# Patient Record
Sex: MALE | Race: WHITE | NOT HISPANIC OR LATINO | Employment: PART TIME | URBAN - METROPOLITAN AREA
[De-identification: names, ages, dates, MRNs, and addresses within clinical notes are randomized per-mention and may not be internally consistent; named-entity substitution may affect disease eponyms.]

---

## 2023-04-05 DIAGNOSIS — I10 ESSENTIAL HYPERTENSION: Primary | ICD-10-CM

## 2023-04-05 RX ORDER — AMLODIPINE BESYLATE 10 MG/1
10 TABLET ORAL DAILY
COMMUNITY
End: 2023-04-05 | Stop reason: SDUPTHER

## 2023-04-05 RX ORDER — AMLODIPINE BESYLATE 10 MG/1
10 TABLET ORAL DAILY
Qty: 90 TABLET | Refills: 1 | Status: SHIPPED | OUTPATIENT
Start: 2023-04-05 | End: 2023-10-12

## 2023-04-18 PROBLEM — I25.10 ASHD (ARTERIOSCLEROTIC HEART DISEASE): Status: ACTIVE | Noted: 2023-04-18

## 2023-04-18 PROBLEM — N52.9 ERECTILE DYSFUNCTION: Status: ACTIVE | Noted: 2023-04-18

## 2023-04-18 PROBLEM — I25.5 ISCHEMIC CARDIOMYOPATHY: Status: ACTIVE | Noted: 2023-04-18

## 2023-04-18 PROBLEM — K74.60 CIRRHOSIS, NONALCOHOLIC (MULTI): Status: ACTIVE | Noted: 2023-04-18

## 2023-04-18 PROBLEM — E11.22 TYPE 2 DIABETES MELLITUS WITH CHRONIC KIDNEY DISEASE, WITHOUT LONG-TERM CURRENT USE OF INSULIN (MULTI): Status: ACTIVE | Noted: 2023-04-18

## 2023-04-18 PROBLEM — K76.6 PORTAL HYPERTENSION WITH ESOPHAGEAL VARICES (MULTI): Status: ACTIVE | Noted: 2023-04-18

## 2023-04-18 PROBLEM — N40.1 ENLARGED PROSTATE WITH LOWER URINARY TRACT SYMPTOMS (LUTS): Status: ACTIVE | Noted: 2023-04-18

## 2023-04-18 PROBLEM — I10 BENIGN ESSENTIAL HYPERTENSION: Status: ACTIVE | Noted: 2023-04-18

## 2023-04-18 PROBLEM — J45.909 ASTHMA (HHS-HCC): Status: ACTIVE | Noted: 2023-04-18

## 2023-04-18 PROBLEM — E11.9 DIABETES MELLITUS (MULTI): Status: ACTIVE | Noted: 2023-04-18

## 2023-04-18 PROBLEM — N18.30 CKD (CHRONIC KIDNEY DISEASE), STAGE III (MULTI): Status: ACTIVE | Noted: 2023-04-18

## 2023-04-18 PROBLEM — I85.00 PORTAL HYPERTENSION WITH ESOPHAGEAL VARICES (MULTI): Status: ACTIVE | Noted: 2023-04-18

## 2023-04-18 RX ORDER — METFORMIN HYDROCHLORIDE 500 MG/1
1000 TABLET, EXTENDED RELEASE ORAL DAILY
COMMUNITY
End: 2023-06-27 | Stop reason: SDUPTHER

## 2023-04-18 RX ORDER — MULTIVITAMIN
1 TABLET ORAL DAILY
COMMUNITY

## 2023-04-18 RX ORDER — DAPAGLIFLOZIN 10 MG/1
1 TABLET, FILM COATED ORAL
COMMUNITY
Start: 2021-05-18

## 2023-04-18 RX ORDER — METOPROLOL SUCCINATE 50 MG/1
50 TABLET, EXTENDED RELEASE ORAL DAILY
COMMUNITY
End: 2023-07-14

## 2023-04-18 RX ORDER — ASPIRIN 81 MG/1
81 TABLET ORAL DAILY
COMMUNITY
End: 2023-12-12

## 2023-04-18 RX ORDER — MAGNESIUM GLUCONATE 27.5 (500)
TABLET ORAL
COMMUNITY
End: 2023-10-04 | Stop reason: SDUPTHER

## 2023-04-18 RX ORDER — ALBUTEROL SULFATE 90 UG/1
AEROSOL, METERED RESPIRATORY (INHALATION)
COMMUNITY
Start: 2014-12-18 | End: 2023-06-29 | Stop reason: SDUPTHER

## 2023-04-18 RX ORDER — PANTOPRAZOLE SODIUM 40 MG/1
1 TABLET, DELAYED RELEASE ORAL DAILY
COMMUNITY
Start: 2023-03-14

## 2023-04-18 RX ORDER — LOSARTAN POTASSIUM 100 MG/1
100 TABLET ORAL DAILY
COMMUNITY
End: 2023-07-14

## 2023-04-18 RX ORDER — ALLOPURINOL 100 MG/1
1 TABLET ORAL DAILY
COMMUNITY
Start: 2019-04-10 | End: 2023-04-18 | Stop reason: WASHOUT

## 2023-04-18 RX ORDER — NITROGLYCERIN 0.4 MG/1
1 TABLET SUBLINGUAL
COMMUNITY
Start: 2013-07-01 | End: 2023-10-12 | Stop reason: WASHOUT

## 2023-04-18 RX ORDER — PROPRANOLOL/HYDROCHLOROTHIAZID 40 MG-25MG
TABLET ORAL
COMMUNITY
End: 2023-10-04 | Stop reason: SDUPTHER

## 2023-04-18 RX ORDER — TADALAFIL 5 MG/1
5 TABLET ORAL DAILY
COMMUNITY
End: 2023-04-19 | Stop reason: WASHOUT

## 2023-04-18 RX ORDER — EPLERENONE 25 MG/1
25 TABLET, FILM COATED ORAL DAILY
COMMUNITY
End: 2023-10-04 | Stop reason: SINTOL

## 2023-04-18 RX ORDER — ATORVASTATIN CALCIUM 80 MG/1
80 TABLET, FILM COATED ORAL DAILY
COMMUNITY

## 2023-04-19 ENCOUNTER — OFFICE VISIT (OUTPATIENT)
Dept: PRIMARY CARE | Facility: CLINIC | Age: 73
End: 2023-04-19
Payer: MEDICARE

## 2023-04-19 VITALS
HEART RATE: 67 BPM | BODY MASS INDEX: 29.54 KG/M2 | TEMPERATURE: 97.5 F | SYSTOLIC BLOOD PRESSURE: 141 MMHG | WEIGHT: 161.5 LBS | DIASTOLIC BLOOD PRESSURE: 65 MMHG

## 2023-04-19 DIAGNOSIS — I85.00 PORTAL HYPERTENSION WITH ESOPHAGEAL VARICES (MULTI): ICD-10-CM

## 2023-04-19 DIAGNOSIS — I10 BENIGN ESSENTIAL HYPERTENSION: ICD-10-CM

## 2023-04-19 DIAGNOSIS — N52.9 ERECTILE DYSFUNCTION, UNSPECIFIED ERECTILE DYSFUNCTION TYPE: ICD-10-CM

## 2023-04-19 DIAGNOSIS — I25.10 ASHD (ARTERIOSCLEROTIC HEART DISEASE): ICD-10-CM

## 2023-04-19 DIAGNOSIS — N18.32 TYPE 2 DIABETES MELLITUS WITH STAGE 3B CHRONIC KIDNEY DISEASE, WITHOUT LONG-TERM CURRENT USE OF INSULIN (MULTI): Primary | ICD-10-CM

## 2023-04-19 DIAGNOSIS — K76.6 PORTAL HYPERTENSION WITH ESOPHAGEAL VARICES (MULTI): ICD-10-CM

## 2023-04-19 DIAGNOSIS — E11.22 TYPE 2 DIABETES MELLITUS WITH STAGE 3B CHRONIC KIDNEY DISEASE, WITHOUT LONG-TERM CURRENT USE OF INSULIN (MULTI): Primary | ICD-10-CM

## 2023-04-19 DIAGNOSIS — M10.9 GOUT, UNSPECIFIED CAUSE, UNSPECIFIED CHRONICITY, UNSPECIFIED SITE: ICD-10-CM

## 2023-04-19 PROCEDURE — 4010F ACE/ARB THERAPY RXD/TAKEN: CPT | Performed by: INTERNAL MEDICINE

## 2023-04-19 PROCEDURE — 1160F RVW MEDS BY RX/DR IN RCRD: CPT | Performed by: INTERNAL MEDICINE

## 2023-04-19 PROCEDURE — 1159F MED LIST DOCD IN RCRD: CPT | Performed by: INTERNAL MEDICINE

## 2023-04-19 PROCEDURE — 1036F TOBACCO NON-USER: CPT | Performed by: INTERNAL MEDICINE

## 2023-04-19 PROCEDURE — 3077F SYST BP >= 140 MM HG: CPT | Performed by: INTERNAL MEDICINE

## 2023-04-19 PROCEDURE — 3078F DIAST BP <80 MM HG: CPT | Performed by: INTERNAL MEDICINE

## 2023-04-19 PROCEDURE — 99215 OFFICE O/P EST HI 40 MIN: CPT | Performed by: INTERNAL MEDICINE

## 2023-04-19 RX ORDER — TADALAFIL 20 MG/1
100 TABLET ORAL AS NEEDED
COMMUNITY
End: 2023-04-19 | Stop reason: SDUPTHER

## 2023-04-19 RX ORDER — TADALAFIL 20 MG/1
20 TABLET ORAL AS NEEDED
Qty: 10 TABLET | Refills: 0 | Status: SHIPPED | OUTPATIENT
Start: 2023-04-19 | End: 2023-04-19

## 2023-04-19 RX ORDER — SILDENAFIL 100 MG/1
TABLET, FILM COATED ORAL
COMMUNITY
End: 2023-04-19 | Stop reason: SDUPTHER

## 2023-04-19 RX ORDER — SILDENAFIL 100 MG/1
100 TABLET, FILM COATED ORAL DAILY
Qty: 30 TABLET | Refills: 0 | Status: SHIPPED | OUTPATIENT
Start: 2023-04-19 | End: 2023-10-04 | Stop reason: SDUPTHER

## 2023-04-19 NOTE — ASSESSMENT & PLAN NOTE
Followed by Dr. Hennessy last seen in November with stable findings recommended continued medical management

## 2023-04-19 NOTE — ASSESSMENT & PLAN NOTE
Recent EGD performed March 2023 showing portal hypertensive gastropathy with negative biopsies.  Recommended pantoprazole 40 mg p.o. daily  Recommended follow-up with Dr. Duran of hepatology

## 2023-04-19 NOTE — PROGRESS NOTES
Subjective     Patient ID: Ritesh Garza is a 72 y.o. male who presents for NEW PT VISIT .  HPI    72-year-old male patient of Dr. Ceja here for establishment of care, last seen November.    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 King last seen in November.  Recent EGD showing portal hypertension with esophageal varices, due to schedule followup appointment.  -Asthma  -BPH with LUTS on daily tadalafil  -DM2 with bilateral neuropathy (followed by podiatry)last A1C 6.6% attributed to Farxiga and metformin 500mg   -SENIA.  Denies a history of SENIA and not interested in further addressing.  -CKD 3  -Systolic CHF with class II heart failure followed by Dr. Hennessy last seen in November on appropriate medical management including eplerenone, Farxiga, losartan, metoprolol and potassium supplementation.  Entresto was withdrawn due to cost.  Recommended 6-month follow-up with cardiology.  -Cognitive and behavioral changes recently seen by neuropsychiatry no significant impairment though with possible frontal lobe process followed by Dr. Andrade  -Right carpal tunnel syndrome pending release surgery recently seen by Dr. De Paz  -Strabismus status post remote eye muscle surgery  -Tubular adenoma April 2020 3 repeat 5 years  - Gout on allopurinol 100mg last flare ages ago.   - ED on sildenafil and tadalafil       ROS:  Review of systems as stated above, otherwise unremarkable.    Social:   - Reading industrial products for many years.   - Lives at home with wife, member of [employer]. Has a son in [place] has a grandson who he has not seen yet.    Objective   Physical Exam  General: Alert and oriented, in no apparent distress   HEENT: No conjunctival erythema, no external facial lesions   Lungs: Breathing comfortably  Skin: No evidence of skin  breakdown.  Neuro: AAO x 3, answering questions appropriately, no obvious cranial nerve deficits      Assessment/Plan   Problem List Items Addressed This Visit          Circulatory    ASHD (arteriosclerotic heart disease)     Followed by Dr. Rodrigues last seen in November with stable findings recommended continued medical management         Relevant Medications    metoprolol succinate XL (Toprol-XL) 50 mg 24 hr tablet    nitroglycerin (Nitrostat) 0.4 mg SL tablet    tadalafil 20 mg tablet    sildenafil (Viagra) 100 mg tablet    Benign essential hypertension     BP readings elevated today on repeat measurement. Will recheck at home, asked to bring his home BP cuff as he reports it being inaccurate.   Advised discontinuation of measurement of wrist cuff if possible.         Portal hypertension with esophageal varices (CMS/HCC)     Recent EGD performed March 2023 showing portal hypertensive gastropathy with negative biopsies.  Recommended pantoprazole 40 mg p.o. daily  Recommended follow-up with Dr. Duran of hepatology            Genitourinary    Erectile dysfunction     Uses Tadalafil as needed, sildenafil for BPH, does not use concurrently.          Relevant Medications    tadalafil 20 mg tablet    sildenafil (Viagra) 100 mg tablet       Endocrine/Metabolic    Type 2 diabetes mellitus with chronic kidney disease, without long-term current use of insulin (CMS/HCC) - Primary     On losartan 100 mg, Farxiga 10 mg  Renal function last checked in September stable, will continue to monitor, screen for nephropathy   Last A1C 6.6% with improvement after initiation of Farxiga.             Other    Gout     On allopurinol 100mg, last flare many years ago.           Followup 3 months for AWV

## 2023-04-19 NOTE — ASSESSMENT & PLAN NOTE
On losartan 100 mg, Farxiga 10 mg  Renal function last checked in September stable, will continue to monitor, screen for nephropathy   Last A1C 6.6% with improvement after initiation of Farxiga.

## 2023-04-19 NOTE — ASSESSMENT & PLAN NOTE
BP readings elevated today on repeat measurement. Will recheck at home, asked to bring his home BP cuff as he reports it being inaccurate.   Advised discontinuation of measurement of wrist cuff if possible.

## 2023-04-19 NOTE — ASSESSMENT & PLAN NOTE
Followed by Dr. King has had AFP and ultrasound performed in November performed in November with stable findings.  Recent EGD showing portal hypertension with esophageal varices  Maintained on daily PPI, will schedule followup appointment.

## 2023-04-19 NOTE — ASSESSMENT & PLAN NOTE
Uses Tadalafil as needed, sildenafil for BPH??  does not use concurrently. Need for further discussion at next visit.

## 2023-04-28 DIAGNOSIS — N40.1 BENIGN PROSTATIC HYPERPLASIA WITH LOWER URINARY TRACT SYMPTOMS, SYMPTOM DETAILS UNSPECIFIED: ICD-10-CM

## 2023-04-28 DIAGNOSIS — N52.9 ERECTILE DYSFUNCTION, UNSPECIFIED ERECTILE DYSFUNCTION TYPE: Primary | ICD-10-CM

## 2023-04-28 RX ORDER — TADALAFIL 5 MG/1
5 TABLET ORAL DAILY
Qty: 90 TABLET | Refills: 0 | Status: SHIPPED | OUTPATIENT
Start: 2023-04-28 | End: 2023-07-25

## 2023-05-04 LAB
ALANINE AMINOTRANSFERASE (SGPT) (U/L) IN SER/PLAS: 15 U/L (ref 10–52)
ALBUMIN (G/DL) IN SER/PLAS: 4.4 G/DL (ref 3.4–5)
ALKALINE PHOSPHATASE (U/L) IN SER/PLAS: 94 U/L (ref 33–136)
ANION GAP IN SER/PLAS: 16 MMOL/L (ref 10–20)
ASPARTATE AMINOTRANSFERASE (SGOT) (U/L) IN SER/PLAS: 16 U/L (ref 9–39)
BILIRUBIN TOTAL (MG/DL) IN SER/PLAS: 0.6 MG/DL (ref 0–1.2)
CALCIUM (MG/DL) IN SER/PLAS: 9.7 MG/DL (ref 8.6–10.6)
CARBON DIOXIDE, TOTAL (MMOL/L) IN SER/PLAS: 24 MMOL/L (ref 21–32)
CHLORIDE (MMOL/L) IN SER/PLAS: 108 MMOL/L (ref 98–107)
CHOLESTEROL (MG/DL) IN SER/PLAS: 143 MG/DL (ref 0–199)
CHOLESTEROL IN HDL (MG/DL) IN SER/PLAS: 37.1 MG/DL
CHOLESTEROL/HDL RATIO: 3.9
CREATININE (MG/DL) IN SER/PLAS: 1.76 MG/DL (ref 0.5–1.3)
ESTIMATED AVERAGE GLUCOSE FOR HBA1C: 148 MG/DL
GFR MALE: 40 ML/MIN/1.73M2
GLUCOSE (MG/DL) IN SER/PLAS: 180 MG/DL (ref 74–99)
HEMOGLOBIN A1C/HEMOGLOBIN TOTAL IN BLOOD: 6.8 %
LDL: 56 MG/DL (ref 0–99)
NON HDL CHOLESTEROL: 106 MG/DL
POTASSIUM (MMOL/L) IN SER/PLAS: 5.2 MMOL/L (ref 3.5–5.3)
PROTEIN TOTAL: 7.5 G/DL (ref 6.4–8.2)
SODIUM (MMOL/L) IN SER/PLAS: 143 MMOL/L (ref 136–145)
TRIGLYCERIDE (MG/DL) IN SER/PLAS: 252 MG/DL (ref 0–149)
UREA NITROGEN (MG/DL) IN SER/PLAS: 26 MG/DL (ref 6–23)
VLDL: 50 MG/DL (ref 0–40)

## 2023-05-05 LAB — NATRIURETIC PEPTIDE B (PG/ML) IN SER/PLAS: 203 PG/ML (ref 0–99)

## 2023-06-26 DIAGNOSIS — Z00.00 ENCOUNTER FOR GENERAL ADULT MEDICAL EXAMINATION WITHOUT ABNORMAL FINDINGS: ICD-10-CM

## 2023-06-26 RX ORDER — ALLOPURINOL 100 MG/1
TABLET ORAL
Qty: 90 TABLET | Refills: 1 | Status: SHIPPED | OUTPATIENT
Start: 2023-06-26 | End: 2023-10-12

## 2023-06-27 DIAGNOSIS — E11.22 TYPE 2 DIABETES MELLITUS WITH STAGE 3B CHRONIC KIDNEY DISEASE, WITHOUT LONG-TERM CURRENT USE OF INSULIN (MULTI): Primary | ICD-10-CM

## 2023-06-27 DIAGNOSIS — N18.32 TYPE 2 DIABETES MELLITUS WITH STAGE 3B CHRONIC KIDNEY DISEASE, WITHOUT LONG-TERM CURRENT USE OF INSULIN (MULTI): Primary | ICD-10-CM

## 2023-06-27 RX ORDER — METFORMIN HYDROCHLORIDE 500 MG/1
1000 TABLET, EXTENDED RELEASE ORAL DAILY
Qty: 180 TABLET | Refills: 1 | Status: SHIPPED | OUTPATIENT
Start: 2023-06-27 | End: 2023-10-12

## 2023-06-29 DIAGNOSIS — J45.30 MILD PERSISTENT ASTHMA, UNSPECIFIED WHETHER COMPLICATED (HHS-HCC): Primary | ICD-10-CM

## 2023-06-29 RX ORDER — ALBUTEROL SULFATE 90 UG/1
1 AEROSOL, METERED RESPIRATORY (INHALATION)
Qty: 18 G | Refills: 1 | Status: SHIPPED | OUTPATIENT
Start: 2023-06-29 | End: 2023-10-10 | Stop reason: WASHOUT

## 2023-07-13 DIAGNOSIS — I10 ESSENTIAL (PRIMARY) HYPERTENSION: ICD-10-CM

## 2023-07-14 RX ORDER — METOPROLOL SUCCINATE 50 MG/1
TABLET, EXTENDED RELEASE ORAL
Qty: 90 TABLET | Refills: 3 | Status: ON HOLD | OUTPATIENT
Start: 2023-07-14 | End: 2024-02-23 | Stop reason: SDUPTHER

## 2023-07-14 RX ORDER — LOSARTAN POTASSIUM 100 MG/1
TABLET ORAL
Qty: 90 TABLET | Refills: 3 | Status: SHIPPED | OUTPATIENT
Start: 2023-07-14 | End: 2023-10-04 | Stop reason: SINTOL

## 2023-07-20 ENCOUNTER — APPOINTMENT (OUTPATIENT)
Dept: PRIMARY CARE | Facility: CLINIC | Age: 73
End: 2023-07-20
Payer: MEDICARE

## 2023-07-23 DIAGNOSIS — N52.9 ERECTILE DYSFUNCTION, UNSPECIFIED ERECTILE DYSFUNCTION TYPE: ICD-10-CM

## 2023-07-23 DIAGNOSIS — N40.1 BENIGN PROSTATIC HYPERPLASIA WITH LOWER URINARY TRACT SYMPTOMS, SYMPTOM DETAILS UNSPECIFIED: ICD-10-CM

## 2023-07-25 RX ORDER — TADALAFIL 5 MG/1
5 TABLET ORAL DAILY
Qty: 90 TABLET | Refills: 0 | Status: SHIPPED | OUTPATIENT
Start: 2023-07-25 | End: 2023-10-12

## 2023-07-31 LAB
ALANINE AMINOTRANSFERASE (SGPT) (U/L) IN SER/PLAS: 23 U/L (ref 10–52)
ALBUMIN (G/DL) IN SER/PLAS: 4.4 G/DL (ref 3.4–5)
ALKALINE PHOSPHATASE (U/L) IN SER/PLAS: 84 U/L (ref 33–136)
ALPHA-1 FETOPROTEIN (NG/ML) IN SER/PLAS: <4 NG/ML (ref 0–9)
ANION GAP IN SER/PLAS: 13 MMOL/L (ref 10–20)
ASPARTATE AMINOTRANSFERASE (SGOT) (U/L) IN SER/PLAS: 18 U/L (ref 9–39)
BASOPHILS (10*3/UL) IN BLOOD BY AUTOMATED COUNT: 0.04 X10E9/L (ref 0–0.1)
BASOPHILS/100 LEUKOCYTES IN BLOOD BY AUTOMATED COUNT: 0.4 % (ref 0–2)
BILIRUBIN DIRECT (MG/DL) IN SER/PLAS: 0.1 MG/DL (ref 0–0.3)
BILIRUBIN TOTAL (MG/DL) IN SER/PLAS: 0.5 MG/DL (ref 0–1.2)
CALCIUM (MG/DL) IN SER/PLAS: 8.9 MG/DL (ref 8.6–10.3)
CARBON DIOXIDE, TOTAL (MMOL/L) IN SER/PLAS: 24 MMOL/L (ref 21–32)
CHLORIDE (MMOL/L) IN SER/PLAS: 108 MMOL/L (ref 98–107)
CHOLESTEROL (MG/DL) IN SER/PLAS: 128 MG/DL (ref 0–199)
CHOLESTEROL IN HDL (MG/DL) IN SER/PLAS: 32.3 MG/DL
CHOLESTEROL/HDL RATIO: 4
CREATININE (MG/DL) IN SER/PLAS: 1.87 MG/DL (ref 0.5–1.3)
EOSINOPHILS (10*3/UL) IN BLOOD BY AUTOMATED COUNT: 0.1 X10E9/L (ref 0–0.4)
EOSINOPHILS/100 LEUKOCYTES IN BLOOD BY AUTOMATED COUNT: 1 % (ref 0–6)
ERYTHROCYTE DISTRIBUTION WIDTH (RATIO) BY AUTOMATED COUNT: 12.5 % (ref 11.5–14.5)
ERYTHROCYTE MEAN CORPUSCULAR HEMOGLOBIN CONCENTRATION (G/DL) BY AUTOMATED: 32.8 G/DL (ref 32–36)
ERYTHROCYTE MEAN CORPUSCULAR VOLUME (FL) BY AUTOMATED COUNT: 93 FL (ref 80–100)
ERYTHROCYTES (10*6/UL) IN BLOOD BY AUTOMATED COUNT: 4.49 X10E12/L (ref 4.5–5.9)
ESTIMATED AVERAGE GLUCOSE FOR HBA1C: 154 MG/DL
FOLLITROPIN (IU/L) IN SER/PLAS: 11.3 IU/L
GFR MALE: 37 ML/MIN/1.73M2
GLUCOSE (MG/DL) IN SER/PLAS: 123 MG/DL (ref 74–99)
HEMATOCRIT (%) IN BLOOD BY AUTOMATED COUNT: 41.8 % (ref 41–52)
HEMOGLOBIN (G/DL) IN BLOOD: 13.7 G/DL (ref 13.5–17.5)
HEMOGLOBIN A1C/HEMOGLOBIN TOTAL IN BLOOD: 7 %
IMMATURE GRANULOCYTES/100 LEUKOCYTES IN BLOOD BY AUTOMATED COUNT: 0.4 % (ref 0–0.9)
INR IN PPP BY COAGULATION ASSAY: 1.1 (ref 0.9–1.1)
LDL: 57 MG/DL (ref 0–99)
LEUKOCYTES (10*3/UL) IN BLOOD BY AUTOMATED COUNT: 9.9 X10E9/L (ref 4.4–11.3)
LUTEINIZING HORMONE (IU/ML) IN SER/PLAS: 8.5 IU/L
LYMPHOCYTES (10*3/UL) IN BLOOD BY AUTOMATED COUNT: 2.5 X10E9/L (ref 0.8–3)
LYMPHOCYTES/100 LEUKOCYTES IN BLOOD BY AUTOMATED COUNT: 25.2 % (ref 13–44)
MONOCYTES (10*3/UL) IN BLOOD BY AUTOMATED COUNT: 0.77 X10E9/L (ref 0.05–0.8)
MONOCYTES/100 LEUKOCYTES IN BLOOD BY AUTOMATED COUNT: 7.8 % (ref 2–10)
NEUTROPHILS (10*3/UL) IN BLOOD BY AUTOMATED COUNT: 6.47 X10E9/L (ref 1.6–5.5)
NEUTROPHILS/100 LEUKOCYTES IN BLOOD BY AUTOMATED COUNT: 65.2 % (ref 40–80)
PLATELETS (10*3/UL) IN BLOOD AUTOMATED COUNT: 203 X10E9/L (ref 150–450)
POTASSIUM (MMOL/L) IN SER/PLAS: 6.5 MMOL/L (ref 3.5–5.3)
PROLACTIN (UG/L) IN SER/PLAS: 10.5 UG/L (ref 2–18)
PROTEIN TOTAL: 6.8 G/DL (ref 6.4–8.2)
PROTHROMBIN TIME (PT) IN PPP BY COAGULATION ASSAY: 12.5 SEC (ref 9.8–12.8)
SODIUM (MMOL/L) IN SER/PLAS: 138 MMOL/L (ref 136–145)
TRIGLYCERIDE (MG/DL) IN SER/PLAS: 192 MG/DL (ref 0–149)
UREA NITROGEN (MG/DL) IN SER/PLAS: 31 MG/DL (ref 6–23)
VLDL: 38 MG/DL (ref 0–40)

## 2023-07-31 NOTE — PROGRESS NOTES
Subjective   Patient ID: Ritesh Garza is a 72 y.o. male who presents for Follow-up and Medicare Annual Wellness Visit Subsequent.  HPI  Patient here for follow-up visit, last seen for establishment of care in April.  He is due for his annual wellness visit.  - Bloodwork has been noted to have severe hyperkalemia to 6.5, recommended by Dr. Duran to present to the ED but stated he was coming in today. Has been chronically taking OTC potassium supplements for periodic leg cramps.     Past medical history  -CAD s/p 5V CABG 1992 on aspirin, last nitroglycerin use very rare.  Followed by Dr Hennessy.   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 Duran last seen earlier this month.  Recent EGD showing portal hypertension with esophageal varices, recommended 6-month follow-up  -Erosive esophagitis on PPI on pantoprazole   -Asthma mild and seasonal - has rare use of albuterol  -BPH with LUTS on daily tadalafil (cialis) 5mg   -DM2 with bilateral neuropathy (followed by podiatry)last A1C yesterday at 7% on Farxiga and metformin 500mg   -SENIA.  Denies a history of SENIA and not interested in further addressing.  -CKD 3  -Systolic CHF with class II heart failure followed by Dr. Hennessy last seen in May on appropriate medical management including eplerenone, Farxiga, losartan, metoprolol and potassium supplementation.  Entresto was withdrawn due to cost.  -Cognitive and behavioral changes recently seen by neuropsychiatry no significant impairment though with possible frontal lobe process followed by Dr. Andrade  -Right carpal tunnel syndrome pending release surgery recently seen by Dr. De Paz in May recommended as needed follow-up  -Strabismus status post remote eye muscle surgery  -Tubular adenoma April 2020 3 repeat 5 years  - Gout on allopurinol 100mg last flare ages ago.   - ED on sildenafil  5 mg daily and 100 mg as  needed, seen by urology Dr. Ibarra Last seen in June recommended follow-up in 2 months upcoming appt scheduled 8/28   -Cognitive impairment-followed by neuropsychiatry last seen in April recommended continued counseling and psychotherapy and consideration for further work-up  - Hearing loss not compliant with hearing aids     Working not at work but on workingSocial:   - [employer] for many years.   - Lives at home with wife, member of [employer]. Has a son in [place] has a grandson who he has not seen yet.      Current Outpatient Medications   Medication Instructions    albuterol (ProAir HFA) 90 mcg/actuation inhaler 1 puff, inhalation, 3 times daily RT    allopurinol (Zyloprim) 100 mg tablet TAKE 1 TABLET BY MOUTH ONCE DAILY    amLODIPine (NORVASC) 10 mg, oral, Daily    aspirin 81 mg, oral, Daily    atorvastatin (LIPITOR) 80 mg, oral, Daily    dapagliflozin (Farxiga) 10 mg 1 tablet, oral, Daily before breakfast    eplerenone (INSPRA) 25 mg, oral, Daily    fish oil concentrate (Omega-3) 120-180 mg capsule oral    losartan (Cozaar) 100 mg tablet TAKE 1 TABLET BY MOUTH ONCE DAILY.    magnesium gluconate (Magonate) 27.5 mg magne- sium (500 mg) tablet oral    metFORMIN XR (GLUCOPHAGE-XR) 1,000 mg, oral, Daily    metoprolol succinate XL (Toprol-XL) 50 mg 24 hr tablet TAKE 1 TABLET BY MOUTH EVERY DAY    multivitamin tablet 1 tablet, oral, Daily    nitroglycerin (Nitrostat) 0.4 mg SL tablet sublingual    pantoprazole (ProtoNix) 40 mg EC tablet 1 tablet, oral, Daily    sildenafil (VIAGRA) 100 mg, oral, Daily    tadalafil (CIALIS) 5 mg, oral, Daily    turmeric-turmeric root extract 450-50 mg capsule oral    ubidecarenone (COENZYME Q10, BULK, MISC) oral, Daily RT        Objective     /75   Pulse 67   Temp 36.4 °C (97.6 °F)   Wt 75.9 kg (167 lb 4 oz)   BMI 30.59 kg/m²     Physical Exam  General: Appears comfortable, NAD, appropriate affect accompanied by his wife   HEENT: NCAT, EOMI,  pupils symmetric, no conjunctival erythema   Neck: Supple, no LAD   Heart: RRR S1 S2 no murmurs appreciated   Lungs: CTA bilaterally, no rhonchi, rales, or wheezes   Abdomen: Soft, NT/ND, no rebound or guarding, NABS   Extremities: no cyanosis or edema appreciated  Neuro: AAO x 3, answers questions appropriately, no FND, gait unremarkable      Assessment/Plan   Problem List Items Addressed This Visit       Asthma     Mild intermittent rare use of albuterol         Benign essential hypertension     Elevated in the setting of discontinuation of antihypertensive regimen, will recheck at next visit after ensuring potassium stability          Cirrhosis, nonalcoholic (CMS/HCC)     Appropriate followup with Dr. Duran, [id number] aníbal and AFPs UTD   EGD UTD          CKD (chronic kidney disease), stage III (CMS/HCC)     On appropriate medication regimen, quantify proteinuria may qualify for fineronone. Declines nephrology consultation          Type 2 diabetes mellitus with chronic kidney disease, without long-term current use of insulin (CMS/HCC)     On Farxiga and metformin, A1C stable. Screen for albuminuria.   Podiatry scheduled today, encourage to schedule with optho          Erectile dysfunction     On sildenafil and tadalafil deemed appropriate followed by urology upcoming appointment scheduled.           Other Visit Diagnoses       Hyperkalemia    -  Primary   EKG today, stop epleronone and losartan, recheck BMP.   EKG largely unchanged since last checking, no peaked T waves present.   Advised to discontinue any potassium containing supplements, stop potassium sparing diuretic and losartan.  Warning signs reviewed.     Hearing loss-encouraged the use of hearing aids     Health Maintenance   Cancer screening:   - Colonoscopy 3/20 3 repeat 5 years  - PSA followed by urology  Immunizations; UTD     Followup 3 months   Patient was identified as a fall risk. Risk prevention instructions provided.

## 2023-08-01 ENCOUNTER — LAB (OUTPATIENT)
Dept: LAB | Facility: LAB | Age: 73
End: 2023-08-01
Payer: MEDICARE

## 2023-08-01 ENCOUNTER — OFFICE VISIT (OUTPATIENT)
Dept: PRIMARY CARE | Facility: CLINIC | Age: 73
End: 2023-08-01
Payer: MEDICARE

## 2023-08-01 VITALS
DIASTOLIC BLOOD PRESSURE: 75 MMHG | SYSTOLIC BLOOD PRESSURE: 151 MMHG | HEART RATE: 67 BPM | TEMPERATURE: 97.6 F | BODY MASS INDEX: 30.59 KG/M2 | WEIGHT: 167.25 LBS

## 2023-08-01 DIAGNOSIS — J45.20 MILD INTERMITTENT ASTHMA, UNSPECIFIED WHETHER COMPLICATED (HHS-HCC): ICD-10-CM

## 2023-08-01 DIAGNOSIS — E11.22 TYPE 2 DIABETES MELLITUS WITH STAGE 3B CHRONIC KIDNEY DISEASE, WITHOUT LONG-TERM CURRENT USE OF INSULIN (MULTI): ICD-10-CM

## 2023-08-01 DIAGNOSIS — N52.9 ERECTILE DYSFUNCTION, UNSPECIFIED ERECTILE DYSFUNCTION TYPE: ICD-10-CM

## 2023-08-01 DIAGNOSIS — E87.5 HYPERKALEMIA: ICD-10-CM

## 2023-08-01 DIAGNOSIS — E87.5 HYPERKALEMIA: Primary | ICD-10-CM

## 2023-08-01 DIAGNOSIS — N18.32 TYPE 2 DIABETES MELLITUS WITH STAGE 3B CHRONIC KIDNEY DISEASE, WITHOUT LONG-TERM CURRENT USE OF INSULIN (MULTI): ICD-10-CM

## 2023-08-01 DIAGNOSIS — N18.32 STAGE 3B CHRONIC KIDNEY DISEASE (MULTI): ICD-10-CM

## 2023-08-01 DIAGNOSIS — K74.60 CIRRHOSIS, NONALCOHOLIC (MULTI): ICD-10-CM

## 2023-08-01 DIAGNOSIS — I10 BENIGN ESSENTIAL HYPERTENSION: ICD-10-CM

## 2023-08-01 LAB
ALBUMIN (MG/L) IN URINE: 170.7 MG/L
ALBUMIN/CREATININE (UG/MG) IN URINE: 278.5 UG/MG CRT (ref 0–30)
ANION GAP IN SER/PLAS: 18 MMOL/L (ref 10–20)
CALCIUM (MG/DL) IN SER/PLAS: 9.5 MG/DL (ref 8.6–10.6)
CARBON DIOXIDE, TOTAL (MMOL/L) IN SER/PLAS: 22 MMOL/L (ref 21–32)
CHLORIDE (MMOL/L) IN SER/PLAS: 105 MMOL/L (ref 98–107)
CREATININE (MG/DL) IN SER/PLAS: 1.94 MG/DL (ref 0.5–1.3)
CREATININE (MG/DL) IN URINE: 61.3 MG/DL (ref 20–370)
GFR MALE: 35 ML/MIN/1.73M2
GLUCOSE (MG/DL) IN SER/PLAS: 142 MG/DL (ref 74–99)
POTASSIUM (MMOL/L) IN SER/PLAS: 5.7 MMOL/L (ref 3.5–5.3)
SODIUM (MMOL/L) IN SER/PLAS: 139 MMOL/L (ref 136–145)
UREA NITROGEN (MG/DL) IN SER/PLAS: 31 MG/DL (ref 6–23)

## 2023-08-01 PROCEDURE — 4010F ACE/ARB THERAPY RXD/TAKEN: CPT | Performed by: INTERNAL MEDICINE

## 2023-08-01 PROCEDURE — 3051F HG A1C>EQUAL 7.0%<8.0%: CPT | Performed by: INTERNAL MEDICINE

## 2023-08-01 PROCEDURE — 1159F MED LIST DOCD IN RCRD: CPT | Performed by: INTERNAL MEDICINE

## 2023-08-01 PROCEDURE — 1126F AMNT PAIN NOTED NONE PRSNT: CPT | Performed by: INTERNAL MEDICINE

## 2023-08-01 PROCEDURE — 1160F RVW MEDS BY RX/DR IN RCRD: CPT | Performed by: INTERNAL MEDICINE

## 2023-08-01 PROCEDURE — 82570 ASSAY OF URINE CREATININE: CPT

## 2023-08-01 PROCEDURE — G0439 PPPS, SUBSEQ VISIT: HCPCS | Performed by: INTERNAL MEDICINE

## 2023-08-01 PROCEDURE — 99214 OFFICE O/P EST MOD 30 MIN: CPT | Performed by: INTERNAL MEDICINE

## 2023-08-01 PROCEDURE — 3077F SYST BP >= 140 MM HG: CPT | Performed by: INTERNAL MEDICINE

## 2023-08-01 PROCEDURE — 80048 BASIC METABOLIC PNL TOTAL CA: CPT

## 2023-08-01 PROCEDURE — 93000 ELECTROCARDIOGRAM COMPLETE: CPT | Performed by: INTERNAL MEDICINE

## 2023-08-01 PROCEDURE — 1170F FXNL STATUS ASSESSED: CPT | Performed by: INTERNAL MEDICINE

## 2023-08-01 PROCEDURE — 1036F TOBACCO NON-USER: CPT | Performed by: INTERNAL MEDICINE

## 2023-08-01 PROCEDURE — 36415 COLL VENOUS BLD VENIPUNCTURE: CPT

## 2023-08-01 PROCEDURE — 82043 UR ALBUMIN QUANTITATIVE: CPT

## 2023-08-01 PROCEDURE — 3078F DIAST BP <80 MM HG: CPT | Performed by: INTERNAL MEDICINE

## 2023-08-01 ASSESSMENT — ACTIVITIES OF DAILY LIVING (ADL)
TAKING_MEDICATION: INDEPENDENT
GROCERY_SHOPPING: INDEPENDENT
BATHING: INDEPENDENT
DRESSING: INDEPENDENT
MANAGING_FINANCES: NEEDS ASSISTANCE
DOING_HOUSEWORK: INDEPENDENT

## 2023-08-01 ASSESSMENT — PATIENT HEALTH QUESTIONNAIRE - PHQ9
2. FEELING DOWN, DEPRESSED OR HOPELESS: NOT AT ALL
1. LITTLE INTEREST OR PLEASURE IN DOING THINGS: NOT AT ALL
SUM OF ALL RESPONSES TO PHQ9 QUESTIONS 1 AND 2: 0

## 2023-08-01 NOTE — PATIENT INSTRUCTIONS
It was a pleasure to see you today! Here is a list of things we have discussed and to follow up on:   STOP any potassium containing supplements   STOP epleronone and losartan  I have ordered blood and/or urine tests for you to do today. The lab can be found on this floor (2nd floor) next to the pharmacy across from the elevators.    I will be in touch with you when and if to restart these medications.   Schedule a visit with the ophthalmologist for your diabetic eye exam.   Followup with me in 3 months.        Ways to Help Prevent Falls at Home    Quick Tips   ? Ask for help if you need it. Most people want to help!   ? Get up slowly after sitting or laying down   ? Wear a medical alert device or keep cell phone in your pocket   ? Use night lights, especially areas near a bathroom   ? Keep the items you use often within reach on a small stool or end table   ? Use an assistive device such as walker or cane, as directed by provider/physical therapy   ? Use a non-slip mat and grab bars in your bathroom. Look for home health sections for best options     Other Areas to Focus On   ? Exercise and nutrition: Regular exercise or taking a falls prevention class are great ways improve strength and balance. Don’t forget to stay hydrated and bring a snack!   ? Medicine side effects: Some medicines can make you sleepy or dizzy, which could cause a fall. Ask your healthcare provider about the side effects your medicines could cause. Be sure to let them know if you take any vitamins or supplements as well.   ? Tripping hazards: Remove items you could trip on, such as loose mats, rugs, cords, and clutter. Wear closed toe shoes with rubber soles.   ? Health and wellness: Get regular checkups with your healthcare provider, plus routine vision and hearing screenings. Talk with your healthcare provider about:   o Your medicines and the possible side effects - bring them in a bag if that is easier!   o Problems with balance or feeling  dizzy   o Ways to promote bone health, such as Vitamin D and calcium supplements   o Questions or concerns about falling     *Ask your healthcare team if you have questions     ©[facility], 2022

## 2023-08-01 NOTE — ASSESSMENT & PLAN NOTE
Elevated in the setting of discontinuation of antihypertensive regimen, will recheck at next visit after ensuring potassium stability

## 2023-08-01 NOTE — ASSESSMENT & PLAN NOTE
On appropriate medication regimen, quantify proteinuria may qualify for fineronone. Declines nephrology consultation

## 2023-08-01 NOTE — ASSESSMENT & PLAN NOTE
On Farxiga and metformin, A1C stable. Screen for albuminuria.   Podiatry scheduled today, encourage to schedule with optho

## 2023-08-04 ENCOUNTER — LAB (OUTPATIENT)
Dept: LAB | Facility: LAB | Age: 73
End: 2023-08-04
Payer: MEDICARE

## 2023-08-04 DIAGNOSIS — E87.5 HYPERKALEMIA: ICD-10-CM

## 2023-08-04 DIAGNOSIS — E87.5 HYPERKALEMIA: Primary | ICD-10-CM

## 2023-08-04 LAB
ANION GAP IN SER/PLAS: 16 MMOL/L (ref 10–20)
CALCIUM (MG/DL) IN SER/PLAS: 9 MG/DL (ref 8.6–10.3)
CARBON DIOXIDE, TOTAL (MMOL/L) IN SER/PLAS: 21 MMOL/L (ref 21–32)
CHLORIDE (MMOL/L) IN SER/PLAS: 108 MMOL/L (ref 98–107)
CREATININE (MG/DL) IN SER/PLAS: 1.93 MG/DL (ref 0.5–1.3)
GFR MALE: 36 ML/MIN/1.73M2
GLUCOSE (MG/DL) IN SER/PLAS: 175 MG/DL (ref 74–99)
POTASSIUM (MMOL/L) IN SER/PLAS: 4.7 MMOL/L (ref 3.5–5.3)
SODIUM (MMOL/L) IN SER/PLAS: 140 MMOL/L (ref 136–145)
UREA NITROGEN (MG/DL) IN SER/PLAS: 30 MG/DL (ref 6–23)

## 2023-08-04 PROCEDURE — 80048 BASIC METABOLIC PNL TOTAL CA: CPT

## 2023-08-04 PROCEDURE — 36415 COLL VENOUS BLD VENIPUNCTURE: CPT

## 2023-08-21 PROBLEM — K20.90 ESOPHAGITIS: Status: ACTIVE | Noted: 2023-08-21

## 2023-08-21 PROBLEM — R20.0 NUMBNESS AND TINGLING IN BOTH HANDS: Status: ACTIVE | Noted: 2023-08-21

## 2023-08-21 PROBLEM — R41.89 COGNITIVE CHANGES: Status: ACTIVE | Noted: 2023-08-21

## 2023-08-21 PROBLEM — M25.559 PAIN IN HIP JOINT: Status: ACTIVE | Noted: 2023-08-21

## 2023-08-21 PROBLEM — R93.89: Status: ACTIVE | Noted: 2023-08-21

## 2023-08-21 PROBLEM — R20.2 NUMBNESS AND TINGLING IN BOTH HANDS: Status: ACTIVE | Noted: 2023-08-21

## 2023-08-21 PROBLEM — M1A.9XX0 CHRONIC GOUT: Status: ACTIVE | Noted: 2023-08-21

## 2023-08-21 PROBLEM — I12.9 HYPERTENSIVE CHRONIC KIDNEY DISEASE W STG 1-4/UNSP CHR KDNY: Status: ACTIVE | Noted: 2021-11-24

## 2023-08-21 PROBLEM — I25.2 HISTORY OF ACUTE INFERIOR WALL MI: Status: ACTIVE | Noted: 2023-08-21

## 2023-08-21 PROBLEM — K76.0 NON-ALCOHOLIC FATTY LIVER DISEASE: Status: ACTIVE | Noted: 2023-08-21

## 2023-08-21 PROBLEM — G56.03 CARPAL TUNNEL SYNDROME, BILATERAL UPPER LIMBS: Status: ACTIVE | Noted: 2023-08-21

## 2023-08-21 PROBLEM — Z79.84 LONG TERM (CURRENT) USE OF ORAL HYPOGLYCEMIC DRUGS: Status: ACTIVE | Noted: 2021-11-24

## 2023-08-21 PROBLEM — R46.89 BEHAVIORAL CHANGE: Status: ACTIVE | Noted: 2023-08-21

## 2023-08-21 PROBLEM — M70.70 BURSITIS OF HIP: Status: ACTIVE | Noted: 2023-08-21

## 2023-08-21 PROBLEM — Z95.1 PRESENCE OF AORTOCORONARY BYPASS GRAFT: Status: ACTIVE | Noted: 2021-11-24

## 2023-08-21 PROBLEM — E66.9 OBESITY: Status: ACTIVE | Noted: 2023-08-21

## 2023-08-21 PROBLEM — R94.02 ABNORMAL BRAIN SCAN: Status: ACTIVE | Noted: 2023-08-21

## 2023-08-21 PROBLEM — G31.84 MILD COGNITIVE IMPAIRMENT: Status: ACTIVE | Noted: 2023-08-21

## 2023-08-21 PROBLEM — I51.89 LEFT VENTRICULAR SYSTOLIC DYSFUNCTION, NYHA CLASS 1: Status: ACTIVE | Noted: 2023-08-21

## 2023-08-21 PROBLEM — N40.0 BPH (BENIGN PROSTATIC HYPERPLASIA): Status: ACTIVE | Noted: 2023-08-21

## 2023-08-21 PROBLEM — G45.9 TIA (TRANSIENT ISCHEMIC ATTACK): Status: ACTIVE | Noted: 2023-08-21

## 2023-08-21 PROBLEM — R40.4 RECURRENT EPISODES OF UNRESPONSIVENESS: Status: ACTIVE | Noted: 2023-08-21

## 2023-08-21 PROBLEM — N52.9 ORGANIC IMPOTENCE: Status: ACTIVE | Noted: 2023-08-21

## 2023-08-21 PROBLEM — F09 COGNITIVE DYSFUNCTION: Status: ACTIVE | Noted: 2023-08-21

## 2023-08-21 PROBLEM — I25.10 ATHSCL HEART DISEASE OF NATIVE CORONARY ARTERY W/O ANG PCTRS: Status: ACTIVE | Noted: 2021-11-24

## 2023-08-21 PROBLEM — R55 SYNCOPE AND COLLAPSE: Status: ACTIVE | Noted: 2023-08-21

## 2023-08-21 PROBLEM — K21.9 GERD (GASTROESOPHAGEAL REFLUX DISEASE): Status: ACTIVE | Noted: 2023-08-21

## 2023-08-21 PROBLEM — D12.6 ADENOMATOUS COLON POLYP: Status: ACTIVE | Noted: 2023-08-21

## 2023-08-21 RX ORDER — FAMOTIDINE 20 MG/1
1 TABLET, FILM COATED ORAL DAILY
COMMUNITY
Start: 2021-11-01 | End: 2023-10-04 | Stop reason: SDUPTHER

## 2023-08-21 RX ORDER — PROPRANOLOL/HYDROCHLOROTHIAZID 40 MG-25MG
TABLET ORAL
COMMUNITY

## 2023-08-21 RX ORDER — HYDROCODONE BITARTRATE AND ACETAMINOPHEN 5; 325 MG/1; MG/1
1 TABLET ORAL EVERY 6 HOURS PRN
COMMUNITY
Start: 2023-05-03 | End: 2023-10-04 | Stop reason: ALTCHOICE

## 2023-08-21 RX ORDER — SODIUM BICARBONATE 650 MG/1
1 TABLET ORAL 2 TIMES DAILY
COMMUNITY
Start: 2021-11-22 | End: 2023-12-09

## 2023-08-21 RX ORDER — ALBUTEROL SULFATE 90 UG/1
AEROSOL, METERED RESPIRATORY (INHALATION) DAILY
Status: ON HOLD | COMMUNITY
Start: 2021-11-01 | End: 2024-02-23 | Stop reason: SDUPTHER

## 2023-08-21 RX ORDER — VITAMIN B COMPLEX
1 CAPSULE ORAL DAILY
COMMUNITY
Start: 2023-04-26

## 2023-08-21 RX ORDER — LEVETIRACETAM 500 MG/1
1 TABLET ORAL 2 TIMES DAILY
COMMUNITY
Start: 2021-11-22 | End: 2023-10-04

## 2023-08-21 RX ORDER — MAGNESIUM HYDROXIDE 400 MG/5ML
2 SUSPENSION, ORAL (FINAL DOSE FORM) ORAL DAILY
COMMUNITY
Start: 2023-04-26 | End: 2023-10-04 | Stop reason: ALTCHOICE

## 2023-08-21 RX ORDER — TADALAFIL 20 MG/1
1 TABLET ORAL DAILY PRN
COMMUNITY
Start: 2021-11-08 | End: 2024-02-23 | Stop reason: HOSPADM

## 2023-08-21 RX ORDER — EPINEPHRINE 0.22MG
1 AEROSOL WITH ADAPTER (ML) INHALATION DAILY
COMMUNITY
Start: 2021-11-01

## 2023-08-21 RX ORDER — SILDENAFIL 100 MG/1
1 TABLET, FILM COATED ORAL
COMMUNITY
Start: 2021-10-12 | End: 2023-10-04 | Stop reason: ALTCHOICE

## 2023-08-21 RX ORDER — AMOXICILLIN 500 MG
1 CAPSULE ORAL DAILY
COMMUNITY
End: 2023-10-04 | Stop reason: SDUPTHER

## 2023-08-21 RX ORDER — POTASSIUM CHLORIDE 750 MG/1
1 TABLET, FILM COATED, EXTENDED RELEASE ORAL 3 TIMES DAILY
COMMUNITY
End: 2023-10-04 | Stop reason: SINTOL

## 2023-08-21 RX ORDER — DICLOFENAC SODIUM 100 MG/1
100 TABLET, EXTENDED RELEASE ORAL DAILY PRN
COMMUNITY
End: 2023-10-04 | Stop reason: ALTCHOICE

## 2023-08-21 RX ORDER — FAMOTIDINE 20 MG/1
1 TABLET, FILM COATED ORAL DAILY PRN
COMMUNITY
End: 2024-04-15 | Stop reason: SDUPTHER

## 2023-08-21 RX ORDER — MAGNESIUM GLUCONATE 27.5 (500)
2 TABLET ORAL DAILY
COMMUNITY
Start: 2021-11-01

## 2023-08-21 RX ORDER — GLUCOSAM/CHONDRO/HERB 149/HYAL 750-100 MG
1 TABLET ORAL DAILY
COMMUNITY
Start: 2021-11-01 | End: 2023-10-04 | Stop reason: SDUPTHER

## 2023-08-21 RX ORDER — LANOLIN ALCOHOL/MO/W.PET/CERES
1 CREAM (GRAM) TOPICAL DAILY
COMMUNITY
Start: 2020-11-17

## 2023-08-29 LAB — TESTOSTERONE (NG/DL) IN SER/PLAS: 507 NG/DL (ref 240–1000)

## 2023-10-02 ENCOUNTER — TELEPHONE (OUTPATIENT)
Dept: GERIATRIC MEDICINE | Facility: CLINIC | Age: 73
End: 2023-10-02

## 2023-10-02 ENCOUNTER — APPOINTMENT (OUTPATIENT)
Dept: NEUROLOGY | Facility: CLINIC | Age: 73
End: 2023-10-02
Payer: MEDICARE
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
## 2023-10-04 ENCOUNTER — OFFICE VISIT (OUTPATIENT)
Dept: BEHAVIORAL HEALTH | Facility: CLINIC | Age: 73
End: 2023-10-04
Payer: MEDICARE

## 2023-10-04 VITALS
DIASTOLIC BLOOD PRESSURE: 70 MMHG | RESPIRATION RATE: 16 BRPM | WEIGHT: 164 LBS | BODY MASS INDEX: 30.99 KG/M2 | SYSTOLIC BLOOD PRESSURE: 138 MMHG | HEART RATE: 62 BPM

## 2023-10-04 DIAGNOSIS — R45.86 MOOD AND AFFECT DISTURBANCE: Primary | ICD-10-CM

## 2023-10-04 PROCEDURE — 3051F HG A1C>EQUAL 7.0%<8.0%: CPT | Performed by: PSYCHIATRY & NEUROLOGY

## 2023-10-04 PROCEDURE — 99215 OFFICE O/P EST HI 40 MIN: CPT | Performed by: PSYCHIATRY & NEUROLOGY

## 2023-10-04 PROCEDURE — 1160F RVW MEDS BY RX/DR IN RCRD: CPT | Performed by: PSYCHIATRY & NEUROLOGY

## 2023-10-04 PROCEDURE — 1126F AMNT PAIN NOTED NONE PRSNT: CPT | Performed by: PSYCHIATRY & NEUROLOGY

## 2023-10-04 PROCEDURE — 1159F MED LIST DOCD IN RCRD: CPT | Performed by: PSYCHIATRY & NEUROLOGY

## 2023-10-04 PROCEDURE — 3075F SYST BP GE 130 - 139MM HG: CPT | Performed by: PSYCHIATRY & NEUROLOGY

## 2023-10-04 PROCEDURE — 3078F DIAST BP <80 MM HG: CPT | Performed by: PSYCHIATRY & NEUROLOGY

## 2023-10-04 PROCEDURE — 1036F TOBACCO NON-USER: CPT | Performed by: PSYCHIATRY & NEUROLOGY

## 2023-10-04 RX ORDER — SERTRALINE HYDROCHLORIDE 50 MG/1
TABLET, FILM COATED ORAL
Qty: 30 TABLET | Refills: 2 | Status: SHIPPED | OUTPATIENT
Start: 2023-10-04 | End: 2023-10-18 | Stop reason: SDUPTHER
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
## 2023-10-04 NOTE — PATIENT INSTRUCTIONS
Plan:   - Take sertraline 25mg (half of 50mg tablets) once daily after breakfast for 2 weeks, then 50mg once daily after breakfast. Call in case of any side effects or concerns.   - Continue to work with your therapist. Try to focus your therapy sessions on learning ways to recognize and manage stress before it builds up, and also on learning regular relaxation techniques.   - Make sure you use your hearing aids consistently.   - Follow up in about 2 months (already scheduled).   - Call sooner if needed.     Brain healthy lifestyle:   - Make sure your medical conditions (if any) such as diabetes, high blood pressure, high cholesterol, thyroid disease sleep apnea are optimally controlled.   - Use eyeglasses or hearing aids appropriately if needed.   - Eat a heart healthy diet (like a Mediterranean diet; lots of fruits and vegetables, fish; low fat;)  - Exercise regularly as tolerated.   - Maintain good sleep hygiene; avoid daytime naps; try to get 7 to 8 hours of continuous sleep at night.   - Stay mentally active - puzzles, word searches, books, playing cards.  - Stay socially active and engaged.

## 2023-10-07 ENCOUNTER — LAB (OUTPATIENT)
Dept: LAB | Facility: LAB | Age: 73
End: 2023-10-07
Payer: MEDICARE

## 2023-10-07 DIAGNOSIS — E87.5 HYPERKALEMIA: ICD-10-CM

## 2023-10-07 LAB
ANION GAP SERPL CALC-SCNC: 13 MMOL/L (ref 10–20)
BUN SERPL-MCNC: 30 MG/DL (ref 6–23)
CALCIUM SERPL-MCNC: 9.1 MG/DL (ref 8.6–10.3)
CHLORIDE SERPL-SCNC: 107 MMOL/L (ref 98–107)
CO2 SERPL-SCNC: 23 MMOL/L (ref 21–32)
CREAT SERPL-MCNC: 1.89 MG/DL (ref 0.5–1.3)
GFR SERPL CREATININE-BSD FRML MDRD: 37 ML/MIN/1.73M*2
GLUCOSE SERPL-MCNC: 164 MG/DL (ref 74–99)
POTASSIUM SERPL-SCNC: 5.2 MMOL/L (ref 3.5–5.3)
SODIUM SERPL-SCNC: 138 MMOL/L (ref 136–145)

## 2023-10-07 PROCEDURE — 80048 BASIC METABOLIC PNL TOTAL CA: CPT

## 2023-10-07 PROCEDURE — 36415 COLL VENOUS BLD VENIPUNCTURE: CPT

## 2023-10-10 ENCOUNTER — OFFICE VISIT (OUTPATIENT)
Dept: PRIMARY CARE | Facility: CLINIC | Age: 73
End: 2023-10-10
Payer: MEDICARE

## 2023-10-10 VITALS
WEIGHT: 166.13 LBS | SYSTOLIC BLOOD PRESSURE: 152 MMHG | TEMPERATURE: 97.4 F | DIASTOLIC BLOOD PRESSURE: 71 MMHG | BODY MASS INDEX: 31.39 KG/M2 | HEART RATE: 62 BPM

## 2023-10-10 DIAGNOSIS — I10 BENIGN ESSENTIAL HYPERTENSION: ICD-10-CM

## 2023-10-10 DIAGNOSIS — N18.32 TYPE 2 DIABETES MELLITUS WITH STAGE 3B CHRONIC KIDNEY DISEASE, WITHOUT LONG-TERM CURRENT USE OF INSULIN (MULTI): Primary | ICD-10-CM

## 2023-10-10 DIAGNOSIS — R41.89 COGNITIVE CHANGES: ICD-10-CM

## 2023-10-10 DIAGNOSIS — Z00.00 ENCOUNTER FOR PREVENTATIVE ADULT HEALTH CARE EXAMINATION: ICD-10-CM

## 2023-10-10 DIAGNOSIS — Z23 NEEDS FLU SHOT: ICD-10-CM

## 2023-10-10 DIAGNOSIS — K74.60 CIRRHOSIS, NONALCOHOLIC (MULTI): ICD-10-CM

## 2023-10-10 DIAGNOSIS — E11.22 TYPE 2 DIABETES MELLITUS WITH STAGE 3B CHRONIC KIDNEY DISEASE, WITHOUT LONG-TERM CURRENT USE OF INSULIN (MULTI): Primary | ICD-10-CM

## 2023-10-10 PROBLEM — R20.2 NUMBNESS AND TINGLING IN BOTH HANDS: Status: RESOLVED | Noted: 2023-08-21 | Resolved: 2023-10-10

## 2023-10-10 PROBLEM — M25.559 PAIN IN HIP JOINT: Status: RESOLVED | Noted: 2023-08-21 | Resolved: 2023-10-10

## 2023-10-10 PROBLEM — R20.0 NUMBNESS AND TINGLING IN BOTH HANDS: Status: RESOLVED | Noted: 2023-08-21 | Resolved: 2023-10-10

## 2023-10-10 PROBLEM — Z79.84 LONG TERM (CURRENT) USE OF ORAL HYPOGLYCEMIC DRUGS: Status: RESOLVED | Noted: 2021-11-24 | Resolved: 2023-10-10

## 2023-10-10 PROCEDURE — 3051F HG A1C>EQUAL 7.0%<8.0%: CPT | Performed by: INTERNAL MEDICINE

## 2023-10-10 PROCEDURE — 90662 IIV NO PRSV INCREASED AG IM: CPT | Performed by: INTERNAL MEDICINE

## 2023-10-10 PROCEDURE — G0008 ADMIN INFLUENZA VIRUS VAC: HCPCS | Performed by: INTERNAL MEDICINE

## 2023-10-10 PROCEDURE — 4010F ACE/ARB THERAPY RXD/TAKEN: CPT | Performed by: INTERNAL MEDICINE

## 2023-10-10 PROCEDURE — 99214 OFFICE O/P EST MOD 30 MIN: CPT | Performed by: INTERNAL MEDICINE

## 2023-10-10 PROCEDURE — 1159F MED LIST DOCD IN RCRD: CPT | Performed by: INTERNAL MEDICINE

## 2023-10-10 PROCEDURE — 1126F AMNT PAIN NOTED NONE PRSNT: CPT | Performed by: INTERNAL MEDICINE

## 2023-10-10 PROCEDURE — 3077F SYST BP >= 140 MM HG: CPT | Performed by: INTERNAL MEDICINE

## 2023-10-10 PROCEDURE — 3078F DIAST BP <80 MM HG: CPT | Performed by: INTERNAL MEDICINE

## 2023-10-10 PROCEDURE — 1036F TOBACCO NON-USER: CPT | Performed by: INTERNAL MEDICINE

## 2023-10-10 PROCEDURE — 1160F RVW MEDS BY RX/DR IN RCRD: CPT | Performed by: INTERNAL MEDICINE

## 2023-10-10 RX ORDER — LOSARTAN POTASSIUM 50 MG/1
50 TABLET ORAL DAILY
Qty: 30 TABLET | Refills: 11 | Status: SHIPPED | OUTPATIENT
Start: 2023-10-10 | End: 2024-02-23 | Stop reason: HOSPADM

## 2023-10-10 NOTE — PATIENT INSTRUCTIONS
.It was a pleasure to see you today! Here is a list of things we have discussed and to follow up on:   Restart Losartan at a lower dose of 50mg (1/2 a tablet). Continue measuring your home blood pressure readings   Blood and urine testing to be performed in 2-3 weeks.   COVID booster and RSV shot at the pharmacy   Followup 3 months         Ways to Help Prevent Falls at Home    Quick Tips   ? Ask for help if you need it. Most people want to help!   ? Get up slowly after sitting or laying down   ? Wear a medical alert device or keep cell phone in your pocket   ? Use night lights, especially areas near a bathroom   ? Keep the items you use often within reach on a small stool or end table   ? Use an assistive device such as walker or cane, as directed by provider/physical therapy   ? Use a non-slip mat and grab bars in your bathroom. Look for home health sections for best options     Other Areas to Focus On   ? Exercise and nutrition: Regular exercise or taking a falls prevention class are great ways improve strength and balance. Don’t forget to stay hydrated and bring a snack!   ? Medicine side effects: Some medicines can make you sleepy or dizzy, which could cause a fall. Ask your healthcare provider about the side effects your medicines could cause. Be sure to let them know if you take any vitamins or supplements as well.   ? Tripping hazards: Remove items you could trip on, such as loose mats, rugs, cords, and clutter. Wear closed toe shoes with rubber soles.   ? Health and wellness: Get regular checkups with your healthcare provider, plus routine vision and hearing screenings. Talk with your healthcare provider about:   o Your medicines and the possible side effects - bring them in a bag if that is easier!   o Problems with balance or feeling dizzy   o Ways to promote bone health, such as Vitamin D and calcium supplements   o Questions or concerns about falling     *Ask your healthcare team if you have questions      ©[facility], [place], 2022

## 2023-10-10 NOTE — PROGRESS NOTES
Subjective   Patient ID: Ritesh Garza is a 72 y.o. male who presents for Follow-up.  HPI  72-year-old male here for follow-up visit, last seen in August.  -He was recently seen by Dr. Prerin out of concern for worsening outbursts of unclear etiology.  Treat symptomatically with sertraline and recommended 2-month follow-up. He is taking half a tablet and notes some improvement in symptoms, some issues related to anxiety. He has been on this medication in the past.   - Believes lost a few pounds since last being seen.     Past medical history  -CAD s/p 5V CABG 1992 on aspirin, last nitroglycerin use very rare.  Followed by Dr Hennessy. Upcoming appointment scheduled 11/2.   -HTN on amlodipine 10mg, home BP readings 120/80   -SIDHU cirrhosis - followed by Dr. Duran -  ultrasound and AFP every 6 months - recent EGD showing portal hypertension with esophageal varices, scheduled for repeat ultrasound to be performed.   -Erosive esophagitis on PPI on pantoprazole   -Asthma mild and seasonal - has rare use of albuterol  -BPH with LUTS on daily tadalafil (cialis) 5mg   -DM2 with bilateral neuropathy (followed by podiatry)last A1C 7% on Farxiga and metformin 500mg 7% UTD with diabetic eye exam due for cataract on the left   -SENIA - denies and not interested in treating   -CKD 3 with persistent proteinuria   -Systolic CHF with class II heart failure followed by Dr. Hennessy last seen in May on appropriate medical management including, Farxiga, metoprolol, discontinued epleronone losartan and potassium. Entresto was withdrawn due to cost.    -Cognitive and behavioral changes recently seen by neuropsychiatry no significant impairment though with possible frontal lobe process followed by Dr. Andrade  -Right carpal tunnel syndrome followed by Dr. De Paz  -Strabismus status post remote eye muscle surgery  -Tubular adenoma April 2020 3 repeat 5 years  - Gout on allopurinol 100mg last flare ages ago.   - ED on sildenafil  5 mg daily  and 100 mg as needed, seen by urology Dr. Ibarra Last seen in August   - Hearing loss now compliant with hearing aids      Social:   - [employer] products for many years.   - Lives at home with wife, member of [employer]. Has a son in [place] has a grandson who he has not seen yet.   Current Outpatient Medications   Medication Instructions    albuterol (ProAir HFA) 90 mcg/actuation inhaler inhalation, Daily, Per instructions    allopurinol (Zyloprim) 100 mg tablet TAKE 1 TABLET BY MOUTH ONCE DAILY    amLODIPine (NORVASC) 10 mg, oral, Daily    aspirin 81 mg, oral, Daily    atorvastatin (LIPITOR) 80 mg, oral, Daily    b complex vitamins capsule 1 capsule, Daily    cetirizine HCl (ALLERGY RELIEF, CETIRIZINE, ORAL) Allergy Relief TABS  Refills: 0    coenzyme Q-10 (CoQ-10) 100 mg capsule 1 capsule, oral, Daily    cyanocobalamin (Vitamin B-12) 1,000 mcg tablet 1 tablet, Daily, As directed    dapagliflozin (Farxiga) 10 mg 1 tablet, oral, Daily before breakfast    famotidine (Pepcid) 20 mg tablet 1 tablet, oral, Daily PRN    fish oil concentrate (Omega-3) 120-180 mg capsule 1 capsule, Daily    losartan (COZAAR) 50 mg, oral, Daily    magnesium gluconate (Magonate) 27.5 mg magne- sium (500 mg) tablet 2 tablets, oral, Daily    metFORMIN XR (GLUCOPHAGE-XR) 1,000 mg, oral, Daily    metoprolol succinate XL (Toprol-XL) 50 mg 24 hr tablet TAKE 1 TABLET BY MOUTH EVERY DAY    multivitamin tablet 1 tablet, oral, Daily    nitroglycerin (Nitrostat) 0.4 mg SL tablet 1 tablet, sublingual, Every 5 minutes for up to 3 doses as needed for chest pain, Call 911    pantoprazole (ProtoNix) 40 mg EC tablet 1 tablet, oral, Daily    sertraline (Zoloft) 50 mg tablet Take half tablet daily in the morning after breakfast for 2 weeks, then one tablet daily in the morning after breakfast.    sodium bicarbonate 650 mg tablet 1 tablet, oral, 2 times daily    tadalafil (CIALIS) 5 mg, oral, Daily    tadalafil 20 mg tablet 1 tablet,  oral, Daily    turmeric-turmeric root extract 450-50 mg capsule Take daily as directed.         Objective     /71   Pulse 62   Temp 36.3 °C (97.4 °F)   Wt 75.4 kg (166 lb 2 oz)   BMI 31.39 kg/m²     Physical Exam  General: Alert and oriented, in no apparent distress   HEENT: No conjunctival erythema, no external facial lesions   Lungs: Breathing comfortably  Skin: No evidence of skin breakdown.  Neuro: AAO x 3, answering questions appropriately, no obvious cranial nerve deficits      Assessment/Plan   Problem List Items Addressed This Visit       Benign essential hypertension     Home BP readings well controled today, will reintroduce low dose losartan 50mg for now, recheck electrolyte levels in about 2-3 weeks.          Relevant Medications    losartan (Cozaar) 50 mg tablet    Other Relevant Orders    Follow Up In [facility] - PCP - Established    Cirrhosis, nonalcoholic (CMS/HCC)     Appropriate followup with Dr. Duran, [id number] sonos and AFPs UTD   EGD UTD          Type 2 diabetes mellitus with chronic kidney disease, without long-term current use of insulin (CMS/HCC) - Primary     Well controlled on Farxiga and metformin overall doing well   Persistent nephropathy will reintroduce losartan recheck albumin/cr ratio   UTD with optho, on statin, UTD with vaccines.          Relevant Orders    Albumin , Urine Random    Cognitive changes     With possible worsening anxiety symptoms, recently started sertraline with notable improvement in in some symptoms. Has followup scheduled with Dr. Perrin, continue current dose of sertraline.          Other Visit Diagnoses       Encounter for preventative adult health care examination        Relevant Orders    Comprehensive Metabolic Panel    Needs flu shot        Relevant Orders    Flu vaccine, quadrivalent, high-dose, preservative free, age 65y+ (FLUZONE) (Completed)          Hyperkalemia   Recheck potassium numbers after reintroduction of losartan.      Health Maintenance   Cancer screening:   - Colonoscopy 2/23  - PSA followed by urology  Immunizations: High dose flu shot today, RSV and COVID boosters at the pharmacy.     Followup 3 months Patient was identified as a fall risk. Risk prevention instructions provided.

## 2023-10-10 NOTE — ASSESSMENT & PLAN NOTE
Home BP readings well controled today, will reintroduce low dose losartan 50mg for now, recheck electrolyte levels in about 2-3 weeks.

## 2023-10-11 NOTE — ASSESSMENT & PLAN NOTE
With possible worsening anxiety symptoms, recently started sertraline with notable improvement in in some symptoms. Has followup scheduled with Dr. Perrin, continue current dose of sertraline.

## 2023-10-18 DIAGNOSIS — R45.86 MOOD AND AFFECT DISTURBANCE: ICD-10-CM

## 2023-10-18 RX ORDER — SERTRALINE HYDROCHLORIDE 50 MG/1
TABLET, FILM COATED ORAL
Qty: 90 TABLET | Refills: 2 | Status: SHIPPED
Start: 2023-10-18 | End: 2023-11-10 | Stop reason: SDUPTHER

## 2023-10-21 ENCOUNTER — LAB (OUTPATIENT)
Dept: LAB | Facility: LAB | Age: 73
End: 2023-10-21
Payer: MEDICARE

## 2023-10-21 DIAGNOSIS — E11.22 TYPE 2 DIABETES MELLITUS WITH STAGE 3B CHRONIC KIDNEY DISEASE, WITHOUT LONG-TERM CURRENT USE OF INSULIN (MULTI): ICD-10-CM

## 2023-10-21 DIAGNOSIS — Z00.00 ENCOUNTER FOR PREVENTATIVE ADULT HEALTH CARE EXAMINATION: ICD-10-CM

## 2023-10-21 DIAGNOSIS — N18.32 TYPE 2 DIABETES MELLITUS WITH STAGE 3B CHRONIC KIDNEY DISEASE, WITHOUT LONG-TERM CURRENT USE OF INSULIN (MULTI): ICD-10-CM

## 2023-10-21 LAB
ALBUMIN SERPL BCP-MCNC: 4.1 G/DL (ref 3.4–5)
ALP SERPL-CCNC: 91 U/L (ref 33–136)
ALT SERPL W P-5'-P-CCNC: 33 U/L (ref 10–52)
ANION GAP SERPL CALC-SCNC: 19 MMOL/L (ref 10–20)
AST SERPL W P-5'-P-CCNC: 28 U/L (ref 9–39)
BILIRUB SERPL-MCNC: 0.5 MG/DL (ref 0–1.2)
BUN SERPL-MCNC: 31 MG/DL (ref 6–23)
CALCIUM SERPL-MCNC: 9.1 MG/DL (ref 8.6–10.3)
CHLORIDE SERPL-SCNC: 109 MMOL/L (ref 98–107)
CO2 SERPL-SCNC: 17 MMOL/L (ref 21–32)
CREAT SERPL-MCNC: 1.89 MG/DL (ref 0.5–1.3)
CREAT UR-MCNC: 82.4 MG/DL (ref 20–370)
GFR SERPL CREATININE-BSD FRML MDRD: 37 ML/MIN/1.73M*2
GLUCOSE SERPL-MCNC: 193 MG/DL (ref 74–99)
MICROALBUMIN UR-MCNC: 139.1 MG/L
MICROALBUMIN/CREAT UR: 168.8 UG/MG CREAT
POTASSIUM SERPL-SCNC: 4.8 MMOL/L (ref 3.5–5.3)
PROT SERPL-MCNC: 6.4 G/DL (ref 6.4–8.2)
SODIUM SERPL-SCNC: 140 MMOL/L (ref 136–145)

## 2023-10-21 PROCEDURE — 80053 COMPREHEN METABOLIC PANEL: CPT

## 2023-10-21 PROCEDURE — 82043 UR ALBUMIN QUANTITATIVE: CPT

## 2023-10-21 PROCEDURE — 36415 COLL VENOUS BLD VENIPUNCTURE: CPT

## 2023-10-21 PROCEDURE — 82570 ASSAY OF URINE CREATININE: CPT

## 2023-11-01 PROBLEM — I25.2 OLD MYOCARDIAL INFARCTION: Status: RESOLVED | Noted: 2023-11-01 | Resolved: 2023-11-01

## 2023-11-02 ENCOUNTER — OFFICE VISIT (OUTPATIENT)
Dept: CARDIOLOGY | Facility: CLINIC | Age: 73
End: 2023-11-02
Payer: MEDICARE

## 2023-11-02 VITALS
BODY MASS INDEX: 28.17 KG/M2 | WEIGHT: 165 LBS | OXYGEN SATURATION: 93 % | HEIGHT: 64 IN | DIASTOLIC BLOOD PRESSURE: 74 MMHG | HEART RATE: 59 BPM | SYSTOLIC BLOOD PRESSURE: 177 MMHG

## 2023-11-02 DIAGNOSIS — I50.82 CONGESTIVE HEART FAILURE WITH RIGHT VENTRICULAR SYSTOLIC DYSFUNCTION (MULTI): ICD-10-CM

## 2023-11-02 DIAGNOSIS — I25.2 HISTORY OF ACUTE INFERIOR WALL MI: ICD-10-CM

## 2023-11-02 DIAGNOSIS — I50.20 CONGESTIVE HEART FAILURE WITH RIGHT VENTRICULAR SYSTOLIC DYSFUNCTION (MULTI): ICD-10-CM

## 2023-11-02 DIAGNOSIS — I51.89 LEFT VENTRICULAR SYSTOLIC DYSFUNCTION, NYHA CLASS 1: ICD-10-CM

## 2023-11-02 DIAGNOSIS — I25.5 ISCHEMIC CARDIOMYOPATHY: ICD-10-CM

## 2023-11-02 DIAGNOSIS — I25.10 ASHD (ARTERIOSCLEROTIC HEART DISEASE): Primary | ICD-10-CM

## 2023-11-02 DIAGNOSIS — I10 BENIGN ESSENTIAL HYPERTENSION: ICD-10-CM

## 2023-11-02 PROCEDURE — 1126F AMNT PAIN NOTED NONE PRSNT: CPT | Performed by: INTERNAL MEDICINE

## 2023-11-02 PROCEDURE — 3060F POS MICROALBUMINURIA REV: CPT | Performed by: INTERNAL MEDICINE

## 2023-11-02 PROCEDURE — 1036F TOBACCO NON-USER: CPT | Performed by: INTERNAL MEDICINE

## 2023-11-02 PROCEDURE — 1159F MED LIST DOCD IN RCRD: CPT | Performed by: INTERNAL MEDICINE

## 2023-11-02 PROCEDURE — 99214 OFFICE O/P EST MOD 30 MIN: CPT | Performed by: INTERNAL MEDICINE

## 2023-11-02 PROCEDURE — 4010F ACE/ARB THERAPY RXD/TAKEN: CPT | Performed by: INTERNAL MEDICINE

## 2023-11-02 PROCEDURE — 3077F SYST BP >= 140 MM HG: CPT | Performed by: INTERNAL MEDICINE

## 2023-11-02 PROCEDURE — 3078F DIAST BP <80 MM HG: CPT | Performed by: INTERNAL MEDICINE

## 2023-11-02 PROCEDURE — 1160F RVW MEDS BY RX/DR IN RCRD: CPT | Performed by: INTERNAL MEDICINE

## 2023-11-02 PROCEDURE — 3051F HG A1C>EQUAL 7.0%<8.0%: CPT | Performed by: INTERNAL MEDICINE

## 2023-11-02 RX ORDER — DICLOFENAC SODIUM 100 MG/1
100 TABLET, EXTENDED RELEASE ORAL DAILY PRN
COMMUNITY
Start: 2023-10-24 | End: 2024-02-23 | Stop reason: HOSPADM

## 2023-11-02 SDOH — HEALTH STABILITY: PHYSICAL HEALTH: ON AVERAGE, HOW MANY MINUTES DO YOU ENGAGE IN EXERCISE AT THIS LEVEL?: 40 MIN

## 2023-11-02 SDOH — HEALTH STABILITY: PHYSICAL HEALTH: ON AVERAGE, HOW MANY DAYS PER WEEK DO YOU ENGAGE IN MODERATE TO STRENUOUS EXERCISE (LIKE A BRISK WALK)?: 5 DAYS

## 2023-11-02 ASSESSMENT — COLUMBIA-SUICIDE SEVERITY RATING SCALE - C-SSRS
1. IN THE PAST MONTH, HAVE YOU WISHED YOU WERE DEAD OR WISHED YOU COULD GO TO SLEEP AND NOT WAKE UP?: NO
6. HAVE YOU EVER DONE ANYTHING, STARTED TO DO ANYTHING, OR PREPARED TO DO ANYTHING TO END YOUR LIFE?: NO
2. HAVE YOU ACTUALLY HAD ANY THOUGHTS OF KILLING YOURSELF?: NO

## 2023-11-02 ASSESSMENT — ENCOUNTER SYMPTOMS
LOSS OF SENSATION IN FEET: 0
OCCASIONAL FEELINGS OF UNSTEADINESS: 0
DEPRESSION: 0

## 2023-11-02 ASSESSMENT — PAIN SCALES - GENERAL: PAINLEVEL: 0-NO PAIN

## 2023-11-02 NOTE — PATIENT INSTRUCTIONS
Please obtain blood tests, fasting.  Orders have been placed.    Schedule a 6-month follow-up visit.    We encourage you to continue exercising for 30 minutes 5 days weekly as a goal.  No other cardiac testing required at this time.

## 2023-11-02 NOTE — PROGRESS NOTES
Primary Care Physician: Ofelia Mccracken DO  Date of Visit: 11/02/2023 10:40 AM EDT  Location of visit: [facility] [place] [address]     Chief Complaint:     Follow-up     HPI/Summary  Ritesh Garza is a 73 y.o. male who presents for followup cardiology evaluation.     Last visit was 6 months ago.  He presents status post remote MI with longstanding multivessel CAD, mild to moderate LV systolic dysfunction at class II heart failure.  Problems include chronic kidney disease.  He was hospitalized in 2021 with episodes of unresponsiveness, probably absence seizures.  He was started on Keppra.  Echocardiogram in 2021 showed ejection fraction of 40% with mild to moderate MR.  A 14-day cardiac monitor showed no atrial fibrillation and no significant pauses.  There was a long hospitalization in November, 2021 with possible TIA, also possible COVID-19 related delirium.  An echocardiogram dated November 16, 2021 showed ejection fraction of 30%.  At his last office visit, we noted that most blood pressures were well controlled.  We suggested a follow-up echocardiogram.  That study was performed on May 12, 2023.  The ejection fraction was 40 to 45%, mildly dilated left ventricle, with multiple segmental wall motion abnormalities.  The RVSP was normal.    He has been treated with aspirin, high-dose amlodipine, high-dose atorvastatin, eplerenone, Farxiga, losartan, metoprolol and potassium.  Entresto was withdrawn because of cost.    Since the last visit, eplerenone has been withdrawn and losartan has been reduced from 100 mg to 50 mg daily.  The patient had presented with a potassium of 6.5.  Potassium was in the recent potassium was 4.8.    He is working part-time for [employer].  He walks his dog, walks out of doors for exercise and is very busy on the job.  No angina, no dyspnea, and no palpitations.  Neurologically intact.    Specialty Problems          Cardiology Problems    History of coronary artery bypass graft     1992: CABG  x5, [facility].         ASHD (arteriosclerotic heart disease)    Benign essential hypertension    Ischemic cardiomyopathy    History of acute inferior wall MI    Left ventricular systolic dysfunction, NYHA class 1        Past Medical History:   Diagnosis Date    Diabetes mellitus (CMS/HCC) Over 20 years ago    Drug-induced gout, unspecified site 10/04/2019    Acute drug-induced gout    Encounter for screening for malignant neoplasm of colon 10/31/2022    Colon cancer screening    Gout, unspecified     Acute gout    Heart disease Over 20 years sgo    Ocular pain, right eye 10/14/2018    Pain of right eye    Periorbital cellulitis 10/14/2018    Periorbital cellulitis    Personal history of colonic polyps 10/11/2017    History of colon polyps    Personal history of other diseases of the circulatory system 05/12/2021    History of angina pectoris    Personal history of other diseases of the circulatory system 05/12/2021    History of angina pectoris    Personal history of other diseases of the respiratory system 10/04/2019    History of acute bronchitis    Personal history of other drug therapy     History of influenza vaccination    Unspecified cirrhosis of liver (CMS/HCC) 11/07/2022    Cirrhosis, nonalcoholic          Past Surgical History:   Procedure Laterality Date    ADENOIDECTOMY  Childhood    CARDIAC CATHETERIZATION  Over 30 years ago    CHOLECYSTECTOMY  Over 30 years ago    CIRCUMCISION, PRIMARY  72,years ago    CORONARY ARTERY BYPASS GRAFT  12/02/2021    CABG    EYE SURGERY  Childhood    MR HEAD ANGIO WO IV CONTRAST  06/02/2021    MR HEAD ANGIO WO IV CONTRAST 6/2/2021 [facility] CLINICAL LEGACY    MR HEAD ANGIO WO IV CONTRAST  11/16/2021    MR HEAD ANGIO WO IV CONTRAST 11/16/2021 [facility] CLINICAL LEGACY    MR NECK ANGIO WO IV CONTRAST  06/02/2021    MR NECK ANGIO WO IV CONTRAST 6/2/2021 [facility] CLINICAL LEGACY    MR NECK ANGIO WO IV CONTRAST  11/16/2021    MR NECK ANGIO WO IV CONTRAST 11/16/2021 [facility]  CLINICAL LEGACY            Social History     Tobacco Use    Smoking status: Never    Smokeless tobacco: Never   Substance Use Topics    Alcohol use: Never    Drug use: Never        Physical Activity: Sufficiently Active (11/2/2023)    Exercise Vital Sign     Days of Exercise per Week: 5 days     Minutes of Exercise per Session: 40 min            No Known Allergies      Current Outpatient Medications   Medication Instructions    albuterol (ProAir HFA) 90 mcg/actuation inhaler inhalation, Daily, Per instructions    allopurinol (ZYLOPRIM) 50 mg, oral, Daily    amLODIPine (NORVASC) 10 mg, oral, Daily    aspirin 81 mg, oral, Daily    atorvastatin (LIPITOR) 80 mg, oral, Daily    b complex vitamins capsule 1 capsule, Daily    cetirizine HCl (ALLERGY RELIEF, CETIRIZINE, ORAL) Allergy Relief TABS  Refills: 0    coenzyme Q-10 (CoQ-10) 100 mg capsule 1 capsule, oral, Daily    cyanocobalamin (Vitamin B-12) 1,000 mcg tablet 1 tablet, Daily, As directed    dapagliflozin (Farxiga) 10 mg 1 tablet, oral, Daily before breakfast    diclofenac sodium (VOLTAREN XR) 100 mg, oral, Daily PRN    famotidine (Pepcid) 20 mg tablet 1 tablet, oral, Daily PRN    fish oil concentrate (Omega-3) 120-180 mg capsule 1 capsule, Daily    losartan (COZAAR) 50 mg, oral, Daily    magnesium gluconate (Magonate) 27.5 mg magne- sium (500 mg) tablet 2 tablets, oral, Daily    metFORMIN XR (GLUCOPHAGE-XR) 1,000 mg, oral, Daily    metoprolol succinate XL (Toprol-XL) 50 mg 24 hr tablet TAKE 1 TABLET BY MOUTH EVERY DAY    multivitamin tablet 1 tablet, oral, Daily    pantoprazole (ProtoNix) 40 mg EC tablet 1 tablet, oral, Daily    sertraline (Zoloft) 50 mg tablet Take one tablet daily in the morning after breakfast.    sodium bicarbonate 650 mg tablet 1 tablet, oral, 2 times daily    tadalafil (CIALIS) 5 mg, oral, Daily    tadalafil 20 mg tablet 1 tablet, oral, Daily    turmeric-turmeric root extract 450-50 mg capsule Take daily as directed.        ROS     Vital  "Signs:  Vitals:    11/02/23 1117   BP: 149/77   BP Location: Left arm   Patient Position: Sitting   Pulse: 59   SpO2: 93%   Weight: 74.8 kg (165 lb)   Height: 1.626 m (5' 4\")     Wt Readings from Last 5 Encounters:   11/02/23 74.8 kg (165 lb)   10/10/23 75.4 kg (166 lb 2 oz)   10/04/23 74.4 kg (164 lb)   08/01/23 75.9 kg (167 lb 4 oz)   07/13/23 75.8 kg (167 lb)     Body mass index is 28.32 kg/m².     Physical Exam:    Today, the patient was fully oriented and alert.  He was not in any acute distress.  There were no carotid bruits.  Lung fields were clear.  The heart sounds were distant but regular, with no S3 and no murmurs.  Obese abdomen but nontender.  No peripheral edema noted.     Last Labs:  CMP:  Recent Labs     10/21/23  1125 10/07/23  1019 08/04/23  1126 08/01/23  1033 07/31/23  1110    138 140 139 138   K 4.8 5.2 4.7 5.7* 6.5*   * 107 108* 105 108*   CO2 17* 23 21 22 24   ANIONGAP 19 13 16 18 13   BUN 31* 30* 30* 31* 31*   CREATININE 1.89* 1.89* 1.93* 1.94* 1.87*   EGFR 37* 37*  --   --   --    GLUCOSE 193* 164* 175* 142* 123*     Recent Labs     10/21/23  1125 07/31/23  1110 05/04/23  1215 09/14/22 2025 08/18/22  0940   ALBUMIN 4.1 4.4 4.4 4.6 4.4   ALKPHOS 91 84 94 98 105   ALT 33 23 15 35 38   AST 28 18 16 25 33   BILITOT 0.5 0.5 0.6 0.5 0.5     CBC:  Recent Labs     07/31/23  1110 09/14/22 2025 08/18/22  0940 11/22/21  0435 11/21/21  0538   WBC 9.9 10.7 10.0 9.2 9.4   HGB 13.7 16.0 15.1 14.5 15.1   HCT 41.8 45.5 44.0 43.5 45.3    199 193 243 188   MCV 93 89 91 90 92     COAG:   Recent Labs     07/31/23  1110 01/27/22  1241 11/15/21  2217 05/31/21  2021 05/15/21  0719   INR 1.1 1.1 1.2* 1.2* 1.2*     HEME/ENDO:  Recent Labs     07/31/23  1110 05/04/23  1215 08/18/22  0940 11/18/21  0530 11/16/21  0547 06/01/21  0525 05/31/21  2021 10/04/19  1619   TSH  --   --  3.73 1.32  --   --  2.54 1.96   HGBA1C 7.0* 6.8* 6.6*  --  7.5*   < >  --   --     < > = values in this interval not " displayed.      CARDIAC:   Recent Labs     05/04/23  1215 09/14/22  2146 09/14/22 2025 12/02/21  0947 11/15/21  2217 05/31/21 2021 09/27/18  1631   TROPHS  --  6 6  --   --   --   --    *  --   --  108* 139* 536* 79     Recent Labs     07/31/23  1110 05/04/23  1215 05/05/22  1450   CHOL 128 143 144   LDLF 57 56 36   HDL 32.3* 37.1* 35.3*   TRIG 192* 252* 362*       Last Cardiology Tests:    ECG:    Not performed at today's visit.    Echo: May 10, 2023  Echo Results:  CONCLUSIONS:  1. Left ventricular systolic function is mildly decreased with a 40-45% estimated ejection fraction.  2. The left ventricular cavity size is mildly dilated.  3. Basal and mid inferolateral wall, basal inferoseptal segment, and basal inferior segment are abnormal.  4. Spectral Doppler shows a pseudonormal pattern of left ventricular diastolic filling.  5. The left atrium is mildly dilated.  6. RVSP within normal limits.    Cath:      Stress Test:  Stress Results:  No results found for this or any previous visit from the past 365 days.         Cardiac Imaging:        Assessment/Plan     The patient remains stable from a cardiac perspective.  The ejection fraction has improved slightly.  He is not in failure at the present time.  Eplerenone has been withdrawn.  He is unable to afford Entresto but is able to get Farxiga without cost from the .  He is tolerating losartan.  Today, the blood pressure was uncontrolled.  He claims his blood pressures are 120-1 30 systolic and less than 80 diastolic at home.    Today, we reviewed the recent echocardiogram and a series of blood tests.  We will repeat a BNP and a fasting lipid panel.  He will return in 6 months.      Orders:  Orders Placed This Encounter   Procedures    B-Type Natriuretic Peptide    Lipid Panel      Followup Appts:  Future Appointments   Date Time Provider Department [place]   11/27/2023  9:50 AM Jeanmarie Ibarra MD [facility]   11/30/2023  9:40 AM Nate MAYFIELD  MD Renee [id number] [facility]   12/13/2023  8:30 AM [facility] RN 2 [id number] [facility]   12/13/2023  9:00 AM Meme Perrin MD [id number] [facility]   1/11/2024 12:00 PM Ofelia Mccracken DO [id number] [facility]           ____________________________________________________________  Jose Hennessy MD    Senior Attending Physician  [facility] [place]  [facility]    [facility] Chair for Cardiovascular Excellence  [employer]

## 2023-11-07 ENCOUNTER — APPOINTMENT (OUTPATIENT)
Dept: PRIMARY CARE | Facility: CLINIC | Age: 73
End: 2023-11-07
Payer: MEDICARE

## 2023-11-10 DIAGNOSIS — R45.86 MOOD AND AFFECT DISTURBANCE: ICD-10-CM

## 2023-11-10 RX ORDER — SERTRALINE HYDROCHLORIDE 50 MG/1
TABLET, FILM COATED ORAL
Qty: 90 TABLET | Refills: 2 | Status: SHIPPED | OUTPATIENT
Start: 2023-11-10 | End: 2023-11-17 | Stop reason: SDUPTHER

## 2023-11-13 ENCOUNTER — APPOINTMENT (OUTPATIENT)
Dept: LAB | Facility: LAB | Age: 73
End: 2023-11-13
Payer: MEDICARE

## 2023-11-13 ENCOUNTER — LAB (OUTPATIENT)
Dept: LAB | Facility: LAB | Age: 73
End: 2023-11-13
Payer: MEDICARE

## 2023-11-13 DIAGNOSIS — I25.10 ASHD (ARTERIOSCLEROTIC HEART DISEASE): ICD-10-CM

## 2023-11-13 DIAGNOSIS — I51.89 LEFT VENTRICULAR SYSTOLIC DYSFUNCTION, NYHA CLASS 1: ICD-10-CM

## 2023-11-13 DIAGNOSIS — I50.20 CONGESTIVE HEART FAILURE WITH RIGHT VENTRICULAR SYSTOLIC DYSFUNCTION (MULTI): ICD-10-CM

## 2023-11-13 DIAGNOSIS — E11.22 TYPE 2 DIABETES MELLITUS WITH STAGE 3B CHRONIC KIDNEY DISEASE, WITHOUT LONG-TERM CURRENT USE OF INSULIN (MULTI): Primary | ICD-10-CM

## 2023-11-13 DIAGNOSIS — I50.82 CONGESTIVE HEART FAILURE WITH RIGHT VENTRICULAR SYSTOLIC DYSFUNCTION (MULTI): ICD-10-CM

## 2023-11-13 DIAGNOSIS — N18.32 TYPE 2 DIABETES MELLITUS WITH STAGE 3B CHRONIC KIDNEY DISEASE, WITHOUT LONG-TERM CURRENT USE OF INSULIN (MULTI): Primary | ICD-10-CM

## 2023-11-13 LAB
CHOLEST SERPL-MCNC: 102 MG/DL (ref 0–199)
CHOLESTEROL/HDL RATIO: 4
HDLC SERPL-MCNC: 25.8 MG/DL
LDLC SERPL CALC-MCNC: 36 MG/DL
NON HDL CHOLESTEROL: 76 MG/DL (ref 0–149)
TRIGL SERPL-MCNC: 203 MG/DL (ref 0–149)
VLDL: 41 MG/DL (ref 0–40)

## 2023-11-13 PROCEDURE — 80061 LIPID PANEL: CPT

## 2023-11-13 PROCEDURE — 36415 COLL VENOUS BLD VENIPUNCTURE: CPT

## 2023-11-13 RX ORDER — ICOSAPENT ETHYL 0.5 G/1
1 CAPSULE ORAL
Qty: 120 CAPSULE | Refills: 11 | Status: SHIPPED | OUTPATIENT
Start: 2023-11-13 | End: 2024-11-12

## 2023-11-14 ENCOUNTER — APPOINTMENT (OUTPATIENT)
Dept: PRIMARY CARE | Facility: CLINIC | Age: 73
End: 2023-11-14
Payer: MEDICARE

## 2023-11-14 ENCOUNTER — TELEPHONE (OUTPATIENT)
Dept: CARDIOLOGY | Facility: CLINIC | Age: 73
End: 2023-11-14

## 2023-11-14 NOTE — TELEPHONE ENCOUNTER
Called patient and LVM stating he does not need to get additional blood work done and Dr. Hennessy will order it at his next follow up.

## 2023-11-16 ENCOUNTER — HOSPITAL ENCOUNTER (OUTPATIENT)
Dept: RADIOLOGY | Facility: HOSPITAL | Age: 73
Discharge: HOME | End: 2023-11-16
Payer: MEDICARE

## 2023-11-16 DIAGNOSIS — K74.60 UNSPECIFIED CIRRHOSIS OF LIVER (MULTI): ICD-10-CM

## 2023-11-16 PROCEDURE — 76705 ECHO EXAM OF ABDOMEN: CPT | Performed by: RADIOLOGY

## 2023-11-16 PROCEDURE — 76705 ECHO EXAM OF ABDOMEN: CPT

## 2023-11-17 DIAGNOSIS — R45.86 MOOD AND AFFECT DISTURBANCE: ICD-10-CM

## 2023-11-17 RX ORDER — SERTRALINE HYDROCHLORIDE 50 MG/1
TABLET, FILM COATED ORAL
Qty: 90 TABLET | Refills: 1 | Status: SHIPPED | OUTPATIENT
Start: 2023-11-17

## 2023-11-26 NOTE — PROGRESS NOTES
HPI  72 yo M who presents today for erectile dysfunction. DM       Last visit 8/28/2023:  -Testosterone lab  -start cialis half tab - 20mg prn  -discussed warning signs      Todays visit:  #Erectile Dysfunction   -Cialis 20mg --> working very well, happy with erections  -denies any s/e      Lab Results   Component Value Date    TESTOSTERONE 507 08/29/2023     Current Medications:  Current Outpatient Medications   Medication Sig Dispense Refill    albuterol (ProAir HFA) 90 mcg/actuation inhaler Inhale once daily. Per instructions      allopurinol (Zyloprim) 100 mg tablet Take 0.5 tablets (50 mg) by mouth once daily. 90 tablet 0    amLODIPine (Norvasc) 10 mg tablet TAKE 1 TABLET BY MOUTH EVERY DAY 90 tablet 1    aspirin 81 mg EC tablet Take 1 tablet (81 mg) by mouth once daily.      atorvastatin (Lipitor) 80 mg tablet Take 1 tablet (80 mg) by mouth once daily.      b complex vitamins capsule 1 capsule once daily.      cetirizine HCl (ALLERGY RELIEF, CETIRIZINE, ORAL) Allergy Relief TABS  Refills: 0      coenzyme Q-10 (CoQ-10) 100 mg capsule Take 1 capsule (100 mg) by mouth once daily.      cyanocobalamin (Vitamin B-12) 1,000 mcg tablet 1 tablet (1,000 mcg) once daily. As directed      dapagliflozin (Farxiga) 10 mg Take 1 tablet (10 mg) by mouth once daily in the morning. Take before meals.      diclofenac sodium (Voltaren XR) 100 mg 24 hr tablet Take 1 tablet (100 mg) by mouth once daily as needed.      famotidine (Pepcid) 20 mg tablet Take 1 tablet (20 mg) by mouth once daily as needed for indigestion.      fish oil concentrate (Omega-3) 120-180 mg capsule 1 capsule (1 g) once daily.      icosapent ethyL (Vascepa) 0.5 gram capsule Take 2 capsules (1 g) by mouth 2 times a day with meals. 120 capsule 11    losartan (Cozaar) 50 mg tablet Take 1 tablet (50 mg) by mouth once daily. 30 tablet 11    magnesium gluconate (Magonate) 27.5 mg magne- sium (500 mg) tablet Take 2 tablets (55 mg) by mouth once daily.      metFORMIN   mg 24 hr tablet TAKE 2 TABLETS (1000 MG) BY MOUTH DAILY 180 tablet 1    metoprolol succinate XL (Toprol-XL) 50 mg 24 hr tablet TAKE 1 TABLET BY MOUTH EVERY DAY 90 tablet 3    multivitamin tablet Take 1 tablet by mouth once daily.      pantoprazole (ProtoNix) 40 mg EC tablet Take 1 tablet (40 mg) by mouth once daily.      sertraline (Zoloft) 50 mg tablet Take one tablet daily in the morning after breakfast. 90 tablet 1    sodium bicarbonate 650 mg tablet Take 1 tablet (650 mg) by mouth 2 times a day.      tadalafil (Cialis) 5 mg tablet TAKE 1 TABLET BY MOUTH EVERY DAY 90 tablet 0    tadalafil 20 mg tablet Take 1 tablet (20 mg) by mouth once daily.      turmeric-turmeric root extract 450-50 mg capsule Take daily as directed.       No current facility-administered medications for this visit.        PMH:  Past Medical History:   Diagnosis Date    Diabetes mellitus (CMS/HCC) Over 20 years ago    Drug-induced gout, unspecified site 10/04/2019    Acute drug-induced gout    Encounter for screening for malignant neoplasm of colon 10/31/2022    Colon cancer screening    Gout, unspecified     Acute gout    Heart disease Over 20 years sgo    Ocular pain, right eye 10/14/2018    Pain of right eye    Periorbital cellulitis 10/14/2018    Periorbital cellulitis    Personal history of colonic polyps 10/11/2017    History of colon polyps    Personal history of other diseases of the circulatory system 05/12/2021    History of angina pectoris    Personal history of other diseases of the circulatory system 05/12/2021    History of angina pectoris    Personal history of other diseases of the respiratory system 10/04/2019    History of acute bronchitis    Personal history of other drug therapy     History of influenza vaccination    Unspecified cirrhosis of liver (CMS/HCC) 11/07/2022    Cirrhosis, nonalcoholic       PSH:  Past Surgical History:   Procedure Laterality Date    ADENOIDECTOMY  Childhood    CARDIAC CATHETERIZATION  Over  30 years ago    CHOLECYSTECTOMY  Over 30 years ago    CIRCUMCISION, PRIMARY  72,years ago    CORONARY ARTERY BYPASS GRAFT  12/02/2021    CABG    EYE SURGERY  Childhood    MR HEAD ANGIO WO IV CONTRAST  06/02/2021    MR HEAD ANGIO WO IV CONTRAST 6/2/2021 [facility] CLINICAL LEGACY    MR HEAD ANGIO WO IV CONTRAST  11/16/2021    MR HEAD ANGIO WO IV CONTRAST 11/16/2021 [facility] CLINICAL LEGACY    MR NECK ANGIO WO IV CONTRAST  06/02/2021    MR NECK ANGIO WO IV CONTRAST 6/2/2021 [facility] CLINICAL LEGACY    MR NECK ANGIO WO IV CONTRAST  11/16/2021    MR NECK ANGIO WO IV CONTRAST 11/16/2021 [facility] CLINICAL LEGACY       FMH:  Family History   Problem Relation Name Age of Onset    Diabetes Mother Sofia Garza     Other (mycoardial infarction) Father  46    Fainting Father      Stomach cancer Mother's Sister      Stroke Maternal Grandmother      Colon cancer Maternal Grandmother         SHx:  Social History     Tobacco Use    Smoking status: Never    Smokeless tobacco: Never   Substance Use Topics    Alcohol use: Never    Drug use: Never       Allergies:  No Known Allergies    Physical Exam   NAD  Nonlabored breathing  Abd nondistended    Assessment/Plan  #Erectile Dysfunction   -Testosterone level normal  -continue cialis 20mg prn, refilled today - med counseling reviewed  -reviewed warning signs     fu in 1yr with CNP.    Scribe Attestation  By signing my name below, I, Nunu Mercado   attest that this documentation has been prepared under the direction and in the presence of Jeanmarie Ibarra MD.

## 2023-11-27 ENCOUNTER — OFFICE VISIT (OUTPATIENT)
Dept: UROLOGY | Facility: HOSPITAL | Age: 73
End: 2023-11-27
Payer: MEDICARE

## 2023-11-27 DIAGNOSIS — N52.9 ERECTILE DYSFUNCTION, UNSPECIFIED ERECTILE DYSFUNCTION TYPE: Primary | ICD-10-CM

## 2023-11-27 PROCEDURE — 1159F MED LIST DOCD IN RCRD: CPT | Performed by: UROLOGY

## 2023-11-27 PROCEDURE — 99213 OFFICE O/P EST LOW 20 MIN: CPT | Performed by: UROLOGY

## 2023-11-27 PROCEDURE — 1126F AMNT PAIN NOTED NONE PRSNT: CPT | Performed by: UROLOGY

## 2023-11-27 PROCEDURE — 3048F LDL-C <100 MG/DL: CPT | Performed by: UROLOGY

## 2023-11-27 PROCEDURE — 3051F HG A1C>EQUAL 7.0%<8.0%: CPT | Performed by: UROLOGY

## 2023-11-27 PROCEDURE — 4010F ACE/ARB THERAPY RXD/TAKEN: CPT | Performed by: UROLOGY

## 2023-11-27 PROCEDURE — 1036F TOBACCO NON-USER: CPT | Performed by: UROLOGY

## 2023-11-27 PROCEDURE — 1160F RVW MEDS BY RX/DR IN RCRD: CPT | Performed by: UROLOGY

## 2023-11-27 PROCEDURE — 3060F POS MICROALBUMINURIA REV: CPT | Performed by: UROLOGY

## 2023-11-30 ENCOUNTER — OFFICE VISIT (OUTPATIENT)
Dept: GASTROENTEROLOGY | Facility: CLINIC | Age: 73
End: 2023-11-30
Payer: MEDICARE

## 2023-11-30 VITALS
HEART RATE: 58 BPM | DIASTOLIC BLOOD PRESSURE: 76 MMHG | SYSTOLIC BLOOD PRESSURE: 158 MMHG | RESPIRATION RATE: 20 BRPM | WEIGHT: 165 LBS | BODY MASS INDEX: 28.32 KG/M2 | TEMPERATURE: 97.4 F

## 2023-11-30 DIAGNOSIS — Z71.2 ENCOUNTER TO DISCUSS TEST RESULTS: ICD-10-CM

## 2023-11-30 DIAGNOSIS — K76.0 NON-ALCOHOLIC FATTY LIVER DISEASE: Primary | ICD-10-CM

## 2023-11-30 DIAGNOSIS — K74.60 CIRRHOSIS OF LIVER WITHOUT ASCITES, UNSPECIFIED HEPATIC CIRRHOSIS TYPE (MULTI): ICD-10-CM

## 2023-11-30 DIAGNOSIS — N18.32 STAGE 3B CHRONIC KIDNEY DISEASE (MULTI): ICD-10-CM

## 2023-11-30 PROCEDURE — 3078F DIAST BP <80 MM HG: CPT | Performed by: INTERNAL MEDICINE

## 2023-11-30 PROCEDURE — 4010F ACE/ARB THERAPY RXD/TAKEN: CPT | Performed by: INTERNAL MEDICINE

## 2023-11-30 PROCEDURE — 3060F POS MICROALBUMINURIA REV: CPT | Performed by: INTERNAL MEDICINE

## 2023-11-30 PROCEDURE — 1160F RVW MEDS BY RX/DR IN RCRD: CPT | Performed by: INTERNAL MEDICINE

## 2023-11-30 PROCEDURE — 99214 OFFICE O/P EST MOD 30 MIN: CPT | Performed by: INTERNAL MEDICINE

## 2023-11-30 PROCEDURE — 3048F LDL-C <100 MG/DL: CPT | Performed by: INTERNAL MEDICINE

## 2023-11-30 PROCEDURE — 1159F MED LIST DOCD IN RCRD: CPT | Performed by: INTERNAL MEDICINE

## 2023-11-30 PROCEDURE — 3077F SYST BP >= 140 MM HG: CPT | Performed by: INTERNAL MEDICINE

## 2023-11-30 PROCEDURE — 1036F TOBACCO NON-USER: CPT | Performed by: INTERNAL MEDICINE

## 2023-11-30 PROCEDURE — 3051F HG A1C>EQUAL 7.0%<8.0%: CPT | Performed by: INTERNAL MEDICINE

## 2023-11-30 PROCEDURE — 1126F AMNT PAIN NOTED NONE PRSNT: CPT | Performed by: INTERNAL MEDICINE

## 2023-11-30 ASSESSMENT — PAIN SCALES - GENERAL: PAINLEVEL: 0-NO PAIN

## 2023-11-30 NOTE — PROGRESS NOTES
"Subjective     History of Present Illness:   Ritesh Garza is a 73 y.o. male who presents to GI/Liver clinic for management of cirrhosis.    He establishing care in my Liver Clinic, earlier this year.  Previously a patient of Dr. Godfrey King.     Last note:  \"Doing well. No problems . Exercising with the dogs. Weight up 14 lbs in last year. Some edema and CKD 3. Cr1.55 alt 17 ast 19 albumin 4.3 bili 0.5 alk phos 76AFP <4. Due for EGD and colonoscopy. \"     Underwent EGD and Colonoscopy with me on March 10, 2023.  We reviewed the findings of both procedures. There was esophagitis, for which he has been prescribed pantoprazole.  He had polyps - resected.    Otherwise appears to be doing well. Here for 6 month follow up.   Review of Systems  Review of Systems   All other systems reviewed and are negative.      Past Medical History   has a past medical history of Diabetes mellitus (CMS/HCC) (Over 20 years ago), Drug-induced gout, unspecified site (10/04/2019), Encounter for screening for malignant neoplasm of colon (10/31/2022), Gout, unspecified, Heart disease (Over 20 years sgo), Ocular pain, right eye (10/14/2018), Periorbital cellulitis (10/14/2018), Personal history of colonic polyps (10/11/2017), Personal history of other diseases of the circulatory system (05/12/2021), Personal history of other diseases of the circulatory system (05/12/2021), Personal history of other diseases of the respiratory system (10/04/2019), Personal history of other drug therapy, and Unspecified cirrhosis of liver (CMS/HCC) (11/07/2022).     Social History   reports that he has never smoked. He has never used smokeless tobacco. He reports that he does not drink alcohol and does not use drugs.     Family History  family history includes Colon cancer in his maternal grandmother; Diabetes in his mother; Fainting in his father; Stomach cancer in his mother's sister; Stroke in his maternal grandmother; mycoardial infarction (age of onset: " 46) in his father.     Allergies  No Known Allergies    Medications  Current Outpatient Medications   Medication Instructions    albuterol (ProAir HFA) 90 mcg/actuation inhaler inhalation, Daily, Per instructions    allopurinol (ZYLOPRIM) 50 mg, oral, Daily    amLODIPine (NORVASC) 10 mg, oral, Daily    aspirin 81 mg, oral, Daily    atorvastatin (LIPITOR) 80 mg, oral, Daily    b complex vitamins capsule 1 capsule, Daily    cetirizine HCl (ALLERGY RELIEF, CETIRIZINE, ORAL) Allergy Relief TABS  Refills: 0    coenzyme Q-10 (CoQ-10) 100 mg capsule 1 capsule, oral, Daily    cyanocobalamin (Vitamin B-12) 1,000 mcg tablet 1 tablet, Daily, As directed    dapagliflozin (Farxiga) 10 mg 1 tablet, oral, Daily before breakfast    diclofenac sodium (VOLTAREN XR) 100 mg, oral, Daily PRN    famotidine (Pepcid) 20 mg tablet 1 tablet, oral, Daily PRN    fish oil concentrate (Omega-3) 120-180 mg capsule 1 capsule, Daily    icosapent ethyL (VASCEPA) 1 g, oral, 2 times daily with meals    losartan (COZAAR) 50 mg, oral, Daily    magnesium gluconate (Magonate) 27.5 mg magne- sium (500 mg) tablet 2 tablets, oral, Daily    metFORMIN XR (GLUCOPHAGE-XR) 1,000 mg, oral, Daily    metoprolol succinate XL (Toprol-XL) 50 mg 24 hr tablet TAKE 1 TABLET BY MOUTH EVERY DAY    multivitamin tablet 1 tablet, oral, Daily    pantoprazole (ProtoNix) 40 mg EC tablet 1 tablet, oral, Daily    sertraline (Zoloft) 50 mg tablet Take one tablet daily in the morning after breakfast.    sodium bicarbonate 650 mg tablet 1 tablet, oral, 2 times daily    tadalafil (CIALIS) 5 mg, oral, Daily    tadalafil 20 mg tablet 1 tablet, oral, Daily    turmeric-turmeric root extract 450-50 mg capsule Take daily as directed.         Objective   Visit Vitals  /76   Pulse 58   Temp 36.3 °C (97.4 °F)   Resp 20          8/1/2023     8:05 AM 10/4/2023     1:56 PM 10/10/2023    12:30 PM 11/2/2023    11:17 AM 11/2/2023    11:53 AM 11/2/2023    11:54 AM 11/30/2023     9:41 AM   Vitals  "  Systolic 151 138 152 149 169 177 158   Diastolic 75 70 71 77 77 74 76   Heart Rate 67 62 62 59   58   Temp 36.4 °C (97.6 °F)  36.3 °C (97.4 °F)    36.3 °C (97.4 °F)   Resp  16     20   Height (in)    1.626 m (5' 4\")      Weight (lb) 167.25 164 166.13 165   165   BMI 31.6 kg/m2 30.99 kg/m2 31.39 kg/m2 28.32 kg/m2   28.32 kg/m2   BSA (m2) 1.81 m2 1.79 m2 1.8 m2 1.84 m2   1.84 m2   Visit Report Report Report Report Report Report Report Report     Physical Exam  Vitals and nursing note reviewed.   Constitutional:       Appearance: Normal appearance. He is obese.      Comments: Older gentleman, no acute distress.    Eyes:      General: No scleral icterus.     Extraocular Movements: Extraocular movements intact.      Pupils: Pupils are equal, round, and reactive to light.   Cardiovascular:      Rate and Rhythm: Normal rate and regular rhythm.      Pulses: Normal pulses.      Heart sounds: Normal heart sounds.   Pulmonary:      Effort: Pulmonary effort is normal.      Breath sounds: Normal breath sounds.   Abdominal:      General: There is no distension.      Palpations: Abdomen is soft. There is no mass.   Musculoskeletal:      Cervical back: Normal range of motion and neck supple.      Right lower leg: No edema.      Left lower leg: No edema.   Skin:     General: Skin is warm and dry.      Coloration: Skin is not jaundiced.   Neurological:      General: No focal deficit present.      Mental Status: Mental status is at baseline.   Psychiatric:         Mood and Affect: Mood normal.         Thought Content: Thought content normal.     Labs    WBC   Date/Time Value Ref Range Status   07/31/2023 11:10 AM 9.9 4.4 - 11.3 x10E9/L Final     Hemoglobin   Date/Time Value Ref Range Status   07/31/2023 11:10 AM 13.7 13.5 - 17.5 g/dL Final     Hematocrit   Date/Time Value Ref Range Status   07/31/2023 11:10 AM 41.8 41.0 - 52.0 % Final     MCV   Date/Time Value Ref Range Status   07/31/2023 11:10 AM 93 80 - 100 fL Final     Platelets " "  Date/Time Value Ref Range Status   07/31/2023 11:10  150 - 450 x10E9/L Final        Total Protein   Date/Time Value Ref Range Status   10/21/2023 11:25 AM 6.4 6.4 - 8.2 g/dL Final     Albumin   Date/Time Value Ref Range Status   10/21/2023 11:25 AM 4.1 3.4 - 5.0 g/dL Final     AST   Date/Time Value Ref Range Status   10/21/2023 11:25 AM 28 9 - 39 U/L Final     ALT   Date/Time Value Ref Range Status   10/21/2023 11:25 AM 33 10 - 52 U/L Final     Comment:     Patients treated with Sulfasalazine may generate falsely decreased results for ALT.     Alkaline Phosphatase   Date/Time Value Ref Range Status   10/21/2023 11:25 AM 91 33 - 136 U/L Final     Bilirubin, Total   Date/Time Value Ref Range Status   10/21/2023 11:25 AM 0.5 0.0 - 1.2 mg/dL Final     Bilirubin, Direct   Date/Time Value Ref Range Status   07/31/2023 11:10 AM 0.1 0.0 - 0.3 mg/dL Final        No results found for: \"VITD25\", \"VITAMINA\", \"VTAI\", \"VITEALPHA\", \"VITEGAMMA\"     AST   Date/Time Value Ref Range Status   10/21/2023 11:25 AM 28 9 - 39 U/L Final     ALT   Date/Time Value Ref Range Status   10/21/2023 11:25 AM 33 10 - 52 U/L Final     Comment:     Patients treated with Sulfasalazine may generate falsely decreased results for ALT.     Alkaline Phosphatase   Date/Time Value Ref Range Status   10/21/2023 11:25 AM 91 33 - 136 U/L Final     Bilirubin, Total   Date/Time Value Ref Range Status   10/21/2023 11:25 AM 0.5 0.0 - 1.2 mg/dL Final     Bilirubin, Direct   Date/Time Value Ref Range Status   07/31/2023 11:10 AM 0.1 0.0 - 0.3 mg/dL Final     Albumin   Date/Time Value Ref Range Status   10/21/2023 11:25 AM 4.1 3.4 - 5.0 g/dL Final     Total Protein   Date/Time Value Ref Range Status   10/21/2023 11:25 AM 6.4 6.4 - 8.2 g/dL Final        Protime   Date/Time Value Ref Range Status   07/31/2023 11:10 AM 12.5 9.8 - 12.8 sec Final     Comment:     Note new reference range as of 6/20/2023 at 10:00am.     INR   Date/Time Value Ref Range Status "   07/31/2023 11:10 AM 1.1 0.9 - 1.1 Final       Assessment/Plan   Ritesh Garza is a 73 y.o. male who presents to GI/Liver clinic for MASLD related cirrhosis.     Impression:  NAFLD/MASLD related cirrhosis.  Stable liver function, compensated liver disease.     No varices seen on endoscopy - next 2-3 years.  Erosive esophagitis on PPI therapy. Will continue.      Continue liver cancer screening every 6m.      Colon polyps - will consider a repeat colonoscopy in 5 years, based on overall health at the time.      6 month follow up appointment.     Discuss a nephrology referral with Dr. Mccracken for CKD evaluation/management.  Instructions      Nate Duran MD

## 2023-11-30 NOTE — PATIENT INSTRUCTIONS
Welcome to Dr. [facility].  Dr. Duran sees patients at the following sites:  [place] [address] [facility] at [facility] [address], [address] at [facility] at [facility], [facility] [address]    Dr. Duran's hepatology care coordinator, Leanne SORIANO, can be reached at [phone].  Dr. Duran's , Gaviota Garcia, can be reached at [phone].     Get a Liver Ultrasound for liver cancer surveillance in May 2024.     Get blood work in May 2024.    See me back in clinic in 6 months. (Appointment Scheduled already)    Discuss a nephrology referral with Dr. Mccracken.

## 2023-12-12 DIAGNOSIS — I25.5 ISCHEMIC CARDIOMYOPATHY: Primary | ICD-10-CM

## 2023-12-12 RX ORDER — ASPIRIN 81 MG/1
81 TABLET ORAL DAILY
Qty: 90 TABLET | Refills: 3 | Status: SHIPPED | OUTPATIENT
Start: 2023-12-12

## 2023-12-13 ENCOUNTER — OFFICE VISIT (OUTPATIENT)
Dept: BEHAVIORAL HEALTH | Facility: CLINIC | Age: 73
End: 2023-12-13
Payer: MEDICARE

## 2023-12-13 ENCOUNTER — CLINICAL SUPPORT (OUTPATIENT)
Dept: GERIATRIC MEDICINE | Facility: CLINIC | Age: 73
End: 2023-12-13
Payer: MEDICARE

## 2023-12-13 VITALS
SYSTOLIC BLOOD PRESSURE: 160 MMHG | HEIGHT: 62 IN | BODY MASS INDEX: 30.4 KG/M2 | RESPIRATION RATE: 18 BRPM | WEIGHT: 165.2 LBS | DIASTOLIC BLOOD PRESSURE: 77 MMHG | TEMPERATURE: 96.3 F | HEART RATE: 59 BPM

## 2023-12-13 DIAGNOSIS — G31.84 MILD COGNITIVE IMPAIRMENT: ICD-10-CM

## 2023-12-13 PROCEDURE — 1036F TOBACCO NON-USER: CPT | Performed by: PSYCHIATRY & NEUROLOGY

## 2023-12-13 PROCEDURE — 99214 OFFICE O/P EST MOD 30 MIN: CPT | Mod: AM | Performed by: PSYCHIATRY & NEUROLOGY

## 2023-12-13 PROCEDURE — 4010F ACE/ARB THERAPY RXD/TAKEN: CPT | Performed by: PSYCHIATRY & NEUROLOGY

## 2023-12-13 PROCEDURE — 1159F MED LIST DOCD IN RCRD: CPT | Performed by: PSYCHIATRY & NEUROLOGY

## 2023-12-13 PROCEDURE — 3048F LDL-C <100 MG/DL: CPT | Performed by: PSYCHIATRY & NEUROLOGY

## 2023-12-13 PROCEDURE — 3060F POS MICROALBUMINURIA REV: CPT | Performed by: PSYCHIATRY & NEUROLOGY

## 2023-12-13 PROCEDURE — 99214 OFFICE O/P EST MOD 30 MIN: CPT | Performed by: PSYCHIATRY & NEUROLOGY

## 2023-12-13 PROCEDURE — 1126F AMNT PAIN NOTED NONE PRSNT: CPT | Performed by: PSYCHIATRY & NEUROLOGY

## 2023-12-13 PROCEDURE — 3051F HG A1C>EQUAL 7.0%<8.0%: CPT | Performed by: PSYCHIATRY & NEUROLOGY

## 2023-12-13 PROCEDURE — 1160F RVW MEDS BY RX/DR IN RCRD: CPT | Performed by: PSYCHIATRY & NEUROLOGY

## 2023-12-13 PROCEDURE — 3077F SYST BP >= 140 MM HG: CPT | Performed by: PSYCHIATRY & NEUROLOGY

## 2023-12-13 PROCEDURE — 3078F DIAST BP <80 MM HG: CPT | Performed by: PSYCHIATRY & NEUROLOGY

## 2023-12-13 ASSESSMENT — MONTREAL COGNITIVE ASSESSMENT (MOCA)
5. MEMORY TRIALS: 0
11. FOR EACH PAIR OF WORDS, WHAT CATEGORY DO THEY BELONG TO (OUT OF 2): 2
10. [FLUENCY] NAME WORDS STARTING WITH DESIGNATED LETTER: 1
WHAT IS THE TOTAL SCORE (OUT OF 30): 19
6. READ LIST OF DIGITS [FORWARD/BACKWARD]: 1
VISUOSPATIAL/EXECUTIVE SUBSCORE: 2
8. SERIAL SUBTRACTION OF 7S: 3
12. MEMORY INDEX SCORE: 0
9. REPEAT EACH SENTENCE: 0
7. [VIGILENCE] TAP WHEN HEARING DESIGNATED LETTER: 1
WHAT LEVEL OF EDUCATION WAS ATTAINED: 0
4. NAME EACH OF THE THREE ANIMALS SHOWN: 3
13. ORIENTATION SUBSCORE: 6

## 2023-12-13 ASSESSMENT — PAIN SCALES - GENERAL: PAINLEVEL: 0-NO PAIN

## 2023-12-13 ASSESSMENT — ENCOUNTER SYMPTOMS
LOSS OF SENSATION IN FEET: 1
DEPRESSION: 0
OCCASIONAL FEELINGS OF UNSTEADINESS: 0

## 2023-12-13 ASSESSMENT — PATIENT HEALTH QUESTIONNAIRE - PHQ9
2. FEELING DOWN, DEPRESSED OR HOPELESS: NOT AT ALL
SUM OF ALL RESPONSES TO PHQ9 QUESTIONS 1 AND 2: 0
1. LITTLE INTEREST OR PLEASURE IN DOING THINGS: NOT AT ALL

## 2023-12-13 NOTE — PATIENT INSTRUCTIONS
Plan:  - You can try to switch sertraline to 25 mg (half tablet) twice daily instead of 50 mg in the morning. If you still feel sleepy during the day, then you can try taking all 50 mg at nighttime. If this still does not help, you can decrease the total dose to 25 mg at bedtime. Contact he office in case of any questions or concerns.   - Continue to work with your therapist.   - Make sure you use your hearing aids consistently.   - Follow up in about 6 months with repeat memory test.   - Contact sooner if needed.      Brain healthy lifestyle:   - Make sure your medical conditions (if any) such as diabetes, high blood pressure, high cholesterol, thyroid disease sleep apnea are optimally controlled.   - Use eyeglasses or hearing aids appropriately if needed.   - Eat a heart healthy diet (like a Mediterranean diet; lots of fruits and vegetables, fish; low fat;)  - Exercise regularly as tolerated.   - Maintain good sleep hygiene; avoid daytime naps; try to get 7 to 8 hours of continuous sleep at night.   - Stay mentally active - puzzles, word searches, books, playing cards.  - Stay socially active and engaged.

## 2023-12-13 NOTE — PROGRESS NOTES
"[place], [place]      [facility] Follow up visit:     Mr. Ritesh Garza  is a 73 y.o. -year-old with  master's level  education and history of cognitive impairment, DM, HTN, CAD, hyperlipidemia, BPH. Seen in clinic today for follow up.   Wife Rosa Maria is healthcare POA.     Subjective:     He says he is feeling \"pretty good.\"     He says he is working later today. 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     He says that \"the pill you are giving me is phenomenal.\" He says he is not getting loud and his wife is not getting mad at her. He says he is not getting agitated \"hardly at all.\"   He started on half tablet and increased the dose to a full tablet.   He says he seems tired and he has a tendency to \"nod off.\" He takes it in the morning. He says he cut in half this morning.     He says he is sleeping good most of the night except sometimes diabetic neuropathy bothers him at night.   He says he \"supposedly\" was diagnosed with sleep apnea but does not think he does; he says he is a side sleeper.   Appetite is okay. Says his weight is down.     He says his memory has been \"pretty good.\" He says if he cannot remember something, it will come back.     He has not been involved in any scams recently - says \"I've been a good boy.: (He was involved in online scams in the last year or so - he sent nude pictures to a woman who then started to threaten him and demanded money.)  Wife notes she still monitors his online activities and taken over banking.     Depression - improved; no anhedonia.   Denies any hopelessness or worthlessness. Denies any death wishes, suicidal thoughts, intent or plans.     Anxiety - denies excessive worry or anxiety.   Psychosis - denies AVH, paranoia or delusions  Yi - denies any manic or hypomanic episodes. Wife denies noticing any change in mood, sleep patterns, speech, grandiosity etc when he was involved with " "online scams.   Suicidality - denied any death wishes or suicidal thoughts, intent or plans.       Functional changes:   Current living situation - lives in a single home with wife.   Driving - denies problems with accidents or getting lost  Finances - wife has taken over due to overdrawn bank accounts  Cooking - still cooking; burned pot once while he was talking to a scammer on the phone.   ADLS - independent.   Medications - takes medications out of the bottles and wife notes he seems to be managing fine.     Sees a therapist (Esdras Thorne) at [facility] regularly.      Prior cognitive work-up:   Aug '22 - normal TSH, folate; slightly high B12; nonreactive syphilis.   Neuropsychological testing (22)   MRI brain, MRA head and neck in  - mild small vessel ischemic changes, generalized volume loss; one blooming artifact in left frontal lobe region.     Relevant family history:   Psychiatric: Mother had \"psych issues\" - she starved herself when she was in her 80s. Patient notes she had eating problems since she was 3 after her father committed suicide.   Dementia: paternal grandfather had Alzheimer's dementia in later life;  in his 90s.   Maternal grandfather committed suicide because of financial issues.     PMH/PSH:  Past Medical History:   Diagnosis Date    Diabetes mellitus (CMS/HCC) Over 20 years ago    Drug-induced gout, unspecified site 10/04/2019    Acute drug-induced gout    Encounter for screening for malignant neoplasm of colon 10/31/2022    Colon cancer screening    Gout, unspecified     Acute gout    Heart disease Over 20 years sgo    Ocular pain, right eye 10/14/2018    Pain of right eye    Periorbital cellulitis 10/14/2018    Periorbital cellulitis    Personal history of colonic polyps 10/11/2017    History of colon polyps    Personal history of other diseases of the circulatory system 2021    History of angina pectoris    Personal history of other diseases of the " circulatory system 05/12/2021    History of angina pectoris    Personal history of other diseases of the respiratory system 10/04/2019    History of acute bronchitis    Personal history of other drug therapy     History of influenza vaccination    Unspecified cirrhosis of liver (CMS/HCC) 11/07/2022    Cirrhosis, nonalcoholic        Meds  Current Outpatient Medications on File Prior to Visit   Medication Sig Dispense Refill    albuterol (ProAir HFA) 90 mcg/actuation inhaler Inhale once daily. Per instructions      allopurinol (Zyloprim) 100 mg tablet Take 0.5 tablets (50 mg) by mouth once daily. 90 tablet 0    amLODIPine (Norvasc) 10 mg tablet TAKE 1 TABLET BY MOUTH EVERY DAY 90 tablet 1    aspirin 81 mg EC tablet TAKE 1 TABLET BY MOUTH EVERY DAY 90 tablet 3    atorvastatin (Lipitor) 80 mg tablet Take 1 tablet (80 mg) by mouth once daily.      b complex vitamins capsule 1 capsule once daily.      cetirizine HCl (ALLERGY RELIEF, CETIRIZINE, ORAL) Allergy Relief TABS  Refills: 0      coenzyme Q-10 (CoQ-10) 100 mg capsule Take 1 capsule (100 mg) by mouth once daily.      cyanocobalamin (Vitamin B-12) 1,000 mcg tablet 1 tablet (1,000 mcg) once daily. As directed      dapagliflozin (Farxiga) 10 mg Take 1 tablet (10 mg) by mouth once daily in the morning. Take before meals.      diclofenac sodium (Voltaren XR) 100 mg 24 hr tablet Take 1 tablet (100 mg) by mouth once daily as needed.      famotidine (Pepcid) 20 mg tablet Take 1 tablet (20 mg) by mouth once daily as needed for indigestion.      icosapent ethyL (Vascepa) 0.5 gram capsule Take 2 capsules (1 g) by mouth 2 times a day with meals. 120 capsule 11    losartan (Cozaar) 50 mg tablet Take 1 tablet (50 mg) by mouth once daily. 30 tablet 11    magnesium gluconate (Magonate) 27.5 mg magne- sium (500 mg) tablet Take 2 tablets (55 mg) by mouth once daily.      metFORMIN  mg 24 hr tablet TAKE 2 TABLETS (1000 MG) BY MOUTH DAILY 180 tablet 1    metoprolol succinate XL  "(Toprol-XL) 50 mg 24 hr tablet TAKE 1 TABLET BY MOUTH EVERY DAY 90 tablet 3    multivitamin tablet Take 1 tablet by mouth once daily.      pantoprazole (ProtoNix) 40 mg EC tablet Take 1 tablet (40 mg) by mouth once daily.      sertraline (Zoloft) 50 mg tablet Take one tablet daily in the morning after breakfast. 90 tablet 1    tadalafil (Cialis) 5 mg tablet TAKE 1 TABLET BY MOUTH EVERY DAY 90 tablet 0    tadalafil 20 mg tablet Take 1 tablet (20 mg) by mouth once daily.      turmeric-turmeric root extract 450-50 mg capsule Take daily as directed.      fish oil concentrate (Omega-3) 120-180 mg capsule 1 capsule (1 g) once daily.      [DISCONTINUED] aspirin 81 mg EC tablet Take 1 tablet (81 mg) by mouth once daily.      [DISCONTINUED] sodium bicarbonate 650 mg tablet Take 1 tablet (650 mg) by mouth 2 times a day.       No current facility-administered medications on file prior to visit.          Mental Status Examination:  General appearance: Hygiene adequate; grooming appropriate  Attitude: cooperative; jokes frequently.   Motor: no psychomotor agitation or retardation, no tremor or other abnormal movements  Speech: somewhat loud; regular rate; unpressured.   Mood: \"good.\"  Affect: full range; close to euthymic.   Passive death wish: Denies.   Suicidal ideation: denies  Thought process:  mostly goal-directed but sometimes slightly circumstantial     Thought content: Hallucinations - No; Delusions - No; Paranoia - No.    Insight: fair  Judgment: fair  Fund of knowledge: Good  Recent and remote memory:  fair recall of recent and remote autobiogrpahical information.   Attention span and concentration: intact  Language: No aphasia; No word finding difficulties; No paraphasic errors.    Assessment/Plan   Mr. Ritesh Garza  is a 72 y.o. -year-old with master's level education and history of cognitive impairment, DM, HTN, CAD, hyperlipidemia, BPH, who is presenting today for behavioral disturbance.     Initial impression: " "  23 - He has a history of insidious onset of cognitive changes. He also had significant behavioral changes last year when he contacted women online and ended getting scammed. Since then, these behaviors seemed to have resolved although wife still notes increased irritability and disinhibition compared to before, as well as cognitive difficulties.     Neuropsychological testin22 (Dr. Raymond Harris): \"Results of the current neuropsychological evaluation occur in the context of estimated high average baseline abilities and are notable for relative deficits and weaknesses in aspects of processing speed, semantic fluency, complex design copy, motor-free visuoperception, and fine motor dexterity, as described above. Memory testing was intact. With the exception of finance management, he is functionally independent.\"     Labs:   Aug '22 - normal TSH, folate; slightly high B12; nonreactive syphilis.     MRI brain, MRA head and neck:    - mild small vessel ischemic changes, generalized volume loss; one blooming artifact in left frontal lobe region (Similar to 2021).     MRI brain:   23 - mild small vessel ischemic changes; generalized volume loss; area of increased susceptibility in medulla and punctate focus in left frontal lobe similar to prior.     FDG-PET/CT: subtle FDG uptake in decrease in biparietal and bitemporal regions.     MoCA:   23 -  (visuospatial/executive 3/5; delayed recall 0/5 with +1 after category cues and another +4 after multiple choice cues).   23 -  (2/5 visuospatial/executive; 0/5 delayed recall, MIS 5/15).    23 -reports significant improvement in mood on sertraline but notes some sleepiness during the day.     Diagnosis:   Mild cognitive impairment - underlying etiology is unclear. PET scan is not suggestive of FTD whereas his intact memory on neuropsychological testing (although poor delayed recall in MoCA in ) is inconsistent " with Alzheimer's disease.     Treatment Plan/Recommendations:       - Discussed ways to manage side effects of sertraline. It has helped significantly with his mood and behaviors.       - Continue psychotherapy.       - Discussed further workup for cognitive issues including repeat neuropsychological testing but he chose to defer.      - Continue using hearing aids consistently      - Follow up in about 6 months with repeat MoCA.       Review with patient: Treatment plan reviewed with the patient.    Meme Perrin MD

## 2024-01-08 ENCOUNTER — LAB (OUTPATIENT)
Dept: LAB | Facility: LAB | Age: 74
End: 2024-01-08
Payer: MEDICARE

## 2024-01-09 ASSESSMENT — ENCOUNTER SYMPTOMS
PND: 0
HEADACHES: 0
NECK PAIN: 0
SWEATS: 0
HYPERTENSION: 1
BLURRED VISION: 0
PALPITATIONS: 0
ORTHOPNEA: 0
SHORTNESS OF BREATH: 0

## 2024-01-11 ENCOUNTER — LAB (OUTPATIENT)
Dept: LAB | Facility: LAB | Age: 74
End: 2024-01-11
Payer: MEDICARE

## 2024-01-11 ENCOUNTER — OFFICE VISIT (OUTPATIENT)
Dept: PRIMARY CARE | Facility: CLINIC | Age: 74
End: 2024-01-11
Payer: MEDICARE

## 2024-01-11 VITALS
BODY MASS INDEX: 29.81 KG/M2 | SYSTOLIC BLOOD PRESSURE: 126 MMHG | OXYGEN SATURATION: 95 % | HEART RATE: 61 BPM | DIASTOLIC BLOOD PRESSURE: 68 MMHG | WEIGHT: 163 LBS

## 2024-01-11 DIAGNOSIS — Z00.00 ENCOUNTER FOR PREVENTATIVE ADULT HEALTH CARE EXAMINATION: Primary | ICD-10-CM

## 2024-01-11 DIAGNOSIS — I50.20 CONGESTIVE HEART FAILURE WITH RIGHT VENTRICULAR SYSTOLIC DYSFUNCTION (MULTI): ICD-10-CM

## 2024-01-11 DIAGNOSIS — K74.60 CIRRHOSIS, NONALCOHOLIC (MULTI): ICD-10-CM

## 2024-01-11 DIAGNOSIS — N52.9 ERECTILE DYSFUNCTION, UNSPECIFIED ERECTILE DYSFUNCTION TYPE: ICD-10-CM

## 2024-01-11 DIAGNOSIS — E11.40 TYPE 2 DIABETES MELLITUS WITH DIABETIC NEUROPATHY, WITHOUT LONG-TERM CURRENT USE OF INSULIN (MULTI): ICD-10-CM

## 2024-01-11 DIAGNOSIS — I10 ESSENTIAL HYPERTENSION: ICD-10-CM

## 2024-01-11 DIAGNOSIS — M10.9 GOUT, UNSPECIFIED CAUSE, UNSPECIFIED CHRONICITY, UNSPECIFIED SITE: ICD-10-CM

## 2024-01-11 DIAGNOSIS — I85.00 PORTAL HYPERTENSION WITH ESOPHAGEAL VARICES (MULTI): ICD-10-CM

## 2024-01-11 DIAGNOSIS — N40.1 BENIGN PROSTATIC HYPERPLASIA WITH LOWER URINARY TRACT SYMPTOMS, SYMPTOM DETAILS UNSPECIFIED: ICD-10-CM

## 2024-01-11 DIAGNOSIS — N18.32 STAGE 3B CHRONIC KIDNEY DISEASE (MULTI): ICD-10-CM

## 2024-01-11 DIAGNOSIS — I50.82 CONGESTIVE HEART FAILURE WITH RIGHT VENTRICULAR SYSTOLIC DYSFUNCTION (MULTI): ICD-10-CM

## 2024-01-11 DIAGNOSIS — R46.89 BEHAVIORAL CHANGE: ICD-10-CM

## 2024-01-11 DIAGNOSIS — I10 BENIGN ESSENTIAL HYPERTENSION: ICD-10-CM

## 2024-01-11 DIAGNOSIS — Z00.00 ENCOUNTER FOR PREVENTATIVE ADULT HEALTH CARE EXAMINATION: ICD-10-CM

## 2024-01-11 DIAGNOSIS — K76.6 PORTAL HYPERTENSION WITH ESOPHAGEAL VARICES (MULTI): ICD-10-CM

## 2024-01-11 PROBLEM — E11.22 TYPE 2 DIABETES MELLITUS WITH CHRONIC KIDNEY DISEASE, WITHOUT LONG-TERM CURRENT USE OF INSULIN (MULTI): Status: RESOLVED | Noted: 2023-04-18 | Resolved: 2024-01-11

## 2024-01-11 LAB
ALBUMIN SERPL BCP-MCNC: 4.3 G/DL (ref 3.4–5)
ALP SERPL-CCNC: 92 U/L (ref 33–136)
ALT SERPL W P-5'-P-CCNC: 24 U/L (ref 10–52)
ANION GAP SERPL CALC-SCNC: 13 MMOL/L (ref 10–20)
AST SERPL W P-5'-P-CCNC: 31 U/L (ref 9–39)
BILIRUB SERPL-MCNC: 0.5 MG/DL (ref 0–1.2)
BUN SERPL-MCNC: 28 MG/DL (ref 6–23)
CALCIUM SERPL-MCNC: 9.1 MG/DL (ref 8.6–10.6)
CHLORIDE SERPL-SCNC: 108 MMOL/L (ref 98–107)
CO2 SERPL-SCNC: 24 MMOL/L (ref 21–32)
CREAT SERPL-MCNC: 1.85 MG/DL (ref 0.5–1.3)
EGFRCR SERPLBLD CKD-EPI 2021: 38 ML/MIN/1.73M*2
EST. AVERAGE GLUCOSE BLD GHB EST-MCNC: 143 MG/DL
GLUCOSE SERPL-MCNC: 243 MG/DL (ref 74–99)
HBA1C MFR BLD: 6.6 %
POTASSIUM SERPL-SCNC: 4.8 MMOL/L (ref 3.5–5.3)
PROT SERPL-MCNC: 6.8 G/DL (ref 6.4–8.2)
SODIUM SERPL-SCNC: 140 MMOL/L (ref 136–145)
URATE SERPL-MCNC: 4.7 MG/DL (ref 4–7.5)

## 2024-01-11 PROCEDURE — 99214 OFFICE O/P EST MOD 30 MIN: CPT | Performed by: INTERNAL MEDICINE

## 2024-01-11 PROCEDURE — 4010F ACE/ARB THERAPY RXD/TAKEN: CPT | Performed by: INTERNAL MEDICINE

## 2024-01-11 PROCEDURE — 3078F DIAST BP <80 MM HG: CPT | Performed by: INTERNAL MEDICINE

## 2024-01-11 PROCEDURE — 1126F AMNT PAIN NOTED NONE PRSNT: CPT | Performed by: INTERNAL MEDICINE

## 2024-01-11 PROCEDURE — 84550 ASSAY OF BLOOD/URIC ACID: CPT

## 2024-01-11 PROCEDURE — 1160F RVW MEDS BY RX/DR IN RCRD: CPT | Performed by: INTERNAL MEDICINE

## 2024-01-11 PROCEDURE — 83036 HEMOGLOBIN GLYCOSYLATED A1C: CPT

## 2024-01-11 PROCEDURE — 3074F SYST BP LT 130 MM HG: CPT | Performed by: INTERNAL MEDICINE

## 2024-01-11 PROCEDURE — 80053 COMPREHEN METABOLIC PANEL: CPT

## 2024-01-11 PROCEDURE — 1036F TOBACCO NON-USER: CPT | Performed by: INTERNAL MEDICINE

## 2024-01-11 PROCEDURE — 3044F HG A1C LEVEL LT 7.0%: CPT | Performed by: INTERNAL MEDICINE

## 2024-01-11 PROCEDURE — 1159F MED LIST DOCD IN RCRD: CPT | Performed by: INTERNAL MEDICINE

## 2024-01-11 PROCEDURE — 36415 COLL VENOUS BLD VENIPUNCTURE: CPT

## 2024-01-11 RX ORDER — AMLODIPINE BESYLATE 10 MG/1
10 TABLET ORAL DAILY
Qty: 90 TABLET | Refills: 1 | Status: SHIPPED | OUTPATIENT
Start: 2024-01-11 | End: 2024-02-23 | Stop reason: HOSPADM

## 2024-01-11 RX ORDER — TADALAFIL 5 MG/1
5 TABLET ORAL DAILY
Qty: 90 TABLET | Refills: 0 | Status: SHIPPED | OUTPATIENT
Start: 2024-01-11 | End: 2024-02-23 | Stop reason: HOSPADM

## 2024-01-11 ASSESSMENT — ENCOUNTER SYMPTOMS
HYPERTENSION: 1
SHORTNESS OF BREATH: 0
SWEATS: 0
PND: 0
PALPITATIONS: 0
ORTHOPNEA: 0
HEADACHES: 0
BLURRED VISION: 0
NECK PAIN: 0

## 2024-01-11 ASSESSMENT — PATIENT HEALTH QUESTIONNAIRE - PHQ9
2. FEELING DOWN, DEPRESSED OR HOPELESS: NOT AT ALL
SUM OF ALL RESPONSES TO PHQ9 QUESTIONS 1 AND 2: 0
2. FEELING DOWN, DEPRESSED OR HOPELESS: NOT AT ALL
SUM OF ALL RESPONSES TO PHQ9 QUESTIONS 1 AND 2: 0
1. LITTLE INTEREST OR PLEASURE IN DOING THINGS: NOT AT ALL
1. LITTLE INTEREST OR PLEASURE IN DOING THINGS: NOT AT ALL

## 2024-01-11 ASSESSMENT — PAIN SCALES - GENERAL: PAINLEVEL: 0-NO PAIN

## 2024-01-11 NOTE — ASSESSMENT & PLAN NOTE
Stable renal function, low bicarb noted on last check, will repeat and trend.   Refer to nephrology

## 2024-01-11 NOTE — PATIENT INSTRUCTIONS
Ritesh,   I have ordered blood and/or urine tests for you to do today. The lab can be found on this floor (2nd floor) next to the pharmacy across from the elevators.   Nephrology referral   Followup 3 months for ANNUAL WELLNESS VISIT

## 2024-01-11 NOTE — ASSESSMENT & PLAN NOTE
BP readings now controlled on amlodipine and losartan 50mg. Advise arm BP cuff as opposed to wrist for home monitoring.

## 2024-01-11 NOTE — PROGRESS NOTES
Answers submitted by the patient for this visit:  High Blood Pressure Questionnaire (Submitted on 1/9/2024)  Chief Complaint: Hypertension  Chronicity: recurrent  Onset: more than 1 year ago  Progression since onset: resolved  Condition status: controlled  anxiety: No  blurred vision: No  chest pain: No  headaches: No  malaise/fatigue: No  neck pain: No  orthopnea: No  palpitations: No  peripheral edema: No  PND: No  shortness of breath: No  sweats: No  Agents associated with hypertension: no associated agents  Subjective   Patient ID: Ritesh Garza is a 73 y.o. male who presents for Follow-up      Hypertension  This is a recurrent problem. The current episode started more than 1 year ago. The problem has been resolved since onset. The problem is controlled. Pertinent negatives include no anxiety, blurred vision, chest pain, headaches, malaise/fatigue, neck pain, orthopnea, palpitations, peripheral edema, PND, shortness of breath or sweats. There are no associated agents to hypertension.         10/23: bicarb low, diarrhea? Kidney function stable.   11/23: started vascepa by cardiology, liver sono stable.   12/23: seen by hepatology stable repeat 6m.    Past medical history  -Mild cognitive impairment and depression -recommended continued psychotherapy recommended repeat MoCA in 6 months on sertraline on 25mg BID   -CAD s/p 5V CABG 1992 on aspirin, last nitroglycerin use very rare.  Followed by Dr Hennessy.   -Systolic CHF with class II heart failure followed by Dr. Hennessy last seen in November on appropriate medical management including, Farxiga, metoprolol, discontinued epleronone losartan and potassium. Entresto was withdrawn due to cost.  Recent TTE and recommended follow-up in 6 months  -HTN on amlodipine 10mg  - SIDHU cirrhosis - followed by Dr. Duran -  ultrasound and AFP every 6 months - recent EGD showing portal hypertension with esophageal varices last seen in November  -Erosive esophagitis on PPI on  pantoprazole   -Asthma mild and seasonal - has rare use of albuterol  -BPH with LUTS on daily tadalafil (cialis) 5mg   -DM2 with bilateral neuropathy (followed by podiatry)last A1C 7% on Farxiga and metformin 500mg 7% UTD with diabetic eye exam due for cataract on the left   -SENIA - denies and not interested in treating   -CKD 3 with persistent proteinuria   -Cognitive and behavioral changes recently seen by neuropsychiatry no significant impairment though with possible frontal lobe process followed by Dr. Andrade  -Right carpal tunnel syndrome followed by Dr. De Paz  -Strabismus status post remote eye muscle surgery  -Tubular adenoma April 2020 3 repeat 5 years  - Gout on allopurinol 100mg last flare ages ago.   - ED -followed by urology on Cialis 20 one half tab as needed  - Hearing loss now compliant with hearing aids   - HLD on vascepa      Social:   - [employer] for many years.   - Lives at home with wife, member of [employer]. Has a son in [place] has a grandson who he has not seen yet.     Current Outpatient Medications   Medication Instructions    albuterol (ProAir HFA) 90 mcg/actuation inhaler inhalation, Daily, Per instructions    allopurinol (ZYLOPRIM) 50 mg, oral, Daily    amLODIPine (NORVASC) 10 mg, oral, Daily    aspirin 81 mg, oral, Daily    atorvastatin (LIPITOR) 80 mg, oral, Daily    b complex vitamins capsule 1 capsule, Daily    cetirizine HCl (ALLERGY RELIEF, CETIRIZINE, ORAL) Allergy Relief TABS  Refills: 0    coenzyme Q-10 (CoQ-10) 100 mg capsule 1 capsule, oral, Daily    cyanocobalamin (Vitamin B-12) 1,000 mcg tablet 1 tablet, Daily, As directed    dapagliflozin (Farxiga) 10 mg 1 tablet, oral, Daily before breakfast    diclofenac sodium (VOLTAREN XR) 100 mg, oral, Daily PRN    famotidine (Pepcid) 20 mg tablet 1 tablet, oral, Daily PRN    icosapent ethyL (VASCEPA) 1 g, oral, 2 times daily with meals    losartan (COZAAR) 50 mg, oral, Daily    magnesium gluconate (Magonate)  27.5 mg magne- sium (500 mg) tablet 2 tablets, oral, Daily    metFORMIN XR (GLUCOPHAGE-XR) 1,000 mg, oral, Daily    metoprolol succinate XL (Toprol-XL) 50 mg 24 hr tablet TAKE 1 TABLET BY MOUTH EVERY DAY    multivitamin tablet 1 tablet, oral, Daily    pantoprazole (ProtoNix) 40 mg EC tablet 1 tablet, oral, Daily    sertraline (Zoloft) 50 mg tablet Take one tablet daily in the morning after breakfast.    tadalafil (CIALIS) 5 mg, oral, Daily    tadalafil 20 mg tablet 1 tablet, oral, Daily    turmeric-turmeric root extract 450-50 mg capsule Take daily as directed.         Objective     /68   Pulse 61   Wt 73.9 kg (163 lb)   SpO2 95%   BMI 29.81 kg/m²     Physical Exam  General: Alert and oriented, in no apparent distress   HEENT: No conjunctival erythema, no external facial lesions   Lungs: Breathing comfortably  Skin: No evidence of skin breakdown.  Neuro: AAO x 3, answering questions appropriately, no obvious cranial nerve deficits    Assessment/Plan   Problem List Items Addressed This Visit       Benign essential hypertension     BP readings now controlled on amlodipine and losartan 50mg. Advise arm BP cuff as opposed to wrist for home monitoring.         Cirrhosis, nonalcoholic (CMS/HCC)     Appropriate followup with Dr. Duran, [id number] sonos and AFPs UTD   EGD UTD          Stage 3b chronic kidney disease (CMS/HCC)     Stable renal function, low bicarb noted on last check, will repeat and trend.   Refer to nephrology          Relevant Orders    Referral to Nephrology    Type 2 diabetes mellitus with diabetic neuropathy, without long-term current use of insulin (CMS/HCC)     Recently initiated gabapentin 100mg at bedtime by podiatrist   On metformin and statin   +nephropathy, refer to nephrology, continue losartan and farxiga   UTD with diabetic eye exam, UTD with vaccinations.          Portal hypertension with esophageal varices (CMS/HCC)     History of, recently seen by hepatology found no varices on  recent endoscopy, recommended repeat in 2-3 years.          Gout    Relevant Orders    Uric acid (Completed)    Behavioral change     Stable, followed regularly by Dr. Perrin followup recommended in June with repeat [place]         Congestive heart failure with right ventricular systolic dysfunction (CMS/HCC)     Followed by Dr. Hennessy last seen in November on appropriate medical management including metoprolol, losartan, and farxiga.   Last TTE performed in May.         Relevant Medications    amLODIPine (Norvasc) 10 mg tablet     Other Visit Diagnoses       Encounter for preventative adult health care examination    -  Primary    Relevant Orders    Hemoglobin A1C (Completed)    Comprehensive Metabolic Panel (Completed)    Essential hypertension        Relevant Medications    amLODIPine (Norvasc) 10 mg tablet          Health Maintenance   Cancer screening:   - Colonoscopy  2/23 consider repeat 5 years  - PSA with urology   Immunizations  UTD   AWV 8/23

## 2024-01-11 NOTE — ASSESSMENT & PLAN NOTE
Recently initiated gabapentin 100mg at bedtime by podiatrist   On metformin and statin   +nephropathy, refer to nephrology, continue losartan and farxiga   UTD with diabetic eye exam, UTD with vaccinations.

## 2024-01-12 DIAGNOSIS — N52.9 ERECTILE DYSFUNCTION, UNSPECIFIED ERECTILE DYSFUNCTION TYPE: ICD-10-CM

## 2024-01-12 DIAGNOSIS — N40.1 BENIGN PROSTATIC HYPERPLASIA WITH LOWER URINARY TRACT SYMPTOMS, SYMPTOM DETAILS UNSPECIFIED: ICD-10-CM

## 2024-01-12 NOTE — ASSESSMENT & PLAN NOTE
History of, recently seen by hepatology found no varices on recent endoscopy, recommended repeat in 2-3 years.

## 2024-01-12 NOTE — ASSESSMENT & PLAN NOTE
Followed by Dr. Hennessy last seen in November on appropriate medical management including metoprolol, losartan, and farxiga.   Last TTE performed in May.

## 2024-01-23 RX ORDER — TADALAFIL 5 MG/1
5 TABLET ORAL DAILY
Qty: 90 TABLET | Refills: 0 | OUTPATIENT
Start: 2024-01-23

## 2024-02-13 ENCOUNTER — APPOINTMENT (OUTPATIENT)
Dept: RADIOLOGY | Facility: HOSPITAL | Age: 74
DRG: 604 | End: 2024-02-13
Payer: MEDICARE

## 2024-02-13 ENCOUNTER — HOSPITAL ENCOUNTER (INPATIENT)
Facility: HOSPITAL | Age: 74
LOS: 9 days | Discharge: HOME HEALTH | DRG: 604 | End: 2024-02-23
Attending: EMERGENCY MEDICINE | Admitting: SURGERY
Payer: MEDICARE

## 2024-02-13 ENCOUNTER — APPOINTMENT (OUTPATIENT)
Dept: CARDIOLOGY | Facility: HOSPITAL | Age: 74
DRG: 604 | End: 2024-02-13
Payer: MEDICARE

## 2024-02-13 DIAGNOSIS — I10 ESSENTIAL (PRIMARY) HYPERTENSION: ICD-10-CM

## 2024-02-13 DIAGNOSIS — S75.992A: ICD-10-CM

## 2024-02-13 DIAGNOSIS — I24.89 DEMAND ISCHEMIA (MULTI): ICD-10-CM

## 2024-02-13 DIAGNOSIS — R07.9 CHEST PAIN, UNSPECIFIED TYPE: ICD-10-CM

## 2024-02-13 DIAGNOSIS — I50.20 CONGESTIVE HEART FAILURE WITH RIGHT VENTRICULAR SYSTOLIC DYSFUNCTION (MULTI): ICD-10-CM

## 2024-02-13 DIAGNOSIS — D62 ACUTE BLOOD LOSS ANEMIA: ICD-10-CM

## 2024-02-13 DIAGNOSIS — I25.10 ASHD (ARTERIOSCLEROTIC HEART DISEASE): ICD-10-CM

## 2024-02-13 DIAGNOSIS — I50.82 CONGESTIVE HEART FAILURE WITH RIGHT VENTRICULAR SYSTOLIC DYSFUNCTION (MULTI): ICD-10-CM

## 2024-02-13 DIAGNOSIS — N18.32 TYPE 2 DIABETES MELLITUS WITH STAGE 3B CHRONIC KIDNEY DISEASE, WITHOUT LONG-TERM CURRENT USE OF INSULIN (MULTI): ICD-10-CM

## 2024-02-13 DIAGNOSIS — N17.9 AKI (ACUTE KIDNEY INJURY) (CMS-HCC): ICD-10-CM

## 2024-02-13 DIAGNOSIS — I21.4 NSTEMI (NON-ST ELEVATED MYOCARDIAL INFARCTION) (MULTI): ICD-10-CM

## 2024-02-13 DIAGNOSIS — I47.20 VENTRICULAR TACHYCARDIA (MULTI): ICD-10-CM

## 2024-02-13 DIAGNOSIS — S70.12XA HEMATOMA OF LEFT THIGH, INITIAL ENCOUNTER: Primary | ICD-10-CM

## 2024-02-13 DIAGNOSIS — I50.23 ACUTE ON CHRONIC SYSTOLIC HEART FAILURE (MULTI): ICD-10-CM

## 2024-02-13 DIAGNOSIS — E11.22 TYPE 2 DIABETES MELLITUS WITH STAGE 3B CHRONIC KIDNEY DISEASE, WITHOUT LONG-TERM CURRENT USE OF INSULIN (MULTI): ICD-10-CM

## 2024-02-13 DIAGNOSIS — J45.20 MILD INTERMITTENT ASTHMA, UNSPECIFIED WHETHER COMPLICATED (HHS-HCC): ICD-10-CM

## 2024-02-13 LAB
ABO GROUP (TYPE) IN BLOOD: NORMAL
ALBUMIN SERPL BCP-MCNC: 3.1 G/DL (ref 3.4–5)
ALP SERPL-CCNC: 70 U/L (ref 33–136)
ALT SERPL W P-5'-P-CCNC: 17 U/L (ref 10–52)
ANION GAP SERPL CALC-SCNC: 10 MMOL/L (ref 10–20)
ANTIBODY SCREEN: NORMAL
APAP SERPL-MCNC: <10 UG/ML
AST SERPL W P-5'-P-CCNC: 15 U/L (ref 9–39)
BASOPHILS # BLD AUTO: 0.04 X10*3/UL (ref 0–0.1)
BASOPHILS NFR BLD AUTO: 0.3 %
BILIRUB SERPL-MCNC: 0.3 MG/DL (ref 0–1.2)
BUN SERPL-MCNC: 39 MG/DL (ref 6–23)
CALCIUM SERPL-MCNC: 7.3 MG/DL (ref 8.6–10.3)
CARDIAC TROPONIN I PNL SERPL HS: 5 NG/L (ref 0–20)
CHLORIDE SERPL-SCNC: 104 MMOL/L (ref 98–107)
CO2 SERPL-SCNC: 20 MMOL/L (ref 21–32)
CREAT SERPL-MCNC: 2.59 MG/DL (ref 0.5–1.3)
EGFRCR SERPLBLD CKD-EPI 2021: 25 ML/MIN/1.73M*2
EOSINOPHIL # BLD AUTO: 0.14 X10*3/UL (ref 0–0.4)
EOSINOPHIL NFR BLD AUTO: 1.2 %
ERYTHROCYTE [DISTWIDTH] IN BLOOD BY AUTOMATED COUNT: 13.6 % (ref 11.5–14.5)
ETHANOL SERPL-MCNC: <10 MG/DL
GLUCOSE SERPL-MCNC: 283 MG/DL (ref 74–99)
HCT VFR BLD AUTO: 23.6 % (ref 41–52)
HGB BLD-MCNC: 7.9 G/DL (ref 13.5–17.5)
IMM GRANULOCYTES # BLD AUTO: 0.09 X10*3/UL (ref 0–0.5)
IMM GRANULOCYTES NFR BLD AUTO: 0.8 % (ref 0–0.9)
INR PPP: 1.3 (ref 0.9–1.1)
LACTATE SERPL-SCNC: 2 MMOL/L (ref 0.4–2)
LACTATE SERPL-SCNC: 2.3 MMOL/L (ref 0.4–2)
LYMPHOCYTES # BLD AUTO: 2.17 X10*3/UL (ref 0.8–3)
LYMPHOCYTES NFR BLD AUTO: 18.2 %
MCH RBC QN AUTO: 31.2 PG (ref 26–34)
MCHC RBC AUTO-ENTMCNC: 33.5 G/DL (ref 32–36)
MCV RBC AUTO: 93 FL (ref 80–100)
MONOCYTES # BLD AUTO: 1.42 X10*3/UL (ref 0.05–0.8)
MONOCYTES NFR BLD AUTO: 11.9 %
NEUTROPHILS # BLD AUTO: 8.04 X10*3/UL (ref 1.6–5.5)
NEUTROPHILS NFR BLD AUTO: 67.6 %
NRBC BLD-RTO: 0 /100 WBCS (ref 0–0)
PLATELET # BLD AUTO: 165 X10*3/UL (ref 150–450)
POTASSIUM SERPL-SCNC: 4.3 MMOL/L (ref 3.5–5.3)
PROT SERPL-MCNC: 4.9 G/DL (ref 6.4–8.2)
PROTHROMBIN TIME: 14.6 SECONDS (ref 9.8–12.8)
RBC # BLD AUTO: 2.53 X10*6/UL (ref 4.5–5.9)
RH FACTOR (ANTIGEN D): NORMAL
SALICYLATES SERPL-MCNC: <3 MG/DL
SODIUM SERPL-SCNC: 130 MMOL/L (ref 136–145)
WBC # BLD AUTO: 11.9 X10*3/UL (ref 4.4–11.3)

## 2024-02-13 PROCEDURE — 2500000004 HC RX 250 GENERAL PHARMACY W/ HCPCS (ALT 636 FOR OP/ED): Performed by: EMERGENCY MEDICINE

## 2024-02-13 PROCEDURE — 84484 ASSAY OF TROPONIN QUANT: CPT | Performed by: EMERGENCY MEDICINE

## 2024-02-13 PROCEDURE — 70450 CT HEAD/BRAIN W/O DYE: CPT | Performed by: RADIOLOGY

## 2024-02-13 PROCEDURE — 72125 CT NECK SPINE W/O DYE: CPT

## 2024-02-13 PROCEDURE — 36415 COLL VENOUS BLD VENIPUNCTURE: CPT | Performed by: EMERGENCY MEDICINE

## 2024-02-13 PROCEDURE — 72125 CT NECK SPINE W/O DYE: CPT | Performed by: RADIOLOGY

## 2024-02-13 PROCEDURE — 85025 COMPLETE CBC W/AUTO DIFF WBC: CPT | Performed by: EMERGENCY MEDICINE

## 2024-02-13 PROCEDURE — 73564 X-RAY EXAM KNEE 4 OR MORE: CPT | Mod: BILATERAL PROCEDURE | Performed by: RADIOLOGY

## 2024-02-13 PROCEDURE — 86920 COMPATIBILITY TEST SPIN: CPT

## 2024-02-13 PROCEDURE — 70450 CT HEAD/BRAIN W/O DYE: CPT

## 2024-02-13 PROCEDURE — 96360 HYDRATION IV INFUSION INIT: CPT

## 2024-02-13 PROCEDURE — 80143 DRUG ASSAY ACETAMINOPHEN: CPT | Performed by: EMERGENCY MEDICINE

## 2024-02-13 PROCEDURE — 80053 COMPREHEN METABOLIC PANEL: CPT | Performed by: EMERGENCY MEDICINE

## 2024-02-13 PROCEDURE — 86901 BLOOD TYPING SEROLOGIC RH(D): CPT | Performed by: EMERGENCY MEDICINE

## 2024-02-13 PROCEDURE — 73552 X-RAY EXAM OF FEMUR 2/>: CPT | Mod: BILATERAL PROCEDURE | Performed by: RADIOLOGY

## 2024-02-13 PROCEDURE — 93005 ELECTROCARDIOGRAM TRACING: CPT

## 2024-02-13 PROCEDURE — 73564 X-RAY EXAM KNEE 4 OR MORE: CPT | Mod: 50

## 2024-02-13 PROCEDURE — 83605 ASSAY OF LACTIC ACID: CPT | Performed by: EMERGENCY MEDICINE

## 2024-02-13 PROCEDURE — 85610 PROTHROMBIN TIME: CPT | Performed by: EMERGENCY MEDICINE

## 2024-02-13 PROCEDURE — 73552 X-RAY EXAM OF FEMUR 2/>: CPT | Mod: LT

## 2024-02-13 PROCEDURE — 72170 X-RAY EXAM OF PELVIS: CPT

## 2024-02-13 PROCEDURE — 99285 EMERGENCY DEPT VISIT HI MDM: CPT | Mod: 25

## 2024-02-13 PROCEDURE — 72170 X-RAY EXAM OF PELVIS: CPT | Performed by: RADIOLOGY

## 2024-02-13 PROCEDURE — 74177 CT ABD & PELVIS W/CONTRAST: CPT

## 2024-02-13 PROCEDURE — 99285 EMERGENCY DEPT VISIT HI MDM: CPT | Mod: 25 | Performed by: EMERGENCY MEDICINE

## 2024-02-13 PROCEDURE — 2550000001 HC RX 255 CONTRASTS: Performed by: EMERGENCY MEDICINE

## 2024-02-13 RX ORDER — SODIUM CHLORIDE, SODIUM LACTATE, POTASSIUM CHLORIDE, CALCIUM CHLORIDE 600; 310; 30; 20 MG/100ML; MG/100ML; MG/100ML; MG/100ML
100 INJECTION, SOLUTION INTRAVENOUS CONTINUOUS
Status: DISCONTINUED | OUTPATIENT
Start: 2024-02-13 | End: 2024-02-14

## 2024-02-13 RX ADMIN — SODIUM CHLORIDE 1000 ML: 9 INJECTION, SOLUTION INTRAVENOUS at 22:15

## 2024-02-13 RX ADMIN — IOHEXOL 100 ML: 350 INJECTION, SOLUTION INTRAVENOUS at 22:09

## 2024-02-13 ASSESSMENT — PAIN - FUNCTIONAL ASSESSMENT
PAIN_FUNCTIONAL_ASSESSMENT: 0-10

## 2024-02-13 ASSESSMENT — LIFESTYLE VARIABLES
EVER FELT BAD OR GUILTY ABOUT YOUR DRINKING: NO
EVER HAD A DRINK FIRST THING IN THE MORNING TO STEADY YOUR NERVES TO GET RID OF A HANGOVER: NO
HAVE PEOPLE ANNOYED YOU BY CRITICIZING YOUR DRINKING: NO
HAVE YOU EVER FELT YOU SHOULD CUT DOWN ON YOUR DRINKING: NO

## 2024-02-13 ASSESSMENT — PAIN SCALES - GENERAL
PAINLEVEL_OUTOF10: 6
PAINLEVEL_OUTOF10: 6

## 2024-02-13 ASSESSMENT — COLUMBIA-SUICIDE SEVERITY RATING SCALE - C-SSRS
6. HAVE YOU EVER DONE ANYTHING, STARTED TO DO ANYTHING, OR PREPARED TO DO ANYTHING TO END YOUR LIFE?: NO
2. HAVE YOU ACTUALLY HAD ANY THOUGHTS OF KILLING YOURSELF?: NO
1. IN THE PAST MONTH, HAVE YOU WISHED YOU WERE DEAD OR WISHED YOU COULD GO TO SLEEP AND NOT WAKE UP?: NO

## 2024-02-13 ASSESSMENT — PAIN DESCRIPTION - DESCRIPTORS: DESCRIPTORS: BURNING

## 2024-02-14 ENCOUNTER — APPOINTMENT (OUTPATIENT)
Dept: CARDIOLOGY | Facility: HOSPITAL | Age: 74
DRG: 604 | End: 2024-02-14
Payer: MEDICARE

## 2024-02-14 ENCOUNTER — APPOINTMENT (OUTPATIENT)
Dept: RADIOLOGY | Facility: HOSPITAL | Age: 74
DRG: 604 | End: 2024-02-14
Payer: MEDICARE

## 2024-02-14 PROBLEM — N17.9 ACUTE KIDNEY INJURY SUPERIMPOSED ON CKD (CMS-HCC): Status: ACTIVE | Noted: 2024-02-14

## 2024-02-14 PROBLEM — G47.33 OSA (OBSTRUCTIVE SLEEP APNEA): Status: ACTIVE | Noted: 2024-02-14

## 2024-02-14 PROBLEM — E78.5 HLD (HYPERLIPIDEMIA): Status: ACTIVE | Noted: 2024-02-14

## 2024-02-14 PROBLEM — I25.10 CAD (CORONARY ARTERY DISEASE): Status: ACTIVE | Noted: 2024-02-14

## 2024-02-14 PROBLEM — R07.9 CHEST PAIN: Status: ACTIVE | Noted: 2024-02-14

## 2024-02-14 PROBLEM — K22.10 EROSIVE ESOPHAGITIS: Status: ACTIVE | Noted: 2024-02-14

## 2024-02-14 PROBLEM — N18.9 ACUTE KIDNEY INJURY SUPERIMPOSED ON CKD (CMS-HCC): Status: ACTIVE | Noted: 2024-02-14

## 2024-02-14 LAB
ABO GROUP (TYPE) IN BLOOD: NORMAL
ALBUMIN SERPL BCP-MCNC: 3.1 G/DL (ref 3.4–5)
ANION GAP SERPL CALC-SCNC: 11 MMOL/L (ref 10–20)
ANION GAP SERPL CALC-SCNC: 11 MMOL/L (ref 10–20)
ANION GAP SERPL CALC-SCNC: 23 MMOL/L (ref 10–20)
ATRIAL RATE: 86 BPM
BASE EXCESS BLDV CALC-SCNC: -16.9 MMOL/L (ref -2–3)
BASOPHILS # BLD AUTO: 0.03 X10*3/UL (ref 0–0.1)
BASOPHILS NFR BLD AUTO: 0.1 %
BODY TEMPERATURE: 37 DEGREES CELSIUS
BUN SERPL-MCNC: 36 MG/DL (ref 6–23)
BUN SERPL-MCNC: 37 MG/DL (ref 6–23)
BUN SERPL-MCNC: 42 MG/DL (ref 6–23)
CALCIUM SERPL-MCNC: 7.4 MG/DL (ref 8.6–10.3)
CALCIUM SERPL-MCNC: 7.7 MG/DL (ref 8.6–10.3)
CALCIUM SERPL-MCNC: 8 MG/DL (ref 8.6–10.3)
CARDIAC TROPONIN I PNL SERPL HS: 258 NG/L (ref 0–20)
CARDIAC TROPONIN I PNL SERPL HS: 377 NG/L (ref 0–20)
CARDIAC TROPONIN I PNL SERPL HS: 635 NG/L (ref 0–20)
CHLORIDE SERPL-SCNC: 107 MMOL/L (ref 98–107)
CHLORIDE SERPL-SCNC: 108 MMOL/L (ref 98–107)
CHLORIDE SERPL-SCNC: 109 MMOL/L (ref 98–107)
CK SERPL-CCNC: 66 U/L (ref 0–325)
CO2 SERPL-SCNC: 12 MMOL/L (ref 21–32)
CO2 SERPL-SCNC: 20 MMOL/L (ref 21–32)
CO2 SERPL-SCNC: 20 MMOL/L (ref 21–32)
CREAT SERPL-MCNC: 2.26 MG/DL (ref 0.5–1.3)
CREAT SERPL-MCNC: 2.38 MG/DL (ref 0.5–1.3)
CREAT SERPL-MCNC: 2.79 MG/DL (ref 0.5–1.3)
EGFRCR SERPLBLD CKD-EPI 2021: 23 ML/MIN/1.73M*2
EGFRCR SERPLBLD CKD-EPI 2021: 28 ML/MIN/1.73M*2
EGFRCR SERPLBLD CKD-EPI 2021: 30 ML/MIN/1.73M*2
EOSINOPHIL # BLD AUTO: 0 X10*3/UL (ref 0–0.4)
EOSINOPHIL NFR BLD AUTO: 0 %
ERYTHROCYTE [DISTWIDTH] IN BLOOD BY AUTOMATED COUNT: 13.6 % (ref 11.5–14.5)
ERYTHROCYTE [DISTWIDTH] IN BLOOD BY AUTOMATED COUNT: 13.7 % (ref 11.5–14.5)
ERYTHROCYTE [DISTWIDTH] IN BLOOD BY AUTOMATED COUNT: 14.6 % (ref 11.5–14.5)
GLUCOSE BLD MANUAL STRIP-MCNC: 155 MG/DL (ref 74–99)
GLUCOSE BLD MANUAL STRIP-MCNC: 169 MG/DL (ref 74–99)
GLUCOSE BLD MANUAL STRIP-MCNC: 181 MG/DL (ref 74–99)
GLUCOSE BLD MANUAL STRIP-MCNC: 241 MG/DL (ref 74–99)
GLUCOSE BLD MANUAL STRIP-MCNC: 303 MG/DL (ref 74–99)
GLUCOSE BLD MANUAL STRIP-MCNC: 314 MG/DL (ref 74–99)
GLUCOSE BLD MANUAL STRIP-MCNC: 343 MG/DL (ref 74–99)
GLUCOSE SERPL-MCNC: 200 MG/DL (ref 74–99)
GLUCOSE SERPL-MCNC: 237 MG/DL (ref 74–99)
GLUCOSE SERPL-MCNC: 315 MG/DL (ref 74–99)
HCO3 BLDV-SCNC: 11.3 MMOL/L (ref 22–26)
HCT VFR BLD AUTO: 20.9 % (ref 41–52)
HCT VFR BLD AUTO: 21.7 % (ref 41–52)
HCT VFR BLD AUTO: 24.1 % (ref 41–52)
HGB BLD-MCNC: 6.9 G/DL (ref 13.5–17.5)
HGB BLD-MCNC: 7 G/DL (ref 13.5–17.5)
HGB BLD-MCNC: 7 G/DL (ref 13.5–17.5)
HGB BLD-MCNC: 7.9 G/DL (ref 13.5–17.5)
HGB BLD-MCNC: 7.9 G/DL (ref 13.5–17.5)
IMM GRANULOCYTES # BLD AUTO: 0.27 X10*3/UL (ref 0–0.5)
IMM GRANULOCYTES NFR BLD AUTO: 1.1 % (ref 0–0.9)
INHALED O2 CONCENTRATION: 21 %
LACTATE SERPL-SCNC: 4.4 MMOL/L (ref 0.4–2)
LACTATE SERPL-SCNC: 9.5 MMOL/L (ref 0.4–2)
LYMPHOCYTES # BLD AUTO: 4.78 X10*3/UL (ref 0.8–3)
LYMPHOCYTES NFR BLD AUTO: 20 %
MAGNESIUM SERPL-MCNC: 1.86 MG/DL (ref 1.6–2.4)
MCH RBC QN AUTO: 30.5 PG (ref 26–34)
MCH RBC QN AUTO: 30.6 PG (ref 26–34)
MCH RBC QN AUTO: 31.4 PG (ref 26–34)
MCHC RBC AUTO-ENTMCNC: 32.3 G/DL (ref 32–36)
MCHC RBC AUTO-ENTMCNC: 32.8 G/DL (ref 32–36)
MCHC RBC AUTO-ENTMCNC: 33.5 G/DL (ref 32–36)
MCV RBC AUTO: 93 FL (ref 80–100)
MCV RBC AUTO: 94 FL (ref 80–100)
MCV RBC AUTO: 95 FL (ref 80–100)
MONOCYTES # BLD AUTO: 2.13 X10*3/UL (ref 0.05–0.8)
MONOCYTES NFR BLD AUTO: 8.9 %
NEUTROPHILS # BLD AUTO: 16.65 X10*3/UL (ref 1.6–5.5)
NEUTROPHILS NFR BLD AUTO: 69.9 %
NRBC BLD-RTO: 0 /100 WBCS (ref 0–0)
NRBC BLD-RTO: 0 /100 WBCS (ref 0–0)
NRBC BLD-RTO: 0.2 /100 WBCS (ref 0–0)
OXYHGB MFR BLDV: 64.7 % (ref 45–75)
P AXIS: 41 DEGREES
PCO2 BLDV: 34 MM HG (ref 41–51)
PH BLDV: 7.13 PH (ref 7.33–7.43)
PHOSPHATE SERPL-MCNC: 3.9 MG/DL (ref 2.5–4.9)
PLATELET # BLD AUTO: 158 X10*3/UL (ref 150–450)
PLATELET # BLD AUTO: 158 X10*3/UL (ref 150–450)
PLATELET # BLD AUTO: 221 X10*3/UL (ref 150–450)
PO2 BLDV: 43 MM HG (ref 35–45)
POTASSIUM SERPL-SCNC: 4.4 MMOL/L (ref 3.5–5.3)
POTASSIUM SERPL-SCNC: 4.5 MMOL/L (ref 3.5–5.3)
POTASSIUM SERPL-SCNC: 4.8 MMOL/L (ref 3.5–5.3)
PR INTERVAL: 175 MS
Q ONSET: 253 MS
QRS COUNT: 13 BEATS
QRS DURATION: 135 MS
QT INTERVAL: 402 MS
QTC CALCULATION(BAZETT): 476 MS
QTC FREDERICIA: 449 MS
R AXIS: 0 DEGREES
RBC # BLD AUTO: 2.23 X10*6/UL (ref 4.5–5.9)
RBC # BLD AUTO: 2.29 X10*6/UL (ref 4.5–5.9)
RBC # BLD AUTO: 2.59 X10*6/UL (ref 4.5–5.9)
RH FACTOR (ANTIGEN D): NORMAL
SAO2 % BLDV: 66 % (ref 45–75)
SODIUM SERPL-SCNC: 135 MMOL/L (ref 136–145)
SODIUM SERPL-SCNC: 135 MMOL/L (ref 136–145)
SODIUM SERPL-SCNC: 137 MMOL/L (ref 136–145)
T AXIS: 103 DEGREES
T OFFSET: 454 MS
VENTRICULAR RATE: 84 BPM
WBC # BLD AUTO: 11.2 X10*3/UL (ref 4.4–11.3)
WBC # BLD AUTO: 11.8 X10*3/UL (ref 4.4–11.3)
WBC # BLD AUTO: 23.9 X10*3/UL (ref 4.4–11.3)

## 2024-02-14 PROCEDURE — 93005 ELECTROCARDIOGRAM TRACING: CPT

## 2024-02-14 PROCEDURE — 2500000002 HC RX 250 W HCPCS SELF ADMINISTERED DRUGS (ALT 637 FOR MEDICARE OP, ALT 636 FOR OP/ED): Performed by: INTERNAL MEDICINE

## 2024-02-14 PROCEDURE — 2500000004 HC RX 250 GENERAL PHARMACY W/ HCPCS (ALT 636 FOR OP/ED): Performed by: EMERGENCY MEDICINE

## 2024-02-14 PROCEDURE — 36415 COLL VENOUS BLD VENIPUNCTURE: CPT | Performed by: SURGERY

## 2024-02-14 PROCEDURE — 83605 ASSAY OF LACTIC ACID: CPT | Performed by: INTERNAL MEDICINE

## 2024-02-14 PROCEDURE — 73630 X-RAY EXAM OF FOOT: CPT | Mod: LEFT SIDE | Performed by: RADIOLOGY

## 2024-02-14 PROCEDURE — 85025 COMPLETE CBC W/AUTO DIFF WBC: CPT | Performed by: INTERNAL MEDICINE

## 2024-02-14 PROCEDURE — 80048 BASIC METABOLIC PNL TOTAL CA: CPT | Mod: CCI | Performed by: SURGERY

## 2024-02-14 PROCEDURE — 99221 1ST HOSP IP/OBS SF/LOW 40: CPT | Performed by: PHYSICIAN ASSISTANT

## 2024-02-14 PROCEDURE — 84484 ASSAY OF TROPONIN QUANT: CPT | Performed by: INTERNAL MEDICINE

## 2024-02-14 PROCEDURE — 2500000002 HC RX 250 W HCPCS SELF ADMINISTERED DRUGS (ALT 637 FOR MEDICARE OP, ALT 636 FOR OP/ED): Performed by: SURGERY

## 2024-02-14 PROCEDURE — P9016 RBC LEUKOCYTES REDUCED: HCPCS

## 2024-02-14 PROCEDURE — 2500000001 HC RX 250 WO HCPCS SELF ADMINISTERED DRUGS (ALT 637 FOR MEDICARE OP): Performed by: SURGERY

## 2024-02-14 PROCEDURE — 80048 BASIC METABOLIC PNL TOTAL CA: CPT | Mod: CCI | Performed by: INTERNAL MEDICINE

## 2024-02-14 PROCEDURE — 84484 ASSAY OF TROPONIN QUANT: CPT | Performed by: PHYSICIAN ASSISTANT

## 2024-02-14 PROCEDURE — 99291 CRITICAL CARE FIRST HOUR: CPT | Performed by: INTERNAL MEDICINE

## 2024-02-14 PROCEDURE — 99222 1ST HOSP IP/OBS MODERATE 55: CPT | Performed by: SURGERY

## 2024-02-14 PROCEDURE — 93010 ELECTROCARDIOGRAM REPORT: CPT | Performed by: INTERNAL MEDICINE

## 2024-02-14 PROCEDURE — 36415 COLL VENOUS BLD VENIPUNCTURE: CPT | Performed by: INTERNAL MEDICINE

## 2024-02-14 PROCEDURE — 83735 ASSAY OF MAGNESIUM: CPT | Performed by: INTERNAL MEDICINE

## 2024-02-14 PROCEDURE — 2020000001 HC ICU ROOM DAILY

## 2024-02-14 PROCEDURE — 82947 ASSAY GLUCOSE BLOOD QUANT: CPT

## 2024-02-14 PROCEDURE — 73630 X-RAY EXAM OF FOOT: CPT | Mod: LT

## 2024-02-14 PROCEDURE — 85027 COMPLETE CBC AUTOMATED: CPT | Performed by: INTERNAL MEDICINE

## 2024-02-14 PROCEDURE — 85018 HEMOGLOBIN: CPT | Performed by: SURGERY

## 2024-02-14 PROCEDURE — 82550 ASSAY OF CK (CPK): CPT | Performed by: EMERGENCY MEDICINE

## 2024-02-14 PROCEDURE — 82805 BLOOD GASES W/O2 SATURATION: CPT | Performed by: INTERNAL MEDICINE

## 2024-02-14 PROCEDURE — 36430 TRANSFUSION BLD/BLD COMPNT: CPT

## 2024-02-14 PROCEDURE — 85027 COMPLETE CBC AUTOMATED: CPT | Performed by: SURGERY

## 2024-02-14 RX ORDER — GABAPENTIN 100 MG/1
100 CAPSULE ORAL NIGHTLY
COMMUNITY

## 2024-02-14 RX ORDER — SODIUM CHLORIDE, SODIUM LACTATE, POTASSIUM CHLORIDE, CALCIUM CHLORIDE 600; 310; 30; 20 MG/100ML; MG/100ML; MG/100ML; MG/100ML
100 INJECTION, SOLUTION INTRAVENOUS CONTINUOUS
Status: DISCONTINUED | OUTPATIENT
Start: 2024-02-14 | End: 2024-02-14

## 2024-02-14 RX ORDER — SERTRALINE HYDROCHLORIDE 50 MG/1
50 TABLET, FILM COATED ORAL DAILY
Status: DISCONTINUED | OUTPATIENT
Start: 2024-02-14 | End: 2024-02-23 | Stop reason: HOSPADM

## 2024-02-14 RX ORDER — ACETAMINOPHEN 325 MG/1
975 TABLET ORAL EVERY 6 HOURS SCHEDULED
Status: DISCONTINUED | OUTPATIENT
Start: 2024-02-14 | End: 2024-02-16

## 2024-02-14 RX ORDER — OXYCODONE HYDROCHLORIDE 5 MG/1
5 TABLET ORAL EVERY 6 HOURS PRN
Status: DISCONTINUED | OUTPATIENT
Start: 2024-02-14 | End: 2024-02-23 | Stop reason: HOSPADM

## 2024-02-14 RX ORDER — ALBUTEROL SULFATE 90 UG/1
2 AEROSOL, METERED RESPIRATORY (INHALATION) EVERY 4 HOURS PRN
Status: DISCONTINUED | OUTPATIENT
Start: 2024-02-14 | End: 2024-02-15

## 2024-02-14 RX ORDER — NALOXONE HYDROCHLORIDE 0.4 MG/ML
0.2 INJECTION, SOLUTION INTRAMUSCULAR; INTRAVENOUS; SUBCUTANEOUS EVERY 5 MIN PRN
Status: DISCONTINUED | OUTPATIENT
Start: 2024-02-14 | End: 2024-02-23 | Stop reason: HOSPADM

## 2024-02-14 RX ORDER — ATORVASTATIN CALCIUM 40 MG/1
80 TABLET, FILM COATED ORAL NIGHTLY
Status: DISCONTINUED | OUTPATIENT
Start: 2024-02-14 | End: 2024-02-14

## 2024-02-14 RX ORDER — MORPHINE SULFATE 2 MG/ML
2 INJECTION, SOLUTION INTRAMUSCULAR; INTRAVENOUS EVERY 2 HOUR PRN
Status: DISCONTINUED | OUTPATIENT
Start: 2024-02-14 | End: 2024-02-17

## 2024-02-14 RX ORDER — INSULIN LISPRO 100 [IU]/ML
0-10 INJECTION, SOLUTION INTRAVENOUS; SUBCUTANEOUS EVERY 4 HOURS
Status: DISCONTINUED | OUTPATIENT
Start: 2024-02-14 | End: 2024-02-22

## 2024-02-14 RX ORDER — AMLODIPINE BESYLATE 10 MG/1
10 TABLET ORAL DAILY
Status: DISCONTINUED | OUTPATIENT
Start: 2024-02-14 | End: 2024-02-14

## 2024-02-14 RX ORDER — ICOSAPENT ETHYL 1 G/1
1 CAPSULE ORAL
Status: DISCONTINUED | OUTPATIENT
Start: 2024-02-14 | End: 2024-02-23 | Stop reason: HOSPADM

## 2024-02-14 RX ORDER — LOSARTAN POTASSIUM 50 MG/1
50 TABLET ORAL DAILY
Status: DISCONTINUED | OUTPATIENT
Start: 2024-02-14 | End: 2024-02-14

## 2024-02-14 RX ORDER — PANTOPRAZOLE SODIUM 40 MG/1
40 TABLET, DELAYED RELEASE ORAL DAILY
Status: DISCONTINUED | OUTPATIENT
Start: 2024-02-14 | End: 2024-02-23 | Stop reason: HOSPADM

## 2024-02-14 RX ORDER — DEXTROSE MONOHYDRATE 100 MG/ML
0.3 INJECTION, SOLUTION INTRAVENOUS ONCE AS NEEDED
Status: DISCONTINUED | OUTPATIENT
Start: 2024-02-14 | End: 2024-02-23 | Stop reason: HOSPADM

## 2024-02-14 RX ORDER — ONDANSETRON 4 MG/1
4 TABLET, ORALLY DISINTEGRATING ORAL EVERY 8 HOURS PRN
Status: DISCONTINUED | OUTPATIENT
Start: 2024-02-14 | End: 2024-02-23 | Stop reason: HOSPADM

## 2024-02-14 RX ORDER — DEXTROSE 50 % IN WATER (D50W) INTRAVENOUS SYRINGE
25
Status: DISCONTINUED | OUTPATIENT
Start: 2024-02-14 | End: 2024-02-23 | Stop reason: HOSPADM

## 2024-02-14 RX ORDER — METOPROLOL SUCCINATE 50 MG/1
50 TABLET, EXTENDED RELEASE ORAL DAILY
Status: DISCONTINUED | OUTPATIENT
Start: 2024-02-14 | End: 2024-02-23 | Stop reason: HOSPADM

## 2024-02-14 RX ORDER — ONDANSETRON HYDROCHLORIDE 2 MG/ML
4 INJECTION, SOLUTION INTRAVENOUS EVERY 8 HOURS PRN
Status: DISCONTINUED | OUTPATIENT
Start: 2024-02-14 | End: 2024-02-23 | Stop reason: HOSPADM

## 2024-02-14 RX ADMIN — METOPROLOL SUCCINATE 50 MG: 50 TABLET, EXTENDED RELEASE ORAL at 11:03

## 2024-02-14 RX ADMIN — ACETAMINOPHEN 975 MG: 325 TABLET ORAL at 12:01

## 2024-02-14 RX ADMIN — AMLODIPINE BESYLATE 10 MG: 10 TABLET ORAL at 11:03

## 2024-02-14 RX ADMIN — OXYCODONE HYDROCHLORIDE 5 MG: 5 TABLET ORAL at 04:34

## 2024-02-14 RX ADMIN — PANTOPRAZOLE SODIUM 40 MG: 40 TABLET, DELAYED RELEASE ORAL at 11:04

## 2024-02-14 RX ADMIN — INSULIN LISPRO 8 UNITS: 100 INJECTION, SOLUTION INTRAVENOUS; SUBCUTANEOUS at 18:30

## 2024-02-14 RX ADMIN — SERTRALINE HYDROCHLORIDE 50 MG: 50 TABLET ORAL at 11:03

## 2024-02-14 RX ADMIN — ICOSAPENT ETHYL 1 G: 1 CAPSULE ORAL at 11:04

## 2024-02-14 RX ADMIN — OXYCODONE HYDROCHLORIDE 5 MG: 5 TABLET ORAL at 15:53

## 2024-02-14 RX ADMIN — ACETAMINOPHEN 975 MG: 325 TABLET ORAL at 04:34

## 2024-02-14 RX ADMIN — SODIUM CHLORIDE, POTASSIUM CHLORIDE, SODIUM LACTATE AND CALCIUM CHLORIDE 100 ML/HR: 600; 310; 30; 20 INJECTION, SOLUTION INTRAVENOUS at 01:03

## 2024-02-14 SDOH — SOCIAL STABILITY: SOCIAL INSECURITY: ARE YOU OR HAVE YOU BEEN THREATENED OR ABUSED PHYSICALLY, EMOTIONALLY, OR SEXUALLY BY ANYONE?: NO

## 2024-02-14 SDOH — SOCIAL STABILITY: SOCIAL INSECURITY: ARE THERE ANY APPARENT SIGNS OF INJURIES/BEHAVIORS THAT COULD BE RELATED TO ABUSE/NEGLECT?: NO

## 2024-02-14 SDOH — SOCIAL STABILITY: SOCIAL INSECURITY: DO YOU FEEL UNSAFE GOING BACK TO THE PLACE WHERE YOU ARE LIVING?: NO

## 2024-02-14 SDOH — SOCIAL STABILITY: SOCIAL INSECURITY: HAS ANYONE EVER THREATENED TO HURT YOUR FAMILY OR YOUR PETS?: NO

## 2024-02-14 SDOH — SOCIAL STABILITY: SOCIAL INSECURITY: DO YOU FEEL ANYONE HAS EXPLOITED OR TAKEN ADVANTAGE OF YOU FINANCIALLY OR OF YOUR PERSONAL PROPERTY?: NO

## 2024-02-14 SDOH — SOCIAL STABILITY: SOCIAL INSECURITY: DOES ANYONE TRY TO KEEP YOU FROM HAVING/CONTACTING OTHER FRIENDS OR DOING THINGS OUTSIDE YOUR HOME?: NO

## 2024-02-14 SDOH — SOCIAL STABILITY: SOCIAL INSECURITY: ABUSE: ADULT

## 2024-02-14 SDOH — SOCIAL STABILITY: SOCIAL INSECURITY: HAVE YOU HAD THOUGHTS OF HARMING ANYONE ELSE?: NO

## 2024-02-14 SDOH — SOCIAL STABILITY: SOCIAL INSECURITY: WERE YOU ABLE TO COMPLETE ALL THE BEHAVIORAL HEALTH SCREENINGS?: YES

## 2024-02-14 ASSESSMENT — PAIN - FUNCTIONAL ASSESSMENT
PAIN_FUNCTIONAL_ASSESSMENT: 0-10

## 2024-02-14 ASSESSMENT — ENCOUNTER SYMPTOMS
WOUND: 0
COUGH: 0
SHORTNESS OF BREATH: 0
FACIAL SWELLING: 0
HEMATURIA: 0
WEAKNESS: 0
NUMBNESS: 0
DIZZINESS: 0
LIGHT-HEADEDNESS: 0
NAUSEA: 0
FREQUENCY: 0
WHEEZING: 0
PALPITATIONS: 0
JOINT SWELLING: 0
HEADACHES: 0
APPETITE CHANGE: 0
ABDOMINAL PAIN: 0
EYE PAIN: 0
DYSURIA: 0
FLANK PAIN: 0
DIAPHORESIS: 0
CHILLS: 0
TROUBLE SWALLOWING: 0
SORE THROAT: 0
CONSTIPATION: 0
DIARRHEA: 0
BLOOD IN STOOL: 0
FATIGUE: 0
BRUISES/BLEEDS EASILY: 0
CHEST TIGHTNESS: 0
BACK PAIN: 0
HALLUCINATIONS: 0
FEVER: 0
VOMITING: 0

## 2024-02-14 ASSESSMENT — LIFESTYLE VARIABLES
HOW OFTEN DO YOU HAVE 6 OR MORE DRINKS ON ONE OCCASION: NEVER
AUDIT-C TOTAL SCORE: 0
SKIP TO QUESTIONS 9-10: 1
HOW MANY STANDARD DRINKS CONTAINING ALCOHOL DO YOU HAVE ON A TYPICAL DAY: PATIENT DOES NOT DRINK
AUDIT-C TOTAL SCORE: 0
HOW OFTEN DO YOU HAVE A DRINK CONTAINING ALCOHOL: NEVER

## 2024-02-14 ASSESSMENT — COGNITIVE AND FUNCTIONAL STATUS - GENERAL
MOBILITY SCORE: 24
CLIMB 3 TO 5 STEPS WITH RAILING: A LITTLE
PATIENT BASELINE BEDBOUND: NO
DAILY ACTIVITIY SCORE: 24
STANDING UP FROM CHAIR USING ARMS: A LITTLE
WALKING IN HOSPITAL ROOM: A LITTLE
DAILY ACTIVITIY SCORE: 24
MOBILITY SCORE: 21

## 2024-02-14 ASSESSMENT — PATIENT HEALTH QUESTIONNAIRE - PHQ9
SUM OF ALL RESPONSES TO PHQ9 QUESTIONS 1 & 2: 0
1. LITTLE INTEREST OR PLEASURE IN DOING THINGS: NOT AT ALL
2. FEELING DOWN, DEPRESSED OR HOPELESS: NOT AT ALL

## 2024-02-14 ASSESSMENT — ACTIVITIES OF DAILY LIVING (ADL)
HEARING - LEFT EAR: FUNCTIONAL
ADEQUATE_TO_COMPLETE_ADL: YES
HEARING - RIGHT EAR: FUNCTIONAL
PATIENT'S MEMORY ADEQUATE TO SAFELY COMPLETE DAILY ACTIVITIES?: YES
DRESSING YOURSELF: INDEPENDENT
BATHING: INDEPENDENT
FEEDING YOURSELF: INDEPENDENT
LACK_OF_TRANSPORTATION: NO
TOILETING: INDEPENDENT
JUDGMENT_ADEQUATE_SAFELY_COMPLETE_DAILY_ACTIVITIES: YES
WALKS IN HOME: INDEPENDENT
GROOMING: INDEPENDENT

## 2024-02-14 ASSESSMENT — PAIN SCALES - GENERAL
PAINLEVEL_OUTOF10: 6
PAINLEVEL_OUTOF10: 7
PAINLEVEL_OUTOF10: 6
PAINLEVEL_OUTOF10: 0 - NO PAIN
PAINLEVEL_OUTOF10: 5 - MODERATE PAIN
PAINLEVEL_OUTOF10: 6

## 2024-02-14 ASSESSMENT — COLUMBIA-SUICIDE SEVERITY RATING SCALE - C-SSRS
1. IN THE PAST MONTH, HAVE YOU WISHED YOU WERE DEAD OR WISHED YOU COULD GO TO SLEEP AND NOT WAKE UP?: NO
2. HAVE YOU ACTUALLY HAD ANY THOUGHTS OF KILLING YOURSELF?: NO
6. HAVE YOU EVER DONE ANYTHING, STARTED TO DO ANYTHING, OR PREPARED TO DO ANYTHING TO END YOUR LIFE?: NO
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
## 2024-02-14 NOTE — SIGNIFICANT EVENT
I was earlier contacted regarding patient's chest pain that resolved instantly.  Patient did have a troponin of 377.  He had no further chest pain, shortness of breath palpitations.  He did feel weak.  EKG showed no acute changes.  Patient did have an echo that is still not reported.  Patient did have a significant H&H drop since admission likely due to his left thigh hematoma.  His blood counts have been stable at 7.    I did  did reevaluate reevaluate the patient who was pale, now hypotensive with a systolic in the mid 80s.  His repeat H&H was 6.9.  Will transfer the patient to the stepdown unit for closer monitoring.  I agree with transfusing 1 unit now and the second unit to be over few hours.  I have discontinued all antihypertensives.  Medications.  Patient was slightly confused on exam and this is likely due to to combination, hypovolemia, anemia and his recent pain treatment.      #Acute blood loss anemia with large left thigh hematoma  #Demand mismatch ischemia  #Hypovolemia  #Acute metabolic encephalopathy, multifactorial  # TAYLOR on chronic kidney disease stage III  # NSVT runs on telemetry  #Known ischemic cardiomyopathy with EF of 40 to 45%    -Give 2 units of blood as ordered  -Check H&H after 2 units of blood transfusion and transfuse again.  Will target H&H of 9  -Hold  all antihypertensives  -Follow echo report  -Serial troponin      Critical care time spent 31 minutes    Zurdo Waller MD, MRCP

## 2024-02-14 NOTE — ASSESSMENT & PLAN NOTE
S/p CABG 5V in 1992  -ASA on hold- resume when able  -C recommended by cardiology, patient states his cardiologist did not think he needed one, referring to last time he saw him outpatient, explained that he now has elevated troponins, chest pain, arrhythmia and this is a much different situation than at his last outpatient appointment- wife at bedside in agreement with C.

## 2024-02-14 NOTE — ASSESSMENT & PLAN NOTE
-Appreciate nephrology recommendations  -Hold home losartan  -Avoid nephrotoxins  -Improving with volume (blood an IVF)

## 2024-02-14 NOTE — ASSESSMENT & PLAN NOTE
-No acute exacerbation  -Monitor volume status closely  -Losartan and farxiga held due to TAYLOR and hypotension

## 2024-02-14 NOTE — PROGRESS NOTES
Physical Therapy                 Therapy Communication Note    Patient Name: Ritesh Garza  MRN: [id number]  Today's Date: 2/14/2024     Discipline: Physical Therapy    Missed Visit Reason: Missed Visit Reason: Patient placed on medical hold (PT. C/O [place] EAR/TMJ PAIN AND CHEST PAIN SUDDEN ONSET  SHORTLY AFTER P.T. ENTERED ROOM RN INFORMED WILL HOLD THERAPY TODAY  WILL SEE FOR EVAL 2/15)    Missed Time: Attempt    Comment: TIME 9159-8014 HRS

## 2024-02-14 NOTE — ASSESSMENT & PLAN NOTE
-Monitor H/H- transfuse if Hgb <7.0 or vitals unstable  -Compressive dressing  -Ice  -Trauma surgery management
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
## 2024-02-14 NOTE — ASSESSMENT & PLAN NOTE
-Has record of mild cognitive impairment  -At risk for hospital delirium  -Good sleep/wake cycle  -Family at bedside as much as able for reorientation
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
## 2024-02-14 NOTE — ASSESSMENT & PLAN NOTE
-Holding Losartan with TAYLOR  -Continue other home medications  -Monitor BP and adjust as necessary
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
## 2024-02-14 NOTE — ASSESSMENT & PLAN NOTE
-ECG showed sinus rhythm with a nonspecific interventricular conduction delay  -Elevated troponin at [phone].   -Monitor on tele  -Echo  -Cardiology consult  -Recommending C, patient not sure

## 2024-02-15 ENCOUNTER — APPOINTMENT (OUTPATIENT)
Dept: CARDIOLOGY | Facility: HOSPITAL | Age: 74
DRG: 604 | End: 2024-02-15
Payer: MEDICARE

## 2024-02-15 ENCOUNTER — APPOINTMENT (OUTPATIENT)
Dept: RADIOLOGY | Facility: HOSPITAL | Age: 74
DRG: 604 | End: 2024-02-15
Payer: MEDICARE

## 2024-02-15 ENCOUNTER — TELEPHONE (OUTPATIENT)
Dept: BEHAVIORAL HEALTH | Facility: CLINIC | Age: 74
End: 2024-02-15

## 2024-02-15 PROBLEM — D62 ACUTE BLOOD LOSS ANEMIA: Status: ACTIVE | Noted: 2024-02-15

## 2024-02-15 PROBLEM — I24.89 DEMAND ISCHEMIA (MULTI): Status: ACTIVE | Noted: 2024-02-15

## 2024-02-15 PROBLEM — I47.20 VENTRICULAR TACHYCARDIA (MULTI): Status: ACTIVE | Noted: 2024-02-15

## 2024-02-15 LAB
ALBUMIN SERPL BCP-MCNC: 3 G/DL (ref 3.4–5)
ALBUMIN SERPL BCP-MCNC: 3 G/DL (ref 3.4–5)
ALBUMIN SERPL BCP-MCNC: 3.4 G/DL (ref 3.4–5)
ALP SERPL-CCNC: 58 U/L (ref 33–136)
ALT SERPL W P-5'-P-CCNC: 17 U/L (ref 10–52)
ANION GAP BLDV CALCULATED.4IONS-SCNC: 21 MMOL/L (ref 10–25)
ANION GAP SERPL CALC-SCNC: 13 MMOL/L (ref 10–20)
ANION GAP SERPL CALC-SCNC: 13 MMOL/L (ref 10–20)
ANION GAP SERPL CALC-SCNC: 16 MMOL/L (ref 10–20)
AORTIC VALVE MEAN GRADIENT: 4 MMHG
AORTIC VALVE PEAK VELOCITY: 1.33 M/S
AST SERPL W P-5'-P-CCNC: 48 U/L (ref 9–39)
ATRIAL RATE: 30 BPM
ATRIAL RATE: 98 BPM
AV PEAK GRADIENT: 7.1 MMHG
AVA (PEAK VEL): 2.46 CM2
AVA (VTI): 2.59 CM2
B-OH-BUTYR SERPL-SCNC: 0.17 MMOL/L (ref 0.02–0.27)
BASE EXCESS BLDV CALC-SCNC: -14.2 MMOL/L (ref -2–3)
BASE EXCESS BLDV CALC-SCNC: -6.6 MMOL/L (ref -2–3)
BASOPHILS # BLD AUTO: 0.02 X10*3/UL (ref 0–0.1)
BASOPHILS NFR BLD AUTO: 0.1 %
BILIRUB SERPL-MCNC: 0.6 MG/DL (ref 0–1.2)
BLOOD EXPIRATION DATE: NORMAL
BLOOD EXPIRATION DATE: NORMAL
BODY TEMPERATURE: 37 DEGREES CELSIUS
BODY TEMPERATURE: 37 DEGREES CELSIUS
BUN SERPL-MCNC: 38 MG/DL (ref 6–23)
BUN SERPL-MCNC: 39 MG/DL (ref 6–23)
BUN SERPL-MCNC: 40 MG/DL (ref 6–23)
CA-I BLDV-SCNC: 1.1 MMOL/L (ref 1.1–1.33)
CALCIUM SERPL-MCNC: 6.5 MG/DL (ref 8.6–10.3)
CALCIUM SERPL-MCNC: 6.7 MG/DL (ref 8.6–10.3)
CALCIUM SERPL-MCNC: 8.5 MG/DL (ref 8.6–10.3)
CHLORIDE BLDV-SCNC: 109 MMOL/L (ref 98–107)
CHLORIDE SERPL-SCNC: 109 MMOL/L (ref 98–107)
CHLORIDE SERPL-SCNC: 113 MMOL/L (ref 98–107)
CHLORIDE SERPL-SCNC: 115 MMOL/L (ref 98–107)
CO2 SERPL-SCNC: 13 MMOL/L (ref 21–32)
CO2 SERPL-SCNC: 16 MMOL/L (ref 21–32)
CO2 SERPL-SCNC: 20 MMOL/L (ref 21–32)
CREAT SERPL-MCNC: 1.96 MG/DL (ref 0.5–1.3)
CREAT SERPL-MCNC: 2.07 MG/DL (ref 0.5–1.3)
CREAT SERPL-MCNC: 2.35 MG/DL (ref 0.5–1.3)
DISPENSE STATUS: NORMAL
DISPENSE STATUS: NORMAL
EGFRCR SERPLBLD CKD-EPI 2021: 28 ML/MIN/1.73M*2
EGFRCR SERPLBLD CKD-EPI 2021: 33 ML/MIN/1.73M*2
EGFRCR SERPLBLD CKD-EPI 2021: 35 ML/MIN/1.73M*2
EJECTION FRACTION APICAL 4 CHAMBER: 29.3
EJECTION FRACTION: 35 %
EOSINOPHIL # BLD AUTO: 0.02 X10*3/UL (ref 0–0.4)
EOSINOPHIL NFR BLD AUTO: 0.1 %
ERYTHROCYTE [DISTWIDTH] IN BLOOD BY AUTOMATED COUNT: 14.9 % (ref 11.5–14.5)
GLUCOSE BLD MANUAL STRIP-MCNC: 150 MG/DL (ref 74–99)
GLUCOSE BLD MANUAL STRIP-MCNC: 162 MG/DL (ref 74–99)
GLUCOSE BLD MANUAL STRIP-MCNC: 175 MG/DL (ref 74–99)
GLUCOSE BLD MANUAL STRIP-MCNC: 238 MG/DL (ref 74–99)
GLUCOSE BLD MANUAL STRIP-MCNC: 267 MG/DL (ref 74–99)
GLUCOSE BLD MANUAL STRIP-MCNC: 279 MG/DL (ref 74–99)
GLUCOSE BLDV-MCNC: 280 MG/DL (ref 74–99)
GLUCOSE SERPL-MCNC: 169 MG/DL (ref 74–99)
GLUCOSE SERPL-MCNC: 219 MG/DL (ref 74–99)
GLUCOSE SERPL-MCNC: 268 MG/DL (ref 74–99)
HCO3 BLDV-SCNC: 13.1 MMOL/L (ref 22–26)
HCO3 BLDV-SCNC: 19.1 MMOL/L (ref 22–26)
HCT VFR BLD AUTO: 22.1 % (ref 41–52)
HCT VFR BLD AUTO: 23.7 % (ref 41–52)
HCT VFR BLD EST: 22 % (ref 41–52)
HGB BLD-MCNC: 7.2 G/DL (ref 13.5–17.5)
HGB BLD-MCNC: 8.1 G/DL (ref 13.5–17.5)
HGB BLD-MCNC: 9 G/DL (ref 13.5–17.5)
HGB BLD-MCNC: 9.6 G/DL (ref 13.5–17.5)
HGB BLDV-MCNC: 7.2 G/DL (ref 13.5–17.5)
IMM GRANULOCYTES # BLD AUTO: 0.14 X10*3/UL (ref 0–0.5)
IMM GRANULOCYTES NFR BLD AUTO: 0.9 % (ref 0–0.9)
INHALED O2 CONCENTRATION: 21 %
INHALED O2 CONCENTRATION: 40 %
LACTATE BLDV-SCNC: 3.3 MMOL/L (ref 0.4–2)
LACTATE BLDV-SCNC: 5.6 MMOL/L (ref 0.4–2)
LACTATE SERPL-SCNC: 1.2 MMOL/L (ref 0.4–2)
LEFT ATRIUM VOLUME AREA LENGTH INDEX BSA: 28.5 ML/M2
LEFT VENTRICLE INTERNAL DIMENSION DIASTOLE: 5.3 CM (ref 3.5–6)
LEFT VENTRICULAR OUTFLOW TRACT DIAMETER: 2 CM
LYMPHOCYTES # BLD AUTO: 2.49 X10*3/UL (ref 0.8–3)
LYMPHOCYTES NFR BLD AUTO: 15.7 %
MAGNESIUM SERPL-MCNC: 1.68 MG/DL (ref 1.6–2.4)
MAGNESIUM SERPL-MCNC: 2.13 MG/DL (ref 1.6–2.4)
MCH RBC QN AUTO: 30 PG (ref 26–34)
MCHC RBC AUTO-ENTMCNC: 32.6 G/DL (ref 32–36)
MCV RBC AUTO: 92 FL (ref 80–100)
MITRAL VALVE E/A RATIO: 2.56
MITRAL VALVE E/E' RATIO: 21.5
MONOCYTES # BLD AUTO: 1.98 X10*3/UL (ref 0.05–0.8)
MONOCYTES NFR BLD AUTO: 12.5 %
NEUTROPHILS # BLD AUTO: 11.16 X10*3/UL (ref 1.6–5.5)
NEUTROPHILS NFR BLD AUTO: 70.7 %
NRBC BLD-RTO: 0.4 /100 WBCS (ref 0–0)
OXYHGB MFR BLDV: 53.1 % (ref 45–75)
OXYHGB MFR BLDV: 67.6 % (ref 45–75)
P AXIS: 2 DEGREES
P AXIS: 69 DEGREES
P OFFSET: 144 MS
P OFFSET: 183 MS
P ONSET: 101 MS
P ONSET: 134 MS
PCO2 BLDV: 36 MM HG (ref 41–51)
PCO2 BLDV: 38 MM HG (ref 41–51)
PH BLDV: 7.17 PH (ref 7.33–7.43)
PH BLDV: 7.31 PH (ref 7.33–7.43)
PHOSPHATE SERPL-MCNC: 3.6 MG/DL (ref 2.5–4.9)
PHOSPHATE SERPL-MCNC: 4.1 MG/DL (ref 2.5–4.9)
PLATELET # BLD AUTO: 145 X10*3/UL (ref 150–450)
PO2 BLDV: 35 MM HG (ref 35–45)
PO2 BLDV: 38 MM HG (ref 35–45)
POTASSIUM BLDV-SCNC: 5.2 MMOL/L (ref 3.5–5.3)
POTASSIUM SERPL-SCNC: 4.7 MMOL/L (ref 3.5–5.3)
POTASSIUM SERPL-SCNC: 4.9 MMOL/L (ref 3.5–5.3)
POTASSIUM SERPL-SCNC: 4.9 MMOL/L (ref 3.5–5.3)
PR INTERVAL: 158 MS
PR INTERVAL: 230 MS
PRODUCT BLOOD TYPE: 1700
PRODUCT BLOOD TYPE: 5100
PRODUCT CODE: NORMAL
PRODUCT CODE: NORMAL
PROT SERPL-MCNC: 4.9 G/DL (ref 6.4–8.2)
Q ONSET: 213 MS
Q ONSET: 216 MS
QRS COUNT: 16 BEATS
QRS COUNT: 24 BEATS
QRS DURATION: 104 MS
QRS DURATION: 112 MS
QT INTERVAL: 352 MS
QT INTERVAL: 364 MS
QTC CALCULATION(BAZETT): 467 MS
QTC CALCULATION(BAZETT): 559 MS
QTC FREDERICIA: 430 MS
QTC FREDERICIA: 479 MS
R AXIS: 47 DEGREES
R AXIS: 65 DEGREES
RBC # BLD AUTO: 2.4 X10*6/UL (ref 4.5–5.9)
RIGHT VENTRICLE FREE WALL PEAK S': 8.81 CM/S
RIGHT VENTRICLE PEAK SYSTOLIC PRESSURE: 26.8 MMHG
SAO2 % BLDV: 54 % (ref 45–75)
SAO2 % BLDV: 70 % (ref 45–75)
SODIUM BLDV-SCNC: 138 MMOL/L (ref 136–145)
SODIUM SERPL-SCNC: 137 MMOL/L (ref 136–145)
SODIUM SERPL-SCNC: 137 MMOL/L (ref 136–145)
SODIUM SERPL-SCNC: 139 MMOL/L (ref 136–145)
T AXIS: 102 DEGREES
T AXIS: 147 DEGREES
T OFFSET: 392 MS
T OFFSET: 395 MS
TRICUSPID ANNULAR PLANE SYSTOLIC EXCURSION: 1.4 CM
TSH SERPL-ACNC: 3.26 MIU/L (ref 0.44–3.98)
UNIT ABO: NORMAL
UNIT ABO: NORMAL
UNIT NUMBER: NORMAL
UNIT NUMBER: NORMAL
UNIT RH: NORMAL
UNIT RH: NORMAL
UNIT VOLUME: 350
UNIT VOLUME: 350
VENTRICULAR RATE: 152 BPM
VENTRICULAR RATE: 99 BPM
WBC # BLD AUTO: 15.8 X10*3/UL (ref 4.4–11.3)
XM INTEP: NORMAL
XM INTEP: NORMAL

## 2024-02-15 PROCEDURE — 74176 CT ABD & PELVIS W/O CONTRAST: CPT | Performed by: STUDENT IN AN ORGANIZED HEALTH CARE EDUCATION/TRAINING PROGRAM

## 2024-02-15 PROCEDURE — 84443 ASSAY THYROID STIM HORMONE: CPT | Performed by: STUDENT IN AN ORGANIZED HEALTH CARE EDUCATION/TRAINING PROGRAM

## 2024-02-15 PROCEDURE — 73700 CT LOWER EXTREMITY W/O DYE: CPT | Mod: LT

## 2024-02-15 PROCEDURE — 74176 CT ABD & PELVIS W/O CONTRAST: CPT

## 2024-02-15 PROCEDURE — 99223 1ST HOSP IP/OBS HIGH 75: CPT | Performed by: INTERNAL MEDICINE

## 2024-02-15 PROCEDURE — P9040 RBC LEUKOREDUCED IRRADIATED: HCPCS

## 2024-02-15 PROCEDURE — 36415 COLL VENOUS BLD VENIPUNCTURE: CPT | Performed by: STUDENT IN AN ORGANIZED HEALTH CARE EDUCATION/TRAINING PROGRAM

## 2024-02-15 PROCEDURE — 84132 ASSAY OF SERUM POTASSIUM: CPT | Performed by: INTERNAL MEDICINE

## 2024-02-15 PROCEDURE — 99233 SBSQ HOSP IP/OBS HIGH 50: CPT | Performed by: PHYSICIAN ASSISTANT

## 2024-02-15 PROCEDURE — 2500000004 HC RX 250 GENERAL PHARMACY W/ HCPCS (ALT 636 FOR OP/ED): Performed by: INTERNAL MEDICINE

## 2024-02-15 PROCEDURE — 36430 TRANSFUSION BLD/BLD COMPNT: CPT

## 2024-02-15 PROCEDURE — 93306 TTE W/DOPPLER COMPLETE: CPT | Performed by: INTERNAL MEDICINE

## 2024-02-15 PROCEDURE — 93010 ELECTROCARDIOGRAM REPORT: CPT | Performed by: STUDENT IN AN ORGANIZED HEALTH CARE EDUCATION/TRAINING PROGRAM

## 2024-02-15 PROCEDURE — 97162 PT EVAL MOD COMPLEX 30 MIN: CPT | Mod: GP

## 2024-02-15 PROCEDURE — 83605 ASSAY OF LACTIC ACID: CPT | Performed by: STUDENT IN AN ORGANIZED HEALTH CARE EDUCATION/TRAINING PROGRAM

## 2024-02-15 PROCEDURE — 2500000002 HC RX 250 W HCPCS SELF ADMINISTERED DRUGS (ALT 637 FOR MEDICARE OP, ALT 636 FOR OP/ED): Performed by: STUDENT IN AN ORGANIZED HEALTH CARE EDUCATION/TRAINING PROGRAM

## 2024-02-15 PROCEDURE — 83735 ASSAY OF MAGNESIUM: CPT | Performed by: STUDENT IN AN ORGANIZED HEALTH CARE EDUCATION/TRAINING PROGRAM

## 2024-02-15 PROCEDURE — 84132 ASSAY OF SERUM POTASSIUM: CPT | Performed by: STUDENT IN AN ORGANIZED HEALTH CARE EDUCATION/TRAINING PROGRAM

## 2024-02-15 PROCEDURE — 2500000004 HC RX 250 GENERAL PHARMACY W/ HCPCS (ALT 636 FOR OP/ED): Performed by: STUDENT IN AN ORGANIZED HEALTH CARE EDUCATION/TRAINING PROGRAM

## 2024-02-15 PROCEDURE — 2500000004 HC RX 250 GENERAL PHARMACY W/ HCPCS (ALT 636 FOR OP/ED): Performed by: SURGERY

## 2024-02-15 PROCEDURE — 85018 HEMOGLOBIN: CPT | Performed by: STUDENT IN AN ORGANIZED HEALTH CARE EDUCATION/TRAINING PROGRAM

## 2024-02-15 PROCEDURE — 73700 CT LOWER EXTREMITY W/O DYE: CPT | Mod: LEFT SIDE | Performed by: STUDENT IN AN ORGANIZED HEALTH CARE EDUCATION/TRAINING PROGRAM

## 2024-02-15 PROCEDURE — 82947 ASSAY GLUCOSE BLOOD QUANT: CPT

## 2024-02-15 PROCEDURE — 93005 ELECTROCARDIOGRAM TRACING: CPT

## 2024-02-15 PROCEDURE — P9016 RBC LEUKOCYTES REDUCED: HCPCS

## 2024-02-15 PROCEDURE — 85025 COMPLETE CBC W/AUTO DIFF WBC: CPT | Performed by: STUDENT IN AN ORGANIZED HEALTH CARE EDUCATION/TRAINING PROGRAM

## 2024-02-15 PROCEDURE — 82805 BLOOD GASES W/O2 SATURATION: CPT | Performed by: INTERNAL MEDICINE

## 2024-02-15 PROCEDURE — 99291 CRITICAL CARE FIRST HOUR: CPT | Performed by: INTERNAL MEDICINE

## 2024-02-15 PROCEDURE — 82010 KETONE BODYS QUAN: CPT | Performed by: STUDENT IN AN ORGANIZED HEALTH CARE EDUCATION/TRAINING PROGRAM

## 2024-02-15 PROCEDURE — 83735 ASSAY OF MAGNESIUM: CPT | Performed by: INTERNAL MEDICINE

## 2024-02-15 PROCEDURE — 93306 TTE W/DOPPLER COMPLETE: CPT

## 2024-02-15 PROCEDURE — 85014 HEMATOCRIT: CPT | Performed by: STUDENT IN AN ORGANIZED HEALTH CARE EDUCATION/TRAINING PROGRAM

## 2024-02-15 PROCEDURE — 99233 SBSQ HOSP IP/OBS HIGH 50: CPT | Performed by: SURGERY

## 2024-02-15 PROCEDURE — 2500000001 HC RX 250 WO HCPCS SELF ADMINISTERED DRUGS (ALT 637 FOR MEDICARE OP): Performed by: STUDENT IN AN ORGANIZED HEALTH CARE EDUCATION/TRAINING PROGRAM

## 2024-02-15 PROCEDURE — 2020000001 HC ICU ROOM DAILY

## 2024-02-15 PROCEDURE — 2500000001 HC RX 250 WO HCPCS SELF ADMINISTERED DRUGS (ALT 637 FOR MEDICARE OP): Performed by: INTERNAL MEDICINE

## 2024-02-15 PROCEDURE — 2500000005 HC RX 250 GENERAL PHARMACY W/O HCPCS: Performed by: STUDENT IN AN ORGANIZED HEALTH CARE EDUCATION/TRAINING PROGRAM

## 2024-02-15 RX ORDER — MAGNESIUM SULFATE HEPTAHYDRATE 40 MG/ML
2 INJECTION, SOLUTION INTRAVENOUS ONCE
Status: COMPLETED | OUTPATIENT
Start: 2024-02-15 | End: 2024-02-15

## 2024-02-15 RX ORDER — SODIUM CHLORIDE, SODIUM LACTATE, POTASSIUM CHLORIDE, CALCIUM CHLORIDE 600; 310; 30; 20 MG/100ML; MG/100ML; MG/100ML; MG/100ML
125 INJECTION, SOLUTION INTRAVENOUS CONTINUOUS
Status: DISCONTINUED | OUTPATIENT
Start: 2024-02-15 | End: 2024-02-15

## 2024-02-15 RX ORDER — AMIODARONE HYDROCHLORIDE 200 MG/1
200 TABLET ORAL DAILY
Status: DISCONTINUED | OUTPATIENT
Start: 2024-02-28 | End: 2024-02-23 | Stop reason: HOSPADM

## 2024-02-15 RX ORDER — LORATADINE 10 MG/1
10 TABLET ORAL DAILY
COMMUNITY

## 2024-02-15 RX ORDER — AMIODARONE HYDROCHLORIDE 200 MG/1
400 TABLET ORAL 2 TIMES DAILY
Status: DISCONTINUED | OUTPATIENT
Start: 2024-02-15 | End: 2024-02-23 | Stop reason: HOSPADM

## 2024-02-15 RX ADMIN — INSULIN LISPRO 6 UNITS: 100 INJECTION, SOLUTION INTRAVENOUS; SUBCUTANEOUS at 20:15

## 2024-02-15 RX ADMIN — ACETAMINOPHEN 975 MG: 325 TABLET ORAL at 17:31

## 2024-02-15 RX ADMIN — AMIODARONE HYDROCHLORIDE 400 MG: 400 TABLET ORAL at 10:53

## 2024-02-15 RX ADMIN — ACETAMINOPHEN 975 MG: 325 TABLET ORAL at 06:30

## 2024-02-15 RX ADMIN — ICOSAPENT ETHYL 1 G: 1 CAPSULE ORAL at 17:31

## 2024-02-15 RX ADMIN — INSULIN LISPRO 0 UNITS: 100 INJECTION, SOLUTION INTRAVENOUS; SUBCUTANEOUS at 20:30

## 2024-02-15 RX ADMIN — ACETAMINOPHEN 975 MG: 325 TABLET ORAL at 00:29

## 2024-02-15 RX ADMIN — SERTRALINE HYDROCHLORIDE 50 MG: 50 TABLET ORAL at 09:07

## 2024-02-15 RX ADMIN — CALCIUM CHLORIDE 1 G: 100 INJECTION INTRAVENOUS; INTRAVENTRICULAR at 09:01

## 2024-02-15 RX ADMIN — PANTOPRAZOLE SODIUM 40 MG: 40 TABLET, DELAYED RELEASE ORAL at 09:02

## 2024-02-15 RX ADMIN — INSULIN LISPRO 4 UNITS: 100 INJECTION, SOLUTION INTRAVENOUS; SUBCUTANEOUS at 17:31

## 2024-02-15 RX ADMIN — AMIODARONE HYDROCHLORIDE 400 MG: 400 TABLET ORAL at 20:16

## 2024-02-15 RX ADMIN — MAGNESIUM SULFATE HEPTAHYDRATE 2 G: 40 INJECTION, SOLUTION INTRAVENOUS at 10:53

## 2024-02-15 RX ADMIN — ICOSAPENT ETHYL 1 G: 1 CAPSULE ORAL at 09:03

## 2024-02-15 RX ADMIN — INSULIN LISPRO 2 UNITS: 100 INJECTION, SOLUTION INTRAVENOUS; SUBCUTANEOUS at 11:30

## 2024-02-15 RX ADMIN — INSULIN LISPRO 2 UNITS: 100 INJECTION, SOLUTION INTRAVENOUS; SUBCUTANEOUS at 04:47

## 2024-02-15 RX ADMIN — HUMAN ALBUMIN MICROSPHERES AND PERFLUTREN 0.5 ML: 10; .22 INJECTION, SOLUTION INTRAVENOUS at 08:15

## 2024-02-15 RX ADMIN — INSULIN LISPRO 6 UNITS: 100 INJECTION, SOLUTION INTRAVENOUS; SUBCUTANEOUS at 00:30

## 2024-02-15 RX ADMIN — ACETAMINOPHEN 975 MG: 325 TABLET ORAL at 11:03

## 2024-02-15 RX ADMIN — SODIUM CHLORIDE, POTASSIUM CHLORIDE, SODIUM LACTATE AND CALCIUM CHLORIDE 125 ML/HR: 600; 310; 30; 20 INJECTION, SOLUTION INTRAVENOUS at 04:01

## 2024-02-15 RX ADMIN — SODIUM BICARBONATE 75 ML/HR: 84 INJECTION, SOLUTION INTRAVENOUS at 04:01

## 2024-02-15 RX ADMIN — METOPROLOL SUCCINATE 50 MG: 50 TABLET, EXTENDED RELEASE ORAL at 09:01

## 2024-02-15 ASSESSMENT — COGNITIVE AND FUNCTIONAL STATUS - GENERAL
MOBILITY SCORE: 12
MOBILITY SCORE: 12
TURNING FROM BACK TO SIDE WHILE IN FLAT BAD: A LOT
STANDING UP FROM CHAIR USING ARMS: A LOT
DAILY ACTIVITIY SCORE: 24
MOVING TO AND FROM BED TO CHAIR: A LOT
TURNING FROM BACK TO SIDE WHILE IN FLAT BAD: A LOT
DAILY ACTIVITIY SCORE: 24
CLIMB 3 TO 5 STEPS WITH RAILING: TOTAL
WALKING IN HOSPITAL ROOM: TOTAL
CLIMB 3 TO 5 STEPS WITH RAILING: TOTAL
MOBILITY SCORE: 24
MOVING TO AND FROM BED TO CHAIR: A LOT
WALKING IN HOSPITAL ROOM: TOTAL
DAILY ACTIVITIY SCORE: 24
STANDING UP FROM CHAIR USING ARMS: A LOT
MOBILITY SCORE: 24

## 2024-02-15 ASSESSMENT — ENCOUNTER SYMPTOMS
WOUND: 0
UNEXPECTED WEIGHT CHANGE: 0
JOINT SWELLING: 0
DIAPHORESIS: 0
APPETITE CHANGE: 0
DIARRHEA: 0
SHORTNESS OF BREATH: 0
CHEST TIGHTNESS: 1
CHILLS: 0
COUGH: 0
FEVER: 0
HEMATURIA: 0
FACIAL SWELLING: 0
SORE THROAT: 0
DIZZINESS: 0
FREQUENCY: 0
NAUSEA: 0
ABDOMINAL PAIN: 0
BRUISES/BLEEDS EASILY: 0
HEADACHES: 0
CONSTIPATION: 0
FLANK PAIN: 0
TROUBLE SWALLOWING: 0
CHEST TIGHTNESS: 0
DYSURIA: 0
WHEEZING: 0
FATIGUE: 0
HALLUCINATIONS: 0
WEAKNESS: 0
PALPITATIONS: 0
DIFFICULTY URINATING: 0
LIGHT-HEADEDNESS: 0
BACK PAIN: 0
BLOOD IN STOOL: 0
NUMBNESS: 0
EYE PAIN: 0
VOMITING: 0

## 2024-02-15 ASSESSMENT — PAIN SCALES - GENERAL
PAINLEVEL_OUTOF10: 0 - NO PAIN

## 2024-02-15 ASSESSMENT — PAIN - FUNCTIONAL ASSESSMENT
PAIN_FUNCTIONAL_ASSESSMENT: 0-10

## 2024-02-15 NOTE — SIGNIFICANT EVENT
I came in to see patient to reevaluate the hematoma of the left thigh.  Throughout the day, the patient has had multiple medical issues.  His hemoglobin at noon was 6.9.  This was relatively stable from the previous 2 hemoglobin levels of 7.0.  However, since it did drop below 7, a unit of blood was ordered.  While awaiting for this blood to be prepared, the patient did have a short run of V. tach.  He had some associated chest pain.  He was seen by the nurse practitioner.  Troponin level was noted to be elevated around 300.  His chest pain had spontaneously resolved and no obvious EKG changes per report.  When the first unit of blood was ready to be hung, the patient did have 1 episode of relative hypotension with a systolic blood pressure in the 90s.  The blood was given wide open and he seemed to respond to the volume resuscitation.  A second unit of blood was ordered at that time.  He responded to the first unit of blood with a hemoglobin improvement from 6.9 up to 7.9.  For closer monitoring, he was transferred down to the stepdown unit.  In the stepdown unit, he had a longer run of V. tach and had some increased confusion.  He was transferred to the unit.  He was noted to have some increase acidosis.  Second unit of blood was started.  Patient remains alert and oriented x 3, but does have some moments of confusion.  Nursing noted that when patient tried to get out of bed is when he would have episodes of V. tach.  He had a Love placed and has had good urine output.  I came to the patient's bedside to evaluate his thigh.  He states that his pain of his left thigh is actually better than it was this morning.  His thigh exam remains bruised but soft.  Minimal tenderness to palpation. He is neurovascularly intact distally.  He has a palpable dorsalis pedis pulse, and a dopplerable posterior tib pulse on the left leg.  Do not feel that he is actively bleeding into his thigh as his initial 3 hemoglobins were stable  and responded appropriately to the blood transfusion with increase of hemoglobin levels.  Reviewed his case with another trauma surgeon (Dr. Pacheco), IMS coverage (Dr. Bailon) and ICU attending (Dr. Mariscal).  It is felt that he does not have compartment syndrome.  His acidosis is likely multifactorial given his chronic renal insufficiency, baseline cardiac disease and still requires volume resuscitation.  Have discussed plan with multiple team members, and plan to transfuse 3rd unit of packed red blood cells to try to keep his hemoglobin level closer to 10 given his underlying cardiac issues.  Will also give bicarb drip with his renal insufficiency and continue to monitor in the ICU.  Dr. Bailon entered all orders, as well as, placed consult for cardiology.

## 2024-02-15 NOTE — PROGRESS NOTES
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 **OR** ondansetron, oxyCODONE, oxygen     Physical Exam  Constitutional:       General: He is not in acute distress.     Appearance: Normal appearance.      Comments: Argumentative/cantankerous    HENT:      Head: Normocephalic and atraumatic.      Right Ear: External ear normal.      Left Ear: External ear normal.      Nose: Nose normal.      Mouth/Throat:      Mouth: Mucous membranes are moist.      Pharynx: Oropharynx is clear.   Eyes:      Extraocular Movements: Extraocular movements intact.      Conjunctiva/sclera: Conjunctivae normal.      Pupils: Pupils are equal, round, and reactive to light.   Cardiovascular:      Rate and Rhythm: Normal rate and regular rhythm.      Pulses: Normal pulses.      Heart sounds: Normal heart sounds.   Pulmonary:      Effort: Pulmonary effort is normal. No respiratory distress.      Breath sounds: Normal breath sounds. No wheezing, rhonchi or rales.   Abdominal:      General: Bowel sounds are normal.      Palpations: Abdomen is soft.      Tenderness: There is no abdominal tenderness. There is no right CVA tenderness, left CVA tenderness, guarding or rebound.   Musculoskeletal:         General: No swelling. Normal range of motion.      Cervical back: Normal range of motion and neck supple.   Skin:     General: Skin is warm and dry.      Capillary Refill: Capillary refill takes less than 2 seconds.      Findings: No lesion or  rash.   Neurological:      General: No focal deficit present.      Mental Status: He is alert and oriented to person, place, and time. Mental status is at baseline.      Comments: Forgetful and easily confused   Psychiatric:         Mood and Affect: Mood normal.         Behavior: Behavior normal.                  Code Status  Full Code     Assessment/Plan   This patient currently has cardiac telemetry ordered; if you would like to modify or discontinue the telemetry order, click here to go to the orders activity to modify/discontinue the order.      Acute blood loss anemia  Assessment & Plan  -s/p 3u PRBC thus far  -Repeat CT without evidence of expanding hematoma  -Trauma surgery management apprecited    Ventricular tachycardia (CMS/[facility])  Assessment & Plan  -Noted recurrent episodes of ventricular tachycardia with associated unresponsiveness and hypotension likely in part related to his metabolic acidosis/acute blood loss anemia in the setting of known cardiac history  -Started on oral amiodarone, continue BB as BP allows  -Consult cardiology who has consulted EP cardiology.  -Update echo  -Recommend keeping serum magnesium greater than 2 and serum potassium greater than 4    * Hematoma of left thigh, initial encounter  Assessment & Plan  -Monitor H/H- transfuse if Hgb <7.0 or vitals unstable  -Compressive dressing  -Ice  -Trauma surgery management    Chest pain  Assessment & Plan  -ECG showed sinus rhythm with a nonspecific interventricular conduction delay  -Elevated troponin at [phone].   -Monitor on tele  -Echo  -Cardiology consult  -Recommending [facility], patient not sure    Erosive esophagitis  Assessment & Plan  -Continue home PPI    SENIA (obstructive sleep apnea)  Assessment & Plan  -Patient ADAMANT his sleep study was wrong and also adamant he will never use a CPAP    Acute kidney injury superimposed on CKD (CMS/[facility])  Assessment & Plan  -Appreciate nephrology recommendations  -Hold home losartan  -Avoid  nephrotoxins  -Improving with volume (blood an IVF)    HLD (hyperlipidemia)  Assessment & Plan  -Continue home medications    CAD (coronary artery disease)  Assessment & Plan  S/p CABG 5V in 1992  -ASA on hold- resume when able  -[facility] recommended by cardiology, patient states his cardiologist did not think he needed one, referring to last time he saw him outpatient, explained that he now has elevated troponins, chest pain, arrhythmia and this is a much different situation than at his last outpatient appointment- wife at bedside in agreement with [facility].     Congestive heart failure with right ventricular systolic dysfunction (CMS/HCC)  Assessment & Plan  -No acute exacerbation  -Monitor volume status closely    Cognitive dysfunction  Assessment & Plan  -Has record of mild cognitive impairment  -At risk for hospital delirium  -Good sleep/wake cycle  -Family at bedside as much as able for reorientation    Type 2 diabetes mellitus with diabetic neuropathy, without long-term current use of insulin (CMS/[facility])  Assessment & Plan  -Hold home oral medications  -SSI  -Monitor glucose and adjust as necessary    Cirrhosis, nonalcoholic (CMS/[facility])  Assessment & Plan  -Continued outpatient follow up    Benign essential hypertension  Assessment & Plan  -Holding Losartan with TAYLOR  -Continue other home medications  -Monitor BP and adjust as necessary    Asthma  Assessment & Plan  -Continue home medications            DVT ppx: No pharmacological ppx with blood loss anemia     Please see orders for more complete plan    Vicki Bojorquez PA-C

## 2024-02-15 NOTE — PROGRESS NOTES
Physical Therapy    Physical Therapy Evaluation    Patient Name: Ritesh Garza  MRN: [id number]  Today's Date: 2/15/2024   Time Calculation  Start Time: 1434  Stop Time: 1505  Time Calculation (min): 31 min    Assessment/Plan   PT Assessment  PT Assessment Results: Decreased strength, Decreased endurance, Impaired balance, Decreased mobility, Decreased safety awareness, Impaired hearing  Rehab Prognosis: Fair  Evaluation/Treatment Tolerance: Patient limited by fatigue  End of Session Communication: Bedside nurse  Assessment Comment:  (MOD A TO EUGENIO AT BEDSIDE, LEGSUNSTEADY, FALLRISK WOULD BENEFIT FROM MOD REHAB PENDING PROGRESS)  End of Session Patient Position: Up in chair (ALARM OFF, BEING OBSERVED BY STAFF FROM DESK, CALL LIGHT IN REACH)  IP OR SWING BED PT PLAN  Inpatient or Swing Bed: Inpatient  PT Plan  Treatment/Interventions: Bed mobility, Transfer training, Gait training, Stair training, Balance training, Strengthening, Endurance training  PT Plan: Skilled PT  PT Frequency: 4 times per week  PT Discharge Recommendations: Moderate intensity level of continued care  Equipment Recommended upon Discharge: Wheeled walker (TBD)  PT Recommended Transfer Status: Assist x1, Assist x2  PT - OK to Discharge: Yes (WHEN MEDICALLY CLEARED)      Subjective   General Visit Information:  General  Reason for Referral: TRAUMA  Referred By: PAULINA  Past Medical History Relevant to Rehab: FALL INJURING L LE; DX: L THIGH HEMATOMA, TRF TO ICU 2/15 2/2 V TACH/HYPOTEN, CONFUSION, HYPOXIA/ INCRESAED TROPONIN; HX: DM, GOUT, NON ALCOHOLIC CIRRHOSIS, CABG, EYE SURG  Missed Visit: Yes  Missed Visit Reason: Patient placed on medical hold (PT. C/O CAMERON EAR/TMJ PAIN AND CHEST PAIN SUDDEN ONSET  SHORTLY AFTER P.T. ENTERED ROOM RN INFORMED WILL HOLD THERAPY TODAY  WILL SEE FOR EVAL 2/15)  Prior to Session Communication: Bedside nurse (OK PER RN TO SEE FOR MOBITLIY)  Patient Position Received: Bed, 3 rail up, Alarm on (ROOM 1015 ICU, ALERT  IV O2 NC)  General Comment: STATES WANTS TO GET OOB, REPORTS FALL ON ICE  ABOUT 1 MO AGO WHICH SKINNED HIS KNEES,  STATES HE AMB ON HIS LLE FOR A DAY BEFORE COMING INTO HOSPITAL  Home Living:  Home Living  Home Living Comments:  (SPOUSE IN 2 LEVEL HOME 12 STEPS TO 2ND FLOOR ADN RAMP OR 2 SAURABH, AMB INDEP OCCASIONALLY USES CANE, WALK IN  SHOWER/RAILS STANDS TO BATHE, SHEARES CHORES W/ WIFE, DRIVES, WORKS PARTTIME AS )       Precautions:  Precautions  Hearing/Visual Limitations: [facility]  Medical Precautions: Fall precautions, Oxygen therapy device and L/min  Vital Signs:       Objective   Pain:  Pain Assessment  Pain Score:  (OCCASIONAL PIN IN L KNEE WHEN IT IS TOUCHED)  Cognition:  Cognition  Overall Cognitive Status: Within Functional Limits  Orientation Level: Disoriented to situation  Attention: Exceptions to WFL  Sustained Attention: Impaired  Memory: Exceptions to WFL  Long-Term Memory:  (? INCONSISTENT W/ INFO,JUMPS AROUND IN TIME WITH HIS HX OF INJURIES)  Safety/Judgement: Exceptions to WFL  Complex Functional Tasks: Minimal  Novel Situations: Minimal  Routine Tasks: Minimal  Unable to Self-Monitor and Self-Correct Consistently: Minimal  Insight: Mild  Impulsive: Mildly  Processing Speed: Delayed    General Assessments:  General Observation  General Observation:  (OLD ABRASIONS ON CAMERON KNEES, DRESSING/ACE L THIGH)               Activity Tolerance  Endurance: Tolerates 10 - 20 min exercise with multiple rests    Sensation  Sensation Comment: NO C/O    Strength  Strength Comments:  (ROM LEGS WFL, STRENGTH OF LEGS: RLE 3+/5 LLE 3/5)  Strength  Strength Comments:  (ROM LEGS WFL, STRENGTH OF LEGS: RLE 3+/5 LLE 3/5)                     Static Sitting Balance  Static Sitting-Comment/Number of Minutes: FAIR  Dynamic Sitting Balance  Dynamic Sitting-Comments: FAIR    Static Standing Balance  Static Standing-Comment/Number of Minutes: FAIR-  Dynamic Standing Balance  Dynamic Standing-Comments: FAIR-  Functional  Assessments:  Bed Mobility  Bed Mobility:  (SUPINE>SIT MIN A, SITS MARIAM SBA 5 MIN)    Transfers  Transfer:  (SIT<> STAND MOD A, LEGS UNSTEADY)    Ambulation/Gait Training  Ambulation/Gait Training Performed:  (MOD A TO AMB 2 FT TO CHAIR AT BEDSIDE LEGS UNSTEADY)                      Outcome Measures:  [facility] Basic Mobility  Turning from your back to your side while in a flat bed without using bedrails: None  Moving from lying on your back to sitting on the side of a flat bed without using bedrails: A lot  Moving to and from bed to chair (including a wheelchair): A lot  Standing up from a chair using your arms (e.g. wheelchair or bedside chair): A lot  To walk in hospital room: Total  Climbing 3-5 steps with railing: Total  Basic Mobility - Total Score: 12    Encounter Problems       Encounter Problems (Active)       Mobility       STG - Patient will ambulate (Not Progressing)       Start:  02/15/24    Expected End:  02/29/24       FWW/CANE SBA USING PROPER GAIT PATTERN         STG - Patient will ascend and descend four to six stairs (Not Progressing)       Start:  02/15/24    Expected End:  02/29/24       RAIL &/OR CANE, MIN A            Pain - Adult          Transfers       STG - Patient to transfer to and from sit to supine (Not Progressing)       Start:  02/15/24    Expected End:  02/21/24       INDEP RAILS PRN HOB FLAT         STG - Patient will transfer sit to and from stand (Not Progressing)       Start:  02/15/24    Expected End:  02/26/24       FW/CANE USING PROPER TECHNQUE                Education Documentation  Mobility Training, taught by Su Lee PT at 2/15/2024  3:37 PM.  Learner: Patient  Readiness: Acceptance  Method: Explanation  Response: Needs Reinforcement  Comment: MOBILTY SAFETY, WAIT FOR A    Education Comments  No comments found.

## 2024-02-15 NOTE — CARE PLAN
Problem: Psychosocial Needs  Goal: Collaborate with me, my family, and caregiver to identify my specific goals  Outcome: Progressing  Note: Pt and spouse updated on plan of care. Pt and spouse verbalized understanding and were ok with moving forward with continued treatment plan     Problem: Skin  Goal: Prevent/minimize sheer/friction injuries  Flowsheets (Taken 2/15/2024 1155)  Prevent/minimize sheer/friction injuries: Turn/reposition every 2 hours/use positioning/transfer devices  Note: PT participates in Q2 turns throughout shift.    The patient's goals for the shift include      The clinical goals for the shift include Pt hemoglobin will remain above 7    Pt hemoglobin stable at 9. Will continue CBC Q6

## 2024-02-15 NOTE — PROGRESS NOTES
Ritesh Garza is a 73 y.o. male admitted for Hematoma of left thigh, initial encounter. Pharmacy reviewed the patient's [id number] medications and allergies for accuracy.    The list below reflects the PTA list prior to pharmacy medication history. A summary a changes to the PTA medication list has been listed below. Please review each medication in order reconciliation for additional clarification and justification.    Source of information:    Medications added:   LORATADINE 10 MG    Medications modified:   TADALAFIL 20 MG 1 every day---> 1 every day PRN    Medications to be removed:   CETIRIZINE 10 MG    Medications of concern:   FAMOTIDINE 20 MG AND PANTOPRAZOLE 40 MG---PATIENT IS UNSURE WHICH ONE THE PHYSICIAN WANTS HIM TO STOP TEMPORARILY.       Prior to Admission Medications   Prescriptions Last Dose Informant Patient Reported? Taking?   albuterol (ProAir HFA) 90 mcg/actuation inhaler Unknown  Yes    Sig: Inhale once daily. Per instructions   allopurinol (Zyloprim) 100 mg tablet 2024  No    Sig: Take 0.5 tablets (50 mg) by mouth once daily.   amLODIPine (Norvasc) 10 mg tablet 2024  No    Sig: Take 1 tablet (10 mg) by mouth once daily.   aspirin 81 mg EC tablet 2024  No    Sig: TAKE 1 TABLET BY MOUTH EVERY DAY   atorvastatin (Lipitor) 80 mg tablet 2024  Yes    Sig: Take 1 tablet (80 mg) by mouth once daily.   b complex vitamins capsule 2024  Yes    Si capsule once daily.   cetirizine HCl (ALLERGY RELIEF, CETIRIZINE, ORAL) 2024  Yes    Sig: Allergy Relief TABS  Refills: 0   coenzyme Q-10 (CoQ-10) 100 mg capsule 2024  Yes    Sig: Take 1 capsule (100 mg) by mouth once daily.   cyanocobalamin (Vitamin B-12) 1,000 mcg tablet 2024  Yes    Si tablet (1,000 mcg) once daily. As directed   dapagliflozin (Farxiga) 10 mg 2024  Yes    Sig: Take 1 tablet (10 mg) by mouth once daily in the morning. Take before meals.   diclofenac sodium (Voltaren XR) 100 mg 24  hr tablet 2/13/2024  Yes    Sig: Take 1 tablet (100 mg) by mouth once daily as needed.   famotidine (Pepcid) 20 mg tablet Past Week  Yes    Sig: Take 1 tablet (20 mg) by mouth once daily as needed for indigestion.   gabapentin (Neurontin) 100 mg capsule 2/13/2024  Yes    Sig: Take 1 capsule (100 mg) by mouth once daily at bedtime.   icosapent ethyL (Vascepa) 0.5 gram capsule 2/13/2024  No    Sig: Take 2 capsules (1 g) by mouth 2 times a day with meals.   losartan (Cozaar) 50 mg tablet 2/13/2024  No    Sig: Take 1 tablet (50 mg) by mouth once daily.   magnesium gluconate (Magonate) 27.5 mg magne- sium (500 mg) tablet 2/13/2024  Yes    Sig: Take 2 tablets (55 mg) by mouth once daily.   metFORMIN  mg 24 hr tablet 2/14/2024  No    Sig: TAKE 2 TABLETS (1000 MG) BY MOUTH DAILY   metoprolol succinate XL (Toprol-XL) 50 mg 24 hr tablet 2/13/2024  No    Sig: TAKE 1 TABLET BY MOUTH EVERY DAY   multivitamin tablet 2/13/2024  Yes    Sig: Take 1 tablet by mouth once daily.   pantoprazole (ProtoNix) 40 mg EC tablet 2/14/2024  Yes    Sig: Take 1 tablet (40 mg) by mouth once daily.   sertraline (Zoloft) 50 mg tablet 2/14/2024  No    Sig: Take one tablet daily in the morning after breakfast.   tadalafil (Cialis) 5 mg tablet 2/13/2024  No    Sig: TAKE 1 TABLET BY MOUTH EVERY DAY   tadalafil 20 mg tablet 2/13/2024  Yes    Sig: Take 1 tablet (20 mg) by mouth once daily.   turmeric-turmeric root extract 450-50 mg capsule 2/13/2024  Yes    Sig: Take daily as directed.      Facility-Administered Medications: None       Aniyah Dent CP

## 2024-02-15 NOTE — PROGRESS NOTES
Occupational Therapy                 Therapy Communication Note    Patient Name: Ritesh Garza  MRN: [id number]  Today's Date: 2/15/2024     Discipline: Occupational Therapy     Missed Visit Reason: Patient placed on medical hold.  Patient transferred to ICU after rapid response, with cardiology to consult.  Hold O.T. today until medically stable.

## 2024-02-15 NOTE — CARE PLAN
Problem: Skin  Goal: Prevent/minimize sheer/friction injuries  Outcome: Progressing  Flowsheets (Taken 2/15/2024 7808)  Prevent/minimize sheer/friction injuries: Use pull sheet     Problem: Pain  Goal: Takes deep breaths with improved pain control throughout the shift  Outcome: Progressing     Problem: Safety - Medical Restraint  Goal: Remains free of injury from restraints (Restraint for Interference with Medical Device)  Outcome: Progressing     Problem: Pain  Goal: My pain/discomfort is manageable  Outcome: Met     Problem: Safety  Goal: Patient will be injury free during hospitalization  Outcome: Met  Goal: I will remain free of falls  Outcome: Met     Problem: Daily Care  Goal: Daily care needs are met  Outcome: Met     Problem: Safety - Adult  Goal: Free from fall injury  Outcome: Met     Problem: Diabetes  Goal: Achieve decreasing blood glucose levels by end of shift  Outcome: Met

## 2024-02-15 NOTE — ASSESSMENT & PLAN NOTE
-Noted recurrent episodes of ventricular tachycardia with associated unresponsiveness and hypotension likely in part related to his metabolic acidosis/acute blood loss anemia in the setting of known cardiac history  -Started on oral amiodarone, continue BB as BP allows  -Consult cardiology who has consulted EP cardiology.  -Update echo  -Recommend keeping serum magnesium greater than 2 and serum potassium greater than 4

## 2024-02-15 NOTE — CONSULTS
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 femur fracture.   No evidence of knee dislocation. Mild tricompartmental osteoarthritis of both knees.       No evidence of left femur fracture. Known large hematoma. Bilateral knee osteoarthritis. Robust vascular calcification     MACRO: None   Signed by: Geovanny Gardner 2/13/2024 11:01 PM Dictation workstation:   [id number]    XR pelvis 1-2 views    Result Date: 2/13/2024  Interpreted By:  Deborah Jamil, STUDY: XR PELVIS 1-2 VIEWS; ;  2/13/2024 9:33 pm   INDICATION: Signs/Symptoms:trauma, left thigh pain.   COMPARISON: None.   ACCESSION NUMBER(S): [id number]   ORDERING CLINICIAN: TETO NEAL   FINDINGS: Evaluation limited to positioning. Postoperative changes in the right hip evidence by surgical clips. Heavy wall calcification of vascular structures in the area of femoral arteries bilaterally. No evidence for acute fracture or dislocation. No bone lesions. No radiopaque foreign body.       No acute fracture or dislocation of bilateral hip joints.     MACRO: None   Signed by: Deborah Jamil 2/13/2024 9:54 PM Dictation workstation:   [id number]    CT cervical spine wo IV contrast    Result Date: 2/13/2024  Interpreted By:  Deborah Jamil, STUDY: CT CERVICAL SPINE WO IV CONTRAST;  2/13/2024 9:45 pm   INDICATION: Signs/Symptoms:Fall, neck pain.   COMPARISON: None.   ACCESSION NUMBER(S): [id number]   ORDERING CLINICIAN: TETO NEAL    TECHNIQUE: Axial CT images of the cervical spine are obtained. Axial, coronal and sagittal reconstructions are provided for review.   FINDINGS: Wall calcification of bilateral carotid bulbs, worse on the left. Heterogeneous enlargement of the right thyroid lobe which is incompletely characterized in this study. No focal masses are seen.   Fractures: There is no evidence for an acute fracture of the cervical spine.   Vertebral Alignment: Within normal limits.   Craniocervical Junction: Degenerative changes at C1-2 level with prominent soft tissue thickening at the C1-2 articulation.   Vertebrae/Disc Spaces:  Moderate multilevel discogenic degenerative changes including disc space narrowing, endplate sclerosis, spurring and posterior disc osteophyte complex. Findings worse from C5-T1. Facet joint sclerosis and hypertrophy bilaterally at multiple levels.   Prevertebral/Paraspinal Soft Tissues: No prevertebral soft tissue swelling.         1. No evidence for an acute fracture or subluxation of the cervical spine.   2. Degenerative changes of the spine as above.   3. Asymmetrical thyromegaly.   4. Heavy wall calcification of bilateral carotid bulbs.   MACRO: None   Signed by: Deborah Jamil 2/13/2024 9:53 PM Dictation workstation:   [id number]    CT head W O contrast trauma protocol    Result Date: 2/13/2024  Interpreted By:  Deborah Jamil, STUDY: CT HEAD W/O CONTRAST TRAUMA PROTOCOL;  2/13/2024 9:45 pm   INDICATION: Signs/Symptoms:Fall.   COMPARISON: 09/14/2022   ACCESSION NUMBER(S): [id number]   ORDERING CLINICIAN: TETO NEAL   TECHNIQUE: Noncontrast axial CT scan of head was performed. Angled reformats in brain and bone windows were generated. The images were reviewed in bone, brain, blood and soft tissue windows.   FINDINGS: CSF Spaces: The ventricles, sulci and cisterns are within normal limits for patient's age.  There is no extraaxial fluid collection.   Parenchyma: The grey-white differentiation is intact.  There is no mass effect or midline shift.  There is no intracranial hemorrhage.   Calvarium: The calvarium is unremarkable.   Paranasal sinuses and mastoids: Visualized paranasal sinuses and mastoids are clear.       No evidence of acute cortical infarct or intracranial hemorrhage.   MACRO: None     Signed by: Deborah Jamil 2/13/2024 9:51 PM Dictation workstation:   [id number]      Assessment/Plan   Principal Problem:    Hematoma of left thigh, initial encounter  Active Problems:    Asthma    Benign essential hypertension    Cirrhosis, nonalcoholic (CMS/HCC)    Type 2 diabetes mellitus with diabetic neuropathy, without long-term current use of insulin (CMS/HCC)    Cognitive dysfunction    Congestive heart failure with right ventricular systolic dysfunction (CMS/HCC)    Traumatic hematoma of left thigh    CAD (coronary artery disease)    HLD (hyperlipidemia)    Acute kidney injury superimposed on CKD (CMS/HCC)    SENIA (obstructive sleep apnea)    Erosive esophagitis    Chest pain    Acute blood loss anemia due to trauma.  Patient has had ventricular arrhythmias likely related to decreased oxygen carrying capacity.  Hemoglobin was 13.7 on July 31, 2023 and dropped to 6.9 around noon yesterday.  Essentially had a stress-induced MI, likely with ischemia induced ventricular tachyarrhythmia that was fortunately transient--troponin negative on admission and bumped up to over 600.  Echo pending.  Cardiology evaluation pending.  Suggest transfusion to hemoglobin 10 or more.  Further evaluation and management of left leg per trauma service.    Metabolic acidosis.  Multifactorial.  Components of lactate, acute kidney injury/renal issues, and hypoxia of the tissue and poor perfusion related to his cardiac pathology.  All of these issues are being addressed.  Creatinine is improving.  Patient currently on a bicarbonate drip.  Would recheck a CHEM profile later today.  Once his venous bicarbonate is up about 20 or more would  discontinue the drip.  Continue to track labs including creatinine and hemoglobin especially.    Dragon dictation software was used to dictate this note and thus there may be minor errors in translation/transcription including garbled speech or misspellings. Please contact for clarification if needed. Inpatient consult to Intensivist  Consult performed by: Kevin Duffy MD  Consult ordered by: DO Kevin Medina MD

## 2024-02-15 NOTE — NURSING NOTE
"Patient has been oriented to the unit upon arrival and instructed on how to use call light, in addition the patient was educated on the need for bedrest and the thibodeaux catheter. The patient is A&Ox4 despite frequently yelling that he \"needs to use the bathroom!\" and being educated on his thibodeaux catheter and how it works. The patient was instructed on the importance of safety measures and the risks a fall would pose. Despite education, the patient refuses to comply and has attempted to get out of the bed several times. Safety measures such as increased rounding, repeated education, and bed alarm have been put into place. The patient did get out of bed, ripped off all of his monitoring, gown, and refused to get back into the bed. The patient was unharmed and did not sustain a fall. The patient was placed back into bed, reeducated, and bed alarm reset. Dr. Bailon notified.   "

## 2024-02-15 NOTE — NURSING NOTE
Patient out of bed again despite education and safety measures, the patient is A&Ox4 with no acute mentation changes since arrival to the unit. Dr. Bailon at bedside and ordered bilateral soft wrist restraints for safety measures as the patient is not willing to comply with staying in bed and he did not want to medicate the patient at this time.
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
## 2024-02-15 NOTE — NURSING NOTE
RN to room for pt yelling.  Pt suddenly becoming unresponsive/alerted by monitor tech of v-tach.  Code blue called.  Pt repositioned in bed.  Team to room.  V-tach resolving w/o medical intervention.  Pt lethargic, does respond to verbal stimuli.  12 lead obtained along with vitals, labwork, BGL, etc.  Pt to transfer to ICU.  Spouse notified of transfer.

## 2024-02-15 NOTE — ASSESSMENT & PLAN NOTE
-s/p 3u PRBC thus far  -Repeat CT without evidence of expanding hematoma  -Trauma surgery management apprecited

## 2024-02-15 NOTE — CARE PLAN
Problem: Mobility  Goal: STG - Patient will ambulate  Description: FWW/CANE SBA USING PROPER GAIT PATTERN  Outcome: Not Progressing  Goal: STG - Patient will ascend and descend four to six stairs  Description: RAIL &/OR CANE, MIN A  Outcome: Not Progressing     Problem: Transfers  Goal: STG - Patient to transfer to and from sit to supine  Description: INDEP RAILS PRN HOB FLAT  Outcome: Not Progressing  Goal: STG - Patient will transfer sit to and from stand  Description: FW/CANE USING PROPER TECHNQUE  Outcome: Not Progressing
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
## 2024-02-15 NOTE — CONSULTS
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    Collection Time: 02/14/24  9:10 PM   Result Value Ref Range    POCT pH, Venous 7.13 (LL) 7.33 - 7.43 pH    POCT pCO2, Venous 34 (L) 41 - 51 mm Hg    POCT pO2, Venous 43 35 - 45 mm Hg    POCT SO2, Venous 66 45 - 75 %    POCT Oxy Hemoglobin, Venous 64.7 45.0 - 75.0 %    POCT Base Excess, Venous -16.9 (L) -2.0 - 3.0 mmol/L    POCT HCO3 Calculated, Venous 11.3 (L) 22.0 - 26.0 mmol/L    Patient Temperature 37.0 degrees Celsius    FiO2 21 %   Troponin I, High Sensitivity    Collection Time: 02/14/24  9:11 PM   Result Value Ref Range    Troponin I, High Sensitivity 635 (HH) 0 - 20 ng/L   POCT GLUCOSE    Collection Time: 02/14/24  9:29 PM   Result Value Ref Range    POCT Glucose 303 (H) 74 - 99 mg/dL   Lactate    Collection Time: 02/14/24 10:52 PM   Result Value Ref Range    Lactate 4.4 (HH) 0.4 - 2.0 mmol/L   POCT GLUCOSE    Collection Time: 02/15/24 12:12 AM   Result Value Ref Range    POCT Glucose 279 (H) 74 - 99 mg/dL   BLOOD GAS VENOUS FULL PANEL    Collection Time: 02/15/24 12:13 AM   Result Value Ref Range    POCT pH, Venous 7.17 (LL) 7.33 - 7.43 pH    POCT pCO2, Venous 36 (L) 41 - 51 mm Hg    POCT pO2, Venous 35 35 - 45 mm Hg    POCT SO2, Venous 54 45 - 75 %    POCT Oxy Hemoglobin, Venous 53.1 45.0 - 75.0 %    POCT Hematocrit Calculated, Venous 22.0 (L) 41.0 - 52.0 %    POCT  Sodium, Venous 138 136 - 145 mmol/L    POCT Potassium, Venous 5.2 3.5 - 5.3 mmol/L    POCT Chloride, Venous 109 (H) 98 - 107 mmol/L    POCT Ionized Calicum, Venous 1.10 1.10 - 1.33 mmol/L    POCT Glucose, Venous 280 (H) 74 - 99 mg/dL    POCT Lactate, Venous 5.6 (HH) 0.4 - 2.0 mmol/L    POCT Base Excess, Venous -14.2 (L) -2.0 - 3.0 mmol/L    POCT HCO3 Calculated, Venous 13.1 (L) 22.0 - 26.0 mmol/L    POCT Hemoglobin, Venous 7.2 (L) 13.5 - 17.5 g/dL    POCT Anion Gap, Venous 21.0 10.0 - 25.0 mmol/L    Patient Temperature 37.0 degrees Celsius    FiO2 21 %   Beta Hydroxybutyrate    Collection Time: 02/15/24 12:13 AM   Result Value Ref Range    Beta-Hydroxybutyrate 0.17 0.02 - 0.27 mmol/L   Renal function panel    Collection Time: 02/15/24 12:13 AM   Result Value Ref Range    Glucose 268 (H) 74 - 99 mg/dL    Sodium 137 136 - 145 mmol/L    Potassium 4.9 3.5 - 5.3 mmol/L    Chloride 113 (H) 98 - 107 mmol/L    Bicarbonate 13 (L) 21 - 32 mmol/L    Anion Gap 16 10 - 20 mmol/L    Urea Nitrogen 39 (H) 6 - 23 mg/dL    Creatinine 2.35 (H) 0.50 - 1.30 mg/dL    eGFR 28 (L) >60 mL/min/1.73m*2    Calcium 6.5 (L) 8.6 - 10.3 mg/dL    Phosphorus 4.1 2.5 - 4.9 mg/dL    Albumin 3.0 (L) 3.4 - 5.0 g/dL   Magnesium    Collection Time: 02/15/24 12:13 AM   Result Value Ref Range    Magnesium 1.68 1.60 - 2.40 mg/dL   Blood Gas Lactic Acid, Venous    Collection Time: 02/15/24 12:52 AM   Result Value Ref Range    POCT Lactate, Venous 3.3 (H) 0.4 - 2.0 mmol/L   Prepare RBC: 1 Units    Collection Time: 02/15/24  2:14 AM   Result Value Ref Range    PRODUCT CODE [id number]     Unit Number [id number]     Unit ABO B     Unit RH NEG     XM INTEP COMP     Dispense Status TR     Blood Expiration Date February 26, 2024 23:59 EST     PRODUCT BLOOD TYPE 1700     UNIT VOLUME 350    POCT GLUCOSE    Collection Time: 02/15/24  4:06 AM   Result Value Ref Range    POCT Glucose 175 (H) 74 - 99 mg/dL   TSH with reflex to Free T4 if abnormal    Collection Time:  02/15/24  4:07 AM   Result Value Ref Range    Thyroid Stimulating Hormone 3.26 0.44 - 3.98 mIU/L   CBC and Auto Differential    Collection Time: 02/15/24  4:07 AM   Result Value Ref Range    WBC 15.8 (H) 4.4 - 11.3 x10*3/uL    nRBC 0.4 (H) 0.0 - 0.0 /100 WBCs    RBC 2.40 (L) 4.50 - 5.90 x10*6/uL    Hemoglobin 7.2 (L) 13.5 - 17.5 g/dL    Hematocrit 22.1 (L) 41.0 - 52.0 %    MCV 92 80 - 100 fL    MCH 30.0 26.0 - 34.0 pg    MCHC 32.6 32.0 - 36.0 g/dL    RDW 14.9 (H) 11.5 - 14.5 %    Platelets 145 (L) 150 - 450 x10*3/uL    Neutrophils % 70.7 40.0 - 80.0 %    Immature Granulocytes %, Automated 0.9 0.0 - 0.9 %    Lymphocytes % 15.7 13.0 - 44.0 %    Monocytes % 12.5 2.0 - 10.0 %    Eosinophils % 0.1 0.0 - 6.0 %    Basophils % 0.1 0.0 - 2.0 %    Neutrophils Absolute 11.16 (H) 1.60 - 5.50 x10*3/uL    Immature Granulocytes Absolute, Automated 0.14 0.00 - 0.50 x10*3/uL    Lymphocytes Absolute 2.49 0.80 - 3.00 x10*3/uL    Monocytes Absolute 1.98 (H) 0.05 - 0.80 x10*3/uL    Eosinophils Absolute 0.02 0.00 - 0.40 x10*3/uL    Basophils Absolute 0.02 0.00 - 0.10 x10*3/uL   Comprehensive Metabolic Panel    Collection Time: 02/15/24  4:07 AM   Result Value Ref Range    Glucose 169 (H) 74 - 99 mg/dL    Sodium 139 136 - 145 mmol/L    Potassium 4.9 3.5 - 5.3 mmol/L    Chloride 115 (H) 98 - 107 mmol/L    Bicarbonate 16 (L) 21 - 32 mmol/L    Anion Gap 13 10 - 20 mmol/L    Urea Nitrogen 38 (H) 6 - 23 mg/dL    Creatinine 1.96 (H) 0.50 - 1.30 mg/dL    eGFR 35 (L) >60 mL/min/1.73m*2    Calcium 6.7 (L) 8.6 - 10.3 mg/dL    Albumin 3.0 (L) 3.4 - 5.0 g/dL    Alkaline Phosphatase 58 33 - 136 U/L    Total Protein 4.9 (L) 6.4 - 8.2 g/dL    AST 48 (H) 9 - 39 U/L    Bilirubin, Total 0.6 0.0 - 1.2 mg/dL    ALT 17 10 - 52 U/L   Lactate    Collection Time: 02/15/24  6:35 AM   Result Value Ref Range    Lactate 1.2 0.4 - 2.0 mmol/L   Transthoracic Echo (TTE) Complete    Collection Time: 02/15/24  8:33 AM   Result Value Ref Range    BSA 1.86 m2   POCT  GLUCOSE    Collection Time: 02/15/24  8:51 AM   Result Value Ref Range    POCT Glucose 150 (H) 74 - 99 mg/dL       CV Studies:  EKG:  Encounter Date: 02/13/24   Electrocardiogram, 12-lead PRN ACS symptoms   Result Value    Ventricular Rate 99    Atrial Rate 98    TX Interval 158    QRS Duration 104    QT Interval 364    QTC Calculation(Bazett) 467    P Axis 2    R Axis 65    T Axis 102    QRS Count 16    Q Onset 213    P Onset 134    P Offset 183    T Offset 395    QTC Fredericia 430    Narrative    Undetermined rhythm  Anteroseptal infarct (cited on or before 14-FEB-2024)  Abnormal ECG  When compared with ECG of 14-FEB-2024 21:00, (unconfirmed)  Current undetermined rhythm precludes rhythm comparison, needs review  Right bundle branch block is no longer Present  Borderline criteria for Inferior infarct are no longer Present  Questionable change in initial forces of Anterior leads     Echocardiogram:   Echocardiogram     [facility], [address] [place] [address]  Tel [phone] and Fax [phone]    TRANSTHORACIC ECHOCARDIOGRAM REPORT      Patient Name:     CHIDI Andrade Physician:  [id number] Herman Wetzel MD  [facility]  Study Date:       5/10/2023       Referring           HUMBERTO SELLERS MD  Physician:  MRN/PID:          [id number]        PCP:  Accession/Order#: [id number]    [facility] Echo  Location:           Lab  YOB: 1950       Fellow:  Gender:           M               Nurse:  Admit Date:                       Sonographer:        EDEN Scott RDCS  Admission Status: Outpatient      Additional Staff:  Height:           160.02 cm       CC Report to:  Weight:           70.76 kg        Study Type:         Echocardiogram  BSA:              1.74 m2  Blood Pressure: 128 /80 mmHg    Diagnosis/ICD: I25.5-Ischemic cardiomyopathy  Indication:    Ischemic cardiomyopathy  Procedure/CPT: Echo Complete w Full  [id number]    Patient History:  Diabetes:          Yes  Pertinent History: CAD, CHF, Hyperlipidemia and HTN. 5V CABG 1992, ischemic  cardiomyopathy, MI May 1985, caths in 1985 and 1992.    Study Detail: The following Echo studies were performed: 2D, M-Mode, Doppler and  color flow. Image quality for this study is suboptimal.      PHYSICIAN INTERPRETATION:  Left Ventricle: The left ventricular systolic function is mildly decreased, with an estimated ejection fraction of 40-45%. Wall motion is abnormal. The left ventricular cavity size is mildly dilated. The left ventricular septal wall thickness is mildly increased. There is decreased left ventricular posterior wall thickness. Spectral Doppler shows a pseudonormal pattern of left ventricular diastolic filling. LVEDVi 85 ml/m2.  all Scoring:  The basal and mid inferolateral wall, basal inferoseptal segment, and basal  inferior segment are akinetic. All remaining scored segments are normal.    Left Atrium: The left atrium is mildly dilated.  Right Ventricle: The right ventricle is mildly enlarged. There is normal right ventricular global systolic function.  Right Atrium: The right atrium is normal in size.  Aortic Valve: The aortic valve is trileaflet. There is minimal aortic valve cusp calcification. There is mild aortic valve thickening. There is no evidence of aortic valve regurgitation. The peak instantaneous gradient of the aortic valve is 7.5 mmHg. The mean gradient of the aortic valve is 3.7 mmHg.  Mitral Valve: The mitral valve is mildly thickened. There is mild mitral valve regurgitation.  Tricuspid Valve: The tricuspid valve is structurally normal. There is mild tricuspid regurgitation. The Doppler estimated RVSP is within normal limits at 31.8 mmHg.  Pulmonic Valve: The pulmonic valve is structurally normal. There is no indication of pulmonic valve regurgitation.  Pericardium: There is no pericardial effusion noted.  Aorta: The aortic root is normal.  In  comparison to the previous echocardiogram(s): Compared with study from 6/17/2019, the ejection fraction now appears mildly reduced, compared with moderately reduced in the prior study. The diastolic filling changed from type 1 in the prior study to pseudonormal in todat's echocardiogram. Regional wall motion abnormalities are unchanged.      CONCLUSIONS:  1. Left ventricular systolic function is mildly decreased with a 40-45% estimated ejection fraction.  2. The left ventricular cavity size is mildly dilated.  3. Basal and mid inferolateral wall, basal inferoseptal segment, and basal inferior segment are abnormal.  4. Spectral Doppler shows a pseudonormal pattern of left ventricular diastolic filling.  5. The left atrium is mildly dilated.  6. RVSP within normal limits.    QUANTITATIVE DATA SUMMARY:  2D MEASUREMENTS:  Normal Ranges:  Ao Root d:     3.10 cm   (2.0-3.7cm)  LAs:           4.33 cm   (2.7-4.0cm)  IVSd:          1.13 cm   (0.6-1.1cm)  LVPWd:         0.50 cm   (0.6-1.1cm)  LVIDd:         5.22 cm   (3.9-5.9cm)  LVIDs:         3.88 cm  LV Mass Index: 86.0 g/m2  LV % FS        25.7 %    LA VOLUME:  Normal Ranges:  LA Vol A4C:        72.2 ml    (22+/-6mL/m2)  LA Vol A2C:        50.8 ml  LA Vol BP:         63.4 ml  LA Vol Index A4C:  41.5ml/m2  LA Vol Index A2C:  29.2 ml/m2  LA Vol Index BP:   36.4 ml/m2  LA Area A4C:       22.2 cm2  LA Area A2C:       17.8 cm2  LA Major Axis A4C: 5.8 cm  LA Major Axis A2C: 5.3 cm  LA Volume Index:   36.2 ml/m2  LA Vol A4C:        70.1 ml  LA Vol A2C:        48.9 ml    M-MODE MEASUREMENTS:  Normal Ranges:  Ao Root: 3.00 cm (2.0-3.7cm)  LAs:     4.87 cm (2.7-4.0cm)    AORTA MEASUREMENTS:  Normal Ranges:  Asc Ao, d: 2.60 cm (2.1-3.4cm)    LV SYSTOLIC FUNCTION BY 2D PLANIMETRY (MOD):  Normal Ranges:  EF-A4C View: 46.2 % (>=55%)    LV DIASTOLIC FUNCTION:  Normal Ranges:  MV Peak E:        0.87 m/s    (0.7-1.2 m/s)  MV Peak A:        0.67 m/s    (0.42-0.7 m/s)  E/A Ratio:         1.30        (1.0-2.2)  MV e'             0.05 m/s    (>8.0)  MV lateral e'     0.06 m/s  MV medial e'      0.04 m/s  MV A Dur:         108.42 msec  E/e' Ratio:       17.38       (<8.0)  a'                0.06 m/s  PulmV Sys Matt:    55.83 cm/s  PulmV [place] Matt:   69.28 cm/s  PulmV S/D Matt:    0.81  PulmV A Revs Matt: 19.62 cm/s  PulmV A Revs Dur: 64.59 msec    MITRAL VALVE:  Normal Ranges:  MV Vmax:    0.93 m/s (<=1.3m/s)  MV peak PG: 3.5 mmHg (<5mmHg)  MV mean P.5 mmHg (<2mmHg)  MV VTI:     31.49 cm (10-13cm)  MV DT:      200 msec (150-240msec)    MITRAL INSUFFICIENCY:  Normal Ranges:  MR VTI:     182.89 cm  MR Vmax:    421.65 cm/s  MR Volume:  8.28 ml  MR Flow Rt: 19.10 ml/s  MR EROA:    0.05 cm2  dP/dt:      1830 mmHg/s (>1200mmHg/sec)    AORTIC VALVE:  Normal Ranges:  AoV Vmax:                1.37 m/s (<=1.7m/s)  AoV Peak P.5 mmHg (<20mmHg)  AoV Mean PG:             3.7 mmHg (1.7-11.5mmHg)  LVOT Max Matt:            1.02 m/s (<=1.1m/s)  AoV VTI:                 29.89 cm (18-25cm)  LVOT VTI:                27.24 cm  LVOT Diameter:           1.96 cm  (1.8-2.4cm)  AoV Area, VTI:           2.74 cm2 (2.5-5.5cm2)  AoV Area,Vmax:           2.24 cm2 (2.5-4.5cm2)  AoV Dimensionless Index: 0.91    RIGHT VENTRICLE:  TAPSE: 12.0 mm  RV s'  0.07 m/s    TRICUSPID VALVE/RVSP:  Normal Ranges:  Peak TR Velocity: 2.69 m/s  RV Syst Pressure: 31.8 mmHg (< 30mmHg)  IVC Diam:         0.70 cm    PULMONIC VALVE:  Normal Ranges:  PV Accel Time: 69 msec  (>120ms)  PV Max Matt:    0.8 m/s  (0.6-0.9m/s)  PV Max P.6 mmHg    Pulmonary Veins:  PulmV A Revs Dur: 64.59 msec  PulmV A Revs Matt: 19.62 cm/s  PulmV [place] Matt:   69.28 cm/s  PulmV S/D Matt:    0.81  PulmV Sys Matt:    55.83 cm/s    AORTA:  Asc Ao Diam 2.63 cm      [id number] Herman Wetzel MD  Electronically signed on 2023 at 2:12:46 PM      Wall Scoring         Final     Stress Testing IMESULT(WCM4200:1:1825): No results found for this or any previous visit  from the past 1825 days.    Cardiac Catheterization: No results found for this or any previous visit from the past 1825 days.  No results found for this or any previous visit from the past 3650 days.     Cardiac Scoring: No results found for this or any previous visit from the past 1825 days.    AAA : No results found for this or any previous visit from the past 1825 days.    OTHER: No results found for this or any previous visit from the past 1825 days.    LAST IMAGING RESULTS  XR foot left 3+ views  Narrative: Interpreted By:  Nico Campos,   STUDY:  XR FOOT LEFT 3+ VIEWS; ;  2/14/2024 1:19 pm      INDICATION:  Signs/Symptoms:trauma.      COMPARISON:  None.      ACCESSION NUMBER(S):  [id number]      ORDERING CLINICIAN:  RODRIGO GALLARDO      FINDINGS:  No acute fracture or dislocation of the left foot is noted.      The lateral margin of the 2nd metatarsophalangeal joint has  degenerative calcifications/ossifications. Articulation      Impression: No acute fracture or dislocation of the left foot.          MACRO:  None      Signed by: Nico Campos 2/15/2024 8:03 AM  Dictation workstation:   [id number]  Electrocardiogram, 12-lead PRN ACS symptoms  Undetermined rhythm  Anteroseptal infarct (cited on or before 14-FEB-2024)  Abnormal ECG  When compared with ECG of 14-FEB-2024 21:00, (unconfirmed)  Current undetermined rhythm precludes rhythm comparison, needs review  Right bundle branch block is no longer Present  Borderline criteria for Inferior infarct are no longer Present  Questionable change in initial forces of Anterior leads  ECG 12 Lead  Undetermined rhythm  Low voltage QRS  Cannot rule out Inferior infarct , age undetermined  Cannot rule out Anteroseptal infarct , age undetermined  Marked ST abnormality, possible lateral subendocardial injury  Abnormal ECG  When compared with ECG of 13-FEB-2024 22:12,  PREVIOUS ECG IS PRESENT        Gonzalo White DO, MILAGROSC, FACOI  2/15/2024  8:55 AM

## 2024-02-15 NOTE — PROGRESS NOTES
Patient was transferred to ICU last evening s/p Rapid Response for V Tach , confusion, hypotension, hypoxia, chest pain with elevated troponin's. Cardiology was consulted. TCC to continue to follow.

## 2024-02-15 NOTE — PROGRESS NOTES
"      Nephrology Progress Note      Nephrology following for TAYLOR on CKD stage IIIb.   Events over night: Patient had episode of hypotension and tachycardia with acute metabolic acidosis overnight.  He was started on a bicarbonate drip and transferred to the ICU.  The arrhythmia has resolved he had repeat labs his bicarb has improved from 12-20.  Love placed last night for accurate urine output.  Patient asking for Love to be removed.  He states he is not going to have a heart cath as suggested by somebody this morning.      /60   Pulse 85   Temp 36.8 °C (98.2 °F)   Resp 16   Ht 1.575 m (5' 2\")   Wt 79 kg (174 lb 2.6 oz)   SpO2 96%   BMI 31.85 kg/m²     Input / Output:  24 HR:   Intake/Output Summary (Last 24 hours) at 2/15/2024 1637  Last data filed at 2/15/2024 1148  Gross per 24 hour   Intake 4000.01 ml   Output 795 ml   Net 3205.01 ml       Physical Exam   Alert and oriented x 3 NAD  Neck: no JVD  CV: RRR  Lungs: CTA bilaterally  Abd: soft, NT, ND   Ext: no lower extremity edema    Scheduled medications  acetaminophen, 975 mg, oral, q6h DIVINE  amiodarone, 400 mg, oral, BID   Followed by  [START ON 2/28/2024] amiodarone, 200 mg, oral, Daily  icosapent ethyL, 1 g, oral, BID with meals  insulin lispro, 0-10 Units, subcutaneous, q4h  metoprolol succinate XL, 50 mg, oral, Daily  pantoprazole, 40 mg, oral, Daily  perflutren lipid microspheres, 0.5-10 mL of dilution, intravenous, Once in imaging  sertraline, 50 mg, oral, Daily  sulfur hexafluoride microsphr, 2 mL, intravenous, Once in imaging      Continuous medications     PRN medications  PRN medications: dextrose 10 % in water (D10W), dextrose, glucagon, HYDROmorphone, morphine, naloxone, ondansetron ODT **OR** ondansetron, oxyCODONE, oxygen   Results from last 7 days   Lab Units 02/15/24  1429 02/15/24  0407   SODIUM mmol/L 137 139   POTASSIUM mmol/L 4.7 4.9   CHLORIDE mmol/L 109* 115*   CO2 mmol/L 20* 16*   BUN mg/dL 40* 38*   CREATININE mg/dL 2.07* " 1.96*   CALCIUM mg/dL 8.5* 6.7*   PROTEIN TOTAL g/dL  --  4.9*   BILIRUBIN TOTAL mg/dL  --  0.6   ALK PHOS U/L  --  58   ALT U/L  --  17   AST U/L  --  48*   GLUCOSE mg/dL 219* 169*      Results from last 7 days   Lab Units 02/15/24  1429 02/15/24  0013 02/14/24  0429   MAGNESIUM mg/dL 2.13 1.68 1.86      Results from last 7 days   Lab Units 02/15/24  1429 02/15/24  0810 02/15/24  0407 02/14/24  2110 02/14/24  1157 02/14/24  0429   WBC AUTO x10*3/uL  --   --  15.8* 23.9*  --  11.8*   HEMOGLOBIN g/dL 9.6* 9.0* 7.2* 7.9*  7.9*   < > 7.0*   HEMATOCRIT %  --   --  22.1* 24.1*  --  20.9*   PLATELETS AUTO x10*3/uL  --   --  145* 221  --  158    < > = values in this interval not displayed.        Assessment & Plan:   Patient is 73 y.o. male who is admitted to hospital for left thigh hematoma and anemia. Nephrology consulted in view of TAYLOR on CKD.      TAYLOR on CKD stage 3b-TAYLOR secondary to volume mediated changes with anemia and some mild component from trauma/contrast.-improving creatinine peaked at 2.3     CKD stage 3b secondary to diabetic nephropathy- baseline Cr 1.7-1.8      Anemia secondary to hematoma-       SIDHU cirrhosis     Left thigh hematoma- S/P trauma    HFrEF-compensated       Recommendations:   -Holding losartan and farxiga   -Stop bicarb drip  Monitor renal function and electrolytes  No need for RRT  -Remove Love  If patient is agreeable we will set him up for out patient follow up in the [place] office      Please message me through [email or web address] with any questions or concerns.      Please message me through [email or web address] with any questions or concerns.     Ketty Madrid DO  2/15/2024  4:37 PM     [facility]    [address], [address]   [place] [address]  Office: [phone]

## 2024-02-15 NOTE — CARE PLAN
Problem: Skin  Goal: Prevent/minimize sheer/friction injuries  Outcome: Progressing  Flowsheets (Taken 2/15/2024 0842)  Prevent/minimize sheer/friction injuries: Use pull sheet     Problem: Pain  Goal: Takes deep breaths with improved pain control throughout the shift  Outcome: Progressing     Problem: Pain  Goal: My pain/discomfort is manageable  Outcome: Met     Problem: Safety  Goal: Patient will be injury free during hospitalization  Outcome: Met  Goal: I will remain free of falls  Outcome: Met     Problem: Daily Care  Goal: Daily care needs are met  Outcome: Met     Problem: Safety - Adult  Goal: Free from fall injury  Outcome: Met     Problem: Diabetes  Goal: Achieve decreasing blood glucose levels by end of shift  Outcome: Met

## 2024-02-16 ENCOUNTER — APPOINTMENT (OUTPATIENT)
Dept: CARDIOLOGY | Facility: HOSPITAL | Age: 74
DRG: 604 | End: 2024-02-16
Payer: MEDICARE

## 2024-02-16 LAB
ANION GAP SERPL CALC-SCNC: 14 MMOL/L (ref 10–20)
BASOPHILS # BLD AUTO: 0.06 X10*3/UL (ref 0–0.1)
BASOPHILS NFR BLD AUTO: 0.3 %
BLOOD EXPIRATION DATE: NORMAL
BUN SERPL-MCNC: 45 MG/DL (ref 6–23)
CALCIUM SERPL-MCNC: 8.4 MG/DL (ref 8.6–10.3)
CHLORIDE SERPL-SCNC: 107 MMOL/L (ref 98–107)
CO2 SERPL-SCNC: 20 MMOL/L (ref 21–32)
CREAT SERPL-MCNC: 2.32 MG/DL (ref 0.5–1.3)
DISPENSE STATUS: NORMAL
EGFRCR SERPLBLD CKD-EPI 2021: 29 ML/MIN/1.73M*2
EOSINOPHIL # BLD AUTO: 0.03 X10*3/UL (ref 0–0.4)
EOSINOPHIL NFR BLD AUTO: 0.2 %
ERYTHROCYTE [DISTWIDTH] IN BLOOD BY AUTOMATED COUNT: 15.8 % (ref 11.5–14.5)
GLUCOSE BLD MANUAL STRIP-MCNC: 171 MG/DL (ref 74–99)
GLUCOSE BLD MANUAL STRIP-MCNC: 191 MG/DL (ref 74–99)
GLUCOSE BLD MANUAL STRIP-MCNC: 205 MG/DL (ref 74–99)
GLUCOSE BLD MANUAL STRIP-MCNC: 220 MG/DL (ref 74–99)
GLUCOSE BLD MANUAL STRIP-MCNC: 237 MG/DL (ref 74–99)
GLUCOSE BLD MANUAL STRIP-MCNC: 273 MG/DL (ref 74–99)
GLUCOSE BLD MANUAL STRIP-MCNC: 294 MG/DL (ref 74–99)
GLUCOSE SERPL-MCNC: 207 MG/DL (ref 74–99)
HCT VFR BLD AUTO: 27.5 % (ref 41–52)
HGB BLD-MCNC: 9.2 G/DL (ref 13.5–17.5)
IMM GRANULOCYTES # BLD AUTO: 0.17 X10*3/UL (ref 0–0.5)
IMM GRANULOCYTES NFR BLD AUTO: 0.9 % (ref 0–0.9)
LYMPHOCYTES # BLD AUTO: 2.53 X10*3/UL (ref 0.8–3)
LYMPHOCYTES NFR BLD AUTO: 12.9 %
MAGNESIUM SERPL-MCNC: 2.09 MG/DL (ref 1.6–2.4)
MCH RBC QN AUTO: 29.8 PG (ref 26–34)
MCHC RBC AUTO-ENTMCNC: 33.5 G/DL (ref 32–36)
MCV RBC AUTO: 89 FL (ref 80–100)
MONOCYTES # BLD AUTO: 2.19 X10*3/UL (ref 0.05–0.8)
MONOCYTES NFR BLD AUTO: 11.2 %
NEUTROPHILS # BLD AUTO: 14.63 X10*3/UL (ref 1.6–5.5)
NEUTROPHILS NFR BLD AUTO: 74.5 %
NRBC BLD-RTO: 0.5 /100 WBCS (ref 0–0)
PLATELET # BLD AUTO: 155 X10*3/UL (ref 150–450)
POTASSIUM SERPL-SCNC: 5.1 MMOL/L (ref 3.5–5.3)
PRODUCT BLOOD TYPE: 9500
PRODUCT CODE: NORMAL
RBC # BLD AUTO: 3.09 X10*6/UL (ref 4.5–5.9)
SODIUM SERPL-SCNC: 136 MMOL/L (ref 136–145)
UNIT ABO: NORMAL
UNIT NUMBER: NORMAL
UNIT RH: NORMAL
UNIT VOLUME: 275
WBC # BLD AUTO: 19.6 X10*3/UL (ref 4.4–11.3)
XM INTEP: NORMAL

## 2024-02-16 PROCEDURE — 2500000001 HC RX 250 WO HCPCS SELF ADMINISTERED DRUGS (ALT 637 FOR MEDICARE OP): Performed by: INTERNAL MEDICINE

## 2024-02-16 PROCEDURE — 99232 SBSQ HOSP IP/OBS MODERATE 35: CPT | Performed by: NURSE PRACTITIONER

## 2024-02-16 PROCEDURE — 99232 SBSQ HOSP IP/OBS MODERATE 35: CPT | Performed by: SURGERY

## 2024-02-16 PROCEDURE — 97110 THERAPEUTIC EXERCISES: CPT | Mod: GP,CQ

## 2024-02-16 PROCEDURE — 99222 1ST HOSP IP/OBS MODERATE 55: CPT | Performed by: INTERNAL MEDICINE

## 2024-02-16 PROCEDURE — 36415 COLL VENOUS BLD VENIPUNCTURE: CPT | Performed by: PHYSICIAN ASSISTANT

## 2024-02-16 PROCEDURE — 85025 COMPLETE CBC W/AUTO DIFF WBC: CPT | Performed by: PHYSICIAN ASSISTANT

## 2024-02-16 PROCEDURE — 2500000002 HC RX 250 W HCPCS SELF ADMINISTERED DRUGS (ALT 637 FOR MEDICARE OP, ALT 636 FOR OP/ED): Performed by: STUDENT IN AN ORGANIZED HEALTH CARE EDUCATION/TRAINING PROGRAM

## 2024-02-16 PROCEDURE — 93005 ELECTROCARDIOGRAM TRACING: CPT

## 2024-02-16 PROCEDURE — 99233 SBSQ HOSP IP/OBS HIGH 50: CPT | Performed by: INTERNAL MEDICINE

## 2024-02-16 PROCEDURE — 2500000004 HC RX 250 GENERAL PHARMACY W/ HCPCS (ALT 636 FOR OP/ED): Performed by: STUDENT IN AN ORGANIZED HEALTH CARE EDUCATION/TRAINING PROGRAM

## 2024-02-16 PROCEDURE — 83735 ASSAY OF MAGNESIUM: CPT | Performed by: PHYSICIAN ASSISTANT

## 2024-02-16 PROCEDURE — 97116 GAIT TRAINING THERAPY: CPT | Mod: GP,CQ

## 2024-02-16 PROCEDURE — 82947 ASSAY GLUCOSE BLOOD QUANT: CPT

## 2024-02-16 PROCEDURE — 2500000001 HC RX 250 WO HCPCS SELF ADMINISTERED DRUGS (ALT 637 FOR MEDICARE OP): Performed by: STUDENT IN AN ORGANIZED HEALTH CARE EDUCATION/TRAINING PROGRAM

## 2024-02-16 PROCEDURE — 2020000001 HC ICU ROOM DAILY

## 2024-02-16 PROCEDURE — 99233 SBSQ HOSP IP/OBS HIGH 50: CPT | Performed by: PHYSICIAN ASSISTANT

## 2024-02-16 PROCEDURE — 82374 ASSAY BLOOD CARBON DIOXIDE: CPT | Performed by: PHYSICIAN ASSISTANT

## 2024-02-16 PROCEDURE — 97166 OT EVAL MOD COMPLEX 45 MIN: CPT | Mod: GO

## 2024-02-16 PROCEDURE — 2500000004 HC RX 250 GENERAL PHARMACY W/ HCPCS (ALT 636 FOR OP/ED): Performed by: INTERNAL MEDICINE

## 2024-02-16 PROCEDURE — 93010 ELECTROCARDIOGRAM REPORT: CPT | Performed by: STUDENT IN AN ORGANIZED HEALTH CARE EDUCATION/TRAINING PROGRAM

## 2024-02-16 RX ORDER — DEXTROSE MONOHYDRATE AND SODIUM CHLORIDE 5; .45 G/100ML; G/100ML
100 INJECTION, SOLUTION INTRAVENOUS CONTINUOUS
Status: DISCONTINUED | OUTPATIENT
Start: 2024-02-16 | End: 2024-02-16

## 2024-02-16 RX ORDER — LORATADINE 10 MG/1
10 TABLET ORAL DAILY
Status: DISCONTINUED | OUTPATIENT
Start: 2024-02-16 | End: 2024-02-17

## 2024-02-16 RX ORDER — IPRATROPIUM BROMIDE AND ALBUTEROL SULFATE 2.5; .5 MG/3ML; MG/3ML
3 SOLUTION RESPIRATORY (INHALATION) EVERY 6 HOURS PRN
Status: DISCONTINUED | OUTPATIENT
Start: 2024-02-16 | End: 2024-02-16

## 2024-02-16 RX ORDER — SODIUM CHLORIDE, SODIUM LACTATE, POTASSIUM CHLORIDE, CALCIUM CHLORIDE 600; 310; 30; 20 MG/100ML; MG/100ML; MG/100ML; MG/100ML
10 INJECTION, SOLUTION INTRAVENOUS CONTINUOUS
Status: DISCONTINUED | OUTPATIENT
Start: 2024-02-16 | End: 2024-02-20

## 2024-02-16 RX ORDER — ACETAMINOPHEN 325 MG/1
650 TABLET ORAL EVERY 6 HOURS SCHEDULED
Status: DISCONTINUED | OUTPATIENT
Start: 2024-02-16 | End: 2024-02-23 | Stop reason: HOSPADM

## 2024-02-16 RX ADMIN — SODIUM CHLORIDE, POTASSIUM CHLORIDE, SODIUM LACTATE AND CALCIUM CHLORIDE 500 ML: 600; 310; 30; 20 INJECTION, SOLUTION INTRAVENOUS at 08:22

## 2024-02-16 RX ADMIN — ICOSAPENT ETHYL 1 G: 1 CAPSULE ORAL at 08:22

## 2024-02-16 RX ADMIN — ACETAMINOPHEN 650 MG: 325 TABLET ORAL at 12:22

## 2024-02-16 RX ADMIN — SERTRALINE HYDROCHLORIDE 50 MG: 50 TABLET ORAL at 08:22

## 2024-02-16 RX ADMIN — INSULIN LISPRO 6 UNITS: 100 INJECTION, SOLUTION INTRAVENOUS; SUBCUTANEOUS at 12:22

## 2024-02-16 RX ADMIN — INSULIN LISPRO 4 UNITS: 100 INJECTION, SOLUTION INTRAVENOUS; SUBCUTANEOUS at 23:40

## 2024-02-16 RX ADMIN — METOPROLOL SUCCINATE 50 MG: 50 TABLET, EXTENDED RELEASE ORAL at 08:23

## 2024-02-16 RX ADMIN — PANTOPRAZOLE SODIUM 40 MG: 40 TABLET, DELAYED RELEASE ORAL at 08:22

## 2024-02-16 RX ADMIN — AMIODARONE HYDROCHLORIDE 400 MG: 400 TABLET ORAL at 08:22

## 2024-02-16 RX ADMIN — ICOSAPENT ETHYL 1 G: 1 CAPSULE ORAL at 17:43

## 2024-02-16 RX ADMIN — INSULIN LISPRO 2 UNITS: 100 INJECTION, SOLUTION INTRAVENOUS; SUBCUTANEOUS at 00:17

## 2024-02-16 RX ADMIN — ACETAMINOPHEN 650 MG: 325 TABLET ORAL at 05:17

## 2024-02-16 RX ADMIN — ACETAMINOPHEN 650 MG: 325 TABLET ORAL at 17:43

## 2024-02-16 RX ADMIN — AMIODARONE HYDROCHLORIDE 400 MG: 400 TABLET ORAL at 20:23

## 2024-02-16 RX ADMIN — LORATADINE 10 MG: 10 TABLET ORAL at 17:43

## 2024-02-16 RX ADMIN — INSULIN LISPRO 2 UNITS: 100 INJECTION, SOLUTION INTRAVENOUS; SUBCUTANEOUS at 08:28

## 2024-02-16 RX ADMIN — INSULIN LISPRO 6 UNITS: 100 INJECTION, SOLUTION INTRAVENOUS; SUBCUTANEOUS at 17:44

## 2024-02-16 RX ADMIN — INSULIN LISPRO 4 UNITS: 100 INJECTION, SOLUTION INTRAVENOUS; SUBCUTANEOUS at 20:23

## 2024-02-16 RX ADMIN — INSULIN LISPRO 4 UNITS: 100 INJECTION, SOLUTION INTRAVENOUS; SUBCUTANEOUS at 04:29

## 2024-02-16 ASSESSMENT — ENCOUNTER SYMPTOMS
BRUISES/BLEEDS EASILY: 0
WHEEZING: 0
COUGH: 0
DYSPNEA ON EXERTION: 0
RESPIRATORY NEGATIVE: 1
HALLUCINATIONS: 0
WEAKNESS: 0
NUMBNESS: 0
EYE PAIN: 0
FREQUENCY: 0
MEMORY LOSS: 0
APPETITE CHANGE: 0
SORE THROAT: 0
HEADACHES: 0
DIARRHEA: 0
FACIAL SWELLING: 0
BLOOD IN STOOL: 0
DYSURIA: 0
DECREASED APPETITE: 0
WOUND: 0
BLURRED VISION: 0
SYNCOPE: 0
IRREGULAR HEARTBEAT: 0
DIZZINESS: 0
ORTHOPNEA: 0
LIGHT-HEADEDNESS: 0
VOMITING: 0
SHORTNESS OF BREATH: 0
JOINT SWELLING: 0
CONSTIPATION: 0
TROUBLE SWALLOWING: 0
FOCAL WEAKNESS: 0
CONSTITUTIONAL NEGATIVE: 1
NAUSEA: 0
HEMATURIA: 0
PALPITATIONS: 0
BACK PAIN: 0
FATIGUE: 0
ABDOMINAL PAIN: 0
FEVER: 0
GASTROINTESTINAL NEGATIVE: 1
DEPRESSION: 0
DIAPHORESIS: 0
FLANK PAIN: 0
CHILLS: 0
CHEST TIGHTNESS: 0

## 2024-02-16 ASSESSMENT — COGNITIVE AND FUNCTIONAL STATUS - GENERAL
HELP NEEDED FOR BATHING: A LITTLE
DRESSING REGULAR UPPER BODY CLOTHING: A LITTLE
MOBILITY SCORE: 12
EATING MEALS: A LITTLE
DAILY ACTIVITIY SCORE: 18
TOILETING: A LITTLE
STANDING UP FROM CHAIR USING ARMS: A LITTLE
EATING MEALS: A LITTLE
CLIMB 3 TO 5 STEPS WITH RAILING: TOTAL
DRESSING REGULAR LOWER BODY CLOTHING: A LITTLE
MOBILITY SCORE: 12
MOBILITY SCORE: 17
WALKING IN HOSPITAL ROOM: TOTAL
MOBILITY SCORE: 14
TURNING FROM BACK TO SIDE WHILE IN FLAT BAD: A LITTLE
PERSONAL GROOMING: A LITTLE
CLIMB 3 TO 5 STEPS WITH RAILING: A LOT
PERSONAL GROOMING: A LITTLE
TURNING FROM BACK TO SIDE WHILE IN FLAT BAD: A LOT
WALKING IN HOSPITAL ROOM: TOTAL
STANDING UP FROM CHAIR USING ARMS: A LOT
PERSONAL GROOMING: A LITTLE
DRESSING REGULAR UPPER BODY CLOTHING: A LITTLE
HELP NEEDED FOR BATHING: A LOT
WALKING IN HOSPITAL ROOM: A LOT
CLIMB 3 TO 5 STEPS WITH RAILING: TOTAL
DAILY ACTIVITIY SCORE: 14
DAILY ACTIVITIY SCORE: 19
MOVING TO AND FROM BED TO CHAIR: A LOT
STANDING UP FROM CHAIR USING ARMS: A LOT
HELP NEEDED FOR BATHING: A LITTLE
MOVING TO AND FROM BED TO CHAIR: A LOT
DRESSING REGULAR LOWER BODY CLOTHING: A LITTLE
DRESSING REGULAR UPPER BODY CLOTHING: A LOT
TOILETING: A LITTLE
MOVING FROM LYING ON BACK TO SITTING ON SIDE OF FLAT BED WITH BEDRAILS: A LITTLE
CLIMB 3 TO 5 STEPS WITH RAILING: TOTAL
MOVING TO AND FROM BED TO CHAIR: A LITTLE
WALKING IN HOSPITAL ROOM: A LOT
DRESSING REGULAR LOWER BODY CLOTHING: A LITTLE
DRESSING REGULAR LOWER BODY CLOTHING: A LOT
DAILY ACTIVITIY SCORE: 18
TURNING FROM BACK TO SIDE WHILE IN FLAT BAD: A LOT
TOILETING: A LOT
STANDING UP FROM CHAIR USING ARMS: A LITTLE
EATING MEALS: A LITTLE
DRESSING REGULAR UPPER BODY CLOTHING: A LITTLE
HELP NEEDED FOR BATHING: A LITTLE
PERSONAL GROOMING: A LITTLE
MOVING TO AND FROM BED TO CHAIR: A LOT
TURNING FROM BACK TO SIDE WHILE IN FLAT BAD: A LITTLE
TOILETING: A LITTLE

## 2024-02-16 ASSESSMENT — PAIN SCALES - GENERAL
PAINLEVEL_OUTOF10: 0 - NO PAIN

## 2024-02-16 ASSESSMENT — PAIN - FUNCTIONAL ASSESSMENT
PAIN_FUNCTIONAL_ASSESSMENT: 0-10

## 2024-02-16 ASSESSMENT — ACTIVITIES OF DAILY LIVING (ADL): BATHING_ASSISTANCE: MAXIMAL

## 2024-02-16 NOTE — PROGRESS NOTES
Physical Therapy    Physical Therapy Treatment    Patient Name: Ritesh Garza  MRN: [id number]  Today's Date: 2/16/2024  Time Calculation  Start Time: 1410  Stop Time: 1442  Time Calculation (min): 32 min       Assessment/Plan   PT Assessment  End of Session Communication: Bedside nurse  End of Session Patient Position: Up in chair, Alarm on     PT Plan  Treatment/Interventions: Bed mobility, Transfer training, Gait training, Stair training, Balance training, Strengthening, Endurance training  PT Plan: Skilled PT  PT Frequency: 4 times per week  PT Discharge Recommendations: Moderate intensity level of continued care  Equipment Recommended upon Discharge: Wheeled walker (TBD)  PT Recommended Transfer Status: Assist x1, Assist x2  PT - OK to Discharge: Yes (WHEN MEDICALLY CLEARED)    General Visit Information:   PT  Visit  PT Received On: 02/16/24  General  Prior to Session Communication: Bedside nurse  Patient Position Received: Bed, 3 rail up, Alarm on    Subjective   Precautions:  Precautions  Medical Precautions: Fall precautions    Objective   Pain:  Pain Assessment  Pain Assessment: 0-10  Pain Score:  (no rating)  Pain Location:  (Left calf/leg)  Cognition:  Cognition  Orientation Level: Disoriented to situation    Treatments:  Therapeutic Exercise  Therapeutic Exercise Performed: Yes  Therapeutic Exercise Activity 1: pt completed seated LE exercises: AP x 10 reps, LAQ x 10 reps, hip flexion x 10 reps, hip adduction x 10 reps    Therapeutic Activity  Therapeutic Activity Performed: Yes  Therapeutic Activity 1: pt completed a dynamic stand for hygeine with CGA for 4 min    Bed Mobility  Bed Mobility: Yes  Bed Mobility 1  Bed Mobility 1: Supine to sitting  Level of Assistance 1: Minimum assistance, Minimal verbal cues    Ambulation/Gait Training  Ambulation/Gait Training Performed: Yes  Ambulation/Gait Training 1  Surface 1: Level tile  Device 1: No device  Assistance 1: Minimum assistance, Minimal verbal  cues  Quality of Gait 1: Decreased step length, Shuffling gait, Forward flexed posture, Narrow base of support  Comments/Distance (ft) 1: 20 feet x 2; pt demonstrated short shufling steps requiring cues to correct along with cues to increase reciprocal arm swing  Transfers  Transfer: Yes  Transfer 1  Technique 1: Sit to stand  Transfer Device 1:  (no device)  Transfer Level of Assistance 1: Minimum assistance, Minimal verbal cues    Outcome Measures:  [facility] Basic Mobility  Turning from your back to your side while in a flat bed without using bedrails: A little  Moving from lying on your back to sitting on the side of a flat bed without using bedrails: A little  Moving to and from bed to chair (including a wheelchair): A lot  Standing up from a chair using your arms (e.g. wheelchair or bedside chair): A little  To walk in hospital room: A lot  Climbing 3-5 steps with railing: Total  Basic Mobility - Total Score: 14    Education Documentation  Mobility Training, taught by Lynn Ngo PTA at 2/16/2024  3:19 PM.  Learner: Patient  Readiness: Acceptance  Method: Explanation  Response: Needs Reinforcement    Education Comments  No comments found.        OP EDUCATION:       Encounter Problems       Encounter Problems (Active)       Mobility       STG - Patient will ambulate (Progressing)       Start:  02/15/24    Expected End:  02/29/24       FWW/CANE SBA USING PROPER GAIT PATTERN         STG - Patient will ascend and descend four to six stairs (Progressing)       Start:  02/15/24    Expected End:  02/29/24       RAIL &/OR CANE, MIN A            Pain - Adult          Transfers       STG - Patient to transfer to and from sit to supine (Progressing)       Start:  02/15/24    Expected End:  02/21/24       INDEP RAILS PRN HOB FLAT         STG - Patient will transfer sit to and from stand (Progressing)       Start:  02/15/24    Expected End:  02/26/24       FW/CANE USING PROPER TECHNQUE

## 2024-02-16 NOTE — PROGRESS NOTES
Patient with worsening renal function, Cardiology plans to do a heart cath after improvement of kidney function. No planned weekend DC at this time. Care Transitions to follow.

## 2024-02-16 NOTE — PROGRESS NOTES
Occupational Therapy    Evaluation    Patient Name: Ritesh Garza  MRN: [id number]  Today's Date: 2/16/2024  Time Calculation  Start Time: 1414  Stop Time: 1441  Time Calculation (min): 27 min    Assessment  IP OT Assessment  OT Assessment: OT eval completed. The patient is functioning below baseline for ADLs and mobility. can benefit from continued OT  End of Session Communication: Bedside nurse  End of Session Patient Position: Up in chair, Alarm on    Plan:  Treatment Interventions: ADL retraining, Functional transfer training, UE strengthening/ROM, Endurance training, Cognitive reorientation, Neuromuscular reeducation  OT Frequency: 3 times per week  OT Discharge Recommendations: Moderate intensity level of continued care  OT - OK to Discharge: Yes To next level of care, with consideration of recommendations established on OT eval, and once cleared by medical team/physician for DC.     Subjective     Current Problem:  1. Hematoma of left thigh, initial encounter        2. TAYLOR (acute kidney injury) (CMS/HCC)        3. Chest pain, unspecified type  Transthoracic Echo (TTE) Complete    Transthoracic Echo (TTE) Complete    CANCELED: Transthoracic Echo (TTE) Complete    CANCELED: Transthoracic Echo (TTE) Complete          General:  General  Reason for Referral: ADLs, safety assessment  Referred By: Mili  Past Medical History Relevant to Rehab: FALL INJURING L LE; DX: L THIGH HEMATOMA, TRF TO ICU 2/15 2/2 V TACH/HYPOTEN, CONFUSION, HYPOXIA/ INCRESAED TROPONIN; HX: DM, GOUT, NON ALCOHOLIC CIRRHOSIS, CABG, EYE SURG  Co-Treatment: PT  Co-Treatment Reason: to optimize safety and mobility, while focusing on discipline specific goals  Prior to Session Communication: Bedside nurse  Patient Position Received: Bed, 3 rail up, Alarm on  General Comment: pt alert and agreeable to therapy    Precautions:  Hearing/Visual Limitations: mild [place]  Medical Precautions: Fall precautions, Oxygen therapy device and L/min    Vital  Signs:  SpO2:  (83 to 90%. pt kept taking NC off)    Pain:  Pain Assessment  Pain Assessment: 0-10  Pain Score:  (not rated)  Pain Type: Acute pain  Pain Location:  (left calf)    Objective     Cognition:  Overall Cognitive Status: Within Functional Limits (except some forgetfulness, decreased safety awareness)  Orientation Level: Disoriented to situation (oriented to person, place, time)             Home Living:  Home Living Comments: SPOUSE IN 2 LEVEL HOME 12 STEPS TO 2ND FLOOR ADN RAMP OR 2 SAURABH, AMB INDEP OCCASIONALLY USES CANE, WALK IN SHOWER/RAILS STANDS TO BATHE, SHEARES CHORES W/ WIFE, DRIVES, WORKS PARTTIME AS          ADL:  Eating Assistance: Stand by  Eating Deficit: Setup  Grooming Assistance: Minimal  Grooming Deficit: Setup, Steadying, Teeth care  Bathing Assistance: Maximal (anticipated)  UE Dressing Assistance: Moderate  UE Dressing Deficit:  ([facility] gown from sitting)  LE Dressing Assistance: Maximal (anticipated)  Toileting Assistance with Device: Maximal (anticipated)        Bed Mobility/Transfers:   Bed Mobility  Bed Mobility: Yes  Bed Mobility 1  Bed Mobility 1: Supine to sitting  Level of Assistance 1: Minimum assistance  Transfers  Transfer: Yes  Transfer 1  Transfer From 1: Sit to  Transfer to 1: Stand  Technique 1: Stand to sit  Transfer Level of Assistance 1: Minimum assistance (x1)  Transfers 2  Transfer From 2:  (performed functional mobility bed to chair then chair to sink then back to bedside chair)  Transfer Level of Assistance 2: Minimum assistance (x1)        Vision: Vision - Basic Assessment  Current Vision: No visual deficits   and      Sensation:  Sensation Comment: none reported    Strength:  Strength Comments: BUE grossly 4-/5      Coordination:  Movements are Fluid and Coordinated: Yes     Hand Function:  Hand Function  Gross Grasp: Functional  Coordination: Functional    Extremities: RUE   RUE : Within Functional Limits and LUE   LUE: Within  Functional Limits    Outcome Measures: [facility] Daily Activity  Putting on and taking off regular lower body clothing: A lot  Bathing (including washing, rinsing, drying): A lot  Putting on and taking off regular upper body clothing: A lot  Toileting, which includes using toilet, bedpan or urinal: A lot  Taking care of personal grooming such as brushing teeth: A little  Eating Meals: A little  Daily Activity - Total Score: 14                    EDUCATION:     Education Documentation  ADL Training, taught by Anahi Omalley OT at 2/16/2024  4:33 PM.  Learner: Patient  Readiness: Eager  Method: Demonstration  Response: Needs Reinforcement    Education Comments  No comments found.        Goals:   Encounter Problems       Encounter Problems (Active)       ADLs       Patient with complete lower body dressing with set-up and supervision level of assistance donning and doffing all LE clothes  with PRN adaptive equipment while supported sitting and standing (Progressing)       Start:  02/16/24    Expected End:  03/01/24                 VISION       Patient will navigate environments with high visual stimuli safely Without loss of balance and Attending to obstacles with modified independent level of assistance. (Progressing)       Start:  02/16/24    Expected End:  03/01/24

## 2024-02-16 NOTE — PROGRESS NOTES
Social work consult placed for DC planning requesting SNF in the comments. SW reviewed pt's chart and communicated with [facility]. No SW needs foreseen at this time. SW signing off; available upon request.    Darian Acosta, MSW, LSW ([id number])   Care Transitions
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
## 2024-02-16 NOTE — CARE PLAN
Problem: Psychosocial Needs  Goal: Demonstrates ability to cope with hospitalization/illness  Outcome: Not Progressing     Problem: Psychosocial Needs  Goal: Collaborate with me, my family, and caregiver to identify my specific goals  Outcome: Progressing     Problem: Discharge Barriers  Goal: My discharge needs are met  Outcome: Progressing     Problem: Skin  Goal: Promote skin healing  Outcome: Progressing  Flowsheets (Taken 2/15/2024 2031)  Promote skin healing: Rotate device position/do not position patient on device     Problem: Diabetes  Goal: Increase stability of blood glucose readings by end of shift  Outcome: Progressing     Problem: Pain  Goal: Performs ADL's with improved pain control throughout shift  Outcome: Progressing     Problem: Safety - Medical Restraint  Goal: Remains free of injury from restraints (Restraint for Interference with Medical Device)  Outcome: Progressing     Problem: Heart Failure  Goal: Improved urinary output this shift  Outcome: Progressing     Problem: Pain - Adult  Goal: Verbalizes/displays adequate comfort level or baseline comfort level  Outcome: Met     Problem: Safety - Medical Restraint  Goal: Free from restraint(s) (Restraint for Interference with Medical Device)  Outcome: Met

## 2024-02-16 NOTE — PROGRESS NOTES
"Ritesh Garza is a 73 y.o. male on day 2 of admission presenting with Hematoma of left thigh, initial encounter.    Subjective   Patient is sitting up in the bedside chair.  He appears to be comfortable.  He seems annoyed because of the Love catheter.  He is on 4 L O2 by nasal cannula and saturating 96%.  He denies feeling short of breath, chest pain, cough, expectoration, abdominal pain.  Left thigh pain is controlled.    Objective     Physical Exam  Vitals and nursing note reviewed.   Constitutional:       Appearance: Normal appearance.   HENT:      Head: Normocephalic.      Nose: Nose normal.      Mouth/Throat:      Pharynx: Oropharynx is clear.   Eyes:      Extraocular Movements: Extraocular movements intact.      Conjunctiva/sclera: Conjunctivae normal.      Pupils: Pupils are equal, round, and reactive to light.   Cardiovascular:      Rate and Rhythm: Normal rate and regular rhythm.      Pulses: Normal pulses.      Heart sounds: Normal heart sounds.   Pulmonary:      Effort: Pulmonary effort is normal.      Breath sounds: Normal breath sounds.   Abdominal:      General: Bowel sounds are normal.      Palpations: Abdomen is soft.   Musculoskeletal:         General: Normal range of motion.      Cervical back: Normal range of motion.   Neurological:      General: No focal deficit present.      Mental Status: He is alert and oriented to person, place, and time. Mental status is at baseline.   Psychiatric:         Mood and Affect: Mood normal.         Behavior: Behavior normal.         Last Recorded Vitals  Blood pressure 142/72, pulse 77, temperature 36.6 °C (97.9 °F), resp. rate 20, height 1.575 m (5' 2\"), weight 78.7 kg (173 lb 8 oz), SpO2 95 %.  Intake/Output last 3 Shifts:  I/O last 3 completed shifts:  In: 3725 (47.3 mL/kg) [I.V.:1273.3 (16.2 mL/kg); Blood:2451.7]  Out: 1125 (14.3 mL/kg) [Urine:1125 (0.4 mL/kg/hr)]  Weight: 78.7 kg     Relevant Results  Scheduled medications  acetaminophen, 650 mg, oral, " q6h DIVINE  amiodarone, 400 mg, oral, BID   Followed by  [START ON 2/28/2024] amiodarone, 200 mg, oral, Daily  icosapent ethyL, 1 g, oral, BID with meals  insulin lispro, 0-10 Units, subcutaneous, q4h  lactated Ringer's, 500 mL, intravenous, Once  metoprolol succinate XL, 50 mg, oral, Daily  pantoprazole, 40 mg, oral, Daily  perflutren lipid microspheres, 0.5-10 mL of dilution, intravenous, Once in imaging  sertraline, 50 mg, oral, Daily  sulfur hexafluoride microsphr, 2 mL, intravenous, Once in imaging      Continuous medications  lactated Ringer's, 100 mL/hr      PRN medications  PRN medications: dextrose 10 % in water (D10W), dextrose, glucagon, HYDROmorphone, morphine, naloxone, ondansetron ODT **OR** ondansetron, oxyCODONE, oxygen     Results for orders placed or performed during the hospital encounter of 02/13/24 (from the past 24 hour(s))   POCT GLUCOSE   Result Value Ref Range    POCT Glucose 162 (H) 74 - 99 mg/dL   Hemoglobin   Result Value Ref Range    Hemoglobin 9.6 (L) 13.5 - 17.5 g/dL   Renal Function Panel   Result Value Ref Range    Glucose 219 (H) 74 - 99 mg/dL    Sodium 137 136 - 145 mmol/L    Potassium 4.7 3.5 - 5.3 mmol/L    Chloride 109 (H) 98 - 107 mmol/L    Bicarbonate 20 (L) 21 - 32 mmol/L    Anion Gap 13 10 - 20 mmol/L    Urea Nitrogen 40 (H) 6 - 23 mg/dL    Creatinine 2.07 (H) 0.50 - 1.30 mg/dL    eGFR 33 (L) >60 mL/min/1.73m*2    Calcium 8.5 (L) 8.6 - 10.3 mg/dL    Phosphorus 3.6 2.5 - 4.9 mg/dL    Albumin 3.4 3.4 - 5.0 g/dL   Magnesium   Result Value Ref Range    Magnesium 2.13 1.60 - 2.40 mg/dL   POCT GLUCOSE   Result Value Ref Range    POCT Glucose 238 (H) 74 - 99 mg/dL   POCT GLUCOSE   Result Value Ref Range    POCT Glucose 267 (H) 74 - 99 mg/dL   Hemoglobin and hematocrit, blood   Result Value Ref Range    Hemoglobin 8.1 (L) 13.5 - 17.5 g/dL    Hematocrit 23.7 (L) 41.0 - 52.0 %   POCT GLUCOSE   Result Value Ref Range    POCT Glucose 171 (H) 74 - 99 mg/dL   CBC and Auto Differential    Result Value Ref Range    WBC 19.6 (H) 4.4 - 11.3 x10*3/uL    nRBC 0.5 (H) 0.0 - 0.0 /100 WBCs    RBC 3.09 (L) 4.50 - 5.90 x10*6/uL    Hemoglobin 9.2 (L) 13.5 - 17.5 g/dL    Hematocrit 27.5 (L) 41.0 - 52.0 %    MCV 89 80 - 100 fL    MCH 29.8 26.0 - 34.0 pg    MCHC 33.5 32.0 - 36.0 g/dL    RDW 15.8 (H) 11.5 - 14.5 %    Platelets 155 150 - 450 x10*3/uL    Neutrophils % 74.5 40.0 - 80.0 %    Immature Granulocytes %, Automated 0.9 0.0 - 0.9 %    Lymphocytes % 12.9 13.0 - 44.0 %    Monocytes % 11.2 2.0 - 10.0 %    Eosinophils % 0.2 0.0 - 6.0 %    Basophils % 0.3 0.0 - 2.0 %    Neutrophils Absolute 14.63 (H) 1.60 - 5.50 x10*3/uL    Immature Granulocytes Absolute, Automated 0.17 0.00 - 0.50 x10*3/uL    Lymphocytes Absolute 2.53 0.80 - 3.00 x10*3/uL    Monocytes Absolute 2.19 (H) 0.05 - 0.80 x10*3/uL    Eosinophils Absolute 0.03 0.00 - 0.40 x10*3/uL    Basophils Absolute 0.06 0.00 - 0.10 x10*3/uL   Basic Metabolic Panel   Result Value Ref Range    Glucose 207 (H) 74 - 99 mg/dL    Sodium 136 136 - 145 mmol/L    Potassium 5.1 3.5 - 5.3 mmol/L    Chloride 107 98 - 107 mmol/L    Bicarbonate 20 (L) 21 - 32 mmol/L    Anion Gap 14 10 - 20 mmol/L    Urea Nitrogen 45 (H) 6 - 23 mg/dL    Creatinine 2.32 (H) 0.50 - 1.30 mg/dL    eGFR 29 (L) >60 mL/min/1.73m*2    Calcium 8.4 (L) 8.6 - 10.3 mg/dL   Magnesium   Result Value Ref Range    Magnesium 2.09 1.60 - 2.40 mg/dL   POCT GLUCOSE   Result Value Ref Range    POCT Glucose 220 (H) 74 - 99 mg/dL   POCT GLUCOSE   Result Value Ref Range    POCT Glucose 191 (H) 74 - 99 mg/dL       Assessment/Plan   Principal Problem:    Hematoma of left thigh, initial encounter  Active Problems:    Asthma    Benign essential hypertension    Cirrhosis, nonalcoholic (CMS/HCC)    Type 2 diabetes mellitus with diabetic neuropathy, without long-term current use of insulin (CMS/HCC)    Cognitive dysfunction    Congestive heart failure with right ventricular systolic dysfunction (CMS/HCC)    Traumatic hematoma  of left thigh    CAD (coronary artery disease)    HLD (hyperlipidemia)    Acute kidney injury superimposed on CKD (CMS/HCC)    SENIA (obstructive sleep apnea)    Erosive esophagitis    Chest pain    Ventricular tachycardia (CMS/HCC)    Acute blood loss anemia    Demand ischemia    Hypoxia: Currently needing 4 L O2.       Recommendations:  Patient is currently in the ICU for left thigh hematoma, acute blood loss anemia, hypotension, metabolic acidosis and was noted to have ventricular arrhythmia with tachycardia.  Patient is on amiodarone oral.  Currently patient is on 4 L oxygen and has a few bibasilar crackles which I think could be secondary to IV fluids.  I reviewed nephrology's note and also reviewed patient's kidney functions from baseline since 2021.  He has chronic kidney disease and his baseline creatinine is around 2.3.  Nephrologist recommended to remove Love catheter.    -Discussed with RN at bedside [place] and advised to to reach out to surgery resident so we can discuss discontinuing IV fluids and encouraging oral intake.  -Continue monitor urine output and if needed will consider diuretic.  -Wean oxygen supplementation and if oxygenation worsens or patient develops dyspnea we will get a chest x-ray and consider dose of Lasix.    -Nocturnal oxygen desaturations could be related to underlying obstructive sleep apnea.  -Patient is being followed by cardiology and is currently on amiodarone for ventricular tachycardia and for elevated troponin there is a plan for possible left heart cath.    Reviewed hospitalist note and discussed with Vicki CATALAN.  I am in agreement with their management.  At this time it does not appear the patient needs continued critical care management as patient is hemodynamically stable.  His oxygenation issues as mentioned above are more related to congestive heart failure.  Critical care will sign off at this time but will be available for any questions.    Case discussed with  nephrology Dr. Cash.     I spent 40 minutes in the professional and overall care of this patient.      Joe Tinoco MD
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
## 2024-02-16 NOTE — PROGRESS NOTES
"      Nephrology Progress Note      Nephrology following for TAYLOR on CKD stage IIIb.   Events over night:   Pt upset he still has a thibodeaux, surgery team convinced him to keep it last night   last 24 hours, + ~3 L  Pt was ordered IVF by primary service this morning and he received 250 ml LR  He is 93% on 3L         /78   Pulse 75   Temp 36.6 °C (97.9 °F)   Resp 20   Ht 1.575 m (5' 2\")   Wt 78.7 kg (173 lb 8 oz)   SpO2 96%   BMI 31.73 kg/m²     Input / Output:  24 HR:   Intake/Output Summary (Last 24 hours) at 2/16/2024 0944  Last data filed at 2/16/2024 0400  Gross per 24 hour   Intake 3083.34 ml   Output 330 ml   Net 2753.34 ml         Physical Exam   Alert and oriented x 3 NAD  Neck: no JVD  CV: RRR  Lungs: CTA bilaterally  Abd: soft, NT, ND   Ext: + edema LLE, bruising at knee    Scheduled medications  acetaminophen, 650 mg, oral, q6h DVIINE  amiodarone, 400 mg, oral, BID   Followed by  [START ON 2/28/2024] amiodarone, 200 mg, oral, Daily  icosapent ethyL, 1 g, oral, BID with meals  insulin lispro, 0-10 Units, subcutaneous, q4h  metoprolol succinate XL, 50 mg, oral, Daily  pantoprazole, 40 mg, oral, Daily  perflutren lipid microspheres, 0.5-10 mL of dilution, intravenous, Once in imaging  sertraline, 50 mg, oral, Daily  sulfur hexafluoride microsphr, 2 mL, intravenous, Once in imaging      Continuous medications  lactated Ringer's, 10 mL/hr, Last Rate: 10 mL/hr (02/16/24 0815)    PRN medications  PRN medications: dextrose 10 % in water (D10W), dextrose, glucagon, HYDROmorphone, morphine, naloxone, ondansetron ODT **OR** ondansetron, oxyCODONE, oxygen   Results from last 7 days   Lab Units 02/16/24  0426 02/15/24  1429 02/15/24  0407   SODIUM mmol/L 136   < > 139   POTASSIUM mmol/L 5.1   < > 4.9   CHLORIDE mmol/L 107   < > 115*   CO2 mmol/L 20*   < > 16*   BUN mg/dL 45*   < > 38*   CREATININE mg/dL 2.32*   < > 1.96*   CALCIUM mg/dL 8.4*   < > 6.7*   PROTEIN TOTAL g/dL  --   --  4.9*   BILIRUBIN TOTAL " mg/dL  --   --  0.6   ALK PHOS U/L  --   --  58   ALT U/L  --   --  17   AST U/L  --   --  48*   GLUCOSE mg/dL 207*   < > 169*    < > = values in this interval not displayed.        Results from last 7 days   Lab Units 02/16/24  0426 02/15/24  1429 02/15/24  0013   MAGNESIUM mg/dL 2.09 2.13 1.68        Results from last 7 days   Lab Units 02/16/24  0426 02/15/24  2046 02/15/24  1429 02/15/24  0810 02/15/24  0407 02/14/24  2110   WBC AUTO x10*3/uL 19.6*  --   --   --  15.8* 23.9*   HEMOGLOBIN g/dL 9.2* 8.1* 9.6*   < > 7.2* 7.9*  7.9*   HEMATOCRIT % 27.5* 23.7*  --   --  22.1* 24.1*   PLATELETS AUTO x10*3/uL 155  --   --   --  145* 221    < > = values in this interval not displayed.          Assessment & Plan:   Patient is 73 y.o. male who is admitted to hospital for left thigh hematoma and anemia. Nephrology consulted in view of TAYLOR on CKD.      TAYLOR on CKD stage 3b-TAYLOR secondary to volume mediated changes with anemia and some mild component from trauma/contrast.-improving creatinine peaked at 2.3  Cr bump 2.0 to 2.3, oliguric overnight  Suspect has developed acute tubular injury from hypotension on 2/14 evening.   -this could worsen before we see improvement     CKD stage 3b secondary to diabetic nephropathy- baseline Cr 1.7-1.8      Anemia secondary to hematoma-   S/p PRBCs 2/15      [facility] cirrhosis     Left thigh hematoma- S/P trauma    HFrEF-compensated       Recommendations:   -Holding losartan and farxiga   -pt eating well, would keep off IVFs to avoid fluid overload state since oliguric, BP stable   -may give diuretics if needed   -avoid IV contrast and NSAIDS  Monitor renal function and electrolytes, strict UOP  Ok for thibodeaux removal for my standpoint  No need for RRT    Please message me through EPIC chat with any questions or concerns.     Ketty Madrid DO  2/16/2024  9:44 AM     [facility]    [address], [address]   [place] [address]  Office: [phone]

## 2024-02-17 ENCOUNTER — APPOINTMENT (OUTPATIENT)
Dept: CARDIOLOGY | Facility: HOSPITAL | Age: 74
DRG: 604 | End: 2024-02-17
Payer: MEDICARE

## 2024-02-17 ENCOUNTER — APPOINTMENT (OUTPATIENT)
Dept: RADIOLOGY | Facility: HOSPITAL | Age: 74
DRG: 604 | End: 2024-02-17
Payer: MEDICARE

## 2024-02-17 PROBLEM — J18.9 PNEUMONIA: Status: ACTIVE | Noted: 2024-02-17

## 2024-02-17 LAB
ANION GAP SERPL CALC-SCNC: 16 MMOL/L (ref 10–20)
APPEARANCE UR: ABNORMAL
ATRIAL RATE: 48 BPM
ATRIAL RATE: 79 BPM
BASOPHILS # BLD AUTO: 0.02 X10*3/UL (ref 0–0.1)
BASOPHILS NFR BLD AUTO: 0.1 %
BILIRUB UR STRIP.AUTO-MCNC: NEGATIVE MG/DL
BUN SERPL-MCNC: 45 MG/DL (ref 6–23)
CALCIUM SERPL-MCNC: 8.4 MG/DL (ref 8.6–10.3)
CHLORIDE SERPL-SCNC: 106 MMOL/L (ref 98–107)
CO2 SERPL-SCNC: 17 MMOL/L (ref 21–32)
COLOR UR: YELLOW
CREAT SERPL-MCNC: 2.15 MG/DL (ref 0.5–1.3)
EGFRCR SERPLBLD CKD-EPI 2021: 31 ML/MIN/1.73M*2
EOSINOPHIL # BLD AUTO: 0 X10*3/UL (ref 0–0.4)
EOSINOPHIL NFR BLD AUTO: 0 %
ERYTHROCYTE [DISTWIDTH] IN BLOOD BY AUTOMATED COUNT: 15.1 % (ref 11.5–14.5)
FOLATE SERPL-MCNC: >22.3 NG/ML
GLUCOSE BLD MANUAL STRIP-MCNC: 212 MG/DL (ref 74–99)
GLUCOSE BLD MANUAL STRIP-MCNC: 214 MG/DL (ref 74–99)
GLUCOSE BLD MANUAL STRIP-MCNC: 216 MG/DL (ref 74–99)
GLUCOSE BLD MANUAL STRIP-MCNC: 233 MG/DL (ref 74–99)
GLUCOSE BLD MANUAL STRIP-MCNC: 254 MG/DL (ref 74–99)
GLUCOSE SERPL-MCNC: 248 MG/DL (ref 74–99)
GLUCOSE UR STRIP.AUTO-MCNC: NEGATIVE MG/DL
HCT VFR BLD AUTO: 27.8 % (ref 41–52)
HGB BLD-MCNC: 9.1 G/DL (ref 13.5–17.5)
IMM GRANULOCYTES # BLD AUTO: 0.26 X10*3/UL (ref 0–0.5)
IMM GRANULOCYTES NFR BLD AUTO: 1.1 % (ref 0–0.9)
IRON SATN MFR SERPL: 8 % (ref 25–45)
IRON SERPL-MCNC: 27 UG/DL (ref 35–150)
KETONES UR STRIP.AUTO-MCNC: ABNORMAL MG/DL
LEUKOCYTE ESTERASE UR QL STRIP.AUTO: NEGATIVE
LYMPHOCYTES # BLD AUTO: 2.65 X10*3/UL (ref 0.8–3)
LYMPHOCYTES NFR BLD AUTO: 11.4 %
MAGNESIUM SERPL-MCNC: 1.96 MG/DL (ref 1.6–2.4)
MCH RBC QN AUTO: 29.8 PG (ref 26–34)
MCHC RBC AUTO-ENTMCNC: 32.7 G/DL (ref 32–36)
MCV RBC AUTO: 91 FL (ref 80–100)
MONOCYTES # BLD AUTO: 2.82 X10*3/UL (ref 0.05–0.8)
MONOCYTES NFR BLD AUTO: 12.1 %
MUCOUS THREADS #/AREA URNS AUTO: ABNORMAL /LPF
NEUTROPHILS # BLD AUTO: 17.48 X10*3/UL (ref 1.6–5.5)
NEUTROPHILS NFR BLD AUTO: 75.3 %
NITRITE UR QL STRIP.AUTO: NEGATIVE
NRBC BLD-RTO: 0.5 /100 WBCS (ref 0–0)
P AXIS: 91 DEGREES
P OFFSET: 182 MS
P ONSET: 133 MS
PH UR STRIP.AUTO: 5 [PH]
PLATELET # BLD AUTO: 184 X10*3/UL (ref 150–450)
POTASSIUM SERPL-SCNC: 5 MMOL/L (ref 3.5–5.3)
PR INTERVAL: 162 MS
PROT UR STRIP.AUTO-MCNC: ABNORMAL MG/DL
Q ONSET: 211 MS
Q ONSET: 214 MS
QRS COUNT: 13 BEATS
QRS COUNT: 19 BEATS
QRS DURATION: 108 MS
QRS DURATION: 122 MS
QT INTERVAL: 360 MS
QT INTERVAL: 410 MS
QTC CALCULATION(BAZETT): 470 MS
QTC CALCULATION(BAZETT): 506 MS
QTC FREDERICIA: 449 MS
QTC FREDERICIA: 452 MS
R AXIS: 46 DEGREES
R AXIS: 54 DEGREES
RBC # BLD AUTO: 3.05 X10*6/UL (ref 4.5–5.9)
RBC # UR STRIP.AUTO: ABNORMAL /UL
RBC #/AREA URNS AUTO: >20 /HPF
SODIUM SERPL-SCNC: 134 MMOL/L (ref 136–145)
SP GR UR STRIP.AUTO: 1.02
T AXIS: 1 DEGREES
T AXIS: 5 DEGREES
T OFFSET: 391 MS
T OFFSET: 419 MS
TIBC SERPL-MCNC: 327 UG/DL (ref 240–445)
UIBC SERPL-MCNC: 300 UG/DL (ref 110–370)
UROBILINOGEN UR STRIP.AUTO-MCNC: <2 MG/DL
VENTRICULAR RATE: 119 BPM
VENTRICULAR RATE: 79 BPM
WBC # BLD AUTO: 23.2 X10*3/UL (ref 4.4–11.3)
WBC #/AREA URNS AUTO: ABNORMAL /HPF

## 2024-02-17 PROCEDURE — 2500000002 HC RX 250 W HCPCS SELF ADMINISTERED DRUGS (ALT 637 FOR MEDICARE OP, ALT 636 FOR OP/ED): Performed by: SURGERY

## 2024-02-17 PROCEDURE — 1200000002 HC GENERAL ROOM WITH TELEMETRY DAILY

## 2024-02-17 PROCEDURE — 36415 COLL VENOUS BLD VENIPUNCTURE: CPT | Performed by: INTERNAL MEDICINE

## 2024-02-17 PROCEDURE — 83735 ASSAY OF MAGNESIUM: CPT | Performed by: PHYSICIAN ASSISTANT

## 2024-02-17 PROCEDURE — 93010 ELECTROCARDIOGRAM REPORT: CPT | Performed by: INTERNAL MEDICINE

## 2024-02-17 PROCEDURE — 87449 NOS EACH ORGANISM AG IA: CPT | Mod: PORLAB | Performed by: INTERNAL MEDICINE

## 2024-02-17 PROCEDURE — 2500000001 HC RX 250 WO HCPCS SELF ADMINISTERED DRUGS (ALT 637 FOR MEDICARE OP): Performed by: INTERNAL MEDICINE

## 2024-02-17 PROCEDURE — 87040 BLOOD CULTURE FOR BACTERIA: CPT | Mod: PORLAB | Performed by: INTERNAL MEDICINE

## 2024-02-17 PROCEDURE — 2500000002 HC RX 250 W HCPCS SELF ADMINISTERED DRUGS (ALT 637 FOR MEDICARE OP, ALT 636 FOR OP/ED): Performed by: STUDENT IN AN ORGANIZED HEALTH CARE EDUCATION/TRAINING PROGRAM

## 2024-02-17 PROCEDURE — 93005 ELECTROCARDIOGRAM TRACING: CPT

## 2024-02-17 PROCEDURE — 85025 COMPLETE CBC W/AUTO DIFF WBC: CPT | Performed by: PHYSICIAN ASSISTANT

## 2024-02-17 PROCEDURE — 82947 ASSAY GLUCOSE BLOOD QUANT: CPT

## 2024-02-17 PROCEDURE — 71046 X-RAY EXAM CHEST 2 VIEWS: CPT

## 2024-02-17 PROCEDURE — 2500000001 HC RX 250 WO HCPCS SELF ADMINISTERED DRUGS (ALT 637 FOR MEDICARE OP): Performed by: STUDENT IN AN ORGANIZED HEALTH CARE EDUCATION/TRAINING PROGRAM

## 2024-02-17 PROCEDURE — 2500000001 HC RX 250 WO HCPCS SELF ADMINISTERED DRUGS (ALT 637 FOR MEDICARE OP): Performed by: SURGERY

## 2024-02-17 PROCEDURE — 99233 SBSQ HOSP IP/OBS HIGH 50: CPT | Performed by: INTERNAL MEDICINE

## 2024-02-17 PROCEDURE — 2500000004 HC RX 250 GENERAL PHARMACY W/ HCPCS (ALT 636 FOR OP/ED): Performed by: INTERNAL MEDICINE

## 2024-02-17 PROCEDURE — 83540 ASSAY OF IRON: CPT | Performed by: INTERNAL MEDICINE

## 2024-02-17 PROCEDURE — 81001 URINALYSIS AUTO W/SCOPE: CPT | Performed by: INTERNAL MEDICINE

## 2024-02-17 PROCEDURE — 87899 AGENT NOS ASSAY W/OPTIC: CPT | Mod: PORLAB | Performed by: INTERNAL MEDICINE

## 2024-02-17 PROCEDURE — 36415 COLL VENOUS BLD VENIPUNCTURE: CPT | Performed by: PHYSICIAN ASSISTANT

## 2024-02-17 PROCEDURE — 82746 ASSAY OF FOLIC ACID SERUM: CPT | Performed by: INTERNAL MEDICINE

## 2024-02-17 PROCEDURE — 99232 SBSQ HOSP IP/OBS MODERATE 35: CPT | Performed by: SURGERY

## 2024-02-17 PROCEDURE — 71046 X-RAY EXAM CHEST 2 VIEWS: CPT | Performed by: RADIOLOGY

## 2024-02-17 PROCEDURE — 80048 BASIC METABOLIC PNL TOTAL CA: CPT | Performed by: PHYSICIAN ASSISTANT

## 2024-02-17 RX ORDER — LORATADINE 10 MG/1
10 TABLET ORAL DAILY
Status: DISCONTINUED | OUTPATIENT
Start: 2024-02-17 | End: 2024-02-23 | Stop reason: HOSPADM

## 2024-02-17 RX ORDER — CEFTRIAXONE 1 G/50ML
1 INJECTION, SOLUTION INTRAVENOUS EVERY 24 HOURS
Status: DISCONTINUED | OUTPATIENT
Start: 2024-02-17 | End: 2024-02-18

## 2024-02-17 RX ORDER — METRONIDAZOLE 500 MG/1
500 TABLET ORAL EVERY 8 HOURS SCHEDULED
Status: DISCONTINUED | OUTPATIENT
Start: 2024-02-17 | End: 2024-02-18

## 2024-02-17 RX ADMIN — METRONIDAZOLE 500 MG: 500 TABLET ORAL at 16:21

## 2024-02-17 RX ADMIN — CEFTRIAXONE SODIUM 1 G: 1 INJECTION, SOLUTION INTRAVENOUS at 16:21

## 2024-02-17 RX ADMIN — ACETAMINOPHEN 650 MG: 325 TABLET ORAL at 06:30

## 2024-02-17 RX ADMIN — ICOSAPENT ETHYL 1 G: 1 CAPSULE ORAL at 16:21

## 2024-02-17 RX ADMIN — INSULIN LISPRO 6 UNITS: 100 INJECTION, SOLUTION INTRAVENOUS; SUBCUTANEOUS at 05:47

## 2024-02-17 RX ADMIN — SALINE NASAL SPRAY 1 SPRAY: 1.5 SOLUTION NASAL at 21:32

## 2024-02-17 RX ADMIN — METRONIDAZOLE 500 MG: 500 TABLET ORAL at 22:20

## 2024-02-17 RX ADMIN — INSULIN LISPRO 4 UNITS: 100 INJECTION, SOLUTION INTRAVENOUS; SUBCUTANEOUS at 12:49

## 2024-02-17 RX ADMIN — PANTOPRAZOLE SODIUM 40 MG: 40 TABLET, DELAYED RELEASE ORAL at 09:01

## 2024-02-17 RX ADMIN — ACETAMINOPHEN 650 MG: 325 TABLET ORAL at 12:48

## 2024-02-17 RX ADMIN — AMIODARONE HYDROCHLORIDE 400 MG: 400 TABLET ORAL at 09:01

## 2024-02-17 RX ADMIN — ICOSAPENT ETHYL 1 G: 1 CAPSULE ORAL at 09:02

## 2024-02-17 RX ADMIN — AMIODARONE HYDROCHLORIDE 400 MG: 400 TABLET ORAL at 20:36

## 2024-02-17 RX ADMIN — SERTRALINE HYDROCHLORIDE 50 MG: 50 TABLET ORAL at 09:01

## 2024-02-17 RX ADMIN — LORATADINE 10 MG: 10 TABLET ORAL at 09:02

## 2024-02-17 RX ADMIN — INSULIN LISPRO 4 UNITS: 100 INJECTION, SOLUTION INTRAVENOUS; SUBCUTANEOUS at 16:28

## 2024-02-17 RX ADMIN — INSULIN LISPRO 4 UNITS: 100 INJECTION, SOLUTION INTRAVENOUS; SUBCUTANEOUS at 09:02

## 2024-02-17 RX ADMIN — INSULIN LISPRO 4 UNITS: 100 INJECTION, SOLUTION INTRAVENOUS; SUBCUTANEOUS at 20:36

## 2024-02-17 RX ADMIN — METOPROLOL SUCCINATE 50 MG: 50 TABLET, EXTENDED RELEASE ORAL at 09:01

## 2024-02-17 RX ADMIN — ACETAMINOPHEN 650 MG: 325 TABLET ORAL at 17:02

## 2024-02-17 ASSESSMENT — ENCOUNTER SYMPTOMS
HALLUCINATIONS: 0
FREQUENCY: 0
SORE THROAT: 0
FEVER: 0
BACK PAIN: 0
WEAKNESS: 0
FACIAL SWELLING: 0
WHEEZING: 0
FATIGUE: 0
TROUBLE SWALLOWING: 0
FLANK PAIN: 0
DIAPHORESIS: 0
ABDOMINAL PAIN: 0
CHILLS: 0
HEADACHES: 0
HEMATURIA: 0
SHORTNESS OF BREATH: 0
NUMBNESS: 0
NAUSEA: 0
CONSTIPATION: 0
PALPITATIONS: 0
BLOOD IN STOOL: 0
VOMITING: 0
CHEST TIGHTNESS: 0
WOUND: 0
BRUISES/BLEEDS EASILY: 0
EYE PAIN: 0
COUGH: 0
APPETITE CHANGE: 0
DIARRHEA: 0
JOINT SWELLING: 0
LIGHT-HEADEDNESS: 0
DIZZINESS: 0
DYSURIA: 0

## 2024-02-17 ASSESSMENT — COGNITIVE AND FUNCTIONAL STATUS - GENERAL
TOILETING: A LOT
MOVING TO AND FROM BED TO CHAIR: A LITTLE
DRESSING REGULAR UPPER BODY CLOTHING: A LITTLE
STANDING UP FROM CHAIR USING ARMS: A LITTLE
MOBILITY SCORE: 21
PERSONAL GROOMING: A LITTLE
CLIMB 3 TO 5 STEPS WITH RAILING: A LITTLE
DRESSING REGULAR LOWER BODY CLOTHING: A LITTLE
CLIMB 3 TO 5 STEPS WITH RAILING: A LOT
DAILY ACTIVITIY SCORE: 24
MOVING TO AND FROM BED TO CHAIR: A LITTLE
DAILY ACTIVITIY SCORE: 18
TURNING FROM BACK TO SIDE WHILE IN FLAT BAD: A LITTLE
STANDING UP FROM CHAIR USING ARMS: A LITTLE
PERSONAL GROOMING: A LITTLE
MOBILITY SCORE: 17
WALKING IN HOSPITAL ROOM: A LOT
HELP NEEDED FOR BATHING: A LITTLE
STANDING UP FROM CHAIR USING ARMS: A LITTLE
DRESSING REGULAR UPPER BODY CLOTHING: A LITTLE
TOILETING: A LITTLE
DRESSING REGULAR LOWER BODY CLOTHING: A LITTLE
HELP NEEDED FOR BATHING: A LITTLE
CLIMB 3 TO 5 STEPS WITH RAILING: A LOT
WALKING IN HOSPITAL ROOM: A LOT
DAILY ACTIVITIY SCORE: 19
WALKING IN HOSPITAL ROOM: A LITTLE
MOBILITY SCORE: 18

## 2024-02-17 ASSESSMENT — PAIN - FUNCTIONAL ASSESSMENT
PAIN_FUNCTIONAL_ASSESSMENT: 0-10

## 2024-02-17 ASSESSMENT — PAIN SCALES - GENERAL
PAINLEVEL_OUTOF10: 0 - NO PAIN
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
## 2024-02-17 NOTE — PROGRESS NOTES
Physical Therapy                 Therapy Communication Note    Patient Name: Ritesh Garza  MRN: [id number]  Today's Date: 2/17/2024     Discipline: Physical Therapy    Missed Visit Reason:      Missed Time: Attempt    Comment:Pt attempted to be seen. Pt eating at this time and will re-attempt at later time.
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
## 2024-02-17 NOTE — CARE PLAN
The patient's goals for the shift include feel better and go home    The clinical goals for the shift include REMAIN FREE FROM FALLS

## 2024-02-18 ENCOUNTER — APPOINTMENT (OUTPATIENT)
Dept: CARDIOLOGY | Facility: HOSPITAL | Age: 74
DRG: 604 | End: 2024-02-18
Payer: MEDICARE

## 2024-02-18 ENCOUNTER — APPOINTMENT (OUTPATIENT)
Dept: RADIOLOGY | Facility: HOSPITAL | Age: 74
DRG: 604 | End: 2024-02-18
Payer: MEDICARE

## 2024-02-18 LAB
ABO GROUP (TYPE) IN BLOOD: NORMAL
ANION GAP BLDA CALCULATED.4IONS-SCNC: 14 MMO/L (ref 10–25)
ANION GAP BLDA CALCULATED.4IONS-SCNC: 16 MMO/L (ref 10–25)
ANION GAP SERPL CALC-SCNC: 15 MMOL/L (ref 10–20)
ANTIBODY SCREEN: NORMAL
APPARATUS: ABNORMAL
ARTERIAL PATENCY WRIST A: POSITIVE
BASE EXCESS BLDA CALC-SCNC: -4.9 MMOL/L (ref -2–3)
BASE EXCESS BLDA CALC-SCNC: -6.6 MMOL/L (ref -2–3)
BASOPHILS # BLD AUTO: 0.05 X10*3/UL (ref 0–0.1)
BASOPHILS NFR BLD AUTO: 0.2 %
BNP SERPL-MCNC: 2116 PG/ML (ref 0–99)
BODY TEMPERATURE: 37 DEGREES CELSIUS
BODY TEMPERATURE: 37 DEGREES CELSIUS
BUN SERPL-MCNC: 52 MG/DL (ref 6–23)
CA-I BLDA-SCNC: 1.17 MMOL/L (ref 1.1–1.33)
CA-I BLDA-SCNC: 1.21 MMOL/L (ref 1.1–1.33)
CALCIUM SERPL-MCNC: 8.2 MG/DL (ref 8.6–10.3)
CARDIAC TROPONIN I PNL SERPL HS: 4490 NG/L (ref 0–20)
CARDIAC TROPONIN I PNL SERPL HS: 4518 NG/L (ref 0–20)
CHLORIDE BLDA-SCNC: 106 MMOL/L (ref 98–107)
CHLORIDE BLDA-SCNC: 106 MMOL/L (ref 98–107)
CHLORIDE SERPL-SCNC: 106 MMOL/L (ref 98–107)
CO2 SERPL-SCNC: 19 MMOL/L (ref 21–32)
CREAT SERPL-MCNC: 2.21 MG/DL (ref 0.5–1.3)
EGFRCR SERPLBLD CKD-EPI 2021: 30 ML/MIN/1.73M*2
EOSINOPHIL # BLD AUTO: 0.09 X10*3/UL (ref 0–0.4)
EOSINOPHIL NFR BLD AUTO: 0.4 %
ERYTHROCYTE [DISTWIDTH] IN BLOOD BY AUTOMATED COUNT: 15.1 % (ref 11.5–14.5)
GLUCOSE BLD MANUAL STRIP-MCNC: 187 MG/DL (ref 74–99)
GLUCOSE BLD MANUAL STRIP-MCNC: 206 MG/DL (ref 74–99)
GLUCOSE BLD MANUAL STRIP-MCNC: 209 MG/DL (ref 74–99)
GLUCOSE BLD MANUAL STRIP-MCNC: 218 MG/DL (ref 74–99)
GLUCOSE BLD MANUAL STRIP-MCNC: 218 MG/DL (ref 74–99)
GLUCOSE BLD MANUAL STRIP-MCNC: 224 MG/DL (ref 74–99)
GLUCOSE BLD MANUAL STRIP-MCNC: 540 MG/DL (ref 74–99)
GLUCOSE BLDA-MCNC: 217 MG/DL (ref 74–99)
GLUCOSE BLDA-MCNC: 241 MG/DL (ref 74–99)
GLUCOSE SERPL-MCNC: 206 MG/DL (ref 74–99)
HCO3 BLDA-SCNC: 17.1 MMOL/L (ref 22–26)
HCO3 BLDA-SCNC: 18.8 MMOL/L (ref 22–26)
HCT VFR BLD AUTO: 28.6 % (ref 41–52)
HCT VFR BLD EST: 25 % (ref 41–52)
HCT VFR BLD EST: 26 % (ref 41–52)
HGB BLD-MCNC: 8.6 G/DL (ref 13.5–17.5)
HGB BLD-MCNC: 9.4 G/DL (ref 13.5–17.5)
HGB BLDA-MCNC: 8.4 G/DL (ref 13.5–17.5)
HGB BLDA-MCNC: 8.5 G/DL (ref 13.5–17.5)
IMM GRANULOCYTES # BLD AUTO: 0.14 X10*3/UL (ref 0–0.5)
IMM GRANULOCYTES NFR BLD AUTO: 0.7 % (ref 0–0.9)
INHALED O2 CONCENTRATION: 100 %
INHALED O2 CONCENTRATION: 44 %
LACTATE BLDA-SCNC: 1.1 MMOL/L (ref 0.4–2)
LACTATE BLDA-SCNC: 2.2 MMOL/L (ref 0.4–2)
LEGIONELLA AG UR QL: NEGATIVE
LYMPHOCYTES # BLD AUTO: 2.91 X10*3/UL (ref 0.8–3)
LYMPHOCYTES NFR BLD AUTO: 14.1 %
MAGNESIUM SERPL-MCNC: 2.07 MG/DL (ref 1.6–2.4)
MCH RBC QN AUTO: 30.5 PG (ref 26–34)
MCHC RBC AUTO-ENTMCNC: 32.9 G/DL (ref 32–36)
MCV RBC AUTO: 93 FL (ref 80–100)
MONOCYTES # BLD AUTO: 2.29 X10*3/UL (ref 0.05–0.8)
MONOCYTES NFR BLD AUTO: 11.1 %
NEUTROPHILS # BLD AUTO: 15.21 X10*3/UL (ref 1.6–5.5)
NEUTROPHILS NFR BLD AUTO: 73.5 %
NRBC BLD-RTO: 0.4 /100 WBCS (ref 0–0)
OXYHGB MFR BLDA: 89.8 % (ref 94–98)
OXYHGB MFR BLDA: 92.3 % (ref 94–98)
PCO2 BLDA: 27 MM HG (ref 38–42)
PCO2 BLDA: 29 MM HG (ref 38–42)
PH BLDA: 7.41 PH (ref 7.38–7.42)
PH BLDA: 7.42 PH (ref 7.38–7.42)
PLATELET # BLD AUTO: 200 X10*3/UL (ref 150–450)
PO2 BLDA: 61 MM HG (ref 85–95)
PO2 BLDA: 66 MM HG (ref 85–95)
POTASSIUM BLDA-SCNC: 5.2 MMOL/L (ref 3.5–5.3)
POTASSIUM BLDA-SCNC: 5.7 MMOL/L (ref 3.5–5.3)
POTASSIUM SERPL-SCNC: 5 MMOL/L (ref 3.5–5.3)
RBC # BLD AUTO: 3.08 X10*6/UL (ref 4.5–5.9)
RH FACTOR (ANTIGEN D): NORMAL
S PNEUM AG UR QL: NEGATIVE
SAO2 % BLDA: 93 % (ref 94–100)
SAO2 % BLDA: 94 % (ref 94–100)
SODIUM BLDA-SCNC: 133 MMOL/L (ref 136–145)
SODIUM BLDA-SCNC: 134 MMOL/L (ref 136–145)
SODIUM SERPL-SCNC: 135 MMOL/L (ref 136–145)
SPECIMEN DRAWN FROM PATIENT: ABNORMAL
WBC # BLD AUTO: 20.7 X10*3/UL (ref 4.4–11.3)

## 2024-02-18 PROCEDURE — 99231 SBSQ HOSP IP/OBS SF/LOW 25: CPT | Performed by: NURSE PRACTITIONER

## 2024-02-18 PROCEDURE — 36430 TRANSFUSION BLD/BLD COMPNT: CPT

## 2024-02-18 PROCEDURE — 84484 ASSAY OF TROPONIN QUANT: CPT | Performed by: INTERNAL MEDICINE

## 2024-02-18 PROCEDURE — 2500000002 HC RX 250 W HCPCS SELF ADMINISTERED DRUGS (ALT 637 FOR MEDICARE OP, ALT 636 FOR OP/ED): Performed by: SURGERY

## 2024-02-18 PROCEDURE — 71045 X-RAY EXAM CHEST 1 VIEW: CPT | Performed by: STUDENT IN AN ORGANIZED HEALTH CARE EDUCATION/TRAINING PROGRAM

## 2024-02-18 PROCEDURE — 86920 COMPATIBILITY TEST SPIN: CPT

## 2024-02-18 PROCEDURE — 2500000004 HC RX 250 GENERAL PHARMACY W/ HCPCS (ALT 636 FOR OP/ED): Performed by: INTERNAL MEDICINE

## 2024-02-18 PROCEDURE — 85018 HEMOGLOBIN: CPT | Performed by: INTERNAL MEDICINE

## 2024-02-18 PROCEDURE — 71045 X-RAY EXAM CHEST 1 VIEW: CPT

## 2024-02-18 PROCEDURE — 99291 CRITICAL CARE FIRST HOUR: CPT | Performed by: INTERNAL MEDICINE

## 2024-02-18 PROCEDURE — 36415 COLL VENOUS BLD VENIPUNCTURE: CPT | Performed by: SURGERY

## 2024-02-18 PROCEDURE — 2500000001 HC RX 250 WO HCPCS SELF ADMINISTERED DRUGS (ALT 637 FOR MEDICARE OP): Performed by: INTERNAL MEDICINE

## 2024-02-18 PROCEDURE — 82435 ASSAY OF BLOOD CHLORIDE: CPT | Performed by: SURGERY

## 2024-02-18 PROCEDURE — 99232 SBSQ HOSP IP/OBS MODERATE 35: CPT | Performed by: SURGERY

## 2024-02-18 PROCEDURE — 36600 WITHDRAWAL OF ARTERIAL BLOOD: CPT

## 2024-02-18 PROCEDURE — 2500000001 HC RX 250 WO HCPCS SELF ADMINISTERED DRUGS (ALT 637 FOR MEDICARE OP): Performed by: SURGERY

## 2024-02-18 PROCEDURE — 93005 ELECTROCARDIOGRAM TRACING: CPT

## 2024-02-18 PROCEDURE — 36415 COLL VENOUS BLD VENIPUNCTURE: CPT | Performed by: INTERNAL MEDICINE

## 2024-02-18 PROCEDURE — 71250 CT THORAX DX C-: CPT

## 2024-02-18 PROCEDURE — 83735 ASSAY OF MAGNESIUM: CPT | Performed by: SURGERY

## 2024-02-18 PROCEDURE — 82947 ASSAY GLUCOSE BLOOD QUANT: CPT

## 2024-02-18 PROCEDURE — 93970 EXTREMITY STUDY: CPT

## 2024-02-18 PROCEDURE — 93010 ELECTROCARDIOGRAM REPORT: CPT | Performed by: INTERNAL MEDICINE

## 2024-02-18 PROCEDURE — 83880 ASSAY OF NATRIURETIC PEPTIDE: CPT | Performed by: INTERNAL MEDICINE

## 2024-02-18 PROCEDURE — 2500000004 HC RX 250 GENERAL PHARMACY W/ HCPCS (ALT 636 FOR OP/ED): Performed by: SURGERY

## 2024-02-18 PROCEDURE — 85025 COMPLETE CBC W/AUTO DIFF WBC: CPT | Performed by: SURGERY

## 2024-02-18 PROCEDURE — 2500000005 HC RX 250 GENERAL PHARMACY W/O HCPCS: Performed by: SURGERY

## 2024-02-18 PROCEDURE — 2020000001 HC ICU ROOM DAILY

## 2024-02-18 PROCEDURE — P9016 RBC LEUKOCYTES REDUCED: HCPCS

## 2024-02-18 PROCEDURE — 2500000002 HC RX 250 W HCPCS SELF ADMINISTERED DRUGS (ALT 637 FOR MEDICARE OP, ALT 636 FOR OP/ED): Performed by: INTERNAL MEDICINE

## 2024-02-18 PROCEDURE — 71045 X-RAY EXAM CHEST 1 VIEW: CPT | Performed by: RADIOLOGY

## 2024-02-18 PROCEDURE — 84132 ASSAY OF SERUM POTASSIUM: CPT | Performed by: INTERNAL MEDICINE

## 2024-02-18 PROCEDURE — 94660 CPAP INITIATION&MGMT: CPT

## 2024-02-18 PROCEDURE — 86901 BLOOD TYPING SEROLOGIC RH(D): CPT | Performed by: SURGERY

## 2024-02-18 PROCEDURE — 71250 CT THORAX DX C-: CPT | Performed by: RADIOLOGY

## 2024-02-18 RX ORDER — FAMOTIDINE 10 MG/ML
20 INJECTION INTRAVENOUS ONCE
Status: COMPLETED | OUTPATIENT
Start: 2024-02-18 | End: 2024-02-18

## 2024-02-18 RX ORDER — MORPHINE SULFATE 2 MG/ML
2 INJECTION, SOLUTION INTRAMUSCULAR; INTRAVENOUS ONCE
Status: DISCONTINUED | OUTPATIENT
Start: 2024-02-18 | End: 2024-02-19

## 2024-02-18 RX ORDER — NITROGLYCERIN 0.4 MG/1
0.4 TABLET SUBLINGUAL EVERY 5 MIN PRN
Status: DISCONTINUED | OUTPATIENT
Start: 2024-02-18 | End: 2024-02-23 | Stop reason: HOSPADM

## 2024-02-18 RX ORDER — HEPARIN SODIUM 5000 [USP'U]/ML
4000 INJECTION, SOLUTION INTRAVENOUS; SUBCUTANEOUS ONCE
Status: DISCONTINUED | OUTPATIENT
Start: 2024-02-18 | End: 2024-02-19

## 2024-02-18 RX ORDER — FUROSEMIDE 10 MG/ML
60 INJECTION INTRAMUSCULAR; INTRAVENOUS ONCE
Status: COMPLETED | OUTPATIENT
Start: 2024-02-18 | End: 2024-02-18

## 2024-02-18 RX ORDER — MORPHINE SULFATE 2 MG/ML
2 INJECTION, SOLUTION INTRAMUSCULAR; INTRAVENOUS ONCE
Status: COMPLETED | OUTPATIENT
Start: 2024-02-18 | End: 2024-02-18

## 2024-02-18 RX ORDER — METOCLOPRAMIDE HYDROCHLORIDE 5 MG/ML
10 INJECTION INTRAMUSCULAR; INTRAVENOUS ONCE
Status: COMPLETED | OUTPATIENT
Start: 2024-02-18 | End: 2024-02-18

## 2024-02-18 RX ORDER — MORPHINE SULFATE 4 MG/ML
4 INJECTION INTRAVENOUS EVERY 6 HOURS PRN
Status: DISCONTINUED | OUTPATIENT
Start: 2024-02-18 | End: 2024-02-23 | Stop reason: HOSPADM

## 2024-02-18 RX ORDER — INSULIN LISPRO 100 [IU]/ML
12 INJECTION, SOLUTION INTRAVENOUS; SUBCUTANEOUS ONCE
Status: COMPLETED | OUTPATIENT
Start: 2024-02-18 | End: 2024-02-18

## 2024-02-18 RX ORDER — NITROGLYCERIN 0.4 MG/1
0.4 TABLET SUBLINGUAL ONCE
Status: COMPLETED | OUTPATIENT
Start: 2024-02-18 | End: 2024-02-18

## 2024-02-18 RX ORDER — HEPARIN SODIUM 10000 [USP'U]/100ML
0-4000 INJECTION, SOLUTION INTRAVENOUS CONTINUOUS
Status: DISCONTINUED | OUTPATIENT
Start: 2024-02-18 | End: 2024-02-22

## 2024-02-18 RX ADMIN — MORPHINE SULFATE 2 MG: 2 INJECTION, SOLUTION INTRAMUSCULAR; INTRAVENOUS at 18:15

## 2024-02-18 RX ADMIN — CEFTRIAXONE SODIUM 1 G: 1 INJECTION, SOLUTION INTRAVENOUS at 14:00

## 2024-02-18 RX ADMIN — FUROSEMIDE 60 MG: 10 INJECTION, SOLUTION INTRAMUSCULAR; INTRAVENOUS at 18:08

## 2024-02-18 RX ADMIN — ACETAMINOPHEN 650 MG: 325 TABLET ORAL at 06:24

## 2024-02-18 RX ADMIN — LORATADINE 10 MG: 10 TABLET ORAL at 08:40

## 2024-02-18 RX ADMIN — INSULIN LISPRO 4 UNITS: 100 INJECTION, SOLUTION INTRAVENOUS; SUBCUTANEOUS at 08:40

## 2024-02-18 RX ADMIN — AMIODARONE HYDROCHLORIDE 400 MG: 400 TABLET ORAL at 08:40

## 2024-02-18 RX ADMIN — HEPARIN SODIUM 1000 UNITS/HR: 10000 INJECTION, SOLUTION INTRAVENOUS at 19:10

## 2024-02-18 RX ADMIN — Medication 10 L/MIN: at 16:04

## 2024-02-18 RX ADMIN — METOPROLOL SUCCINATE 50 MG: 50 TABLET, EXTENDED RELEASE ORAL at 08:40

## 2024-02-18 RX ADMIN — INSULIN LISPRO 12 UNITS: 100 INJECTION, SOLUTION INTRAVENOUS; SUBCUTANEOUS at 15:54

## 2024-02-18 RX ADMIN — NITROGLYCERIN 0.4 MG: 0.4 TABLET SUBLINGUAL at 19:10

## 2024-02-18 RX ADMIN — METOCLOPRAMIDE 10 MG: 5 INJECTION, SOLUTION INTRAMUSCULAR; INTRAVENOUS at 18:08

## 2024-02-18 RX ADMIN — PANTOPRAZOLE SODIUM 40 MG: 40 TABLET, DELAYED RELEASE ORAL at 08:40

## 2024-02-18 RX ADMIN — SERTRALINE HYDROCHLORIDE 50 MG: 50 TABLET ORAL at 08:40

## 2024-02-18 RX ADMIN — VANCOMYCIN HYDROCHLORIDE 1250 MG: 1.25 INJECTION, POWDER, LYOPHILIZED, FOR SOLUTION INTRAVENOUS at 22:31

## 2024-02-18 RX ADMIN — FAMOTIDINE 20 MG: 10 INJECTION, SOLUTION INTRAVENOUS at 18:08

## 2024-02-18 RX ADMIN — ICOSAPENT ETHYL 1 G: 1 CAPSULE ORAL at 16:12

## 2024-02-18 RX ADMIN — ACETAMINOPHEN 650 MG: 325 TABLET ORAL at 16:12

## 2024-02-18 RX ADMIN — ACETAMINOPHEN 650 MG: 325 TABLET ORAL at 23:57

## 2024-02-18 RX ADMIN — Medication 10 L/MIN: at 07:44

## 2024-02-18 RX ADMIN — METRONIDAZOLE 500 MG: 500 TABLET ORAL at 14:00

## 2024-02-18 RX ADMIN — INSULIN LISPRO 4 UNITS: 100 INJECTION, SOLUTION INTRAVENOUS; SUBCUTANEOUS at 00:01

## 2024-02-18 RX ADMIN — Medication 10 L/MIN: at 11:47

## 2024-02-18 RX ADMIN — INSULIN LISPRO 4 UNITS: 100 INJECTION, SOLUTION INTRAVENOUS; SUBCUTANEOUS at 11:30

## 2024-02-18 RX ADMIN — ACETAMINOPHEN 650 MG: 325 TABLET ORAL at 00:00

## 2024-02-18 RX ADMIN — METRONIDAZOLE 500 MG: 500 TABLET ORAL at 06:24

## 2024-02-18 RX ADMIN — FUROSEMIDE 60 MG: 10 INJECTION, SOLUTION INTRAMUSCULAR; INTRAVENOUS at 23:57

## 2024-02-18 RX ADMIN — Medication 10 L/MIN: at 07:41

## 2024-02-18 RX ADMIN — ICOSAPENT ETHYL 1 G: 1 CAPSULE ORAL at 08:40

## 2024-02-18 RX ADMIN — PIPERACILLIN SODIUM AND TAZOBACTAM SODIUM 3.38 G: 3; .375 INJECTION, SOLUTION INTRAVENOUS at 20:40

## 2024-02-18 RX ADMIN — ACETAMINOPHEN 650 MG: 325 TABLET ORAL at 11:30

## 2024-02-18 ASSESSMENT — COGNITIVE AND FUNCTIONAL STATUS - GENERAL
DRESSING REGULAR UPPER BODY CLOTHING: A LITTLE
TOILETING: A LITTLE
TOILETING: A LITTLE
MOVING TO AND FROM BED TO CHAIR: A LITTLE
TURNING FROM BACK TO SIDE WHILE IN FLAT BAD: A LITTLE
DAILY ACTIVITIY SCORE: 19
WALKING IN HOSPITAL ROOM: A LOT
PERSONAL GROOMING: A LITTLE
DRESSING REGULAR LOWER BODY CLOTHING: A LITTLE
TURNING FROM BACK TO SIDE WHILE IN FLAT BAD: A LITTLE
PERSONAL GROOMING: A LITTLE
DRESSING REGULAR UPPER BODY CLOTHING: A LITTLE
MOBILITY SCORE: 14
MOBILITY SCORE: 14
STANDING UP FROM CHAIR USING ARMS: A LOT
WALKING IN HOSPITAL ROOM: A LOT
CLIMB 3 TO 5 STEPS WITH RAILING: TOTAL
STANDING UP FROM CHAIR USING ARMS: A LOT
MOVING FROM LYING ON BACK TO SITTING ON SIDE OF FLAT BED WITH BEDRAILS: A LITTLE
HELP NEEDED FOR BATHING: A LITTLE
MOVING TO AND FROM BED TO CHAIR: A LITTLE
DAILY ACTIVITIY SCORE: 19
HELP NEEDED FOR BATHING: A LITTLE
DRESSING REGULAR LOWER BODY CLOTHING: A LITTLE
MOVING FROM LYING ON BACK TO SITTING ON SIDE OF FLAT BED WITH BEDRAILS: A LITTLE
CLIMB 3 TO 5 STEPS WITH RAILING: TOTAL

## 2024-02-18 ASSESSMENT — PAIN - FUNCTIONAL ASSESSMENT
PAIN_FUNCTIONAL_ASSESSMENT: 0-10

## 2024-02-18 ASSESSMENT — PAIN SCALES - GENERAL
PAINLEVEL_OUTOF10: 4
PAINLEVEL_OUTOF10: 2
PAINLEVEL_OUTOF10: 0 - NO PAIN
PAINLEVEL_OUTOF10: 0 - NO PAIN

## 2024-02-18 ASSESSMENT — PAIN DESCRIPTION - LOCATION: LOCATION: CHEST
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
## 2024-02-18 NOTE — PROGRESS NOTES
"      Nephrology Progress Note      Nephrology following for TAYLOR on CKD stage IIIb.     Events over night:   -transfer out of ICU  -feels ok  -getting some sleep  -breathing ok        /82 (BP Location: Left arm, Patient Position: Lying)   Pulse 81   Temp 37.1 °C (98.7 °F) (Temporal)   Resp 14   Ht 1.575 m (5' 2\")   Wt 83 kg (182 lb 15.7 oz)   SpO2 92%   BMI 33.47 kg/m²     Input / Output:  24 HR:   Intake/Output Summary (Last 24 hours) at 2/18/2024 1109  Last data filed at 2/17/2024 1722  Gross per 24 hour   Intake 381.17 ml   Output 45 ml   Net 336.17 ml         Physical Exam   Alert and oriented x 3 NAD  Neck: no JVD  CV: RRR  Lungs: Mildly tachypneic on conversation breath sounds are equal bilaterally  Abd: soft, NT, ND   Ext: + edema LLE, bruising at knee    Scheduled medications  acetaminophen, 650 mg, oral, q6h DIVINE  amiodarone, 400 mg, oral, BID   Followed by  [START ON 2/28/2024] amiodarone, 200 mg, oral, Daily  cefTRIAXone, 1 g, intravenous, q24h  icosapent ethyL, 1 g, oral, BID with meals  insulin lispro, 0-10 Units, subcutaneous, q4h  loratadine, 10 mg, oral, Daily  metoprolol succinate XL, 50 mg, oral, Daily  metroNIDAZOLE, 500 mg, oral, q8h DIVINE  pantoprazole, 40 mg, oral, Daily  sertraline, 50 mg, oral, Daily      Continuous medications  lactated Ringer's, 10 mL/hr, Last Rate: 10 mL/hr (02/16/24 0815)    PRN medications  PRN medications: dextrose 10 % in water (D10W), dextrose, glucagon, HYDROmorphone, naloxone, ondansetron ODT **OR** ondansetron, oxyCODONE, oxygen, sodium chloride   Results from last 7 days   Lab Units 02/18/24  0419 02/15/24  1429 02/15/24  0407   SODIUM mmol/L 135*   < > 139   POTASSIUM mmol/L 5.0   < > 4.9   CHLORIDE mmol/L 106   < > 115*   CO2 mmol/L 19*   < > 16*   BUN mg/dL 52*   < > 38*   CREATININE mg/dL 2.21*   < > 1.96*   CALCIUM mg/dL 8.2*   < > 6.7*   PROTEIN TOTAL g/dL  --   --  4.9*   BILIRUBIN TOTAL mg/dL  --   --  0.6   ALK PHOS U/L  --   --  58   ALT U/L  " --   --  17   AST U/L  --   --  48*   GLUCOSE mg/dL 206*   < > 169*    < > = values in this interval not displayed.        Results from last 7 days   Lab Units 02/18/24 0419 02/17/24  0535 02/16/24  0426   MAGNESIUM mg/dL 2.07 1.96 2.09        Results from last 7 days   Lab Units 02/18/24 0419 02/17/24  0535 02/16/24  0426   WBC AUTO x10*3/uL 20.7* 23.2* 19.6*   HEMOGLOBIN g/dL 9.4* 9.1* 9.2*   HEMATOCRIT % 28.6* 27.8* 27.5*   PLATELETS AUTO x10*3/uL 200 184 155          Assessment & Plan:   Patient is 73 y.o. male who is admitted to hospital for left thigh hematoma and anemia. Nephrology consulted in view of TAYLOR on CKD.      TAYLOR on CKD stage 3b-TAYLOR secondary to volume mediated changes with anemia and some mild component from trauma/contrast.-improving creatinine peaked at 2.3     -Imaging study reviewed, partially atrophic right kidney with contrast retention   -Currently nonoliguric   -Serum creatinine is relative stable at 2.2mg/dL today    CKD stage 3b secondary to diabetic nephropathy- baseline Cr 1.7-1.8      Anemia secondary to hematoma-   S/p PRBCs 2/15    -Hemoglobin stable at 9.4  SIDHU cirrhosis    -Albumin 3.4, INR was at 1.3 prior    Left thigh hematoma- S/P trauma    HFrEF/dysrhythmia   -currently on oral amiodarone   -per cardiology    Leukocytosis   -culture pending per primary team     Recommendations:   -Continue to hold losartan and farxiga   -intermittent lasix as needed  -bmp in AM    Please message me through EPIC chat with any questions or concerns.     Arnold Alcala MD  2/18/2024  11:09 AM     [facility]    [address], [address]   [place] [address]  Office: [phone]

## 2024-02-18 NOTE — NURSING NOTE
Pt notified RN that nose was stuffy and pt removed oxygen d/t to stuffiness. RN notified Dr Olivas orders received for saline nasal spray and Claritin. RN added humidification to oxygen.
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
## 2024-02-18 NOTE — NURSING NOTE
RT at bedside per RN request. Pt appears working harder to breath. Pt SpO2 90-91% 6L O2 with abdominal muscle use. Dr Olivas notified and orders received

## 2024-02-18 NOTE — CARE PLAN
The patient's goals for the shift include go get rest and get better    The clinical goals for the shift include Decrease O2 needs

## 2024-02-19 PROBLEM — I21.4 NSTEMI (NON-ST ELEVATED MYOCARDIAL INFARCTION) (MULTI): Status: ACTIVE | Noted: 2024-02-13

## 2024-02-19 PROBLEM — I50.23 ACUTE ON CHRONIC SYSTOLIC HEART FAILURE (MULTI): Status: ACTIVE | Noted: 2024-02-13

## 2024-02-19 LAB
ANION GAP SERPL CALC-SCNC: 16 MMOL/L (ref 10–20)
ATRIAL RATE: 65 BPM
ATRIAL RATE: 70 BPM
ATRIAL RATE: 80 BPM
ATRIAL RATE: 82 BPM
BASOPHILS # BLD AUTO: 0.05 X10*3/UL (ref 0–0.1)
BASOPHILS NFR BLD AUTO: 0.3 %
BLOOD EXPIRATION DATE: NORMAL
BUN SERPL-MCNC: 62 MG/DL (ref 6–23)
CALCIUM SERPL-MCNC: 7.9 MG/DL (ref 8.6–10.3)
CHLORIDE SERPL-SCNC: 103 MMOL/L (ref 98–107)
CO2 SERPL-SCNC: 20 MMOL/L (ref 21–32)
CREAT SERPL-MCNC: 2.6 MG/DL (ref 0.5–1.3)
DISPENSE STATUS: NORMAL
EGFRCR SERPLBLD CKD-EPI 2021: 25 ML/MIN/1.73M*2
EOSINOPHIL # BLD AUTO: 0.21 X10*3/UL (ref 0–0.4)
EOSINOPHIL NFR BLD AUTO: 1.4 %
ERYTHROCYTE [DISTWIDTH] IN BLOOD BY AUTOMATED COUNT: 15.7 % (ref 11.5–14.5)
GLUCOSE BLD MANUAL STRIP-MCNC: 154 MG/DL (ref 74–99)
GLUCOSE BLD MANUAL STRIP-MCNC: 181 MG/DL (ref 74–99)
GLUCOSE BLD MANUAL STRIP-MCNC: 184 MG/DL (ref 74–99)
GLUCOSE BLD MANUAL STRIP-MCNC: 190 MG/DL (ref 74–99)
GLUCOSE BLD MANUAL STRIP-MCNC: 23 MG/DL (ref 74–99)
GLUCOSE BLD MANUAL STRIP-MCNC: 246 MG/DL (ref 74–99)
GLUCOSE SERPL-MCNC: 174 MG/DL (ref 74–99)
HCT VFR BLD AUTO: 31.1 % (ref 41–52)
HGB BLD-MCNC: 10.4 G/DL (ref 13.5–17.5)
HGB BLD-MCNC: 12.4 G/DL (ref 13.5–17.5)
HGB BLD-MCNC: 9.6 G/DL (ref 13.5–17.5)
IMM GRANULOCYTES # BLD AUTO: 0.16 X10*3/UL (ref 0–0.5)
IMM GRANULOCYTES NFR BLD AUTO: 1.1 % (ref 0–0.9)
LACTATE SERPL-SCNC: 1.3 MMOL/L (ref 0.4–2)
LYMPHOCYTES # BLD AUTO: 1.63 X10*3/UL (ref 0.8–3)
LYMPHOCYTES NFR BLD AUTO: 10.8 %
MAGNESIUM SERPL-MCNC: 2 MG/DL (ref 1.6–2.4)
MCH RBC QN AUTO: 30.6 PG (ref 26–34)
MCHC RBC AUTO-ENTMCNC: 33.4 G/DL (ref 32–36)
MCV RBC AUTO: 92 FL (ref 80–100)
MONOCYTES # BLD AUTO: 1.5 X10*3/UL (ref 0.05–0.8)
MONOCYTES NFR BLD AUTO: 9.9 %
NEUTROPHILS # BLD AUTO: 11.6 X10*3/UL (ref 1.6–5.5)
NEUTROPHILS NFR BLD AUTO: 76.5 %
NRBC BLD-RTO: 0.3 /100 WBCS (ref 0–0)
P AXIS: 36 DEGREES
P AXIS: 68 DEGREES
P OFFSET: 157 MS
P OFFSET: 169 MS
P ONSET: 117 MS
P ONSET: 128 MS
PLATELET # BLD AUTO: 177 X10*3/UL (ref 150–450)
POTASSIUM SERPL-SCNC: 5.3 MMOL/L (ref 3.5–5.3)
PR INTERVAL: 112 MS
PR INTERVAL: 144 MS
PR INTERVAL: 162 MS
PR INTERVAL: 176 MS
PRODUCT BLOOD TYPE: 5100
PRODUCT CODE: NORMAL
Q ONSET: 205 MS
Q ONSET: 208 MS
Q ONSET: 209 MS
Q ONSET: 212 MS
QRS COUNT: 11 BEATS
QRS COUNT: 13 BEATS
QRS COUNT: 14 BEATS
QRS COUNT: 16 BEATS
QRS DURATION: 116 MS
QRS DURATION: 118 MS
QRS DURATION: 128 MS
QRS DURATION: 92 MS
QT INTERVAL: 390 MS
QT INTERVAL: 392 MS
QT INTERVAL: 404 MS
QT INTERVAL: 446 MS
QTC CALCULATION(BAZETT): 457 MS
QTC CALCULATION(BAZETT): 463 MS
QTC CALCULATION(BAZETT): 465 MS
QTC CALCULATION(BAZETT): 508 MS
QTC FREDERICIA: 435 MS
QTC FREDERICIA: 445 MS
QTC FREDERICIA: 457 MS
QTC FREDERICIA: 465 MS
R AXIS: -15 DEGREES
R AXIS: 128 DEGREES
R AXIS: 58 DEGREES
R AXIS: 60 DEGREES
RBC # BLD AUTO: 3.4 X10*6/UL (ref 4.5–5.9)
SODIUM SERPL-SCNC: 134 MMOL/L (ref 136–145)
T AXIS: 133 DEGREES
T AXIS: 14 DEGREES
T AXIS: 144 DEGREES
T AXIS: 25 DEGREES
T OFFSET: 404 MS
T OFFSET: 407 MS
T OFFSET: 411 MS
T OFFSET: 428 MS
UFH PPP CHRO-ACNC: 0.3 IU/ML
UFH PPP CHRO-ACNC: 0.4 IU/ML
UNIT ABO: NORMAL
UNIT NUMBER: NORMAL
UNIT RH: NORMAL
UNIT VOLUME: 350
VANCOMYCIN SERPL-MCNC: 12.9 UG/ML (ref 5–20)
VENTRICULAR RATE: 102 BPM
VENTRICULAR RATE: 65 BPM
VENTRICULAR RATE: 80 BPM
VENTRICULAR RATE: 82 BPM
WBC # BLD AUTO: 15.2 X10*3/UL (ref 4.4–11.3)
XM INTEP: NORMAL

## 2024-02-19 PROCEDURE — 36415 COLL VENOUS BLD VENIPUNCTURE: CPT | Performed by: SURGERY

## 2024-02-19 PROCEDURE — 2500000002 HC RX 250 W HCPCS SELF ADMINISTERED DRUGS (ALT 637 FOR MEDICARE OP, ALT 636 FOR OP/ED): Performed by: INTERNAL MEDICINE

## 2024-02-19 PROCEDURE — 83605 ASSAY OF LACTIC ACID: CPT | Performed by: INTERNAL MEDICINE

## 2024-02-19 PROCEDURE — 2020000001 HC ICU ROOM DAILY

## 2024-02-19 PROCEDURE — 94660 CPAP INITIATION&MGMT: CPT

## 2024-02-19 PROCEDURE — 85018 HEMOGLOBIN: CPT | Performed by: INTERNAL MEDICINE

## 2024-02-19 PROCEDURE — 82947 ASSAY GLUCOSE BLOOD QUANT: CPT

## 2024-02-19 PROCEDURE — 2500000001 HC RX 250 WO HCPCS SELF ADMINISTERED DRUGS (ALT 637 FOR MEDICARE OP): Performed by: INTERNAL MEDICINE

## 2024-02-19 PROCEDURE — 36415 COLL VENOUS BLD VENIPUNCTURE: CPT | Performed by: INTERNAL MEDICINE

## 2024-02-19 PROCEDURE — 2500000004 HC RX 250 GENERAL PHARMACY W/ HCPCS (ALT 636 FOR OP/ED): Performed by: INTERNAL MEDICINE

## 2024-02-19 PROCEDURE — 83735 ASSAY OF MAGNESIUM: CPT | Performed by: SURGERY

## 2024-02-19 PROCEDURE — 99291 CRITICAL CARE FIRST HOUR: CPT | Performed by: FAMILY MEDICINE

## 2024-02-19 PROCEDURE — 85520 HEPARIN ASSAY: CPT | Performed by: INTERNAL MEDICINE

## 2024-02-19 PROCEDURE — 82374 ASSAY BLOOD CARBON DIOXIDE: CPT | Performed by: SURGERY

## 2024-02-19 PROCEDURE — 99232 SBSQ HOSP IP/OBS MODERATE 35: CPT | Performed by: SURGERY

## 2024-02-19 PROCEDURE — 99233 SBSQ HOSP IP/OBS HIGH 50: CPT | Performed by: NURSE PRACTITIONER

## 2024-02-19 PROCEDURE — 80202 ASSAY OF VANCOMYCIN: CPT

## 2024-02-19 PROCEDURE — 85027 COMPLETE CBC AUTOMATED: CPT | Performed by: INTERNAL MEDICINE

## 2024-02-19 RX ORDER — VANCOMYCIN HYDROCHLORIDE 750 MG/150ML
750 INJECTION, SOLUTION INTRAVENOUS EVERY 24 HOURS
Status: DISCONTINUED | OUTPATIENT
Start: 2024-02-19 | End: 2024-02-19

## 2024-02-19 RX ORDER — VANCOMYCIN HYDROCHLORIDE 750 MG/150ML
750 INJECTION, SOLUTION INTRAVENOUS ONCE
Status: COMPLETED | OUTPATIENT
Start: 2024-02-20 | End: 2024-02-20

## 2024-02-19 RX ORDER — VANCOMYCIN HYDROCHLORIDE 1 G/20ML
INJECTION, POWDER, LYOPHILIZED, FOR SOLUTION INTRAVENOUS DAILY PRN
Status: DISCONTINUED | OUTPATIENT
Start: 2024-02-19 | End: 2024-02-21

## 2024-02-19 RX ORDER — MORPHINE SULFATE 2 MG/ML
2 INJECTION, SOLUTION INTRAMUSCULAR; INTRAVENOUS ONCE
Status: COMPLETED | OUTPATIENT
Start: 2024-02-19 | End: 2024-02-19

## 2024-02-19 RX ORDER — SODIUM CHLORIDE 9 MG/ML
75 INJECTION, SOLUTION INTRAVENOUS CONTINUOUS
Status: DISCONTINUED | OUTPATIENT
Start: 2024-02-20 | End: 2024-02-20

## 2024-02-19 RX ADMIN — INSULIN LISPRO 2 UNITS: 100 INJECTION, SOLUTION INTRAVENOUS; SUBCUTANEOUS at 15:49

## 2024-02-19 RX ADMIN — PIPERACILLIN SODIUM AND TAZOBACTAM SODIUM 3.38 G: 3; .375 INJECTION, SOLUTION INTRAVENOUS at 02:32

## 2024-02-19 RX ADMIN — ACETAMINOPHEN 650 MG: 325 TABLET ORAL at 05:59

## 2024-02-19 RX ADMIN — ICOSAPENT ETHYL 1 G: 1 CAPSULE ORAL at 10:43

## 2024-02-19 RX ADMIN — INSULIN LISPRO 2 UNITS: 100 INJECTION, SOLUTION INTRAVENOUS; SUBCUTANEOUS at 12:46

## 2024-02-19 RX ADMIN — MORPHINE SULFATE 2 MG: 2 INJECTION, SOLUTION INTRAMUSCULAR; INTRAVENOUS at 04:25

## 2024-02-19 RX ADMIN — METOPROLOL SUCCINATE 50 MG: 50 TABLET, EXTENDED RELEASE ORAL at 10:31

## 2024-02-19 RX ADMIN — VANCOMYCIN HYDROCHLORIDE 750 MG: 750 INJECTION, SOLUTION INTRAVENOUS at 23:58

## 2024-02-19 RX ADMIN — HEPARIN SODIUM 1000 UNITS/HR: 10000 INJECTION, SOLUTION INTRAVENOUS at 19:46

## 2024-02-19 RX ADMIN — SERTRALINE HYDROCHLORIDE 50 MG: 50 TABLET ORAL at 10:09

## 2024-02-19 RX ADMIN — INSULIN LISPRO 2 UNITS: 100 INJECTION, SOLUTION INTRAVENOUS; SUBCUTANEOUS at 04:23

## 2024-02-19 RX ADMIN — PIPERACILLIN SODIUM AND TAZOBACTAM SODIUM 3.38 G: 3; .375 INJECTION, SOLUTION INTRAVENOUS at 10:08

## 2024-02-19 RX ADMIN — PANTOPRAZOLE SODIUM 40 MG: 40 TABLET, DELAYED RELEASE ORAL at 10:32

## 2024-02-19 RX ADMIN — ICOSAPENT ETHYL 1 G: 1 CAPSULE ORAL at 18:35

## 2024-02-19 RX ADMIN — ACETAMINOPHEN 650 MG: 325 TABLET ORAL at 18:35

## 2024-02-19 RX ADMIN — PIPERACILLIN SODIUM AND TAZOBACTAM SODIUM 3.38 G: 3; .375 INJECTION, SOLUTION INTRAVENOUS at 15:43

## 2024-02-19 RX ADMIN — SODIUM CHLORIDE 75 ML/HR: 9 INJECTION, SOLUTION INTRAVENOUS at 23:58

## 2024-02-19 RX ADMIN — PIPERACILLIN SODIUM AND TAZOBACTAM SODIUM 3.38 G: 3; .375 INJECTION, SOLUTION INTRAVENOUS at 19:47

## 2024-02-19 RX ADMIN — AMIODARONE HYDROCHLORIDE 400 MG: 400 TABLET ORAL at 10:10

## 2024-02-19 RX ADMIN — ACETAMINOPHEN 650 MG: 325 TABLET ORAL at 23:58

## 2024-02-19 RX ADMIN — AMIODARONE HYDROCHLORIDE 400 MG: 400 TABLET ORAL at 21:00

## 2024-02-19 RX ADMIN — INSULIN LISPRO 4 UNITS: 100 INJECTION, SOLUTION INTRAVENOUS; SUBCUTANEOUS at 20:59

## 2024-02-19 RX ADMIN — ACETAMINOPHEN 650 MG: 325 TABLET ORAL at 11:30

## 2024-02-19 RX ADMIN — LORATADINE 10 MG: 10 TABLET ORAL at 10:10

## 2024-02-19 RX ADMIN — INSULIN LISPRO 4 UNITS: 100 INJECTION, SOLUTION INTRAVENOUS; SUBCUTANEOUS at 00:06

## 2024-02-19 ASSESSMENT — PAIN SCALES - GENERAL
PAINLEVEL_OUTOF10: 0 - NO PAIN

## 2024-02-19 ASSESSMENT — PAIN - FUNCTIONAL ASSESSMENT
PAIN_FUNCTIONAL_ASSESSMENT: 0-10

## 2024-02-19 NOTE — PROGRESS NOTES
Physical Therapy                 Therapy Communication Note    Patient Name: Ritesh Garza  MRN: [id number]  Today's Date: 2/19/2024     Discipline: Physical Therapy    Missed Visit Reason: Patient refused    Missed Time: Attempt    Comment: pt lethargic, arouses to name but has difficulty remaining awake. While talking with therapists. Pt declining therapy

## 2024-02-19 NOTE — PROGRESS NOTES
Social work consult placed for DC planning with the comments stating rehab placement. SW reviewed pt's chart and communicated with [facility]. No SW needs foreseen at this time. SW signing off; available upon request.    Darian Acosta, MSW, LSW ([id number])   Care Transitions
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
## 2024-02-19 NOTE — PROGRESS NOTES
Occupational Therapy                 Therapy Communication Note    Patient Name: Ritesh Garza  MRN: [id number]  Today's Date: 2/19/2024     Discipline: Occupational Therapy    Missed Visit Reason: Missed Visit Reason: Patient refused (pt. unable to stay aroused.)    Missed Time: Attempt    Comment:

## 2024-02-19 NOTE — PROGRESS NOTES
"Rietsh Garza is a 73 y.o. male on day 5 of admission presenting with Hematoma of left thigh, initial encounter.    Subjective   Patient seen awake in bed. He reports breathing is unchanged, denies chest pain or palpitations. No reported overnight events.     ROS:  Constitutional: No fever, no chills, no fatigue  HEENT: No headache, no vision changes, no hearing loss, no nasal congestion  Respiratory: No cough, no SOB  Cardiac: No chest pain, no palpitations, no swelling of limbs  GI: No nausea, no vomiting, no diarrhea  Musculoskeletal: No back pain, no myalgia  Neuro: No seizures, no dizziness, no lightheadedness  Heme: No easy bruising, no bleeding  Genitourinary:  No Dysuria       Objective     Physical Exam  Gen: Adult male, visibly fatigued, no acute distress  HEENT: Normocephalic, atraumatic, good dentition, no oral lesions appreciated  Neck: Supple. No cervical lymphadenopathy appreciated, no thyroid enlargement appreciated  CV: Regular rate and rhythm, S1 and S2 present, no murmurs rubs or gallops appreciated  Resp: Lungs clear to auscultation bilaterally, no ronchi, rales or wheezes appreciated  Abdomen: soft, nontender, nondistended, no guarding, no masses appreciated  MSK: No joint swelling appreciated, full ROM  Psych: appropriate mood and affect    Last Recorded Vitals  Blood pressure 137/79, pulse 73, temperature 36.4 °C (97.5 °F), resp. rate 15, height 1.575 m (5' 2\"), weight 97.8 kg (215 lb 9.8 oz), SpO2 94 %.  Intake/Output last 3 Shifts:  I/O last 3 completed shifts:  In: 881.7 (9 mL/kg) [P.O.:100; I.V.:108.3 (1.1 mL/kg); Blood:573.3; IV Piggyback:100]  Out: 900 (9.2 mL/kg) [Urine:900 (0.3 mL/kg/hr)]  Weight: 97.8 kg       Relevant Results  Scheduled medications  acetaminophen, 650 mg, oral, q6h DIVINE  amiodarone, 400 mg, oral, BID   Followed by  [START ON 2/28/2024] amiodarone, 200 mg, oral, Daily  heparin, 4,000 Units, intravenous, Once  icosapent ethyL, 1 g, oral, BID with meals  insulin " lispro, 0-10 Units, subcutaneous, q4h  loratadine, 10 mg, oral, Daily  metoprolol succinate XL, 50 mg, oral, Daily  morphine, 2 mg, intravenous, Once  pantoprazole, 40 mg, oral, Daily  piperacillin-tazobactam, 3.375 g, intravenous, q6h  sertraline, 50 mg, oral, Daily      Continuous medications  heparin, 0-4,000 Units/hr, Last Rate: 1,000 Units/hr (02/18/24 2127)  [Held by provider] lactated Ringer's, 10 mL/hr, Last Rate: Stopped (02/18/24 2039)      PRN medications  PRN medications: dextrose 10 % in water (D10W), dextrose, glucagon, HYDROmorphone, morphine, naloxone, nitroglycerin, ondansetron ODT **OR** ondansetron, oxyCODONE, oxygen, sodium chloride, vancomycin    Results for orders placed or performed during the hospital encounter of 02/13/24 (from the past 24 hour(s))   POCT GLUCOSE   Result Value Ref Range    POCT Glucose 206 (H) 74 - 99 mg/dL   POCT GLUCOSE   Result Value Ref Range    POCT Glucose 540 (H) 74 - 99 mg/dL   Troponin I, High Sensitivity, Initial   Result Value Ref Range    Troponin I, High Sensitivity 4,518 (HH) 0 - 20 ng/L   POCT GLUCOSE   Result Value Ref Range    POCT Glucose 224 (H) 74 - 99 mg/dL   Troponin, High Sensitivity, 1 Hour   Result Value Ref Range    Troponin I, High Sensitivity 4,490 (HH) 0 - 20 ng/L   Hemoglobin   Result Value Ref Range    Hemoglobin 8.6 (L) 13.5 - 17.5 g/dL   Prepare RBC: 1 Units   Result Value Ref Range    PRODUCT CODE O9269R53     Unit Number [id number]     Unit ABO O     Unit RH POS     XM INTEP COMP     Dispense Status TR     Blood Expiration Date March 13, 2024 23:59 EDT     PRODUCT BLOOD TYPE 5100     UNIT VOLUME 350    Prepare RBC: 1 Units   Result Value Ref Range    PRODUCT CODE F9476V75     Unit Number [id number]     Unit ABO O     Unit RH POS     XM INTEP COMP     Dispense Status XM     Blood Expiration Date March 13, 2024 23:59 EDT     PRODUCT BLOOD TYPE 5100     UNIT VOLUME 350    Type and screen   Result Value Ref Range    ABO TYPE B     Rh  TYPE POS     ANTIBODY SCREEN NEG    Blood Gas Arterial Full Panel   Result Value Ref Range    POCT pH, Arterial 7.42 7.38 - 7.42 pH    POCT pCO2, Arterial 29 (L) 38 - 42 mm Hg    POCT pO2, Arterial 66 (L) 85 - 95 mm Hg    POCT SO2, Arterial 94 94 - 100 %    POCT Oxy Hemoglobin, Arterial 92.3 (L) 94.0 - 98.0 %    POCT Hematocrit Calculated, Arterial 26.0 (L) 41.0 - 52.0 %    POCT Sodium, Arterial 133 (L) 136 - 145 mmol/L    POCT Potassium, Arterial 5.7 (H) 3.5 - 5.3 mmol/L    POCT Chloride, Arterial 106 98 - 107 mmol/L    POCT Ionized Calcium, Arterial 1.21 1.10 - 1.33 mmol/L    POCT Glucose, Arterial 241 (H) 74 - 99 mg/dL    POCT Lactate, Arterial 2.2 (H) 0.4 - 2.0 mmol/L    POCT Base Excess, Arterial -4.9 (L) -2.0 - 3.0 mmol/L    POCT HCO3 Calculated, Arterial 18.8 (L) 22.0 - 26.0 mmol/L    POCT Hemoglobin, Arterial 8.5 (L) 13.5 - 17.5 g/dL    POCT Anion Gap, Arterial 14 10 - 25 mmo/L    Patient Temperature 37.0 degrees Celsius    FiO2 100 %   POCT GLUCOSE   Result Value Ref Range    POCT Glucose 209 (H) 74 - 99 mg/dL   Hemoglobin   Result Value Ref Range    Hemoglobin 9.6 (L) 13.5 - 17.5 g/dL   Heparin Assay, UFH   Result Value Ref Range    Heparin Unfractionated 0.3 See Comment Below for Therapeutic Ranges IU/mL   Lactate   Result Value Ref Range    Lactate 1.3 0.4 - 2.0 mmol/L   POCT GLUCOSE   Result Value Ref Range    POCT Glucose 23 (L) 74 - 99 mg/dL   POCT GLUCOSE   Result Value Ref Range    POCT Glucose 181 (H) 74 - 99 mg/dL   Basic Metabolic Panel   Result Value Ref Range    Glucose 174 (H) 74 - 99 mg/dL    Sodium 134 (L) 136 - 145 mmol/L    Potassium 5.3 3.5 - 5.3 mmol/L    Chloride 103 98 - 107 mmol/L    Bicarbonate 20 (L) 21 - 32 mmol/L    Anion Gap 16 10 - 20 mmol/L    Urea Nitrogen 62 (H) 6 - 23 mg/dL    Creatinine 2.60 (H) 0.50 - 1.30 mg/dL    eGFR 25 (L) >60 mL/min/1.73m*2    Calcium 7.9 (L) 8.6 - 10.3 mg/dL   Magnesium   Result Value Ref Range    Magnesium 2.00 1.60 - 2.40 mg/dL   Heparin Assay, UFH    Result Value Ref Range    Heparin Unfractionated 0.4 See Comment Below for Therapeutic Ranges IU/mL   CBC and Auto Differential   Result Value Ref Range    WBC 15.2 (H) 4.4 - 11.3 x10*3/uL    nRBC 0.3 (H) 0.0 - 0.0 /100 WBCs    RBC 3.40 (L) 4.50 - 5.90 x10*6/uL    Hemoglobin 10.4 (L) 13.5 - 17.5 g/dL    Hematocrit 31.1 (L) 41.0 - 52.0 %    MCV 92 80 - 100 fL    MCH 30.6 26.0 - 34.0 pg    MCHC 33.4 32.0 - 36.0 g/dL    RDW 15.7 (H) 11.5 - 14.5 %    Platelets 177 150 - 450 x10*3/uL    Neutrophils % 76.5 40.0 - 80.0 %    Immature Granulocytes %, Automated 1.1 (H) 0.0 - 0.9 %    Lymphocytes % 10.8 13.0 - 44.0 %    Monocytes % 9.9 2.0 - 10.0 %    Eosinophils % 1.4 0.0 - 6.0 %    Basophils % 0.3 0.0 - 2.0 %    Neutrophils Absolute 11.60 (H) 1.60 - 5.50 x10*3/uL    Immature Granulocytes Absolute, Automated 0.16 0.00 - 0.50 x10*3/uL    Lymphocytes Absolute 1.63 0.80 - 3.00 x10*3/uL    Monocytes Absolute 1.50 (H) 0.05 - 0.80 x10*3/uL    Eosinophils Absolute 0.21 0.00 - 0.40 x10*3/uL    Basophils Absolute 0.05 0.00 - 0.10 x10*3/uL   Hemoglobin   Result Value Ref Range    Hemoglobin 10.4 (L) 13.5 - 17.5 g/dL   POCT GLUCOSE   Result Value Ref Range    POCT Glucose 154 (H) 74 - 99 mg/dL       CT chest wo IV contrast    Result Date: 2/19/2024  Interpreted By:  Rolando Diana, STUDY: CT CHEST WO IV CONTRAST;  2/18/2024 10:02 pm   INDICATION: Signs/Symptoms:Shortness of breath, chest pain.   COMPARISON: None.   ACCESSION NUMBER(S): [id number]   ORDERING CLINICIAN: MILA SAMUELS   TECHNIQUE: Contiguous axial images of the chest were obtained without intravenous contrast. Coronal and sagittal reformatted images were obtained from the axial images.   FINDINGS: The examination is limited secondary to lack of intravenous contrast and patient motion.   No axillary or mediastinal lymphadenopathy. There is limited evaluation for hilar lymphadenopathy on noncontrast examination.   The heart is borderline and size. Coronary artery artifact  calcifications. No significant pericardial effusion   Small bilateral pleural effusions and bibasilar consolidative/atelectatic opacities. No pneumothorax.   Limited evaluation the upper abdomen. Nodularity of the liver compatible with cirrhosis. Postsurgical change of cholecystectomy.   Multilevel degenerative change of the thoracic spine.       Small bilateral pleural effusions and bibasilar consolidative/atelectatic opacities.   Liver cirrhosis and postsurgical change of cholecystectomy.   MACRO: None   Signed by: Rolando Diana 2/19/2024 2:30 AM Dictation workstation:   [id number]    Lower extremity venous duplex bilateral    Result Date: 2/18/2024  Interpreted By:  Rolando Diana, STUDY: [facility] US LOWER EXTREMITY VENOUS DUPLEX BILATERAL;  2/18/2024 9:01 pm   INDICATION: Signs/Symptoms:Concern for DVT and PE.   COMPARISON: None.   ACCESSION NUMBER(S): [id number]   ORDERING CLINICIAN: SHAGGY HOWARD   TECHNIQUE: Vascular ultrasound of the bilateral lower extremities was performed. Real-time compression views as well as Gray scale, color Doppler and spectral Doppler waveform analysis was performed.   FINDINGS: Evaluation of the visualized portions of the bilateral common femoral vein, proximal, mid, and distal femoral vein, and popliteal vein were performed.  Evaluation of the visualized portions of the  posterior tibial and peroneal veins were also performed.   The evaluated veins demonstrate normal compressibility. There is intact venous flow demonstrating normal respiratory variability and normal augmentation of flow with calf compression. Therefore, there is no ultrasonographic evidence for deep vein thrombosis within the evaluated veins.       No sonographic evidence for deep vein thrombosis within the evaluated veins of the bilateral lower extremity.   MACRO: None   Signed by: Rolando Diana 2/18/2024 10:27 PM Dictation workstation:   [id number]    XR chest 1 view    Result Date: 2/18/2024  Interpreted By:   Geovanny Gardner, STUDY: XR CHEST 1 VIEW;  2/18/2024 6:24 pm   INDICATION: Signs/Symptoms:Hypoxemia worsening.   COMPARISON: 02/18/2024   ACCESSION NUMBER(S): [id number]   ORDERING CLINICIAN: SHAGGY HOWRAD   FINDINGS: Heart size is enlarged. Small effusions. Features suggesting edema. Median sternotomy no pneumothorax       1.  Features suggesting mild pulmonary edema ; slightly progressed       MACRO: None   Signed by: Geovanny Gardner 2/18/2024 6:31 PM Dictation workstation:   [id number]    Electrocardiogram, 12-lead PRN ACS symptoms    Result Date: 2/18/2024  Sinus rhythm with occasional Premature ventricular complexes Low voltage QRS Cannot rule out Anteroseptal infarct (cited on or before 14-FEB-2024) Abnormal ECG When compared with ECG of 17-FEB-2024 08:37, (unconfirmed) Premature supraventricular complexes are no longer Present Serial changes of Anteroseptal infarct Present    XR chest 1 view    Result Date: 2/18/2024  Interpreted By:  Morales Cortes, STUDY: XR CHEST 1 VIEW;  2/18/2024 5:03 am   INDICATION: Signs/Symptoms:hypoxia.   COMPARISON: Chest radiograph dated 02/17/2024.   ACCESSION NUMBER(S): [id number]   ORDERING CLINICIAN: NEELAM MILAN   FINDINGS:   CARDIOMEDIASTINAL SILHOUETTE: Cardiomediastinal silhouette is mildly enlarged. Similar sternotomy changes.   LUNGS/PLEURA: There are no consolidations.There are no pleural effusions. There is no demonstrated pneumothorax.   Abdomen: Cholecystectomy clips.   BONES: No evidence of acute osseous abnormality.       1.  No evidence of acute cardiopulmonary process. Mild cardiomegaly.     Signed by: Morales Cortes 2/18/2024 5:37 AM Dictation workstation:   [id number]    XR chest 2 views    Result Date: 2/17/2024  Interpreted By:  Jose Morse, STUDY: XR CHEST 2 VIEWS  2/17/2024 12:09 pm   INDICATION: Signs/Symptoms:Persistent leukocytosis   COMPARISON: 09/14/2022   ACCESSION NUMBER(S): [id number]   ORDERING CLINICIAN: SHAGGY HOWARD   TECHNIQUE: PA  and lateral views of the chest were obtained.   FINDINGS: Cardiac monitoring leads are seen over the chest. Sternal wires and mediastinal surgical clips are present. A small left-sided pleural effusion is present. Left basilar airspace consolidation is seen and may represent atelectasis and/or pneumonia. No focal infiltrate, pleural effusion or pneumothorax is identified. The cardiac silhouette is within normal limits for size. Moderate to severe discogenic degenerative changes are seen throughout the thoracic spine.       Left-sided pleural effusion and left basilar airspace consolidation, as above. Clinical correlation and continued follow-up until clearing is recommended.   MACRO: None.   Signed by: Jose Morse 2/17/2024 12:33 PM Dictation workstation:   [id number]    Electrocardiogram, 12-lead PRN ACS symptoms    Result Date: 2/17/2024  Atrial fibrillation with aberrantly conducted complexes Premature ventricular complexes Anteroseptal infarct , age undetermined Abnormal ECG When compared with ECG of 15-FEB-2024 17:46, (unconfirmed) No significant change was found Confirmed by Blaine Adams ([id number]) on 2/17/2024 12:00:10 PM    ECG 12 Lead    Result Date: 2/17/2024  Normal sinus rhythm Low voltage QRS Septal infarct , age undetermined Abnormal ECG When compared with ECG of 15-FEB-2024 17:46, (unconfirmed) Sinus rhythm has replaced Wide QRS rhythm Vent. rate has decreased BY  40 BPM Confirmed by Blaine Adams ([id number]) on 2/17/2024 2:18:29 AM    Transthoracic Echo (TTE) Complete    Result Date: 2/15/2024              [place] [id number]      Phone [phone] Fax [phone] TRANSTHORACIC ECHOCARDIOGRAM REPORT  Patient Name:      CHIDI VARGAS      Reading Physician:    [id number] Gonzalo White DO Study Date:        2/15/2024             Ordering Provider:    [id number] FRANCIS                                                                 MARIA ISABEL MRN/PID:           [id number]              Fellow: Accession#:        [id number]          Nurse: Date of Birth/Age: 1950 / 73 years  Sonographer:          Heena David RDCS Gender:            M                     Additional Staff: Height:            160.02 cm             Admit Date:           2/13/2024 Weight:            73.03 kg              Admission Status:     Inpatient -                                                                Routine BSA / BMI:         1.76 m2 / 28.52 kg/m2 Department Location:  [facility] Echo                                                                Lab Blood Pressure: 105 /60 mmHg Study Type:    TRANSTHORACIC ECHO (TTE) COMPLETE Diagnosis/ICD: Chest pain, unspecified-R07.9 Indication:    Chest Pain CPT Codes:     Echo Complete w Full Doppler-[id number]  Study Detail: The following Echo studies were performed: 2D, M-Mode, Doppler and               color flow. Optison used as a contrast agent for endocardial               border definition. Total contrast used for this procedure was 3 mL               via IV push.  PHYSICIAN INTERPRETATION: Left Ventricle: Left ventricular systolic function is normal, with an estimated ejection fraction of 30-35%. There are multiple wall motion abnormalities. The left ventricular cavity size is upper limits of normal. Spectral Doppler shows a restrictive pattern of left ventricular diastolic filling. LV Wall Scoring: The basal inferoseptal segment and basal inferior segment are akinetic. The entire anterior septum, mid and apical inferior septum, basal and mid inferolateral wall, basal anterolateral segment, basal anterior segment, and mid inferior segment are hypokinetic. All remaining scored segments are normal. Left Atrium: The left atrium is normal in size. Right Ventricle: The right ventricle was not well visualized. There is low normal right ventricular systolic function. Right Atrium: The right atrium  is normal in size. Aortic Valve: The aortic valve is trileaflet. There is mild aortic valve thickening. There is no evidence of aortic valve regurgitation. The peak instantaneous gradient of the aortic valve is 7.1 mmHg. The mean gradient of the aortic valve is 4.0 mmHg. Mitral Valve: The mitral valve is normal in structure. There is mild mitral valve regurgitation. Tricuspid Valve: The tricuspid valve is structurally normal. There is mild to moderate tricuspid regurgitation. Pulmonic Valve: The pulmonic valve is structurally normal. There is physiologic pulmonic valve regurgitation. Pericardium: There is no pericardial effusion noted. Aorta: The aortic root is normal.  CONCLUSIONS:  1. Left ventricular systolic function is normal with a 30-35% estimated ejection fraction.  2. Multiple segmental abnormalities exist. See findings.  3. Spectral Doppler shows a restrictive pattern of left ventricular diastolic filling.  4. There is low normal right ventricular systolic function.  5. Mild to moderate tricuspid regurgitation.  6. There are multiple wall motion abnormalities. QUANTITATIVE DATA SUMMARY: 2D MEASUREMENTS:                           Normal Ranges: Ao Root d:     2.90 cm    (2.0-3.7cm) LAs:           4.60 cm    (2.7-4.0cm) IVSd:          1.00 cm    (0.6-1.1cm) LVPWd:         0.90 cm    (0.6-1.1cm) LVIDd:         5.30 cm    (3.9-5.9cm) LVIDs:         5.20 cm LV Mass Index: 106.2 g/m2 LV % FS        1.9 % LA VOLUME:                               Normal Ranges: LA Vol A4C:        53.0 ml    (22+/-6mL/m2) LA Vol A2C:        47.8 ml LA Vol BP:         50.3 ml LA Vol Index A4C:  30.0ml/m2 LA Vol Index A2C:  27.1 ml/m2 LA Vol Index BP:   28.5 ml/m2 LA Area A4C:       18.0 cm2 LA Area A2C:       17.1 cm2 LA Major Axis A4C: 5.2 cm LA Major Axis A2C: 5.2 cm LA Volume Index:   26.8 ml/m2 M-MODE MEASUREMENTS:                  Normal Ranges: Ao Root: 2.90 cm (2.0-3.7cm) AORTA MEASUREMENTS:                    Normal Ranges:  Asc Ao, d: 2.50 cm (2.1-3.4cm) LV SYSTOLIC FUNCTION BY 2D PLANIMETRY (MOD):                     Normal Ranges: EF-A4C View: 29.3 % (>=55%) EF-A2C View: 43.2 % EF-Biplane:  35.2 % LV DIASTOLIC FUNCTION:                        Normal Ranges: MV Peak E:    1.29 m/s (0.7-1.2 m/s) MV Peak A:    0.50 m/s (0.42-0.7 m/s) E/A Ratio:    2.56     (1.0-2.2) MV e'         0.06 m/s (>8.0) MV lateral e' 0.06 m/s MV medial e'  0.06 m/s E/e' Ratio:   21.50    (<8.0) MITRAL VALVE:                 Normal Ranges: MV DT: 151 msec (150-240msec) MITRAL INSUFFICIENCY:                           Normal Ranges: PISA Radius:  0.5 cm MR VTI:       129.00 cm MR Vmax:      437.00 cm/s MR Alias Matt: 38.5 cm/s MR Volume:    17.85 ml MR Flow Rt:   60.48 ml/s MR EROA:      0.14 cm2 AORTIC VALVE:                                   Normal Ranges: AoV Vmax:                1.33 m/s (<=1.7m/s) AoV Peak P.1 mmHg (<20mmHg) AoV Mean P.0 mmHg (1.7-11.5mmHg) LVOT Max Matt:            1.04 m/s (<=1.1m/s) AoV VTI:                 25.10 cm (18-25cm) LVOT VTI:                20.70 cm LVOT Diameter:           2.00 cm  (1.8-2.4cm) AoV Area, VTI:           2.59 cm2 (2.5-5.5cm2) AoV Area,Vmax:           2.46 cm2 (2.5-4.5cm2) AoV Dimensionless Index: 0.82  RIGHT VENTRICLE: TAPSE: 14.1 mm RV s'  0.09 m/s TRICUSPID VALVE/RVSP:                             Normal Ranges: Peak TR Velocity: 2.44 m/s RV Syst Pressure: 26.8 mmHg (< 30mmHg) IVC Diam:         1.50 cm  [id number] Gonzalo Pace DO Electronically signed on 2/15/2024 at 11:57:27 AM  Wall Scoring  ** Final **     CT femur left wo IV contrast    Result Date: 2/15/2024  Interpreted By:  Chris Nails, STUDY: CT FEMUR LEFT WO IV CONTRAST;  2/15/2024 9:59 am   INDICATION: Signs/Symptoms:hematoma left thigh.   COMPARISON: Radiographs 2024.   ACCESSION NUMBER(S): [id number]   ORDERING CLINICIAN: SHANTEL LINTON   TECHNIQUE: CT imaging of the  left femur was obtained  without administration of  intravenous contrast medium. Coronal and sagittal reformatted images were performed.   FINDINGS: OSSEOUS STRUCTURES: No acute fracture or dislocation. Left hip joint is maintained. Osteoarthritis of the knee. Trace knee joint effusion with mild synovial thickening.   SOFT TISSUES: There is in ellipsoid superficial, subcutaneous hematoma in the anterior thigh splaying the rectus femoris and vastus lateralis muscles. This hematoma measures 12 x 5 x 19 cm (transverse x anterior-posterior x craniocaudal). There is minimal skin thickening and diffuse subcutaneous edema throughout the imaged thigh. No fluid collection or suspicious soft tissue mass. Muscle bulk is maintained with preserved intermuscular fascial planes. Small popliteal cyst. Love balloon and excreted contrast within the bladder. No acute intrapelvic process.       Anterior thigh subcutaneous hematoma splaying the rectus femoris and vastus lateralis muscles, 12 x 5 x 19 cm.   Minimal skin thickening and diffuse subcutaneous edema throughout the imaged thigh without a fluid collection. No CT evidence of deep compartment infiltration.   No acute fracture.   MACRO: None   Signed by: Chris Nails 2/15/2024 11:51 AM Dictation workstation:   [id number]    CT abdomen pelvis wo IV contrast    Result Date: 2/15/2024  Interpreted By:  Akil Ruiz, STUDY: CT ABDOMEN PELVIS WO IV CONTRAST;  2/15/2024 9:59 am   INDICATION: Signs/Symptoms:Hematoma left thigh,.   COMPARISON: None   ACCESSION NUMBER(S): [id number]   ORDERING CLINICIAN: AKIL LINTON   TECHNIQUE: CT of the abdomen and pelvis was performed. Contiguous axial images were obtained at 3 mm slice thickness through the abdomen and pelvis. Coronal and sagittal reconstructions at 3 mm slice thickness were performed.  No intravenous contrast was administered; positive oral contrast was given.   FINDINGS: Please note that the evaluation of vessels, lymph nodes and organs is limited without intravenous  contrast.   LOWER CHEST: Trace bilateral pleural effusion with surrounding atelectasis.   ABDOMEN:   LIVER: Cirrhotic hepatic morphology. No gross lesions.   BILE DUCTS: No intrahepatic biliary dilatation.   GALLBLADDER: Surgically absent.   PANCREAS: No duct dilatation.   SPLEEN: No splenomegaly.   ADRENAL GLANDS: Bilateral adrenal glands appear normal.   KIDNEYS AND URETERS: Retained contrast within the renal parenchyma from prior contrasted study. Small size right kidney. No hydroureteronephrosis or nephroureterolithiasis is identified. Left cortical renal cysts.   PELVIS:   BLADDER: Within normal limits. Love catheter in place. Trace contrast noted.   REPRODUCTIVE ORGANS: Mild splenomegaly.   BOWEL: No bowel dilatation.   VESSELS: Moderate vascular calcifications and atherosclerotic changes. Focal aneurysmal dilatation of the infrarenal aorta measuring 3.1 cm.   PERITONEUM/RETROPERITONEUM/LYMPH NODES: No enlarged intra-abdominal or pelvic lymphadenopathy.   ABDOMINAL WALL: Redemonstrated left lateral abdominal wall/flank and lateral thigh significant soft tissue thickening and edematous changes with a small intramuscular hyperdensity in the left lateral thigh measuring 1.7 cm as in prior.   BONES: Multilevel degenerative spine changes and facet joint disease.       1. Redemonstrated left flank and lateral thigh significant soft tissue thickening and edematous changes with a small intramuscular hyperdensity in the left lateral thigh measuring 1.7 cm as in prior in keeping with the known hematoma. The previously described left thigh active extravasation is not included in this portions of CT scan and would be better assessed in same day left femur CT scan official read. 2. Cirrhotic hepatic morphology. 3. Partially atrophic right kidney. Retained renal parenchymal IV contrast from prior study could be related to impairment. Advise correlation with kidney function tests. 4. Focal aneurysmal dilatation of the  abdominal aorta measuring 3.1 cm. 5. Trace bilateral pleural effusion with surrounding atelectasis     MACRO: None   Signed by: Akil Ruiz 2/15/2024 10:49 AM Dictation workstation:   [id number]    ECG 12 Lead    Result Date: 2/15/2024  Ventricular tachcyardia, offset into undetermined rhythm with frequent ectopic beats Low voltage QRS Cannot rule out Inferior infarct , age undetermined Cannot rule out Anteroseptal infarct , age undetermined Marked ST abnormality, possible lateral subendocardial injury Abnormal ECG When compared with ECG of 13-FEB-2024 22:12, PREVIOUS ECG IS PRESENT Confirmed by Migdalia Beasley ([id number]) on 2/15/2024 10:32:33 AM    Electrocardiogram, 12-lead PRN ACS symptoms    Result Date: 2/15/2024  Sinus rhythm with frequent PAC/PVC's Anteroseptal infarct (cited on or before 14-FEB-2024) Abnormal ECG When compared with ECG of 14-FEB-2024 21:00, (unconfirmed) Current undetermined rhythm precludes rhythm comparison, needs review Right bundle branch block is no longer Present Borderline criteria for Inferior infarct are no longer Present Questionable change in initial forces of Anterior leads Confirmed by Migdalia Beasley ([id number]) on 2/15/2024 10:31:39 AM    XR foot left 3+ views    Result Date: 2/15/2024  Interpreted By:  Nico Campos, STUDY: XR FOOT LEFT 3+ VIEWS; ;  2/14/2024 1:19 pm   INDICATION: Signs/Symptoms:trauma.   COMPARISON: None.   ACCESSION NUMBER(S): [id number]   ORDERING CLINICIAN: RODRIGO GALLARDO   FINDINGS: No acute fracture or dislocation of the left foot is noted.   The lateral margin of the 2nd metatarsophalangeal joint has degenerative calcifications/ossifications. Articulation       No acute fracture or dislocation of the left foot.     MACRO: None   Signed by: Nico Campos 2/15/2024 8:03 AM Dictation workstation:   [id number]    ECG 12 lead    Result Date: 2/14/2024  Sinus rhythm Nonspecific intraventricular conduction delay Anteroseptal infarct, age indeterminate Minimal ST  elevation, inferior leads Lateral leads are also involved See ED provider note for full interpretation and clinical correlation Confirmed by Malik Hills ([id number]) on 2/14/2024 3:39:40 PM    CT chest abdomen pelvis w IV contrast    Addendum Date: 2/13/2024    Interpreted By:  Geovanny Gardner, ADDENDUM: Geovanny Gardner discussed the significance and urgency of this critical finding by epic chat with  TETO NEAL on 2/13/2024 at 11:05 pm. (**-RCF-**) Findings:  See findings.     Signed by: Geovanny Gardner 2/13/2024 11:05 PM   -------- ORIGINAL REPORT -------- Dictation workstation:   [id number]    Result Date: 2/13/2024  Interpreted By:  Geovanny Gardner, STUDY: CT CHEST ABDOMEN PELVIS W IV CONTRAST;  2/13/2024 10:08 pm   INDICATION: Signs/Symptoms:Trauma, fall, left lower extremity hematoma, left lower quadrant discomfort   COMPARISON: 06/29/2020   ACCESSION NUMBER(S): [id number]   ORDERING CLINICIAN: TETO NEAL   TECHNIQUE: CT of the chest, abdomen, and pelvis was performed. Contiguous axial images were obtained at  5 mm slice thickness through the chest, and at  3 mm through the abdomen and pelvis. Coronal and sagittal reconstructions at  3 mm slice thickness were performed. Intravenous contrast administered   FINDINGS: CHEST:   Gynecomastia. Median sternotomy. Enlarged main pulmonary artery suggestive of pulmonary arterial hypertension no findings of pneumothorax or pleural effusion. No acute traumatic findings in the chest. Severe vascular calcification   ABDOMEN: Abdominal aortic aneurysm measuring 3.2 x 2.5 cm not measurably changed. Heterogeneous appearance of the liver with surface nodularity. Unremarkable adrenal glands and pancreas. Unremarkable spleen. No splenic hematoma   Prostate gland is enlarged. It is heterogeneous.   Right kidney is atrophic compared to the left. 1 cm left renal cyst. Findings most suspicious for renal artery stenosis diverticulosis.   Interrogation of the left pelvis shows a large  hematoma in the left quadriceps with high density blush of contrast. Reference image 50 series 12 compatible with active extravasation. Hematoma measures about 9.1 x 5.6 x 13 cm.   Marked soft tissue swelling of the left lower extremity. Mild multilevel degenerative disc disease.       Large left intramuscular hematoma with evidence of active extravasation within the muscle.   Severe vascular calcification.   Stable abdominal aortic aneurysm with severe vascular calcification. Heterogeneity of the liver. Query cirrhosis. Right kidney is atrophic compared to the left most suggestive of renal artery stenosis.   Enlarged prostate gland. Diverticulosis. No diverticulitis   Signed by: Geovanny Gardner 2/13/2024 10:59 PM Dictation workstation:   [id number]    XR femur left 2+ views    Result Date: 2/13/2024  Interpreted By:  Geovanny Gardner, STUDY: XR KNEE 4+ VIEWS BILATERAL; XR FEMUR LEFT 2+ VIEWS; ;  2/13/2024 10:35 pm   INDICATION: Signs/Symptoms:Fall, bilateral knee pain; Signs/Symptoms:Fall, left thigh hematoma.   COMPARISON: None.   ACCESSION NUMBER(S): [id number]; [id number]   ORDERING CLINICIAN: TETO NEAL   FINDINGS: Left femur and bilateral knees performed.   Vascular calcification detected bilaterally.   Large soft tissue swelling seen in the left thigh compatible with known hematoma. No evidence of femur fracture.   No evidence of knee dislocation. Mild tricompartmental osteoarthritis of both knees.       No evidence of left femur fracture. Known large hematoma. Bilateral knee osteoarthritis. Robust vascular calcification     MACRO: None   Signed by: Geovanny Gardner 2/13/2024 11:01 PM Dictation workstation:   [id number]    XR knee 4+ views bilateral    Result Date: 2/13/2024  Interpreted By:  Geovanny Gardner, STUDY: XR KNEE 4+ VIEWS BILATERAL; XR FEMUR LEFT 2+ VIEWS; ;  2/13/2024 10:35 pm   INDICATION: Signs/Symptoms:Fall, bilateral knee pain; Signs/Symptoms:Fall, left thigh hematoma.   COMPARISON:  None.   ACCESSION NUMBER(S): [id number]; [id number]   ORDERING CLINICIAN: TETO NEAL   FINDINGS: Left femur and bilateral knees performed.   Vascular calcification detected bilaterally.   Large soft tissue swelling seen in the left thigh compatible with known hematoma. No evidence of femur fracture.   No evidence of knee dislocation. Mild tricompartmental osteoarthritis of both knees.       No evidence of left femur fracture. Known large hematoma. Bilateral knee osteoarthritis. Robust vascular calcification     MACRO: None   Signed by: Geovanny Gardner 2/13/2024 11:01 PM Dictation workstation:   [id number]    XR pelvis 1-2 views    Result Date: 2/13/2024  Interpreted By:  Deborah Jamil, STUDY: XR PELVIS 1-2 VIEWS; ;  2/13/2024 9:33 pm   INDICATION: Signs/Symptoms:trauma, left thigh pain.   COMPARISON: None.   ACCESSION NUMBER(S): [id number]   ORDERING CLINICIAN: TETO NEAL   FINDINGS: Evaluation limited to positioning. Postoperative changes in the right hip evidence by surgical clips. Heavy wall calcification of vascular structures in the area of femoral arteries bilaterally. No evidence for acute fracture or dislocation. No bone lesions. No radiopaque foreign body.       No acute fracture or dislocation of bilateral hip joints.     MACRO: None   Signed by: Deborah Jamil 2/13/2024 9:54 PM Dictation workstation:   [id number]    CT cervical spine wo IV contrast    Result Date: 2/13/2024  Interpreted By:  Deborah Jamil, STUDY: CT CERVICAL SPINE WO IV CONTRAST;  2/13/2024 9:45 pm   INDICATION: Signs/Symptoms:Fall, neck pain.   COMPARISON: None.   ACCESSION NUMBER(S): [id number]   ORDERING CLINICIAN: TETO NEAL   TECHNIQUE: Axial CT images of the cervical spine are obtained. Axial, coronal and sagittal reconstructions are provided for review.   FINDINGS: Wall calcification of bilateral carotid bulbs, worse on the left. Heterogeneous enlargement of the right thyroid lobe which is incompletely characterized in  this study. No focal masses are seen.   Fractures: There is no evidence for an acute fracture of the cervical spine.   Vertebral Alignment: Within normal limits.   Craniocervical Junction: Degenerative changes at C1-2 level with prominent soft tissue thickening at the C1-2 articulation.   Vertebrae/Disc Spaces:  Moderate multilevel discogenic degenerative changes including disc space narrowing, endplate sclerosis, spurring and posterior disc osteophyte complex. Findings worse from C5-T1. Facet joint sclerosis and hypertrophy bilaterally at multiple levels.   Prevertebral/Paraspinal Soft Tissues: No prevertebral soft tissue swelling.         1. No evidence for an acute fracture or subluxation of the cervical spine.   2. Degenerative changes of the spine as above.   3. Asymmetrical thyromegaly.   4. Heavy wall calcification of bilateral carotid bulbs.   MACRO: None   Signed by: Deborah Jamil 2/13/2024 9:53 PM Dictation workstation:   [id number]    CT head W O contrast trauma protocol    Result Date: 2/13/2024  Interpreted By:  Deborah Jamil, STUDY: CT HEAD W/O CONTRAST TRAUMA PROTOCOL;  2/13/2024 9:45 pm   INDICATION: Signs/Symptoms:Fall.   COMPARISON: 09/14/2022   ACCESSION NUMBER(S): [id number]   ORDERING CLINICIAN: TETO NEAL   TECHNIQUE: Noncontrast axial CT scan of head was performed. Angled reformats in brain and bone windows were generated. The images were reviewed in bone, brain, blood and soft tissue windows.   FINDINGS: CSF Spaces: The ventricles, sulci and cisterns are within normal limits for patient's age.  There is no extraaxial fluid collection.   Parenchyma: The grey-white differentiation is intact. There is no mass effect or midline shift.  There is no intracranial hemorrhage.   Calvarium: The calvarium is unremarkable.   Paranasal sinuses and mastoids: Visualized paranasal sinuses and mastoids are clear.       No evidence of acute cortical infarct or intracranial hemorrhage.   MACRO: None      Signed by: Deborah Jamil 2/13/2024 9:51 PM Dictation workstation:   [id number]           This patient currently has cardiac telemetry ordered; if you would like to modify or discontinue the telemetry order, click here to go to the orders activity to modify/discontinue the order.                 Assessment/Plan   Principal Problem:    Hematoma of left thigh, initial encounter  Active Problems:    Asthma    Benign essential hypertension    Cirrhosis, nonalcoholic (CMS/HCC)    Type 2 diabetes mellitus with diabetic neuropathy, without long-term current use of insulin (CMS/HCC)    Cognitive dysfunction    Congestive heart failure with right ventricular systolic dysfunction (CMS/HCC)    Traumatic hematoma of left thigh    CAD (coronary artery disease)    HLD (hyperlipidemia)    Acute kidney injury superimposed on CKD (CMS/HCC)    SENIA (obstructive sleep apnea)    Erosive esophagitis    Chest pain    Ventricular tachycardia (CMS/HCC)    Acute blood loss anemia    Demand ischemia    Pneumonia    Acute non-ST elevation MI, angina, CAD, Congestive heart failure with right ventricular systolic dysfunction  -downtrending troponin   -cardiology consulted  -LHC postponed due to elevated Creatinine, appreciate further reccs  -continue amiodarone, metoprolol, vascepa  -NPO after midnight in anticipation for LHC  -continue heparin drip    Acute blood loss anemia, hematoma of left thigh  - trauma surgery following  - s/p 1u pRBC  - trending CBC, transfuse for HB<7.0    Acute kidney injury on chronic kidney disease secondary to diabetic nephropathy  -Nephrology following, imaging shows partially atrophic right kidney with contrast retention   -continue holding home losartan and farxiga  -start low IVF and recheck BMP in AM    Cirrhosis nonalcoholic  -monitor albumin, INR, will calculate MELD score in AM    History of erosive esophagitis  -continue pantoprazole every day    Type 2 diabetes with neuropathy  -A1c 6.6%  -sliding scale  insulin      DVT prophylaxis-SCDs no pharmacologic prophylaxis due to blood loss anemia           I spent 40 minutes in the professional and overall care of this patient.      Rajendra Liu MD

## 2024-02-19 NOTE — PROGRESS NOTES
Pharmacy to Dose Vancomycin - Results Assessment    Ritesh Garza is a 73 y.o. male admitted for Hematoma of left thigh, initial encounter. Pharmacy was consulted for vancomycin dosing and monitoring. Today is day 1 of vancomycin therapy.      Assessment  Vital signs reviewed, including temperature, HR, BP, I/Os.   Available lab results reviewed, including:WBC, serum creatinine, BUN, and MRSA screen.    Recent vancomycin received: 1250 mg x1 dose to present.              Trough level assessment:2/20 @ 0500                   Plan     Within goal trough range. Continue current vancomycin regimen.  Expected vancomycin duration: . days of total therapy, to be completed on ..   Follow for continued vancomycin need, clinical response, and s/s of toxicity.     Jose Manuel Up, PharmD
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
## 2024-02-19 NOTE — SIGNIFICANT EVENT
Reviewed patient's EKGs EKG from this morning showed sinus rhythm with occasional PVCs with noted anterior Q waves really no changes from previous EKG.  EKG done later in the afternoon had some concern for some lateral Q waves and loss of initial force on the lateral leads but appears this is likely limb lead reversal.  Repeat EKG shows presence of ectopy as well poor EKG.  Suspicion is that no gross ischemia is seen will repeat EKG currently.  Troponins are elevated at 4518 significantly higher than on the day of presentation.  More concerning is the fact the patient is having the significant persistent midsternal chest pain.  Rechecked hemoglobin this evening is 8.6 compared to 9.4 earlier today so patient may be having some demand ischemia.  Discussed case with interventional cardiology Dr. Langford and cardiology Dr. Beasley who recommends sublingual nitro, transferred to the ICU with careful monitoring patient will be started on a heparin drip without bolus did discuss this with Dr. Teague of trauma who will put in for hemoglobin checks.  Will transfuse 1 unit of packed red cells.  Follow-up chest x-ray shows mild pulmonary edema.  Since patient's not normally on oxygen concern oxygen requirements have gone up so significantly now on 10 L.  Since patient's not on anticoagulation prior to this could consider PE in the differential.  Will check duplex of lower extremities.  Case discussed with nocturnist who will take over this evening.    Addendum at 19: 42 patient seen and and appears to have improvement in his chest pain after both sublingual nitro and other maneuvers.  Being moved to the ICU.

## 2024-02-19 NOTE — PROGRESS NOTES
"      Nephrology Progress Note      Nephrology following for TAYLOR on CKD stage IIIb.     Events over night:   -Transfer back to ICU due to increasing troponins  -Left heart cath postponed due to TAYLOR  -Breathing is better today status post IV Lasix  -CT of the chest with small bilateral pleural effusions        /79   Pulse 73   Temp 37.8 °C (100 °F)   Resp 15   Ht 1.575 m (5' 2\")   Wt 97.8 kg (215 lb 9.8 oz)   SpO2 94%   BMI 39.44 kg/m²     Input / Output:  24 HR:   Intake/Output Summary (Last 24 hours) at 2/19/2024 1152  Last data filed at 2/19/2024 0600  Gross per 24 hour   Intake 881.66 ml   Output 900 ml   Net -18.34 ml         Physical Exam   Alert and oriented x 3 NAD  Neck: no JVD  CV: RRR  Lungs: Mildly tachypneic on conversation breath sounds are equal bilaterally  Abd: soft, NT, ND   Ext: + edema LLE, bruising at knee    Scheduled medications  acetaminophen, 650 mg, oral, q6h DIVINE  amiodarone, 400 mg, oral, BID   Followed by  [START ON 2/28/2024] amiodarone, 200 mg, oral, Daily  heparin, 4,000 Units, intravenous, Once  icosapent ethyL, 1 g, oral, BID with meals  insulin lispro, 0-10 Units, subcutaneous, q4h  loratadine, 10 mg, oral, Daily  metoprolol succinate XL, 50 mg, oral, Daily  morphine, 2 mg, intravenous, Once  pantoprazole, 40 mg, oral, Daily  piperacillin-tazobactam, 3.375 g, intravenous, q6h  sertraline, 50 mg, oral, Daily      Continuous medications  heparin, 0-4,000 Units/hr, Last Rate: 1,000 Units/hr (02/18/24 2127)  [Held by provider] lactated Ringer's, 10 mL/hr, Last Rate: Stopped (02/18/24 2039)  [START ON 2/20/2024] sodium chloride 0.9%, 75 mL/hr    PRN medications  PRN medications: dextrose 10 % in water (D10W), dextrose, glucagon, HYDROmorphone, morphine, naloxone, nitroglycerin, ondansetron ODT **OR** ondansetron, oxyCODONE, oxygen, sodium chloride, vancomycin   Results from last 7 days   Lab Units 02/19/24  0558 02/15/24  1429 02/15/24  0407   SODIUM mmol/L 134*   < > 139 "   POTASSIUM mmol/L 5.3   < > 4.9   CHLORIDE mmol/L 103   < > 115*   CO2 mmol/L 20*   < > 16*   BUN mg/dL 62*   < > 38*   CREATININE mg/dL 2.60*   < > 1.96*   CALCIUM mg/dL 7.9*   < > 6.7*   PROTEIN TOTAL g/dL  --   --  4.9*   BILIRUBIN TOTAL mg/dL  --   --  0.6   ALK PHOS U/L  --   --  58   ALT U/L  --   --  17   AST U/L  --   --  48*   GLUCOSE mg/dL 174*   < > 169*    < > = values in this interval not displayed.        Results from last 7 days   Lab Units 02/19/24  0558 02/18/24  0419 02/17/24  0535   SODIUM mmol/L 134* 135* 134*   POTASSIUM mmol/L 5.3 5.0 5.0   CHLORIDE mmol/L 103 106 106   CO2 mmol/L 20* 19* 17*   BUN mg/dL 62* 52* 45*   CREATININE mg/dL 2.60* 2.21* 2.15*   GLUCOSE mg/dL 174* 206* 248*   CALCIUM mg/dL 7.9* 8.2* 8.4*       Results from last 7 days   Lab Units 02/19/24  0558 02/18/24  0419 02/17/24  0535   MAGNESIUM mg/dL 2.00 2.07 1.96        Results from last 7 days   Lab Units 02/19/24  0559 02/19/24  0002 02/18/24  1838 02/18/24  0419 02/17/24  0535   WBC AUTO x10*3/uL 15.2*  --   --  20.7* 23.2*   HEMOGLOBIN g/dL 10.4*  10.4* 9.6* 8.6* 9.4* 9.1*   HEMATOCRIT % 31.1*  --   --  28.6* 27.8*   PLATELETS AUTO x10*3/uL 177  --   --  200 184          Assessment & Plan:   Patient is 73 y.o. male who is admitted to hospital for left thigh hematoma and anemia. Nephrology consulted in view of TAYLOR on CKD.      TAYLOR on CKD stage 3b-TAYLOR secondary to volume mediated changes with anemia and some mild component from trauma/contrast.-improving creatinine peaked at 2.3     -Imaging study reviewed, partially atrophic right kidney with contrast retention   -Currently nonoliguric   -Serum creatinine bumped to 2.6, likely due to diuretic    CKD stage 3b secondary to diabetic nephropathy- baseline Cr 1.7-1.8      Anemia secondary to hematoma-   S/p PRBCs 2/15    -Hemoglobin stable at 9.4  SIDHU cirrhosis    -Albumin 3.4, INR was at 1.3 prior    Left thigh hematoma- S/P trauma    HFrEF/dysrhythmia/NSTEMI   -currently  on oral amiodarone   -per cardiology         Recommendations:   -Continue to hold losartan and farxiga   -intermittent lasix as needed  -Start IV fluids low rate tonight prior to [facility] tomorrow  -bmp in AM    Please message me through [employer] chat with any questions or concerns.     Ketty Madrid DO  2/19/2024  11:52 AM     [facility]    [address], [address]   [place] [address]  Office: [phone]
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
## 2024-02-20 ENCOUNTER — APPOINTMENT (OUTPATIENT)
Dept: RADIOLOGY | Facility: HOSPITAL | Age: 74
DRG: 604 | End: 2024-02-20
Payer: MEDICARE

## 2024-02-20 LAB
ALBUMIN SERPL BCP-MCNC: 3.2 G/DL (ref 3.4–5)
ALP SERPL-CCNC: 60 U/L (ref 33–136)
ALT SERPL W P-5'-P-CCNC: 109 U/L (ref 10–52)
ANION GAP SERPL CALC-SCNC: 16 MMOL/L (ref 10–20)
AST SERPL W P-5'-P-CCNC: 99 U/L (ref 9–39)
BASOPHILS # BLD AUTO: 0.04 X10*3/UL (ref 0–0.1)
BASOPHILS NFR BLD AUTO: 0.2 %
BILIRUB SERPL-MCNC: 1.2 MG/DL (ref 0–1.2)
BLOOD EXPIRATION DATE: NORMAL
BUN SERPL-MCNC: 64 MG/DL (ref 6–23)
CALCIUM SERPL-MCNC: 7.6 MG/DL (ref 8.6–10.3)
CHLORIDE SERPL-SCNC: 104 MMOL/L (ref 98–107)
CO2 SERPL-SCNC: 20 MMOL/L (ref 21–32)
CREAT SERPL-MCNC: 2.76 MG/DL (ref 0.5–1.3)
DISPENSE STATUS: NORMAL
EGFRCR SERPLBLD CKD-EPI 2021: 23 ML/MIN/1.73M*2
EOSINOPHIL # BLD AUTO: 0.1 X10*3/UL (ref 0–0.4)
EOSINOPHIL NFR BLD AUTO: 0.5 %
ERYTHROCYTE [DISTWIDTH] IN BLOOD BY AUTOMATED COUNT: 16.6 % (ref 11.5–14.5)
ERYTHROCYTE [DISTWIDTH] IN BLOOD BY AUTOMATED COUNT: 16.7 % (ref 11.5–14.5)
GLUCOSE BLD MANUAL STRIP-MCNC: 197 MG/DL (ref 74–99)
GLUCOSE BLD MANUAL STRIP-MCNC: 213 MG/DL (ref 74–99)
GLUCOSE BLD MANUAL STRIP-MCNC: 229 MG/DL (ref 74–99)
GLUCOSE BLD MANUAL STRIP-MCNC: 238 MG/DL (ref 74–99)
GLUCOSE BLD MANUAL STRIP-MCNC: 248 MG/DL (ref 74–99)
GLUCOSE BLD MANUAL STRIP-MCNC: 278 MG/DL (ref 74–99)
GLUCOSE SERPL-MCNC: 253 MG/DL (ref 74–99)
HCT VFR BLD AUTO: 31.9 % (ref 41–52)
HCT VFR BLD AUTO: 35.6 % (ref 41–52)
HGB BLD-MCNC: 10.4 G/DL (ref 13.5–17.5)
HGB BLD-MCNC: 10.5 G/DL (ref 13.5–17.5)
HGB BLD-MCNC: 11.9 G/DL (ref 13.5–17.5)
HGB BLD-MCNC: 11.9 G/DL (ref 13.5–17.5)
IMM GRANULOCYTES # BLD AUTO: 0.13 X10*3/UL (ref 0–0.5)
IMM GRANULOCYTES NFR BLD AUTO: 0.6 % (ref 0–0.9)
INR PPP: 2.1 (ref 0.9–1.1)
LYMPHOCYTES # BLD AUTO: 0.69 X10*3/UL (ref 0.8–3)
LYMPHOCYTES NFR BLD AUTO: 3.4 %
MAGNESIUM SERPL-MCNC: 1.79 MG/DL (ref 1.6–2.4)
MCH RBC QN AUTO: 30.1 PG (ref 26–34)
MCH RBC QN AUTO: 30.8 PG (ref 26–34)
MCHC RBC AUTO-ENTMCNC: 32.6 G/DL (ref 32–36)
MCHC RBC AUTO-ENTMCNC: 33.4 G/DL (ref 32–36)
MCV RBC AUTO: 92 FL (ref 80–100)
MCV RBC AUTO: 92 FL (ref 80–100)
MONOCYTES # BLD AUTO: 0.77 X10*3/UL (ref 0.05–0.8)
MONOCYTES NFR BLD AUTO: 3.8 %
NEUTROPHILS # BLD AUTO: 18.72 X10*3/UL (ref 1.6–5.5)
NEUTROPHILS NFR BLD AUTO: 91.5 %
NRBC BLD-RTO: 0.1 /100 WBCS (ref 0–0)
NRBC BLD-RTO: 0.1 /100 WBCS (ref 0–0)
PLATELET # BLD AUTO: 226 X10*3/UL (ref 150–450)
PLATELET # BLD AUTO: 233 X10*3/UL (ref 150–450)
POTASSIUM SERPL-SCNC: 4.3 MMOL/L (ref 3.5–5.3)
PRODUCT BLOOD TYPE: 5100
PRODUCT CODE: NORMAL
PROT SERPL-MCNC: 5.8 G/DL (ref 6.4–8.2)
PROTHROMBIN TIME: 23.7 SECONDS (ref 9.8–12.8)
RBC # BLD AUTO: 3.46 X10*6/UL (ref 4.5–5.9)
RBC # BLD AUTO: 3.86 X10*6/UL (ref 4.5–5.9)
SODIUM SERPL-SCNC: 136 MMOL/L (ref 136–145)
UFH PPP CHRO-ACNC: 0.4 IU/ML
UNIT ABO: NORMAL
UNIT NUMBER: NORMAL
UNIT RH: NORMAL
UNIT VOLUME: 350
VANCOMYCIN SERPL-MCNC: 10 UG/ML (ref 5–20)
WBC # BLD AUTO: 20.5 X10*3/UL (ref 4.4–11.3)
WBC # BLD AUTO: 29.1 X10*3/UL (ref 4.4–11.3)
XM INTEP: NORMAL

## 2024-02-20 PROCEDURE — 2500000004 HC RX 250 GENERAL PHARMACY W/ HCPCS (ALT 636 FOR OP/ED): Performed by: CLINICAL NURSE SPECIALIST

## 2024-02-20 PROCEDURE — 99232 SBSQ HOSP IP/OBS MODERATE 35: CPT | Performed by: CLINICAL NURSE SPECIALIST

## 2024-02-20 PROCEDURE — 36415 COLL VENOUS BLD VENIPUNCTURE: CPT | Performed by: STUDENT IN AN ORGANIZED HEALTH CARE EDUCATION/TRAINING PROGRAM

## 2024-02-20 PROCEDURE — 97530 THERAPEUTIC ACTIVITIES: CPT | Mod: GP,CQ

## 2024-02-20 PROCEDURE — 71045 X-RAY EXAM CHEST 1 VIEW: CPT

## 2024-02-20 PROCEDURE — 99232 SBSQ HOSP IP/OBS MODERATE 35: CPT | Performed by: SURGERY

## 2024-02-20 PROCEDURE — 2500000004 HC RX 250 GENERAL PHARMACY W/ HCPCS (ALT 636 FOR OP/ED): Performed by: INTERNAL MEDICINE

## 2024-02-20 PROCEDURE — 71045 X-RAY EXAM CHEST 1 VIEW: CPT | Performed by: RADIOLOGY

## 2024-02-20 PROCEDURE — 85520 HEPARIN ASSAY: CPT | Performed by: INTERNAL MEDICINE

## 2024-02-20 PROCEDURE — 2020000001 HC ICU ROOM DAILY

## 2024-02-20 PROCEDURE — 97530 THERAPEUTIC ACTIVITIES: CPT | Mod: GO,CO

## 2024-02-20 PROCEDURE — 36415 COLL VENOUS BLD VENIPUNCTURE: CPT | Performed by: INTERNAL MEDICINE

## 2024-02-20 PROCEDURE — 83735 ASSAY OF MAGNESIUM: CPT | Performed by: FAMILY MEDICINE

## 2024-02-20 PROCEDURE — 94667 MNPJ CHEST WALL 1ST: CPT

## 2024-02-20 PROCEDURE — 2500000002 HC RX 250 W HCPCS SELF ADMINISTERED DRUGS (ALT 637 FOR MEDICARE OP, ALT 636 FOR OP/ED): Performed by: INTERNAL MEDICINE

## 2024-02-20 PROCEDURE — 94660 CPAP INITIATION&MGMT: CPT

## 2024-02-20 PROCEDURE — 99291 CRITICAL CARE FIRST HOUR: CPT | Performed by: INTERNAL MEDICINE

## 2024-02-20 PROCEDURE — 80053 COMPREHEN METABOLIC PANEL: CPT | Performed by: FAMILY MEDICINE

## 2024-02-20 PROCEDURE — 80202 ASSAY OF VANCOMYCIN: CPT | Performed by: INTERNAL MEDICINE

## 2024-02-20 PROCEDURE — 82947 ASSAY GLUCOSE BLOOD QUANT: CPT

## 2024-02-20 PROCEDURE — 85018 HEMOGLOBIN: CPT | Performed by: INTERNAL MEDICINE

## 2024-02-20 PROCEDURE — 2500000002 HC RX 250 W HCPCS SELF ADMINISTERED DRUGS (ALT 637 FOR MEDICARE OP, ALT 636 FOR OP/ED)

## 2024-02-20 PROCEDURE — 2500000001 HC RX 250 WO HCPCS SELF ADMINISTERED DRUGS (ALT 637 FOR MEDICARE OP): Performed by: INTERNAL MEDICINE

## 2024-02-20 PROCEDURE — 99291 CRITICAL CARE FIRST HOUR: CPT | Performed by: FAMILY MEDICINE

## 2024-02-20 PROCEDURE — 85025 COMPLETE CBC W/AUTO DIFF WBC: CPT | Performed by: STUDENT IN AN ORGANIZED HEALTH CARE EDUCATION/TRAINING PROGRAM

## 2024-02-20 PROCEDURE — 2500000002 HC RX 250 W HCPCS SELF ADMINISTERED DRUGS (ALT 637 FOR MEDICARE OP, ALT 636 FOR OP/ED): Performed by: FAMILY MEDICINE

## 2024-02-20 PROCEDURE — 2500000004 HC RX 250 GENERAL PHARMACY W/ HCPCS (ALT 636 FOR OP/ED): Performed by: FAMILY MEDICINE

## 2024-02-20 PROCEDURE — 2500000004 HC RX 250 GENERAL PHARMACY W/ HCPCS (ALT 636 FOR OP/ED): Performed by: NURSE PRACTITIONER

## 2024-02-20 PROCEDURE — 87081 CULTURE SCREEN ONLY: CPT | Mod: PORLAB | Performed by: INTERNAL MEDICINE

## 2024-02-20 PROCEDURE — 85610 PROTHROMBIN TIME: CPT | Performed by: FAMILY MEDICINE

## 2024-02-20 PROCEDURE — 94640 AIRWAY INHALATION TREATMENT: CPT

## 2024-02-20 RX ORDER — IPRATROPIUM BROMIDE AND ALBUTEROL SULFATE 2.5; .5 MG/3ML; MG/3ML
3 SOLUTION RESPIRATORY (INHALATION)
Status: DISCONTINUED | OUTPATIENT
Start: 2024-02-20 | End: 2024-02-20

## 2024-02-20 RX ORDER — IPRATROPIUM BROMIDE AND ALBUTEROL SULFATE 2.5; .5 MG/3ML; MG/3ML
3 SOLUTION RESPIRATORY (INHALATION) 3 TIMES DAILY
Status: DISCONTINUED | OUTPATIENT
Start: 2024-02-20 | End: 2024-02-22

## 2024-02-20 RX ORDER — FUROSEMIDE 10 MG/ML
40 INJECTION INTRAMUSCULAR; INTRAVENOUS ONCE
Status: COMPLETED | OUTPATIENT
Start: 2024-02-20 | End: 2024-02-20

## 2024-02-20 RX ORDER — FUROSEMIDE 10 MG/ML
80 INJECTION INTRAMUSCULAR; INTRAVENOUS EVERY 12 HOURS
Status: DISCONTINUED | OUTPATIENT
Start: 2024-02-20 | End: 2024-02-21

## 2024-02-20 RX ORDER — LANOLIN ALCOHOL/MO/W.PET/CERES
400 CREAM (GRAM) TOPICAL ONCE
Status: COMPLETED | OUTPATIENT
Start: 2024-02-20 | End: 2024-02-20

## 2024-02-20 RX ORDER — IPRATROPIUM BROMIDE AND ALBUTEROL SULFATE 2.5; .5 MG/3ML; MG/3ML
3 SOLUTION RESPIRATORY (INHALATION) EVERY 4 HOURS PRN
Status: DISCONTINUED | OUTPATIENT
Start: 2024-02-20 | End: 2024-02-22

## 2024-02-20 RX ORDER — VANCOMYCIN HYDROCHLORIDE 750 MG/150ML
750 INJECTION, SOLUTION INTRAVENOUS ONCE
Status: COMPLETED | OUTPATIENT
Start: 2024-02-20 | End: 2024-02-21

## 2024-02-20 RX ADMIN — INSULIN LISPRO 4 UNITS: 100 INJECTION, SOLUTION INTRAVENOUS; SUBCUTANEOUS at 08:06

## 2024-02-20 RX ADMIN — IPRATROPIUM BROMIDE AND ALBUTEROL SULFATE 3 ML: 2.5; .5 SOLUTION RESPIRATORY (INHALATION) at 19:32

## 2024-02-20 RX ADMIN — SERTRALINE HYDROCHLORIDE 50 MG: 50 TABLET ORAL at 08:14

## 2024-02-20 RX ADMIN — LORATADINE 10 MG: 10 TABLET ORAL at 08:14

## 2024-02-20 RX ADMIN — AMIODARONE HYDROCHLORIDE 400 MG: 400 TABLET ORAL at 20:07

## 2024-02-20 RX ADMIN — INSULIN LISPRO 2 UNITS: 100 INJECTION, SOLUTION INTRAVENOUS; SUBCUTANEOUS at 20:54

## 2024-02-20 RX ADMIN — Medication 400 MG: at 12:09

## 2024-02-20 RX ADMIN — PANTOPRAZOLE SODIUM 40 MG: 40 TABLET, DELAYED RELEASE ORAL at 08:14

## 2024-02-20 RX ADMIN — ACETAMINOPHEN 650 MG: 325 TABLET ORAL at 05:50

## 2024-02-20 RX ADMIN — ICOSAPENT ETHYL 1 G: 1 CAPSULE ORAL at 18:52

## 2024-02-20 RX ADMIN — PIPERACILLIN SODIUM AND TAZOBACTAM SODIUM 3.38 G: 3; .375 INJECTION, SOLUTION INTRAVENOUS at 13:15

## 2024-02-20 RX ADMIN — FUROSEMIDE 80 MG: 10 INJECTION, SOLUTION INTRAMUSCULAR; INTRAVENOUS at 15:56

## 2024-02-20 RX ADMIN — ACETAMINOPHEN 650 MG: 325 TABLET ORAL at 12:08

## 2024-02-20 RX ADMIN — FUROSEMIDE 40 MG: 10 INJECTION, SOLUTION INTRAMUSCULAR; INTRAVENOUS at 12:09

## 2024-02-20 RX ADMIN — INSULIN LISPRO 4 UNITS: 100 INJECTION, SOLUTION INTRAVENOUS; SUBCUTANEOUS at 12:10

## 2024-02-20 RX ADMIN — PIPERACILLIN SODIUM AND TAZOBACTAM SODIUM 3.38 G: 3; .375 INJECTION, SOLUTION INTRAVENOUS at 08:02

## 2024-02-20 RX ADMIN — AMIODARONE HYDROCHLORIDE 400 MG: 400 TABLET ORAL at 08:13

## 2024-02-20 RX ADMIN — PIPERACILLIN SODIUM AND TAZOBACTAM SODIUM 3.38 G: 3; .375 INJECTION, SOLUTION INTRAVENOUS at 01:39

## 2024-02-20 RX ADMIN — ACETAMINOPHEN 650 MG: 325 TABLET ORAL at 18:52

## 2024-02-20 RX ADMIN — HEPARIN SODIUM 1000 UNITS/HR: 10000 INJECTION, SOLUTION INTRAVENOUS at 20:07

## 2024-02-20 RX ADMIN — PIPERACILLIN SODIUM AND TAZOBACTAM SODIUM 3.38 G: 3; .375 INJECTION, SOLUTION INTRAVENOUS at 19:33

## 2024-02-20 RX ADMIN — IPRATROPIUM BROMIDE AND ALBUTEROL SULFATE 3 ML: 2.5; .5 SOLUTION RESPIRATORY (INHALATION) at 13:20

## 2024-02-20 RX ADMIN — INSULIN LISPRO 4 UNITS: 100 INJECTION, SOLUTION INTRAVENOUS; SUBCUTANEOUS at 17:30

## 2024-02-20 RX ADMIN — METOPROLOL SUCCINATE 50 MG: 50 TABLET, EXTENDED RELEASE ORAL at 08:13

## 2024-02-20 ASSESSMENT — COGNITIVE AND FUNCTIONAL STATUS - GENERAL
MOBILITY SCORE: 14
CLIMB 3 TO 5 STEPS WITH RAILING: TOTAL
DRESSING REGULAR LOWER BODY CLOTHING: TOTAL
DRESSING REGULAR UPPER BODY CLOTHING: A LOT
HELP NEEDED FOR BATHING: TOTAL
DAILY ACTIVITIY SCORE: 13
PERSONAL GROOMING: A LITTLE
EATING MEALS: A LITTLE
MOVING FROM LYING ON BACK TO SITTING ON SIDE OF FLAT BED WITH BEDRAILS: A LITTLE
MOBILITY SCORE: 14
EATING MEALS: A LITTLE
MOVING FROM LYING ON BACK TO SITTING ON SIDE OF FLAT BED WITH BEDRAILS: A LITTLE
STANDING UP FROM CHAIR USING ARMS: A LOT
TOILETING: A LOT
MOVING TO AND FROM BED TO CHAIR: A LITTLE
TOILETING: TOTAL
DRESSING REGULAR LOWER BODY CLOTHING: A LOT
WALKING IN HOSPITAL ROOM: A LOT
PERSONAL GROOMING: A LOT
WALKING IN HOSPITAL ROOM: A LOT
STANDING UP FROM CHAIR USING ARMS: A LOT
HELP NEEDED FOR BATHING: A LOT
TURNING FROM BACK TO SIDE WHILE IN FLAT BAD: A LITTLE
MOVING TO AND FROM BED TO CHAIR: A LITTLE
DRESSING REGULAR UPPER BODY CLOTHING: TOTAL
TURNING FROM BACK TO SIDE WHILE IN FLAT BAD: A LITTLE
DAILY ACTIVITIY SCORE: 10
CLIMB 3 TO 5 STEPS WITH RAILING: TOTAL

## 2024-02-20 ASSESSMENT — PAIN SCALES - GENERAL
PAINLEVEL_OUTOF10: 0 - NO PAIN

## 2024-02-20 ASSESSMENT — PAIN - FUNCTIONAL ASSESSMENT
PAIN_FUNCTIONAL_ASSESSMENT: 0-10

## 2024-02-20 ASSESSMENT — ENCOUNTER SYMPTOMS
SHORTNESS OF BREATH: 1
ACTIVITY CHANGE: 1
FATIGUE: 1
DIFFICULTY URINATING: 1
WHEEZING: 1
ALLERGIC/IMMUNOLOGIC NEGATIVE: 1
BRUISES/BLEEDS EASILY: 1
APNEA: 1

## 2024-02-20 NOTE — PROGRESS NOTES
Physical Therapy    Physical Therapy Treatment    Patient Name: Ritesh Garza  MRN: [id number]  Today's Date: 2/20/2024  Time Calculation  Start Time: 0930  Stop Time: 0953  Time Calculation (min): 23 min       Assessment/Plan   PT Assessment  PT Assessment Results: Decreased strength, Decreased endurance, Impaired balance, Decreased mobility  Rehab Prognosis: Good  End of Session Communication: Bedside nurse  Assessment Comment: RN ok to see pt, pt is going down for a heaart cath any time. pt requred cues on hand placment with transfers to EOB. pt tolerated 15 minutes EOB with min assist for balance. pt with R lateral lean with HOB raised for balance.  End of Session Patient Position: Alarm on, Bed, 3 rail up     PT Plan  Treatment/Interventions: Bed mobility, Transfer training, Gait training, Stair training, Balance training, Strengthening, Endurance training  PT Plan: Skilled PT  PT Frequency: 4 times per week  PT Discharge Recommendations: Moderate intensity level of continued care  Equipment Recommended upon Discharge: Wheeled walker (TBD)  PT Recommended Transfer Status: Assist x1, Assist x2  PT - OK to Discharge: Yes (WHEN MEDICALLY CLEARED)      General Visit Information:   PT  Visit  PT Received On: 02/20/24       Subjective   Precautions:     Vital Signs:       Objective   Pain:  Pain Assessment  Pain Score:  (LLE did not rate)  Cognition:  Cognition  Orientation Level: Oriented X4    Treatments:            Bed Mobility 1  Bed Mobility 1: Supine to sitting, Sitting to supine  Level of Assistance 1: Moderate assistance (x2)         Outcome Measures:  [facility] Basic Mobility  Turning from your back to your side while in a flat bed without using bedrails: A little  Moving from lying on your back to sitting on the side of a flat bed without using bedrails: A little  Moving to and from bed to chair (including a wheelchair): A little  Standing up from a chair using your arms (e.g. wheelchair or bedside chair): A  lot  To walk in hospital room: A lot  Climbing 3-5 steps with railing: Total  Basic Mobility - Total Score: 14    Education Documentation  Mobility Training, taught by Evelyn Daniel PTA at 2/20/2024 10:31 AM.  Learner: Patient  Readiness: Acceptance  Method: Explanation  Response: Verbalizes Understanding    Education Comments  No comments found.        OP EDUCATION:       Encounter Problems       Encounter Problems (Active)       Mobility       STG - Patient will ambulate (Progressing)       Start:  02/15/24    Expected End:  02/20/24       FWW/CANE SBA USING PROPER GAIT PATTERN         STG - Patient will ascend and descend four to six stairs (Progressing)       Start:  02/15/24    Expected End:  02/20/24       RAIL &/OR CANE, MIN A            Pain - Adult          Transfers       STG - Patient to transfer to and from sit to supine (Progressing)       Start:  02/15/24    Expected End:  02/20/24       INDEP RAILS PRN HOB FLAT         STG - Patient will transfer sit to and from stand (Progressing)       Start:  02/15/24    Expected End:  02/20/24       FW/CANE USING PROPER TECHNQUE
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
## 2024-02-20 NOTE — CARE PLAN
The patient's goals for the shift include go get rest and get better    The clinical goals for the shift include pt will be free from chest pain throughout shift

## 2024-02-20 NOTE — CONSULTS
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 e'         0.06 m/s (>8.0) MV lateral e' 0.06 m/s MV medial e'  0.06 m/s E/e' Ratio:   21.50    (<8.0) MITRAL VALVE:                 Normal Ranges: MV DT: 151 msec (150-240msec) MITRAL INSUFFICIENCY:                           Normal Ranges: PISA Radius:  0.5 cm MR VTI:       129.00 cm MR Vmax:      437.00 cm/s MR Alias Matt: 38.5 cm/s MR Volume:    17.85 ml MR Flow Rt:   60.48 ml/s MR EROA:      0.14 cm2 AORTIC VALVE:                                   Normal Ranges: AoV Vmax:                1.33 m/s (<=1.7m/s) AoV Peak P.1 mmHg (<20mmHg) AoV Mean P.0 mmHg (1.7-11.5mmHg) LVOT Max Matt:            1.04 m/s (<=1.1m/s) AoV VTI:                 25.10 cm (18-25cm) LVOT VTI:                20.70 cm LVOT Diameter:           2.00 cm  (1.8-2.4cm) AoV Area, VTI:           2.59 cm2 (2.5-5.5cm2) AoV Area,Vmax:           2.46 cm2 (2.5-4.5cm2) AoV Dimensionless Index: 0.82  RIGHT VENTRICLE: TAPSE: 14.1 mm RV s'  0.09 m/s TRICUSPID VALVE/RVSP:                             Normal Ranges: Peak TR Velocity: 2.44 m/s RV Syst Pressure: 26.8 mmHg (< 30mmHg) IVC Diam:         1.50 cm  [id number] Gonzalo White DO Electronically signed on 2/15/2024 at  11:57:27 AM  Wall Scoring  ** Final **     CT femur left wo IV contrast    Result Date: 2/15/2024  Interpreted By:  Chirs Nails, STUDY: CT FEMUR LEFT WO IV CONTRAST;  2/15/2024 9:59 am   INDICATION: Signs/Symptoms:hematoma left thigh.   COMPARISON: Radiographs 02/13/2024.   ACCESSION NUMBER(S): [id number]   ORDERING CLINICIAN: AKIL LINTON   TECHNIQUE: CT imaging of the  left femur was obtained  without administration of intravenous contrast medium. Coronal and sagittal reformatted images were performed.   FINDINGS: OSSEOUS STRUCTURES: No acute fracture or dislocation. Left hip joint is maintained. Osteoarthritis of the knee. Trace knee joint effusion with mild synovial thickening.   SOFT TISSUES: There is in ellipsoid superficial, subcutaneous hematoma in the anterior thigh splaying the rectus femoris and vastus lateralis muscles. This hematoma measures 12 x 5 x 19 cm (transverse x anterior-posterior x craniocaudal). There is minimal skin thickening and diffuse subcutaneous edema throughout the imaged thigh. No fluid collection or suspicious soft tissue mass. Muscle bulk is maintained with preserved intermuscular fascial planes. Small popliteal cyst. Love balloon and excreted contrast within the bladder. No acute intrapelvic process.       Anterior thigh subcutaneous hematoma splaying the rectus femoris and vastus lateralis muscles, 12 x 5 x 19 cm.   Minimal skin thickening and diffuse subcutaneous edema throughout the imaged thigh without a fluid collection. No CT evidence of deep compartment infiltration.   No acute fracture.   MACRO: None   Signed by: Chris Nails 2/15/2024 11:51 AM Dictation workstation:   [id number]    CT abdomen pelvis wo IV contrast    Result Date: 2/15/2024  Interpreted By:  Akil Ruiz, STUDY: CT ABDOMEN PELVIS WO IV CONTRAST;  2/15/2024 9:59 am   INDICATION: Signs/Symptoms:Hematoma left thigh,.   COMPARISON: None   ACCESSION NUMBER(S): [id number]   ORDERING CLINICIAN: AKIL  MUJAHED   TECHNIQUE: CT of the abdomen and pelvis was performed. Contiguous axial images were obtained at 3 mm slice thickness through the abdomen and pelvis. Coronal and sagittal reconstructions at 3 mm slice thickness were performed.  No intravenous contrast was administered; positive oral contrast was given.   FINDINGS: Please note that the evaluation of vessels, lymph nodes and organs is limited without intravenous contrast.   LOWER CHEST: Trace bilateral pleural effusion with surrounding atelectasis.   ABDOMEN:   LIVER: Cirrhotic hepatic morphology. No gross lesions.   BILE DUCTS: No intrahepatic biliary dilatation.   GALLBLADDER: Surgically absent.   PANCREAS: No duct dilatation.   SPLEEN: No splenomegaly.   ADRENAL GLANDS: Bilateral adrenal glands appear normal.   KIDNEYS AND URETERS: Retained contrast within the renal parenchyma from prior contrasted study. Small size right kidney. No hydroureteronephrosis or nephroureterolithiasis is identified. Left cortical renal cysts.   PELVIS:   BLADDER: Within normal limits. Love catheter in place. Trace contrast noted.   REPRODUCTIVE ORGANS: Mild splenomegaly.   BOWEL: No bowel dilatation.   VESSELS: Moderate vascular calcifications and atherosclerotic changes. Focal aneurysmal dilatation of the infrarenal aorta measuring 3.1 cm.   PERITONEUM/RETROPERITONEUM/LYMPH NODES: No enlarged intra-abdominal or pelvic lymphadenopathy.   ABDOMINAL WALL: Redemonstrated left lateral abdominal wall/flank and lateral thigh significant soft tissue thickening and edematous changes with a small intramuscular hyperdensity in the left lateral thigh measuring 1.7 cm as in prior.   BONES: Multilevel degenerative spine changes and facet joint disease.       1. Redemonstrated left flank and lateral thigh significant soft tissue thickening and edematous changes with a small intramuscular hyperdensity in the left lateral thigh measuring 1.7 cm as in prior in keeping with the known hematoma.  The previously described left thigh active extravasation is not included in this portions of CT scan and would be better assessed in same day left femur CT scan official read. 2. Cirrhotic hepatic morphology. 3. Partially atrophic right kidney. Retained renal parenchymal IV contrast from prior study could be related to impairment. Advise correlation with kidney function tests. 4. Focal aneurysmal dilatation of the abdominal aorta measuring 3.1 cm. 5. Trace bilateral pleural effusion with surrounding atelectasis     MACRO: None   Signed by: Akil Ruiz 2/15/2024 10:49 AM Dictation workstation:   [id number]    ECG 12 Lead    Result Date: 2/15/2024  Ventricular tachcyardia, offset into undetermined rhythm with frequent ectopic beats Low voltage QRS Cannot rule out Inferior infarct , age undetermined Cannot rule out Anteroseptal infarct , age undetermined Marked ST abnormality, possible lateral subendocardial injury Abnormal ECG When compared with ECG of 13-FEB-2024 22:12, PREVIOUS ECG IS PRESENT Confirmed by Migdalia Beasley ([id number]) on 2/15/2024 10:32:33 AM    Electrocardiogram, 12-lead PRN ACS symptoms    Result Date: 2/15/2024  Sinus rhythm with frequent PAC/PVC's Anteroseptal infarct (cited on or before 14-FEB-2024) Abnormal ECG When compared with ECG of 14-FEB-2024 21:00, (unconfirmed) Current undetermined rhythm precludes rhythm comparison, needs review Right bundle branch block is no longer Present Borderline criteria for Inferior infarct are no longer Present Questionable change in initial forces of Anterior leads Confirmed by Migdalia Beasley ([id number]) on 2/15/2024 10:31:39 AM    XR foot left 3+ views    Result Date: 2/15/2024  Interpreted By:  Nico Campos, STUDY: XR FOOT LEFT 3+ VIEWS; ;  2/14/2024 1:19 pm   INDICATION: Signs/Symptoms:trauma.   COMPARISON: None.   ACCESSION NUMBER(S): [id number]   ORDERING CLINICIAN: RODRIGO GALLARDO   FINDINGS: No acute fracture or dislocation of the left foot is noted.   The  lateral margin of the 2nd metatarsophalangeal joint has degenerative calcifications/ossifications. Articulation       No acute fracture or dislocation of the left foot.     MACRO: None   Signed by: Nico Campos 2/15/2024 8:03 AM Dictation workstation:   [id number]    ECG 12 lead    Result Date: 2/14/2024  Sinus rhythm Nonspecific intraventricular conduction delay Anteroseptal infarct, age indeterminate Minimal ST elevation, inferior leads Lateral leads are also involved See ED provider note for full interpretation and clinical correlation Confirmed by Malik Hills ([id number]) on 2/14/2024 3:39:40 PM    CT chest abdomen pelvis w IV contrast    Addendum Date: 2/13/2024    Interpreted By:  Geovanny Gardner, ADDENDUM: Geovanny Gardner discussed the significance and urgency of this critical finding by epic chat with  TETO NEAL on 2/13/2024 at 11:05 pm. (**-RCF-**) Findings:  See findings.     Signed by: Geovanny Gardner 2/13/2024 11:05 PM   -------- ORIGINAL REPORT -------- Dictation workstation:   [id number]    Result Date: 2/13/2024  Interpreted By:  Geovanny Gardner, STUDY: CT CHEST ABDOMEN PELVIS W IV CONTRAST;  2/13/2024 10:08 pm   INDICATION: Signs/Symptoms:Trauma, fall, left lower extremity hematoma, left lower quadrant discomfort   COMPARISON: 06/29/2020   ACCESSION NUMBER(S): [id number]   ORDERING CLINICIAN: TETO NEAL   TECHNIQUE: CT of the chest, abdomen, and pelvis was performed. Contiguous axial images were obtained at  5 mm slice thickness through the chest, and at  3 mm through the abdomen and pelvis. Coronal and sagittal reconstructions at  3 mm slice thickness were performed. Intravenous contrast administered   FINDINGS: CHEST:   Gynecomastia. Median sternotomy. Enlarged main pulmonary artery suggestive of pulmonary arterial hypertension no findings of pneumothorax or pleural effusion. No acute traumatic findings in the chest. Severe vascular calcification   ABDOMEN: Abdominal aortic aneurysm measuring 3.2  x 2.5 cm not measurably changed. Heterogeneous appearance of the liver with surface nodularity. Unremarkable adrenal glands and pancreas. Unremarkable spleen. No splenic hematoma   Prostate gland is enlarged. It is heterogeneous.   Right kidney is atrophic compared to the left. 1 cm left renal cyst. Findings most suspicious for renal artery stenosis diverticulosis.   Interrogation of the left pelvis shows a large hematoma in the left quadriceps with high density blush of contrast. Reference image 50 series 12 compatible with active extravasation. Hematoma measures about 9.1 x 5.6 x 13 cm.   Marked soft tissue swelling of the left lower extremity. Mild multilevel degenerative disc disease.       Large left intramuscular hematoma with evidence of active extravasation within the muscle.   Severe vascular calcification.   Stable abdominal aortic aneurysm with severe vascular calcification. Heterogeneity of the liver. Query cirrhosis. Right kidney is atrophic compared to the left most suggestive of renal artery stenosis.   Enlarged prostate gland. Diverticulosis. No diverticulitis   Signed by: Geovanny Gardner 2/13/2024 10:59 PM Dictation workstation:   [id number]    XR femur left 2+ views    Result Date: 2/13/2024  Interpreted By:  Geovanny Gardner, STUDY: XR KNEE 4+ VIEWS BILATERAL; XR FEMUR LEFT 2+ VIEWS; ;  2/13/2024 10:35 pm   INDICATION: Signs/Symptoms:Fall, bilateral knee pain; Signs/Symptoms:Fall, left thigh hematoma.   COMPARISON: None.   ACCESSION NUMBER(S): [id number]; [id number]   ORDERING CLINICIAN: TETO NEAL   FINDINGS: Left femur and bilateral knees performed.   Vascular calcification detected bilaterally.   Large soft tissue swelling seen in the left thigh compatible with known hematoma. No evidence of femur fracture.   No evidence of knee dislocation. Mild tricompartmental osteoarthritis of both knees.       No evidence of left femur fracture. Known large hematoma. Bilateral knee osteoarthritis.  Robust vascular calcification     MACRO: None   Signed by: Geovanny Gardner 2/13/2024 11:01 PM Dictation workstation:   [id number]    XR knee 4+ views bilateral    Result Date: 2/13/2024  Interpreted By:  Geovanny Gardner, STUDY: XR KNEE 4+ VIEWS BILATERAL; XR FEMUR LEFT 2+ VIEWS; ;  2/13/2024 10:35 pm   INDICATION: Signs/Symptoms:Fall, bilateral knee pain; Signs/Symptoms:Fall, left thigh hematoma.   COMPARISON: None.   ACCESSION NUMBER(S): [id number]; [id number]   ORDERING CLINICIAN: TETO NEAL   FINDINGS: Left femur and bilateral knees performed.   Vascular calcification detected bilaterally.   Large soft tissue swelling seen in the left thigh compatible with known hematoma. No evidence of femur fracture.   No evidence of knee dislocation. Mild tricompartmental osteoarthritis of both knees.       No evidence of left femur fracture. Known large hematoma. Bilateral knee osteoarthritis. Robust vascular calcification     MACRO: None   Signed by: Geovanny Gardner 2/13/2024 11:01 PM Dictation workstation:   [id number]    XR pelvis 1-2 views    Result Date: 2/13/2024  Interpreted By:  Deborah Jamil, STUDY: XR PELVIS 1-2 VIEWS; ;  2/13/2024 9:33 pm   INDICATION: Signs/Symptoms:trauma, left thigh pain.   COMPARISON: None.   ACCESSION NUMBER(S): [id number]   ORDERING CLINICIAN: TETO NEAL   FINDINGS: Evaluation limited to positioning. Postoperative changes in the right hip evidence by surgical clips. Heavy wall calcification of vascular structures in the area of femoral arteries bilaterally. No evidence for acute fracture or dislocation. No bone lesions. No radiopaque foreign body.       No acute fracture or dislocation of bilateral hip joints.     MACRO: None   Signed by: Deborah Jamil 2/13/2024 9:54 PM Dictation workstation:   [id number]    CT cervical spine wo IV contrast    Result Date: 2/13/2024  Interpreted By:  Deborah Jamil, STUDY: CT CERVICAL SPINE WO IV CONTRAST;  2/13/2024 9:45 pm   INDICATION:  Signs/Symptoms:Fall, neck pain.   COMPARISON: None.   ACCESSION NUMBER(S): [id number]   ORDERING CLINICIAN: TETO NEAL   TECHNIQUE: Axial CT images of the cervical spine are obtained. Axial, coronal and sagittal reconstructions are provided for review.   FINDINGS: Wall calcification of bilateral carotid bulbs, worse on the left. Heterogeneous enlargement of the right thyroid lobe which is incompletely characterized in this study. No focal masses are seen.   Fractures: There is no evidence for an acute fracture of the cervical spine.   Vertebral Alignment: Within normal limits.   Craniocervical Junction: Degenerative changes at C1-2 level with prominent soft tissue thickening at the C1-2 articulation.   Vertebrae/Disc Spaces:  Moderate multilevel discogenic degenerative changes including disc space narrowing, endplate sclerosis, spurring and posterior disc osteophyte complex. Findings worse from C5-T1. Facet joint sclerosis and hypertrophy bilaterally at multiple levels.   Prevertebral/Paraspinal Soft Tissues: No prevertebral soft tissue swelling.         1. No evidence for an acute fracture or subluxation of the cervical spine.   2. Degenerative changes of the spine as above.   3. Asymmetrical thyromegaly.   4. Heavy wall calcification of bilateral carotid bulbs.   MACRO: None   Signed by: Deborah Jamil 2/13/2024 9:53 PM Dictation workstation:   [id number]    CT head W O contrast trauma protocol    Result Date: 2/13/2024  Interpreted By:  Deborah Jamil, STUDY: CT HEAD W/O CONTRAST TRAUMA PROTOCOL;  2/13/2024 9:45 pm   INDICATION: Signs/Symptoms:Fall.   COMPARISON: 09/14/2022   ACCESSION NUMBER(S): [id number]   ORDERING CLINICIAN: TETO NEAL   TECHNIQUE: Noncontrast axial CT scan of head was performed. Angled reformats in brain and bone windows were generated. The images were reviewed in bone, brain, blood and soft tissue windows.   FINDINGS: CSF Spaces: The ventricles, sulci and cisterns are within normal  limits for patient's age.  There is no extraaxial fluid collection.   Parenchyma: The grey-white differentiation is intact. There is no mass effect or midline shift.  There is no intracranial hemorrhage.   Calvarium: The calvarium is unremarkable.   Paranasal sinuses and mastoids: Visualized paranasal sinuses and mastoids are clear.       No evidence of acute cortical infarct or intracranial hemorrhage.   MACRO: None     Signed by: Deborah Jamil 2/13/2024 9:51 PM Dictation workstation:   [id number]      Assessment/Plan   Principal Problem:    Hematoma of left thigh, initial encounter  Active Problems:    Asthma    Benign essential hypertension    Cirrhosis, nonalcoholic (CMS/HCC)    Type 2 diabetes mellitus with diabetic neuropathy, without long-term current use of insulin (CMS/HCC)    Cognitive dysfunction    Congestive heart failure with right ventricular systolic dysfunction (CMS/HCC)    Traumatic hematoma of left thigh    CAD (coronary artery disease)    HLD (hyperlipidemia)    Acute kidney injury superimposed on CKD (CMS/HCC)    SENIA (obstructive sleep apnea)    Erosive esophagitis    Chest pain    Ventricular tachycardia (CMS/[facility], [facility])    Acute blood loss anemia    Demand ischemia    Pneumonia    NSTEMI (non-ST elevated myocardial infarction) (CMS/HCC)    Acute on chronic systolic heart failure (CMS/HCC)    Ritesh Garza is a 73 y.o. male with past medical history of Mild cognitive impairment, depression, CAD s/p 5V CABG 1992 (ASA), HFrEF (farxiga, metop, losartan), HTN, SIDHU cirrhosis, Erosive esophagitis (PPI), Asthma, BPH/ED (cialis), NIDDMII, SENIA, CKD III, gout (allopurinol), HLD (vascepa/statin), hearing loss being admitted to the ICU do to Acute hypoxic Respiratory failure.    Acute Hypoxic Respiratory Failure secondary to pulmonary edema with a history of Asthma  2/20/24: Patient on 13 L NC saturating above 90%   History of SENIA with noncompliance to CPAP and BiPAP   Patient refused BiPAP overnight    Diffuse Bilateral crackles   Stopped IVF - worsening respiratory distress since initiation   80 mg Lasix   Q6h Duo neb therapy   Continue Zosyn   Maintain Spo2 above 90%     2. Acute Kidney Injury secondary to Heart Failure   2/20/24: Baseline cr of 1.85 - Currently 2.76 and rising   Bladder scan showed >300 ml of retention - repeat showed no urine left in bladder   80 mg Lasix - should increase perfusion to kidneys and stabilize function    LHC postponed due to Cr rise   Nephrology Consulted       3. Ventricular Tachycardia   2/20/24: Patient has had intermittent runs of Vtach requiring ICU level of care since admission on 2/13/24  Cardiology is on the case   Continue Telemetry monitoring   Continue Amiodarone 400 mg for 25 doses then 200 mg oral per Cardiology   PRN Metoprolol 50 mg - hold if SBP <120 and/or HR <65     4. NSTEMI   2/20/24: Patient with complaints of chest pain   Troponins peaked in 4000s on 2/18/24 - now down trending   Has received 3u of packed RBC   Continue heparin ggt   LHC postponed due to Cr rise   Continue Amiodarone, Vascepa and metoprolol     5. Acute blood loss anemia secondary to Hematoma of Left thigh   2/20/24: Hgb Stable   Trauma surgery on board   Has received 3u of packed RBC this admission   Transfuse if Hgb <7 or active bleed with hemodynamic instability      I spent 45 minutes of cumulative critical care time with the patient.  Greater than 50% of that time was spent in the direct collaboration and or coordination of care of the patient.     Dragon dictation software was used to dictate this note and thus there may be minor errors in translation/transcription including garbled speech or misspellings. Please contact for clarification if needed.

## 2024-02-20 NOTE — PROGRESS NOTES
02/20/24 0950   Patient Choice   Provider Choice list and CMS website (https://medicare.gov/care-compare#search) for post-acute Quality and Resource Measure Data were provided and reviewed with: Patient;Family     I met with pt/wife at bedside. Pt agreeable to speak in wife's presence.  I discussed PT rec for mod therapy.  Pt states he would be interested but was noncommittal as he states he is focused on HC he is waiting to have.  I offered to leave the SNF list for him to review when he feels up to it.   Freedom of choice list was offered by creating list in [employer] in patient's preferred geographical area and in network with insurance. Wife states she will review but also asked about HHC.  Will generate HC list in network for this pt as with SNF and deliver to his room.    Wife states pt has some difficulty hearing and has mild memory deficits and she will be assisting with decisions.    1350 Freedom of choice list was offered by creating list in [employer] in patient's preferred geographical area and in network with insurance for HHC.  Pts wife was not present but list was left at bedside for her as requested.
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
## 2024-02-20 NOTE — PROGRESS NOTES
"Ritesh Garza is a 73 y.o. male on day 6 of admission presenting with Hematoma of left thigh, initial encounter.    Subjective   Patient seen awake in bed. He reports some shortness of breath with wheezing, denies chest pain or palpitations. Nurse reports increasing oxygen requirements this morning, stopped IV fluids. Patient refused Bipap overnight.     ROS:  Constitutional: No fever, no chills, no fatigue  HEENT: No headache, no vision changes, no hearing loss, no nasal congestion  Respiratory: Wheezing, SOB, no cough  Cardiac: No chest pain, no palpitations, no swelling of limbs  GI: No nausea, no vomiting, no diarrhea  Musculoskeletal: No back pain, no myalgia  Neuro: No seizures, no dizziness, no lightheadedness  Heme: No easy bruising, no bleeding  Genitourinary:  No Dysuria       Objective     Physical Exam  Gen: Adult male, visibly fatigued, no acute distress  HEENT: Normocephalic, atraumatic, good dentition, no oral lesions appreciated  Neck: Supple. No cervical lymphadenopathy appreciated, no thyroid enlargement appreciated  CV: Regular rate and rhythm, S1 and S2 present, no murmurs rubs or gallops appreciated  Resp: Lungs diminished to auscultation bilaterally, scatted ronchi, no rales or wheezes appreciated  Abdomen: soft, nontender, nondistended, no guarding, no masses appreciated  MSK: No joint swelling appreciated, full ROM  Psych: appropriate mood and affect  Skin: Large purple patches on L hip and thigh and buttock and spreading outside marker to midline lumbar back, nontender with irregular borders. Firm nonfluctuant mass at site of L posteriolateral hip    Last Recorded Vitals  Blood pressure 119/77, pulse 73, temperature 36.8 °C (98.2 °F), resp. rate 15, height 1.575 m (5' 2\"), weight 75.8 kg (167 lb 1.7 oz), SpO2 92 %.  Intake/Output last 3 Shifts:  I/O last 3 completed shifts:  In: 1955.3 (25.8 mL/kg) [P.O.:100; I.V.:832 (11 mL/kg); Blood:573.3; IV Piggyback:450]  Out: 2900 (38.3 mL/kg) " [Urine:2900 (1.1 mL/kg/hr)]  Weight: 75.8 kg       Relevant Results  Scheduled medications  acetaminophen, 650 mg, oral, q6h DIVINE  amiodarone, 400 mg, oral, BID   Followed by  [START ON 2/28/2024] amiodarone, 200 mg, oral, Daily  icosapent ethyL, 1 g, oral, BID with meals  insulin lispro, 0-10 Units, subcutaneous, q4h  loratadine, 10 mg, oral, Daily  metoprolol succinate XL, 50 mg, oral, Daily  pantoprazole, 40 mg, oral, Daily  piperacillin-tazobactam, 3.375 g, intravenous, q6h  sertraline, 50 mg, oral, Daily      Continuous medications  heparin, 0-4,000 Units/hr, Last Rate: 1,000 Units/hr (02/20/24 1004)  [Held by provider] lactated Ringer's, 10 mL/hr, Last Rate: Stopped (02/18/24 2039)  sodium chloride 0.9%, 75 mL/hr, Last Rate: Stopped (02/20/24 0900)      PRN medications  PRN medications: dextrose 10 % in water (D10W), dextrose, glucagon, HYDROmorphone, morphine, naloxone, nitroglycerin, ondansetron ODT **OR** ondansetron, oxyCODONE, oxygen, sodium chloride, vancomycin    Results for orders placed or performed during the hospital encounter of 02/13/24 (from the past 24 hour(s))   POCT GLUCOSE   Result Value Ref Range    POCT Glucose 190 (H) 74 - 99 mg/dL   Hemoglobin   Result Value Ref Range    Hemoglobin 10.4 (L) 13.5 - 17.5 g/dL   POCT GLUCOSE   Result Value Ref Range    POCT Glucose 184 (H) 74 - 99 mg/dL   Hemoglobin   Result Value Ref Range    Hemoglobin 12.4 (L) 13.5 - 17.5 g/dL   Vancomycin   Result Value Ref Range    Vancomycin 12.9 5.0 - 20.0 ug/mL   POCT GLUCOSE   Result Value Ref Range    POCT Glucose 246 (H) 74 - 99 mg/dL   CBC   Result Value Ref Range    WBC 29.1 (H) 4.4 - 11.3 x10*3/uL    nRBC 0.1 (H) 0.0 - 0.0 /100 WBCs    RBC 3.86 (L) 4.50 - 5.90 x10*6/uL    Hemoglobin 11.9 (L) 13.5 - 17.5 g/dL    Hematocrit 35.6 (L) 41.0 - 52.0 %    MCV 92 80 - 100 fL    MCH 30.8 26.0 - 34.0 pg    MCHC 33.4 32.0 - 36.0 g/dL    RDW 16.7 (H) 11.5 - 14.5 %    Platelets 233 150 - 450 x10*3/uL   Hemoglobin   Result  Value Ref Range    Hemoglobin 11.9 (L) 13.5 - 17.5 g/dL   POCT GLUCOSE   Result Value Ref Range    POCT Glucose 278 (H) 74 - 99 mg/dL   POCT GLUCOSE   Result Value Ref Range    POCT Glucose 238 (H) 74 - 99 mg/dL   Hemoglobin   Result Value Ref Range    Hemoglobin 10.5 (L) 13.5 - 17.5 g/dL   Magnesium   Result Value Ref Range    Magnesium 1.79 1.60 - 2.40 mg/dL   Comprehensive metabolic panel   Result Value Ref Range    Glucose 253 (H) 74 - 99 mg/dL    Sodium 136 136 - 145 mmol/L    Potassium 4.3 3.5 - 5.3 mmol/L    Chloride 104 98 - 107 mmol/L    Bicarbonate 20 (L) 21 - 32 mmol/L    Anion Gap 16 10 - 20 mmol/L    Urea Nitrogen 64 (H) 6 - 23 mg/dL    Creatinine 2.76 (H) 0.50 - 1.30 mg/dL    eGFR 23 (L) >60 mL/min/1.73m*2    Calcium 7.6 (L) 8.6 - 10.3 mg/dL    Albumin 3.2 (L) 3.4 - 5.0 g/dL    Alkaline Phosphatase 60 33 - 136 U/L    Total Protein 5.8 (L) 6.4 - 8.2 g/dL    AST 99 (H) 9 - 39 U/L    Bilirubin, Total 1.2 0.0 - 1.2 mg/dL     (H) 10 - 52 U/L   Protime-INR   Result Value Ref Range    Protime 23.7 (H) 9.8 - 12.8 seconds    INR 2.1 (H) 0.9 - 1.1   Heparin Assay, UFH   Result Value Ref Range    Heparin Unfractionated 0.4 See Comment Below for Therapeutic Ranges IU/mL   POCT GLUCOSE   Result Value Ref Range    POCT Glucose 248 (H) 74 - 99 mg/dL       CT chest wo IV contrast    Result Date: 2/19/2024  Interpreted By:  Rolando Diana, STUDY: CT CHEST WO IV CONTRAST;  2/18/2024 10:02 pm   INDICATION: Signs/Symptoms:Shortness of breath, chest pain.   COMPARISON: None.   ACCESSION NUMBER(S): [id number]   ORDERING CLINICIAN: MILA SAMUELS   TECHNIQUE: Contiguous axial images of the chest were obtained without intravenous contrast. Coronal and sagittal reformatted images were obtained from the axial images.   FINDINGS: The examination is limited secondary to lack of intravenous contrast and patient motion.   No axillary or mediastinal lymphadenopathy. There is limited evaluation for hilar lymphadenopathy on  noncontrast examination.   The heart is borderline and size. Coronary artery artifact calcifications. No significant pericardial effusion   Small bilateral pleural effusions and bibasilar consolidative/atelectatic opacities. No pneumothorax.   Limited evaluation the upper abdomen. Nodularity of the liver compatible with cirrhosis. Postsurgical change of cholecystectomy.   Multilevel degenerative change of the thoracic spine.       Small bilateral pleural effusions and bibasilar consolidative/atelectatic opacities.   Liver cirrhosis and postsurgical change of cholecystectomy.   MACRO: None   Signed by: Rolando Diana 2/19/2024 2:30 AM Dictation workstation:   [id number]    Lower extremity venous duplex bilateral    Result Date: 2/18/2024  Interpreted By:  Rolando Diana, STUDY: [facility] [place] [facility] US LOWER EXTREMITY VENOUS DUPLEX BILATERAL;  2/18/2024 9:01 pm   INDICATION: Signs/Symptoms:Concern for DVT and PE.   COMPARISON: None.   ACCESSION NUMBER(S): [id number]   ORDERING CLINICIAN: SHAGGY HOWARD   TECHNIQUE: Vascular ultrasound of the bilateral lower extremities was performed. Real-time compression views as well as Gray scale, color Doppler and spectral Doppler waveform analysis was performed.   FINDINGS: Evaluation of the visualized portions of the bilateral common femoral vein, proximal, mid, and distal femoral vein, and popliteal vein were performed.  Evaluation of the visualized portions of the  posterior tibial and peroneal veins were also performed.   The evaluated veins demonstrate normal compressibility. There is intact venous flow demonstrating normal respiratory variability and normal augmentation of flow with calf compression. Therefore, there is no ultrasonographic evidence for deep vein thrombosis within the evaluated veins.       No sonographic evidence for deep vein thrombosis within the evaluated veins of the bilateral lower extremity.   MACRO: None   Signed by: Rolanod Diana 2/18/2024 10:27 PM Dictation  workstation:   [id number]    XR chest 1 view    Result Date: 2/18/2024  Interpreted By:  Geovanny Gardner, STUDY: XR CHEST 1 VIEW;  2/18/2024 6:24 pm   INDICATION: Signs/Symptoms:Hypoxemia worsening.   COMPARISON: 02/18/2024   ACCESSION NUMBER(S): [id number]   ORDERING CLINICIAN: SHAGGY HOWARD   FINDINGS: Heart size is enlarged. Small effusions. Features suggesting edema. Median sternotomy no pneumothorax       1.  Features suggesting mild pulmonary edema ; slightly progressed       MACRO: None   Signed by: Geovanny Gardner 2/18/2024 6:31 PM Dictation workstation:   [id number]    Electrocardiogram, 12-lead PRN ACS symptoms    Result Date: 2/18/2024  Sinus rhythm with occasional Premature ventricular complexes Low voltage QRS Cannot rule out Anteroseptal infarct (cited on or before 14-FEB-2024) Abnormal ECG When compared with ECG of 17-FEB-2024 08:37, (unconfirmed) Premature supraventricular complexes are no longer Present Serial changes of Anteroseptal infarct Present    XR chest 1 view    Result Date: 2/18/2024  Interpreted By:  Morales Corets, STUDY: XR CHEST 1 VIEW;  2/18/2024 5:03 am   INDICATION: Signs/Symptoms:hypoxia.   COMPARISON: Chest radiograph dated 02/17/2024.   ACCESSION NUMBER(S): [id number]   ORDERING CLINICIAN: NEELAM MILAN   FINDINGS:   CARDIOMEDIASTINAL SILHOUETTE: Cardiomediastinal silhouette is mildly enlarged. Similar sternotomy changes.   LUNGS/PLEURA: There are no consolidations.There are no pleural effusions. There is no demonstrated pneumothorax.   Abdomen: Cholecystectomy clips.   BONES: No evidence of acute osseous abnormality.       1.  No evidence of acute cardiopulmonary process. Mild cardiomegaly.     Signed by: Morales Cortes 2/18/2024 5:37 AM Dictation workstation:   [id number]    XR chest 2 views    Result Date: 2/17/2024  Interpreted By:  Jose Morse, STUDY: XR CHEST 2 VIEWS  2/17/2024 12:09 pm   INDICATION: Signs/Symptoms:Persistent leukocytosis   COMPARISON:  09/14/2022   ACCESSION NUMBER(S): [id number]   ORDERING CLINICIAN: SHAGGY HOWARD   TECHNIQUE: PA and lateral views of the chest were obtained.   FINDINGS: Cardiac monitoring leads are seen over the chest. Sternal wires and mediastinal surgical clips are present. A small left-sided pleural effusion is present. Left basilar airspace consolidation is seen and may represent atelectasis and/or pneumonia. No focal infiltrate, pleural effusion or pneumothorax is identified. The cardiac silhouette is within normal limits for size. Moderate to severe discogenic degenerative changes are seen throughout the thoracic spine.       Left-sided pleural effusion and left basilar airspace consolidation, as above. Clinical correlation and continued follow-up until clearing is recommended.   MACRO: None.   Signed by: Jose Morse 2/17/2024 12:33 PM Dictation workstation:   [id number]    Electrocardiogram, 12-lead PRN ACS symptoms    Result Date: 2/17/2024  Atrial fibrillation with aberrantly conducted complexes Premature ventricular complexes Anteroseptal infarct , age undetermined Abnormal ECG When compared with ECG of 15-FEB-2024 17:46, (unconfirmed) No significant change was found Confirmed by Blaine Adams ([id number]) on 2/17/2024 12:00:10 PM    ECG 12 Lead    Result Date: 2/17/2024  Normal sinus rhythm Low voltage QRS Septal infarct , age undetermined Abnormal ECG When compared with ECG of 15-FEB-2024 17:46, (unconfirmed) Sinus rhythm has replaced Wide QRS rhythm Vent. rate has decreased BY  40 BPM Confirmed by Blaine Adams ([id number]) on 2/17/2024 2:18:29 AM    Transthoracic Echo (TTE) Complete    Result Date: 2/15/2024              [place] [id number]      Phone [phone] Fax [phone] TRANSTHORACIC ECHOCARDIOGRAM REPORT  Patient Name:      CHIDI VARGAS      Reading Physician:    [id number] Gonzalo White DO Study Date:         2/15/2024             Ordering Provider:    [id number] TITO NICOLAS MRN/PID:           [id number]              Fellow: Accession#:        [id number]          Nurse: Date of Birth/Age: 1950 / 73 years  Sonographer:          Heena David RDCS Gender:            M                     Additional Staff: Height:            160.02 cm             Admit Date:           2/13/2024 Weight:            73.03 kg              Admission Status:     Inpatient -                                                                Routine BSA / BMI:         1.76 m2 / 28.52 kg/m2 Department Location:  [facility] Echo                                                                Lab Blood Pressure: 105 /60 mmHg Study Type:    TRANSTHORACIC ECHO (TTE) COMPLETE Diagnosis/ICD: Chest pain, unspecified-R07.9 Indication:    Chest Pain CPT Codes:     Echo Complete w Full Doppler-[id number]  Study Detail: The following Echo studies were performed: 2D, M-Mode, Doppler and               color flow. Optison used as a contrast agent for endocardial               border definition. Total contrast used for this procedure was 3 mL               via IV push.  PHYSICIAN INTERPRETATION: Left Ventricle: Left ventricular systolic function is normal, with an estimated ejection fraction of 30-35%. There are multiple wall motion abnormalities. The left ventricular cavity size is upper limits of normal. Spectral Doppler shows a restrictive pattern of left ventricular diastolic filling. LV Wall Scoring: The basal inferoseptal segment and basal inferior segment are akinetic. The entire anterior septum, mid and apical inferior septum, basal and mid inferolateral wall, basal anterolateral segment, basal anterior segment, and mid inferior segment are hypokinetic. All remaining scored segments are normal. Left Atrium: The left atrium is normal in size. Right Ventricle: The right ventricle was not well  visualized. There is low normal right ventricular systolic function. Right Atrium: The right atrium is normal in size. Aortic Valve: The aortic valve is trileaflet. There is mild aortic valve thickening. There is no evidence of aortic valve regurgitation. The peak instantaneous gradient of the aortic valve is 7.1 mmHg. The mean gradient of the aortic valve is 4.0 mmHg. Mitral Valve: The mitral valve is normal in structure. There is mild mitral valve regurgitation. Tricuspid Valve: The tricuspid valve is structurally normal. There is mild to moderate tricuspid regurgitation. Pulmonic Valve: The pulmonic valve is structurally normal. There is physiologic pulmonic valve regurgitation. Pericardium: There is no pericardial effusion noted. Aorta: The aortic root is normal.  CONCLUSIONS:  1. Left ventricular systolic function is normal with a 30-35% estimated ejection fraction.  2. Multiple segmental abnormalities exist. See findings.  3. Spectral Doppler shows a restrictive pattern of left ventricular diastolic filling.  4. There is low normal right ventricular systolic function.  5. Mild to moderate tricuspid regurgitation.  6. There are multiple wall motion abnormalities. QUANTITATIVE DATA SUMMARY: 2D MEASUREMENTS:                           Normal Ranges: Ao Root d:     2.90 cm    (2.0-3.7cm) LAs:           4.60 cm    (2.7-4.0cm) IVSd:          1.00 cm    (0.6-1.1cm) LVPWd:         0.90 cm    (0.6-1.1cm) LVIDd:         5.30 cm    (3.9-5.9cm) LVIDs:         5.20 cm LV Mass Index: 106.2 g/m2 LV % FS        1.9 % LA VOLUME:                               Normal Ranges: LA Vol A4C:        53.0 ml    (22+/-6mL/m2) LA Vol A2C:        47.8 ml LA Vol BP:         50.3 ml LA Vol Index A4C:  30.0ml/m2 LA Vol Index A2C:  27.1 ml/m2 LA Vol Index BP:   28.5 ml/m2 LA Area A4C:       18.0 cm2 LA Area A2C:       17.1 cm2 LA Major Axis A4C: 5.2 cm LA Major Axis A2C: 5.2 cm LA Volume Index:   26.8 ml/m2 M-MODE MEASUREMENTS:                   Normal Ranges: Ao Root: 2.90 cm (2.0-3.7cm) AORTA MEASUREMENTS:                    Normal Ranges: Asc Ao, d: 2.50 cm (2.1-3.4cm) LV SYSTOLIC FUNCTION BY 2D PLANIMETRY (MOD):                     Normal Ranges: EF-A4C View: 29.3 % (>=55%) EF-A2C View: 43.2 % EF-Biplane:  35.2 % LV DIASTOLIC FUNCTION:                        Normal Ranges: MV Peak E:    1.29 m/s (0.7-1.2 m/s) MV Peak A:    0.50 m/s (0.42-0.7 m/s) E/A Ratio:    2.56     (1.0-2.2) MV e'         0.06 m/s (>8.0) MV lateral e' 0.06 m/s MV medial e'  0.06 m/s E/e' Ratio:   21.50    (<8.0) MITRAL VALVE:                 Normal Ranges: MV DT: 151 msec (150-240msec) MITRAL INSUFFICIENCY:                           Normal Ranges: PISA Radius:  0.5 cm MR VTI:       129.00 cm MR Vmax:      437.00 cm/s MR Alias Matt: 38.5 cm/s MR Volume:    17.85 ml MR Flow Rt:   60.48 ml/s MR EROA:      0.14 cm2 AORTIC VALVE:                                   Normal Ranges: AoV Vmax:                1.33 m/s (<=1.7m/s) AoV Peak P.1 mmHg (<20mmHg) AoV Mean P.0 mmHg (1.7-11.5mmHg) LVOT Max Matt:            1.04 m/s (<=1.1m/s) AoV VTI:                 25.10 cm (18-25cm) LVOT VTI:                20.70 cm LVOT Diameter:           2.00 cm  (1.8-2.4cm) AoV Area, VTI:           2.59 cm2 (2.5-5.5cm2) AoV Area,Vmax:           2.46 cm2 (2.5-4.5cm2) AoV Dimensionless Index: 0.82  RIGHT VENTRICLE: TAPSE: 14.1 mm RV s'  0.09 m/s TRICUSPID VALVE/RVSP:                             Normal Ranges: Peak TR Velocity: 2.44 m/s RV Syst Pressure: 26.8 mmHg (< 30mmHg) IVC Diam:         1.50 cm  [id number] Gonzalo White DO Electronically signed on 2/15/2024 at 11:57:27 AM  Wall Scoring  ** Final **     CT femur left wo IV contrast    Result Date: 2/15/2024  Interpreted By:  Chris Nails, STUDY: CT FEMUR LEFT WO IV CONTRAST;  2/15/2024 9:59 am   INDICATION: Signs/Symptoms:hematoma left thigh.   COMPARISON: Radiographs 2024.   ACCESSION NUMBER(S): [id number]   ORDERING  CLINICIAN: AKIL LINTON   TECHNIQUE: CT imaging of the  left femur was obtained  without administration of intravenous contrast medium. Coronal and sagittal reformatted images were performed.   FINDINGS: OSSEOUS STRUCTURES: No acute fracture or dislocation. Left hip joint is maintained. Osteoarthritis of the knee. Trace knee joint effusion with mild synovial thickening.   SOFT TISSUES: There is in ellipsoid superficial, subcutaneous hematoma in the anterior thigh splaying the rectus femoris and vastus lateralis muscles. This hematoma measures 12 x 5 x 19 cm (transverse x anterior-posterior x craniocaudal). There is minimal skin thickening and diffuse subcutaneous edema throughout the imaged thigh. No fluid collection or suspicious soft tissue mass. Muscle bulk is maintained with preserved intermuscular fascial planes. Small popliteal cyst. Love balloon and excreted contrast within the bladder. No acute intrapelvic process.       Anterior thigh subcutaneous hematoma splaying the rectus femoris and vastus lateralis muscles, 12 x 5 x 19 cm.   Minimal skin thickening and diffuse subcutaneous edema throughout the imaged thigh without a fluid collection. No CT evidence of deep compartment infiltration.   No acute fracture.   MACRO: None   Signed by: Chris Nails 2/15/2024 11:51 AM Dictation workstation:   [id number]    CT abdomen pelvis wo IV contrast    Result Date: 2/15/2024  Interpreted By:  Akil Ruiz, STUDY: CT ABDOMEN PELVIS WO IV CONTRAST;  2/15/2024 9:59 am   INDICATION: Signs/Symptoms:Hematoma left thigh,.   COMPARISON: None   ACCESSION NUMBER(S): [id number]   ORDERING CLINICIAN: AKIL LINTON   TECHNIQUE: CT of the abdomen and pelvis was performed. Contiguous axial images were obtained at 3 mm slice thickness through the abdomen and pelvis. Coronal and sagittal reconstructions at 3 mm slice thickness were performed.  No intravenous contrast was administered; positive oral contrast was given.   FINDINGS:  Please note that the evaluation of vessels, lymph nodes and organs is limited without intravenous contrast.   LOWER CHEST: Trace bilateral pleural effusion with surrounding atelectasis.   ABDOMEN:   LIVER: Cirrhotic hepatic morphology. No gross lesions.   BILE DUCTS: No intrahepatic biliary dilatation.   GALLBLADDER: Surgically absent.   PANCREAS: No duct dilatation.   SPLEEN: No splenomegaly.   ADRENAL GLANDS: Bilateral adrenal glands appear normal.   KIDNEYS AND URETERS: Retained contrast within the renal parenchyma from prior contrasted study. Small size right kidney. No hydroureteronephrosis or nephroureterolithiasis is identified. Left cortical renal cysts.   PELVIS:   BLADDER: Within normal limits. Love catheter in place. Trace contrast noted.   REPRODUCTIVE ORGANS: Mild splenomegaly.   BOWEL: No bowel dilatation.   VESSELS: Moderate vascular calcifications and atherosclerotic changes. Focal aneurysmal dilatation of the infrarenal aorta measuring 3.1 cm.   PERITONEUM/RETROPERITONEUM/LYMPH NODES: No enlarged intra-abdominal or pelvic lymphadenopathy.   ABDOMINAL WALL: Redemonstrated left lateral abdominal wall/flank and lateral thigh significant soft tissue thickening and edematous changes with a small intramuscular hyperdensity in the left lateral thigh measuring 1.7 cm as in prior.   BONES: Multilevel degenerative spine changes and facet joint disease.       1. Redemonstrated left flank and lateral thigh significant soft tissue thickening and edematous changes with a small intramuscular hyperdensity in the left lateral thigh measuring 1.7 cm as in prior in keeping with the known hematoma. The previously described left thigh active extravasation is not included in this portions of CT scan and would be better assessed in same day left femur CT scan official read. 2. Cirrhotic hepatic morphology. 3. Partially atrophic right kidney. Retained renal parenchymal IV contrast from prior study could be related to  impairment. Advise correlation with kidney function tests. 4. Focal aneurysmal dilatation of the abdominal aorta measuring 3.1 cm. 5. Trace bilateral pleural effusion with surrounding atelectasis     MACRO: None   Signed by: Akil Ruiz 2/15/2024 10:49 AM Dictation workstation:   [id number]    ECG 12 Lead    Result Date: 2/15/2024  Ventricular tachcyardia, offset into undetermined rhythm with frequent ectopic beats Low voltage QRS Cannot rule out Inferior infarct , age undetermined Cannot rule out Anteroseptal infarct , age undetermined Marked ST abnormality, possible lateral subendocardial injury Abnormal ECG When compared with ECG of 13-FEB-2024 22:12, PREVIOUS ECG IS PRESENT Confirmed by Migdalia Beasley ([id number]) on 2/15/2024 10:32:33 AM    Electrocardiogram, 12-lead PRN ACS symptoms    Result Date: 2/15/2024  Sinus rhythm with frequent PAC/PVC's Anteroseptal infarct (cited on or before 14-FEB-2024) Abnormal ECG When compared with ECG of 14-FEB-2024 21:00, (unconfirmed) Current undetermined rhythm precludes rhythm comparison, needs review Right bundle branch block is no longer Present Borderline criteria for Inferior infarct are no longer Present Questionable change in initial forces of Anterior leads Confirmed by Migdalia Beasley ([id number]) on 2/15/2024 10:31:39 AM    XR foot left 3+ views    Result Date: 2/15/2024  Interpreted By:  Nico Campos, STUDY: XR FOOT LEFT 3+ VIEWS; ;  2/14/2024 1:19 pm   INDICATION: Signs/Symptoms:trauma.   COMPARISON: None.   ACCESSION NUMBER(S): [id number]   ORDERING CLINICIAN: RODRIGO GALLARDO   FINDINGS: No acute fracture or dislocation of the left foot is noted.   The lateral margin of the 2nd metatarsophalangeal joint has degenerative calcifications/ossifications. Articulation       No acute fracture or dislocation of the left foot.     MACRO: None   Signed by: Nico Campos 2/15/2024 8:03 AM Dictation workstation:   [id number]    ECG 12 lead    Result Date: 2/14/2024  Sinus rhythm  Nonspecific intraventricular conduction delay Anteroseptal infarct, age indeterminate Minimal ST elevation, inferior leads Lateral leads are also involved See ED provider note for full interpretation and clinical correlation Confirmed by Malik Hills ([id number]) on 2/14/2024 3:39:40 PM    CT chest abdomen pelvis w IV contrast    Addendum Date: 2/13/2024    Interpreted By:  Geovanny Gardner, ADDENDUM: Geovanny Gardner discussed the significance and urgency of this critical finding by epic chat with  TETO NEAL on 2/13/2024 at 11:05 pm. (**-RCF-**) Findings:  See findings.     Signed by: Geovanny Gardner 2/13/2024 11:05 PM   -------- ORIGINAL REPORT -------- Dictation workstation:   [id number]    Result Date: 2/13/2024  Interpreted By:  Geovanny Gardner, STUDY: CT CHEST ABDOMEN PELVIS W IV CONTRAST;  2/13/2024 10:08 pm   INDICATION: Signs/Symptoms:Trauma, fall, left lower extremity hematoma, left lower quadrant discomfort   COMPARISON: 06/29/2020   ACCESSION NUMBER(S): [id number]   ORDERING CLINICIAN: TETO NEAL   TECHNIQUE: CT of the chest, abdomen, and pelvis was performed. Contiguous axial images were obtained at  5 mm slice thickness through the chest, and at  3 mm through the abdomen and pelvis. Coronal and sagittal reconstructions at  3 mm slice thickness were performed. Intravenous contrast administered   FINDINGS: CHEST:   Gynecomastia. Median sternotomy. Enlarged main pulmonary artery suggestive of pulmonary arterial hypertension no findings of pneumothorax or pleural effusion. No acute traumatic findings in the chest. Severe vascular calcification   ABDOMEN: Abdominal aortic aneurysm measuring 3.2 x 2.5 cm not measurably changed. Heterogeneous appearance of the liver with surface nodularity. Unremarkable adrenal glands and pancreas. Unremarkable spleen. No splenic hematoma   Prostate gland is enlarged. It is heterogeneous.   Right kidney is atrophic compared to the left. 1 cm left renal cyst. Findings most  suspicious for renal artery stenosis diverticulosis.   Interrogation of the left pelvis shows a large hematoma in the left quadriceps with high density blush of contrast. Reference image 50 series 12 compatible with active extravasation. Hematoma measures about 9.1 x 5.6 x 13 cm.   Marked soft tissue swelling of the left lower extremity. Mild multilevel degenerative disc disease.       Large left intramuscular hematoma with evidence of active extravasation within the muscle.   Severe vascular calcification.   Stable abdominal aortic aneurysm with severe vascular calcification. Heterogeneity of the liver. Query cirrhosis. Right kidney is atrophic compared to the left most suggestive of renal artery stenosis.   Enlarged prostate gland. Diverticulosis. No diverticulitis   Signed by: Geovanny Gardner 2/13/2024 10:59 PM Dictation workstation:   [id number]    XR femur left 2+ views    Result Date: 2/13/2024  Interpreted By:  Geovanny Gardner, STUDY: XR KNEE 4+ VIEWS BILATERAL; XR FEMUR LEFT 2+ VIEWS; ;  2/13/2024 10:35 pm   INDICATION: Signs/Symptoms:Fall, bilateral knee pain; Signs/Symptoms:Fall, left thigh hematoma.   COMPARISON: None.   ACCESSION NUMBER(S): [id number]; [id number]   ORDERING CLINICIAN: TETO NEAL   FINDINGS: Left femur and bilateral knees performed.   Vascular calcification detected bilaterally.   Large soft tissue swelling seen in the left thigh compatible with known hematoma. No evidence of femur fracture.   No evidence of knee dislocation. Mild tricompartmental osteoarthritis of both knees.       No evidence of left femur fracture. Known large hematoma. Bilateral knee osteoarthritis. Robust vascular calcification     MACRO: None   Signed by: Geovanny Gardner 2/13/2024 11:01 PM Dictation workstation:   [id number]    XR knee 4+ views bilateral    Result Date: 2/13/2024  Interpreted By:  Geovanny Gardner, STUDY: XR KNEE 4+ VIEWS BILATERAL; XR FEMUR LEFT 2+ VIEWS; ;  2/13/2024 10:35 pm   INDICATION:  Signs/Symptoms:Fall, bilateral knee pain; Signs/Symptoms:Fall, left thigh hematoma.   COMPARISON: None.   ACCESSION NUMBER(S): [id number]; [id number]   ORDERING CLINICIAN: TETO NEAL   FINDINGS: Left femur and bilateral knees performed.   Vascular calcification detected bilaterally.   Large soft tissue swelling seen in the left thigh compatible with known hematoma. No evidence of femur fracture.   No evidence of knee dislocation. Mild tricompartmental osteoarthritis of both knees.       No evidence of left femur fracture. Known large hematoma. Bilateral knee osteoarthritis. Robust vascular calcification     MACRO: None   Signed by: Geovanny Gardner 2/13/2024 11:01 PM Dictation workstation:   [id number]    XR pelvis 1-2 views    Result Date: 2/13/2024  Interpreted By:  Deborah Jamil, STUDY: XR PELVIS 1-2 VIEWS; ;  2/13/2024 9:33 pm   INDICATION: Signs/Symptoms:trauma, left thigh pain.   COMPARISON: None.   ACCESSION NUMBER(S): [id number]   ORDERING CLINICIAN: TETO NEAL   FINDINGS: Evaluation limited to positioning. Postoperative changes in the right hip evidence by surgical clips. Heavy wall calcification of vascular structures in the area of femoral arteries bilaterally. No evidence for acute fracture or dislocation. No bone lesions. No radiopaque foreign body.       No acute fracture or dislocation of bilateral hip joints.     MACRO: None   Signed by: Deborah Jamil 2/13/2024 9:54 PM Dictation workstation:   [id number]    CT cervical spine wo IV contrast    Result Date: 2/13/2024  Interpreted By:  Deborah Jamil, STUDY: CT CERVICAL SPINE WO IV CONTRAST;  2/13/2024 9:45 pm   INDICATION: Signs/Symptoms:Fall, neck pain.   COMPARISON: None.   ACCESSION NUMBER(S): [id number]   ORDERING CLINICIAN: TETO NEAL   TECHNIQUE: Axial CT images of the cervical spine are obtained. Axial, coronal and sagittal reconstructions are provided for review.   FINDINGS: Wall calcification of bilateral carotid bulbs, worse on the  left. Heterogeneous enlargement of the right thyroid lobe which is incompletely characterized in this study. No focal masses are seen.   Fractures: There is no evidence for an acute fracture of the cervical spine.   Vertebral Alignment: Within normal limits.   Craniocervical Junction: Degenerative changes at C1-2 level with prominent soft tissue thickening at the C1-2 articulation.   Vertebrae/Disc Spaces:  Moderate multilevel discogenic degenerative changes including disc space narrowing, endplate sclerosis, spurring and posterior disc osteophyte complex. Findings worse from C5-T1. Facet joint sclerosis and hypertrophy bilaterally at multiple levels.   Prevertebral/Paraspinal Soft Tissues: No prevertebral soft tissue swelling.         1. No evidence for an acute fracture or subluxation of the cervical spine.   2. Degenerative changes of the spine as above.   3. Asymmetrical thyromegaly.   4. Heavy wall calcification of bilateral carotid bulbs.   MACRO: None   Signed by: Deborah Jamil 2/13/2024 9:53 PM Dictation workstation:   [id number]    CT head W O contrast trauma protocol    Result Date: 2/13/2024  Interpreted By:  Deborah Jamil, STUDY: CT HEAD W/O CONTRAST TRAUMA PROTOCOL;  2/13/2024 9:45 pm   INDICATION: Signs/Symptoms:Fall.   COMPARISON: 09/14/2022   ACCESSION NUMBER(S): [id number]   ORDERING CLINICIAN: TETO NEAL   TECHNIQUE: Noncontrast axial CT scan of head was performed. Angled reformats in brain and bone windows were generated. The images were reviewed in bone, brain, blood and soft tissue windows.   FINDINGS: CSF Spaces: The ventricles, sulci and cisterns are within normal limits for patient's age.  There is no extraaxial fluid collection.   Parenchyma: The grey-white differentiation is intact. There is no mass effect or midline shift.  There is no intracranial hemorrhage.   Calvarium: The calvarium is unremarkable.   Paranasal sinuses and mastoids: Visualized paranasal sinuses and mastoids are  clear.       No evidence of acute cortical infarct or intracranial hemorrhage.   MACRO: None     Signed by: Deborah Jamil 2/13/2024 9:51 PM Dictation workstation:   [id number]           This patient currently has cardiac telemetry ordered; if you would like to modify or discontinue the telemetry order, click here to go to the orders activity to modify/discontinue the order.                 Assessment/Plan   Principal Problem:    Hematoma of left thigh, initial encounter  Active Problems:    Asthma    Benign essential hypertension    Cirrhosis, nonalcoholic (CMS/[facility])    Type 2 diabetes mellitus with diabetic neuropathy, without long-term current use of insulin (CMS/[facility])    Cognitive dysfunction    Congestive heart failure with right ventricular systolic dysfunction (CMS/[facility])    Traumatic hematoma of left thigh    CAD (coronary artery disease)    HLD (hyperlipidemia)    Acute kidney injury superimposed on CKD (CMS/[facility])    SENIA (obstructive sleep apnea)    Erosive esophagitis    Chest pain    Ventricular tachycardia (CMS/[facility])    Acute blood loss anemia    Demand ischemia    Pneumonia    NSTEMI (non-ST elevated myocardial infarction) (CMS/[facility])    Acute on chronic systolic heart failure (CMS/[facility])    Acute non-ST elevation MI, angina, CAD, Congestive heart failure with right ventricular systolic dysfunction  -downtrending troponin   -cardiology consulted and following  -LHC postponed due to elevated Creatinine, appreciate further reccs  -continue amiodarone, metoprolol, vascepa  -NPO after midnight in anticipation for LHC  -continue heparin drip    Acute blood loss anemia, hematoma of left thigh  - trauma surgery following  - s/p 1u pRBC  - trending CBC, appears relatively stable post-transfusion, will transfuse for Hb<7.0    Acute kidney injury on chronic kidney disease secondary to diabetic nephropathy  -Nephrology following, imaging shows partially atrophic right kidney with contrast retention   -continue holding home  losartan and farxiga  -held IVF due to worsening respiratory status  -nephrology following, appreciate reccs    Acute respiratory failure  -hx of home nebulizer use  -hx of SENIA, noncompliant with CPAP/Bipap  -likely due to worsening HF  -continue supplemental O2, goal SpO2>92%  -will trial duoneb q6    Cirrhosis nonalcoholic  -monitor LFTs  -MELD 3.0 score 26, estimated 85% 90-day survival    History of erosive esophagitis  -continue pantoprazole every day    Type 2 diabetes with neuropathy  -A1c 6.6%, goal <8.0%  -sliding scale insulin      DVT prophylaxis-Heparin drip for now, will switch to SCDs after           I spent 40 minutes in the professional and overall care of this patient.      Rajendra Liu MD

## 2024-02-21 ENCOUNTER — APPOINTMENT (OUTPATIENT)
Dept: CARDIOLOGY | Facility: HOSPITAL | Age: 74
DRG: 604 | End: 2024-02-21
Payer: MEDICARE

## 2024-02-21 LAB
ALBUMIN SERPL BCP-MCNC: 3.1 G/DL (ref 3.4–5)
ANION GAP SERPL CALC-SCNC: 18 MMOL/L (ref 10–20)
BACTERIA BLD CULT: NORMAL
BUN SERPL-MCNC: 68 MG/DL (ref 6–23)
CALCIUM SERPL-MCNC: 7.5 MG/DL (ref 8.6–10.3)
CHLORIDE SERPL-SCNC: 103 MMOL/L (ref 98–107)
CO2 SERPL-SCNC: 19 MMOL/L (ref 21–32)
CREAT SERPL-MCNC: 2.67 MG/DL (ref 0.5–1.3)
EGFRCR SERPLBLD CKD-EPI 2021: 24 ML/MIN/1.73M*2
ERYTHROCYTE [DISTWIDTH] IN BLOOD BY AUTOMATED COUNT: 16.4 % (ref 11.5–14.5)
GLUCOSE BLD MANUAL STRIP-MCNC: 166 MG/DL (ref 74–99)
GLUCOSE BLD MANUAL STRIP-MCNC: 168 MG/DL (ref 74–99)
GLUCOSE BLD MANUAL STRIP-MCNC: 169 MG/DL (ref 74–99)
GLUCOSE BLD MANUAL STRIP-MCNC: 208 MG/DL (ref 74–99)
GLUCOSE BLD MANUAL STRIP-MCNC: 242 MG/DL (ref 74–99)
GLUCOSE BLD MANUAL STRIP-MCNC: 250 MG/DL (ref 74–99)
GLUCOSE BLD MANUAL STRIP-MCNC: 267 MG/DL (ref 74–99)
GLUCOSE SERPL-MCNC: 180 MG/DL (ref 74–99)
HCT VFR BLD AUTO: 31.7 % (ref 41–52)
HGB BLD-MCNC: 10.2 G/DL (ref 13.5–17.5)
MAGNESIUM SERPL-MCNC: 1.78 MG/DL (ref 1.6–2.4)
MCH RBC QN AUTO: 29.8 PG (ref 26–34)
MCHC RBC AUTO-ENTMCNC: 32.2 G/DL (ref 32–36)
MCV RBC AUTO: 93 FL (ref 80–100)
MRSA DNA SPEC QL NAA+PROBE: NOT DETECTED
NRBC BLD-RTO: 0 /100 WBCS (ref 0–0)
PHOSPHATE SERPL-MCNC: 3.8 MG/DL (ref 2.5–4.9)
PLATELET # BLD AUTO: 216 X10*3/UL (ref 150–450)
POTASSIUM SERPL-SCNC: 3.3 MMOL/L (ref 3.5–5.3)
RBC # BLD AUTO: 3.42 X10*6/UL (ref 4.5–5.9)
SODIUM SERPL-SCNC: 137 MMOL/L (ref 136–145)
UFH PPP CHRO-ACNC: 0.4 IU/ML
WBC # BLD AUTO: 14.7 X10*3/UL (ref 4.4–11.3)

## 2024-02-21 PROCEDURE — 99291 CRITICAL CARE FIRST HOUR: CPT | Performed by: INTERNAL MEDICINE

## 2024-02-21 PROCEDURE — 2500000002 HC RX 250 W HCPCS SELF ADMINISTERED DRUGS (ALT 637 FOR MEDICARE OP, ALT 636 FOR OP/ED): Performed by: INTERNAL MEDICINE

## 2024-02-21 PROCEDURE — 99232 SBSQ HOSP IP/OBS MODERATE 35: CPT | Performed by: SURGERY

## 2024-02-21 PROCEDURE — 83735 ASSAY OF MAGNESIUM: CPT | Performed by: FAMILY MEDICINE

## 2024-02-21 PROCEDURE — 2500000004 HC RX 250 GENERAL PHARMACY W/ HCPCS (ALT 636 FOR OP/ED): Performed by: INTERNAL MEDICINE

## 2024-02-21 PROCEDURE — 85027 COMPLETE CBC AUTOMATED: CPT | Performed by: INTERNAL MEDICINE

## 2024-02-21 PROCEDURE — 2500000002 HC RX 250 W HCPCS SELF ADMINISTERED DRUGS (ALT 637 FOR MEDICARE OP, ALT 636 FOR OP/ED)

## 2024-02-21 PROCEDURE — 2500000004 HC RX 250 GENERAL PHARMACY W/ HCPCS (ALT 636 FOR OP/ED): Performed by: PHARMACIST

## 2024-02-21 PROCEDURE — 94660 CPAP INITIATION&MGMT: CPT

## 2024-02-21 PROCEDURE — 2500000001 HC RX 250 WO HCPCS SELF ADMINISTERED DRUGS (ALT 637 FOR MEDICARE OP): Performed by: INTERNAL MEDICINE

## 2024-02-21 PROCEDURE — 82947 ASSAY GLUCOSE BLOOD QUANT: CPT

## 2024-02-21 PROCEDURE — 97535 SELF CARE MNGMENT TRAINING: CPT | Mod: GO,CO

## 2024-02-21 PROCEDURE — 87641 MR-STAPH DNA AMP PROBE: CPT | Performed by: INTERNAL MEDICINE

## 2024-02-21 PROCEDURE — 99291 CRITICAL CARE FIRST HOUR: CPT | Performed by: FAMILY MEDICINE

## 2024-02-21 PROCEDURE — 97116 GAIT TRAINING THERAPY: CPT | Mod: GP,CQ

## 2024-02-21 PROCEDURE — 99232 SBSQ HOSP IP/OBS MODERATE 35: CPT | Performed by: NURSE PRACTITIONER

## 2024-02-21 PROCEDURE — 93005 ELECTROCARDIOGRAM TRACING: CPT

## 2024-02-21 PROCEDURE — 94640 AIRWAY INHALATION TREATMENT: CPT

## 2024-02-21 PROCEDURE — 97530 THERAPEUTIC ACTIVITIES: CPT | Mod: GO,CO

## 2024-02-21 PROCEDURE — 80069 RENAL FUNCTION PANEL: CPT | Performed by: FAMILY MEDICINE

## 2024-02-21 PROCEDURE — 36415 COLL VENOUS BLD VENIPUNCTURE: CPT | Performed by: INTERNAL MEDICINE

## 2024-02-21 PROCEDURE — 2060000001 HC INTERMEDIATE ICU ROOM DAILY

## 2024-02-21 PROCEDURE — 85520 HEPARIN ASSAY: CPT | Performed by: INTERNAL MEDICINE

## 2024-02-21 PROCEDURE — 97110 THERAPEUTIC EXERCISES: CPT | Mod: GP,CQ

## 2024-02-21 RX ORDER — POTASSIUM CHLORIDE 750 MG/1
40 TABLET, FILM COATED, EXTENDED RELEASE ORAL ONCE
Status: COMPLETED | OUTPATIENT
Start: 2024-02-21 | End: 2024-02-21

## 2024-02-21 RX ORDER — VANCOMYCIN HYDROCHLORIDE 750 MG/150ML
750 INJECTION, SOLUTION INTRAVENOUS EVERY 24 HOURS
Status: DISCONTINUED | OUTPATIENT
Start: 2024-02-21 | End: 2024-02-21

## 2024-02-21 RX ORDER — POTASSIUM CHLORIDE 20 MEQ/1
20 TABLET, EXTENDED RELEASE ORAL ONCE
Status: COMPLETED | OUTPATIENT
Start: 2024-02-21 | End: 2024-02-21

## 2024-02-21 RX ORDER — POTASSIUM CHLORIDE 14.9 MG/ML
20 INJECTION INTRAVENOUS ONCE
Status: DISCONTINUED | OUTPATIENT
Start: 2024-02-21 | End: 2024-02-21

## 2024-02-21 RX ORDER — FUROSEMIDE 80 MG/1
80 TABLET ORAL
Status: DISCONTINUED | OUTPATIENT
Start: 2024-02-21 | End: 2024-02-23 | Stop reason: HOSPADM

## 2024-02-21 RX ADMIN — INSULIN LISPRO 2 UNITS: 100 INJECTION, SOLUTION INTRAVENOUS; SUBCUTANEOUS at 00:04

## 2024-02-21 RX ADMIN — INSULIN LISPRO 4 UNITS: 100 INJECTION, SOLUTION INTRAVENOUS; SUBCUTANEOUS at 12:02

## 2024-02-21 RX ADMIN — VANCOMYCIN HYDROCHLORIDE 750 MG: 750 INJECTION, SOLUTION INTRAVENOUS at 00:21

## 2024-02-21 RX ADMIN — FUROSEMIDE 80 MG: 10 INJECTION, SOLUTION INTRAMUSCULAR; INTRAVENOUS at 03:43

## 2024-02-21 RX ADMIN — POTASSIUM CHLORIDE 40 MEQ: 750 TABLET, FILM COATED, EXTENDED RELEASE ORAL at 12:20

## 2024-02-21 RX ADMIN — ACETAMINOPHEN 650 MG: 325 TABLET ORAL at 00:04

## 2024-02-21 RX ADMIN — IPRATROPIUM BROMIDE AND ALBUTEROL SULFATE 3 ML: 2.5; .5 SOLUTION RESPIRATORY (INHALATION) at 21:31

## 2024-02-21 RX ADMIN — AMIODARONE HYDROCHLORIDE 400 MG: 400 TABLET ORAL at 08:00

## 2024-02-21 RX ADMIN — INSULIN LISPRO 2 UNITS: 100 INJECTION, SOLUTION INTRAVENOUS; SUBCUTANEOUS at 09:00

## 2024-02-21 RX ADMIN — FUROSEMIDE 80 MG: 80 TABLET ORAL at 16:04

## 2024-02-21 RX ADMIN — ICOSAPENT ETHYL 1 G: 1 CAPSULE ORAL at 18:18

## 2024-02-21 RX ADMIN — ICOSAPENT ETHYL 1 G: 1 CAPSULE ORAL at 08:00

## 2024-02-21 RX ADMIN — POTASSIUM CHLORIDE 20 MEQ: 1500 TABLET, EXTENDED RELEASE ORAL at 11:22

## 2024-02-21 RX ADMIN — SERTRALINE HYDROCHLORIDE 50 MG: 50 TABLET ORAL at 08:00

## 2024-02-21 RX ADMIN — INSULIN LISPRO 4 UNITS: 100 INJECTION, SOLUTION INTRAVENOUS; SUBCUTANEOUS at 16:04

## 2024-02-21 RX ADMIN — ACETAMINOPHEN 650 MG: 325 TABLET ORAL at 06:19

## 2024-02-21 RX ADMIN — HEPARIN SODIUM 1000 UNITS/HR: 10000 INJECTION, SOLUTION INTRAVENOUS at 22:00

## 2024-02-21 RX ADMIN — AMIODARONE HYDROCHLORIDE 400 MG: 400 TABLET ORAL at 21:56

## 2024-02-21 RX ADMIN — POTASSIUM CHLORIDE 20 MEQ: 14.9 INJECTION, SOLUTION INTRAVENOUS at 11:21

## 2024-02-21 RX ADMIN — PIPERACILLIN SODIUM AND TAZOBACTAM SODIUM 3.38 G: 3; .375 INJECTION, SOLUTION INTRAVENOUS at 21:58

## 2024-02-21 RX ADMIN — PIPERACILLIN SODIUM AND TAZOBACTAM SODIUM 3.38 G: 3; .375 INJECTION, SOLUTION INTRAVENOUS at 02:09

## 2024-02-21 RX ADMIN — METOPROLOL SUCCINATE 50 MG: 50 TABLET, EXTENDED RELEASE ORAL at 08:00

## 2024-02-21 RX ADMIN — INSULIN LISPRO 6 UNITS: 100 INJECTION, SOLUTION INTRAVENOUS; SUBCUTANEOUS at 21:58

## 2024-02-21 RX ADMIN — PIPERACILLIN SODIUM AND TAZOBACTAM SODIUM 3.38 G: 3; .375 INJECTION, SOLUTION INTRAVENOUS at 14:45

## 2024-02-21 RX ADMIN — ACETAMINOPHEN 650 MG: 325 TABLET ORAL at 11:22

## 2024-02-21 RX ADMIN — PANTOPRAZOLE SODIUM 40 MG: 40 TABLET, DELAYED RELEASE ORAL at 08:00

## 2024-02-21 RX ADMIN — INSULIN LISPRO 2 UNITS: 100 INJECTION, SOLUTION INTRAVENOUS; SUBCUTANEOUS at 03:43

## 2024-02-21 RX ADMIN — PIPERACILLIN SODIUM AND TAZOBACTAM SODIUM 3.38 G: 3; .375 INJECTION, SOLUTION INTRAVENOUS at 08:03

## 2024-02-21 RX ADMIN — ACETAMINOPHEN 650 MG: 325 TABLET ORAL at 18:18

## 2024-02-21 RX ADMIN — LORATADINE 10 MG: 10 TABLET ORAL at 08:00

## 2024-02-21 ASSESSMENT — COGNITIVE AND FUNCTIONAL STATUS - GENERAL
DRESSING REGULAR UPPER BODY CLOTHING: A LOT
HELP NEEDED FOR BATHING: A LOT
WALKING IN HOSPITAL ROOM: A LOT
PERSONAL GROOMING: A LITTLE
MOBILITY SCORE: 15
DAILY ACTIVITIY SCORE: 14
MOVING FROM LYING ON BACK TO SITTING ON SIDE OF FLAT BED WITH BEDRAILS: A LITTLE
EATING MEALS: A LITTLE
STANDING UP FROM CHAIR USING ARMS: A LITTLE
TURNING FROM BACK TO SIDE WHILE IN FLAT BAD: A LITTLE
MOVING TO AND FROM BED TO CHAIR: A LITTLE
DRESSING REGULAR LOWER BODY CLOTHING: A LOT
TOILETING: A LOT
CLIMB 3 TO 5 STEPS WITH RAILING: TOTAL

## 2024-02-21 ASSESSMENT — PAIN SCALES - GENERAL
PAINLEVEL_OUTOF10: 0 - NO PAIN

## 2024-02-21 ASSESSMENT — PAIN - FUNCTIONAL ASSESSMENT
PAIN_FUNCTIONAL_ASSESSMENT: 0-10

## 2024-02-21 ASSESSMENT — ACTIVITIES OF DAILY LIVING (ADL): HOME_MANAGEMENT_TIME_ENTRY: 15

## 2024-02-21 NOTE — PROGRESS NOTES
Occupational Therapy    OT Treatment    Patient Name: Ritesh Garza  MRN: [id number]  Today's Date: 2/21/2024  Time Calculation  Start Time: 1538  Stop Time: 1608  Time Calculation (min): 30 min         Assessment:  End of Session Communication: Bedside nurse  End of Session Patient Position: Up in chair, Alarm on     Plan:  Treatment Interventions: ADL retraining, Endurance training, Functional transfer training      Subjective   Previous Visit Info:  OT Last Visit  OT Received On: 02/21/24  General:  General  Co-Treatment: PT  Co-Treatment Reason: to maximize safety with functional mobility and activity. tolerance  General Comment: pt. improved today he was able to walk to bathroom with MIN A x1 another for managing lines. He increased standing tolerance to 7 mins while performing sinlk level grooming. Pt. needs motivated talkative and needs redirected to task No loss of balance  Pt. likely to continue to progress with skilled OT to return back to base line.       Pain:  Pain Assessment  Pain Assessment: 0-10  Pain Score: 0 - No pain (reports pain in LLE has improved.)    Objective    Cognition:  Cognition  Overall Cognitive Status: Within Functional Limits  Orientation Level: Oriented X4  Coordination:     Activities of Daily Living: Grooming  Grooming Level of Assistance: Contact guard  Grooming Where Assessed: Standing sinkside  Grooming Comments: stood at sink level completed hand hygiene , brushing teeth, combing hair and cleaning eye glasses no loss of balane    LE Dressing  LE Dressing: Yes  Adult Briefs Level of Assistance: Moderate assistance  LE Dressing Where Assessed: Toilet  LE Dressing Comments: pt. struggled to reach feet but did assist with threading through brief and pulling up over hips    Toileting  Toileting Level of Assistance: Maximum verbal cues  Where Assessed: Toilet (standing.)  Toileting Comments: pt. reports he did not feel confident wiping himself despite encouraged to atttempt  seated.  Functional Standing Tolerance:  Time: 2mins initial stance then 7 mins intermittent UE support  Activity: during toileting then stood for prolonged time at sink performing grooming  Functional Standing Tolerance Comments: no loss of balance activity tolerance improving.  Bed Mobility/Transfers: Bed Mobility 1  Bed Mobility 1: Supine to sitting  Level of Assistance 1: Minimum assistance  Bed Mobility Comments 1: cueing for hand placement.    Transfers  Transfer: Yes  Transfer 1  Transfer From 1: Bed to  Transfer to 1: Toilet  Technique 1:  (via FWW to bathroom)  Transfer Device 1: Walker (FWW)  Transfer Level of Assistance 1: Minimum assistance  Trials/Comments 1: (P) pt. scanning environment well no loss of balance during ambulation  Transfers 2  Transfer From 2: Toilet to  Transfer to 2: Chair with arms  Technique 2: Sit to stand, Stand to sit  Transfer Device 2: Walker  Transfer Level of Assistance 2: Minimum assistance    Toilet Transfers  Toilet Transfer From: Bed  Toilet Transfer Type: To and from  Toilet Transfer to: Standard toilet  Toilet Transfer Technique: Ambulating  Toilet Transfers: Minimal assistance    Outcome Measures:[facility] Daily Activity  Putting on and taking off regular lower body clothing: A lot  Bathing (including washing, rinsing, drying): A lot  Putting on and taking off regular upper body clothing: A lot  Toileting, which includes using toilet, bedpan or urinal: A lot  Taking care of personal grooming such as brushing teeth: A little  Eating Meals: A little  Daily Activity - Total Score: 14        Education Documentation  Body Mechanics, taught by ASIM Pinedo at 2/21/2024  4:54 PM.  Learner: Patient  Readiness: Acceptance  Method: Explanation, Demonstration  Response: Verbalizes Understanding, Needs Reinforcement    Precautions, taught by ASIM Pinedo at 2/21/2024  4:54 PM.  Learner: Patient  Readiness: Acceptance  Method: Explanation,  Demonstration  Response: Verbalizes Understanding, Needs Reinforcement    ADL Training, taught by ASIM Pinedo at 2/21/2024  4:54 PM.  Learner: Patient  Readiness: Acceptance  Method: Explanation, Demonstration  Response: Verbalizes Understanding, Needs Reinforcement    Education Comments  No comments found.        OP EDUCATION:       Goals:  Encounter Problems       Encounter Problems (Active)       ADLs       Patient with complete lower body dressing with set-up and supervision level of assistance donning and doffing all LE clothes  with PRN adaptive equipment while supported sitting and standing (Progressing)       Start:  02/16/24    Expected End:  02/24/24               Mobility       STG - Patient will ambulate (Progressing)       Start:  02/15/24    Expected End:  02/24/24       FWW/CANE SBA USING PROPER GAIT PATTERN         STG - Patient will ascend and descend four to six stairs (Progressing)       Start:  02/15/24    Expected End:  02/24/24       RAIL &/OR CANE, MIN A            Transfers       STG - Patient to transfer to and from sit to supine (Progressing)       Start:  02/15/24    Expected End:  02/24/24       INDEP RAILS PRN HOB FLAT         STG - Patient will transfer sit to and from stand (Progressing)       Start:  02/15/24    Expected End:  02/24/24       FW/CANE USING PROPER TECHNQUE            VISION       Patient will navigate environments with high visual stimuli safely Without loss of balance and Attending to obstacles with modified independent level of assistance. (Progressing)       Start:  02/16/24    Expected End:  02/24/24

## 2024-02-21 NOTE — PROGRESS NOTES
Ritesh Garza is a 73 y.o. male on day 7 of admission presenting with Hematoma of left thigh, initial encounter.    Subjective   Interval History: No new complaints    Review of Systems  As above    Objective   Range of Vitals (last 24 hours)  Heart Rate:  []   Temp:  [36 °C (96.8 °F)-36.7 °C (98.1 °F)]   Resp:  [13-25]   BP: (100-136)/()   Weight:  [74.1 kg (163 lb 5.8 oz)]   SpO2:  [89 %-100 %]   Daily Weight  02/21/24 : 74.1 kg (163 lb 5.8 oz)    Body mass index is 29.88 kg/m².    Physical Exam  General: AAO*3  Skin: no rashes  Neck: supple  CVS: S1S2  Chest:CTAB  GI: soft, non tender  : no CVAT  Psych: alert,oriented  CNS: no FND      Antibiotics  sodium chloride 0.9 % bolus 1,000 mL  iohexol (OMNIPaque) 350 mg iodine/mL solution 100 mL  lactated Ringer's infusion  atorvastatin (Lipitor) tablet 80 mg  pantoprazole (ProtoNix) EC tablet 40 mg  metoprolol succinate XL (Toprol-XL) 24 hr tablet 50 mg  albuterol 90 mcg/actuation inhaler 2 puff  losartan (Cozaar) tablet 50 mg  icosapent ethyL (Vascepa) capsule 1 g  sertraline (Zoloft) tablet 50 mg  amLODIPine (Norvasc) tablet 10 mg  oxygen (O2) therapy  lactated Ringer's infusion  acetaminophen (Tylenol) tablet 975 mg  naloxone (Narcan) injection 0.2 mg  oxyCODONE (Roxicodone) immediate release tablet 5 mg  HYDROmorphone PF (Dilaudid) injection 0.2 mg  morphine injection 2 mg  ondansetron ODT (Zofran-ODT) disintegrating tablet 4 mg  ondansetron (Zofran) injection 4 mg  dextrose 50 % injection 25 g  glucagon (Glucagen) injection 1 mg  dextrose 10 % in water (D10W) infusion  insulin regular (HumuLIN R,NovoLIN R) injection 0-5 Units  gabapentin (Neurontin) capsule  insulin lispro (HumaLOG) injection 0-10 Units  perflutren lipid microspheres (Definity) injection 0.5-10 mL of dilution  sulfur hexafluoride microsphr (Lumason) injection 24.28 mg  perflutren protein A microsphere (Optison) injection 0.5 mL  lactated Ringer's infusion  perflutren lipid  microspheres (Definity) injection 0.5-10 mL of dilution  sulfur hexafluoride microsphr (Lumason) injection 24.28 mg  perflutren protein A microsphere (Optison) injection 0.5 mL  sodium bicarbonate 150 mEq in dextrose 5 % in water (D5W) 1,000 mL infusion  calcium chloride 1 g in dextrose 5 % in water (D5W) 50 mL IV  amiodarone (Pacerone) tablet 400 mg  amiodarone (Pacerone) tablet 200 mg  magnesium sulfate IV 2 g  loratadine (Claritin) tablet  acetaminophen (Tylenol) tablet 650 mg  lactated Ringer's bolus 500 mL  dextrose 5%-0.45 % sodium chloride infusion  lactated Ringer's infusion  loratadine (Claritin) tablet 10 mg  ipratropium-albuteroL (Duo-Neb) 0.5-2.5 mg/3 mL nebulizer solution 3 mL  cefTRIAXone (Rocephin) IVPB 1 g  metroNIDAZOLE (Flagyl) tablet 500 mg  loratadine (Claritin) tablet 10 mg  sodium chloride (Ocean) 0.65 % nasal spray 1 spray  insulin lispro (HumaLOG) injection 12 Units  famotidine PF (Pepcid) injection 20 mg  metoclopramide (Reglan) injection 10 mg  furosemide (Lasix) injection 60 mg  morphine injection 2 mg  nitroglycerin (Nitrostat) SL tablet 0.4 mg  heparin (porcine) injection 4,000 Units  heparin 25,000 Units in dextrose 5% 250 mL (100 Units/mL) infusion (premix)  nitroglycerin (Nitrostat) SL tablet 0.4 mg  piperacillin-tazobactam-dextrose (Zosyn) IV 3.375 g  morphine injection 2 mg  morphine injection 4 mg  vancomycin (Vancocin) in dextrose 5 % water (D5W) 250 mL IV 1,250 mg  furosemide (Lasix) injection 60 mg  vancomycin in dextrose 5 % (Vancocin) IVPB 750 mg  morphine injection 2 mg  vancomycin (Vancocin) placeholder  sodium chloride 0.9% infusion  vancomycin in dextrose 5 % (Vancocin) IVPB 750 mg  ipratropium-albuteroL (Duo-Neb) 0.5-2.5 mg/3 mL nebulizer solution 3 mL  magnesium oxide (Mag-Ox) tablet 400 mg  furosemide (Lasix) injection 40 mg  ipratropium-albuteroL (Duo-Neb) 0.5-2.5 mg/3 mL nebulizer solution 3 mL  furosemide (Lasix) injection 80 mg  ipratropium-albuteroL (Duo-Neb)  0.5-2.5 mg/3 mL nebulizer solution 3 mL  vancomycin in dextrose 5 % (Vancocin) IVPB 750 mg      Relevant Results  Labs  Results from last 72 hours   Lab Units 02/21/24 0358 02/20/24  1259 02/20/24  0528 02/19/24  2356 02/19/24  1244 02/19/24  0559   WBC AUTO x10*3/uL 14.7* 20.5*  --  29.1*  --  15.2*   HEMOGLOBIN g/dL 10.2* 10.4* 10.5* 11.9*  11.9*   < > 10.4*  10.4*   HEMATOCRIT % 31.7* 31.9*  --  35.6*  --  31.1*   PLATELETS AUTO x10*3/uL 216 226  --  233  --  177   NEUTROS PCT AUTO %  --  91.5  --   --   --  76.5   LYMPHS PCT AUTO %  --  3.4  --   --   --  10.8   MONOS PCT AUTO %  --  3.8  --   --   --  9.9   EOS PCT AUTO %  --  0.5  --   --   --  1.4    < > = values in this interval not displayed.     Results from last 72 hours   Lab Units 02/21/24 0358 02/20/24  0528 02/19/24  0558   SODIUM mmol/L 137 136 134*   POTASSIUM mmol/L 3.3* 4.3 5.3   CHLORIDE mmol/L 103 104 103   CO2 mmol/L 19* 20* 20*   BUN mg/dL 68* 64* 62*   CREATININE mg/dL 2.67* 2.76* 2.60*   GLUCOSE mg/dL 180* 253* 174*   CALCIUM mg/dL 7.5* 7.6* 7.9*   ANION GAP mmol/L 18 16 16   EGFR mL/min/1.73m*2 24* 23* 25*   PHOSPHORUS mg/dL 3.8  --   --      Results from last 72 hours   Lab Units 02/21/24 0358 02/20/24  0528   ALK PHOS U/L  --  60   BILIRUBIN TOTAL mg/dL  --  1.2   PROTEIN TOTAL g/dL  --  5.8*   ALT U/L  --  109*   AST U/L  --  99*   ALBUMIN g/dL 3.1* 3.2*     Estimated Creatinine Clearance: 21.1 mL/min (A) (by C-G formula based on SCr of 2.67 mg/dL (H)).  CRP   Date Value Ref Range Status   11/18/2021 16.43 (A) mg/dL Final     Comment:     REF VALUE  < 1.00     11/17/2021 12.85 (A) mg/dL Final     Comment:     REF VALUE  < 1.00         Assessment/Plan   SIRS  Left thigh hematoma  CHF  DM  Gout   CAD  Cirrhosis         Suggest:   Improving clinically   C/w vancomycin   C/w zosyn   Follow nasal mrsa pcr- if negative, dc vancomycin  Blood cx 2/17 were negative  Trend wbc and temps      Maria Luz Silva MD

## 2024-02-21 NOTE — CARE PLAN
The patient's goals for the shift include go get rest and get better    The clinical goals for the shift include pt will remain hemodynamically stable

## 2024-02-21 NOTE — PROGRESS NOTES
Physical Therapy    Physical Therapy Treatment    Patient Name: Ritesh Garza  MRN: [id number]  Today's Date: 2/21/2024  Time Calculation  Start Time: 1539  Stop Time: 1608  Time Calculation (min): 29 min       Assessment/Plan   PT Assessment  End of Session Communication: Bedside nurse  End of Session Patient Position: Up in chair, Alarm on     PT Plan  Treatment/Interventions: Bed mobility, Transfer training, Gait training, Stair training, Balance training, Strengthening, Endurance training  PT Plan: Skilled PT  PT Frequency: 4 times per week  PT Discharge Recommendations: Moderate intensity level of continued care  Equipment Recommended upon Discharge: Wheeled walker (TBD)  PT Recommended Transfer Status: Assist x1, Assist x2  PT - OK to Discharge: Yes (WHEN MEDICALLY CLEARED)    General Visit Information:   PT  Visit  PT Received On: 02/21/24  General  Prior to Session Communication: Bedside nurse  Patient Position Received: Bed, 3 rail up, Alarm on  General Comment: pt anxious with mobility and requires frequent redirection back to task at hand    Subjective   Precautions:  Precautions  Hearing/Visual Limitations: [place]  Medical Precautions: Fall precautions    Objective   Pain:  Pain Assessment  Pain Assessment: 0-10  Pain Score: 0 - No pain  Cognition:  Cognition  Overall Cognitive Status: Within Functional Limits  Orientation Level: Oriented X4    Treatments:  Therapeutic Exercise  Therapeutic Exercise Performed: Yes  Therapeutic Exercise Activity 1: pt completed seated LE exercises: AP x 15 reps, LAQ x 10 reps, hip flexion x 10 reps, hip adduction x 10 reps, hip abduction x 10 reps    Therapeutic Activity  Therapeutic Activity Performed: Yes  Therapeutic Activity 1: pt completed 2 dynamic stands with CGA, 1st for 2 min 2nd for 7 min    Bed Mobility  Bed Mobility: Yes  Bed Mobility 1  Bed Mobility 1: Supine to sitting  Level of Assistance 1: Minimum assistance, Minimal verbal cues    Ambulation/Gait  Training  Ambulation/Gait Training Performed: Yes  Ambulation/Gait Training 1  Surface 1: Level tile  Device 1: Rolling walker  Assistance 1: Minimum assistance, Minimal verbal cues  Quality of Gait 1: Decreased step length, Shuffling gait, Forward flexed posture  Comments/Distance (ft) 1: 12 feet x 2; pt demonsrtated short step length and required cues for safety awareness and for safe navigation of walker  Transfers  Transfer: Yes  Transfer 1  Technique 1: Sit to stand  Transfer Device 1: Walker  Transfer Level of Assistance 1: Minimum assistance, Minimal verbal cues  Transfers 2  Transfer to 2: Toilet  Transfer Level of Assistance 2: Minimum assistance, Minimal verbal cues    Outcome Measures:  [facility] Basic Mobility  Turning from your back to your side while in a flat bed without using bedrails: A little  Moving from lying on your back to sitting on the side of a flat bed without using bedrails: A little  Moving to and from bed to chair (including a wheelchair): A little  Standing up from a chair using your arms (e.g. wheelchair or bedside chair): A little  To walk in hospital room: A lot  Climbing 3-5 steps with railing: Total  Basic Mobility - Total Score: 15    Education Documentation  Mobility Training, taught by Lynn Ngo PTA at 2/21/2024  5:12 PM.  Learner: Patient  Readiness: Acceptance  Method: Explanation  Response: Needs Reinforcement    Education Comments  No comments found.        OP EDUCATION:       Encounter Problems       Encounter Problems (Active)       Mobility       STG - Patient will ambulate (Progressing)       Start:  02/15/24    Expected End:  02/24/24       FWW/CANE SBA USING PROPER GAIT PATTERN         STG - Patient will ascend and descend four to six stairs (Progressing)       Start:  02/15/24    Expected End:  02/24/24       RAIL &/OR CANE, MIN A            Pain - Adult          Transfers       STG - Patient to transfer to and from sit to supine (Progressing)       Start:  02/15/24     Expected End:  02/24/24       INDEP RAILS PRN HOB FLAT         STG - Patient will transfer sit to and from stand (Progressing)       Start:  02/15/24    Expected End:  02/24/24       FW/CANE USING PROPER TECHNQUE

## 2024-02-21 NOTE — PROGRESS NOTES
"Ritesh Garza is a 73 y.o. male on day 7 of admission presenting with Hematoma of left thigh, initial encounter.    Subjective   Patient seen awake in bed. He reports improvement in breathing, increased urination which he attributes to lasix, denies chest pain or palpitations. Nurse reports decreased oxygen requirements this morning. Patient refused Bipap overnight.     ROS:  Constitutional: No fever, no chills, no fatigue  HEENT: No headache, no vision changes, no hearing loss, no nasal congestion  Respiratory: Wheezing, SOB, no cough  Cardiac: No chest pain, no palpitations, no swelling of limbs  GI: No nausea, no vomiting, no diarrhea  Musculoskeletal: No back pain, no myalgia  Neuro: No seizures, no dizziness, no lightheadedness  Heme: No easy bruising, no bleeding  Genitourinary:  No Dysuria       Objective     Physical Exam  Gen: Adult male, visibly fatigued, no acute distress  HEENT: Normocephalic, atraumatic, good dentition, no oral lesions appreciated  Neck: Supple. No cervical lymphadenopathy appreciated, no thyroid enlargement appreciated  CV: Regular rate and rhythm, S1 and S2 present, no murmurs rubs or gallops appreciated  Resp: Lungs diminished to auscultation bilaterally, scatted ronchi, no rales or wheezes appreciated  Abdomen: soft, nontender, nondistended, no guarding, no masses appreciated  MSK: No joint swelling appreciated, full ROM  Psych: appropriate mood and affect  Skin: Large purple patches on L hip and thigh and buttock and spreading outside marker to midline lumbar back, nontender with irregular borders. Firm nonfluctuant mass at site of L posteriolateral hip    Last Recorded Vitals  Blood pressure 120/67, pulse 72, temperature 36.4 °C (97.5 °F), resp. rate 18, height 1.575 m (5' 2\"), weight 74.1 kg (163 lb 5.8 oz), SpO2 99 %.  Intake/Output last 3 Shifts:  I/O last 3 completed shifts:  In: 1871.3 (25.3 mL/kg) [P.O.:100; I.V.:1131.3 (15.3 mL/kg); IV Piggyback:640]  Out: 4250 (57.4 " mL/kg) [Urine:4250 (1.6 mL/kg/hr)]  Weight: 74.1 kg       Relevant Results  Scheduled medications  acetaminophen, 650 mg, oral, q6h DIVINE  amiodarone, 400 mg, oral, BID   Followed by  [START ON 2/28/2024] amiodarone, 200 mg, oral, Daily  furosemide, 80 mg, oral, BID with meals  icosapent ethyL, 1 g, oral, BID with meals  insulin lispro, 0-10 Units, subcutaneous, q4h  ipratropium-albuteroL, 3 mL, nebulization, TID  loratadine, 10 mg, oral, Daily  metoprolol succinate XL, 50 mg, oral, Daily  pantoprazole, 40 mg, oral, Daily  piperacillin-tazobactam, 3.375 g, intravenous, q6h  sertraline, 50 mg, oral, Daily  vancomycin in dextrose 5 %, 750 mg, intravenous, q24h      Continuous medications  heparin, 0-4,000 Units/hr, Last Rate: 1,000 Units/hr (02/21/24 1213)      PRN medications  PRN medications: dextrose 10 % in water (D10W), dextrose, glucagon, HYDROmorphone, ipratropium-albuteroL, morphine, naloxone, nitroglycerin, ondansetron ODT **OR** ondansetron, oxyCODONE, oxygen, sodium chloride, vancomycin    Results for orders placed or performed during the hospital encounter of 02/13/24 (from the past 24 hour(s))   POCT GLUCOSE   Result Value Ref Range    POCT Glucose 229 (H) 74 - 99 mg/dL   POCT GLUCOSE   Result Value Ref Range    POCT Glucose 197 (H) 74 - 99 mg/dL   Vancomycin   Result Value Ref Range    Vancomycin 10.0 5.0 - 20.0 ug/mL   POCT GLUCOSE   Result Value Ref Range    POCT Glucose 168 (H) 74 - 99 mg/dL   POCT GLUCOSE   Result Value Ref Range    POCT Glucose 166 (H) 74 - 99 mg/dL   Magnesium   Result Value Ref Range    Magnesium 1.78 1.60 - 2.40 mg/dL   Renal Function Panel   Result Value Ref Range    Glucose 180 (H) 74 - 99 mg/dL    Sodium 137 136 - 145 mmol/L    Potassium 3.3 (L) 3.5 - 5.3 mmol/L    Chloride 103 98 - 107 mmol/L    Bicarbonate 19 (L) 21 - 32 mmol/L    Anion Gap 18 10 - 20 mmol/L    Urea Nitrogen 68 (H) 6 - 23 mg/dL    Creatinine 2.67 (H) 0.50 - 1.30 mg/dL    eGFR 24 (L) >60 mL/min/1.73m*2     Calcium 7.5 (L) 8.6 - 10.3 mg/dL    Phosphorus 3.8 2.5 - 4.9 mg/dL    Albumin 3.1 (L) 3.4 - 5.0 g/dL   CBC   Result Value Ref Range    WBC 14.7 (H) 4.4 - 11.3 x10*3/uL    nRBC 0.0 0.0 - 0.0 /100 WBCs    RBC 3.42 (L) 4.50 - 5.90 x10*6/uL    Hemoglobin 10.2 (L) 13.5 - 17.5 g/dL    Hematocrit 31.7 (L) 41.0 - 52.0 %    MCV 93 80 - 100 fL    MCH 29.8 26.0 - 34.0 pg    MCHC 32.2 32.0 - 36.0 g/dL    RDW 16.4 (H) 11.5 - 14.5 %    Platelets 216 150 - 450 x10*3/uL   Heparin Assay, UFH   Result Value Ref Range    Heparin Unfractionated 0.4 See Comment Below for Therapeutic Ranges IU/mL   POCT GLUCOSE   Result Value Ref Range    POCT Glucose 169 (H) 74 - 99 mg/dL   POCT GLUCOSE   Result Value Ref Range    POCT Glucose 250 (H) 74 - 99 mg/dL       CT chest wo IV contrast    Result Date: 2/19/2024  Interpreted By:  Rolando Diana, STUDY: CT CHEST WO IV CONTRAST;  2/18/2024 10:02 pm   INDICATION: Signs/Symptoms:Shortness of breath, chest pain.   COMPARISON: None.   ACCESSION NUMBER(S): [id number]   ORDERING CLINICIAN: MILA SAMUELS   TECHNIQUE: Contiguous axial images of the chest were obtained without intravenous contrast. Coronal and sagittal reformatted images were obtained from the axial images.   FINDINGS: The examination is limited secondary to lack of intravenous contrast and patient motion.   No axillary or mediastinal lymphadenopathy. There is limited evaluation for hilar lymphadenopathy on noncontrast examination.   The heart is borderline and size. Coronary artery artifact calcifications. No significant pericardial effusion   Small bilateral pleural effusions and bibasilar consolidative/atelectatic opacities. No pneumothorax.   Limited evaluation the upper abdomen. Nodularity of the liver compatible with cirrhosis. Postsurgical change of cholecystectomy.   Multilevel degenerative change of the thoracic spine.       Small bilateral pleural effusions and bibasilar consolidative/atelectatic opacities.   Liver cirrhosis and  postsurgical change of cholecystectomy.   MACRO: None   Signed by: Rolando Diana 2/19/2024 2:30 AM Dictation workstation:   [id number]    Lower extremity venous duplex bilateral    Result Date: 2/18/2024  Interpreted By:  Rolando Diana, STUDY: VAS US LOWER EXTREMITY VENOUS DUPLEX BILATERAL;  2/18/2024 9:01 pm   INDICATION: Signs/Symptoms:Concern for DVT and PE.   COMPARISON: None.   ACCESSION NUMBER(S): [id number]   ORDERING CLINICIAN: SHAGGY HOWARD   TECHNIQUE: Vascular ultrasound of the bilateral lower extremities was performed. Real-time compression views as well as Gray scale, color Doppler and spectral Doppler waveform analysis was performed.   FINDINGS: Evaluation of the visualized portions of the bilateral common femoral vein, proximal, mid, and distal femoral vein, and popliteal vein were performed.  Evaluation of the visualized portions of the  posterior tibial and peroneal veins were also performed.   The evaluated veins demonstrate normal compressibility. There is intact venous flow demonstrating normal respiratory variability and normal augmentation of flow with calf compression. Therefore, there is no ultrasonographic evidence for deep vein thrombosis within the evaluated veins.       No sonographic evidence for deep vein thrombosis within the evaluated veins of the bilateral lower extremity.   MACRO: None   Signed by: Rolando Diana 2/18/2024 10:27 PM Dictation workstation:   [id number]    XR chest 1 view    Result Date: 2/18/2024  Interpreted By:  Geovanny Gardner, STUDY: XR CHEST 1 VIEW;  2/18/2024 6:24 pm   INDICATION: Signs/Symptoms:Hypoxemia worsening.   COMPARISON: 02/18/2024   ACCESSION NUMBER(S): [id number]   ORDERING CLINICIAN: SHAGGY HOWARD   FINDINGS: Heart size is enlarged. Small effusions. Features suggesting edema. Median sternotomy no pneumothorax       1.  Features suggesting mild pulmonary edema ; slightly progressed       MACRO: None   Signed by: Geovanny Gardner 2/18/2024 6:31 PM  Dictation workstation:   [id number]    Electrocardiogram, 12-lead PRN ACS symptoms    Result Date: 2/18/2024  Sinus rhythm with occasional Premature ventricular complexes Low voltage QRS Cannot rule out Anteroseptal infarct (cited on or before 14-FEB-2024) Abnormal ECG When compared with ECG of 17-FEB-2024 08:37, (unconfirmed) Premature supraventricular complexes are no longer Present Serial changes of Anteroseptal infarct Present    XR chest 1 view    Result Date: 2/18/2024  Interpreted By:  Morales Cortes, STUDY: XR CHEST 1 VIEW;  2/18/2024 5:03 am   INDICATION: Signs/Symptoms:hypoxia.   COMPARISON: Chest radiograph dated 02/17/2024.   ACCESSION NUMBER(S): [id number]   ORDERING CLINICIAN: NEELAM MILAN   FINDINGS:   CARDIOMEDIASTINAL SILHOUETTE: Cardiomediastinal silhouette is mildly enlarged. Similar sternotomy changes.   LUNGS/PLEURA: There are no consolidations.There are no pleural effusions. There is no demonstrated pneumothorax.   Abdomen: Cholecystectomy clips.   BONES: No evidence of acute osseous abnormality.       1.  No evidence of acute cardiopulmonary process. Mild cardiomegaly.     Signed by: Morales Cortes 2/18/2024 5:37 AM Dictation workstation:   [id number]    XR chest 2 views    Result Date: 2/17/2024  Interpreted By:  Jose Morse, STUDY: XR CHEST 2 VIEWS  2/17/2024 12:09 pm   INDICATION: Signs/Symptoms:Persistent leukocytosis   COMPARISON: 09/14/2022   ACCESSION NUMBER(S): [id number]   ORDERING CLINICIAN: SHAGGY HOWARD   TECHNIQUE: PA and lateral views of the chest were obtained.   FINDINGS: Cardiac monitoring leads are seen over the chest. Sternal wires and mediastinal surgical clips are present. A small left-sided pleural effusion is present. Left basilar airspace consolidation is seen and may represent atelectasis and/or pneumonia. No focal infiltrate, pleural effusion or pneumothorax is identified. The cardiac silhouette is within normal limits for size. Moderate to severe  discogenic degenerative changes are seen throughout the thoracic spine.       Left-sided pleural effusion and left basilar airspace consolidation, as above. Clinical correlation and continued follow-up until clearing is recommended.   MACRO: None.   Signed by: Jose Morse 2/17/2024 12:33 PM Dictation workstation:   [id number]    Electrocardiogram, 12-lead PRN ACS symptoms    Result Date: 2/17/2024  Atrial fibrillation with aberrantly conducted complexes Premature ventricular complexes Anteroseptal infarct , age undetermined Abnormal ECG When compared with ECG of 15-FEB-2024 17:46, (unconfirmed) No significant change was found Confirmed by Blaine Adams ([id number]) on 2/17/2024 12:00:10 PM    ECG 12 Lead    Result Date: 2/17/2024  Normal sinus rhythm Low voltage QRS Septal infarct , age undetermined Abnormal ECG When compared with ECG of 15-FEB-2024 17:46, (unconfirmed) Sinus rhythm has replaced Wide QRS rhythm Vent. rate has decreased BY  40 BPM Confirmed by Blaine Adams ([id number]) on 2/17/2024 2:18:29 AM    Transthoracic Echo (TTE) Complete    Result Date: 2/15/2024              [place] [address]      Phone [phone] Fax [phone] TRANSTHORACIC ECHOCARDIOGRAM REPORT  Patient Name:      CHIDI Andrade Physician:    [id number] Gonzalo White DO Study Date:        2/15/2024             Ordering Provider:    [id number] TITO NICOLAS MRN/PID:           [id number]              Fellow: Accession#:        [id number]          Nurse: Date of Birth/Age: 1950 / 73 years  Sonographer:          Heena David SOTO Gender:            M                     Additional Staff: Height:            160.02 cm             Admit Date:           2/13/2024 Weight:            73.03 kg              Admission Status:     Inpatient -                                                                 Routine BSA / BMI:         1.76 m2 / 28.52 kg/m2 Department Location:  [facility] Echo                                                                Lab Blood Pressure: 105 /60 mmHg Study Type:    TRANSTHORACIC ECHO (TTE) COMPLETE Diagnosis/ICD: Chest pain, unspecified-R07.9 Indication:    Chest Pain CPT Codes:     Echo Complete w Full Doppler-[id number]  Study Detail: The following Echo studies were performed: 2D, M-Mode, Doppler and               color flow. Optison used as a contrast agent for endocardial               border definition. Total contrast used for this procedure was 3 mL               via IV push.  PHYSICIAN INTERPRETATION: Left Ventricle: Left ventricular systolic function is normal, with an estimated ejection fraction of 30-35%. There are multiple wall motion abnormalities. The left ventricular cavity size is upper limits of normal. Spectral Doppler shows a restrictive pattern of left ventricular diastolic filling. LV Wall Scoring: The basal inferoseptal segment and basal inferior segment are akinetic. The entire anterior septum, mid and apical inferior septum, basal and mid inferolateral wall, basal anterolateral segment, basal anterior segment, and mid inferior segment are hypokinetic. All remaining scored segments are normal. Left Atrium: The left atrium is normal in size. Right Ventricle: The right ventricle was not well visualized. There is low normal right ventricular systolic function. Right Atrium: The right atrium is normal in size. Aortic Valve: The aortic valve is trileaflet. There is mild aortic valve thickening. There is no evidence of aortic valve regurgitation. The peak instantaneous gradient of the aortic valve is 7.1 mmHg. The mean gradient of the aortic valve is 4.0 mmHg. Mitral Valve: The mitral valve is normal in structure. There is mild mitral valve regurgitation. Tricuspid Valve: The tricuspid valve is structurally normal. There is mild to  moderate tricuspid regurgitation. Pulmonic Valve: The pulmonic valve is structurally normal. There is physiologic pulmonic valve regurgitation. Pericardium: There is no pericardial effusion noted. Aorta: The aortic root is normal.  CONCLUSIONS:  1. Left ventricular systolic function is normal with a 30-35% estimated ejection fraction.  2. Multiple segmental abnormalities exist. See findings.  3. Spectral Doppler shows a restrictive pattern of left ventricular diastolic filling.  4. There is low normal right ventricular systolic function.  5. Mild to moderate tricuspid regurgitation.  6. There are multiple wall motion abnormalities. QUANTITATIVE DATA SUMMARY: 2D MEASUREMENTS:                           Normal Ranges: Ao Root d:     2.90 cm    (2.0-3.7cm) LAs:           4.60 cm    (2.7-4.0cm) IVSd:          1.00 cm    (0.6-1.1cm) LVPWd:         0.90 cm    (0.6-1.1cm) LVIDd:         5.30 cm    (3.9-5.9cm) LVIDs:         5.20 cm LV Mass Index: 106.2 g/m2 LV % FS        1.9 % LA VOLUME:                               Normal Ranges: LA Vol A4C:        53.0 ml    (22+/-6mL/m2) LA Vol A2C:        47.8 ml LA Vol BP:         50.3 ml LA Vol Index A4C:  30.0ml/m2 LA Vol Index A2C:  27.1 ml/m2 LA Vol Index BP:   28.5 ml/m2 LA Area A4C:       18.0 cm2 LA Area A2C:       17.1 cm2 LA Major Axis A4C: 5.2 cm LA Major Axis A2C: 5.2 cm LA Volume Index:   26.8 ml/m2 M-MODE MEASUREMENTS:                  Normal Ranges: Ao Root: 2.90 cm (2.0-3.7cm) AORTA MEASUREMENTS:                    Normal Ranges: Asc Ao, d: 2.50 cm (2.1-3.4cm) LV SYSTOLIC FUNCTION BY 2D PLANIMETRY (MOD):                     Normal Ranges: EF-A4C View: 29.3 % (>=55%) EF-A2C View: 43.2 % EF-Biplane:  35.2 % LV DIASTOLIC FUNCTION:                        Normal Ranges: MV Peak E:    1.29 m/s (0.7-1.2 m/s) MV Peak A:    0.50 m/s (0.42-0.7 m/s) E/A Ratio:    2.56     (1.0-2.2) MV e'         0.06 m/s (>8.0) MV lateral e' 0.06 m/s MV medial e'  0.06 m/s E/e' Ratio:   21.50     (<8.0) MITRAL VALVE:                 Normal Ranges: MV DT: 151 msec (150-240msec) MITRAL INSUFFICIENCY:                           Normal Ranges: PISA Radius:  0.5 cm MR VTI:       129.00 cm MR Vmax:      437.00 cm/s MR Alias Matt: 38.5 cm/s MR Volume:    17.85 ml MR Flow Rt:   60.48 ml/s MR EROA:      0.14 cm2 AORTIC VALVE:                                   Normal Ranges: AoV Vmax:                1.33 m/s (<=1.7m/s) AoV Peak P.1 mmHg (<20mmHg) AoV Mean P.0 mmHg (1.7-11.5mmHg) LVOT Max Matt:            1.04 m/s (<=1.1m/s) AoV VTI:                 25.10 cm (18-25cm) LVOT VTI:                20.70 cm LVOT Diameter:           2.00 cm  (1.8-2.4cm) AoV Area, VTI:           2.59 cm2 (2.5-5.5cm2) AoV Area,Vmax:           2.46 cm2 (2.5-4.5cm2) AoV Dimensionless Index: 0.82  RIGHT VENTRICLE: TAPSE: 14.1 mm RV s'  0.09 m/s TRICUSPID VALVE/RVSP:                             Normal Ranges: Peak TR Velocity: 2.44 m/s RV Syst Pressure: 26.8 mmHg (< 30mmHg) IVC Diam:         1.50 cm  [id number] Gonzalo White DO Electronically signed on 2/15/2024 at 11:57:27 AM  Wall Scoring  ** Final **     CT femur left wo IV contrast    Result Date: 2/15/2024  Interpreted By:  Chris Nails, STUDY: CT FEMUR LEFT WO IV CONTRAST;  2/15/2024 9:59 am   INDICATION: Signs/Symptoms:hematoma left thigh.   COMPARISON: Radiographs 2024.   ACCESSION NUMBER(S): [id number]   ORDERING CLINICIAN: SHANTEL LINTON   TECHNIQUE: CT imaging of the  left femur was obtained  without administration of intravenous contrast medium. Coronal and sagittal reformatted images were performed.   FINDINGS: OSSEOUS STRUCTURES: No acute fracture or dislocation. Left hip joint is maintained. Osteoarthritis of the knee. Trace knee joint effusion with mild synovial thickening.   SOFT TISSUES: There is in ellipsoid superficial, subcutaneous hematoma in the anterior thigh splaying the rectus femoris and vastus lateralis muscles. This hematoma measures 12 x 5  x 19 cm (transverse x anterior-posterior x craniocaudal). There is minimal skin thickening and diffuse subcutaneous edema throughout the imaged thigh. No fluid collection or suspicious soft tissue mass. Muscle bulk is maintained with preserved intermuscular fascial planes. Small popliteal cyst. Love balloon and excreted contrast within the bladder. No acute intrapelvic process.       Anterior thigh subcutaneous hematoma splaying the rectus femoris and vastus lateralis muscles, 12 x 5 x 19 cm.   Minimal skin thickening and diffuse subcutaneous edema throughout the imaged thigh without a fluid collection. No CT evidence of deep compartment infiltration.   No acute fracture.   MACRO: None   Signed by: Chris Nails 2/15/2024 11:51 AM Dictation workstation:   [id number]    CT abdomen pelvis wo IV contrast    Result Date: 2/15/2024  Interpreted By:  Akil Ruiz, STUDY: CT ABDOMEN PELVIS WO IV CONTRAST;  2/15/2024 9:59 am   INDICATION: Signs/Symptoms:Hematoma left thigh,.   COMPARISON: None   ACCESSION NUMBER(S): [id number]   ORDERING CLINICIAN: AKIL LINTON   TECHNIQUE: CT of the abdomen and pelvis was performed. Contiguous axial images were obtained at 3 mm slice thickness through the abdomen and pelvis. Coronal and sagittal reconstructions at 3 mm slice thickness were performed.  No intravenous contrast was administered; positive oral contrast was given.   FINDINGS: Please note that the evaluation of vessels, lymph nodes and organs is limited without intravenous contrast.   LOWER CHEST: Trace bilateral pleural effusion with surrounding atelectasis.   ABDOMEN:   LIVER: Cirrhotic hepatic morphology. No gross lesions.   BILE DUCTS: No intrahepatic biliary dilatation.   GALLBLADDER: Surgically absent.   PANCREAS: No duct dilatation.   SPLEEN: No splenomegaly.   ADRENAL GLANDS: Bilateral adrenal glands appear normal.   KIDNEYS AND URETERS: Retained contrast within the renal parenchyma from prior contrasted study.  Small size right kidney. No hydroureteronephrosis or nephroureterolithiasis is identified. Left cortical renal cysts.   PELVIS:   BLADDER: Within normal limits. Love catheter in place. Trace contrast noted.   REPRODUCTIVE ORGANS: Mild splenomegaly.   BOWEL: No bowel dilatation.   VESSELS: Moderate vascular calcifications and atherosclerotic changes. Focal aneurysmal dilatation of the infrarenal aorta measuring 3.1 cm.   PERITONEUM/RETROPERITONEUM/LYMPH NODES: No enlarged intra-abdominal or pelvic lymphadenopathy.   ABDOMINAL WALL: Redemonstrated left lateral abdominal wall/flank and lateral thigh significant soft tissue thickening and edematous changes with a small intramuscular hyperdensity in the left lateral thigh measuring 1.7 cm as in prior.   BONES: Multilevel degenerative spine changes and facet joint disease.       1. Redemonstrated left flank and lateral thigh significant soft tissue thickening and edematous changes with a small intramuscular hyperdensity in the left lateral thigh measuring 1.7 cm as in prior in keeping with the known hematoma. The previously described left thigh active extravasation is not included in this portions of CT scan and would be better assessed in same day left femur CT scan official read. 2. Cirrhotic hepatic morphology. 3. Partially atrophic right kidney. Retained renal parenchymal IV contrast from prior study could be related to impairment. Advise correlation with kidney function tests. 4. Focal aneurysmal dilatation of the abdominal aorta measuring 3.1 cm. 5. Trace bilateral pleural effusion with surrounding atelectasis     MACRO: None   Signed by: Akil Ruiz 2/15/2024 10:49 AM Dictation workstation:   [id number]    ECG 12 Lead    Result Date: 2/15/2024  Ventricular tachcyardia, offset into undetermined rhythm with frequent ectopic beats Low voltage QRS Cannot rule out Inferior infarct , age undetermined Cannot rule out Anteroseptal infarct , age undetermined Marked ST  abnormality, possible lateral subendocardial injury Abnormal ECG When compared with ECG of 13-FEB-2024 22:12, PREVIOUS ECG IS PRESENT Confirmed by Migdalia Beasley ([id number]) on 2/15/2024 10:32:33 AM    Electrocardiogram, 12-lead PRN ACS symptoms    Result Date: 2/15/2024  Sinus rhythm with frequent PAC/PVC's Anteroseptal infarct (cited on or before 14-FEB-2024) Abnormal ECG When compared with ECG of 14-FEB-2024 21:00, (unconfirmed) Current undetermined rhythm precludes rhythm comparison, needs review Right bundle branch block is no longer Present Borderline criteria for Inferior infarct are no longer Present Questionable change in initial forces of Anterior leads Confirmed by Migdalia Beasley ([id number]) on 2/15/2024 10:31:39 AM    XR foot left 3+ views    Result Date: 2/15/2024  Interpreted By:  Nico Campos, STUDY: XR FOOT LEFT 3+ VIEWS; ;  2/14/2024 1:19 pm   INDICATION: Signs/Symptoms:trauma.   COMPARISON: None.   ACCESSION NUMBER(S): [id number]   ORDERING CLINICIAN: RODRIGO GALLARDO   FINDINGS: No acute fracture or dislocation of the left foot is noted.   The lateral margin of the 2nd metatarsophalangeal joint has degenerative calcifications/ossifications. Articulation       No acute fracture or dislocation of the left foot.     MACRO: None   Signed by: Nico Campos 2/15/2024 8:03 AM Dictation workstation:   [id number]    ECG 12 lead    Result Date: 2/14/2024  Sinus rhythm Nonspecific intraventricular conduction delay Anteroseptal infarct, age indeterminate Minimal ST elevation, inferior leads Lateral leads are also involved See ED provider note for full interpretation and clinical correlation Confirmed by Malik Hills ([id number]) on 2/14/2024 3:39:40 PM    CT chest abdomen pelvis w IV contrast    Addendum Date: 2/13/2024    Interpreted By:  Geovanny Gardner, ADDENDUM: Geovanny Gardner discussed the significance and urgency of this critical finding by epic chat with  TETO NEAL on 2/13/2024 at 11:05 pm. (**-RCF-**)  Findings:  See findings.     Signed by: Geovanny Gardner 2/13/2024 11:05 PM   -------- ORIGINAL REPORT -------- Dictation workstation:   [id number]    Result Date: 2/13/2024  Interpreted By:  Geovanny Gardner, STUDY: CT CHEST ABDOMEN PELVIS W IV CONTRAST;  2/13/2024 10:08 pm   INDICATION: Signs/Symptoms:Trauma, fall, left lower extremity hematoma, left lower quadrant discomfort   COMPARISON: 06/29/2020   ACCESSION NUMBER(S): [id number]   ORDERING CLINICIAN: TETO NEAL   TECHNIQUE: CT of the chest, abdomen, and pelvis was performed. Contiguous axial images were obtained at  5 mm slice thickness through the chest, and at  3 mm through the abdomen and pelvis. Coronal and sagittal reconstructions at  3 mm slice thickness were performed. Intravenous contrast administered   FINDINGS: CHEST:   Gynecomastia. Median sternotomy. Enlarged main pulmonary artery suggestive of pulmonary arterial hypertension no findings of pneumothorax or pleural effusion. No acute traumatic findings in the chest. Severe vascular calcification   ABDOMEN: Abdominal aortic aneurysm measuring 3.2 x 2.5 cm not measurably changed. Heterogeneous appearance of the liver with surface nodularity. Unremarkable adrenal glands and pancreas. Unremarkable spleen. No splenic hematoma   Prostate gland is enlarged. It is heterogeneous.   Right kidney is atrophic compared to the left. 1 cm left renal cyst. Findings most suspicious for renal artery stenosis diverticulosis.   Interrogation of the left pelvis shows a large hematoma in the left quadriceps with high density blush of contrast. Reference image 50 series 12 compatible with active extravasation. Hematoma measures about 9.1 x 5.6 x 13 cm.   Marked soft tissue swelling of the left lower extremity. Mild multilevel degenerative disc disease.       Large left intramuscular hematoma with evidence of active extravasation within the muscle.   Severe vascular calcification.   Stable abdominal aortic aneurysm with  severe vascular calcification. Heterogeneity of the liver. Query cirrhosis. Right kidney is atrophic compared to the left most suggestive of renal artery stenosis.   Enlarged prostate gland. Diverticulosis. No diverticulitis   Signed by: Geovanny Gardner 2/13/2024 10:59 PM Dictation workstation:   [id number]    XR femur left 2+ views    Result Date: 2/13/2024  Interpreted By:  Geovanny Gardner, STUDY: XR KNEE 4+ VIEWS BILATERAL; XR FEMUR LEFT 2+ VIEWS; ;  2/13/2024 10:35 pm   INDICATION: Signs/Symptoms:Fall, bilateral knee pain; Signs/Symptoms:Fall, left thigh hematoma.   COMPARISON: None.   ACCESSION NUMBER(S): [id number]; [id number]   ORDERING CLINICIAN: TETO NEAL   FINDINGS: Left femur and bilateral knees performed.   Vascular calcification detected bilaterally.   Large soft tissue swelling seen in the left thigh compatible with known hematoma. No evidence of femur fracture.   No evidence of knee dislocation. Mild tricompartmental osteoarthritis of both knees.       No evidence of left femur fracture. Known large hematoma. Bilateral knee osteoarthritis. Robust vascular calcification     MACRO: None   Signed by: Geovanny Gardner 2/13/2024 11:01 PM Dictation workstation:   [id number]    XR knee 4+ views bilateral    Result Date: 2/13/2024  Interpreted By:  Geovanny Gardner, STUDY: XR KNEE 4+ VIEWS BILATERAL; XR FEMUR LEFT 2+ VIEWS; ;  2/13/2024 10:35 pm   INDICATION: Signs/Symptoms:Fall, bilateral knee pain; Signs/Symptoms:Fall, left thigh hematoma.   COMPARISON: None.   ACCESSION NUMBER(S): [id number]; [id number]   ORDERING CLINICIAN: TETO NEAL   FINDINGS: Left femur and bilateral knees performed.   Vascular calcification detected bilaterally.   Large soft tissue swelling seen in the left thigh compatible with known hematoma. No evidence of femur fracture.   No evidence of knee dislocation. Mild tricompartmental osteoarthritis of both knees.       No evidence of left femur fracture. Known large hematoma.  Bilateral knee osteoarthritis. Robust vascular calcification     MACRO: None   Signed by: Geovanny Gardner 2/13/2024 11:01 PM Dictation workstation:   [id number]    XR pelvis 1-2 views    Result Date: 2/13/2024  Interpreted By:  Deborah Jamil, STUDY: XR PELVIS 1-2 VIEWS; ;  2/13/2024 9:33 pm   INDICATION: Signs/Symptoms:trauma, left thigh pain.   COMPARISON: None.   ACCESSION NUMBER(S): [id number]   ORDERING CLINICIAN: TETO NEAL   FINDINGS: Evaluation limited to positioning. Postoperative changes in the right hip evidence by surgical clips. Heavy wall calcification of vascular structures in the area of femoral arteries bilaterally. No evidence for acute fracture or dislocation. No bone lesions. No radiopaque foreign body.       No acute fracture or dislocation of bilateral hip joints.     MACRO: None   Signed by: Deborah Jamil 2/13/2024 9:54 PM Dictation workstation:   [id number]    CT cervical spine wo IV contrast    Result Date: 2/13/2024  Interpreted By:  Deborah Jamil, STUDY: CT CERVICAL SPINE WO IV CONTRAST;  2/13/2024 9:45 pm   INDICATION: Signs/Symptoms:Fall, neck pain.   COMPARISON: None.   ACCESSION NUMBER(S): [id number]   ORDERING CLINICIAN: TETO NEAL   TECHNIQUE: Axial CT images of the cervical spine are obtained. Axial, coronal and sagittal reconstructions are provided for review.   FINDINGS: Wall calcification of bilateral carotid bulbs, worse on the left. Heterogeneous enlargement of the right thyroid lobe which is incompletely characterized in this study. No focal masses are seen.   Fractures: There is no evidence for an acute fracture of the cervical spine.   Vertebral Alignment: Within normal limits.   Craniocervical Junction: Degenerative changes at C1-2 level with prominent soft tissue thickening at the C1-2 articulation.   Vertebrae/Disc Spaces:  Moderate multilevel discogenic degenerative changes including disc space narrowing, endplate sclerosis, spurring and posterior disc osteophyte  complex. Findings worse from C5-T1. Facet joint sclerosis and hypertrophy bilaterally at multiple levels.   Prevertebral/Paraspinal Soft Tissues: No prevertebral soft tissue swelling.         1. No evidence for an acute fracture or subluxation of the cervical spine.   2. Degenerative changes of the spine as above.   3. Asymmetrical thyromegaly.   4. Heavy wall calcification of bilateral carotid bulbs.   MACRO: None   Signed by: Deborah Jamil 2/13/2024 9:53 PM Dictation workstation:   [id number]    CT head W O contrast trauma protocol    Result Date: 2/13/2024  Interpreted By:  Deborah Jamil, STUDY: CT HEAD W/O CONTRAST TRAUMA PROTOCOL;  2/13/2024 9:45 pm   INDICATION: Signs/Symptoms:Fall.   COMPARISON: 09/14/2022   ACCESSION NUMBER(S): [id number]   ORDERING CLINICIAN: TETO NEAL   TECHNIQUE: Noncontrast axial CT scan of head was performed. Angled reformats in brain and bone windows were generated. The images were reviewed in bone, brain, blood and soft tissue windows.   FINDINGS: CSF Spaces: The ventricles, sulci and cisterns are within normal limits for patient's age.  There is no extraaxial fluid collection.   Parenchyma: The grey-white differentiation is intact. There is no mass effect or midline shift.  There is no intracranial hemorrhage.   Calvarium: The calvarium is unremarkable.   Paranasal sinuses and mastoids: Visualized paranasal sinuses and mastoids are clear.       No evidence of acute cortical infarct or intracranial hemorrhage.   MACRO: None     Signed by: Deborah Jamil 2/13/2024 9:51 PM Dictation workstation:   [id number]           This patient currently has cardiac telemetry ordered; if you would like to modify or discontinue the telemetry order, click here to go to the orders activity to modify/discontinue the order.                 Assessment/Plan   Principal Problem:    Hematoma of left thigh, initial encounter  Active Problems:    Asthma    Benign essential hypertension    Cirrhosis,  nonalcoholic (CMS/[facility])    Type 2 diabetes mellitus with diabetic neuropathy, without long-term current use of insulin (CMS/[facility])    Cognitive dysfunction    Congestive heart failure with right ventricular systolic dysfunction (CMS/[facility])    Traumatic hematoma of left thigh    CAD (coronary artery disease)    HLD (hyperlipidemia)    Acute kidney injury superimposed on CKD (CMS/[facility])    SENIA (obstructive sleep apnea)    Erosive esophagitis    Chest pain    Ventricular tachycardia (CMS/[facility])    Acute blood loss anemia    Demand ischemia    Pneumonia    NSTEMI (non-ST elevated myocardial infarction) (CMS/[facility])    Acute on chronic systolic heart failure (CMS/[facility])    Acute non-ST elevation MI, angina, CAD, Congestive heart failure with right ventricular systolic dysfunction  -downtrending troponin   -cardiology consulted and following  -LHC postponed due to elevated Creatinine, appreciate further reccs  -continue amiodarone, metoprolol, vascepa  -continue heparin drip    Acute blood loss anemia, hematoma of left thigh  - trauma surgery following  - s/p 1u pRBC  - trending CBC, appears relatively stable post-transfusion, will monitor and transfuse for Hb<7.0    Acute kidney injury on chronic kidney disease secondary to diabetic nephropathy  -Nephrology following, imaging shows partially atrophic right kidney with contrast retention   -continue holding home losartan and farxiga  -held IVF due to worsening respiratory status  -nephrology following, okay with scheduled lasix appreciate reccs    Acute respiratory failure  -hx of home nebulizer use  -hx of SENIA, noncompliant with CPAP/Bipap  -likely due to worsening HF  -continue supplemental O2, goal SpO2>92%  -duoneb q6  -continue lasix, switch to oral BID dosing    Cirrhosis nonalcoholic  -monitor LFTs  -MELD 3.0 score 26, estimated 85% 90-day survival    History of erosive esophagitis  -continue pantoprazole every day    Type 2 diabetes with neuropathy  -A1c 6.6%, goal  <8.0%  -sliding scale insulin      DVT prophylaxis-Heparin drip for now, will switch to SCDs after           I spent 30 minutes in the professional and overall care of this patient.      Rajendra Liu MD

## 2024-02-21 NOTE — PROGRESS NOTES
HC has been postponed d/t worsening renal function.  Pt to be transferred to floor today.  Unknown ADOD. LVM with pts wife regarding DC disposition.      1545  Spoke with pts wife who had not seen the HC list and would like it emailed to her at [email or web address].  Response will go to coworker Jessica email as this TCC is off tomorrow.

## 2024-02-21 NOTE — PROGRESS NOTES
"      Nephrology Progress Note      Nephrology following for TAYLOR on CKD stage IIIb.    Events over night:   -Patient sleeping currently. Arouses easily but feels very fatigued. No other complaints   -Good diuresis   -On 3 liters O2 via NC      /89   Pulse (!) 124   Temp 36.3 °C (97.3 °F)   Resp 18   Ht 1.575 m (5' 2\")   Wt 74.1 kg (163 lb 5.8 oz)   SpO2 96%   BMI 29.88 kg/m²     Input / Output:  24 HR:   Intake/Output Summary (Last 24 hours) at 2/21/2024 1104  Last data filed at 2/21/2024 1011  Gross per 24 hour   Intake 731.16 ml   Output 4025 ml   Net -3293.84 ml         Physical Exam   Alert and oriented x 3 NAD  Neck: no JVD  CV: RRR  Lungs: CTA anteriorly  Abd: soft, NT, ND   Ext: + edema LLE, bruising at knee    Scheduled medications  acetaminophen, 650 mg, oral, q6h DIVINE  amiodarone, 400 mg, oral, BID   Followed by  [START ON 2/28/2024] amiodarone, 200 mg, oral, Daily  furosemide, 80 mg, oral, BID with meals  icosapent ethyL, 1 g, oral, BID with meals  insulin lispro, 0-10 Units, subcutaneous, q4h  ipratropium-albuteroL, 3 mL, nebulization, TID  loratadine, 10 mg, oral, Daily  metoprolol succinate XL, 50 mg, oral, Daily  pantoprazole, 40 mg, oral, Daily  piperacillin-tazobactam, 3.375 g, intravenous, q6h  potassium chloride, 20 mEq, intravenous, Once  potassium chloride CR, 20 mEq, oral, Once  sertraline, 50 mg, oral, Daily  vancomycin in dextrose 5 %, 750 mg, intravenous, q24h      Continuous medications  heparin, 0-4,000 Units/hr, Last Rate: 1,000 Units/hr (02/21/24 1011)    PRN medications  PRN medications: dextrose 10 % in water (D10W), dextrose, glucagon, HYDROmorphone, ipratropium-albuteroL, morphine, naloxone, nitroglycerin, ondansetron ODT **OR** ondansetron, oxyCODONE, oxygen, sodium chloride, vancomycin   Results from last 7 days   Lab Units 02/21/24  0358 02/20/24  0528   SODIUM mmol/L 137 136   POTASSIUM mmol/L 3.3* 4.3   CHLORIDE mmol/L 103 104   CO2 mmol/L 19* 20*   BUN mg/dL 68* 64* "   CREATININE mg/dL 2.67* 2.76*   CALCIUM mg/dL 7.5* 7.6*   PROTEIN TOTAL g/dL  --  5.8*   BILIRUBIN TOTAL mg/dL  --  1.2   ALK PHOS U/L  --  60   ALT U/L  --  109*   AST U/L  --  99*   GLUCOSE mg/dL 180* 253*        Results from last 7 days   Lab Units 02/21/24  0358 02/20/24  0528 02/19/24  0558   SODIUM mmol/L 137 136 134*   POTASSIUM mmol/L 3.3* 4.3 5.3   CHLORIDE mmol/L 103 104 103   CO2 mmol/L 19* 20* 20*   BUN mg/dL 68* 64* 62*   CREATININE mg/dL 2.67* 2.76* 2.60*   GLUCOSE mg/dL 180* 253* 174*   CALCIUM mg/dL 7.5* 7.6* 7.9*         Results from last 7 days   Lab Units 02/21/24  0358 02/20/24  0528 02/19/24  0558   MAGNESIUM mg/dL 1.78 1.79 2.00        Results from last 7 days   Lab Units 02/21/24  0358 02/20/24  1259 02/20/24  0528 02/19/24  2356   WBC AUTO x10*3/uL 14.7* 20.5*  --  29.1*   HEMOGLOBIN g/dL 10.2* 10.4* 10.5* 11.9*  11.9*   HEMATOCRIT % 31.7* 31.9*  --  35.6*   PLATELETS AUTO x10*3/uL 216 226  --  233          Assessment & Plan:   Patient is 73 y.o. male who is admitted to hospital for left thigh hematoma and anemia. Nephrology consulted in view of TAYLOR on CKD.      TAYLOR on CKD stage 3b-TAYLRO secondary to volume mediated changes with anemia and some mild component from trauma/contrast.-improving creatinine peaked at 2.3     -Imaging study reviewed, partially atrophic right kidney with contrast retention   -Currently nonoliguric   -Serum creatinine stabilizing with creatinine 2.6-2.8    CKD stage 3b secondary to diabetic nephropathy- baseline Cr 1.7-1.8      Anemia secondary to hematoma-   S/p PRBCs 2/15    -Hemoglobin stable at 9.4  SIDHU cirrhosis    -Albumin 3.4, INR was at 1.3 prior    Left thigh hematoma- S/P trauma    HFrEF/dysrhythmia/NSTEMI   -currently on oral amiodarone  -Volume overloaded   -per cardiology  -Hypokalemia - secondary to diuresis - being replaced IV         Recommendations:   -Continue to hold losartan and farxiga   -IV fluids stopped  -Cont scheduled lasix, No additional  needed today from renal standpoint   -Treatment for pneumonia, nebulizer treatments  -IV potassium   -bmp in AM    Please message me through EPIC chat with any questions or concerns.     Diallo Madrid PA-C  2/21/2024  11:04 AM     [facility]    [address], [address]   [place] [address]  Office: [phone]
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
## 2024-02-21 NOTE — PROGRESS NOTES
Critical Care Daily Progress        Subjective   Patient is a 73 y.o. male admitted on 2/13/2024  9:11 PM with the following indication(s) for ICU care: Acute hypoxic Respiratory Failure .     Interval History: Patient seen and examined this AM. Urinating. Oxygen requirement has decreased from 13 L to 3 L NC since initiation of Lasix. HR was in the 120s overnight. Kidney function slightly improved.     Complaints: has none..     Scheduled Medications:   acetaminophen, 650 mg, oral, q6h DIVINE  amiodarone, 400 mg, oral, BID   Followed by  [START ON 2/28/2024] amiodarone, 200 mg, oral, Daily  furosemide, 80 mg, intravenous, q12h  icosapent ethyL, 1 g, oral, BID with meals  insulin lispro, 0-10 Units, subcutaneous, q4h  ipratropium-albuteroL, 3 mL, nebulization, TID  loratadine, 10 mg, oral, Daily  metoprolol succinate XL, 50 mg, oral, Daily  pantoprazole, 40 mg, oral, Daily  piperacillin-tazobactam, 3.375 g, intravenous, q6h  sertraline, 50 mg, oral, Daily         Continuous Medications:   heparin, 0-4,000 Units/hr, Last Rate: 1,000 Units/hr (02/21/24 0749)         PRN Medications:   PRN medications: dextrose 10 % in water (D10W), dextrose, glucagon, HYDROmorphone, ipratropium-albuteroL, morphine, naloxone, nitroglycerin, ondansetron ODT **OR** ondansetron, oxyCODONE, oxygen, sodium chloride, vancomycin    Objective   Vitals:  Most Recent:  Vitals:    02/21/24 0715   BP: 121/75   Pulse:    Resp:    Temp:    SpO2: 99%       24hr Min/Max:  Temp  Min: 36 °C (96.8 °F)  Max: 36.7 °C (98.1 °F)  Pulse  Min: 67  Max: 122  BP  Min: 100/66  Max: 144/117  Resp  Min: 13  Max: 25  SpO2  Min: 88 %  Max: 100 %    LDA:         Vent settings:  S RR:  [14] 14    Hemodynamic parameters for last 24 hours:       I/O:    Intake/Output Summary (Last 24 hours) at 2/21/2024 0754  Last data filed at 2/21/2024 0600  Gross per 24 hour   Intake 897.58 ml   Output 3700 ml   Net -2802.42 ml       Physical Exam:   Physical Exam  Constitutional:        Appearance: Normal appearance. He is obese.   Eyes:      Extraocular Movements: Extraocular movements intact.      Pupils: Pupils are equal, round, and reactive to light.   Cardiovascular:      Rate and Rhythm: Normal rate. Rhythm irregular.      Pulses: Normal pulses.      Heart sounds: Normal heart sounds.   Pulmonary:      Effort: Respiratory distress present.      Breath sounds: Rales present.   Abdominal:      General: Bowel sounds are normal.      Palpations: Abdomen is soft.   Musculoskeletal:         General: Signs of injury present.      Cervical back: Normal range of motion.      Right lower leg: Edema present.      Left lower leg: Edema present.   Skin:     Findings: Bruising present.   Neurological:      General: No focal deficit present.      Mental Status: He is alert and oriented to person, place, and time. Mental status is at baseline.   Psychiatric:         Mood and Affect: Mood normal.         Behavior: Behavior normal.         Thought Content: Thought content normal.         Judgment: Judgment normal.          Lab/Radiology/Diagnostic Review:  Results for orders placed or performed during the hospital encounter of 02/13/24 (from the past 24 hour(s))   POCT GLUCOSE   Result Value Ref Range    POCT Glucose 213 (H) 74 - 99 mg/dL   CBC and Auto Differential   Result Value Ref Range    WBC 20.5 (H) 4.4 - 11.3 x10*3/uL    nRBC 0.1 (H) 0.0 - 0.0 /100 WBCs    RBC 3.46 (L) 4.50 - 5.90 x10*6/uL    Hemoglobin 10.4 (L) 13.5 - 17.5 g/dL    Hematocrit 31.9 (L) 41.0 - 52.0 %    MCV 92 80 - 100 fL    MCH 30.1 26.0 - 34.0 pg    MCHC 32.6 32.0 - 36.0 g/dL    RDW 16.6 (H) 11.5 - 14.5 %    Platelets 226 150 - 450 x10*3/uL    Neutrophils % 91.5 40.0 - 80.0 %    Immature Granulocytes %, Automated 0.6 0.0 - 0.9 %    Lymphocytes % 3.4 13.0 - 44.0 %    Monocytes % 3.8 2.0 - 10.0 %    Eosinophils % 0.5 0.0 - 6.0 %    Basophils % 0.2 0.0 - 2.0 %    Neutrophils Absolute 18.72 (H) 1.60 - 5.50 x10*3/uL    Immature Granulocytes  Absolute, Automated 0.13 0.00 - 0.50 x10*3/uL    Lymphocytes Absolute 0.69 (L) 0.80 - 3.00 x10*3/uL    Monocytes Absolute 0.77 0.05 - 0.80 x10*3/uL    Eosinophils Absolute 0.10 0.00 - 0.40 x10*3/uL    Basophils Absolute 0.04 0.00 - 0.10 x10*3/uL   POCT GLUCOSE   Result Value Ref Range    POCT Glucose 229 (H) 74 - 99 mg/dL   POCT GLUCOSE   Result Value Ref Range    POCT Glucose 197 (H) 74 - 99 mg/dL   Vancomycin   Result Value Ref Range    Vancomycin 10.0 5.0 - 20.0 ug/mL   POCT GLUCOSE   Result Value Ref Range    POCT Glucose 168 (H) 74 - 99 mg/dL   POCT GLUCOSE   Result Value Ref Range    POCT Glucose 166 (H) 74 - 99 mg/dL   Magnesium   Result Value Ref Range    Magnesium 1.78 1.60 - 2.40 mg/dL   Renal Function Panel   Result Value Ref Range    Glucose 180 (H) 74 - 99 mg/dL    Sodium 137 136 - 145 mmol/L    Potassium 3.3 (L) 3.5 - 5.3 mmol/L    Chloride 103 98 - 107 mmol/L    Bicarbonate 19 (L) 21 - 32 mmol/L    Anion Gap 18 10 - 20 mmol/L    Urea Nitrogen 68 (H) 6 - 23 mg/dL    Creatinine 2.67 (H) 0.50 - 1.30 mg/dL    eGFR 24 (L) >60 mL/min/1.73m*2    Calcium 7.5 (L) 8.6 - 10.3 mg/dL    Phosphorus 3.8 2.5 - 4.9 mg/dL    Albumin 3.1 (L) 3.4 - 5.0 g/dL   CBC   Result Value Ref Range    WBC 14.7 (H) 4.4 - 11.3 x10*3/uL    nRBC 0.0 0.0 - 0.0 /100 WBCs    RBC 3.42 (L) 4.50 - 5.90 x10*6/uL    Hemoglobin 10.2 (L) 13.5 - 17.5 g/dL    Hematocrit 31.7 (L) 41.0 - 52.0 %    MCV 93 80 - 100 fL    MCH 29.8 26.0 - 34.0 pg    MCHC 32.2 32.0 - 36.0 g/dL    RDW 16.4 (H) 11.5 - 14.5 %    Platelets 216 150 - 450 x10*3/uL   Heparin Assay, UFH   Result Value Ref Range    Heparin Unfractionated 0.4 See Comment Below for Therapeutic Ranges IU/mL     Electrocardiogram, 12-lead PRN ACS symptoms    Result Date: 2/19/2024  Sinus rhythm with Premature supraventricular complexes and with occasional Premature ventricular complexes Nonspecific intraventricular block Cannot rule out Septal infarct (cited on or before 14-FEB-2024) Abnormal ECG  When compared with ECG of 16-FEB-2024 10:32, Premature ventricular complexes are now Present Premature supraventricular complexes are now Present Confirmed by Gonzalo White ([id number]) on 2/19/2024 1:52:38 PM    Electrocardiogram, 12-lead PRN ACS symptoms    Result Date: 2/19/2024  Sinus rhythm with occasional Premature ventricular complexes Low voltage QRS Cannot rule out Anteroseptal infarct (cited on or before 14-FEB-2024) Abnormal ECG When compared with ECG of 17-FEB-2024 08:37, (unconfirmed) Premature supraventricular complexes are no longer Present Serial changes of Anteroseptal infarct Present Confirmed by Gonzalo White ([id number]) on 2/19/2024 1:50:10 PM    Electrocardiogram, 12-lead PRN ACS symptoms    Result Date: 2/19/2024  Sinus rhythm with frequent Premature ventricular complexes Low voltage QRS Inferior infarct , age undetermined Anterolateral infarct (cited on or before 14-FEB-2024) Abnormal ECG When compared with ECG of 18-FEB-2024 08:37, (unconfirmed) QRS axis Shifted right Serial changes of Anterior infarct Present Confirmed by Gonzalo White ([id number]) on 2/19/2024 1:49:50 PM    Electrocardiogram, 12-lead PRN ACS symptoms    Result Date: 2/19/2024  Suspect unspecified pacemaker failure Undetermined rhythm Possible Inferior infarct (cited on or before 14-FEB-2024) Abnormal ECG When compared with ECG of 18-FEB-2024 15:06, (unconfirmed) Current undetermined rhythm precludes rhythm comparison, needs review QRS duration has decreased Criteria for Anterolateral infarct are no longer Present Serial changes of evolving Inferior infarct Present Confirmed by Gonzalo White ([id number]) on 2/19/2024 1:49:40 PM    CT chest wo IV contrast    Result Date: 2/19/2024  Interpreted By:  Rolando Diana, STUDY: CT CHEST WO IV CONTRAST;  2/18/2024 10:02 pm   INDICATION: Signs/Symptoms:Shortness of breath, chest pain.   COMPARISON: None.   ACCESSION NUMBER(S): [id number]   ORDERING CLINICIAN: MILA SAMUELS   TECHNIQUE: Contiguous axial images of the  chest were obtained without intravenous contrast. Coronal and sagittal reformatted images were obtained from the axial images.   FINDINGS: The examination is limited secondary to lack of intravenous contrast and patient motion.   No axillary or mediastinal lymphadenopathy. There is limited evaluation for hilar lymphadenopathy on noncontrast examination.   The heart is borderline and size. Coronary artery artifact calcifications. No significant pericardial effusion   Small bilateral pleural effusions and bibasilar consolidative/atelectatic opacities. No pneumothorax.   Limited evaluation the upper abdomen. Nodularity of the liver compatible with cirrhosis. Postsurgical change of cholecystectomy.   Multilevel degenerative change of the thoracic spine.       Small bilateral pleural effusions and bibasilar consolidative/atelectatic opacities.   Liver cirrhosis and postsurgical change of cholecystectomy.   MACRO: None   Signed by: Rolando Diana 2/19/2024 2:30 AM Dictation workstation:   [id number]    Lower extremity venous duplex bilateral    Result Date: 2/18/2024  Interpreted By:  Rolando Diana, STUDY: [facility] US LOWER EXTREMITY VENOUS DUPLEX BILATERAL;  2/18/2024 9:01 pm   INDICATION: Signs/Symptoms:Concern for DVT and PE.   COMPARISON: None.   ACCESSION NUMBER(S): [id number]   ORDERING CLINICIAN: SHAGGY HOWARD   TECHNIQUE: Vascular ultrasound of the bilateral lower extremities was performed. Real-time compression views as well as Gray scale, color Doppler and spectral Doppler waveform analysis was performed.   FINDINGS: Evaluation of the visualized portions of the bilateral common femoral vein, proximal, mid, and distal femoral vein, and popliteal vein were performed.  Evaluation of the visualized portions of the  posterior tibial and peroneal veins were also performed.   The evaluated veins demonstrate normal compressibility. There is intact venous flow demonstrating normal respiratory variability and normal  augmentation of flow with calf compression. Therefore, there is no ultrasonographic evidence for deep vein thrombosis within the evaluated veins.       No sonographic evidence for deep vein thrombosis within the evaluated veins of the bilateral lower extremity.   MACRO: None   Signed by: Rolando Diana 2/18/2024 10:27 PM Dictation workstation:   [id number]    XR chest 1 view    Result Date: 2/18/2024  Interpreted By:  Geovanny Gardner, STUDY: XR CHEST 1 VIEW;  2/18/2024 6:24 pm   INDICATION: Signs/Symptoms:Hypoxemia worsening.   COMPARISON: 02/18/2024   ACCESSION NUMBER(S): [id number]   ORDERING CLINICIAN: SHAGGY HOWARD   FINDINGS: Heart size is enlarged. Small effusions. Features suggesting edema. Median sternotomy no pneumothorax       1.  Features suggesting mild pulmonary edema ; slightly progressed       MACRO: None   Signed by: Geovanny Gardner 2/18/2024 6:31 PM Dictation workstation:   [id number]    XR chest 1 view    Result Date: 2/18/2024  Interpreted By:  Morales Cortes, STUDY: XR CHEST 1 VIEW;  2/18/2024 5:03 am   INDICATION: Signs/Symptoms:hypoxia.   COMPARISON: Chest radiograph dated 02/17/2024.   ACCESSION NUMBER(S): [id number]   ORDERING CLINICIAN: NEELAM MILAN   FINDINGS:   CARDIOMEDIASTINAL SILHOUETTE: Cardiomediastinal silhouette is mildly enlarged. Similar sternotomy changes.   LUNGS/PLEURA: There are no consolidations.There are no pleural effusions. There is no demonstrated pneumothorax.   Abdomen: Cholecystectomy clips.   BONES: No evidence of acute osseous abnormality.       1.  No evidence of acute cardiopulmonary process. Mild cardiomegaly.     Signed by: Morales Cortes 2/18/2024 5:37 AM Dictation workstation:   [id number]    XR chest 2 views    Result Date: 2/17/2024  Interpreted By:  Jose Morse, STUDY: XR CHEST 2 VIEWS  2/17/2024 12:09 pm   INDICATION: Signs/Symptoms:Persistent leukocytosis   COMPARISON: 09/14/2022   ACCESSION NUMBER(S): [id number]   ORDERING CLINICIAN: SHAGGY  LEE   TECHNIQUE: PA and lateral views of the chest were obtained.   FINDINGS: Cardiac monitoring leads are seen over the chest. Sternal wires and mediastinal surgical clips are present. A small left-sided pleural effusion is present. Left basilar airspace consolidation is seen and may represent atelectasis and/or pneumonia. No focal infiltrate, pleural effusion or pneumothorax is identified. The cardiac silhouette is within normal limits for size. Moderate to severe discogenic degenerative changes are seen throughout the thoracic spine.       Left-sided pleural effusion and left basilar airspace consolidation, as above. Clinical correlation and continued follow-up until clearing is recommended.   MACRO: None.   Signed by: Jose Morse 2/17/2024 12:33 PM Dictation workstation:   [id number]    Electrocardiogram, 12-lead PRN ACS symptoms    Result Date: 2/17/2024  Atrial fibrillation with aberrantly conducted complexes Premature ventricular complexes Anteroseptal infarct , age undetermined Abnormal ECG When compared with ECG of 15-FEB-2024 17:46, (unconfirmed) No significant change was found Confirmed by Blaine Adams ([id number]) on 2/17/2024 12:00:10 PM    ECG 12 Lead    Result Date: 2/17/2024  Normal sinus rhythm Low voltage QRS Septal infarct , age undetermined Abnormal ECG When compared with ECG of 15-FEB-2024 17:46, (unconfirmed) Sinus rhythm has replaced Wide QRS rhythm Vent. rate has decreased BY  40 BPM Confirmed by Blaine Adams ([id number]) on 2/17/2024 2:18:29 AM    Transthoracic Echo (TTE) Complete    Result Date: 2/15/2024              [address] [address]      Phone [phone] Fax [phone] TRANSTHORACIC ECHOCARDIOGRAM REPORT  Patient Name:      CHIDI VARGAS      Reading Physician:    [id number] Gonzalo White DO Study Date:        2/15/2024             Ordering Provider:    [id number] FRANCIS                                                                 MARIA ISABEL MRN/PID:           [id number]              Fellow: Accession#:        [id number]          Nurse: Date of Birth/Age: 1950 / 73 years  Sonographer:          Heena David RDCS Gender:            M                     Additional Staff: Height:            160.02 cm             Admit Date:           2/13/2024 Weight:            73.03 kg              Admission Status:     Inpatient -                                                                Routine BSA / BMI:         1.76 m2 / 28.52 kg/m2 Department Location:  [facility] Echo                                                                Lab Blood Pressure: 105 /60 mmHg Study Type:    TRANSTHORACIC ECHO (TTE) COMPLETE Diagnosis/ICD: Chest pain, unspecified-R07.9 Indication:    Chest Pain CPT Codes:     Echo Complete w Full Doppler-[id number]  Study Detail: The following Echo studies were performed: 2D, M-Mode, Doppler and               color flow. Optison used as a contrast agent for endocardial               border definition. Total contrast used for this procedure was 3 mL               via IV push.  PHYSICIAN INTERPRETATION: Left Ventricle: Left ventricular systolic function is normal, with an estimated ejection fraction of 30-35%. There are multiple wall motion abnormalities. The left ventricular cavity size is upper limits of normal. Spectral Doppler shows a restrictive pattern of left ventricular diastolic filling. LV Wall Scoring: The basal inferoseptal segment and basal inferior segment are akinetic. The entire anterior septum, mid and apical inferior septum, basal and mid inferolateral wall, basal anterolateral segment, basal anterior segment, and mid inferior segment are hypokinetic. All remaining scored segments are normal. Left Atrium: The left atrium is normal in size. Right Ventricle: The right ventricle was not well visualized. There is low normal right ventricular systolic function. Right  Atrium: The right atrium is normal in size. Aortic Valve: The aortic valve is trileaflet. There is mild aortic valve thickening. There is no evidence of aortic valve regurgitation. The peak instantaneous gradient of the aortic valve is 7.1 mmHg. The mean gradient of the aortic valve is 4.0 mmHg. Mitral Valve: The mitral valve is normal in structure. There is mild mitral valve regurgitation. Tricuspid Valve: The tricuspid valve is structurally normal. There is mild to moderate tricuspid regurgitation. Pulmonic Valve: The pulmonic valve is structurally normal. There is physiologic pulmonic valve regurgitation. Pericardium: There is no pericardial effusion noted. Aorta: The aortic root is normal.  CONCLUSIONS:  1. Left ventricular systolic function is normal with a 30-35% estimated ejection fraction.  2. Multiple segmental abnormalities exist. See findings.  3. Spectral Doppler shows a restrictive pattern of left ventricular diastolic filling.  4. There is low normal right ventricular systolic function.  5. Mild to moderate tricuspid regurgitation.  6. There are multiple wall motion abnormalities. QUANTITATIVE DATA SUMMARY: 2D MEASUREMENTS:                           Normal Ranges: Ao Root d:     2.90 cm    (2.0-3.7cm) LAs:           4.60 cm    (2.7-4.0cm) IVSd:          1.00 cm    (0.6-1.1cm) LVPWd:         0.90 cm    (0.6-1.1cm) LVIDd:         5.30 cm    (3.9-5.9cm) LVIDs:         5.20 cm LV Mass Index: 106.2 g/m2 LV % FS        1.9 % LA VOLUME:                               Normal Ranges: LA Vol A4C:        53.0 ml    (22+/-6mL/m2) LA Vol A2C:        47.8 ml LA Vol BP:         50.3 ml LA Vol Index A4C:  30.0ml/m2 LA Vol Index A2C:  27.1 ml/m2 LA Vol Index BP:   28.5 ml/m2 LA Area A4C:       18.0 cm2 LA Area A2C:       17.1 cm2 LA Major Axis A4C: 5.2 cm LA Major Axis A2C: 5.2 cm LA Volume Index:   26.8 ml/m2 M-MODE MEASUREMENTS:                  Normal Ranges: Ao Root: 2.90 cm (2.0-3.7cm) AORTA MEASUREMENTS:                     Normal Ranges: Asc Ao, d: 2.50 cm (2.1-3.4cm) LV SYSTOLIC FUNCTION BY 2D PLANIMETRY (MOD):                     Normal Ranges: EF-A4C View: 29.3 % (>=55%) EF-A2C View: 43.2 % EF-Biplane:  35.2 % LV DIASTOLIC FUNCTION:                        Normal Ranges: MV Peak E:    1.29 m/s (0.7-1.2 m/s) MV Peak A:    0.50 m/s (0.42-0.7 m/s) E/A Ratio:    2.56     (1.0-2.2) MV e'         0.06 m/s (>8.0) MV lateral e' 0.06 m/s MV medial e'  0.06 m/s E/e' Ratio:   21.50    (<8.0) MITRAL VALVE:                 Normal Ranges: MV DT: 151 msec (150-240msec) MITRAL INSUFFICIENCY:                           Normal Ranges: PISA Radius:  0.5 cm MR VTI:       129.00 cm MR Vmax:      437.00 cm/s MR Alias Matt: 38.5 cm/s MR Volume:    17.85 ml MR Flow Rt:   60.48 ml/s MR EROA:      0.14 cm2 AORTIC VALVE:                                   Normal Ranges: AoV Vmax:                1.33 m/s (<=1.7m/s) AoV Peak P.1 mmHg (<20mmHg) AoV Mean P.0 mmHg (1.7-11.5mmHg) LVOT Max Matt:            1.04 m/s (<=1.1m/s) AoV VTI:                 25.10 cm (18-25cm) LVOT VTI:                20.70 cm LVOT Diameter:           2.00 cm  (1.8-2.4cm) AoV Area, VTI:           2.59 cm2 (2.5-5.5cm2) AoV Area,Vmax:           2.46 cm2 (2.5-4.5cm2) AoV Dimensionless Index: 0.82  RIGHT VENTRICLE: TAPSE: 14.1 mm RV s'  0.09 m/s TRICUSPID VALVE/RVSP:                             Normal Ranges: Peak TR Velocity: 2.44 m/s RV Syst Pressure: 26.8 mmHg (< 30mmHg) IVC Diam:         1.50 cm  [id number] Gonzalo Pace DO Electronically signed on 2/15/2024 at 11:57:27 AM  Wall Scoring  ** Final **     CT femur left wo IV contrast    Result Date: 2/15/2024  Interpreted By:  Chris Nails, STUDY: CT FEMUR LEFT WO IV CONTRAST;  2/15/2024 9:59 am   INDICATION: Signs/Symptoms:hematoma left thigh.   COMPARISON: Radiographs 2024.   ACCESSION NUMBER(S): [id number]   ORDERING CLINICIAN: SHANTEL LINTON   TECHNIQUE: CT imaging of the  left femur was obtained   without administration of intravenous contrast medium. Coronal and sagittal reformatted images were performed.   FINDINGS: OSSEOUS STRUCTURES: No acute fracture or dislocation. Left hip joint is maintained. Osteoarthritis of the knee. Trace knee joint effusion with mild synovial thickening.   SOFT TISSUES: There is in ellipsoid superficial, subcutaneous hematoma in the anterior thigh splaying the rectus femoris and vastus lateralis muscles. This hematoma measures 12 x 5 x 19 cm (transverse x anterior-posterior x craniocaudal). There is minimal skin thickening and diffuse subcutaneous edema throughout the imaged thigh. No fluid collection or suspicious soft tissue mass. Muscle bulk is maintained with preserved intermuscular fascial planes. Small popliteal cyst. Love balloon and excreted contrast within the bladder. No acute intrapelvic process.       Anterior thigh subcutaneous hematoma splaying the rectus femoris and vastus lateralis muscles, 12 x 5 x 19 cm.   Minimal skin thickening and diffuse subcutaneous edema throughout the imaged thigh without a fluid collection. No CT evidence of deep compartment infiltration.   No acute fracture.   MACRO: None   Signed by: Chris Nails 2/15/2024 11:51 AM Dictation workstation:   [id number]    CT abdomen pelvis wo IV contrast    Result Date: 2/15/2024  Interpreted By:  Akil Ruiz, STUDY: CT ABDOMEN PELVIS WO IV CONTRAST;  2/15/2024 9:59 am   INDICATION: Signs/Symptoms:Hematoma left thigh,.   COMPARISON: None   ACCESSION NUMBER(S): [id number]   ORDERING CLINICIAN: AKIL LINTON   TECHNIQUE: CT of the abdomen and pelvis was performed. Contiguous axial images were obtained at 3 mm slice thickness through the abdomen and pelvis. Coronal and sagittal reconstructions at 3 mm slice thickness were performed.  No intravenous contrast was administered; positive oral contrast was given.   FINDINGS: Please note that the evaluation of vessels, lymph nodes and organs is limited  without intravenous contrast.   LOWER CHEST: Trace bilateral pleural effusion with surrounding atelectasis.   ABDOMEN:   LIVER: Cirrhotic hepatic morphology. No gross lesions.   BILE DUCTS: No intrahepatic biliary dilatation.   GALLBLADDER: Surgically absent.   PANCREAS: No duct dilatation.   SPLEEN: No splenomegaly.   ADRENAL GLANDS: Bilateral adrenal glands appear normal.   KIDNEYS AND URETERS: Retained contrast within the renal parenchyma from prior contrasted study. Small size right kidney. No hydroureteronephrosis or nephroureterolithiasis is identified. Left cortical renal cysts.   PELVIS:   BLADDER: Within normal limits. Love catheter in place. Trace contrast noted.   REPRODUCTIVE ORGANS: Mild splenomegaly.   BOWEL: No bowel dilatation.   VESSELS: Moderate vascular calcifications and atherosclerotic changes. Focal aneurysmal dilatation of the infrarenal aorta measuring 3.1 cm.   PERITONEUM/RETROPERITONEUM/LYMPH NODES: No enlarged intra-abdominal or pelvic lymphadenopathy.   ABDOMINAL WALL: Redemonstrated left lateral abdominal wall/flank and lateral thigh significant soft tissue thickening and edematous changes with a small intramuscular hyperdensity in the left lateral thigh measuring 1.7 cm as in prior.   BONES: Multilevel degenerative spine changes and facet joint disease.       1. Redemonstrated left flank and lateral thigh significant soft tissue thickening and edematous changes with a small intramuscular hyperdensity in the left lateral thigh measuring 1.7 cm as in prior in keeping with the known hematoma. The previously described left thigh active extravasation is not included in this portions of CT scan and would be better assessed in same day left femur CT scan official read. 2. Cirrhotic hepatic morphology. 3. Partially atrophic right kidney. Retained renal parenchymal IV contrast from prior study could be related to impairment. Advise correlation with kidney function tests. 4. Focal aneurysmal  dilatation of the abdominal aorta measuring 3.1 cm. 5. Trace bilateral pleural effusion with surrounding atelectasis     MACRO: None   Signed by: Akil Ruiz 2/15/2024 10:49 AM Dictation workstation:   [id number]    ECG 12 Lead    Result Date: 2/15/2024  Ventricular tachcyardia, offset into undetermined rhythm with frequent ectopic beats Low voltage QRS Cannot rule out Inferior infarct , age undetermined Cannot rule out Anteroseptal infarct , age undetermined Marked ST abnormality, possible lateral subendocardial injury Abnormal ECG When compared with ECG of 13-FEB-2024 22:12, PREVIOUS ECG IS PRESENT Confirmed by Migdalia Beasley ([id number]) on 2/15/2024 10:32:33 AM    Electrocardiogram, 12-lead PRN ACS symptoms    Result Date: 2/15/2024  Sinus rhythm with frequent PAC/PVC's Anteroseptal infarct (cited on or before 14-FEB-2024) Abnormal ECG When compared with ECG of 14-FEB-2024 21:00, (unconfirmed) Current undetermined rhythm precludes rhythm comparison, needs review Right bundle branch block is no longer Present Borderline criteria for Inferior infarct are no longer Present Questionable change in initial forces of Anterior leads Confirmed by Migdalia Beasley ([id number]) on 2/15/2024 10:31:39 AM    XR foot left 3+ views    Result Date: 2/15/2024  Interpreted By:  Nico Campos, STUDY: XR FOOT LEFT 3+ VIEWS; ;  2/14/2024 1:19 pm   INDICATION: Signs/Symptoms:trauma.   COMPARISON: None.   ACCESSION NUMBER(S): [id number]   ORDERING CLINICIAN: RODRIGO GALLARDO   FINDINGS: No acute fracture or dislocation of the left foot is noted.   The lateral margin of the 2nd metatarsophalangeal joint has degenerative calcifications/ossifications. Articulation       No acute fracture or dislocation of the left foot.     MACRO: None   Signed by: Nico Campos 2/15/2024 8:03 AM Dictation workstation:   [id number]    ECG 12 lead    Result Date: 2/14/2024  Sinus rhythm Nonspecific intraventricular conduction delay Anteroseptal infarct, age  indeterminate Minimal ST elevation, inferior leads Lateral leads are also involved See ED provider note for full interpretation and clinical correlation Confirmed by Malik Hills ([id number]) on 2/14/2024 3:39:40 PM    CT chest abdomen pelvis w IV contrast    Addendum Date: 2/13/2024    Interpreted By:  Geovanny Gardner, ADDENDUM: Geovanny Gardner discussed the significance and urgency of this critical finding by epic chat with  TETO NEAL on 2/13/2024 at 11:05 pm. (**-RCF-**) Findings:  See findings.     Signed by: Geovanny Gardner 2/13/2024 11:05 PM   -------- ORIGINAL REPORT -------- Dictation workstation:   [id number]    Result Date: 2/13/2024  Interpreted By:  Geovanny Gardner, STUDY: CT CHEST ABDOMEN PELVIS W IV CONTRAST;  2/13/2024 10:08 pm   INDICATION: Signs/Symptoms:Trauma, fall, left lower extremity hematoma, left lower quadrant discomfort   COMPARISON: 06/29/2020   ACCESSION NUMBER(S): [id number]   ORDERING CLINICIAN: TETO NEAL   TECHNIQUE: CT of the chest, abdomen, and pelvis was performed. Contiguous axial images were obtained at  5 mm slice thickness through the chest, and at  3 mm through the abdomen and pelvis. Coronal and sagittal reconstructions at  3 mm slice thickness were performed. Intravenous contrast administered   FINDINGS: CHEST:   Gynecomastia. Median sternotomy. Enlarged main pulmonary artery suggestive of pulmonary arterial hypertension no findings of pneumothorax or pleural effusion. No acute traumatic findings in the chest. Severe vascular calcification   ABDOMEN: Abdominal aortic aneurysm measuring 3.2 x 2.5 cm not measurably changed. Heterogeneous appearance of the liver with surface nodularity. Unremarkable adrenal glands and pancreas. Unremarkable spleen. No splenic hematoma   Prostate gland is enlarged. It is heterogeneous.   Right kidney is atrophic compared to the left. 1 cm left renal cyst. Findings most suspicious for renal artery stenosis diverticulosis.   Interrogation of the  left pelvis shows a large hematoma in the left quadriceps with high density blush of contrast. Reference image 50 series 12 compatible with active extravasation. Hematoma measures about 9.1 x 5.6 x 13 cm.   Marked soft tissue swelling of the left lower extremity. Mild multilevel degenerative disc disease.       Large left intramuscular hematoma with evidence of active extravasation within the muscle.   Severe vascular calcification.   Stable abdominal aortic aneurysm with severe vascular calcification. Heterogeneity of the liver. Query cirrhosis. Right kidney is atrophic compared to the left most suggestive of renal artery stenosis.   Enlarged prostate gland. Diverticulosis. No diverticulitis   Signed by: Geovanny Gardner 2/13/2024 10:59 PM Dictation workstation:   [id number]    XR femur left 2+ views    Result Date: 2/13/2024  Interpreted By:  Geovanny Gardner, STUDY: XR KNEE 4+ VIEWS BILATERAL; XR FEMUR LEFT 2+ VIEWS; ;  2/13/2024 10:35 pm   INDICATION: Signs/Symptoms:Fall, bilateral knee pain; Signs/Symptoms:Fall, left thigh hematoma.   COMPARISON: None.   ACCESSION NUMBER(S): [id number]; [id number]   ORDERING CLINICIAN: TETO NEAL   FINDINGS: Left femur and bilateral knees performed.   Vascular calcification detected bilaterally.   Large soft tissue swelling seen in the left thigh compatible with known hematoma. No evidence of femur fracture.   No evidence of knee dislocation. Mild tricompartmental osteoarthritis of both knees.       No evidence of left femur fracture. Known large hematoma. Bilateral knee osteoarthritis. Robust vascular calcification     MACRO: None   Signed by: Geovanny Gardner 2/13/2024 11:01 PM Dictation workstation:   [id number]    XR knee 4+ views bilateral    Result Date: 2/13/2024  Interpreted By:  Geovanny Gardner, STUDY: XR KNEE 4+ VIEWS BILATERAL; XR FEMUR LEFT 2+ VIEWS; ;  2/13/2024 10:35 pm   INDICATION: Signs/Symptoms:Fall, bilateral knee pain; Signs/Symptoms:Fall, left thigh  hematoma.   COMPARISON: None.   ACCESSION NUMBER(S): [id number]; [id number]   ORDERING CLINICIAN: TETO NEAL   FINDINGS: Left femur and bilateral knees performed.   Vascular calcification detected bilaterally.   Large soft tissue swelling seen in the left thigh compatible with known hematoma. No evidence of femur fracture.   No evidence of knee dislocation. Mild tricompartmental osteoarthritis of both knees.       No evidence of left femur fracture. Known large hematoma. Bilateral knee osteoarthritis. Robust vascular calcification     MACRO: None   Signed by: Geovanny Gardner 2/13/2024 11:01 PM Dictation workstation:   [id number]    XR pelvis 1-2 views    Result Date: 2/13/2024  Interpreted By:  Deborah Jamil, STUDY: XR PELVIS 1-2 VIEWS; ;  2/13/2024 9:33 pm   INDICATION: Signs/Symptoms:trauma, left thigh pain.   COMPARISON: None.   ACCESSION NUMBER(S): [id number]   ORDERING CLINICIAN: TETO NEAL   FINDINGS: Evaluation limited to positioning. Postoperative changes in the right hip evidence by surgical clips. Heavy wall calcification of vascular structures in the area of femoral arteries bilaterally. No evidence for acute fracture or dislocation. No bone lesions. No radiopaque foreign body.       No acute fracture or dislocation of bilateral hip joints.     MACRO: None   Signed by: Deborah Jamil 2/13/2024 9:54 PM Dictation workstation:   [id number]    CT cervical spine wo IV contrast    Result Date: 2/13/2024  Interpreted By:  Deborah Jamil, STUDY: CT CERVICAL SPINE WO IV CONTRAST;  2/13/2024 9:45 pm   INDICATION: Signs/Symptoms:Fall, neck pain.   COMPARISON: None.   ACCESSION NUMBER(S): [id number]   ORDERING CLINICIAN: TETO NEAL   TECHNIQUE: Axial CT images of the cervical spine are obtained. Axial, coronal and sagittal reconstructions are provided for review.   FINDINGS: Wall calcification of bilateral carotid bulbs, worse on the left. Heterogeneous enlargement of the right thyroid lobe which is  incompletely characterized in this study. No focal masses are seen.   Fractures: There is no evidence for an acute fracture of the cervical spine.   Vertebral Alignment: Within normal limits.   Craniocervical Junction: Degenerative changes at C1-2 level with prominent soft tissue thickening at the C1-2 articulation.   Vertebrae/Disc Spaces:  Moderate multilevel discogenic degenerative changes including disc space narrowing, endplate sclerosis, spurring and posterior disc osteophyte complex. Findings worse from C5-T1. Facet joint sclerosis and hypertrophy bilaterally at multiple levels.   Prevertebral/Paraspinal Soft Tissues: No prevertebral soft tissue swelling.         1. No evidence for an acute fracture or subluxation of the cervical spine.   2. Degenerative changes of the spine as above.   3. Asymmetrical thyromegaly.   4. Heavy wall calcification of bilateral carotid bulbs.   MACRO: None   Signed by: Deborah Jamil 2/13/2024 9:53 PM Dictation workstation:   [id number]    CT head W O contrast trauma protocol    Result Date: 2/13/2024  Interpreted By:  Deborah Jamil, STUDY: CT HEAD W/O CONTRAST TRAUMA PROTOCOL;  2/13/2024 9:45 pm   INDICATION: Signs/Symptoms:Fall.   COMPARISON: 09/14/2022   ACCESSION NUMBER(S): [id number]   ORDERING CLINICIAN: TETO NEAL   TECHNIQUE: Noncontrast axial CT scan of head was performed. Angled reformats in brain and bone windows were generated. The images were reviewed in bone, brain, blood and soft tissue windows.   FINDINGS: CSF Spaces: The ventricles, sulci and cisterns are within normal limits for patient's age.  There is no extraaxial fluid collection.   Parenchyma: The grey-white differentiation is intact. There is no mass effect or midline shift.  There is no intracranial hemorrhage.   Calvarium: The calvarium is unremarkable.   Paranasal sinuses and mastoids: Visualized paranasal sinuses and mastoids are clear.       No evidence of acute cortical infarct or intracranial  hemorrhage.   MACRO: None     Signed by: Deborah Jamil 2/13/2024 9:51 PM Dictation workstation:   [id number]                     Assessment/Plan   Principal Problem:    Hematoma of left thigh, initial encounter  Active Problems:    Asthma    Benign essential hypertension    Cirrhosis, nonalcoholic (CMS/HCC)    Type 2 diabetes mellitus with diabetic neuropathy, without long-term current use of insulin (CMS/HCC)    Cognitive dysfunction    Congestive heart failure with right ventricular systolic dysfunction (CMS/HCC)    Traumatic hematoma of left thigh    CAD (coronary artery disease)    HLD (hyperlipidemia)    Acute kidney injury superimposed on CKD (CMS/HCC)    SENAI (obstructive sleep apnea)    Erosive esophagitis    Chest pain    Ventricular tachycardia (CMS/HCC)    Acute blood loss anemia    Demand ischemia    Pneumonia    NSTEMI (non-ST elevated myocardial infarction) (CMS/[facility])    Acute on chronic systolic heart failure (CMS/[facility])    Ritesh Garza is a 73 y.o. male with past medical history of Mild cognitive impairment, depression, CAD s/p 5V CABG 1992 (ASA), HFrEF (farxiga, metop, losartan), HTN, SIDHU cirrhosis, Erosive esophagitis (PPI), Asthma, BPH/ED (cialis), NIDDMII, SENIA, CKD III, gout (allopurinol), HLD (vascepa/statin), hearing loss being admitted to the ICU do to Acute hypoxic Respiratory failure.     Acute Hypoxic Respiratory Failure secondary to pulmonary edema with a history of Asthma  2/20/24: Patient on 13 L NC saturating above 90%   History of SENIA with noncompliance to CPAP and BiPAP   Patient refused BiPAP overnight   Diffuse Bilateral crackles   Stopped IVF - worsening respiratory distress since initiation   80 mg Lasix   Q6h Duo neb therapy   Continue Zosyn   Maintain Spo2 above 90%   2/21/24: OZ requirement is 3 L now saturating at 100%   Q6h Duo neb therapy   Continue Zosyn   Vancomycin added per Pharmacy - Will discontinue if Cultures and MRSA screen negative   Maintain Spo2 above 90%    Patient made to 3.7 L of urine in the last 24 hours  Oxygen requirement significantly improved from 13 L to 3 L  Switched from IV Lasix to oral Lasix 80 mg twice daily     2. Acute Kidney Injury secondary to Heart Failure   2/20/24: Baseline cr of 1.85 - Currently 2.76 and rising   Bladder scan showed >300 ml of retention - repeat showed no urine left in bladder   80 mg Lasix - should increase perfusion to kidneys and stabilize function    LHC postponed due to Cr rise   Nephrology Consulted   2/21/24: Cr slightly improved - will continue to trend   LH once kidney function is stable   Patient made to 3.7 L of urine in the last 24 hours  Oxygen requirement significantly improved from 13 L to 3 L  Switched from IV Lasix to oral Lasix 80 mg twice daily    3. Ventricular Tachycardia   2/20/24: Patient has had intermittent runs of Vtach requiring ICU level of care since admission on 2/13/24  Cardiology is on the case   Continue Telemetry monitoring   Continue Amiodarone 400 mg for 25 doses then 200 mg oral per Cardiology   PRN Metoprolol 50 mg - hold if SBP <120 and/or HR <65   2/21/24: HR has been sustaining >120 overnight   After Medications given HR stabilized in the 70s   Continue Telemetry monitoring   Continue Amiodarone 400 mg for 25 doses then 200 mg oral per Cardiology   PRN Metoprolol 50 mg - hold if SBP <120 and/or HR <65      4. NSTEMI   2/20/24: Patient with complaints of chest pain   Troponins peaked in 4000s on 2/18/24 - now down trending   Has received 3u of packed RBC   Continue heparin ggt   LHC postponed due to Cr rise   Continue Amiodarone, Vascepa and metoprolol      5. Acute blood loss anemia secondary to Hematoma of Left thigh   2/20/24: Hgb Stable   Trauma surgery on board   Has received 3u of packed RBC this admission   Transfuse if Hgb <7 or active bleed with hemodynamic instability    6. Hypokalemia secondary to aggressive Diuresis  2/21/24: Potassium replaced with 40 meq oral   Patient with  difficulty tolerating IV  potassium      Will Transfer Patient to floor today     I spent 45 minutes of cumulative critical care time with the patient.  Greater than 50% of that time was spent in the direct collaboration and or coordination of care of the patient.

## 2024-02-21 NOTE — PROGRESS NOTES
Vancomycin Dosing by Pharmacy- Cessation of Therapy    Consult to pharmacy for vancomycin dosing has been discontinued by the prescriber, pharmacy will sign off at this time.    Please call pharmacy if there are further questions or re-enter a consult if vancomycin is resumed.     Sally Wilson, PharmD

## 2024-02-22 LAB
ANION GAP SERPL CALC-SCNC: 15 MMOL/L (ref 10–20)
BUN SERPL-MCNC: 67 MG/DL (ref 6–23)
CALCIUM SERPL-MCNC: 8 MG/DL (ref 8.6–10.3)
CHLORIDE SERPL-SCNC: 102 MMOL/L (ref 98–107)
CO2 SERPL-SCNC: 25 MMOL/L (ref 21–32)
CREAT SERPL-MCNC: 2.67 MG/DL (ref 0.5–1.3)
EGFRCR SERPLBLD CKD-EPI 2021: 24 ML/MIN/1.73M*2
ERYTHROCYTE [DISTWIDTH] IN BLOOD BY AUTOMATED COUNT: 16.3 % (ref 11.5–14.5)
GLUCOSE BLD MANUAL STRIP-MCNC: 177 MG/DL (ref 74–99)
GLUCOSE BLD MANUAL STRIP-MCNC: 215 MG/DL (ref 74–99)
GLUCOSE BLD MANUAL STRIP-MCNC: 228 MG/DL (ref 74–99)
GLUCOSE BLD MANUAL STRIP-MCNC: 285 MG/DL (ref 74–99)
GLUCOSE BLD MANUAL STRIP-MCNC: 303 MG/DL (ref 74–99)
GLUCOSE SERPL-MCNC: 213 MG/DL (ref 74–99)
HCT VFR BLD AUTO: 31.6 % (ref 41–52)
HGB BLD-MCNC: 10.1 G/DL (ref 13.5–17.5)
MAGNESIUM SERPL-MCNC: 1.67 MG/DL (ref 1.6–2.4)
MCH RBC QN AUTO: 29.5 PG (ref 26–34)
MCHC RBC AUTO-ENTMCNC: 32 G/DL (ref 32–36)
MCV RBC AUTO: 92 FL (ref 80–100)
NRBC BLD-RTO: 0 /100 WBCS (ref 0–0)
PLATELET # BLD AUTO: 217 X10*3/UL (ref 150–450)
POTASSIUM SERPL-SCNC: 3.5 MMOL/L (ref 3.5–5.3)
RBC # BLD AUTO: 3.42 X10*6/UL (ref 4.5–5.9)
SODIUM SERPL-SCNC: 138 MMOL/L (ref 136–145)
STAPHYLOCOCCUS SPEC CULT: ABNORMAL
UFH PPP CHRO-ACNC: 0.4 IU/ML
WBC # BLD AUTO: 11.6 X10*3/UL (ref 4.4–11.3)

## 2024-02-22 PROCEDURE — 99232 SBSQ HOSP IP/OBS MODERATE 35: CPT | Performed by: NURSE PRACTITIONER

## 2024-02-22 PROCEDURE — 94645 CONT INHLJ TX EACH ADDL HOUR: CPT

## 2024-02-22 PROCEDURE — 94640 AIRWAY INHALATION TREATMENT: CPT

## 2024-02-22 PROCEDURE — 2500000002 HC RX 250 W HCPCS SELF ADMINISTERED DRUGS (ALT 637 FOR MEDICARE OP, ALT 636 FOR OP/ED): Performed by: INTERNAL MEDICINE

## 2024-02-22 PROCEDURE — 85027 COMPLETE CBC AUTOMATED: CPT | Performed by: INTERNAL MEDICINE

## 2024-02-22 PROCEDURE — 2500000004 HC RX 250 GENERAL PHARMACY W/ HCPCS (ALT 636 FOR OP/ED): Performed by: INTERNAL MEDICINE

## 2024-02-22 PROCEDURE — 85520 HEPARIN ASSAY: CPT | Performed by: INTERNAL MEDICINE

## 2024-02-22 PROCEDURE — 2500000004 HC RX 250 GENERAL PHARMACY W/ HCPCS (ALT 636 FOR OP/ED): Performed by: NURSE PRACTITIONER

## 2024-02-22 PROCEDURE — 82374 ASSAY BLOOD CARBON DIOXIDE: CPT | Performed by: INTERNAL MEDICINE

## 2024-02-22 PROCEDURE — 99233 SBSQ HOSP IP/OBS HIGH 50: CPT | Performed by: FAMILY MEDICINE

## 2024-02-22 PROCEDURE — 2060000001 HC INTERMEDIATE ICU ROOM DAILY

## 2024-02-22 PROCEDURE — 83735 ASSAY OF MAGNESIUM: CPT | Performed by: FAMILY MEDICINE

## 2024-02-22 PROCEDURE — 2500000001 HC RX 250 WO HCPCS SELF ADMINISTERED DRUGS (ALT 637 FOR MEDICARE OP): Performed by: INTERNAL MEDICINE

## 2024-02-22 PROCEDURE — 82947 ASSAY GLUCOSE BLOOD QUANT: CPT

## 2024-02-22 PROCEDURE — 99232 SBSQ HOSP IP/OBS MODERATE 35: CPT | Performed by: SURGERY

## 2024-02-22 PROCEDURE — 97530 THERAPEUTIC ACTIVITIES: CPT | Mod: GO,CO

## 2024-02-22 PROCEDURE — 36415 COLL VENOUS BLD VENIPUNCTURE: CPT | Performed by: INTERNAL MEDICINE

## 2024-02-22 RX ORDER — MAGNESIUM SULFATE HEPTAHYDRATE 40 MG/ML
2 INJECTION, SOLUTION INTRAVENOUS ONCE
Status: COMPLETED | OUTPATIENT
Start: 2024-02-22 | End: 2024-02-22

## 2024-02-22 RX ORDER — IPRATROPIUM BROMIDE AND ALBUTEROL SULFATE 2.5; .5 MG/3ML; MG/3ML
3 SOLUTION RESPIRATORY (INHALATION) EVERY 6 HOURS PRN
Status: DISCONTINUED | OUTPATIENT
Start: 2024-02-22 | End: 2024-02-23 | Stop reason: HOSPADM

## 2024-02-22 RX ORDER — INSULIN LISPRO 100 [IU]/ML
0-10 INJECTION, SOLUTION INTRAVENOUS; SUBCUTANEOUS
Status: DISCONTINUED | OUTPATIENT
Start: 2024-02-22 | End: 2024-02-23 | Stop reason: HOSPADM

## 2024-02-22 RX ADMIN — MAGNESIUM SULFATE HEPTAHYDRATE 2 G: 40 INJECTION, SOLUTION INTRAVENOUS at 09:03

## 2024-02-22 RX ADMIN — ACETAMINOPHEN 650 MG: 325 TABLET ORAL at 00:32

## 2024-02-22 RX ADMIN — ICOSAPENT ETHYL 1 G: 1 CAPSULE ORAL at 08:48

## 2024-02-22 RX ADMIN — ICOSAPENT ETHYL 1 G: 1 CAPSULE ORAL at 17:13

## 2024-02-22 RX ADMIN — INSULIN LISPRO 2 UNITS: 100 INJECTION, SOLUTION INTRAVENOUS; SUBCUTANEOUS at 08:50

## 2024-02-22 RX ADMIN — METOPROLOL SUCCINATE 50 MG: 50 TABLET, EXTENDED RELEASE ORAL at 08:48

## 2024-02-22 RX ADMIN — IPRATROPIUM BROMIDE AND ALBUTEROL SULFATE 3 ML: 2.5; .5 SOLUTION RESPIRATORY (INHALATION) at 20:20

## 2024-02-22 RX ADMIN — FUROSEMIDE 80 MG: 80 TABLET ORAL at 08:48

## 2024-02-22 RX ADMIN — PIPERACILLIN SODIUM AND TAZOBACTAM SODIUM 3.38 G: 3; .375 INJECTION, SOLUTION INTRAVENOUS at 08:50

## 2024-02-22 RX ADMIN — LORATADINE 10 MG: 10 TABLET ORAL at 08:48

## 2024-02-22 RX ADMIN — AMIODARONE HYDROCHLORIDE 400 MG: 400 TABLET ORAL at 08:48

## 2024-02-22 RX ADMIN — PIPERACILLIN SODIUM AND TAZOBACTAM SODIUM 3.38 G: 3; .375 INJECTION, SOLUTION INTRAVENOUS at 15:37

## 2024-02-22 RX ADMIN — INSULIN LISPRO 4 UNITS: 100 INJECTION, SOLUTION INTRAVENOUS; SUBCUTANEOUS at 20:40

## 2024-02-22 RX ADMIN — PIPERACILLIN SODIUM AND TAZOBACTAM SODIUM 3.38 G: 3; .375 INJECTION, SOLUTION INTRAVENOUS at 21:01

## 2024-02-22 RX ADMIN — PIPERACILLIN SODIUM AND TAZOBACTAM SODIUM 3.38 G: 3; .375 INJECTION, SOLUTION INTRAVENOUS at 03:14

## 2024-02-22 RX ADMIN — AMIODARONE HYDROCHLORIDE 400 MG: 400 TABLET ORAL at 21:01

## 2024-02-22 RX ADMIN — FUROSEMIDE 80 MG: 80 TABLET ORAL at 17:13

## 2024-02-22 RX ADMIN — SERTRALINE HYDROCHLORIDE 50 MG: 50 TABLET ORAL at 08:48

## 2024-02-22 RX ADMIN — IPRATROPIUM BROMIDE AND ALBUTEROL SULFATE 3 ML: 2.5; .5 SOLUTION RESPIRATORY (INHALATION) at 08:15

## 2024-02-22 RX ADMIN — ACETAMINOPHEN 650 MG: 325 TABLET ORAL at 17:13

## 2024-02-22 RX ADMIN — INSULIN LISPRO 8 UNITS: 100 INJECTION, SOLUTION INTRAVENOUS; SUBCUTANEOUS at 17:13

## 2024-02-22 RX ADMIN — INSULIN LISPRO 4 UNITS: 100 INJECTION, SOLUTION INTRAVENOUS; SUBCUTANEOUS at 04:18

## 2024-02-22 RX ADMIN — INSULIN LISPRO 4 UNITS: 100 INJECTION, SOLUTION INTRAVENOUS; SUBCUTANEOUS at 00:33

## 2024-02-22 RX ADMIN — PANTOPRAZOLE SODIUM 40 MG: 40 TABLET, DELAYED RELEASE ORAL at 08:48

## 2024-02-22 RX ADMIN — ACETAMINOPHEN 650 MG: 325 TABLET ORAL at 12:50

## 2024-02-22 RX ADMIN — INSULIN LISPRO 6 UNITS: 100 INJECTION, SOLUTION INTRAVENOUS; SUBCUTANEOUS at 12:50

## 2024-02-22 RX ADMIN — ACETAMINOPHEN 650 MG: 325 TABLET ORAL at 06:25

## 2024-02-22 ASSESSMENT — PAIN SCALES - GENERAL
PAINLEVEL_OUTOF10: 7
PAINLEVEL_OUTOF10: 0 - NO PAIN

## 2024-02-22 ASSESSMENT — PAIN - FUNCTIONAL ASSESSMENT
PAIN_FUNCTIONAL_ASSESSMENT: 0-10

## 2024-02-22 ASSESSMENT — COGNITIVE AND FUNCTIONAL STATUS - GENERAL
DRESSING REGULAR LOWER BODY CLOTHING: A LITTLE
DRESSING REGULAR UPPER BODY CLOTHING: A LITTLE
DAILY ACTIVITIY SCORE: 18
MOBILITY SCORE: 21
PERSONAL GROOMING: A LITTLE
TOILETING: A LITTLE
WALKING IN HOSPITAL ROOM: A LITTLE
HELP NEEDED FOR BATHING: A LITTLE
EATING MEALS: A LITTLE
PERSONAL GROOMING: A LITTLE
DRESSING REGULAR LOWER BODY CLOTHING: A LITTLE
HELP NEEDED FOR BATHING: A LITTLE
CLIMB 3 TO 5 STEPS WITH RAILING: A LOT
DAILY ACTIVITIY SCORE: 19
TOILETING: A LITTLE
DRESSING REGULAR UPPER BODY CLOTHING: A LITTLE

## 2024-02-22 ASSESSMENT — PAIN DESCRIPTION - LOCATION: LOCATION: HIP

## 2024-02-22 ASSESSMENT — PAIN DESCRIPTION - ORIENTATION: ORIENTATION: LEFT

## 2024-02-22 NOTE — PROGRESS NOTES
Nutrition Note:   Nutrition Assessment    Reason for Assessment: Length of stay    Medical history per chart:   SIDHU cirrhosis (followed by hepatology), CHF with diastolic dysfunction, gout, cataracts, HTN, type 2 DM, nephrolithiasis, Pulm HTN, and BPH, CKD stage III    HPI:  Patient presents with left thigh hematoma    2/22:  Patient awake, alert at time of visit.  Appetite improving per patient, tolerating meals.      Current Diet: Adult diet Cardiac; 70 gm fat; 2 - 3 grams Sodium  Average meal Intake during admission:  limited meal intake noted, 50% of most meals documented, intake improving per patient.          Nutrition Related Findings:   Oral Symptoms: none Teeth: Intact   GI symptoms: no GI issues at this time.   BM: Last BM Date: 02/21/24  Wound Type:  hematoma/bruising  (nursing/wound notes provide further details)    Food allergies: NKFA. has No Known Allergies.  Meds/Labs reviewed.  acetaminophen, 650 mg, oral, q6h DIVINE  amiodarone, 400 mg, oral, BID   Followed by  [START ON 2/28/2024] amiodarone, 200 mg, oral, Daily  furosemide, 80 mg, oral, BID with meals  icosapent ethyL, 1 g, oral, BID with meals  insulin lispro, 0-10 Units, subcutaneous, Before meals & nightly  ipratropium-albuteroL, 3 mL, nebulization, TID  loratadine, 10 mg, oral, Daily  metoprolol succinate XL, 50 mg, oral, Daily  pantoprazole, 40 mg, oral, Daily  piperacillin-tazobactam, 3.375 g, intravenous, q6h  sertraline, 50 mg, oral, Daily             Nutrition Significant Labs:    Results from last 7 days   Lab Units 02/22/24  0432 02/21/24  0358 02/20/24  0528   GLUCOSE mg/dL 213* 180* 253*   SODIUM mmol/L 138 137 136   POTASSIUM mmol/L 3.5 3.3* 4.3   CHLORIDE mmol/L 102 103 104   CO2 mmol/L 25 19* 20*   BUN mg/dL 67* 68* 64*   CREATININE mg/dL 2.67* 2.67* 2.76*   EGFR mL/min/1.73m*2 24* 24* 23*   CALCIUM mg/dL 8.0* 7.5* 7.6*   PHOSPHORUS mg/dL  --  3.8  --    MAGNESIUM mg/dL 1.67 1.78 1.79     Lab Results   Component Value Date    HGBA1C  "6.6 (H) 01/11/2024    HGBA1C 7.0 (A) 07/31/2023     Results from last 7 days   Lab Units 02/22/24  1227 02/22/24  0856 02/22/24  0417 02/21/24  2343 02/21/24  1946 02/21/24  1529 02/21/24  1129 02/21/24  0708   POCT GLUCOSE mg/dL 285* 177* 215* 242* 267* 208* 250* 169*       Anthropometrics:  Height: 157.5 cm (5' 2\")   Weight: 74.4 kg (164 lb 0.4 oz)   BMI (Calculated): 29.99  IBW/kg (Dietitian Calculated): 53.6 kg          Weight History:   Wt Readings from Last 10 Encounters:   02/22/24 74.4 kg (164 lb 0.4 oz)   01/11/24 73.9 kg (163 lb)   12/13/23 74.9 kg (165 lb 3.2 oz)   11/30/23 74.8 kg (165 lb)   11/02/23 74.8 kg (165 lb)   10/10/23 75.4 kg (166 lb 2 oz)   10/04/23 74.4 kg (164 lb)   08/01/23 75.9 kg (167 lb 4 oz)   07/13/23 75.8 kg (167 lb)   06/28/23 77.1 kg (170 lb)        Weight Change %:  Weight History / % Weight Change: No significant weight changes indicated per history review.  Significant Weight Loss: No            Nutrition Diagnosis   Nutrition Diagnosis:       Nutrition Diagnosis  Patient has Nutrition Diagnosis: No       Nutrition Interventions/Recommendations   Nutrition Interventions and Recommendations:        Nutrition Prescription:  Individualized Nutrition Prescription Provided for : Cardiac diet.        Nutrition Interventions:   Food and/or Nutrient Delivery Interventions  Interventions: Meals and snacks  Meals and Snacks: General healthful diet  Goal: >75% intake of meals    Coordination of Nutrition Care by a Nutrition Professional  Collaboration and Referral of Nutrition Care: Other (Comment)  Goal: Notified diet office regarding patient requests    Nutrition Education:   Education Documentation  No documentation found.      Nutrition Counseling  Counseling Theoretical Approach: Other (Comment)  Goal: Encouraged adequate intake of cardiac diet       Nutrition Monitoring and Evaluation   Monitoring/Evaluation:   Food/Nutrient Related History Monitoring  Monitoring and Evaluation Plan: " Energy intake  Energy Intake: Estimated energy intake  Criteria: meet >75% of estimated needs from diet    Body Composition/Growth/Weight History  Monitoring and Evaluation Plan: Weight  Weight: Weight change  Criteria: Maintain stable weight    Biochemical Data, Medical Tests and Procedures  Monitoring and Evaluation Plan: Electrolyte/renal panel, Glucose/endocrine profile  Electrolyte and Renal Panel: Creatinine, BUN  Criteria: WNL  Glucose/Endocrine Profile: Glucose, casual  Criteria: WNL    Nutrition Focused Physical Findings  Monitoring and Evaluation Plan: Skin  Criteria: Maintain skin integrity            Time Spent/Follow-up Reminder:   Follow Up  Time Spent (min): 45 minutes  Last Date of Nutrition Visit: 02/22/24  Nutrition Follow-Up Needed?: Dietitian to reassess per policy  Follow up Comment: 2/29-3/3

## 2024-02-22 NOTE — PROGRESS NOTES
"Ritesh Garza is a 73 y.o. male on day 8 of admission presenting with Hematoma of left thigh, initial encounter.    Subjective   Patient seen awake in chair. He reports improvement in breathing and was able to be weaned to room air, increased urination which he attributes to lasix, denies chest pain or palpitations. No reported overnight events. Patient earlier refused cardiac cath, but after discussion with patient and wife patient agrees to cardiac cath if cardiology ready to proceed.     ROS:  Constitutional: No fever, no chills, no fatigue  HEENT: No headache, no vision changes, no hearing loss, no nasal congestion  Respiratory: Wheezing, SOB, no cough  Cardiac: No chest pain, no palpitations, no swelling of limbs  GI: No nausea, no vomiting, no diarrhea  Musculoskeletal: No back pain, no myalgia  Neuro: No seizures, no dizziness, no lightheadedness  Heme: No easy bruising, no bleeding  Genitourinary:  No Dysuria       Objective     Physical Exam  Gen: Adult male, visibly fatigued, no acute distress  HEENT: Normocephalic, atraumatic, good dentition, no oral lesions appreciated  Neck: Supple. No cervical lymphadenopathy appreciated, no thyroid enlargement appreciated  CV: Regular rate and rhythm, S1 and S2 present, no murmurs rubs or gallops appreciated  Resp: Lungs diminished to auscultation bilaterally, scatted ronchi, no rales or wheezes appreciated  Abdomen: soft, nontender, nondistended, no guarding, no masses appreciated  MSK: No joint swelling appreciated, full ROM  Psych: appropriate mood and affect  Skin: Large purple patches on L hip and thigh and buttock and spreading outside marker to midline lumbar back, nontender with irregular borders. Firm nonfluctuant mass at site of L posteriolateral hip    Last Recorded Vitals  Blood pressure 128/61, pulse 56, temperature 36.2 °C (97.1 °F), temperature source Temporal, resp. rate 19, height 1.575 m (5' 2\"), weight 74.4 kg (164 lb 0.4 oz), SpO2 94 " %.  Intake/Output last 3 Shifts:  I/O last 3 completed shifts:  In: 1173.1 (15.8 mL/kg) [P.O.:100; I.V.:383.1 (5.1 mL/kg); IV Piggyback:690]  Out: 2575 (34.6 mL/kg) [Urine:2575 (1 mL/kg/hr)]  Weight: 74.4 kg       Relevant Results  Scheduled medications  acetaminophen, 650 mg, oral, q6h DIVINE  amiodarone, 400 mg, oral, BID   Followed by  [START ON 2/28/2024] amiodarone, 200 mg, oral, Daily  furosemide, 80 mg, oral, BID with meals  icosapent ethyL, 1 g, oral, BID with meals  insulin lispro, 0-10 Units, subcutaneous, Before meals & nightly  ipratropium-albuteroL, 3 mL, nebulization, TID  loratadine, 10 mg, oral, Daily  metoprolol succinate XL, 50 mg, oral, Daily  pantoprazole, 40 mg, oral, Daily  piperacillin-tazobactam, 3.375 g, intravenous, q6h  sertraline, 50 mg, oral, Daily      Continuous medications       PRN medications  PRN medications: dextrose 10 % in water (D10W), dextrose, glucagon, HYDROmorphone, ipratropium-albuteroL, morphine, naloxone, nitroglycerin, ondansetron ODT **OR** ondansetron, oxyCODONE, oxygen, sodium chloride    Results for orders placed or performed during the hospital encounter of 02/13/24 (from the past 24 hour(s))   POCT GLUCOSE   Result Value Ref Range    POCT Glucose 267 (H) 74 - 99 mg/dL   POCT GLUCOSE   Result Value Ref Range    POCT Glucose 242 (H) 74 - 99 mg/dL   POCT GLUCOSE   Result Value Ref Range    POCT Glucose 215 (H) 74 - 99 mg/dL   Heparin Assay, UFH   Result Value Ref Range    Heparin Unfractionated 0.4 See Comment Below for Therapeutic Ranges IU/mL   CBC   Result Value Ref Range    WBC 11.6 (H) 4.4 - 11.3 x10*3/uL    nRBC 0.0 0.0 - 0.0 /100 WBCs    RBC 3.42 (L) 4.50 - 5.90 x10*6/uL    Hemoglobin 10.1 (L) 13.5 - 17.5 g/dL    Hematocrit 31.6 (L) 41.0 - 52.0 %    MCV 92 80 - 100 fL    MCH 29.5 26.0 - 34.0 pg    MCHC 32.0 32.0 - 36.0 g/dL    RDW 16.3 (H) 11.5 - 14.5 %    Platelets 217 150 - 450 x10*3/uL   Basic Metabolic Panel   Result Value Ref Range    Glucose 213 (H) 74 - 99  mg/dL    Sodium 138 136 - 145 mmol/L    Potassium 3.5 3.5 - 5.3 mmol/L    Chloride 102 98 - 107 mmol/L    Bicarbonate 25 21 - 32 mmol/L    Anion Gap 15 10 - 20 mmol/L    Urea Nitrogen 67 (H) 6 - 23 mg/dL    Creatinine 2.67 (H) 0.50 - 1.30 mg/dL    eGFR 24 (L) >60 mL/min/1.73m*2    Calcium 8.0 (L) 8.6 - 10.3 mg/dL   Magnesium   Result Value Ref Range    Magnesium 1.67 1.60 - 2.40 mg/dL   POCT GLUCOSE   Result Value Ref Range    POCT Glucose 177 (H) 74 - 99 mg/dL   POCT GLUCOSE   Result Value Ref Range    POCT Glucose 285 (H) 74 - 99 mg/dL   POCT GLUCOSE   Result Value Ref Range    POCT Glucose 303 (H) 74 - 99 mg/dL       CT chest wo IV contrast    Result Date: 2/19/2024  Interpreted By:  Rolando Diana, STUDY: CT CHEST WO IV CONTRAST;  2/18/2024 10:02 pm   INDICATION: Signs/Symptoms:Shortness of breath, chest pain.   COMPARISON: None.   ACCESSION NUMBER(S): [id number]   ORDERING CLINICIAN: MILA SAMUELS   TECHNIQUE: Contiguous axial images of the chest were obtained without intravenous contrast. Coronal and sagittal reformatted images were obtained from the axial images.   FINDINGS: The examination is limited secondary to lack of intravenous contrast and patient motion.   No axillary or mediastinal lymphadenopathy. There is limited evaluation for hilar lymphadenopathy on noncontrast examination.   The heart is borderline and size. Coronary artery artifact calcifications. No significant pericardial effusion   Small bilateral pleural effusions and bibasilar consolidative/atelectatic opacities. No pneumothorax.   Limited evaluation the upper abdomen. Nodularity of the liver compatible with cirrhosis. Postsurgical change of cholecystectomy.   Multilevel degenerative change of the thoracic spine.       Small bilateral pleural effusions and bibasilar consolidative/atelectatic opacities.   Liver cirrhosis and postsurgical change of cholecystectomy.   MACRO: None   Signed by: Rolando Diana 2/19/2024 2:30 AM Dictation  workstation:   [id number]    Lower extremity venous duplex bilateral    Result Date: 2/18/2024  Interpreted By:  Rolando Diana, STUDY: VAS US LOWER EXTREMITY VENOUS DUPLEX BILATERAL;  2/18/2024 9:01 pm   INDICATION: Signs/Symptoms:Concern for DVT and PE.   COMPARISON: None.   ACCESSION NUMBER(S): [id number]   ORDERING CLINICIAN: SHAGGY HOWARD   TECHNIQUE: Vascular ultrasound of the bilateral lower extremities was performed. Real-time compression views as well as Gray scale, color Doppler and spectral Doppler waveform analysis was performed.   FINDINGS: Evaluation of the visualized portions of the bilateral common femoral vein, proximal, mid, and distal femoral vein, and popliteal vein were performed.  Evaluation of the visualized portions of the  posterior tibial and peroneal veins were also performed.   The evaluated veins demonstrate normal compressibility. There is intact venous flow demonstrating normal respiratory variability and normal augmentation of flow with calf compression. Therefore, there is no ultrasonographic evidence for deep vein thrombosis within the evaluated veins.       No sonographic evidence for deep vein thrombosis within the evaluated veins of the bilateral lower extremity.   MACRO: None   Signed by: Rolando Diana 2/18/2024 10:27 PM Dictation workstation:   [id number]    XR chest 1 view    Result Date: 2/18/2024  Interpreted By:  Geovanny Gardner, STUDY: XR CHEST 1 VIEW;  2/18/2024 6:24 pm   INDICATION: Signs/Symptoms:Hypoxemia worsening.   COMPARISON: 02/18/2024   ACCESSION NUMBER(S): [id number]   ORDERING CLINICIAN: SHAGGY HOWARD   FINDINGS: Heart size is enlarged. Small effusions. Features suggesting edema. Median sternotomy no pneumothorax       1.  Features suggesting mild pulmonary edema ; slightly progressed       MACRO: None   Signed by: Geovanny Gardner 2/18/2024 6:31 PM Dictation workstation:   [id number]    Electrocardiogram, 12-lead PRN ACS symptoms    Result Date:  2/18/2024  Sinus rhythm with occasional Premature ventricular complexes Low voltage QRS Cannot rule out Anteroseptal infarct (cited on or before 14-FEB-2024) Abnormal ECG When compared with ECG of 17-FEB-2024 08:37, (unconfirmed) Premature supraventricular complexes are no longer Present Serial changes of Anteroseptal infarct Present    XR chest 1 view    Result Date: 2/18/2024  Interpreted By:  Morales Cortes, STUDY: XR CHEST 1 VIEW;  2/18/2024 5:03 am   INDICATION: Signs/Symptoms:hypoxia.   COMPARISON: Chest radiograph dated 02/17/2024.   ACCESSION NUMBER(S): [id number]   ORDERING CLINICIAN: NEELAM MILAN   FINDINGS:   CARDIOMEDIASTINAL SILHOUETTE: Cardiomediastinal silhouette is mildly enlarged. Similar sternotomy changes.   LUNGS/PLEURA: There are no consolidations.There are no pleural effusions. There is no demonstrated pneumothorax.   Abdomen: Cholecystectomy clips.   BONES: No evidence of acute osseous abnormality.       1.  No evidence of acute cardiopulmonary process. Mild cardiomegaly.     Signed by: Morales Cortes 2/18/2024 5:37 AM Dictation workstation:   [id number]    XR chest 2 views    Result Date: 2/17/2024  Interpreted By:  Jose Morse, STUDY: XR CHEST 2 VIEWS  2/17/2024 12:09 pm   INDICATION: Signs/Symptoms:Persistent leukocytosis   COMPARISON: 09/14/2022   ACCESSION NUMBER(S): [id number]   ORDERING CLINICIAN: SHAGGY HOWARD   TECHNIQUE: PA and lateral views of the chest were obtained.   FINDINGS: Cardiac monitoring leads are seen over the chest. Sternal wires and mediastinal surgical clips are present. A small left-sided pleural effusion is present. Left basilar airspace consolidation is seen and may represent atelectasis and/or pneumonia. No focal infiltrate, pleural effusion or pneumothorax is identified. The cardiac silhouette is within normal limits for size. Moderate to severe discogenic degenerative changes are seen throughout the thoracic spine.       Left-sided pleural effusion and  left basilar airspace consolidation, as above. Clinical correlation and continued follow-up until clearing is recommended.   MACRO: None.   Signed by: Jose Morse 2/17/2024 12:33 PM Dictation workstation:   [id number]    Electrocardiogram, 12-lead PRN ACS symptoms    Result Date: 2/17/2024  Atrial fibrillation with aberrantly conducted complexes Premature ventricular complexes Anteroseptal infarct , age undetermined Abnormal ECG When compared with ECG of 15-FEB-2024 17:46, (unconfirmed) No significant change was found Confirmed by Blaine Adams ([id number]) on 2/17/2024 12:00:10 PM    ECG 12 Lead    Result Date: 2/17/2024  Normal sinus rhythm Low voltage QRS Septal infarct , age undetermined Abnormal ECG When compared with ECG of 15-FEB-2024 17:46, (unconfirmed) Sinus rhythm has replaced Wide QRS rhythm Vent. rate has decreased BY  40 BPM Confirmed by Blaine Adams ([id number]) on 2/17/2024 2:18:29 AM    Transthoracic Echo (TTE) Complete    Result Date: 2/15/2024              [place] [address]      Phone [phone] Fax [phone] TRANSTHORACIC ECHOCARDIOGRAM REPORT  Patient Name:      CHIDI Andrade Physician:    [id number] Gonzalo White DO Study Date:        2/15/2024             Ordering Provider:    [id number] TITO NICOLAS MRN/PID:           [id number]              Fellow: Accession#:        [id number]          Nurse: Date of Birth/Age: 1950 / 73 years  Sonographer:          [facility]ena David [facility] Gender:            M                     Additional Staff: Height:            160.02 cm             Admit Date:           2/13/2024 Weight:            73.03 kg              Admission Status:     Inpatient -                                                                Routine BSA / BMI:         1.76 m2 / 28.52 kg/m2 Department Location:   [facility] Echo                                                                Lab Blood Pressure: 105 /60 mmHg Study Type:    TRANSTHORACIC ECHO (TTE) COMPLETE Diagnosis/ICD: Chest pain, unspecified-R07.9 Indication:    Chest Pain CPT Codes:     Echo Complete w Full Doppler-[id number]  Study Detail: The following Echo studies were performed: 2D, M-Mode, Doppler and               color flow. Optison used as a contrast agent for endocardial               border definition. Total contrast used for this procedure was 3 mL               via IV push.  PHYSICIAN INTERPRETATION: Left Ventricle: Left ventricular systolic function is normal, with an estimated ejection fraction of 30-35%. There are multiple wall motion abnormalities. The left ventricular cavity size is upper limits of normal. Spectral Doppler shows a restrictive pattern of left ventricular diastolic filling. LV Wall Scoring: The basal inferoseptal segment and basal inferior segment are akinetic. The entire anterior septum, mid and apical inferior septum, basal and mid inferolateral wall, basal anterolateral segment, basal anterior segment, and mid inferior segment are hypokinetic. All remaining scored segments are normal. Left Atrium: The left atrium is normal in size. Right Ventricle: The right ventricle was not well visualized. There is low normal right ventricular systolic function. Right Atrium: The right atrium is normal in size. Aortic Valve: The aortic valve is trileaflet. There is mild aortic valve thickening. There is no evidence of aortic valve regurgitation. The peak instantaneous gradient of the aortic valve is 7.1 mmHg. The mean gradient of the aortic valve is 4.0 mmHg. Mitral Valve: The mitral valve is normal in structure. There is mild mitral valve regurgitation. Tricuspid Valve: The tricuspid valve is structurally normal. There is mild to moderate tricuspid regurgitation. Pulmonic Valve: The pulmonic valve is structurally normal. There is  physiologic pulmonic valve regurgitation. Pericardium: There is no pericardial effusion noted. Aorta: The aortic root is normal.  CONCLUSIONS:  1. Left ventricular systolic function is normal with a 30-35% estimated ejection fraction.  2. Multiple segmental abnormalities exist. See findings.  3. Spectral Doppler shows a restrictive pattern of left ventricular diastolic filling.  4. There is low normal right ventricular systolic function.  5. Mild to moderate tricuspid regurgitation.  6. There are multiple wall motion abnormalities. QUANTITATIVE DATA SUMMARY: 2D MEASUREMENTS:                           Normal Ranges: Ao Root d:     2.90 cm    (2.0-3.7cm) LAs:           4.60 cm    (2.7-4.0cm) IVSd:          1.00 cm    (0.6-1.1cm) LVPWd:         0.90 cm    (0.6-1.1cm) LVIDd:         5.30 cm    (3.9-5.9cm) LVIDs:         5.20 cm LV Mass Index: 106.2 g/m2 LV % FS        1.9 % LA VOLUME:                               Normal Ranges: LA Vol A4C:        53.0 ml    (22+/-6mL/m2) LA Vol A2C:        47.8 ml LA Vol BP:         50.3 ml LA Vol Index A4C:  30.0ml/m2 LA Vol Index A2C:  27.1 ml/m2 LA Vol Index BP:   28.5 ml/m2 LA Area A4C:       18.0 cm2 LA Area A2C:       17.1 cm2 LA Major Axis A4C: 5.2 cm LA Major Axis A2C: 5.2 cm LA Volume Index:   26.8 ml/m2 M-MODE MEASUREMENTS:                  Normal Ranges: Ao Root: 2.90 cm (2.0-3.7cm) AORTA MEASUREMENTS:                    Normal Ranges: Asc Ao, d: 2.50 cm (2.1-3.4cm) LV SYSTOLIC FUNCTION BY 2D PLANIMETRY (MOD):                     Normal Ranges: EF-A4C View: 29.3 % (>=55%) EF-A2C View: 43.2 % EF-Biplane:  35.2 % LV DIASTOLIC FUNCTION:                        Normal Ranges: MV Peak E:    1.29 m/s (0.7-1.2 m/s) MV Peak A:    0.50 m/s (0.42-0.7 m/s) E/A Ratio:    2.56     (1.0-2.2) MV e'         0.06 m/s (>8.0) MV lateral e' 0.06 m/s MV medial e'  0.06 m/s E/e' Ratio:   21.50    (<8.0) MITRAL VALVE:                 Normal Ranges: MV DT: 151 msec (150-240msec) MITRAL  INSUFFICIENCY:                           Normal Ranges: PISA Radius:  0.5 cm MR VTI:       129.00 cm MR Vmax:      437.00 cm/s MR Alias Matt: 38.5 cm/s MR Volume:    17.85 ml MR Flow Rt:   60.48 ml/s MR EROA:      0.14 cm2 AORTIC VALVE:                                   Normal Ranges: AoV Vmax:                1.33 m/s (<=1.7m/s) AoV Peak P.1 mmHg (<20mmHg) AoV Mean P.0 mmHg (1.7-11.5mmHg) LVOT Max Matt:            1.04 m/s (<=1.1m/s) AoV VTI:                 25.10 cm (18-25cm) LVOT VTI:                20.70 cm LVOT Diameter:           2.00 cm  (1.8-2.4cm) AoV Area, VTI:           2.59 cm2 (2.5-5.5cm2) AoV Area,Vmax:           2.46 cm2 (2.5-4.5cm2) AoV Dimensionless Index: 0.82  RIGHT VENTRICLE: TAPSE: 14.1 mm RV s'  0.09 m/s TRICUSPID VALVE/RVSP:                             Normal Ranges: Peak TR Velocity: 2.44 m/s RV Syst Pressure: 26.8 mmHg (< 30mmHg) IVC Diam:         1.50 cm  [id number] Gonzalo White DO Electronically signed on 2/15/2024 at 11:57:27 AM  Wall Scoring  ** Final **     CT femur left wo IV contrast    Result Date: 2/15/2024  Interpreted By:  Chris Nails, STUDY: CT FEMUR LEFT WO IV CONTRAST;  2/15/2024 9:59 am   INDICATION: Signs/Symptoms:hematoma left thigh.   COMPARISON: Radiographs 2024.   ACCESSION NUMBER(S): [id number]   ORDERING CLINICIAN: SHANTEL LINTON   TECHNIQUE: CT imaging of the  left femur was obtained  without administration of intravenous contrast medium. Coronal and sagittal reformatted images were performed.   FINDINGS: OSSEOUS STRUCTURES: No acute fracture or dislocation. Left hip joint is maintained. Osteoarthritis of the knee. Trace knee joint effusion with mild synovial thickening.   SOFT TISSUES: There is in ellipsoid superficial, subcutaneous hematoma in the anterior thigh splaying the rectus femoris and vastus lateralis muscles. This hematoma measures 12 x 5 x 19 cm (transverse x anterior-posterior x craniocaudal). There is minimal skin  thickening and diffuse subcutaneous edema throughout the imaged thigh. No fluid collection or suspicious soft tissue mass. Muscle bulk is maintained with preserved intermuscular fascial planes. Small popliteal cyst. Love balloon and excreted contrast within the bladder. No acute intrapelvic process.       Anterior thigh subcutaneous hematoma splaying the rectus femoris and vastus lateralis muscles, 12 x 5 x 19 cm.   Minimal skin thickening and diffuse subcutaneous edema throughout the imaged thigh without a fluid collection. No CT evidence of deep compartment infiltration.   No acute fracture.   MACRO: None   Signed by: Chris Nails 2/15/2024 11:51 AM Dictation workstation:   [id number]    CT abdomen pelvis wo IV contrast    Result Date: 2/15/2024  Interpreted By:  Akil Ruiz, STUDY: CT ABDOMEN PELVIS WO IV CONTRAST;  2/15/2024 9:59 am   INDICATION: Signs/Symptoms:Hematoma left thigh,.   COMPARISON: None   ACCESSION NUMBER(S): [id number]   ORDERING CLINICIAN: AKIL LINTON   TECHNIQUE: CT of the abdomen and pelvis was performed. Contiguous axial images were obtained at 3 mm slice thickness through the abdomen and pelvis. Coronal and sagittal reconstructions at 3 mm slice thickness were performed.  No intravenous contrast was administered; positive oral contrast was given.   FINDINGS: Please note that the evaluation of vessels, lymph nodes and organs is limited without intravenous contrast.   LOWER CHEST: Trace bilateral pleural effusion with surrounding atelectasis.   ABDOMEN:   LIVER: Cirrhotic hepatic morphology. No gross lesions.   BILE DUCTS: No intrahepatic biliary dilatation.   GALLBLADDER: Surgically absent.   PANCREAS: No duct dilatation.   SPLEEN: No splenomegaly.   ADRENAL GLANDS: Bilateral adrenal glands appear normal.   KIDNEYS AND URETERS: Retained contrast within the renal parenchyma from prior contrasted study. Small size right kidney. No hydroureteronephrosis or nephroureterolithiasis is  identified. Left cortical renal cysts.   PELVIS:   BLADDER: Within normal limits. Love catheter in place. Trace contrast noted.   REPRODUCTIVE ORGANS: Mild splenomegaly.   BOWEL: No bowel dilatation.   VESSELS: Moderate vascular calcifications and atherosclerotic changes. Focal aneurysmal dilatation of the infrarenal aorta measuring 3.1 cm.   PERITONEUM/RETROPERITONEUM/LYMPH NODES: No enlarged intra-abdominal or pelvic lymphadenopathy.   ABDOMINAL WALL: Redemonstrated left lateral abdominal wall/flank and lateral thigh significant soft tissue thickening and edematous changes with a small intramuscular hyperdensity in the left lateral thigh measuring 1.7 cm as in prior.   BONES: Multilevel degenerative spine changes and facet joint disease.       1. Redemonstrated left flank and lateral thigh significant soft tissue thickening and edematous changes with a small intramuscular hyperdensity in the left lateral thigh measuring 1.7 cm as in prior in keeping with the known hematoma. The previously described left thigh active extravasation is not included in this portions of CT scan and would be better assessed in same day left femur CT scan official read. 2. Cirrhotic hepatic morphology. 3. Partially atrophic right kidney. Retained renal parenchymal IV contrast from prior study could be related to impairment. Advise correlation with kidney function tests. 4. Focal aneurysmal dilatation of the abdominal aorta measuring 3.1 cm. 5. Trace bilateral pleural effusion with surrounding atelectasis     MACRO: None   Signed by: Akil Ruiz 2/15/2024 10:49 AM Dictation workstation:   [id number]    ECG 12 Lead    Result Date: 2/15/2024  Ventricular tachcyardia, offset into undetermined rhythm with frequent ectopic beats Low voltage QRS Cannot rule out Inferior infarct , age undetermined Cannot rule out Anteroseptal infarct , age undetermined Marked ST abnormality, possible lateral subendocardial injury Abnormal ECG When compared  with ECG of 13-FEB-2024 22:12, PREVIOUS ECG IS PRESENT Confirmed by Migdalia Beasley ([id number]) on 2/15/2024 10:32:33 AM    Electrocardiogram, 12-lead PRN ACS symptoms    Result Date: 2/15/2024  Sinus rhythm with frequent PAC/PVC's Anteroseptal infarct (cited on or before 14-FEB-2024) Abnormal ECG When compared with ECG of 14-FEB-2024 21:00, (unconfirmed) Current undetermined rhythm precludes rhythm comparison, needs review Right bundle branch block is no longer Present Borderline criteria for Inferior infarct are no longer Present Questionable change in initial forces of Anterior leads Confirmed by Migdalia Beasley ([id number]) on 2/15/2024 10:31:39 AM    XR foot left 3+ views    Result Date: 2/15/2024  Interpreted By:  Nico Campos, STUDY: XR FOOT LEFT 3+ VIEWS; ;  2/14/2024 1:19 pm   INDICATION: Signs/Symptoms:trauma.   COMPARISON: None.   ACCESSION NUMBER(S): [id number]   ORDERING CLINICIAN: RODRIGO GALLARDO   FINDINGS: No acute fracture or dislocation of the left foot is noted.   The lateral margin of the 2nd metatarsophalangeal joint has degenerative calcifications/ossifications. Articulation       No acute fracture or dislocation of the left foot.     MACRO: None   Signed by: Nico Campos 2/15/2024 8:03 AM Dictation workstation:   [id number]    ECG 12 lead    Result Date: 2/14/2024  Sinus rhythm Nonspecific intraventricular conduction delay Anteroseptal infarct, age indeterminate Minimal ST elevation, inferior leads Lateral leads are also involved See ED provider note for full interpretation and clinical correlation Confirmed by Malik Hills ([id number]) on 2/14/2024 3:39:40 PM    CT chest abdomen pelvis w IV contrast    Addendum Date: 2/13/2024    Interpreted By:  Geovanny Gardner, ADDENDUM: Geovnany Gardner discussed the significance and urgency of this critical finding by epic chat with  TETO NEAL on 2/13/2024 at 11:05 pm. (**-RCF-**) Findings:  See findings.     Signed by: Geovanny Gardner 2/13/2024 11:05 PM   --------  ORIGINAL REPORT -------- Dictation workstation:   [id number]    Result Date: 2/13/2024  Interpreted By:  Geovanny Gardner, STUDY: CT CHEST ABDOMEN PELVIS W IV CONTRAST;  2/13/2024 10:08 pm   INDICATION: Signs/Symptoms:Trauma, fall, left lower extremity hematoma, left lower quadrant discomfort   COMPARISON: 06/29/2020   ACCESSION NUMBER(S): [id number]   ORDERING CLINICIAN: TETO NEAL   TECHNIQUE: CT of the chest, abdomen, and pelvis was performed. Contiguous axial images were obtained at  5 mm slice thickness through the chest, and at  3 mm through the abdomen and pelvis. Coronal and sagittal reconstructions at  3 mm slice thickness were performed. Intravenous contrast administered   FINDINGS: CHEST:   Gynecomastia. Median sternotomy. Enlarged main pulmonary artery suggestive of pulmonary arterial hypertension no findings of pneumothorax or pleural effusion. No acute traumatic findings in the chest. Severe vascular calcification   ABDOMEN: Abdominal aortic aneurysm measuring 3.2 x 2.5 cm not measurably changed. Heterogeneous appearance of the liver with surface nodularity. Unremarkable adrenal glands and pancreas. Unremarkable spleen. No splenic hematoma   Prostate gland is enlarged. It is heterogeneous.   Right kidney is atrophic compared to the left. 1 cm left renal cyst. Findings most suspicious for renal artery stenosis diverticulosis.   Interrogation of the left pelvis shows a large hematoma in the left quadriceps with high density blush of contrast. Reference image 50 series 12 compatible with active extravasation. Hematoma measures about 9.1 x 5.6 x 13 cm.   Marked soft tissue swelling of the left lower extremity. Mild multilevel degenerative disc disease.       Large left intramuscular hematoma with evidence of active extravasation within the muscle.   Severe vascular calcification.   Stable abdominal aortic aneurysm with severe vascular calcification. Heterogeneity of the liver. Query cirrhosis. Right  kidney is atrophic compared to the left most suggestive of renal artery stenosis.   Enlarged prostate gland. Diverticulosis. No diverticulitis   Signed by: Geovanny Gardner 2/13/2024 10:59 PM Dictation workstation:   [id number]    XR femur left 2+ views    Result Date: 2/13/2024  Interpreted By:  Geovanny Gardner, STUDY: XR KNEE 4+ VIEWS BILATERAL; XR FEMUR LEFT 2+ VIEWS; ;  2/13/2024 10:35 pm   INDICATION: Signs/Symptoms:Fall, bilateral knee pain; Signs/Symptoms:Fall, left thigh hematoma.   COMPARISON: None.   ACCESSION NUMBER(S): [id number]; [id number]   ORDERING CLINICIAN: TEOT NEAL   FINDINGS: Left femur and bilateral knees performed.   Vascular calcification detected bilaterally.   Large soft tissue swelling seen in the left thigh compatible with known hematoma. No evidence of femur fracture.   No evidence of knee dislocation. Mild tricompartmental osteoarthritis of both knees.       No evidence of left femur fracture. Known large hematoma. Bilateral knee osteoarthritis. Robust vascular calcification     MACRO: None   Signed by: Geovanny Gardner 2/13/2024 11:01 PM Dictation workstation:   [id number]    XR knee 4+ views bilateral    Result Date: 2/13/2024  Interpreted By:  Geovanny Gardner, STUDY: XR KNEE 4+ VIEWS BILATERAL; XR FEMUR LEFT 2+ VIEWS; ;  2/13/2024 10:35 pm   INDICATION: Signs/Symptoms:Fall, bilateral knee pain; Signs/Symptoms:Fall, left thigh hematoma.   COMPARISON: None.   ACCESSION NUMBER(S): [id number]; [id number]   ORDERING CLINICIAN: TETO NEAL   FINDINGS: Left femur and bilateral knees performed.   Vascular calcification detected bilaterally.   Large soft tissue swelling seen in the left thigh compatible with known hematoma. No evidence of femur fracture.   No evidence of knee dislocation. Mild tricompartmental osteoarthritis of both knees.       No evidence of left femur fracture. Known large hematoma. Bilateral knee osteoarthritis. Robust vascular calcification     MACRO: None    Signed by: Geovanny Gardner 2/13/2024 11:01 PM Dictation workstation:   [id number]    XR pelvis 1-2 views    Result Date: 2/13/2024  Interpreted By:  Deborah Jamil, STUDY: XR PELVIS 1-2 VIEWS; ;  2/13/2024 9:33 pm   INDICATION: Signs/Symptoms:trauma, left thigh pain.   COMPARISON: None.   ACCESSION NUMBER(S): [id number]   ORDERING CLINICIAN: TETO NEAL   FINDINGS: Evaluation limited to positioning. Postoperative changes in the right hip evidence by surgical clips. Heavy wall calcification of vascular structures in the area of femoral arteries bilaterally. No evidence for acute fracture or dislocation. No bone lesions. No radiopaque foreign body.       No acute fracture or dislocation of bilateral hip joints.     MACRO: None   Signed by: Deborah Jamil 2/13/2024 9:54 PM Dictation workstation:   [id number]    CT cervical spine wo IV contrast    Result Date: 2/13/2024  Interpreted By:  Deborah Jamil, STUDY: CT CERVICAL SPINE WO IV CONTRAST;  2/13/2024 9:45 pm   INDICATION: Signs/Symptoms:Fall, neck pain.   COMPARISON: None.   ACCESSION NUMBER(S): [id number]   ORDERING CLINICIAN: TETO NEAL   TECHNIQUE: Axial CT images of the cervical spine are obtained. Axial, coronal and sagittal reconstructions are provided for review.   FINDINGS: Wall calcification of bilateral carotid bulbs, worse on the left. Heterogeneous enlargement of the right thyroid lobe which is incompletely characterized in this study. No focal masses are seen.   Fractures: There is no evidence for an acute fracture of the cervical spine.   Vertebral Alignment: Within normal limits.   Craniocervical Junction: Degenerative changes at C1-2 level with prominent soft tissue thickening at the C1-2 articulation.   Vertebrae/Disc Spaces:  Moderate multilevel discogenic degenerative changes including disc space narrowing, endplate sclerosis, spurring and posterior disc osteophyte complex. Findings worse from C5-T1. Facet joint sclerosis and hypertrophy  bilaterally at multiple levels.   Prevertebral/Paraspinal Soft Tissues: No prevertebral soft tissue swelling.         1. No evidence for an acute fracture or subluxation of the cervical spine.   2. Degenerative changes of the spine as above.   3. Asymmetrical thyromegaly.   4. Heavy wall calcification of bilateral carotid bulbs.   MACRO: None   Signed by: Deborah Jamil 2/13/2024 9:53 PM Dictation workstation:   [id number]    CT head W O contrast trauma protocol    Result Date: 2/13/2024  Interpreted By:  Deborah Jamil, STUDY: CT HEAD W/O CONTRAST TRAUMA PROTOCOL;  2/13/2024 9:45 pm   INDICATION: Signs/Symptoms:Fall.   COMPARISON: 09/14/2022   ACCESSION NUMBER(S): [id number]   ORDERING CLINICIAN: TETO NEAL   TECHNIQUE: Noncontrast axial CT scan of head was performed. Angled reformats in brain and bone windows were generated. The images were reviewed in bone, brain, blood and soft tissue windows.   FINDINGS: CSF Spaces: The ventricles, sulci and cisterns are within normal limits for patient's age.  There is no extraaxial fluid collection.   Parenchyma: The grey-white differentiation is intact. There is no mass effect or midline shift.  There is no intracranial hemorrhage.   Calvarium: The calvarium is unremarkable.   Paranasal sinuses and mastoids: Visualized paranasal sinuses and mastoids are clear.       No evidence of acute cortical infarct or intracranial hemorrhage.   MACRO: None     Signed by: Deborah Jamil 2/13/2024 9:51 PM Dictation workstation:   [id number]           This patient currently has cardiac telemetry ordered; if you would like to modify or discontinue the telemetry order, click here to go to the orders activity to modify/discontinue the order.                 Assessment/Plan   Principal Problem:    Hematoma of left thigh, initial encounter  Active Problems:    Asthma    Benign essential hypertension    Cirrhosis, nonalcoholic (CMS/HCC)    Type 2 diabetes mellitus with diabetic neuropathy,  without long-term current use of insulin (CMS/[facility])    Cognitive dysfunction    Congestive heart failure with right ventricular systolic dysfunction (CMS/[facility])    Traumatic hematoma of left thigh    CAD (coronary artery disease)    HLD (hyperlipidemia)    Acute kidney injury superimposed on CKD (CMS/[facility])    SENIA (obstructive sleep apnea)    Erosive esophagitis    Chest pain    Ventricular tachycardia (CMS/[facility])    Acute blood loss anemia    Demand ischemia    Pneumonia    NSTEMI (non-ST elevated myocardial infarction) (CMS/[facility])    Acute on chronic systolic heart failure (CMS/[facility])    Acute non-ST elevation MI, angina, CAD, Congestive heart failure with right ventricular systolic dysfunction  -downtrending troponin   -cardiology consulted and following  -LHC postponed due to elevated Creatinine. Spoke to patient and wife and cardiology, patient consents to [facility] tomorrow. Will monitor renal function  -continue amiodarone, metoprolol, vascepa  -discontinued heparin drip    Acute blood loss anemia, hematoma of left thigh  - trauma surgery following  - s/p 1u pRBC  - trending CBC, appears relatively stable post-transfusion, will monitor and transfuse for Hb<7.0    Acute kidney injury on chronic kidney disease secondary to diabetic nephropathy  -Nephrology following, imaging shows partially atrophic right kidney with contrast retention   -continue holding home losartan and farxiga  -held IVF due to worsening respiratory status  -nephrology following, okay with scheduled lasix, appreciate reccs    Acute respiratory failure  -hx of home nebulizer use  -hx of SENIA, noncompliant with CPAP/Bipap  -likely due to worsening HF  -weaned to room air after diuresis, will monitor  -duoneb q6 PRN  -continue lasix, switch to oral BID dosing    Cirrhosis nonalcoholic  -monitor LFTs  -MELD 3.0 score 26, estimated 85% 90-day survival    History of erosive esophagitis  -continue pantoprazole every day    Type 2 diabetes with neuropathy  -A1c  6.6%, goal <8.0%  -sliding scale insulin      DVT prophylaxis- s/p Heparin drip, now SCDs           I spent 30 minutes in the professional and overall care of this patient.      Rajendra Liu MD

## 2024-02-22 NOTE — PROGRESS NOTES
Attempted to discuss SNF recommendation with patient, he was quite agitated stating that he has been here for 3 weeks and just wants to go home.  He did agree to allow me to call his wife, left her a voicemail to return my call. Care transitions following.    1548  Spoke with patient and his wife, patient is adamant that he is going home. Wife ensures that there will be someone with patient 24 hours/day and they would like [facility]. Will send referral.

## 2024-02-22 NOTE — CARE PLAN
The patient's goals for the shift include go get rest and get better    The clinical goals for the shift include Remain hemodynamically stable

## 2024-02-22 NOTE — PROGRESS NOTES
Physical Therapy                 Therapy Communication Note    Patient Name: Ritesh Garza  MRN: [id number]  Today's Date: 2/22/2024     Discipline: Physical Therapy    Missed Visit Reason: Missed Visit Reason:  (pt declined stating he wanted to order breakfast first and has been busy all morning with resp therapy.)
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
## 2024-02-22 NOTE — PROGRESS NOTES
Occupational Therapy    OT Treatment    Patient Name: Ritesh Garza  MRN: [id number]  Today's Date: 2/22/2024  Time Calculation  Start Time: 1157  Stop Time: 1220  Time Calculation (min): 23 min         Assessment:  OT Assessment: pt. making good progress he is motivated CGA for transfers  End of Session Communication: Bedside nurse  End of Session Patient Position: Up in chair, Alarm on (visiting with friends)     Plan:  Treatment Interventions: ADL retraining, Functional transfer training, UE strengthening/ROM, Endurance training    Subjective   Previous Visit Info:  OT Last Visit  OT Received On: 02/22/24  General:  General  Co-Treatment: PT  Co-Treatment Reason: to maximize safety with functional mobility and activity. tolerance  General Comment: pt. up in chair on arrival he was able to increase functional mobility on unit today without NC. Spo2 sats monitored patient was 90-96%. notified RN pt. was not on o2 on arrival. Pt. standing tolerance up to 3 mins no loss of balance. Pt. declined to perform ADLS today stated he would do them later       Pain:  Pain Assessment  Pain Assessment: 0-10  Pain Score: 0 - No pain    Objective    Cognition:  Cognition  Overall Cognitive Status: Within Functional Limits  Orientation Level: Oriented X4  Coordination:     Activities of Daily Living:   Functional Standing Tolerance:  Time: 3 min  Activity: functional mobility on unit patient took 2 standing rest periods due to fatigue.  Functional Standing Tolerance Comments: pt. with no loss of balance noted moderate fatigue post completion but spo2 sat remained >90%   Bed Mobility/Transfers: Transfers  Transfer: Yes  Transfer 1  Transfer From 1: Chair with arms to  Transfer to 1: Chair with arms  Technique 1: Sit to stand, Stand to sit  Transfer Device 1: Walker  Transfer Level of Assistance 1: Contact guard  Trials/Comments 1: pt. scanning environment well no loss of balance during ambulation        Outcome Measures:[facility]  Daily Activity  Putting on and taking off regular lower body clothing: A little  Bathing (including washing, rinsing, drying): A little  Putting on and taking off regular upper body clothing: A little  Toileting, which includes using toilet, bedpan or urinal: A little  Taking care of personal grooming such as brushing teeth: A little  Eating Meals: None  Daily Activity - Total Score: 19        Education Documentation  Body Mechanics, taught by ASIM Pinedo at 2/22/2024  2:20 PM.  Learner: Patient  Readiness: Acceptance  Method: Explanation, Demonstration  Response: Needs Reinforcement, Demonstrated Understanding, Verbalizes Understanding    Precautions, taught by ASIM Pinedo at 2/22/2024  2:20 PM.  Learner: Patient  Readiness: Acceptance  Method: Explanation, Demonstration  Response: Needs Reinforcement, Demonstrated Understanding, Verbalizes Understanding    ADL Training, taught by ASIM Pinedo at 2/22/2024  2:20 PM.  Learner: Patient  Readiness: Acceptance  Method: Explanation, Demonstration  Response: Needs Reinforcement, Demonstrated Understanding, Verbalizes Understanding    Body Mechanics, taught by ASIM Pinedo at 2/21/2024  4:54 PM.  Learner: Patient  Readiness: Acceptance  Method: Explanation, Demonstration  Response: Verbalizes Understanding, Needs Reinforcement    Precautions, taught by ASIM Pinedo at 2/21/2024  4:54 PM.  Learner: Patient  Readiness: Acceptance  Method: Explanation, Demonstration  Response: Verbalizes Understanding, Needs Reinforcement    ADL Training, taught by ASIM Pinedo at 2/21/2024  4:54 PM.  Learner: Patient  Readiness: Acceptance  Method: Explanation, Demonstration  Response: Verbalizes Understanding, Needs Reinforcement    Education Comments  No comments found.        OP EDUCATION:       Goals:  Encounter Problems       Encounter Problems (Active)       ADLs       Patient with complete lower body dressing  with set-up and supervision level of assistance donning and doffing all LE clothes  with PRN adaptive equipment while supported sitting and standing (Progressing)       Start:  02/16/24    Expected End:  02/24/24               Mobility       STG - Patient will ambulate (Progressing)       Start:  02/15/24    Expected End:  02/24/24       FWW/CANE SBA USING PROPER GAIT PATTERN         STG - Patient will ascend and descend four to six stairs (Progressing)       Start:  02/15/24    Expected End:  02/24/24       RAIL &/OR CANE, MIN A            Transfers       STG - Patient to transfer to and from sit to supine (Progressing)       Start:  02/15/24    Expected End:  02/24/24       INDEP RAILS PRN HOB FLAT         STG - Patient will transfer sit to and from stand (Progressing)       Start:  02/15/24    Expected End:  02/24/24       FW/CANE USING PROPER TECHNQUE            VISION       Patient will navigate environments with high visual stimuli safely Without loss of balance and Attending to obstacles with modified independent level of assistance. (Progressing)       Start:  02/16/24    Expected End:  02/24/24
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
## 2024-02-22 NOTE — PROGRESS NOTES
Ritesh Garza is a 73 y.o. male on day 8 of admission presenting with Hematoma of left thigh, initial encounter.    Subjective   Interval History: No new complaints. On room air, sitting on chair, alert and oriented    Review of Systems  As above    Objective   Range of Vitals (last 24 hours)  Heart Rate:  [66-92]   Temp:  [35.8 °C (96.4 °F)-36.3 °C (97.4 °F)]   Resp:  [18-20]   BP: (115-151)/(66-79)   Weight:  [74.4 kg (164 lb 0.4 oz)]   SpO2:  [92 %-100 %]   Daily Weight  02/22/24 : 74.4 kg (164 lb 0.4 oz)    Body mass index is 30 kg/m².    Physical Exam  General: AAO*3  Skin: no rashes  Neck: supple  CVS: S1S2  Chest:CTAB  GI: soft, non tender  : no CVAT  Psych: alert,oriented  CNS: no FND      Antibiotics  sodium chloride 0.9 % bolus 1,000 mL  iohexol (OMNIPaque) 350 mg iodine/mL solution 100 mL  lactated Ringer's infusion  atorvastatin (Lipitor) tablet 80 mg  pantoprazole (ProtoNix) EC tablet 40 mg  metoprolol succinate XL (Toprol-XL) 24 hr tablet 50 mg  albuterol 90 mcg/actuation inhaler 2 puff  losartan (Cozaar) tablet 50 mg  icosapent ethyL (Vascepa) capsule 1 g  sertraline (Zoloft) tablet 50 mg  amLODIPine (Norvasc) tablet 10 mg  oxygen (O2) therapy  lactated Ringer's infusion  acetaminophen (Tylenol) tablet 975 mg  naloxone (Narcan) injection 0.2 mg  oxyCODONE (Roxicodone) immediate release tablet 5 mg  HYDROmorphone PF (Dilaudid) injection 0.2 mg  morphine injection 2 mg  ondansetron ODT (Zofran-ODT) disintegrating tablet 4 mg  ondansetron (Zofran) injection 4 mg  dextrose 50 % injection 25 g  glucagon (Glucagen) injection 1 mg  dextrose 10 % in water (D10W) infusion  insulin regular (HumuLIN R,NovoLIN R) injection 0-5 Units  gabapentin (Neurontin) capsule  insulin lispro (HumaLOG) injection 0-10 Units  perflutren lipid microspheres (Definity) injection 0.5-10 mL of dilution  sulfur hexafluoride microsphr (Lumason) injection 24.28 mg  perflutren protein A microsphere (Optison) injection 0.5  mL  lactated Ringer's infusion  perflutren lipid microspheres (Definity) injection 0.5-10 mL of dilution  sulfur hexafluoride microsphr (Lumason) injection 24.28 mg  perflutren protein A microsphere (Optison) injection 0.5 mL  sodium bicarbonate 150 mEq in dextrose 5 % in water (D5W) 1,000 mL infusion  calcium chloride 1 g in dextrose 5 % in water (D5W) 50 mL IV  amiodarone (Pacerone) tablet 400 mg  amiodarone (Pacerone) tablet 200 mg  magnesium sulfate IV 2 g  loratadine (Claritin) tablet  acetaminophen (Tylenol) tablet 650 mg  lactated Ringer's bolus 500 mL  dextrose 5%-0.45 % sodium chloride infusion  lactated Ringer's infusion  loratadine (Claritin) tablet 10 mg  ipratropium-albuteroL (Duo-Neb) 0.5-2.5 mg/3 mL nebulizer solution 3 mL  cefTRIAXone (Rocephin) IVPB 1 g  metroNIDAZOLE (Flagyl) tablet 500 mg  loratadine (Claritin) tablet 10 mg  sodium chloride (Ocean) 0.65 % nasal spray 1 spray  insulin lispro (HumaLOG) injection 12 Units  famotidine PF (Pepcid) injection 20 mg  metoclopramide (Reglan) injection 10 mg  furosemide (Lasix) injection 60 mg  morphine injection 2 mg  nitroglycerin (Nitrostat) SL tablet 0.4 mg  heparin (porcine) injection 4,000 Units  heparin 25,000 Units in dextrose 5% 250 mL (100 Units/mL) infusion (premix)  nitroglycerin (Nitrostat) SL tablet 0.4 mg  piperacillin-tazobactam-dextrose (Zosyn) IV 3.375 g  morphine injection 2 mg  morphine injection 4 mg  vancomycin (Vancocin) in dextrose 5 % water (D5W) 250 mL IV 1,250 mg  furosemide (Lasix) injection 60 mg  vancomycin in dextrose 5 % (Vancocin) IVPB 750 mg  morphine injection 2 mg  vancomycin (Vancocin) placeholder  sodium chloride 0.9% infusion  vancomycin in dextrose 5 % (Vancocin) IVPB 750 mg  ipratropium-albuteroL (Duo-Neb) 0.5-2.5 mg/3 mL nebulizer solution 3 mL  magnesium oxide (Mag-Ox) tablet 400 mg  furosemide (Lasix) injection 40 mg  ipratropium-albuteroL (Duo-Neb) 0.5-2.5 mg/3 mL nebulizer solution 3 mL  furosemide (Lasix)  injection 80 mg  ipratropium-albuteroL (Duo-Neb) 0.5-2.5 mg/3 mL nebulizer solution 3 mL  vancomycin in dextrose 5 % (Vancocin) IVPB 750 mg      Relevant Results  Labs  Results from last 72 hours   Lab Units 02/22/24 0432 02/21/24 0358 02/20/24  1259   WBC AUTO x10*3/uL 11.6* 14.7* 20.5*   HEMOGLOBIN g/dL 10.1* 10.2* 10.4*   HEMATOCRIT % 31.6* 31.7* 31.9*   PLATELETS AUTO x10*3/uL 217 216 226   NEUTROS PCT AUTO %  --   --  91.5   LYMPHS PCT AUTO %  --   --  3.4   MONOS PCT AUTO %  --   --  3.8   EOS PCT AUTO %  --   --  0.5       Results from last 72 hours   Lab Units 02/22/24 0432 02/21/24 0358 02/20/24  0528   SODIUM mmol/L 138 137 136   POTASSIUM mmol/L 3.5 3.3* 4.3   CHLORIDE mmol/L 102 103 104   CO2 mmol/L 25 19* 20*   BUN mg/dL 67* 68* 64*   CREATININE mg/dL 2.67* 2.67* 2.76*   GLUCOSE mg/dL 213* 180* 253*   CALCIUM mg/dL 8.0* 7.5* 7.6*   ANION GAP mmol/L 15 18 16   EGFR mL/min/1.73m*2 24* 24* 23*   PHOSPHORUS mg/dL  --  3.8  --        Results from last 72 hours   Lab Units 02/21/24 0358 02/20/24  0528   ALK PHOS U/L  --  60   BILIRUBIN TOTAL mg/dL  --  1.2   PROTEIN TOTAL g/dL  --  5.8*   ALT U/L  --  109*   AST U/L  --  99*   ALBUMIN g/dL 3.1* 3.2*       Estimated Creatinine Clearance: 21.1 mL/min (A) (by C-G formula based on SCr of 2.67 mg/dL (H)).  CRP   Date Value Ref Range Status   11/18/2021 16.43 (A) mg/dL Final     Comment:     REF VALUE  < 1.00     11/17/2021 12.85 (A) mg/dL Final     Comment:     REF VALUE  < 1.00         Assessment/Plan   SIRS  Left thigh hematoma  CHF  DM  Gout   CAD  Cirrhosis         Suggest:   Improving clinically   Nasal mrsa negative   C/w zosyn   Blood cx 2/17 were negative  Trend wbc and temps      Maria Luz Silva MD
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
## 2024-02-23 ENCOUNTER — PHARMACY VISIT (OUTPATIENT)
Dept: PHARMACY | Facility: CLINIC | Age: 74
End: 2024-02-23
Payer: COMMERCIAL

## 2024-02-23 VITALS
WEIGHT: 154.32 LBS | DIASTOLIC BLOOD PRESSURE: 64 MMHG | RESPIRATION RATE: 18 BRPM | OXYGEN SATURATION: 92 % | HEIGHT: 62 IN | TEMPERATURE: 97 F | HEART RATE: 74 BPM | BODY MASS INDEX: 28.4 KG/M2 | SYSTOLIC BLOOD PRESSURE: 149 MMHG

## 2024-02-23 LAB
ALBUMIN SERPL BCP-MCNC: 3.3 G/DL (ref 3.4–5)
ANION GAP SERPL CALC-SCNC: 15 MMOL/L (ref 10–20)
BUN SERPL-MCNC: 59 MG/DL (ref 6–23)
CALCIUM SERPL-MCNC: 8.6 MG/DL (ref 8.6–10.3)
CHLORIDE SERPL-SCNC: 100 MMOL/L (ref 98–107)
CO2 SERPL-SCNC: 25 MMOL/L (ref 21–32)
CREAT SERPL-MCNC: 2.51 MG/DL (ref 0.5–1.3)
EGFRCR SERPLBLD CKD-EPI 2021: 26 ML/MIN/1.73M*2
ERYTHROCYTE [DISTWIDTH] IN BLOOD BY AUTOMATED COUNT: 16.1 % (ref 11.5–14.5)
GLUCOSE BLD MANUAL STRIP-MCNC: 234 MG/DL (ref 74–99)
GLUCOSE BLD MANUAL STRIP-MCNC: 250 MG/DL (ref 74–99)
GLUCOSE BLD MANUAL STRIP-MCNC: 251 MG/DL (ref 74–99)
GLUCOSE SERPL-MCNC: 230 MG/DL (ref 74–99)
HCT VFR BLD AUTO: 35.1 % (ref 41–52)
HGB BLD-MCNC: 12 G/DL (ref 13.5–17.5)
MAGNESIUM SERPL-MCNC: 1.74 MG/DL (ref 1.6–2.4)
MCH RBC QN AUTO: 30.6 PG (ref 26–34)
MCHC RBC AUTO-ENTMCNC: 34.2 G/DL (ref 32–36)
MCV RBC AUTO: 90 FL (ref 80–100)
NRBC BLD-RTO: 0 /100 WBCS (ref 0–0)
PHOSPHATE SERPL-MCNC: 2.5 MG/DL (ref 2.5–4.9)
PLATELET # BLD AUTO: 266 X10*3/UL (ref 150–450)
POTASSIUM SERPL-SCNC: 3.4 MMOL/L (ref 3.5–5.3)
RBC # BLD AUTO: 3.92 X10*6/UL (ref 4.5–5.9)
SODIUM SERPL-SCNC: 137 MMOL/L (ref 136–145)
WBC # BLD AUTO: 12.6 X10*3/UL (ref 4.4–11.3)

## 2024-02-23 PROCEDURE — 82947 ASSAY GLUCOSE BLOOD QUANT: CPT

## 2024-02-23 PROCEDURE — 2500000002 HC RX 250 W HCPCS SELF ADMINISTERED DRUGS (ALT 637 FOR MEDICARE OP, ALT 636 FOR OP/ED): Performed by: INTERNAL MEDICINE

## 2024-02-23 PROCEDURE — 36415 COLL VENOUS BLD VENIPUNCTURE: CPT | Performed by: INTERNAL MEDICINE

## 2024-02-23 PROCEDURE — 97530 THERAPEUTIC ACTIVITIES: CPT | Mod: GP,CQ

## 2024-02-23 PROCEDURE — 2500000004 HC RX 250 GENERAL PHARMACY W/ HCPCS (ALT 636 FOR OP/ED): Performed by: INTERNAL MEDICINE

## 2024-02-23 PROCEDURE — 2500000001 HC RX 250 WO HCPCS SELF ADMINISTERED DRUGS (ALT 637 FOR MEDICARE OP): Performed by: INTERNAL MEDICINE

## 2024-02-23 PROCEDURE — 97116 GAIT TRAINING THERAPY: CPT | Mod: GP,CQ

## 2024-02-23 PROCEDURE — 85027 COMPLETE CBC AUTOMATED: CPT | Performed by: INTERNAL MEDICINE

## 2024-02-23 PROCEDURE — 99239 HOSP IP/OBS DSCHRG MGMT >30: CPT | Performed by: FAMILY MEDICINE

## 2024-02-23 PROCEDURE — RXMED WILLOW AMBULATORY MEDICATION CHARGE

## 2024-02-23 PROCEDURE — 99232 SBSQ HOSP IP/OBS MODERATE 35: CPT | Performed by: NURSE PRACTITIONER

## 2024-02-23 PROCEDURE — 83735 ASSAY OF MAGNESIUM: CPT | Performed by: FAMILY MEDICINE

## 2024-02-23 PROCEDURE — 2500000002 HC RX 250 W HCPCS SELF ADMINISTERED DRUGS (ALT 637 FOR MEDICARE OP, ALT 636 FOR OP/ED): Performed by: NURSE PRACTITIONER

## 2024-02-23 PROCEDURE — 2500000001 HC RX 250 WO HCPCS SELF ADMINISTERED DRUGS (ALT 637 FOR MEDICARE OP): Performed by: NURSE PRACTITIONER

## 2024-02-23 PROCEDURE — 99232 SBSQ HOSP IP/OBS MODERATE 35: CPT | Performed by: SURGERY

## 2024-02-23 PROCEDURE — 2500000001 HC RX 250 WO HCPCS SELF ADMINISTERED DRUGS (ALT 637 FOR MEDICARE OP): Performed by: FAMILY MEDICINE

## 2024-02-23 PROCEDURE — 80069 RENAL FUNCTION PANEL: CPT | Performed by: FAMILY MEDICINE

## 2024-02-23 RX ORDER — CLOPIDOGREL BISULFATE 75 MG/1
75 TABLET ORAL DAILY
Status: DISCONTINUED | OUTPATIENT
Start: 2024-02-23 | End: 2024-02-23 | Stop reason: HOSPADM

## 2024-02-23 RX ORDER — ALBUTEROL SULFATE 90 UG/1
2 AEROSOL, METERED RESPIRATORY (INHALATION) EVERY 6 HOURS PRN
Qty: 18 G | Refills: 11
Start: 2024-02-23 | End: 2024-04-11 | Stop reason: SDUPTHER

## 2024-02-23 RX ORDER — AMIODARONE HYDROCHLORIDE 200 MG/1
200 TABLET ORAL DAILY
Qty: 48 TABLET | Refills: 0 | Status: SHIPPED | OUTPATIENT
Start: 2024-02-28 | End: 2024-04-15 | Stop reason: SDUPTHER

## 2024-02-23 RX ORDER — METOPROLOL SUCCINATE 50 MG/1
75 TABLET, EXTENDED RELEASE ORAL DAILY
Qty: 45 TABLET | Refills: 0 | Status: ON HOLD | OUTPATIENT
Start: 2024-02-23 | End: 2024-04-15

## 2024-02-23 RX ORDER — FUROSEMIDE 40 MG/1
40 TABLET ORAL DAILY
Qty: 30 TABLET | Refills: 0 | Status: SHIPPED | OUTPATIENT
Start: 2024-02-23 | End: 2024-03-06 | Stop reason: DRUGHIGH

## 2024-02-23 RX ORDER — CLOPIDOGREL BISULFATE 75 MG/1
75 TABLET ORAL DAILY
Qty: 30 TABLET | Refills: 0 | Status: SHIPPED | OUTPATIENT
Start: 2024-02-23 | End: 2024-03-24

## 2024-02-23 RX ORDER — NAPROXEN SODIUM 220 MG/1
81 TABLET, FILM COATED ORAL DAILY
Status: DISCONTINUED | OUTPATIENT
Start: 2024-02-23 | End: 2024-02-23 | Stop reason: HOSPADM

## 2024-02-23 RX ORDER — METOPROLOL SUCCINATE 25 MG/1
25 TABLET, EXTENDED RELEASE ORAL ONCE
Status: COMPLETED | OUTPATIENT
Start: 2024-02-23 | End: 2024-02-23

## 2024-02-23 RX ORDER — POTASSIUM CHLORIDE 20 MEQ/1
20 TABLET, EXTENDED RELEASE ORAL ONCE
Status: COMPLETED | OUTPATIENT
Start: 2024-02-23 | End: 2024-02-23

## 2024-02-23 RX ORDER — ATORVASTATIN CALCIUM 40 MG/1
80 TABLET, FILM COATED ORAL NIGHTLY
Status: DISCONTINUED | OUTPATIENT
Start: 2024-02-23 | End: 2024-02-23 | Stop reason: HOSPADM

## 2024-02-23 RX ORDER — ACETAMINOPHEN 325 MG/1
650 TABLET ORAL EVERY 6 HOURS PRN
Start: 2024-02-23

## 2024-02-23 RX ORDER — AMIODARONE HYDROCHLORIDE 400 MG/1
400 TABLET ORAL 2 TIMES DAILY
Qty: 9 TABLET | Refills: 0 | Status: SHIPPED | OUTPATIENT
Start: 2024-02-23 | End: 2024-03-06 | Stop reason: DRUGHIGH

## 2024-02-23 RX ADMIN — ICOSAPENT ETHYL 1 G: 1 CAPSULE ORAL at 08:42

## 2024-02-23 RX ADMIN — ACETAMINOPHEN 650 MG: 325 TABLET ORAL at 06:27

## 2024-02-23 RX ADMIN — PIPERACILLIN SODIUM AND TAZOBACTAM SODIUM 3.38 G: 3; .375 INJECTION, SOLUTION INTRAVENOUS at 03:31

## 2024-02-23 RX ADMIN — ACETAMINOPHEN 650 MG: 325 TABLET ORAL at 00:03

## 2024-02-23 RX ADMIN — LORATADINE 10 MG: 10 TABLET ORAL at 08:39

## 2024-02-23 RX ADMIN — AMIODARONE HYDROCHLORIDE 400 MG: 400 TABLET ORAL at 08:39

## 2024-02-23 RX ADMIN — METOPROLOL SUCCINATE 50 MG: 50 TABLET, EXTENDED RELEASE ORAL at 08:39

## 2024-02-23 RX ADMIN — METOPROLOL SUCCINATE 25 MG: 25 TABLET, EXTENDED RELEASE ORAL at 17:45

## 2024-02-23 RX ADMIN — PANTOPRAZOLE SODIUM 40 MG: 40 TABLET, DELAYED RELEASE ORAL at 08:42

## 2024-02-23 RX ADMIN — CLOPIDOGREL 75 MG: 75 TABLET ORAL at 16:03

## 2024-02-23 RX ADMIN — ASPIRIN 81 MG CHEWABLE TABLET 81 MG: 81 TABLET CHEWABLE at 16:03

## 2024-02-23 RX ADMIN — SERTRALINE HYDROCHLORIDE 50 MG: 50 TABLET ORAL at 08:42

## 2024-02-23 RX ADMIN — PIPERACILLIN SODIUM AND TAZOBACTAM SODIUM 3.38 G: 3; .375 INJECTION, SOLUTION INTRAVENOUS at 08:34

## 2024-02-23 RX ADMIN — ACETAMINOPHEN 650 MG: 325 TABLET ORAL at 14:51

## 2024-02-23 RX ADMIN — POTASSIUM CHLORIDE 20 MEQ: 1500 TABLET, EXTENDED RELEASE ORAL at 09:46

## 2024-02-23 ASSESSMENT — PAIN SCALES - GENERAL
PAINLEVEL_OUTOF10: 0 - NO PAIN

## 2024-02-23 ASSESSMENT — COGNITIVE AND FUNCTIONAL STATUS - GENERAL
TURNING FROM BACK TO SIDE WHILE IN FLAT BAD: A LITTLE
WALKING IN HOSPITAL ROOM: A LOT
DRESSING REGULAR LOWER BODY CLOTHING: A LITTLE
MOVING TO AND FROM BED TO CHAIR: A LITTLE
CLIMB 3 TO 5 STEPS WITH RAILING: TOTAL
PERSONAL GROOMING: A LITTLE
MOVING TO AND FROM BED TO CHAIR: A LITTLE
DAILY ACTIVITIY SCORE: 19
CLIMB 3 TO 5 STEPS WITH RAILING: TOTAL
TURNING FROM BACK TO SIDE WHILE IN FLAT BAD: A LITTLE
MOBILITY SCORE: 15
WALKING IN HOSPITAL ROOM: A LOT
MOVING FROM LYING ON BACK TO SITTING ON SIDE OF FLAT BED WITH BEDRAILS: A LITTLE
TOILETING: A LITTLE
MOVING FROM LYING ON BACK TO SITTING ON SIDE OF FLAT BED WITH BEDRAILS: A LITTLE
MOBILITY SCORE: 15
DRESSING REGULAR UPPER BODY CLOTHING: A LITTLE
STANDING UP FROM CHAIR USING ARMS: A LITTLE
STANDING UP FROM CHAIR USING ARMS: A LITTLE
HELP NEEDED FOR BATHING: A LITTLE

## 2024-02-23 ASSESSMENT — PAIN - FUNCTIONAL ASSESSMENT
PAIN_FUNCTIONAL_ASSESSMENT: 0-10

## 2024-02-23 NOTE — PROGRESS NOTES
"      Nephrology Progress Note      Nephrology following for TAYLOR on CKD stage IIIb.    Events over night:   -Patient even more agitated today  -Wants to go home    /59 (BP Location: Left arm, Patient Position: Lying)   Pulse 78   Temp 36.3 °C (97.4 °F) (Temporal)   Resp 18   Ht 1.575 m (5' 2\")   Wt 70 kg (154 lb 5.2 oz)   SpO2 91%   BMI 28.23 kg/m²     Input / Output:  24 HR: No intake or output data in the 24 hours ending 02/23/24 1300      Physical Exam   Alert and oriented x 3 NAD  Neck: no JVD  CV: RRR  Lungs: CTA anteriorly  Abd: soft, NT, ND   Ext: + edema LLE, bruising at knee    Scheduled medications  acetaminophen, 650 mg, oral, q6h DIVINE  amiodarone, 400 mg, oral, BID   Followed by  [START ON 2/28/2024] amiodarone, 200 mg, oral, Daily  furosemide, 80 mg, oral, BID with meals  icosapent ethyL, 1 g, oral, BID with meals  insulin lispro, 0-10 Units, subcutaneous, Before meals & nightly  loratadine, 10 mg, oral, Daily  metoprolol succinate XL, 50 mg, oral, Daily  pantoprazole, 40 mg, oral, Daily  piperacillin-tazobactam, 3.375 g, intravenous, q6h  sertraline, 50 mg, oral, Daily      Continuous medications     PRN medications  PRN medications: dextrose 10 % in water (D10W), dextrose, glucagon, HYDROmorphone, ipratropium-albuteroL, morphine, naloxone, nitroglycerin, ondansetron ODT **OR** ondansetron, oxyCODONE, oxygen, sodium chloride   Results from last 7 days   Lab Units 02/23/24  0808 02/21/24  0358 02/20/24  0528   SODIUM mmol/L 137   < > 136   POTASSIUM mmol/L 3.4*   < > 4.3   CHLORIDE mmol/L 100   < > 104   CO2 mmol/L 25   < > 20*   BUN mg/dL 59*   < > 64*   CREATININE mg/dL 2.51*   < > 2.76*   CALCIUM mg/dL 8.6   < > 7.6*   PROTEIN TOTAL g/dL  --   --  5.8*   BILIRUBIN TOTAL mg/dL  --   --  1.2   ALK PHOS U/L  --   --  60   ALT U/L  --   --  109*   AST U/L  --   --  99*   GLUCOSE mg/dL 230*   < > 253*    < > = values in this interval not displayed.        Results from last 7 days   Lab Units " 02/23/24  0808 02/22/24  0432 02/21/24  0358   SODIUM mmol/L 137 138 137   POTASSIUM mmol/L 3.4* 3.5 3.3*   CHLORIDE mmol/L 100 102 103   CO2 mmol/L 25 25 19*   BUN mg/dL 59* 67* 68*   CREATININE mg/dL 2.51* 2.67* 2.67*   GLUCOSE mg/dL 230* 213* 180*   CALCIUM mg/dL 8.6 8.0* 7.5*         Results from last 7 days   Lab Units 02/23/24  0808 02/22/24  0432 02/21/24  0358   MAGNESIUM mg/dL 1.74 1.67 1.78        Results from last 7 days   Lab Units 02/23/24  0808 02/22/24  0432 02/21/24  0358   WBC AUTO x10*3/uL 12.6* 11.6* 14.7*   HEMOGLOBIN g/dL 12.0* 10.1* 10.2*   HEMATOCRIT % 35.1* 31.6* 31.7*   PLATELETS AUTO x10*3/uL 266 217 216          Assessment & Plan:   Patient is 73 y.o. male who is admitted to hospital for left thigh hematoma and anemia. Nephrology consulted in view of TAYLOR on CKD.      TAYLOR on CKD stage 3b-TAYLOR secondary to volume mediated changes with anemia and some mild component from trauma/contrast.     -Imaging study reviewed, partially atrophic right kidney with contrast retention   -Currently nonoliguric   -Serum creatinine improving slowly 2.51 today     CKD stage 3b secondary to diabetic nephropathy- baseline Cr 1.7-1.8      Anemia secondary to hematoma-   S/p PRBCs 2/15    -Hemoglobin at goal now  [facility] cirrhosis    -Albumin 3.4, INR was at 1.3 prior    Left thigh hematoma- S/P trauma    HFrEF/dysrhythmia/NSTEMI   -currently on oral amiodarone  -Volume overloaded   -per cardiology  -Hypokalemia - secondary to diuresis - being replaced IV         Recommendations:   -Continue to hold losartan and farxiga   -Continue p.o. Lasix 80 mg twice daily  -bmp in AM    Please message me through [employer] chat with any questions or concerns.     Diallo Madrid PA-C  2/23/2024  1:00 PM     [facility]    [address], [address]   [place] [address]  Office: [phone]
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
## 2024-02-23 NOTE — DOCUMENTATION CLARIFICATION NOTE
"    PATIENT:               CHIDI VARGAS  ACCT #:                  [id number]  MRN:                       [id number]  :                       1950  ADMIT DATE:       2024 9:11 PM  DISCH DATE:  RESPONDING PROVIDER #:        [id number]          PROVIDER RESPONSE TEXT:    Acute kidney injury without acute tubular necrosis    CDI QUERY TEXT:    [facility]_ATN        Instruction:    Based on your assessment of the patient and the clinical information, please provide the requested documentation by clicking on the appropriate radio button and enter any additional information if prompted.    Question: Please further clarify the diagnosis of acute kidney injury as    When answering this query, please exercise your independent professional judgment. The fact that a question is being asked, does not imply that any particular answer is desired or expected.    The patient's clinical indicators include:  Clinical Information: 73 yr. old male admitted to hospital for left thigh hematoma and acute blood loss anemia. TAYLOR on CKD.    Clinical Indicators:  24 Nephrology: \"TAYLOR on CKD stage 3b-TAYLOR secondary to volume mediated changes with anemia and some mild component from trauma/contrast.-improving creatinine peaked at 2.3  Cr bump 2.0 to 2.3, oliguric overnight  Suspect has developed acute tubular injury from hypotension on  evening.  -this could worsen before we see improvement  CKD stage 3b secondary to diabetic nephropathy- baseline Cr 1.7-1.8 \"  Per vital signs flow sheet: 24 1440: BP 94/55  24 1732: Bp 89/54  24 1742 BP 89/54  24 2101 BP 89/49  2/15/24 0000 BP 92/47    Treatment: Hold losartan and farxiga, Avoid IV contrast and NSAIDS, Monitor renal function and electrolytes, Strict UOP    Risk Factors: Hypotension, CKD  Options provided:  -- Acute kidney injury with acute tubular necrosis  -- Acute kidney injury without acute tubular necrosis  -- Other - I will add my own diagnosis  -- Refer to " Clinical Documentation Reviewer    Query created by: Franny Meza on 2/22/2024 7:53 AM      Electronically signed by:  LUO FREDERICK MD 2/23/2024 8:11 AM

## 2024-02-23 NOTE — PROGRESS NOTES
Ritesh Garza is a 73 y.o. male on day 9 of admission presenting with Hematoma of left thigh, initial encounter.    Subjective   Interval History: No new complaints.     Review of Systems  As above    Objective   Range of Vitals (last 24 hours)  Heart Rate:  [56-78]   Temp:  [35.9 °C (96.6 °F)-36.6 °C (97.9 °F)]   Resp:  [12-20]   BP: (119-150)/(59-73)   Weight:  [70 kg (154 lb 5.2 oz)]   SpO2:  [91 %-94 %]   Daily Weight  02/23/24 : 70 kg (154 lb 5.2 oz)    Body mass index is 28.23 kg/m².    Physical Exam  General: AAO*3  Skin: no rashes  Neck: supple  CVS: S1S2  Chest:CTAB  GI: soft, non tender  : no CVAT  Psych: alert,oriented  CNS: no FND      Antibiotics  sodium chloride 0.9 % bolus 1,000 mL  iohexol (OMNIPaque) 350 mg iodine/mL solution 100 mL  lactated Ringer's infusion  atorvastatin (Lipitor) tablet 80 mg  pantoprazole (ProtoNix) EC tablet 40 mg  metoprolol succinate XL (Toprol-XL) 24 hr tablet 50 mg  albuterol 90 mcg/actuation inhaler 2 puff  losartan (Cozaar) tablet 50 mg  icosapent ethyL (Vascepa) capsule 1 g  sertraline (Zoloft) tablet 50 mg  amLODIPine (Norvasc) tablet 10 mg  oxygen (O2) therapy  lactated Ringer's infusion  acetaminophen (Tylenol) tablet 975 mg  naloxone (Narcan) injection 0.2 mg  oxyCODONE (Roxicodone) immediate release tablet 5 mg  HYDROmorphone PF (Dilaudid) injection 0.2 mg  morphine injection 2 mg  ondansetron ODT (Zofran-ODT) disintegrating tablet 4 mg  ondansetron (Zofran) injection 4 mg  dextrose 50 % injection 25 g  glucagon (Glucagen) injection 1 mg  dextrose 10 % in water (D10W) infusion  insulin regular (HumuLIN R,NovoLIN R) injection 0-5 Units  gabapentin (Neurontin) capsule  insulin lispro (HumaLOG) injection 0-10 Units  perflutren lipid microspheres (Definity) injection 0.5-10 mL of dilution  sulfur hexafluoride microsphr (Lumason) injection 24.28 mg  perflutren protein A microsphere (Optison) injection 0.5 mL  lactated Ringer's infusion  perflutren lipid  microspheres (Definity) injection 0.5-10 mL of dilution  sulfur hexafluoride microsphr (Lumason) injection 24.28 mg  perflutren protein A microsphere (Optison) injection 0.5 mL  sodium bicarbonate 150 mEq in dextrose 5 % in water (D5W) 1,000 mL infusion  calcium chloride 1 g in dextrose 5 % in water (D5W) 50 mL IV  amiodarone (Pacerone) tablet 400 mg  amiodarone (Pacerone) tablet 200 mg  magnesium sulfate IV 2 g  loratadine (Claritin) tablet  acetaminophen (Tylenol) tablet 650 mg  lactated Ringer's bolus 500 mL  dextrose 5%-0.45 % sodium chloride infusion  lactated Ringer's infusion  loratadine (Claritin) tablet 10 mg  ipratropium-albuteroL (Duo-Neb) 0.5-2.5 mg/3 mL nebulizer solution 3 mL  cefTRIAXone (Rocephin) IVPB 1 g  metroNIDAZOLE (Flagyl) tablet 500 mg  loratadine (Claritin) tablet 10 mg  sodium chloride (Ocean) 0.65 % nasal spray 1 spray  insulin lispro (HumaLOG) injection 12 Units  famotidine PF (Pepcid) injection 20 mg  metoclopramide (Reglan) injection 10 mg  furosemide (Lasix) injection 60 mg  morphine injection 2 mg  nitroglycerin (Nitrostat) SL tablet 0.4 mg  heparin (porcine) injection 4,000 Units  heparin 25,000 Units in dextrose 5% 250 mL (100 Units/mL) infusion (premix)  nitroglycerin (Nitrostat) SL tablet 0.4 mg  piperacillin-tazobactam-dextrose (Zosyn) IV 3.375 g  morphine injection 2 mg  morphine injection 4 mg  vancomycin (Vancocin) in dextrose 5 % water (D5W) 250 mL IV 1,250 mg  furosemide (Lasix) injection 60 mg  vancomycin in dextrose 5 % (Vancocin) IVPB 750 mg  morphine injection 2 mg  vancomycin (Vancocin) placeholder  sodium chloride 0.9% infusion  vancomycin in dextrose 5 % (Vancocin) IVPB 750 mg  ipratropium-albuteroL (Duo-Neb) 0.5-2.5 mg/3 mL nebulizer solution 3 mL  magnesium oxide (Mag-Ox) tablet 400 mg  furosemide (Lasix) injection 40 mg  ipratropium-albuteroL (Duo-Neb) 0.5-2.5 mg/3 mL nebulizer solution 3 mL  furosemide (Lasix) injection 80 mg  ipratropium-albuteroL (Duo-Neb)  0.5-2.5 mg/3 mL nebulizer solution 3 mL  vancomycin in dextrose 5 % (Vancocin) IVPB 750 mg      Relevant Results  Labs  Results from last 72 hours   Lab Units 02/23/24  0808 02/22/24  0432 02/21/24  0358   WBC AUTO x10*3/uL 12.6* 11.6* 14.7*   HEMOGLOBIN g/dL 12.0* 10.1* 10.2*   HEMATOCRIT % 35.1* 31.6* 31.7*   PLATELETS AUTO x10*3/uL 266 217 216       Results from last 72 hours   Lab Units 02/23/24  0808 02/22/24  0432 02/21/24  0358   SODIUM mmol/L 137 138 137   POTASSIUM mmol/L 3.4* 3.5 3.3*   CHLORIDE mmol/L 100 102 103   CO2 mmol/L 25 25 19*   BUN mg/dL 59* 67* 68*   CREATININE mg/dL 2.51* 2.67* 2.67*   GLUCOSE mg/dL 230* 213* 180*   CALCIUM mg/dL 8.6 8.0* 7.5*   ANION GAP mmol/L 15 15 18   EGFR mL/min/1.73m*2 26* 24* 24*   PHOSPHORUS mg/dL 2.5  --  3.8       Results from last 72 hours   Lab Units 02/23/24  0808 02/21/24  0358   ALBUMIN g/dL 3.3* 3.1*       Estimated Creatinine Clearance: 21.9 mL/min (A) (by C-G formula based on SCr of 2.51 mg/dL (H)).  CRP   Date Value Ref Range Status   11/18/2021 16.43 (A) mg/dL Final     Comment:     REF VALUE  < 1.00     11/17/2021 12.85 (A) mg/dL Final     Comment:     REF VALUE  < 1.00         Assessment/Plan   SIRS  Left thigh hematoma  CHF  DM  Gout   CAD  Cirrhosis         Suggest:   Improving clinically   Nasal mrsa negative   C/w zosyn   Blood cx 2/17 were negative  Trend wbc and temps      Discharge planning: may switch to cefdinir until 2/25/24      Maria Luz Silva MD

## 2024-02-23 NOTE — NURSING NOTE
Patient had 10 bt run of vtach. Dr Liu came to patients room 5 minutes  after patient had vtach. Patient is asymptomatic /64 HR 74. Dr Liu here speaking with patient and wife
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
## 2024-02-23 NOTE — PROGRESS NOTES
Physical Therapy    Physical Therapy Treatment    Patient Name: Ritesh Garza  MRN: [id number]  Today's Date: 2/23/2024  Time Calculation  Start Time: 1000  Stop Time: 1026  Time Calculation (min): 26 min       Assessment/Plan   PT Assessment  End of Session Communication: Bedside nurse  Assessment Comment: pt declined to attempt exercises due to fatigue  End of Session Patient Position: Up in chair, Alarm on     PT Plan  Treatment/Interventions: Bed mobility, Transfer training, Gait training, Stair training, Balance training, Strengthening, Endurance training  PT Plan: Skilled PT  PT Frequency: 4 times per week  PT Discharge Recommendations: Moderate intensity level of continued care  Equipment Recommended upon Discharge: Wheeled walker (TBD)  PT Recommended Transfer Status: Assist x1, Assist x2  PT - OK to Discharge: Yes (WHEN MEDICALLY CLEARED)    General Visit Information:   PT  Visit  PT Received On: 02/23/24  General  Prior to Session Communication: Bedside nurse  Patient Position Received: Bed, 3 rail up, Alarm on    Subjective   Precautions:  Precautions  Medical Precautions: Fall precautions    Objective   Pain:  Pain Assessment  Pain Assessment: 0-10  Pain Score: 0 - No pain  Cognition:  Cognition  Orientation Level: Oriented X4    Therapeutic Activity  Therapeutic Activity Performed: Yes  Therapeutic Activity 1: pt completed a dynamic stand for 2 min with CGA    Bed Mobility  Bed Mobility: Yes  Bed Mobility 1  Bed Mobility 1: Supine to sitting  Level of Assistance 1: Minimum assistance, Minimal verbal cues    Ambulation/Gait Training  Ambulation/Gait Training Performed: Yes  Ambulation/Gait Training 1  Surface 1: Level tile  Device 1: Rolling walker  Assistance 1: Minimum assistance, Moderate verbal cues  Quality of Gait 1: Decreased step length, Forward flexed posture  Comments/Distance (ft) 1: 15 feet x 2; 35 feet; pt required ues for safety awareness with activity  Transfers  Transfer: Yes  Transfer  1  Technique 1: Sit to stand  Transfer Device 1: Walker  Transfer Level of Assistance 1: Minimum assistance, Minimal verbal cues  Transfers 2  Transfer to 2: Toilet  Transfer Device 2:  (grab bars)  Transfer Level of Assistance 2: Minimum assistance    Outcome Measures:  [facility] Basic Mobility  Turning from your back to your side while in a flat bed without using bedrails: A little  Moving from lying on your back to sitting on the side of a flat bed without using bedrails: A little  Moving to and from bed to chair (including a wheelchair): A little  Standing up from a chair using your arms (e.g. wheelchair or bedside chair): A little  To walk in hospital room: A lot  Climbing 3-5 steps with railing: Total  Basic Mobility - Total Score: 15    Education Documentation  Mobility Training, taught by Lynn Ngo PTA at 2/23/2024  1:00 PM.  Learner: Patient  Readiness: Acceptance  Method: Explanation  Response: Needs Reinforcement    Education Comments  No comments found.        OP EDUCATION:       Encounter Problems       Encounter Problems (Active)       Mobility       STG - Patient will ambulate (Progressing)       Start:  02/15/24    Expected End:  02/24/24       FWW/CANE SBA USING PROPER GAIT PATTERN         STG - Patient will ascend and descend four to six stairs (Progressing)       Start:  02/15/24    Expected End:  02/24/24       RAIL &/OR CANE, MIN A            Pain - Adult          Transfers       STG - Patient to transfer to and from sit to supine (Progressing)       Start:  02/15/24    Expected End:  02/24/24       INDEP RAILS PRN HOB FLAT         STG - Patient will transfer sit to and from stand (Progressing)       Start:  02/15/24    Expected End:  02/24/24       FW/CANE USING PROPER TECHNQUE

## 2024-02-26 ENCOUNTER — TELEPHONE (OUTPATIENT)
Dept: CARDIOLOGY | Facility: CLINIC | Age: 74
End: 2024-02-26
Payer: MEDICARE

## 2024-02-26 ENCOUNTER — TELEPHONE (OUTPATIENT)
Dept: SCHEDULING | Age: 74
End: 2024-02-26

## 2024-02-26 DIAGNOSIS — I21.4 NSTEMI (NON-ST ELEVATED MYOCARDIAL INFARCTION) (MULTI): Primary | ICD-10-CM

## 2024-02-26 NOTE — TELEPHONE ENCOUNTER
2/26/24  1133    This is a plan of care note for a 74 y/o male patient with a h/o CAD, ventricular tachycardia, HTN, cardiomyopathy who was referred for a heart catheterization due to Probable NSTEMI following several instances VT, with MS change, and cardiomyopathy.    Patient has NKA  Patient has an elevated serum creatinine  Patient has recent labs and EKG but will require additional labs to be done on 3/4/24; ordered.  Patient does not take metformin  Patient does not take OAC    Called to give pre procedure instructions for right and left heart cath to wife of patient to include:  Date of procedure and show time of procedure and procedure time. Also informed for need of 3 hour of hydration due to kidney status.  NPO after midnight x for Aspirin, Plavix and Metoprolol  To have a ride due to sedation  To have labs done on 3/4/24  To shower the night before and wear loose comfortable clothes  To bring an overnight bag in case of overnight stay  To bring an ID card, insurance card and list of medications.    Wife patient verbalized understanding.

## 2024-02-27 ENCOUNTER — PATIENT OUTREACH (OUTPATIENT)
Dept: PRIMARY CARE | Facility: CLINIC | Age: 74
End: 2024-02-27
Payer: MEDICARE

## 2024-02-27 PROCEDURE — RXMED WILLOW AMBULATORY MEDICATION CHARGE

## 2024-02-27 NOTE — PROGRESS NOTES
Discharge Facility:  [facility]  Discharge Diagnosis: Hematoma of left thigh, initial encounter  Admission Date: 2-  Discharge Date: 2-    PCP Appointment Date: OUTREACH TO OFFICE TO SCHEDULE FU   Issues Requiring Follow-Up  Patient will need close followup for likely NSTEMI and will need a cardiac cath  Patient will need followup for large hematoma of L leg.  Patient was started on Lasix and will need followup and adjustment of dose    Specialist Appointment Date:   -CARDIOLOGY -  3-6-2024  -CARDIAC CATH 3-  -GERIATRIC MEDICINE NURSE VISIT 3-  -GERIATRIC BEHAVIORIAL HEALTH 3-    Hospital Encounter and Summary: Linked   UNSUCCESSFUL OUTREACH / 2 ATTEMPTS / 2 MESSAGES

## 2024-02-28 ENCOUNTER — PHARMACY VISIT (OUTPATIENT)
Dept: PHARMACY | Facility: CLINIC | Age: 74
End: 2024-02-28
Payer: COMMERCIAL

## 2024-02-28 NOTE — PROGRESS NOTES
2/28 1545 Received message regarding patient not receiving call from [facility] since discharge. Called [facility] whom states they do not see referral in their system and requested another referral be placed. Referral resent. Requested call from [facility] to confirm that referral was received.    1556 Called patients wife to update her that I reached out to [facility] and a new referral was sent, awaiting confirmation call. Wife expressed frustration regarding with miscommunication. TCC acknowledged wife concerns expressed understanding and frustrations. Notified her that they will be reaching out to arrange first appointment. Answered all questions and verbalized understanding.      2/29 0044 received message from [facility] that they received referral and have been in touch with patients wife.

## 2024-03-04 ENCOUNTER — LAB (OUTPATIENT)
Dept: LAB | Facility: LAB | Age: 74
End: 2024-03-04
Payer: MEDICARE

## 2024-03-04 ENCOUNTER — TELEPHONE (OUTPATIENT)
Dept: CARDIOLOGY | Facility: HOSPITAL | Age: 74
End: 2024-03-04

## 2024-03-04 DIAGNOSIS — I50.82 CONGESTIVE HEART FAILURE WITH RIGHT VENTRICULAR SYSTOLIC DYSFUNCTION (MULTI): ICD-10-CM

## 2024-03-04 DIAGNOSIS — J45.20 MILD INTERMITTENT ASTHMA, UNSPECIFIED WHETHER COMPLICATED (HHS-HCC): Primary | ICD-10-CM

## 2024-03-04 DIAGNOSIS — N17.9 AKI (ACUTE KIDNEY INJURY) (CMS-HCC): ICD-10-CM

## 2024-03-04 DIAGNOSIS — I50.20 CONGESTIVE HEART FAILURE WITH RIGHT VENTRICULAR SYSTOLIC DYSFUNCTION (MULTI): ICD-10-CM

## 2024-03-04 DIAGNOSIS — I21.4 NSTEMI (NON-ST ELEVATED MYOCARDIAL INFARCTION) (MULTI): ICD-10-CM

## 2024-03-04 DIAGNOSIS — E11.40 TYPE 2 DIABETES MELLITUS WITH DIABETIC NEUROPATHY, WITHOUT LONG-TERM CURRENT USE OF INSULIN (MULTI): ICD-10-CM

## 2024-03-04 LAB
ANION GAP SERPL CALC-SCNC: 14 MMOL/L (ref 10–20)
BUN SERPL-MCNC: 39 MG/DL (ref 6–23)
CALCIUM SERPL-MCNC: 9.3 MG/DL (ref 8.6–10.3)
CHLORIDE SERPL-SCNC: 100 MMOL/L (ref 98–107)
CO2 SERPL-SCNC: 28 MMOL/L (ref 21–32)
CREAT SERPL-MCNC: 2.53 MG/DL (ref 0.5–1.3)
EGFRCR SERPLBLD CKD-EPI 2021: 26 ML/MIN/1.73M*2
ERYTHROCYTE [DISTWIDTH] IN BLOOD BY AUTOMATED COUNT: 17 % (ref 11.5–14.5)
GLUCOSE SERPL-MCNC: 199 MG/DL (ref 74–99)
HCT VFR BLD AUTO: 46 % (ref 41–52)
HGB BLD-MCNC: 14.4 G/DL (ref 13.5–17.5)
MCH RBC QN AUTO: 30.7 PG (ref 26–34)
MCHC RBC AUTO-ENTMCNC: 31.3 G/DL (ref 32–36)
MCV RBC AUTO: 98 FL (ref 80–100)
NRBC BLD-RTO: 0 /100 WBCS (ref 0–0)
PLATELET # BLD AUTO: 355 X10*3/UL (ref 150–450)
POTASSIUM SERPL-SCNC: 4.9 MMOL/L (ref 3.5–5.3)
RBC # BLD AUTO: 4.69 X10*6/UL (ref 4.5–5.9)
SODIUM SERPL-SCNC: 137 MMOL/L (ref 136–145)
WBC # BLD AUTO: 8.6 X10*3/UL (ref 4.4–11.3)

## 2024-03-04 PROCEDURE — 85027 COMPLETE CBC AUTOMATED: CPT

## 2024-03-04 PROCEDURE — 36415 COLL VENOUS BLD VENIPUNCTURE: CPT

## 2024-03-04 PROCEDURE — 80048 BASIC METABOLIC PNL TOTAL CA: CPT

## 2024-03-04 NOTE — TELEPHONE ENCOUNTER
I discussed BMP results with patient. His kidney function has not improved since his hospitalization. The patient denies chest pain. He does not have a nephrologist appointment scheduled. We are holding off on heart cath procedure until seen by nephrologist. He is okay with me reaching out to the nephrologist that saw him during his hospitalization at [facility]. [facility] was called and patient information given. Discharge summary and labs faxed over and received fax confirmation. I notified Cath lab  and Dr. Adams's office team and cath lab manager that case was cancelled.

## 2024-03-06 ENCOUNTER — OFFICE VISIT (OUTPATIENT)
Dept: CARDIOLOGY | Facility: CLINIC | Age: 74
End: 2024-03-06
Payer: MEDICARE

## 2024-03-06 VITALS
HEART RATE: 57 BPM | SYSTOLIC BLOOD PRESSURE: 118 MMHG | BODY MASS INDEX: 28.34 KG/M2 | HEIGHT: 61 IN | DIASTOLIC BLOOD PRESSURE: 62 MMHG | OXYGEN SATURATION: 94 % | WEIGHT: 150.1 LBS

## 2024-03-06 DIAGNOSIS — I51.89 LEFT VENTRICULAR SYSTOLIC DYSFUNCTION, NYHA CLASS 1: ICD-10-CM

## 2024-03-06 DIAGNOSIS — I24.89 DEMAND ISCHEMIA (MULTI): ICD-10-CM

## 2024-03-06 DIAGNOSIS — I47.20 VENTRICULAR TACHYCARDIA (MULTI): ICD-10-CM

## 2024-03-06 DIAGNOSIS — I25.10 ASHD (ARTERIOSCLEROTIC HEART DISEASE): Primary | ICD-10-CM

## 2024-03-06 DIAGNOSIS — Z95.1 HISTORY OF CORONARY ARTERY BYPASS GRAFT: ICD-10-CM

## 2024-03-06 DIAGNOSIS — I50.82 CONGESTIVE HEART FAILURE WITH RIGHT VENTRICULAR SYSTOLIC DYSFUNCTION (MULTI): ICD-10-CM

## 2024-03-06 DIAGNOSIS — I50.23 ACUTE ON CHRONIC SYSTOLIC HEART FAILURE (MULTI): ICD-10-CM

## 2024-03-06 DIAGNOSIS — I50.20 CONGESTIVE HEART FAILURE WITH RIGHT VENTRICULAR SYSTOLIC DYSFUNCTION (MULTI): ICD-10-CM

## 2024-03-06 PROCEDURE — 1157F ADVNC CARE PLAN IN RCRD: CPT | Performed by: INTERNAL MEDICINE

## 2024-03-06 PROCEDURE — 1111F DSCHRG MED/CURRENT MED MERGE: CPT | Performed by: INTERNAL MEDICINE

## 2024-03-06 PROCEDURE — 99215 OFFICE O/P EST HI 40 MIN: CPT | Performed by: INTERNAL MEDICINE

## 2024-03-06 PROCEDURE — 1036F TOBACCO NON-USER: CPT | Performed by: INTERNAL MEDICINE

## 2024-03-06 PROCEDURE — 1159F MED LIST DOCD IN RCRD: CPT | Performed by: INTERNAL MEDICINE

## 2024-03-06 PROCEDURE — 1160F RVW MEDS BY RX/DR IN RCRD: CPT | Performed by: INTERNAL MEDICINE

## 2024-03-06 PROCEDURE — 1126F AMNT PAIN NOTED NONE PRSNT: CPT | Performed by: INTERNAL MEDICINE

## 2024-03-06 PROCEDURE — 3074F SYST BP LT 130 MM HG: CPT | Performed by: INTERNAL MEDICINE

## 2024-03-06 PROCEDURE — 3078F DIAST BP <80 MM HG: CPT | Performed by: INTERNAL MEDICINE

## 2024-03-06 PROCEDURE — 3044F HG A1C LEVEL LT 7.0%: CPT | Performed by: INTERNAL MEDICINE

## 2024-03-06 RX ORDER — FUROSEMIDE 40 MG/1
40 TABLET ORAL DAILY
Qty: 30 TABLET | Refills: 0 | Status: SHIPPED | OUTPATIENT
Start: 2024-03-06 | End: 2024-03-06 | Stop reason: DRUGHIGH

## 2024-03-06 RX ORDER — FUROSEMIDE 40 MG/1
40 TABLET ORAL DAILY
Qty: 30 TABLET | Refills: 0 | Status: SHIPPED | OUTPATIENT
Start: 2024-03-06 | End: 2024-04-15 | Stop reason: SDUPTHER

## 2024-03-06 ASSESSMENT — ENCOUNTER SYMPTOMS
DEPRESSION: 0
LOSS OF SENSATION IN FEET: 0
OCCASIONAL FEELINGS OF UNSTEADINESS: 1

## 2024-03-06 ASSESSMENT — PATIENT HEALTH QUESTIONNAIRE - PHQ9
SUM OF ALL RESPONSES TO PHQ9 QUESTIONS 1 AND 2: 0
1. LITTLE INTEREST OR PLEASURE IN DOING THINGS: NOT AT ALL
2. FEELING DOWN, DEPRESSED OR HOPELESS: NOT AT ALL

## 2024-03-06 ASSESSMENT — COLUMBIA-SUICIDE SEVERITY RATING SCALE - C-SSRS
6. HAVE YOU EVER DONE ANYTHING, STARTED TO DO ANYTHING, OR PREPARED TO DO ANYTHING TO END YOUR LIFE?: NO
1. IN THE PAST MONTH, HAVE YOU WISHED YOU WERE DEAD OR WISHED YOU COULD GO TO SLEEP AND NOT WAKE UP?: NO
2. HAVE YOU ACTUALLY HAD ANY THOUGHTS OF KILLING YOURSELF?: NO

## 2024-03-06 ASSESSMENT — PAIN SCALES - GENERAL: PAINLEVEL: 0-NO PAIN

## 2024-03-06 NOTE — PROGRESS NOTES
Primary Care Physician: Ofelia Mccracken DO  Date of Visit: 03/06/2024  4:00 PM EST  [place] [facility] [address]   Last office visit: 11/2/2023     Chief Complaint:     Hospital follow-up.    HPI/Summary  Ritesh Garza is a 73 y.o. male who presents for followup cardiology evaluation.     The patient presents status post remote MI with longstanding multivessel CAD, mild to moderate LV systolic dysfunction and well compensated congestive heart failure.  The problem list includes chronic kidney disease.  In 2021, he was hospitalized with an episode of unresponsiveness, possibly absence seizures.  He was started on Keppra.  An echocardiogram showed ejection fraction of 40%, with mild to moderate MR.  A 14-day monitor showed no atrial fibrillation and no significant pauses.  There was a long hospitalization in November, 2021 with possible TIA, also possible COVID-related delirium.  At that time, the ejection fraction was measured at 30%.  We repeated an echocardiogram and on May 12, 2023, and the ejection fraction had recovered, estimated at 40 to 45% with a mildly dilated left ventricle, and multiple segmental wall motion abnormalities.  The RVSP was normal.  Last year, we discontinued eplerenone because of hyperkalemia and we reduced losartan from 100 mg to 50 mg daily.  He was clinically stable.  He has been unable to afford Entresto, but has been obtaining Farxiga at no cost through the .    The patient was hospitalized from February 13 through February 23, 2024.  He initially presented with left hip pain and swelling after a fall with significant hematoma of the left leg.  In the hospital, there were several episodes of ventricular tachycardia and chest pain.  This was thought to be in part related to metabolic acidosis and acute blood loss anemia.  Biomarkers were elevated.  He was seen by electrophysiology, was considered for ICD if no recovery of LV ejection fraction, which was measured at  30 to 35% in the hospital.  With acute on chronic kidney injury, cardiac catheterization was deferred.    The echocardiogram performed in the hospital showed ejection fraction of 30 to 35% with multiple segmental abnormalities and mild to moderate TR.  The creatinine at discharge was 2.51, and was 2.53 a few days ago.  Baseline had been 1.85.  The hemoglobin before transfusion was as low as 6.9 g/dL.    At discharge, he was loaded with oral amiodarone for 5 days, then started on amiodarone 200 mg daily.  He was also started on daily furosemide, and clopidogrel for ACS.  He was continued on aspirin, atorvastatin and Farxiga.  It appears that amlodipine, losartan and metformin were all held, given the acute on chronic kidney injury.  Cardiac catheterization, which was tentatively scheduled, has been on hold because of the acute kidney injury and he has been referred to nephrology.    He is scheduled for nephrology evaluation tomorrow.  His weight has come down by 14 pounds since the hospitalization.  No longer dyspneic with effort and no peripheral edema.  Blood sugars have been around 120 mg/dL.  Tolerating amiodarone without perceived side effects.  Hemoglobin is back to normal.  Blood pressures satisfactory without amlodipine or losartan.      Specialty Problems          Cardiology Problems    History of coronary artery bypass graft     1992: CABG x5, [facility].         ASHD (arteriosclerotic heart disease)    Benign essential hypertension    Ischemic cardiomyopathy    History of acute inferior wall MI    Left ventricular systolic dysfunction, NYHA class 1    Congestive heart failure with right ventricular systolic dysfunction (CMS/HCC)    CAD (coronary artery disease)    Chest pain    HLD (hyperlipidemia)    Demand ischemia    Ventricular tachycardia (CMS/HCC)    Acute on chronic systolic heart failure (CMS/HCC)    NSTEMI (non-ST elevated myocardial infarction) (CMS/HCC)        Past Medical  History:   Diagnosis Date    Diabetes mellitus (CMS/HCC) Over 20 years ago    Drug-induced gout, unspecified site 10/04/2019    Acute drug-induced gout    Encounter for screening for malignant neoplasm of colon 10/31/2022    Colon cancer screening    Gout, unspecified     Acute gout    Heart disease Over 20 years sgo    Ocular pain, right eye 10/14/2018    Pain of right eye    Periorbital cellulitis 10/14/2018    Periorbital cellulitis    Personal history of colonic polyps 10/11/2017    History of colon polyps    Personal history of other diseases of the circulatory system 05/12/2021    History of angina pectoris    Personal history of other diseases of the circulatory system 05/12/2021    History of angina pectoris    Personal history of other diseases of the respiratory system 10/04/2019    History of acute bronchitis    Personal history of other drug therapy     History of influenza vaccination    Unspecified cirrhosis of liver (CMS/HCC) 11/07/2022    Cirrhosis, nonalcoholic          Past Surgical History:   Procedure Laterality Date    ADENOIDECTOMY  Childhood    CARDIAC CATHETERIZATION  Over 30 years ago    CHOLECYSTECTOMY  Over 30 years ago    CIRCUMCISION, PRIMARY  72,years ago    CORONARY ARTERY BYPASS GRAFT  12/02/2021    CABG    EYE SURGERY  Childhood    MR HEAD ANGIO WO IV CONTRAST  06/02/2021    MR HEAD ANGIO WO IV CONTRAST 6/2/2021 [facility] CLINICAL LEGACY    MR HEAD ANGIO WO IV CONTRAST  11/16/2021    MR HEAD ANGIO WO IV CONTRAST 11/16/2021 [facility] CLINICAL LEGACY    MR NECK ANGIO WO IV CONTRAST  06/02/2021    MR NECK ANGIO WO IV CONTRAST 6/2/2021 [facility] CLINICAL LEGACY    MR NECK ANGIO WO IV CONTRAST  11/16/2021    MR NECK ANGIO WO IV CONTRAST 11/16/2021 [facility] CLINICAL LEGACY            Social History     Tobacco Use    Smoking status: Never    Smokeless tobacco: Never   Substance Use Topics    Alcohol use: Never    Drug use: Never        Physical Activity: Sufficiently Active (11/2/2023)    Exercise Vital Sign      Days of Exercise per Week: 5 days     Minutes of Exercise per Session: 40 min            No Known Allergies        Current Outpatient Medications   Medication Instructions    acetaminophen (TYLENOL) 650 mg, oral, Every 6 hours PRN    albuterol (ProAir HFA) 90 mcg/actuation inhaler 2 puffs, inhalation, Every 6 hours PRN    allopurinol (ZYLOPRIM) 50 mg, oral, Daily    amiodarone (Pacerone) 200 mg tablet Take 2 tablets (400 mg) by mouth 2 times a day for 9 doses. On February, 28th start to take 1 tablet (200 mg) by mouth once daily.    aspirin 81 mg, oral, Daily    atorvastatin (LIPITOR) 80 mg, oral, Daily    b complex vitamins capsule 1 capsule, Daily    clopidogrel (PLAVIX) 75 mg, oral, Daily    coenzyme Q-10 (CoQ-10) 100 mg capsule 1 capsule, oral, Daily    cyanocobalamin (Vitamin B-12) 1,000 mcg tablet 1 tablet, Daily, As directed    dapagliflozin (Farxiga) 10 mg 1 tablet, oral, Daily before breakfast    famotidine (Pepcid) 20 mg tablet 1 tablet, oral, Daily PRN    furosemide (LASIX) 40 mg, oral, Daily, As needed for weight gain greater than 3 pounds    gabapentin (NEURONTIN) 100 mg, oral, Nightly    icosapent ethyL (VASCEPA) 1 g, oral, 2 times daily with meals    loratadine (CLARITIN) 10 mg, oral, Daily    magnesium gluconate (Magonate) 27.5 mg magne- sium (500 mg) tablet 2 tablets, oral, Daily    metoprolol succinate XL (Toprol-XL) 50 mg 24 hr tablet Take 1 and 1/2 tablets (75 mg) by mouth once daily. Do not crush or chew.    multivitamin tablet 1 tablet, oral, Daily    pantoprazole (ProtoNix) 40 mg EC tablet 1 tablet, oral, Daily    sertraline (Zoloft) 50 mg tablet Take one tablet daily in the morning after breakfast.    turmeric-turmeric root extract 450-50 mg capsule Take daily as directed.        ROS     Vital Signs:  Vitals:    03/06/24 1623   BP: 118/62   BP Location: Left arm   Patient Position: Sitting   BP Cuff Size: Adult   Pulse: 57   SpO2: 94%   Weight: 68.1 kg (150 lb 1.6 oz)   Height: 1.549 m  "(5' 1\")     Wt Readings from Last 2 Encounters:   03/06/24 68.1 kg (150 lb 1.6 oz)   02/23/24 70 kg (154 lb 5.2 oz)     Body mass index is 28.36 kg/m².       Physical Exam:    He was pleasant, not in any pain today, not in any respiratory distress.  No audible wheezes or rhonchi, no rales noted.  Heart sounds regular.  No murmurs or gallops appreciated.  No ankle edema.  Large resolving hematoma left thigh.     Last Labs:  CMP:  Recent Labs     03/04/24  0945 02/23/24  0808 02/22/24  0432 02/21/24  0358 02/20/24  0528    137 138 137 136   K 4.9 3.4* 3.5 3.3* 4.3    100 102 103 104   CO2 28 25 25 19* 20*   ANIONGAP 14 15 15 18 16   BUN 39* 59* 67* 68* 64*   CREATININE 2.53* 2.51* 2.67* 2.67* 2.76*   EGFR 26* 26* 24* 24* 23*   GLUCOSE 199* 230* 213* 180* 253*     Recent Labs     02/23/24  0808 02/21/24  0358 02/20/24  0528 02/15/24  1429 02/15/24  0407 02/14/24  0429 02/13/24  2214 01/11/24  1336 10/21/23  1125   ALBUMIN 3.3* 3.1* 3.2* 3.4 3.0*   < > 3.1* 4.3 4.1   ALKPHOS  --   --  60  --  58  --  70 92 91   ALT  --   --  109*  --  17  --  17 24 33   AST  --   --  99*  --  48*  --  15 31 28   BILITOT  --   --  1.2  --  0.6  --  0.3 0.5 0.5    < > = values in this interval not displayed.     CBC:  Recent Labs     03/04/24  0945 02/23/24  0808 02/22/24  0432 02/21/24  0358 02/20/24  1259   WBC 8.6 12.6* 11.6* 14.7* 20.5*   HGB 14.4 12.0* 10.1* 10.2* 10.4*   HCT 46.0 35.1* 31.6* 31.7* 31.9*    266 217 216 226   MCV 98 90 92 93 92     COAG:   Recent Labs     02/20/24  0528 02/13/24  2214 07/31/23  1110 01/27/22  1241 11/15/21  2217   INR 2.1* 1.3* 1.1 1.1 1.2*     HEME/ENDO:  Recent Labs     02/17/24  0535 02/15/24  0407 01/11/24  1336 07/31/23  1110 05/04/23  1215 08/18/22  0940 11/18/21  0530 06/01/21  0525 05/31/21 2021   IRONSAT 8*  --   --   --   --   --   --   --   --    TSH  --  3.26  --   --   --  3.73 1.32  --  2.54   HGBA1C  --   --  6.6* 7.0* 6.8* 6.6*  --    < >  --     < > = values in " this interval not displayed.      CARDIAC:   Recent Labs     02/18/24  1826 02/18/24  1730 02/18/24  0419 02/14/24  2111 02/14/24  1720 02/14/24  1502 02/13/24  2214 05/04/23  1215 09/14/22 2025 12/02/21  0947 11/15/21  2217 05/31/21 2021   TROPHS 4,490* 4,518*  --  635* 258* 377*   < >  --    < >  --   --   --    BNP  --   --  2,116*  --   --   --   --  203*  --  108* 139* 536*    < > = values in this interval not displayed.     Recent Labs     11/13/23  1041 07/31/23  1110 05/04/23  1215 05/05/22  1450   CHOL 102 128 143 144   LDLF  --  57 56 36   HDL 25.8 32.3* 37.1* 35.3*   TRIG 203* 192* 252* 362*       Last Cardiology Tests:    ECG:    Not performed today.    Echo:  Echo Results:  Transthoracic Echo (TTE) Complete With Contrast 02/15/2024    [place] [id number]  Phone [phone] Fax [phone]    TRANSTHORACIC ECHOCARDIOGRAM REPORT      Patient Name:      CHIDI Andrade Physician:    [id number] Gonzalo White DO  Study Date:        2/15/2024             Ordering Provider:    [id number] TIOT NICOLAS  MRN/PID:           [id number]              Fellow:  Accession#:        [id number]          Nurse:  Date of Birth/Age: 1950 / 73 years  Sonographer:          Heena [facility]  Gender:            M                     Additional Staff:  Height:            160.02 cm             Admit Date:           2/13/2024  Weight:            73.03 kg              Admission Status:     Inpatient -  Routine  BSA / BMI:         1.76 m2 / 28.52 kg/m2 Department Location:  [facility] Echo  Lab  Blood Pressure: 105 /60 mmHg    Study Type:    TRANSTHORACIC ECHO (TTE) COMPLETE  Diagnosis/ICD: Chest pain, unspecified-R07.9  Indication:    Chest Pain  CPT Codes:     Echo Complete w Full Doppler-[id number]  Study Detail: The following Echo studies were performed: 2D, M-Mode, Doppler and  color flow. Optison used as a contrast agent for endocardial  border  definition. Total contrast used for this procedure was 3 mL  via IV push.      PHYSICIAN INTERPRETATION:  Left Ventricle: Left ventricular systolic function is normal, with an estimated ejection fraction of 30-35%. There are multiple wall motion abnormalities. The left ventricular cavity size is upper limits of normal. Spectral Doppler shows a restrictive pattern of left ventricular diastolic filling.  LV Wall Scoring:  The basal inferoseptal segment and basal inferior segment are akinetic. The  entire anterior septum, mid and apical inferior septum, basal and mid  inferolateral wall, basal anterolateral segment, basal anterior segment, and mid  inferior segment are hypokinetic. All remaining scored segments are normal.    Left Atrium: The left atrium is normal in size.  Right Ventricle: The right ventricle was not well visualized. There is low normal right ventricular systolic function.  Right Atrium: The right atrium is normal in size.  Aortic Valve: The aortic valve is trileaflet. There is mild aortic valve thickening. There is no evidence of aortic valve regurgitation. The peak instantaneous gradient of the aortic valve is 7.1 mmHg. The mean gradient of the aortic valve is 4.0 mmHg.  Mitral Valve: The mitral valve is normal in structure. There is mild mitral valve regurgitation.  Tricuspid Valve: The tricuspid valve is structurally normal. There is mild to moderate tricuspid regurgitation.  Pulmonic Valve: The pulmonic valve is structurally normal. There is physiologic pulmonic valve regurgitation.  Pericardium: There is no pericardial effusion noted.  Aorta: The aortic root is normal.      CONCLUSIONS:  1. Left ventricular systolic function is normal with a 30-35% estimated ejection fraction.  2. Multiple segmental abnormalities exist. See findings.  3. Spectral Doppler shows a restrictive pattern of left ventricular diastolic filling.  4. There is low normal right ventricular systolic function.  5. Mild to  moderate tricuspid regurgitation.  6. There are multiple wall motion abnormalities.    QUANTITATIVE DATA SUMMARY:  2D MEASUREMENTS:  Normal Ranges:  Ao Root d:     2.90 cm    (2.0-3.7cm)  LAs:           4.60 cm    (2.7-4.0cm)  IVSd:          1.00 cm    (0.6-1.1cm)  LVPWd:         0.90 cm    (0.6-1.1cm)  LVIDd:         5.30 cm    (3.9-5.9cm)  LVIDs:         5.20 cm  LV Mass Index: 106.2 g/m2  LV % FS        1.9 %    LA VOLUME:  Normal Ranges:  LA Vol A4C:        53.0 ml    (22+/-6mL/m2)  LA Vol A2C:        47.8 ml  LA Vol BP:         50.3 ml  LA Vol Index A4C:  30.0ml/m2  LA Vol Index A2C:  27.1 ml/m2  LA Vol Index BP:   28.5 ml/m2  LA Area A4C:       18.0 cm2  LA Area A2C:       17.1 cm2  LA Major Axis A4C: 5.2 cm  LA Major Axis A2C: 5.2 cm  LA Volume Index:   26.8 ml/m2    M-MODE MEASUREMENTS:  Normal Ranges:  Ao Root: 2.90 cm (2.0-3.7cm)    AORTA MEASUREMENTS:  Normal Ranges:  Asc Ao, d: 2.50 cm (2.1-3.4cm)    LV SYSTOLIC FUNCTION BY 2D PLANIMETRY (MOD):  Normal Ranges:  EF-A4C View: 29.3 % (>=55%)  EF-A2C View: 43.2 %  EF-Biplane:  35.2 %    LV DIASTOLIC FUNCTION:  Normal Ranges:  MV Peak E:    1.29 m/s (0.7-1.2 m/s)  MV Peak A:    0.50 m/s (0.42-0.7 m/s)  E/A Ratio:    2.56     (1.0-2.2)  MV e'         0.06 m/s (>8.0)  MV lateral e' 0.06 m/s  MV medial e'  0.06 m/s  E/e' Ratio:   21.50    (<8.0)    MITRAL VALVE:  Normal Ranges:  MV DT: 151 msec (150-240msec)    MITRAL INSUFFICIENCY:  Normal Ranges:  PISA Radius:  0.5 cm  MR VTI:       129.00 cm  MR Vmax:      437.00 cm/s  MR Alias Matt: 38.5 cm/s  MR Volume:    17.85 ml  MR Flow Rt:   60.48 ml/s  MR EROA:      0.14 cm2    AORTIC VALVE:  Normal Ranges:  AoV Vmax:                1.33 m/s (<=1.7m/s)  AoV Peak P.1 mmHg (<20mmHg)  AoV Mean P.0 mmHg (1.7-11.5mmHg)  LVOT Max Matt:            1.04 m/s (<=1.1m/s)  AoV VTI:                 25.10 cm (18-25cm)  LVOT VTI:                20.70 cm  LVOT Diameter:           2.00 cm   (1.8-2.4cm)  AoV Area, VTI:           2.59 cm2 (2.5-5.5cm2)  AoV Area,Vmax:           2.46 cm2 (2.5-4.5cm2)  AoV Dimensionless Index: 0.82      RIGHT VENTRICLE:  TAPSE: 14.1 mm  RV s'  0.09 m/s    TRICUSPID VALVE/RVSP:  Normal Ranges:  Peak TR Velocity: 2.44 m/s  RV Syst Pressure: 26.8 mmHg (< 30mmHg)  IVC Diam:         1.50 cm      [id number] Gonzalo Christopher CONRAD  Electronically signed on 2/15/2024 at 11:57:27 AM      Wall Scoring        ** Final **       Cath:      Stress Test:  Stress Results:  No results found for this or any previous visit from the past 365 days.         Cardiac Imaging:        Assessment/Plan     Complex hospitalization reviewed.  Patient sustained trauma to the left thigh, with blood loss anemia, which resulted in acute kidney injury, demand ischemia, ventricular tachycardia, a reduction in LV ejection fraction, and heart failure.  Catheterization was advised but delayed and ultimately postponed because of acute kidney injury which has not yet resolved.  Fortunately, blood pressures are now in a satisfactory range, blood loss anemia has been corrected, and he has no current symptoms of heart failure.  He is tolerating Farxiga.  Hemoglobin A1c is at goal.  We will likely want to pursue cardiac catheterization but we will anticipate some further improvement in renal function before proceeding with elective heart catheterization.  We will discontinue daily furosemide and utilize furosemide on an as-needed basis.  He will keep a previously scheduled visit in 2 months, but we will be in touch and likely schedule the catheterization after renal function has plateaued.      Orders:  No orders of the defined types were placed in this encounter.     Followup Appts:  Future Appointments   Date Time Provider Department Center   3/13/2024  3:00 PM [facility] RN 1 [id number] [facility]   3/13/2024  3:30 PM Meme Perrin MD [id number] [facility]   4/18/2024 12:20 PM Ofelia cMcracken DO [id number] [facility]   5/2/2024   9:40 AM Jose Hennessy MD [id number] [facility]   5/23/2024  8:20 AM Nate Duran MD [id number] [facility]   11/25/2024  9:50 AM Jeanmarie Ibarra MD [facility]           ____________________________________________________________  Jose Hennessy MD    Senior Attending Physician  [facility]  [facility]    [facility]  [employer]

## 2024-03-06 NOTE — PATIENT INSTRUCTIONS
Stop daily furosemide.  Take furosemide 20 mg on as-needed basis, for weight gain more than 3 pounds in 3 days.    Daily weights, daily blood sugars.    Send us a [employer] message next week with weights and blood pressures.    Keep scheduled visit in early May.    Follow-up with primary care provider.    Ultimately, we may wish to switch from losartan to Entresto and we will likely repeat an echocardiogram to reassess LV function.  Hopefully, we will see some improvement.  We may refer you to electrophysiology for consideration of implantable defibrillator, at a future date.

## 2024-03-08 ENCOUNTER — PATIENT OUTREACH (OUTPATIENT)
Dept: PRIMARY CARE | Facility: CLINIC | Age: 74
End: 2024-03-08
Payer: MEDICARE

## 2024-03-08 NOTE — PROGRESS NOTES
Unable to reach patient for call back after patient's follow up appointment with PCP. No appointment was scheduled.-  Patient did fu with cardiology   LVM with call back number for patient to call if needed.

## 2024-03-11 ENCOUNTER — TELEPHONE (OUTPATIENT)
Dept: BEHAVIORAL HEALTH | Facility: CLINIC | Age: 74
End: 2024-03-11
Payer: MEDICARE

## 2024-03-11 DIAGNOSIS — G31.84 MILD COGNITIVE IMPAIRMENT: ICD-10-CM

## 2024-03-12 ENCOUNTER — DOCUMENTATION (OUTPATIENT)
Dept: BEHAVIORAL HEALTH | Facility: CLINIC | Age: 74
End: 2024-03-12
Payer: MEDICARE

## 2024-03-12 NOTE — PROGRESS NOTES
Wife called asking for pt's driving evaluation referral to be emailed to her. This nurse emailed the referral to the email provided. [email or web address]. Called and left a message for wife informing her of the incoming email and to call the office with any additional questions or concerns.

## 2024-03-13 ENCOUNTER — APPOINTMENT (OUTPATIENT)
Dept: GERIATRIC MEDICINE | Facility: CLINIC | Age: 74
End: 2024-03-13
Payer: MEDICARE

## 2024-03-13 ENCOUNTER — OFFICE VISIT (OUTPATIENT)
Dept: BEHAVIORAL HEALTH | Facility: CLINIC | Age: 74
End: 2024-03-13
Payer: MEDICARE

## 2024-03-13 ENCOUNTER — TELEPHONE (OUTPATIENT)
Dept: SCHEDULING | Age: 74
End: 2024-03-13

## 2024-03-13 VITALS
DIASTOLIC BLOOD PRESSURE: 71 MMHG | BODY MASS INDEX: 28.87 KG/M2 | WEIGHT: 152.8 LBS | SYSTOLIC BLOOD PRESSURE: 143 MMHG | HEART RATE: 57 BPM

## 2024-03-13 DIAGNOSIS — R45.86 MOOD AND AFFECT DISTURBANCE: ICD-10-CM

## 2024-03-13 DIAGNOSIS — G31.84 MILD COGNITIVE IMPAIRMENT: ICD-10-CM

## 2024-03-13 PROCEDURE — 3077F SYST BP >= 140 MM HG: CPT | Performed by: PSYCHIATRY & NEUROLOGY

## 2024-03-13 PROCEDURE — 3044F HG A1C LEVEL LT 7.0%: CPT | Performed by: PSYCHIATRY & NEUROLOGY

## 2024-03-13 PROCEDURE — 1159F MED LIST DOCD IN RCRD: CPT | Performed by: PSYCHIATRY & NEUROLOGY

## 2024-03-13 PROCEDURE — 1157F ADVNC CARE PLAN IN RCRD: CPT | Performed by: PSYCHIATRY & NEUROLOGY

## 2024-03-13 PROCEDURE — 3078F DIAST BP <80 MM HG: CPT | Performed by: PSYCHIATRY & NEUROLOGY

## 2024-03-13 PROCEDURE — 1160F RVW MEDS BY RX/DR IN RCRD: CPT | Performed by: PSYCHIATRY & NEUROLOGY

## 2024-03-13 PROCEDURE — 1036F TOBACCO NON-USER: CPT | Performed by: PSYCHIATRY & NEUROLOGY

## 2024-03-13 PROCEDURE — 99213 OFFICE O/P EST LOW 20 MIN: CPT | Performed by: PSYCHIATRY & NEUROLOGY

## 2024-03-13 PROCEDURE — 99213 OFFICE O/P EST LOW 20 MIN: CPT | Mod: AM | Performed by: PSYCHIATRY & NEUROLOGY

## 2024-03-13 PROCEDURE — 1111F DSCHRG MED/CURRENT MED MERGE: CPT | Performed by: PSYCHIATRY & NEUROLOGY

## 2024-03-13 ASSESSMENT — MONTREAL COGNITIVE ASSESSMENT (MOCA)
9. REPEAT EACH SENTENCE: 2
8. SERIAL SUBTRACTION OF 7S: 3
13. ORIENTATION SUBSCORE: 6
10. [FLUENCY] NAME WORDS STARTING WITH DESIGNATED LETTER: 1
VISUOSPATIAL/EXECUTIVE SUBSCORE: 1
6. READ LIST OF DIGITS [FORWARD/BACKWARD]: 2
WHAT LEVEL OF EDUCATION WAS ATTAINED: 0
4. NAME EACH OF THE THREE ANIMALS SHOWN: 3
11. FOR EACH PAIR OF WORDS, WHAT CATEGORY DO THEY BELONG TO (OUT OF 2): 0
12. MEMORY INDEX SCORE: 1
7. [VIGILENCE] TAP WHEN HEARING DESIGNATED LETTER: 1

## 2024-03-13 ASSESSMENT — ENCOUNTER SYMPTOMS
OCCASIONAL FEELINGS OF UNSTEADINESS: 0
LOSS OF SENSATION IN FEET: 0
DEPRESSION: 0

## 2024-03-13 ASSESSMENT — PATIENT HEALTH QUESTIONNAIRE - PHQ9: 1. LITTLE INTEREST OR PLEASURE IN DOING THINGS: NOT AT ALL

## 2024-03-13 NOTE — PATIENT INSTRUCTIONS
Plan:  - Please get occupational therapy driving evaluation as soon as possible. Please let me know when you have completed the test and where you got tested.   - Neuropsychological testing.   - Continue sertraline 50mg once daily.   - Continue to work with your therapist.   - Please work on getting vision correctly.   - Make sure you use your hearing aids consistently.   - Follow up after neuropsychological testing is completed.   - Contact sooner if needed.      Brain healthy lifestyle:   - Make sure your medical conditions (if any) such as diabetes, high blood pressure, high cholesterol, thyroid disease sleep apnea are optimally controlled.   - Use eyeglasses or hearing aids appropriately if needed.   - Eat a heart healthy diet (like a Mediterranean diet; lots of fruits and vegetables, fish; low fat;)  - Exercise regularly as tolerated.   - Maintain good sleep hygiene; avoid daytime naps; try to get 7 to 8 hours of continuous sleep at night.   - Stay mentally active - puzzles, word searches, books, playing cards.  - Stay socially active and engaged.
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
## 2024-03-19 ENCOUNTER — TELEPHONE (OUTPATIENT)
Dept: SCHEDULING | Age: 74
End: 2024-03-19
Payer: MEDICARE

## 2024-03-19 ENCOUNTER — DOCUMENTATION (OUTPATIENT)
Dept: CARDIOLOGY | Facility: CLINIC | Age: 74
End: 2024-03-19
Payer: MEDICARE

## 2024-03-28 NOTE — TELEPHONE ENCOUNTER
Signed form successfully faxed to [facility].  Scanned into EPIC under media.

## 2024-04-02 ENCOUNTER — TELEPHONE (OUTPATIENT)
Dept: CARDIOLOGY | Facility: CLINIC | Age: 74
End: 2024-04-02
Payer: MEDICARE

## 2024-04-02 DIAGNOSIS — I51.89 LEFT VENTRICULAR SYSTOLIC DYSFUNCTION, NYHA CLASS 1: Primary | ICD-10-CM

## 2024-04-02 DIAGNOSIS — I47.20 VENTRICULAR TACHYCARDIA (MULTI): ICD-10-CM

## 2024-04-02 DIAGNOSIS — I21.4 NSTEMI (NON-ST ELEVATED MYOCARDIAL INFARCTION) (MULTI): ICD-10-CM

## 2024-04-02 NOTE — TELEPHONE ENCOUNTER
4/2/24  [id number]  This is a plan of care note for a 72 y/o male patient with a h/o STEMI, heart failure, VT and HLP who will be having a heart cath due to NSTEMI.    Patient has NKA  Patient has an elevated serum creatinine  Patient needs labs and EKG; ordered and task sent for scheduling EKG to Ryley  Patient does not take metformin  Patient does not take OAC    Called to give pre procedure instructions for [place] cath to patient's wife to include  Date of procedure, show time of procedure and procedure time and that pre-hydration will be done to reduce renal strain from the IV contrast.  NPO after midnight x for aspirin and metoprolol  To have a ride due to sedation  To have labs and EKG done prior to procedure  To shower the night before and wear loose comfortable clothes  To bring an overnight bag in case of overnight stay  To bring an ID card, insurance card and list of medications.      Wife verbalized understanding of instructions.      ----- Message from Ryley Cecil sent at 4/2/2024 10:59 AM EDT -----  Regarding: Heart Cath 4/15  Patient is scheduled for cath on: Monday April 15th   Arrival: 6:30 for 3hr hydrations before cath at 9:30am  Please call pt with instructions. Thank you.

## 2024-04-03 ENCOUNTER — PATIENT OUTREACH (OUTPATIENT)
Dept: PRIMARY CARE | Facility: CLINIC | Age: 74
End: 2024-04-03
Payer: MEDICARE

## 2024-04-03 NOTE — PROGRESS NOTES
Unable to reach patient for one month post discharge follow up call.   LVM with call back number for patient to call if needed. No follow up appointment was scheduled with PCP

## 2024-04-10 ENCOUNTER — CLINICAL SUPPORT (OUTPATIENT)
Dept: CARDIOLOGY | Facility: CLINIC | Age: 74
End: 2024-04-10
Payer: MEDICARE

## 2024-04-10 ENCOUNTER — LAB (OUTPATIENT)
Dept: LAB | Facility: LAB | Age: 74
End: 2024-04-10
Payer: MEDICARE

## 2024-04-10 VITALS
WEIGHT: 152 LBS | HEART RATE: 67 BPM | DIASTOLIC BLOOD PRESSURE: 70 MMHG | HEIGHT: 61 IN | SYSTOLIC BLOOD PRESSURE: 120 MMHG | BODY MASS INDEX: 28.7 KG/M2

## 2024-04-10 DIAGNOSIS — I47.20 VENTRICULAR TACHYCARDIA (MULTI): ICD-10-CM

## 2024-04-10 DIAGNOSIS — I21.4 NSTEMI (NON-ST ELEVATED MYOCARDIAL INFARCTION) (MULTI): ICD-10-CM

## 2024-04-10 DIAGNOSIS — I51.89 LEFT VENTRICULAR SYSTOLIC DYSFUNCTION, NYHA CLASS 1: ICD-10-CM

## 2024-04-10 LAB
ANION GAP SERPL CALC-SCNC: 15 MMOL/L (ref 10–20)
BUN SERPL-MCNC: 42 MG/DL (ref 6–23)
CALCIUM SERPL-MCNC: 9.1 MG/DL (ref 8.6–10.3)
CHLORIDE SERPL-SCNC: 104 MMOL/L (ref 98–107)
CO2 SERPL-SCNC: 25 MMOL/L (ref 21–32)
CREAT SERPL-MCNC: 2.4 MG/DL (ref 0.5–1.3)
EGFRCR SERPLBLD CKD-EPI 2021: 27 ML/MIN/1.73M*2
ERYTHROCYTE [DISTWIDTH] IN BLOOD BY AUTOMATED COUNT: 14.6 % (ref 11.5–14.5)
GLUCOSE SERPL-MCNC: 170 MG/DL (ref 74–99)
HCT VFR BLD AUTO: 44 % (ref 41–52)
HGB BLD-MCNC: 14.7 G/DL (ref 13.5–17.5)
MCH RBC QN AUTO: 31.9 PG (ref 26–34)
MCHC RBC AUTO-ENTMCNC: 33.4 G/DL (ref 32–36)
MCV RBC AUTO: 95 FL (ref 80–100)
NRBC BLD-RTO: 0 /100 WBCS (ref 0–0)
PLATELET # BLD AUTO: 185 X10*3/UL (ref 150–450)
POTASSIUM SERPL-SCNC: 4.5 MMOL/L (ref 3.5–5.3)
RBC # BLD AUTO: 4.61 X10*6/UL (ref 4.5–5.9)
SODIUM SERPL-SCNC: 139 MMOL/L (ref 136–145)
WBC # BLD AUTO: 8.4 X10*3/UL (ref 4.4–11.3)

## 2024-04-10 PROCEDURE — 36415 COLL VENOUS BLD VENIPUNCTURE: CPT

## 2024-04-10 PROCEDURE — 85027 COMPLETE CBC AUTOMATED: CPT

## 2024-04-10 PROCEDURE — 93000 ELECTROCARDIOGRAM COMPLETE: CPT | Performed by: STUDENT IN AN ORGANIZED HEALTH CARE EDUCATION/TRAINING PROGRAM

## 2024-04-10 PROCEDURE — 80048 BASIC METABOLIC PNL TOTAL CA: CPT

## 2024-04-11 ENCOUNTER — APPOINTMENT (OUTPATIENT)
Dept: CARDIOLOGY | Facility: CLINIC | Age: 74
End: 2024-04-11
Payer: MEDICARE

## 2024-04-11 DIAGNOSIS — J45.20 MILD INTERMITTENT ASTHMA, UNSPECIFIED WHETHER COMPLICATED (HHS-HCC): ICD-10-CM

## 2024-04-11 DIAGNOSIS — I50.82 CONGESTIVE HEART FAILURE WITH RIGHT VENTRICULAR SYSTOLIC DYSFUNCTION (MULTI): ICD-10-CM

## 2024-04-11 DIAGNOSIS — I50.20 CONGESTIVE HEART FAILURE WITH RIGHT VENTRICULAR SYSTOLIC DYSFUNCTION (MULTI): ICD-10-CM

## 2024-04-12 RX ORDER — ALBUTEROL SULFATE 90 UG/1
2 AEROSOL, METERED RESPIRATORY (INHALATION) EVERY 6 HOURS PRN
Qty: 18 G | Refills: 11 | Status: SHIPPED | OUTPATIENT
Start: 2024-04-12

## 2024-04-15 ENCOUNTER — HOSPITAL ENCOUNTER (OUTPATIENT)
Facility: HOSPITAL | Age: 74
Setting detail: OUTPATIENT SURGERY
Discharge: HOME | End: 2024-04-15
Attending: STUDENT IN AN ORGANIZED HEALTH CARE EDUCATION/TRAINING PROGRAM | Admitting: STUDENT IN AN ORGANIZED HEALTH CARE EDUCATION/TRAINING PROGRAM
Payer: MEDICARE

## 2024-04-15 VITALS
DIASTOLIC BLOOD PRESSURE: 94 MMHG | TEMPERATURE: 98 F | SYSTOLIC BLOOD PRESSURE: 157 MMHG | RESPIRATION RATE: 14 BRPM | OXYGEN SATURATION: 95 % | HEART RATE: 74 BPM

## 2024-04-15 DIAGNOSIS — I50.23 ACUTE ON CHRONIC SYSTOLIC HEART FAILURE (MULTI): ICD-10-CM

## 2024-04-15 DIAGNOSIS — I50.20 CONGESTIVE HEART FAILURE WITH RIGHT VENTRICULAR SYSTOLIC DYSFUNCTION (MULTI): ICD-10-CM

## 2024-04-15 DIAGNOSIS — I10 ESSENTIAL (PRIMARY) HYPERTENSION: ICD-10-CM

## 2024-04-15 DIAGNOSIS — I25.5 ISCHEMIC CARDIOMYOPATHY: ICD-10-CM

## 2024-04-15 DIAGNOSIS — I50.82 CONGESTIVE HEART FAILURE WITH RIGHT VENTRICULAR SYSTOLIC DYSFUNCTION (MULTI): ICD-10-CM

## 2024-04-15 DIAGNOSIS — I51.89 LEFT VENTRICULAR SYSTOLIC DYSFUNCTION, NYHA CLASS 1: ICD-10-CM

## 2024-04-15 DIAGNOSIS — I77.1 SUBCLAVIAN ARTERIAL STENOSIS (CMS-HCC): ICD-10-CM

## 2024-04-15 DIAGNOSIS — I50.20 CONGESTIVE HEART FAILURE WITH RIGHT VENTRICULAR SYSTOLIC DYSFUNCTION (MULTI): Primary | ICD-10-CM

## 2024-04-15 DIAGNOSIS — K21.9 GASTROESOPHAGEAL REFLUX DISEASE WITHOUT ESOPHAGITIS: ICD-10-CM

## 2024-04-15 DIAGNOSIS — I21.4 NSTEMI (NON-ST ELEVATED MYOCARDIAL INFARCTION) (MULTI): Primary | ICD-10-CM

## 2024-04-15 DIAGNOSIS — I47.20 VENTRICULAR TACHYCARDIA (MULTI): ICD-10-CM

## 2024-04-15 DIAGNOSIS — I21.4 NSTEMI (NON-ST ELEVATED MYOCARDIAL INFARCTION) (MULTI): ICD-10-CM

## 2024-04-15 DIAGNOSIS — I50.82 CONGESTIVE HEART FAILURE WITH RIGHT VENTRICULAR SYSTOLIC DYSFUNCTION (MULTI): Primary | ICD-10-CM

## 2024-04-15 LAB
BASE EXCESS BLDA CALC-SCNC: -1.8 MMOL/L (ref -2–3)
BASE EXCESS BLDMV CALC-SCNC: 0.6 MMOL/L (ref -2–3)
BODY TEMPERATURE: 37 DEGREES CELSIUS
BODY TEMPERATURE: 37 DEGREES CELSIUS
HCO3 BLDA-SCNC: 23.7 MMOL/L (ref 22–26)
HCO3 BLDMV-SCNC: 26.6 MMOL/L (ref 22–26)
OXYHGB MFR BLDA: 93.2 % (ref 94–98)
OXYHGB MFR BLDMV: 67.1 % (ref 45–75)
PCO2 BLDA: 42 MM HG (ref 38–42)
PCO2 BLDMV: 47 MM HG (ref 41–51)
PH BLDA: 7.36 PH (ref 7.38–7.42)
PH BLDMV: 7.36 PH (ref 7.33–7.43)
PO2 BLDA: 70 MM HG (ref 85–95)
PO2 BLDMV: 37 MM HG (ref 35–45)
POC ACTIVATED CLOTTING TIME LOW RANGE: 174 SEC
POC ACTIVATED CLOTTING TIME LOW RANGE: 329 SEC
SAO2 % BLDA: 96 % (ref 94–100)
SAO2 % BLDMV: 69 % (ref 45–75)

## 2024-04-15 PROCEDURE — C1760 CLOSURE DEV, VASC: HCPCS | Performed by: STUDENT IN AN ORGANIZED HEALTH CARE EDUCATION/TRAINING PROGRAM

## 2024-04-15 PROCEDURE — 2500000005 HC RX 250 GENERAL PHARMACY W/O HCPCS: Performed by: STUDENT IN AN ORGANIZED HEALTH CARE EDUCATION/TRAINING PROGRAM

## 2024-04-15 PROCEDURE — 75710 ARTERY X-RAYS ARM/LEG: CPT | Performed by: STUDENT IN AN ORGANIZED HEALTH CARE EDUCATION/TRAINING PROGRAM

## 2024-04-15 PROCEDURE — 51701 INSERT BLADDER CATHETER: CPT

## 2024-04-15 PROCEDURE — 7100000009 HC PHASE TWO TIME - INITIAL BASE CHARGE: Performed by: STUDENT IN AN ORGANIZED HEALTH CARE EDUCATION/TRAINING PROGRAM

## 2024-04-15 PROCEDURE — 2720000007 HC OR 272 NO HCPCS: Performed by: STUDENT IN AN ORGANIZED HEALTH CARE EDUCATION/TRAINING PROGRAM

## 2024-04-15 PROCEDURE — 2500000004 HC RX 250 GENERAL PHARMACY W/ HCPCS (ALT 636 FOR OP/ED): Performed by: NURSE PRACTITIONER

## 2024-04-15 PROCEDURE — 99153 MOD SED SAME PHYS/QHP EA: CPT | Performed by: STUDENT IN AN ORGANIZED HEALTH CARE EDUCATION/TRAINING PROGRAM

## 2024-04-15 PROCEDURE — C1894 INTRO/SHEATH, NON-LASER: HCPCS | Performed by: STUDENT IN AN ORGANIZED HEALTH CARE EDUCATION/TRAINING PROGRAM

## 2024-04-15 PROCEDURE — 75710 ARTERY X-RAYS ARM/LEG: CPT | Mod: 59 | Performed by: STUDENT IN AN ORGANIZED HEALTH CARE EDUCATION/TRAINING PROGRAM

## 2024-04-15 PROCEDURE — 85347 COAGULATION TIME ACTIVATED: CPT

## 2024-04-15 PROCEDURE — 2780000003 HC OR 278 NO HCPCS: Performed by: STUDENT IN AN ORGANIZED HEALTH CARE EDUCATION/TRAINING PROGRAM

## 2024-04-15 PROCEDURE — 99152 MOD SED SAME PHYS/QHP 5/>YRS: CPT | Performed by: STUDENT IN AN ORGANIZED HEALTH CARE EDUCATION/TRAINING PROGRAM

## 2024-04-15 PROCEDURE — 82810 BLOOD GASES O2 SAT ONLY: CPT | Mod: CCI

## 2024-04-15 PROCEDURE — 99221 1ST HOSP IP/OBS SF/LOW 40: CPT | Performed by: NURSE PRACTITIONER

## 2024-04-15 PROCEDURE — 93461 R&L HRT ART/VENTRICLE ANGIO: CPT | Performed by: STUDENT IN AN ORGANIZED HEALTH CARE EDUCATION/TRAINING PROGRAM

## 2024-04-15 PROCEDURE — C1769 GUIDE WIRE: HCPCS | Performed by: STUDENT IN AN ORGANIZED HEALTH CARE EDUCATION/TRAINING PROGRAM

## 2024-04-15 PROCEDURE — C1751 CATH, INF, PER/CENT/MIDLINE: HCPCS | Performed by: STUDENT IN AN ORGANIZED HEALTH CARE EDUCATION/TRAINING PROGRAM

## 2024-04-15 PROCEDURE — 2500000004 HC RX 250 GENERAL PHARMACY W/ HCPCS (ALT 636 FOR OP/ED): Performed by: STUDENT IN AN ORGANIZED HEALTH CARE EDUCATION/TRAINING PROGRAM

## 2024-04-15 PROCEDURE — 7100000010 HC PHASE TWO TIME - EACH INCREMENTAL 1 MINUTE: Performed by: STUDENT IN AN ORGANIZED HEALTH CARE EDUCATION/TRAINING PROGRAM

## 2024-04-15 RX ORDER — METOPROLOL SUCCINATE 50 MG/1
50 TABLET, EXTENDED RELEASE ORAL DAILY
Qty: 30 TABLET | Refills: 1 | Status: SHIPPED | OUTPATIENT
Start: 2024-04-15 | End: 2024-06-14

## 2024-04-15 RX ORDER — CLOPIDOGREL BISULFATE 75 MG/1
75 TABLET ORAL DAILY
Qty: 90 TABLET | Refills: 3 | Status: SHIPPED | OUTPATIENT
Start: 2024-04-15 | End: 2025-04-15

## 2024-04-15 RX ORDER — FAMOTIDINE 20 MG/1
20 TABLET, FILM COATED ORAL DAILY PRN
Qty: 30 TABLET | Refills: 3 | Status: SHIPPED | OUTPATIENT
Start: 2024-04-15

## 2024-04-15 RX ORDER — FENTANYL CITRATE 50 UG/ML
INJECTION, SOLUTION INTRAMUSCULAR; INTRAVENOUS AS NEEDED
Status: DISCONTINUED | OUTPATIENT
Start: 2024-04-15 | End: 2024-04-15 | Stop reason: HOSPADM

## 2024-04-15 RX ORDER — ACETAMINOPHEN 160 MG/5ML
650 SOLUTION ORAL EVERY 6 HOURS PRN
Status: DISCONTINUED | OUTPATIENT
Start: 2024-04-15 | End: 2024-04-15 | Stop reason: HOSPADM

## 2024-04-15 RX ORDER — SODIUM CHLORIDE 9 MG/ML
1000 INJECTION, SOLUTION INTRAVENOUS ONCE AS NEEDED
Status: DISCONTINUED | OUTPATIENT
Start: 2024-04-15 | End: 2024-04-15 | Stop reason: HOSPADM

## 2024-04-15 RX ORDER — FUROSEMIDE 40 MG/1
40 TABLET ORAL DAILY
Qty: 30 TABLET | Refills: 0 | Status: SHIPPED | OUTPATIENT
Start: 2024-04-15 | End: 2024-05-02 | Stop reason: DRUGHIGH

## 2024-04-15 RX ORDER — HEPARIN SODIUM 1000 [USP'U]/ML
INJECTION, SOLUTION INTRAVENOUS; SUBCUTANEOUS AS NEEDED
Status: DISCONTINUED | OUTPATIENT
Start: 2024-04-15 | End: 2024-04-15 | Stop reason: HOSPADM

## 2024-04-15 RX ORDER — MORPHINE SULFATE 2 MG/ML
2 INJECTION, SOLUTION INTRAMUSCULAR; INTRAVENOUS EVERY 6 HOURS PRN
Status: DISCONTINUED | OUTPATIENT
Start: 2024-04-15 | End: 2024-04-15 | Stop reason: HOSPADM

## 2024-04-15 RX ORDER — ACETAMINOPHEN 325 MG/1
650 TABLET ORAL EVERY 6 HOURS PRN
Status: DISCONTINUED | OUTPATIENT
Start: 2024-04-15 | End: 2024-04-15 | Stop reason: HOSPADM

## 2024-04-15 RX ORDER — SODIUM CHLORIDE 9 MG/ML
75 INJECTION, SOLUTION INTRAVENOUS CONTINUOUS
Status: DISCONTINUED | OUTPATIENT
Start: 2024-04-15 | End: 2024-04-15

## 2024-04-15 RX ORDER — MIDAZOLAM HYDROCHLORIDE 1 MG/ML
INJECTION, SOLUTION INTRAMUSCULAR; INTRAVENOUS AS NEEDED
Status: DISCONTINUED | OUTPATIENT
Start: 2024-04-15 | End: 2024-04-15 | Stop reason: HOSPADM

## 2024-04-15 RX ORDER — LIDOCAINE HYDROCHLORIDE 20 MG/ML
INJECTION, SOLUTION INFILTRATION; PERINEURAL AS NEEDED
Status: DISCONTINUED | OUTPATIENT
Start: 2024-04-15 | End: 2024-04-15 | Stop reason: HOSPADM

## 2024-04-15 RX ORDER — SODIUM CHLORIDE 9 MG/ML
75 INJECTION, SOLUTION INTRAVENOUS CONTINUOUS
Status: ACTIVE | OUTPATIENT
Start: 2024-04-15 | End: 2024-04-15

## 2024-04-15 RX ORDER — ACETAMINOPHEN 650 MG/1
650 SUPPOSITORY RECTAL EVERY 6 HOURS PRN
Status: DISCONTINUED | OUTPATIENT
Start: 2024-04-15 | End: 2024-04-15 | Stop reason: HOSPADM

## 2024-04-15 RX ORDER — AMIODARONE HYDROCHLORIDE 200 MG/1
200 TABLET ORAL DAILY
Qty: 90 TABLET | Refills: 3 | Status: SHIPPED | OUTPATIENT
Start: 2024-04-15 | End: 2025-04-10

## 2024-04-15 RX ADMIN — SODIUM CHLORIDE 75 ML/HR: 9 INJECTION, SOLUTION INTRAVENOUS at 06:55

## 2024-04-15 ASSESSMENT — ENCOUNTER SYMPTOMS
NEUROLOGICAL NEGATIVE: 1
PSYCHIATRIC NEGATIVE: 1
CARDIOVASCULAR NEGATIVE: 1
CONSTITUTIONAL NEGATIVE: 1
SHORTNESS OF BREATH: 1
GASTROINTESTINAL NEGATIVE: 1

## 2024-04-15 ASSESSMENT — PAIN SCALES - GENERAL

## 2024-04-15 ASSESSMENT — PAIN - FUNCTIONAL ASSESSMENT: PAIN_FUNCTIONAL_ASSESSMENT: 0-10

## 2024-04-15 NOTE — NURSING NOTE
Venous sheath pulled at 1405, manual pressure applied x30 minutes. Gauze and tegaderm dressing applied. Right femoral site WNL, no oozing or hematoma, DP pulse 2+.

## 2024-04-15 NOTE — H&P
History Of Present Illness  Ritesh Garza is a 73 y.o. male presenting with significant past medical history for remote MI, longstanding multivessel CAD, mild to moderate LV systolic dysfunction and well compensated CHF. He has chronic kidney disease and is now established with nephrologist. He was pre-hydrated before the procedure. He was hospitalized in 2/13/24 at [facility]. He had a hematoma of the left thigh, he had elevated cardiac enzymes at that time. He was CP free and he was sent home with the plan of L&RHC as outpatient. Patient kidney function was worse on repeat labs and he was referred to nephrology before setting up his elective LHC. Today, he reports his shortness of breath is improved. He denies chest pain.      Past Medical History  Past Medical History:   Diagnosis Date    Diabetes mellitus (Multi) Over 20 years ago    Drug-induced gout, unspecified site 10/04/2019    Acute drug-induced gout    Encounter for screening for malignant neoplasm of colon 10/31/2022    Colon cancer screening    Gout, unspecified     Acute gout    Heart disease Over 20 years sgo    Ocular pain, right eye 10/14/2018    Pain of right eye    Periorbital cellulitis 10/14/2018    Periorbital cellulitis    Personal history of colonic polyps 10/11/2017    History of colon polyps    Personal history of other diseases of the circulatory system 05/12/2021    History of angina pectoris    Personal history of other diseases of the circulatory system 05/12/2021    History of angina pectoris    Personal history of other diseases of the respiratory system 10/04/2019    History of acute bronchitis    Personal history of other drug therapy     History of influenza vaccination    Unspecified cirrhosis of liver (Multi) 11/07/2022    Cirrhosis, nonalcoholic       Surgical History  Past Surgical History:   Procedure Laterality Date    ADENOIDECTOMY  Childhood    CARDIAC CATHETERIZATION  Over 30 years ago    CHOLECYSTECTOMY  Over 30 years  ago    CIRCUMCISION, PRIMARY  72,years ago    CORONARY ARTERY BYPASS GRAFT  12/02/2021    CABG    EYE SURGERY  Childhood    MR HEAD ANGIO WO IV CONTRAST  06/02/2021    MR HEAD ANGIO WO IV CONTRAST 6/2/2021 [facility]ICAL [facility]GAC[facility]    MR HEAD ANGIO WO IV CONTRAST  11/16/2021    MR HEAD ANGIO WO IV CONTRAST 11/16/2021 [facility] CL[facility]ICAL LEGAC[facility]    MR NECK ANGIO WO IV CONTRAST  06/02/2021    MR NECK ANGIO WO IV CONTRAST 6/2/2021 [facility] CL[facility]ICAL LEGAC[facility]    MR NECK ANGIO WO IV CONTRAST  11/16/2021    MR NECK ANGIO WO IV CONTRAST 11/16/2021 [facility] CLINICAL LEGACY        Social History  He reports that he has never smoked. He has never used smokeless tobacco. He reports that he does not drink alcohol and does not use drugs.    Family History  Family History   Problem Relation Name Age of Onset    Diabetes Mother Sofia Garza     Other (mycoardial infarction) Father  46    Fainting Father      Stomach cancer Mother's Sister      Stroke Maternal Grandmother      Colon cancer Maternal Grandmother          Allergies  Patient has no known allergies.    Review of Systems   Constitutional: Negative.    HENT: Negative.     Respiratory:  Positive for shortness of breath.         Shortness of breath improved   Cardiovascular: Negative.    Gastrointestinal: Negative.    Neurological: Negative.    Psychiatric/Behavioral: Negative.          Physical Exam  Constitutional:       Appearance: Normal appearance.   HENT:      Mouth/Throat:      Mouth: Mucous membranes are moist.   Cardiovascular:      Rate and Rhythm: Normal rate and regular rhythm.      Pulses: Normal pulses.      Heart sounds: Normal heart sounds. No murmur heard.     No friction rub. No gallop.   Pulmonary:      Effort: Pulmonary effort is normal.      Breath sounds: Normal breath sounds.   Musculoskeletal:         General: Normal range of motion.   Skin:     General: Skin is warm and dry.      Capillary Refill: Capillary refill takes less than 2 seconds.   Neurological:       General: No focal deficit present.      Mental Status: He is alert and oriented to person, place, and time.   Psychiatric:         Mood and Affect: Mood normal.         Behavior: Behavior normal.         Thought Content: Thought content normal.         Judgment: Judgment normal.          Last Recorded Vitals  Blood pressure 155/79, pulse 70, temperature 36.7 °C (98 °F), temperature source Temporal, resp. rate 17, SpO2 95%.    Relevant Results  Interpreted By:  Nico Campos,   STUDY:  XR CHEST 1 VIEW; ;  2/20/2024 1:50 pm      INDICATION:  Signs/Symptoms:SOB.      COMPARISON:  02/18/2024 radiograph and CT      ACCESSION NUMBER(S):  [id number]      ORDERING CLINICIAN:  ESTELITA MILTON      TECHNIQUE:  Upright portable chest 1:44 p.m.      FINDINGS:  Both lungs have patchy almost nodular areas of consolidation more so  than noted previously.      The cardiac silhouette is borderline enlarged, unchanged.      IMPRESSION:  Both lungs have developed patchy almost nodular areas of  consolidation more so on the left.          MACRO:  None      Signed by: Nico Campos 2/21/2024 8:13 AM  Dictation workstation:   [id number]    Interpreted By:  Rolando Diana,   STUDY:  [facility] US LOWER EXTREMITY VENOUS DUPLEX BILATERAL;  2/18/2024 9:01 pm      INDICATION:  Signs/Symptoms:Concern for DVT and PE.      COMPARISON:  None.      ACCESSION NUMBER(S):  [id number]      ORDERING CLINICIAN:  SHAGGY HOWARD      TECHNIQUE:  Vascular ultrasound of the bilateral lower extremities was performed.  Real-time compression views as well as Gray scale, color Doppler and  spectral Doppler waveform analysis was performed.      FINDINGS:  Evaluation of the visualized portions of the bilateral common femoral  vein, proximal, mid, and distal femoral vein, and popliteal vein were  performed.  Evaluation of the visualized portions of the  posterior  tibial and peroneal veins were also performed.      The evaluated veins demonstrate normal compressibility.  There is  intact venous flow demonstrating normal respiratory variability and  normal augmentation of flow with calf compression. Therefore, there  is no ultrasonographic evidence for deep vein thrombosis within the  evaluated veins.      IMPRESSION:  No sonographic evidence for deep vein thrombosis within the evaluated  veins of the bilateral lower extremity.      MACRO:  None      Signed by: Rolando Diana 2/18/2024 10:27 PM  Dictation workstation:   [id number]     [address] [address]       Phone [phone] Fax [phone]     TRANSTHORACIC ECHOCARDIOGRAM REPORT        Patient Name:      CHIDI VARGAS      Reading Physician:    [id number] Gonzalo White DO  Study Date:        2/15/2024             Ordering Provider:    [id number] TITO NICOLAS  MRN/PID:           [id number]              Fellow:  Accession#:        [id number]          Nurse:  Date of Birth/Age: 1950 / 73 years  Sonographer:          Heena David RDCS  Gender:            M                     Additional Staff:  Height:            160.02 cm             Admit Date:           2/13/2024  Weight:            73.03 kg              Admission Status:     Inpatient -                                                                 Routine  BSA / BMI:         1.76 m2 / 28.52 kg/m2 Department Location:  [facility] Echo                                                                 Lab  Blood Pressure: 105 /60 mmHg     Study Type:    TRANSTHORACIC ECHO (TTE) COMPLETE  Diagnosis/ICD: Chest pain, unspecified-R07.9  Indication:    Chest Pain  CPT Codes:     Echo Complete w Full Doppler-[id number]   Study Detail: The following Echo studies were performed: 2D, M-Mode, Doppler and                color flow. Optison used as a contrast agent for endocardial                border definition. Total  contrast used for this procedure was 3 mL                via IV push.        PHYSICIAN INTERPRETATION:  Left Ventricle: Left ventricular systolic function is normal, with an estimated ejection fraction of 30-35%. There are multiple wall motion abnormalities. The left ventricular cavity size is upper limits of normal. Spectral Doppler shows a restrictive pattern of left ventricular diastolic filling.  LV Wall Scoring:  The basal inferoseptal segment and basal inferior segment are akinetic. The  entire anterior septum, mid and apical inferior septum, basal and mid  inferolateral wall, basal anterolateral segment, basal anterior segment, and mid  inferior segment are hypokinetic. All remaining scored segments are normal.     Left Atrium: The left atrium is normal in size.  Right Ventricle: The right ventricle was not well visualized. There is low normal right ventricular systolic function.  Right Atrium: The right atrium is normal in size.  Aortic Valve: The aortic valve is trileaflet. There is mild aortic valve thickening. There is no evidence of aortic valve regurgitation. The peak instantaneous gradient of the aortic valve is 7.1 mmHg. The mean gradient of the aortic valve is 4.0 mmHg.  Mitral Valve: The mitral valve is normal in structure. There is mild mitral valve regurgitation.  Tricuspid Valve: The tricuspid valve is structurally normal. There is mild to moderate tricuspid regurgitation.  Pulmonic Valve: The pulmonic valve is structurally normal. There is physiologic pulmonic valve regurgitation.  Pericardium: There is no pericardial effusion noted.  Aorta: The aortic root is normal.        CONCLUSIONS:   1. Left ventricular systolic function is normal with a 30-35% estimated ejection fraction.   2. Multiple segmental abnormalities exist. See findings.   3. Spectral Doppler shows a restrictive pattern of left ventricular diastolic filling.   4. There is low normal right ventricular systolic function.   5. Mild  to moderate tricuspid regurgitation.   6. There are multiple wall motion abnormalities.     QUANTITATIVE DATA SUMMARY:  2D MEASUREMENTS:                            Normal Ranges:  Ao Root d:     2.90 cm    (2.0-3.7cm)  LAs:           4.60 cm    (2.7-4.0cm)  IVSd:          1.00 cm    (0.6-1.1cm)  LVPWd:         0.90 cm    (0.6-1.1cm)  LVIDd:         5.30 cm    (3.9-5.9cm)  LVIDs:         5.20 cm  LV Mass Index: 106.2 g/m2  LV % FS        1.9 %     LA VOLUME:                                Normal Ranges:  LA Vol A4C:        53.0 ml    (22+/-6mL/m2)  LA Vol A2C:        47.8 ml  LA Vol BP:         50.3 ml  LA Vol Index A4C:  30.0ml/m2  LA Vol Index A2C:  27.1 ml/m2  LA Vol Index BP:   28.5 ml/m2  LA Area A4C:       18.0 cm2  LA Area A2C:       17.1 cm2  LA Major Axis A4C: 5.2 cm  LA Major Axis A2C: 5.2 cm  LA Volume Index:   26.8 ml/m2     M-MODE MEASUREMENTS:                   Normal Ranges:  Ao Root: 2.90 cm (2.0-3.7cm)     AORTA MEASUREMENTS:                     Normal Ranges:  Asc Ao, d: 2.50 cm (2.1-3.4cm)     LV SYSTOLIC FUNCTION BY 2D PLANIMETRY (MOD):                      Normal Ranges:  EF-A4C View: 29.3 % (>=55%)  EF-A2C View: 43.2 %  EF-Biplane:  35.2 %     LV DIASTOLIC FUNCTION:                         Normal Ranges:  MV Peak E:    1.29 m/s (0.7-1.2 m/s)  MV Peak A:    0.50 m/s (0.42-0.7 m/s)  E/A Ratio:    2.56     (1.0-2.2)  MV e'         0.06 m/s (>8.0)  MV lateral e' 0.06 m/s  MV medial e'  0.06 m/s  E/e' Ratio:   21.50    (<8.0)     MITRAL VALVE:                  Normal Ranges:  MV DT: 151 msec (150-240msec)     MITRAL INSUFFICIENCY:                            Normal Ranges:  PISA Radius:  0.5 cm  MR VTI:       129.00 cm  MR Vmax:      437.00 cm/s  MR Alias Matt: 38.5 cm/s  MR Volume:    17.85 ml  MR Flow Rt:   60.48 ml/s  MR EROA:      0.14 cm2     AORTIC VALVE:                                    Normal Ranges:  AoV Vmax:                1.33 m/s (<=1.7m/s)  AoV Peak P.1 mmHg  (<20mmHg)  AoV Mean P.0 mmHg (1.7-11.5mmHg)  LVOT Max Matt:            1.04 m/s (<=1.1m/s)  AoV VTI:                 25.10 cm (18-25cm)  LVOT VTI:                20.70 cm  LVOT Diameter:           2.00 cm  (1.8-2.4cm)  AoV Area, VTI:           2.59 cm2 (2.5-5.5cm2)  AoV Area,Vmax:           2.46 cm2 (2.5-4.5cm2)  AoV Dimensionless Index: 0.82        RIGHT VENTRICLE:  TAPSE: 14.1 mm  RV s'  0.09 m/s     TRICUSPID VALVE/RVSP:                              Normal Ranges:  Peak TR Velocity: 2.44 m/s  RV Syst Pressure: 26.8 mmHg (< 30mmHg)  IVC Diam:         1.50 cm        [id number] Gonzalo White DO  Electronically signed on 2/15/2024 at 11:57:27 AM             Assessment/Plan   Principal Problem:    NSTEMI (non-ST elevated myocardial infarction) (Multi)  Active Problems:    Congestive heart failure with right ventricular systolic dysfunction (Multi)      L&RHC        I spent 20 minutes in the professional and overall care of this patient.      Angelina Do, APRN-CNP

## 2024-04-15 NOTE — POST-PROCEDURE NOTE
Physician Transition of Care Summary  Invasive Cardiovascular Lab    Procedure Date: 4/15/2024  Attending:    Tanisha Adams - Primary  Resident/Fellow/Other Assistant: Surgeons and Role:  * No surgeons found with a matching role *    Indications:   Pre-op Diagnosis     * NSTEMI (non-ST elevated myocardial infarction) (Multi) [I21.4]     * Congestive heart failure with right ventricular systolic dysfunction (Multi) [I50.82, I50.20]    Post-procedure diagnosis:   Post-op Diagnosis     * NSTEMI (non-ST elevated myocardial infarction) (Multi) [I21.4]     * Congestive heart failure with right ventricular systolic dysfunction (Multi) [I50.82, I50.20]    Procedure(s):   Left And Right Heart Cath, With LV  29999 - MS R & L HRT CATH WINJX HRT ART& L VENTR IMG        Procedure Findings:   Severe native CAD  Patent LIMA-LAD/diagonal; All other graft are occluded   Severe left subclavian artery stenosis with gradient of ~28-30 mmHg   Mildy elevated filling pressure  Preserved cardiac output/index    Description of the Procedure:   As above     Complications:   None     Stents/Implants:       Anticoagulation/Antiplatelet Plan:   ASA, Plavix     Estimated Blood Loss:   20 mL    Anesthesia: Moderate Sedation Anesthesia Staff: No anesthesia staff entered.    Any Specimen(s) Removed:   No specimens collected during this procedure.    Disposition:   Recovery area/home       Electronically signed by: Blaine Adams MD, 4/15/2024 5:15 PM

## 2024-04-15 NOTE — DISCHARGE INSTRUCTIONS
Dr. Adams's office will call you with recommendations in the next few days. If you do not hear back by Friday, 4?19/24 please call.       If you have any questions, please call the Cath Lab Nurse Practitioner Mae Hi at [phone] and leave a message. She will return your call the same day if calling before 3 PM, M-F. If you call on the weekend you can expect a call back on Monday morning. You may also call the Cath Lab at [phone] M-F, 7-3:30 with any questions. Weekends and after hours please call your cardiologist office number to reach a physician on call. The heart group office number is [phone]           CARDIAC CATHETERIZATION DISCHARGE INSTRUCTIONS     FOR SUDDEN AND SEVERE CHEST PAIN, SHORTNESS OF BREATH, EXCESSIVE BLEEDING, SIGNS OF STROKE, OR CHANGES IN MENTAL STATUS YOU SHOULD CALL 911 IMMEDIATELY.     If your provider has prescribed aspirin and/or clopidogrel (Plavix), or prasugrel (Effient), or ticagrelor (Brilinta), DO NOT STOP THESE MEDICATIONS for any reason without talking to your cardiologist first. If any of these were prescribed, you must take them every day without missing a single dose. If you are getting low on these medications, contact your provider immediately for a refill.     FOR NEXT 24 HOURS  - Upon discharge, you should return home and rest for the remainder of the day and evening. You do not have to stay on bed rest but should not be very active.  It is recommended a responsible adult be with you for the first 24 hours after the procedure.    - No driving for 24 hours after procedure. Please arrange for someone to drive you home from the hospital today.     - Do not drive, operate machinery, or use power tools for 24 hours after your procedure.     - Do not make any legal decisions for 24 hours after your procedure.     - Do not drink alcoholic beverages for 24 hours after your procedure.    WOUND CARE   *FOR FEMORAL (LEG) ACCESS*  ·      Avoid heavy lifting (over 10  pounds) for 7 days, squatting or excessive bending for 2 days, and strenuous exercise for 7 days.  ·      No submerged bathing, swimming, or hot tubs for the next 7 days, or until fully healed.  ·      Avoid sexual activity for 3-4 days until any groin discomfort has ceased.     *FOR RADIAL (WRIST) ACCESS*  ·      No lifting more than 5 pounds or excessive use of the wrist for 24 hours - for example, treat your wrist as if it is sprained.  ·      Do not engage in vigorous activities (tennis, golf, bowling, weights) for at least 48 hours after the procedure.  ·      Do not submerge the wrist for 7 days after the procedure.  ·      You should expect mild tingling in your hand and tenderness at the puncture site for up to 3 days.    - The transparent dressing should be removed from the site 24 hours after the procedure.  Wash the site gently with soap and water. Rinse well and pat dry. Keep the area clean and dry. You may apply a Band-Aid to the site. Avoid lotions, ointments, or powders until fully healed.     - You may shower the day after your procedure.      - It is normal to notice a small bruise around the puncture site and/or a small grape sized or smaller lump. Any large bruising or large lump warrants a call to the office.     - If bleeding should occur, lay down and apply pressure to the affected area for 10 minutes.  If the bleeding stops notify your physician.  If there is a large amount of bleeding or spurting of blood CALL 911 immediately.  DO NOT drive yourself to the hospital.    - You may experience some tenderness, bruising or minimal inflammation.  If you have any concerns, you may contact the Cath Lab or if any of these symptoms become excessive, contact your cardiologist or go to the emergency room.     OTHER INSTRUCTIONS  - You may take acetaminophen (Tylenol) as directed for discomfort.  If pain is not relieved with acetaminophen (Tylenol), contact your doctor.    - If you notice or experience  any of the following, you should notify your doctor or seek medical attention  Chest pain or discomfort  Change in mental status or weakness in extremities.  Dizziness, light headedness, or feeling faint.  Change in the site where the procedure was performed, such as bleeding or an increased area of bruising or swelling.  Tingling, numbness, pain, or coolness in the leg/arm beyond the site where the procedure was performed.  Signs of infection (i.e. shaking chills, temperature > 100 degrees Fahrenheit, warmth, redness) in the leg/arm area where the procedure was performed.  Changes in urination   Bloody or black stools  Vomiting blood  Severe nose bleeds  Any excessive bleeding    - If you DO NOT have an appointment with your cardiologist within 2-4 weeks following your procedure, please contact their office.      Important ways to reduce your risk of coronary artery disease by healthy diet, maintaining a healthy weight, exercising regularly and stop using tobacco products.     Mediterranean Diet    About this topic  This is a heart healthy diet. It is based on widely used foods and cooking styles from many countries around the [place]. The main pattern for the diet is more plant foods and monounsaturated fats, or good fats, like olive oil. Protein in this diet comes from seafood, legumes, nuts, seeds, and poultry and eggs in lowered amounts. You will also eat more whole grains, vegetables, and fruits and moderate amounts of alcohol are also included. This diet has less red meats, dairy products, and processed foods.    What will the results be?  Your diet will have less saturated fat, cholesterol, calories, sodium, and added sugars. Your diet will be higher in fiber. This will help to keep your blood sugar steady. This diet lowers the chance of heart disease and other health problems.  What lifestyle changes are needed?  If you do not often eat this way, you will need to change your eating habits. Be sure  "to get regular exercise. It is believed to help the health benefits of this diet.  What changes to diet are needed?  You may need to limit the amount of red meat and processed foods in your diet. Ask your dietitian for help planning meals that are right for you.  What foods are good to eat?  Plenty of fish and other seafood  Fresh, frozen, or canned fruits and vegetables  Nuts and nut butters and dried beans, lentils, or peas  Foods high in fiber like whole grains and whole grain products  Olive oil (good fat), peanut or canola oil, margarine, or spreads that list vegetable oil as the first ingredient and do not contain trans fat or partially hydrogenated oil  Small amounts of poultry and eggs  What foods should be limited or avoided?  Red meats  Sweets, desserts, and processed foods  Butter, oils, and fats that contain trans fats or are hydrogenated or partially hydrogenated  Gravies and sauces  What problems could happen?  Your weight may rise because your diet will be higher in fat from olive oil and nuts.  You may have lower iron levels. Be sure to eat foods rich in iron. Also, eat foods rich in vitamin C. This will help your body take in iron.  You may have lower calcium levels because you are eating less dairy products. Ask your doctor if you need to take a calcium supplement.  Wine is often thought of as part of a Mediterranean diet. It is not needed and you may choose not to include it. Avoid wine if you are prone to alcohol abuse or are pregnant. Also, avoid it if you are at risk for breast cancer, have liver problems, or have other illnesses that make it important for you to not have alcohol.  When do I need to call the doctor?  If you have any concerns about your diet     Health risks of obesity    The Basics  Written by the doctors and editors at [facility]  What does it mean to have obesity? -- Doctors use a calculation called \"body mass index,\" or \"BMI,\" to decide whether a person is underweight, at a " "healthy weight, overweight, or has obesity.  Your BMI will tell you which category you are in based on your weight and height (figure 1):  ?If your BMI is between 25 and 29.9, you are overweight.  ?If your BMI is 30 or greater, you have obesity.  Obesity is a problem, because it increases the risks of many different health problems. It can also make it hard for you to move, breathe, and do other things that people who are at a healthy weight can do easily.  What are the health risks of obesity? -- Having obesity increases a person's risk of developing many health problems. Here are just a few examples:  ?Diabetes  ?High blood pressure  ?High cholesterol  ?Heart disease (including heart attacks)  ?Stroke  ?Sleep apnea (a disorder in which you stop breathing for short periods while asleep)  ?Asthma  ?Cancer  Does having obesity shorten a person's life? -- Yes. Studies show that people with obesity die younger than people who are a healthy weight. They also show that the risk of death goes up the heavier a person is. The degree of increased risk depends on how long the person has had obesity, and on what other medical problems they have.  People with \"central obesity\" might also be at risk of dying younger. Central obesity means carrying extra weight in the belly area, even if the BMI is normal.  Should I see an expert? -- Yes. If you are overweight or have obesity, you can talk to your doctor or nurse. They might have suggestions for healthy ways to lose weight. It can also help to work with a dietitian (food and nutrition expert). A dietitian can help you choose healthy foods and plan meals.  Are there medical treatments that can help me lose weight? -- Yes. There are medicines and surgery to help with weight loss. But those treatments are only for people who have not been able to lose weight through lifestyle changes such as diet and exercise. Also, weight loss treatments do not take the place of diet and exercise. " People who have those treatments must also change how they eat and how active they are.  What can I do to prevent the problems caused by obesity? -- The best thing that you can do is lose weight. But even if weight loss is not possible, you can improve your health and lower your risk if you:  ?Become more active - Many types of physical activity can help, including walking. You can start with a few minutes a day and add more as you get stronger and build up your endurance. Anything that gets your body moving is good for you.  ?Improve your diet - It is healthy to have regular meal times, eat smaller portions, and not skip meals. Limit sweets, and avoid processed foods. Try to eat more vegetables and fruits instead.  ?Quit smoking (if you smoke) - Some people start eating more after they stop smoking, so try to make healthy food choices. Even if it increases your appetite, quitting smoking is still one of the best things that you can do to improve your health.  ?Limit alcohol - For females, drink no more than 1 drink a day. For males, drink no more than 2 drinks a day.  What causes obesity? -- The thing that increases a person's risk the most is having an unhealthy lifestyle. Most people develop obesity because they eat too much, eat unhealthy foods, and move too little. That's especially true of people who watch too much TV. But there are also other things that seem to increase the risk of obesity that many people do not know about. Here are some things that might affect a person's chance of developing obesity:  ?Mother's habits during and after pregnancy - People who eat a lot of calories, have diabetes, or smoke during pregnancy have a higher chance of having babies who have obesity as adults. Also, babies who drink formula might be more likely than  babies to develop obesity later in life.  ?Habits and weight gain during childhood - Children or teens who are overweight or have obesity are more likely to  "become have obesity as an adult.  ?Sleeping too little - People who do not get enough sleep are more likely to develop obesity than people who sleep enough.  ?Taking certain medicines - Long-term use of certain medicines, such as some medicines to treat depression, can cause weight gain. If you are concerned that 1 of your medicines might be making you gain weight, talk to your doctor or nurse. They might be able to switch you to a different medicine instead.  ?Certain hormonal conditions - Some hormonal problems can increase the risk of developing obesity. For example, a condition called \"polycystic ovary syndrome\" can cause weight gain, along with other symptoms like irregular periods.  What if I want to get pregnant? -- If you are overweight or have obesity, it might be harder to get pregnant. For males, obesity can also cause sex problems, like having trouble getting or keeping an erection. This is more likely if you also have high blood pressure or diabetes.  What if my child has obesity? -- In children, obesity has many of the same risks as it does in adults. For example, it can increase the risk of diabetes, high blood pressure, asthma, and sleep apnea. It can also cause added problems related to childhood. For example, obesity can make children grow faster than normal and cause girls to go through puberty earlier than usual.  All topics are updated as new evidence becomes available and our peer review process is complete.  This topic retrieved from [employer] on: Jan 11, 2024.  Topic 96567 Version 17.0  Release: 31.6.4 - C32.10  © 2024 [employer], Inc. and/or its affiliates. All rights reserved.  figure 1: Your body mass index (BMI)        If you use tobacco products please review   Quitting smoking    The Basics  Written by the doctors and editors at [employer]  What are the benefits of quitting smoking? -- Quitting smoking can dramatically improve your health and help you live longer. It lowers your risk of " "heart disease, lung disease, kidney failure, cancer, infection, stomach problems, and diabetes.  Quitting smoking can also lower your chances of getting osteoporosis, a condition that makes your bones weak.  Quitting is not easy for most people, and it might take several tries to completely quit. But help and support are available. Quitting smoking will improve your health no matter how old you are, even if you have smoked for a long time.  What should I do if I want to quit smoking? -- It's a good idea to start by talking with your doctor or nurse. It is possible to quit on your own, without help. But getting help greatly increases your chances of quitting successfully.  When you are ready to quit, you will make a plan to:  ?Set a quit date.  ?Tell your family and friends that you plan to quit.  ?Plan ahead for the challenges you will face, such as cigarette cravings.  ?Remove cigarettes from your home, car, and work.  How can my doctor or nurse help? -- Your doctor or nurse can give you advice on the best way to quit. They can also give you medicines to:  ?Reduce your craving for cigarettes  ?Reduce your \"withdrawal\" symptoms (symptoms that happen when you stop smoking)  Your doctor or nurse can also help you find a counselor to talk to. For most people who are trying to quit smoking, it works best to use both medicines and counseling.  You can also get help from a free phone line (0-308-QUITNOW, or 1-889.668.2873) or go online to www.smokefree.gov.  What are the symptoms of withdrawal? -- When you stop smoking, you might have symptoms such as:  ?Trouble sleeping  ?Feeling irritable, anxious, or restless  ?Getting frustrated or angry  ?Having trouble thinking clearly  These symptoms can be hard to deal with, which is why it can be so hard to quit. But medicines can help.  Some people who stop smoking become temporarily depressed. Some people need treatment for depression, such as counseling or medicines or both. " People with depression might:  ?No longer enjoy or care about doing the things they used to like to do  ?Feel sad, down, hopeless, nervous, or cranky most of the day, almost every day  ?Lose or gain weight  ?Sleep too much or too little  ?Feel tired or like they have no energy  ?Feel guilty or like they are worth nothing  ?Forget things or feel confused  ?Move and speak more slowly than usual  ?Act restless or have trouble staying still  ?Think about death or suicide  If you think you might be depressed, tell your doctor or nurse right away. They can talk to you about your symptoms and recommend treatment if needed.  Get help right away if you are thinking of hurting or killing yourself! -- Sometimes, people with depression think of hurting or killing themselves. If you ever feel like you might hurt yourself, help is available:  ?In the , contact the [facility] Suicide & Crisis Lifeline:  To speak to someone, call or text [facility].  To talk to someone online, go to www.[employer].org/chat.  ?Call your doctor or nurse and tell them that it is an emergency.  ?Call for an ambulance (in the [place] and [place], call 9-1-1).  ?Go to the emergency department at your local hospital.  If you think your partner might have depression, or if you are worried that they might hurt themselves, get them help right away.  How does counseling work? -- A counselor can help you figure out:  ?What triggers you to want to smoke, and how to handle these situations  ?How to resist cravings  ?What you can do differently if you have tried to quit before  You can meet with a counselor in 1-on-1 sessions or as part of a group. There are other ways to get counseling, too, such as over the phone, through text messaging, or online.  How do medicines help you stop smoking? -- Different medicines work in different ways:  ?Nicotine replacement therapy - Nicotine is the main drug in cigarettes, and the reason they are addictive. These medicines reduce your body's  "craving for nicotine. They also help with withdrawal symptoms.  There are different forms of nicotine replacement, including skin patches, lozenges, gum, nasal sprays, and inhalers. Most can be bought without a prescription. Also, health insurance might cover some or all of the cost.  It often helps to use 2 forms of nicotine replacement. For example, you might wear a patch all the time, plus use gum or lozenges when you get a craving to smoke.  ?Varenicline - Varenicline (brand names: Chantix, Champix) is a prescription medicine that reduces withdrawal symptoms and cigarette cravings. Varenicline can increase the effects of alcohol in some people. It's a good idea to limit drinking while you're taking it, at least until you know how it affects you.  Even if you are not yet ready to commit to a quit date, varenicline can help reduce cravings. This can make it easier to quit when you are ready.  ?Bupropion - Bupropion (sample brand names: Wellbutrin, Zyban) is a prescription medicine that reduces your desire to smoke. It is also available in a generic version, which is cheaper than the brand name medicines. Doctors do not usually prescribe bupropion for people with seizures or who have had seizures in the past.  It might also be helpful to combine nicotine replacement with bupropion or varenicline. In some cases, a person might even take both bupropion and varenicline. Your doctor or nurse can help you figure out the best combination for you.  What about e-cigarettes? -- Sometimes people wonder if using electronic cigarettes, or \"e-cigarettes,\" can help them quit smoking. Using e-cigarettes is also called \"vaping.\" Doctors do not recommend e-cigarettes in place of using of medicines and counseling. That's because e-cigarettes still contain nicotine as well as other substances that might be harmful. It's also not clear how they can affect a person's health in the long term.  What if I am pregnant and I smoke? -- If " you are pregnant, it's really important for the health of your baby that you quit. Ask your doctor what options you have, and what is safest for your baby.  What if I have already smoked for a long time? -- The longer you have smoked, the higher your chances are of having health problems. But it is never too late to quit smoking. It helps your health even if you are older or have smoked for many years. The best thing you can do to lower your chance of having a health problem caused by smoking is to quit.  Will I gain weight if I quit? -- You might gain a few pounds. This can be frustrating for some people. But it's important to remember that you are improving your health by quitting smoking. You can help prevent gaining a lot of weight by staying active and eating a healthy diet.  What if I am not able to quit? -- If you don't quit on your first try, or if you quit but then start smoking again, don't give up hope. Lots of people have to try more than once before they are able to completely quit.  It might help to try to understand why quitting did not work. There might be something you can do differently when you try again. It can help to figure out which situations make you want to smoke, so you can avoid them.  What else can I do to improve my chances of quitting? -- You can:  ?Get regular exercise. Any type of physical activity, even gentle forms of movement, is good for your health. Physical activity can also help reduce stress.  ?Stay away from people who smoke and places that make you want to smoke. If people close to you smoke, ask them to quit with you or avoid smoking around you.  ?Carry gum, hard candy, or something to put in your mouth. If you get a craving for a cigarette, try 1 of these instead.  ?Don't give up, even if you start smoking again. It takes most people a few tries before they succeed.  All topics are updated as new evidence becomes available and our peer review process is complete.

## 2024-04-15 NOTE — NURSING NOTE
Patient ambulated in lamb without difficulty, voided. No complaints. Right groin WNL. Discharge instructions reviewed. IV site d/c'd.

## 2024-04-15 NOTE — Clinical Note
Single view of the LIMA to the left anterior descending obtained using hand injection. Non selective

## 2024-04-15 NOTE — PRE-SEDATION DOCUMENTATION
Sedation Plan    ASA 3     Mallampati class: II.    Risks, benefits, and alternatives discussed with patient.    Moderate Sedation

## 2024-04-16 PROBLEM — I77.1 SUBCLAVIAN ARTERIAL STENOSIS (CMS-HCC): Status: ACTIVE | Noted: 2024-02-23

## 2024-04-16 LAB
ACT BLD: 174 SEC (ref 89–169)
ACT BLD: 304 SEC (ref 89–169)
ACT BLD: 304 SEC (ref 89–169)
ACT BLD: 329 SEC (ref 89–169)

## 2024-04-16 NOTE — PROGRESS NOTES
Patient had catheterization today which shows normal cardiac output, patent left POP to LAD and D1, occlusion of all saphenous vein grafts, and a significant left subclavian stenosis, compromising flow to the left POP graft.  Renal function worse than baseline, but stable with a creatinine of 2.4, following the recent hospitalization.  Plan is to proceed with left subclavian stent in a few weeks.  We renewed several prescriptions, reviewed the preliminary catheterization findings and the plan going forward.  We arrange for a limited echocardiogram since the patient does have LV dysfunction, and he had fairly difficult to control VT while he was in the hospital.  On amiodarone 200 mg daily.  If the ejection fraction remains impaired will proceed with referral to electrophysiology for consideration of secondary prevention ICD.  If the ejection fraction is improved, back to baseline, we can discuss amiodarone long-term versus an electrophysiology study.

## 2024-04-18 ENCOUNTER — OFFICE VISIT (OUTPATIENT)
Dept: PRIMARY CARE | Facility: CLINIC | Age: 74
End: 2024-04-18
Payer: MEDICARE

## 2024-04-18 VITALS
WEIGHT: 144 LBS | SYSTOLIC BLOOD PRESSURE: 163 MMHG | BODY MASS INDEX: 27.19 KG/M2 | OXYGEN SATURATION: 98 % | TEMPERATURE: 97 F | DIASTOLIC BLOOD PRESSURE: 74 MMHG | HEART RATE: 66 BPM | HEIGHT: 61 IN

## 2024-04-18 DIAGNOSIS — M10.9 GOUT, UNSPECIFIED CAUSE, UNSPECIFIED CHRONICITY, UNSPECIFIED SITE: ICD-10-CM

## 2024-04-18 DIAGNOSIS — Z00.00 ROUTINE GENERAL MEDICAL EXAMINATION AT HEALTH CARE FACILITY: Primary | ICD-10-CM

## 2024-04-18 DIAGNOSIS — E11.40 TYPE 2 DIABETES MELLITUS WITH DIABETIC NEUROPATHY, WITHOUT LONG-TERM CURRENT USE OF INSULIN (MULTI): ICD-10-CM

## 2024-04-18 DIAGNOSIS — R41.89 COGNITIVE CHANGES: ICD-10-CM

## 2024-04-18 DIAGNOSIS — Z00.00 ENCOUNTER FOR GENERAL ADULT MEDICAL EXAMINATION WITHOUT ABNORMAL FINDINGS: ICD-10-CM

## 2024-04-18 DIAGNOSIS — I77.1 SUBCLAVIAN ARTERIAL STENOSIS (CMS-HCC): ICD-10-CM

## 2024-04-18 DIAGNOSIS — N18.4 CHRONIC RENAL DISEASE, STAGE IV (MULTI): ICD-10-CM

## 2024-04-18 DIAGNOSIS — Z00.00 ENCOUNTER FOR PREVENTATIVE ADULT HEALTH CARE EXAMINATION: ICD-10-CM

## 2024-04-18 DIAGNOSIS — I25.810 CORONARY ARTERY DISEASE INVOLVING CORONARY BYPASS GRAFT OF NATIVE HEART, UNSPECIFIED WHETHER ANGINA PRESENT: ICD-10-CM

## 2024-04-18 PROBLEM — N18.32 STAGE 3B CHRONIC KIDNEY DISEASE (MULTI): Status: RESOLVED | Noted: 2023-04-18 | Resolved: 2024-04-18

## 2024-04-18 PROCEDURE — 3077F SYST BP >= 140 MM HG: CPT | Performed by: INTERNAL MEDICINE

## 2024-04-18 PROCEDURE — 1036F TOBACCO NON-USER: CPT | Performed by: INTERNAL MEDICINE

## 2024-04-18 PROCEDURE — 1160F RVW MEDS BY RX/DR IN RCRD: CPT | Performed by: INTERNAL MEDICINE

## 2024-04-18 PROCEDURE — 1157F ADVNC CARE PLAN IN RCRD: CPT | Performed by: INTERNAL MEDICINE

## 2024-04-18 PROCEDURE — 3044F HG A1C LEVEL LT 7.0%: CPT | Performed by: INTERNAL MEDICINE

## 2024-04-18 PROCEDURE — 1170F FXNL STATUS ASSESSED: CPT | Performed by: INTERNAL MEDICINE

## 2024-04-18 PROCEDURE — 99214 OFFICE O/P EST MOD 30 MIN: CPT | Performed by: INTERNAL MEDICINE

## 2024-04-18 PROCEDURE — 1126F AMNT PAIN NOTED NONE PRSNT: CPT | Performed by: INTERNAL MEDICINE

## 2024-04-18 PROCEDURE — 3078F DIAST BP <80 MM HG: CPT | Performed by: INTERNAL MEDICINE

## 2024-04-18 PROCEDURE — G0439 PPPS, SUBSEQ VISIT: HCPCS | Performed by: INTERNAL MEDICINE

## 2024-04-18 PROCEDURE — 1159F MED LIST DOCD IN RCRD: CPT | Performed by: INTERNAL MEDICINE

## 2024-04-18 PROCEDURE — 1124F ACP DISCUSS-NO DSCNMKR DOCD: CPT | Performed by: INTERNAL MEDICINE

## 2024-04-18 RX ORDER — ALLOPURINOL 100 MG/1
100 TABLET ORAL DAILY
Qty: 90 TABLET | Refills: 0 | Status: SHIPPED | OUTPATIENT
Start: 2024-04-18 | End: 2024-04-22

## 2024-04-18 ASSESSMENT — ACTIVITIES OF DAILY LIVING (ADL)
BATHING: INDEPENDENT
GROCERY_SHOPPING: INDEPENDENT
DOING_HOUSEWORK: INDEPENDENT
MANAGING_FINANCES: NEEDS ASSISTANCE
TAKING_MEDICATION: INDEPENDENT
DRESSING: INDEPENDENT

## 2024-04-18 ASSESSMENT — PAIN SCALES - GENERAL: PAINLEVEL: 0-NO PAIN
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
## 2024-04-18 NOTE — PATIENT INSTRUCTIONS
Ritesh,   Speak to nephrology regarding CT chest/abdomen/pelvis - This is regarding possible renal artery stenosis.   Also regarding bloodwork that was ordered   Blood pressure   I recommend you monitor your home blood pressure readings. To do this, be sure that the blood pressure cuff is on bare skin. Feet should be planted on the floor and back should be supported. Arm should be resting at the level of the heart. No talking to anyone during measurement and no caffeine within 30 minutes of checking blood pressure. Goal should be <130/80 on AVERAGE (outliers do not count).   Followup 3 months

## 2024-04-19 DIAGNOSIS — Z00.00 ENCOUNTER FOR GENERAL ADULT MEDICAL EXAMINATION WITHOUT ABNORMAL FINDINGS: ICD-10-CM

## 2024-04-19 DIAGNOSIS — I10 ESSENTIAL HYPERTENSION: ICD-10-CM

## 2024-04-19 NOTE — ASSESSMENT & PLAN NOTE
S/p 5V CABG in 1992, with recent NSTEMI in hospital, on appropriate medical management, has had recent cardiac cath.

## 2024-04-19 NOTE — ASSESSMENT & PLAN NOTE
with bilateral neuropathy (followed by podiatry, on gabapentin)last A1C 6.6% on Farxiga, off metformin. UTD with diabetic eye exam

## 2024-04-19 NOTE — ASSESSMENT & PLAN NOTE
Seen by nephrology, ordered bloodwork to monitor for mineral bone disease, will inquire where he can get this done.   To discuss chronic atrophic kidney and e/o possible renal artery stenosis on imaging with nephrology.  Appropriate followup scheduled.

## 2024-04-19 NOTE — ASSESSMENT & PLAN NOTE
Has been taking 100mg of allouprinol due to recurrence in gout with dosage reduction. Renal function has been stable.   Continue to monitor, followup with nephrology

## 2024-04-22 RX ORDER — AMLODIPINE BESYLATE 10 MG/1
10 TABLET ORAL DAILY
Qty: 90 TABLET | Refills: 1 | Status: SHIPPED | OUTPATIENT
Start: 2024-04-22

## 2024-04-22 RX ORDER — ALLOPURINOL 100 MG/1
50 TABLET ORAL DAILY
Qty: 45 TABLET | Refills: 1 | Status: SHIPPED | OUTPATIENT
Start: 2024-04-22

## 2024-04-26 ENCOUNTER — HOSPITAL ENCOUNTER (OUTPATIENT)
Dept: CARDIOLOGY | Facility: HOSPITAL | Age: 74
Discharge: HOME | End: 2024-04-26
Payer: MEDICARE

## 2024-04-26 DIAGNOSIS — I51.89 LEFT VENTRICULAR SYSTOLIC DYSFUNCTION, NYHA CLASS 1: ICD-10-CM

## 2024-04-26 DIAGNOSIS — I47.20 VENTRICULAR TACHYCARDIA (MULTI): ICD-10-CM

## 2024-04-26 DIAGNOSIS — I50.20 CONGESTIVE HEART FAILURE WITH RIGHT VENTRICULAR SYSTOLIC DYSFUNCTION (MULTI): ICD-10-CM

## 2024-04-26 DIAGNOSIS — I50.41 ACUTE COMBINED SYSTOLIC (CONGESTIVE) AND DIASTOLIC (CONGESTIVE) HEART FAILURE (MULTI): ICD-10-CM

## 2024-04-26 DIAGNOSIS — I51.9 HEART DISEASE, UNSPECIFIED: ICD-10-CM

## 2024-04-26 DIAGNOSIS — I47.29 OTHER VENTRICULAR TACHYCARDIA (MULTI): ICD-10-CM

## 2024-04-26 DIAGNOSIS — I50.82 CONGESTIVE HEART FAILURE WITH RIGHT VENTRICULAR SYSTOLIC DYSFUNCTION (MULTI): ICD-10-CM

## 2024-04-26 DIAGNOSIS — I21.4 NSTEMI (NON-ST ELEVATED MYOCARDIAL INFARCTION) (MULTI): ICD-10-CM

## 2024-04-26 PROCEDURE — 93308 TTE F-UP OR LMTD: CPT

## 2024-04-26 PROCEDURE — 2500000004 HC RX 250 GENERAL PHARMACY W/ HCPCS (ALT 636 FOR OP/ED): Performed by: INTERNAL MEDICINE

## 2024-04-26 PROCEDURE — 93308 TTE F-UP OR LMTD: CPT | Performed by: STUDENT IN AN ORGANIZED HEALTH CARE EDUCATION/TRAINING PROGRAM

## 2024-04-26 RX ADMIN — HUMAN ALBUMIN MICROSPHERES AND PERFLUTREN 0.5 ML: 10; .22 INJECTION, SOLUTION INTRAVENOUS at 09:09

## 2024-04-28 LAB
EJECTION FRACTION APICAL 4 CHAMBER: 36.9
LEFT VENTRICLE INTERNAL DIMENSION DIASTOLE: 5.17 CM (ref 3.5–6)
LEFT VENTRICULAR OUTFLOW TRACT DIAMETER: 2.03 CM
LV EJECTION FRACTION BIPLANE: 30 %
MITRAL VALVE E/A RATIO: 3.98
MITRAL VALVE E/E' RATIO: 28.16
RIGHT VENTRICLE PEAK SYSTOLIC PRESSURE: 35.7 MMHG

## 2024-04-29 NOTE — RESULT ENCOUNTER NOTE
LVEF remains impaired.  He is scheduled for left subclavian stent.  Recent catheterization showed a patent left POP graft to LAD and D1, occluded vein grafts, and severe native CAD with 80% left main,  proximal LAD,  proximal circumflex,  proximal RCA.  LVEF is 30 to 35%.  Patient had VT in the setting of hemodynamic compromise from blood loss anemia, likely secondary to ischemia from severe subclavian stenosis.  Question whether he requires ICD or EP study.  Will ask for electrophysiology input.  First, await recovery from subclavian stent.

## 2024-04-30 ENCOUNTER — HOSPITAL ENCOUNTER (OUTPATIENT)
Facility: HOSPITAL | Age: 74
Setting detail: OUTPATIENT SURGERY
Discharge: HOME | End: 2024-04-30
Attending: STUDENT IN AN ORGANIZED HEALTH CARE EDUCATION/TRAINING PROGRAM | Admitting: STUDENT IN AN ORGANIZED HEALTH CARE EDUCATION/TRAINING PROGRAM
Payer: MEDICARE

## 2024-04-30 VITALS
HEIGHT: 63 IN | BODY MASS INDEX: 26.56 KG/M2 | TEMPERATURE: 97.1 F | OXYGEN SATURATION: 94 % | HEART RATE: 60 BPM | SYSTOLIC BLOOD PRESSURE: 139 MMHG | RESPIRATION RATE: 16 BRPM | DIASTOLIC BLOOD PRESSURE: 74 MMHG | WEIGHT: 149.9 LBS

## 2024-04-30 DIAGNOSIS — I77.1 SUBCLAVIAN ARTERIAL STENOSIS (CMS-HCC): ICD-10-CM

## 2024-04-30 DIAGNOSIS — I25.5 ISCHEMIC CARDIOMYOPATHY: ICD-10-CM

## 2024-04-30 PROBLEM — I73.9 PAD (PERIPHERAL ARTERY DISEASE) (CMS-HCC): Status: ACTIVE | Noted: 2024-04-30

## 2024-04-30 LAB
ACT BLD: 155 SEC (ref 89–169)
ANION GAP SERPL CALC-SCNC: 10 MMOL/L (ref 10–20)
BUN SERPL-MCNC: 38 MG/DL (ref 6–23)
CALCIUM SERPL-MCNC: 8.4 MG/DL (ref 8.6–10.3)
CHLORIDE SERPL-SCNC: 107 MMOL/L (ref 98–107)
CO2 SERPL-SCNC: 23 MMOL/L (ref 21–32)
CREAT SERPL-MCNC: 1.4 MG/DL (ref 0.6–1.3)
CREAT SERPL-MCNC: 2.25 MG/DL (ref 0.5–1.3)
CREATININE: 1.36
EGFRCR SERPLBLD CKD-EPI 2021: 30 ML/MIN/1.73M*2
GFR MALE: 54 IA
GFR SERPL CREATININE-BSD FRML MDRD: 52 ML/MIN/1.73M*2
GLUCOSE SERPL-MCNC: 155 MG/DL (ref 74–99)
POTASSIUM SERPL-SCNC: 4.7 MMOL/L (ref 3.5–5.3)
SODIUM SERPL-SCNC: 135 MMOL/L (ref 136–145)

## 2024-04-30 PROCEDURE — 37236 OPEN/PERQ PLACE STENT 1ST: CPT | Performed by: INTERNAL MEDICINE

## 2024-04-30 PROCEDURE — 82565 ASSAY OF CREATININE: CPT

## 2024-04-30 PROCEDURE — 99152 MOD SED SAME PHYS/QHP 5/>YRS: CPT | Performed by: INTERNAL MEDICINE

## 2024-04-30 PROCEDURE — 36225 PLACE CATH SUBCLAVIAN ART: CPT | Performed by: INTERNAL MEDICINE

## 2024-04-30 PROCEDURE — 2550000001 HC RX 255 CONTRASTS: Performed by: INTERNAL MEDICINE

## 2024-04-30 PROCEDURE — 75710 ARTERY X-RAYS ARM/LEG: CPT | Performed by: INTERNAL MEDICINE

## 2024-04-30 PROCEDURE — 2500000001 HC RX 250 WO HCPCS SELF ADMINISTERED DRUGS (ALT 637 FOR MEDICARE OP): Performed by: INTERNAL MEDICINE

## 2024-04-30 PROCEDURE — C1894 INTRO/SHEATH, NON-LASER: HCPCS | Performed by: INTERNAL MEDICINE

## 2024-04-30 PROCEDURE — 99153 MOD SED SAME PHYS/QHP EA: CPT | Performed by: INTERNAL MEDICINE

## 2024-04-30 PROCEDURE — 7100000009 HC PHASE TWO TIME - INITIAL BASE CHARGE: Performed by: INTERNAL MEDICINE

## 2024-04-30 PROCEDURE — 2780000003 HC OR 278 NO HCPCS: Performed by: INTERNAL MEDICINE

## 2024-04-30 PROCEDURE — 36415 COLL VENOUS BLD VENIPUNCTURE: CPT | Performed by: STUDENT IN AN ORGANIZED HEALTH CARE EDUCATION/TRAINING PROGRAM

## 2024-04-30 PROCEDURE — C1769 GUIDE WIRE: HCPCS | Performed by: INTERNAL MEDICINE

## 2024-04-30 PROCEDURE — 2720000007 HC OR 272 NO HCPCS: Performed by: INTERNAL MEDICINE

## 2024-04-30 PROCEDURE — 7100000010 HC PHASE TWO TIME - EACH INCREMENTAL 1 MINUTE: Performed by: INTERNAL MEDICINE

## 2024-04-30 PROCEDURE — 82565 ASSAY OF CREATININE: CPT | Mod: 59 | Performed by: STUDENT IN AN ORGANIZED HEALTH CARE EDUCATION/TRAINING PROGRAM

## 2024-04-30 PROCEDURE — 2500000004 HC RX 250 GENERAL PHARMACY W/ HCPCS (ALT 636 FOR OP/ED): Performed by: INTERNAL MEDICINE

## 2024-04-30 PROCEDURE — 2500000005 HC RX 250 GENERAL PHARMACY W/O HCPCS: Performed by: INTERNAL MEDICINE

## 2024-04-30 PROCEDURE — 85347 COAGULATION TIME ACTIVATED: CPT

## 2024-04-30 DEVICE — PREMOUNTED STENT SYSTEM
Type: IMPLANTABLE DEVICE | Site: SUBCLAVIAN | Status: FUNCTIONAL
Brand: EXPRESS® LD ILIAC / BILIARY

## 2024-04-30 RX ORDER — ASPIRIN 325 MG
TABLET ORAL AS NEEDED
Status: DISCONTINUED | OUTPATIENT
Start: 2024-04-30 | End: 2024-04-30 | Stop reason: HOSPADM

## 2024-04-30 RX ORDER — ACETAMINOPHEN 160 MG/5ML
650 SOLUTION ORAL EVERY 6 HOURS PRN
Status: DISCONTINUED | OUTPATIENT
Start: 2024-04-30 | End: 2024-04-30 | Stop reason: HOSPADM

## 2024-04-30 RX ORDER — MIDAZOLAM HYDROCHLORIDE 1 MG/ML
INJECTION, SOLUTION INTRAMUSCULAR; INTRAVENOUS AS NEEDED
Status: DISCONTINUED | OUTPATIENT
Start: 2024-04-30 | End: 2024-04-30 | Stop reason: HOSPADM

## 2024-04-30 RX ORDER — SODIUM CHLORIDE 9 MG/ML
1000 INJECTION, SOLUTION INTRAVENOUS ONCE AS NEEDED
Status: DISCONTINUED | OUTPATIENT
Start: 2024-04-30 | End: 2024-04-30 | Stop reason: HOSPADM

## 2024-04-30 RX ORDER — ACETAMINOPHEN 325 MG/1
650 TABLET ORAL EVERY 6 HOURS PRN
Status: DISCONTINUED | OUTPATIENT
Start: 2024-04-30 | End: 2024-04-30 | Stop reason: HOSPADM

## 2024-04-30 RX ORDER — TADALAFIL 5 MG/1
5 TABLET ORAL DAILY
COMMUNITY
End: 2024-05-31 | Stop reason: HOSPADM

## 2024-04-30 RX ORDER — LIDOCAINE HYDROCHLORIDE 20 MG/ML
INJECTION, SOLUTION INFILTRATION; PERINEURAL AS NEEDED
Status: DISCONTINUED | OUTPATIENT
Start: 2024-04-30 | End: 2024-04-30 | Stop reason: HOSPADM

## 2024-04-30 RX ORDER — PROTAMINE SULFATE 10 MG/ML
INJECTION, SOLUTION INTRAVENOUS CONTINUOUS PRN
Status: COMPLETED | OUTPATIENT
Start: 2024-04-30 | End: 2024-04-30

## 2024-04-30 RX ORDER — SODIUM CHLORIDE 9 MG/ML
75 INJECTION, SOLUTION INTRAVENOUS CONTINUOUS
Status: DISCONTINUED | OUTPATIENT
Start: 2024-04-30 | End: 2024-04-30 | Stop reason: HOSPADM

## 2024-04-30 RX ORDER — HEPARIN SODIUM 1000 [USP'U]/ML
INJECTION, SOLUTION INTRAVENOUS; SUBCUTANEOUS AS NEEDED
Status: DISCONTINUED | OUTPATIENT
Start: 2024-04-30 | End: 2024-04-30 | Stop reason: HOSPADM

## 2024-04-30 RX ORDER — IODIXANOL 320 MG/ML
INJECTION, SOLUTION INTRAVASCULAR AS NEEDED
Status: DISCONTINUED | OUTPATIENT
Start: 2024-04-30 | End: 2024-04-30 | Stop reason: HOSPADM

## 2024-04-30 RX ORDER — CLOPIDOGREL BISULFATE 300 MG/1
TABLET, FILM COATED ORAL AS NEEDED
Status: DISCONTINUED | OUTPATIENT
Start: 2024-04-30 | End: 2024-04-30 | Stop reason: HOSPADM

## 2024-04-30 RX ORDER — FENTANYL CITRATE 50 UG/ML
INJECTION, SOLUTION INTRAMUSCULAR; INTRAVENOUS AS NEEDED
Status: DISCONTINUED | OUTPATIENT
Start: 2024-04-30 | End: 2024-04-30 | Stop reason: HOSPADM

## 2024-04-30 RX ORDER — ACETAMINOPHEN 650 MG/1
650 SUPPOSITORY RECTAL EVERY 6 HOURS PRN
Status: DISCONTINUED | OUTPATIENT
Start: 2024-04-30 | End: 2024-04-30 | Stop reason: HOSPADM

## 2024-04-30 RX ORDER — MORPHINE SULFATE 2 MG/ML
2 INJECTION, SOLUTION INTRAMUSCULAR; INTRAVENOUS EVERY 6 HOURS PRN
Status: DISCONTINUED | OUTPATIENT
Start: 2024-04-30 | End: 2024-04-30 | Stop reason: HOSPADM

## 2024-04-30 ASSESSMENT — PAIN SCALES - GENERAL

## 2024-04-30 ASSESSMENT — PAIN - FUNCTIONAL ASSESSMENT: PAIN_FUNCTIONAL_ASSESSMENT: 0-10

## 2024-04-30 NOTE — ADDENDUM NOTE
from mom, [place], [phone].  Hernandez was seen on Tuesday and prescriptions were supposed to be sent to the pharmacy but they never received anything.     Addended by: OLU NORMAN on: 4/30/2024 02:08 PM     Modules accepted: Orders

## 2024-04-30 NOTE — PRE-SEDATION DOCUMENTATION
"Interventional Cardiology Preprocedure Note    Ritesh Garza   Indication for procedure: The encounter diagnosis was Subclavian arterial stenosis (CMS-HCC).    Relevant review of systems:  denies shortness of breath, chest pain       /64   Pulse 57   Temp 36.2 °C (97.1 °F) (Temporal)   Resp 12   Ht 1.6 m (5' 3\")   Wt 68 kg (149 lb 14.4 oz)   SpO2 96%   BMI 26.55 kg/m²    Relevant Labs:   Lab Results   Component Value Date    CREATININE 2.40 (H) 04/10/2024    EGFR 52 (L) 04/30/2024    INR 2.1 (H) 02/20/2024    PROTIME 23.7 (H) 02/20/2024       Planned Sedation/Anesthesia: Moderate    Airway assessment: normal    Physical Exam  Constitutional:       General: He is not in acute distress.     Appearance: Normal appearance. He is not ill-appearing.   Cardiovascular:      Rate and Rhythm: Normal rate and regular rhythm.      Pulses: Normal pulses.      Heart sounds: Normal heart sounds.   Pulmonary:      Effort: Pulmonary effort is normal.      Breath sounds: Normal breath sounds.   Skin:     General: Skin is warm and dry.      Capillary Refill: Capillary refill takes less than 2 seconds.   Neurological:      General: No focal deficit present.      Mental Status: He is alert and oriented to person, place, and time.         Mallampati: II (hard and soft palate, upper portion of tonsils and uvula visible)    ASA Score: ASA 3 - Patient with moderate systemic disease with functional limitations    Benefits, risks and alternatives of procedure and planned sedation have been discussed with the patient and/or their representative. All questions answered and they agree to proceed.     Angelina Do, APRN-CNP   "

## 2024-04-30 NOTE — DISCHARGE INSTRUCTIONS
If you have any questions, please call the Cath Lab Nurse Practitioner Maetiara Do at [phone] and leave a message. She will return your call the same day if calling before 3 PM, M-F. If you call on the weekend you can expect a call back on Monday morning. You may also call the Cath Lab at [phone] M-F, 7-3:30 with any questions. Weekends and after hours please call your cardiologist office number to reach a physician on call. The heart group office number is [phone]           CARDIAC CATHETERIZATION DISCHARGE INSTRUCTIONS     FOR SUDDEN AND SEVERE CHEST PAIN, SHORTNESS OF BREATH, EXCESSIVE BLEEDING, SIGNS OF STROKE, OR CHANGES IN MENTAL STATUS YOU SHOULD CALL 911 IMMEDIATELY.     If your provider has prescribed aspirin and/or clopidogrel (Plavix), or prasugrel (Effient), or ticagrelor (Brilinta), DO NOT STOP THESE MEDICATIONS for any reason without talking to your cardiologist first. If any of these were prescribed, you must take them every day without missing a single dose. If you are getting low on these medications, contact your provider immediately for a refill.     FOR NEXT 24 HOURS  - Upon discharge, you should return home and rest for the remainder of the day and evening. You do not have to stay on bed rest but should not be very active.  It is recommended a responsible adult be with you for the first 24 hours after the procedure.    - No driving for 24 hours after procedure. Please arrange for someone to drive you home from the hospital today.     - Do not drive, operate machinery, or use power tools for 24 hours after your procedure.     - Do not make any legal decisions for 24 hours after your procedure.     - Do not drink alcoholic beverages for 24 hours after your procedure.    WOUND CARE   *FOR FEMORAL (LEG) ACCESS*  ·      Avoid heavy lifting (over 10 pounds) for 7 days, squatting or excessive bending for 2 days, and strenuous exercise for 7 days.  ·      No submerged bathing, swimming, or  hot tubs for the next 7 days, or until fully healed.  ·      Avoid sexual activity for 3-4 days until any groin discomfort has ceased.     *FOR RADIAL (WRIST) ACCESS*  ·      No lifting more than 5 pounds or excessive use of the wrist for 24 hours - for example, treat your wrist as if it is sprained.  ·      Do not engage in vigorous activities (tennis, golf, bowling, weights) for at least 48 hours after the procedure.  ·      Do not submerge the wrist for 7 days after the procedure.  ·      You should expect mild tingling in your hand and tenderness at the puncture site for up to 3 days.    - The transparent dressing should be removed from the site 24 hours after the procedure.  Wash the site gently with soap and water. Rinse well and pat dry. Keep the area clean and dry. You may apply a Band-Aid to the site. Avoid lotions, ointments, or powders until fully healed.     - You may shower the day after your procedure.      - It is normal to notice a small bruise around the puncture site and/or a small grape sized or smaller lump. Any large bruising or large lump warrants a call to the office.     - If bleeding should occur, lay down and apply pressure to the affected area for 10 minutes.  If the bleeding stops notify your physician.  If there is a large amount of bleeding or spurting of blood CALL 911 immediately.  DO NOT drive yourself to the hospital.    - You may experience some tenderness, bruising or minimal inflammation.  If you have any concerns, you may contact the Cath Lab or if any of these symptoms become excessive, contact your cardiologist or go to the emergency room.     OTHER INSTRUCTIONS  - You may take acetaminophen (Tylenol) as directed for discomfort.  If pain is not relieved with acetaminophen (Tylenol), contact your doctor.    - If you notice or experience any of the following, you should notify your doctor or seek medical attention  Chest pain or discomfort  Change in mental status or weakness in  extremities.  Dizziness, light headedness, or feeling faint.  Change in the site where the procedure was performed, such as bleeding or an increased area of bruising or swelling.  Tingling, numbness, pain, or coolness in the leg/arm beyond the site where the procedure was performed.  Signs of infection (i.e. shaking chills, temperature > 100 degrees Fahrenheit, warmth, redness) in the leg/arm area where the procedure was performed.  Changes in urination   Bloody or black stools  Vomiting blood  Severe nose bleeds  Any excessive bleeding    - If you DO NOT have an appointment with your cardiologist within 2-4 weeks following your procedure, please contact their office.      Important ways to reduce your risk of coronary artery disease by healthy diet, maintaining a healthy weight, exercising regularly and stop using tobacco products.     Mediterranean Diet    About this topic  This is a heart healthy diet. It is based on widely used foods and cooking styles from many countries around the [place]. The main pattern for the diet is more plant foods and monounsaturated fats, or good fats, like olive oil. Protein in this diet comes from seafood, legumes, nuts, seeds, and poultry and eggs in lowered amounts. You will also eat more whole grains, vegetables, and fruits and moderate amounts of alcohol are also included. This diet has less red meats, dairy products, and processed foods.    What will the results be?  Your diet will have less saturated fat, cholesterol, calories, sodium, and added sugars. Your diet will be higher in fiber. This will help to keep your blood sugar steady. This diet lowers the chance of heart disease and other health problems.  What lifestyle changes are needed?  If you do not often eat this way, you will need to change your eating habits. Be sure to get regular exercise. It is believed to help the health benefits of this diet.  What changes to diet are needed?  You may need to limit the  "amount of red meat and processed foods in your diet. Ask your dietitian for help planning meals that are right for you.  What foods are good to eat?  Plenty of fish and other seafood  Fresh, frozen, or canned fruits and vegetables  Nuts and nut butters and dried beans, lentils, or peas  Foods high in fiber like whole grains and whole grain products  Olive oil (good fat), peanut or canola oil, margarine, or spreads that list vegetable oil as the first ingredient and do not contain trans fat or partially hydrogenated oil  Small amounts of poultry and eggs  What foods should be limited or avoided?  Red meats  Sweets, desserts, and processed foods  Butter, oils, and fats that contain trans fats or are hydrogenated or partially hydrogenated  Gravies and sauces  What problems could happen?  Your weight may rise because your diet will be higher in fat from olive oil and nuts.  You may have lower iron levels. Be sure to eat foods rich in iron. Also, eat foods rich in vitamin C. This will help your body take in iron.  You may have lower calcium levels because you are eating less dairy products. Ask your doctor if you need to take a calcium supplement.  Wine is often thought of as part of a Mediterranean diet. It is not needed and you may choose not to include it. Avoid wine if you are prone to alcohol abuse or are pregnant. Also, avoid it if you are at risk for breast cancer, have liver problems, or have other illnesses that make it important for you to not have alcohol.  When do I need to call the doctor?  If you have any concerns about your diet     Health risks of obesity    The Basics  Written by the doctors and editors at [facility]  What does it mean to have obesity? -- Doctors use a calculation called \"body mass index,\" or \"BMI,\" to decide whether a person is underweight, at a healthy weight, overweight, or has obesity.  Your BMI will tell you which category you are in based on your weight and height (figure 1):  ?If " "your BMI is between 25 and 29.9, you are overweight.  ?If your BMI is 30 or greater, you have obesity.  Obesity is a problem, because it increases the risks of many different health problems. It can also make it hard for you to move, breathe, and do other things that people who are at a healthy weight can do easily.  What are the health risks of obesity? -- Having obesity increases a person's risk of developing many health problems. Here are just a few examples:  ?Diabetes  ?High blood pressure  ?High cholesterol  ?Heart disease (including heart attacks)  ?Stroke  ?Sleep apnea (a disorder in which you stop breathing for short periods while asleep)  ?Asthma  ?Cancer  Does having obesity shorten a person's life? -- Yes. Studies show that people with obesity die younger than people who are a healthy weight. They also show that the risk of death goes up the heavier a person is. The degree of increased risk depends on how long the person has had obesity, and on what other medical problems they have.  People with \"central obesity\" might also be at risk of dying younger. Central obesity means carrying extra weight in the belly area, even if the BMI is normal.  Should I see an expert? -- Yes. If you are overweight or have obesity, you can talk to your doctor or nurse. They might have suggestions for healthy ways to lose weight. It can also help to work with a dietitian (food and nutrition expert). A dietitian can help you choose healthy foods and plan meals.  Are there medical treatments that can help me lose weight? -- Yes. There are medicines and surgery to help with weight loss. But those treatments are only for people who have not been able to lose weight through lifestyle changes such as diet and exercise. Also, weight loss treatments do not take the place of diet and exercise. People who have those treatments must also change how they eat and how active they are.  What can I do to prevent the problems caused by " obesity? -- The best thing that you can do is lose weight. But even if weight loss is not possible, you can improve your health and lower your risk if you:  ?Become more active - Many types of physical activity can help, including walking. You can start with a few minutes a day and add more as you get stronger and build up your endurance. Anything that gets your body moving is good for you.  ?Improve your diet - It is healthy to have regular meal times, eat smaller portions, and not skip meals. Limit sweets, and avoid processed foods. Try to eat more vegetables and fruits instead.  ?Quit smoking (if you smoke) - Some people start eating more after they stop smoking, so try to make healthy food choices. Even if it increases your appetite, quitting smoking is still one of the best things that you can do to improve your health.  ?Limit alcohol - For females, drink no more than 1 drink a day. For males, drink no more than 2 drinks a day.  What causes obesity? -- The thing that increases a person's risk the most is having an unhealthy lifestyle. Most people develop obesity because they eat too much, eat unhealthy foods, and move too little. That's especially true of people who watch too much TV. But there are also other things that seem to increase the risk of obesity that many people do not know about. Here are some things that might affect a person's chance of developing obesity:  ?Mother's habits during and after pregnancy - People who eat a lot of calories, have diabetes, or smoke during pregnancy have a higher chance of having babies who have obesity as adults. Also, babies who drink formula might be more likely than  babies to develop obesity later in life.  ?Habits and weight gain during childhood - Children or teens who are overweight or have obesity are more likely to become have obesity as an adult.  ?Sleeping too little - People who do not get enough sleep are more likely to develop obesity than  "people who sleep enough.  ?Taking certain medicines - Long-term use of certain medicines, such as some medicines to treat depression, can cause weight gain. If you are concerned that 1 of your medicines might be making you gain weight, talk to your doctor or nurse. They might be able to switch you to a different medicine instead.  ?Certain hormonal conditions - Some hormonal problems can increase the risk of developing obesity. For example, a condition called \"polycystic ovary syndrome\" can cause weight gain, along with other symptoms like irregular periods.  What if I want to get pregnant? -- If you are overweight or have obesity, it might be harder to get pregnant. For males, obesity can also cause sex problems, like having trouble getting or keeping an erection. This is more likely if you also have high blood pressure or diabetes.  What if my child has obesity? -- In children, obesity has many of the same risks as it does in adults. For example, it can increase the risk of diabetes, high blood pressure, asthma, and sleep apnea. It can also cause added problems related to childhood. For example, obesity can make children grow faster than normal and cause girls to go through puberty earlier than usual.  All topics are updated as new evidence becomes available and our peer review process is complete.  This topic retrieved from [employer] on: Jan 11, 2024.  Topic 97142 Version 17.0  Release: 31.6.4 - C32.10  © 2024 Up[employer], Inc. and/or its affiliates. All rights reserved.  figure 1: Your body mass index (BMI)        If you use tobacco products please review   Quitting smoking    The Basics  Written by the doctors and editors at [employer]  What are the benefits of quitting smoking? -- Quitting smoking can dramatically improve your health and help you live longer. It lowers your risk of heart disease, lung disease, kidney failure, cancer, infection, stomach problems, and diabetes.  Quitting smoking can also lower your " "chances of getting osteoporosis, a condition that makes your bones weak.  Quitting is not easy for most people, and it might take several tries to completely quit. But help and support are available. Quitting smoking will improve your health no matter how old you are, even if you have smoked for a long time.  What should I do if I want to quit smoking? -- It's a good idea to start by talking with your doctor or nurse. It is possible to quit on your own, without help. But getting help greatly increases your chances of quitting successfully.  When you are ready to quit, you will make a plan to:  ?Set a quit date.  ?Tell your family and friends that you plan to quit.  ?Plan ahead for the challenges you will face, such as cigarette cravings.  ?Remove cigarettes from your home, car, and work.  How can my doctor or nurse help? -- Your doctor or nurse can give you advice on the best way to quit. They can also give you medicines to:  ?Reduce your craving for cigarettes  ?Reduce your \"withdrawal\" symptoms (symptoms that happen when you stop smoking)  Your doctor or nurse can also help you find a counselor to talk to. For most people who are trying to quit smoking, it works best to use both medicines and counseling.  You can also get help from a free phone line (0-615-QUITNOW, or 1-302.430.9093) or go online to www.smokefree.gov.  What are the symptoms of withdrawal? -- When you stop smoking, you might have symptoms such as:  ?Trouble sleeping  ?Feeling irritable, anxious, or restless  ?Getting frustrated or angry  ?Having trouble thinking clearly  These symptoms can be hard to deal with, which is why it can be so hard to quit. But medicines can help.  Some people who stop smoking become temporarily depressed. Some people need treatment for depression, such as counseling or medicines or both. People with depression might:  ?No longer enjoy or care about doing the things they used to like to do  ?Feel sad, down, hopeless, " nervous, or cranky most of the day, almost every day  ?Lose or gain weight  ?Sleep too much or too little  ?Feel tired or like they have no energy  ?Feel guilty or like they are worth nothing  ?Forget things or feel confused  ?Move and speak more slowly than usual  ?Act restless or have trouble staying still  ?Think about death or suicide  If you think you might be depressed, tell your doctor or nurse right away. They can talk to you about your symptoms and recommend treatment if needed.  Get help right away if you are thinking of hurting or killing yourself! -- Sometimes, people with depression think of hurting or killing themselves. If you ever feel like you might hurt yourself, help is available:  ?In the , contact the [employer] Suicide [facility] Crisis Lifeline:  To speak to someone, call or text [employer].  To talk to someone online, go to www.[employer]/chat.  ?Call your doctor or nurse and tell them that it is an emergency.  ?Call for an ambulance (in the [place] and [place], call 9-1-1).  ?Go to the emergency department at your local hospital.  If you think your partner might have depression, or if you are worried that they might hurt themselves, get them help right away.  How does counseling work? -- A counselor can help you figure out:  ?What triggers you to want to smoke, and how to handle these situations  ?How to resist cravings  ?What you can do differently if you have tried to quit before  You can meet with a counselor in 1-on-1 sessions or as part of a group. There are other ways to get counseling, too, such as over the phone, through text messaging, or online.  How do medicines help you stop smoking? -- Different medicines work in different ways:  ?Nicotine replacement therapy - Nicotine is the main drug in cigarettes, and the reason they are addictive. These medicines reduce your body's craving for nicotine. They also help with withdrawal symptoms.  There are different forms of nicotine replacement, including skin  "patches, lozenges, gum, nasal sprays, and inhalers. Most can be bought without a prescription. Also, health insurance might cover some or all of the cost.  It often helps to use 2 forms of nicotine replacement. For example, you might wear a patch all the time, plus use gum or lozenges when you get a craving to smoke.  ?Varenicline - Varenicline (brand names: Chantix, Champix) is a prescription medicine that reduces withdrawal symptoms and cigarette cravings. Varenicline can increase the effects of alcohol in some people. It's a good idea to limit drinking while you're taking it, at least until you know how it affects you.  Even if you are not yet ready to commit to a quit date, varenicline can help reduce cravings. This can make it easier to quit when you are ready.  ?Bupropion - Bupropion (sample brand names: Wellbutrin, Zyban) is a prescription medicine that reduces your desire to smoke. It is also available in a generic version, which is cheaper than the brand name medicines. Doctors do not usually prescribe bupropion for people with seizures or who have had seizures in the past.  It might also be helpful to combine nicotine replacement with bupropion or varenicline. In some cases, a person might even take both bupropion and varenicline. Your doctor or nurse can help you figure out the best combination for you.  What about e-cigarettes? -- Sometimes people wonder if using electronic cigarettes, or \"e-cigarettes,\" can help them quit smoking. Using e-cigarettes is also called \"vaping.\" Doctors do not recommend e-cigarettes in place of using of medicines and counseling. That's because e-cigarettes still contain nicotine as well as other substances that might be harmful. It's also not clear how they can affect a person's health in the long term.  What if I am pregnant and I smoke? -- If you are pregnant, it's really important for the health of your baby that you quit. Ask your doctor what options you have, and what " is safest for your baby.  What if I have already smoked for a long time? -- The longer you have smoked, the higher your chances are of having health problems. But it is never too late to quit smoking. It helps your health even if you are older or have smoked for many years. The best thing you can do to lower your chance of having a health problem caused by smoking is to quit.  Will I gain weight if I quit? -- You might gain a few pounds. This can be frustrating for some people. But it's important to remember that you are improving your health by quitting smoking. You can help prevent gaining a lot of weight by staying active and eating a healthy diet.  What if I am not able to quit? -- If you don't quit on your first try, or if you quit but then start smoking again, don't give up hope. Lots of people have to try more than once before they are able to completely quit.  It might help to try to understand why quitting did not work. There might be something you can do differently when you try again. It can help to figure out which situations make you want to smoke, so you can avoid them.  What else can I do to improve my chances of quitting? -- You can:  ?Get regular exercise. Any type of physical activity, even gentle forms of movement, is good for your health. Physical activity can also help reduce stress.  ?Stay away from people who smoke and places that make you want to smoke. If people close to you smoke, ask them to quit with you or avoid smoking around you.  ?Carry gum, hard candy, or something to put in your mouth. If you get a craving for a cigarette, try 1 of these instead.  ?Don't give up, even if you start smoking again. It takes most people a few tries before they succeed.  All topics are updated as new evidence becomes available and our peer review process is complete.

## 2024-04-30 NOTE — POST-PROCEDURE NOTE
Physician Transition of Care Summary  Invasive Cardiovascular Lab    Procedure Date: 4/30/2024  Attending:    * Narinder Quevedo - Primary  Resident/Fellow/Other Assistant: Surgeons and Role:     * Blaine Adams MD - Assisting    Indications:   Pre-op Diagnosis     * Subclavian arterial stenosis (CMS-HCC) [I77.1]    Post-procedure diagnosis:   Post-op Diagnosis     * Subclavian arterial stenosis (CMS-HCC) [I77.1]    Procedure(s):   Angioplasty - Upper Extremity  07328 - SD TRLML BALO ANGIOP OPEN/PERQ IMG S&I 1ST ART        Procedure Findings:   Stenosis of the left subclavian, see report for details.     Description of the Procedure:   Peripheral intervention of the left subclavian artery angioplasty/stent  RCFA>manual pull     Complications:   None     Stents/Implants:   Implants       Type Not Specified    8mm X 20mm Balloon, 17mm Stent, 135 Cm Cath, Express Biliary Ld Premounted Stent - Implanted        Model/Cat number: [id number] Lot number: [id number]    Device identifier: [id number]        As of 4/30/2024       Status: Implanted                              Anticoagulation/Antiplatelet Plan:   Protamine given prior to sheath pull. Continue on ASA + Plavix for at least 6 month uninterrupted.     Estimated Blood Loss:   0 mL    Anesthesia: Moderate Sedation Anesthesia Staff: No anesthesia staff entered.    Any Specimen(s) Removed:   ACT    Disposition:   Recovery and home later on today      Electronically signed by: CHRISTOPHER Velez, 4/30/2024 11:04 AM

## 2024-04-30 NOTE — SIGNIFICANT EVENT
Patient ambulated without difficulty. Discharge instructions reviewed with patient and wife. Final site assessment WNL no oozing or hematoma. Distal pulses WNL. IV removed and opsite applied.

## 2024-05-02 ENCOUNTER — OFFICE VISIT (OUTPATIENT)
Dept: CARDIOLOGY | Facility: CLINIC | Age: 74
End: 2024-05-02
Payer: MEDICARE

## 2024-05-02 VITALS
WEIGHT: 149.2 LBS | HEART RATE: 55 BPM | SYSTOLIC BLOOD PRESSURE: 135 MMHG | DIASTOLIC BLOOD PRESSURE: 64 MMHG | OXYGEN SATURATION: 96 % | BODY MASS INDEX: 26.43 KG/M2 | RESPIRATION RATE: 20 BRPM

## 2024-05-02 DIAGNOSIS — I50.82 CONGESTIVE HEART FAILURE WITH RIGHT VENTRICULAR SYSTOLIC DYSFUNCTION (MULTI): ICD-10-CM

## 2024-05-02 DIAGNOSIS — I77.1 SUBCLAVIAN ARTERIAL STENOSIS (CMS-HCC): ICD-10-CM

## 2024-05-02 DIAGNOSIS — I50.23 ACUTE ON CHRONIC SYSTOLIC HEART FAILURE (MULTI): ICD-10-CM

## 2024-05-02 DIAGNOSIS — I21.4 NSTEMI (NON-ST ELEVATED MYOCARDIAL INFARCTION) (MULTI): Primary | ICD-10-CM

## 2024-05-02 DIAGNOSIS — I47.20 VENTRICULAR TACHYCARDIA (MULTI): ICD-10-CM

## 2024-05-02 DIAGNOSIS — I50.20 CONGESTIVE HEART FAILURE WITH RIGHT VENTRICULAR SYSTOLIC DYSFUNCTION (MULTI): ICD-10-CM

## 2024-05-02 PROCEDURE — 3044F HG A1C LEVEL LT 7.0%: CPT | Performed by: INTERNAL MEDICINE

## 2024-05-02 PROCEDURE — 1159F MED LIST DOCD IN RCRD: CPT | Performed by: INTERNAL MEDICINE

## 2024-05-02 PROCEDURE — 1157F ADVNC CARE PLAN IN RCRD: CPT | Performed by: INTERNAL MEDICINE

## 2024-05-02 PROCEDURE — 3078F DIAST BP <80 MM HG: CPT | Performed by: INTERNAL MEDICINE

## 2024-05-02 PROCEDURE — 1160F RVW MEDS BY RX/DR IN RCRD: CPT | Performed by: INTERNAL MEDICINE

## 2024-05-02 PROCEDURE — 1036F TOBACCO NON-USER: CPT | Performed by: INTERNAL MEDICINE

## 2024-05-02 PROCEDURE — 99215 OFFICE O/P EST HI 40 MIN: CPT | Performed by: INTERNAL MEDICINE

## 2024-05-02 PROCEDURE — 3075F SYST BP GE 130 - 139MM HG: CPT | Performed by: INTERNAL MEDICINE

## 2024-05-02 PROCEDURE — 1126F AMNT PAIN NOTED NONE PRSNT: CPT | Performed by: INTERNAL MEDICINE

## 2024-05-02 RX ORDER — FUROSEMIDE 40 MG/1
40 TABLET ORAL DAILY
Qty: 30 TABLET | Refills: 0 | Status: SHIPPED | OUTPATIENT
Start: 2024-05-02 | End: 2024-05-08

## 2024-05-02 ASSESSMENT — PAIN SCALES - GENERAL: PAINLEVEL: 0-NO PAIN

## 2024-05-02 ASSESSMENT — PATIENT HEALTH QUESTIONNAIRE - PHQ9
SUM OF ALL RESPONSES TO PHQ9 QUESTIONS 1 AND 2: 0
2. FEELING DOWN, DEPRESSED OR HOPELESS: NOT AT ALL
1. LITTLE INTEREST OR PLEASURE IN DOING THINGS: NOT AT ALL

## 2024-05-02 ASSESSMENT — COLUMBIA-SUICIDE SEVERITY RATING SCALE - C-SSRS: 1. IN THE PAST MONTH, HAVE YOU WISHED YOU WERE DEAD OR WISHED YOU COULD GO TO SLEEP AND NOT WAKE UP?: NO

## 2024-05-02 NOTE — PROGRESS NOTES
Primary Care Physician: Ofelia Mccracken DO  Date of Visit: 05/02/2024  9:40 AM EDT  Location of visit: [facility] [address]   Last office visit: 3/6/2024     Chief Complaint:     Follow-up left subclavian stent    HPI/Summary  Ritesh Garza is a 73 y.o. male who presents for followup cardiology evaluation.     The patient presents status post remote MI with longstanding multivessel CAD, moderate LV systolic dysfunction and compensated congestive heart failure.  The problem list includes chronic kidney disease.  In 2021, he was hospitalized with an episode of unresponsiveness, possibly absence seizures.  He was started on Keppra.  A 14-day monitor showed no atrial fibrillation and no pauses.  There was a long hospitalization in November, 2021 with possible TIA, also possible COVID-related delirium.  During that admission, the ejection fraction was measured at 30%.  Repeat echocardiogram on May 12, 2023 showed that the ejection fraction had recovered, estimated 40 to 45%.  Last year, we discontinued eplerenone because of hyperkalemia.  There was a long hospitalization in February, 2022 with left hip pain and swelling after a fall, with significant hematoma of the left leg.  In the hospital, there were episodes of ventricular tachycardia and chest pain.  This was thought to be in part related to metabolic acidosis and acute blood loss anemia.  Biomarkers were elevated.  He was seen by electrophysiology, was considered for ICD if no recovery of LV ejection fraction, which was measured at 30 to 35% in the hospital.  Cardiac catheterization was deferred because of acute on chronic kidney injury.  At discharge, he was loaded with amiodarone, started on amiodarone 200 mg daily, also started on furosemide and clopidogrel for ACS.  He was continued on aspirin, atorvastatin and Farxiga.  Amlodipine, losartan and metformin were all held, given the acute on chronic kidney injury.  We saw him in the office and discontinue daily  furosemide, utilize furosemide on an as-needed basis.    Catheterization on April 15, 2024 showed a patent left POP to LAD and D1, occluded vein grafts and severe native CAD with an 80% left main,  proximal LAD,  proximal circumflex,  proximal RCA.  LVEF 30 to 35%.  Severe left subclavian stenosis.  After evaluation, we elected to proceed with left subclavian stent, which was performed successfully on April 30, 2024.    The patient continues on DAPT with aspirin and Plavix, also high-dose atorvastatin, amlodipine, Farxiga, furosemide, Vascepa, metoprolol succinate, and amiodarone 200 mg daily.  Not currently on Entresto.  Recent creatinine showed improvement to 2.25 mg/dL.    He has not required furosemide.  His weight has gone up by only a few pounds in past 2 months.  He is feeling well, with no palpitations, no angina, no dyspnea with usual activities.  Not yet in cardiac rehabilitation.        Specialty Problems          Cardiology Problems    PAD (peripheral artery disease) (CMS-HCC)    History of coronary artery bypass graft     1992: CABG x5, [facility].         ASHD (arteriosclerotic heart disease)    Benign essential hypertension    Ischemic cardiomyopathy    History of acute inferior wall MI    Left ventricular systolic dysfunction, NYHA class 1    Congestive heart failure with right ventricular systolic dysfunction (Multi)    CAD (coronary artery disease)    Chest pain    HLD (hyperlipidemia)    Demand ischemia (Multi)    Ventricular tachycardia (Multi)    Acute on chronic systolic heart failure (Multi)    NSTEMI (non-ST elevated myocardial infarction) (Multi)    Subclavian arterial stenosis (CMS-HCC)        Past Medical History:   Diagnosis Date    Allergic     Asthma ([facility])     Diabetes mellitus (Multi) Over 20 years ago    Drug-induced gout, unspecified site 10/04/2019    Acute drug-induced gout    Encounter for screening for malignant neoplasm of colon 10/31/2022    Colon  cancer screening    Gout, unspecified     Acute gout    Heart disease Over 20 years sgo    Hypertension Over 20 years ago    Ocular pain, right eye 10/14/2018    Pain of right eye    Periorbital cellulitis 10/14/2018    Periorbital cellulitis    Personal history of colonic polyps 10/11/2017    History of colon polyps    Personal history of other diseases of the circulatory system 05/12/2021    History of angina pectoris    Personal history of other diseases of the circulatory system 05/12/2021    History of angina pectoris    Personal history of other diseases of the respiratory system 10/04/2019    History of acute bronchitis    Personal history of other drug therapy     History of influenza vaccination    Unspecified cirrhosis of liver (Multi) 11/07/2022    Cirrhosis, nonalcoholic          Past Surgical History:   Procedure Laterality Date    ADENOIDECTOMY  Childhood    CARDIAC CATHETERIZATION  Over 30 years ago    CARDIAC CATHETERIZATION N/A 4/15/2024    Procedure: Left And Right Heart Cath, With LV;  Surgeon: Blaine Adams MD;  Location: [facility];  Service: Cardiovascular;  Laterality: N/A;  3 hr hydration / arrive 6:30    CHOLECYSTECTOMY  Over 30 years ago    CIRCUMCISION, PRIMARY  72,years ago    CORONARY ARTERY BYPASS GRAFT  12/02/2021    CABG    EYE SURGERY  Childhood    INVASIVE VASCULAR PROCEDURE Left 4/30/2024    Procedure: Angioplasty - Upper Extremity;  Surgeon: Narinder Quevedo MD;  Location: [facility];  Service: Cardiovascular;  Laterality: Left;  3 hr hydration  / arrive 6:30    MR HEAD ANGIO WO IV CONTRAST  06/02/2021    MR HEAD ANGIO WO IV CONTRAST 6/2/2021 [facility] CLINICAL LEGACY    MR HEAD ANGIO WO IV CONTRAST  11/16/2021    MR HEAD ANGIO WO IV CONTRAST 11/16/2021 [facility] CLINICAL LEGACY    MR NECK ANGIO WO IV CONTRAST  06/02/2021    MR NECK ANGIO WO IV CONTRAST 6/2/2021 [facility] CLINICAL LEGACY    MR NECK ANGIO WO IV CONTRAST  11/16/2021    MR NECK ANGIO WO IV CONTRAST 11/16/2021 [facility]  CLINICAL LEGACY            Social History     Tobacco Use    Smoking status: Never    Smokeless tobacco: Never   Substance Use Topics    Alcohol use: Never    Drug use: Never        Physical Activity: Sufficiently Active (11/2/2023)    Exercise Vital Sign     Days of Exercise per Week: 5 days     Minutes of Exercise per Session: 40 min            No Known Allergies      Current Outpatient Medications   Medication Instructions    acetaminophen (TYLENOL) 650 mg, oral, Every 6 hours PRN    albuterol (ProAir HFA) 90 mcg/actuation inhaler 2 puffs, inhalation, Every 6 hours PRN    allopurinol (ZYLOPRIM) 50 mg, oral, Daily    amiodarone (Pacerone) 200 mg tablet Take 2 tablets (400 mg) by mouth 2 times a day for 9 doses. On February, 28th start to take 1 tablet (200 mg) by mouth once daily.    amLODIPine (NORVASC) 10 mg, oral, Daily    aspirin 81 mg, oral, Daily    atorvastatin (LIPITOR) 80 mg, oral, Daily    b complex vitamins capsule 1 capsule, Daily    clopidogrel (PLAVIX) 75 mg, oral, Daily    coenzyme Q-10 (CoQ-10) 100 mg capsule 1 capsule, oral, Daily    cyanocobalamin (Vitamin B-12) 1,000 mcg tablet 1 tablet, Daily, As directed    dapagliflozin (Farxiga) 10 mg 1 tablet, oral, Daily before breakfast    famotidine (PEPCID) 20 mg, oral, Daily PRN    furosemide (LASIX) 40 mg, oral, Daily, As needed for weight gain greater than 3 pounds    gabapentin (NEURONTIN) 100 mg, oral, Nightly    icosapent ethyL (VASCEPA) 1 g, oral, 2 times daily with meals    loratadine (CLARITIN) 10 mg, oral, Daily    magnesium gluconate (Magonate) 27.5 mg magne- sium (500 mg) tablet 2 tablets, oral, Daily    metoprolol succinate XL (TOPROL-XL) 50 mg, oral, Daily    multivitamin tablet 1 tablet, oral, Daily    pantoprazole (ProtoNix) 40 mg EC tablet 1 tablet, oral, Daily    sertraline (Zoloft) 50 mg tablet Take one tablet daily in the morning after breakfast.    tadalafil (CIALIS) 5 mg, oral, Daily    turmeric-turmeric root extract 450-50 mg  capsule Take daily as directed.        ROS     Vital Signs:  Vitals:    05/02/24 0954   BP: 135/64   BP Location: Left arm   Patient Position: Sitting   BP Cuff Size: Adult   Pulse: 55   Resp: 20   SpO2: 96%   Weight: 67.7 kg (149 lb 3.2 oz)     Wt Readings from Last 2 Encounters:   05/02/24 67.7 kg (149 lb 3.2 oz)   04/30/24 68 kg (149 lb 14.4 oz)     Body mass index is 26.43 kg/m².       Physical Exam:    Brief examination shows that he is alert and oriented, not in any pain, not in any respiratory distress.  Clear lung fields.  Regular heart sounds.  No S3 noted today.  No peripheral edema.  Otherwise, not examined.     Last Labs:  CMP:  Recent Labs     04/30/24  1030 04/30/24  0933 04/10/24  0906 03/04/24  0945 02/23/24  0808 02/22/24  0432   *  --  139 137 137 138   K 4.7  --  4.5 4.9 3.4* 3.5     --  104 100 100 102   CO2 23  --  25 28 25 25   ANIONGAP 10  --  15 14 15 15   BUN 38*  --  42* 39* 59* 67*   CREATININE 2.25*  --  2.40* 2.53* 2.51* 2.67*   EGFR 30* 52* 27* 26* 26* 24*   GLUCOSE 155*  --  170* 199* 230* 213*     Recent Labs     02/23/24  0808 02/21/24  0358 02/20/24  0528 02/15/24  1429 02/15/24  0407 02/14/24  0429 02/13/24  2214 01/11/24  1336 10/21/23  1125   ALBUMIN 3.3* 3.1* 3.2* 3.4 3.0*   < > 3.1* 4.3 4.1   ALKPHOS  --   --  60  --  58  --  70 92 91   ALT  --   --  109*  --  17  --  17 24 33   AST  --   --  99*  --  48*  --  15 31 28   BILITOT  --   --  1.2  --  0.6  --  0.3 0.5 0.5    < > = values in this interval not displayed.     CBC:  Recent Labs     04/10/24  0906 03/04/24  0945 02/23/24  0808 02/22/24  0432 02/21/24  0358   WBC 8.4 8.6 12.6* 11.6* 14.7*   HGB 14.7 14.4 12.0* 10.1* 10.2*   HCT 44.0 46.0 35.1* 31.6* 31.7*    355 266 217 216   MCV 95 98 90 92 93     COAG:   Recent Labs     02/20/24  0528 02/13/24  2214 07/31/23  1110 01/27/22  1241 11/15/21  2217   INR 2.1* 1.3* 1.1 1.1 1.2*     HEME/ENDO:  Recent Labs     02/17/24  0535 02/15/24  0407 01/11/24  1336  07/31/23  1110 05/04/23  1215 08/18/22  0940 11/18/21  0530 06/01/21  0525 05/31/21 2021   IRONSAT 8*  --   --   --   --   --   --   --   --    TSH  --  3.26  --   --   --  3.73 1.32  --  2.54   HGBA1C  --   --  6.6* 7.0* 6.8* 6.6*  --    < >  --     < > = values in this interval not displayed.      CARDIAC:   Recent Labs     02/18/24  1826 02/18/24  1730 02/18/24  0419 02/14/24  2111 02/14/24  1720 02/14/24  1502 02/13/24  2214 05/04/23  1215 09/14/22 2025 12/02/21  0947 11/15/21  2217 05/31/21 2021   TROPHS 4,490* 4,518*  --  635* 258* 377*   < >  --    < >  --   --   --    BNP  --   --  2,116*  --   --   --   --  203*  --  108* 139* 536*    < > = values in this interval not displayed.     Recent Labs     11/13/23  1041 07/31/23  1110 05/04/23  1215 05/05/22  1450   CHOL 102 128 143 144   LDLF  --  57 56 36   HDL 25.8 32.3* 37.1* 35.3*   TRIG 203* 192* 252* 362*       Last Cardiology Tests:    ECG:    Not performed today.    Echo:  Echo Results:  Transthoracic Echo (TTE) Limited With Contrast 04/26/2024    [place] [address]  Phone [phone] Fax [phone]    TRANSTHORACIC ECHOCARDIOGRAM REPORT      Patient Name:      CHIDI Andrade Physician:    [id number] Blaine Adams MD  Study Date:        4/26/2024            Ordering Provider:    [id number] HUMBERTO SELLERS  MRN/PID:           [id number]             Fellow:  Accession#:        [id number]         Nurse:                Christina Chacon RN  Date of Birth/Age: 1950 / 73 years Sonographer:          Nikole Porter RDCS  Gender:            M                    Additional Staff:  Height:            152.40 cm            Admit Date:  Weight:            65.32 kg             Admission Status:     Outpatient  BSA / BMI:         1.62 m2 / 28.12      Department Location:  [facility] Echo  kg/m2                                      Lab  Blood Pressure: 163 /74 mmHg    Study Type:    TRANSTHORACIC ECHO  (TTE) LIMITED  Diagnosis/ICD: Acute combined systolic (congestive) and diastolic (congestive)  heart failure (CHF)-I50.41; Non ST elevation (NSTEMI) myocardial  infarction-I21.4; Ventricular tachycardia, other-I47.29; Heart  disease, unspecified-I51.9  Indication:    CHF, NSTEMI, VT, LVD  CPT Codes:     Echo Limited-00531; Doppler Limited-31548; Color Doppler-02756    Patient History:  Diabetes:          Yes  Pertinent History: CHF, Cancer, HTN and TIA.    Study Detail: The following Echo studies were performed: 2D, Doppler and color  flow. Optison used as a contrast agent for endocardial border  definition. Total contrast used for this procedure was 5 mL via IV  push. Unable to obtain subcostal and suprasternal notch view.      PHYSICIAN INTERPRETATION:  Left Ventricle: Left ventricular systolic function is moderately decreased, with an estimated ejection fraction of 30-35%. Wall motion is abnormal. The left ventricular cavity size is mildly dilated. Spectral Doppler shows a restrictive pattern of left ventricular diastolic filling. There is an elevated left ventricular end diastolic pressure.  LV Wall Scoring:  The basal and mid inferior septum, basal and mid inferior wall, and basal and  mid inferolateral wall are akinetic. The basal anteroseptal segment, apical  septal segment, and apical inferior segment are hypokinetic. All remaining  scored segments are normal.    Left Atrium: The left atrium was not assessed.  Right Ventricle: The right ventricle was not assessed. Right ventricular systolic function not assessed.  Right Atrium: The right atrium was not assessed.  Aortic Valve: The aortic valve is trileaflet. There is mild aortic valve cusp calcification. There is no evidence of aortic valve regurgitation.  Mitral Valve: The mitral valve is mildly thickened. There is moderately decreased mitral valve posterior leaflet mobility. There is moderate mitral valve regurgitation.  Tricuspid Valve: The tricuspid  valve is structurally normal. There is mild to moderate tricuspid regurgitation. The Doppler estimated RVSP is mildly elevated at 35.7 mmHg.  Pulmonic Valve: The pulmonic valve was not assessed. The pulmonic valve regurgitation was not assessed.  Pericardium: There is no pericardial effusion noted.  Aorta: The aortic root was not assessed.      CONCLUSIONS:  1. Left ventricular systolic function is moderately decreased with a 30-35% estimated ejection fraction.  2. Multiple segmental abnormalities exist. See findings.  3. Spectral Doppler shows a restrictive pattern of left ventricular diastolic filling.  4. There is an elevated left ventricular end diastolic pressure.  5. Moderate mitral valve regurgitation.  6. Moderately decreased mitral valve posterior leaflet mobility.  7. Mild to moderate tricuspid regurgitation.  8. Mildly elevated RVSP.    QUANTITATIVE DATA SUMMARY:  2D MEASUREMENTS:  Normal Ranges:  IVSd:          1.16 cm    (0.6-1.1cm)  LVPWd:         1.04 cm    (0.6-1.1cm)  LVIDd:         5.17 cm    (3.9-5.9cm)  LVIDs:         4.87 cm  LV Mass Index: 134.2 g/m2  LV % FS        5.8 %    LV SYSTOLIC FUNCTION BY 2D PLANIMETRY (MOD):  Normal Ranges:  EF-A4C View: 36.9 % (>=55%)  EF-A2C View: 28.7 %  EF-Biplane:  29.8 %    LV DIASTOLIC FUNCTION:  Normal Ranges:  MV Peak E:    1.13 m/s   (0.7-1.2 m/s)  MV Peak A:    0.28 m/s   (0.42-0.7 m/s)  E/A Ratio:    3.98       (1.0-2.2)  MV e'         0.04 m/s   (>8.0)  MV lateral e' 0.04 m/s  MV medial e'  0.04 m/s  MV A Dur:     88.49 msec  E/e' Ratio:   28.16      (<8.0)  LV IVRT:      74 msec    (<100ms)    MITRAL VALVE:  Normal Ranges:  MV DT: 171 msec (150-240msec)    AORTIC VALVE:  Normal Ranges:  LVOT Max Matt:  1.02 m/s (<=1.1m/s)  LVOT VTI:      25.00 cm  LVOT Diameter: 2.03 cm  (1.8-2.4cm)    TRICUSPID VALVE/RVSP:  Normal Ranges:  Peak TR Velocity: 2.86 m/s  RV Syst Pressure: 35.7 mmHg (< 30mmHg)      [id number] Blaine Adams MD  Electronically signed on 4/28/2024  at 11:13:11 AM      Wall Scoring        ** Final **      Transthoracic Echo (TTE) Complete With Contrast 02/15/2024    [place] [address]  Phone [phone] Fax [phone]    TRANSTHORACIC ECHOCARDIOGRAM REPORT      Patient Name:      CHIDI VARGAS      Reading Physician:    [id number] Gonzalo Christopher CONRAD  Study Date:        2/15/2024             Ordering Provider:    [id number] TITO NICOLAS  MRN/PID:           [id number]              Fellow:  Accession#:        [id number]          Nurse:  Date of Birth/Age: 1950 / 73 years  Sonographer:          Heena David RDCS  Gender:            M                     Additional Staff:  Height:            160.02 cm             Admit Date:           2/13/2024  Weight:            73.03 kg              Admission Status:     Inpatient -  Routine  BSA / BMI:         1.76 m2 / 28.52 kg/m2 Department Location:  [facility] Echo  Lab  Blood Pressure: 105 /60 mmHg    Study Type:    TRANSTHORACIC ECHO (TTE) COMPLETE  Diagnosis/ICD: Chest pain, unspecified-R07.9  Indication:    Chest Pain  CPT Codes:     Echo Complete w Full Doppler-40323  Study Detail: The following Echo studies were performed: 2D, M-Mode, Doppler and  color flow. Optison used as a contrast agent for endocardial  border definition. Total contrast used for this procedure was 3 mL  via IV push.      PHYSICIAN INTERPRETATION:  Left Ventricle: Left ventricular systolic function is normal, with an estimated ejection fraction of 30-35%. There are multiple wall motion abnormalities. The left ventricular cavity size is upper limits of normal. Spectral Doppler shows a restrictive pattern of left ventricular diastolic filling.  LV Wall Scoring:  The basal inferoseptal segment and basal inferior segment are akinetic. The  entire anterior septum, mid and apical inferior septum, basal and mid  inferolateral wall, basal anterolateral segment, basal anterior segment, and  mid  inferior segment are hypokinetic. All remaining scored segments are normal.    Left Atrium: The left atrium is normal in size.  Right Ventricle: The right ventricle was not well visualized. There is low normal right ventricular systolic function.  Right Atrium: The right atrium is normal in size.  Aortic Valve: The aortic valve is trileaflet. There is mild aortic valve thickening. There is no evidence of aortic valve regurgitation. The peak instantaneous gradient of the aortic valve is 7.1 mmHg. The mean gradient of the aortic valve is 4.0 mmHg.  Mitral Valve: The mitral valve is normal in structure. There is mild mitral valve regurgitation.  Tricuspid Valve: The tricuspid valve is structurally normal. There is mild to moderate tricuspid regurgitation.  Pulmonic Valve: The pulmonic valve is structurally normal. There is physiologic pulmonic valve regurgitation.  Pericardium: There is no pericardial effusion noted.  Aorta: The aortic root is normal.      CONCLUSIONS:  1. Left ventricular systolic function is normal with a 30-35% estimated ejection fraction.  2. Multiple segmental abnormalities exist. See findings.  3. Spectral Doppler shows a restrictive pattern of left ventricular diastolic filling.  4. There is low normal right ventricular systolic function.  5. Mild to moderate tricuspid regurgitation.  6. There are multiple wall motion abnormalities.    QUANTITATIVE DATA SUMMARY:  2D MEASUREMENTS:  Normal Ranges:  Ao Root d:     2.90 cm    (2.0-3.7cm)  LAs:           4.60 cm    (2.7-4.0cm)  IVSd:          1.00 cm    (0.6-1.1cm)  LVPWd:         0.90 cm    (0.6-1.1cm)  LVIDd:         5.30 cm    (3.9-5.9cm)  LVIDs:         5.20 cm  LV Mass Index: 106.2 g/m2  LV % FS        1.9 %    LA VOLUME:  Normal Ranges:  LA Vol A4C:        53.0 ml    (22+/-6mL/m2)  LA Vol A2C:        47.8 ml  LA Vol BP:         50.3 ml  LA Vol Index A4C:  30.0ml/m2  LA Vol Index A2C:  27.1 ml/m2  LA Vol Index BP:   28.5 ml/m2  LA Area A4C:        18.0 cm2  LA Area A2C:       17.1 cm2  LA Major Axis A4C: 5.2 cm  LA Major Axis A2C: 5.2 cm  LA Volume Index:   26.8 ml/m2    M-MODE MEASUREMENTS:  Normal Ranges:  Ao Root: 2.90 cm (2.0-3.7cm)    AORTA MEASUREMENTS:  Normal Ranges:  Asc Ao, d: 2.50 cm (2.1-3.4cm)    LV SYSTOLIC FUNCTION BY 2D PLANIMETRY (MOD):  Normal Ranges:  EF-A4C View: 29.3 % (>=55%)  EF-A2C View: 43.2 %  EF-Biplane:  35.2 %    LV DIASTOLIC FUNCTION:  Normal Ranges:  MV Peak E:    1.29 m/s (0.7-1.2 m/s)  MV Peak A:    0.50 m/s (0.42-0.7 m/s)  E/A Ratio:    2.56     (1.0-2.2)  MV e'         0.06 m/s (>8.0)  MV lateral e' 0.06 m/s  MV medial e'  0.06 m/s  E/e' Ratio:   21.50    (<8.0)    MITRAL VALVE:  Normal Ranges:  MV DT: 151 msec (150-240msec)    MITRAL INSUFFICIENCY:  Normal Ranges:  PISA Radius:  0.5 cm  MR VTI:       129.00 cm  MR Vmax:      437.00 cm/s  MR Alias Matt: 38.5 cm/s  MR Volume:    17.85 ml  MR Flow Rt:   60.48 ml/s  MR EROA:      0.14 cm2    AORTIC VALVE:  Normal Ranges:  AoV Vmax:                1.33 m/s (<=1.7m/s)  AoV Peak P.1 mmHg (<20mmHg)  AoV Mean P.0 mmHg (1.7-11.5mmHg)  LVOT Max Matt:            1.04 m/s (<=1.1m/s)  AoV VTI:                 25.10 cm (18-25cm)  LVOT VTI:                20.70 cm  LVOT Diameter:           2.00 cm  (1.8-2.4cm)  AoV Area, VTI:           2.59 cm2 (2.5-5.5cm2)  AoV Area,Vmax:           2.46 cm2 (2.5-4.5cm2)  AoV Dimensionless Index: 0.82      RIGHT VENTRICLE:  TAPSE: 14.1 mm  RV s'  0.09 m/s    TRICUSPID VALVE/RVSP:  Normal Ranges:  Peak TR Velocity: 2.44 m/s  RV Syst Pressure: 26.8 mmHg (< 30mmHg)  IVC Diam:         1.50 cm      [id number] Gonzalo White DO  Electronically signed on 2/15/2024 at 11:57:27 AM      Wall Scoring        ** Final **       Cath:      Stress Test:  Stress Results:  No results found for this or any previous visit from the past 365 days.         Cardiac Imaging:        Assessment/Plan     Complex problem list reviewed.  After trauma to the  left thigh with blood loss anemia, developed acute kidney injury, demand ischemia, ventricular tachycardia, significant LV dysfunction and decompensated heart failure.  Catheterization showed occlusion of the native circulation, and a patent mammary graft to the LAD and D1, and occlusion of all vein grafts.  A severe left subclavian stenosis was identified, the likely cause of his acute ischemia in the setting of blood loss anemia and hemodynamic compromise.  2 days ago, he underwent subclavian stent.    He will require DAPT for 1 year.  We will likely initiate Entresto once renal function has stabilized.  He did develop hyperkalemia with both spironolactone and losartan, so we will need to be very careful, monitoring potassium.  For now, we will hold off on the Entresto until he is seen by electrophysiology for assessment of long-term antiarrhythmic drug therapy, or secondary prevention ICD.  Will see him again in 6 weeks.  We encouraged him to walk for exercise, 30 minutes 5 days weekly.    Orders:  Orders Placed This Encounter   Procedures    Renal Function Panel    Referral to Cardiac Electrophysiology      Followup Appts:  Future Appointments   Date Time Provider Department Center   5/14/2024  7:00 AM [facility] ULTRASOUND 2 [facility] [place]   5/16/2024 12:00 PM Blaine Adams MD [id number] [facility]   5/23/2024  8:20 AM Nate Duran MD [id number] [facility]   8/22/2024 12:40 PM Ofelia Mccracken DO [id number] [facility]   9/5/2024  1:00 PM Alyse Holland, PhD [id number] [facility]   9/18/2024  3:30 PM Meme Perrin MD [id number] [facility]   11/25/2024  9:50 AM Jeanmarie Ibarra MD AHUURO [facility]           ____________________________________________________________  Jose Hennessy MD    Senior Attending Physician  [facility]  [facility] Chair for Cardiovascular Excellence  [employer] [facility] [employer]

## 2024-05-02 NOTE — PATIENT INSTRUCTIONS
Continue to walk for exercise, 30 minutes 5 days weekly.    Take all medications as prescribed.    Schedule appointment with Electrophysiology Dr. Díaz at [facility] [phone]    Followup in our office 6 weeks.

## 2024-05-06 ENCOUNTER — LAB (OUTPATIENT)
Dept: LAB | Facility: LAB | Age: 74
End: 2024-05-06
Payer: MEDICARE

## 2024-05-06 DIAGNOSIS — E11.40 TYPE 2 DIABETES MELLITUS WITH DIABETIC NEUROPATHY, WITHOUT LONG-TERM CURRENT USE OF INSULIN (MULTI): ICD-10-CM

## 2024-05-06 DIAGNOSIS — K74.60 CIRRHOSIS OF LIVER WITHOUT ASCITES, UNSPECIFIED HEPATIC CIRRHOSIS TYPE (MULTI): ICD-10-CM

## 2024-05-06 DIAGNOSIS — I50.82 CONGESTIVE HEART FAILURE WITH RIGHT VENTRICULAR SYSTOLIC DYSFUNCTION (MULTI): ICD-10-CM

## 2024-05-06 DIAGNOSIS — I50.23 ACUTE ON CHRONIC SYSTOLIC HEART FAILURE (MULTI): ICD-10-CM

## 2024-05-06 DIAGNOSIS — K76.0 NON-ALCOHOLIC FATTY LIVER DISEASE: ICD-10-CM

## 2024-05-06 DIAGNOSIS — M10.9 GOUT, UNSPECIFIED CAUSE, UNSPECIFIED CHRONICITY, UNSPECIFIED SITE: ICD-10-CM

## 2024-05-06 DIAGNOSIS — I50.20 CONGESTIVE HEART FAILURE WITH RIGHT VENTRICULAR SYSTOLIC DYSFUNCTION (MULTI): ICD-10-CM

## 2024-05-06 LAB
AFP SERPL-MCNC: <4 NG/ML (ref 0–9)
ALBUMIN SERPL BCP-MCNC: 4.2 G/DL (ref 3.4–5)
ALP SERPL-CCNC: 103 U/L (ref 33–136)
ALT SERPL W P-5'-P-CCNC: 27 U/L (ref 10–52)
ANION GAP SERPL CALC-SCNC: 12 MMOL/L (ref 10–20)
AST SERPL W P-5'-P-CCNC: 25 U/L (ref 9–39)
BASOPHILS # BLD AUTO: 0.04 X10*3/UL (ref 0–0.1)
BASOPHILS NFR BLD AUTO: 0.5 %
BILIRUB DIRECT SERPL-MCNC: 0.1 MG/DL (ref 0–0.3)
BILIRUB SERPL-MCNC: 0.6 MG/DL (ref 0–1.2)
BUN SERPL-MCNC: 44 MG/DL (ref 6–23)
CALCIUM SERPL-MCNC: 9 MG/DL (ref 8.6–10.3)
CHLORIDE SERPL-SCNC: 105 MMOL/L (ref 98–107)
CHOLEST SERPL-MCNC: 114 MG/DL (ref 0–199)
CHOLESTEROL/HDL RATIO: 3.7
CO2 SERPL-SCNC: 27 MMOL/L (ref 21–32)
CREAT SERPL-MCNC: 2.43 MG/DL (ref 0.5–1.3)
CREAT UR-MCNC: 81.9 MG/DL (ref 20–370)
EGFRCR SERPLBLD CKD-EPI 2021: 27 ML/MIN/1.73M*2
EOSINOPHIL # BLD AUTO: 0.26 X10*3/UL (ref 0–0.4)
EOSINOPHIL NFR BLD AUTO: 3.4 %
ERYTHROCYTE [DISTWIDTH] IN BLOOD BY AUTOMATED COUNT: 13.9 % (ref 11.5–14.5)
GLUCOSE SERPL-MCNC: 147 MG/DL (ref 74–99)
HCT VFR BLD AUTO: 42.3 % (ref 41–52)
HDLC SERPL-MCNC: 31 MG/DL
HGB BLD-MCNC: 13.6 G/DL (ref 13.5–17.5)
IMM GRANULOCYTES # BLD AUTO: 0.01 X10*3/UL (ref 0–0.5)
IMM GRANULOCYTES NFR BLD AUTO: 0.1 % (ref 0–0.9)
INR PPP: 1.2 (ref 0.9–1.1)
LDLC SERPL CALC-MCNC: 53 MG/DL
LYMPHOCYTES # BLD AUTO: 2.2 X10*3/UL (ref 0.8–3)
LYMPHOCYTES NFR BLD AUTO: 28.4 %
MCH RBC QN AUTO: 31.1 PG (ref 26–34)
MCHC RBC AUTO-ENTMCNC: 32.2 G/DL (ref 32–36)
MCV RBC AUTO: 97 FL (ref 80–100)
MICROALBUMIN UR-MCNC: 83.8 MG/L
MICROALBUMIN/CREAT UR: 102.3 UG/MG CREAT
MONOCYTES # BLD AUTO: 0.82 X10*3/UL (ref 0.05–0.8)
MONOCYTES NFR BLD AUTO: 10.6 %
NEUTROPHILS # BLD AUTO: 4.43 X10*3/UL (ref 1.6–5.5)
NEUTROPHILS NFR BLD AUTO: 57 %
NON HDL CHOLESTEROL: 83 MG/DL (ref 0–149)
NRBC BLD-RTO: 0 /100 WBCS (ref 0–0)
PHOSPHATE SERPL-MCNC: 4.6 MG/DL (ref 2.5–4.9)
PLATELET # BLD AUTO: 182 X10*3/UL (ref 150–450)
POTASSIUM SERPL-SCNC: 5.5 MMOL/L (ref 3.5–5.3)
PROT SERPL-MCNC: 7 G/DL (ref 6.4–8.2)
PROTHROMBIN TIME: 13.3 SECONDS (ref 9.8–12.8)
RBC # BLD AUTO: 4.38 X10*6/UL (ref 4.5–5.9)
SODIUM SERPL-SCNC: 138 MMOL/L (ref 136–145)
TRIGL SERPL-MCNC: 149 MG/DL (ref 0–149)
URATE SERPL-MCNC: 5.4 MG/DL (ref 4–7.5)
VLDL: 30 MG/DL (ref 0–40)
WBC # BLD AUTO: 7.8 X10*3/UL (ref 4.4–11.3)

## 2024-05-06 PROCEDURE — 82043 UR ALBUMIN QUANTITATIVE: CPT

## 2024-05-06 PROCEDURE — 82570 ASSAY OF URINE CREATININE: CPT

## 2024-05-06 PROCEDURE — 84100 ASSAY OF PHOSPHORUS: CPT

## 2024-05-06 PROCEDURE — 84550 ASSAY OF BLOOD/URIC ACID: CPT

## 2024-05-06 PROCEDURE — 82105 ALPHA-FETOPROTEIN SERUM: CPT

## 2024-05-06 PROCEDURE — 80053 COMPREHEN METABOLIC PANEL: CPT

## 2024-05-06 PROCEDURE — 36415 COLL VENOUS BLD VENIPUNCTURE: CPT

## 2024-05-06 PROCEDURE — 85025 COMPLETE CBC W/AUTO DIFF WBC: CPT

## 2024-05-06 PROCEDURE — 85610 PROTHROMBIN TIME: CPT

## 2024-05-06 PROCEDURE — 80061 LIPID PANEL: CPT

## 2024-05-06 PROCEDURE — 82248 BILIRUBIN DIRECT: CPT

## 2024-05-07 DIAGNOSIS — N52.9 ERECTILE DYSFUNCTION, UNSPECIFIED ERECTILE DYSFUNCTION TYPE: Primary | ICD-10-CM

## 2024-05-08 DIAGNOSIS — I50.23 ACUTE ON CHRONIC SYSTOLIC HEART FAILURE (MULTI): ICD-10-CM

## 2024-05-08 DIAGNOSIS — I50.20 CONGESTIVE HEART FAILURE WITH RIGHT VENTRICULAR SYSTOLIC DYSFUNCTION (MULTI): ICD-10-CM

## 2024-05-08 DIAGNOSIS — I50.82 CONGESTIVE HEART FAILURE WITH RIGHT VENTRICULAR SYSTOLIC DYSFUNCTION (MULTI): ICD-10-CM

## 2024-05-08 RX ORDER — TADALAFIL 20 MG/1
TABLET ORAL
Qty: 30 TABLET | Refills: 0 | Status: SHIPPED | OUTPATIENT
Start: 2024-05-08

## 2024-05-08 RX ORDER — FUROSEMIDE 40 MG/1
40 TABLET ORAL DAILY
Qty: 90 TABLET | Refills: 3 | Status: SHIPPED | OUTPATIENT
Start: 2024-05-08 | End: 2025-05-08

## 2024-05-09 PROBLEM — R45.86 MOOD CHANGES: Status: ACTIVE | Noted: 2023-10-04

## 2024-05-09 PROBLEM — K21.00 GASTRO-ESOPHAGEAL REFLUX DISEASE WITH ESOPHAGITIS, WITHOUT BLEEDING: Status: ACTIVE | Noted: 2023-03-10

## 2024-05-09 PROBLEM — Z86.0100 HISTORY OF COLONIC POLYPS: Status: ACTIVE | Noted: 2023-03-10

## 2024-05-09 PROBLEM — N40.0 BENIGN PROSTATIC HYPERPLASIA: Status: ACTIVE | Noted: 2023-04-18

## 2024-05-09 PROBLEM — G56.00 CARPAL TUNNEL SYNDROME: Status: ACTIVE | Noted: 2024-05-09

## 2024-05-09 PROBLEM — E11.9 TYPE 2 DIABETES MELLITUS (MULTI): Status: ACTIVE | Noted: 2022-09-15

## 2024-05-09 PROBLEM — N17.9 ACUTE KIDNEY INJURY (CMS-HCC): Status: ACTIVE | Noted: 2024-02-14

## 2024-05-09 PROBLEM — E87.5 HYPERKALEMIA: Status: ACTIVE | Noted: 2023-08-01

## 2024-05-09 PROBLEM — I25.810 ATHEROSCLEROSIS OF CORONARY ARTERY BYPASS GRAFT: Status: ACTIVE | Noted: 2023-04-18

## 2024-05-09 PROBLEM — I21.9 MYOCARDIAL INFARCTION (MULTI): Status: ACTIVE | Noted: 2024-02-13

## 2024-05-09 PROBLEM — R41.89 UNRESPONSIVENESS: Status: ACTIVE | Noted: 2023-08-21

## 2024-05-09 PROBLEM — S85.909A: Status: ACTIVE | Noted: 2024-05-09

## 2024-05-09 PROBLEM — E78.5 HYPERLIPIDEMIA: Status: ACTIVE | Noted: 2023-05-03

## 2024-05-09 PROBLEM — I25.2 HISTORY OF MYOCARDIAL INFARCTION: Status: ACTIVE | Noted: 2022-09-15

## 2024-05-09 PROBLEM — Z20.822 CONTACT WITH AND (SUSPECTED) EXPOSURE TO COVID-19: Status: ACTIVE | Noted: 2022-09-15

## 2024-05-09 PROBLEM — I85.00 ESOPHAGEAL VARICES (MULTI): Status: ACTIVE | Noted: 2023-08-21

## 2024-05-09 PROBLEM — R68.89 FINDING OF NECK REGION: Status: ACTIVE | Noted: 2023-08-21

## 2024-05-09 PROBLEM — R41.89 IMPAIRED COGNITION: Status: ACTIVE | Noted: 2022-12-13

## 2024-05-09 PROBLEM — N18.30 STAGE 3 CHRONIC KIDNEY DISEASE (MULTI): Status: ACTIVE | Noted: 2023-04-18

## 2024-05-09 PROBLEM — G56.03 BILATERAL CARPAL TUNNEL SYNDROME: Status: ACTIVE | Noted: 2023-01-18

## 2024-05-09 PROBLEM — K76.6 PORTAL HYPERTENSION (MULTI): Status: ACTIVE | Noted: 2023-03-10

## 2024-05-09 PROBLEM — I51.9 LEFT VENTRICULAR SYSTOLIC DYSFUNCTION (LVSD): Status: ACTIVE | Noted: 2023-08-21

## 2024-05-09 PROBLEM — Z86.010 HISTORY OF COLONIC POLYPS: Status: ACTIVE | Noted: 2023-03-10

## 2024-05-09 RX ORDER — MULTIVITAMIN
TABLET ORAL EVERY 24 HOURS
COMMUNITY

## 2024-05-09 RX ORDER — DICLOFENAC SODIUM 100 MG/1
100 TABLET, EXTENDED RELEASE ORAL DAILY PRN
COMMUNITY
Start: 2024-04-17

## 2024-05-09 RX ORDER — CETIRIZINE HYDROCHLORIDE 5 MG/1
TABLET ORAL EVERY 24 HOURS
COMMUNITY
End: 2024-05-31 | Stop reason: HOSPADM

## 2024-05-13 ENCOUNTER — OFFICE VISIT (OUTPATIENT)
Dept: CARDIOLOGY | Facility: CLINIC | Age: 74
End: 2024-05-13
Payer: MEDICARE

## 2024-05-13 VITALS
DIASTOLIC BLOOD PRESSURE: 64 MMHG | SYSTOLIC BLOOD PRESSURE: 122 MMHG | HEART RATE: 56 BPM | HEIGHT: 63 IN | BODY MASS INDEX: 26.33 KG/M2 | WEIGHT: 148.6 LBS

## 2024-05-13 DIAGNOSIS — I50.23 ACUTE ON CHRONIC SYSTOLIC HEART FAILURE (MULTI): ICD-10-CM

## 2024-05-13 DIAGNOSIS — I47.20 VENTRICULAR TACHYCARDIA (MULTI): Primary | ICD-10-CM

## 2024-05-13 PROCEDURE — 3044F HG A1C LEVEL LT 7.0%: CPT | Performed by: INTERNAL MEDICINE

## 2024-05-13 PROCEDURE — 1159F MED LIST DOCD IN RCRD: CPT | Performed by: INTERNAL MEDICINE

## 2024-05-13 PROCEDURE — 3078F DIAST BP <80 MM HG: CPT | Performed by: INTERNAL MEDICINE

## 2024-05-13 PROCEDURE — 3074F SYST BP LT 130 MM HG: CPT | Performed by: INTERNAL MEDICINE

## 2024-05-13 PROCEDURE — 93000 ELECTROCARDIOGRAM COMPLETE: CPT | Performed by: INTERNAL MEDICINE

## 2024-05-13 PROCEDURE — 3048F LDL-C <100 MG/DL: CPT | Performed by: INTERNAL MEDICINE

## 2024-05-13 PROCEDURE — 3060F POS MICROALBUMINURIA REV: CPT | Performed by: INTERNAL MEDICINE

## 2024-05-13 PROCEDURE — 1157F ADVNC CARE PLAN IN RCRD: CPT | Performed by: INTERNAL MEDICINE

## 2024-05-13 PROCEDURE — 99214 OFFICE O/P EST MOD 30 MIN: CPT | Performed by: INTERNAL MEDICINE

## 2024-05-13 NOTE — PROGRESS NOTES
[facility] and Vascular Electrophysiology    Patient Name: Ritesh Garza  Patient : 1950    Referred for      Ritesh Garza is a 73 y.o. year old male patient with    CAD: MI with multivessel CAD. CABG 1992.   HFrEF: 2021 admitted with TIA and covid. LV function at that time was 30%. Repeat echo May 2023 LV function 40-45%.   CKD  Ventricular tachycardia: Patient was admitted in 2024 after a fall with significant left hip and hematoma. While hospitalized he had episodes of VT and chest pain. He was started on amiodarone. ICD was considered if no recovery of LV EF which was 30-35% at that time. LHC was deferred due to acute TAYLOR. LHC 2024 showed a patent left POP to LAD and D1, occluded vein grafts and severe native CAD with an 80% left main,  proximal LAD,  proximal circumflex,  proximal RCA.  LVEF 30 to 35%.  Severe left subclavian stenosis.  After evaluation, we elected to proceed with left subclavian stent, which was performed successfully on 2024.  Cardiomyopathy: LV function has remained 30-35% on GDMT since February.        Past Medical History:  He has a past medical history of Allergic, Asthma ([facility]), Coronary artery disease (Long Ago), Diabetes mellitus (Multi) (Over 20 years ago), Drug-induced gout, unspecified site (10/04/2019), Encounter for screening for malignant neoplasm of colon (10/31/2022), Gout, unspecified, Heart disease (Over 20 years sgo), Heart murmur (Long ago), Hypertension (Over 20 years ago), Myocardial infarction (Multi) (Over 20 yesrs ago), Ocular pain, right eye (10/14/2018), Periorbital cellulitis (10/14/2018), Personal history of colonic polyps (10/11/2017), Personal history of other diseases of the circulatory system (2021), Personal history of other diseases of the circulatory system (2021), Personal history of other diseases of the respiratory system (10/04/2019), Personal history of other drug  therapy, and Unspecified cirrhosis of liver (Multi) (11/07/2022).    Past Surgical History:  He has a past surgical history that includes Coronary artery bypass graft (12/02/2021); MR angio head wo IV contrast (06/02/2021); MR angio neck wo IV contrast (06/02/2021); MR angio head wo IV contrast (11/16/2021); MR angio neck wo IV contrast (11/16/2021); Adenoidectomy (Childhood); Cardiac catheterization (Over 30 years ago); Cholecystectomy (Over 30 years ago); Circumcision, primary (72,years ago); Eye surgery (Childhood); Cardiac catheterization (N/A, 04/15/2024); Invasive Vascular Procedure (Left, 04/30/2024); and Coronary stent placement (Last week).      Social History:  He reports that he has never smoked. He has never used smokeless tobacco. He reports that he does not drink alcohol and does not use drugs.    Family History:  Family History   Problem Relation Name Age of Onset    Diabetes Mother Sofia Garza     Other (mycoardial infarction) Father Hilario 46    Fainting Father Hilario     Heart disease Father Hilario     Stomach cancer Mother's Sister      Stroke Maternal Grandmother Jasmina Mace     Colon cancer Maternal Grandmother Jasmina Mace         Allergies:  Patient has no known allergies.    Outpatient Medications:  Current Outpatient Medications   Medication Instructions    acetaminophen (TYLENOL) 650 mg, oral, Every 6 hours PRN    albuterol (ProAir HFA) 90 mcg/actuation inhaler 2 puffs, inhalation, Every 6 hours PRN    allopurinol (ZYLOPRIM) 50 mg, oral, Daily    amiodarone (Pacerone) 200 mg tablet Take 2 tablets (400 mg) by mouth 2 times a day for 9 doses. On February, 28th start to take 1 tablet (200 mg) by mouth once daily.    amLODIPine (NORVASC) 10 mg, oral, Daily    aspirin 81 mg, oral, Daily    atorvastatin (LIPITOR) 80 mg, oral, Daily    b complex vitamins capsule 1 capsule, Daily    cetirizine (ZyrTEC) 5 mg tablet Every 24 hours    clopidogrel (PLAVIX) 75 mg, oral, Daily    coenzyme Q-10  "(CoQ-10) 100 mg capsule 1 capsule, oral, Daily    cyanocobalamin (Vitamin B-12) 1,000 mcg tablet 1 tablet, Daily, As directed    dapagliflozin (Farxiga) 10 mg 1 tablet, oral, Daily before breakfast    diclofenac sodium (VOLTAREN XR) 100 mg, oral, Daily PRN    famotidine (PEPCID) 20 mg, oral, Daily PRN    furosemide (LASIX) 40 mg, oral, Daily, As needed for weight gain greater than 3 pounds    gabapentin (NEURONTIN) 100 mg, oral, Nightly    icosapent ethyL (VASCEPA) 1 g, oral, 2 times daily with meals    loratadine (CLARITIN) 10 mg, oral, Daily    magnesium gluconate (Magonate) 27.5 mg magne- sium (500 mg) tablet 2 tablets, oral, Daily    metoprolol succinate XL (TOPROL-XL) 50 mg, oral, Daily    multivit with min-folic acid 0.4 mg tablet Every 24 hours    multivitamin tablet 1 tablet, oral, Daily    pantoprazole (ProtoNix) 40 mg EC tablet 1 tablet, oral, Daily    sertraline (Zoloft) 50 mg tablet Take one tablet daily in the morning after breakfast.    tadalafil (CIALIS) 5 mg, oral, Daily    tadalafil 20 mg tablet START WITH ONE-HALF TABLET AS NEEDED 2 HOURS PRIOR TO ACTIVITY. MAY INCREASE TO 1 TABLET AS NEEDED 2 HOURS PRIOR TO ACTIVITY    turmeric-turmeric root extract 450-50 mg capsule Take daily as directed.         ROS:  A 14 point review of systems was done and is negative other than as stated in HPI    Vitals:  Vitals:    05/13/24 0823   BP: 122/64   Pulse: 56   Weight: 67.4 kg (148 lb 9.6 oz)   Height: 1.6 m (5' 3\")       Physical Exam  Constitutional:       Appearance: Normal appearance.   Eyes:      Pupils: Pupils are equal, round, and reactive to light.   Cardiovascular:      Rate and Rhythm: Normal rate and regular rhythm.      Heart sounds: Normal heart sounds.   Pulmonary:      Effort: Pulmonary effort is normal.      Breath sounds: Normal breath sounds.   Musculoskeletal:         General: Normal range of motion.   Skin:     General: Skin is warm and dry.   Neurological:      Mental Status: He is alert " and oriented to person, place, and time.          Labs:   Lab Results   Component Value Date    WBC 7.8 05/06/2024    HGB 13.6 05/06/2024    HCT 42.3 05/06/2024     05/06/2024    ALT 27 05/06/2024    AST 25 05/06/2024     05/06/2024    K 5.5 (H) 05/06/2024     05/06/2024    CREATININE 2.43 (H) 05/06/2024    BUN 44 (H) 05/06/2024    CO2 27 05/06/2024    TSH 3.26 02/15/2024    INR 1.2 (H) 05/06/2024        Cardiac Testing:  ECG  5/13/2024 sinus bradycardia with rate of 56 bpm, ST changes, QTC 465ms    Echocardiogram April 2024  CONCLUSIONS:   1. Left ventricular systolic function is moderately decreased with a 30-35% estimated ejection fraction.   2. Multiple segmental abnormalities exist. See findings.   3. Spectral Doppler shows a restrictive pattern of left ventricular diastolic filling.   4. There is an elevated left ventricular end diastolic pressure.   5. Moderate mitral valve regurgitation.   6. Moderately decreased mitral valve posterior leaflet mobility.   7. Mild to moderate tricuspid regurgitation.   8. Mildly elevated RVSP.    Echocardiogram February 2024  CONCLUSIONS:   1. Left ventricular systolic function is normal with a 30-35% estimated ejection fraction.   2. Multiple segmental abnormalities exist. See findings.   3. Spectral Doppler shows a restrictive pattern of left ventricular diastolic filling.   4. There is low normal right ventricular systolic function.   5. Mild to moderate tricuspid regurgitation.   6. There are multiple wall motion abnormalities.    Echocardiogram May 2023  CONCLUSIONS:  1. Left ventricular systolic function is mildly decreased with a 40-45% estimated ejection fraction.  2. The left ventricular cavity size is mildly dilated.  3. Basal and mid inferolateral wall, basal inferoseptal segment, and basal inferior segment are abnormal.  4. Spectral Doppler shows a pseudonormal pattern of left ventricular diastolic filling.  5. The left atrium is mildly  dilated.  6. RVSP within normal limits.    Cath April 2024  CONCLUSIONS:   1. Severe native three vessel CAD.   2. Patent LIMA-LAD/Diagonal vessel. All other grafts are occluded.   3. Severe left subclavian artery stenosis with a gradient of ~28-30 mmHg across the stenosis.   4. Mildly elevated filling pressure with PCWP of 21 mmHg.   5. Mild pulmonary hypertension with mPAP of 29 mmHg (Post capillary).   6. Normal cardiac output and index of 4.1 L/min and 2.5 L/min/m2 respectively.   7. Successful RCFA closure with 6Fr. VIP Angioseal.   8. Left Ventricular end-diastolic pressure = 23 mmHg.    Assessment:   Cardiomyopathy: LV function remains 30-35% despite GDMT. ECG today sinus rhythm with narrow QRS. We discussed ICD implant for primary prevention of SCD, including risks and benefits. Patient is agreeable to ICD implant.       Plan:  Plan for ICD implant

## 2024-05-14 ENCOUNTER — HOSPITAL ENCOUNTER (OUTPATIENT)
Dept: RADIOLOGY | Facility: HOSPITAL | Age: 74
Discharge: HOME | End: 2024-05-14
Payer: MEDICARE

## 2024-05-14 DIAGNOSIS — K74.60 CIRRHOSIS OF LIVER WITHOUT ASCITES, UNSPECIFIED HEPATIC CIRRHOSIS TYPE (MULTI): ICD-10-CM

## 2024-05-14 DIAGNOSIS — K76.0 NON-ALCOHOLIC FATTY LIVER DISEASE: ICD-10-CM

## 2024-05-14 PROCEDURE — 76705 ECHO EXAM OF ABDOMEN: CPT | Performed by: RADIOLOGY

## 2024-05-14 PROCEDURE — 76705 ECHO EXAM OF ABDOMEN: CPT

## 2024-05-16 ENCOUNTER — TELEPHONE (OUTPATIENT)
Dept: CARDIOLOGY | Facility: CLINIC | Age: 74
End: 2024-05-16

## 2024-05-16 ENCOUNTER — APPOINTMENT (OUTPATIENT)
Dept: CARDIOLOGY | Facility: CLINIC | Age: 74
End: 2024-05-16
Payer: MEDICARE

## 2024-05-17 DIAGNOSIS — I47.20 VENTRICULAR TACHYCARDIA, UNSPECIFIED (MULTI): ICD-10-CM

## 2024-05-17 DIAGNOSIS — I50.23 ACUTE ON CHRONIC SYSTOLIC HEART FAILURE (MULTI): ICD-10-CM

## 2024-05-17 DIAGNOSIS — I25.5 ISCHEMIC CARDIOMYOPATHY: ICD-10-CM

## 2024-05-23 ENCOUNTER — OFFICE VISIT (OUTPATIENT)
Dept: GASTROENTEROLOGY | Facility: CLINIC | Age: 74
End: 2024-05-23
Payer: MEDICARE

## 2024-05-23 VITALS
TEMPERATURE: 97.2 F | DIASTOLIC BLOOD PRESSURE: 79 MMHG | BODY MASS INDEX: 26.58 KG/M2 | SYSTOLIC BLOOD PRESSURE: 144 MMHG | WEIGHT: 150 LBS | HEIGHT: 63 IN | HEART RATE: 58 BPM

## 2024-05-23 DIAGNOSIS — K74.60 CIRRHOSIS OF LIVER WITHOUT ASCITES, UNSPECIFIED HEPATIC CIRRHOSIS TYPE (MULTI): ICD-10-CM

## 2024-05-23 DIAGNOSIS — Z71.2 ENCOUNTER TO DISCUSS TEST RESULTS: ICD-10-CM

## 2024-05-23 DIAGNOSIS — K76.0 NON-ALCOHOLIC FATTY LIVER DISEASE: Primary | ICD-10-CM

## 2024-05-23 PROCEDURE — 3078F DIAST BP <80 MM HG: CPT | Performed by: INTERNAL MEDICINE

## 2024-05-23 PROCEDURE — 3077F SYST BP >= 140 MM HG: CPT | Performed by: INTERNAL MEDICINE

## 2024-05-23 PROCEDURE — 1160F RVW MEDS BY RX/DR IN RCRD: CPT | Performed by: INTERNAL MEDICINE

## 2024-05-23 PROCEDURE — 1159F MED LIST DOCD IN RCRD: CPT | Performed by: INTERNAL MEDICINE

## 2024-05-23 PROCEDURE — 99214 OFFICE O/P EST MOD 30 MIN: CPT | Performed by: INTERNAL MEDICINE

## 2024-05-23 PROCEDURE — 3060F POS MICROALBUMINURIA REV: CPT | Performed by: INTERNAL MEDICINE

## 2024-05-23 PROCEDURE — 3048F LDL-C <100 MG/DL: CPT | Performed by: INTERNAL MEDICINE

## 2024-05-23 PROCEDURE — 1157F ADVNC CARE PLAN IN RCRD: CPT | Performed by: INTERNAL MEDICINE

## 2024-05-23 PROCEDURE — 3044F HG A1C LEVEL LT 7.0%: CPT | Performed by: INTERNAL MEDICINE

## 2024-05-23 NOTE — PATIENT INSTRUCTIONS
Welcome to Dr. [facility].  Dr. Duran sees patients at the following sites:  [place] [address] [facility] at [facility]  [address], [address] at [facility] at [facility], [facility] [address]    Dr. Duran's hepatology care coordinator, Leanne SORIANO, can be reached at [phone].  Dr. Duran's , Gaviota Garcia, can be reached at [phone].    Get labs done in 6 months (November 2024).    Get a Liver Ultrasound in 6 months.  Do not eat or drink anything 6 hours prior to your exam.  Call [phone] to schedule.     Follow up with me in clinic in 6 months.  Schedule your visit on your way out today. If unable, please call [phone] to schedule.
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
## 2024-05-24 ENCOUNTER — TELEPHONE (OUTPATIENT)
Dept: CARDIOLOGY | Facility: CLINIC | Age: 74
End: 2024-05-24
Payer: MEDICARE

## 2024-05-24 NOTE — TELEPHONE ENCOUNTER
5/24 - Patient agreeable to dual chamber ICD implant ([employer]) with Dr. Díaz on Thursday 5/30 12:30 pm - arrival 11:30 am. [facility]     Patient informed of the following at time of scheduling:    NPO from midnight prior to procedure  Take morning meds with small sips of water  Pack overnight bag in case of stay      Patient's wife will accompany him to and from procedure.   Patient will update labs at [facility] prior to Thursday.    All questions answered, patient verbalized understanding to all instructions.     - Agustin AMARAL

## 2024-05-28 ENCOUNTER — LAB (OUTPATIENT)
Dept: LAB | Facility: LAB | Age: 74
End: 2024-05-28
Payer: MEDICARE

## 2024-05-28 DIAGNOSIS — I47.20 VENTRICULAR TACHYCARDIA, UNSPECIFIED (MULTI): ICD-10-CM

## 2024-05-28 DIAGNOSIS — I50.23 ACUTE ON CHRONIC SYSTOLIC HEART FAILURE (MULTI): ICD-10-CM

## 2024-05-28 DIAGNOSIS — I25.5 ISCHEMIC CARDIOMYOPATHY: ICD-10-CM

## 2024-05-28 LAB
ANION GAP SERPL CALC-SCNC: 14 MMOL/L (ref 10–20)
BUN SERPL-MCNC: 73 MG/DL (ref 6–23)
CALCIUM SERPL-MCNC: 8.8 MG/DL (ref 8.6–10.3)
CHLORIDE SERPL-SCNC: 105 MMOL/L (ref 98–107)
CO2 SERPL-SCNC: 25 MMOL/L (ref 21–32)
CREAT SERPL-MCNC: 3.22 MG/DL (ref 0.5–1.3)
EGFRCR SERPLBLD CKD-EPI 2021: 19 ML/MIN/1.73M*2
ERYTHROCYTE [DISTWIDTH] IN BLOOD BY AUTOMATED COUNT: 13.6 % (ref 11.5–14.5)
GLUCOSE SERPL-MCNC: 151 MG/DL (ref 74–99)
HCT VFR BLD AUTO: 38.5 % (ref 41–52)
HGB BLD-MCNC: 12.4 G/DL (ref 13.5–17.5)
INR PPP: 1.2 (ref 0.9–1.1)
MCH RBC QN AUTO: 31.4 PG (ref 26–34)
MCHC RBC AUTO-ENTMCNC: 32.2 G/DL (ref 32–36)
MCV RBC AUTO: 98 FL (ref 80–100)
NRBC BLD-RTO: 0 /100 WBCS (ref 0–0)
PLATELET # BLD AUTO: 153 X10*3/UL (ref 150–450)
POTASSIUM SERPL-SCNC: 5.2 MMOL/L (ref 3.5–5.3)
PROTHROMBIN TIME: 13.9 SECONDS (ref 9.8–12.8)
RBC # BLD AUTO: 3.95 X10*6/UL (ref 4.5–5.9)
SODIUM SERPL-SCNC: 139 MMOL/L (ref 136–145)
WBC # BLD AUTO: 7.5 X10*3/UL (ref 4.4–11.3)

## 2024-05-28 PROCEDURE — 36415 COLL VENOUS BLD VENIPUNCTURE: CPT

## 2024-05-28 PROCEDURE — 85027 COMPLETE CBC AUTOMATED: CPT

## 2024-05-28 PROCEDURE — 85610 PROTHROMBIN TIME: CPT

## 2024-05-28 PROCEDURE — 80048 BASIC METABOLIC PNL TOTAL CA: CPT

## 2024-05-29 ENCOUNTER — TELEPHONE (OUTPATIENT)
Dept: CARDIOLOGY | Facility: CLINIC | Age: 74
End: 2024-05-29
Payer: MEDICARE

## 2024-05-29 NOTE — TELEPHONE ENCOUNTER
5/29 9am - Confirmed with patient's [employer] that CPT code 35175 does not require prior authorization. Cardiology informed.     - Agustin AMARAL

## 2024-05-30 ENCOUNTER — APPOINTMENT (OUTPATIENT)
Dept: CARDIOLOGY | Facility: HOSPITAL | Age: 74
End: 2024-05-30
Payer: MEDICARE

## 2024-05-30 ENCOUNTER — HOSPITAL ENCOUNTER (OUTPATIENT)
Facility: HOSPITAL | Age: 74
Discharge: HOME | End: 2024-05-31
Attending: INTERNAL MEDICINE | Admitting: INTERNAL MEDICINE
Payer: MEDICARE

## 2024-05-30 DIAGNOSIS — I47.20 VENTRICULAR TACHYCARDIA, UNSPECIFIED (MULTI): Primary | ICD-10-CM

## 2024-05-30 DIAGNOSIS — I25.5 ISCHEMIC CARDIOMYOPATHY: ICD-10-CM

## 2024-05-30 DIAGNOSIS — I42.9 CARDIOMYOPATHY (MULTI): ICD-10-CM

## 2024-05-30 DIAGNOSIS — I50.23 ACUTE ON CHRONIC SYSTOLIC HEART FAILURE (MULTI): ICD-10-CM

## 2024-05-30 DIAGNOSIS — Z95.810 PRESENCE OF AUTOMATIC (IMPLANTABLE) CARDIAC DEFIBRILLATOR: ICD-10-CM

## 2024-05-30 LAB
GLUCOSE BLD MANUAL STRIP-MCNC: 126 MG/DL (ref 74–99)
GLUCOSE BLD MANUAL STRIP-MCNC: 209 MG/DL (ref 74–99)

## 2024-05-30 PROCEDURE — C1722 AICD, SINGLE CHAMBER: HCPCS | Performed by: INTERNAL MEDICINE

## 2024-05-30 PROCEDURE — 93005 ELECTROCARDIOGRAM TRACING: CPT | Mod: 59

## 2024-05-30 PROCEDURE — C1894 INTRO/SHEATH, NON-LASER: HCPCS | Performed by: INTERNAL MEDICINE

## 2024-05-30 PROCEDURE — G0378 HOSPITAL OBSERVATION PER HR: HCPCS

## 2024-05-30 PROCEDURE — C1777 LEAD, AICD, ENDO SINGLE COIL: HCPCS | Performed by: INTERNAL MEDICINE

## 2024-05-30 PROCEDURE — 2780000003 HC OR 278 NO HCPCS: Performed by: INTERNAL MEDICINE

## 2024-05-30 PROCEDURE — 2500000004 HC RX 250 GENERAL PHARMACY W/ HCPCS (ALT 636 FOR OP/ED): Performed by: INTERNAL MEDICINE

## 2024-05-30 PROCEDURE — 7100000010 HC PHASE TWO TIME - EACH INCREMENTAL 1 MINUTE: Performed by: INTERNAL MEDICINE

## 2024-05-30 PROCEDURE — 99153 MOD SED SAME PHYS/QHP EA: CPT | Performed by: INTERNAL MEDICINE

## 2024-05-30 PROCEDURE — 33249 INSJ/RPLCMT DEFIB W/LEAD(S): CPT | Performed by: INTERNAL MEDICINE

## 2024-05-30 PROCEDURE — A4217 STERILE WATER/SALINE, 500 ML: HCPCS | Performed by: NURSE PRACTITIONER

## 2024-05-30 PROCEDURE — 2500000004 HC RX 250 GENERAL PHARMACY W/ HCPCS (ALT 636 FOR OP/ED): Performed by: NURSE PRACTITIONER

## 2024-05-30 PROCEDURE — 7100000011 HC EXTENDED STAY RECOVERY HOURLY - NURSING UNIT

## 2024-05-30 PROCEDURE — 2500000002 HC RX 250 W HCPCS SELF ADMINISTERED DRUGS (ALT 637 FOR MEDICARE OP, ALT 636 FOR OP/ED): Performed by: NURSE PRACTITIONER

## 2024-05-30 PROCEDURE — 82947 ASSAY GLUCOSE BLOOD QUANT: CPT

## 2024-05-30 PROCEDURE — C1892 INTRO/SHEATH,FIXED,PEEL-AWAY: HCPCS | Performed by: INTERNAL MEDICINE

## 2024-05-30 PROCEDURE — 2500000001 HC RX 250 WO HCPCS SELF ADMINISTERED DRUGS (ALT 637 FOR MEDICARE OP): Performed by: NURSE PRACTITIONER

## 2024-05-30 PROCEDURE — 2720000007 HC OR 272 NO HCPCS: Performed by: INTERNAL MEDICINE

## 2024-05-30 PROCEDURE — 7100000009 HC PHASE TWO TIME - INITIAL BASE CHARGE: Performed by: INTERNAL MEDICINE

## 2024-05-30 PROCEDURE — 2750000001 HC OR 275 NO HCPCS: Performed by: INTERNAL MEDICINE

## 2024-05-30 PROCEDURE — 99152 MOD SED SAME PHYS/QHP 5/>YRS: CPT | Performed by: INTERNAL MEDICINE

## 2024-05-30 PROCEDURE — 2500000005 HC RX 250 GENERAL PHARMACY W/O HCPCS: Performed by: INTERNAL MEDICINE

## 2024-05-30 PROCEDURE — C1898 LEAD, PMKR, OTHER THAN TRANS: HCPCS | Performed by: INTERNAL MEDICINE

## 2024-05-30 DEVICE — PACING LEAD
Type: IMPLANTABLE DEVICE | Site: CORONARY | Status: FUNCTIONAL
Brand: TENDRIL™

## 2024-05-30 DEVICE — DEFIBRILLATION LEAD
Type: IMPLANTABLE DEVICE | Site: CORONARY | Status: FUNCTIONAL
Brand: DURATA™

## 2024-05-30 DEVICE — IMPLANTABLE CARDIOVERTER DEFIBRILLATOR
Type: IMPLANTABLE DEVICE | Site: CHEST  WALL | Status: FUNCTIONAL
Brand: GALLANT™

## 2024-05-30 RX ORDER — ALLOPURINOL 100 MG/1
50 TABLET ORAL DAILY
Status: DISCONTINUED | OUTPATIENT
Start: 2024-05-30 | End: 2024-05-31 | Stop reason: HOSPADM

## 2024-05-30 RX ORDER — LORATADINE 10 MG/1
10 TABLET ORAL DAILY
Status: DISCONTINUED | OUTPATIENT
Start: 2024-05-30 | End: 2024-05-30 | Stop reason: SDUPTHER

## 2024-05-30 RX ORDER — GABAPENTIN 100 MG/1
100 CAPSULE ORAL NIGHTLY
Status: DISCONTINUED | OUTPATIENT
Start: 2024-05-30 | End: 2024-05-31 | Stop reason: HOSPADM

## 2024-05-30 RX ORDER — CLOPIDOGREL BISULFATE 75 MG/1
75 TABLET ORAL DAILY
Status: DISCONTINUED | OUTPATIENT
Start: 2024-05-30 | End: 2024-05-31 | Stop reason: HOSPADM

## 2024-05-30 RX ORDER — ACETAMINOPHEN 325 MG/1
650 TABLET ORAL EVERY 4 HOURS PRN
Status: DISCONTINUED | OUTPATIENT
Start: 2024-05-30 | End: 2024-05-31 | Stop reason: HOSPADM

## 2024-05-30 RX ORDER — FAMOTIDINE 20 MG/1
20 TABLET, FILM COATED ORAL DAILY PRN
Status: DISCONTINUED | OUTPATIENT
Start: 2024-05-30 | End: 2024-05-31 | Stop reason: HOSPADM

## 2024-05-30 RX ORDER — BISMUTH SUBSALICYLATE 262 MG
1 TABLET,CHEWABLE ORAL DAILY
Status: DISCONTINUED | OUTPATIENT
Start: 2024-05-30 | End: 2024-05-31 | Stop reason: HOSPADM

## 2024-05-30 RX ORDER — ACETAMINOPHEN AND CODEINE PHOSPHATE 300; 30 MG/1; MG/1
1 TABLET ORAL EVERY 6 HOURS PRN
Status: DISCONTINUED | OUTPATIENT
Start: 2024-05-30 | End: 2024-05-31 | Stop reason: HOSPADM

## 2024-05-30 RX ORDER — ONDANSETRON 4 MG/1
4 TABLET, FILM COATED ORAL EVERY 8 HOURS PRN
Status: DISCONTINUED | OUTPATIENT
Start: 2024-05-30 | End: 2024-05-31 | Stop reason: HOSPADM

## 2024-05-30 RX ORDER — MIDAZOLAM HYDROCHLORIDE 1 MG/ML
INJECTION, SOLUTION INTRAMUSCULAR; INTRAVENOUS AS NEEDED
Status: DISCONTINUED | OUTPATIENT
Start: 2024-05-30 | End: 2024-05-30 | Stop reason: HOSPADM

## 2024-05-30 RX ORDER — AMIODARONE HYDROCHLORIDE 200 MG/1
200 TABLET ORAL DAILY
Status: DISCONTINUED | OUTPATIENT
Start: 2024-05-30 | End: 2024-05-31 | Stop reason: HOSPADM

## 2024-05-30 RX ORDER — ALBUTEROL SULFATE 90 UG/1
2 AEROSOL, METERED RESPIRATORY (INHALATION) EVERY 6 HOURS PRN
Status: DISCONTINUED | OUTPATIENT
Start: 2024-05-30 | End: 2024-05-31 | Stop reason: HOSPADM

## 2024-05-30 RX ORDER — ASPIRIN 81 MG/1
81 TABLET ORAL DAILY
Status: DISCONTINUED | OUTPATIENT
Start: 2024-05-30 | End: 2024-05-31 | Stop reason: HOSPADM

## 2024-05-30 RX ORDER — METOPROLOL SUCCINATE 50 MG/1
50 TABLET, EXTENDED RELEASE ORAL DAILY
Status: DISCONTINUED | OUTPATIENT
Start: 2024-05-30 | End: 2024-05-31 | Stop reason: HOSPADM

## 2024-05-30 RX ORDER — CHLORHEXIDINE GLUCONATE 40 MG/ML
SOLUTION TOPICAL ONCE
Status: COMPLETED | OUTPATIENT
Start: 2024-05-30 | End: 2024-05-30

## 2024-05-30 RX ORDER — ATORVASTATIN CALCIUM 40 MG/1
80 TABLET, FILM COATED ORAL DAILY
Status: DISCONTINUED | OUTPATIENT
Start: 2024-05-30 | End: 2024-05-31 | Stop reason: HOSPADM

## 2024-05-30 RX ORDER — SODIUM CHLORIDE 9 MG/ML
30 INJECTION, SOLUTION INTRAVENOUS CONTINUOUS
Status: DISCONTINUED | OUTPATIENT
Start: 2024-05-30 | End: 2024-05-31 | Stop reason: HOSPADM

## 2024-05-30 RX ORDER — LIDOCAINE HYDROCHLORIDE 20 MG/ML
INJECTION, SOLUTION INFILTRATION; PERINEURAL AS NEEDED
Status: DISCONTINUED | OUTPATIENT
Start: 2024-05-30 | End: 2024-05-30 | Stop reason: HOSPADM

## 2024-05-30 RX ORDER — FENTANYL CITRATE 50 UG/ML
INJECTION, SOLUTION INTRAMUSCULAR; INTRAVENOUS AS NEEDED
Status: DISCONTINUED | OUTPATIENT
Start: 2024-05-30 | End: 2024-05-30 | Stop reason: HOSPADM

## 2024-05-30 RX ORDER — LORATADINE 10 MG/1
10 TABLET ORAL DAILY
Status: DISCONTINUED | OUTPATIENT
Start: 2024-05-30 | End: 2024-05-31 | Stop reason: HOSPADM

## 2024-05-30 RX ORDER — NALOXONE HYDROCHLORIDE 0.4 MG/ML
0.2 INJECTION, SOLUTION INTRAMUSCULAR; INTRAVENOUS; SUBCUTANEOUS EVERY 5 MIN PRN
Status: DISCONTINUED | OUTPATIENT
Start: 2024-05-30 | End: 2024-05-31 | Stop reason: HOSPADM

## 2024-05-30 RX ORDER — DAPAGLIFLOZIN 5 MG/1
10 TABLET, FILM COATED ORAL
Status: DISCONTINUED | OUTPATIENT
Start: 2024-05-31 | End: 2024-05-31 | Stop reason: HOSPADM

## 2024-05-30 RX ORDER — PANTOPRAZOLE SODIUM 40 MG/1
40 TABLET, DELAYED RELEASE ORAL DAILY
Status: DISCONTINUED | OUTPATIENT
Start: 2024-05-30 | End: 2024-05-31 | Stop reason: HOSPADM

## 2024-05-30 RX ORDER — ONDANSETRON HYDROCHLORIDE 2 MG/ML
4 INJECTION, SOLUTION INTRAVENOUS EVERY 8 HOURS PRN
Status: DISCONTINUED | OUTPATIENT
Start: 2024-05-30 | End: 2024-05-31 | Stop reason: HOSPADM

## 2024-05-30 RX ORDER — FUROSEMIDE 40 MG/1
40 TABLET ORAL DAILY
Status: DISCONTINUED | OUTPATIENT
Start: 2024-05-30 | End: 2024-05-31 | Stop reason: HOSPADM

## 2024-05-30 RX ORDER — AMLODIPINE BESYLATE 5 MG/1
10 TABLET ORAL DAILY
Status: DISCONTINUED | OUTPATIENT
Start: 2024-05-30 | End: 2024-05-31 | Stop reason: HOSPADM

## 2024-05-30 RX ORDER — SERTRALINE HYDROCHLORIDE 50 MG/1
50 TABLET, FILM COATED ORAL DAILY
Status: DISCONTINUED | OUTPATIENT
Start: 2024-05-30 | End: 2024-05-31 | Stop reason: HOSPADM

## 2024-05-30 RX ORDER — CEFAZOLIN SODIUM 1 G/50ML
1 SOLUTION INTRAVENOUS ONCE
Status: COMPLETED | OUTPATIENT
Start: 2024-05-30 | End: 2024-05-30

## 2024-05-30 RX ORDER — ICOSAPENT ETHYL 1 G/1
1 CAPSULE ORAL
Status: DISCONTINUED | OUTPATIENT
Start: 2024-05-30 | End: 2024-05-31 | Stop reason: HOSPADM

## 2024-05-30 RX ADMIN — ATORVASTATIN CALCIUM 80 MG: 40 TABLET, FILM COATED ORAL at 20:19

## 2024-05-30 RX ADMIN — AMIODARONE HYDROCHLORIDE 200 MG: 200 TABLET ORAL at 16:31

## 2024-05-30 RX ADMIN — CEFAZOLIN SODIUM 1 G: 1 INJECTION, SOLUTION INTRAVENOUS at 12:15

## 2024-05-30 RX ADMIN — FUROSEMIDE 40 MG: 40 TABLET ORAL at 16:27

## 2024-05-30 RX ADMIN — ASPIRIN 81 MG: 81 TABLET, COATED ORAL at 16:31

## 2024-05-30 RX ADMIN — Medication: at 12:15

## 2024-05-30 RX ADMIN — GABAPENTIN 100 MG: 100 CAPSULE ORAL at 20:19

## 2024-05-30 RX ADMIN — MULTIVITAMIN TABLET 1 TABLET: TABLET at 14:15

## 2024-05-30 RX ADMIN — ALLOPURINOL 50 MG: 100 TABLET ORAL at 16:31

## 2024-05-30 RX ADMIN — PANTOPRAZOLE SODIUM 40 MG: 40 TABLET, DELAYED RELEASE ORAL at 16:31

## 2024-05-30 RX ADMIN — METOPROLOL SUCCINATE 50 MG: 50 TABLET, EXTENDED RELEASE ORAL at 20:19

## 2024-05-30 RX ADMIN — SERTRALINE HYDROCHLORIDE 50 MG: 50 TABLET ORAL at 16:31

## 2024-05-30 RX ADMIN — VANCOMYCIN HYDROCHLORIDE: 1 INJECTION, POWDER, LYOPHILIZED, FOR SOLUTION INTRAVENOUS at 12:15

## 2024-05-30 RX ADMIN — ICOSAPENT ETHYL 1 G: 1 CAPSULE ORAL at 20:19

## 2024-05-30 RX ADMIN — SODIUM CHLORIDE 30 ML/HR: 9 INJECTION, SOLUTION INTRAVENOUS at 12:15

## 2024-05-30 RX ADMIN — CLOPIDOGREL 75 MG: 75 TABLET ORAL at 14:15

## 2024-05-30 SDOH — SOCIAL STABILITY: SOCIAL INSECURITY: DOES ANYONE TRY TO KEEP YOU FROM HAVING/CONTACTING OTHER FRIENDS OR DOING THINGS OUTSIDE YOUR HOME?: NO

## 2024-05-30 SDOH — SOCIAL STABILITY: SOCIAL INSECURITY: DO YOU FEEL UNSAFE GOING BACK TO THE PLACE WHERE YOU ARE LIVING?: NO

## 2024-05-30 SDOH — SOCIAL STABILITY: SOCIAL INSECURITY: HAVE YOU HAD THOUGHTS OF HARMING ANYONE ELSE?: YES

## 2024-05-30 SDOH — SOCIAL STABILITY: SOCIAL INSECURITY: ARE THERE ANY APPARENT SIGNS OF INJURIES/BEHAVIORS THAT COULD BE RELATED TO ABUSE/NEGLECT?: NO

## 2024-05-30 SDOH — SOCIAL STABILITY: SOCIAL INSECURITY: DO YOU FEEL ANYONE HAS EXPLOITED OR TAKEN ADVANTAGE OF YOU FINANCIALLY OR OF YOUR PERSONAL PROPERTY?: NO

## 2024-05-30 SDOH — SOCIAL STABILITY: SOCIAL INSECURITY: WERE YOU ABLE TO COMPLETE ALL THE BEHAVIORAL HEALTH SCREENINGS?: YES

## 2024-05-30 SDOH — SOCIAL STABILITY: SOCIAL INSECURITY: ARE YOU OR HAVE YOU BEEN THREATENED OR ABUSED PHYSICALLY, EMOTIONALLY, OR SEXUALLY BY ANYONE?: NO

## 2024-05-30 SDOH — SOCIAL STABILITY: SOCIAL INSECURITY: ABUSE: ADULT

## 2024-05-30 SDOH — SOCIAL STABILITY: SOCIAL INSECURITY: HAS ANYONE EVER THREATENED TO HURT YOUR FAMILY OR YOUR PETS?: NO

## 2024-05-30 ASSESSMENT — PAIN - FUNCTIONAL ASSESSMENT
PAIN_FUNCTIONAL_ASSESSMENT: 0-10

## 2024-05-30 ASSESSMENT — COGNITIVE AND FUNCTIONAL STATUS - GENERAL
EATING MEALS: A LITTLE
WALKING IN HOSPITAL ROOM: A LITTLE
PATIENT BASELINE BEDBOUND: NO
DAILY ACTIVITIY SCORE: 18
HELP NEEDED FOR BATHING: A LITTLE
DRESSING REGULAR UPPER BODY CLOTHING: A LITTLE
DRESSING REGULAR LOWER BODY CLOTHING: A LITTLE
STANDING UP FROM CHAIR USING ARMS: A LITTLE
CLIMB 3 TO 5 STEPS WITH RAILING: A LITTLE
MOBILITY SCORE: 20
PERSONAL GROOMING: A LITTLE
TOILETING: A LITTLE
MOVING TO AND FROM BED TO CHAIR: A LITTLE

## 2024-05-30 ASSESSMENT — PATIENT HEALTH QUESTIONNAIRE - PHQ9
1. LITTLE INTEREST OR PLEASURE IN DOING THINGS: NOT AT ALL
SUM OF ALL RESPONSES TO PHQ9 QUESTIONS 1 & 2: 0
2. FEELING DOWN, DEPRESSED OR HOPELESS: NOT AT ALL

## 2024-05-30 ASSESSMENT — ACTIVITIES OF DAILY LIVING (ADL)
GROOMING: INDEPENDENT
LACK_OF_TRANSPORTATION: NO
WALKS IN HOME: INDEPENDENT
JUDGMENT_ADEQUATE_SAFELY_COMPLETE_DAILY_ACTIVITIES: YES
TOILETING: INDEPENDENT
ADEQUATE_TO_COMPLETE_ADL: YES
ASSISTIVE_DEVICE: EYEGLASSES;CANE
FEEDING YOURSELF: INDEPENDENT
BATHING: INDEPENDENT
HEARING - LEFT EAR: FUNCTIONAL
PATIENT'S MEMORY ADEQUATE TO SAFELY COMPLETE DAILY ACTIVITIES?: YES
DRESSING YOURSELF: INDEPENDENT
HEARING - RIGHT EAR: FUNCTIONAL

## 2024-05-30 ASSESSMENT — COLUMBIA-SUICIDE SEVERITY RATING SCALE - C-SSRS
2. HAVE YOU ACTUALLY HAD ANY THOUGHTS OF KILLING YOURSELF?: NO
1. IN THE PAST MONTH, HAVE YOU WISHED YOU WERE DEAD OR WISHED YOU COULD GO TO SLEEP AND NOT WAKE UP?: NO
6. HAVE YOU EVER DONE ANYTHING, STARTED TO DO ANYTHING, OR PREPARED TO DO ANYTHING TO END YOUR LIFE?: NO

## 2024-05-30 ASSESSMENT — PAIN SCALES - GENERAL
PAINLEVEL_OUTOF10: 0 - NO PAIN

## 2024-05-30 ASSESSMENT — LIFESTYLE VARIABLES
HOW MANY STANDARD DRINKS CONTAINING ALCOHOL DO YOU HAVE ON A TYPICAL DAY: PATIENT DOES NOT DRINK
SKIP TO QUESTIONS 9-10: 1
AUDIT-C TOTAL SCORE: 0
HOW OFTEN DO YOU HAVE 6 OR MORE DRINKS ON ONE OCCASION: NEVER
AUDIT-C TOTAL SCORE: 0
HOW OFTEN DO YOU HAVE A DRINK CONTAINING ALCOHOL: NEVER

## 2024-05-30 NOTE — CARE PLAN
Problem: Pain  Goal: My pain/discomfort is manageable  Outcome: Progressing     Problem: Safety  Goal: Patient will be injury free during hospitalization  Outcome: Progressing  Goal: I will remain free of falls  Outcome: Progressing     Problem: Daily Care  Goal: Daily care needs are met  Outcome: Progressing     Problem: Psychosocial Needs  Goal: Demonstrates ability to cope with hospitalization/illness  Outcome: Progressing  Goal: Collaborate with me, my family, and caregiver to identify my specific goals  Outcome: Progressing  Flowsheets (Taken 5/30/2024 3843)  Cultural Requests During Hospitalization: No  Spiritual Requests During Hospitalization: No   The patient's goals for the shift include

## 2024-05-30 NOTE — INTERVAL H&P NOTE
H&P reviewed. The patient was examined and there are no changes to the H&P.    Reviewed H&P from EP and no changes. Awaiting signature from visit on 5/13/24

## 2024-05-30 NOTE — Clinical Note
Sheath was exchanged in the left subclavian vein with ACCESS KIT, S-HUDSON MINI, 4FR 10CM 0.018IN 40CM, NT/PT, ECHO ENHANCE NEEDLE.

## 2024-05-30 NOTE — PRE-SEDATION DOCUMENTATION
"EP Preprocedure Note    Ritesh Garza   Indication for procedure: The primary encounter diagnosis was Ventricular tachycardia, unspecified (Multi). Diagnoses of Ischemic cardiomyopathy and Acute on chronic systolic heart failure (Multi) were also pertinent to this visit. Patient is here for ICD placement.     Relevant review of systems:  NA      /69   Pulse 56   Temp 36.4 °C (97.6 °F) (Temporal)   Resp 12   Ht 1.6 m (5' 3\")   Wt 69.4 kg (153 lb)   SpO2 98%   BMI 27.10 kg/m²    Relevant Labs:   Lab Results   Component Value Date    CREATININE 3.22 (H) 05/28/2024    EGFR 19 (L) 05/28/2024    INR 1.2 (H) 05/28/2024    PROTIME 13.9 (H) 05/28/2024       Planned Sedation/Anesthesia: Moderate    Airway assessment: normal    Physical Exam  Constitutional:       Appearance: Normal appearance.   HENT:      Head: Normocephalic.      Mouth/Throat:      Mouth: Mucous membranes are moist.   Cardiovascular:      Rate and Rhythm: Regular rhythm. Bradycardia present.      Pulses: Normal pulses.   Pulmonary:      Effort: Pulmonary effort is normal.      Breath sounds: Normal breath sounds.   Abdominal:      Palpations: Abdomen is soft.   Skin:     General: Skin is warm and dry.      Capillary Refill: Capillary refill takes less than 2 seconds.   Neurological:      Mental Status: He is alert.   Psychiatric:         Mood and Affect: Mood normal.         Behavior: Behavior normal.         Mallampati: III (soft and hard palate and base of uvula visible)    ASA Score: ASA 3 - Patient with moderate systemic disease with functional limitations    Benefits, risks and alternatives of procedure and planned sedation have been discussed with the patient and/or their representative. All questions answered and they agree to proceed.     Angelina Do, APRN-CNP   "

## 2024-05-31 ENCOUNTER — APPOINTMENT (OUTPATIENT)
Dept: RADIOLOGY | Facility: HOSPITAL | Age: 74
End: 2024-05-31
Payer: MEDICARE

## 2024-05-31 VITALS
BODY MASS INDEX: 27.73 KG/M2 | OXYGEN SATURATION: 92 % | WEIGHT: 156.53 LBS | HEART RATE: 60 BPM | RESPIRATION RATE: 18 BRPM | TEMPERATURE: 97.2 F | HEIGHT: 63 IN | SYSTOLIC BLOOD PRESSURE: 127 MMHG | DIASTOLIC BLOOD PRESSURE: 61 MMHG

## 2024-05-31 LAB — GLUCOSE BLD MANUAL STRIP-MCNC: 160 MG/DL (ref 74–99)

## 2024-05-31 PROCEDURE — 71046 X-RAY EXAM CHEST 2 VIEWS: CPT

## 2024-05-31 PROCEDURE — 2500000001 HC RX 250 WO HCPCS SELF ADMINISTERED DRUGS (ALT 637 FOR MEDICARE OP): Performed by: NURSE PRACTITIONER

## 2024-05-31 PROCEDURE — 99239 HOSP IP/OBS DSCHRG MGMT >30: CPT | Performed by: PHYSICIAN ASSISTANT

## 2024-05-31 PROCEDURE — 2500000004 HC RX 250 GENERAL PHARMACY W/ HCPCS (ALT 636 FOR OP/ED): Performed by: NURSE PRACTITIONER

## 2024-05-31 PROCEDURE — 71046 X-RAY EXAM CHEST 2 VIEWS: CPT | Performed by: RADIOLOGY

## 2024-05-31 PROCEDURE — 82947 ASSAY GLUCOSE BLOOD QUANT: CPT

## 2024-05-31 PROCEDURE — 7100000011 HC EXTENDED STAY RECOVERY HOURLY - NURSING UNIT

## 2024-05-31 RX ADMIN — LORATADINE 10 MG: 10 TABLET ORAL at 08:21

## 2024-05-31 RX ADMIN — ICOSAPENT ETHYL 1 G: 1 CAPSULE ORAL at 08:21

## 2024-05-31 RX ADMIN — DAPAGLIFLOZIN 10 MG: 5 TABLET, FILM COATED ORAL at 08:21

## 2024-05-31 RX ADMIN — FUROSEMIDE 40 MG: 40 TABLET ORAL at 08:21

## 2024-05-31 RX ADMIN — AMLODIPINE BESYLATE 10 MG: 5 TABLET ORAL at 08:21

## 2024-05-31 ASSESSMENT — COGNITIVE AND FUNCTIONAL STATUS - GENERAL
MOBILITY SCORE: 23
CLIMB 3 TO 5 STEPS WITH RAILING: A LITTLE
DRESSING REGULAR LOWER BODY CLOTHING: A LITTLE
HELP NEEDED FOR BATHING: A LITTLE
DRESSING REGULAR UPPER BODY CLOTHING: A LITTLE
TOILETING: A LITTLE
DAILY ACTIVITIY SCORE: 20

## 2024-05-31 ASSESSMENT — ENCOUNTER SYMPTOMS
VOMITING: 0
WHEEZING: 0
SHORTNESS OF BREATH: 0
FEVER: 0
WEAKNESS: 0
DIARRHEA: 0
DYSURIA: 0
ORTHOPNEA: 0
PALPITATIONS: 0
NAUSEA: 0
ABDOMINAL PAIN: 0

## 2024-05-31 ASSESSMENT — PAIN - FUNCTIONAL ASSESSMENT: PAIN_FUNCTIONAL_ASSESSMENT: 0-10

## 2024-05-31 ASSESSMENT — ACTIVITIES OF DAILY LIVING (ADL): LACK_OF_TRANSPORTATION: NO

## 2024-05-31 ASSESSMENT — PAIN SCALES - GENERAL: PAINLEVEL_OUTOF10: 0 - NO PAIN

## 2024-05-31 NOTE — CARE PLAN
The patient's goals for the shift include    Problem: Safety  Goal: Patient will be injury free during hospitalization  Outcome: Progressing     Problem: Safety  Goal: I will remain free of falls  Outcome: Progressing       The clinical goals for the shift include Patient will remain hemodynamically stable this shift    Over the shift, the patient did make progress towards goals. Arm sling applied to patients left arm, ice intermittently applied to pacer site.  All needs met, safety maintained.

## 2024-05-31 NOTE — DISCHARGE INSTRUCTIONS
Homegoing Instructions After a Pacemaker Implant    Incision:  Leave the surgical dressing in place for 5 days post implant, then you may remove the dressing.  Please keep your incision dry and open to air for one week after implant.  If you have steri-strips or incision adhesive (I.e. glue), please allow the productive to peel on their own.  After one week, you may wash the site with your usual soap and water.   Inspect your incision each day.  If you note increased redness, swelling, or drainage, or if you develop a fever, please call your pacemaker doctor immediately.     Pain:  It is normal for the area around the incision to be tender for a few weeks following surgery.  Pain relievers such as Tylenol or Motrin are usually sufficient for pain relief.  The pain should get better each day, it it gets worse, please contact your pacemaker doctor.    Activity:  First six weeks after implant:  Avoid gross arm movement on the side of your new implant.  Do not raise or stretch your arm above shoulder level.  Do not  weights greater than 10 lbs.  Avoid activities such as golf or swimming for six weeks.  Try to use garments that button down the front.   Avoid pull over shirts.     ID Card:  It is important that you carry your pacemaker ID card with you at all times.  Inform all doctors and healthcare providers that you have a pacemaker.      Electromagnetic Interference:  Microwave ovens are safe to use.  Please inform any physicians of your pacemaker implant prior to MRI for appropriate scheduling.  You should be prepared to show your pacemaker identification card to the security guards at metal detectors,  hand wand detector should be kept away from the pacemaker.  Read the patient booklet for more information.  Please keep your pacemaker doctor informed about changes in your incision or how you are feeling.    It is your responsibilitiy to make and keep appointments.   After each visit on your way out, make an  appointment for your next visit.  Please follow the recommendations of your doctor for the frequency of appointments.  Your good health is your personal responsibility.

## 2024-05-31 NOTE — DISCHARGE SUMMARY
CARDIOLOGY DISCHARGE SUMMARY  Discharge Diagnosis  Ventricular tachycardia, unspecified (Multi)    Subjective Data:  Ritesh Garza is a 73 y.o. year old male patient with     CAD: MI with multivessel CAD. CABG March 1992.   HFrEF: November 2021 admitted with TIA and covid. LV function at that time was 30%. Repeat echo May 2023 LV function 40-45%.   CKD  Ventricular tachycardia: Patient was admitted in February 2024 after a fall with significant left hip and hematoma. While hospitalized he had episodes of VT and chest pain. He was started on amiodarone. ICD was considered if no recovery of LV EF which was 30-35% at that time. LHC was deferred due to acute TAYLOR. LHC April 2024 showed a patent left POP to LAD and D1, occluded vein grafts and severe native CAD with an 80% left main,  proximal LAD,  proximal circumflex,  proximal RCA.  LVEF 30 to 35%.  Severe left subclavian stenosis.  After evaluation, we elected to proceed with left subclavian stent, which was performed successfully on April 30, 2024.  Cardiomyopathy: LV function has remained 30-35% on GDMT since February.   5-31-24: Patient doing well status post dual-chamber ICD implantation yesterday.  Device check performed this morning by [employer] rep was within normal limits and chest x-ray as read by radiology shows appropriate lead placement with no pneumothorax.  Patient not having significant discomfort.  Education provided regarding the dressing and arm movements and scheduled follow-up has been arranged.  He voices no other new cardiac complaints or concerns.  Currently telemetry is sinus rhythm.    Last Labs:  CBC - 5/28/2024:  2:58 PM  7.5 12.4 153    38.5      CMP - 5/28/2024:  2:58 PM  8.8 7.0 25 --- 0.6   4.6 4.2 27 103      PTT - No results in last year.  1.2   13.9 _     Hospital Course   Uncomplicated dual-chamber ICD implantation.    Echo:CONCLUSIONS:   1. Left ventricular systolic function is moderately decreased with a 30-35%  estimated ejection fraction.   2. Multiple segmental abnormalities exist. See findings.   3. Spectral Doppler shows a restrictive pattern of left ventricular diastolic filling.   4. There is an elevated left ventricular end diastolic pressure.   5. Moderate mitral valve regurgitation.   6. Moderately decreased mitral valve posterior leaflet mobility.   7. Mild to moderate tricuspid regurgitation.   8. Mildly elevated RVSP.         Review of Systems   Constitutional: Negative for fever and malaise/fatigue.   Cardiovascular:  Negative for chest pain, orthopnea and palpitations.        No significant pain over device pocket   Respiratory:  Negative for shortness of breath and wheezing.    Skin:  Negative for itching and rash.   Gastrointestinal:  Negative for abdominal pain, diarrhea, nausea and vomiting.   Genitourinary:  Negative for dysuria.   Neurological:  Negative for weakness.        Pertinent Physical Exam At Time of Discharge  Physical Exam  Constitutional:       General: He is not in acute distress.     Appearance: Normal appearance.   HENT:      Mouth/Throat:      Mouth: Mucous membranes are moist.   Cardiovascular:      Rate and Rhythm: Normal rate and regular rhythm.      Heart sounds: Normal heart sounds. No murmur heard.     Comments: Tele is SR  Device pocket flat; minimal tenderness;  some old blood on dressing and some bruising inferior to pocket  Pulmonary:      Comments: Few crackles R base only--clear otherwise  Abdominal:      General: Abdomen is flat. Bowel sounds are normal.      Palpations: Abdomen is soft.   Musculoskeletal:         General: No swelling.   Skin:     General: Skin is warm and dry.   Neurological:      Mental Status: He is alert and oriented to person, place, and time.   Psychiatric:         Mood and Affect: Mood normal.          ASSESSMENT/PLAN  Assessment:   Cardiomyopathy: LV function remains 30-35% despite GDMT. ECG today sinus rhythm with narrow QRS. We discussed ICD  implant for primary prevention of SCD, including risks and benefits. Patient is agreeable to ICD implant.   5-31-24: Patient now status post dual-chamber ICD implantation.  Pocket looks good and device check and chest x-ray are unremarkable.  Patient appears well compensated from a CHF standpoint.  He is in sinus rhythm.  He has no other voiced complaints and after period of education he is stable for discharge.  Patient already has scheduled follow-up with his usual cardiologist Dr. Melara and is scheduled back in our device clinic for follow-up.  Should the patient have any problems he is instructed to call Dr. Díaz's office.    Home Medications     Medication List      CHANGE how you take these medications     sertraline 50 mg tablet; Commonly known as: Zoloft; Take one tablet   daily in the morning after breakfast.; What changed: how much to take, how   to take this, when to take this, additional instructions   tadalafil 20 mg tablet; Commonly known as: Cialis; START WITH ONE-HALF   TABLET AS NEEDED 2 HOURS PRIOR TO ACTIVITY. MAY INCREASE TO 1 TABLET AS   NEEDED 2 HOURS PRIOR TO ACTIVITY; What changed: Another medication with   the same name was removed. Continue taking this medication, and follow the   directions you see here.     CONTINUE taking these medications     acetaminophen 325 mg tablet; Commonly known as: Tylenol; Take 2 tablets   (650 mg) by mouth every 6 hours if needed for mild pain (1 - 3) or   moderate pain (4 - 6).   albuterol 90 mcg/actuation inhaler; Commonly known as: ProAir HFA;   Inhale 2 puffs every 6 hours if needed for wheezing or shortness of   breath.   allopurinol 100 mg tablet; Commonly known as: Zyloprim; TAKE 0.5 TABLETS   (50 MG) BY MOUTH ONCE DAILY.   amiodarone 200 mg tablet; Commonly known as: Pacerone; Take 2 tablets   (400 mg) by mouth 2 times a day for 9 doses. On February, 28th start to   take 1 tablet (200 mg) by mouth once daily.   amLODIPine 10 mg tablet; Commonly  known as: Norvasc; TAKE 1 TABLET BY   MOUTH EVERY DAY   aspirin 81 mg EC tablet; TAKE 1 TABLET BY MOUTH EVERY DAY   atorvastatin 80 mg tablet; Commonly known as: Lipitor   b complex vitamins capsule   clopidogrel 75 mg tablet; Commonly known as: Plavix; Take 1 tablet (75   mg) by mouth once daily.   CoQ-10 100 mg capsule; Generic drug: coenzyme Q-10   cyanocobalamin 1,000 mcg tablet; Commonly known as: Vitamin B-12   diclofenac sodium 100 mg 24 hr tablet; Commonly known as: Voltaren XR   famotidine 20 mg tablet; Commonly known as: Pepcid; Take 1 tablet (20   mg) by mouth once daily as needed for indigestion.   Farxiga 10 mg; Generic drug: dapagliflozin propanediol   furosemide 40 mg tablet; Commonly known as: Lasix; Take 1 tablet (40 mg)   by mouth once daily. As needed for weight gain greater than 3 pounds   gabapentin 100 mg capsule; Commonly known as: Neurontin   icosapent ethyL 0.5 gram capsule; Commonly known as: Vascepa; Take 2   capsules (1 g) by mouth 2 times a day with meals.   loratadine 10 mg tablet; Commonly known as: Claritin   magnesium gluconate 27.5 mg magne- sium (500 mg) tablet; Commonly known   as: Magonate   metoprolol succinate XL 50 mg 24 hr tablet; Commonly known as:   Toprol-XL; Take 1 tablet (50 mg) by mouth once daily.   multivit with min-folic acid 0.4 mg tablet   multivitamin tablet   pantoprazole 40 mg EC tablet; Commonly known as: ProtoNix   turmeric-turmeric root extract 450-50 mg capsule     STOP taking these medications     cetirizine 5 mg tablet; Commonly known as: ZyrTEC       Outpatient Follow-Up  Future Appointments   Date Time Provider Department Center   6/13/2024  9:40 AM Jose Hennessy MD [id number] [facility]   7/8/2024  3:30 PM [facility] [place]   8/22/2024 12:40 PM Ofelia Mccracken DO [id number] [facility]   9/5/2024  1:00 PM Alyse Holland, PhD [id number] [facility]   9/18/2024  3:30 PM Meme Perrin MD [id number] [facility]   11/21/2024  9:20  LESLEE Duran MD [id number] [facility]   11/25/2024  9:50 AM Jeanmarie Ibarra MD [facility]       Rajendra Kirk PA-C  5/31/2024  8:38 AM

## 2024-05-31 NOTE — PROGRESS NOTES
05/31/24 1012   Discharge Planning   Living Arrangements Spouse/significant other   Support Systems Spouse/significant other   Assistance Needed Independent   Type of Residence Private residence   Home or Post Acute Services None   Patient expects to be discharged to: Home   Does the patient need discharge transport arranged? No   Financial Resource Strain   How hard is it for you to pay for the very basics like food, housing, medical care, and heating? Not hard   Housing Stability   In the last 12 months, was there a time when you were not able to pay the mortgage or rent on time? N   In the last 12 months, was there a time when you did not have a steady place to sleep or slept in a shelter (including now)? N   Transportation Needs   In the past 12 months, has lack of transportation kept you from medical appointments or from getting medications? no   In the past 12 months, has lack of transportation kept you from meetings, work, or from getting things needed for daily living? No     PCP is Ofelia Mccracken DO. Patient is from home with his wife. Patient is independent with ambulation, self care, driving, shopping, and meals.  Patient was discharged last month with [facility] and has since been discharged from their services. Patient plans to return home with no needs upon discharge. [facility] will continue to follow for needs if they arise.

## 2024-06-05 ENCOUNTER — APPOINTMENT (OUTPATIENT)
Dept: GERIATRIC MEDICINE | Facility: CLINIC | Age: 74
End: 2024-06-05
Payer: MEDICARE

## 2024-06-05 ENCOUNTER — APPOINTMENT (OUTPATIENT)
Dept: BEHAVIORAL HEALTH | Facility: CLINIC | Age: 74
End: 2024-06-05
Payer: MEDICARE

## 2024-06-13 ENCOUNTER — OFFICE VISIT (OUTPATIENT)
Dept: CARDIOLOGY | Facility: CLINIC | Age: 74
End: 2024-06-13
Payer: MEDICARE

## 2024-06-13 VITALS
HEART RATE: 60 BPM | WEIGHT: 153.31 LBS | SYSTOLIC BLOOD PRESSURE: 125 MMHG | DIASTOLIC BLOOD PRESSURE: 64 MMHG | BODY MASS INDEX: 27.16 KG/M2 | HEIGHT: 63 IN | OXYGEN SATURATION: 94 %

## 2024-06-13 DIAGNOSIS — I21.4 NSTEMI (NON-ST ELEVATED MYOCARDIAL INFARCTION) (MULTI): Primary | ICD-10-CM

## 2024-06-13 DIAGNOSIS — I77.1 SUBCLAVIAN ARTERIAL STENOSIS (CMS-HCC): ICD-10-CM

## 2024-06-13 DIAGNOSIS — I50.20 CONGESTIVE HEART FAILURE WITH RIGHT VENTRICULAR SYSTOLIC DYSFUNCTION (MULTI): ICD-10-CM

## 2024-06-13 DIAGNOSIS — I50.82 CONGESTIVE HEART FAILURE WITH RIGHT VENTRICULAR SYSTOLIC DYSFUNCTION (MULTI): ICD-10-CM

## 2024-06-13 DIAGNOSIS — I50.23 ACUTE ON CHRONIC SYSTOLIC HEART FAILURE (MULTI): ICD-10-CM

## 2024-06-13 DIAGNOSIS — I25.10 ASHD (ARTERIOSCLEROTIC HEART DISEASE): ICD-10-CM

## 2024-06-13 PROCEDURE — 1157F ADVNC CARE PLAN IN RCRD: CPT | Performed by: INTERNAL MEDICINE

## 2024-06-13 PROCEDURE — 1159F MED LIST DOCD IN RCRD: CPT | Performed by: INTERNAL MEDICINE

## 2024-06-13 PROCEDURE — 99214 OFFICE O/P EST MOD 30 MIN: CPT | Performed by: INTERNAL MEDICINE

## 2024-06-13 PROCEDURE — 3044F HG A1C LEVEL LT 7.0%: CPT | Performed by: INTERNAL MEDICINE

## 2024-06-13 PROCEDURE — 3048F LDL-C <100 MG/DL: CPT | Performed by: INTERNAL MEDICINE

## 2024-06-13 PROCEDURE — 3078F DIAST BP <80 MM HG: CPT | Performed by: INTERNAL MEDICINE

## 2024-06-13 PROCEDURE — 3060F POS MICROALBUMINURIA REV: CPT | Performed by: INTERNAL MEDICINE

## 2024-06-13 PROCEDURE — 3074F SYST BP LT 130 MM HG: CPT | Performed by: INTERNAL MEDICINE

## 2024-06-13 PROCEDURE — 1126F AMNT PAIN NOTED NONE PRSNT: CPT | Performed by: INTERNAL MEDICINE

## 2024-06-13 RX ORDER — TADALAFIL 5 MG/1
5 TABLET ORAL DAILY
COMMUNITY

## 2024-06-13 RX ORDER — FUROSEMIDE 40 MG/1
40 TABLET ORAL DAILY
Qty: 90 TABLET | Refills: 3 | Status: SHIPPED | OUTPATIENT
Start: 2024-06-13 | End: 2024-06-13 | Stop reason: DRUGHIGH

## 2024-06-13 RX ORDER — FUROSEMIDE 40 MG/1
40 TABLET ORAL DAILY
Qty: 90 TABLET | Refills: 3 | Status: SHIPPED | OUTPATIENT
Start: 2024-06-13 | End: 2025-06-13

## 2024-06-13 RX ORDER — SACUBITRIL AND VALSARTAN 24; 26 MG/1; MG/1
0.5 TABLET, FILM COATED ORAL 2 TIMES DAILY
Qty: 60 TABLET | Refills: 2 | Status: SHIPPED | OUTPATIENT
Start: 2024-06-13

## 2024-06-13 ASSESSMENT — PAIN SCALES - GENERAL: PAINLEVEL: 0-NO PAIN

## 2024-06-13 NOTE — H&P (VIEW-ONLY)
Primary Care Physician: Ofelia Mccracken DO  Date of Visit: 06/13/2024  9:40 AM EDT  Location of visit: [facility] [place] [address]   Last office visit: 5/2/2024     Chief Complaint:     Follow-up HFrEF, recent left subclavian stent.    HPI/Summary  Ritesh Garza is a 73 y.o. male who presents for followup cardiology evaluation.     The patient is status post remote MI, with longstanding multivessel CAD, moderate LV systolic dysfunction and compensated congestive heart failure.  The patient's problem list includes chronic kidney disease.  In 2021, he was hospitalized with an episode of unresponsiveness, possibly absence seizures.  Keppra was prescribed.  A monitor showed no atrial fibrillation and no pauses.  There was a long hospital admission in November, 2021, with COVID-related delirium.  The ejection fraction was 30%.  In 2023, eplerenone was discontinued because of hyperkalemia.  There was a long hospitalization in February, 2024 with left hip pain and swelling after a fall, with significant left leg hematoma.  There were episodes of ventricular tachycardia and chest discomfort, thought to be in part related to metabolic acidosis and to acute blood loss anemia.  Biomarkers were elevated.  He was seen by electrophysiology, and was considered for ICD if there was no recovery of LV function, which was measured at 30 to 35% in the hospital.  Catheterization was deferred because of acute on chronic kidney injury.  At discharge, he was loaded with amiodarone, then started on amiodarone 200 mg daily, also started on furosemide and clopidogrel for ACS.  He was continued on aspirin, atorvastatin and Farxiga.  Amlodipine, losartan and metformin were all held, given the acute on chronic kidney injury.    Cardiac catheterization was performed on April 15, 2024.  The left POP to LAD and D1 was patent, vein grafts were occluded and there was severe native CAD with an 80% left main,  of the proximal LAD,  of the proximal  circumflex,  of the proximal RCA.  There was severe left subclavian stenosis.  This likely compromised flow to the left POP when he was hemodynamically unstable, with non-STEMI and ventricular tachycardia.  We arranged for a follow-up limited echocardiogram on April 26, 2024.  The LVEF was estimated at 30 to 35%. After of evaluation, we proceeded with left subclavian stent on April 30, 2024.  He was in our office a few days later and we recommended that we pursue ICD.  This was successfully performed on May 30, 2024.    The patient has been continued on DAPT with aspirin and Plavix, also high-dose atorvastatin, amlodipine, Farxiga, as needed furosemide, Vascepa, metoprolol succinate and amiodarone 200 mg daily.  Not currently on Entresto or losartan.  History of hyperkalemia on eplerenone.  Metformin has been held.  Recent creatinine 3.22 mg/dL.  Recent cholesterol 114, LDL 53.  Triglycerides 149.    He is feeling well.  The device pocket has been inspected and is healing appropriately.  No fevers or chills.  He has only taken a few doses of furosemide, which is as needed for weight gain greater than 3 pounds.  Specialty Problems          Cardiology Problems    Peripheral arterial disease (CMS-HCC)    Cardiomyopathy (Multi)    History of coronary artery bypass graft     1992: CABG x5, [facility].         History of myocardial infarction    Atherosclerosis of coronary artery bypass graft    Benign essential hypertension    Ischemic cardiomyopathy    Hyperlipidemia    Left ventricular systolic dysfunction (LVSD)     Comment on above: February 28, 2014: LV ejection fraction 35-40%. Basal inferolateral, and basal inferior, basal inferoseptal, mid inferolateral, mid inferior, and the inferoseptal akinesis. Mild LV dilatation. Mild mitral regurgitation. June 06, 2018: LV ejection fraction 35-40%. basal and mid inferior wall, basal and mid inferolateral wall, basal inferoseptal and mid inferoseptal  segments are abnormal. Left ventricle mildly dilated. Trace mitral valve regurgitation. June 07, 2019: LV ejection fraction stable, 35-40%.;  February 28, 2014: LV ejection fraction 35-40%. Basal inferolateral, and basal inferior, basal inferoseptal, mid inferolateral, mid inferior, and the inferoseptal akinesis. Mild LV dilatation. Mild mitral regurgitation. June 06, 2018: LV ejection fraction 35-40%. basal and mid inferior wall, basal and mid inferolateral wall, basal inferoseptal and mid inferoseptal segments are abnormal. Left ventricle mildly dilated. Trace mitral valve regurgitation. June 07, 2019: LV ejection fraction stable, 35-40%.June 1. 2021: LV EF 40%.;  February 28, 2014: LV ejection fraction 35-40%. Basal inferolateral, and basal inferior, basal inferoseptal, mid inferolateral, mid inferior, and the inferoseptal akinesis. Mild LV dilatation. Mild mitral regurgitation. June 06, 2018: LV ejection fraction 35-40%. basal and mid inferior wall, basal and mid inferolateral wall, basal inferoseptal and mid inferoseptal segments are abnormal. Left ventricle mildly dilated. Trace mitral valve regurgitation. June 07, 2019: LV ejection fraction stable, 35-40%.June 1. 2021: LV EF 40%. #16, 2021: LVEF 35%.;         Congestive heart failure with right ventricular systolic dysfunction (Multi)    CAD (coronary artery disease)    Chest pain    Demand ischemia (Multi)    Ventricular tachycardia, unspecified (Multi)    Acute on chronic systolic heart failure (Multi)    Myocardial infarction (Multi)    Stenosis of subclavian artery (CMS-HCC)        Past Medical History:   Diagnosis Date    Allergic     Asthma ([facility]-HCC)     Coronary artery disease Long Ago    Diabetes mellitus (Multi) Over 20 years ago    Drug-induced gout, unspecified site 10/04/2019    Acute drug-induced gout    Encounter for screening for malignant neoplasm of colon 10/31/2022    Colon cancer screening    Gout, unspecified     Acute gout    Heart  disease Over 20 years sgo    Heart murmur Long ago    Hypertension Over 20 years ago    Myocardial infarction (Multi) Over 20 yesrs ago    Ocular pain, right eye 10/14/2018    Pain of right eye    Periorbital cellulitis 10/14/2018    Periorbital cellulitis    Personal history of colonic polyps 10/11/2017    History of colon polyps    Personal history of other diseases of the circulatory system 05/12/2021    History of angina pectoris    Personal history of other diseases of the circulatory system 05/12/2021    History of angina pectoris    Personal history of other diseases of the respiratory system 10/04/2019    History of acute bronchitis    Personal history of other drug therapy     History of influenza vaccination    Unspecified cirrhosis of liver (Multi) 11/07/2022    Cirrhosis, nonalcoholic          Past Surgical History:   Procedure Laterality Date    ADENOIDECTOMY  Childhood    CARDIAC CATHETERIZATION  Over 30 years ago    CARDIAC CATHETERIZATION N/A 04/15/2024    Procedure: Left And Right Heart Cath, With LV;  Surgeon: Blaine Aadms MD;  Location: [facility];  Service: Cardiovascular;  Laterality: N/A;  3 hr hydration / arrive 6:30    CARDIAC ELECTROPHYSIOLOGY PROCEDURE N/A 5/30/2024    Procedure: ICD Dual Implant;  Surgeon: Mina Kiran MD;  Location: [facility];  Service: Electrophysiology;  Laterality: N/A;  Dual chamber ICD, [facility]  notified [facility] 5/24/24    CHOLECYSTECTOMY  Over 30 years ago    CIRCUMCISION, PRIMARY  72,years ago    CORONARY ARTERY BYPASS GRAFT  12/02/2021    CABG    CORONARY STENT PLACEMENT  Last week    EYE SURGERY  Childhood    INVASIVE VASCULAR PROCEDURE Left 04/30/2024    Procedure: Angioplasty - Upper Extremity;  Surgeon: Narinder Quevedo MD;  Location: [facility];  Service: Cardiovascular;  Laterality: Left;  3 hr hydration  / arrive 6:30    MR HEAD ANGIO WO IV CONTRAST  06/02/2021    MR HEAD ANGIO WO IV CONTRAST 6/2/2021 [facility]  CLINICAL LEGACY    MR HEAD ANGIO WO IV CONTRAST  11/16/2021    MR HEAD ANGIO WO IV CONTRAST 11/16/2021 [facility] CLINICAL LEGACY    MR NECK ANGIO WO IV CONTRAST  06/02/2021    MR NECK ANGIO WO IV CONTRAST 6/2/2021 [facility] CLINICAL LEGACY    MR NECK ANGIO WO IV CONTRAST  11/16/2021    MR NECK ANGIO WO IV CONTRAST 11/16/2021 [facility] CLINICAL LEGACY            Social History     Tobacco Use    Smoking status: Never    Smokeless tobacco: Never   Substance Use Topics    Alcohol use: Never    Drug use: Never                 No Known Allergies      Current Outpatient Medications   Medication Instructions    acetaminophen (TYLENOL) 650 mg, oral, Every 6 hours PRN    albuterol (ProAir HFA) 90 mcg/actuation inhaler 2 puffs, inhalation, Every 6 hours PRN    allopurinol (ZYLOPRIM) 50 mg, oral, Daily    amiodarone (Pacerone) 200 mg tablet Take 2 tablets (400 mg) by mouth 2 times a day for 9 doses. On February, 28th start to take 1 tablet (200 mg) by mouth once daily.    amLODIPine (NORVASC) 10 mg, oral, Daily    aspirin 81 mg, oral, Daily    atorvastatin (LIPITOR) 80 mg, oral, Daily    b complex vitamins capsule 1 capsule, Daily    clopidogrel (PLAVIX) 75 mg, oral, Daily    coenzyme Q-10 (CoQ-10) 100 mg capsule 1 capsule, oral, Daily    cyanocobalamin (Vitamin B-12) 1,000 mcg tablet 1 tablet, Daily, As directed    dapagliflozin (Farxiga) 10 mg 1 tablet, oral, Daily before breakfast    diclofenac sodium (VOLTAREN XR) 100 mg, oral, Daily PRN    famotidine (PEPCID) 20 mg, oral, Daily PRN    furosemide (LASIX) 40 mg, oral, Daily, As needed for weight gain greater than 3 pounds    gabapentin (NEURONTIN) 100 mg, oral, Nightly    icosapent ethyL (VASCEPA) 1 g, oral, 2 times daily (morning and late afternoon)    loratadine (CLARITIN) 10 mg, oral, Daily    magnesium gluconate (Magonate) 27.5 mg magne- sium (500 mg) tablet 2 tablets, oral, Daily    metoprolol succinate XL (TOPROL-XL) 50 mg, oral, Daily    multivit with min-folic acid 0.4 mg tablet  "Every 24 hours    multivitamin tablet 1 tablet, oral, Daily    pantoprazole (ProtoNix) 40 mg EC tablet 1 tablet, oral, Daily    sacubitriL-valsartan (Entresto) 24-26 mg tablet 0.5 tablets, oral, 2 times daily    sertraline (Zoloft) 50 mg tablet Take one tablet daily in the morning after breakfast.    tadalafil (CIALIS) 5 mg, oral, Daily    tadalafil 20 mg tablet START WITH ONE-HALF TABLET AS NEEDED 2 HOURS PRIOR TO ACTIVITY. MAY INCREASE TO 1 TABLET AS NEEDED 2 HOURS PRIOR TO ACTIVITY    turmeric-turmeric root extract 450-50 mg capsule Take daily as directed.        ROS     Vital Signs:  Vitals:    06/13/24 0945   BP: 125/64   BP Location: Left arm   Patient Position: Sitting   Pulse: 60   SpO2: 94%   Weight: 69.5 kg (153 lb 5 oz)   Height: 1.6 m (5' 3\")     Wt Readings from Last 2 Encounters:   06/13/24 69.5 kg (153 lb 5 oz)   05/31/24 71 kg (156 lb 8.4 oz)     Body mass index is 27.16 kg/m².       Physical Exam:    The device pocket was moderately swollen, not tender not erythematous.  Incision well-approximated.  No edema.  Otherwise, the patient was not examined today.     Last Labs:  CMP:  Recent Labs     05/28/24  1458 05/06/24  1128 04/30/24  1030 04/30/24  0933 04/10/24  0906 03/04/24  0945    138 135*  --  139 137   K 5.2 5.5* 4.7  --  4.5 4.9    105 107  --  104 100   CO2 25 27 23  --  25 28   ANIONGAP 14 12 10  --  15 14   BUN 73* 44* 38*  --  42* 39*   CREATININE 3.22* 2.43* 2.25*  --  2.40* 2.53*   EGFR 19* 27* 30* 52* 27* 26*   GLUCOSE 151* 147* 155*  --  170* 199*     Recent Labs     05/06/24  1128 02/23/24  0808 02/21/24  0358 02/20/24  0528 02/15/24  1429 02/15/24  0407 02/14/24  0429 02/13/24  2214 01/11/24  1336   ALBUMIN 4.2 3.3* 3.1* 3.2* 3.4 3.0*   < > 3.1* 4.3   ALKPHOS 103  --   --  60  --  58  --  70 92   ALT 27  --   --  109*  --  17  --  17 24   AST 25  --   --  99*  --  48*  --  15 31   BILITOT 0.6  --   --  1.2  --  0.6  --  0.3 0.5    < > = values in this interval not " displayed.     CBC:  Recent Labs     05/28/24  1458 05/06/24  1128 04/10/24  0906 03/04/24  0945 02/23/24  0808   WBC 7.5 7.8 8.4 8.6 12.6*   HGB 12.4* 13.6 14.7 14.4 12.0*   HCT 38.5* 42.3 44.0 46.0 35.1*    182 185 355 266   MCV 98 97 95 98 90     COAG:   Recent Labs     05/28/24  1458 05/06/24  1128 02/20/24  0528 02/13/24  2214 07/31/23  1110   INR 1.2* 1.2* 2.1* 1.3* 1.1     HEME/ENDO:  Recent Labs     02/17/24  0535 02/15/24  0407 01/11/24  1336 07/31/23  1110 05/04/23  1215 08/18/22  0940 11/18/21  0530 06/01/21  0525 05/31/21 2021   IRONSAT 8*  --   --   --   --   --   --   --   --    TSH  --  3.26  --   --   --  3.73 1.32  --  2.54   HGBA1C  --   --  6.6* 7.0* 6.8* 6.6*  --    < >  --     < > = values in this interval not displayed.      CARDIAC:   Recent Labs     02/18/24  1826 02/18/24  1730 02/18/24  0419 02/14/24 2111 02/14/24  1720 02/14/24  1502 02/13/24 2214 05/04/23  1215 09/14/22  2025 12/02/21  0947 11/15/21  2217 05/31/21 2021   TROPHS 4,490* 4,518*  --  635* 258* 377*   < >  --    < >  --   --   --    BNP  --   --  2,116*  --   --   --   --  203*  --  108* 139* 536*    < > = values in this interval not displayed.     Recent Labs     05/06/24  1128 11/13/23  1041 07/31/23  1110 05/04/23  1215 05/05/22  1450   CHOL 114 102 128 143 144   LDLF  --   --  57 56 36   HDL 31.0 25.8 32.3* 37.1* 35.3*   TRIG 149 203* 192* 252* 362*       Last Cardiology Tests:    ECG:    Not performed today.    Echo:  Echo Results:  Transthoracic Echo (TTE) Limited With Contrast 04/26/2024    [place] [id number]  Phone [phone] Fax [phone]    TRANSTHORACIC ECHOCARDIOGRAM REPORT      Patient Name:      CHIDI VARGAS     Reading Physician:    [id number] Blaine Adams MD  Study Date:        4/26/2024            Ordering Provider:    [id number] HUMBERTO SELLERS  MRN/PID:           [id number]             Fellow:  Accession#:        [id number]          Nurse:                Christina Chacon RN  Date of Birth/Age: 1950 / 73 years Sonographer:          Nikole Porter RDCS  Gender:            M                    Additional Staff:  Height:            152.40 cm            Admit Date:  Weight:            65.32 kg             Admission Status:     Outpatient  BSA / BMI:         1.62 m2 / 28.12      Department Location:  [facility] Echo  kg/m2                                      Lab  Blood Pressure: 163 /74 mmHg    Study Type:    TRANSTHORACIC ECHO (TTE) LIMITED  Diagnosis/ICD: Acute combined systolic (congestive) and diastolic (congestive)  heart failure (CHF)-I50.41; Non ST elevation (NSTEMI) myocardial  infarction-I21.4; Ventricular tachycardia, other-I47.29; Heart  disease, unspecified-I51.9  Indication:    CHF, NSTEMI, VT, LVD  CPT Codes:     Echo Limited-70230; Doppler Limited-96566; Color Doppler-05222    Patient History:  Diabetes:          Yes  Pertinent History: CHF, Cancer, HTN and TIA.    Study Detail: The following Echo studies were performed: 2D, Doppler and color  flow. Optison used as a contrast agent for endocardial border  definition. Total contrast used for this procedure was 5 mL via IV  push. Unable to obtain subcostal and suprasternal notch view.      PHYSICIAN INTERPRETATION:  Left Ventricle: Left ventricular systolic function is moderately decreased, with an estimated ejection fraction of 30-35%. Wall motion is abnormal. The left ventricular cavity size is mildly dilated. Spectral Doppler shows a restrictive pattern of left ventricular diastolic filling. There is an elevated left ventricular end diastolic pressure.  LV Wall Scoring:  The basal and mid inferior septum, basal and mid inferior wall, and basal and  mid inferolateral wall are akinetic. The basal anteroseptal segment, apical  septal segment, and apical inferior segment are hypokinetic. All remaining  scored segments are normal.    Left Atrium: The left atrium was not assessed.  Right  Ventricle: The right ventricle was not assessed. Right ventricular systolic function not assessed.  Right Atrium: The right atrium was not assessed.  Aortic Valve: The aortic valve is trileaflet. There is mild aortic valve cusp calcification. There is no evidence of aortic valve regurgitation.  Mitral Valve: The mitral valve is mildly thickened. There is moderately decreased mitral valve posterior leaflet mobility. There is moderate mitral valve regurgitation.  Tricuspid Valve: The tricuspid valve is structurally normal. There is mild to moderate tricuspid regurgitation. The Doppler estimated RVSP is mildly elevated at 35.7 mmHg.  Pulmonic Valve: The pulmonic valve was not assessed. The pulmonic valve regurgitation was not assessed.  Pericardium: There is no pericardial effusion noted.  Aorta: The aortic root was not assessed.      CONCLUSIONS:  1. Left ventricular systolic function is moderately decreased with a 30-35% estimated ejection fraction.  2. Multiple segmental abnormalities exist. See findings.  3. Spectral Doppler shows a restrictive pattern of left ventricular diastolic filling.  4. There is an elevated left ventricular end diastolic pressure.  5. Moderate mitral valve regurgitation.  6. Moderately decreased mitral valve posterior leaflet mobility.  7. Mild to moderate tricuspid regurgitation.  8. Mildly elevated RVSP.    QUANTITATIVE DATA SUMMARY:  2D MEASUREMENTS:  Normal Ranges:  IVSd:          1.16 cm    (0.6-1.1cm)  LVPWd:         1.04 cm    (0.6-1.1cm)  LVIDd:         5.17 cm    (3.9-5.9cm)  LVIDs:         4.87 cm  LV Mass Index: 134.2 g/m2  LV % FS        5.8 %    LV SYSTOLIC FUNCTION BY 2D PLANIMETRY (MOD):  Normal Ranges:  EF-A4C View: 36.9 % (>=55%)  EF-A2C View: 28.7 %  EF-Biplane:  29.8 %    LV DIASTOLIC FUNCTION:  Normal Ranges:  MV Peak E:    1.13 m/s   (0.7-1.2 m/s)  MV Peak A:    0.28 m/s   (0.42-0.7 m/s)  E/A Ratio:    3.98       (1.0-2.2)  MV e'         0.04 m/s   (>8.0)  MV lateral e'  0.04 m/s  MV medial e'  0.04 m/s  MV A Dur:     88.49 msec  E/e' Ratio:   28.16      (<8.0)  LV IVRT:      74 msec    (<100ms)    MITRAL VALVE:  Normal Ranges:  MV DT: 171 msec (150-240msec)    AORTIC VALVE:  Normal Ranges:  LVOT Max Matt:  1.02 m/s (<=1.1m/s)  LVOT VTI:      25.00 cm  LVOT Diameter: 2.03 cm  (1.8-2.4cm)    TRICUSPID VALVE/RVSP:  Normal Ranges:  Peak TR Velocity: 2.86 m/s  RV Syst Pressure: 35.7 mmHg (< 30mmHg)      [id number] Blaine Adams MD  Electronically signed on 4/28/2024 at 11:13:11 AM      Wall Scoring        ** Final **      Transthoracic Echo (TTE) Complete With Contrast 02/15/2024    [place] [id number]  Phone [phone] Fax [phone]    TRANSTHORACIC ECHOCARDIOGRAM REPORT      Patient Name:      CHIDI Andrade Physician:    [id number] Gonzalo White DO  Study Date:        2/15/2024             Ordering Provider:    [id number] TITO NICOLAS  MRN/PID:           [id number]              Fellow:  Accession#:        [id number]          Nurse:  Date of Birth/Age: 1950 / 73 years  Sonographer:          Heena David RDCS  Gender:            M                     Additional Staff:  Height:            160.02 cm             Admit Date:           2/13/2024  Weight:            73.03 kg              Admission Status:     Inpatient -  Routine  BSA / BMI:         1.76 m2 / 28.52 kg/m2 Department Location:  [facility] Echo  Lab  Blood Pressure: 105 /60 mmHg    Study Type:    TRANSTHORACIC ECHO (TTE) COMPLETE  Diagnosis/ICD: Chest pain, unspecified-R07.9  Indication:    Chest Pain  CPT Codes:     Echo Complete w Full Doppler-24052  Study Detail: The following Echo studies were performed: 2D, M-Mode, Doppler and  color flow. Optison used as a contrast agent for endocardial  border definition. Total contrast used for this procedure was 3 mL  via IV push.      PHYSICIAN INTERPRETATION:  Left Ventricle: Left ventricular systolic function  is normal, with an estimated ejection fraction of 30-35%. There are multiple wall motion abnormalities. The left ventricular cavity size is upper limits of normal. Spectral Doppler shows a restrictive pattern of left ventricular diastolic filling.  LV Wall Scoring:  The basal inferoseptal segment and basal inferior segment are akinetic. The  entire anterior septum, mid and apical inferior septum, basal and mid  inferolateral wall, basal anterolateral segment, basal anterior segment, and mid  inferior segment are hypokinetic. All remaining scored segments are normal.    Left Atrium: The left atrium is normal in size.  Right Ventricle: The right ventricle was not well visualized. There is low normal right ventricular systolic function.  Right Atrium: The right atrium is normal in size.  Aortic Valve: The aortic valve is trileaflet. There is mild aortic valve thickening. There is no evidence of aortic valve regurgitation. The peak instantaneous gradient of the aortic valve is 7.1 mmHg. The mean gradient of the aortic valve is 4.0 mmHg.  Mitral Valve: The mitral valve is normal in structure. There is mild mitral valve regurgitation.  Tricuspid Valve: The tricuspid valve is structurally normal. There is mild to moderate tricuspid regurgitation.  Pulmonic Valve: The pulmonic valve is structurally normal. There is physiologic pulmonic valve regurgitation.  Pericardium: There is no pericardial effusion noted.  Aorta: The aortic root is normal.      CONCLUSIONS:  1. Left ventricular systolic function is normal with a 30-35% estimated ejection fraction.  2. Multiple segmental abnormalities exist. See findings.  3. Spectral Doppler shows a restrictive pattern of left ventricular diastolic filling.  4. There is low normal right ventricular systolic function.  5. Mild to moderate tricuspid regurgitation.  6. There are multiple wall motion abnormalities.    QUANTITATIVE DATA SUMMARY:  2D MEASUREMENTS:  Normal Ranges:  Ao Root d:      2.90 cm    (2.0-3.7cm)  LAs:           4.60 cm    (2.7-4.0cm)  IVSd:          1.00 cm    (0.6-1.1cm)  LVPWd:         0.90 cm    (0.6-1.1cm)  LVIDd:         5.30 cm    (3.9-5.9cm)  LVIDs:         5.20 cm  LV Mass Index: 106.2 g/m2  LV % FS        1.9 %    LA VOLUME:  Normal Ranges:  LA Vol A4C:        53.0 ml    (22+/-6mL/m2)  LA Vol A2C:        47.8 ml  LA Vol BP:         50.3 ml  LA Vol Index A4C:  30.0ml/m2  LA Vol Index A2C:  27.1 ml/m2  LA Vol Index BP:   28.5 ml/m2  LA Area A4C:       18.0 cm2  LA Area A2C:       17.1 cm2  LA Major Axis A4C: 5.2 cm  LA Major Axis A2C: 5.2 cm  LA Volume Index:   26.8 ml/m2    M-MODE MEASUREMENTS:  Normal Ranges:  Ao Root: 2.90 cm (2.0-3.7cm)    AORTA MEASUREMENTS:  Normal Ranges:  Asc Ao, d: 2.50 cm (2.1-3.4cm)    LV SYSTOLIC FUNCTION BY 2D PLANIMETRY (MOD):  Normal Ranges:  EF-A4C View: 29.3 % (>=55%)  EF-A2C View: 43.2 %  EF-Biplane:  35.2 %    LV DIASTOLIC FUNCTION:  Normal Ranges:  MV Peak E:    1.29 m/s (0.7-1.2 m/s)  MV Peak A:    0.50 m/s (0.42-0.7 m/s)  E/A Ratio:    2.56     (1.0-2.2)  MV e'         0.06 m/s (>8.0)  MV lateral e' 0.06 m/s  MV medial e'  0.06 m/s  E/e' Ratio:   21.50    (<8.0)    MITRAL VALVE:  Normal Ranges:  MV DT: 151 msec (150-240msec)    MITRAL INSUFFICIENCY:  Normal Ranges:  PISA Radius:  0.5 cm  MR VTI:       129.00 cm  MR Vmax:      437.00 cm/s  MR Alias Matt: 38.5 cm/s  MR Volume:    17.85 ml  MR Flow Rt:   60.48 ml/s  MR EROA:      0.14 cm2    AORTIC VALVE:  Normal Ranges:  AoV Vmax:                1.33 m/s (<=1.7m/s)  AoV Peak P.1 mmHg (<20mmHg)  AoV Mean P.0 mmHg (1.7-11.5mmHg)  LVOT Max Matt:            1.04 m/s (<=1.1m/s)  AoV VTI:                 25.10 cm (18-25cm)  LVOT VTI:                20.70 cm  LVOT Diameter:           2.00 cm  (1.8-2.4cm)  AoV Area, VTI:           2.59 cm2 (2.5-5.5cm2)  AoV Area,Vmax:           2.46 cm2 (2.5-4.5cm2)  AoV Dimensionless Index: 0.82      RIGHT VENTRICLE:  TAPSE: 14.1  mm  RV s'  0.09 m/s    TRICUSPID VALVE/RVSP:  Normal Ranges:  Peak TR Velocity: 2.44 m/s  RV Syst Pressure: 26.8 mmHg (< 30mmHg)  IVC Diam:         1.50 cm      [id number] Gonzalo White DO  Electronically signed on 2/15/2024 at 11:57:27 AM      Wall Scoring        ** Final **       Cath:      Stress Test:  Stress Results:  No results found for this or any previous visit from the past 365 days.         Cardiac Imaging:        Assessment/Plan     We reviewed the long problem list again, discussed the recent left subclavian stent, the secondary prevention ICD, and we reevaluated the LV ejection fraction on the recent echocardiogram, in association with progressive chronic kidney disease.  He does not have volume overload.  Eplerenone will not be resumed because he developed hyperkalemia.  However, we would like to try again with Entresto.  Cost was prohibitive in the past.  We will try Entresto 24-26 mg, start with one half of the tablet twice daily because chronic kidney disease, and we will check a BNP and renal function panel 1 week after he starts the medication.  He will follow-up with electrophysiology next month.  We may decide to stop amiodarone at that time.  He will follow-up in our office in approximately 2 months, and will maintain close follow-up with nephrology.    Orders:  Orders Placed This Encounter   Procedures    Renal Function Panel    B-Type Natriuretic Peptide      Followup Appts:  Future Appointments   Date Time Provider Department Center   7/8/2024  3:30 PM [place] [facility]   8/22/2024 12:40 PM Ofelia Mccracken DO [id number] [facility]   9/5/2024  1:00 PM Alyse Holland, PhD [id number] [facility]   9/18/2024  3:30 PM Meme Perrin MD [id number] [facility]   11/21/2024  9:20 AM Nate Duran MD [id number] [facility]   11/25/2024  9:50 AM MD DES Bolden [facility]           ____________________________________________________________  MD Von King  Attending Physician  [facility]  [facility]    [facility]  [facility] [employer]

## 2024-06-13 NOTE — PATIENT INSTRUCTIONS
1.  Begin Entresto 24-26 mg, start with one half of a tablet twice daily.    2.  1 week after starting the Entresto, obtain requested laboratory tests.    3.  We will send you a [employer] message with the blood test results.    4.  Follow-up with electrophysiology as scheduled, keep scheduled appointment with your kidney specialist, and we will see you again in approximately 8 weeks.  Take furosemide as needed for weight gain greater than 3 pounds.

## 2024-06-13 NOTE — PROGRESS NOTES
Primary Care Physician: Ofelia Mccracken DO  Date of Visit: 06/13/2024  9:40 AM EDT  [place] [facility] [place] [address]   Last office visit: 5/2/2024     Chief Complaint:     Follow-up HFrEF, recent left subclavian stent.    HPI/Summary  Ritesh Garza is a 73 y.o. male who presents for followup cardiology evaluation.     The patient is status post remote MI, with longstanding multivessel CAD, moderate LV systolic dysfunction and compensated congestive heart failure.  The patient's problem list includes chronic kidney disease.  In 2021, he was hospitalized with an episode of unresponsiveness, possibly absence seizures.  Keppra was prescribed.  A monitor showed no atrial fibrillation and no pauses.  There was a long hospital admission in November, 2021, with COVID-related delirium.  The ejection fraction was 30%.  In 2023, eplerenone was discontinued because of hyperkalemia.  There was a long hospitalization in February, 2024 with left hip pain and swelling after a fall, with significant left leg hematoma.  There were episodes of ventricular tachycardia and chest discomfort, thought to be in part related to metabolic acidosis and to acute blood loss anemia.  Biomarkers were elevated.  He was seen by electrophysiology, and was considered for ICD if there was no recovery of LV function, which was measured at 30 to 35% in the hospital.  Catheterization was deferred because of acute on chronic kidney injury.  At discharge, he was loaded with amiodarone, then started on amiodarone 200 mg daily, also started on furosemide and clopidogrel for ACS.  He was continued on aspirin, atorvastatin and Farxiga.  Amlodipine, losartan and metformin were all held, given the acute on chronic kidney injury.    Cardiac catheterization was performed on April 15, 2024.  The left POP to LAD and D1 was patent, vein grafts were occluded and there was severe native CAD with an 80% left main,  of the proximal LAD,  of the proximal  circumflex,  of the proximal RCA.  There was severe left subclavian stenosis.  This likely compromised flow to the left POP when he was hemodynamically unstable, with non-STEMI and ventricular tachycardia.  We arranged for a follow-up limited echocardiogram on April 26, 2024.  The LVEF was estimated at 30 to 35%. After of evaluation, we proceeded with left subclavian stent on April 30, 2024.  He was in our office a few days later and we recommended that we pursue ICD.  This was successfully performed on May 30, 2024.    The patient has been continued on DAPT with aspirin and Plavix, also high-dose atorvastatin, amlodipine, Farxiga, as needed furosemide, Vascepa, metoprolol succinate and amiodarone 200 mg daily.  Not currently on Entresto or losartan.  History of hyperkalemia on eplerenone.  Metformin has been held.  Recent creatinine 3.22 mg/dL.  Recent cholesterol 114, LDL 53.  Triglycerides 149.    He is feeling well.  The device pocket has been inspected and is healing appropriately.  No fevers or chills.  He has only taken a few doses of furosemide, which is as needed for weight gain greater than 3 pounds.  Specialty Problems          Cardiology Problems    Peripheral arterial disease (CMS-HCC)    Cardiomyopathy (Multi)    History of coronary artery bypass graft     1992: CABG x5, [facility].         History of myocardial infarction    Atherosclerosis of coronary artery bypass graft    Benign essential hypertension    Ischemic cardiomyopathy    Hyperlipidemia    Left ventricular systolic dysfunction (LVSD)     Comment on above: February 28, 2014: LV ejection fraction 35-40%. Basal inferolateral, and basal inferior, basal inferoseptal, mid inferolateral, mid inferior, and the inferoseptal akinesis. Mild LV dilatation. Mild mitral regurgitation. June 06, 2018: LV ejection fraction 35-40%. basal and mid inferior wall, basal and mid inferolateral wall, basal inferoseptal and mid inferoseptal  segments are abnormal. Left ventricle mildly dilated. Trace mitral valve regurgitation. June 07, 2019: LV ejection fraction stable, 35-40%.;  February 28, 2014: LV ejection fraction 35-40%. Basal inferolateral, and basal inferior, basal inferoseptal, mid inferolateral, mid inferior, and the inferoseptal akinesis. Mild LV dilatation. Mild mitral regurgitation. June 06, 2018: LV ejection fraction 35-40%. basal and mid inferior wall, basal and mid inferolateral wall, basal inferoseptal and mid inferoseptal segments are abnormal. Left ventricle mildly dilated. Trace mitral valve regurgitation. June 07, 2019: LV ejection fraction stable, 35-40%.June 1. 2021: LV EF 40%.;  February 28, 2014: LV ejection fraction 35-40%. Basal inferolateral, and basal inferior, basal inferoseptal, mid inferolateral, mid inferior, and the inferoseptal akinesis. Mild LV dilatation. Mild mitral regurgitation. June 06, 2018: LV ejection fraction 35-40%. basal and mid inferior wall, basal and mid inferolateral wall, basal inferoseptal and mid inferoseptal segments are abnormal. Left ventricle mildly dilated. Trace mitral valve regurgitation. June 07, 2019: LV ejection fraction stable, 35-40%.June 1. 2021: LV EF 40%. #16, 2021: LVEF 35%.;         Congestive heart failure with right ventricular systolic dysfunction (Multi)    CAD (coronary artery disease)    Chest pain    Demand ischemia (Multi)    Ventricular tachycardia, unspecified (Multi)    Acute on chronic systolic heart failure (Multi)    Myocardial infarction (Multi)    Stenosis of subclavian artery (CMS-HCC)        Past Medical History:   Diagnosis Date    Allergic     Asthma ([facility]-H[facility])     Coronary artery disease Long Ago    Diabetes mellitus (Multi) Over 20 years ago    Drug-induced gout, unspecified site 10/04/2019    Acute drug-induced gout    Encounter for screening for malignant neoplasm of colon 10/31/2022    Colon cancer screening    Gout, unspecified     Acute gout    Heart  disease Over 20 years sgo    Heart murmur Long ago    Hypertension Over 20 years ago    Myocardial infarction (Multi) Over 20 yesrs ago    Ocular pain, right eye 10/14/2018    Pain of right eye    Periorbital cellulitis 10/14/2018    Periorbital cellulitis    Personal history of colonic polyps 10/11/2017    History of colon polyps    Personal history of other diseases of the circulatory system 05/12/2021    History of angina pectoris    Personal history of other diseases of the circulatory system 05/12/2021    History of angina pectoris    Personal history of other diseases of the respiratory system 10/04/2019    History of acute bronchitis    Personal history of other drug therapy     History of influenza vaccination    Unspecified cirrhosis of liver (Multi) 11/07/2022    Cirrhosis, nonalcoholic          Past Surgical History:   Procedure Laterality Date    ADENOIDECTOMY  Childhood    CARDIAC CATHETERIZATION  Over 30 years ago    CARDIAC CATHETERIZATION N/A 04/15/2024    Procedure: Left And Right Heart Cath, With LV;  Surgeon: Blaine Adams MD;  Location: [facility];  Service: Cardiovascular;  Laterality: N/A;  3 hr hydration / arrive 6:30    CARDIAC ELECTROPHYSIOLOGY PROCEDURE N/A 5/30/2024    Procedure: ICD Dual Implant;  Surgeon: Mina Kiran MD;  Location: [facility];  Service: Electrophysiology;  Laterality: N/A;  Dual chamber ICD, [facility]  notified [facility] 5/24/24    CHOLECYSTECTOMY  Over 30 years ago    CIRCUMCISION, PRIMARY  72,years ago    CORONARY ARTERY BYPASS GRAFT  12/02/2021    CABG    CORONARY STENT PLACEMENT  Last week    EYE SURGERY  Childhood    INVASIVE VASCULAR PROCEDURE Left 04/30/2024    Procedure: Angioplasty - Upper Extremity;  Surgeon: Narinder Quevedo MD;  Location: [facility];  Service: Cardiovascular;  Laterality: Left;  3 hr hydration  / arrive 6:30    MR HEAD ANGIO WO IV CONTRAST  06/02/2021    MR HEAD ANGIO WO IV CONTRAST 6/2/2021 [facility]  CLINICAL LEGACY    MR HEAD ANGIO WO IV CONTRAST  11/16/2021    MR HEAD ANGIO WO IV CONTRAST 11/16/2021 [email or web address] CLINICAL LEGACY    MR NECK ANGIO WO IV CONTRAST  06/02/2021    MR NECK ANGIO WO IV CONTRAST 6/2/2021 [facility] [email or web address]] CLINICAL LEGACY    MR NECK ANGIO WO IV CONTRAST  11/16/2021    MR NECK ANGIO WO IV CONTRAST 11/16/2021 [facility] [email or web address] CLINICAL LEGACY            Social History     Tobacco Use    Smoking status: Never    Smokeless tobacco: Never   Substance Use Topics    Alcohol use: Never    Drug use: Never                 No Known Allergies      Current Outpatient Medications   Medication Instructions    acetaminophen (TYLENOL) 650 mg, oral, Every 6 hours PRN    albuterol (ProAir HFA) 90 mcg/actuation inhaler 2 puffs, inhalation, Every 6 hours PRN    allopurinol (ZYLOPRIM) 50 mg, oral, Daily    amiodarone (Pacerone) 200 mg tablet Take 2 tablets (400 mg) by mouth 2 times a day for 9 doses. On February, 28th start to take 1 tablet (200 mg) by mouth once daily.    amLODIPine (NORVASC) 10 mg, oral, Daily    aspirin 81 mg, oral, Daily    atorvastatin (LIPITOR) 80 mg, oral, Daily    b complex vitamins capsule 1 capsule, Daily    clopidogrel (PLAVIX) 75 mg, oral, Daily    coenzyme Q-10 (CoQ-10) 100 mg capsule 1 capsule, oral, Daily    cyanocobalamin (Vitamin B-12) 1,000 mcg tablet 1 tablet, Daily, As directed    dapagliflozin (Farxiga) 10 mg 1 tablet, oral, Daily before breakfast    diclofenac sodium (VOLTAREN XR) 100 mg, oral, Daily PRN    famotidine (PEPCID) 20 mg, oral, Daily PRN    furosemide (LASIX) 40 mg, oral, Daily, As needed for weight gain greater than 3 pounds    gabapentin (NEURONTIN) 100 mg, oral, Nightly    icosapent ethyL (VASCEPA) 1 g, oral, 2 times daily (morning and late afternoon)    loratadine (CLARITIN) 10 mg, oral, Daily    magnesium gluconate (Magonate) 27.5 mg magne- sium (500 mg) tablet 2 tablets, oral, Daily    metoprolol succinate XL (TOPROL-XL) 50 mg, oral, Daily    multivit with min-folic acid 0.4 mg tablet  "Every 24 hours    multivitamin tablet 1 tablet, oral, Daily    pantoprazole (ProtoNix) 40 mg EC tablet 1 tablet, oral, Daily    sacubitriL-valsartan (Entresto) 24-26 mg tablet 0.5 tablets, oral, 2 times daily    sertraline (Zoloft) 50 mg tablet Take one tablet daily in the morning after breakfast.    tadalafil (CIALIS) 5 mg, oral, Daily    tadalafil 20 mg tablet START WITH ONE-HALF TABLET AS NEEDED 2 HOURS PRIOR TO ACTIVITY. MAY INCREASE TO 1 TABLET AS NEEDED 2 HOURS PRIOR TO ACTIVITY    turmeric-turmeric root extract 450-50 mg capsule Take daily as directed.        ROS     Vital Signs:  Vitals:    06/13/24 0945   BP: 125/64   BP Location: Left arm   Patient Position: Sitting   Pulse: 60   SpO2: 94%   Weight: 69.5 kg (153 lb 5 oz)   Height: 1.6 m (5' 3\")     Wt Readings from Last 2 Encounters:   06/13/24 69.5 kg (153 lb 5 oz)   05/31/24 71 kg (156 lb 8.4 oz)     Body mass index is 27.16 kg/m².       Physical Exam:    The device pocket was moderately swollen, not tender not erythematous.  Incision well-approximated.  No edema.  Otherwise, the patient was not examined today.     Last Labs:  CMP:  Recent Labs     05/28/24  1458 05/06/24  1128 04/30/24  1030 04/30/24  0933 04/10/24  0906 03/04/24  0945    138 135*  --  139 137   K 5.2 5.5* 4.7  --  4.5 4.9    105 107  --  104 100   CO2 25 27 23  --  25 28   ANIONGAP 14 12 10  --  15 14   BUN 73* 44* 38*  --  42* 39*   CREATININE 3.22* 2.43* 2.25*  --  2.40* 2.53*   EGFR 19* 27* 30* 52* 27* 26*   GLUCOSE 151* 147* 155*  --  170* 199*     Recent Labs     05/06/24  1128 02/23/24  0808 02/21/24  0358 02/20/24  0528 02/15/24  1429 02/15/24  0407 02/14/24  0429 02/13/24  2214 01/11/24  1336   ALBUMIN 4.2 3.3* 3.1* 3.2* 3.4 3.0*   < > 3.1* 4.3   ALKPHOS 103  --   --  60  --  58  --  70 92   ALT 27  --   --  109*  --  17  --  17 24   AST 25  --   --  99*  --  48*  --  15 31   BILITOT 0.6  --   --  1.2  --  0.6  --  0.3 0.5    < > = values in this interval not " displayed.     CBC:  Recent Labs     05/28/24  1458 05/06/24  1128 04/10/24  0906 03/04/24  0945 02/23/24  0808   WBC 7.5 7.8 8.4 8.6 12.6*   HGB 12.4* 13.6 14.7 14.4 12.0*   HCT 38.5* 42.3 44.0 46.0 35.1*    182 185 355 266   MCV 98 97 95 98 90     COAG:   Recent Labs     05/28/24  1458 05/06/24  1128 02/20/24  0528 02/13/24  2214 07/31/23  1110   INR 1.2* 1.2* 2.1* 1.3* 1.1     HEME/ENDO:  Recent Labs     02/17/24  0535 02/15/24  0407 01/11/24  1336 07/31/23  1110 05/04/23  1215 08/18/22  0940 11/18/21  0530 06/01/21  0525 05/31/21 2021   IRONSAT 8*  --   --   --   --   --   --   --   --    TSH  --  3.26  --   --   --  3.73 1.32  --  2.54   HGBA1C  --   --  6.6* 7.0* 6.8* 6.6*  --    < >  --     < > = values in this interval not displayed.      CARDIAC:   Recent Labs     02/18/24  1826 02/18/24  1730 02/18/24  0419 02/14/24 2111 02/14/24  1720 02/14/24  1502 02/13/24 2214 05/04/23  1215 09/14/22  2025 12/02/21  0947 11/15/21  2217 05/31/21 2021   TROPHS 4,490* 4,518*  --  635* 258* 377*   < >  --    < >  --   --   --    BNP  --   --  2,116*  --   --   --   --  203*  --  108* 139* 536*    < > = values in this interval not displayed.     Recent Labs     05/06/24  1128 11/13/23  1041 07/31/23  1110 05/04/23  1215 05/05/22  1450   CHOL 114 102 128 143 144   LDLF  --   --  57 56 36   HDL 31.0 25.8 32.3* 37.1* 35.3*   TRIG 149 203* 192* 252* 362*       Last Cardiology Tests:    ECG:    Not performed today.    Echo:  Echo Results:  Transthoracic Echo (TTE) Limited With Contrast 04/26/2024    [place] [id number]  Phone [phone] Fax [phone]    TRANSTHORACIC ECHOCARDIOGRAM REPORT      Patient Name:      CHIDI VARGAS     Reading Physician:    [id number] Blaine Adams MD  Study Date:        4/26/2024            Ordering Provider:    [id number] HUMBERTO SELLERS  MRN/PID:           [id number]             Fellow:  Accession#:        [id number]          Nurse:                Christina Chacon RN  Date of Birth/Age: 1950 / 73 years Sonographer:          Nikole Porter RDCS  Gender:            M                    Additional Staff:  Height:            152.40 cm            Admit Date:  Weight:            65.32 kg             Admission Status:     Outpatient  BSA / BMI:         1.62 m2 / 28.12      Department Location:  [facility] Echo  kg/m2                                      Lab  Blood Pressure: 163 /74 mmHg    Study Type:    TRANSTHORACIC ECHO (TTE) LIMITED  Diagnosis/ICD: Acute combined systolic (congestive) and diastolic (congestive)  heart failure (CHF)-I50.41; Non ST elevation (NSTEMI) myocardial  infarction-I21.4; Ventricular tachycardia, other-I47.29; Heart  disease, unspecified-I51.9  Indication:    CHF, NSTEMI, VT, LVD  CPT Codes:     Echo Limited-11668; Doppler Limited-76715; Color Doppler-04402    Patient History:  Diabetes:          Yes  Pertinent History: CHF, Cancer, HTN and TIA.    Study Detail: The following Echo studies were performed: 2D, Doppler and color  flow. Optison used as a contrast agent for endocardial border  definition. Total contrast used for this procedure was 5 mL via IV  push. Unable to obtain subcostal and suprasternal notch view.      PHYSICIAN INTERPRETATION:  Left Ventricle: Left ventricular systolic function is moderately decreased, with an estimated ejection fraction of 30-35%. Wall motion is abnormal. The left ventricular cavity size is mildly dilated. Spectral Doppler shows a restrictive pattern of left ventricular diastolic filling. There is an elevated left ventricular end diastolic pressure.  LV Wall Scoring:  The basal and mid inferior septum, basal and mid inferior wall, and basal and  mid inferolateral wall are akinetic. The basal anteroseptal segment, apical  septal segment, and apical inferior segment are hypokinetic. All remaining  scored segments are normal.    Left Atrium: The left atrium was not assessed.  Right  Ventricle: The right ventricle was not assessed. Right ventricular systolic function not assessed.  Right Atrium: The right atrium was not assessed.  Aortic Valve: The aortic valve is trileaflet. There is mild aortic valve cusp calcification. There is no evidence of aortic valve regurgitation.  Mitral Valve: The mitral valve is mildly thickened. There is moderately decreased mitral valve posterior leaflet mobility. There is moderate mitral valve regurgitation.  Tricuspid Valve: The tricuspid valve is structurally normal. There is mild to moderate tricuspid regurgitation. The Doppler estimated RVSP is mildly elevated at 35.7 mmHg.  Pulmonic Valve: The pulmonic valve was not assessed. The pulmonic valve regurgitation was not assessed.  Pericardium: There is no pericardial effusion noted.  Aorta: The aortic root was not assessed.      CONCLUSIONS:  1. Left ventricular systolic function is moderately decreased with a 30-35% estimated ejection fraction.  2. Multiple segmental abnormalities exist. See findings.  3. Spectral Doppler shows a restrictive pattern of left ventricular diastolic filling.  4. There is an elevated left ventricular end diastolic pressure.  5. Moderate mitral valve regurgitation.  6. Moderately decreased mitral valve posterior leaflet mobility.  7. Mild to moderate tricuspid regurgitation.  8. Mildly elevated RVSP.    QUANTITATIVE DATA SUMMARY:  2D MEASUREMENTS:  Normal Ranges:  IVSd:          1.16 cm    (0.6-1.1cm)  LVPWd:         1.04 cm    (0.6-1.1cm)  LVIDd:         5.17 cm    (3.9-5.9cm)  LVIDs:         4.87 cm  LV Mass Index: 134.2 g/m2  LV % FS        5.8 %    LV SYSTOLIC FUNCTION BY 2D PLANIMETRY (MOD):  Normal Ranges:  EF-A4C View: 36.9 % (>=55%)  EF-A2C View: 28.7 %  EF-Biplane:  29.8 %    LV DIASTOLIC FUNCTION:  Normal Ranges:  MV Peak E:    1.13 m/s   (0.7-1.2 m/s)  MV Peak A:    0.28 m/s   (0.42-0.7 m/s)  E/A Ratio:    3.98       (1.0-2.2)  MV e'         0.04 m/s   (>8.0)  MV lateral e'  0.04 m/s  MV medial e'  0.04 m/s  MV A Dur:     88.49 msec  E/e' Ratio:   28.16      (<8.0)  LV IVRT:      74 msec    (<100ms)    MITRAL VALVE:  Normal Ranges:  MV DT: 171 msec (150-240msec)    AORTIC VALVE:  Normal Ranges:  LVOT Max Matt:  1.02 m/s (<=1.1m/s)  LVOT VTI:      25.00 cm  LVOT Diameter: 2.03 cm  (1.8-2.4cm)    TRICUSPID VALVE/RVSP:  Normal Ranges:  Peak TR Velocity: 2.86 m/s  RV Syst Pressure: 35.7 mmHg (< 30mmHg)      [id number] Blaine Adams MD  Electronically signed on 4/28/2024 at 11:13:11 AM      Wall Scoring        ** Final **      Transthoracic Echo (TTE) Complete With Contrast 02/15/2024    [place] [id number]  Phone [phone] Fax [phone]    TRANSTHORACIC ECHOCARDIOGRAM REPORT      Patient Name:      CHIDI Andrade Physician:    [id number] Gonzalo White DO  Study Date:        2/15/2024             Ordering Provider:    [id number] TITO NICOLAS  MRN/PID:           [id number]              Fellow:  Accession#:        [id number]          Nurse:  Date of Birth/Age: 1950 / 73 years  Sonographer:          Heena David RDCS  Gender:            M                     Additional Staff:  Height:            160.02 cm             Admit Date:           2/13/2024  Weight:            73.03 kg              Admission Status:     Inpatient -  Routine  BSA / BMI:         1.76 m2 / 28.52 kg/m2 Department Location:  [facility] Echo  Lab  Blood Pressure: 105 /60 mmHg    Study Type:    TRANSTHORACIC ECHO (TTE) COMPLETE  Diagnosis/ICD: Chest pain, unspecified-R07.9  Indication:    Chest Pain  CPT Codes:     Echo Complete w Full Doppler-30547  Study Detail: The following Echo studies were performed: 2D, M-Mode, Doppler and  color flow. Optison used as a contrast agent for endocardial  border definition. Total contrast used for this procedure was 3 mL  via IV push.      PHYSICIAN INTERPRETATION:  Left Ventricle: Left ventricular systolic function  is normal, with an estimated ejection fraction of 30-35%. There are multiple wall motion abnormalities. The left ventricular cavity size is upper limits of normal. Spectral Doppler shows a restrictive pattern of left ventricular diastolic filling.  LV Wall Scoring:  The basal inferoseptal segment and basal inferior segment are akinetic. The  entire anterior septum, mid and apical inferior septum, basal and mid  inferolateral wall, basal anterolateral segment, basal anterior segment, and mid  inferior segment are hypokinetic. All remaining scored segments are normal.    Left Atrium: The left atrium is normal in size.  Right Ventricle: The right ventricle was not well visualized. There is low normal right ventricular systolic function.  Right Atrium: The right atrium is normal in size.  Aortic Valve: The aortic valve is trileaflet. There is mild aortic valve thickening. There is no evidence of aortic valve regurgitation. The peak instantaneous gradient of the aortic valve is 7.1 mmHg. The mean gradient of the aortic valve is 4.0 mmHg.  Mitral Valve: The mitral valve is normal in structure. There is mild mitral valve regurgitation.  Tricuspid Valve: The tricuspid valve is structurally normal. There is mild to moderate tricuspid regurgitation.  Pulmonic Valve: The pulmonic valve is structurally normal. There is physiologic pulmonic valve regurgitation.  Pericardium: There is no pericardial effusion noted.  Aorta: The aortic root is normal.      CONCLUSIONS:  1. Left ventricular systolic function is normal with a 30-35% estimated ejection fraction.  2. Multiple segmental abnormalities exist. See findings.  3. Spectral Doppler shows a restrictive pattern of left ventricular diastolic filling.  4. There is low normal right ventricular systolic function.  5. Mild to moderate tricuspid regurgitation.  6. There are multiple wall motion abnormalities.    QUANTITATIVE DATA SUMMARY:  2D MEASUREMENTS:  Normal Ranges:  Ao Root d:      2.90 cm    (2.0-3.7cm)  LAs:           4.60 cm    (2.7-4.0cm)  IVSd:          1.00 cm    (0.6-1.1cm)  LVPWd:         0.90 cm    (0.6-1.1cm)  LVIDd:         5.30 cm    (3.9-5.9cm)  LVIDs:         5.20 cm  LV Mass Index: 106.2 g/m2  LV % FS        1.9 %    LA VOLUME:  Normal Ranges:  LA Vol A4C:        53.0 ml    (22+/-6mL/m2)  LA Vol A2C:        47.8 ml  LA Vol BP:         50.3 ml  LA Vol Index A4C:  30.0ml/m2  LA Vol Index A2C:  27.1 ml/m2  LA Vol Index BP:   28.5 ml/m2  LA Area A4C:       18.0 cm2  LA Area A2C:       17.1 cm2  LA Major Axis A4C: 5.2 cm  LA Major Axis A2C: 5.2 cm  LA Volume Index:   26.8 ml/m2    M-MODE MEASUREMENTS:  Normal Ranges:  Ao Root: 2.90 cm (2.0-3.7cm)    AORTA MEASUREMENTS:  Normal Ranges:  Asc Ao, d: 2.50 cm (2.1-3.4cm)    LV SYSTOLIC FUNCTION BY 2D PLANIMETRY (MOD):  Normal Ranges:  EF-A4C View: 29.3 % (>=55%)  EF-A2C View: 43.2 %  EF-Biplane:  35.2 %    LV DIASTOLIC FUNCTION:  Normal Ranges:  MV Peak E:    1.29 m/s (0.7-1.2 m/s)  MV Peak A:    0.50 m/s (0.42-0.7 m/s)  E/A Ratio:    2.56     (1.0-2.2)  MV e'         0.06 m/s (>8.0)  MV lateral e' 0.06 m/s  MV medial e'  0.06 m/s  E/e' Ratio:   21.50    (<8.0)    MITRAL VALVE:  Normal Ranges:  MV DT: 151 msec (150-240msec)    MITRAL INSUFFICIENCY:  Normal Ranges:  PISA Radius:  0.5 cm  MR VTI:       129.00 cm  MR Vmax:      437.00 cm/s  MR Alias Matt: 38.5 cm/s  MR Volume:    17.85 ml  MR Flow Rt:   60.48 ml/s  MR EROA:      0.14 cm2    AORTIC VALVE:  Normal Ranges:  AoV Vmax:                1.33 m/s (<=1.7m/s)  AoV Peak P.1 mmHg (<20mmHg)  AoV Mean P.0 mmHg (1.7-11.5mmHg)  LVOT Max Matt:            1.04 m/s (<=1.1m/s)  AoV VTI:                 25.10 cm (18-25cm)  LVOT VTI:                20.70 cm  LVOT Diameter:           2.00 cm  (1.8-2.4cm)  AoV Area, VTI:           2.59 cm2 (2.5-5.5cm2)  AoV Area,Vmax:           2.46 cm2 (2.5-4.5cm2)  AoV Dimensionless Index: 0.82      RIGHT VENTRICLE:  TAPSE: 14.1  mm  RV s'  0.09 m/s    TRICUSPID VALVE/RVSP:  Normal Ranges:  Peak TR Velocity: 2.44 m/s  RV Syst Pressure: 26.8 mmHg (< 30mmHg)  IVC Diam:         1.50 cm      [id number] Gonzalo White DO  Electronically signed on 2/15/2024 at 11:57:27 AM      Wall Scoring        ** Final **       Cath:      Stress Test:  Stress Results:  No results found for this or any previous visit from the past 365 days.         Cardiac Imaging:        Assessment/Plan     We reviewed the long problem list again, discussed the recent left subclavian stent, the secondary prevention ICD, and we reevaluated the LV ejection fraction on the recent echocardiogram, in association with progressive chronic kidney disease.  He does not have volume overload.  Eplerenone will not be resumed because he developed hyperkalemia.  However, we would like to try again with Entresto.  Cost was prohibitive in the past.  We will try Entresto 24-26 mg, start with one half of the tablet twice daily because chronic kidney disease, and we will check a BNP and renal function panel 1 week after he starts the medication.  He will follow-up with electrophysiology next month.  We may decide to stop amiodarone at that time.  He will follow-up in our office in approximately 2 months, and will maintain close follow-up with nephrology.    Orders:  Orders Placed This Encounter   Procedures    Renal Function Panel    B-Type Natriuretic Peptide      Followup Appts:  Future Appointments   Date Time Provider Department Center   7/8/2024  3:30 PM [place] [facility] [place] [facility]   8/22/2024 12:40 PM Ofelia Mccracken DO [id number] [facility]   9/5/2024  1:00 PM Alyse Holland, PhD [id number] [facility]   9/18/2024  3:30 PM Meme Perrin MD [id number] [facility]   11/21/2024  9:20 AM Nate Duran MD [id number] [facility]   11/25/2024  9:50 AM MD DES Bolden [facility]           ____________________________________________________________  MD Von King  Attending Physician  [facility]  [facility]    [facility] for Cardiovascular Excellence  [employer]

## 2024-06-13 NOTE — Clinical Note
Doing well following ICD.  Electrophysiology follow-up in a few weeks, then decide on if/when to stop amiodarone.

## 2024-06-24 DIAGNOSIS — N52.9 ERECTILE DYSFUNCTION, UNSPECIFIED ERECTILE DYSFUNCTION TYPE: Primary | ICD-10-CM

## 2024-06-25 RX ORDER — TADALAFIL 5 MG/1
5 TABLET ORAL DAILY
Qty: 90 TABLET | Refills: 0 | Status: SHIPPED | OUTPATIENT
Start: 2024-06-25

## 2024-06-27 DIAGNOSIS — M10.9 GOUT, UNSPECIFIED CAUSE, UNSPECIFIED CHRONICITY, UNSPECIFIED SITE: Primary | ICD-10-CM

## 2024-06-30 ENCOUNTER — HOSPITAL ENCOUNTER (EMERGENCY)
Facility: HOSPITAL | Age: 74
Discharge: OTHER INSTITUTION | End: 2024-07-01
Attending: EMERGENCY MEDICINE
Payer: MEDICARE

## 2024-06-30 ENCOUNTER — APPOINTMENT (OUTPATIENT)
Dept: RADIOLOGY | Facility: HOSPITAL | Age: 74
End: 2024-06-30
Payer: MEDICARE

## 2024-06-30 DIAGNOSIS — E27.49: Primary | ICD-10-CM
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
## 2024-06-30 LAB
ALBUMIN SERPL BCP-MCNC: 4 G/DL (ref 3.4–5)
ALP SERPL-CCNC: 106 U/L (ref 33–136)
ALT SERPL W P-5'-P-CCNC: 39 U/L (ref 10–52)
ANION GAP SERPL CALC-SCNC: 16 MMOL/L (ref 10–20)
AST SERPL W P-5'-P-CCNC: 60 U/L (ref 9–39)
BASOPHILS # BLD AUTO: 0.03 X10*3/UL (ref 0–0.1)
BASOPHILS NFR BLD AUTO: 0.3 %
BILIRUB SERPL-MCNC: 0.7 MG/DL (ref 0–1.2)
BUN SERPL-MCNC: 60 MG/DL (ref 6–23)
CALCIUM SERPL-MCNC: 8.8 MG/DL (ref 8.6–10.3)
CARDIAC TROPONIN I PNL SERPL HS: 7 NG/L (ref 0–20)
CHLORIDE SERPL-SCNC: 102 MMOL/L (ref 98–107)
CO2 SERPL-SCNC: 20 MMOL/L (ref 21–32)
CREAT SERPL-MCNC: 3.5 MG/DL (ref 0.5–1.3)
EGFRCR SERPLBLD CKD-EPI 2021: 17 ML/MIN/1.73M*2
EOSINOPHIL # BLD AUTO: 0.23 X10*3/UL (ref 0–0.4)
EOSINOPHIL NFR BLD AUTO: 2.4 %
ERYTHROCYTE [DISTWIDTH] IN BLOOD BY AUTOMATED COUNT: 14.4 % (ref 11.5–14.5)
ETHANOL SERPL-MCNC: <10 MG/DL
GLUCOSE SERPL-MCNC: 232 MG/DL (ref 74–99)
HCT VFR BLD AUTO: 34.3 % (ref 41–52)
HGB BLD-MCNC: 10.9 G/DL (ref 13.5–17.5)
IMM GRANULOCYTES # BLD AUTO: 0.04 X10*3/UL (ref 0–0.5)
IMM GRANULOCYTES NFR BLD AUTO: 0.4 % (ref 0–0.9)
INR PPP: 1.3 (ref 0.9–1.1)
LACTATE SERPL-SCNC: 1.3 MMOL/L (ref 0.4–2)
LYMPHOCYTES # BLD AUTO: 2.47 X10*3/UL (ref 0.8–3)
LYMPHOCYTES NFR BLD AUTO: 25.7 %
MCH RBC QN AUTO: 30.4 PG (ref 26–34)
MCHC RBC AUTO-ENTMCNC: 31.8 G/DL (ref 32–36)
MCV RBC AUTO: 96 FL (ref 80–100)
MONOCYTES # BLD AUTO: 0.84 X10*3/UL (ref 0.05–0.8)
MONOCYTES NFR BLD AUTO: 8.8 %
NEUTROPHILS # BLD AUTO: 5.99 X10*3/UL (ref 1.6–5.5)
NEUTROPHILS NFR BLD AUTO: 62.4 %
NRBC BLD-RTO: 0 /100 WBCS (ref 0–0)
PLATELET # BLD AUTO: 176 X10*3/UL (ref 150–450)
POTASSIUM SERPL-SCNC: 4.4 MMOL/L (ref 3.5–5.3)
PROT SERPL-MCNC: 6.9 G/DL (ref 6.4–8.2)
PROTHROMBIN TIME: 14.2 SECONDS (ref 9.8–12.8)
RBC # BLD AUTO: 3.59 X10*6/UL (ref 4.5–5.9)
SODIUM SERPL-SCNC: 134 MMOL/L (ref 136–145)
WBC # BLD AUTO: 9.6 X10*3/UL (ref 4.4–11.3)

## 2024-06-30 PROCEDURE — 82077 ASSAY SPEC XCP UR&BREATH IA: CPT | Performed by: EMERGENCY MEDICINE

## 2024-06-30 PROCEDURE — 83605 ASSAY OF LACTIC ACID: CPT | Performed by: EMERGENCY MEDICINE

## 2024-06-30 PROCEDURE — 72128 CT CHEST SPINE W/O DYE: CPT | Mod: RSC

## 2024-06-30 PROCEDURE — 99291 CRITICAL CARE FIRST HOUR: CPT | Mod: 25 | Performed by: EMERGENCY MEDICINE

## 2024-06-30 PROCEDURE — 70450 CT HEAD/BRAIN W/O DYE: CPT

## 2024-06-30 PROCEDURE — 72128 CT CHEST SPINE W/O DYE: CPT | Performed by: RADIOLOGY

## 2024-06-30 PROCEDURE — 72125 CT NECK SPINE W/O DYE: CPT | Performed by: RADIOLOGY

## 2024-06-30 PROCEDURE — 84484 ASSAY OF TROPONIN QUANT: CPT | Performed by: EMERGENCY MEDICINE

## 2024-06-30 PROCEDURE — 36415 COLL VENOUS BLD VENIPUNCTURE: CPT | Performed by: EMERGENCY MEDICINE

## 2024-06-30 PROCEDURE — 80053 COMPREHEN METABOLIC PANEL: CPT | Performed by: EMERGENCY MEDICINE

## 2024-06-30 PROCEDURE — 72125 CT NECK SPINE W/O DYE: CPT

## 2024-06-30 PROCEDURE — 72131 CT LUMBAR SPINE W/O DYE: CPT | Performed by: RADIOLOGY

## 2024-06-30 PROCEDURE — 72131 CT LUMBAR SPINE W/O DYE: CPT | Mod: RSC

## 2024-06-30 PROCEDURE — 74176 CT ABD & PELVIS W/O CONTRAST: CPT

## 2024-06-30 PROCEDURE — 2500000001 HC RX 250 WO HCPCS SELF ADMINISTERED DRUGS (ALT 637 FOR MEDICARE OP): Performed by: EMERGENCY MEDICINE

## 2024-06-30 PROCEDURE — 85610 PROTHROMBIN TIME: CPT | Performed by: EMERGENCY MEDICINE

## 2024-06-30 PROCEDURE — 70450 CT HEAD/BRAIN W/O DYE: CPT | Performed by: RADIOLOGY

## 2024-06-30 PROCEDURE — G0390 TRAUMA RESPONS W/HOSP CRITI: HCPCS

## 2024-06-30 PROCEDURE — 71250 CT THORAX DX C-: CPT | Performed by: RADIOLOGY

## 2024-06-30 PROCEDURE — 74176 CT ABD & PELVIS W/O CONTRAST: CPT | Performed by: RADIOLOGY

## 2024-06-30 PROCEDURE — 85025 COMPLETE CBC W/AUTO DIFF WBC: CPT | Performed by: EMERGENCY MEDICINE

## 2024-06-30 RX ORDER — GABAPENTIN 100 MG/1
100 CAPSULE ORAL ONCE
Status: COMPLETED | OUTPATIENT
Start: 2024-06-30 | End: 2024-06-30

## 2024-06-30 ASSESSMENT — PAIN SCALES - GENERAL: PAINLEVEL_OUTOF10: 9

## 2024-06-30 ASSESSMENT — LIFESTYLE VARIABLES
EVER HAD A DRINK FIRST THING IN THE MORNING TO STEADY YOUR NERVES TO GET RID OF A HANGOVER: NO
EVER FELT BAD OR GUILTY ABOUT YOUR DRINKING: NO
HAVE YOU EVER FELT YOU SHOULD CUT DOWN ON YOUR DRINKING: NO
TOTAL SCORE: 0
HAVE PEOPLE ANNOYED YOU BY CRITICIZING YOUR DRINKING: NO

## 2024-06-30 ASSESSMENT — PAIN - FUNCTIONAL ASSESSMENT: PAIN_FUNCTIONAL_ASSESSMENT: 0-10

## 2024-07-01 ENCOUNTER — HOSPITAL ENCOUNTER (INPATIENT)
Facility: HOSPITAL | Age: 74
DRG: 472 | End: 2024-07-01
Attending: EMERGENCY MEDICINE | Admitting: SURGERY
Payer: MEDICARE

## 2024-07-01 ENCOUNTER — APPOINTMENT (OUTPATIENT)
Dept: CARDIOLOGY | Facility: HOSPITAL | Age: 74
DRG: 472 | End: 2024-07-01
Payer: MEDICARE

## 2024-07-01 ENCOUNTER — APPOINTMENT (OUTPATIENT)
Dept: RADIOLOGY | Facility: HOSPITAL | Age: 74
DRG: 472 | End: 2024-07-01
Payer: MEDICARE

## 2024-07-01 VITALS
OXYGEN SATURATION: 96 % | TEMPERATURE: 97.2 F | HEIGHT: 63 IN | RESPIRATION RATE: 16 BRPM | BODY MASS INDEX: 27.2 KG/M2 | WEIGHT: 153.5 LBS | DIASTOLIC BLOOD PRESSURE: 65 MMHG | SYSTOLIC BLOOD PRESSURE: 124 MMHG | HEART RATE: 70 BPM

## 2024-07-01 DIAGNOSIS — S70.12XA HEMATOMA OF LEFT THIGH, INITIAL ENCOUNTER: ICD-10-CM

## 2024-07-01 DIAGNOSIS — R41.89 IMPAIRED COGNITION: ICD-10-CM

## 2024-07-01 DIAGNOSIS — K76.6 PORTAL HYPERTENSION (MULTI): ICD-10-CM

## 2024-07-01 DIAGNOSIS — G56.03 BILATERAL CARPAL TUNNEL SYNDROME: ICD-10-CM

## 2024-07-01 DIAGNOSIS — I51.9 LEFT VENTRICULAR SYSTOLIC DYSFUNCTION (LVSD): ICD-10-CM

## 2024-07-01 DIAGNOSIS — K22.10 EROSIVE ESOPHAGITIS: ICD-10-CM

## 2024-07-01 DIAGNOSIS — Z20.822 EXPOSURE TO SEVERE ACUTE RESPIRATORY SYNDROME CORONAVIRUS 2 (SARS-COV-2): ICD-10-CM

## 2024-07-01 DIAGNOSIS — R94.02 ABNORMAL BRAIN SCAN: ICD-10-CM

## 2024-07-01 DIAGNOSIS — I77.1 STENOSIS OF SUBCLAVIAN ARTERY (CMS-HCC): ICD-10-CM

## 2024-07-01 DIAGNOSIS — M48.02 CERVICAL STENOSIS OF SPINE: ICD-10-CM

## 2024-07-01 DIAGNOSIS — G45.9 TRANSIENT ISCHEMIC ATTACK: ICD-10-CM

## 2024-07-01 DIAGNOSIS — N17.9 ACUTE KIDNEY INJURY (CMS-HCC): ICD-10-CM

## 2024-07-01 DIAGNOSIS — K21.00 GASTRO-ESOPHAGEAL REFLUX DISEASE WITH ESOPHAGITIS, WITHOUT BLEEDING: ICD-10-CM

## 2024-07-01 DIAGNOSIS — I42.8 OTHER CARDIOMYOPATHY (MULTI): Primary | ICD-10-CM

## 2024-07-01 DIAGNOSIS — R46.89 BEHAVIORAL CHANGE: ICD-10-CM

## 2024-07-01 DIAGNOSIS — I47.20 VENTRICULAR TACHYCARDIA, UNSPECIFIED (MULTI): ICD-10-CM

## 2024-07-01 DIAGNOSIS — Z86.010 HISTORY OF COLONIC POLYPS: ICD-10-CM

## 2024-07-01 DIAGNOSIS — R45.86 MOOD CHANGES: ICD-10-CM

## 2024-07-01 DIAGNOSIS — R41.89 UNRESPONSIVENESS: ICD-10-CM

## 2024-07-01 DIAGNOSIS — Z01.818 PREOPERATIVE CLEARANCE: ICD-10-CM

## 2024-07-01 DIAGNOSIS — E87.5 HYPERKALEMIA: ICD-10-CM

## 2024-07-01 DIAGNOSIS — I50.23 ACUTE ON CHRONIC SYSTOLIC HEART FAILURE (MULTI): ICD-10-CM

## 2024-07-01 DIAGNOSIS — F09 COGNITIVE DISORDER: ICD-10-CM

## 2024-07-01 DIAGNOSIS — D62 ANEMIA DUE TO ACUTE BLOOD LOSS: ICD-10-CM

## 2024-07-01 DIAGNOSIS — I50.82 CONGESTIVE HEART FAILURE WITH RIGHT VENTRICULAR SYSTOLIC DYSFUNCTION (MULTI): ICD-10-CM

## 2024-07-01 DIAGNOSIS — I25.5 ISCHEMIC CARDIOMYOPATHY: ICD-10-CM

## 2024-07-01 DIAGNOSIS — S14.129A CENTRAL CORD SYNDROME, INITIAL ENCOUNTER (MULTI): ICD-10-CM

## 2024-07-01 DIAGNOSIS — I50.20 CONGESTIVE HEART FAILURE WITH RIGHT VENTRICULAR SYSTOLIC DYSFUNCTION (MULTI): ICD-10-CM

## 2024-07-01 DIAGNOSIS — G47.33 OBSTRUCTIVE SLEEP APNEA SYNDROME: ICD-10-CM

## 2024-07-01 DIAGNOSIS — I25.2 HISTORY OF MYOCARDIAL INFARCTION: ICD-10-CM

## 2024-07-01 DIAGNOSIS — I10 BENIGN ESSENTIAL HYPERTENSION: ICD-10-CM

## 2024-07-01 DIAGNOSIS — I73.9 PERIPHERAL ARTERIAL DISEASE (CMS-HCC): ICD-10-CM

## 2024-07-01 DIAGNOSIS — I42.9 CARDIOMYOPATHY, UNSPECIFIED TYPE (MULTI): ICD-10-CM

## 2024-07-01 DIAGNOSIS — K76.0 NONALCOHOLIC FATTY LIVER: ICD-10-CM

## 2024-07-01 DIAGNOSIS — K20.90 ESOPHAGITIS: ICD-10-CM

## 2024-07-01 DIAGNOSIS — Z95.1 HISTORY OF CORONARY ARTERY BYPASS GRAFT: ICD-10-CM

## 2024-07-01 DIAGNOSIS — I12.9 CHRONIC KIDNEY DISEASE DUE TO HYPERTENSION: ICD-10-CM

## 2024-07-01 DIAGNOSIS — R41.89 COGNITIVE CHANGES: ICD-10-CM

## 2024-07-01 DIAGNOSIS — N52.9 ORGANIC IMPOTENCE: ICD-10-CM

## 2024-07-01 DIAGNOSIS — I24.89 DEMAND ISCHEMIA (MULTI): ICD-10-CM

## 2024-07-01 DIAGNOSIS — R68.89: ICD-10-CM

## 2024-07-01 DIAGNOSIS — N17.9 ACUTE KIDNEY INJURY SUPERIMPOSED ON CKD (CMS-HCC): ICD-10-CM

## 2024-07-01 DIAGNOSIS — N18.9 ACUTE KIDNEY INJURY SUPERIMPOSED ON CKD (CMS-HCC): ICD-10-CM

## 2024-07-01 LAB
ALBUMIN SERPL BCP-MCNC: 3.8 G/DL (ref 3.4–5)
ALBUMIN SERPL BCP-MCNC: 4 G/DL (ref 3.4–5)
ALP SERPL-CCNC: 112 U/L (ref 33–136)
ALT SERPL W P-5'-P-CCNC: 39 U/L (ref 10–52)
ANION GAP SERPL CALC-SCNC: 16 MMOL/L (ref 10–20)
APTT PPP: 34 SECONDS (ref 27–38)
AST SERPL W P-5'-P-CCNC: 52 U/L (ref 9–39)
BILIRUB DIRECT SERPL-MCNC: 0.1 MG/DL (ref 0–0.3)
BILIRUB SERPL-MCNC: 0.6 MG/DL (ref 0–1.2)
BUN SERPL-MCNC: 60 MG/DL (ref 6–23)
CALCIUM SERPL-MCNC: 8.7 MG/DL (ref 8.6–10.6)
CHLORIDE SERPL-SCNC: 104 MMOL/L (ref 98–107)
CO2 SERPL-SCNC: 22 MMOL/L (ref 21–32)
CREAT SERPL-MCNC: 3.11 MG/DL (ref 0.5–1.3)
EGFRCR SERPLBLD CKD-EPI 2021: 20 ML/MIN/1.73M*2
GLUCOSE SERPL-MCNC: 143 MG/DL (ref 74–99)
HOLD SPECIMEN: NORMAL
INR PPP: 1.2 (ref 0.9–1.1)
PHOSPHATE SERPL-MCNC: 3.6 MG/DL (ref 2.5–4.9)
POTASSIUM SERPL-SCNC: 4.2 MMOL/L (ref 3.5–5.3)
PROT SERPL-MCNC: 7.1 G/DL (ref 6.4–8.2)
PROTHROMBIN TIME: 14 SECONDS (ref 9.8–12.8)
SODIUM SERPL-SCNC: 138 MMOL/L (ref 136–145)

## 2024-07-01 PROCEDURE — 93287 PERI-PX DEVICE EVAL & PRGR: CPT

## 2024-07-01 PROCEDURE — 96374 THER/PROPH/DIAG INJ IV PUSH: CPT

## 2024-07-01 PROCEDURE — 72040 X-RAY EXAM NECK SPINE 2-3 VW: CPT | Performed by: SURGERY

## 2024-07-01 PROCEDURE — 2500000004 HC RX 250 GENERAL PHARMACY W/ HCPCS (ALT 636 FOR OP/ED)

## 2024-07-01 PROCEDURE — 73564 X-RAY EXAM KNEE 4 OR MORE: CPT | Mod: LEFT SIDE | Performed by: RADIOLOGY

## 2024-07-01 PROCEDURE — 99285 EMERGENCY DEPT VISIT HI MDM: CPT | Mod: 25

## 2024-07-01 PROCEDURE — 73110 X-RAY EXAM OF WRIST: CPT | Mod: LEFT SIDE | Performed by: RADIOLOGY

## 2024-07-01 PROCEDURE — 72141 MRI NECK SPINE W/O DYE: CPT

## 2024-07-01 PROCEDURE — 73110 X-RAY EXAM OF WRIST: CPT | Mod: 50

## 2024-07-01 PROCEDURE — 36415 COLL VENOUS BLD VENIPUNCTURE: CPT

## 2024-07-01 PROCEDURE — 85730 THROMBOPLASTIN TIME PARTIAL: CPT | Performed by: STUDENT IN AN ORGANIZED HEALTH CARE EDUCATION/TRAINING PROGRAM

## 2024-07-01 PROCEDURE — 82040 ASSAY OF SERUM ALBUMIN: CPT

## 2024-07-01 PROCEDURE — 1200000002 HC GENERAL ROOM WITH TELEMETRY DAILY

## 2024-07-01 PROCEDURE — 2500000004 HC RX 250 GENERAL PHARMACY W/ HCPCS (ALT 636 FOR OP/ED): Performed by: EMERGENCY MEDICINE

## 2024-07-01 PROCEDURE — 73564 X-RAY EXAM KNEE 4 OR MORE: CPT | Mod: LT

## 2024-07-01 PROCEDURE — 72040 X-RAY EXAM NECK SPINE 2-3 VW: CPT

## 2024-07-01 PROCEDURE — 36415 COLL VENOUS BLD VENIPUNCTURE: CPT | Performed by: STUDENT IN AN ORGANIZED HEALTH CARE EDUCATION/TRAINING PROGRAM

## 2024-07-01 PROCEDURE — 2500000004 HC RX 250 GENERAL PHARMACY W/ HCPCS (ALT 636 FOR OP/ED): Performed by: STUDENT IN AN ORGANIZED HEALTH CARE EDUCATION/TRAINING PROGRAM

## 2024-07-01 PROCEDURE — 71045 X-RAY EXAM CHEST 1 VIEW: CPT | Performed by: RADIOLOGY

## 2024-07-01 PROCEDURE — G0390 TRAUMA RESPONS W/HOSP CRITI: HCPCS

## 2024-07-01 PROCEDURE — 99285 EMERGENCY DEPT VISIT HI MDM: CPT | Performed by: EMERGENCY MEDICINE

## 2024-07-01 PROCEDURE — 99222 1ST HOSP IP/OBS MODERATE 55: CPT | Performed by: STUDENT IN AN ORGANIZED HEALTH CARE EDUCATION/TRAINING PROGRAM

## 2024-07-01 PROCEDURE — 82248 BILIRUBIN DIRECT: CPT | Performed by: STUDENT IN AN ORGANIZED HEALTH CARE EDUCATION/TRAINING PROGRAM

## 2024-07-01 PROCEDURE — 73060 X-RAY EXAM OF HUMERUS: CPT | Mod: LEFT SIDE | Performed by: RADIOLOGY

## 2024-07-01 PROCEDURE — 2500000001 HC RX 250 WO HCPCS SELF ADMINISTERED DRUGS (ALT 637 FOR MEDICARE OP)

## 2024-07-01 PROCEDURE — 71045 X-RAY EXAM CHEST 1 VIEW: CPT

## 2024-07-01 PROCEDURE — 72141 MRI NECK SPINE W/O DYE: CPT | Performed by: RADIOLOGY

## 2024-07-01 PROCEDURE — 73060 X-RAY EXAM OF HUMERUS: CPT | Mod: LT

## 2024-07-01 PROCEDURE — 73090 X-RAY EXAM OF FOREARM: CPT | Mod: LT

## 2024-07-01 PROCEDURE — 73090 X-RAY EXAM OF FOREARM: CPT | Mod: LEFT SIDE | Performed by: RADIOLOGY

## 2024-07-01 RX ORDER — ACETAMINOPHEN 325 MG/1
650 TABLET ORAL ONCE
Status: COMPLETED | OUTPATIENT
Start: 2024-07-01 | End: 2024-07-01

## 2024-07-01 RX ORDER — DOCUSATE SODIUM 100 MG/1
100 CAPSULE, LIQUID FILLED ORAL 2 TIMES DAILY
Status: DISCONTINUED | OUTPATIENT
Start: 2024-07-01 | End: 2024-07-13

## 2024-07-01 RX ORDER — POLYETHYLENE GLYCOL 3350 17 G/17G
17 POWDER, FOR SOLUTION ORAL DAILY
Status: DISCONTINUED | OUTPATIENT
Start: 2024-07-01 | End: 2024-07-16 | Stop reason: HOSPADM

## 2024-07-01 RX ORDER — OXYCODONE HYDROCHLORIDE 5 MG/1
10 TABLET ORAL EVERY 6 HOURS PRN
Status: DISCONTINUED | OUTPATIENT
Start: 2024-07-01 | End: 2024-07-04

## 2024-07-01 RX ORDER — METHOCARBAMOL 500 MG/1
500 TABLET, FILM COATED ORAL EVERY 8 HOURS SCHEDULED
Status: DISCONTINUED | OUTPATIENT
Start: 2024-07-01 | End: 2024-07-06

## 2024-07-01 RX ORDER — KETOROLAC TROMETHAMINE 30 MG/ML
15 INJECTION, SOLUTION INTRAMUSCULAR; INTRAVENOUS ONCE
Status: COMPLETED | OUTPATIENT
Start: 2024-07-01 | End: 2024-07-01

## 2024-07-01 RX ORDER — GABAPENTIN 100 MG/1
100 CAPSULE ORAL 2 TIMES DAILY
Status: DISCONTINUED | OUTPATIENT
Start: 2024-07-01 | End: 2024-07-16 | Stop reason: HOSPADM

## 2024-07-01 RX ORDER — HYDROMORPHONE HYDROCHLORIDE 1 MG/ML
1 INJECTION, SOLUTION INTRAMUSCULAR; INTRAVENOUS; SUBCUTANEOUS ONCE
Status: COMPLETED | OUTPATIENT
Start: 2024-07-01 | End: 2024-07-01

## 2024-07-01 RX ORDER — NALOXONE HYDROCHLORIDE 0.4 MG/ML
0.2 INJECTION, SOLUTION INTRAMUSCULAR; INTRAVENOUS; SUBCUTANEOUS EVERY 5 MIN PRN
Status: DISCONTINUED | OUTPATIENT
Start: 2024-07-01 | End: 2024-07-16 | Stop reason: HOSPADM

## 2024-07-01 RX ORDER — SODIUM CHLORIDE, SODIUM LACTATE, POTASSIUM CHLORIDE, CALCIUM CHLORIDE 600; 310; 30; 20 MG/100ML; MG/100ML; MG/100ML; MG/100ML
75 INJECTION, SOLUTION INTRAVENOUS CONTINUOUS
Status: DISCONTINUED | OUTPATIENT
Start: 2024-07-01 | End: 2024-07-04

## 2024-07-01 RX ORDER — HEPARIN SODIUM 5000 [USP'U]/ML
5000 INJECTION, SOLUTION INTRAVENOUS; SUBCUTANEOUS EVERY 8 HOURS SCHEDULED
Status: DISCONTINUED | OUTPATIENT
Start: 2024-07-01 | End: 2024-07-16 | Stop reason: HOSPADM

## 2024-07-01 RX ORDER — HYDROMORPHONE HYDROCHLORIDE 1 MG/ML
0.2 INJECTION, SOLUTION INTRAMUSCULAR; INTRAVENOUS; SUBCUTANEOUS
Status: DISCONTINUED | OUTPATIENT
Start: 2024-07-01 | End: 2024-07-04

## 2024-07-01 RX ORDER — ACETAMINOPHEN 325 MG/1
650 TABLET ORAL EVERY 6 HOURS
Status: DISCONTINUED | OUTPATIENT
Start: 2024-07-01 | End: 2024-07-16 | Stop reason: HOSPADM

## 2024-07-01 RX ORDER — OXYCODONE HYDROCHLORIDE 5 MG/1
5 TABLET ORAL EVERY 6 HOURS PRN
Status: DISCONTINUED | OUTPATIENT
Start: 2024-07-01 | End: 2024-07-04

## 2024-07-01 ASSESSMENT — PAIN DESCRIPTION - PROGRESSION
CLINICAL_PROGRESSION: NOT CHANGED
CLINICAL_PROGRESSION: NOT CHANGED

## 2024-07-01 ASSESSMENT — PAIN SCALES - GENERAL
PAINLEVEL_OUTOF10: 6
PAINLEVEL_OUTOF10: 0 - NO PAIN
PAINLEVEL_OUTOF10: 10 - WORST POSSIBLE PAIN
PAINLEVEL_OUTOF10: 7
PAINLEVEL_OUTOF10: 7
PAINLEVEL_OUTOF10: 10 - WORST POSSIBLE PAIN
PAINLEVEL_OUTOF10: 9
PAINLEVEL_OUTOF10: 5 - MODERATE PAIN

## 2024-07-01 ASSESSMENT — PAIN DESCRIPTION - LOCATION
LOCATION: ABDOMEN
LOCATION: ARM

## 2024-07-01 ASSESSMENT — PAIN - FUNCTIONAL ASSESSMENT
PAIN_FUNCTIONAL_ASSESSMENT: 0-10

## 2024-07-01 ASSESSMENT — LIFESTYLE VARIABLES
EVER FELT BAD OR GUILTY ABOUT YOUR DRINKING: NO
HAVE YOU EVER FELT YOU SHOULD CUT DOWN ON YOUR DRINKING: NO
EVER HAD A DRINK FIRST THING IN THE MORNING TO STEADY YOUR NERVES TO GET RID OF A HANGOVER: NO
HAVE PEOPLE ANNOYED YOU BY CRITICIZING YOUR DRINKING: NO
TOTAL SCORE: 0

## 2024-07-01 ASSESSMENT — PAIN DESCRIPTION - PAIN TYPE
TYPE: ACUTE PAIN
TYPE: ACUTE PAIN

## 2024-07-01 ASSESSMENT — PAIN DESCRIPTION - DESCRIPTORS: DESCRIPTORS: SHARP

## 2024-07-01 ASSESSMENT — PAIN DESCRIPTION - ORIENTATION
ORIENTATION: LEFT
ORIENTATION: LEFT;RIGHT
ORIENTATION: RIGHT;LEFT

## 2024-07-01 NOTE — CONSULTS
Consults  Date of Service:  7/2/2024 Attending Provider:  Sly Malone MD;Darryl Alston DO;Rafita Borjas MD;Kasia*     Reason for Consultation:  Ritesh Garza is being seen today for a consult requested by Sly Malone MD;Darryl Alston DO;Rafita Borjas MD;Kasia* for concern for central cord.    Subjective   History of Present Illness:  Ritesh is a 73 y.o. male with Central cord syndrome, initial encounter (Multi)    Patient s/p fall and came to OSH with BUE tingling sensation and BLE tingling sensation that has since improved. Continues to have BUE numbness and tingling.     Patient with wife at bedside. Reported multiple recurrent falls and wife reported patient has been wobbly. Denied any issues with bottoning his clothes or feeding himself. Has known carpal tunnel syndrome so denied any new numbness or tingling in fingertips.    Patient denied any weakness, vision changes, fevers or chills.    Review of Systems negative other than listed in HPI.    Objective   Vitals:  Vitals:    07/02/24 0747   BP: 142/71   Pulse: 67   Resp: 18   Temp: 36.2 °C (97.2 °F)   SpO2: 96%         Exam:  Constitutional: No acute distress  Resp: breathing comfortably  Cardio: well perfused  GI: nondistended  MSK: full range of motion  Neuro: Awake, Ox3 (mild dementia per wife)  face symmetric  RUE 5/5  LUE 5/5  RLE 5/5  LLE 5/5  Decreased sensation and tingling in BUE  No cardozo's no clonus  Psych: appropriate  Skin: no obvious lesions    Medical History  Past Medical History:   Diagnosis Date    Allergic     Asthma (HHS-HCC)     Coronary artery disease Long Ago    Diabetes mellitus (Multi) Over 20 years ago    Drug-induced gout, unspecified site 10/04/2019    Acute drug-induced gout    Encounter for screening for malignant neoplasm of colon 10/31/2022    Colon cancer screening    Gout, unspecified     Acute gout    Heart disease Over 20 years sgo    Heart murmur Long ago    Hypertension Over 20 years ago    Myocardial  infarction (Multi) Over 20 yesrs ago    Ocular pain, right eye 10/14/2018    Pain of right eye    Periorbital cellulitis 10/14/2018    Periorbital cellulitis    Personal history of colonic polyps 10/11/2017    History of colon polyps    Personal history of other diseases of the circulatory system 05/12/2021    History of angina pectoris    Personal history of other diseases of the circulatory system 05/12/2021    History of angina pectoris    Personal history of other diseases of the respiratory system 10/04/2019    History of acute bronchitis    Personal history of other drug therapy     History of influenza vaccination    Unspecified cirrhosis of liver (Multi) 11/07/2022    Cirrhosis, nonalcoholic       Surgical History  Past Surgical History:   Procedure Laterality Date    ADENOIDECTOMY  Childhood    CARDIAC CATHETERIZATION  Over 30 years ago    CARDIAC CATHETERIZATION N/A 04/15/2024    Procedure: Left And Right Heart Cath, With LV;  Surgeon: Blaine Adams MD;  Location: [facility];  Service: Cardiovascular;  Laterality: N/A;  3 hr hydration / arrive 6:30    CARDIAC DEFIBRILLATOR PLACEMENT      CARDIAC ELECTROPHYSIOLOGY PROCEDURE N/A 05/30/2024    Procedure: ICD Dual Implant;  Surgeon: Mina Kiran MD;  Location: [facility];  Service: Electrophysiology;  Laterality: N/A;  Dual chamber ICD, [employer] [facility]  notified [facility] 5/24/24    CHOLECYSTECTOMY  Over 30 years ago    CIRCUMCISION, PRIMARY  72,years ago    CORONARY ARTERY BYPASS GRAFT  12/02/2021    CABG    CORONARY STENT PLACEMENT  Last week    EYE SURGERY  Childhood    INVASIVE VASCULAR PROCEDURE Left 04/30/2024    Procedure: Angioplasty - Upper Extremity;  Surgeon: Narinder Quevedo MD;  Location: [facility];  Service: Cardiovascular;  Laterality: Left;  3 hr hydration  / arrive 6:30    MR HEAD ANGIO WO IV CONTRAST  06/02/2021    MR HEAD ANGIO WO IV CONTRAST 6/2/2021 [facility] CLINICAL LEGACY    MR HEAD ANGIO WO IV CONTRAST   11/16/2021    MR HEAD ANGIO WO IV CONTRAST 11/16/2021 [facility] CLINICAL LEGACY    MR NECK ANGIO WO IV CONTRAST  06/02/2021    MR NECK ANGIO WO IV CONTRAST 6/2/2021 [facility] CLINICAL LEGACY    MR NECK ANGIO WO IV CONTRAST  11/16/2021    MR NECK ANGIO WO IV CONTRAST 11/16/2021 [facility] CLINICAL LEGACY        Medications  Current Outpatient Medications   Medication Instructions    acetaminophen (TYLENOL) 650 mg, oral, Every 6 hours PRN    albuterol (ProAir HFA) 90 mcg/actuation inhaler 2 puffs, inhalation, Every 6 hours PRN    allopurinol (ZYLOPRIM) 50 mg, oral, Daily    amiodarone (Pacerone) 200 mg tablet Take 2 tablets (400 mg) by mouth 2 times a day for 9 doses. On February, 28th start to take 1 tablet (200 mg) by mouth once daily.    amLODIPine (NORVASC) 10 mg, oral, Daily    aspirin 81 mg, oral, Daily    atorvastatin (LIPITOR) 80 mg, oral, Every evening    b complex vitamins capsule 1 capsule, oral, Daily    clopidogrel (PLAVIX) 75 mg, oral, Daily    coenzyme Q-10 (CoQ-10) 100 mg capsule 1 capsule, oral, Daily    cyanocobalamin (Vitamin B-12) 1,000 mcg tablet 1 tablet, oral, Daily, As directed    dapagliflozin (Farxiga) 10 mg 1 tablet, oral, Daily before evening meal    famotidine (PEPCID) 20 mg, oral, Daily PRN    furosemide (LASIX) 40 mg, oral, Daily, As needed for weight gain greater than 3 pounds    gabapentin (NEURONTIN) 100 mg, oral, Nightly    icosapent ethyL (VASCEPA) 1 g, oral, 2 times daily (morning and late afternoon)    loratadine (CLARITIN) 10 mg, oral, 2 times daily, Take 1 tablet every morning and 1 tablet every evening    magnesium gluconate (Magonate) 27.5 mg magne- sium (500 mg) tablet 1 tablet, oral, Daily    metoprolol succinate XL (TOPROL-XL) 50 mg, oral, Daily    multivitamin tablet 1 tablet, oral, Daily    pantoprazole (PROTONIX) 40 mg, oral, Daily    sacubitriL-valsartan (Entresto) 24-26 mg tablet 0.5 tablets, oral, 2 times daily    sertraline (Zoloft) 50 mg tablet Take one tablet daily in the morning  after breakfast.    tadalafil (CIALIS) 5 mg, oral, Daily    tadalafil 20 mg tablet START WITH ONE-HALF TABLET AS NEEDED 2 HOURS PRIOR TO ACTIVITY. MAY INCREASE TO 1 TABLET AS NEEDED 2 HOURS PRIOR TO ACTIVITY    turmeric-turmeric root extract 450-50 mg capsule 1 capsule, oral, Daily        Diagnostic Results:    Lab Results   Component Value Date    WBC 7.7 07/02/2024    HGB 10.7 (L) 07/02/2024    HCT 31.8 (L) 07/02/2024    MCV 91 07/02/2024     07/02/2024     Lab Results   Component Value Date    CREATININE 2.88 (H) 07/02/2024    BUN 60 (H) 07/02/2024     07/02/2024    K 4.5 07/02/2024     07/02/2024    CO2 23 07/02/2024     Lab Results   Component Value Date    INR 1.2 (H) 07/01/2024    INR 1.3 (H) 06/30/2024    INR 1.2 (H) 05/28/2024    PROTIME 14.0 (H) 07/01/2024    PROTIME 14.2 (H) 06/30/2024    PROTIME 13.9 (H) 05/28/2024       === 06/30/24 ===    CT CHEST ABDOMEN PELVIS WO CONTRAST    - Impression -  1. High-density fat stranding surrounding the right adrenal gland  concerning for adrenal hemorrhage, new from 12/15/2024.  2. No solid organ injury or free air. Trace ascites. Somewhat limited  evaluation in the absence of IV contrast.  3. Hepatic cirrhosis.  4. No acute cardiopulmonary abnormality.  5. No acute fracture or traumatic malalignment of the thoracic or  lumbar spine.  6. Unchanged saccular aortic aneurysm measuring up to 3.6 cm in  maximal transverse diameter.  7. Additional chronic findings as above.      Signed by: Fahad Billingsley 6/30/2024 10:51 PM  Dictation workstation:   [id number]  === 07/01/24 ===    MR CERVICAL SPINE WO CONTRAST    - Impression -  1. Moderate to marked spinal canal stenosis and bilateral neural  foraminal stenosis at C4-C5 and C5-C6 as described. Can not exclude  subtle increased STIR/T2 hyperintense spinal cord signal with minimal  contusion/edema not excluded in the setting of trauma. Artifact or  minimal myelomalacia are also possible. Additional  cervical  spondylitic changes as described above.  2. Interval development of moderate to large prevertebral soft tissue  fluid collection extending from this single level through  approximately C6-7 likely representing hematoma in the setting of  trauma and possibly associated with small anterioinferior endplate  fracture noted in retrospect of C3 on the CT cervical spine from  06/30/2024 with possible minimal disruption focally of the anterior  spinal ligament at C3-4. Airway may be significantly compromised  since the prior CT but is incompletely evaluated on the current exam.  3. Increased STIR and T2 signal surrounding the posterior elements at  C4-C5 likely representing component of interspinous ligament injury  or partial disruption with subtle widening of the interspinous  distance at this level    I personally reviewed the images/study and I agree with the findings  as stated above by resident physician, Dr. Alvarado Hernandez. The  study was interpreted at [facility].    MACRO:  Alvarado Hernandez discussed the significance and urgency of this  critical finding by telephone with  Dr. Gao on 7/1/2024 at 3:39  pm.  (**-RCF-**) Findings:  See findings.    Signed by: Robby Kowalski 7/1/2024 4:06 PM  Dictation workstation:   [id number]    Assessment/Plan   Assessment:  73yM h/o TIA, mild dementia, HTN, HLD, CAD s/p CABG (1992 on ASA), CHF (EF 40-45% 5/2024), L thigh hematoma c/b vtach (2/13/2024), 4/2024 [facility] occluded vein grafts w/ 80% L main, L subclavian stenosis s/p L subclavian stents (4/30/2024), s/p dual chamber ICD (5/2024 [employer]),  SENIA, liver cirrhosis (fatty liver), DM2, BPH, CKD, gout, b/l carpal tunnel s/p RUE carpal tunnel release (5/2023), 6/30 p/w fall downstairs, CT CTL spine C4-5, C5-6 anterior disc-osteophytes, 7/1 MRI C spine severe C4-5, C5-6, C6-7 stenosis    Other injuries: adrenal gland hemorrhage    Patient with severe cervical  stenosis with mild symptoms of possible myelopathy. [id number] with imbalance and recurrent falls.     Plan:  Trauma primary  OR planning for Wednesday 7/3 for C4-5, C5-6, C6-7 ACDF  Please obtain upright xrays C spine (AP and lateral views)  Please consult medicine for preoperative clearance  Please hold ASA      Patient with cord compression and myelopathy. Concern for ongoing falls and MRI with severe disease, would recommend inpatient surgical planning.      I have explained the surgical procedure in detail with expected duration and extent of recovery along risks of surgery that include, but is not limited to bleeding, infection, blood vessel injury or damage, loss of sensation, loss of bladder, bowel or sexual function, nerve injury/damage resulting in weakness/paralysis, malunion, nonunion, CSF leak, brachial plexus injury, peripheral vision blindness, failure of implants/fusion, failure to relieve symptoms, recurrent disease, adjacent segment disease, need to reoperate for any reason and general anesthesia reaction such as stroke, coma, heart attack, delirium, confusion, death as well as worsening of preexisted medical conditions. I have also discussed with the patient the chances of C5 palsy.     I clearly emphasized that while the goal of surgery is to decompress the spinal cord so as to ARREST the progression of neurological deficits - preexisting deficits may or may not improve after surgery. We discussed that up to 90% of patients do show clinically significant improvement in functional and neurological outcomes following decompression of the spinal cord in patients with cervical degenerative myelopathy or radiculopathy the extent of which is variable and depends on the severity of myelopathy, duration of deficits and age of the patient.    I saw and evaluated the patient.  I personally obtained the key and critical portions of the history and physical exam or was physically present for key and critical  portions performed by the Resident/Fellow. I reviewed the documentation and discussed the patient with the Resident/Fellow.  I agree with the Resident/Fellow’s medical decision making as documented in the note.     I have reviewed all prior documentation and reviewed the electronic medical record since admission. I have personally have reviewed all advanced imaging not just the reports and used my interpretation as documented as the relevant findings. I have reviewed the risks and benefits of all treatment recommendations listed in this note with the patient and family. I spent a total of 45 minutes in service to this patient's care during this date of service.      Ganesh Bhatt MD, [facility]  Spine , [facility]  Benitez Robison and Radha Robison Chair in Spinal Neurosurgery  Neurosurgery , [facility]  Complex Spine Surgery Fellowship Director   of Neurological Surgery  [facility] [employer]  Office: [phone]  Fax: [phone]

## 2024-07-01 NOTE — ED PROVIDER NOTES
CC: Fall     HPI: Patient is a 73-year-old male with history of DM, asthma, CAD, HFrEF (30-40%), presenting to the emergency department today as a transfer from [facility].  Reportedly had a mechanical fall down 8 steps earlier today without loss of consciousness.  No blood thinners noted.  Denies head strike.  Was complaining of left-sided neck pain and burning sensation of the bilateral shoulders at outside hospital with notable weakness.  CT of the C-spine showed evidence of severe spinal cord stenosis at C4-C6.  CT of the chest abdomen pelvis showed evidence of adrenal gland hemorrhage as well.  Transferred to [facility] for further evaluation.  On arrival, reports most of his weakness, tingling, and burning pain to the bilateral upper extremities has since resolved.  Denies chest pain, shortness of breath, [place] pain, or changes in urination/stool for us today.    Records Reviewed: Recent available ED and inpatient notes reviewed in EMR.    PMHx/PSHx:  Per HPI.   - has a past medical history of Allergic, Asthma (HHS-HCC), Coronary artery disease, Diabetes mellitus (Multi), Drug-induced gout, unspecified site, Encounter for screening for malignant neoplasm of colon, Gout, unspecified, Heart disease, Heart murmur, Hypertension, Myocardial infarction (Multi), Ocular pain, right eye, Periorbital cellulitis, Personal history of colonic polyps, Personal history of other diseases of the circulatory system, Personal history of other diseases of the circulatory system, Personal history of other diseases of the respiratory system, Personal history of other drug therapy, and Unspecified cirrhosis of liver (Multi).  - has a past surgical history that includes Coronary artery bypass graft (12/02/2021); MR angio head wo IV contrast (06/02/2021); MR angio neck wo IV contrast (06/02/2021); MR angio head wo IV contrast (11/16/2021); MR angio neck wo IV contrast (11/16/2021); Adenoidectomy (Childhood); Cardiac  catheterization (Over 30 years ago); Cholecystectomy (Over 30 years ago); Circumcision, primary (72,years ago); Eye surgery (Childhood); Cardiac catheterization (N/A, 04/15/2024); Invasive Vascular Procedure (Left, 04/30/2024); Coronary stent placement (Last week); Cardiac electrophysiology procedure (N/A, 05/30/2024); and Cardiac defibrillator placement.  - has Atherosclerosis of coronary artery bypass graft; Asthma ([facility]-HCC); Benign essential hypertension; Hepatic cirrhosis (Multi); Type 2 diabetes mellitus (Multi); Enlarged prostate with lower urinary tract symptoms (LUTS); Erectile dysfunction; Ischemic cardiomyopathy; Portal hypertension (Multi); Gout; Abnormal brain scan; Finding of neck region; Behavioral change; Bursitis of hip; Bilateral carpal tunnel syndrome; Cognitive changes; Cognitive disorder; Impaired cognition; Esophagitis; Gastro-esophageal reflux disease with esophagitis, without bleeding; Left ventricular systolic dysfunction (LVSD); Nonalcoholic fatty liver; Obesity; Unresponsiveness; Syncope; Transient ischemic attack; Benign prostatic hyperplasia; Organic impotence; Chronic gout; History of coronary artery bypass graft; Chronic kidney disease due to hypertension; Adenomatous polyp of colon; History of myocardial infarction; Congestive heart failure with right ventricular systolic dysfunction (Multi); Traumatic hematoma of left thigh; Hematoma of left thigh; CAD (coronary artery disease); Hyperlipidemia; Acute kidney injury superimposed on CKD (CMS-HCC); Obstructive sleep apnea syndrome; Erosive esophagitis; Chest pain; Ventricular tachycardia, unspecified (Multi); Anemia due to acute blood loss; Demand ischemia (Multi); Pneumonia; Myocardial infarction (Multi); Acute on chronic systolic heart failure (Multi); Stenosis of subclavian artery (CMS-HCC); Stage 3 chronic kidney disease (Multi); Peripheral arterial disease (CMS-HCC); Acute kidney injury (CMS-HCC); Carpal tunnel syndrome; Exposure to  severe acute respiratory syndrome coronavirus 2 (SARS-CoV-2); History of colonic polyps; Hyperkalemia; Injury to blood vessels of lower extremity; Mood changes; Esophageal varices (Multi); and Cardiomyopathy (Multi) on their problem list.    Medications:  Current Outpatient Medications   Medication Instructions    acetaminophen (TYLENOL) 650 mg, oral, Every 6 hours PRN    albuterol (ProAir HFA) 90 mcg/actuation inhaler 2 puffs, inhalation, Every 6 hours PRN    allopurinol (ZYLOPRIM) 50 mg, oral, Daily    amiodarone (Pacerone) 200 mg tablet Take 2 tablets (400 mg) by mouth 2 times a day for 9 doses. On February, 28th start to take 1 tablet (200 mg) by mouth once daily.    amLODIPine (NORVASC) 10 mg, oral, Daily    aspirin 81 mg, oral, Daily    atorvastatin (LIPITOR) 80 mg, oral, Every evening    b complex vitamins capsule 1 capsule, oral, Daily    clopidogrel (PLAVIX) 75 mg, oral, Daily    coenzyme Q-10 (CoQ-10) 100 mg capsule 1 capsule, oral, Daily    cyanocobalamin (Vitamin B-12) 1,000 mcg tablet 1 tablet, oral, Daily, As directed    dapagliflozin (Farxiga) 10 mg 1 tablet, oral, Daily before evening meal    famotidine (PEPCID) 20 mg, oral, Daily PRN    furosemide (LASIX) 40 mg, oral, Daily, As needed for weight gain greater than 3 pounds    gabapentin (NEURONTIN) 100 mg, oral, Nightly    icosapent ethyL (VASCEPA) 1 g, oral, 2 times daily (morning and late afternoon)    loratadine (CLARITIN) 10 mg, oral, 2 times daily, Take 1 tablet every morning and 1 tablet every evening    magnesium gluconate (Magonate) 27.5 mg magne- sium (500 mg) tablet 1 tablet, oral, Daily    metoprolol succinate XL (TOPROL-XL) 50 mg, oral, Daily    multivitamin tablet 1 tablet, oral, Daily    pantoprazole (PROTONIX) 40 mg, oral, Daily    sacubitriL-valsartan (Entresto) 24-26 mg tablet 0.5 tablets, oral, 2 times daily    sertraline (Zoloft) 50 mg tablet Take one tablet daily in the morning after breakfast.    tadalafil (CIALIS) 5 mg, oral,  Daily    tadalafil 20 mg tablet START WITH ONE-HALF TABLET AS NEEDED 2 HOURS PRIOR TO ACTIVITY. MAY INCREASE TO 1 TABLET AS NEEDED 2 HOURS PRIOR TO ACTIVITY    turmeric-turmeric root extract 450-50 mg capsule 1 capsule, oral, Daily        Allergies:  Patient has no known allergies.    Social History:  - Tobacco:  reports that he has never smoked. He has never used smokeless tobacco.   - Alcohol:  reports no history of alcohol use.   - Illicit Drugs:  reports no history of drug use.     ROS:  Per HPI.     ???????????????????????????????????????????????????????????????  Triage Vitals:  T 36.6 °C (97.9 °F)  HR 68  /69  RR 20  O2 95 % None (Room air)    General: Patient resting comfortably in bed, no acute distress, breathing easily, well appearing, and appropriately conversational without confusion or gross mental status changes.  Head: Normocephalic. Atraumatic.  Neck: C-collar in place.  Midline C-spine tenderness to palpation.    Eyes: EOMI. No scleral icterus or injection.  ENT: Moist mucous membranes, no apparent trauma or lesions.  CV: Regular rhythm. No murmurs, rubs, gallops appreciated. 2+ radial pulses bilaterally.  Resp: Clear to auscultation bilaterally. No respiratory distress.   GI: Soft, non-distended.  No tenderness with palpation.  No rebound tenderness or guarding. No palpable masses.  : No suprapubic or CVA tenderness.  MSK: Full ROM in bilateral upper and lower extremities. No gross step offs or deformities.  EXT: No peripheral edema, contusions, or wounds.  Skin: Warm and dry, no rashes or lesions.  Neuro: Alert and oriented.  Cranial nerves II-XII grossly intact.  No focal neurological deficits.  Motor and sensation intact throughout.  Speech fluent.  5 5 strength in the upper extremities.  No sensory deficits noted for me today.  Psych: Appropriate mood and behavior, converses and responds appropriately.          [place] Coma Scale Score: 15                   ???????????????????????????????????????????????????????????????    Results: Relevant laboratory and radiographic results were reviewed and independently interpreted by myself.  As necessary, they are commented on in the ED Course.    Medical Decision Making   Patient is a 73-year-old male presenting emergency department today as a transfer from [facility] due to concern for central cord compression as well as left adrenal hemorrhage.  On arrival, vital signs within normal limits, afebrile for us.  On examination, patient has mild C-spine tenderness to palpation.  C-collar in place.  Orthospine and trauma were consulted for evaluation on arrival.  Recommended MRI of the C-spine as well as x-rays of the left knee, left upper arm.  X-rays negative for acute fracture, deformity, or dislocation today.  Patient treated symptomatically with 1 mg Dilaudid.  Patient signed out to oncoming provider with MRI and final disposition pending.         Social Determinants Limiting Care:  None identified    Disposition:   Sign Out    Brown Gutiérrez MD  Emergency Medicine PGY3      Procedures ? SmartLinks last updated 7/1/2024 6:54 AM        Brown Gutiérrez MD  Resident  07/01/24 0654

## 2024-07-01 NOTE — PROGRESS NOTES
Pharmacy Admission Order Reconciliation Review    Ritesh Garza is a 73 y.o. male admitted for Central cord syndrome, initial encounter (Multi). Pharmacy reviewed the patient's unreconciled admission medications.    Prior to admission medications that were reviewed and acted on by the pharmacist include:  Acetaminophen  B complex  Vitamin B12  Coenzyme Q-10  Pepcid  Loratadine  Magnesium gluconate  MVI  Protonix  turmeric  These medications have been reconciled.     Any other unreconcilied medications have been addressed and will be ordered or held by the patient's medical team. Medications addressed by the pharmacist may be added or changed by the patient's medical team at any time.    Romana Antonio, Reynaldo  Transitions of Care Pharmacist  [facility]  Please reach out via Secure Chat for questions

## 2024-07-01 NOTE — PROGRESS NOTES
Pharmacy Medication History Review    Ritesh Garza is a 73 y.o. male admitted for No Principal Problem currently listed. Pharmacy reviewed the patient's mexsh-rb-hxltvnord medications and allergies for accuracy.    The list below reflects the updated PTA list. Comments regarding how patient may be taking medications differently can be found in the Admit Orders Activity  Prior to Admission Medications   Prescriptions Informant Patient/Chart Reported?   acetaminophen (Tylenol) 325 mg tablet Self PRN   Sig: Take 1-2 tablets (325 mg-650 mg) by mouth if needed   for pain   albuterol (ProAir HFA) 90 mcg/actuation inhaler Self PRN   Sig: Inhale 2 puffs every 6 hours if needed for wheezing or   shortness of breath.   allopurinol (Zyloprim) 100 mg tablet Self Yes   Sig: Take 1 tablets (100 mg) by mouth once daily.   amLODIPine (Norvasc) 10 mg tablet Self Yes   Sig: TAKE 1 TABLET BY MOUTH EVERY MORNING   amiodarone (Pacerone) 200 mg tablet Self Yes   Sig: Take 1 tablets (200 mg) by mouth once daily.    aspirin 81 mg EC tablet Self Yes   Sig: TAKE 1 TABLET BY MOUTH EVERY DAY   atorvastatin (Lipitor) 80 mg tablet Self Yes   Sig: Take 1 tablet (80 mg) by mouth once daily in the evening.   b complex vitamins capsule Self Yes   Sig: Take 1 capsule by mouth once daily.   clopidogrel (Plavix) 75 mg tablet Self Yes   Sig: Take 1 tablet (75 mg) by mouth once daily.   coenzyme Q-10 (CoQ-10) 100 mg capsule Self Yes   Sig: Take 1 capsule (100 mg) by mouth once daily.   cyanocobalamin (Vitamin B-12) 1,000 mcg tablet Self Yes   Sig: Take 1 tablet (1,000 mcg) by mouth once daily.    dapagliflozin (Farxiga) 10 mg Self Yes   Sig: Take 1 tablet (10 mg) by mouth once daily in the evening.    Take before meals.   famotidine (Pepcid) 20 mg tablet Self PRN   Sig: Take 1 tablet (20 mg) by mouth once daily as needed   for indigestion.   furosemide (Lasix) 40 mg tablet Self PRN   Sig: Take 1 tablet (40 mg) by mouth once daily.   As needed for  weight gain greater than 3 pounds   gabapentin (Neurontin) 100 mg capsule Self Yes   Sig: Take 1 capsule (100 mg) by mouth once daily at bedtime.  **LF 6/2/24, 30 DS, #30**   icosapent ethyL (Vascepa) 0.5 gram capsule Self Yes   Sig: Take 2 capsules (1 g) by mouth 2 times a day with meals.   loratadine (Claritin) 10 mg tablet Self Yes   Sig: Take 1 tablet (10 mg) by mouth 2 times a day.   Take 1 tablet every morning and 1 tablet every evening   magnesium gluconate (Magonate) 27.5 mg magne- sium (500 mg) tablet Self Yes   Sig: Take 1 tablet (27.5 mg) by mouth once daily.   metoprolol succinate XL (Toprol-XL) 50 mg 24 hr tablet Self Yes   Sig: Take 1 tablet (50 mg) by mouth once daily in the evening   multivitamin tablet Self Yes   Sig: Take 1 tablet by mouth once daily.   pantoprazole (ProtoNix) 40 mg EC tablet Self Yes   Sig: Take 1 tablet (40 mg) by mouth once daily.   sacubitriL-valsartan (Entresto) 24-26 mg tablet Self Yes   Sig: Take 0.5 tablets by mouth 2 times a day.    sertraline (Zoloft) 50 mg tablet Self Yes   Sig: Take one tablet daily in the evening   tadalafil (Cialis) 5 mg tablet Self Yes   Sig: TAKE 1 TABLET BY MOUTH EVERY DAY   tadalafil 20 mg tablet Self PRN   Sig: START WITH ONE-HALF TABLET AS NEEDED 2 HOURS  PRIOR TO ACTIVITY. MAY INCREASE TO 1 TABLET AS   NEEDED 2 HOURS PRIOR TO ACTIVITY   turmeric-turmeric root extract 450-50 mg capsule Self Yes   Sig: Take 1 capsule by mouth once daily.      Facility-Administered Medications: None        The list below reflects the updated allergy list. Please review each documented allergy for additional clarification and justification.  Allergies  Reviewed by Cj White on 7/1/2024   No Known Allergies         Patient declines M2B at discharge. Pharmacy has been updated to [facility], [place], [address] [phone].    Sources used to complete the med history include:  Patient interviewed about medications, alert, cooperative, conversive, pleasant, knew  his medications and dose  Epic Dispense Report reviewed  Care Everywhere reviewed  6/13/24  Office Visit & AVS reviewed    Below are additional concerns with the patient's PTA list.  None    ---------------------------------  Cj White CPhT  Transitions of Care Technician  Medication reconciliation complete  Please reach out via [facility] Secure Chat for questions,   or if no response call [employer] or [email or web address].  [facility]

## 2024-07-01 NOTE — ED TRIAGE NOTES
Transferred from [facility] for MRI (has pacemaker) after evaluation for fall x9 steps and confirmed adrenal hemorrhage

## 2024-07-01 NOTE — H&P
[facility]  TRAUMA SERVICE - HISTORY AND PHYSICAL / CONSULT    Patient Name: Ritesh Garza  MRN: [id number]  Admit Date: 701  : 1950  AGE: 73 y.o.   GENDER: male  ==============================================================================  MECHANISM OF INJURY / CHIEF COMPLAINT:   Patient is a 74 yo M who was going up his stairs and missed the top step and fell down and landed on L side/head. Patient instantly noticed burning sensation and pain down bilateral arms, BLE with no abnormalities. He states that he was able to get up with help. Patient went to [facility] where they pan scanned him and he was found to have moderate to severe multilevel spinal degenerative change described  above, notable for severe spinal canal stenosis at C4-C5 and C5-C6 and R adrenal hemorrhage. Patient transferred here in stable condition for MR c-spine.     LOC (yes/no?): No  Anticoagulant / Anti-platelet Rx? (for what dx?): Plavix (last dose ), aspirin 81mg   Referring Facility Name (N/A for scene EMR run): [facility]     INJURIES:   Spinal canal stenosis w concern for TSCI (central cord)  R     OTHER MEDICAL PROBLEMS:  CAD s/p MI s/p CABG  s/p and cardiac debrillator placement    DM2  Asthma  Gout  HTN  Liver cirrohsis   BPH  TIA  CHF  SENIA  CKD    INCIDENTAL FINDINGS:  None    ==============================================================================  ADMISSION PLAN OF CARE:  Keep NPO  Pain control  Obtain MR c-spine  Fu ortho spine recs   Consultants notified (specialty, provider name, time): Ortho spine    Dispo: Pend MRI, overall patient clinically improving, admit to floor      Pt discussed with Dr. Gamaliel Hurt S Leaver, PA-C  Trauma Surgery  [id number]    ==============================================================================  PAST MEDICAL HISTORY:   PMH:   Past Medical History:   Diagnosis Date    Allergic     Asthma ([facility])     Coronary artery disease Long  Ago    Diabetes mellitus (Multi) Over 20 years ago    Drug-induced gout, unspecified site 10/04/2019    Acute drug-induced gout    Encounter for screening for malignant neoplasm of colon 10/31/2022    Colon cancer screening    Gout, unspecified     Acute gout    Heart disease Over 20 years sgo    Heart murmur Long ago    Hypertension Over 20 years ago    Myocardial infarction (Multi) Over 20 yesrs ago    Ocular pain, right eye 10/14/2018    Pain of right eye    Periorbital cellulitis 10/14/2018    Periorbital cellulitis    Personal history of colonic polyps 10/11/2017    History of colon polyps    Personal history of other diseases of the circulatory system 05/12/2021    History of angina pectoris    Personal history of other diseases of the circulatory system 05/12/2021    History of angina pectoris    Personal history of other diseases of the respiratory system 10/04/2019    History of acute bronchitis    Personal history of other drug therapy     History of influenza vaccination    Unspecified cirrhosis of liver (Multi) 11/07/2022    Cirrhosis, nonalcoholic     PSH:   Past Surgical History:   Procedure Laterality Date    ADENOIDECTOMY  Childhood    CARDIAC CATHETERIZATION  Over 30 years ago    CARDIAC CATHETERIZATION N/A 04/15/2024    Procedure: Left And Right Heart Cath, With LV;  Surgeon: Blaine Adams MD;  Location: [facility];  Service: Cardiovascular;  Laterality: N/A;  3 hr hydration / arrive 6:30    CARDIAC DEFIBRILLATOR PLACEMENT      CARDIAC ELECTROPHYSIOLOGY PROCEDURE N/A 05/30/2024    Procedure: ICD Dual Implant;  Surgeon: Mina Kiran MD;  Location: [facility];  Service: Electrophysiology;  Laterality: N/A;  Dual chamber ICD, [facility]  notified [facility] 5/24/24    CHOLECYSTECTOMY  Over 30 years ago    CIRCUMCISION, PRIMARY  72,years ago    CORONARY ARTERY BYPASS GRAFT  12/02/2021    CABG    CORONARY STENT PLACEMENT  Last week    EYE SURGERY  Childhood    INVASIVE  VASCULAR PROCEDURE Left 04/30/2024    Procedure: Angioplasty - Upper Extremity;  Surgeon: Narinder Quevedo MD;  Location: [facility] Cardiac Cath Lab;  Service: Cardiovascular;  Laterality: Left;  3 hr hydration  / arrive 6:30    MR HEAD ANGIO WO IV CONTRAST  06/02/2021    MR HEAD ANGIO WO IV CONTRAST 6/2/2021 [facility] CLINICAL LEGACY    MR HEAD ANGIO WO IV CONTRAST  11/16/2021    MR HEAD ANGIO WO IV CONTRAST 11/16/2021 [facility] CLINICAL LEGACY    MR NECK ANGIO WO IV CONTRAST  06/02/2021    MR NECK ANGIO WO IV CONTRAST 6/2/2021 [facility] CLINICAL LEGACY    MR NECK ANGIO WO IV CONTRAST  11/16/2021    MR NECK ANGIO WO IV CONTRAST 11/16/2021 [facility] CLINICAL LEGACY     FH:   Family History   Problem Relation Name Age of Onset    Diabetes Mother Sofia Garza     Other (mycoardial infarction) Father Hilario 46    Fainting Father Hilario     Heart disease Father Hilario     Stomach cancer Mother's Sister      Stroke Maternal Grandmother Jasmina Mace     Colon cancer Maternal Grandmother Jasmina Mace      SOCIAL HISTORY:    Smoking: Denies  Social History     Tobacco Use   Smoking Status Never   Smokeless Tobacco Never       Alcohol: Denies  Social History     Substance and Sexual Activity   Alcohol Use Never       Drug use: Denies    MEDICATIONS:   Prior to Admission medications    Medication Sig Start Date End Date Taking? Authorizing Provider   acetaminophen (Tylenol) 325 mg tablet Take 2 tablets (650 mg) by mouth every 6 hours if needed for mild pain (1 - 3) or moderate pain (4 - 6). 2/23/24   Rajendra Liu MD   albuterol (ProAir HFA) 90 mcg/actuation inhaler Inhale 2 puffs every 6 hours if needed for wheezing or shortness of breath. 4/12/24   Ofelia Mccracken DO   allopurinol (Zyloprim) 100 mg tablet Take 0.5 tablets (50 mg) by mouth once daily. 6/27/24   Ofelia Mccracken DO   amiodarone (Pacerone) 200 mg tablet Take 2 tablets (400 mg) by mouth 2 times a day for 9 doses. On February, 28th start to take 1 tablet (200 mg) by mouth once daily.  4/15/24 4/10/25  Jose Hennessy MD   amLODIPine (Norvasc) 10 mg tablet TAKE 1 TABLET BY MOUTH EVERY DAY 4/22/24   Steven Bell MD   aspirin 81 mg EC tablet TAKE 1 TABLET BY MOUTH EVERY DAY 12/12/23   Ofelia Mccracken DO   atorvastatin (Lipitor) 80 mg tablet Take 1 tablet (80 mg) by mouth once daily.    Historical Provider, MD   b complex vitamins capsule 1 capsule once daily. 4/26/23   Historical Provider, MD   clopidogrel (Plavix) 75 mg tablet Take 1 tablet (75 mg) by mouth once daily. 4/15/24 4/15/25  Jose Hennessy MD   coenzyme Q-10 (CoQ-10) 100 mg capsule Take 1 capsule (100 mg) by mouth once daily. 11/1/21   Historical Provider, MD   cyanocobalamin (Vitamin B-12) 1,000 mcg tablet 1 tablet (1,000 mcg) once daily. As directed 11/17/20   Historical Provider, MD   dapagliflozin (Farxiga) 10 mg Take 1 tablet (10 mg) by mouth once daily in the morning. Take before meals. 5/18/21   Historical Provider, MD   diclofenac sodium (Voltaren XR) 100 mg 24 hr tablet Take 1 tablet (100 mg) by mouth once daily as needed. 4/17/24   Historical Provider, MD   famotidine (Pepcid) 20 mg tablet Take 1 tablet (20 mg) by mouth once daily as needed for indigestion. 4/15/24   Jose Hennessy MD   furosemide (Lasix) 40 mg tablet Take 1 tablet (40 mg) by mouth once daily. As needed for weight gain greater than 3 pounds 6/13/24 6/13/25  Jose Hennessy MD   gabapentin (Neurontin) 100 mg capsule Take 1 capsule (100 mg) by mouth once daily at bedtime.    Historical Provider, MD   icosapent ethyL (Vascepa) 0.5 gram capsule Take 2 capsules (1 g) by mouth 2 times a day with meals. 11/13/23 11/12/24  Jose Hennessy MD   loratadine (Claritin) 10 mg tablet Take 1 tablet (10 mg) by mouth once daily.    Historical Provider, MD   magnesium gluconate (Magonate) 27.5 mg magne- sium (500 mg) tablet Take 2 tablets (55 mg) by mouth once daily. 11/1/21   Historical Provider, MD   metoprolol succinate XL (Toprol-XL) 50 mg 24 hr tablet Take 1 tablet (50  mg) by mouth once daily. 6/27/24 12/24/24  Ofelia Mccracken DO   multivitamin tablet Take 1 tablet by mouth once daily.    Historical Provider, MD   pantoprazole (ProtoNix) 40 mg EC tablet Take 1 tablet (40 mg) by mouth once daily. 6/27/24   Ofelia Mccracken DO   sacubitriL-valsartan (Entresto) 24-26 mg tablet Take 0.5 tablets by mouth 2 times a day. 6/13/24   Jose Hennessy MD   sertraline (Zoloft) 50 mg tablet Take one tablet daily in the morning after breakfast.  Patient taking differently: Take 1 tablet (50 mg) by mouth once daily. Take one tablet daily in the evening 11/17/23   Meme Perrin MD   tadalafil (Cialis) 5 mg tablet TAKE 1 TABLET BY MOUTH EVERY DAY 6/25/24   Jeanmarie Ibarra MD   tadalafil 20 mg tablet START WITH ONE-HALF TABLET AS NEEDED 2 HOURS PRIOR TO ACTIVITY. MAY INCREASE TO 1 TABLET AS NEEDED 2 HOURS PRIOR TO ACTIVITY 5/8/24   Jeanmarie Ibarra MD   turmeric-turmeric root extract 450-50 mg capsule Take daily as directed.    Historical Provider, MD   allopurinol (Zyloprim) 100 mg tablet TAKE 0.5 TABLETS (50 MG) BY MOUTH ONCE DAILY. 4/22/24 6/27/24  Steven Bell MD   metoprolol succinate XL (Toprol-XL) 50 mg 24 hr tablet Take 1 tablet (50 mg) by mouth once daily. 4/15/24 6/27/24  Angelina oD APRN-CNP   multivit with min-folic acid 0.4 mg tablet once every 24 hours.  7/1/24  Historical Provider, MD   pantoprazole (ProtoNix) 40 mg EC tablet Take 1 tablet (40 mg) by mouth once daily. 3/14/23 6/27/24  Historical Provider, MD   tadalafil (Cialis) 5 mg tablet Take 1 tablet (5 mg) by mouth once daily.  6/25/24  Historical Provider, MD     ALLERGIES:   No Known Allergies    REVIEW OF SYSTEMS:  Review of Systems   Neurological:         Burning and pain down bilateral arms   Midline tenderness of neck   All other systems reviewed and are negative.    PHYSICAL EXAM:  PRIMARY SURVEY:  Primary Survey  SECONDARY SURVEY/PHYSICAL EXAM:  Physical Exam  Vitals reviewed.   Constitutional:        Appearance: He is obese.   HENT:      Head: Normocephalic and atraumatic.      Right Ear: External ear normal.      Left Ear: External ear normal.      Nose: Nose normal.      Mouth/Throat:      Mouth: Mucous membranes are moist.      Pharynx: Oropharynx is clear.   Eyes:      Extraocular Movements: Extraocular movements intact.      Pupils: Pupils are equal, round, and reactive to light.   Neck:      Comments: In c-collar  Midline tenderness to palpation  Cardiovascular:      Rate and Rhythm: Normal rate and regular rhythm.      Pulses: Normal pulses.      Heart sounds: Normal heart sounds.   Pulmonary:      Effort: Pulmonary effort is normal.      Breath sounds: Normal breath sounds.   Abdominal:      General: Abdomen is flat.      Palpations: Abdomen is soft.   Musculoskeletal:      Comments: ROM bilateral upper and lower extremities intact, upper ROM produces burning sensation   Skin:     General: Skin is warm.      Capillary Refill: Capillary refill takes less than 2 seconds.   Neurological:      Mental Status: He is alert and oriented to person, place, and time. Mental status is at baseline.      Sensory: No sensory deficit.      Comments: Patient experiences burning sensation when raising arms above head, strength 5/5 in all extremities. Can make okay signs and thumbs up.    Psychiatric:         Mood and Affect: Mood normal.       IMAGING SUMMARY:  (summary of findings, not a copy of dictation)  CT Head/Face: No acute findings   CT C-Spine: Moderate to severe multilevel spinal degenerative change described above, notable for severe spinal canal stenosis at C4-C5 and C5-C6  CT Chest/Abd/Pelvis and T/L spine: High-density fat stranding surrounding the right adrenal gland concerning for adrenal hemorrhage, new from 12/15/2024. No solid organ injury or free air. Trace ascites. Somewhat limited evaluation in the absence of IV contrast. Hepatic cirrhosis. No acute cardiopulmonary abnormality. Unchanged saccular  aortic aneurysm measuring up to 3.6 cm in maximal transverse diameter. No acute fracture or traumatic malalignment of the thoracic or lumbar spine.   MR c-spine: pend    LABS:  Results for orders placed or performed during the hospital encounter of 06/30/24 (from the past 24 hour(s))   CBC and Auto Differential   Result Value Ref Range    WBC 9.6 4.4 - 11.3 x10*3/uL    nRBC 0.0 0.0 - 0.0 /100 WBCs    RBC 3.59 (L) 4.50 - 5.90 x10*6/uL    Hemoglobin 10.9 (L) 13.5 - 17.5 g/dL    Hematocrit 34.3 (L) 41.0 - 52.0 %    MCV 96 80 - 100 fL    MCH 30.4 26.0 - 34.0 pg    MCHC 31.8 (L) 32.0 - 36.0 g/dL    RDW 14.4 11.5 - 14.5 %    Platelets 176 150 - 450 x10*3/uL    Neutrophils % 62.4 40.0 - 80.0 %    Immature Granulocytes %, Automated 0.4 0.0 - 0.9 %    Lymphocytes % 25.7 13.0 - 44.0 %    Monocytes % 8.8 2.0 - 10.0 %    Eosinophils % 2.4 0.0 - 6.0 %    Basophils % 0.3 0.0 - 2.0 %    Neutrophils Absolute 5.99 (H) 1.60 - 5.50 x10*3/uL    Immature Granulocytes Absolute, Automated 0.04 0.00 - 0.50 x10*3/uL    Lymphocytes Absolute 2.47 0.80 - 3.00 x10*3/uL    Monocytes Absolute 0.84 (H) 0.05 - 0.80 x10*3/uL    Eosinophils Absolute 0.23 0.00 - 0.40 x10*3/uL    Basophils Absolute 0.03 0.00 - 0.10 x10*3/uL   Comprehensive Metabolic Panel   Result Value Ref Range    Glucose 232 (H) 74 - 99 mg/dL    Sodium 134 (L) 136 - 145 mmol/L    Potassium 4.4 3.5 - 5.3 mmol/L    Chloride 102 98 - 107 mmol/L    Bicarbonate 20 (L) 21 - 32 mmol/L    Anion Gap 16 10 - 20 mmol/L    Urea Nitrogen 60 (H) 6 - 23 mg/dL    Creatinine 3.50 (H) 0.50 - 1.30 mg/dL    eGFR 17 (L) >60 mL/min/1.73m*2    Calcium 8.8 8.6 - 10.3 mg/dL    Albumin 4.0 3.4 - 5.0 g/dL    Alkaline Phosphatase 106 33 - 136 U/L    Total Protein 6.9 6.4 - 8.2 g/dL    AST 60 (H) 9 - 39 U/L    Bilirubin, Total 0.7 0.0 - 1.2 mg/dL    ALT 39 10 - 52 U/L   Alcohol   Result Value Ref Range    Alcohol <10 <=10 mg/dL   Lactate   Result Value Ref Range    Lactate 1.3 0.4 - 2.0 mmol/L   Protime-INR    Result Value Ref Range    Protime 14.2 (H) 9.8 - 12.8 seconds    INR 1.3 (H) 0.9 - 1.1   Troponin I, High Sensitivity   Result Value Ref Range    Troponin I, High Sensitivity 7 0 - 20 ng/L       I have reviewed all laboratory and imaging results ordered/pertinent for this encounter.

## 2024-07-01 NOTE — PROGRESS NOTES
I received Ritesh Garza in signout from previous provider.  Please see the previous note for all HPI, PE and MDM up to the time of signout at 0700.    In brief Ritesh Garza is an 73-year-old male history of diabetes, asthma, CAD, HFrEF (30 to 40%) presenting to the ED as a transfer from [facility] after a mechanical fall down 8 steps with resulting left-sided neck pain, upper extremity weakness and cape like distribution of burning pain along bilateral shoulders.  Concern for central cord.  CT showing severe spinal cord stenosis at C4-C6, additionally found to have concern for adrenal gland hemorrhage, transported for trauma and spine evaluation.  On initial provider presentation, patient had improvement in upper extremity weakness with no focal deficits noted.  Patient signed out to me pending results of MRI and consultant recs.    Chief Complaint   Patient presents with    Fall     Shortly after initiating care patient was admitted to the trauma service for further care and management, still pending MRI and final spine consultant recommendations.  Recently had ICD placed, device rep notified with MRI clearance pending at time of admission.  Patient otherwise remained hemodynamically stable with no changes in clinical status at time of admission.    ED Course as of 07/01/24 1554   Mon Jul 01, 2024   0907 Spoke with device rep, patient has a recently implanted defibrillator MRI conditional, placed on 5/30/2024, typically requires at least 6 weeks prior to MRI clearance.  Patient on inpatient list, will obtain chest x-ray per guidelines and defer to admitting team for further clearance prior to MRI. [KR]      ED Course User Index  [KR] Felicia Alexandra DO         Diagnoses as of 07/01/24 1554   Central cord syndrome, initial encounter (Multi)       Pt Disposition: Admit to trauma    Procedures

## 2024-07-01 NOTE — CONSULTS
ORTHOPAEDIC CONSULTATION     Subjective   History Of Present Illness  Ritesh Garza is a 73 y.o. male (cirrhosis, CAD, MI, DM2, CHF w EF=30-35% in April + s/p pacemaker) p/a fall down 8-9 steps. Reported severe burning pain in shoulders down through hands, no weakness. Denies b/b, saddle anesthesia. CT w severe canal stenosis C4-6. Also w adrenal hemorrhage.     Orthopaedic Problems/Injuries:  C4-6 canal stenosis s/f central cord    Location: Painful at c-spine  Duration: Pain has been persistent since fall  Severity: 2 /10  Worsened by movement/Palpation, improved with rest and pain medication  Open/Closed: closed, NVI: yes  Associated symptoms: no associated weakness, b/b incontinence    Other Injuries: none  NPO: yes    Past medical history: per HPI; no history of blood clots  Past surgical history: per HPI, rest reviewed in EMR  Allergies: NKDA  Medications: per EMR  Social History: no smoking, occasional drinking, denies IVDU  Family History:  Non-contributory to this patient's acute surgical issue.    Review of Systems: 12 point ROS negative unless stated in HPI    Past Medical History  He has a past medical history of Allergic, Asthma (HHS-HCC), Coronary artery disease (Long Ago), Diabetes mellitus (Multi) (Over 20 years ago), Drug-induced gout, unspecified site (10/04/2019), Encounter for screening for malignant neoplasm of colon (10/31/2022), Gout, unspecified, Heart disease (Over 20 years sgo), Heart murmur (Long ago), Hypertension (Over 20 years ago), Myocardial infarction (Multi) (Over 20 yesrs ago), Ocular pain, right eye (10/14/2018), Periorbital cellulitis (10/14/2018), Personal history of colonic polyps (10/11/2017), Personal history of other diseases of the circulatory system (05/12/2021), Personal history of other diseases of the circulatory system (05/12/2021), Personal history of other diseases of the respiratory system (10/04/2019), Personal history of other drug therapy, and Unspecified  cirrhosis of liver (Multi) (11/07/2022).    Surgical History  He has a past surgical history that includes Coronary artery bypass graft (12/02/2021); MR angio head wo IV contrast (06/02/2021); MR angio neck wo IV contrast (06/02/2021); MR angio head wo IV contrast (11/16/2021); MR angio neck wo IV contrast (11/16/2021); Adenoidectomy (Childhood); Cardiac catheterization (Over 30 years ago); Cholecystectomy (Over 30 years ago); Circumcision, primary (72,years ago); Eye surgery (Childhood); Cardiac catheterization (N/A, 04/15/2024); Invasive Vascular Procedure (Left, 04/30/2024); Coronary stent placement (Last week); Cardiac electrophysiology procedure (N/A, 05/30/2024); and Cardiac defibrillator placement.     Social History  He reports that he has never smoked. He has never used smokeless tobacco. He reports that he does not drink alcohol and does not use drugs.    Family History  Family History   Problem Relation Name Age of Onset    Diabetes Mother Sofia Garza     Other (mycoardial infarction) Father Hilario 46    Fainting Father Hilario     Heart disease Father Hilario     Stomach cancer Mother's Sister      Stroke Maternal Grandmother Jasmina Mace     Colon cancer Maternal Grandmother Jasmina Mace         Allergies  Patient has no known allergies.    Review of Systems  12 point ROS negative unless stated in HPI          Objective   Physical Exam  General: Lying comfortably in bed, no acute distress  HEENT: EOMI, NC/AT  CV: RRR by peripheral pulses  Resp: Normal WOB  MSK: See below. Gross motor in tact.  Neurologic: AOx3  Skin: No rash  Psych: Mood appropriate  Spine Exam:  Mild TTP over c spine. No tenderness to palpation over thoracic, or lumbar spine  No bruising, swelling, or erythema    C5: SILT   Deltoid 5/5 Left; 5/5 Right  C6: SILT   Wrist Ext: 5/5 Left; 5/5 Right  C7: SILT   Triceps: 5/5 Left; 5/5 Right  C8: SILT   Finger flexion: 5/5 Left; 5/5 Right  T1: SILT    Interossei: 5/5 Left; 5/5  Right    Huizar: Negative    L1: SILT       L2: SILT      Hip flexors 5/5 Left; 5/5 Right  L3: SILT      Knee extension 5/5 Left; 5/5 Right  L4: SILT      Tib Ant. (Dorsiflexion) 5/5 Left; 5/5 Right  L5: SILT      EHL 5/5 Left; 5/5 Right  S1: SILT      Plantar flexion 5/5 Left; 5/5 Right    Babinkski: Intact  No clonus    A full secondary survey was conducted. Patient did not have any acute pain with ROM or palpation of other extremities other than that which is mentioned below.      Last Recorded Vitals  Blood pressure 114/63, pulse 70, temperature 36.6 °C (97.9 °F), temperature source Oral, resp. rate 18, SpO2 94%.    Relevant Results  Results for orders placed or performed during the hospital encounter of 06/30/24 (from the past 24 hour(s))   CBC and Auto Differential   Result Value Ref Range    WBC 9.6 4.4 - 11.3 x10*3/uL    nRBC 0.0 0.0 - 0.0 /100 WBCs    RBC 3.59 (L) 4.50 - 5.90 x10*6/uL    Hemoglobin 10.9 (L) 13.5 - 17.5 g/dL    Hematocrit 34.3 (L) 41.0 - 52.0 %    MCV 96 80 - 100 fL    MCH 30.4 26.0 - 34.0 pg    MCHC 31.8 (L) 32.0 - 36.0 g/dL    RDW 14.4 11.5 - 14.5 %    Platelets 176 150 - 450 x10*3/uL    Neutrophils % 62.4 40.0 - 80.0 %    Immature Granulocytes %, Automated 0.4 0.0 - 0.9 %    Lymphocytes % 25.7 13.0 - 44.0 %    Monocytes % 8.8 2.0 - 10.0 %    Eosinophils % 2.4 0.0 - 6.0 %    Basophils % 0.3 0.0 - 2.0 %    Neutrophils Absolute 5.99 (H) 1.60 - 5.50 x10*3/uL    Immature Granulocytes Absolute, Automated 0.04 0.00 - 0.50 x10*3/uL    Lymphocytes Absolute 2.47 0.80 - 3.00 x10*3/uL    Monocytes Absolute 0.84 (H) 0.05 - 0.80 x10*3/uL    Eosinophils Absolute 0.23 0.00 - 0.40 x10*3/uL    Basophils Absolute 0.03 0.00 - 0.10 x10*3/uL   Comprehensive Metabolic Panel   Result Value Ref Range    Glucose 232 (H) 74 - 99 mg/dL    Sodium 134 (L) 136 - 145 mmol/L    Potassium 4.4 3.5 - 5.3 mmol/L    Chloride 102 98 - 107 mmol/L    Bicarbonate 20 (L) 21 - 32 mmol/L    Anion Gap 16 10 - 20 mmol/L    Urea  Nitrogen 60 (H) 6 - 23 mg/dL    Creatinine 3.50 (H) 0.50 - 1.30 mg/dL    eGFR 17 (L) >60 mL/min/1.73m*2    Calcium 8.8 8.6 - 10.3 mg/dL    Albumin 4.0 3.4 - 5.0 g/dL    Alkaline Phosphatase 106 33 - 136 U/L    Total Protein 6.9 6.4 - 8.2 g/dL    AST 60 (H) 9 - 39 U/L    Bilirubin, Total 0.7 0.0 - 1.2 mg/dL    ALT 39 10 - 52 U/L   Alcohol   Result Value Ref Range    Alcohol <10 <=10 mg/dL   Lactate   Result Value Ref Range    Lactate 1.3 0.4 - 2.0 mmol/L   Protime-INR   Result Value Ref Range    Protime 14.2 (H) 9.8 - 12.8 seconds    INR 1.3 (H) 0.9 - 1.1   Troponin I, High Sensitivity   Result Value Ref Range    Troponin I, High Sensitivity 7 0 - 20 ng/L       Images:  CT C-spine w severe canal stenosis at C4-6          Assessment/Plan   73M (cirrhosis, CAD, MI, DM2, CHF w EF=30-35% in April + s/p pacemaker) p/a fall down 8-9 steps. Reported severe burning pain in shoulders down through hands, no weakness. Denies b/b, saddle anesthesia. 5/5 strength BL UE/LE’s, SILT throughout. No clonus, negative Hoffmans. CT w severe canal stenosis C4-6. Also w adrenal hemorrhage, Trauma admit. MRI pending.    Plan:  - No acute orthopaedic surgical intervention  - Obtain MRI C spine  - Okay for diet from orthopedic perspective  - okay for DVT ppx from orthopedic perspective  - Patient to follow up in clinic with Dr. Wilkinson in 1 week following MRI. Please call [phone] to schedule appointment after discharge.    Patient seen within 30 minutes of page.    Consult to be discussed with attending, Dr. Wilkinson.    Marilu Colby MD, PGY-2  Orthopedic Surgery  [phone]  Epic Chat Preferred     This patient will be followed peripherally by the Orthopaedic Spine service while admitted. Please page or Epic Chat the corresponding residents below with questions or concerns.     Ortho Spine Service (Epic Chat Preferred)  First call: Mari Goyal MD PGY-2  Second call: Geovanny Catherine MD PGY-4

## 2024-07-01 NOTE — H&P (VIEW-ONLY)
Consults  Date of Service:  7/2/2024 Attending Provider:  Sly Malone MD;Darryl Alston DO;Rafita Borjas MD;Kasia*     Reason for Consultation:  Ritesh Garza is being seen today for a consult requested by Sly Malone MD;Darryl Alston DO;Rafita Borjas MD;Kasia* for concern for central cord.    Subjective   History of Present Illness:  Ritesh is a 73 y.o. male with Central cord syndrome, initial encounter (Multi)    Patient s/p fall and came to OSH with BUE tingling sensation and BLE tingling sensation that has since improved. Continues to have BUE numbness and tingling.     Patient with wife at bedside. Reported multiple recurrent falls and wife reported patient has been wobbly. Denied any issues with bottoning his clothes or feeding himself. Has known carpal tunnel syndrome so denied any new numbness or tingling in fingertips.    Patient denied any weakness, vision changes, fevers or chills.    Review of Systems negative other than listed in HPI.    Objective   Vitals:  Vitals:    07/02/24 0747   BP: 142/71   Pulse: 67   Resp: 18   Temp: 36.2 °C (97.2 °F)   SpO2: 96%         Exam:  Constitutional: No acute distress  Resp: breathing comfortably  Cardio: well perfused  GI: nondistended  MSK: full range of motion  Neuro: Awake, Ox3 (mild dementia per wife)  face symmetric  RUE 5/5  LUE 5/5  RLE 5/5  LLE 5/5  Decreased sensation and tingling in BUE  No cardozo's no clonus  Psych: appropriate  Skin: no obvious lesions    Medical History  Past Medical History:   Diagnosis Date    Allergic     Asthma (HHS-HCC)     Coronary artery disease Long Ago    Diabetes mellitus (Multi) Over 20 years ago    Drug-induced gout, unspecified site 10/04/2019    Acute drug-induced gout    Encounter for screening for malignant neoplasm of colon 10/31/2022    Colon cancer screening    Gout, unspecified     Acute gout    Heart disease Over 20 years sgo    Heart murmur Long ago    Hypertension Over 20 years ago    Myocardial  infarction (Multi) Over 20 yesrs ago    Ocular pain, right eye 10/14/2018    Pain of right eye    Periorbital cellulitis 10/14/2018    Periorbital cellulitis    Personal history of colonic polyps 10/11/2017    History of colon polyps    Personal history of other diseases of the circulatory system 05/12/2021    History of angina pectoris    Personal history of other diseases of the circulatory system 05/12/2021    History of angina pectoris    Personal history of other diseases of the respiratory system 10/04/2019    History of acute bronchitis    Personal history of other drug therapy     History of influenza vaccination    Unspecified cirrhosis of liver (Multi) 11/07/2022    Cirrhosis, nonalcoholic       Surgical History  Past Surgical History:   Procedure Laterality Date    ADENOIDECTOMY  Childhood    CARDIAC CATHETERIZATION  Over 30 years ago    CARDIAC CATHETERIZATION N/A 04/15/2024    Procedure: Left And Right Heart Cath, With LV;  Surgeon: Blaine Adams MD;  Location: [facility];  Service: Cardiovascular;  Laterality: N/A;  3 hr hydration / arrive 6:30    CARDIAC DEFIBRILLATOR PLACEMENT      CARDIAC ELECTROPHYSIOLOGY PROCEDURE N/A 05/30/2024    Procedure: ICD Dual Implant;  Surgeon: Mina Kiran MD;  Location: [facility];  Service: Electrophysiology;  Laterality: N/A;  Dual chamber ICD, [facility]  notified [facility] 5/24/24    CHOLECYSTECTOMY  Over 30 years ago    CIRCUMCISION, PRIMARY  72,years ago    CORONARY ARTERY BYPASS GRAFT  12/02/2021    CABG    CORONARY STENT PLACEMENT  Last week    EYE SURGERY  Childhood    INVASIVE VASCULAR PROCEDURE Left 04/30/2024    Procedure: Angioplasty - Upper Extremity;  Surgeon: Narinder Quevedo MD;  Location: [facility];  Service: Cardiovascular;  Laterality: Left;  3 hr hydration  / arrive 6:30    MR HEAD ANGIO WO IV CONTRAST  06/02/2021    MR HEAD ANGIO WO IV CONTRAST 6/2/2021 [facility] CLINICAL LEGACY    MR HEAD ANGIO WO IV CONTRAST   11/16/2021    MR HEAD ANGIO WO IV CONTRAST 11/16/2021 [facility] CLINICAL LEGACY    MR NECK ANGIO WO IV CONTRAST  06/02/2021    MR NECK ANGIO WO IV CONTRAST 6/2/2021 [facility] CLINICAL LEGACY    MR NECK ANGIO WO IV CONTRAST  11/16/2021    MR NECK ANGIO WO IV CONTRAST 11/16/2021 [facility] CLINICAL LEGACY        Medications  Current Outpatient Medications   Medication Instructions    acetaminophen (TYLENOL) 650 mg, oral, Every 6 hours PRN    albuterol (ProAir HFA) 90 mcg/actuation inhaler 2 puffs, inhalation, Every 6 hours PRN    allopurinol (ZYLOPRIM) 50 mg, oral, Daily    amiodarone (Pacerone) 200 mg tablet Take 2 tablets (400 mg) by mouth 2 times a day for 9 doses. On February, 28th start to take 1 tablet (200 mg) by mouth once daily.    amLODIPine (NORVASC) 10 mg, oral, Daily    aspirin 81 mg, oral, Daily    atorvastatin (LIPITOR) 80 mg, oral, Every evening    b complex vitamins capsule 1 capsule, oral, Daily    clopidogrel (PLAVIX) 75 mg, oral, Daily    coenzyme Q-10 (CoQ-10) 100 mg capsule 1 capsule, oral, Daily    cyanocobalamin (Vitamin B-12) 1,000 mcg tablet 1 tablet, oral, Daily, As directed    dapagliflozin (Farxiga) 10 mg 1 tablet, oral, Daily before evening meal    famotidine (PEPCID) 20 mg, oral, Daily PRN    furosemide (LASIX) 40 mg, oral, Daily, As needed for weight gain greater than 3 pounds    gabapentin (NEURONTIN) 100 mg, oral, Nightly    icosapent ethyL (VASCEPA) 1 g, oral, 2 times daily (morning and late afternoon)    loratadine (CLARITIN) 10 mg, oral, 2 times daily, Take 1 tablet every morning and 1 tablet every evening    magnesium gluconate (Magonate) 27.5 mg magne- sium (500 mg) tablet 1 tablet, oral, Daily    metoprolol succinate XL (TOPROL-XL) 50 mg, oral, Daily    multivitamin tablet 1 tablet, oral, Daily    pantoprazole (PROTONIX) 40 mg, oral, Daily    sacubitriL-valsartan (Entresto) 24-26 mg tablet 0.5 tablets, oral, 2 times daily    sertraline (Zoloft) 50 mg tablet Take one tablet daily in the morning  after breakfast.    tadalafil (CIALIS) 5 mg, oral, Daily    tadalafil 20 mg tablet START WITH ONE-HALF TABLET AS NEEDED 2 HOURS PRIOR TO ACTIVITY. MAY INCREASE TO 1 TABLET AS NEEDED 2 HOURS PRIOR TO ACTIVITY    turmeric-turmeric root extract 450-50 mg capsule 1 capsule, oral, Daily        Diagnostic Results:    Lab Results   Component Value Date    WBC 7.7 07/02/2024    HGB 10.7 (L) 07/02/2024    HCT 31.8 (L) 07/02/2024    MCV 91 07/02/2024     07/02/2024     Lab Results   Component Value Date    CREATININE 2.88 (H) 07/02/2024    BUN 60 (H) 07/02/2024     07/02/2024    K 4.5 07/02/2024     07/02/2024    CO2 23 07/02/2024     Lab Results   Component Value Date    INR 1.2 (H) 07/01/2024    INR 1.3 (H) 06/30/2024    INR 1.2 (H) 05/28/2024    PROTIME 14.0 (H) 07/01/2024    PROTIME 14.2 (H) 06/30/2024    PROTIME 13.9 (H) 05/28/2024       === 06/30/24 ===    CT CHEST ABDOMEN PELVIS WO CONTRAST    - Impression -  1. High-density fat stranding surrounding the right adrenal gland  concerning for adrenal hemorrhage, new from 12/15/2024.  2. No solid organ injury or free air. Trace ascites. Somewhat limited  evaluation in the absence of IV contrast.  3. Hepatic cirrhosis.  4. No acute cardiopulmonary abnormality.  5. No acute fracture or traumatic malalignment of the thoracic or  lumbar spine.  6. Unchanged saccular aortic aneurysm measuring up to 3.6 cm in  maximal transverse diameter.  7. Additional chronic findings as above.      Signed by: Fahad Billingsley 6/30/2024 10:51 PM  Dictation workstation:   [id number]  === 07/01/24 ===    MR CERVICAL SPINE WO CONTRAST    - Impression -  1. Moderate to marked spinal canal stenosis and bilateral neural  foraminal stenosis at C4-C5 and C5-C6 as described. Can not exclude  subtle increased STIR/T2 hyperintense spinal cord signal with minimal  contusion/edema not excluded in the setting of trauma. Artifact or  minimal myelomalacia are also possible. Additional  cervical  spondylitic changes as described above.  2. Interval development of moderate to large prevertebral soft tissue  fluid collection extending from this single level through  approximately C6-7 likely representing hematoma in the setting of  trauma and possibly associated with small anterioinferior endplate  fracture noted in retrospect of C3 on the CT cervical spine from  06/30/2024 with possible minimal disruption focally of the anterior  spinal ligament at C3-4. Airway may be significantly compromised  since the prior CT but is incompletely evaluated on the current exam.  3. Increased STIR and T2 signal surrounding the posterior elements at  C4-C5 likely representing component of interspinous ligament injury  or partial disruption with subtle widening of the interspinous  distance at this level    I personally reviewed the images/study and I agree with the findings  as stated above by resident physician, Dr. Alvarado Hernandez. The  study was interpreted at [facility] in [facility].    MACRO:  Alvarado Hernandez discussed the significance and urgency of this  critical finding by telephone with  Dr. Gao on 7/1/2024 at 3:39  pm.  (**-RCF-**) Findings:  See findings.    Signed by: Robby Kowalski 7/1/2024 4:06 PM  Dictation workstation:   [id number]    Assessment/Plan   Assessment:  73yM h/o TIA, mild dementia, HTN, HLD, CAD s/p CABG (1992 on ASA), CHF (EF 40-45% 5/2024), L thigh hematoma c/b vtach (2/13/2024), 4/2024 [facility] occluded vein grafts w/ 80% L main, L subclavian stenosis s/p L subclavian stents (4/30/2024), s/p dual chamber ICD (5/2024 [employer]),  SENIA, liver cirrhosis (fatty liver), DM2, BPH, CKD, gout, b/l carpal tunnel s/p RUE carpal tunnel release (5/2023), 6/30 p/w fall downstairs, CT CTL spine C4-5, C5-6 anterior disc-osteophytes, 7/1 MRI C spine severe C4-5, C5-6, C6-7 stenosis    Other injuries: adrenal gland hemorrhage    Patient with severe cervical  stenosis with mild symptoms of possible myelopathy. [id number] with imbalance and recurrent falls.     Plan:  Trauma primary  OR planning for Wednesday 7/3 for C4-5, C5-6, C6-7 ACDF  Please obtain upright xrays C spine (AP and lateral views)  Please consult medicine for preoperative clearance  Please hold ASA      Patient with cord compression and myelopathy. Concern for ongoing falls and MRI with severe disease, would recommend inpatient surgical planning.      I have explained the surgical procedure in detail with expected duration and extent of recovery along risks of surgery that include, but is not limited to bleeding, infection, blood vessel injury or damage, loss of sensation, loss of bladder, bowel or sexual function, nerve injury/damage resulting in weakness/paralysis, malunion, nonunion, CSF leak, brachial plexus injury, peripheral vision blindness, failure of implants/fusion, failure to relieve symptoms, recurrent disease, adjacent segment disease, need to reoperate for any reason and general anesthesia reaction such as stroke, coma, heart attack, delirium, confusion, death as well as worsening of preexisted medical conditions. I have also discussed with the patient the chances of C5 palsy.     I clearly emphasized that while the goal of surgery is to decompress the spinal cord so as to ARREST the progression of neurological deficits - preexisting deficits may or may not improve after surgery. We discussed that up to 90% of patients do show clinically significant improvement in functional and neurological outcomes following decompression of the spinal cord in patients with cervical degenerative myelopathy or radiculopathy the extent of which is variable and depends on the severity of myelopathy, duration of deficits and age of the patient.    I saw and evaluated the patient.  I personally obtained the key and critical portions of the history and physical exam or was physically present for key and critical  portions performed by the Resident/Fellow. I reviewed the documentation and discussed the patient with the Resident/Fellow.  I agree with the Resident/Fellow’s medical decision making as documented in the note.     I have reviewed all prior documentation and reviewed the electronic medical record since admission. I have personally have reviewed all advanced imaging not just the reports and used my interpretation as documented as the relevant findings. I have reviewed the risks and benefits of all treatment recommendations listed in this note with the patient and family. I spent a total of 45 minutes in service to this patient's care during this date of service.      Ganesh Bhatt MD, [facility]  Spine , [facility]  Benitez Robison and Radha Robison Chair in Spinal Neurosurgery  Neurosurgery , [facility]  Complex Spine Surgery Fellowship Director   of Neurological Surgery  [employer]  Office: [phone]  Fax: [phone]

## 2024-07-01 NOTE — NURSING NOTE
1415 Arrival of the device clinic nurse to interrogate patient's pacemaker/AICD into MRI safe mode; AOO@85/min. ICD turned off. BL

## 2024-07-01 NOTE — ED PROVIDER NOTES
HPI   Chief Complaint   Patient presents with    Fall     Pt states he thinks his shoes slipped and he fell down approx 8 steps. -loc, -blood thinners, -hit head. Pt is c/o left sided neck pain and a burning sensation bilateral from the shoulders down his body.        Patient presents emergency department after a fall down 8 or 9 stairs.  Patient denies loss of consciousness.  He was caring dog food upstairs to the dog when he had a mechanical fall falling down the stairs.  He denies loss conscious.  Patient has been on blood thinners.  He is endorsing some neck pain as well as burning sensation down the bilateral upper lower extremities and his upper extremities are tender to palpation making the burning sensation worse.  Patient denies any weakness.                          Mar Coma Scale Score: 15                  Patient History   Past Medical History:   Diagnosis Date    Allergic     Asthma ([facility])     Coronary artery disease Long Ago    Diabetes mellitus (Multi) Over 20 years ago    Drug-induced gout, unspecified site 10/04/2019    Acute drug-induced gout    Encounter for screening for malignant neoplasm of colon 10/31/2022    Colon cancer screening    Gout, unspecified     Acute gout    Heart disease Over 20 years sgo    Heart murmur Long ago    Hypertension Over 20 years ago    Myocardial infarction (Multi) Over 20 yesrs ago    Ocular pain, right eye 10/14/2018    Pain of right eye    Periorbital cellulitis 10/14/2018    Periorbital cellulitis    Personal history of colonic polyps 10/11/2017    History of colon polyps    Personal history of other diseases of the circulatory system 05/12/2021    History of angina pectoris    Personal history of other diseases of the circulatory system 05/12/2021    History of angina pectoris    Personal history of other diseases of the respiratory system 10/04/2019    History of acute bronchitis    Personal history of other drug therapy     History of influenza  vaccination    Unspecified cirrhosis of liver (Multi) 11/07/2022    Cirrhosis, nonalcoholic     Past Surgical History:   Procedure Laterality Date    ADENOIDECTOMY  Childhood    CARDIAC CATHETERIZATION  Over 30 years ago    CARDIAC CATHETERIZATION N/A 04/15/2024    Procedure: Left And Right Heart Cath, With LV;  Surgeon: Blaine Adams MD;  Location: [facility];  Service: Cardiovascular;  Laterality: N/A;  3 hr hydration / arrive 6:30    CARDIAC DEFIBRILLATOR PLACEMENT      CARDIAC ELECTROPHYSIOLOGY PROCEDURE N/A 05/30/2024    Procedure: ICD Dual Implant;  Surgeon: Mina Kiran MD;  Location: [facility];  Service: Electrophysiology;  Laterality: N/A;  Dual chamber ICD, [facility]  notified [facility] 5/24/24    CHOLECYSTECTOMY  Over 30 years ago    CIRCUMCISION, PRIMARY  72,years ago    CORONARY ARTERY BYPASS GRAFT  12/02/2021    [facility]    CORONARY STENT PLACEMENT  Last week    EYE SURGERY  Childhood    INVASIVE VASCULAR PROCEDURE Left 04/30/2024    Procedure: Angioplasty - Upper Extremity;  Surgeon: Narinder Quevedo MD;  Location: [facility];  Service: Cardiovascular;  Laterality: Left;  3 hr hydration  / arrive 6:30    MR HEAD ANGIO WO IV CONTRAST  06/02/2021    MR HEAD ANGIO WO IV CONTRAST 6/2/2021 [facility]GAC[facility]    MR HEAD ANGIO WO IV CONTRAST  11/16/2021    MR HEAD ANGIO WO IV CONTRAST 11/16/2021 [facility] CL[facility]ICAL LEGACY    MR NECK ANGIO WO IV CONTRAST  06/02/2021    MR NECK ANGIO WO IV CONTRAST 6/2/2021 [facility] CL[facility]ICAL LEGACY    MR NECK ANGIO WO IV CONTRAST  11/16/2021    MR NECK ANGIO WO IV CONTRAST 11/16/2021 [facility] CL[facility]ICAL LEGACY     Family History   Problem Relation Name Age of Onset    Diabetes Mother Sofia Garza     Other (mycoardial infarction) Father Hilario 46    Fainting Father Hilario     Heart disease Father Hilario     Stomach cancer Mother's Sister      Stroke Maternal Grandmother Jasmina Mace     Colon cancer Maternal Grandmother Jasmina Mace      Social  History     Tobacco Use    Smoking status: Never    Smokeless tobacco: Never   Substance Use Topics    Alcohol use: Never    Drug use: Never       Physical Exam   ED Triage Vitals [06/30/24 2107]   Temperature Heart Rate Respirations BP   36.5 °C (97.7 °F) 70 16 120/51      Pulse Ox Temp Source Heart Rate Source Patient Position   96 % Temporal -- --      BP Location FiO2 (%)     -- --       PRIMARY SURVEY  Airway: Patent  Breathing: Breath sounds equal  Circulation: 2+ radial, 2+ femoral, 2+ dp/tp  Disability:     Eye: 4     Verbal: 5     Motor: 6    SECONDARY SURVEY  Neurological: Sensation and motor grossly intact in upper and lower extremities, oriented x3  HEENT: Atraumatic, no occlusions. PERRLA, no step offs.  Neck: No midline cervical tenderness, trachea midline, atraumatic  Chest: Atraumatic, nontender  Cardiovascular: Regular rate and rhythm  Abdomen: Atraumatic, nontender  Pelvic/Perineal: Atraumatic, pelvis stable, no blood present  Back/Spine: Atraumatic, nontender, no step offs  Extremities: Atraumatic, palpation of the arms bilaterally worsen burning sensation starting in the bilateral shoulders going down to the wrist, no deformity    Physical Exam    ED Course & MDM   ED Course as of 07/01/24 0008   Sun Jun 30, 2024 2118 Trauma alert activated   [JH]   2123 Patient presents as a limited trauma after fall downstairs with burning sensation in the bilateral upper extremities without weakness. Available chart reviewed. On initial assessment the patient was found non-toxic, no acute distress, vitals hemodynamically stable and afebrile. Initial concern for fracture, subluxation, internal bleeding, spinal cord injury.  Patient was placed in c-collar upon arrival here to the emergency department.  Given the fall down a flight of stairs at my discretion, and med list shows Plavix, trauma alert was activated. [JH]   2300 CT of the head, entire spine and chest abdomen pelvis shows no bony abnormalities,  concern for left adrenal hemorrhage.  Patient's burning sensation in his bilateral upper extremities is worsening here in the emergency department.  Patient ordered 100 mg of gabapentin. []   2300 CBC shows no leukocytosis, slight anemia with hemoglobin of 11, no thrombocytopenia.  Metabolic panel shows slight hyponatremia 134, chronic kidney disease with elevated BUN of 16 creatinine of 3.5.  Troponins normal at 7.  Lipase normal 1.3. []   2330 Discussed with local trauma surgeon Dr. Rivera. Plan for tx to [facility]. []   Mon Jul 01, 2024   0000 Accepted at [facility] for transfer. []   0001 biceps and brachioradialis reflexes are normal []      ED Course User Index  [] Geovanny Huffman MD         Diagnoses as of 07/01/24 0008   Fall, initial encounter   Adrenal hemorrhage (Multi)       Medical Decision Making      Procedure  Critical Care    Performed by: Geovanny Huffman MD  Authorized by: Geovanny Huffman MD    Critical care provider statement:     Critical care time (minutes):  60    Critical care time was exclusive of:  Separately billable procedures and treating other patients and teaching time    Critical care was necessary to treat or prevent imminent or life-threatening deterioration of the following conditions:  Trauma    Critical care was time spent personally by me on the following activities:  Discussions with consultants, obtaining history from patient or surrogate, re-evaluation of patient's condition, ordering and review of radiographic studies and examination of patient    Care discussed with: accepting provider at another facility         Geovanny Huffman MD  07/01/24 0008       Geovanny Huffman MD  07/01/24 0018

## 2024-07-01 NOTE — SIGNIFICANT EVENT
MRI C spine was reviewed with on-call ortho spine attending Dr. Wilkinson.  There is stenosis at C4-6. We will continue non-operative management during this admission. However, please have patient follow-up with Dr. Darian Wilkinson next week to discuss potential outpatient surgical options.    Geovanny Catherine MD  Resident, PGY3  Orthopaedic Surgery

## 2024-07-02 ENCOUNTER — ANESTHESIA EVENT (OUTPATIENT)
Dept: OPERATING ROOM | Facility: HOSPITAL | Age: 74
End: 2024-07-02
Payer: MEDICARE

## 2024-07-02 ENCOUNTER — CLINICAL SUPPORT (OUTPATIENT)
Dept: EMERGENCY MEDICINE | Facility: HOSPITAL | Age: 74
DRG: 472 | End: 2024-07-02
Payer: MEDICARE

## 2024-07-02 ENCOUNTER — APPOINTMENT (OUTPATIENT)
Dept: CARDIOLOGY | Facility: HOSPITAL | Age: 74
DRG: 472 | End: 2024-07-02
Payer: MEDICARE

## 2024-07-02 PROBLEM — M48.02 CERVICAL STENOSIS OF SPINE: Status: ACTIVE | Noted: 2024-06-30

## 2024-07-02 LAB
ABO GROUP (TYPE) IN BLOOD: NORMAL
ANION GAP SERPL CALC-SCNC: 15 MMOL/L (ref 10–20)
ANTIBODY SCREEN: NORMAL
AORTIC VALVE PEAK VELOCITY: 1.21 M/S
APPEARANCE UR: CLEAR
APTT PPP: 32 SECONDS (ref 27–38)
AV PEAK GRADIENT: 5.9 MMHG
AVA (PEAK VEL): 3.12 CM2
BILIRUB UR STRIP.AUTO-MCNC: NEGATIVE MG/DL
BUN SERPL-MCNC: 60 MG/DL (ref 6–23)
CALCIUM SERPL-MCNC: 9 MG/DL (ref 8.6–10.6)
CHLORIDE SERPL-SCNC: 104 MMOL/L (ref 98–107)
CO2 SERPL-SCNC: 23 MMOL/L (ref 21–32)
COLOR UR: ABNORMAL
CREAT SERPL-MCNC: 2.88 MG/DL (ref 0.5–1.3)
EGFRCR SERPLBLD CKD-EPI 2021: 22 ML/MIN/1.73M*2
EJECTION FRACTION APICAL 4 CHAMBER: 32.7
EJECTION FRACTION: 33 %
ERYTHROCYTE [DISTWIDTH] IN BLOOD BY AUTOMATED COUNT: 14 % (ref 11.5–14.5)
GLUCOSE BLD MANUAL STRIP-MCNC: 199 MG/DL (ref 74–99)
GLUCOSE BLD MANUAL STRIP-MCNC: 232 MG/DL (ref 74–99)
GLUCOSE SERPL-MCNC: 153 MG/DL (ref 74–99)
GLUCOSE UR STRIP.AUTO-MCNC: ABNORMAL MG/DL
HCT VFR BLD AUTO: 31.8 % (ref 41–52)
HGB BLD-MCNC: 10.7 G/DL (ref 13.5–17.5)
HYALINE CASTS #/AREA URNS AUTO: ABNORMAL /LPF
INR PPP: 1.2 (ref 0.9–1.1)
KETONES UR STRIP.AUTO-MCNC: NEGATIVE MG/DL
LEFT ATRIUM VOLUME AREA LENGTH INDEX BSA: 43.1 ML/M2
LEFT VENTRICLE INTERNAL DIMENSION DIASTOLE: 5.6 CM (ref 3.5–6)
LEFT VENTRICULAR OUTFLOW TRACT DIAMETER: 2.1 CM
LEUKOCYTE ESTERASE UR QL STRIP.AUTO: NEGATIVE
MAGNESIUM SERPL-MCNC: 2.26 MG/DL (ref 1.6–2.4)
MCH RBC QN AUTO: 30.6 PG (ref 26–34)
MCHC RBC AUTO-ENTMCNC: 33.6 G/DL (ref 32–36)
MCV RBC AUTO: 91 FL (ref 80–100)
MITRAL VALVE E/A RATIO: 2.47
NITRITE UR QL STRIP.AUTO: NEGATIVE
NRBC BLD-RTO: 0 /100 WBCS (ref 0–0)
PH UR STRIP.AUTO: 5 [PH]
PLATELET # BLD AUTO: 180 X10*3/UL (ref 150–450)
POTASSIUM SERPL-SCNC: 4.5 MMOL/L (ref 3.5–5.3)
PROT UR STRIP.AUTO-MCNC: ABNORMAL MG/DL
PROTHROMBIN TIME: 13.3 SECONDS (ref 9.8–12.8)
RBC # BLD AUTO: 3.5 X10*6/UL (ref 4.5–5.9)
RBC # UR STRIP.AUTO: ABNORMAL /UL
RBC #/AREA URNS AUTO: ABNORMAL /HPF
RH FACTOR (ANTIGEN D): NORMAL
RIGHT VENTRICLE FREE WALL PEAK S': 7.58 CM/S
RIGHT VENTRICLE PEAK SYSTOLIC PRESSURE: 41.9 MMHG
SODIUM SERPL-SCNC: 137 MMOL/L (ref 136–145)
SP GR UR STRIP.AUTO: 1.02
TRICUSPID ANNULAR PLANE SYSTOLIC EXCURSION: 1.3 CM
UROBILINOGEN UR STRIP.AUTO-MCNC: NORMAL MG/DL
WBC # BLD AUTO: 7.7 X10*3/UL (ref 4.4–11.3)
WBC #/AREA URNS AUTO: ABNORMAL /HPF

## 2024-07-02 PROCEDURE — 2500000004 HC RX 250 GENERAL PHARMACY W/ HCPCS (ALT 636 FOR OP/ED)

## 2024-07-02 PROCEDURE — 2500000002 HC RX 250 W HCPCS SELF ADMINISTERED DRUGS (ALT 637 FOR MEDICARE OP, ALT 636 FOR OP/ED): Performed by: HEALTH CARE PROVIDER

## 2024-07-02 PROCEDURE — 93005 ELECTROCARDIOGRAM TRACING: CPT

## 2024-07-02 PROCEDURE — 82947 ASSAY GLUCOSE BLOOD QUANT: CPT

## 2024-07-02 PROCEDURE — 93325 DOPPLER ECHO COLOR FLOW MAPG: CPT | Performed by: INTERNAL MEDICINE

## 2024-07-02 PROCEDURE — 36415 COLL VENOUS BLD VENIPUNCTURE: CPT

## 2024-07-02 PROCEDURE — 2500000001 HC RX 250 WO HCPCS SELF ADMINISTERED DRUGS (ALT 637 FOR MEDICARE OP)

## 2024-07-02 PROCEDURE — 93325 DOPPLER ECHO COLOR FLOW MAPG: CPT

## 2024-07-02 PROCEDURE — 86901 BLOOD TYPING SEROLOGIC RH(D): CPT | Performed by: PHYSICIAN ASSISTANT

## 2024-07-02 PROCEDURE — 36415 COLL VENOUS BLD VENIPUNCTURE: CPT | Performed by: PHYSICIAN ASSISTANT

## 2024-07-02 PROCEDURE — 80048 BASIC METABOLIC PNL TOTAL CA: CPT

## 2024-07-02 PROCEDURE — 99223 1ST HOSP IP/OBS HIGH 75: CPT

## 2024-07-02 PROCEDURE — 81001 URINALYSIS AUTO W/SCOPE: CPT | Performed by: PHYSICIAN ASSISTANT

## 2024-07-02 PROCEDURE — 1200000002 HC GENERAL ROOM WITH TELEMETRY DAILY

## 2024-07-02 PROCEDURE — 85610 PROTHROMBIN TIME: CPT | Performed by: PHYSICIAN ASSISTANT

## 2024-07-02 PROCEDURE — 99232 SBSQ HOSP IP/OBS MODERATE 35: CPT | Performed by: SURGERY

## 2024-07-02 PROCEDURE — 2500000004 HC RX 250 GENERAL PHARMACY W/ HCPCS (ALT 636 FOR OP/ED): Performed by: STUDENT IN AN ORGANIZED HEALTH CARE EDUCATION/TRAINING PROGRAM

## 2024-07-02 PROCEDURE — 85027 COMPLETE CBC AUTOMATED: CPT

## 2024-07-02 PROCEDURE — 83735 ASSAY OF MAGNESIUM: CPT

## 2024-07-02 PROCEDURE — 93321 DOPPLER ECHO F-UP/LMTD STD: CPT | Performed by: INTERNAL MEDICINE

## 2024-07-02 PROCEDURE — 93308 TTE F-UP OR LMTD: CPT | Performed by: INTERNAL MEDICINE

## 2024-07-02 RX ORDER — FUROSEMIDE 10 MG/ML
40 INJECTION INTRAMUSCULAR; INTRAVENOUS ONCE
Status: COMPLETED | OUTPATIENT
Start: 2024-07-02 | End: 2024-07-02

## 2024-07-02 RX ORDER — DEXTROSE 50 % IN WATER (D50W) INTRAVENOUS SYRINGE
12.5
Status: DISCONTINUED | OUTPATIENT
Start: 2024-07-02 | End: 2024-07-16 | Stop reason: HOSPADM

## 2024-07-02 RX ORDER — DEXTROSE 50 % IN WATER (D50W) INTRAVENOUS SYRINGE
25
Status: DISCONTINUED | OUTPATIENT
Start: 2024-07-02 | End: 2024-07-16 | Stop reason: HOSPADM

## 2024-07-02 ASSESSMENT — PAIN DESCRIPTION - ORIENTATION: ORIENTATION: RIGHT;LEFT

## 2024-07-02 ASSESSMENT — PAIN SCALES - GENERAL
PAINLEVEL_OUTOF10: 0 - NO PAIN
PAINLEVEL_OUTOF10: 7
PAINLEVEL_OUTOF10: 7
PAINLEVEL_OUTOF10: 5 - MODERATE PAIN
PAINLEVEL_OUTOF10: 0 - NO PAIN
PAINLEVEL_OUTOF10: 5 - MODERATE PAIN
PAINLEVEL_OUTOF10: 6

## 2024-07-02 ASSESSMENT — COLUMBIA-SUICIDE SEVERITY RATING SCALE - C-SSRS
6. HAVE YOU EVER DONE ANYTHING, STARTED TO DO ANYTHING, OR PREPARED TO DO ANYTHING TO END YOUR LIFE?: NO
2. HAVE YOU ACTUALLY HAD ANY THOUGHTS OF KILLING YOURSELF?: NO
1. SINCE LAST CONTACT, HAVE YOU WISHED YOU WERE DEAD OR WISHED YOU COULD GO TO SLEEP AND NOT WAKE UP?: NO

## 2024-07-02 ASSESSMENT — PAIN - FUNCTIONAL ASSESSMENT: PAIN_FUNCTIONAL_ASSESSMENT: 0-10

## 2024-07-02 ASSESSMENT — PAIN DESCRIPTION - PAIN TYPE: TYPE: ACUTE PAIN

## 2024-07-02 ASSESSMENT — PAIN DESCRIPTION - LOCATION: LOCATION: ARM

## 2024-07-02 ASSESSMENT — PAIN DESCRIPTION - DESCRIPTORS: DESCRIPTORS: ACHING

## 2024-07-02 NOTE — SIGNIFICANT EVENT
Trauma Care Plan    - Admit to trauma (T8)  - Cardiology and medicine evaluation for pre-operative risk stratification and optimization.  - If appropriate candidate for OR tomorrow w/ NSGY  - NPO at midnight  - T&S    Patient seen and discussed with attending surgeon, Dr. Dewitt.    Mil Hawkins MD  Trauma Surgery

## 2024-07-02 NOTE — PROGRESS NOTES
[facility]  TRAUMA SERVICE - PROGRESS NOTE    Patient Name: Ritesh Garza  MRN: [id number]  Admit Date: 2024  : 1950  AGE: 73 y.o.   GENDER: male  ==============================================================================  MECHANISM OF INJURY / CHIEF COMPLAINT:   Patient is a 72 yo M who was going up his stairs and missed the top step and fell down and landed on L side/head. Patient instantly noticed burning sensation and pain down bilateral arms, BLE with no abnormalities. He states that he was able to get up with help. Patient went to [facility] where they pan scanned him and he was found to have moderate to severe multilevel spinal degenerative change described  above, notable for severe spinal canal stenosis at C4-C5 and C5-C6 and R adrenal hemorrhage. Patient transferred here in stable condition for MR c-spine.      LOC (yes/no?): No  Anticoagulant / Anti-platelet Rx? (for what dx?): Plavix (last dose ), aspirin 81mg   Referring Facility Name (N/A for scene EMR run): [facility]      INJURIES:   Spinal canal stenosis w concern for TSCI (central cord)  Adrenal hemorrhage      OTHER MEDICAL PROBLEMS:  CAD s/p MI s/p CABG  s/p and cardiac debrillator placement    DM2  Asthma  Gout  HTN  Liver cirrohsis   BPH  TIA  CHF  SENIA  CKD     INCIDENTAL FINDINGS:  None     ==============================================================================  ADMISSION PLAN OF CARE:  Keep NPO  Pain control with tylenol and oxy. Dilaudid for breakthrough pain  C- spine protocol due to C spinal stenosis  Fu ortho spine recs   Cardiology cleared him for OR (rec to put on lasix 40mg and start plavix and ASA after OR)  IOANA plan to take him to OR on 7/3    Patient's exam, labs, and findings discussed and seen with Dr. Dewitt, who agrees with the plan as described above.    Briana Kidd MD  PGY-1 General Surgery  Trauma Surgery Floor [id number]  Subjective    ==============================================================================  CHIEF COMPLAINT / OVERNIGHT EVENTS:   No acute events overnight. Patient seen and evaluated this AM during rounds. Patient is cleared by cards and will plan to go to OR with NSGY on 7/3. No Fever, chills or pain in abdomen.     MEDICAL HISTORY / ROS:  Admission history reviewed. Pertinent changes as follows:  None.    A 12-point review of systems was performed, and was negative except as above.     Objective   PHYSICAL EXAM:  Temperature:  [36.2 °C (97.2 °F)-36.9 °C (98.4 °F)] 36.2 °C (97.2 °F)  Heart Rate:  [64-81] 81  Respirations:  [15-18] 17  BP: (117-147)/(58-84) 135/71  FiO2 (%):  [28 %] 28 %    GEN: No acute distress. Alert, awake and conversive.  HEENT: Sclera anicteric. Moist mucous membranes.  RESP: Breathing non-labored, equal chest rise. On 2L NC and O2 sat around 90-93%.   CV: Regular rate, normotensive  GI: Abdomen soft, nondistended, nontender.   : Voiding spontaneously.  MSK: No gross deformities. Moves all extremities spontaneously.  NEURO: Alert and oriented x3. No focal deficits.  PSYCH: Appropriate mood and affect.  SKIN: No rashes or lesions. Nonjaundiced    IMAGING SUMMARY:    Transthoracic Echo (TTE) Limited    Result Date: 7/2/2024   [facility], [address] [place] [address]                Tel [phone] and Fax [phone] TRANSTHORACIC ECHOCARDIOGRAM REPORT  Patient Name:      CHIDI Andrade Physician:    [id number] Kisha Reina MD Study Date:        7/2/2024             Ordering Provider:    [id number] ZENON ESTRADA MRN/PID:           [id number]             Fellow: Accession#:        [id number]         Nurse:                Kisha Michelle RN Date of Birth/Age: 1950 / 73 years Sonographer:          Angelina Butler                                                                [facility] Gender:            M                    Additional Staff:     Kiki Erickson Height:            160.02 cm            Admit Date:           7/1/2024 Weight:            69.40 kg             Admission Status:     Inpatient -                                                               Routine BSA / BMI:         1.73 m2 / 27.10      Encounter#:           [id number]                    kg/m2 Blood Pressure:    142/71 mmHg          Department Location:  [facility] Non                                                               Invasive Study Type:    TRANSTHORACIC ECHO (TTE) LIMITED Diagnosis/ICD: Encounter for other preprocedural examination-Z01.818 Indication:    Preop evaluation CPT Code:      Echo Limited-79603; Doppler Limited-51136; Color Doppler-85054 Patient History: Pertinent History: DM, CAD, HFrEF, CKD, ICD (5/30/24). Study Detail: The following Echo studies were performed: 2D, M-Mode, Doppler and               color flow. Definity used as a contrast agent for endocardial               border definition. Total contrast used for this procedure was 2.0               mL via IV push.  PHYSICIAN INTERPRETATION: Left Ventricle: Left ventricular ejection fraction is moderately decreased, by visual estimate at 30-35%. Wall motion is abnormal. The left ventricular cavity size is moderately dilated. Spectral Doppler shows a pseudonormal pattern of left ventricular diastolic filling. There is no definite left ventricular thrombus visualized. Technically difficult exam, though there is moderate LV systolic dysfunction with the apical function being best but hypokinesis of the basal and mid segments. There is no LV apical thrombus seen on the echocontrast images. Left Atrium: The left atrium is moderately dilated. Right Ventricle: The right ventricle was not well visualized. There is reduced right ventricular systolic function. A device is visualized in the right ventricle. Right Atrium:  The right atrium was not well visualized. There is a device visualized in the right atrium. Aortic Valve: The aortic valve is trileaflet. There is mild aortic valve cusp calcification. There is no evidence of aortic valve regurgitation. The peak instantaneous gradient of the aortic valve is 5.9 mmHg. Mitral Valve: The mitral valve is mildly thickened. There is mild mitral annular calcification. There is moderate mitral valve regurgitation. Tricuspid Valve: The tricuspid valve was not well visualized. There is moderate tricuspid regurgitation. The Doppler estimated RVSP is mildly elevated at 41.9 mmHg. Pulmonic Valve: The pulmonic valve is not well visualized. Pulmonic valve regurgitation was not assessed. Pericardium: There is a trivial pericardial effusion. Aorta: The aortic root is normal. Systemic Veins: The inferior vena cava appears mildly dilated. There is IVC inspiratory collapse greater than 50%. In comparison to the previous echocardiogram(s): Compared with the prior exam from 5/10/2023, today's exam is more technically difficult making assessment of LV systolic function more difficult. Proir study demonstrated clear wall motiom abnormality in the basal septal, basal inferior and basal and mid inferolateral segments. The LV systolic function appears to be worse today ( hyaving declined from mild dysfunciton to moderate dysfunction. In addition, the degree of MR appears to have increased from mild to moderate.  CONCLUSIONS:  1. Left ventricular ejection fraction is moderately decreased, by visual estimate at 30-35%.  2. Spectral Doppler shows a pseudonormal pattern of left ventricular diastolic filling.  3. Left ventricular cavity size is moderately dilated.  4. No left ventricular thrombus visualized.  5. Technically difficult exam, though there is moderate LV systolic dysfunction with the apical function being best but hypokinesis of the basal and mid segments. There is no LV apical thrombus seen on the  echocontrast images.  6. There is reduced right ventricular systolic function.  7. The left atrium is moderately dilated.  8. Moderate mitral valve regurgitation.  9. Mildly elevated RVSP. 10. Moderate tricuspid regurgitation visualized. 11. Compared with the prior exam from 5/10/2023, today's exam is more technically difficult making assessment of LV systolic function more difficult. Proir study demonstrated clear wall motiom abnormality in the basal septal, basal inferior and basal and mid inferolateral segments. The LV systolic function appears to be worse today ( hyaving declined from mild dysfunciton to moderate dysfunction. In addition, the degree of MR appears to have increased from mild to moderate. QUANTITATIVE DATA SUMMARY: 2D MEASUREMENTS:                          Normal Ranges: Ao Root d:     3.20 cm   (2.0-3.7cm) LAs:           4.60 cm   (2.7-4.0cm) IVSd:          0.90 cm   (0.6-1.1cm) LVPWd:         0.70 cm   (0.6-1.1cm) LVIDd:         5.60 cm   (3.9-5.9cm) LVIDs:         4.90 cm LV Mass Index: 95.6 g/m2 LV % FS        12.5 % LA VOLUME:                               Normal Ranges: LA Vol A4C:        69.7 ml    (22+/-6mL/m2) LA Vol A2C:        78.1 ml LA Vol BP:         74.4 ml LA Vol Index A4C:  40.4ml/m2 LA Vol Index A2C:  45.3 ml/m2 LA Vol Index BP:   43.1 ml/m2 LA Area A4C:       22.2 cm2 LA Area A2C:       23.7 cm2 LA Major Axis A4C: 6.0 cm LA Major Axis A2C: 6.1 cm LA Volume Index:   43.1 ml/m2 AORTA MEASUREMENTS:                      Normal Ranges: Ao Sinus, d: 2.80 cm (2.1-3.5cm) LV SYSTOLIC FUNCTION BY 2D PLANIMETRY (MOD):                      Normal Ranges: EF-A4C View:    33 % (>=55%) EF-A2C View:    39 % EF-Biplane:     36 % EF-Visual:      33 % LV EF Reported: 33 % LV DIASTOLIC FUNCTION:                           Normal Ranges: MV Peak E:    1.08 m/s    (0.7-1.2 m/s) MV Peak A:    0.44 m/s    (0.42-0.7 m/s) E/A Ratio:    2.47        (1.0-2.2) MV lateral e' 0.10 m/s MV A Dur:     127.00 msec  MV DT:        211 msec    (150-240 msec) MITRAL VALVE:                      Normal Ranges: MV Vmax:    1.42 m/s (<=1.3m/s) MV peak P.1 mmHg (<5mmHg) MV mean PG: 3.0 mmHg (<2mmHg) MV DT:      211 msec (150-240msec) AORTIC VALVE:                         Normal Ranges: AoV Vmax:      1.21 m/s (<=1.7m/s) AoV Peak P.9 mmHg (<20mmHg) LVOT Max Matt:  1.09 m/s (<=1.1m/s) LVOT VTI:      28.00 cm LVOT Diameter: 2.10 cm  (1.8-2.4cm) AoV Area,Vmax: 3.12 cm2 (2.5-4.5cm2)  RIGHT VENTRICLE: TAPSE: 12.6 mm RV s'  0.08 m/s TRICUSPID VALVE/RVSP:                             Normal Ranges: Peak TR Velocity: 2.91 m/s RV Syst Pressure: 41.9 mmHg (< 30mmHg) IVC Diam:         2.20 cm  [id number] Kisha Reina MD Electronically signed on 2024 at 4:53:18 PM  ** Final **     XR cervical spine 2-3 views    Result Date: 2024  Interpreted By:  Vini Wiley,  and James Landrum STUDY: XR CERVICAL SPINE 2-3 VIEWS; ;  2024 8:13 pm   INDICATION: Signs/Symptoms:needs AP and lateral views while upright.   COMPARISON: Cervical spine MRI 2024, CT cervical spine 2024, and cervical spine radiograph 2020.   ACCESSION NUMBER(S): [id number]   ORDERING CLINICIAN: KAILEE THOMAS   FINDINGS: Cervical spine, two views.   Grade 1 degenerative anterolisthesis C3 on C4. Grade 1 degenerative retrolisthesis C4 on C5. Alignment is otherwise maintained.   No acute fracture. Vertebral stature is maintained.   Endplate irregularity and osteophytosis associated with moderate to severe disc height loss at C4-C5 through C6-C7   There is moderate to large prevertebral soft tissue fluid collection, better assessed on same day MR cervical spine. Within the limitations of the single view radiograph, the aerodigestive tract appears patent.       1. Moderate-to-large prevertebral soft tissue fluid collection, better assessed on same day MR cervical spine. 2. No acute fracture is evident. 3. Additional findings as detailed above.      I personally reviewed the images/study and I agree with the findings as stated by Diallo Blackwell MD (Radiology Resident). This study was interpreted at [facility], [place], [place].   MACRO: None   Signed by: Vini Wiley 7/1/2024 8:28 PM Dictation workstation:   [id number]    MR cervical spine wo IV contrast    Result Date: 7/1/2024  Interpreted By:  Robby Kowalski and Liller Gregory STUDY: MR CERVICAL SPINE WO IV CONTRAST;  7/1/2024 2:45 pm   INDICATION: Signs/Symptoms:Central cord rule out.   COMPARISON: CT cervical spine 06/30/2024   ACCESSION NUMBER(S): [id number]   ORDERING CLINICIAN: KAILEE THOMAS   TECHNIQUE: Sagittal T1, T2, STIR, axial T1 and axial T2 weighted images were acquired through the cervical spine.   FINDINGS: Alignment: There is flattening and slight reversal of the normal cervical lordosis. There is grade 1 anterolisthesis of C3 on C4, grade 1 retrolisthesis of C5 on C6, and grade 1 anterolisthesis of C6 on C7.   Vertebrae/Intervertebral Discs: Likely small anterior inferior endplate fracture of C3 is noted in retrospect but better visualized on CT cervical spine from 06/30/2024 which is not well visualized on the current MRI although there may be subtle disruption of the anterior spinal ligament at C3-4. There is mild decrease in vertebral body height which is most likely degenerative in nature and most significant of the inferior endplate at C7. Otherwise, the vertebral bodies demonstrate expected height.  Type 3 (degenerative) Modic endplate changes noted. Multilevel loss of normal disc T2 signal and mild loss of disc height.   Spinal cord: Can not exclude subtle increased T2 and FLAIR signal intensity within the cervical spinal cord on some images at C4 through C6.   C1-C2: The cervicomedullary junction appears unremarkable. There is no spinal canal stenosis.   C2-C3: There is no posterior disc contour abnormality. There is no significant  spinal canal or neural foraminal stenosis.   C3-C4: Component of disc uncovering and mild central disc protrusion abuts the ventral thecal sac without significant spinal canal stenosis. There is mild bilateral neural foraminal stenosis.   C4-C5: Posterior projecting disc osteophyte complex abuts the ventral thecal sac resulting in severe spinal canal stenosis and compressing the spinal cord. There is moderate to severe bilateral neural foraminal stenosis.   C5-C6: Posterior projecting disc osteophyte complex abuts the ventral thecal sac resulting in severe spinal canal stenosis and compresses the spinal cord. There is severe bilateral neural foraminal stenosis.   C6-C7: Central and paracentral left disc protrusion component of osteophytosis abuts the central and left paracentral thecal sac and anterior aspect of the spinal cord resulting in mild spinal canal and left neural foraminal stenosis. No significant right neural foraminal stenosis.   C7-T1: There is no posterior disc contour abnormality. There is no significant spinal canal or neural foraminal stenosis.   Posterior projecting disc osteophyte complex at T2-T3 abuts the ventral thecal sac without significant spinal canal stenosis at this level. Otherwise, the visualized upper thoracic vertebra and spinal cord appears unremarkable.   Increased T2 and STIR signal noted within the anterior prevertebral soft tissues ranging from C1-C6 likely representing large prevertebral soft tissue hematoma measuring up to 9.1 cm craniocaudal by 1.7 cm anterior-by 2.6 cm transverse. There is compromise of the adjoining aerodigestive tract including the oropharynx posteriorly which is incompletely evaluated on the current exam. Mildly increased STIR and T2 signal surrounding the posterior elements of C4-C5.       1. Moderate to marked spinal canal stenosis and bilateral neural foraminal stenosis at C4-C5 and C5-C6 as described. Can not exclude subtle increased STIR/T2  hyperintense spinal cord signal with minimal contusion/edema not excluded in the setting of trauma. Artifact or minimal myelomalacia are also possible. Additional cervical spondylitic changes as described above. 2. Interval development of moderate to large prevertebral soft tissue fluid collection extending from this single level through approximately C6-7 likely representing hematoma in the setting of trauma and possibly associated with small anterioinferior endplate fracture noted in retrospect of C3 on the CT cervical spine from 06/30/2024 with possible minimal disruption focally of the anterior spinal ligament at C3-4. Airway may be significantly compromised since the prior CT but is incompletely evaluated on the current exam. 3. Increased STIR and T2 signal surrounding the posterior elements at C4-C5 likely representing component of interspinous ligament injury or partial disruption with subtle widening of the interspinous distance at this level   I personally reviewed the images/study and I agree with the findings as stated above by resident physician, Dr. Alvarado Hernandez. The study was interpreted at [facility] in [facility].   MACRO: Alvarado Hernandez discussed the significance and urgency of this critical finding by telephone with  Dr. Gao on 7/1/2024 at 3:39 pm.  (**-RCF-**) Findings:  See findings.   Signed by: Robby Kowalski 7/1/2024 4:06 PM Dictation workstation:   [id number]    XR chest 1 view    Result Date: 7/1/2024  Interpreted By:  Hank Razo, STUDY: XR CHEST 1 VIEW;  7/1/2024 9:27 am   INDICATION: Signs/Symptoms:ICD.   COMPARISON: 05/31/2024   ACCESSION NUMBER(S): [id number]   ORDERING CLINICIAN: KAILEE THOMAS   FINDINGS: Left-sided pacemaker in place   Median sternotomy wires present   CARDIOMEDIASTINAL SILHOUETTE: Cardiomediastinal silhouette is mildly enlarged but unchanged.   LUNGS: Lungs are clear. No consolidation or effusion   ABDOMEN: No  remarkable upper abdominal findings.   BONES: No acute osseous changes.       1.  No evidence of acute cardiopulmonary process.       MACRO: None   Signed by: Hank Razo 7/1/2024 9:58 AM Dictation workstation:   [id number]    XR knee left 4+ views    Result Date: 7/1/2024  Interpreted By:  Hank Razo and Sheng Max STUDY: XR KNEE LEFT 4+ VIEWS; ;  7/1/2024 5:28 am   INDICATION: Signs/Symptoms:L knee pain.   COMPARISON: None.   ACCESSION NUMBER(S): [id number]   ORDERING CLINICIAN: KAILEE THOMAS   FINDINGS: Left knee, four views: No acute fracture or malalignment. Mild tricompartmental osteophytosis and sclerosis. No significant knee joint effusion. Extensive vascular calcifications. Soft tissue edema about the knee, nonspecific but       1. No acute fracture or malalignment. Nonspecific soft tissue edema about the knee. 2. Mild degenerative changes.     I personally reviewed the images/study and I agree with the findings as stated by Dr. Jamil Rea. This study was interpreted at [place], [place].   MACRO: None   Signed by: Hank Razo 7/1/2024 8:57 AM Dictation workstation:   [id number]    XR forearm left 2 views    Result Date: 7/1/2024  Interpreted By:  Hank Razo and Sheng Max STUDY: XR FOREARM LEFT 2 VIEWS; XR WRIST 3+ VIEWS BILATERAL; XR HUMERUS LEFT; ;  7/1/2024 5:28 am   INDICATION: Signs/Symptoms:L forearm pain; Signs/Symptoms:BL wrist pain; Signs/Symptoms:L humerus pain. Patient had mechanical fall down 8 steps.   COMPARISON: None.   ACCESSION NUMBER(S): [id number]; [id number]; [id number]   ORDERING CLINICIAN: KAILEE THOMAS   FINDINGS: Left forearm, two views: Left humerus, two views: No acute fracture or malalignment. Several ossific fragments adjacent to the lateral humeral epicondyle are noted. Visualized elbow and humerus joint are unremarkable. Soft tissues are unremarkable.   Left wrist, three views: No acute fracture.  Negative ulnar variance. There is widening of the scapholunate distance concerning for underlying scapholunate ligament tear. Osteoarthrosis involving the triscaphe and radiocarpal joints. Soft tissues are within normal limits.   Right wrist, three views: No acute fracture. There is a remote ulnar styloid fracture. Negative ulnar variance. Osteoarthrosis involving the triscaphe and radiocarpal joints. Soft tissues are within normal limits.       1. No acute fracture. Remote appearing avulsion injury at the left lateral humeral epicondyle with several well corticated ossific fragments present. Remote right ulnar styloid fracture 2. Widening of the scapholunate distance in the left wrist concerning for underlying scapholunate ligament tear. 3. Mild degenerative changes     I personally reviewed the images/study and I agree with the findings as stated by Dr. Jamil Rea. This study was interpreted at [place], [place].   MACRO: None   Signed by: Hank Razo 7/1/2024 8:52 AM Dictation workstation:   [id number]    XR humerus left    Result Date: 7/1/2024  Interpreted By:  Hank Razo and Sheng Max STUDY: XR FOREARM LEFT 2 VIEWS; XR WRIST 3+ VIEWS BILATERAL; XR HUMERUS LEFT; ;  7/1/2024 5:28 am   INDICATION: Signs/Symptoms:L forearm pain; Signs/Symptoms:BL wrist pain; Signs/Symptoms:L humerus pain. Patient had mechanical fall down 8 steps.   COMPARISON: None.   ACCESSION NUMBER(S): [id number]; [id number]; [id number]   ORDERING CLINICIAN: KAILEE THOMAS   FINDINGS: Left forearm, two views: Left humerus, two views: No acute fracture or malalignment. Several ossific fragments adjacent to the lateral humeral epicondyle are noted. Visualized elbow and humerus joint are unremarkable. Soft tissues are unremarkable.   Left wrist, three views: No acute fracture. Negative ulnar variance. There is widening of the scapholunate distance concerning for underlying scapholunate  ligament tear. Osteoarthrosis involving the triscaphe and radiocarpal joints. Soft tissues are within normal limits.   Right wrist, three views: No acute fracture. There is a remote ulnar styloid fracture. Negative ulnar variance. Osteoarthrosis involving the triscaphe and radiocarpal joints. Soft tissues are within normal limits.       1. No acute fracture. Remote appearing avulsion injury at the left lateral humeral epicondyle with several well corticated ossific fragments present. Remote right ulnar styloid fracture 2. Widening of the scapholunate distance in the left wrist concerning for underlying scapholunate ligament tear. 3. Mild degenerative changes     I personally reviewed the images/study and I agree with the findings as stated by Dr. Jamil Rea. This study was interpreted at [place], [place].   MACRO: None   Signed by: Hank Razo 7/1/2024 8:52 AM Dictation workstation:   [id number]    XR wrist 3+ views bilateral    Result Date: 7/1/2024  Interpreted By:  Hank Razo and Sheng Max STUDY: XR FOREARM LEFT 2 VIEWS; XR WRIST 3+ VIEWS BILATERAL; XR HUMERUS LEFT; ;  7/1/2024 5:28 am   INDICATION: Signs/Symptoms:L forearm pain; Signs/Symptoms:BL wrist pain; Signs/Symptoms:L humerus pain. Patient had mechanical fall down 8 steps.   COMPARISON: None.   ACCESSION NUMBER(S): [id number]; [id number]; [id number]   ORDERING CLINICIAN: KAILEE THOMAS   FINDINGS: Left forearm, two views: Left humerus, two views: No acute fracture or malalignment. Several ossific fragments adjacent to the lateral humeral epicondyle are noted. Visualized elbow and humerus joint are unremarkable. Soft tissues are unremarkable.   Left wrist, three views: No acute fracture. Negative ulnar variance. There is widening of the scapholunate distance concerning for underlying scapholunate ligament tear. Osteoarthrosis involving the triscaphe and radiocarpal joints. Soft tissues are  within normal limits.   Right wrist, three views: No acute fracture. There is a remote ulnar styloid fracture. Negative ulnar variance. Osteoarthrosis involving the triscaphe and radiocarpal joints. Soft tissues are within normal limits.       1. No acute fracture. Remote appearing avulsion injury at the left lateral humeral epicondyle with several well corticated ossific fragments present. Remote right ulnar styloid fracture 2. Widening of the scapholunate distance in the left wrist concerning for underlying scapholunate ligament tear. 3. Mild degenerative changes     I personally reviewed the images/study and I agree with the findings as stated by Dr. Jamil Rea. This study was interpreted at [place], [place].   MACRO: None   Signed by: Hank Razo 7/1/2024 8:52 AM Dictation workstation:   [id number]    CT thoracic spine wo IV contrast    Result Date: 6/30/2024  Interpreted By:  Fahad Billingsley, STUDY: CT CHEST ABDOMEN PELVIS WO CONTRAST; CT LUMBAR SPINE WO IV CONTRAST; CT THORACIC SPINE WO IV CONTRAST;  6/30/2024 10:02 pm; 6/30/2024 10:01 pm   INDICATION: Signs/Symptoms:fall down stairs, trauma; Signs/Symptoms:fall down stairs; Signs/Symptoms:fall down stair   COMPARISON: None.   ACCESSION NUMBER(S): [id number]; [id number]; [id number]   ORDERING CLINICIAN: DORENE HARRISON   TECHNIQUE: CT of the chest, abdomen, and pelvis was performed. Contiguous axial images were obtained through the chest, abdomen and pelvis. Coronal and sagittal reconstructions were performed. No intravenous contrast.   Thoracic and lumbar spine reconstructions were performed.   FINDINGS: CHEST:   LOWER NECK AND CHEST WALL:  Left pacemaker. Bilateral gynecomastia.   MEDIASTINUM/TNAO:No lymphadenopathy. Esophagus is unremarkable.   CARDIOVASCULAR:  There is left atrial enlargement. No pericardial effusion.  Aortic caliber normal. There is dilation of the main pulmonary artery measuring up  to 3.7 cm which can be seen pulmonary hypertension.Postsurgical change related to CABG.  Extensive atherosclerosis.   LUNGS, AIRWAYS, AND PLEURA:  Mild dependent hypoventilatory change. No consolidation, pleural effusion, or pneumothorax. The trachea and central airways are patent.   MUSCULOSKELETAL: No acute osseous abnormality or suspicious osseous lesions.Median sternotomy postsurgical change.   Thoracic spine: There is minimal grade 1 anterolisthesis of T1 on T2, T7 on T8, T8-T9, and T9-T10. Vertebral body heights are preserved. There is no osseous spinal canal stenosis. There is mild multilevel neural foraminal stenosis secondary to facet arthropathy. Moderate to severe multilevel spinal degenerative change.   ABDOMEN:   LIVER: Nodular contour of the liver compatible with cirrhosis.   BILE DUCTS: Normal caliber.   GALLBLADDER: Cholecystectomy.   PANCREAS: Within normal limits.   SPLEEN: Within normal limits.   ADRENALS: There is new high-density fat stranding about the right adrenal gland concerning for adrenal hemorrhage.   KIDNEYS, URETERS, and BLADDER: There is moderate to severe right renal cortical thinning/atrophy. There is bilateral perinephric fat stranding. Exophytic left lower pole renal cyst. No hydronephrosis or renal calculi. Ureters are non-dilated. Urinary bladder within normal limits.   REPRODUCTIVE: No pelvic masses.   VESSELS: Moderate to severe atherosclerosis. There is a focal saccular outpouching of the posterior infrarenal abdominal aorta. The maximal transverse diameter measures 3.6 cm and the saccular component measures 2.5 by 1.5 cm. The aneurysm size is unchanged when compared to 02/15/2024 when measured retrospectively.   RETROPERITONEUM and LYMPH NODES: No lymphadenopathy.   BOWEL: The stomach is unremarkable. Small bowel is non-dilated. The appendix is not definitely visualized. There is however no pericecal stranding or fluid. Large bowel is normal.   PERITONEUM: Trace ascites. No  free air.   BODY WALL: Small fat containing right greater than left inguinal hernias.Moderate to severe multilevel spinal degenerative change. There is mild body wall edema.   MUSCULOSKELETAL: No acute osseous abnormality or suspicious osseous lesions.   Lumbar spine: Straightening of normal lumbar lordosis. No traumatic malalignment. Moderate multilevel spinal degenerative change. Vertebral body heights are preserved. There is mild spinal canal narrowing at L3-L4 and L4-L5 secondary to posterior disc bulge. Multilevel facet arthropathy and disc bulge contributing to mild bilateral L4-L5 and right-greater-than-left L5-S1 neural foraminal stenosis.       1. High-density fat stranding surrounding the right adrenal gland concerning for adrenal hemorrhage, new from 12/15/2024. 2. No solid organ injury or free air. Trace ascites. Somewhat limited evaluation in the absence of IV contrast. 3. Hepatic cirrhosis. 4. No acute cardiopulmonary abnormality. 5. No acute fracture or traumatic malalignment of the thoracic or lumbar spine. 6. Unchanged saccular aortic aneurysm measuring up to 3.6 cm in maximal transverse diameter. 7. Additional chronic findings as above.     Signed by: Fahad Billingsley 6/30/2024 10:51 PM Dictation workstation:   [id number]    CT lumbar spine wo IV contrast    Result Date: 6/30/2024  Interpreted By:  Fahad Billingsley, STUDY: CT CHEST ABDOMEN PELVIS WO CONTRAST; CT LUMBAR SPINE WO IV CONTRAST; CT THORACIC SPINE WO IV CONTRAST;  6/30/2024 10:02 pm; 6/30/2024 10:01 pm   INDICATION: Signs/Symptoms:fall down stairs, trauma; Signs/Symptoms:fall down stairs; Signs/Symptoms:fall down stair   COMPARISON: None.   ACCESSION NUMBER(S): [id number]; [id number]; [id number]   ORDERING CLINICIAN: DORENE HARRISON   TECHNIQUE: CT of the chest, abdomen, and pelvis was performed. Contiguous axial images were obtained through the chest, abdomen and pelvis. Coronal and sagittal reconstructions were performed. No  intravenous contrast.   Thoracic and lumbar spine reconstructions were performed.   FINDINGS: CHEST:   LOWER NECK AND CHEST WALL:  Left pacemaker. Bilateral gynecomastia.   MEDIASTINUM/TANO:No lymphadenopathy. Esophagus is unremarkable.   CARDIOVASCULAR:  There is left atrial enlargement. No pericardial effusion.  Aortic caliber normal. There is dilation of the main pulmonary artery measuring up to 3.7 cm which can be seen pulmonary hypertension.Postsurgical change related to CABG.  Extensive atherosclerosis.   LUNGS, AIRWAYS, AND PLEURA:  Mild dependent hypoventilatory change. No consolidation, pleural effusion, or pneumothorax. The trachea and central airways are patent.   MUSCULOSKELETAL: No acute osseous abnormality or suspicious osseous lesions.Median sternotomy postsurgical change.   Thoracic spine: There is minimal grade 1 anterolisthesis of T1 on T2, T7 on T8, T8-T9, and T9-T10. Vertebral body heights are preserved. There is no osseous spinal canal stenosis. There is mild multilevel neural foraminal stenosis secondary to facet arthropathy. Moderate to severe multilevel spinal degenerative change.   ABDOMEN:   LIVER: Nodular contour of the liver compatible with cirrhosis.   BILE DUCTS: Normal caliber.   GALLBLADDER: Cholecystectomy.   PANCREAS: Within normal limits.   SPLEEN: Within normal limits.   ADRENALS: There is new high-density fat stranding about the right adrenal gland concerning for adrenal hemorrhage.   KIDNEYS, URETERS, and BLADDER: There is moderate to severe right renal cortical thinning/atrophy. There is bilateral perinephric fat stranding. Exophytic left lower pole renal cyst. No hydronephrosis or renal calculi. Ureters are non-dilated. Urinary bladder within normal limits.   REPRODUCTIVE: No pelvic masses.   VESSELS: Moderate to severe atherosclerosis. There is a focal saccular outpouching of the posterior infrarenal abdominal aorta. The maximal transverse diameter measures 3.6 cm and the  saccular component measures 2.5 by 1.5 cm. The aneurysm size is unchanged when compared to 02/15/2024 when measured retrospectively.   RETROPERITONEUM and LYMPH NODES: No lymphadenopathy.   BOWEL: The stomach is unremarkable. Small bowel is non-dilated. The appendix is not definitely visualized. There is however no pericecal stranding or fluid. Large bowel is normal.   PERITONEUM: Trace ascites. No free air.   BODY WALL: Small fat containing right greater than left inguinal hernias.Moderate to severe multilevel spinal degenerative change. There is mild body wall edema.   MUSCULOSKELETAL: No acute osseous abnormality or suspicious osseous lesions.   Lumbar spine: Straightening of normal lumbar lordosis. No traumatic malalignment. Moderate multilevel spinal degenerative change. Vertebral body heights are preserved. There is mild spinal canal narrowing at L3-L4 and L4-L5 secondary to posterior disc bulge. Multilevel facet arthropathy and disc bulge contributing to mild bilateral L4-L5 and right-greater-than-left L5-S1 neural foraminal stenosis.       1. High-density fat stranding surrounding the right adrenal gland concerning for adrenal hemorrhage, new from 12/15/2024. 2. No solid organ injury or free air. Trace ascites. Somewhat limited evaluation in the absence of IV contrast. 3. Hepatic cirrhosis. 4. No acute cardiopulmonary abnormality. 5. No acute fracture or traumatic malalignment of the thoracic or lumbar spine. 6. Unchanged saccular aortic aneurysm measuring up to 3.6 cm in maximal transverse diameter. 7. Additional chronic findings as above.     Signed by: Fahad Billingsley 6/30/2024 10:51 PM Dictation workstation:   [id number]    CT chest abdomen pelvis wo IV contrast    Result Date: 6/30/2024  Interpreted By:  Fahad Billingsley, STUDY: CT CHEST ABDOMEN PELVIS WO CONTRAST; CT LUMBAR SPINE WO IV CONTRAST; CT THORACIC SPINE WO IV CONTRAST;  6/30/2024 10:02 pm; 6/30/2024 10:01 pm   INDICATION:  Signs/Symptoms:fall down stairs, trauma; Signs/Symptoms:fall down stairs; Signs/Symptoms:fall down stair   COMPARISON: None.   ACCESSION NUMBER(S): [id number]; [id number]; [id number]   ORDERING CLINICIAN: DORENE HARRISON   TECHNIQUE: CT of the chest, abdomen, and pelvis was performed. Contiguous axial images were obtained through the chest, abdomen and pelvis. Coronal and sagittal reconstructions were performed. No intravenous contrast.   Thoracic and lumbar spine reconstructions were performed.   FINDINGS: CHEST:   LOWER NECK AND CHEST WALL:  Left pacemaker. Bilateral gynecomastia.   MEDIASTINUM/TANO:No lymphadenopathy. Esophagus is unremarkable.   CARDIOVASCULAR:  There is left atrial enlargement. No pericardial effusion.  Aortic caliber normal. There is dilation of the main pulmonary artery measuring up to 3.7 cm which can be seen pulmonary hypertension.Postsurgical change related to CABG.  Extensive atherosclerosis.   LUNGS, AIRWAYS, AND PLEURA:  Mild dependent hypoventilatory change. No consolidation, pleural effusion, or pneumothorax. The trachea and central airways are patent.   MUSCULOSKELETAL: No acute osseous abnormality or suspicious osseous lesions.Median sternotomy postsurgical change.   Thoracic spine: There is minimal grade 1 anterolisthesis of T1 on T2, T7 on T8, T8-T9, and T9-T10. Vertebral body heights are preserved. There is no osseous spinal canal stenosis. There is mild multilevel neural foraminal stenosis secondary to facet arthropathy. Moderate to severe multilevel spinal degenerative change.   ABDOMEN:   LIVER: Nodular contour of the liver compatible with cirrhosis.   BILE DUCTS: Normal caliber.   GALLBLADDER: Cholecystectomy.   PANCREAS: Within normal limits.   SPLEEN: Within normal limits.   ADRENALS: There is new high-density fat stranding about the right adrenal gland concerning for adrenal hemorrhage.   KIDNEYS, URETERS, and BLADDER: There is moderate to severe right renal cortical  thinning/atrophy. There is bilateral perinephric fat stranding. Exophytic left lower pole renal cyst. No hydronephrosis or renal calculi. Ureters are non-dilated. Urinary bladder within normal limits.   REPRODUCTIVE: No pelvic masses.   VESSELS: Moderate to severe atherosclerosis. There is a focal saccular outpouching of the posterior infrarenal abdominal aorta. The maximal transverse diameter measures 3.6 cm and the saccular component measures 2.5 by 1.5 cm. The aneurysm size is unchanged when compared to 02/15/2024 when measured retrospectively.   RETROPERITONEUM and LYMPH NODES: No lymphadenopathy.   BOWEL: The stomach is unremarkable. Small bowel is non-dilated. The appendix is not definitely visualized. There is however no pericecal stranding or fluid. Large bowel is normal.   PERITONEUM: Trace ascites. No free air.   BODY WALL: Small fat containing right greater than left inguinal hernias.Moderate to severe multilevel spinal degenerative change. There is mild body wall edema.   MUSCULOSKELETAL: No acute osseous abnormality or suspicious osseous lesions.   Lumbar spine: Straightening of normal lumbar lordosis. No traumatic malalignment. Moderate multilevel spinal degenerative change. Vertebral body heights are preserved. There is mild spinal canal narrowing at L3-L4 and L4-L5 secondary to posterior disc bulge. Multilevel facet arthropathy and disc bulge contributing to mild bilateral L4-L5 and right-greater-than-left L5-S1 neural foraminal stenosis.       1. High-density fat stranding surrounding the right adrenal gland concerning for adrenal hemorrhage, new from 12/15/2024. 2. No solid organ injury or free air. Trace ascites. Somewhat limited evaluation in the absence of IV contrast. 3. Hepatic cirrhosis. 4. No acute cardiopulmonary abnormality. 5. No acute fracture or traumatic malalignment of the thoracic or lumbar spine. 6. Unchanged saccular aortic aneurysm measuring up to 3.6 cm in maximal transverse  diameter. 7. Additional chronic findings as above.     Signed by: Fahad Billingsley 6/30/2024 10:51 PM Dictation workstation:   [id number]    CT head W O contrast trauma protocol    Result Date: 6/30/2024  Interpreted By:  Fahad Billingsley, STUDY: CT HEAD W/O CONTRAST TRAUMA PROTOCOL; CT CERVICAL SPINE WO IV CONTRAST;  6/30/2024 9:58 pm   INDICATION: Signs/Symptoms:fall down stairs, burning sensation down bilateral upper extremities; Signs/Symptoms:Proceed to midline C-spine tenderness, burning sensation to the bilateral upper extremities.   COMPARISON: 02/30/2024   ACCESSION NUMBER(S): [id number]; [id number]   ORDERING CLINICIAN: DORENE HARRISON   TECHNIQUE: Axial noncontrast CT images of the head and cervical spine. Sagittal and coronal reformats were provided.   FINDINGS: Head: BRAIN: No acute intracranial hemorrhage. No mass effect or midline shift. Gray-white matter interfaces are preserved.Subtle white matter hypodensities which are nonspecific but likely related to microangiopathic white matter changes. Remote left basal ganglia lacunar infarct or dilated perivascular space.   VENTRICLES and EXTRA-AXIAL SPACES: Prominent ventricles and extra-axial CSF spaces, reflecting parenchymal volume loss.   EXTRACRANIAL SOFT TISSUES:  Posterior scalp vertex soft tissue swelling.   PARANASAL SINUSES/MASTOIDS: The visualized paranasal sinuses and mastoid air cells are aerated.   BONES AND ORBITS: No displaced skull fracture. Status post bilateral lens replacement.   OTHER FINDINGS: None.     Cervical Spine:   FRACTURES: There is no evidence for an acute fracture of the cervical spine.   VERTEBRAL ALIGNMENT: There is mild grade 1 anterolisthesis of C3 on C4 and C7 on T1 as well as T1 on T2. Straightening of normal cervical lordosis.   CRANIOCERVICAL JUNCTION: Within normal limits.   VERTEBRA/DISC SPACES: Moderate to severe multilevel disc space narrowing, endplate osteophytes, and facet/uncovertebral arthropathy.  Chronic height loss of the C7 vertebral body, unchanged from 02/13/2024. Posterior disc osteophyte complex and ossification of the posterior longitudinal ligament contribute to severe spinal canal stenosis at C4-C5 and C5-C6 as well as mild spinal canal stenosis at C6-C7. Multilevel uncovertebral and facet arthropathy contributes neural foraminal stenosis: Mild left C2-C3, severe right and mild left C3-C4, severe bilateral C4-C5, severe bilateral C5-C6, moderate bilateral C6-C7, mild bilateral C7-T1.   SOFT TISSUES: No acute abnormality. Moderate carotid atherosclerosis. Partially visualized pacemaker leads.   UPPER CHEST: Visualized portions are within normal limits.       1. No acute intracranial hemorrhage or mass effect. 2. No acute fracture or traumatic malalignment of the cervical spine. 3. Moderate to severe multilevel spinal degenerative change described above, notable for severe spinal canal stenosis at C4-C5 and C5-C6.   Signed by: Fahad Billingsley 6/30/2024 10:25 PM Dictation workstation:   [id number]    CT cervical spine wo IV contrast    Result Date: 6/30/2024  Interpreted By:  Fahad Billingsley, STUDY: CT HEAD W/O CONTRAST TRAUMA PROTOCOL; CT CERVICAL SPINE WO IV CONTRAST;  6/30/2024 9:58 pm   INDICATION: Signs/Symptoms:fall down stairs, burning sensation down bilateral upper extremities; Signs/Symptoms:Proceed to midline C-spine tenderness, burning sensation to the bilateral upper extremities.   COMPARISON: 02/30/2024   ACCESSION NUMBER(S): [id number]; [id number]   ORDERING CLINICIAN: DORENE HARRISON   TECHNIQUE: Axial noncontrast CT images of the head and cervical spine. Sagittal and coronal reformats were provided.   FINDINGS: Head: BRAIN: No acute intracranial hemorrhage. No mass effect or midline shift. Gray-white matter interfaces are preserved.Subtle white matter hypodensities which are nonspecific but likely related to microangiopathic white matter changes. Remote left basal ganglia  lacunar infarct or dilated perivascular space.   VENTRICLES and EXTRA-AXIAL SPACES: Prominent ventricles and extra-axial CSF spaces, reflecting parenchymal volume loss.   EXTRACRANIAL SOFT TISSUES:  Posterior scalp vertex soft tissue swelling.   PARANASAL SINUSES/MASTOIDS: The visualized paranasal sinuses and mastoid air cells are aerated.   BONES AND ORBITS: No displaced skull fracture. Status post bilateral lens replacement.   OTHER FINDINGS: None.     Cervical Spine:   FRACTURES: There is no evidence for an acute fracture of the cervical spine.   VERTEBRAL ALIGNMENT: There is mild grade 1 anterolisthesis of C3 on C4 and C7 on T1 as well as T1 on T2. Straightening of normal cervical lordosis.   CRANIOCERVICAL JUNCTION: Within normal limits.   VERTEBRA/DISC SPACES: Moderate to severe multilevel disc space narrowing, endplate osteophytes, and facet/uncovertebral arthropathy. Chronic height loss of the C7 vertebral body, unchanged from 02/13/2024. Posterior disc osteophyte complex and ossification of the posterior longitudinal ligament contribute to severe spinal canal stenosis at C4-C5 and C5-C6 as well as mild spinal canal stenosis at C6-C7. Multilevel uncovertebral and facet arthropathy contributes neural foraminal stenosis: Mild left C2-C3, severe right and mild left C3-C4, severe bilateral C4-C5, severe bilateral C5-C6, moderate bilateral C6-C7, mild bilateral C7-T1.   SOFT TISSUES: No acute abnormality. Moderate carotid atherosclerosis. Partially visualized pacemaker leads.   UPPER CHEST: Visualized portions are within normal limits.       1. No acute intracranial hemorrhage or mass effect. 2. No acute fracture or traumatic malalignment of the cervical spine. 3. Moderate to severe multilevel spinal degenerative change described above, notable for severe spinal canal stenosis at C4-C5 and C5-C6.   Signed by: Fahad Billingsley 6/30/2024 10:25 PM Dictation workstation:   [id number]     I have reviewed the  imaging above as it pertains to the patient's surgical concerns and agree with the radiologist's interpretation.    LABS:  Results from last 7 days   Lab Units 07/02/24  0448 06/30/24 2129   WBC AUTO x10*3/uL 7.7 9.6   HEMOGLOBIN g/dL 10.7* 10.9*   HEMATOCRIT % 31.8* 34.3*   PLATELETS AUTO x10*3/uL 180 176   NEUTROS PCT AUTO %  --  62.4   LYMPHS PCT AUTO %  --  25.7   MONOS PCT AUTO %  --  8.8   EOS PCT AUTO %  --  2.4     Results from last 7 days   Lab Units 07/02/24  1416 07/01/24  0950 06/30/24 2129   APTT seconds 32 34  --    INR  1.2* 1.2* 1.3*     Results from last 7 days   Lab Units 07/02/24  0448 07/01/24  1807 07/01/24  0950 06/30/24 2129   SODIUM mmol/L 137 138  --  134*   POTASSIUM mmol/L 4.5 4.2  --  4.4   CHLORIDE mmol/L 104 104  --  102   CO2 mmol/L 23 22  --  20*   BUN mg/dL 60* 60*  --  60*   CREATININE mg/dL 2.88* 3.11*  --  3.50*   CALCIUM mg/dL 9.0 8.7  --  8.8   PROTEIN TOTAL g/dL  --   --  7.1 6.9   BILIRUBIN TOTAL mg/dL  --   --  0.6 0.7   ALK PHOS U/L  --   --  112 106   ALT U/L  --   --  39 39   AST U/L  --   --  52* 60*   GLUCOSE mg/dL 153* 143*  --  232*     Results from last 7 days   Lab Units 07/01/24  0950 06/30/24 2129   BILIRUBIN TOTAL mg/dL 0.6 0.7   BILIRUBIN DIRECT mg/dL 0.1  --                 I have reviewed all medications, laboratory results, and imaging pertinent for today's encounter.    I saw and evaluated the patient. I personally obtained the key and critical portions of the history and physical exam. I reviewed the resident’s documentation and discussed the patient with the resident. I agree with the resident’s medical decision making as documented in the resident’s note.    Doing well. Appreciate medicine/cardiology recommendations regarding pre-op risk stratification and optimization. Spine plans for OR 7/3.     Rajendra Dewitt MD  Trauma, Critical Care, and Acute Care Surgery  Pager: [phone]

## 2024-07-02 NOTE — PROGRESS NOTES
Physical Therapy                 Therapy Communication Note    Patient Name: Ritesh Garza  MRN: [id number]  Today's Date: 7/2/2024     Discipline: Physical Therapy    Missed Visit Reason: Missed Visit Reason:  (Pt pending OR, will hold and reattempt as medically appropriate)    Missed Time: Attempt

## 2024-07-02 NOTE — ANESTHESIA PREPROCEDURE EVALUATION
Patient: Ritesh Garza    Procedure Information       Date/Time: 07/03/24 0815    Procedure: C4-5, C5-6, C6-7 Anterior Cervical and Discectomy    Location: [facility] OR 25 / [facility] OR    Surgeons: Ganseh Bhatt MD        ALLERGIES:  No Known Allergies     MEDICAL HISTORY:  Past Medical History:   Diagnosis Date   • Allergic    • Asthma (HHS-HCC)    • Coronary artery disease Long Ago   • Diabetes mellitus (Multi) Over 20 years ago   • Drug-induced gout, unspecified site 10/04/2019    Acute drug-induced gout   • Encounter for screening for malignant neoplasm of colon 10/31/2022    Colon cancer screening   • Gout, unspecified     Acute gout   • Heart disease Over 20 years sgo   • Heart murmur Long ago   • Hypertension Over 20 years ago   • Myocardial infarction (Multi) Over 20 yesrs ago   • Ocular pain, right eye 10/14/2018    Pain of right eye   • Periorbital cellulitis 10/14/2018    Periorbital cellulitis   • Personal history of colonic polyps 10/11/2017    History of colon polyps   • Personal history of other diseases of the circulatory system 05/12/2021    History of angina pectoris   • Personal history of other diseases of the circulatory system 05/12/2021    History of angina pectoris   • Personal history of other diseases of the respiratory system 10/04/2019    History of acute bronchitis   • Personal history of other drug therapy     History of influenza vaccination   • Unspecified cirrhosis of liver (Multi) 11/07/2022    Cirrhosis, nonalcoholic        Relevant Problems   Cardiac   (+) Atherosclerosis of coronary artery bypass graft   (+) Benign essential hypertension   (+) CAD (coronary artery disease)   (+) Chest pain   (+) Congestive heart failure with right ventricular systolic dysfunction (Multi)   (+) History of coronary artery bypass graft   (+) Hyperlipidemia   (+) Myocardial infarction (Multi)   (+) Peripheral arterial disease (CMS-HCC)   (+) Stenosis of subclavian artery (CMS-HCC)       Pulmonary   (+) Asthma (HHS-HCC)   (+) Obstructive sleep apnea syndrome   (+) Pneumonia      Neuro   (+) Bilateral carpal tunnel syndrome   (+) Carpal tunnel syndrome      GI   (+) Erosive esophagitis   (+) Esophageal varices (Multi)      /Renal   (+) Benign prostatic hyperplasia   (+) Enlarged prostate with lower urinary tract symptoms (LUTS)      Liver   (+) Hepatic cirrhosis (Multi)   (+) Nonalcoholic fatty liver      Endocrine   (+) Obesity   (+) Type 2 diabetes mellitus (Multi)      Hematology   (+) Anemia due to acute blood loss      Musculoskeletal   (+) Bilateral carpal tunnel syndrome   (+) Carpal tunnel syndrome   (+) Cervical stenosis of spine        SURGICAL HISTORY:  Past Surgical History:   Procedure Laterality Date   • ADENOIDECTOMY  Childhood   • CARDIAC CATHETERIZATION  Over 30 years ago   • CARDIAC CATHETERIZATION N/A 04/15/2024    Procedure: Left And Right Heart Cath, With LV;  Surgeon: Blaine Adams MD;  Location: [facility];  Service: Cardiovascular;  Laterality: N/A;  3 hr hydration / arrive 6:30   • CARDIAC DEFIBRILLATOR PLACEMENT     • CARDIAC ELECTROPHYSIOLOGY PROCEDURE N/A 05/30/2024    Procedure: ICD Dual Implant;  Surgeon: Mina Kiran MD;  Location: [facility];  Service: Electrophysiology;  Laterality: N/A;  Dual chamber ICD, [facility]  notified [facility] 5/24/24   • CHOLECYSTECTOMY  Over 30 years ago   • CIRCUMCISION, PRIMARY  72,years ago   • CORONARY ARTERY BYPASS GRAFT  12/02/2021    CABG   • CORONARY STENT PLACEMENT  Last week   • EYE SURGERY  Childhood   • INVASIVE VASCULAR PROCEDURE Left 04/30/2024    Procedure: Angioplasty - Upper Extremity;  Surgeon: Narinder Quevedo MD;  Location: [facility];  Service: Cardiovascular;  Laterality: Left;  3 hr hydration  / arrive 6:30   • MR HEAD ANGIO WO IV CONTRAST  06/02/2021    MR HEAD ANGIO WO IV CONTRAST 6/2/2021 [facility] CLINICAL LEGACY   • MR HEAD ANGIO WO IV CONTRAST  11/16/2021    MR HEAD  ANGIO WO IV CONTRAST 11/16/2021 [facility] CLINICAL LEGACY   • MR NECK ANGIO WO IV CONTRAST  06/02/2021    MR NECK ANGIO WO IV CONTRAST 6/2/2021 [facility] CLINICAL LEGACY   • MR NECK ANGIO WO IV CONTRAST  11/16/2021    MR NECK ANGIO WO IV CONTRAST 11/16/2021 [facility] CLINICAL LEGACY        MEDICATIONS:  Current Outpatient Medications   Medication Instructions   • acetaminophen (TYLENOL) 650 mg, oral, Every 6 hours PRN   • albuterol (ProAir HFA) 90 mcg/actuation inhaler 2 puffs, inhalation, Every 6 hours PRN   • allopurinol (ZYLOPRIM) 50 mg, oral, Daily   • amiodarone (Pacerone) 200 mg tablet Take 2 tablets (400 mg) by mouth 2 times a day for 9 doses. On February, 28th start to take 1 tablet (200 mg) by mouth once daily.   • amLODIPine (NORVASC) 10 mg, oral, Daily   • aspirin 81 mg, oral, Daily   • atorvastatin (LIPITOR) 80 mg, oral, Every evening   • b complex vitamins capsule 1 capsule, oral, Daily   • clopidogrel (PLAVIX) 75 mg, oral, Daily   • coenzyme Q-10 (CoQ-10) 100 mg capsule 1 capsule, oral, Daily   • cyanocobalamin (Vitamin B-12) 1,000 mcg tablet 1 tablet, oral, Daily, As directed   • dapagliflozin (Farxiga) 10 mg 1 tablet, oral, Daily before evening meal   • famotidine (PEPCID) 20 mg, oral, Daily PRN   • furosemide (LASIX) 40 mg, oral, Daily, As needed for weight gain greater than 3 pounds   • gabapentin (NEURONTIN) 100 mg, oral, Nightly   • icosapent ethyL (VASCEPA) 1 g, oral, 2 times daily (morning and late afternoon)   • loratadine (CLARITIN) 10 mg, oral, 2 times daily, Take 1 tablet every morning and 1 tablet every evening   • magnesium gluconate (Magonate) 27.5 mg magne- sium (500 mg) tablet 1 tablet, oral, Daily   • metoprolol succinate XL (TOPROL-XL) 50 mg, oral, Daily   • multivitamin tablet 1 tablet, oral, Daily   • pantoprazole (PROTONIX) 40 mg, oral, Daily   • sacubitriL-valsartan (Entresto) 24-26 mg tablet 0.5 tablets, oral, 2 times daily   • sertraline (Zoloft) 50 mg tablet Take one tablet daily in the  morning after breakfast.   • tadalafil (CIALIS) 5 mg, oral, Daily   • tadalafil 20 mg tablet START WITH ONE-HALF TABLET AS NEEDED 2 HOURS PRIOR TO ACTIVITY. MAY INCREASE TO 1 TABLET AS NEEDED 2 HOURS PRIOR TO ACTIVITY   • turmeric-turmeric root extract 450-50 mg capsule 1 capsule, oral, Daily        VITALS:      7/2/2024     7:47 AM 7/2/2024     6:26 AM 7/2/2024     3:06 AM   Vitals   Systolic 142 136 126   Diastolic 71 69 58   Heart Rate 67 66 70   Temp 36.2 °C (97.2 °F)     Resp 18 17 15       LABS:   BMP   Lab Results   Component Value Date    GLUCOSE 153 (H) 07/02/2024    CALCIUM 9.0 07/02/2024     07/02/2024    K 4.5 07/02/2024    CO2 23 07/02/2024     07/02/2024    BUN 60 (H) 07/02/2024    CREATININE 2.88 (H) 07/02/2024   , LFT   Lab Results   Component Value Date    ALT 39 07/01/2024    AST 52 (H) 07/01/2024    ALKPHOS 112 07/01/2024    BILITOT 0.6 07/01/2024   , CBC  Lab Results   Component Value Date    WBC 7.7 07/02/2024    HGB 10.7 (L) 07/02/2024    HCT 31.8 (L) 07/02/2024    MCV 91 07/02/2024     07/02/2024          , Coags   Lab Results   Component Value Date/Time    PROTIME 14.0 (H) 07/01/2024 0950    INR 1.2 (H) 07/01/2024 0950    APTT 34 07/01/2024 0950      , A1C   Lab Results   Component Value Date    HGBA1C 6.6 (H) 01/11/2024       IMAGES:     , ECHO         Transthoracic Echo (TTE) Limited With Contrast 04/26/2024    [place] [id number]  Phone [phone] Fax [phone]    TRANSTHORACIC ECHOCARDIOGRAM REPORT      Patient Name:      CHIDI VILLALOBOS SAM     Reading Physician:    [id number] Blaine Adams MD  Study Date:        4/26/2024            Ordering Provider:    [id number] HUMBERTO SELLERS  MRN/PID:           [id number]             Fellow:  Accession#:        [id number]         Nurse:                Christina Chacon RN  Date of Birth/Age: 1950 / 73 years Sonographer:          Nikole Porter RDCS  Gender:            M                     Additional Staff:  Height:            152.40 cm            Admit Date:  Weight:            65.32 kg             Admission Status:     Outpatient  BSA / BMI:         1.62 m2 / 28.12      Department Location:  [facility] Echo  kg/m2                                      Lab  Blood Pressure: 163 /74 mmHg    Study Type:    TRANSTHORACIC ECHO (TTE) LIMITED  Diagnosis/ICD: Acute combined systolic (congestive) and diastolic (congestive)  heart failure (CHF)-I50.41; Non ST elevation (NSTEMI) myocardial  infarction-I21.4; Ventricular tachycardia, other-I47.29; Heart  disease, unspecified-I51.9  Indication:    CHF, NSTEMI, VT, LVD  CPT Codes:     Echo Limited-35962; Doppler Limited-93784; Color Doppler-71662    Patient History:  Diabetes:          Yes  Pertinent History: CHF, Cancer, HTN and TIA.    Study Detail: The following Echo studies were performed: 2D, Doppler and color  flow. Optison used as a contrast agent for endocardial border  definition. Total contrast used for this procedure was 5 mL via IV  push. Unable to obtain subcostal and suprasternal notch view.      PHYSICIAN INTERPRETATION:  Left Ventricle: Left ventricular systolic function is moderately decreased, with an estimated ejection fraction of 30-35%. Wall motion is abnormal. The left ventricular cavity size is mildly dilated. Spectral Doppler shows a restrictive pattern of left ventricular diastolic filling. There is an elevated left ventricular end diastolic pressure.  LV Wall Scoring:  The basal and mid inferior septum, basal and mid inferior wall, and basal and  mid inferolateral wall are akinetic. The basal anteroseptal segment, apical  septal segment, and apical inferior segment are hypokinetic. All remaining  scored segments are normal.    Left Atrium: The left atrium was not assessed.  Right Ventricle: The right ventricle was not assessed. Right ventricular systolic function not assessed.  Right Atrium: The right atrium was not  assessed.  Aortic Valve: The aortic valve is trileaflet. There is mild aortic valve cusp calcification. There is no evidence of aortic valve regurgitation.  Mitral Valve: The mitral valve is mildly thickened. There is moderately decreased mitral valve posterior leaflet mobility. There is moderate mitral valve regurgitation.  Tricuspid Valve: The tricuspid valve is structurally normal. There is mild to moderate tricuspid regurgitation. The Doppler estimated RVSP is mildly elevated at 35.7 mmHg.  Pulmonic Valve: The pulmonic valve was not assessed. The pulmonic valve regurgitation was not assessed.  Pericardium: There is no pericardial effusion noted.  Aorta: The aortic root was not assessed.      CONCLUSIONS:  1. Left ventricular systolic function is moderately decreased with a 30-35% estimated ejection fraction.  2. Multiple segmental abnormalities exist. See findings.  3. Spectral Doppler shows a restrictive pattern of left ventricular diastolic filling.  4. There is an elevated left ventricular end diastolic pressure.  5. Moderate mitral valve regurgitation.  6. Moderately decreased mitral valve posterior leaflet mobility.  7. Mild to moderate tricuspid regurgitation.  8. Mildly elevated RVSP.    QUANTITATIVE DATA SUMMARY:  2D MEASUREMENTS:  Normal Ranges:  IVSd:          1.16 cm    (0.6-1.1cm)  LVPWd:         1.04 cm    (0.6-1.1cm)  LVIDd:         5.17 cm    (3.9-5.9cm)  LVIDs:         4.87 cm  LV Mass Index: 134.2 g/m2  LV % FS        5.8 %    LV SYSTOLIC FUNCTION BY 2D PLANIMETRY (MOD):  Normal Ranges:  EF-A4C View: 36.9 % (>=55%)  EF-A2C View: 28.7 %  EF-Biplane:  29.8 %    LV DIASTOLIC FUNCTION:  Normal Ranges:  MV Peak E:    1.13 m/s   (0.7-1.2 m/s)  MV Peak A:    0.28 m/s   (0.42-0.7 m/s)  E/A Ratio:    3.98       (1.0-2.2)  MV e'         0.04 m/s   (>8.0)  MV lateral e' 0.04 m/s  MV medial e'  0.04 m/s  MV A Dur:     88.49 msec  E/e' Ratio:   28.16      (<8.0)  LV IVRT:      74 msec    (<100ms)    MITRAL  VALVE:  Normal Ranges:  MV DT: 171 msec (150-240msec)    AORTIC VALVE:  Normal Ranges:  LVOT Max Matt:  1.02 m/s (<=1.1m/s)  LVOT VTI:      25.00 cm  LVOT Diameter: 2.03 cm  (1.8-2.4cm)    TRICUSPID VALVE/RVSP:  Normal Ranges:  Peak TR Velocity: 2.86 m/s  RV Syst Pressure: 35.7 mmHg (< 30mmHg)      [id number] Blaine Adams MD  Electronically signed on 4/28/2024 at 11:13:11 AM      Wall Scoring        ** Final **    , CARDIAC CATH        Left And Right Heart Cath, With LV 04/15/2024    [facility], Cath Lab  [address] [place] [address]  Phone [phone] Fax [phone]    Cardiovascular Catheterization Report    Patient Name:     CHIDI VARGAS    Performing           [id number] Blaine Adams  Physician:           MD  Study Date:       4/15/2024           Verifying Physician: [id number] Adams MD  MRN/PID:          [id number]            Cardiologist:  Accession#:       [id number]        Ordering Provider:   [id number] HECTOR PIERRE  Date of           1950 / 73      Fellow:  Birth/Age:        years  Gender:           M                   Fellow:  Encounter#:       [id number]      Study:            Left Heart Cath  Additional Study: Right Heart Cath      Indications:  CHIDI VARGAS is a 73 year old male who presents with coronary artery disease, prior myocardial infarction, prior percutaneous coronary intervention, peripheral artery disease, prior coronary artery bypass graft surgery, hypertension, dyslipidemia and a chest pain assessment of atypical angina. Left ventricular dysfunction, NSTE - ACS and cardiomyopathy. Heart failure.    Procedure Description:  After infiltration with 2% Lidocaine, the right femoral artery was cannulated with a modified Seldinger technique. Subsequently a 6 [place] Zealander sheath was placed retrograde in the right femoral artery. After infiltration of local anesthetic, the right femoral vein was identified with two-dimensional ultrasound. Under direct  ultrasound visualization, the right femoral vein was cannulated with a micropuncture technique. A 7 Icelandic sheath was placed in the vein. Selective coronary catheterization was performed using a 6 Fr and 4 Fr catheter(s) exchanged over a guide wire to cannulate the coronary arteries. A JL 4 tip catheter was used for left coronary injections. A JR 4 tip catheter was used for right coronary injections.  Multiple injections of contrast were made into the left and right coronary arteries with angiograms recorded in multiple projections. A balloon tipped catheter was advanced through the right heart to record pressures. Cardiac output was calculated via the Cinda method. After completion of the procedure, femoral artery angiography was performed. This demonstrated a common femoral artery puncture appropriate for closure. An Angio-Seal VIP 6F ([employer]) vascular closure device was placed per protocol. Post-procedure, the venous sheath was intact, to be removed in the holding area.    Coronary Angiography:  The coronary circulation is right dominant.    Left Main Coronary Artery:  The left main coronary artery is a normal caliber vessel. The left main arises normally from the left coronary sinus of Valsalva. The mid to distal left main coronary artery showed 80% stenosis.    Left Anterior Descending Coronary Artery Distribution:  The left anterior descending coronary artery is a normal caliber vessel. The LAD demonstrated chronic occlusion originating at the proximal segment.    Circumflex Coronary Artery Distribution:  The circumflex arises normally from the left main coronary artery. The circumflex revealed chronic occlusion originating at the proximal segment.    Right Heart Catheterization:  A balloon tipped catheter was advanced through the right heart to record pressures. Cardiac output was calculated via the Cinda method. Elevated left sided filling pressures with normal cardiac output. Elevated ventricular  filling pressure. Cardiac output is normal. Pulmonary venous hypertension.    Right Coronary Artery Distribution:    The RCA arises normally from the right sinus of Valsalva. The RCA showed chronic occlusion originating at the proximal segment.    Coronary Grafts:    LIMA Graft:  Left internal mammary artery graft conduit, originating in situ and attached to the proximal left anterior descending and 1st diagonal, is patent. The left internal mammary artery graft conduit originating in situ and attached to the proximal left anterior descending and 1st diagonal is patent.  Saphaneous Vein Graft(s):  The saphenous vein graft, originating from the aorta and attached to the distal right coronary artery, appeared totally occluded. The saphenous vein graft, originating from the aorta and attached to the distal right coronary artery appeared totally occluded.  The 2nd saphenous vein graft, originating from the aorta and attached to the 1st obtuse marginal, appeared totally occluded. The 2nd saphenous vein graft, originating from the aorta and attached to the 1st obtuse marginal appeared totally occluded.  Sequential Graft:  The saphenous vein graft, originating from the aorta and attached to the distal right coronary artery, appeared totally occluded.    Coronary Lesion Summary:  Vessel      Stenosis   Vessel Segment  Left Main 80% stenosis mid to distal      Graft Stenosis Summary:  Graft                Destination of Graft  LIMA  proximal left anterior descending and 1st diagonal  SVG 1 distal right coronary artery  SVG 2 1st obtuse marginal      Subclavian Artery Findings:    Left Subclavian Artery:  The left subclavian artery revealed severe atherosclerotic disease and severe calcification. There was severe ostial stenosis in the left subclavian artery. The lesion was crossed with the glidwire. The 6Fr. cathteter was unable to be advanced. We then used a 4Fr catheter that was able to cross the stenosis. There was  pressure gradient of ~28-30 mmHg across the stenosis.    Hemo Personnel:  +-----------------+---------+  Name             Duty       +-----------------+---------+  Blaine AdamsROC MD 1  +-----------------+---------+      Hemodynamic Pressures:    +----+----------+---------+-------------+-------------+---+----+-------+-------+  SiteDate Time   Phase  Systolic mmHg  Diastolic  ED MeanA-Wave V-Wave                   Name                    mmHg     mmHmmHg mmHg   mmHg                                                      g                     +----+----------+---------+-------------+-------------+---+----+-------+-------+    PA 4/15/2024 AIR REST           41           19     29                      10:04:54                                                                      AM                                                          +----+----------+---------+-------------+-------------+---+----+-------+-------+    PW 4/15/2024 AIR REST                               21     20     35        10:06:15                                                                      AM                                                          +----+----------+---------+-------------+-------------+---+----+-------+-------+    RV 4/15/2024 AIR REST           43            0  6                          10:07:16                                                                      AM                                                          +----+----------+---------+-------------+-------------+---+----+-------+-------+    RA 4/15/2024 AIR REST                                4      7      5        10:07:36                                                                      AM                                                           +----+----------+---------+-------------+-------------+---+----+-------+-------+    LV 4/15/2024 AIR REST          151           13 24                          10:10:29                                                                      AM                                                          +----+----------+---------+-------------+-------------+---+----+-------+-------+   LVp 4/15/2024 AIR REST          147           11 23                          10:10:37                                                                      AM                                                          +----+----------+---------+-------------+-------------+---+----+-------+-------+   AOp 4/15/2024 AIR REST          143           59     92                      10:10:45                                                                      AM                                                          +----+----------+---------+-------------+-------------+---+----+-------+-------+    AO 4/15/2024 AIR REST          150           64     99                      10:12:31                                                                      AM                                                          +----+----------+---------+-------------+-------------+---+----+-------+-------+    AO 4/15/2024 AIR REST          163           72    109                      10:36:08                                                                      AM                                                          +----+----------+---------+-------------+-------------+---+----+-------+-------+    AO 4/15/2024 AIR REST          141           64     93                      11:03:48                                                                      AM                                                           +----+----------+---------+-------------+-------------+---+----+-------+-------+     L 4/15/2024 AIR REST          135           69     96                      11:11:05                                                                      AM                                                          +----+----------+---------+-------------+-------------+---+----+-------+-------+    AO 4/15/2024 AIR REST          159           71    107                      11:11:18                                                                      AM                                                          +----+----------+---------+-------------+-------------+---+----+-------+-------+        Oxygen Saturation %:  +-----------+----------+------------+  Sample SiteO2 Sat (%)HB (g/100ml)  +-----------+----------+------------+           AO        96        14.7  +-----------+----------+------------+           PA        69        14.7  +-----------+----------+------------+           AO        96        14.7  +-----------+----------+------------+           PA        69        14.7  +-----------+----------+------------+      Cardiac Outputs:  +---------------+------------------+-------+  SEBASTIAN CO (l/min)SEBASTIAN CI (l/min/m2)SEBASTIAN SV  +---------------+------------------+-------+              4.1               2.5   60.0  +---------------+------------------+-------+      Vascular Resistance Calculated Values (Wood Units):  +-----+---+----+-------+----+----+---+----+----+----+-------+  PhasePVRPVRIPVR/SVRSVR SVRITPRTPRITVR TVRITPR/TVR  +-----+---+----+-------+----+----+---+----+----+----+-------+  0    1.93.2 0      [id number]        +-----+---+----+-------+----+----+---+----+----+----+-------+      Complications:  No in-lab complications observed.    Cardiac Cath Post Procedure Notes:  Post Procedure  Diagnosis: Triple vessel disease.  Blood Loss:               Estimated blood loss during the procedure was 30 mls.  Specimens Removed:        Number of specimen(s) removed: none.      Recommendations:  Maximize medical therapy.  Agressive risk factor modification efforts.  Follow-up with cardiology clinic.  Lipid lowering agent or Statin therapy.  Will discuss regarding subclavian artery intervention.  Aspirin therapy.    ____________________________________________________________________________________  CONCLUSIONS:  1. Severe native three vessel CAD.  2. Patent LIMA-LAD/Diagonal vessel. All other grafts are occluded.  3. Severe left subclavian artery stenosis with a gradient of ~28-30 mmHg across the stenosis.  4. Mildly elevated filling pressure with PCWP of 21 mmHg.  5. Mild pulmonary hypertension with mPAP of 29 mmHg (Post capillary).  6. Normal cardiac output and index of 4.1 L/min and 2.5 L/min/m2 respectively.  7. Successful RCFA closure with 6Fr. VIP Angioseal.  8. Left Ventricular end-diastolic pressure = 23 mmHg.    ICD 10 Codes:  Ischemic cardiomyopathy-I25.5    CPT Codes:  Angiography, Extremity,uni,S&I (PER)-26528; Right & Left Heart Cath Bypass Graft w/ventriculography/Graft angio (RHC)([facility])-58696; Moderate Sedation Services 1st additional 15 minutes patient >5 years-27091; Moderate Sedation Services 2nd additional 15 minutes patient >5 years-35729; Moderate Sedation Services 3rd additional 15 minutes patient >5 years-67738; Moderate Sedation Services 4th additional 15 minutes patient >5 years-13976; Moderate Sedation Services 5th additional 15 minutes patient >5 years-91561    [id number] Blaine Adams MD  Performing Physician  Electronically signed by [id number] Blaine Adams MD on 4/16/2024 at 10:24:58 PM          ** Final **   , CXR       XR chest 1 view 07/01/2024    Narrative  Interpreted By:  Hank Razo,  STUDY:  XR CHEST 1 VIEW;  7/1/2024 9:27  am    INDICATION:  Signs/Symptoms:ICD.    COMPARISON:  05/31/2024    ACCESSION NUMBER(S):  [id number]    ORDERING CLINICIAN:  KAILEE THOMAS    FINDINGS:  Left-sided pacemaker in place    Median sternotomy wires present    CARDIOMEDIASTINAL SILHOUETTE:  Cardiomediastinal silhouette is mildly enlarged but unchanged.    LUNGS:  Lungs are clear. No consolidation or effusion    ABDOMEN:  No remarkable upper abdominal findings.    BONES:  No acute osseous changes.    Impression  1.  No evidence of acute cardiopulmonary process.        MACRO:  None    Signed by: Hank Razo 7/1/2024 9:58 AM  Dictation workstation:   [id number]    , CT Head/Neck       CT head W O contrast trauma protocol 06/30/2024  CT cervical spine wo IV contrast 06/30/2024    Narrative  Interpreted By:  Fahad Billingsley,  STUDY:  CT HEAD W/O CONTRAST TRAUMA PROTOCOL; CT CERVICAL SPINE WO IV  CONTRAST;  6/30/2024 9:58 pm    INDICATION:  Signs/Symptoms:fall down stairs, burning sensation down bilateral  upper extremities; Signs/Symptoms:Proceed to midline C-spine  tenderness, burning sensation to the bilateral upper extremities.    COMPARISON:  02/30/2024    ACCESSION NUMBER(S):  [id number]; [id number]    ORDERING CLINICIAN:  DORENE HARRISON    TECHNIQUE:  Axial noncontrast CT images of the head and cervical spine. Sagittal  and coronal reformats were provided.    FINDINGS:  Head:  BRAIN: No acute intracranial hemorrhage. No mass effect or midline  shift. Gray-white matter interfaces are preserved.Subtle white matter  hypodensities which are nonspecific but likely related to  microangiopathic white matter changes. Remote left basal ganglia  lacunar infarct or dilated perivascular space.    VENTRICLES and EXTRA-AXIAL SPACES: Prominent ventricles and  extra-axial CSF spaces, reflecting parenchymal volume loss.    EXTRACRANIAL SOFT TISSUES:  Posterior scalp vertex soft tissue  swelling.    PARANASAL SINUSES/MASTOIDS: The visualized paranasal sinuses  and  mastoid air cells are aerated.    BONES AND ORBITS: No displaced skull fracture. Status post bilateral  lens replacement.    OTHER FINDINGS: None.      Cervical Spine:    FRACTURES: There is no evidence for an acute fracture of the cervical  spine.    VERTEBRAL ALIGNMENT: There is mild grade 1 anterolisthesis of C3 on  C4 and C7 on T1 as well as T1 on T2. Straightening of normal cervical  lordosis.    CRANIOCERVICAL JUNCTION: Within normal limits.    VERTEBRA/DISC SPACES: Moderate to severe multilevel disc space  narrowing, endplate osteophytes, and facet/uncovertebral arthropathy.  Chronic height loss of the C7 vertebral body, unchanged from  02/13/2024. Posterior disc osteophyte complex and ossification of the  posterior longitudinal ligament contribute to severe spinal canal  stenosis at C4-C5 and C5-C6 as well as mild spinal canal stenosis at  C6-C7. Multilevel uncovertebral and facet arthropathy contributes  neural foraminal stenosis: Mild left C2-C3, severe right and mild  left C3-C4, severe bilateral C4-C5, severe bilateral C5-C6, moderate  bilateral C6-C7, mild bilateral C7-T1.    SOFT TISSUES: No acute abnormality. Moderate carotid atherosclerosis.  Partially visualized pacemaker leads.    UPPER CHEST: Visualized portions are within normal limits.    Impression  1. No acute intracranial hemorrhage or mass effect.  2. No acute fracture or traumatic malalignment of the cervical spine.  3. Moderate to severe multilevel spinal degenerative change described  above, notable for severe spinal canal stenosis at C4-C5 and C5-C6.    Signed by: Fahad Billingsley 6/30/2024 10:25 PM  Dictation workstation:   [id number]    , CT Chest        CT chest wo IV contrast 02/18/2024    Narrative  Interpreted By:  Rolando Diana,  STUDY:  CT CHEST WO IV CONTRAST;  2/18/2024 10:02 pm    INDICATION:  Signs/Symptoms:Shortness of breath, chest pain.    COMPARISON:  None.    ACCESSION NUMBER(S):  [id number]    ORDERING  CLINICIAN:  MILA SAMUELS    TECHNIQUE:  Contiguous axial images of the chest were obtained without  intravenous contrast. Coronal and sagittal reformatted images were  obtained from the axial images.    FINDINGS:  The examination is limited secondary to lack of intravenous contrast  and patient motion.    No axillary or mediastinal lymphadenopathy. There is limited  evaluation for hilar lymphadenopathy on noncontrast examination.    The heart is borderline and size. Coronary artery artifact  calcifications. No significant pericardial effusion    Small bilateral pleural effusions and bibasilar  consolidative/atelectatic opacities. No pneumothorax.    Limited evaluation the upper abdomen.  Nodularity of the liver compatible with cirrhosis. Postsurgical  change of cholecystectomy.    Multilevel degenerative change of the thoracic spine.    Impression  Small bilateral pleural effusions and bibasilar  consolidative/atelectatic opacities.    Liver cirrhosis and postsurgical change of cholecystectomy.    MACRO:  None    Signed by: Rolando Diana 2/19/2024 2:30 AM  Dictation workstation:   [id number]   , CT Abdomin     No results found for this or any previous visit from the past 730 days.    SOCIAL:  Social History     Tobacco Use   Smoking Status Never   Smokeless Tobacco Never      Social History     Substance and Sexual Activity   Alcohol Use Never      Social History     Substance and Sexual Activity   Drug Use Never        NPO STATUS:  No data recorded    Clinical Areas Reviewed:    Allergies  Meds               Anesthesia Assessment:    Physical Exam    Airway  Mallampati: III  TM distance: >3 FB  Neck ROM: full     Cardiovascular - normal exam     Dental - normal exam     Pulmonary - normal exam     Abdominal - normal exam         Anesthesia Plan    History of general anesthesia?: yes  History of complications of general anesthesia?: no    ASA 4     general     intravenous induction   Postoperative administration  of opioids is intended.  Trial extubation is planned.  Anesthetic plan and risks discussed with patient.  Use of blood products discussed with patient who.    Plan discussed with CAA and attending.

## 2024-07-02 NOTE — PROGRESS NOTES
[facility]  TRAUMA SERVICE - HISTORY AND PHYSICAL    Patient Name: Ritesh Garza  MRN: [id number]  Admit Date: 2024  : 1950  AGE: 73 y.o.   GENDER: male  ==============================================================================  MECHANISM OF INJURY / CHIEF COMPLAINT:     Patient is a 73-year-old male with history of DM, asthma, CAD, HFrEF (30-40%), presenting to the emergency department today as a transfer from [facility].  Reportedly had a mechanical fall down 8 steps earlier today without loss of consciousness.  No blood thinners noted.  Denies head strike.  Was complaining of left-sided neck pain and burning sensation of the bilateral shoulders at outside hospital with notable weakness.  CT of the C-spine showed evidence of severe spinal cord stenosis at C4-C6.  CT of the chest abdomen pelvis showed evidence of adrenal gland hemorrhage as well.  Transferred to [facility] for further evaluation.  On arrival, reports most of his weakness, tingling, and burning pain to the bilateral upper extremities has since resolved.  Denies chest pain, shortness of breath, Franklin pain, or changes in urination/stool for us today.     LOC: no  Anticoagulant / Anti-platelet Rx? Yes, ASA for CAD  Referring Facility Name:  [facility]    INJURIES:   CT w severe canal stenosis C4-6.   Also w adrenal hemorrhage.     OTHER MEDICAL PROBLEMS:  See down below    INCIDENTAL FINDINGS:  Unknown    ==============================================================================  ADMISSION PLAN OF CARE:  Ritesh Garza is a 73 y.o. male with (cirrhosis, CAD, MI, DM2, CHF w EF=30-35% in April + s/p pacemaker) who presents to  ED as a  transfer from [facility] with a fall down 8-9 steps . Workup revealed CT w severe canal stenosis C4-6. Also w adrenal hemorrhage. Patient is in stable condition. Denies b/b, saddle anesthesia. 5/5 strength BL UE/LE’s, SILT  throughout. No clonus, negative Hoffmans     ORTHO:    No acute orthopaedic surgical intervention  - Obtain MRI C spine  - Okay for diet from orthopedic perspective  - okay for DVT ppx from orthopedic perspective  - Patient to follow up in clinic with Dr. Wilkinson in 1 week following MRI. Please call [phone] to schedule appointment after discharge.      Neurosurgery:   OR planning for Wednesday 7/3 for C4-5, C5-6, C6-7 ACDF  Please obtain upright xrays C spine (AP and lateral views)  Please consult medicine for preoperative clearance  Please hold ASA    Trauma:   - get a preop echo   - consult medicine for preop checkup   - hold ASA  - Pain meds (tyelenol, oxy, louise)    GI:   -Regular diet  - bowel regimen  - NPO starting midnight 7/2    DVT: SQH ppx    Dispo: PT/OT to work with him. Cont management on floor.     Patient's exam, labs, and findings discussed and seen with Dr. Dewitt, who agrees with the plan as described above.    Briana Kidd MD  PGY-1 General Surgery  Trauma Surgery Floor [id number]    Subjective   ==============================================================================  PAST MEDICAL HISTORY:   PMH:   Past Medical History:   Diagnosis Date    Allergic     Asthma (HHS-HCC)     Coronary artery disease Long Ago    Diabetes mellitus (Multi) Over 20 years ago    Drug-induced gout, unspecified site 10/04/2019    Acute drug-induced gout    Encounter for screening for malignant neoplasm of colon 10/31/2022    Colon cancer screening    Gout, unspecified     Acute gout    Heart disease Over 20 years sgo    Heart murmur Long ago    Hypertension Over 20 years ago    Myocardial infarction (Multi) Over 20 yesrs ago    Ocular pain, right eye 10/14/2018    Pain of right eye    Periorbital cellulitis 10/14/2018    Periorbital cellulitis    Personal history of colonic polyps 10/11/2017    History of colon polyps    Personal history of other diseases of the circulatory system 05/12/2021    History of angina  pectoris    Personal history of other diseases of the circulatory system 05/12/2021    History of angina pectoris    Personal history of other diseases of the respiratory system 10/04/2019    History of acute bronchitis    Personal history of other drug therapy     History of influenza vaccination    Unspecified cirrhosis of liver (Multi) 11/07/2022    Cirrhosis, nonalcoholic         PSH:  Past Surgical History:   Procedure Laterality Date    ADENOIDECTOMY  Childhood    CARDIAC CATHETERIZATION  Over 30 years ago    CARDIAC CATHETERIZATION N/A 04/15/2024    Procedure: Left And Right Heart Cath, With LV;  Surgeon: Blaine Adams MD;  Location: [facility]iac [facility];  Service: Cardiovascular;  Laterality: N/A;  3 hr hydration / arrive 6:30    CARDIAC DEFIBRILLATOR PLACEMENT      CARDIAC ELECTROPHYSIOLOGY PROCEDURE N/A 05/30/2024    Procedure: ICD Dual Implant;  Surgeon: Mina Kiran MD;  Location: [facility]iac [facility];  Service: Electrophysiology;  Laterality: N/A;  Dual chamber ICD, [facility]  notified [facility] 5/24/24    CHOLECYSTECTOMY  Over 30 years ago    CIRCUMCISION, PRIMARY  72,years ago    CORONARY ARTERY BYPASS GRAFT  12/02/2021    [facility]    CORONARY STENT PLACEMENT  Last week    EYE SURGERY  Childhood    INVASIVE VASCULAR PROCEDURE Left 04/30/2024    Procedure: Angioplasty - Upper Extremity;  Surgeon: Narinder Quevedo MD;  Location: [facility];  Service: Cardiovascular;  Laterality: Left;  3 hr hydration  / arrive 6:30    MR HEAD ANGIO WO IV CONTRAST  06/02/2021    MR HEAD ANGIO WO IV CONTRAST 6/2/2021 [facility]ICAL [facility]GAC[facility]    MR HEAD ANGIO WO IV CONTRAST  11/16/2021    MR HEAD ANGIO WO IV CONTRAST 11/16/2021 [facility] CLINICAL LEGAC[facility]    MR NECK ANGIO WO IV CONTRAST  06/02/2021    MR NECK ANGIO WO IV CONTRAST 6/2/2021 [facility] CLINICAL LEGACY    MR NECK ANGIO WO IV CONTRAST  11/16/2021    MR NECK ANGIO WO IV CONTRAST 11/16/2021 [facility] CLINICAL LEGAC[facility]     FH:   Family History   Problem Relation Name Age  of Onset    Diabetes Mother Sofia Garza     Other (mycoardial infarction) Father Hilario 46    Fainting Father Hilario     Heart disease Father Hilario     Stomach cancer Mother's Sister      Stroke Maternal Grandmother Jasmina Mace     Colon cancer Maternal Grandmother Jasmina Mace      SOCIAL HISTORY:    Smoking:   Social History     Tobacco Use   Smoking Status Never   Smokeless Tobacco Never       Alcohol:   Social History     Substance and Sexual Activity   Alcohol Use Never       Drug use:     MEDICATIONS:   Prior to Admission medications    Medication Sig Start Date End Date Taking? Authorizing Provider   acetaminophen (Tylenol) 325 mg tablet Take 2 tablets (650 mg) by mouth every 6 hours if needed for mild pain (1 - 3) or moderate pain (4 - 6).  Patient taking differently: Take 1-2 tablets (325-650 mg) by mouth every 6 hours if needed for mild pain (1 - 3) or moderate pain (4 - 6). 2/23/24   Rajendra Liu MD   albuterol (ProAir HFA) 90 mcg/actuation inhaler Inhale 2 puffs every 6 hours if needed for wheezing or shortness of breath. 4/12/24   Ofelia Mccracken DO   allopurinol (Zyloprim) 100 mg tablet Take 0.5 tablets (50 mg) by mouth once daily.  Patient taking differently: Take 1 tablet (100 mg) by mouth once daily. 6/27/24   Ofelia Mccracken DO   amiodarone (Pacerone) 200 mg tablet Take 2 tablets (400 mg) by mouth 2 times a day for 9 doses. On February, 28th start to take 1 tablet (200 mg) by mouth once daily. 4/15/24 4/10/25  Jose Hennessy MD   amLODIPine (Norvasc) 10 mg tablet TAKE 1 TABLET BY MOUTH EVERY DAY 4/22/24   Steven Bell MD   aspirin 81 mg EC tablet TAKE 1 TABLET BY MOUTH EVERY DAY 12/12/23   Ofelia Mccracken DO   atorvastatin (Lipitor) 80 mg tablet Take 1 tablet (80 mg) by mouth once daily in the evening.    Historical Provider, MD   b complex vitamins capsule Take 1 capsule by mouth once daily. 4/26/23   Historical Provider, MD   clopidogrel (Plavix) 75 mg tablet Take 1 tablet (75 mg)  by mouth once daily. 4/15/24 4/15/25  Jose Hennessy MD   coenzyme Q-10 (CoQ-10) 100 mg capsule Take 1 capsule (100 mg) by mouth once daily. 11/1/21   Historical Provider, MD   cyanocobalamin (Vitamin B-12) 1,000 mcg tablet Take 1 tablet (1,000 mcg) by mouth once daily. As directed 11/17/20   Historical Provider, MD   dapagliflozin (Farxiga) 10 mg Take 1 tablet (10 mg) by mouth once daily in the evening.  Take before meals. 5/18/21   Historical Provider, MD   famotidine (Pepcid) 20 mg tablet Take 1 tablet (20 mg) by mouth once daily as needed for indigestion. 4/15/24   Jose Hennessy MD   furosemide (Lasix) 40 mg tablet Take 1 tablet (40 mg) by mouth once daily. As needed for weight gain greater than 3 pounds 6/13/24 6/13/25  Jose Hennessy MD   gabapentin (Neurontin) 100 mg capsule Take 1 capsule (100 mg) by mouth once daily at bedtime.    Historical Provider, MD   icosapent ethyL (Vascepa) 0.5 gram capsule Take 2 capsules (1 g) by mouth 2 times a day with meals. 11/13/23 11/12/24  Jose Hennessy MD   loratadine (Claritin) 10 mg tablet Take 1 tablet (10 mg) by mouth 2 times a day. Take 1 tablet every morning and 1 tablet every evening    Historical Provider, MD   magnesium gluconate (Magonate) 27.5 mg magne- sium (500 mg) tablet Take 1 tablet (27.5 mg) by mouth once daily. 11/1/21   Historical Provider, MD   metoprolol succinate XL (Toprol-XL) 50 mg 24 hr tablet Take 1 tablet (50 mg) by mouth once daily. 6/27/24 12/24/24  Ofelia Mccracken DO   multivitamin tablet Take 1 tablet by mouth once daily.    Historical Provider, MD   pantoprazole (ProtoNix) 40 mg EC tablet Take 1 tablet (40 mg) by mouth once daily. 6/27/24   Ofelia Mccracken DO   sacubitriL-valsartan (Entresto) 24-26 mg tablet Take 0.5 tablets by mouth 2 times a day. 6/13/24   Jose Hennessy MD   sertraline (Zoloft) 50 mg tablet Take one tablet daily in the morning after breakfast.  Patient taking differently: Take 1 tablet (50 mg) by mouth once daily  in the evening. 11/17/23   Meme Perrin MD   tadalafil (Cialis) 5 mg tablet TAKE 1 TABLET BY MOUTH EVERY DAY 6/25/24   Jeanmarie Ibarra MD   tadalafil 20 mg tablet START WITH ONE-HALF TABLET AS NEEDED 2 HOURS PRIOR TO ACTIVITY. MAY INCREASE TO 1 TABLET AS NEEDED 2 HOURS PRIOR TO ACTIVITY 5/8/24   Jeanmarie Ibarra MD   turmeric-turmeric root extract 450-50 mg capsule Take 1 capsule by mouth once daily.    Historical Provider, MD   allopurinol (Zyloprim) 100 mg tablet TAKE 0.5 TABLETS (50 MG) BY MOUTH ONCE DAILY. 4/22/24 6/27/24  Steven Bell MD   diclofenac sodium (Voltaren XR) 100 mg 24 hr tablet Take 1 tablet (100 mg) by mouth once daily as needed. 4/17/24 7/1/24  Historical Provider, MD   metoprolol succinate XL (Toprol-XL) 50 mg 24 hr tablet Take 1 tablet (50 mg) by mouth once daily. 4/15/24 6/27/24  Angelina Do, APRN-CNP   multivit with min-folic acid 0.4 mg tablet once every 24 hours.  7/1/24  Historical Provider, MD   pantoprazole (ProtoNix) 40 mg EC tablet Take 1 tablet (40 mg) by mouth once daily. 3/14/23 6/27/24  Historical Provider, MD   tadalafil (Cialis) 5 mg tablet Take 1 tablet (5 mg) by mouth once daily.  6/25/24  Historical Provider, MD     ALLERGIES:   No Known Allergies    REVIEW OF SYSTEMS:  Review of Systems     Objective   PHYSICAL EXAM:  Temperature:  [36.1 °C (97 °F)-37 °C (98.6 °F)] 36.1 °C (97 °F)  Heart Rate:  [62-88] 68  Respirations:  [16-20] 16  BP: (114-136)/(51-74) 125/65  FiO2 (%):  [21 %] 21 %    PRIMARY SURVEY:  Airway: Intact, phonating  Breathing: Equal breath sounds bilaterally  Circulation: Regular rate, normotensive. Pulses equal and intact BUE, bilateral fems, and distally.  Disability: GCS 15/15 (4 Eyes, 5 Verbal, 6 Motor). No focal deficits.      SECONDARY SURVEY/PHYSICAL EXAM:  GEN: No acute distress. Alert, awake. Nondiaphoretic.  HEENT: Sclera anicteric. Moist mucous membranes.  RESP: Breathing nonlabored, Equal chest rise. On RA.  CV: Regular rate,  normotensive  GI: Abdomen soft, nondistended, nontender. Gluteal tone intact.   : Voiding spontaneously. No blood at urethral meatus. Pelvis stable.  MSK: No gross deformities., Moves all extremities spontaneously. Burning sensation and pain in B/L upper extremities due to cervical spine stenosis.   NEURO: Alert and oriented x3. No focal deficits.   SPINE:  Cervical spine not tender to midline, no palpable step-off or deformities.   Thoracic spine not tender to midline, no palpable step-off or deformities.   Lumbar spine not tender to midline, no palpable step-off or deformities.  PSYCH: Appropriate mood and affect.  SKIN: Abrasions to B/L lower extremities  Nonjaundiced    IMAGING SUMMARY:   CT C-Spine: C4-C6 spinal stenosis  CT Chest/Abd/Pelvis: adrenal hemorrahge        LABS:  Results for orders placed or performed during the hospital encounter of 07/01/24 (from the past 24 hour(s))   Coagulation Screen   Result Value Ref Range    Protime 14.0 (H) 9.8 - 12.8 seconds    INR 1.2 (H) 0.9 - 1.1    aPTT 34 27 - 38 seconds   Hepatic function panel   Result Value Ref Range    Albumin 4.0 3.4 - 5.0 g/dL    Bilirubin, Total 0.6 0.0 - 1.2 mg/dL    Bilirubin, Direct 0.1 0.0 - 0.3 mg/dL    Alkaline Phosphatase 112 33 - 136 U/L    ALT 39 10 - 52 U/L    AST 52 (H) 9 - 39 U/L    Total Protein 7.1 6.4 - 8.2 g/dL   Lavender Top   Result Value Ref Range    Extra Tube Hold for add-ons.    Renal function panel   Result Value Ref Range    Glucose 143 (H) 74 - 99 mg/dL    Sodium 138 136 - 145 mmol/L    Potassium 4.2 3.5 - 5.3 mmol/L    Chloride 104 98 - 107 mmol/L    Bicarbonate 22 21 - 32 mmol/L    Anion Gap 16 10 - 20 mmol/L    Urea Nitrogen 60 (H) 6 - 23 mg/dL    Creatinine 3.11 (H) 0.50 - 1.30 mg/dL    eGFR 20 (L) >60 mL/min/1.73m*2    Calcium 8.7 8.6 - 10.6 mg/dL    Phosphorus 3.6 2.5 - 4.9 mg/dL    Albumin 3.8 3.4 - 5.0 g/dL        I have reviewed all laboratory and imaging results ordered/pertinent for this encounter.

## 2024-07-02 NOTE — CONSULTS
Reason For Consult  Perioperative cardiac evaluation     History Of Present Illness  Ritesh Garza is a 73 y.o. male presenting with multivessel CAD (hx of CABG), recent NSTEMI, VT with ICD placement, HFrEF 30-35% (4/2024) who presents with fall downstairs, CT CTL spine C4-5, C5-6 anterior disc-osteophytes, 7/1 MRI C spine severe C4-5, C5-6, C6-7 stenosis. Cardiology consulted for preoperative cardiac risk evaluation.      Past Medical History  He has a past medical history of Allergic, Asthma (HHS-HCC), Coronary artery disease (Long Ago), Diabetes mellitus (Multi) (Over 20 years ago), Drug-induced gout, unspecified site (10/04/2019), Encounter for screening for malignant neoplasm of colon (10/31/2022), Gout, unspecified, Heart disease (Over 20 years sgo), Heart murmur (Long ago), Hypertension (Over 20 years ago), Myocardial infarction (Multi) (Over 20 yesrs ago), Ocular pain, right eye (10/14/2018), Periorbital cellulitis (10/14/2018), Personal history of colonic polyps (10/11/2017), Personal history of other diseases of the circulatory system (05/12/2021), Personal history of other diseases of the circulatory system (05/12/2021), Personal history of other diseases of the respiratory system (10/04/2019), Personal history of other drug therapy, and Unspecified cirrhosis of liver (Multi) (11/07/2022).    Surgical History  He has a past surgical history that includes Coronary artery bypass graft (12/02/2021); MR angio head wo IV contrast (06/02/2021); MR angio neck wo IV contrast (06/02/2021); MR angio head wo IV contrast (11/16/2021); MR angio neck wo IV contrast (11/16/2021); Adenoidectomy (Childhood); Cardiac catheterization (Over 30 years ago); Cholecystectomy (Over 30 years ago); Circumcision, primary (72,years ago); Eye surgery (Childhood); Cardiac catheterization (N/A, 04/15/2024); Invasive Vascular Procedure (Left, 04/30/2024); Coronary stent placement (Last week); Cardiac electrophysiology procedure (N/A,  "05/30/2024); and Cardiac defibrillator placement.     Social History  He reports that he has never smoked. He has never used smokeless tobacco. He reports that he does not drink alcohol and does not use drugs.    Family History  Family History   Problem Relation Name Age of Onset    Diabetes Mother Sofia Garza     Other (mycoardial infarction) Father Hilario 46    Fainting Father Hilario     Heart disease Father Hilario     Stomach cancer Mother's Sister      Stroke Maternal Grandmother Jasmina Mace     Colon cancer Maternal Grandmother Jasmina Mace         Allergies  Patient has no known allergies.    Review of Systems  Negative except for HPI      Physical Exam  Vitals reviewed.   Constitutional:       Appearance: Normal appearance.   HENT:      Head: Normocephalic and atraumatic.      Mouth/Throat:      Mouth: Mucous membranes are moist.   Eyes:      Extraocular Movements: Extraocular movements intact.      Pupils: Pupils are equal, round, and reactive to light.   Cardiovascular:      Rate and Rhythm: Normal rate and regular rhythm.   Pulmonary:      Effort: Pulmonary effort is normal.      Breath sounds: Normal breath sounds.   Abdominal:      General: Abdomen is flat.      Palpations: Abdomen is soft.   Musculoskeletal:         General: Normal range of motion.      Cervical back: Normal range of motion and neck supple.   Neurological:      General: No focal deficit present.      Mental Status: He is alert and oriented to person, place, and time.   Psychiatric:         Mood and Affect: Mood normal.             Last Recorded Vitals  Blood pressure 147/79, pulse 80, temperature 36.5 °C (97.7 °F), temperature source Temporal, resp. rate 18, height 1.6 m (5' 3\"), weight 69.4 kg (153 lb), SpO2 (!) 90%.    Assessment/Plan     Given that is patient had a recent history (within the last 6 months) of ACS as well as recent history with VT with ICD placement he is considered a very high risk patient. However, he appears " to have been stable over the past few months since his subclavian stent. He is also functional with METS 6-8. He is medically optimized on his current regiment. RCRI 3 points, Class IV risk, 15% 30 day risk of death, MI, or cardiac arrest.     Would recommend a small amount of diuresis given slightly elevated JVP and mild lower extremity edema (40mg IV lasix). Would recommend scheduling OR.     Please restart Plavix and ASA ASAP after procedure.     Jesus Nunez MD    Patient was staffed with Dr. Soto     Pager numbers:  General Cardiology Consult Pager: [phone] (weekday 7AM-6PM and weekend 7AM-2PM) and other: [phone]  EP Consult Pager: [phone] (weekday 7AM-6PM and weekend 7AM-2PM) and other: [phone]  EP Device Nurse Pager: [phone] (weekday 7AM-5PM)   Advanced Heart Failure Consult Pager: [phone] anytime  CICU Fellow Pager: [phone] anytime  Endovascular /Limb Salvage Team Pager: [phone] for day coverage (8am-5pm)  Interventional Cardiology Fellow Pager: [phone] for night and weekend coverage  Structural Heart Team Pager: [phone] anytime  Night coverage: [facility] [id number]

## 2024-07-02 NOTE — PROGRESS NOTES
"Ritesh Garza is a 73 y.o. male on day 1 of admission presenting with Central cord syndrome, initial encounter (Multi).    Subjective   No events overnight    Objective     Physical Exam  AOx3  Wearing cervical collar  RUE D5 B5 T5 HG5 IO 4  RLE HF5 KE5 DF/PF 5  LUE D5 B5 T5 HG5 IO 4  LLE HF5 KE5 DF/PF 5  No cardozo or clonus    Last Recorded Vitals  Blood pressure 136/69, pulse 66, temperature 36.1 °C (97 °F), resp. rate 17, height 1.6 m (5' 3\"), weight 69.4 kg (153 lb), SpO2 95%.  Intake/Output last 3 Shifts:  No intake/output data recorded.    Relevant Results  Lab Results   Component Value Date    WBC 7.7 07/02/2024    HGB 10.7 (L) 07/02/2024    HCT 31.8 (L) 07/02/2024    MCV 91 07/02/2024     07/02/2024     Lab Results   Component Value Date    GLUCOSE 153 (H) 07/02/2024    CALCIUM 9.0 07/02/2024     07/02/2024    K 4.5 07/02/2024    CO2 23 07/02/2024     07/02/2024    BUN 60 (H) 07/02/2024    CREATININE 2.88 (H) 07/02/2024       Assessment/Plan   Principal Problem:    Central cord syndrome, initial encounter (Multi)    73yM h/o TIA, mild dementia, HTN, HLD, CAD s/p CABG (1992 on ASA), CHF (EF 40-45% 5/2024), L thigh hematoma c/b vtach (2/13/2024), 4/2024 C occluded vein grafts w/ 80% L main, L subclavian stenosis s/p L subclavian stents (4/30/2024), s/p dual chamber ICD (5/2024 [employer]), SENIA, liver cirrhosis (fatty liver), DM2, BPH, CKD, gout, b/l carpal tunnel s/p RUE carpal tunnel release (5/2023), 6/30 p/w fall downstairs, CT CTL spine C4-5, C5-6 anterior disc-osteophytes, 7/1 MRI C spine severe C4-5, C5-6, C6-7 stenosis     Trauma primary  OR possibly Wednesday 7/3 for C4-5, C5-6, C6-7 ACDF pending workup and clearance  Patient will need medical optimization, operative risk, RCRI documented to proceed with OR  Hold ASA  No need for cervical collar              Nahid Sethi MD      "

## 2024-07-03 ENCOUNTER — APPOINTMENT (OUTPATIENT)
Dept: CARDIOLOGY | Facility: HOSPITAL | Age: 74
DRG: 472 | End: 2024-07-03
Payer: MEDICARE

## 2024-07-03 ENCOUNTER — ANESTHESIA (OUTPATIENT)
Dept: OPERATING ROOM | Facility: HOSPITAL | Age: 74
End: 2024-07-03
Payer: MEDICARE

## 2024-07-03 ENCOUNTER — APPOINTMENT (OUTPATIENT)
Dept: RADIOLOGY | Facility: HOSPITAL | Age: 74
DRG: 472 | End: 2024-07-03
Payer: MEDICARE

## 2024-07-03 LAB
ALBUMIN SERPL BCP-MCNC: 3.9 G/DL (ref 3.4–5)
ANION GAP SERPL CALC-SCNC: 19 MMOL/L (ref 10–20)
ATRIAL RATE: 77 BPM
BODY SURFACE AREA: 1.76 M2
BUN SERPL-MCNC: 56 MG/DL (ref 6–23)
CALCIUM SERPL-MCNC: 8.8 MG/DL (ref 8.6–10.6)
CHLORIDE SERPL-SCNC: 104 MMOL/L (ref 98–107)
CO2 SERPL-SCNC: 19 MMOL/L (ref 21–32)
CREAT SERPL-MCNC: 2.8 MG/DL (ref 0.5–1.3)
EGFRCR SERPLBLD CKD-EPI 2021: 23 ML/MIN/1.73M*2
GLUCOSE BLD MANUAL STRIP-MCNC: 157 MG/DL (ref 74–99)
GLUCOSE BLD MANUAL STRIP-MCNC: 158 MG/DL (ref 74–99)
GLUCOSE BLD MANUAL STRIP-MCNC: 210 MG/DL (ref 74–99)
GLUCOSE BLD MANUAL STRIP-MCNC: 214 MG/DL (ref 74–99)
GLUCOSE SERPL-MCNC: 140 MG/DL (ref 74–99)
P AXIS: 70 DEGREES
P OFFSET: 163 MS
P ONSET: 112 MS
PHOSPHATE SERPL-MCNC: 3.3 MG/DL (ref 2.5–4.9)
POTASSIUM SERPL-SCNC: 5 MMOL/L (ref 3.5–5.3)
PR INTERVAL: 190 MS
Q ONSET: 207 MS
QRS COUNT: 13 BEATS
QRS DURATION: 164 MS
QT INTERVAL: 460 MS
QTC CALCULATION(BAZETT): 520 MS
QTC FREDERICIA: 499 MS
R AXIS: 37 DEGREES
SODIUM SERPL-SCNC: 137 MMOL/L (ref 136–145)
T AXIS: 196 DEGREES
T OFFSET: 437 MS
VENTRICULAR RATE: 77 BPM

## 2024-07-03 PROCEDURE — C1776 JOINT DEVICE (IMPLANTABLE): HCPCS | Performed by: STUDENT IN AN ORGANIZED HEALTH CARE EDUCATION/TRAINING PROGRAM

## 2024-07-03 PROCEDURE — 2500000004 HC RX 250 GENERAL PHARMACY W/ HCPCS (ALT 636 FOR OP/ED): Mod: JZ

## 2024-07-03 PROCEDURE — 2500000005 HC RX 250 GENERAL PHARMACY W/O HCPCS: Performed by: ANESTHESIOLOGY

## 2024-07-03 PROCEDURE — 7100000001 HC RECOVERY ROOM TIME - INITIAL BASE CHARGE: Performed by: STUDENT IN AN ORGANIZED HEALTH CARE EDUCATION/TRAINING PROGRAM

## 2024-07-03 PROCEDURE — C9359 IMPLNT,BON VOID FILLER-PUTTY: HCPCS | Performed by: STUDENT IN AN ORGANIZED HEALTH CARE EDUCATION/TRAINING PROGRAM

## 2024-07-03 PROCEDURE — 1200000002 HC GENERAL ROOM WITH TELEMETRY DAILY

## 2024-07-03 PROCEDURE — 3700000002 HC GENERAL ANESTHESIA TIME - EACH INCREMENTAL 1 MINUTE: Performed by: STUDENT IN AN ORGANIZED HEALTH CARE EDUCATION/TRAINING PROGRAM

## 2024-07-03 PROCEDURE — 22846 INSERT SPINE FIXATION DEVICE: CPT | Performed by: STUDENT IN AN ORGANIZED HEALTH CARE EDUCATION/TRAINING PROGRAM

## 2024-07-03 PROCEDURE — 2500000004 HC RX 250 GENERAL PHARMACY W/ HCPCS (ALT 636 FOR OP/ED)

## 2024-07-03 PROCEDURE — 2500000005 HC RX 250 GENERAL PHARMACY W/O HCPCS

## 2024-07-03 PROCEDURE — 82947 ASSAY GLUCOSE BLOOD QUANT: CPT

## 2024-07-03 PROCEDURE — 2500000005 HC RX 250 GENERAL PHARMACY W/O HCPCS: Performed by: STUDENT IN AN ORGANIZED HEALTH CARE EDUCATION/TRAINING PROGRAM

## 2024-07-03 PROCEDURE — 36415 COLL VENOUS BLD VENIPUNCTURE: CPT

## 2024-07-03 PROCEDURE — 7100000002 HC RECOVERY ROOM TIME - EACH INCREMENTAL 1 MINUTE: Performed by: STUDENT IN AN ORGANIZED HEALTH CARE EDUCATION/TRAINING PROGRAM

## 2024-07-03 PROCEDURE — 93287 PERI-PX DEVICE EVAL & PRGR: CPT

## 2024-07-03 PROCEDURE — 2720000007 HC OR 272 NO HCPCS: Performed by: STUDENT IN AN ORGANIZED HEALTH CARE EDUCATION/TRAINING PROGRAM

## 2024-07-03 PROCEDURE — 22853 INSJ BIOMECHANICAL DEVICE: CPT | Performed by: STUDENT IN AN ORGANIZED HEALTH CARE EDUCATION/TRAINING PROGRAM

## 2024-07-03 PROCEDURE — 2780000003 HC OR 278 NO HCPCS: Performed by: STUDENT IN AN ORGANIZED HEALTH CARE EDUCATION/TRAINING PROGRAM

## 2024-07-03 PROCEDURE — 22551 ARTHRD ANT NTRBDY CERVICAL: CPT | Performed by: STUDENT IN AN ORGANIZED HEALTH CARE EDUCATION/TRAINING PROGRAM

## 2024-07-03 PROCEDURE — 0RT30ZZ RESECTION OF CERVICAL VERTEBRAL DISC, OPEN APPROACH: ICD-10-PCS | Performed by: STUDENT IN AN ORGANIZED HEALTH CARE EDUCATION/TRAINING PROGRAM

## 2024-07-03 PROCEDURE — 3600000018 HC OR TIME - INITIAL BASE CHARGE - PROCEDURE LEVEL SIX: Performed by: STUDENT IN AN ORGANIZED HEALTH CARE EDUCATION/TRAINING PROGRAM

## 2024-07-03 PROCEDURE — 22552 ARTHRD ANT NTRBD CERVICAL EA: CPT | Performed by: STUDENT IN AN ORGANIZED HEALTH CARE EDUCATION/TRAINING PROGRAM

## 2024-07-03 PROCEDURE — 3600000017 HC OR TIME - EACH INCREMENTAL 1 MINUTE - PROCEDURE LEVEL SIX: Performed by: STUDENT IN AN ORGANIZED HEALTH CARE EDUCATION/TRAINING PROGRAM

## 2024-07-03 PROCEDURE — 84100 ASSAY OF PHOSPHORUS: CPT

## 2024-07-03 PROCEDURE — 2500000002 HC RX 250 W HCPCS SELF ADMINISTERED DRUGS (ALT 637 FOR MEDICARE OP, ALT 636 FOR OP/ED): Performed by: HEALTH CARE PROVIDER

## 2024-07-03 PROCEDURE — 3700000001 HC GENERAL ANESTHESIA TIME - INITIAL BASE CHARGE: Performed by: STUDENT IN AN ORGANIZED HEALTH CARE EDUCATION/TRAINING PROGRAM

## 2024-07-03 PROCEDURE — 0RG20A0 FUSION OF 2 OR MORE CERVICAL VERTEBRAL JOINTS WITH INTERBODY FUSION DEVICE, ANTERIOR APPROACH, ANTERIOR COLUMN, OPEN APPROACH: ICD-10-PCS | Performed by: STUDENT IN AN ORGANIZED HEALTH CARE EDUCATION/TRAINING PROGRAM

## 2024-07-03 PROCEDURE — C1713 ANCHOR/SCREW BN/BN,TIS/BN: HCPCS | Performed by: STUDENT IN AN ORGANIZED HEALTH CARE EDUCATION/TRAINING PROGRAM

## 2024-07-03 DEVICE — QUICK START DISTRACTION SCREW, 14 MM, STERILE, 5/BOX
Type: IMPLANTABLE DEVICE | Site: SPINE CERVICAL | Status: FUNCTIONAL
Brand: TSI

## 2024-07-03 DEVICE — SCREW, DISTRACTION, ACF, 14 MM: Type: IMPLANTABLE DEVICE | Site: SPINE CERVICAL | Status: NON-FUNCTIONAL

## 2024-07-03 DEVICE — IMPLANTABLE DEVICE: Type: IMPLANTABLE DEVICE | Site: SPINE CERVICAL | Status: FUNCTIONAL

## 2024-07-03 DEVICE — SCREW, ACP, SELF DRILL, 3.5 X 17MM, VARIABLE: Type: IMPLANTABLE DEVICE | Site: SPINE CERVICAL | Status: FUNCTIONAL

## 2024-07-03 DEVICE — ALLOGRAFT, TRIAD LORDOTIC 7 X 11 X 14: Type: IMPLANTABLE DEVICE | Site: SPINE CERVICAL | Status: FUNCTIONAL

## 2024-07-03 RX ORDER — OXYCODONE HYDROCHLORIDE 5 MG/1
5 TABLET ORAL EVERY 4 HOURS PRN
Status: DISCONTINUED | OUTPATIENT
Start: 2024-07-03 | End: 2024-07-03 | Stop reason: HOSPADM

## 2024-07-03 RX ORDER — HYDROMORPHONE HYDROCHLORIDE 1 MG/ML
0.2 INJECTION, SOLUTION INTRAMUSCULAR; INTRAVENOUS; SUBCUTANEOUS EVERY 5 MIN PRN
Status: DISCONTINUED | OUTPATIENT
Start: 2024-07-03 | End: 2024-07-03 | Stop reason: HOSPADM

## 2024-07-03 RX ORDER — HYDROMORPHONE HYDROCHLORIDE 1 MG/ML
0.5 INJECTION, SOLUTION INTRAMUSCULAR; INTRAVENOUS; SUBCUTANEOUS EVERY 5 MIN PRN
Status: DISCONTINUED | OUTPATIENT
Start: 2024-07-03 | End: 2024-07-03 | Stop reason: HOSPADM

## 2024-07-03 RX ORDER — POLYMYXIN B 500000 [USP'U]/1
INJECTION, POWDER, LYOPHILIZED, FOR SOLUTION INTRAMUSCULAR; INTRATHECAL; INTRAVENOUS; OPHTHALMIC AS NEEDED
Status: DISCONTINUED | OUTPATIENT
Start: 2024-07-03 | End: 2024-07-03 | Stop reason: HOSPADM

## 2024-07-03 RX ORDER — FENTANYL CITRATE 50 UG/ML
INJECTION, SOLUTION INTRAMUSCULAR; INTRAVENOUS AS NEEDED
Status: DISCONTINUED | OUTPATIENT
Start: 2024-07-03 | End: 2024-07-03

## 2024-07-03 RX ORDER — OLANZAPINE 5 MG/1
5 TABLET, ORALLY DISINTEGRATING ORAL ONCE
Status: DISCONTINUED | OUTPATIENT
Start: 2024-07-03 | End: 2024-07-04

## 2024-07-03 RX ORDER — LIDOCAINE HCL/PF 100 MG/5ML
SYRINGE (ML) INTRAVENOUS AS NEEDED
Status: DISCONTINUED | OUTPATIENT
Start: 2024-07-03 | End: 2024-07-03

## 2024-07-03 RX ORDER — HYDROMORPHONE HYDROCHLORIDE 1 MG/ML
INJECTION, SOLUTION INTRAMUSCULAR; INTRAVENOUS; SUBCUTANEOUS AS NEEDED
Status: DISCONTINUED | OUTPATIENT
Start: 2024-07-03 | End: 2024-07-03

## 2024-07-03 RX ORDER — LIDOCAINE HYDROCHLORIDE 10 MG/ML
0.1 INJECTION INFILTRATION; PERINEURAL ONCE
Status: DISCONTINUED | OUTPATIENT
Start: 2024-07-03 | End: 2024-07-03 | Stop reason: HOSPADM

## 2024-07-03 RX ORDER — CEFAZOLIN SODIUM 1 G/50ML
1 SOLUTION INTRAVENOUS EVERY 8 HOURS
Status: DISCONTINUED | OUTPATIENT
Start: 2024-07-03 | End: 2024-07-03

## 2024-07-03 RX ORDER — ONDANSETRON HYDROCHLORIDE 2 MG/ML
4 INJECTION, SOLUTION INTRAVENOUS ONCE AS NEEDED
Status: DISCONTINUED | OUTPATIENT
Start: 2024-07-03 | End: 2024-07-03 | Stop reason: HOSPADM

## 2024-07-03 RX ORDER — CEFAZOLIN SODIUM 2 G/100ML
2 INJECTION, SOLUTION INTRAVENOUS EVERY 12 HOURS
Status: COMPLETED | OUTPATIENT
Start: 2024-07-03 | End: 2024-07-05

## 2024-07-03 RX ORDER — PROPOFOL 10 MG/ML
INJECTION, EMULSION INTRAVENOUS AS NEEDED
Status: DISCONTINUED | OUTPATIENT
Start: 2024-07-03 | End: 2024-07-03

## 2024-07-03 RX ORDER — ONDANSETRON HYDROCHLORIDE 2 MG/ML
INJECTION, SOLUTION INTRAVENOUS AS NEEDED
Status: DISCONTINUED | OUTPATIENT
Start: 2024-07-03 | End: 2024-07-03

## 2024-07-03 RX ORDER — MIDAZOLAM HYDROCHLORIDE 1 MG/ML
INJECTION INTRAMUSCULAR; INTRAVENOUS AS NEEDED
Status: DISCONTINUED | OUTPATIENT
Start: 2024-07-03 | End: 2024-07-03

## 2024-07-03 RX ORDER — SODIUM CHLORIDE, SODIUM LACTATE, POTASSIUM CHLORIDE, CALCIUM CHLORIDE 600; 310; 30; 20 MG/100ML; MG/100ML; MG/100ML; MG/100ML
100 INJECTION, SOLUTION INTRAVENOUS CONTINUOUS
Status: DISCONTINUED | OUTPATIENT
Start: 2024-07-03 | End: 2024-07-03 | Stop reason: HOSPADM

## 2024-07-03 RX ORDER — CEFAZOLIN 1 G/1
INJECTION, POWDER, FOR SOLUTION INTRAVENOUS AS NEEDED
Status: DISCONTINUED | OUTPATIENT
Start: 2024-07-03 | End: 2024-07-03

## 2024-07-03 RX ORDER — LIDOCAINE HYDROCHLORIDE AND EPINEPHRINE 5; 5 MG/ML; UG/ML
INJECTION, SOLUTION INFILTRATION; PERINEURAL AS NEEDED
Status: DISCONTINUED | OUTPATIENT
Start: 2024-07-03 | End: 2024-07-03 | Stop reason: HOSPADM

## 2024-07-03 RX ORDER — ROCURONIUM BROMIDE 10 MG/ML
INJECTION, SOLUTION INTRAVENOUS AS NEEDED
Status: DISCONTINUED | OUTPATIENT
Start: 2024-07-03 | End: 2024-07-03

## 2024-07-03 SDOH — ECONOMIC STABILITY: FOOD INSECURITY: WITHIN THE PAST 12 MONTHS, THE FOOD YOU BOUGHT JUST DIDN'T LAST AND YOU DIDN'T HAVE MONEY TO GET MORE.: NEVER TRUE

## 2024-07-03 SDOH — HEALTH STABILITY: MENTAL HEALTH: HOW OFTEN DO YOU HAVE 6 OR MORE DRINKS ON ONE OCCASION?: NEVER

## 2024-07-03 SDOH — SOCIAL STABILITY: SOCIAL INSECURITY: DO YOU FEEL UNSAFE GOING BACK TO THE PLACE WHERE YOU ARE LIVING?: NO

## 2024-07-03 SDOH — HEALTH STABILITY: MENTAL HEALTH: HOW MANY STANDARD DRINKS CONTAINING ALCOHOL DO YOU HAVE ON A TYPICAL DAY?: PATIENT DOES NOT DRINK

## 2024-07-03 SDOH — SOCIAL STABILITY: SOCIAL INSECURITY: DOES ANYONE TRY TO KEEP YOU FROM HAVING/CONTACTING OTHER FRIENDS OR DOING THINGS OUTSIDE YOUR HOME?: NO

## 2024-07-03 SDOH — HEALTH STABILITY: MENTAL HEALTH: HOW OFTEN DO YOU HAVE A DRINK CONTAINING ALCOHOL?: NEVER

## 2024-07-03 SDOH — HEALTH STABILITY: MENTAL HEALTH
STRESS IS WHEN SOMEONE FEELS TENSE, NERVOUS, ANXIOUS, OR CAN'T SLEEP AT NIGHT BECAUSE THEIR MIND IS TROUBLED. HOW STRESSED ARE YOU?: NOT AT ALL

## 2024-07-03 SDOH — SOCIAL STABILITY: SOCIAL INSECURITY: HAS ANYONE EVER THREATENED TO HURT YOUR FAMILY OR YOUR PETS?: NO

## 2024-07-03 SDOH — HEALTH STABILITY: PHYSICAL HEALTH: ON AVERAGE, HOW MANY DAYS PER WEEK DO YOU ENGAGE IN MODERATE TO STRENUOUS EXERCISE (LIKE A BRISK WALK)?: 5 DAYS

## 2024-07-03 SDOH — ECONOMIC STABILITY: FOOD INSECURITY: WITHIN THE PAST 12 MONTHS, YOU WORRIED THAT YOUR FOOD WOULD RUN OUT BEFORE YOU GOT MONEY TO BUY MORE.: NEVER TRUE

## 2024-07-03 SDOH — SOCIAL STABILITY: SOCIAL INSECURITY: HAVE YOU HAD THOUGHTS OF HARMING ANYONE ELSE?: NO

## 2024-07-03 SDOH — SOCIAL STABILITY: SOCIAL NETWORK: IN A TYPICAL WEEK, HOW MANY TIMES DO YOU TALK ON THE PHONE WITH FAMILY, FRIENDS, OR NEIGHBORS?: TWICE A WEEK

## 2024-07-03 SDOH — ECONOMIC STABILITY: INCOME INSECURITY: HOW HARD IS IT FOR YOU TO PAY FOR THE VERY BASICS LIKE FOOD, HOUSING, MEDICAL CARE, AND HEATING?: NOT HARD AT ALL

## 2024-07-03 SDOH — SOCIAL STABILITY: SOCIAL INSECURITY: ARE YOU OR HAVE YOU BEEN THREATENED OR ABUSED PHYSICALLY, EMOTIONALLY, OR SEXUALLY BY ANYONE?: NO

## 2024-07-03 SDOH — SOCIAL STABILITY: SOCIAL INSECURITY: HAVE YOU HAD ANY THOUGHTS OF HARMING ANYONE ELSE?: NO

## 2024-07-03 SDOH — SOCIAL STABILITY: SOCIAL INSECURITY: ARE THERE ANY APPARENT SIGNS OF INJURIES/BEHAVIORS THAT COULD BE RELATED TO ABUSE/NEGLECT?: NO

## 2024-07-03 SDOH — SOCIAL STABILITY: SOCIAL INSECURITY: DO YOU FEEL ANYONE HAS EXPLOITED OR TAKEN ADVANTAGE OF YOU FINANCIALLY OR OF YOUR PERSONAL PROPERTY?: NO

## 2024-07-03 SDOH — HEALTH STABILITY: PHYSICAL HEALTH: ON AVERAGE, HOW MANY MINUTES DO YOU ENGAGE IN EXERCISE AT THIS LEVEL?: 40 MIN

## 2024-07-03 SDOH — SOCIAL STABILITY: SOCIAL INSECURITY: ABUSE: ADULT

## 2024-07-03 ASSESSMENT — PAIN SCALES - GENERAL
PAINLEVEL_OUTOF10: 0 - NO PAIN

## 2024-07-03 ASSESSMENT — LIFESTYLE VARIABLES
HOW OFTEN DO YOU HAVE 6 OR MORE DRINKS ON ONE OCCASION: NEVER
SUBSTANCE_ABUSE_PAST_12_MONTHS: NO
SKIP TO QUESTIONS 9-10: 1
SKIP TO QUESTIONS 9-10: 1
AUDIT-C TOTAL SCORE: 0
HOW MANY STANDARD DRINKS CONTAINING ALCOHOL DO YOU HAVE ON A TYPICAL DAY: PATIENT DOES NOT DRINK
SKIP TO QUESTIONS 9-10: 1
PRESCIPTION_ABUSE_PAST_12_MONTHS: NO
AUDIT-C TOTAL SCORE: 0
HOW OFTEN DO YOU HAVE A DRINK CONTAINING ALCOHOL: NEVER
AUDIT-C TOTAL SCORE: 0
AUDIT-C TOTAL SCORE: 0

## 2024-07-03 ASSESSMENT — COGNITIVE AND FUNCTIONAL STATUS - GENERAL
MOVING FROM LYING ON BACK TO SITTING ON SIDE OF FLAT BED WITH BEDRAILS: A LITTLE
CLIMB 3 TO 5 STEPS WITH RAILING: A LITTLE
MOBILITY SCORE: 21
PATIENT BASELINE BEDBOUND: NO
TURNING FROM BACK TO SIDE WHILE IN FLAT BAD: A LITTLE
DAILY ACTIVITIY SCORE: 24

## 2024-07-03 ASSESSMENT — PAIN - FUNCTIONAL ASSESSMENT
PAIN_FUNCTIONAL_ASSESSMENT: 0-10

## 2024-07-03 ASSESSMENT — ACTIVITIES OF DAILY LIVING (ADL)
HEARING - LEFT EAR: HEARING AID
BATHING: INDEPENDENT
TOILETING: INDEPENDENT
FEEDING YOURSELF: INDEPENDENT
GROOMING: INDEPENDENT
DRESSING YOURSELF: INDEPENDENT
ADEQUATE_TO_COMPLETE_ADL: YES
WALKS IN HOME: INDEPENDENT
LACK_OF_TRANSPORTATION: NO
JUDGMENT_ADEQUATE_SAFELY_COMPLETE_DAILY_ACTIVITIES: YES
HEARING - RIGHT EAR: HEARING AID
PATIENT'S MEMORY ADEQUATE TO SAFELY COMPLETE DAILY ACTIVITIES?: YES

## 2024-07-03 ASSESSMENT — PATIENT HEALTH QUESTIONNAIRE - PHQ9
SUM OF ALL RESPONSES TO PHQ9 QUESTIONS 1 & 2: 0
2. FEELING DOWN, DEPRESSED OR HOPELESS: NOT AT ALL
1. LITTLE INTEREST OR PLEASURE IN DOING THINGS: NOT AT ALL

## 2024-07-03 NOTE — ANESTHESIA PROCEDURE NOTES
Airway  Date/Time: 7/3/2024 8:51 AM  Urgency: elective    Airway not difficult    Staffing  Performed: NETO   Authorized by: Aamir Smith MD    Performed by: NIKOLAI Ely  Patient location during procedure: OR    Indications and Patient Condition  Indications for airway management: anesthesia and airway protection  Spontaneous Ventilation: absent  Sedation level: deep  Preoxygenated: yes  Patient position: sniffing  MILS not maintained throughout  Mask difficulty assessment: 1 - vent by mask  Planned trial extubation    Final Airway Details  Final airway type: endotracheal airway      Successful airway: ETT  Cuffed: yes   Successful intubation technique: video laryngoscopy  Facilitating devices/methods: intubating stylet  Endotracheal tube insertion site: oral  Blade: Patricia  Blade size: #4  ETT size (mm): 7.0  Cormack-Lehane Classification: grade I - full view of glottis  Placement verified by: chest auscultation and capnometry   Cuff volume (mL): 8  Measured from: lips  ETT to lips (cm): 22  Number of attempts at approach: 1    Additional Comments  Glidescope s4

## 2024-07-03 NOTE — ANESTHESIA POSTPROCEDURE EVALUATION
Patient: Ritesh Garza    Procedure Summary       Date: 07/03/24 Room / Location: [facility] OR 25 / [facility] OR    Anesthesia Start: 0835 Anesthesia Stop: 1307    Procedure: C4-5, C5-6, C6-7 Anterior Cervical and Discectomy (Spine Cervical) Diagnosis:       Cervical stenosis of spine      (Cervical stenosis of spine [M48.02])    Surgeons: Ganesh Bhatt MD Responsible Provider: Aamir Smith MD    Anesthesia Type: general ASA Status: 4            Anesthesia Type: general    Vitals Value Taken Time   /48 07/03/24 1308   Temp 36.5 07/03/24 1308   Pulse 81 07/03/24 1303   Resp 15 07/03/24 1303   SpO2 95 % 07/03/24 1303   Vitals shown include unfiled device data.    Anesthesia Post Evaluation    Patient location during evaluation: PACU  Patient participation: complete - patient participated  Level of consciousness: sleepy but conscious  Pain management: adequate  Airway patency: patent  Cardiovascular status: acceptable  Respiratory status: acceptable  Hydration status: acceptable  Postoperative Nausea and Vomiting: none      There were no known notable events for this encounter.

## 2024-07-03 NOTE — SIGNIFICANT EVENT
73yM h/o TIA, mild dementia, HTN, HLD, CAD s/p CABG (1992 on ASA), CHF (EF 40-45% 5/2024), L thigh hematoma c/b vtach (2/13/2024), 4/2024 [facility] occluded vein grafts w/ 80% L main, L subclavian stenosis s/p L subclavian stents (4/30/2024), s/p dual chamber ICD (5/2024 [employer]), SENIA, liver cirrhosis (fatty liver), DM2, BPH, CKD, gout, b/l carpal tunnel s/p RUE carpal tunnel release (5/2023), 6/30 p/w fall downstairs, CT CTL spine C4-5, C5-6 anterior disc-osteophytes, 7/1 MRI C spine severe C4-5, C5-6, C6-7 stenosis     Now, s/p C4-7 ACDF    Recommendations  Tele for high cardiac perioperative risk  Q4 neuro checks  Ancef while drain in place  Drain per neurosurgery - please record qshift output  Hold antiplatelets  Upright cervical AP and lateral xrays 7/4 (will order)  Please page neurosurgery stat for high drain output, concern for neck hematoma  SBP<160  Will need post op pain regimen  Recs relayed to trauma team  Please page [phone] with any questions

## 2024-07-03 NOTE — OP NOTE
C4-5, C5-6, C6-7 Anterior Cervical and Discectomy Operative Note     Date: 2024 - 7/3/2024  OR Location: [facility] OR    Name: Ritesh Garza, : 1950, Age: 73 y.o., MRN: [id number], Sex: male    Diagnosis  Pre-op Diagnosis     * Cervical stenosis of spine [M48.02] Post-op Diagnosis     * Cervical stenosis of spine [M48.02]     Procedures  C4-5, C5-6, C6-7 Anterior Cervical and Discectomy   ALLOGRAFT FOR SPINE SURGERY ONLY MORSELIZED      Surgeons      * Ganesh Bhatt - Primary    Resident/Fellow/Other Assistant:  Surgeons and Role:     * Good Robles MD - Resident - Assisting    Procedure Summary  Anesthesia: General  ASA: IV  Anesthesia Staff: Anesthesiologist: Aamir Smith MD  C-AA: NIKOLAI Ely  Estimated Blood Loss: 50mL  Intra-op Medications:   Administrations occurring from 0815 to 1235 on 24:   Medication Name Total Dose   lidocaine-epinephrine (Xylocaine W/EPI) 0.5 %-1:200,000 injection 3 mL   thrombin (recombinant) (Recothrom) topical solution 10,000 Units   polymyxin B injection 500,000 Units   acetaminophen (Tylenol) tablet 650 mg Cannot be calculated   dextrose 50 % injection 12.5 g Cannot be calculated   dextrose 50 % injection 25 g Cannot be calculated   docusate sodium (Colace) capsule 100 mg Cannot be calculated   gabapentin (Neurontin) capsule 100 mg Cannot be calculated   glucagon (Glucagen) injection 1 mg Cannot be calculated   glucagon (Glucagen) injection 1 mg Cannot be calculated   HYDROmorphone (Dilaudid) injection 0.2 mg Cannot be calculated   insulin regular (HumuLIN R,NovoLIN R) injection 0-5 Units Cannot be calculated   methocarbamol (Robaxin) tablet 500 mg Cannot be calculated   naloxone (Narcan) injection 0.2 mg Cannot be calculated   OLANZapine zydis (ZyPREXA) disintegrating tablet 5 mg Cannot be calculated   oxyCODONE (Roxicodone) immediate release tablet 10 mg Cannot be calculated   oxyCODONE (Roxicodone) immediate release tablet 5 mg  DISCHARGE  Reason for Discharge: Patient has failed to schedule further appointments.    Equipment Issued:     Discharge Plan: Patient to continue home program.    Referring Provider:  Homer Sumner     Cannot be calculated   polyethylene glycol (Glycolax, Miralax) packet 17 g Cannot be calculated              Anesthesia Record               Intraprocedure I/O Totals       None           Specimen: No specimens collected     Staff:   Circulator: Jorge Ibrahim Person: Sayra  Circulator: Sony Garcia Circulator: Cherri         Drains and/or Catheters:   Closed/Suction Drain 1 Anterior Neck Bulb 10 Fr. (Active)       Implants:  Implants       Type Name Action Serial No.      Spinal Hardware SCREW, DISTRACTION, ACF, 14 MM - [id number] Used, Not Implanted      Spinal Hardware SCREW, DISTRACTION, ACF, 14 MM - [id number] Used, Not Implanted      Screw DISTRACTION SCREW, QUICK START, 14 MM, STERILE - [id number] Implanted      Screw DISTRACTION SCREW, QUICK START, 14 MM, STERILE - [id number] Implanted      Spinal Hardware ALLOGRAFT, TRIAD LORDOTIC 7 X 11 X 14 - [id number] - [id number] Implanted [id number]     Spinal Hardware ALLOGRAFT, TRIAD LORDOTIC 7 X 11 X 14 - [id number] - [id number] Implanted [id number]     Spinal Hardware ALLOGRAFT, TRIAD LORDOTIC 7 X 11 X 14 - [id number] - [id number] Implanted [id number]     Spinal Hardware SCREW, ACP, SELF DRILL, 3.5 X 17MM, VARIABLE - [id number] Implanted      Plate 56mm PLATE Implanted      Screw 19mm SCREW Implanted                     Informed Consent:  The risks, benefits, complications, and alternatives were discussed with the patient. I have explained the surgical procedure in detail with expected duration and extent of recovery along risks of surgery that include, but is not limited to bleeding, infection, blood vessel injury or damage, loss of sensation, loss of bladder, bowel or sexual function, nerve injury/damage resulting in weakness/paralysis, malunion, nonunion, CSF leak, brachial plexus injury, peripheral vision blindness, failure of implants/fusion, failure to relieve symptoms, recurrent disease, adjacent segment disease, need to reoperate for any reason and general  anesthesia reaction such as stroke, coma, heart attack, delirium, confusion, death as well as worsening of preexisted medical conditions.     I clearly emphasized that while the goal of surgery is to decompress the spinal cord so as to ARREST the progression of neurological deficits - preexisting deficits may or may not improve after surgery. We discussed that many patients do clinically improve in functional and neurological outcomes following decompression of the spinal elements in patients but the extent of which is variable and depends on the severity of pain, numbness, tingling, or weakness. With improvement seen of those symptoms in that order. We did discuss the goal of surgery to alleviate pain first and foremost and hope for recovery of all neurologic function with time.     All questions were answered and the patient was amenable to proceed.     INDICATIONS FOR THE PROCEDURE: Ritesh Garza is an 73 y.o. male who is having surgery for Cervical stenosis of spine [M48.02].       DESCRIPTION OF THE OPERATION:   The patient was brought to the operating room theater. A verbal Huddle was performed confirming the patient by name, date of birth, medical record number, site of surgery. After all team members were in agreement, they underwent anesthesia induction without complication. Two large bore IVs were placed as well as endotracheal tube. Perioperative antibiotic administration was confirmed. A horizontal incision from the midline of the trachea over to the medial aspect of the sternocleidomastoid was marked at approximately the C4-5, C5-6, C6-7 disc spaces and this was confirmed with lateral fluoroscopy. Next, we prepped and draped the neck in a sterile fashion and infiltrated the skin with lidocaine with epinephrine.    We then began the procedure by opening the skin with a 15 blade and using combination of Bovie electrocautery and blunt dissection, we exposed the platysma muscle and this was bisected in a  horizontal fashion. Staying along the medial aspect of the sternocleidomastoid we used blunt dissection to retract the carotid sheath medially, omohyoid muscle inferiorly, and trachea/esophagus medially exposing the precervical fascia. We then exposed the anterior aspect of the cervical spine with blunt dissection. Using Bovie electrocautery, we reflected the longus colli muscles over the our disc space and used lateral fluoroscopy to confirm our level of interest. We then placed our self-retaining retractor in and Chittenden pins in and placed the disc space under distraction. We then performed an annulotomy with an 11 blade followed by Kerrison rongeurs and curettes. We performed a complete total discectomy under microscopic assistance. After completion of the discectomy, we removed the posterior longitudinal ligament using microsurgical technique with a nerve hook, curettes, and kerrison rongeurs. We then burred out the uncinate processes bilaterally and performed foraminotomies with a Kerrison rongeur. This was confirmed with a large blunt nerve hook bilaterally.    FloSeal and bipolar cautery were next used to achieve hemostasis. Contouring and arthrodesis of the disc space was then finally performed with a high-speed drill. Anterior osteophytes were removed, and a trial was then inserted in the disc space and a 7mm structural allograft interbody was placed without complication under fluoroscopic guidance.    We then turned our attention to C5-6  and performed the same surgical technique for discectomy, foraminotomy, and confirmation of our decompression. Contouring and arthrodesis of the disc space was then finally performed with a high-speed drill. Anterior osteophytes were removed, and a trial was then inserted in the disc space and a 7mm structural allograft interbody was placed without complication under fluoroscopic guidance at the second level as well.    We then turned our attention to C4-5 and performed  the same surgical technique for discectomy, foraminotomy, and confirmation of our decompression. Contouring and arthrodesis of the disc space was then finally performed with a high-speed drill. Anterior osteophytes were removed, and a trial was then inserted in the disc space and a 7mm structural allograft interbody was placed without complication under fluoroscopic guidance at the second level as well.    We then used an anterior plate and secured this with 19mm screws to fixate the spine in place. This was final tightened and secured to the vertebral bodies of C4-C7. Copious amounts of irrigation were used and FloSeal and Bipolar caudery for hemostasis. Final x-rays confirmed accurate placement of all hardware across the C4-C7 disc spaces. The platysma and dermal layers were then approximated with 3-0 Vicryl sutures. Skin was approximated with a Biosyn stitch in a subcuticular fashion followed by Dermabond on the skin. The patient was then extubated and returned to PACU in stable condition.    Disposition: PACU - hemodynamically stable.    Condition: stable    Attending Attestation: I was present and scrubbed for the key portions of the procedure.      Ganesh Bhatt MD, [facility]  Spine , [facility]  Benitez Robison and Radha Robison Chair in Spinal Neurosurgery  Neurosurgery , [facility] and [facility]  Complex Spine Surgery Fellowship Director   of Neurological Surgery  [facility] [employer]  Office: [phone]  Fax: [phone]

## 2024-07-03 NOTE — ANESTHESIA PROCEDURE NOTES
Arterial Line:    Date/Time: 7/3/2024 9:05 AM    Staffing  Performed: CAA   Authorized by: Aamir Smith MD    Performed by: NIKOLAI Ely    An arterial line was placed. Procedure performed using surface landmarks.in the OR for the following indication(s): continuous blood pressure monitoring.    A 20 gauge (size), 1 and 3/4 inch (length), Angiocath (type) catheter was placed into the Right radial artery, secured by Tegaderm,   Seldinger technique not used.  Events:  patient tolerated procedure well with no complications.      Additional notes:  Sterile technique

## 2024-07-03 NOTE — ED NOTES
This RN was told in report that the patient was refusing to wear C-collar and the trauma team was contacted and aware during the day and okay with patient having collar off. Upon looking through orders there was still an order for c-collar. Notes from Dr. Nahid Sethi on 7/2 at 07:16am say the plan states trauma is primary and no need for cervical collar. Upon contacting trauma team to verify and see if order is still needed, Provider Miguel Hudson stated he would still like patient in c-collar. Patient placed in c-collar at this time.      Maria Guadalupe Bauer RN  07/03/24 0456

## 2024-07-03 NOTE — ANESTHESIA PROCEDURE NOTES
Peripheral IV  Date/Time: 7/3/2024 9:00 AM  Inserted by: NIKOLAI Ely    Placement  Needle size: 16 G  Laterality: left  Location: forearm  Local anesthetic: none  Site prep: alcohol  Technique: anatomical landmarks  Attempts: 1

## 2024-07-03 NOTE — PROGRESS NOTES
"Ritesh Garza is a 73 y.o. male on day 2 of admission presenting with Central cord syndrome, initial encounter (Multi).    Subjective   No events overnight    Objective     Physical Exam  AOx3  Wearing cervical collar  RUE D5 B5 T5 HG5 IO 4  RLE HF5 KE5 DF/PF 5  LUE D5 B5 T5 HG5 IO 4  LLE HF5 KE5 DF/PF 5  No cardozo or clonus    Last Recorded Vitals  Blood pressure 138/72, pulse 90, temperature 36 °C (96.8 °F), temperature source Temporal, resp. rate 18, height 1.6 m (5' 2.99\"), weight 69.4 kg (153 lb), SpO2 (!) 93%.  Intake/Output last 3 Shifts:  No intake/output data recorded.    Relevant Results  Lab Results   Component Value Date    WBC 7.7 07/02/2024    HGB 10.7 (L) 07/02/2024    HCT 31.8 (L) 07/02/2024    MCV 91 07/02/2024     07/02/2024     Lab Results   Component Value Date    GLUCOSE 140 (H) 07/03/2024    CALCIUM 8.8 07/03/2024     07/03/2024    K 5.0 07/03/2024    CO2 19 (L) 07/03/2024     07/03/2024    BUN 56 (H) 07/03/2024    CREATININE 2.80 (H) 07/03/2024       Assessment/Plan   Principal Problem:    Central cord syndrome, initial encounter (Multi)  Active Problems:    Cervical stenosis of spine    73yM h/o TIA, mild dementia, HTN, HLD, CAD s/p CABG (1992 on ASA), CHF (EF 40-45% 5/2024), L thigh hematoma c/b vtach (2/13/2024), 4/2024 [facility] occluded vein grafts w/ 80% L main, L subclavian stenosis s/p L subclavian stents (4/30/2024), s/p dual chamber ICD (5/2024 [employer]), SENIA, liver cirrhosis (fatty liver), DM2, BPH, CKD, gout, b/l carpal tunnel s/p RUE carpal tunnel release (5/2023), 6/30 p/w fall downstairs, CT CTL spine C4-5, C5-6 anterior disc-osteophytes, 7/1 MRI C spine severe C4-5, C5-6, C6-7 stenosis     Trauma primary  OR today for ACDF  Hold ASA, PLX will determine restart  Cards recs- RCRI 3, restart DAPT as soon as possible post-op, medically optimized ok to proceed  SCD    H&P from 7/1 reviewed. The patient was examined and there are no changes to the H&P.        "         Nahid Sethi MD

## 2024-07-04 ENCOUNTER — APPOINTMENT (OUTPATIENT)
Dept: RADIOLOGY | Facility: HOSPITAL | Age: 74
DRG: 472 | End: 2024-07-04
Payer: MEDICARE

## 2024-07-04 LAB
ALBUMIN SERPL BCP-MCNC: 3.7 G/DL (ref 3.4–5)
ALBUMIN SERPL BCP-MCNC: 3.7 G/DL (ref 3.4–5)
ALBUMIN SERPL BCP-MCNC: 3.9 G/DL (ref 3.4–5)
ALP SERPL-CCNC: 80 U/L (ref 33–136)
ALT SERPL W P-5'-P-CCNC: 23 U/L (ref 10–52)
ANION GAP BLDA CALCULATED.4IONS-SCNC: 15 MMO/L (ref 10–25)
ANION GAP BLDA CALCULATED.4IONS-SCNC: 16 MMO/L (ref 10–25)
ANION GAP SERPL CALC-SCNC: 18 MMOL/L (ref 10–20)
ANION GAP SERPL CALC-SCNC: 18 MMOL/L (ref 10–20)
AST SERPL W P-5'-P-CCNC: 45 U/L (ref 9–39)
BASE EXCESS BLDA CALC-SCNC: -1.9 MMOL/L (ref -2–3)
BASE EXCESS BLDA CALC-SCNC: -2.5 MMOL/L (ref -2–3)
BILIRUB DIRECT SERPL-MCNC: 0.2 MG/DL (ref 0–0.3)
BILIRUB SERPL-MCNC: 0.6 MG/DL (ref 0–1.2)
BODY TEMPERATURE: 37 DEGREES CELSIUS
BODY TEMPERATURE: 37 DEGREES CELSIUS
BUN SERPL-MCNC: 67 MG/DL (ref 6–23)
BUN SERPL-MCNC: 68 MG/DL (ref 6–23)
CA-I BLDA-SCNC: 1.19 MMOL/L (ref 1.1–1.33)
CA-I BLDA-SCNC: 1.19 MMOL/L (ref 1.1–1.33)
CALCIUM SERPL-MCNC: 8.7 MG/DL (ref 8.6–10.6)
CALCIUM SERPL-MCNC: 8.8 MG/DL (ref 8.6–10.6)
CHLORIDE BLDA-SCNC: 105 MMOL/L (ref 98–107)
CHLORIDE BLDA-SCNC: 105 MMOL/L (ref 98–107)
CHLORIDE SERPL-SCNC: 103 MMOL/L (ref 98–107)
CHLORIDE SERPL-SCNC: 104 MMOL/L (ref 98–107)
CO2 SERPL-SCNC: 21 MMOL/L (ref 21–32)
CO2 SERPL-SCNC: 23 MMOL/L (ref 21–32)
CREAT SERPL-MCNC: 2.81 MG/DL (ref 0.5–1.3)
CREAT SERPL-MCNC: 2.85 MG/DL (ref 0.5–1.3)
EGFRCR SERPLBLD CKD-EPI 2021: 22 ML/MIN/1.73M*2
EGFRCR SERPLBLD CKD-EPI 2021: 23 ML/MIN/1.73M*2
ERYTHROCYTE [DISTWIDTH] IN BLOOD BY AUTOMATED COUNT: 14.6 % (ref 11.5–14.5)
GLUCOSE BLD MANUAL STRIP-MCNC: 186 MG/DL (ref 74–99)
GLUCOSE BLD MANUAL STRIP-MCNC: 191 MG/DL (ref 74–99)
GLUCOSE BLD MANUAL STRIP-MCNC: 192 MG/DL (ref 74–99)
GLUCOSE BLD MANUAL STRIP-MCNC: 205 MG/DL (ref 74–99)
GLUCOSE BLD MANUAL STRIP-MCNC: 229 MG/DL (ref 74–99)
GLUCOSE BLD MANUAL STRIP-MCNC: 234 MG/DL (ref 74–99)
GLUCOSE BLDA-MCNC: 211 MG/DL (ref 74–99)
GLUCOSE BLDA-MCNC: 230 MG/DL (ref 74–99)
GLUCOSE SERPL-MCNC: 194 MG/DL (ref 74–99)
GLUCOSE SERPL-MCNC: 234 MG/DL (ref 74–99)
HCO3 BLDA-SCNC: 21.4 MMOL/L (ref 22–26)
HCO3 BLDA-SCNC: 21.9 MMOL/L (ref 22–26)
HCT VFR BLD AUTO: 29.2 % (ref 41–52)
HCT VFR BLD EST: 31 % (ref 41–52)
HCT VFR BLD EST: 33 % (ref 41–52)
HGB BLD-MCNC: 9.3 G/DL (ref 13.5–17.5)
HGB BLDA-MCNC: 10.3 G/DL (ref 13.5–17.5)
HGB BLDA-MCNC: 10.9 G/DL (ref 13.5–17.5)
INHALED O2 CONCENTRATION: 80 %
INHALED O2 CONCENTRATION: 90 %
LACTATE BLDA-SCNC: 1.2 MMOL/L (ref 0.4–2)
LACTATE BLDA-SCNC: 1.7 MMOL/L (ref 0.4–2)
MCH RBC QN AUTO: 30.2 PG (ref 26–34)
MCHC RBC AUTO-ENTMCNC: 31.8 G/DL (ref 32–36)
MCV RBC AUTO: 95 FL (ref 80–100)
NRBC BLD-RTO: 0 /100 WBCS (ref 0–0)
OXYHGB MFR BLDA: 86.4 % (ref 94–98)
OXYHGB MFR BLDA: 93.5 % (ref 94–98)
PCO2 BLDA: 33 MM HG (ref 38–42)
PCO2 BLDA: 33 MM HG (ref 38–42)
PH BLDA: 7.42 PH (ref 7.38–7.42)
PH BLDA: 7.43 PH (ref 7.38–7.42)
PHOSPHATE SERPL-MCNC: 3 MG/DL (ref 2.5–4.9)
PHOSPHATE SERPL-MCNC: 3.8 MG/DL (ref 2.5–4.9)
PLATELET # BLD AUTO: 184 X10*3/UL (ref 150–450)
PO2 BLDA: 53 MM HG (ref 85–95)
PO2 BLDA: 67 MM HG (ref 85–95)
POTASSIUM BLDA-SCNC: 5 MMOL/L (ref 3.5–5.3)
POTASSIUM BLDA-SCNC: 5 MMOL/L (ref 3.5–5.3)
POTASSIUM SERPL-SCNC: 4.7 MMOL/L (ref 3.5–5.3)
POTASSIUM SERPL-SCNC: 5 MMOL/L (ref 3.5–5.3)
PROT SERPL-MCNC: 6.7 G/DL (ref 6.4–8.2)
RBC # BLD AUTO: 3.08 X10*6/UL (ref 4.5–5.9)
SAO2 % BLDA: 89 % (ref 94–100)
SAO2 % BLDA: 96 % (ref 94–100)
SODIUM BLDA-SCNC: 137 MMOL/L (ref 136–145)
SODIUM BLDA-SCNC: 137 MMOL/L (ref 136–145)
SODIUM SERPL-SCNC: 138 MMOL/L (ref 136–145)
SODIUM SERPL-SCNC: 139 MMOL/L (ref 136–145)
WBC # BLD AUTO: 11.9 X10*3/UL (ref 4.4–11.3)

## 2024-07-04 PROCEDURE — 2500000002 HC RX 250 W HCPCS SELF ADMINISTERED DRUGS (ALT 637 FOR MEDICARE OP, ALT 636 FOR OP/ED)

## 2024-07-04 PROCEDURE — 71045 X-RAY EXAM CHEST 1 VIEW: CPT

## 2024-07-04 PROCEDURE — 99291 CRITICAL CARE FIRST HOUR: CPT

## 2024-07-04 PROCEDURE — 97161 PT EVAL LOW COMPLEX 20 MIN: CPT | Mod: GP

## 2024-07-04 PROCEDURE — 2500000004 HC RX 250 GENERAL PHARMACY W/ HCPCS (ALT 636 FOR OP/ED): Performed by: STUDENT IN AN ORGANIZED HEALTH CARE EDUCATION/TRAINING PROGRAM

## 2024-07-04 PROCEDURE — 94640 AIRWAY INHALATION TREATMENT: CPT

## 2024-07-04 PROCEDURE — 71045 X-RAY EXAM CHEST 1 VIEW: CPT | Performed by: RADIOLOGY

## 2024-07-04 PROCEDURE — 2500000004 HC RX 250 GENERAL PHARMACY W/ HCPCS (ALT 636 FOR OP/ED): Mod: JZ | Performed by: STUDENT IN AN ORGANIZED HEALTH CARE EDUCATION/TRAINING PROGRAM

## 2024-07-04 PROCEDURE — 36415 COLL VENOUS BLD VENIPUNCTURE: CPT

## 2024-07-04 PROCEDURE — 2500000001 HC RX 250 WO HCPCS SELF ADMINISTERED DRUGS (ALT 637 FOR MEDICARE OP): Performed by: STUDENT IN AN ORGANIZED HEALTH CARE EDUCATION/TRAINING PROGRAM

## 2024-07-04 PROCEDURE — 84132 ASSAY OF SERUM POTASSIUM: CPT

## 2024-07-04 PROCEDURE — 97165 OT EVAL LOW COMPLEX 30 MIN: CPT | Mod: GO

## 2024-07-04 PROCEDURE — 82947 ASSAY GLUCOSE BLOOD QUANT: CPT

## 2024-07-04 PROCEDURE — 2500000005 HC RX 250 GENERAL PHARMACY W/O HCPCS: Performed by: STUDENT IN AN ORGANIZED HEALTH CARE EDUCATION/TRAINING PROGRAM

## 2024-07-04 PROCEDURE — 85027 COMPLETE CBC AUTOMATED: CPT

## 2024-07-04 PROCEDURE — 2500000005 HC RX 250 GENERAL PHARMACY W/O HCPCS: Performed by: ANESTHESIOLOGY

## 2024-07-04 PROCEDURE — 2500000002 HC RX 250 W HCPCS SELF ADMINISTERED DRUGS (ALT 637 FOR MEDICARE OP, ALT 636 FOR OP/ED): Performed by: STUDENT IN AN ORGANIZED HEALTH CARE EDUCATION/TRAINING PROGRAM

## 2024-07-04 PROCEDURE — 2500000004 HC RX 250 GENERAL PHARMACY W/ HCPCS (ALT 636 FOR OP/ED)

## 2024-07-04 PROCEDURE — 2020000001 HC ICU ROOM DAILY

## 2024-07-04 PROCEDURE — 80053 COMPREHEN METABOLIC PANEL: CPT

## 2024-07-04 PROCEDURE — 36600 WITHDRAWAL OF ARTERIAL BLOOD: CPT

## 2024-07-04 PROCEDURE — 82435 ASSAY OF BLOOD CHLORIDE: CPT | Performed by: STUDENT IN AN ORGANIZED HEALTH CARE EDUCATION/TRAINING PROGRAM

## 2024-07-04 PROCEDURE — 72040 X-RAY EXAM NECK SPINE 2-3 VW: CPT | Performed by: RADIOLOGY

## 2024-07-04 PROCEDURE — 72040 X-RAY EXAM NECK SPINE 2-3 VW: CPT

## 2024-07-04 PROCEDURE — 82040 ASSAY OF SERUM ALBUMIN: CPT

## 2024-07-04 RX ORDER — HYDROMORPHONE HYDROCHLORIDE 1 MG/ML
0.2 INJECTION, SOLUTION INTRAMUSCULAR; INTRAVENOUS; SUBCUTANEOUS
Status: DISCONTINUED | OUTPATIENT
Start: 2024-07-04 | End: 2024-07-04

## 2024-07-04 RX ORDER — FUROSEMIDE 40 MG/1
40 TABLET ORAL DAILY
Status: DISCONTINUED | OUTPATIENT
Start: 2024-07-04 | End: 2024-07-04

## 2024-07-04 RX ORDER — HYDROMORPHONE HYDROCHLORIDE 1 MG/ML
0.2 INJECTION, SOLUTION INTRAMUSCULAR; INTRAVENOUS; SUBCUTANEOUS
Status: DISCONTINUED | OUTPATIENT
Start: 2024-07-04 | End: 2024-07-09

## 2024-07-04 RX ORDER — FUROSEMIDE 10 MG/ML
40 INJECTION INTRAMUSCULAR; INTRAVENOUS ONCE
Status: DISCONTINUED | OUTPATIENT
Start: 2024-07-04 | End: 2024-07-04

## 2024-07-04 RX ORDER — FUROSEMIDE 10 MG/ML
80 INJECTION INTRAMUSCULAR; INTRAVENOUS ONCE
Status: COMPLETED | OUTPATIENT
Start: 2024-07-04 | End: 2024-07-04

## 2024-07-04 RX ORDER — DEXAMETHASONE SODIUM PHOSPHATE 10 MG/ML
10 INJECTION INTRAMUSCULAR; INTRAVENOUS ONCE
Status: COMPLETED | OUTPATIENT
Start: 2024-07-04 | End: 2024-07-04

## 2024-07-04 RX ORDER — NAPROXEN SODIUM 220 MG/1
81 TABLET, FILM COATED ORAL DAILY
Status: DISCONTINUED | OUTPATIENT
Start: 2024-07-04 | End: 2024-07-16 | Stop reason: HOSPADM

## 2024-07-04 RX ORDER — OXYCODONE HYDROCHLORIDE 5 MG/1
5 TABLET ORAL EVERY 6 HOURS PRN
Status: DISCONTINUED | OUTPATIENT
Start: 2024-07-04 | End: 2024-07-07

## 2024-07-04 RX ORDER — AMIODARONE HYDROCHLORIDE 200 MG/1
200 TABLET ORAL DAILY
Status: DISCONTINUED | OUTPATIENT
Start: 2024-07-04 | End: 2024-07-16 | Stop reason: HOSPADM

## 2024-07-04 RX ORDER — IPRATROPIUM BROMIDE AND ALBUTEROL SULFATE 2.5; .5 MG/3ML; MG/3ML
3 SOLUTION RESPIRATORY (INHALATION)
Status: DISCONTINUED | OUTPATIENT
Start: 2024-07-04 | End: 2024-07-05

## 2024-07-04 RX ORDER — OXYCODONE HYDROCHLORIDE 5 MG/1
2.5 TABLET ORAL EVERY 6 HOURS PRN
Status: DISCONTINUED | OUTPATIENT
Start: 2024-07-04 | End: 2024-07-07

## 2024-07-04 RX ORDER — METOPROLOL TARTRATE 25 MG/1
25 TABLET, FILM COATED ORAL EVERY 12 HOURS
Status: DISCONTINUED | OUTPATIENT
Start: 2024-07-04 | End: 2024-07-10

## 2024-07-04 ASSESSMENT — COGNITIVE AND FUNCTIONAL STATUS - GENERAL
HELP NEEDED FOR BATHING: A LITTLE
WALKING IN HOSPITAL ROOM: A LITTLE
MOVING FROM LYING ON BACK TO SITTING ON SIDE OF FLAT BED WITH BEDRAILS: A LITTLE
CLIMB 3 TO 5 STEPS WITH RAILING: A LOT
DRESSING REGULAR LOWER BODY CLOTHING: A LITTLE
TURNING FROM BACK TO SIDE WHILE IN FLAT BAD: A LITTLE
DAILY ACTIVITIY SCORE: 19
DRESSING REGULAR UPPER BODY CLOTHING: A LITTLE
PERSONAL GROOMING: A LITTLE
STANDING UP FROM CHAIR USING ARMS: A LITTLE
MOBILITY SCORE: 17
MOVING TO AND FROM BED TO CHAIR: A LITTLE
TOILETING: A LITTLE

## 2024-07-04 ASSESSMENT — PAIN - FUNCTIONAL ASSESSMENT
PAIN_FUNCTIONAL_ASSESSMENT: 0-10

## 2024-07-04 ASSESSMENT — ACTIVITIES OF DAILY LIVING (ADL)
ADL_ASSISTANCE: INDEPENDENT
ADL_ASSISTANCE: INDEPENDENT
BATHING_ASSISTANCE: MINIMAL

## 2024-07-04 ASSESSMENT — PAIN SCALES - GENERAL
PAINLEVEL_OUTOF10: 0 - NO PAIN
PAINLEVEL_OUTOF10: 3
PAINLEVEL_OUTOF10: 2
PAINLEVEL_OUTOF10: 0 - NO PAIN
PAINLEVEL_OUTOF10: 3
PAINLEVEL_OUTOF10: 0 - NO PAIN

## 2024-07-04 NOTE — SIGNIFICANT EVENT
Pt placed on Airvo 60L 80% d/t sustained desat of 84-88% ABG drawn pt is hypoxic but slowly regaining sat

## 2024-07-04 NOTE — SIGNIFICANT EVENT
Rapid response called to assist with ICU transfer. Patient had pulled VIDYA drain out from neck and started to develop a hematoma. TSICU accepted patient. Rapid response was called to help assess growing hematoma on neck and assist with transfer to TSICU. Bedside RN, RT, TSICU doctor at bedside for transfer. Patient arrived in TSICU bed 8 at 0605.

## 2024-07-04 NOTE — PROGRESS NOTES
Physical Therapy    Physical Therapy Evaluation    Patient Name: Ritesh Garza  MRN: [id number]  Today's Date: 7/4/2024   Time Calculation  Start Time: 0915  Stop Time: 0929  Time Calculation (min): 14 min    Assessment/Plan   PT Assessment  PT Assessment Results: Decreased strength, Decreased endurance, Impaired balance, Decreased mobility  Rehab Prognosis: Good  Medical Staff Made Aware: Yes  End of Session Communication: Bedside nurse  Assessment Comment: Pt demonstrated ability to complete bed mobility, sit<>stand transfer and ambulation with FWW.  CGA-minAx1 provided throughout session.  Slightly impulsive.  Vitals stable.  End of Session Patient Position: Bed, 2 rail up, Alarm off, not on at start of session (On transport cart)  IP OR SWING BED PT PLAN  Inpatient or Swing Bed: Inpatient  PT Plan  Treatment/Interventions: Bed mobility, Transfer training, Gait training, Stair training, Balance training, Strengthening, Endurance training, Therapeutic exercise, Therapeutic activity  PT Plan: Ongoing PT  PT Frequency: Daily  PT Discharge Recommendations: Low intensity level of continued care, 24 hr supervision due to cognition  Equipment Recommended upon Discharge: Wheeled walker  PT Recommended Transfer Status: Assist x1, Assistive device  PT - OK to Discharge: Yes      Subjective   General Visit Information:  General  Reason for Referral: Pt admitted 2/2 going up his stairs and missed the top step and fell down and landed on L side/head.  Pt now s/p ACD C4-5, 5-6, 6-7 (INJURIES: Spinal canal stenosis w concern for TSCI (central cord). Adrenal hemorrhage)  Past Medical History Relevant to Rehab: 1. CAD s/p MI s/p CABG 2021 s/p and cardiac debrillator placement 5/24   2. DM2  3. Asthma  4. Gout  5. HTN  6. Liver cirrohsis  7. BPH 8. TIA  9.CHF 10.SENIA 11.CKD  Missed Visit: No  Family/Caregiver Present: No  Co-Treatment: OT  Co-Treatment Reason: Maximize pts functional mobility  Prior to Session Communication:  Bedside nurse (Resident ok'd mobility with recent neck hematoma)  Patient Position Received: Bed, 3 rail up, Alarm on (Sitter present)  Preferred Learning Style: auditory, verbal  General Comment: Pt awake, alert and willing to participate in therapy session  Home Living:  Home Living  Type of Home: House  Lives With: Spouse  Home Layout:  (Ramp to enter, bed/bath - 2nd floor with 1 railing, both types of showers but uses walk in with GB)  Prior Level of Function:  Prior Function Per Pt/Caregiver Report  Level of [place]: Independent with ADLs and functional transfers, Independent with homemaking with ambulation  ADL Assistance: Independent  Homemaking Assistance: Independent  Ambulatory Assistance: Independent  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[employer])  Prior Function Comments: (+) drives, x2 falls in past 6 months  Precautions:  Precautions  Hearing/Visual Limitations: (+) glasses, hearing appears WFL  Medical Precautions: Fall precautions, Oxygen therapy device and L/min  Post-Surgical Precautions: Spinal precautions  Precautions Comment: SBP <160  Vital Signs:  Vital Signs  Heart Rate: 93 (Post: 93)  Heart Rate Source: Monitor  Resp: 17  SpO2: 97 % (Post; 97)  BP: 119/64 (Post: 120/73)  MAP (mmHg): 82 (Post: 89)  BP Location: Left arm  BP Method: Automatic  Patient Position: Lying    Objective   Pain:  Pain Assessment  Pain Assessment: 0-10  0-10 (Numeric) Pain Score: 3  Pain Type: Surgical pain  Pain Location: Neck  Cognition:  Cognition  Overall Cognitive Status: Within Functional Limits  Orientation Level:  (AO x3)    General Assessments:  General Observation  General Observation: 6L, tele     Activity Tolerance  Endurance: Tolerates 10 - 20 min exercise with multiple rests  Early Mobility/Exercise Safety Screen: Proceed with mobilization - No exclusion criteria met  Activity Tolerance Comments: Limited mobility d/t transport arriving to take pt to xray    Sensation  Light Touch: No apparent deficits  (Post-op)    Strength  Strength Comments: BLE at least 3/5 shown through functional mobilty  Postural Control  Postural Control: Within Functional Limits    Static Sitting Balance  Static Sitting-Balance Support: Bilateral upper extremity supported, Feet supported  Static Sitting-Level of Assistance: Close supervision    Static Standing Balance  Static Standing-Balance Support: Bilateral upper extremity supported  Static Standing-Level of Assistance: Contact guard  Static Standing-Comment/Number of Minutes: FWW  Functional Assessments:  ADL  ADL's Addressed:  (Refer to OTs note for additional information)    Bed Mobility  Bed Mobility: Yes  Bed Mobility 1  Bed Mobility 1: Supine to sitting  Level of Assistance 1: Contact guard  Bed Mobility Comments 1: HOB elevated, cues for sequencing (log roll)  Bed Mobility 2  Bed Mobility  2: Sitting to supine  Level of Assistance 2: Minimum assistance (x1)  Bed Mobility Comments 2: HOB slightly elevated, assistance with advancing LEs onto the bed.  Cues for sequencing to complete log roll    Transfers  Transfer: Yes  Transfer 1  Transfer From 1: Sit to, Stand to  Transfer to 1: Sit, Stand  Technique 1: Sit to stand, Stand to sit  Transfer Device 1: Walker  Transfer Level of Assistance 1: Contact guard  Trials/Comments 1: Cues for hand placement and proper use of AD (Slightly impulsive)    Ambulation/Gait Training  Ambulation/Gait Training Performed: Yes  Ambulation/Gait Training 1  Surface 1: Level tile  Device 1: Rolling walker  Assistance 1: Contact guard  Comments/Distance (ft) 1: ~15ft (Limited distance d/t transport arriving;  Slightly impulsive with movement)    Stairs  Stairs: No  Extremity/Trunk Assessments:  RLE   RLE : Within Functional Limits  LLE   LLE : Within Functional Limits  Outcome Measures:  AMPAC Basic Mobility  Turning from your back to your side while in a flat bed without using bedrails: A little  Moving from lying on your back to sitting on the side of a  flat bed without using bedrails: A little  Moving to and from bed to chair (including a wheelchair): A little  Standing up from a chair using your arms (e.g. wheelchair or bedside chair): A little  To walk in hospital room: A little  Climbing 3-5 steps with railing: A lot  Basic Mobility - Total Score: 17    FSS-ICU  Ambulation: Walks <50 feet with any assistance x1 or walks any distance with assistance x2 people  Rolling: Supervision or set-up only  Sitting: Supervision or set-up only  Transfer Sit-to-Stand: Supervision or set-up only  Transfer Supine-to-Sit: Supervision or set-up only  Total Score: 21    Early Mobility/Exercise Safety Screen: Proceed with mobilization - No exclusion criteria met  ICU Mobility Scale: Walking with assistance of 1 person [8]    Encounter Problems       Encounter Problems (Active)       Balance       Pt will demonstrate ability to complete standing static/dynamic balance activities with unilateral UE support and no LOB for increase in safety up D/C.  (Progressing)       Start:  07/04/24    Expected End:  07/18/24               Mobility       Pt will demonstrated ability to ambulate >/=250ft with proper form, LRAD and no balance deficits for safe home going.   (Progressing)       Start:  07/04/24    Expected End:  07/18/24            Pt will demonstrate ability to ascend/descend 1 flight of stairs with unilateral rail and no balance deficits for safe home going.  (Progressing)       Start:  07/04/24    Expected End:  07/18/24               PT Transfers       Pt will demonstrated ability to complete bed mobility and sit<>stand transfers without assistance and use of assistive device to safely return home.  (Progressing)       Start:  07/04/24    Expected End:  07/18/24                   Education Documentation  Precautions, taught by Tiara Sanchez, PT at 7/4/2024  1:36 PM.  Learner: Patient  Readiness: Acceptance  Method: Explanation, Demonstration  Response: Verbalizes Understanding,  Demonstrated Understanding    Body Mechanics, taught by Tiara Sanchez, PT at 7/4/2024  1:36 PM.  Learner: Patient  Readiness: Acceptance  Method: Explanation, Demonstration  Response: Verbalizes Understanding, Demonstrated Understanding    Mobility Training, taught by Tiara Sanchez PT at 7/4/2024  1:36 PM.  Learner: Patient  Readiness: Acceptance  Method: Explanation, Demonstration  Response: Verbalizes Understanding, Demonstrated Understanding    Education Comments  No comments found.

## 2024-07-04 NOTE — SIGNIFICANT EVENT
Rapid Response RN Note     07/04/24 0111   Onset Documentation   Rapid Response Initiated By Radar auto page   Location/Room [facility]   Pager Time 0110   Arrival Time 0111   Event End Time 0115   Level II Called No   Primary Reason for Call Radar auto page  (6)       Rapid response RN at bedside for RADAR score 6 due to the following VS: T 37.1 °Celsius; HR 95; RR 13; /69; SPO2 90% on 2L NC.     Reviewed above VS with bedside RN via phone.  VS within patient's current trends.  Per bedside RN, going to recheck SPO2. No concerns from bedside RN at this time.  No interventions by rapid response team indicated at this time.      Staff to page rapid response for any concerns or acute change in condition/VS.

## 2024-07-04 NOTE — PROGRESS NOTES
Occupational Therapy    Evaluation    Patient Name: Ritesh Garza  MRN: [id number]  Today's Date: 7/4/2024  Time Calculation  Start Time: 0916  Stop Time: 0929  Time Calculation (min): 13 min    Assessment  IP OT Assessment  OT Assessment: impaired ADLs/transfers  Prognosis: Good  Barriers to Discharge: None  End of Session Communication: Bedside nurse  End of Session Patient Position:  (patient on transport cart, sitter present)  Plan:  Treatment Interventions: ADL retraining, Functional transfer training, Endurance training, Cognitive reorientation, Patient/family training, Equipment evaluation/education, Neuromuscular reeducation, Compensatory technique education  OT Frequency: 2 times per week  OT Discharge Recommendations: Low intensity level of continued care, 24 hr supervision due to cognition  Equipment Recommended upon Discharge: Wheeled walker  OT Recommended Transfer Status:  (CGA with FWW)  OT - OK to Discharge: Yes    Subjective   General:  General  Reason for Referral: Pt admitted 2/2 going up his stairs and missed the top step and fell down and landed on L side/head.  Pt now s/p ACD C4-5, 5-6, 6-7 (INJURIES: Spinal canal stenosis w concern for TSCI (central cord). Adrenal hemorrhage)  Past Medical History Relevant to Rehab: 1. CAD s/p MI s/p CABG 2021 s/p and cardiac debrillator placement 5/24   2. DM2  3. Asthma  4. Gout  5. HTN  6. Liver cirrohsis  7. BPH 8. TIA  9.CHF 10.SENIA 11.CKD  Family/Caregiver Present: No  Co-Treatment: PT  Co-Treatment Reason: to maximize patient safety and therapeutic gains  Prior to Session Communication: Bedside nurse (spoke to trauma resident who stated okay for mobilizing with recent neck hematoma)  Patient Position Received: Bed, 3 rail up, Alarm on (sitter present)  General Comment: patient agreeable to OT; tele, IV, 7L NC  Precautions:  Hearing/Visual Limitations: hearing is grossly WFL  Medical Precautions: Fall precautions, Oxygen therapy device and  L/min  Post-Surgical Precautions: Spinal precautions  Precautions Comment: SBP <160  Vital Signs:  Heart Rate:  (pre 91, psot 93)  SpO2:  (pre 97%, post 97%)  BP:  (pre 119/64, post 120/73)  Pain:  Pain Assessment  Pain Assessment: 0-10  0-10 (Numeric) Pain Score: 3  Pain Type: Surgical pain  Pain Location: Neck    Objective   Cognition:  Overall Cognitive Status: Within Functional Limits  Orientation Level:  (oriented x3)  Following Commands: Follows one step commands with repetition  Impulsive: Mildly     Home Living:  Type of Home: House  Lives With: Spouse  Home Living Comments: Ramp to enter, bed/bath - 2nd floor with 1 railing, both types of showers but uses walk in with GB   Prior Function:  Level of [place]: Independent with ADLs and functional transfers, Independent with homemaking with ambulation  ADL Assistance: Independent  Homemaking Assistance: Independent  Ambulatory Assistance: Independent  Vocational:  (semi-retired; 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[employer])  Prior Function Comments: (+) drives, x2 falls in past 6 months     ADL:  Eating Assistance: Independent  Eating Deficit: Setup (anticipated)  Grooming Assistance: Stand by  Bathing Assistance: Minimal  Bathing Deficit:  (anticipated)  UE Dressing Assistance: Minimal  UE Dressing Deficit:  (anticipated)  LE Dressing Assistance: Minimal  LE Dressing Deficit: Don/doff R sock, Don/doff L sock  Toileting Assistance with Device: Minimal  Toileting Deficit:  (anticipated)  Activity Tolerance:  Early Mobility/Exercise Safety Screen: Proceed with mobilization - No exclusion criteria met  Bed Mobility/Transfers: Bed Mobility  Bed Mobility: Yes  Bed Mobility 1  Bed Mobility 1: Supine to sitting  Level of Assistance 1: Contact guard  Bed Mobility Comments 1: HOB elevated, cues for log-roll  Bed Mobility 2  Bed Mobility  2: Sitting to supine  Level of Assistance 2: Minimum assistance, Minimal verbal cues  Bed Mobility Comments 2: cues for  log-roll    Transfers  Transfer: Yes  Transfer 1  Transfer From 1: Sit to, Stand to  Transfer to 1: Sit, Stand  Technique 1: Sit to stand, Stand to sit  Transfer Device 1: Walker  Transfer Level of Assistance 1: Contact guard, Minimal verbal cues  Trials/Comments 1: patient able to take steps short household distances with FWW and CGA    Sitting Balance:  Static Sitting Balance  Static Sitting-Level of Assistance: Close supervision  Dynamic Sitting Balance  Dynamic Sitting-Comments: SBA  Standing Balance:  Static Standing Balance  Static Standing-Comment/Number of Minutes: CGA with FWW  Dynamic Standing Balance  Dynamic Standing-Comments: CGA with FWW     Vision: Vision - Basic Assessment  Current Vision: Wears glasses all the time  Sensation:  Light Touch: No apparent deficits  Strength:  Strength Comments: (B)  WFL, (B) shoulders 3-/5 as patient reports only being allowed to complete 0-90 degrees ROM 2/2 recent cardiac procedures, (B) elbows >/= 3/5     Extremities: RUE   RUE :  (shoulder 0-90 degrees, distal joints WFL) and LUE   LUE:  (shoulder 0-90 degrees, distal joints WFL)    Outcome Measures: [facility] Daily Activity  Putting on and taking off regular lower body clothing: A little  Bathing (including washing, rinsing, drying): A little  Putting on and taking off regular upper body clothing: A little  Toileting, which includes using toilet, bedpan or urinal: A little  Taking care of personal grooming such as brushing teeth: A little  Eating Meals: None  Daily Activity - Total Score: 19    , Confusion Assessment Method-ICU (CAM-ICU)  Feature 1: Acute Onset or Fluctuating Course: Positive  Feature 2: Inattention: Negative  Overall CAM-ICU: Negative  , and E = Exercise and Early Mobility  Early Mobility/Exercise Safety Screen: Proceed with mobilization - No exclusion criteria met  ICU Mobility Scale: Walking with assistance of 1 person  Education Documentation  Body Mechanics, taught by Merissa Yañez, OT at  7/4/2024  2:07 PM.  Learner: Patient  Readiness: Acceptance  Method: Explanation, Demonstration  Response: Needs Reinforcement    Precautions, taught by Merissa Yañez OT at 7/4/2024  2:07 PM.  Learner: Patient  Readiness: Acceptance  Method: Explanation, Demonstration  Response: Needs Reinforcement    ADL Training, taught by Merissa Yañez OT at 7/4/2024  2:07 PM.  Learner: Patient  Readiness: Acceptance  Method: Explanation, Demonstration  Response: Needs Reinforcement    Education Comments  No comments found.    Goals:   Encounter Problems       Encounter Problems (Active)       ADLs       Patient with complete upper body dressing with supervision level of assistance donning and doffing all UE clothes with no adaptive equipment. (Progressing)       Start:  07/04/24    Expected End:  07/18/24            Patient with complete lower body dressing with supervision level of assistance donning and doffing all LE clothes  with PRN adaptive equipment. (Progressing)       Start:  07/04/24    Expected End:  07/18/24            Patient will complete daily grooming tasks with supervision level of assistance and PRN adaptive equipment while standing. (Progressing)       Start:  07/04/24    Expected End:  07/18/24            Patient will complete toileting including hygiene clothing management/hygiene with supervision level of assistance. (Progressing)       Start:  07/04/24    Expected End:  07/18/24               BALANCE       Pt will maintain dynamic standing balance during ADL task with supervision level of assistance in order to demonstrate decreased risk of falling and improved postural control. (Progressing)       Start:  07/04/24    Expected End:  07/18/24               COGNITION/SAFETY       Patient will recall and adhere to spine precautions during all functional mobility/ADL tasks in order to demonstrate improved understanding and promote healing post op (Progressing)       Start:  07/04/24    Expected End:   07/18/24               MOBILITY       Patient will perform Functional mobility Household distances/Community Distances with supervision level of assistance and front wheeled walker in order to improve safety and functional mobility. (Progressing)       Start:  07/04/24    Expected End:  07/18/24               TRANSFERS       Patient will perform bed mobility supervision level of assistance in order to improve safety and independence with mobility (Progressing)       Start:  07/04/24    Expected End:  07/18/24            Patient will complete functional transfers with front wheeled walker with supervision level of assistance. (Progressing)       Start:  07/04/24    Expected End:  07/18/24               Merissa Yañez OTR/L  Inpatient Occupational Therapist   Rehab Office: [phone]

## 2024-07-04 NOTE — CARE PLAN
The patient's goals for the shift include      The clinical goals for the shift include no injuries or falls during hosp stay      Problem: Diabetes  Goal: Achieve decreasing blood glucose levels by end of shift  Outcome: Progressing  Goal: Increase stability of blood glucose readings by end of shift  Outcome: Progressing  Goal: Decrease in ketones present in urine by end of shift  Outcome: Progressing  Goal: Maintain electrolyte levels within acceptable range throughout shift  Outcome: Progressing  Goal: Maintain glucose levels >70mg/dl to <250mg/dl throughout shift  Outcome: Progressing  Goal: No changes in neurological exam by end of shift  Outcome: Progressing  Goal: Learn about and adhere to nutrition recommendations by end of shift  Outcome: Progressing  Goal: Vital signs within normal range for age by end of shift  Outcome: Progressing  Goal: Increase self care and/or family involovement by end of shift  Outcome: Progressing  Goal: Receive DSME education by end of shift  Outcome: Progressing     Problem: Pain - Adult  Goal: Verbalizes/displays adequate comfort level or baseline comfort level  Outcome: Progressing     Problem: Safety - Adult  Goal: Free from fall injury  Outcome: Progressing     Problem: Discharge Planning  Goal: Discharge to home or other facility with appropriate resources  Outcome: Progressing     Problem: Fall/Injury  Goal: Not fall by end of shift  Outcome: Progressing  Goal: Be free from injury by end of the shift  Outcome: Progressing  Goal: Verbalize understanding of personal risk factors for fall in the hospital  Outcome: Progressing  Goal: Verbalize understanding of risk factor reduction measures to prevent injury from fall in the home  Outcome: Progressing  Goal: Use assistive devices by end of the shift  Outcome: Progressing  Goal: Pace activities to prevent fatigue by end of the shift  Outcome: Progressing     Problem: Respiratory  Goal: Minimize anxiety/maximize coping throughout  shift  Outcome: Progressing  Goal: Minimal/no exertional discomfort or dyspnea this shift  Outcome: Progressing  Goal: No signs of respiratory distress (eg. Use of accessory muscles. Peds grunting)  Outcome: Progressing  Goal: Patent airway maintained this shift  Outcome: Progressing  Goal: Verbalize decreased shortness of breath this shift  Outcome: Progressing  Goal: Wean oxygen to maintain O2 saturation per order/standard this shift  Outcome: Progressing  Goal: Increase self care and/or family involvement in next 24 hours  Outcome: Progressing     Problem: Pain  Goal: Takes deep breaths with improved pain control throughout the shift  Outcome: Progressing  Goal: Turns in bed with improved pain control throughout the shift  Outcome: Progressing  Goal: Walks with improved pain control throughout the shift  Outcome: Progressing  Goal: Performs ADL's with improved pain control throughout shift  Outcome: Progressing  Goal: Participates in PT with improved pain control throughout the shift  Outcome: Progressing  Goal: Free from opioid side effects throughout the shift  Outcome: Progressing  Goal: Free from acute confusion related to pain meds throughout the shift  Outcome: Progressing     Problem: Skin  Goal: Participates in plan/prevention/treatment measures  Outcome: Progressing  Flowsheets (Taken 7/3/2024 2056)  Participates in plan/prevention/treatment measures:   Discuss with provider PT/OT consult   Elevate heels  Goal: Prevent/manage excess moisture  Outcome: Progressing  Flowsheets (Taken 7/3/2024 2056)  Prevent/manage excess moisture:   Moisturize dry skin   Monitor for/manage infection if present  Goal: Prevent/minimize sheer/friction injuries  Outcome: Progressing  Flowsheets (Taken 7/3/2024 2056)  Prevent/minimize sheer/friction injuries:   Use pull sheet   Increase activity/out of bed for meals   Turn/reposition every 2 hours/use positioning/transfer devices  Goal: Promote skin healing  Outcome:  Progressing  Flowsheets (Taken 7/3/2024 2056)  Promote skin healing:   Turn/reposition every 2 hours/use positioning/transfer devices   Assess skin/pad under line(s)/device(s)

## 2024-07-04 NOTE — PROGRESS NOTES
"Ritesh Garza is a 73 y.o. male on day 3 of admission presenting with Central cord syndrome, initial encounter (Multi).    Subjective   Patient arrived to T8 and auto dc'd cervical drain. Incision was assessed and noted to have a circular collection at R edge of incision which was soft and compressible. Patient was neurologically intact, no difficulty breathing, min hoarseness. Order was placed for continuous pulse ox and regular incision checks. Incision was re-assessed and noted to have expanded over entire incision but still soft and compressible. Patient was on 5L NC, not on continuous pulse ox, w/o signs of airway compression, HDS, following commands and denied feeling SOB, or having difficulty breathing. Noted incr hoarseness from prior visit. Decision was made to transfer to ICU for close observation given expansion and airway watch.     Objective     Physical Exam  AOx3  RUE D5 B5 T5 HG 5 IO 4  RLE HF5 KE5 DF/PF 5  LUE D5 B5 T5 HG 5 IO 4  LLE HF5 KE5 DF/PF 5  No cardozo, clonus  Soft, compressible large collection surrounding entire incision closed w/ glue, drain site intact no leakage    Last Recorded Vitals  Blood pressure 125/78, pulse 92, temperature 36.7 °C (98.1 °F), temperature source Temporal, resp. rate 18, height 1.6 m (5' 2.99\"), weight 69.4 kg (153 lb), SpO2 96%.  Intake/Output last 3 Shifts:  I/O last 3 completed shifts:  In: 2268.3 (32.7 mL/kg) [I.V.:1168.3 (16.8 mL/kg); IV Piggyback:1100]  Out: 125 (1.8 mL/kg) [Drains:75; Blood:50]  Weight: 69.4 kg     Relevant Results  Lab Results   Component Value Date    WBC 11.9 (H) 07/04/2024    HGB 9.3 (L) 07/04/2024    HCT 29.2 (L) 07/04/2024    MCV 95 07/04/2024     07/04/2024     Lab Results   Component Value Date    GLUCOSE 194 (H) 07/04/2024    CALCIUM 8.8 07/04/2024     07/04/2024    K 5.0 07/04/2024    CO2 23 07/04/2024     07/04/2024    BUN 67 (H) 07/04/2024    CREATININE 2.85 (H) 07/04/2024       Assessment/Plan   Principal " Problem:    Central cord syndrome, initial encounter (Multi)  Active Problems:    Cervical stenosis of spine    73yM h/o TIA, mild dementia, HTN, HLD, CAD s/p CABG (1992 on ASA), CHF (EF 40-45% 5/2024), L thigh hematoma c/b vtach (2/13/2024), 4/2024 LHC occluded vein grafts w/ 80% L main, L subclavian stenosis s/p L subclavian stents (4/30/2024), s/p dual chamber ICD (5/2024 [employer]), SENIA, liver cirrhosis (fatty liver), DM2, BPH, CKD, gout, b/l carpal tunnel s/p RUE carpal tunnel release (5/2023), 6/30 p/w fall downstairs, CT CTL spine C4-5, C5-6 anterior disc-osteophytes, 7/1 MRI C spine severe C4-5, C5-6, C6-7 stenosis    Trauma primary, transferred to TSICU for airway watch  Uprights when able   Con't  hold ASA/PLX  Cards recs  Will monitor incision and collection closely, may require exploration if continues to expand, has airway compromise           Nahid Sethi MD

## 2024-07-04 NOTE — PROGRESS NOTES
[facility]  TRAUMA ICU - PROGRESS NOTE    Patient Name: Ritesh Garza  MRN: [id number]  Admit Date: 701  : 1950  AGE: 73 y.o.   GENDER: male  ==============================================================================  MECHANISM OF INJURY / CHIEF COMPLAINT:   Patient is a 74 yo M who was going up his stairs and missed the top step and fell down and landed on L side/head. Patient instantly noticed burning sensation and pain down bilateral arms, BLE with no abnormalities. He states that he was able to get up with help. Patient went to [facility] where they pan scanned him and he was found to have moderate to severe multilevel spinal degenerative change described  above, notable for severe spinal canal stenosis at C4-C5 and C5-C6 and R adrenal hemorrhage. Patient transferred here in stable condition for MR c-spine.      LOC (yes/no?): No  Anticoagulant / Anti-platelet Rx? (for what dx?): Plavix (last dose ), aspirin 81mg   Referring Facility Name (N/A for scene EMR run): [facility]      INJURIES:   Spinal canal stenosis w concern for TSCI (central cord)  Adrenal hemorrhage      OTHER MEDICAL PROBLEMS:  CAD s/p MI s/p CABG  s/p and cardiac debrillator placement    DM2  Asthma  Gout  HTN  Liver cirrohsis   BPH  TIA  CHF  SENIA  CKD     INCIDENTAL FINDINGS:  None    PROCEDURES:  : ACD C4-5, 5-6, 6-7    ==============================================================================  TODAY'S ASSESSMENT AND PLAN OF CARE:  Ritesh Garza is a 73 y.o. male in the ICU due to: Respiratory monitoring    NEURO/PAIN/SEDATION:   - GCS 15, Q4h neuro checks  - Delirium precautions    RESPIRATORY:   - Respiratory monitoring due to worsening neck hematoma  - Pulm Hygiene  - Wean oxygen as tolerated    CARDIOVASC:   - Holding DAPT at this time  - Tele monitoring  - Maintain SBP <160    GI:   - NPO    :   - Voiding freely     FEN:   - LR 75ml/hr  - Electrolyte replacement  "per ICU policy     HEMATOLOGIC:   - Maintain H/H > 7/21    ENDOCRINE:   - SSI    MUSCULOSKELETAL/SKIN:   - ICU skin protocol     INFECTIOUS DISEASE:   - No needs at this time    GI PROPHYLAXIS:   - No needs at this time    DVT PROPHYLAXIS:   - SCDs  - Holding chemo proph at this time post spinal surgery    DISPOSITION: Remain in ICU at this time for airway monitoring    Patient seen and discussed with Dr. Mcdonald,    Tracee Sutherland CNP  Trauma Surgery  Floor: [id number] TICU: [id number]  Pager: [phone]    Total face to face time spent with patient of 45 minutes, with >50% of the time spent discussing plan of care/management, counseling/educating on disease processes, explaining results of diagnostic testing.    ==============================================================================  CHIEF COMPLAINT / OVERNIGHT EVENTS / HPI:   Patient on the floor postoperatively, however new concern for worsening neck hematoma after patient inadvertently pulled his VIDYA drain from his neck. Patient requiring airway monitoring in setting of worsening hematoma.    MEDICAL HISTORY / ROS:  Admission history and ROS reviewed. Pertinent changes as follows:      PHYSICAL EXAM:  Heart Rate:  []   Temp:  [36 °C (96.8 °F)-37.1 °C (98.8 °F)]   Resp:  [10-18]   BP: (120-149)/(61-82)   Height:  [160 cm (5' 2.99\")]   Weight:  [69.4 kg (153 lb)]   SpO2:  [90 %-97 %]   Physical Exam  Constitutional:       Appearance: Normal appearance.   HENT:      Head: Normocephalic.      Right Ear: External ear normal.      Left Ear: External ear normal.      Nose: Nose normal.      Mouth/Throat:      Mouth: Mucous membranes are dry.      Comments: Neck with swelling and hematoma noted, right neck incision intact at this time without redness or drainage.  Neck:      Comments: Tenderness over incisional site  Cardiovascular:      Rate and Rhythm: Normal rate.      Pulses: Normal pulses.   Pulmonary:      Comments: Increased resp requirement, currently on " venturi mask 10L HFNC  Abdominal:      Palpations: Abdomen is soft.   Musculoskeletal:         General: Normal range of motion.      Cervical back: Tenderness present.      Comments: 5/5 strengths uppers and lower bilaterally. No sensory or motor deficit of bilateral upper and lower extremities on exam   Skin:     General: Skin is warm.      Capillary Refill: Capillary refill takes less than 2 seconds.   Neurological:      Mental Status: He is oriented to person, place, and time.         IMAGING SUMMARY:  (summary of new imaging findings, not a copy of dictation)      LABS:  Results from last 7 days   Lab Units 07/02/24  0448 06/30/24 2129   WBC AUTO x10*3/uL 7.7 9.6   HEMOGLOBIN g/dL 10.7* 10.9*   HEMATOCRIT % 31.8* 34.3*   PLATELETS AUTO x10*3/uL 180 176   NEUTROS PCT AUTO %  --  62.4   LYMPHS PCT AUTO %  --  25.7   MONOS PCT AUTO %  --  8.8   EOS PCT AUTO %  --  2.4     Results from last 7 days   Lab Units 07/02/24  1416 07/01/24  0950 06/30/24  2129   APTT seconds 32 34  --    INR  1.2* 1.2* 1.3*     Results from last 7 days   Lab Units 07/03/24  0534 07/02/24  0448 07/01/24  1807 07/01/24  0950 06/30/24  2129   SODIUM mmol/L 137 137 138  --  134*   POTASSIUM mmol/L 5.0 4.5 4.2  --  4.4   CHLORIDE mmol/L 104 104 104  --  102   CO2 mmol/L 19* 23 22  --  20*   BUN mg/dL 56* 60* 60*  --  60*   CREATININE mg/dL 2.80* 2.88* 3.11*  --  3.50*   CALCIUM mg/dL 8.8 9.0 8.7  --  8.8   PROTEIN TOTAL g/dL  --   --   --  7.1 6.9   BILIRUBIN TOTAL mg/dL  --   --   --  0.6 0.7   ALK PHOS U/L  --   --   --  112 106   ALT U/L  --   --   --  39 39   AST U/L  --   --   --  52* 60*   GLUCOSE mg/dL 140* 153* 143*  --  232*     Results from last 7 days   Lab Units 07/01/24  0950 06/30/24  2129   BILIRUBIN TOTAL mg/dL 0.6 0.7   BILIRUBIN DIRECT mg/dL 0.1  --          I have reviewed all medications, laboratory results, and imaging pertinent for today's encounter.

## 2024-07-04 NOTE — SIGNIFICANT EVENT
UPDATED PLAN  NEURO/PAIN/SEDATION:    - q4h neuro checks    - Tylenol 650mg q6h    - Gabapentin 100mg BID    - Robaxin 500mg q8h    - oxycodone PRN    - Dilaudid 0.2mg q3h PRN for breakthrough pain     RESPIRATORY:    - Respiratory monitoring    - IS at bedside    - continue to monitor O2 saturation     CARDIOVASC:    - resume home amiodarone 200mg daily    - resume home ASA 81mg    - resume home furosemide 40mg daily    - Lopressor 25mg q12h    - maintain SBP < 160     GI:    - Cardiac diet    - Miralax and Colace     :    - no thibodeaux in place    FEN:    - replete electrolytes as needed     HEMATOLOGIC:    - daily and as needed CBCs    ENDOCRINE:    - SSI    - q4h glucose checks     MUSCULOSKELETAL/SKIN: no needs at this time     INFECTIOUS DISEASE:    - surgical prophylaxis     GI PROPHYLAXIS: N/A    DVT PROPHYLAXIS: Heparin and SCDs    DISPOSITION: TSU     Patient seen and discussed with Dr. Aron Miller PGY1  [facility] [phone]

## 2024-07-05 ENCOUNTER — APPOINTMENT (OUTPATIENT)
Dept: RADIOLOGY | Facility: HOSPITAL | Age: 74
DRG: 472 | End: 2024-07-05
Payer: MEDICARE

## 2024-07-05 LAB
ALBUMIN SERPL BCP-MCNC: 3.5 G/DL (ref 3.4–5)
ANION GAP SERPL CALC-SCNC: 16 MMOL/L (ref 10–20)
BNP SERPL-MCNC: 3294 PG/ML (ref 0–99)
BUN SERPL-MCNC: 71 MG/DL (ref 6–23)
CALCIUM SERPL-MCNC: 8.7 MG/DL (ref 8.6–10.6)
CHLORIDE SERPL-SCNC: 104 MMOL/L (ref 98–107)
CO2 SERPL-SCNC: 24 MMOL/L (ref 21–32)
CREAT SERPL-MCNC: 2.79 MG/DL (ref 0.5–1.3)
EGFRCR SERPLBLD CKD-EPI 2021: 23 ML/MIN/1.73M*2
ERYTHROCYTE [DISTWIDTH] IN BLOOD BY AUTOMATED COUNT: 14.6 % (ref 11.5–14.5)
GLUCOSE BLD MANUAL STRIP-MCNC: 209 MG/DL (ref 74–99)
GLUCOSE BLD MANUAL STRIP-MCNC: 211 MG/DL (ref 74–99)
GLUCOSE BLD MANUAL STRIP-MCNC: 224 MG/DL (ref 74–99)
GLUCOSE BLD MANUAL STRIP-MCNC: 224 MG/DL (ref 74–99)
GLUCOSE BLD MANUAL STRIP-MCNC: 227 MG/DL (ref 74–99)
GLUCOSE BLD MANUAL STRIP-MCNC: 245 MG/DL (ref 74–99)
GLUCOSE SERPL-MCNC: 237 MG/DL (ref 74–99)
HCT VFR BLD AUTO: 27.4 % (ref 41–52)
HGB BLD-MCNC: 8.9 G/DL (ref 13.5–17.5)
MAGNESIUM SERPL-MCNC: 2 MG/DL (ref 1.6–2.4)
MCH RBC QN AUTO: 30.3 PG (ref 26–34)
MCHC RBC AUTO-ENTMCNC: 32.5 G/DL (ref 32–36)
MCV RBC AUTO: 93 FL (ref 80–100)
NRBC BLD-RTO: 0 /100 WBCS (ref 0–0)
PHOSPHATE SERPL-MCNC: 3.4 MG/DL (ref 2.5–4.9)
PLATELET # BLD AUTO: 164 X10*3/UL (ref 150–450)
POTASSIUM SERPL-SCNC: 4.7 MMOL/L (ref 3.5–5.3)
RBC # BLD AUTO: 2.94 X10*6/UL (ref 4.5–5.9)
SODIUM SERPL-SCNC: 139 MMOL/L (ref 136–145)
WBC # BLD AUTO: 11.5 X10*3/UL (ref 4.4–11.3)

## 2024-07-05 PROCEDURE — 2500000001 HC RX 250 WO HCPCS SELF ADMINISTERED DRUGS (ALT 637 FOR MEDICARE OP): Performed by: STUDENT IN AN ORGANIZED HEALTH CARE EDUCATION/TRAINING PROGRAM

## 2024-07-05 PROCEDURE — 83880 ASSAY OF NATRIURETIC PEPTIDE: CPT | Performed by: STUDENT IN AN ORGANIZED HEALTH CARE EDUCATION/TRAINING PROGRAM

## 2024-07-05 PROCEDURE — 2500000004 HC RX 250 GENERAL PHARMACY W/ HCPCS (ALT 636 FOR OP/ED): Performed by: STUDENT IN AN ORGANIZED HEALTH CARE EDUCATION/TRAINING PROGRAM

## 2024-07-05 PROCEDURE — 83735 ASSAY OF MAGNESIUM: CPT

## 2024-07-05 PROCEDURE — 2500000005 HC RX 250 GENERAL PHARMACY W/O HCPCS: Performed by: STUDENT IN AN ORGANIZED HEALTH CARE EDUCATION/TRAINING PROGRAM

## 2024-07-05 PROCEDURE — 82947 ASSAY GLUCOSE BLOOD QUANT: CPT

## 2024-07-05 PROCEDURE — 99291 CRITICAL CARE FIRST HOUR: CPT | Performed by: SURGERY

## 2024-07-05 PROCEDURE — 94640 AIRWAY INHALATION TREATMENT: CPT

## 2024-07-05 PROCEDURE — 2500000002 HC RX 250 W HCPCS SELF ADMINISTERED DRUGS (ALT 637 FOR MEDICARE OP, ALT 636 FOR OP/ED)

## 2024-07-05 PROCEDURE — 2500000002 HC RX 250 W HCPCS SELF ADMINISTERED DRUGS (ALT 637 FOR MEDICARE OP, ALT 636 FOR OP/ED): Performed by: STUDENT IN AN ORGANIZED HEALTH CARE EDUCATION/TRAINING PROGRAM

## 2024-07-05 PROCEDURE — 36415 COLL VENOUS BLD VENIPUNCTURE: CPT

## 2024-07-05 PROCEDURE — 71045 X-RAY EXAM CHEST 1 VIEW: CPT | Performed by: RADIOLOGY

## 2024-07-05 PROCEDURE — 80069 RENAL FUNCTION PANEL: CPT

## 2024-07-05 PROCEDURE — 2020000001 HC ICU ROOM DAILY

## 2024-07-05 PROCEDURE — 71045 X-RAY EXAM CHEST 1 VIEW: CPT

## 2024-07-05 PROCEDURE — 2500000004 HC RX 250 GENERAL PHARMACY W/ HCPCS (ALT 636 FOR OP/ED): Mod: JZ | Performed by: STUDENT IN AN ORGANIZED HEALTH CARE EDUCATION/TRAINING PROGRAM

## 2024-07-05 PROCEDURE — 85027 COMPLETE CBC AUTOMATED: CPT

## 2024-07-05 RX ORDER — FUROSEMIDE 10 MG/ML
120 INJECTION INTRAMUSCULAR; INTRAVENOUS ONCE
Status: COMPLETED | OUTPATIENT
Start: 2024-07-05 | End: 2024-07-05

## 2024-07-05 RX ORDER — IPRATROPIUM BROMIDE AND ALBUTEROL SULFATE 2.5; .5 MG/3ML; MG/3ML
3 SOLUTION RESPIRATORY (INHALATION) EVERY 6 HOURS PRN
Status: DISCONTINUED | OUTPATIENT
Start: 2024-07-05 | End: 2024-07-16 | Stop reason: HOSPADM

## 2024-07-05 RX ORDER — CHLOROTHIAZIDE SODIUM 500 MG/1
500 INJECTION INTRAVENOUS ONCE
Status: COMPLETED | OUTPATIENT
Start: 2024-07-05 | End: 2024-07-05

## 2024-07-05 ASSESSMENT — PAIN - FUNCTIONAL ASSESSMENT
PAIN_FUNCTIONAL_ASSESSMENT: 0-10

## 2024-07-05 ASSESSMENT — PAIN SCALES - GENERAL
PAINLEVEL_OUTOF10: 0 - NO PAIN

## 2024-07-05 NOTE — PROGRESS NOTES
Physical Therapy                 Therapy Communication Note    Patient Name: Ritesh Garza  MRN: [id number]  Today's Date: 7/5/2024     Discipline: Physical Therapy    Missed Visit Reason: Missed Visit Reason:  (Per ID rounds, pt in CHF exacerbation and not appropriate for PT tx at this time.)    Missed Time: Attempt    Talya Wells, PT

## 2024-07-05 NOTE — PROGRESS NOTES
"Ritesh Garza is a 73 y.o. male on day 4 of admission presenting with Central cord syndrome, initial encounter (Multi).    Subjective     Hoarseness is better. Per primary team, increased O2 requirement is likely from pulmonary cause rather than upper airway issues     Objective     Physical Exam  AOx3  RUE D5 B5 T5 HG 5 IO 4  RLE HF5 KE5 DF/PF 5  LUE D5 B5 T5 HG 5 IO 4  LLE HF5 KE5 DF/PF 5  No cardozo, clonus  Soft, compressible large collection surrounding entire incision closed w/ glue, drain site intact no leakage    Last Recorded Vitals  Blood pressure 134/72, pulse 84, temperature 36.5 °C (97.7 °F), resp. rate 19, height 1.6 m (5' 2.99\"), weight 69.4 kg (153 lb), SpO2 97%.  Intake/Output last 3 Shifts:  I/O last 3 completed shifts:  In: 1262.3 (18.2 mL/kg) [I.V.:962.3 (13.9 mL/kg); IV Piggyback:300]  Out: 450 (6.5 mL/kg) [Urine:450 (0.2 mL/kg/hr)]  Weight: 69.4 kg     Relevant Results  Lab Results   Component Value Date    WBC 11.5 (H) 07/05/2024    HGB 8.9 (L) 07/05/2024    HCT 27.4 (L) 07/05/2024    MCV 93 07/05/2024     07/05/2024     Lab Results   Component Value Date    GLUCOSE 237 (H) 07/05/2024    CALCIUM 8.7 07/05/2024     07/05/2024    K 4.7 07/05/2024    CO2 24 07/05/2024     07/05/2024    BUN 71 (H) 07/05/2024    CREATININE 2.79 (H) 07/05/2024       Assessment/Plan   Principal Problem:    Central cord syndrome, initial encounter (Multi)  Active Problems:    Cervical stenosis of spine    73yM h/o TIA, mild dementia, HTN, HLD, CAD s/p CABG (1992 on ASA), CHF (EF 40-45% 5/2024), L thigh hematoma c/b vtach (2/13/2024), 4/2024 LHC occluded vein grafts w/ 80% L main, L subclavian stenosis s/p L subclavian stents (4/30/2024), s/p dual chamber ICD (5/2024 [employer]), SENIA, liver cirrhosis (fatty liver), DM2, BPH, CKD, gout, b/l carpal tunnel s/p RUE carpal tunnel release (5/2023), 6/30 p/w fall downstairs, CT CTL spine C4-5, C5-6 anterior disc-osteophytes, 7/1 MRI C spine severe " C4-5, C5-6, C6-7 stenosis    7/5 uprights  Hardware in posn    TSICU for airway watch  Con't  hold ASA/PLX  Cards recs  Will monitor incision and collection closely, may require exploration if continues to expand, has airway compromise         Willi Phelps MD

## 2024-07-05 NOTE — PROGRESS NOTES
[facility]  TRAUMA ICU - PROGRESS NOTE    Patient Name: Ritesh Garza  MRN: [id number]  Admit Date: 701  : 1950  AGE: 73 y.o.   GENDER: male  ==============================================================================  MECHANISM OF INJURY / CHIEF COMPLAINT:   Patient is a 72 yo M who was going up his stairs and missed the top step and fell down and landed on L side/head. Patient instantly noticed burning sensation and pain down bilateral arms, BLE with no abnormalities. He states that he was able to get up with help. Patient went to [place] where they pan scanned him and he was found to have moderate to severe multilevel spinal degenerative change described  above, notable for severe spinal canal stenosis at C4-C5 and C5-C6 and R adrenal hemorrhage. Patient transferred here in stable condition for MR c-spine.      LOC (yes/no?): No  Anticoagulant / Anti-platelet Rx? (for what dx?): Plavix (last dose ), aspirin 81mg   Referring Facility Name (N/A for scene EMR run): [facility]      INJURIES:   Spinal canal stenosis w concern for TSCI (central cord)  Adrenal hemorrhage      OTHER MEDICAL PROBLEMS:  CAD s/p MI s/p CABG  s/p and cardiac debrillator placement    DM2  Asthma  Gout  HTN  Liver cirrohsis   BPH  TIA  CHF  SENIA  CKD     INCIDENTAL FINDINGS:  None     PROCEDURES:  : ACD C4-5, 5-6, 6-7    ==============================================================================  TODAY'S ASSESSMENT AND PLAN OF CARE:  Ritesh Garza is a 73 y.o. male in the ICU due to: Respiratory monitoring     NEURO/PAIN/SEDATION:   - Multimodal pain control  - q4h neuro checks    RESPIRATORY:   - IS  - CXR to monitor fluid status    CARDIOVASC:   - Consider additional diuresis to net negative 1L daily  - Home Amio, Lasix, Metop  - Recent coronary and L subclavian stent - was on DAPT  - DAPT currently being held due to concern for worsening of neck hematoma, as per NSGY  recommendations    GI:   - Bowel regimen    ENDOCRINE:   - SSI    MUSCULOSKELETAL/SKIN:   - PT/OT    INFECTIOUS DISEASE:   - No indications    GI PROPHYLAXIS:   - Not indicated    DVT PROPHYLAXIS:   - SCDs  - SQH    DISPOSITION:  - TICU for neck hematoma and respiratory status monitoring    ==============================================================================  CHIEF COMPLAINT / OVERNIGHT EVENTS / HPI:   No overnight events.  Tolerating airvo.   No issues speaking, breathing, swallowing.  Hematoma hasn't gotten larger per patient.  Feeling otherwise okay.  ICU team diuresing     MEDICAL HISTORY / ROS:  Admission history and ROS reviewed. Pertinent changes as follows:  None    PHYSICAL EXAM:  Heart Rate:  [80-98]   Temp:  [36.5 °C (97.7 °F)-36.7 °C (98.1 °F)]   Resp:  [13-27]   BP: (114-150)/()   SpO2:  [88 %-100 %]   Physical Exam  General: NAD. Nontoxic. Conversant.  HEENT: Atraumatic. Neck w/ hematoma on R around prior surgical incision with surrounding ecchymosis; soft; non-pulsatile. EOMI. MMM. AirVo in nares.  Card: RRR  Pulm: Nonlabored breathing. AirVO @ 60L. No conversational dyspnea.  Abdomen: Soft, nontender, nondistended.  Neuro: No focal deficits.    LABS:  Results from last 7 days   Lab Units 07/05/24  0405 07/04/24  0625 07/02/24  0448 06/30/24  7220   WBC AUTO x10*3/uL 11.5* 11.9* 7.7 9.6   HEMOGLOBIN g/dL 8.9* 9.3* 10.7* 10.9*   HEMATOCRIT % 27.4* 29.2* 31.8* 34.3*   PLATELETS AUTO x10*3/uL 164 184 180 176   NEUTROS PCT AUTO %  --   --   --  62.4   LYMPHS PCT AUTO %  --   --   --  25.7   MONOS PCT AUTO %  --   --   --  8.8   EOS PCT AUTO %  --   --   --  2.4     Results from last 7 days   Lab Units 07/02/24  1416 07/01/24  0950 06/30/24  2129   APTT seconds 32 34  --    INR  1.2* 1.2* 1.3*     Results from last 7 days   Lab Units 07/05/24  0405 07/04/24  2136 07/04/24  1047 07/04/24  0625 07/01/24  1807 07/01/24  0950 06/30/24 2129   SODIUM mmol/L 139 138  --  139   < >  --  134*    POTASSIUM mmol/L 4.7 4.7  --  5.0   < >  --  4.4   CHLORIDE mmol/L 104 104  --  103   < >  --  102   CO2 mmol/L 24 21  --  23   < >  --  20*   BUN mg/dL 71* 68*  --  67*   < >  --  60*   CREATININE mg/dL 2.79* 2.81*  --  2.85*   < >  --  3.50*   CALCIUM mg/dL 8.7 8.7  --  8.8   < >  --  8.8   PROTEIN TOTAL g/dL  --   --  6.7  --   --  7.1 6.9   BILIRUBIN TOTAL mg/dL  --   --  0.6  --   --  0.6 0.7   ALK PHOS U/L  --   --  80  --   --  112 106   ALT U/L  --   --  23  --   --  39 39   AST U/L  --   --  45*  --   --  52* 60*   GLUCOSE mg/dL 237* 234*  --  194*   < >  --  232*    < > = values in this interval not displayed.     Results from last 7 days   Lab Units 07/04/24  1047 07/01/24  0950 06/30/24  2129   BILIRUBIN TOTAL mg/dL 0.6 0.6 0.7   BILIRUBIN DIRECT mg/dL 0.2 0.1  --      Results from last 7 days   Lab Units 07/04/24  2218 07/04/24  1826   POCT PH, ARTERIAL pH 7.42 7.43*   POCT PCO2, ARTERIAL mm Hg 33* 33*   POCT PO2, ARTERIAL mm Hg 67* 53*   POCT HCO3 CALCULATED, ARTERIAL mmol/L 21.4* 21.9*   POCT BASE EXCESS, ARTERIAL mmol/L -2.5* -1.9     I have reviewed all medications, laboratory results, and imaging pertinent for today's encounter.    Patient seen and discussed with attending surgeon, Dr. Dewitt.    Mil Hawkins MD  Trauma Surgery    I saw and evaluated the patient. I personally obtained the key and critical portions of the history and physical exam. I reviewed the resident’s documentation and discussed the patient with the resident. I agree with the resident’s medical decision making as documented in the resident’s note.    Rajendra Dewitt MD  Trauma, Critical Care, and Acute Care Surgery  Pager: [phone]

## 2024-07-05 NOTE — PROGRESS NOTES
PLAN: 3 y.o. male on day 4 of admission presenting with Central cord syndrome, initial encounter (Multi).   -TSICU for airway watch  Con't  hold ASA/PLX  Cards recs  Will monitor incision and collection closely, may require exploration if continues to expand, has airway compromise    PAYOR: Renate     DISPO: [facility]- pending remaining hospital course    SUPPORT/CONTACT: Rosa Maria (shayla) [phone]  Met with patient to complete assessment. Confirmed demographics on file, lives with spouse. IPTA, active with PCP, oral meds for DM, uses FWW at baseline as needed. No barriers identified to post-acute needs. Will follow ICU course to assist.

## 2024-07-05 NOTE — PROGRESS NOTES
[facility]  TRAUMA ICU - PROGRESS NOTE    Patient Name: Ritesh Garza  MRN: [id number]  Admit Date: 701  : 1950  AGE: 73 y.o.   GENDER: male  ==============================================================================   [###USE THIS SECTION FOR TRAUMA PATIENTS ONLY###]  MECHANISM OF INJURY / CHIEF COMPLAINT:   Patient is a 74 yo M who was going up his stairs and missed the top step and fell down and landed on L side/head. Patient instantly noticed burning sensation and pain down bilateral arms, BLE with no abnormalities. He states that he was able to get up with help. Patient went to [facility] where they pan scanned him and he was found to have moderate to severe multilevel spinal degenerative change described above, notable for severe spinal canal stenosis at C4-C5 and C5-C6 and R adrenal hemorrhage. Patient transferred here in stable condition for MR c-spine.    Patient pulled out VIDYA drain from his neck and developed a hematoma on  where he was transferred to the ICU for respiratory monitoring.      LOC (yes/no?): No  Anticoagulant / Anti-platelet Rx? (for what dx?): Plavix (last dose ), aspirin 81mg   Referring Facility Name (N/A for scene EMR run): [facility]      INJURIES:   Spinal canal stenosis w concern for TSCI (central cord)  Adrenal hemorrhage      OTHER MEDICAL PROBLEMS:  CAD s/p MI s/p CABG  s/p and cardiac debrillator placement    DM2  Asthma  Gout  HTN  Liver cirrohsis   BPH  TIA  CHF  SENIA  CKD     INCIDENTAL FINDINGS:  None     PROCEDURES:  : ACD C4-5, 5-6, 6-7    ==============================================================================  TODAY'S ASSESSMENT AND PLAN OF CARE:  Ritesh Garza is a 73 y.o. male in the ICU due to: respiratory monitoring     NEURO/PAIN/SEDATION:   #Acute pain  #ACD C4-5, 5-6, 6-7  #Spinal Cord Stenosis    - q4h neuro checks    - Tylenol 650mg q6h    - Gabapentin 100mg BID   - Robaxin 500mg q8h     - oxycodone PRN    - Dilaudid 0.2mg q3h PRN for breakthrough    - Per neurosurgery, holding ASA    RESPIRATORY:   #History of Asthma  #History of SENAI   - Respiratory monitoring    - duo-neb q6h PRN    - IS at bedside, recommend q1h    - continue monitoring O2 saturation    - SpO2 goal >90%    CARDIOVASC:   #history of CHF  #history of CAD  #History of HTN    - continue amiodarone   - holding ASA per neurosurgery    - continue Lopressor    - one time dose of 80mg furosemide given overnight and then one time dose of 120mg furosemide given today   - goal net -1L   *oral medications were held this morning due to an inability to safely swallow while on AirVo.     GI:    - cardiac diet    - continue bowel regimen (Colace and Miralax)     :    - no thibodeaux in place     FEN:    - replete electrolytes as needed      HEMATOLOGIC:    - daily and as needed CBCs    ENDOCRINE:   #history of diabetes    - SSI increased to corrective scale 2 (glucoses were in 200s)   - q4h glucose checks     MUSCULOSKELETAL/SKIN: no indications at this time     INFECTIOUS DISEASE: no indications for antibiotics at this time     GI PROPHYLAXIS: N/A    DVT PROPHYLAXIS: Heparin and SCDs     DISPOSITION: remain in TSICU     Patient discussed with Dr. Aron Miller PGY1   TSICU [id number]    ==============================================================================  CHIEF COMPLAINT / OVERNIGHT EVENTS / HPI:   Overnight the patient desaturated to the high 80s and was placed on AirVo 60L, subsequently improved. He remained on AirVo this morning and was saturating well in the 90s. He overall felt well this morning without concern of chest pain or shortness of breath. He endorses that his hoarseness in his voice has improved.     MEDICAL HISTORY / ROS:  Admission history and ROS reviewed. Pertinent changes as follows:  Voice hoarseness, improved.     PHYSICAL EXAM:  Heart Rate:  [77-98]   Temp:  [36.5 °C (97.7 °F)-37.2 °C (99 °F)]   Resp:   [13-27]   BP: (114-153)/(62-94)   SpO2:  [88 %-100 %]     Physical Exam  Constitutional:       General: He is not in acute distress.  Neck:      Comments: Right sided hematoma at incision, bruising near incision   Cardiovascular:      Rate and Rhythm: Normal rate and regular rhythm.   Pulmonary:      Effort: No respiratory distress.      Comments: On AirVo - breathing comfortably without distress  Abdominal:      General: There is no distension.      Palpations: Abdomen is soft.      Tenderness: There is no abdominal tenderness.   Musculoskeletal:         General: Normal range of motion.   Skin:     General: Skin is warm and dry.   Neurological:      General: No focal deficit present.      Mental Status: He is alert and oriented to person, place, and time.      Comments: Hoarseness in voice improved        IMAGING SUMMARY:  (summary of new imaging findings, not a copy of dictation)  CXR 7/4: pleural effusion, stable on repeat CXR 7/5    LABS:  Results from last 7 days   Lab Units 07/05/24  0405 07/04/24  0625 07/02/24  0448 06/30/24 2129   WBC AUTO x10*3/uL 11.5* 11.9* 7.7 9.6   HEMOGLOBIN g/dL 8.9* 9.3* 10.7* 10.9*   HEMATOCRIT % 27.4* 29.2* 31.8* 34.3*   PLATELETS AUTO x10*3/uL 164 184 180 176   NEUTROS PCT AUTO %  --   --   --  62.4   LYMPHS PCT AUTO %  --   --   --  25.7   MONOS PCT AUTO %  --   --   --  8.8   EOS PCT AUTO %  --   --   --  2.4     Results from last 7 days   Lab Units 07/02/24  1416 07/01/24  0950 06/30/24 2129   APTT seconds 32 34  --    INR  1.2* 1.2* 1.3*     Results from last 7 days   Lab Units 07/05/24  0405 07/04/24  2136 07/04/24  1047 07/04/24  0625 07/01/24  1807 07/01/24  0950 06/30/24 2129   SODIUM mmol/L 139 138  --  139   < >  --  134*   POTASSIUM mmol/L 4.7 4.7  --  5.0   < >  --  4.4   CHLORIDE mmol/L 104 104  --  103   < >  --  102   CO2 mmol/L 24 21  --  23   < >  --  20*   BUN mg/dL 71* 68*  --  67*   < >  --  60*   CREATININE mg/dL 2.79* 2.81*  --  2.85*   < >  --  3.50*    CALCIUM mg/dL 8.7 8.7  --  8.8   < >  --  8.8   PROTEIN TOTAL g/dL  --   --  6.7  --   --  7.1 6.9   BILIRUBIN TOTAL mg/dL  --   --  0.6  --   --  0.6 0.7   ALK PHOS U/L  --   --  80  --   --  112 106   ALT U/L  --   --  23  --   --  39 39   AST U/L  --   --  45*  --   --  52* 60*   GLUCOSE mg/dL 237* 234*  --  194*   < >  --  232*    < > = values in this interval not displayed.     Results from last 7 days   Lab Units 07/04/24  1047 07/01/24  0950 06/30/24  2129   BILIRUBIN TOTAL mg/dL 0.6 0.6 0.7   BILIRUBIN DIRECT mg/dL 0.2 0.1  --      Results from last 7 days   Lab Units 07/04/24  2218 07/04/24  1826   POCT PH, ARTERIAL pH 7.42 7.43*   POCT PCO2, ARTERIAL mm Hg 33* 33*   POCT PO2, ARTERIAL mm Hg 67* 53*   POCT HCO3 CALCULATED, ARTERIAL mmol/L 21.4* 21.9*   POCT BASE EXCESS, ARTERIAL mmol/L -2.5* -1.9     I have reviewed all medications, laboratory results, and imaging pertinent for today's encounter.     I have reviewed all medications, laboratory results, and imaging pertinent for today's encounter.   Seen, examined, reviewed chart on Team rounds today. Orders written.  35  minutes critical care time   Glenn Sharp MD

## 2024-07-06 ENCOUNTER — APPOINTMENT (OUTPATIENT)
Dept: RADIOLOGY | Facility: HOSPITAL | Age: 74
DRG: 472 | End: 2024-07-06
Payer: MEDICARE

## 2024-07-06 LAB
ALBUMIN SERPL BCP-MCNC: 3.6 G/DL (ref 3.4–5)
ANION GAP SERPL CALC-SCNC: 16 MMOL/L (ref 10–20)
BUN SERPL-MCNC: 80 MG/DL (ref 6–23)
CALCIUM SERPL-MCNC: 9.2 MG/DL (ref 8.6–10.6)
CHLORIDE SERPL-SCNC: 101 MMOL/L (ref 98–107)
CO2 SERPL-SCNC: 27 MMOL/L (ref 21–32)
CREAT SERPL-MCNC: 2.64 MG/DL (ref 0.5–1.3)
EGFRCR SERPLBLD CKD-EPI 2021: 24 ML/MIN/1.73M*2
ERYTHROCYTE [DISTWIDTH] IN BLOOD BY AUTOMATED COUNT: 14.6 % (ref 11.5–14.5)
GLUCOSE BLD MANUAL STRIP-MCNC: 146 MG/DL (ref 74–99)
GLUCOSE BLD MANUAL STRIP-MCNC: 180 MG/DL (ref 74–99)
GLUCOSE BLD MANUAL STRIP-MCNC: 180 MG/DL (ref 74–99)
GLUCOSE BLD MANUAL STRIP-MCNC: 190 MG/DL (ref 74–99)
GLUCOSE SERPL-MCNC: 195 MG/DL (ref 74–99)
HCT VFR BLD AUTO: 29.1 % (ref 41–52)
HGB BLD-MCNC: 9.3 G/DL (ref 13.5–17.5)
MAGNESIUM SERPL-MCNC: 2.27 MG/DL (ref 1.6–2.4)
MCH RBC QN AUTO: 30.3 PG (ref 26–34)
MCHC RBC AUTO-ENTMCNC: 32 G/DL (ref 32–36)
MCV RBC AUTO: 95 FL (ref 80–100)
NRBC BLD-RTO: 0 /100 WBCS (ref 0–0)
PHOSPHATE SERPL-MCNC: 3.9 MG/DL (ref 2.5–4.9)
PLATELET # BLD AUTO: 204 X10*3/UL (ref 150–450)
POTASSIUM SERPL-SCNC: 5.2 MMOL/L (ref 3.5–5.3)
RBC # BLD AUTO: 3.07 X10*6/UL (ref 4.5–5.9)
SODIUM SERPL-SCNC: 139 MMOL/L (ref 136–145)
WBC # BLD AUTO: 10.5 X10*3/UL (ref 4.4–11.3)

## 2024-07-06 PROCEDURE — 71045 X-RAY EXAM CHEST 1 VIEW: CPT | Performed by: RADIOLOGY

## 2024-07-06 PROCEDURE — 2020000001 HC ICU ROOM DAILY

## 2024-07-06 PROCEDURE — 2500000004 HC RX 250 GENERAL PHARMACY W/ HCPCS (ALT 636 FOR OP/ED): Performed by: STUDENT IN AN ORGANIZED HEALTH CARE EDUCATION/TRAINING PROGRAM

## 2024-07-06 PROCEDURE — 83735 ASSAY OF MAGNESIUM: CPT

## 2024-07-06 PROCEDURE — 36415 COLL VENOUS BLD VENIPUNCTURE: CPT

## 2024-07-06 PROCEDURE — 99291 CRITICAL CARE FIRST HOUR: CPT | Performed by: STUDENT IN AN ORGANIZED HEALTH CARE EDUCATION/TRAINING PROGRAM

## 2024-07-06 PROCEDURE — 99291 CRITICAL CARE FIRST HOUR: CPT | Performed by: SURGERY

## 2024-07-06 PROCEDURE — 2500000001 HC RX 250 WO HCPCS SELF ADMINISTERED DRUGS (ALT 637 FOR MEDICARE OP): Performed by: STUDENT IN AN ORGANIZED HEALTH CARE EDUCATION/TRAINING PROGRAM

## 2024-07-06 PROCEDURE — 85027 COMPLETE CBC AUTOMATED: CPT

## 2024-07-06 PROCEDURE — 80069 RENAL FUNCTION PANEL: CPT

## 2024-07-06 PROCEDURE — 71045 X-RAY EXAM CHEST 1 VIEW: CPT

## 2024-07-06 PROCEDURE — 2500000002 HC RX 250 W HCPCS SELF ADMINISTERED DRUGS (ALT 637 FOR MEDICARE OP, ALT 636 FOR OP/ED)

## 2024-07-06 PROCEDURE — 82947 ASSAY GLUCOSE BLOOD QUANT: CPT

## 2024-07-06 PROCEDURE — 2500000001 HC RX 250 WO HCPCS SELF ADMINISTERED DRUGS (ALT 637 FOR MEDICARE OP)

## 2024-07-06 PROCEDURE — 2500000002 HC RX 250 W HCPCS SELF ADMINISTERED DRUGS (ALT 637 FOR MEDICARE OP, ALT 636 FOR OP/ED): Performed by: STUDENT IN AN ORGANIZED HEALTH CARE EDUCATION/TRAINING PROGRAM

## 2024-07-06 RX ORDER — LIDOCAINE 560 MG/1
1 PATCH PERCUTANEOUS; TOPICAL; TRANSDERMAL DAILY
Status: DISCONTINUED | OUTPATIENT
Start: 2024-07-07 | End: 2024-07-16 | Stop reason: HOSPADM

## 2024-07-06 RX ORDER — ATORVASTATIN CALCIUM 80 MG/1
80 TABLET, FILM COATED ORAL EVERY EVENING
Status: DISCONTINUED | OUTPATIENT
Start: 2024-07-06 | End: 2024-07-16 | Stop reason: HOSPADM

## 2024-07-06 RX ORDER — FUROSEMIDE 40 MG/1
40 TABLET ORAL DAILY
Status: DISCONTINUED | OUTPATIENT
Start: 2024-07-06 | End: 2024-07-16 | Stop reason: HOSPADM

## 2024-07-06 RX ORDER — SERTRALINE HYDROCHLORIDE 50 MG/1
50 TABLET, FILM COATED ORAL EVERY EVENING
Status: DISCONTINUED | OUTPATIENT
Start: 2024-07-06 | End: 2024-07-16 | Stop reason: HOSPADM

## 2024-07-06 ASSESSMENT — PAIN - FUNCTIONAL ASSESSMENT
PAIN_FUNCTIONAL_ASSESSMENT: 0-10

## 2024-07-06 ASSESSMENT — PAIN SCALES - GENERAL
PAINLEVEL_OUTOF10: 0 - NO PAIN
PAINLEVEL_OUTOF10: 2
PAINLEVEL_OUTOF10: 3
PAINLEVEL_OUTOF10: 0 - NO PAIN
PAINLEVEL_OUTOF10: 0 - NO PAIN

## 2024-07-06 NOTE — PROGRESS NOTES
"Ritesh Garza is a 73 y.o. male on day 5 of admission presenting with Central cord syndrome, initial encounter (Multi).    Subjective   No acute events overnight    Objective     Physical Exam  AOx3  RUE D5 B5 T5 HG 5 IO 4  RLE HF5 KE5 DF/PF 5  LUE D5 B5 T5 HG 5 IO 4  LLE HF5 KE5 DF/PF 5  No cardozo, clonus  Soft, compressible large collection surrounding entire incision closed w/ glue, drain site intact no leakage    Last Recorded Vitals  Blood pressure 123/74, pulse (!) 111, temperature 35.6 °C (96.1 °F), resp. rate 20, height 1.6 m (5' 2.99\"), weight 69.4 kg (153 lb), SpO2 91%.  Intake/Output last 3 Shifts:  I/O last 3 completed shifts:  In: 494 (7.1 mL/kg) [I.V.:294 (4.2 mL/kg); IV Piggyback:200]  Out: 1350 (19.5 mL/kg) [Urine:1350 (0.5 mL/kg/hr)]  Weight: 69.4 kg     Relevant Results  Lab Results   Component Value Date    WBC 10.5 07/06/2024    HGB 9.3 (L) 07/06/2024    HCT 29.1 (L) 07/06/2024    MCV 95 07/06/2024     07/06/2024     Lab Results   Component Value Date    GLUCOSE 195 (H) 07/06/2024    CALCIUM 9.2 07/06/2024     07/06/2024    K 5.2 07/06/2024    CO2 27 07/06/2024     07/06/2024    BUN 80 (H) 07/06/2024    CREATININE 2.64 (H) 07/06/2024       Assessment/Plan   Principal Problem:    Central cord syndrome, initial encounter (Multi)  Active Problems:    Cervical stenosis of spine    73yM h/o TIA, mild dementia, HTN, HLD, CAD s/p CABG (1992 on ASA), CHF (EF 40-45% 5/2024), L thigh hematoma c/b vtach (2/13/2024), 4/2024 LHC occluded vein grafts w/ 80% L main, L subclavian stenosis s/p L subclavian stents (4/30/2024), s/p dual chamber ICD (5/2024 [employer]), SENIA, liver cirrhosis (fatty liver), DM2, BPH, CKD, gout, b/l carpal tunnel s/p RUE carpal tunnel release (5/2023), 6/30 p/w fall downstairs, CT CTL spine C4-5, C5-6 anterior disc-osteophytes, 7/1 MRI C spine severe C4-5, C5-6, C6-7 stenosis    7/5 uprights hardware in posn    Trauma primary  TSICU for airway watch  Continue " to hold ASA/PLX  Cards recs  Will monitor incision and collection closely, may require exploration if continues to expand, has airway compromise      Hilda Hendrickson MD

## 2024-07-06 NOTE — PROGRESS NOTES
[facility]  TRAUMA ICU - PROGRESS NOTE    Patient Name: Ritesh Garza  MRN: [id number]  Admit Date: 701  : 1950  AGE: 73 y.o.   GENDER: male  ==============================================================================   [###USE THIS SECTION FOR TRAUMA PATIENTS ONLY###]  MECHANISM OF INJURY / CHIEF COMPLAINT:   Patient is a 72 yo M who was going up his stairs and missed the top step and fell down and landed on L side/head. Patient instantly noticed burning sensation and pain down bilateral arms, BLE with no abnormalities. He states that he was able to get up with help. Patient went to [facility] where they pan scanned him and he was found to have moderate to severe multilevel spinal degenerative change described above, notable for severe spinal canal stenosis at C4-C5 and C5-C6 and R adrenal hemorrhage. Patient transferred here in stable condition for MR c-spine.    Patient pulled out VIDYA drain from his neck and developed a hematoma on  where he was transferred to the ICU for respiratory monitoring.      LOC (yes/no?): No  Anticoagulant / Anti-platelet Rx? (for what dx?): Plavix (last dose ), aspirin 81mg   Referring Facility Name (N/A for scene EMR run): [facility]      INJURIES:   Spinal canal stenosis w concern for TSCI (central cord)  Adrenal hemorrhage      OTHER MEDICAL PROBLEMS:  CAD s/p MI s/p CABG  s/p and cardiac debrillator placement    DM2  Asthma  Gout  HTN  Liver cirrohsis   BPH  TIA  CHF  SENIA  CKD     INCIDENTAL FINDINGS:  None     PROCEDURES:  : ACD C4-5, 5-6, 6-7    ==============================================================================  TODAY'S ASSESSMENT AND PLAN OF CARE:  Ritesh Garza is a 73 y.o. male in the ICU due to: respiratory monitoring     NEURO/PAIN/SEDATION:   #Acute pain  #ACD C4-5, 5-6, 6-7  #Spinal Cord Stenosis    - q4h neuro checks    - Tylenol 650mg q6h    - Continue home Sertraline, Gabapentin   - NSGY  Following:   > continue to hold ASA/Plavix   > Will monitor incision and collection closely, may require exploration if continues to expand, has airway compromise     RESPIRATORY:   #History of Asthma  #History of SENIA   - Respiratory monitoring    - duo-neb q6h PRN    - IS at bedside, recommend q1h    - continue monitoring O2 saturation, wean supplemental O2 requirements to SpO2 goal >90%    CARDIOVASC:   #history of CHF  #history of CAD  #History of HTN    - holding ASA per neurosurgery    - resumed home amiodarone, Lopressor, Entresto   - goal net -1L     #New onset wide complex tachycardia intermittent   - cardiology EP consulted today:   > Repeat TSH to exclude hyperthyroidism    > Primary team can up-titrate his Metoprolol Tartrate to 25 mg QID. If insufficient, can increase to 37.5 mg QID tomorrow.     > If the patient is euthyroid function, continue his home Amiodarone 200 daily (originally indicated for VT).     GI:    - cardiac diet    - continue bowel regimen (Colace and Miralax)     :    - no thibodeaux in place     FEN:    - replete electrolytes as needed      HEMATOLOGIC:    - daily and as needed CBCs    ENDOCRINE:   #history of diabetes    - SSI increased to corrective scale 2 (glucoses were in 200s)   - q4h glucose checks     MUSCULOSKELETAL/SKIN: no indications at this time     INFECTIOUS DISEASE: no indications for antibiotics at this time     GI PROPHYLAXIS: N/A    DVT PROPHYLAXIS: Heparin and SCDs     DISPOSITION: remain in TSICU     Patient discussed with Dr. Aron Alcala MD  PGY-2  Trauma, Critical Care, and Acute Care Surgery  Ext [phone] (floor), [phone] (ICU)  ==============================================================================  CHIEF COMPLAINT / OVERNIGHT EVENTS / HPI:   No issues overnight. Weaned from airvo to HFNC. Sleepy but arousable this morning.     MEDICAL HISTORY / ROS:  Admission history and ROS reviewed. Pertinent changes as follows:  Voice hoarseness, improved.      PHYSICAL EXAM:  Heart Rate:  []   Temp:  [35.6 °C (96.1 °F)-36.7 °C (98.1 °F)]   Resp:  [8-20]   BP: (105-132)/(63-87)   SpO2:  [89 %-98 %]     Physical Exam  Constitutional:       General: He is not in acute distress.  Neck:      Comments: Right-sided hematoma at incision unchanged in size compared to yesterday's exam, diffuse neck ecchymosis   Cardiovascular:      Rate and Rhythm: Normal rate and regular rhythm.   Pulmonary:      Effort: No respiratory distress.      Comments: On HFLC, SpO2 > 90%  Abdominal:      General: There is no distension.      Palpations: Abdomen is soft.      Tenderness: There is no abdominal tenderness.   Musculoskeletal:         General: Normal range of motion.   Skin:     General: Skin is warm and dry.   Neurological:      General: No focal deficit present.      Mental Status: He is alert and oriented to person, place, and time.      Comments: Hoarseness in voice improved        IMAGING SUMMARY:  (summary of new imaging findings, not a copy of dictation)  CXR 7/4: pleural effusion, stable on repeat CXR 7/5    LABS:  Results from last 7 days   Lab Units 07/06/24  0331 07/05/24  0405 07/04/24  0625 07/02/24  0448 06/30/24  2129   WBC AUTO x10*3/uL 10.5 11.5* 11.9*   < > 9.6   HEMOGLOBIN g/dL 9.3* 8.9* 9.3*   < > 10.9*   HEMATOCRIT % 29.1* 27.4* 29.2*   < > 34.3*   PLATELETS AUTO x10*3/uL 204 164 184   < > 176   NEUTROS PCT AUTO %  --   --   --   --  62.4   LYMPHS PCT AUTO %  --   --   --   --  25.7   MONOS PCT AUTO %  --   --   --   --  8.8   EOS PCT AUTO %  --   --   --   --  2.4    < > = values in this interval not displayed.     Results from last 7 days   Lab Units 07/02/24  1416 07/01/24  0950 06/30/24  2129   APTT seconds 32 34  --    INR  1.2* 1.2* 1.3*     Results from last 7 days   Lab Units 07/06/24  0331 07/05/24  0405 07/04/24  2136 07/04/24  1047 07/01/24  1807 07/01/24  0950 06/30/24 2129   SODIUM mmol/L 139 139 138  --    < >  --  134*   POTASSIUM mmol/L 5.2 4.7  4.7  --    < >  --  4.4   CHLORIDE mmol/L 101 104 104  --    < >  --  102   CO2 mmol/L 27 24 21  --    < >  --  20*   BUN mg/dL 80* 71* 68*  --    < >  --  60*   CREATININE mg/dL 2.64* 2.79* 2.81*  --    < >  --  3.50*   CALCIUM mg/dL 9.2 8.7 8.7  --    < >  --  8.8   PROTEIN TOTAL g/dL  --   --   --  6.7  --  7.1 6.9   BILIRUBIN TOTAL mg/dL  --   --   --  0.6  --  0.6 0.7   ALK PHOS U/L  --   --   --  80  --  112 106   ALT U/L  --   --   --  23  --  39 39   AST U/L  --   --   --  45*  --  52* 60*   GLUCOSE mg/dL 195* 237* 234*  --    < >  --  232*    < > = values in this interval not displayed.     Results from last 7 days   Lab Units 07/04/24  1047 07/01/24  0950 06/30/24  2129   BILIRUBIN TOTAL mg/dL 0.6 0.6 0.7   BILIRUBIN DIRECT mg/dL 0.2 0.1  --      Results from last 7 days   Lab Units 07/04/24  2218 07/04/24  1826   POCT PH, ARTERIAL pH 7.42 7.43*   POCT PCO2, ARTERIAL mm Hg 33* 33*   POCT PO2, ARTERIAL mm Hg 67* 53*   POCT HCO3 CALCULATED, ARTERIAL mmol/L 21.4* 21.9*   POCT BASE EXCESS, ARTERIAL mmol/L -2.5* -1.9     I have reviewed all medications, laboratory results, and imaging pertinent for today's encounter.   Seen, examined, reviewed chart on Team rounds today. Orders written.  35  minutes critical care time   Glenn Sharp MD

## 2024-07-06 NOTE — CONSULTS
Inpatient consult to Electrophysiology  Consult performed by: Ag Prado MD  Consult ordered by: Darryl Alston DO  Reason for consult: WCT        History Of Present Illness:    Ritesh Garza is a 73 y.o. male with PMH of multivessel CAD/NSTEMI s/p CABG in 1992 (all native coronary arteries with ; patent LIMA-LAD graft in 2/2024 but others were occluded), ICM/HFrEF (LVEF 30-35%) c/b VT s/p Soares dcICD in 5/2024, severe left subclavian artery stenosis on cardiac cath, who successfully underwent C4-5, C5-6, C6-7 anterior cervical and discectomy on 7/3/2024. Patient pulled out VIDYA drain from his neck and developed cervical hematoma next day, so that this case was transferred to TSICU for the airway monitoring. His telemetry showed new wide complex tachycardia (WCT) intermittently last night so that EP was consulted for the further assessment.    Last Recorded Vitals:  Vitals:    07/06/24 1100 07/06/24 1200 07/06/24 1300 07/06/24 1400   BP: 121/73 117/80 120/62 118/64   Pulse: 109 103 71 97   Resp: 11 (!) 9 19 10   Temp:  36.4 °C (97.6 °F)     TempSrc:       SpO2: 94% 96% 93%    Weight:       Height:         Past Medical History:  He has a past medical history of Allergic, Asthma (HHS-HCC), Coronary artery disease (Long Ago), Diabetes mellitus (Multi) (Over 20 years ago), Drug-induced gout, unspecified site (10/04/2019), Encounter for screening for malignant neoplasm of colon (10/31/2022), Gout, unspecified, Heart disease (Over 20 years sgo), Heart murmur (Long ago), Hypertension (Over 20 years ago), Myocardial infarction (Multi) (Over 20 yesrs ago), Ocular pain, right eye (10/14/2018), Periorbital cellulitis (10/14/2018), Personal history of colonic polyps (10/11/2017), Personal history of other diseases of the circulatory system (05/12/2021), Personal history of other diseases of the circulatory system (05/12/2021), Personal history of other diseases of the respiratory system (10/04/2019), Personal  history of other drug therapy, Type 2 diabetes mellitus (Multi), and Unspecified cirrhosis of liver (Multi) (11/07/2022).    Past Surgical History:  He has a past surgical history that includes Coronary artery bypass graft (12/02/2021); MR angio head wo IV contrast (06/02/2021); MR angio neck wo IV contrast (06/02/2021); MR angio head wo IV contrast (11/16/2021); MR angio neck wo IV contrast (11/16/2021); Adenoidectomy (Childhood); Cardiac catheterization (Over 30 years ago); Cholecystectomy (Over 30 years ago); Circumcision, primary (72,years ago); Eye surgery (Childhood); Cardiac catheterization (N/A, 04/15/2024); Invasive Vascular Procedure (Left, 04/30/2024); Coronary stent placement (Last week); Cardiac electrophysiology procedure (N/A, 05/30/2024); and Cardiac defibrillator placement.      Inpatient Medications:  Scheduled medications   Medication Dose Route Frequency    acetaminophen  650 mg oral q6h    amiodarone  200 mg oral Daily    [Held by provider] aspirin  81 mg oral Daily    atorvastatin  80 mg oral q PM    docusate sodium  100 mg oral BID    furosemide  40 mg oral Daily    gabapentin  100 mg oral BID    heparin (porcine)  5,000 Units subcutaneous q8h DIVINE    insulin regular  0-10 Units subcutaneous TID    metoprolol tartrate  25 mg oral q12h    oxygen   inhalation Continuous - Inhalation    oxygen   inhalation Continuous - Inhalation    polyethylene glycol  17 g oral Daily    sacubitriL-valsartan  0.5 tablet oral BID    sertraline  50 mg oral q PM     PRN medications   Medication    dextrose    dextrose    glucagon    glucagon    HYDROmorphone    ipratropium-albuteroL    naloxone    oxyCODONE    oxyCODONE     Continuous Medications   Medication Dose Last Rate     Outpatient Medications:  Current Outpatient Medications   Medication Instructions    acetaminophen (TYLENOL) 650 mg, oral, Every 6 hours PRN    albuterol (ProAir HFA) 90 mcg/actuation inhaler 2 puffs, inhalation, Every 6 hours PRN     allopurinol (ZYLOPRIM) 50 mg, oral, Daily    amiodarone (Pacerone) 200 mg tablet Take 2 tablets (400 mg) by mouth 2 times a day for 9 doses. On February, 28th start to take 1 tablet (200 mg) by mouth once daily.    amLODIPine (NORVASC) 10 mg, oral, Daily    aspirin 81 mg, oral, Daily    atorvastatin (LIPITOR) 80 mg, oral, Every evening    b complex vitamins capsule 1 capsule, oral, Daily    clopidogrel (PLAVIX) 75 mg, oral, Daily    coenzyme Q-10 (CoQ-10) 100 mg capsule 1 capsule, oral, Daily    cyanocobalamin (Vitamin B-12) 1,000 mcg tablet 1 tablet, oral, Daily, As directed    dapagliflozin (Farxiga) 10 mg 1 tablet, oral, Daily before evening meal    famotidine (PEPCID) 20 mg, oral, Daily PRN    furosemide (LASIX) 40 mg, oral, Daily, As needed for weight gain greater than 3 pounds    gabapentin (NEURONTIN) 100 mg, oral, Nightly    icosapent ethyL (VASCEPA) 1 g, oral, 2 times daily (morning and late afternoon)    loratadine (CLARITIN) 10 mg, oral, 2 times daily, Take 1 tablet every morning and 1 tablet every evening    magnesium gluconate (Magonate) 27.5 mg magne- sium (500 mg) tablet 1 tablet, oral, Daily    metoprolol succinate XL (TOPROL-XL) 50 mg, oral, Daily    multivitamin tablet 1 tablet, oral, Daily    pantoprazole (PROTONIX) 40 mg, oral, Daily    sacubitriL-valsartan (Entresto) 24-26 mg tablet 0.5 tablets, oral, 2 times daily    sertraline (Zoloft) 50 mg tablet Take one tablet daily in the morning after breakfast.    tadalafil (CIALIS) 5 mg, oral, Daily    tadalafil 20 mg tablet START WITH ONE-HALF TABLET AS NEEDED 2 HOURS PRIOR TO ACTIVITY. MAY INCREASE TO 1 TABLET AS NEEDED 2 HOURS PRIOR TO ACTIVITY    turmeric-turmeric root extract 450-50 mg capsule 1 capsule, oral, Daily       Physical Exam:  Constitutional:       General: He is not in acute distress.  Neck:      Comments: Right sided hematoma at incision, bruising near incision   Cardiovascular:      Rate and Rhythm: Normal rate and regular rhythm.    Pulmonary:      Effort: No respiratory distress.      Comments: On AirVo - breathing comfortably without distress  Abdominal:      General: There is no distension.      Palpations: Abdomen is soft.      Tenderness: There is no abdominal tenderness.   Musculoskeletal:         General: Normal range of motion.   Skin:     General: Skin is warm and dry.   Neurological:      General: No focal deficit present.      Mental Status: He is alert and oriented to person, place, and time.      Comments: Hoarseness in voice improved      <ICD interrogation during the paroxysmal WCT in TSICU>      <Telemetry demonstrating rapid HR change between narrow and wide QRS rhythm>         Assessment/Plan   Ritesh Garza is a 73 y.o. male with PMH of multivessel CAD/NSTEMI s/p CABG in 1992 (all native coronary arteries with ; patent LIMA-LAD graft in 2/2024 but others were occluded), ICM/HFrEF (LVEF 30-35%) c/b symptomatic VT (currently on Amiodarone) s/p Soares dcICD in 5/2024, severe left subclavian artery stenosis on cardiac cath, who successfully underwent C4-5, C5-6, C6-7 anterior cervical and discectomy on 7/3/2024. Patient pulled out VIDYA drain from his neck and developed cervical hematoma next day, so that this case was transferred to TSICU for the airway monitoring. His telemetry showed new wide complex tachycardia (WCT) intermitently last night so that EP was consulted for the further assessment.    <Key observations>  - On the telemetry, there were multiple rapid (non-physiologic) transitions of heart rate from 60 bpm to 100 bpm.   - The ICD interrogation during the WCT demonstrated normally sensed atrium and ventricular intrinsic signals.   - The tachycardia was initiated by PVC most time.   - During the persistent WCT, sporadic PVC did not affect the cycle length of the WCT.  - NO AT/AF events as well as VT/VF since his dcICD implantation in 5/2024    Impressions:  - Likely to have post-operative paroxysmal atrial  tachycardia or atypical AVNRT (not atrial fibrillation)  - Likely to have rate-dependent complete left bundle branch block representing WCT on telemetry intermittently    Recommendations:  - Without hemodynamic instability, the paroxysmal atrial tachycardia will not affect this patient and the tachycardia should be spontaneously improved after the resolution of post-operative stress later.  - Repeat TSH to exclude hyperthyroidism  - If the patient is euthyroid function, continue his home Amiodarone 200 daily (originally indicated for VT).  - Primary team can up-titrate his Metoprolol Tartrate to 25 mg QID. If insufficient, can increase to 37.5 mg QID tomorrow.    EP will follow this case later.    Ag Prado MD  Clinical Cardiac Electrophysiology Fellow    Code Status:  Full Code    Ag Prado MD    I saw and evaluated the patient. I personally obtained the key and critical portions of the history and physical exam or was physically present for key and critical portions performed by the resident/fellow. I reviewed the resident/fellow's documentation and discussed the patient with the resident/fellow. I agree with the resident/fellow's medical decision making as documented in the note, and my edits (bold and italic) have been made to the document as needed.     I attest that this critical care service meets the definition of critical care as supported by my documentation. The reason for critical care for this patient is wide complex tachycardia, and we are treating with medications, diagnostic testing and planning for procedures. Please see above delineated assessment and plan for details. My full attention was spent providing critical care to this patient for 45 minutes. The critical care service I provided was above and beyond any resident/fellow teaching and any separately billable procedures are not included into the time calculation.    Wiley Bass MD [facility]  Cardiac  Electrophysiology    **Disclaimer: This note was dictated by speech recognition, and every effort has been made to prevent any error in transcription, however minor errors may be present**

## 2024-07-06 NOTE — PROGRESS NOTES
[facility]  TRAUMA ICU - PROGRESS NOTE    Patient Name: Ritesh Garza  MRN: [id number]  Admit Date: 701  : 1950  AGE: 73 y.o.   GENDER: male  ==============================================================================  MECHANISM OF INJURY / CHIEF COMPLAINT:   Patient is a 74 yo M who was going up his stairs and missed the top step and fell down and landed on L side/head. Patient instantly noticed burning sensation and pain down bilateral arms, BLE with no abnormalities. He states that he was able to get up with help. Patient went to [facility] where they pan scanned him and he was found to have moderate to severe multilevel spinal degenerative change described  above, notable for severe spinal canal stenosis at C4-C5 and C5-C6 and R adrenal hemorrhage. Patient transferred here in stable condition for MR c-spine.      LOC (yes/no?): No  Anticoagulant / Anti-platelet Rx? (for what dx?): Plavix (last dose ), aspirin 81mg   Referring Facility Name (N/A for scene EMR run): [facility]      INJURIES:   Spinal canal stenosis w concern for TSCI (central cord)  Adrenal hemorrhage      OTHER MEDICAL PROBLEMS:  CAD s/p MI s/p CABG  s/p and cardiac debrillator placement    DM2  Asthma  Gout  HTN  Liver cirrohsis   BPH  TIA  CHF  SENIA  CKD     INCIDENTAL FINDINGS:  None     PROCEDURES:  : ACD C4-5, 5-6, 6-7    ==============================================================================  TODAY'S ASSESSMENT AND PLAN OF CARE:  Ritesh Garza is a 73 y.o. male in the ICU due to: Respiratory monitoring     NEURO/PAIN/SEDATION:   - Multimodal pain control  - q4h neuro checks  - Holding ASA per neuro    RESPIRATORY:   - IS  - CXR to monitor fluid status  - Aggressive pulmonary hygiene  - Wean oxygen as able    CARDIOVASC:   - Consider additional diuresis to net negative 1L daily  - Home Amio, Lasix, Metop  - Recent coronary and L subclavian stent - was on DAPT  - DAPT  currently being held due to concern for worsening of neck hematoma, as per NSGY recommendations  - EP for Vtach episodes    GI:   - Bowel regimen    ENDOCRINE:   - SSI    MUSCULOSKELETAL/SKIN:   - PT/OT  - OOBTC and walking as able    INFECTIOUS DISEASE:   - No indications    GI PROPHYLAXIS:   - Not indicated    DVT PROPHYLAXIS:   - SCDs  - SQH    DISPOSITION:  - TICU for neck hematoma and respiratory status monitoring    ==============================================================================  CHIEF COMPLAINT / OVERNIGHT EVENTS / HPI:   Overnight, had several episodes of Vtach, but was overall asymptomatic.  Net negative 1.5L overnight.   Oxygen continues to be weaned down.  No issues speaking, breathing, swallowing.  Feeling otherwise okay.    MEDICAL HISTORY / ROS:  Admission history and ROS reviewed. Pertinent changes as follows:  None    PHYSICAL EXAM:  Heart Rate:  []   Temp:  [35.6 °C (96.1 °F)-36.6 °C (97.8 °F)]   Resp:  [8-20]   BP: (105-132)/(62-87)   SpO2:  [89 %-98 %]   Physical Exam  General: NAD. Nontoxic. Conversant.  HEENT: Atraumatic. Neck w/ hematoma on R around prior surgical incision with surrounding ecchymosis; soft; non-pulsatile, minimally larger than exam yesterday (7/5). EOMI. MMM. AirVo in nares.  Card: RRR  Pulm: Nonlabored breathing. AirVO @ 60L. No conversational dyspnea.  Abdomen: Soft, nontender, nondistended.  Neuro: No focal deficits.    LABS:  Results from last 7 days   Lab Units 07/06/24  0331 07/05/24  0405 07/04/24  0625 07/02/24  0448 06/30/24  2129   WBC AUTO x10*3/uL 10.5 11.5* 11.9*   < > 9.6   HEMOGLOBIN g/dL 9.3* 8.9* 9.3*   < > 10.9*   HEMATOCRIT % 29.1* 27.4* 29.2*   < > 34.3*   PLATELETS AUTO x10*3/uL 204 164 184   < > 176   NEUTROS PCT AUTO %  --   --   --   --  62.4   LYMPHS PCT AUTO %  --   --   --   --  25.7   MONOS PCT AUTO %  --   --   --   --  8.8   EOS PCT AUTO %  --   --   --   --  2.4    < > = values in this interval not displayed.     Results from  last 7 days   Lab Units 07/02/24  1416 07/01/24  0950 06/30/24  2129   APTT seconds 32 34  --    INR  1.2* 1.2* 1.3*     Results from last 7 days   Lab Units 07/06/24  0331 07/05/24  0405 07/04/24  2136 07/04/24  1047 07/01/24  1807 07/01/24  0950 06/30/24 2129   SODIUM mmol/L 139 139 138  --    < >  --  134*   POTASSIUM mmol/L 5.2 4.7 4.7  --    < >  --  4.4   CHLORIDE mmol/L 101 104 104  --    < >  --  102   CO2 mmol/L 27 24 21  --    < >  --  20*   BUN mg/dL 80* 71* 68*  --    < >  --  60*   CREATININE mg/dL 2.64* 2.79* 2.81*  --    < >  --  3.50*   CALCIUM mg/dL 9.2 8.7 8.7  --    < >  --  8.8   PROTEIN TOTAL g/dL  --   --   --  6.7  --  7.1 6.9   BILIRUBIN TOTAL mg/dL  --   --   --  0.6  --  0.6 0.7   ALK PHOS U/L  --   --   --  80  --  112 106   ALT U/L  --   --   --  23  --  39 39   AST U/L  --   --   --  45*  --  52* 60*   GLUCOSE mg/dL 195* 237* 234*  --    < >  --  232*    < > = values in this interval not displayed.     Results from last 7 days   Lab Units 07/04/24  1047 07/01/24  0950 06/30/24 2129   BILIRUBIN TOTAL mg/dL 0.6 0.6 0.7   BILIRUBIN DIRECT mg/dL 0.2 0.1  --      Results from last 7 days   Lab Units 07/04/24  2218 07/04/24  1826   POCT PH, ARTERIAL pH 7.42 7.43*   POCT PCO2, ARTERIAL mm Hg 33* 33*   POCT PO2, ARTERIAL mm Hg 67* 53*   POCT HCO3 CALCULATED, ARTERIAL mmol/L 21.4* 21.9*   POCT BASE EXCESS, ARTERIAL mmol/L -2.5* -1.9     I have reviewed all medications, laboratory results, and imaging pertinent for today's encounter.    Patient seen and discussed with attending surgeon, Dr. Dewitt.    Mil Hawkins MD  Trauma Surgery    I saw and evaluated the patient. I personally obtained the key and critical portions of the history and physical exam. I reviewed the resident’s documentation and discussed the patient with the resident. I agree with the resident’s medical decision making as documented in the resident’s note.    Doing ok. Neck hematoma soft and low concern for airway  compromise. EP consulting for paroxysmal atrial tachycardia. Weaning O2 somewhat but remains on HFNC.    Rajendra Dewitt MD  Trauma, Critical Care, and Acute Care Surgery  Pager: [phone]

## 2024-07-07 VITALS
HEART RATE: 77 BPM | SYSTOLIC BLOOD PRESSURE: 103 MMHG | OXYGEN SATURATION: 99 % | DIASTOLIC BLOOD PRESSURE: 66 MMHG | BODY MASS INDEX: 27.11 KG/M2 | RESPIRATION RATE: 18 BRPM | HEIGHT: 63 IN | TEMPERATURE: 96.4 F | WEIGHT: 153 LBS

## 2024-07-07 LAB
ALBUMIN SERPL BCP-MCNC: 3.3 G/DL (ref 3.4–5)
ANION GAP SERPL CALC-SCNC: 14 MMOL/L (ref 10–20)
BUN SERPL-MCNC: 76 MG/DL (ref 6–23)
CALCIUM SERPL-MCNC: 8.9 MG/DL (ref 8.6–10.6)
CHLORIDE SERPL-SCNC: 101 MMOL/L (ref 98–107)
CO2 SERPL-SCNC: 31 MMOL/L (ref 21–32)
CREAT SERPL-MCNC: 2.28 MG/DL (ref 0.5–1.3)
EGFRCR SERPLBLD CKD-EPI 2021: 29 ML/MIN/1.73M*2
ERYTHROCYTE [DISTWIDTH] IN BLOOD BY AUTOMATED COUNT: 14.3 % (ref 11.5–14.5)
GLUCOSE BLD MANUAL STRIP-MCNC: 152 MG/DL (ref 74–99)
GLUCOSE BLD MANUAL STRIP-MCNC: 155 MG/DL (ref 74–99)
GLUCOSE BLD MANUAL STRIP-MCNC: 194 MG/DL (ref 74–99)
GLUCOSE BLD MANUAL STRIP-MCNC: 234 MG/DL (ref 74–99)
GLUCOSE SERPL-MCNC: 221 MG/DL (ref 74–99)
HCT VFR BLD AUTO: 30.1 % (ref 41–52)
HGB BLD-MCNC: 9.6 G/DL (ref 13.5–17.5)
MAGNESIUM SERPL-MCNC: 2.02 MG/DL (ref 1.6–2.4)
MCH RBC QN AUTO: 30.5 PG (ref 26–34)
MCHC RBC AUTO-ENTMCNC: 31.9 G/DL (ref 32–36)
MCV RBC AUTO: 96 FL (ref 80–100)
NRBC BLD-RTO: 0 /100 WBCS (ref 0–0)
PHOSPHATE SERPL-MCNC: 3.8 MG/DL (ref 2.5–4.9)
PLATELET # BLD AUTO: 221 X10*3/UL (ref 150–450)
POTASSIUM SERPL-SCNC: 4 MMOL/L (ref 3.5–5.3)
RBC # BLD AUTO: 3.15 X10*6/UL (ref 4.5–5.9)
SODIUM SERPL-SCNC: 142 MMOL/L (ref 136–145)
TSH SERPL-ACNC: 10.97 MIU/L (ref 0.44–3.98)
WBC # BLD AUTO: 8.5 X10*3/UL (ref 4.4–11.3)

## 2024-07-07 PROCEDURE — 2500000001 HC RX 250 WO HCPCS SELF ADMINISTERED DRUGS (ALT 637 FOR MEDICARE OP)

## 2024-07-07 PROCEDURE — 83735 ASSAY OF MAGNESIUM: CPT

## 2024-07-07 PROCEDURE — 1100000001 HC PRIVATE ROOM DAILY

## 2024-07-07 PROCEDURE — 2500000001 HC RX 250 WO HCPCS SELF ADMINISTERED DRUGS (ALT 637 FOR MEDICARE OP): Performed by: PHYSICIAN ASSISTANT

## 2024-07-07 PROCEDURE — 2500000002 HC RX 250 W HCPCS SELF ADMINISTERED DRUGS (ALT 637 FOR MEDICARE OP, ALT 636 FOR OP/ED): Performed by: PHYSICIAN ASSISTANT

## 2024-07-07 PROCEDURE — 36415 COLL VENOUS BLD VENIPUNCTURE: CPT

## 2024-07-07 PROCEDURE — 82947 ASSAY GLUCOSE BLOOD QUANT: CPT

## 2024-07-07 PROCEDURE — 2500000002 HC RX 250 W HCPCS SELF ADMINISTERED DRUGS (ALT 637 FOR MEDICARE OP, ALT 636 FOR OP/ED)

## 2024-07-07 PROCEDURE — 2500000001 HC RX 250 WO HCPCS SELF ADMINISTERED DRUGS (ALT 637 FOR MEDICARE OP): Performed by: STUDENT IN AN ORGANIZED HEALTH CARE EDUCATION/TRAINING PROGRAM

## 2024-07-07 PROCEDURE — 85027 COMPLETE CBC AUTOMATED: CPT

## 2024-07-07 PROCEDURE — 99233 SBSQ HOSP IP/OBS HIGH 50: CPT | Performed by: SURGERY

## 2024-07-07 PROCEDURE — 2500000004 HC RX 250 GENERAL PHARMACY W/ HCPCS (ALT 636 FOR OP/ED): Performed by: STUDENT IN AN ORGANIZED HEALTH CARE EDUCATION/TRAINING PROGRAM

## 2024-07-07 PROCEDURE — 84443 ASSAY THYROID STIM HORMONE: CPT

## 2024-07-07 PROCEDURE — 2500000005 HC RX 250 GENERAL PHARMACY W/O HCPCS: Performed by: PHYSICIAN ASSISTANT

## 2024-07-07 PROCEDURE — 80069 RENAL FUNCTION PANEL: CPT

## 2024-07-07 PROCEDURE — 2500000002 HC RX 250 W HCPCS SELF ADMINISTERED DRUGS (ALT 637 FOR MEDICARE OP, ALT 636 FOR OP/ED): Performed by: STUDENT IN AN ORGANIZED HEALTH CARE EDUCATION/TRAINING PROGRAM

## 2024-07-07 PROCEDURE — 2500000004 HC RX 250 GENERAL PHARMACY W/ HCPCS (ALT 636 FOR OP/ED): Performed by: PHYSICIAN ASSISTANT

## 2024-07-07 ASSESSMENT — COGNITIVE AND FUNCTIONAL STATUS - GENERAL
TURNING FROM BACK TO SIDE WHILE IN FLAT BAD: A LITTLE
MOVING FROM LYING ON BACK TO SITTING ON SIDE OF FLAT BED WITH BEDRAILS: A LITTLE
DAILY ACTIVITIY SCORE: 24
MOBILITY SCORE: 24

## 2024-07-07 ASSESSMENT — PAIN SCALES - GENERAL
PAINLEVEL_OUTOF10: 3
PAINLEVEL_OUTOF10: 2
PAINLEVEL_OUTOF10: 0 - NO PAIN
PAINLEVEL_OUTOF10: 0 - NO PAIN

## 2024-07-07 ASSESSMENT — PAIN DESCRIPTION - LOCATION: LOCATION: ARM

## 2024-07-07 ASSESSMENT — PAIN - FUNCTIONAL ASSESSMENT
PAIN_FUNCTIONAL_ASSESSMENT: 0-10

## 2024-07-07 ASSESSMENT — PAIN DESCRIPTION - ORIENTATION: ORIENTATION: LEFT

## 2024-07-07 NOTE — CARE PLAN
The clinical goals for the shift include Patient remains hemodynamically stable through the shift      Problem: Diabetes  Goal: Achieve decreasing blood glucose levels by end of shift  Outcome: Progressing  Goal: Increase stability of blood glucose readings by end of shift  Outcome: Progressing  Goal: Decrease in ketones present in urine by end of shift  Outcome: Progressing  Goal: Maintain electrolyte levels within acceptable range throughout shift  Outcome: Progressing  Goal: Maintain glucose levels >70mg/dl to <250mg/dl throughout shift  Outcome: Progressing  Goal: No changes in neurological exam by end of shift  Outcome: Progressing  Goal: Learn about and adhere to nutrition recommendations by end of shift  Outcome: Progressing  Goal: Vital signs within normal range for age by end of shift  Outcome: Progressing  Goal: Increase self care and/or family involovement by end of shift  Outcome: Progressing  Goal: Receive DSME education by end of shift  Outcome: Progressing     Problem: Safety - Adult  Goal: Free from fall injury  Outcome: Progressing     Problem: Discharge Planning  Goal: Discharge to home or other facility with appropriate resources  Outcome: Progressing     Problem: Chronic Conditions and Co-morbidities  Goal: Patient's chronic conditions and co-morbidity symptoms are monitored and maintained or improved  Outcome: Progressing     Problem: Fall/Injury  Goal: Not fall by end of shift  Outcome: Progressing  Goal: Be free from injury by end of the shift  Outcome: Progressing  Goal: Verbalize understanding of personal risk factors for fall in the hospital  Outcome: Progressing  Goal: Verbalize understanding of risk factor reduction measures to prevent injury from fall in the home  Outcome: Progressing  Goal: Use assistive devices by end of the shift  Outcome: Progressing  Goal: Pace activities to prevent fatigue by end of the shift  Outcome: Progressing     Problem: Respiratory  Goal: Minimize  anxiety/maximize coping throughout shift  Outcome: Progressing  Goal: Minimal/no exertional discomfort or dyspnea this shift  Outcome: Progressing  Goal: No signs of respiratory distress (eg. Use of accessory muscles. Peds grunting)  Outcome: Progressing  Goal: Patent airway maintained this shift  Outcome: Progressing  Goal: Verbalize decreased shortness of breath this shift  Outcome: Progressing  Goal: Wean oxygen to maintain O2 saturation per order/standard this shift  Outcome: Progressing  Goal: Increase self care and/or family involvement in next 24 hours  Outcome: Progressing     Problem: Pain  Goal: Takes deep breaths with improved pain control throughout the shift  Outcome: Progressing  Goal: Turns in bed with improved pain control throughout the shift  Outcome: Progressing  Goal: Walks with improved pain control throughout the shift  Outcome: Progressing  Goal: Performs ADL's with improved pain control throughout shift  Outcome: Progressing  Goal: Participates in PT with improved pain control throughout the shift  Outcome: Progressing  Goal: Free from opioid side effects throughout the shift  Outcome: Progressing  Goal: Free from acute confusion related to pain meds throughout the shift  Outcome: Progressing     Problem: Skin  Goal: Participates in plan/prevention/treatment measures  Outcome: Progressing  Flowsheets (Taken 7/7/2024 0616)  Participates in plan/prevention/treatment measures:   Discuss with provider PT/OT consult   Elevate heels   Increase activity/out of bed for meals  Goal: Prevent/manage excess moisture  Outcome: Progressing  Flowsheets (Taken 7/7/2024 0616)  Prevent/manage excess moisture:   Monitor for/manage infection if present   Moisturize dry skin  Goal: Prevent/minimize sheer/friction injuries  Outcome: Progressing  Flowsheets (Taken 7/7/2024 0616)  Prevent/minimize sheer/friction injuries:   Use pull sheet   HOB 30 degrees or less   Turn/reposition every 2 hours/use  positioning/transfer devices  Goal: Promote skin healing  Outcome: Progressing  Flowsheets (Taken 7/7/2024 3816)  Promote skin healing:   Turn/reposition every 2 hours/use positioning/transfer devices   Ensure correct size (line/device) and apply per  instructions   Rotate device position/do not position patient on device   Assess skin/pad under line(s)/device(s)   Protective dressings over bony prominences

## 2024-07-07 NOTE — SIGNIFICANT EVENT
Patient is s/p C4-7 ACDF. Auto dc'd drain POD 0 with neck hematoma, which has stabilized. On 4L NC. Ok to restart ASA POD 5 and restart plavix POD 10. Ok for DVT ppx. No acute neurosurgical intervention or additional neuroimaging needed at this time. A 2 week and 6 week follow up appointment for wound and post-op check. Please place in patient's discharge profile when it populates in visit history. Thank you for allowing us to participate in the care of this patient. Will sign off at this time. Please page [phone] with any questions or concerns.

## 2024-07-07 NOTE — PROGRESS NOTES
[facility]  TRAUMA ICU - PROGRESS NOTE    Patient Name: Ritesh Garza  MRN: [id number]  Admit Date: 701  : 1950  AGE: 73 y.o.   GENDER: male  ==============================================================================  MECHANISM OF INJURY / CHIEF COMPLAINT:   Patient is a 74 yo M who was going up his stairs and missed the top step and fell down and landed on L side/head. Patient instantly noticed burning sensation and pain down bilateral arms, BLE with no abnormalities. He states that he was able to get up with help. Patient went to [facility] where they pan scanned him and he was found to have moderate to severe multilevel spinal degenerative change described above, notable for severe spinal canal stenosis at C4-C5 and C5-C6 and R adrenal hemorrhage. Patient transferred here in stable condition for MR c-spine.    Patient pulled out VIDYA drain from his neck and developed a hematoma on  where he was transferred to the ICU for respiratory monitoring.      LOC (yes/no?): No  Anticoagulant / Anti-platelet Rx? (for what dx?): Plavix (last dose ), aspirin 81mg   Referring Facility Name (N/A for scene EMR run): [facility]      INJURIES:   Spinal canal stenosis w concern for TSCI (central cord)  Adrenal hemorrhage      OTHER MEDICAL PROBLEMS:  CAD s/p MI s/p CABG  s/p and cardiac debrillator placement    DM2  Asthma  Gout  HTN  Liver cirrohsis   BPH  TIA  CHF  SENIA  CKD     INCIDENTAL FINDINGS:  None     PROCEDURES:  : ACD C4-5, 5-6, 6-7  ==============================================================================  TODAY'S ASSESSMENT AND PLAN OF CARE:  Ritesh Garza is a 73 y.o. male in the ICU due to: respiratory monitoring     NEURO/PAIN/SEDATION:   #Acute pain  #ACD C4-5, 5-6, 6-7  #Spinal Cord Stenosis    - q4h neuro checks    - Tylenol 650mg q6h, lidocaine patches  -  Discontinued oxy 2.5/5 due to increased somnolence yesterday   - Continue home  Sertraline, Gabapentin   - NSGY Following:   > continue to hold ASA/Plavix   > Will monitor incision and collection closely, may require exploration if continues to expand, has airway compromise     RESPIRATORY:   #History of Asthma  #History of SENIA   - duo-neb q6h PRN    - IS at bedside, recommend q1h    - continue respiratory monitoring, wean supplemental O2 requirements to SpO2 goal >90%   > currently on room air    CARDIOVASC:   #history of CHF  #history of CAD  #History of HTN    - holding ASA per neurosurgery    - continue home amiodarone, Lopressor, Entresto   - goal net -1L     #New onset wide complex tachycardia intermittent   - cardiology EP consulted today:   > Repeat TSH to exclude hyperthyroidism: 10.97   > Primary team can up-titrate his Metoprolol Tartrate to 25 mg QID. If insufficient, can increase to 37.5 mg QID tomorrow   > If the patient is euthyroid function, continue his home Amiodarone 200 daily (originally indicated for VT)    GI:    - cardiac diet    - continue bowel regimen (Colace and Miralax)     :    - no thibodeaux in place     FEN:    - replete electrolytes as needed      HEMATOLOGIC:    - daily and as needed CBCs    ENDOCRINE:   #history of diabetes    - SSI increased to corrective scale 2 (glucoses were in 200s)   - q4h glucose checks     MUSCULOSKELETAL/SKIN:   - PT/OT - re-evaluation    INFECTIOUS DISEASE: no indications for antibiotics at this time     GI PROPHYLAXIS: N/A    DVT PROPHYLAXIS: SQH and SCDs     DISPOSITION: Transfer to [facility] vs Discharge, pending Trauma Surgery's evaluation    Patient discussed with Dr. Aron Alcala MD  PGY-2  Trauma, Critical Care, and Acute Care Surgery  Ext [phone] (floor), [phone] (ICU)  ==============================================================================  CHIEF COMPLAINT / OVERNIGHT EVENTS / HPI:   No issues overnight. Weaned down room air, satting well. Voice hoarseness unchanged. Denies chest pain or SOB. Up in chair, enjoying  breakfast. No concerns or complaints at this time.    PHYSICAL EXAM:  Heart Rate:  []   Temp:  [35.4 °C (95.7 °F)-36.6 °C (97.8 °F)]   Resp:  [9-24]   BP: ()/(48-80)   SpO2:  [92 %-100 %]     Physical Exam  Constitutional:       General: He is not in acute distress.  Neck:      Comments: Right-sided hematoma at incision unchanged in size compared to yesterday's exam, diffuse neck ecchymosis   Cardiovascular:      Rate and Rhythm: Normal rate and regular rhythm.   Pulmonary:      Effort: No respiratory distress.      Comments: On nasal cannula, SpO2 > 90%  Abdominal:      General: There is no distension.      Palpations: Abdomen is soft.      Tenderness: There is no abdominal tenderness.   Musculoskeletal:         General: Normal range of motion.   Skin:     General: Skin is warm and dry.   Neurological:      General: No focal deficit present.      Mental Status: He is alert and oriented to person, place, and time.      Comments: Hoarseness in voice improved        IMAGING SUMMARY:    CXR 7/6:  1. Slight interval improvement in left perihilar edema/effusions. Correlate with fluid status.   2. Persistent moderate right and small left pleural effusions.     LABS:  Results from last 7 days   Lab Units 07/07/24  0145 07/06/24  0331 07/05/24  0405 07/02/24  0448 06/30/24  2129   WBC AUTO x10*3/uL 8.5 10.5 11.5*   < > 9.6   HEMOGLOBIN g/dL 9.6* 9.3* 8.9*   < > 10.9*   HEMATOCRIT % 30.1* 29.1* 27.4*   < > 34.3*   PLATELETS AUTO x10*3/uL 221 204 164   < > 176   NEUTROS PCT AUTO %  --   --   --   --  62.4   LYMPHS PCT AUTO %  --   --   --   --  25.7   MONOS PCT AUTO %  --   --   --   --  8.8   EOS PCT AUTO %  --   --   --   --  2.4    < > = values in this interval not displayed.     Results from last 7 days   Lab Units 07/02/24  1416 07/01/24  0950 06/30/24  2129   APTT seconds 32 34  --    INR  1.2* 1.2* 1.3*     Results from last 7 days   Lab Units 07/07/24  0145 07/06/24  0331 07/05/24  0405 07/04/24  2136  07/04/24  1047 07/01/24  1807 07/01/24  0950 06/30/24  2129   SODIUM mmol/L 142 139 139   < >  --    < >  --  134*   POTASSIUM mmol/L 4.0 5.2 4.7   < >  --    < >  --  4.4   CHLORIDE mmol/L 101 101 104   < >  --    < >  --  102   CO2 mmol/L 31 27 24   < >  --    < >  --  20*   BUN mg/dL 76* 80* 71*   < >  --    < >  --  60*   CREATININE mg/dL 2.28* 2.64* 2.79*   < >  --    < >  --  3.50*   CALCIUM mg/dL 8.9 9.2 8.7   < >  --    < >  --  8.8   PROTEIN TOTAL g/dL  --   --   --   --  6.7  --  7.1 6.9   BILIRUBIN TOTAL mg/dL  --   --   --   --  0.6  --  0.6 0.7   ALK PHOS U/L  --   --   --   --  80  --  112 106   ALT U/L  --   --   --   --  23  --  39 39   AST U/L  --   --   --   --  45*  --  52* 60*   GLUCOSE mg/dL 221* 195* 237*   < >  --    < >  --  232*    < > = values in this interval not displayed.     Results from last 7 days   Lab Units 07/04/24  1047 07/01/24  0950 06/30/24  2129   BILIRUBIN TOTAL mg/dL 0.6 0.6 0.7   BILIRUBIN DIRECT mg/dL 0.2 0.1  --      Results from last 7 days   Lab Units 07/04/24 2218 07/04/24  1826   POCT PH, ARTERIAL pH 7.42 7.43*   POCT PCO2, ARTERIAL mm Hg 33* 33*   POCT PO2, ARTERIAL mm Hg 67* 53*   POCT HCO3 CALCULATED, ARTERIAL mmol/L 21.4* 21.9*   POCT BASE EXCESS, ARTERIAL mmol/L -2.5* -1.9     I have reviewed all medications, laboratory results, and imaging pertinent for today's encounter.     [id number]  HD # 6 for this 72 YO male admitted after fall down stairs ( mechanical missed step) , with central cord syndrome exacerbated by  severe C-spine stenosis . He is now POD # 4  s/p decompression  but had his drain dislopdged on the floor resulting in transfer to ICU for airway watch Hematoma soft and hoarseness resolved. Pain controlled and sitting in a chair eating breakfast on lovenox today, restart outpatinet  ASA tomorrow and Plavix POD 10 has severe ischemiv cardiomyopathy. Plans for D/C to home today  if all post discharge events can be coordinated otherwise tr to RNF        This critically ill patient continues no longer continues  to be at-risk for clinically significant deterioration / failure due to the above mentioned dysfunctional, unstable organ systems.  I have personally identified and managed all complex critical care issues to prevent aforementioned clinical deterioration.  Critical care time is spent at bedside and/or the immediate area and has included, but is not limited to, the review of diagnostic tests, labs, radiographs, serial assessments of hemodynamics, respiratory status, ventilatory management, and family updates.  Time spent in procedures and teaching are reported separately.     CARE TIME: Level III  Glenn Sharp MD

## 2024-07-07 NOTE — PROGRESS NOTES
Brief Trauma Update    73 y.o. male admitted 7/1/2024 after fall down stairs, with severe C-spine stenosis and central cord syndrome. Now s/p decompression 7/3, c/b neck hematoma. Hematoma soft and hoarseness resolved. Pain controlled and sitting in a chair eating breakfast when I saw him this morning. NSG recommended ok for lovenox today, restart ASA POD 5 and restart plavix POD 10. Working towards floor vs home.    Rajendra Dewitt MD  Trauma, Critical Care, and Acute Care Surgery  Pager: [phone]

## 2024-07-08 LAB
ALBUMIN SERPL BCP-MCNC: 3.7 G/DL (ref 3.4–5)
ANION GAP SERPL CALC-SCNC: 16 MMOL/L (ref 10–20)
ATRIAL RATE: 112 BPM
BUN SERPL-MCNC: 68 MG/DL (ref 6–23)
CALCIUM SERPL-MCNC: 9.1 MG/DL (ref 8.6–10.6)
CHLORIDE SERPL-SCNC: 103 MMOL/L (ref 98–107)
CO2 SERPL-SCNC: 28 MMOL/L (ref 21–32)
CREAT SERPL-MCNC: 1.88 MG/DL (ref 0.5–1.3)
EGFRCR SERPLBLD CKD-EPI 2021: 37 ML/MIN/1.73M*2
ERYTHROCYTE [DISTWIDTH] IN BLOOD BY AUTOMATED COUNT: 14 % (ref 11.5–14.5)
GLUCOSE BLD MANUAL STRIP-MCNC: 132 MG/DL (ref 74–99)
GLUCOSE BLD MANUAL STRIP-MCNC: 183 MG/DL (ref 74–99)
GLUCOSE BLD MANUAL STRIP-MCNC: 184 MG/DL (ref 74–99)
GLUCOSE BLD MANUAL STRIP-MCNC: 264 MG/DL (ref 74–99)
GLUCOSE SERPL-MCNC: 175 MG/DL (ref 74–99)
HCT VFR BLD AUTO: 35.7 % (ref 41–52)
HGB BLD-MCNC: 11.3 G/DL (ref 13.5–17.5)
MAGNESIUM SERPL-MCNC: 2.02 MG/DL (ref 1.6–2.4)
MCH RBC QN AUTO: 30 PG (ref 26–34)
MCHC RBC AUTO-ENTMCNC: 31.7 G/DL (ref 32–36)
MCV RBC AUTO: 95 FL (ref 80–100)
NRBC BLD-RTO: 0 /100 WBCS (ref 0–0)
PHOSPHATE SERPL-MCNC: 2.8 MG/DL (ref 2.5–4.9)
PLATELET # BLD AUTO: 233 X10*3/UL (ref 150–450)
POTASSIUM SERPL-SCNC: 3.9 MMOL/L (ref 3.5–5.3)
PR INTERVAL: 168 MS
Q ONSET: 213 MS
QRS COUNT: 18 BEATS
QRS DURATION: 138 MS
QT INTERVAL: 386 MS
QTC CALCULATION(BAZETT): 526 MS
QTC FREDERICIA: 475 MS
R AXIS: 87 DEGREES
RBC # BLD AUTO: 3.77 X10*6/UL (ref 4.5–5.9)
SODIUM SERPL-SCNC: 143 MMOL/L (ref 136–145)
T AXIS: 33 DEGREES
T OFFSET: 406 MS
VENTRICULAR RATE: 112 BPM
WBC # BLD AUTO: 8.3 X10*3/UL (ref 4.4–11.3)

## 2024-07-08 PROCEDURE — 84443 ASSAY THYROID STIM HORMONE: CPT | Performed by: PHYSICIAN ASSISTANT

## 2024-07-08 PROCEDURE — 83735 ASSAY OF MAGNESIUM: CPT | Performed by: PHYSICIAN ASSISTANT

## 2024-07-08 PROCEDURE — 2500000002 HC RX 250 W HCPCS SELF ADMINISTERED DRUGS (ALT 637 FOR MEDICARE OP, ALT 636 FOR OP/ED): Performed by: PHYSICIAN ASSISTANT

## 2024-07-08 PROCEDURE — 99233 SBSQ HOSP IP/OBS HIGH 50: CPT | Performed by: INTERNAL MEDICINE

## 2024-07-08 PROCEDURE — 1100000001 HC PRIVATE ROOM DAILY

## 2024-07-08 PROCEDURE — 99232 SBSQ HOSP IP/OBS MODERATE 35: CPT | Performed by: PHYSICIAN ASSISTANT

## 2024-07-08 PROCEDURE — 2500000001 HC RX 250 WO HCPCS SELF ADMINISTERED DRUGS (ALT 637 FOR MEDICARE OP): Performed by: PHYSICIAN ASSISTANT

## 2024-07-08 PROCEDURE — 2500000005 HC RX 250 GENERAL PHARMACY W/O HCPCS: Performed by: PHYSICIAN ASSISTANT

## 2024-07-08 PROCEDURE — 85027 COMPLETE CBC AUTOMATED: CPT | Performed by: PHYSICIAN ASSISTANT

## 2024-07-08 PROCEDURE — 36415 COLL VENOUS BLD VENIPUNCTURE: CPT | Performed by: PHYSICIAN ASSISTANT

## 2024-07-08 PROCEDURE — 80069 RENAL FUNCTION PANEL: CPT | Performed by: PHYSICIAN ASSISTANT

## 2024-07-08 PROCEDURE — 82947 ASSAY GLUCOSE BLOOD QUANT: CPT

## 2024-07-08 PROCEDURE — 97116 GAIT TRAINING THERAPY: CPT | Mod: GP

## 2024-07-08 PROCEDURE — 2500000004 HC RX 250 GENERAL PHARMACY W/ HCPCS (ALT 636 FOR OP/ED): Performed by: PHYSICIAN ASSISTANT

## 2024-07-08 PROCEDURE — 84439 ASSAY OF FREE THYROXINE: CPT | Performed by: PHYSICIAN ASSISTANT

## 2024-07-08 PROCEDURE — 97530 THERAPEUTIC ACTIVITIES: CPT | Mod: GP

## 2024-07-08 PROCEDURE — 97535 SELF CARE MNGMENT TRAINING: CPT | Mod: GO

## 2024-07-08 ASSESSMENT — PAIN SCALES - GENERAL
PAINLEVEL_OUTOF10: 0 - NO PAIN
PAINLEVEL_OUTOF10: 10 - WORST POSSIBLE PAIN
PAINLEVEL_OUTOF10: 0 - NO PAIN
PAINLEVEL_OUTOF10: 2
PAINLEVEL_OUTOF10: 0 - NO PAIN

## 2024-07-08 ASSESSMENT — PAIN - FUNCTIONAL ASSESSMENT
PAIN_FUNCTIONAL_ASSESSMENT: 0-10
PAIN_FUNCTIONAL_ASSESSMENT: 0-10

## 2024-07-08 ASSESSMENT — PAIN SCALES - WONG BAKER: WONGBAKER_NUMERICALRESPONSE: HURTS LITTLE BIT

## 2024-07-08 ASSESSMENT — PAIN DESCRIPTION - ORIENTATION: ORIENTATION: LEFT

## 2024-07-08 ASSESSMENT — COGNITIVE AND FUNCTIONAL STATUS - GENERAL
WALKING IN HOSPITAL ROOM: A LITTLE
TURNING FROM BACK TO SIDE WHILE IN FLAT BAD: A LITTLE
CLIMB 3 TO 5 STEPS WITH RAILING: TOTAL
DRESSING REGULAR UPPER BODY CLOTHING: A LITTLE
TOILETING: A LITTLE
DAILY ACTIVITIY SCORE: 19
MOVING TO AND FROM BED TO CHAIR: A LITTLE
MOVING FROM LYING ON BACK TO SITTING ON SIDE OF FLAT BED WITH BEDRAILS: A LITTLE
HELP NEEDED FOR BATHING: A LITTLE
PERSONAL GROOMING: A LITTLE
MOBILITY SCORE: 16
STANDING UP FROM CHAIR USING ARMS: A LITTLE
DRESSING REGULAR LOWER BODY CLOTHING: A LITTLE

## 2024-07-08 ASSESSMENT — PAIN DESCRIPTION - LOCATION: LOCATION: ARM

## 2024-07-08 ASSESSMENT — ACTIVITIES OF DAILY LIVING (ADL): HOME_MANAGEMENT_TIME_ENTRY: 32

## 2024-07-08 NOTE — PROGRESS NOTES
Occupational Therapy    Occupational Therapy Treatment    Name: Ritesh Garza  MRN: [id number]  : 1950  Date: 24  Room: [address] [place], [place] [address]      Time Calculation  Start Time: 1032  Stop Time: 1104  Time Calculation (min): 32 min    Assessment:     Plan:  Treatment Interventions: ADL retraining, Compensatory technique education  OT Frequency: 2 times per week  OT Discharge Recommendations: Low intensity level of continued care  Equipment Recommended upon Discharge: Wheeled walker  OT Recommended Transfer Status:  (SUP)  OT - OK to Discharge: Yes    Subjective   General:         Precautions:  Medical Precautions: Fall precautions  Post-Surgical Precautions: Spinal precautions  Vitals:     Lines/Tubes/Drains:       Cognition:       Pain Assessment:        Objective   Activities of Daily Living:                                 Functional Standing Tolerance:     Bed Mobility/Transfers:                                 Balance:     Communication:     Splinting:     Therapy/Activity:            Sensory:     Cognitive Skill Development:     Vision:     Strength:     Other Activity:         Outcome Measures:        Education Documentation  Handouts, taught by ELEN Amin at 2024 12:41 PM.  Learner: Patient  Readiness: Acceptance  Method: Explanation, Demonstration, Handout  Response: Verbalizes Understanding, Needs Reinforcement    Body Mechanics, taught by ELEN Amin at 2024 12:41 PM.  Learner: Patient  Readiness: Acceptance  Method: Explanation, Demonstration, Handout  Response: Verbalizes Understanding, Needs Reinforcement    Precautions, taught by ELEN Amin at 2024 12:41 PM.  Learner: Patient  Readiness: Acceptance  Method: Explanation, Demonstration, Handout  Response: Verbalizes Understanding, Needs Reinforcement    ADL Training, taught by ELEN Amin at 2024 12:41 PM.  Learner: Patient  Readiness: Acceptance  Method: Explanation, Demonstration,  Handout  Response: Verbalizes Understanding, Needs Reinforcement    Education Comments  No comments found.        Goals:  Encounter Problems       Encounter Problems (Active)       ADLs       Patient with complete upper body dressing with supervision level of assistance donning and doffing all UE clothes with no adaptive equipment. (Met)       Start:  07/04/24    Expected End:  07/18/24    Resolved:  07/08/24    Updated to: Patient with complete upper body dressing with IND donning and doffing all UE clothes with no adaptive equipment.    Update reason: met         Patient with complete lower body dressing with supervision level of assistance donning and doffing all LE clothes  with PRN adaptive equipment. (Progressing)       Start:  07/04/24    Expected End:  07/18/24            Patient will complete daily grooming tasks with supervision level of assistance and PRN adaptive equipment while standing. (Met)       Start:  07/04/24    Expected End:  07/18/24    Resolved:  07/08/24    Updated to: Patient will complete daily grooming tasks with IND and PRN adaptive equipment while standing.    Update reason: met         Patient will complete toileting including hygiene clothing management/hygiene with supervision level of assistance. (Progressing)       Start:  07/04/24    Expected End:  07/18/24            Patient with complete upper body dressing with IND donning and doffing all UE clothes with no adaptive equipment. (Progressing)       Start:  07/08/24    Expected End:  07/18/24                Patient will complete daily grooming tasks with IND and PRN adaptive equipment while standing. (Progressing)       Start:  07/08/24    Expected End:  07/18/24                   BALANCE       Pt will maintain dynamic standing balance during ADL task with supervision level of assistance in order to demonstrate decreased risk of falling and improved postural control. (Met)       Start:  07/04/24    Expected End:  07/18/24     Resolved:  07/08/24    Updated to: Pt will maintain dynamic standing balance during ADL task with IND in order to demonstrate decreased risk of falling and improved postural control.    Update reason: met         Pt will maintain dynamic standing balance during ADL task with IND in order to demonstrate decreased risk of falling and improved postural control. (Progressing)       Start:  07/08/24    Expected End:  07/18/24                   COGNITION/SAFETY       Patient will recall and adhere to spine precautions during all functional mobility/ADL tasks in order to demonstrate improved understanding and promote healing post op (Progressing)       Start:  07/04/24    Expected End:  07/18/24               MOBILITY       Patient will perform Functional mobility Household distances/Community Distances with supervision level of assistance and front wheeled walker in order to improve safety and functional mobility. (Met)       Start:  07/04/24    Expected End:  07/18/24    Resolved:  07/08/24    Updated to: Patient will perform Functional mobility Household distances/Community Distances with IND and front wheeled walker as needed in order to improve safety and functional mobility.    Update reason: met         Patient will perform Functional mobility Household distances/Community Distances with IND and front wheeled walker as needed in order to improve safety and functional mobility. (Progressing)       Start:  07/08/24    Expected End:  07/18/24                   TRANSFERS       Patient will perform bed mobility supervision level of assistance in order to improve safety and independence with mobility (Met)       Start:  07/04/24    Expected End:  07/18/24    Resolved:  07/08/24    Updated to: Patient will perform bed mobility IND in order to improve safety and independence with mobility    Update reason: met         Patient will complete functional transfers with front wheeled walker with supervision level of assistance.  (Progressing)       Start:  07/04/24    Expected End:  07/18/24            Patient will perform bed mobility IND in order to improve safety and independence with mobility (Progressing)       Start:  07/08/24    Expected End:  07/18/24 07/08/24 at 3:49 PM   CORDELIA VERAS, S-OT   [phone]

## 2024-07-08 NOTE — CARE PLAN
The patient's goals for the shift include  No pain throughout shift    The clinical goals for the shift include Patient follow protocol to prevent falls throughout shift

## 2024-07-08 NOTE — PROGRESS NOTES
Cardiac Electrophysiology Progress Note     Subjective  Improved, able to tolerate PO   Denies chest pain or palpitations       10 system ROS done and pertinent positives noted in HPI/subjective otherwise negative    Objective      7/8/2024     3:54 AM 7/8/2024     8:32 AM 7/8/2024    11:00 AM 7/8/2024    11:53 AM 7/8/2024    12:04 PM 7/8/2024     5:19 PM 7/8/2024     5:22 PM   Vitals   Systolic 111 141 111 119 119  113   Diastolic 61 82 68 63 63  56   Heart Rate 100 80 70 68 63  67   Temp 36.8 °C (98.3 °F) 36.3 °C (97.3 °F)     36.2 °C (97.2 °F)   Resp 18 18 18   18 18       Scheduled medications   Medication Dose Route Frequency    acetaminophen  650 mg oral q6h    amiodarone  200 mg oral Daily    aspirin  81 mg oral Daily    atorvastatin  80 mg oral q PM    docusate sodium  100 mg oral BID    furosemide  40 mg oral Daily    gabapentin  100 mg oral BID    heparin (porcine)  5,000 Units subcutaneous q8h DIVINE    insulin regular  0-10 Units subcutaneous TID    lidocaine  1 patch transdermal Daily    metoprolol tartrate  25 mg oral q12h    oxygen   inhalation Continuous - Inhalation    oxygen   inhalation Continuous - Inhalation    polyethylene glycol  17 g oral Daily    sacubitriL-valsartan  0.5 tablet oral BID    sertraline  50 mg oral q PM        Physical Exam:  Constitutional:       General: He is not in acute distress.  Neck:      Comments: Right sided hematoma at incision, bruising near incision   Cardiovascular:      Rate and Rhythm: Normal rate and regular rhythm.   Pulmonary:      Effort: No respiratory distress.      Comments: breathing comfortably without distress  Abdominal:      General: There is no distension.      Palpations: Abdomen is soft.      Tenderness: There is no abdominal tenderness.   Musculoskeletal:         General: Normal range of motion.   Skin:     General: Skin is warm and dry.   Neurological:      General: No focal deficit present.      Mental Status: He is alert and oriented to  person, place, and time.      Comments: Hoarseness in voice improved           Assessment/Plan   Ritesh Garza is a 73 y.o. male with PMH of multivessel CAD/NSTEMI s/p CABG in 1992 (all native coronary arteries with ; patent LIMA-LAD graft in 2/2024 but others were occluded), ICM/HFrEF (LVEF 30-35%) c/b symptomatic VT (currently on Amiodarone) s/p Soares dcICD in 5/2024, severe left subclavian artery stenosis on cardiac cath, who successfully underwent C4-5, C5-6, C6-7 anterior cervical and discectomy on 7/3/2024. Patient pulled out VIDYA drain from his neck and developed cervical hematoma next day, so that this case was transferred to TSICU for the airway monitoring. His telemetry showed new wide complex tachycardia (WCT) intermitently last night so that EP was consulted for the further assessment.     <Key observations>  - On the telemetry, there were multiple rapid (non-physiologic) transitions of heart rate from 60 bpm to 100 bpm.   - The ICD interrogation during the WCT demonstrated normally sensed atrium and ventricular intrinsic signals.   - The tachycardia was initiated by PVC most time.   - During the persistent WCT, sporadic PVC did not affect the cycle length of the WCT.  - NO AT/AF events as well as VT/VF since his dcICD implantation in 5/2024     Impressions:  - Likely to have post-operative paroxysmal atrial tachycardia or atypical AVNRT (not atrial fibrillation)  - Likely to have rate-dependent complete left bundle branch block representing WCT on telemetry intermittently     Recommendations:  - Without hemodynamic instability, the paroxysmal atrial tachycardia will not affect this patient and the tachycardia should be spontaneously improved after the resolution of post-operative stress later.  - Repeat TSH to exclude hyperthyroidism  - continue home amiodorone 200 mg daily   -continue Metoprolol Tartrate to 25 mg BID.    - EP to sign off, please call us anytime however with questions or clinical  change.         Full Code    I spent 30 minutes in the professional and overall care of this patient.      Thank you for the opportunity to contribute to the care of this patient. Above recommendations discussed with Dr. Woodruff. If further questions arise, please page the EP consult pager at [phone] on weekdays 7AM - 6PM and weekends 7AM - 2PM, or at [phone] at all other times. The EP device nurse can be reached at pager [phone] during regular business hours SONG Landa MD   PGY5 Cardiology Fellow   Pager [phone]

## 2024-07-08 NOTE — CARE PLAN
The patient's goals for the shift include      The clinical goals for the shift include Patient follow protocol to prevent falls throughout shift

## 2024-07-08 NOTE — PROGRESS NOTES
"Physical Therapy    Physical Therapy Treatment    Patient Name: Ritesh Garza  MRN: [id number]  Today's Date: 7/8/2024  Time Calculation  Start Time: 1153  Stop Time: 1227  Time Calculation (min): 34 min       Assessment/Plan   PT Assessment  End of Session Communication: Bedside nurse  End of Session Patient Position: Bed, 3 rail up, Alarm on     PT Plan  Treatment/Interventions: Bed mobility, Transfer training, Gait training, Stair training, Balance training, Strengthening, Endurance training, Therapeutic exercise, Therapeutic activity  PT Plan: Ongoing PT  PT Frequency: 5 times per week  PT Discharge Recommendations: Moderate intensity level of continued care  Equipment Recommended upon Discharge: Wheeled walker  PT Recommended Transfer Status: Assist x1, Assistive device  PT - OK to Discharge: Yes      General Visit Information:   PT  Visit  PT Received On: 07/08/24  Prior to Session Communication: Bedside nurse  Family/Caregiver Present: No  General Comment: pt seated in chair. noted \"feeling dizzy for ~15-20 mins\" pt had x1 LOB posteriorly required modA to recover.VSS. pt stated he felt this feeling before he fell prior to admission. Updating DC rec to mod intensity 2/2 x1 LOB and unsteadiness in gait.      Subjective   Precautions:  Precautions  Medical Precautions: Fall precautions, Spinal precautions  Post-Surgical Precautions: Spinal precautions  Vital Signs:  Vital Signs  Heart Rate: 68  SpO2: 95 %  BP: 119/63    Objective   Pain:  Pain Assessment  Pain Assessment: 0-10  0-10 (Numeric) Pain Score: 0 - No pain  Cognition:  Cognition  Overall Cognitive Status: Within Functional Limits  Orientation Level: Oriented X4  Lines/Tubes/Drains:   tele    PT Treatments:  Therapeutic Exercise  Therapeutic Exercise Performed: Yes  Therapeutic Exercise Activity 1: 1x10 APS  Therapeutic Activity  Therapeutic Activity Performed: Yes  Therapeutic Activity 1: pt educated about fall provention 2/2 usteadiness in stainding " without UE support. improved with FWW, noted BL knee buckling 2x.  Therapeutic Activity 2: skilled vitals taking thoughout session 2/2 dizziness,.  Therapeutic Activity 3: ambulated in room without walker; trialed with FWW     Bed Mobility  Bed Mobility: Yes  Bed Mobility 1  Bed Mobility 1: Sitting to supine  Level of Assistance 1: Minimum assistance, Minimal verbal cues  Bed Mobility Comments 1: HOB near flat  Ambulation/Gait Training  Ambulation/Gait Training Performed: Yes  Ambulation/Gait Training 1  Surface 1: Level tile  Device 1: No device  Assistance 1:  (CGA-modA)  Quality of Gait 1: Narrow base of support, Shuffling gait, Antalgic, Soft knee(s), Knee(s) buckle, Inconsistent stride length, Decreased step length  Comments/Distance (ft) 1: ~3ft and had x1 LOB requiring modA to recover. pt CGA after and ambulated ~10ft. unsteady gait. VSS  Ambulation/Gait Training 2  Surface 2: Level tile  Device 2: Rolling walker  Assistance 2: Contact guard, Minimal verbal cues  Quality of Gait 2: Narrow base of support, Inconsistent stride length, Decreased step length, Shuffling gait, Knee(s) buckle  Comments/Distance (ft) 2: ~180ft x1; 50ftx1; x2 standing rest breaks taken.  Transfers  Transfer: Yes  Transfer 1  Transfer From 1: Sit to, Stand to  Transfer to 1: Stand, Sit  Technique 1: Sit to stand  Transfer Device 1:  (trialed with and without walker)  Transfer Level of Assistance 1:  (SBA-CGA 2/2 dizziness)  Trials/Comments 1: x4 trials; VC for hand placement  Stairs  Stairs: No          Outcome Measures:  [facility] Basic Mobility  Turning from your back to your side while in a flat bed without using bedrails: A little  Moving from lying on your back to sitting on the side of a flat bed without using bedrails: A little  Moving to and from bed to chair (including a wheelchair): A little  Standing up from a chair using your arms (e.g. wheelchair or bedside chair): A little  To walk in hospital room: A little  Climbing 3-5  steps with railing: Total  Basic Mobility - Total Score: 16                            Education Documentation  Precautions, taught by Claribel Sierra PT at 7/8/2024  2:33 PM.  Learner: Patient  Readiness: Acceptance  Method: Explanation  Response: Verbalizes Understanding    Body Mechanics, taught by Claribel Sierra PT at 7/8/2024  2:33 PM.  Learner: Patient  Readiness: Acceptance  Method: Explanation  Response: Verbalizes Understanding    Mobility Training, taught by Claribel Sierra PT at 7/8/2024  2:33 PM.  Learner: Patient  Readiness: Acceptance  Method: Explanation  Response: Verbalizes Understanding    Education Comments  No comments found.          OP EDUCATION:       Encounter Problems       Encounter Problems (Active)       Balance       Pt will demonstrate ability to complete standing static/dynamic balance activities with unilateral UE support and no LOB for increase in safety up D/C.  (Progressing)       Start:  07/04/24    Expected End:  07/18/24               Mobility       Pt will demonstrated ability to ambulate >/=250ft with proper form, LRAD and no balance deficits for safe home going.   (Progressing)       Start:  07/04/24    Expected End:  07/18/24            Pt will demonstrate ability to ascend/descend 1 flight of stairs with unilateral rail and no balance deficits for safe home going.  (Progressing)       Start:  07/04/24    Expected End:  07/18/24               PT Transfers       Pt will demonstrated ability to complete bed mobility and sit<>stand transfers without assistance and use of assistive device to safely return home.  (Progressing)       Start:  07/04/24    Expected End:  07/18/24 07/08/24 at 2:34 PM   Claribel Sierra PT   Rehab Office: [phone]

## 2024-07-08 NOTE — HOSPITAL COURSE
Patient is a 74 yo M who was going up his stairs and missed the top step and fell down and landed on L side/head. Patient instantly noticed burning sensation and pain down bilateral arms, BLE with no abnormalities. He states that he was able to get up with help. Patient went to [place] where they pan scanned him and he was found to have moderate to severe multilevel spinal degenerative change described  above, notable for severe spinal canal stenosis at C4-C5 and C5-C6 and R adrenal hemorrhage. Patient transferred here in stable condition for MRI which noted cord compression with myelopathy. NSGY consulted and recommended operative intervention. Patient went to the OR on 7/3 for C4-C7 ACDF. Post-op patient was monitored in the TICU. While in TICU patient had runs of paroxysmal atrial tachycardia for which cardiology was consulted. Recommended TSH and continue home Amio and Metoprolol. Patient had no further issues with HR. Remained stable and was transferred to the [facility].  There was concern for airway compression due to neck hematoma so patient was transferred back to the TICU for airway monitoring.     Patient did well and was ultimately transferred to the [facility].     Patient reevaluated by PT/OT and rec'd home with [facility] and 24/7 supervision. Patient family and patient agreeable to discharge plan.    At the time of discharge, patient's pain was controlled with oral analgesia, patient was urinating, having BMs, sleeping, and eating well. Based on PT/OT's recommendation, patient was discharged home w/ [facility] with scripts and follow up appointments. Discharge plan was discussed with the patient and all of the patient's questions were answered.

## 2024-07-08 NOTE — PROGRESS NOTES
[facility]  TRAUMA SERVICE - PROGRESS NOTE    Patient Name: Ritesh Garza  MRN: [id number]  Admit Date: 701  : 1950  AGE: 73 y.o.   GENDER: male  ==============================================================================  MECHANISM OF INJURY:   73 YOM s/p fall down stairs.    LOC (yes/no?): no  Anticoagulant / Anti-platelet Rx? (for what dx?): ASA/Plavix  Referring Facility Name (N/A for scene EMR run): [facility]    INJURIES:   SCI  Adrenal hemorrhage    OTHER MEDICAL PROBLEMS:  CAD s/p MI s/p CABG  s/p and cardiac debrillator placement    DM2  Asthma  Gout  HTN  Liver cirrohsis   BPH  TIA  CHF  SENIA  CKD    INCIDENTAL FINDINGS:  None    PROCEDURES:  7/3: ACDF C4-C7    ==============================================================================  TODAY'S ASSESSMENT AND PLAN OF CARE:  ## Central cord   - Neuro initially following, s/o        -> Drain removed       -> Okay for ASA POD5 ()       -> Okay for Plavix POD10 ()       -> Okay for dvt chemoppx       -> fu 6 weeks   - Will start ASA today,   - Pain control with josefina. Tylenol, Gabapentin, Lidocaine patch, PRN Dilaudid    ## Adrenal hemorrhage  - hgb stable  - Okay for diet    ## New onset wide complex tachycardia (paroxismal)  - Cards c/s        -> rTSH; 10.97       -> Can uptitrate Metop to 25mg QID; can increase to 37/5 QID if insufficient       -> can continue Amio 100mg if euthyroid fcn  - Continuous tele    ## PMHx  - Continue home medications    ## FEN/GI  - Cardiac diet  - BR    ## PPX  - SCDs  - SQH    Dispo: Continue care on RNF, pending PT/OT    Patient discussed with attending, Dr. Katarina Calderón, PA-C  Trauma, Critical Care, and Acute Care Surgery  [id number]    ==============================================================================  CHIEF COMPLAINT / OVERNIGHT EVENTS:   No acute events overnight     MEDICAL HISTORY / ROS:  Admission history and ROS reviewed.  Pertinent changes as follows:    PHYSICAL EXAM:  Heart Rate:  []   Temp:  [35.6 °C (96.1 °F)-36.9 °C (98.5 °F)]   Resp:  [12-18]   BP: (103-141)/(54-82)   SpO2:  [91 %-99 %]   Physical Exam  Constitutional:       Appearance: Normal appearance.   HENT:      Head: Atraumatic.      Comments: R neck hematoma mild firmness.      Nose: Nose normal.      Mouth/Throat:      Mouth: Mucous membranes are moist.   Eyes:      Extraocular Movements: Extraocular movements intact.      Pupils: Pupils are equal, round, and reactive to light.   Cardiovascular:      Rate and Rhythm: Normal rate.      Pulses: Normal pulses.   Pulmonary:      Effort: Pulmonary effort is normal. No respiratory distress.      Breath sounds: Normal breath sounds.   Chest:      Chest wall: No tenderness.   Abdominal:      General: Abdomen is flat.      Palpations: Abdomen is soft.   Neurological:      General: No focal deficit present.      Mental Status: He is alert and oriented to person, place, and time.      Sensory: No sensory deficit.      Motor: No weakness.   Psychiatric:         Mood and Affect: Mood normal.         Behavior: Behavior normal.         IMAGING SUMMARY:  (summary of new imaging findings, not a copy of dictation)  No new imaging to review     LABS:  Results from last 7 days   Lab Units 07/08/24  0639 07/07/24  0145 07/06/24  0331   WBC AUTO x10*3/uL 8.3 8.5 10.5   HEMOGLOBIN g/dL 11.3* 9.6* 9.3*   HEMATOCRIT % 35.7* 30.1* 29.1*   PLATELETS AUTO x10*3/uL 233 221 204     Results from last 7 days   Lab Units 07/02/24  1416 07/01/24  0950   APTT seconds 32 34   INR  1.2* 1.2*     Results from last 7 days   Lab Units 07/08/24  0639 07/07/24  0145 07/06/24  0331 07/04/24  2136 07/04/24  1047 07/01/24  1807 07/01/24  0950   SODIUM mmol/L 143 142 139   < >  --    < >  --    POTASSIUM mmol/L 3.9 4.0 5.2   < >  --    < >  --    CHLORIDE mmol/L 103 101 101   < >  --    < >  --    CO2 mmol/L 28 31 27   < >  --    < >  --    BUN mg/dL 68* 76*  80*   < >  --    < >  --    CREATININE mg/dL 1.88* 2.28* 2.64*   < >  --    < >  --    CALCIUM mg/dL 9.1 8.9 9.2   < >  --    < >  --    PROTEIN TOTAL g/dL  --   --   --   --  6.7  --  7.1   BILIRUBIN TOTAL mg/dL  --   --   --   --  0.6  --  0.6   ALK PHOS U/L  --   --   --   --  80  --  112   ALT U/L  --   --   --   --  23  --  39   AST U/L  --   --   --   --  45*  --  52*   GLUCOSE mg/dL 175* 221* 195*   < >  --    < >  --     < > = values in this interval not displayed.     Results from last 7 days   Lab Units 07/04/24  1047 07/01/24  0950   BILIRUBIN TOTAL mg/dL 0.6 0.6   BILIRUBIN DIRECT mg/dL 0.2 0.1     Results from last 7 days   Lab Units 07/04/24  2218 07/04/24  1826   POCT PH, ARTERIAL pH 7.42 7.43*   POCT PCO2, ARTERIAL mm Hg 33* 33*   POCT PO2, ARTERIAL mm Hg 67* 53*   POCT HCO3 CALCULATED, ARTERIAL mmol/L 21.4* 21.9*   POCT BASE EXCESS, ARTERIAL mmol/L -2.5* -1.9       I have reviewed all medications, laboratory results, and imaging pertinent for today's encounter.

## 2024-07-09 LAB
ALBUMIN SERPL BCP-MCNC: 3.5 G/DL (ref 3.4–5)
ANION GAP SERPL CALC-SCNC: 12 MMOL/L (ref 10–20)
BUN SERPL-MCNC: 55 MG/DL (ref 6–23)
CALCIUM SERPL-MCNC: 9.1 MG/DL (ref 8.6–10.6)
CHLORIDE SERPL-SCNC: 104 MMOL/L (ref 98–107)
CO2 SERPL-SCNC: 29 MMOL/L (ref 21–32)
CREAT SERPL-MCNC: 1.82 MG/DL (ref 0.5–1.3)
EGFRCR SERPLBLD CKD-EPI 2021: 38 ML/MIN/1.73M*2
ERYTHROCYTE [DISTWIDTH] IN BLOOD BY AUTOMATED COUNT: 14 % (ref 11.5–14.5)
GLUCOSE BLD MANUAL STRIP-MCNC: 147 MG/DL (ref 74–99)
GLUCOSE BLD MANUAL STRIP-MCNC: 148 MG/DL (ref 74–99)
GLUCOSE BLD MANUAL STRIP-MCNC: 183 MG/DL (ref 74–99)
GLUCOSE BLD MANUAL STRIP-MCNC: 224 MG/DL (ref 74–99)
GLUCOSE SERPL-MCNC: 150 MG/DL (ref 74–99)
HCT VFR BLD AUTO: 35.7 % (ref 41–52)
HGB BLD-MCNC: 11.1 G/DL (ref 13.5–17.5)
MAGNESIUM SERPL-MCNC: 1.9 MG/DL (ref 1.6–2.4)
MCH RBC QN AUTO: 30.7 PG (ref 26–34)
MCHC RBC AUTO-ENTMCNC: 31.1 G/DL (ref 32–36)
MCV RBC AUTO: 99 FL (ref 80–100)
NRBC BLD-RTO: 0 /100 WBCS (ref 0–0)
PHOSPHATE SERPL-MCNC: 2.6 MG/DL (ref 2.5–4.9)
PLATELET # BLD AUTO: 194 X10*3/UL (ref 150–450)
POTASSIUM SERPL-SCNC: 4 MMOL/L (ref 3.5–5.3)
RBC # BLD AUTO: 3.62 X10*6/UL (ref 4.5–5.9)
SODIUM SERPL-SCNC: 141 MMOL/L (ref 136–145)
T4 FREE SERPL-MCNC: 1.32 NG/DL (ref 0.78–1.48)
TSH SERPL-ACNC: 9.35 MIU/L (ref 0.44–3.98)
WBC # BLD AUTO: 8 X10*3/UL (ref 4.4–11.3)

## 2024-07-09 PROCEDURE — 2500000004 HC RX 250 GENERAL PHARMACY W/ HCPCS (ALT 636 FOR OP/ED): Performed by: PHYSICIAN ASSISTANT

## 2024-07-09 PROCEDURE — 2500000001 HC RX 250 WO HCPCS SELF ADMINISTERED DRUGS (ALT 637 FOR MEDICARE OP): Performed by: PHYSICIAN ASSISTANT

## 2024-07-09 PROCEDURE — 2500000002 HC RX 250 W HCPCS SELF ADMINISTERED DRUGS (ALT 637 FOR MEDICARE OP, ALT 636 FOR OP/ED): Performed by: PHYSICIAN ASSISTANT

## 2024-07-09 PROCEDURE — 1100000001 HC PRIVATE ROOM DAILY

## 2024-07-09 PROCEDURE — 99232 SBSQ HOSP IP/OBS MODERATE 35: CPT | Performed by: PHYSICIAN ASSISTANT

## 2024-07-09 PROCEDURE — 83735 ASSAY OF MAGNESIUM: CPT | Performed by: PHYSICIAN ASSISTANT

## 2024-07-09 PROCEDURE — 85027 COMPLETE CBC AUTOMATED: CPT | Performed by: PHYSICIAN ASSISTANT

## 2024-07-09 PROCEDURE — 36415 COLL VENOUS BLD VENIPUNCTURE: CPT | Performed by: PHYSICIAN ASSISTANT

## 2024-07-09 PROCEDURE — 82947 ASSAY GLUCOSE BLOOD QUANT: CPT

## 2024-07-09 PROCEDURE — 2500000005 HC RX 250 GENERAL PHARMACY W/O HCPCS: Performed by: PHYSICIAN ASSISTANT

## 2024-07-09 PROCEDURE — 80069 RENAL FUNCTION PANEL: CPT | Performed by: PHYSICIAN ASSISTANT

## 2024-07-09 RX ORDER — OXYCODONE HYDROCHLORIDE 5 MG/1
5 TABLET ORAL EVERY 4 HOURS PRN
Status: DISCONTINUED | OUTPATIENT
Start: 2024-07-09 | End: 2024-07-16 | Stop reason: HOSPADM

## 2024-07-09 ASSESSMENT — PAIN SCALES - WONG BAKER: WONGBAKER_NUMERICALRESPONSE: HURTS EVEN MORE

## 2024-07-09 ASSESSMENT — PAIN DESCRIPTION - ORIENTATION: ORIENTATION: LEFT

## 2024-07-09 ASSESSMENT — PAIN SCALES - GENERAL
PAINLEVEL_OUTOF10: 10 - WORST POSSIBLE PAIN
PAINLEVEL_OUTOF10: 6

## 2024-07-09 ASSESSMENT — PAIN DESCRIPTION - LOCATION: LOCATION: SHOULDER

## 2024-07-09 NOTE — CARE PLAN
The patient's goals for the shift include      The clinical goals for the shift include Pt will be free of falls      Problem: Diabetes  Goal: Increase stability of blood glucose readings by end of shift  Outcome: Progressing     Problem: Diabetes  Goal: Achieve decreasing blood glucose levels by end of shift  Outcome: Progressing     Problem: Diabetes  Goal: Maintain glucose levels >70mg/dl to <250mg/dl throughout shift  Outcome: Progressing     Problem: Fall/Injury  Goal: Not fall by end of shift  Outcome: Progressing     Problem: Fall/Injury  Goal: Be free from injury by end of the shift  Outcome: Progressing     Problem: Fall/Injury  Goal: Verbalize understanding of personal risk factors for fall in the hospital  Outcome: Progressing

## 2024-07-09 NOTE — PROGRESS NOTES
Ritesh Garza is a 73 y.o. male on day 8 of admission presenting with Central cord syndrome, initial encounter (Multi).    Subjective   Met with patient at bedside to further discuss discharge plan; discussed that PT/OT are recommending moderate intensity therapy at discharge. Patient adamantly refusing placement and also refusing HHC. Patient states that he would be agreeable for outpatient PT/OT if necessary. Patient states that he would like to be discharged home tomorrow. Discussed that this SW would update team and that they would follow up tomorrow. Patient reports that his wife Petra ([phone]) can transport home at discharge; she will need to be notified.     Primary team updated.    - Krystyna WALKER MA, LSW  Care Transitions   [facility] Secure Chat or [phone]

## 2024-07-09 NOTE — PROGRESS NOTES
07/08/24 1032   OT Last Visit   OT Received On 07/08/24   General   Family/Caregiver Present No   Prior to Session Communication Bedside nurse   Patient Position Received Up in room   Preferred Learning Style auditory;verbal   General Comment Pt up in room upon arrival. Pt was pleasant and agreealbe to OT treatment   Precautions   Medical Precautions Fall precautions   Post-Surgical Precautions Spinal precautions   Pain Assessment   Pain Assessment 0-10   0-10 (Numeric) Pain Score 0 - No pain   Cognition   Overall Cognitive Status WFL   Orientation Level Oriented X4   Impulsive Mildly   Grooming   Grooming Level of Assistance Close supervision   Grooming Where Assessed Standing sinkside   Grooming Comments pt stood at sink to cpomplete oral hygiene with SUP. Pt dyanmically stood with SUP requiring mod verbal cues for adherence to spinal precautions and for  utilizing supplied equipment to complete oral hygiene adhereing to spinal precautions.   UE Dressing   UE Dressing Level of Assistance Close supervision   UE Dressing Where Assessed Edge of bed   UE Dressing Comments Pt donned gown at EOB and then stood to tuck into pants qith SUP. Pt demo'd good balance  from completing task but required mod verbal cueing and visual demonstration for carryover of dressing techniques for adhereing to spinal precautions.   LE Dressing   LE Dressing Yes   Pants Level of Assistance Close supervision   Shoe Level of Assistance Close supervision   LE Dressing Where Assessed Edge of bed  (standing)   LE Dressing Comments Pt threaded BLE while seated EOB with SUP. Pt provided education on adaptive dressing techniques for adherence to spinal precautions. Pt required multiple cues for adherence. Pt donned shoes while seated EOB with SUP. Pt provided education for figure 4 technique to adhere to spinal precautions with good carryover.   Functional Mobility   Functional Mobility Performed Yes   Transfers   Transfer Yes   Transfer 1    Transfer From 1 Sit to;Stand to   Transfer to 1 Stand;Sit   Technique 1 Sit to stand   Transfer Level of Assistance 1 Close supervision   Trials/Comments 1 Pt demo'd ability to complete STS with SUP on this date with good balance and coordination. Pt stood from EOB to pull pants over hips and then ambulate to bathroom for grooming.   Dynamic Sitting Balance   Dynamic Sitting-Balance Support Feet supported   Dynamic Sitting-Balance Lateral lean;Forward lean   Dynamic Sitting-Comments SUP for threading BLEs while seated EOB with good balance and coordination   Dynamic Standing Balance   Dynamic Standing-Balance Support No upper extremity supported   Dynamic Standing-Balance Lateral lean;Forward lean   Dynamic Standing-Comments stood at sink for oral hygiene with SUP requiring min verbal cueing for adherence to spinal precautions while completing with good balance and coordination   Therapeutic Activity   Therapeutic Activity Performed Yes   Therapeutic Activity 1 Pt provided handout and education on spinal precautions. Pt provided ongoing visual and verbal education on spinal precautions while engaged in oral hygiene and dressing tasks. Pt states he's aware of precautions but forgets them when completing daily tasks. Pt required mod-max cueing for implementing techniques while completing ADLs on this date.   IP OT Assessment   Evaluation/Treatment Tolerance Patient tolerated treatment well   Medical Staff Made Aware Yes   End of Session Communication Bedside nurse   End of Session Patient Position   (EOB)   Inpatient Plan   Treatment Interventions ADL retraining;Compensatory technique education   OT Frequency 3 times per week   OT Discharge Recommendations Moderate intensity level of continued care   OT Recommended Transfer Status   (SUP)   OT - OK to Discharge Yes

## 2024-07-09 NOTE — CARE PLAN
Problem: Diabetes  Goal: Achieve decreasing blood glucose levels by end of shift  Outcome: Progressing  Goal: Increase stability of blood glucose readings by end of shift  Outcome: Progressing  Goal: Decrease in ketones present in urine by end of shift  Outcome: Progressing  Goal: Maintain electrolyte levels within acceptable range throughout shift  Outcome: Progressing  Goal: Maintain glucose levels >70mg/dl to <250mg/dl throughout shift  Outcome: Progressing  Goal: No changes in neurological exam by end of shift  Outcome: Progressing  Goal: Learn about and adhere to nutrition recommendations by end of shift  Outcome: Progressing  Goal: Vital signs within normal range for age by end of shift  Outcome: Progressing  Goal: Increase self care and/or family involovement by end of shift  Outcome: Progressing  Goal: Receive DSME education by end of shift  Outcome: Progressing     Problem: Safety - Adult  Goal: Free from fall injury  Outcome: Progressing     Problem: Discharge Planning  Goal: Discharge to home or other facility with appropriate resources  Outcome: Progressing     Problem: Chronic Conditions and Co-morbidities  Goal: Patient's chronic conditions and co-morbidity symptoms are monitored and maintained or improved  Outcome: Progressing     Problem: Fall/Injury  Goal: Not fall by end of shift  Outcome: Progressing  Goal: Be free from injury by end of the shift  Outcome: Progressing  Goal: Verbalize understanding of personal risk factors for fall in the hospital  Outcome: Progressing  Goal: Verbalize understanding of risk factor reduction measures to prevent injury from fall in the home  Outcome: Progressing  Goal: Use assistive devices by end of the shift  Outcome: Progressing  Goal: Pace activities to prevent fatigue by end of the shift  Outcome: Progressing     Problem: Respiratory  Goal: Minimize anxiety/maximize coping throughout shift  Outcome: Progressing  Goal: Minimal/no exertional discomfort or  dyspnea this shift  Outcome: Progressing  Goal: No signs of respiratory distress (eg. Use of accessory muscles. Peds grunting)  Outcome: Progressing  Goal: Patent airway maintained this shift  Outcome: Progressing  Goal: Verbalize decreased shortness of breath this shift  Outcome: Progressing  Goal: Wean oxygen to maintain O2 saturation per order/standard this shift  Outcome: Progressing  Goal: Increase self care and/or family involvement in next 24 hours  Outcome: Progressing     Problem: Pain  Goal: Takes deep breaths with improved pain control throughout the shift  Outcome: Progressing  Goal: Turns in bed with improved pain control throughout the shift  Outcome: Progressing  Goal: Walks with improved pain control throughout the shift  Outcome: Progressing  Goal: Performs ADL's with improved pain control throughout shift  Outcome: Progressing  Goal: Participates in PT with improved pain control throughout the shift  Outcome: Progressing  Goal: Free from opioid side effects throughout the shift  Outcome: Progressing  Goal: Free from acute confusion related to pain meds throughout the shift  Outcome: Progressing     Problem: Skin  Goal: Participates in plan/prevention/treatment measures  Outcome: Progressing  Goal: Prevent/manage excess moisture  Outcome: Progressing  Goal: Prevent/minimize sheer/friction injuries  Outcome: Progressing  Goal: Promote skin healing  Outcome: Progressing   The patient's goals for the shift include      The clinical goals for the shift include Patient will remain free from falls throughout shift.

## 2024-07-09 NOTE — PROGRESS NOTES
[facility], [place]  TRAUMA SERVICE - PROGRESS NOTE    Patient Name: Ritesh Garza  MRN: [id number]  Admit Date: 701  : 1950  AGE: 73 y.o.   GENDER: male  ==============================================================================  MECHANISM OF INJURY:   73 YOM s/p fall down stairs.    LOC (yes/no?): no  Anticoagulant / Anti-platelet Rx? (for what dx?): ASA/Plavix  Referring Facility Name (N/A for scene EMR run): [place]    INJURIES:   SCI  Adrenal hemorrhage    OTHER MEDICAL PROBLEMS:  CAD s/p MI s/p CABG  s/p and cardiac debrillator placement    DM2  Asthma  Gout  HTN  Liver cirrohsis   BPH  TIA  CHF  SENIA  CKD    INCIDENTAL FINDINGS:  None    PROCEDURES:  7/3: ACDF C4-C7    ==============================================================================  TODAY'S ASSESSMENT AND PLAN OF CARE:  ## Central cord   - Neuro initially following, s/o        -> Drain removed       -> Okay for ASA POD5 ()       -> Okay for Plavix POD10 ()       -> Okay for dvt chemoppx       -> fu 6 weeks   -ASA started   - Pain control with josefina. Tylenol, Gabapentin, Lidocaine patch, PRN Dilaudid    ## Adrenal hemorrhage  - hgb stable  - Okay for diet    ## New onset wide complex tachycardia (paroxismal)  - Cards c/s        -> rTSH; 10.97       -> Can uptitrate Metop to 25mg QID; can increase to 37/5 QID if insufficient       -> can continue Amio 100mg if euthyroid fcn  - Continuous tele    ## PMHx  - Continue home medications    ## FEN/GI  - Cardiac diet  - BR    ## PPX  - SCDs  - SQH    Dispo: Continue care on RNF, pending PT/OT    Patient discussed with attending, Dr. Katarina Calderón, PA-C  Trauma, Critical Care, and Acute Care Surgery  [id number]    ==============================================================================  CHIEF COMPLAINT / OVERNIGHT EVENTS:   No acute events overnight     MEDICAL HISTORY / ROS:  Admission history and ROS reviewed. Pertinent changes  as follows:    PHYSICAL EXAM:  Heart Rate:  [63-80]   Temp:  [35.7 °C (96.3 °F)-36.3 °C (97.3 °F)]   Resp:  [18]   BP: (111-156)/(56-82)   SpO2:  [95 %-100 %]   Physical Exam  Constitutional:       Appearance: Normal appearance.   HENT:      Head: Atraumatic.      Comments: R neck hematoma mild firmness.      Nose: Nose normal.      Mouth/Throat:      Mouth: Mucous membranes are moist.   Eyes:      Extraocular Movements: Extraocular movements intact.      Pupils: Pupils are equal, round, and reactive to light.   Cardiovascular:      Rate and Rhythm: Normal rate.      Pulses: Normal pulses.   Pulmonary:      Effort: Pulmonary effort is normal. No respiratory distress.      Breath sounds: Normal breath sounds.   Chest:      Chest wall: No tenderness.   Abdominal:      General: Abdomen is flat.      Palpations: Abdomen is soft.   Neurological:      General: No focal deficit present.      Mental Status: He is alert and oriented to person, place, and time.      Sensory: No sensory deficit.      Motor: Weakness present.      Comments: 5/5 BUE today   Psychiatric:         Mood and Affect: Mood normal.         Behavior: Behavior normal.         IMAGING SUMMARY:  (summary of new imaging findings, not a copy of dictation)  No new imaging to review     LABS:  Results from last 7 days   Lab Units 07/08/24  0639 07/07/24  0145 07/06/24  0331   WBC AUTO x10*3/uL 8.3 8.5 10.5   HEMOGLOBIN g/dL 11.3* 9.6* 9.3*   HEMATOCRIT % 35.7* 30.1* 29.1*   PLATELETS AUTO x10*3/uL 233 221 204     Results from last 7 days   Lab Units 07/02/24  1416   APTT seconds 32   INR  1.2*     Results from last 7 days   Lab Units 07/08/24  0639 07/07/24  0145 07/06/24  0331 07/04/24  2136 07/04/24  1047   SODIUM mmol/L 143 142 139   < >  --    POTASSIUM mmol/L 3.9 4.0 5.2   < >  --    CHLORIDE mmol/L 103 101 101   < >  --    CO2 mmol/L 28 31 27   < >  --    BUN mg/dL 68* 76* 80*   < >  --    CREATININE mg/dL 1.88* 2.28* 2.64*   < >  --    CALCIUM mg/dL 9.1  8.9 9.2   < >  --    PROTEIN TOTAL g/dL  --   --   --   --  6.7   BILIRUBIN TOTAL mg/dL  --   --   --   --  0.6   ALK PHOS U/L  --   --   --   --  80   ALT U/L  --   --   --   --  23   AST U/L  --   --   --   --  45*   GLUCOSE mg/dL 175* 221* 195*   < >  --     < > = values in this interval not displayed.     Results from last 7 days   Lab Units 07/04/24  1047   BILIRUBIN TOTAL mg/dL 0.6   BILIRUBIN DIRECT mg/dL 0.2     Results from last 7 days   Lab Units 07/04/24  2218 07/04/24  1826   POCT PH, ARTERIAL pH 7.42 7.43*   POCT PCO2, ARTERIAL mm Hg 33* 33*   POCT PO2, ARTERIAL mm Hg 67* 53*   POCT HCO3 CALCULATED, ARTERIAL mmol/L 21.4* 21.9*   POCT BASE EXCESS, ARTERIAL mmol/L -2.5* -1.9       I have reviewed all medications, laboratory results, and imaging pertinent for today's encounter.

## 2024-07-09 NOTE — PROGRESS NOTES
Transitional Care Coordinator Note: Patient discussed in morning rounds, per medical team (trauma) patient is medically ready. Discharge dispo: Pending confirmed discharge dispo from therapy team as PT rec Mod and OT rec Low with 24/7 assist. TCC sent Epic Chat to PT and OT to clarify therapy recs, pending response.       Lolita Jimenez RN BSN   Transitional Care Coordinator

## 2024-07-10 LAB
ALBUMIN SERPL BCP-MCNC: 3.6 G/DL (ref 3.4–5)
ANION GAP SERPL CALC-SCNC: 15 MMOL/L (ref 10–20)
BUN SERPL-MCNC: 58 MG/DL (ref 6–23)
CALCIUM SERPL-MCNC: 9 MG/DL (ref 8.6–10.6)
CHLORIDE SERPL-SCNC: 104 MMOL/L (ref 98–107)
CO2 SERPL-SCNC: 27 MMOL/L (ref 21–32)
CREAT SERPL-MCNC: 2.09 MG/DL (ref 0.5–1.3)
EGFRCR SERPLBLD CKD-EPI 2021: 32 ML/MIN/1.73M*2
GLUCOSE BLD MANUAL STRIP-MCNC: 135 MG/DL (ref 74–99)
GLUCOSE BLD MANUAL STRIP-MCNC: 166 MG/DL (ref 74–99)
GLUCOSE BLD MANUAL STRIP-MCNC: 172 MG/DL (ref 74–99)
GLUCOSE BLD MANUAL STRIP-MCNC: 189 MG/DL (ref 74–99)
GLUCOSE BLD MANUAL STRIP-MCNC: 212 MG/DL (ref 74–99)
GLUCOSE SERPL-MCNC: 141 MG/DL (ref 74–99)
MAGNESIUM SERPL-MCNC: 2.03 MG/DL (ref 1.6–2.4)
PHOSPHATE SERPL-MCNC: 3.1 MG/DL (ref 2.5–4.9)
POTASSIUM SERPL-SCNC: 4.2 MMOL/L (ref 3.5–5.3)
SODIUM SERPL-SCNC: 142 MMOL/L (ref 136–145)

## 2024-07-10 PROCEDURE — 2500000002 HC RX 250 W HCPCS SELF ADMINISTERED DRUGS (ALT 637 FOR MEDICARE OP, ALT 636 FOR OP/ED): Performed by: PHYSICIAN ASSISTANT

## 2024-07-10 PROCEDURE — 2500000005 HC RX 250 GENERAL PHARMACY W/O HCPCS: Performed by: PHYSICIAN ASSISTANT

## 2024-07-10 PROCEDURE — 2500000001 HC RX 250 WO HCPCS SELF ADMINISTERED DRUGS (ALT 637 FOR MEDICARE OP): Performed by: PHYSICIAN ASSISTANT

## 2024-07-10 PROCEDURE — 2500000004 HC RX 250 GENERAL PHARMACY W/ HCPCS (ALT 636 FOR OP/ED): Performed by: PHYSICIAN ASSISTANT

## 2024-07-10 PROCEDURE — 83735 ASSAY OF MAGNESIUM: CPT | Performed by: PHYSICIAN ASSISTANT

## 2024-07-10 PROCEDURE — 97116 GAIT TRAINING THERAPY: CPT | Mod: GP

## 2024-07-10 PROCEDURE — 99232 SBSQ HOSP IP/OBS MODERATE 35: CPT | Performed by: PHYSICIAN ASSISTANT

## 2024-07-10 PROCEDURE — 80069 RENAL FUNCTION PANEL: CPT | Performed by: PHYSICIAN ASSISTANT

## 2024-07-10 PROCEDURE — 1100000001 HC PRIVATE ROOM DAILY

## 2024-07-10 PROCEDURE — 97530 THERAPEUTIC ACTIVITIES: CPT | Mod: GP

## 2024-07-10 PROCEDURE — 82947 ASSAY GLUCOSE BLOOD QUANT: CPT

## 2024-07-10 PROCEDURE — 36415 COLL VENOUS BLD VENIPUNCTURE: CPT | Performed by: PHYSICIAN ASSISTANT

## 2024-07-10 RX ORDER — LIDOCAINE 560 MG/1
1 PATCH PERCUTANEOUS; TOPICAL; TRANSDERMAL DAILY
Status: DISCONTINUED | OUTPATIENT
Start: 2024-07-10 | End: 2024-07-16 | Stop reason: HOSPADM

## 2024-07-10 RX ORDER — METOPROLOL SUCCINATE 50 MG/1
50 TABLET, EXTENDED RELEASE ORAL NIGHTLY
Status: DISCONTINUED | OUTPATIENT
Start: 2024-07-10 | End: 2024-07-16 | Stop reason: HOSPADM

## 2024-07-10 ASSESSMENT — COGNITIVE AND FUNCTIONAL STATUS - GENERAL
MOVING TO AND FROM BED TO CHAIR: A LITTLE
STANDING UP FROM CHAIR USING ARMS: A LITTLE
DRESSING REGULAR LOWER BODY CLOTHING: A LITTLE
WALKING IN HOSPITAL ROOM: A LITTLE
MOVING FROM LYING ON BACK TO SITTING ON SIDE OF FLAT BED WITH BEDRAILS: A LITTLE
TOILETING: A LITTLE
MOBILITY SCORE: 23
CLIMB 3 TO 5 STEPS WITH RAILING: TOTAL
DAILY ACTIVITIY SCORE: 19
MOBILITY SCORE: 16
MOBILITY SCORE: 17
PERSONAL GROOMING: A LITTLE
TURNING FROM BACK TO SIDE WHILE IN FLAT BAD: A LITTLE
HELP NEEDED FOR BATHING: A LITTLE
CLIMB 3 TO 5 STEPS WITH RAILING: A LITTLE
WALKING IN HOSPITAL ROOM: A LITTLE
TURNING FROM BACK TO SIDE WHILE IN FLAT BAD: A LITTLE
DRESSING REGULAR UPPER BODY CLOTHING: A LITTLE
STANDING UP FROM CHAIR USING ARMS: A LITTLE
MOVING TO AND FROM BED TO CHAIR: A LITTLE
CLIMB 3 TO 5 STEPS WITH RAILING: A LOT
MOVING FROM LYING ON BACK TO SITTING ON SIDE OF FLAT BED WITH BEDRAILS: A LITTLE

## 2024-07-10 ASSESSMENT — PAIN SCALES - GENERAL
PAINLEVEL_OUTOF10: 10 - WORST POSSIBLE PAIN
PAINLEVEL_OUTOF10: 0 - NO PAIN
PAINLEVEL_OUTOF10: 0 - NO PAIN

## 2024-07-10 ASSESSMENT — PAIN - FUNCTIONAL ASSESSMENT
PAIN_FUNCTIONAL_ASSESSMENT: 0-10
PAIN_FUNCTIONAL_ASSESSMENT: 0-10

## 2024-07-10 ASSESSMENT — PAIN DESCRIPTION - LOCATION: LOCATION: ARM

## 2024-07-10 ASSESSMENT — PAIN DESCRIPTION - ORIENTATION: ORIENTATION: LEFT

## 2024-07-10 NOTE — PROGRESS NOTES
[facility]  TRAUMA SERVICE - PROGRESS NOTE    Patient Name: Ritesh Garza  MRN: [id number]  Admit Date: 701  : 1950  AGE: 73 y.o.   GENDER: male  ==============================================================================  MECHANISM OF INJURY:   73 YOM s/p fall down stairs.    LOC (yes/no?): no  Anticoagulant / Anti-platelet Rx? (for what dx?): ASA/Plavix  Referring Facility Name (N/A for scene EMR run): [place]    INJURIES:   SCI  Adrenal hemorrhage    OTHER MEDICAL PROBLEMS:  CAD s/p MI s/p CABG  s/p and cardiac debrillator placement    DM2  Asthma  Gout  HTN  Liver cirrohsis   BPH  TIA  CHF  SENIA  CKD    INCIDENTAL FINDINGS:  None    PROCEDURES:  7/3: ACDF C4-C7    ==============================================================================  TODAY'S ASSESSMENT AND PLAN OF CARE:  ## Central cord   - Neuro initially following, s/o        -> Drain removed       -> Okay for ASA POD5 ()       -> Okay for Plavix POD10 ()       -> Okay for dvt chemoppx       -> fu 6 weeks   -ASA started   - Pain control with josefina. Tylenol, Gabapentin, Lidocaine patch, PRN Dilaudid  - PT (moderate), OT (moderate)    ## Adrenal hemorrhage  - hgb stable  - Okay for diet    ## New onset wide complex tachycardia (paroxismal)  - Cards c/s        -> rTSH; 10.97       -> Can uptitrate Metop to 25mg QID; can increase to 37/5 QID if insufficient       -> Can continue Amio 100mg if euthyroid fcn  - Patient stable on 25mg Metop BID; home dose is 50mg ER nightly and patient currently refusing to take AM dose of Metoprolol; will change back to home 50mg ER daily.   - Continuous tele    ## PMHx  - Continue home medications  - Baseline Cr over last 6mo ~2, will monitor Cr at this time    ## FEN/GI  - Cardiac diet  - BR    ## PPX  - SCDs  - SQH    Dispo: Continue care on RNF, pending continued PT/OT.    Patient discussed with attending, Dr. Katarina Calderón, PA-C  Trauma,  Critical Care, and Acute Care Surgery  [id number]     ==============================================================================  CHIEF COMPLAINT / OVERNIGHT EVENTS:   No acute events overnight. L arm pain, improved with lidocaine patch     MEDICAL HISTORY / ROS:  Admission history and ROS reviewed. Pertinent changes as follows:    PHYSICAL EXAM:  Heart Rate:  [65-76]   Temp:  [36.1 °C (97 °F)-36.5 °C (97.7 °F)]   Resp:  [18]   BP: ()/(57-75)   SpO2:  [91 %-100 %]   Physical Exam  Constitutional:       Appearance: Normal appearance.   HENT:      Head: Atraumatic.      Comments: R neck hematoma mild firmness.      Nose: Nose normal.      Mouth/Throat:      Mouth: Mucous membranes are moist.   Eyes:      Extraocular Movements: Extraocular movements intact.      Pupils: Pupils are equal, round, and reactive to light.   Cardiovascular:      Rate and Rhythm: Normal rate.      Pulses: Normal pulses.   Pulmonary:      Effort: Pulmonary effort is normal. No respiratory distress.      Breath sounds: Normal breath sounds.   Chest:      Chest wall: No tenderness.   Abdominal:      General: Abdomen is flat.      Palpations: Abdomen is soft.   Neurological:      General: No focal deficit present.      Mental Status: He is alert and oriented to person, place, and time.      Sensory: No sensory deficit.      Motor: Weakness present.      Comments: 5/5 BUE today   Psychiatric:         Mood and Affect: Mood normal.         Behavior: Behavior normal.         IMAGING SUMMARY:  (summary of new imaging findings, not a copy of dictation)  No new imaging to review     LABS:  Results from last 7 days   Lab Units 07/09/24  0756 07/08/24  0639 07/07/24  0145   WBC AUTO x10*3/uL 8.0 8.3 8.5   HEMOGLOBIN g/dL 11.1* 11.3* 9.6*   HEMATOCRIT % 35.7* 35.7* 30.1*   PLATELETS AUTO x10*3/uL 194 233 221           Results from last 7 days   Lab Units 07/09/24  0756 07/08/24  0639 07/07/24  0145 07/04/24  2136 07/04/24  1047   SODIUM mmol/L 141 143  142   < >  --    POTASSIUM mmol/L 4.0 3.9 4.0   < >  --    CHLORIDE mmol/L 104 103 101   < >  --    CO2 mmol/L 29 28 31   < >  --    BUN mg/dL 55* 68* 76*   < >  --    CREATININE mg/dL 1.82* 1.88* 2.28*   < >  --    CALCIUM mg/dL 9.1 9.1 8.9   < >  --    PROTEIN TOTAL g/dL  --   --   --   --  6.7   BILIRUBIN TOTAL mg/dL  --   --   --   --  0.6   ALK PHOS U/L  --   --   --   --  80   ALT U/L  --   --   --   --  23   AST U/L  --   --   --   --  45*   GLUCOSE mg/dL 150* 175* 221*   < >  --     < > = values in this interval not displayed.     Results from last 7 days   Lab Units 07/04/24  1047   BILIRUBIN TOTAL mg/dL 0.6   BILIRUBIN DIRECT mg/dL 0.2     Results from last 7 days   Lab Units 07/04/24  2218 07/04/24  1826   POCT PH, ARTERIAL pH 7.42 7.43*   POCT PCO2, ARTERIAL mm Hg 33* 33*   POCT PO2, ARTERIAL mm Hg 67* 53*   POCT HCO3 CALCULATED, ARTERIAL mmol/L 21.4* 21.9*   POCT BASE EXCESS, ARTERIAL mmol/L -2.5* -1.9       I have reviewed all medications, laboratory results, and imaging pertinent for today's encounter.

## 2024-07-10 NOTE — CARE PLAN
Problem: Safety - Adult  Goal: Free from fall injury  Outcome: Progressing     Problem: Discharge Planning  Goal: Discharge to home or other facility with appropriate resources  Outcome: Progressing   The patient's goals for the shift include      The clinical goals for the shift include Patient will remain free from falls throughout shift.

## 2024-07-10 NOTE — CARE PLAN
The patient's goals for the shift include      The clinical goals for the shift include pt will remain free and safe from falls throughout shift

## 2024-07-10 NOTE — PROGRESS NOTES
Physical Therapy    Physical Therapy Treatment    Patient Name: Ritesh Garza  MRN: [id number]  Today's Date: 7/10/2024  Time Calculation  Start Time: 1404  Stop Time: 1438  Time Calculation (min): 34 min       Assessment/Plan   PT Assessment  End of Session Communication: Bedside nurse  End of Session Patient Position: Up in chair, Alarm off, not on at start of session     PT Plan  Treatment/Interventions: Bed mobility, Transfer training, Gait training, Stair training, Balance training, Strengthening, Endurance training, Therapeutic exercise, Therapeutic activity  PT Plan: Ongoing PT  PT Frequency: 5 times per week  PT Discharge Recommendations: Moderate intensity level of continued care  Equipment Recommended upon Discharge: Wheeled walker  PT Recommended Transfer Status: Assist x1, Assistive device  PT - OK to Discharge: Yes      General Visit Information:   PT  Visit  PT Received On: 07/10/24  Prior to Session Communication: Bedside nurse  Patient Position Received: Bed, 3 rail up, Alarm off, not on at start of session  Family/Caregiver Present: Yes  Caregiver Feedback: wife present end of session. wife stated pt has dementia and wife is unable to be home 24/7  General Comment: pt supine alert and agreeable for PT. at times tearful during session.pt is high risk for falls (scored 13/28 on Tinetti balance assessment) and benefits from continued PT at moderate intensity.     Subjective   Precautions:  Precautions  Medical Precautions: Fall precautions, Spinal precautions  Post-Surgical Precautions: Spinal precautions    Objective   Pain:  Pain Assessment  Pain Assessment: 0-10  0-10 (Numeric) Pain Score:  (did not state pain)  Cognition:  Cognition  Overall Cognitive Status: Within Functional Limits  Orientation Level: Oriented X4    PT Treatments:  Therapeutic Exercise  Therapeutic Exercise Performed: Yes  Therapeutic Exercise Activity 1: 1x10 APS  Therapeutic Activity  Therapeutic Activity Performed:  Yes  Therapeutic Activity 1: pt ambulated with FWW and grossly CGA. without walker, increased unsteadiness and shuffling gait pattern demo'd. pt cued on use and sequencing of FWW  Therapeutic Activity 2: completed Tinetti balance assessment     Bed Mobility  Bed Mobility: Yes  Bed Mobility 1  Bed Mobility 1: Supine to sitting  Level of Assistance 1: Contact guard  Bed Mobility Comments 1: HOB min elevated; use of bed rails  Ambulation/Gait Training  Ambulation/Gait Training Performed: Yes  Ambulation/Gait Training 1  Surface 1: Level tile  Device 1: Rolling walker  Assistance 1: Contact guard, Minimal verbal cues  Quality of Gait 1: Narrow base of support, Shuffling gait, Antalgic, Inconsistent stride length, Decreased step length  Comments/Distance (ft) 1: ~[id number]  with standing rest breaks in between. cues for safety and use of walker  Ambulation/Gait Training 2  Surface 2: Level tile  Device 2: No device  Assistance 2: Contact guard, Minimal verbal cues  Quality of Gait 2: Narrow base of support, Inconsistent stride length, Decreased step length, Shuffling gait, Soft knee(s)  Comments/Distance (ft) 2: ~10ft; reaching out to hold onto furniture  Transfers  Transfer: Yes  Transfer 1  Transfer From 1: Sit to, Stand to  Transfer to 1: Stand, Sit  Technique 1: Sit to stand  Transfer Device 1: Walker  Transfer Level of Assistance 1: Contact guard, Minimum assistance  Trials/Comments 1: x3 with VC for hand placement  Stairs  Stairs: No          Outcome Measures:  [facility] Basic Mobility  Turning from your back to your side while in a flat bed without using bedrails: A little  Moving from lying on your back to sitting on the side of a flat bed without using bedrails: A little  Moving to and from bed to chair (including a wheelchair): A little  Standing up from a chair using your arms (e.g. wheelchair or bedside chair): A little  To walk in hospital room: A little  Climbing 3-5 steps with railing: Total  Basic Mobility -  Total Score: 16                 Tinetti  Sitting Balance: Steady, safe  Arises: Able, uses arms to help  Attempts to Arise: Able, requires more than one attempt  Immediate Standing Balance (First 5 Seconds): Steady but uses walker or other support  Standing Balance: Steady but wide stance, uses cane or other support  Nudged: Steady without walker or other support  Eyes Closed: Unsteady  Turned 360 Degrees: Steadiness: Steady  Turned 360 Degrees: Continuity of Steps: Discontinuous steps  Sitting Down: Uses arms or not a smooth motion  Balance Score: 9  Initiation of Gait: No hesitancy  Step Height: R Swing Foot: Right foot does not clear floor completely with step  Step Length: R Swing Foot: Does not pass left stance foot with step  Step Height: L Swing Foot: Left foot does not clear floor completely with step  Step Length: L Swing Foot: Does not pass right stance foot with step  Step Symmetry: Right and left step length not equal (Estimate)  Step Continuity: Stopping or discontinuity between steps  Path: Mild/moderate deviation or uses walking aid  Trunk: No sway but flexion of knees or back or spreads arms out while walking  Walking Time: Heels almost touching while walking  Gait Score: 4  Total Score: 13/28; indicates high falls risk           Education Documentation  Precautions, taught by Claribel Sierra PT at 7/10/2024  3:19 PM.  Learner: Family, Patient  Readiness: Acceptance  Method: Explanation  Response: Needs Reinforcement    Body Mechanics, taught by Claribel Sierra PT at 7/10/2024  3:19 PM.  Learner: Family, Patient  Readiness: Acceptance  Method: Explanation  Response: Needs Reinforcement    Mobility Training, taught by Claribel Sierra PT at 7/10/2024  3:19 PM.  Learner: Family, Patient  Readiness: Acceptance  Method: Explanation  Response: Needs Reinforcement    Education Comments  No comments found.          OP EDUCATION:       Encounter Problems       Encounter Problems (Active)        Balance       Pt will demonstrate ability to complete standing static/dynamic balance activities with unilateral UE support and no LOB for increase in safety up D/C.  (Progressing)       Start:  07/04/24    Expected End:  07/18/24               Mobility       Pt will demonstrated ability to ambulate >/=250ft with proper form, LRAD and no balance deficits for safe home going.   (Progressing)       Start:  07/04/24    Expected End:  07/18/24            Pt will demonstrate ability to ascend/descend 1 flight of stairs with unilateral rail and no balance deficits for safe home going.  (Progressing)       Start:  07/04/24    Expected End:  07/18/24               PT Transfers       Pt will demonstrated ability to complete bed mobility and sit<>stand transfers without assistance and use of assistive device to safely return home.  (Progressing)       Start:  07/04/24    Expected End:  07/18/24                     07/10/24 at 3:20 PM   Claribel Sierra, PT   Rehab Office: [phone]

## 2024-07-10 NOTE — PROGRESS NOTES
Patient discussed with provider. Patient is medically ready for discharge. Currently, he is recommended for SNF, but is adamantly refusing to go. Patient does not have adequate support. Team to educate about the importance of going to SNF post discharge. SW will continue to follow to facilitate discharge plan.     Update @ 15:12: PHIL met with patient wife, Rosa Maria [phone], to discuss discharge planning. She is aware that patient was recommended for SNF. SW explained that he was adamantly refusing SNF, which she doesn't believe he has the capacity to do. SW did ask the provider for a capacity evaluation. Provider will attempt to speak with the patient to provide education, prior to doing so.     SW provided patient wife with a SNF list. She requested for referrals to be sent to 1) [facility], 2) [facility], and 3) [facility]. Referrals sent.     Update: Patient was accepted at [facility]. PHIL requested that they start pre-cert.    Marilyn Crisostomo LCSW

## 2024-07-11 LAB
GLUCOSE BLD MANUAL STRIP-MCNC: 144 MG/DL (ref 74–99)
GLUCOSE BLD MANUAL STRIP-MCNC: 153 MG/DL (ref 74–99)
GLUCOSE BLD MANUAL STRIP-MCNC: 163 MG/DL (ref 74–99)
GLUCOSE BLD MANUAL STRIP-MCNC: 168 MG/DL (ref 74–99)
GLUCOSE BLD MANUAL STRIP-MCNC: 193 MG/DL (ref 74–99)

## 2024-07-11 PROCEDURE — 2500000002 HC RX 250 W HCPCS SELF ADMINISTERED DRUGS (ALT 637 FOR MEDICARE OP, ALT 636 FOR OP/ED): Performed by: PHYSICIAN ASSISTANT

## 2024-07-11 PROCEDURE — 2500000001 HC RX 250 WO HCPCS SELF ADMINISTERED DRUGS (ALT 637 FOR MEDICARE OP): Performed by: PHYSICIAN ASSISTANT

## 2024-07-11 PROCEDURE — 2500000005 HC RX 250 GENERAL PHARMACY W/O HCPCS: Performed by: PHYSICIAN ASSISTANT

## 2024-07-11 PROCEDURE — 1100000001 HC PRIVATE ROOM DAILY

## 2024-07-11 PROCEDURE — 99232 SBSQ HOSP IP/OBS MODERATE 35: CPT | Performed by: NURSE PRACTITIONER

## 2024-07-11 PROCEDURE — 2500000001 HC RX 250 WO HCPCS SELF ADMINISTERED DRUGS (ALT 637 FOR MEDICARE OP): Performed by: NURSE PRACTITIONER

## 2024-07-11 PROCEDURE — 2500000004 HC RX 250 GENERAL PHARMACY W/ HCPCS (ALT 636 FOR OP/ED): Performed by: PHYSICIAN ASSISTANT

## 2024-07-11 PROCEDURE — 82947 ASSAY GLUCOSE BLOOD QUANT: CPT

## 2024-07-11 RX ORDER — FAMOTIDINE 20 MG/1
20 TABLET, FILM COATED ORAL DAILY
Status: DISCONTINUED | OUTPATIENT
Start: 2024-07-11 | End: 2024-07-16 | Stop reason: HOSPADM

## 2024-07-11 RX ORDER — PANTOPRAZOLE SODIUM 40 MG/1
40 TABLET, DELAYED RELEASE ORAL
Status: DISCONTINUED | OUTPATIENT
Start: 2024-07-11 | End: 2024-07-16 | Stop reason: HOSPADM

## 2024-07-11 ASSESSMENT — COGNITIVE AND FUNCTIONAL STATUS - GENERAL
DAILY ACTIVITIY SCORE: 22
DRESSING REGULAR LOWER BODY CLOTHING: A LITTLE
MOBILITY SCORE: 23
DRESSING REGULAR UPPER BODY CLOTHING: A LITTLE
CLIMB 3 TO 5 STEPS WITH RAILING: A LITTLE

## 2024-07-11 ASSESSMENT — PAIN SCALES - WONG BAKER
WONGBAKER_NUMERICALRESPONSE: HURTS LITTLE MORE
WONGBAKER_NUMERICALRESPONSE: HURTS LITTLE BIT
WONGBAKER_NUMERICALRESPONSE: NO HURT
WONGBAKER_NUMERICALRESPONSE: HURTS LITTLE MORE

## 2024-07-11 ASSESSMENT — PAIN - FUNCTIONAL ASSESSMENT: PAIN_FUNCTIONAL_ASSESSMENT: 0-10

## 2024-07-11 ASSESSMENT — PAIN SCALES - GENERAL
PAINLEVEL_OUTOF10: 3
PAINLEVEL_OUTOF10: 2
PAINLEVEL_OUTOF10: 7
PAINLEVEL_OUTOF10: 8
PAINLEVEL_OUTOF10: 10 - WORST POSSIBLE PAIN
PAINLEVEL_OUTOF10: 3

## 2024-07-11 ASSESSMENT — PAIN DESCRIPTION - LOCATION
LOCATION: SHOULDER
LOCATION: NECK

## 2024-07-11 ASSESSMENT — PAIN DESCRIPTION - ORIENTATION: ORIENTATION: LEFT

## 2024-07-11 NOTE — CARE PLAN
Problem: Safety - Adult  Goal: Free from fall injury  Outcome: Progressing     Problem: Discharge Planning  Goal: Discharge to home or other facility with appropriate resources  Outcome: Progressing   The patient's goals for the shift include      The clinical goals for the shift include pt will remain free from falls and maintain hemodynamic gtability along with patent airway throughout shift

## 2024-07-11 NOTE — CARE PLAN
The patient's goals for the shift include      The clinical goals for the shift include pt will remain HDS and free of falls this shift

## 2024-07-11 NOTE — PROGRESS NOTES
Pre-cert denied. SW given info to appeal decision. Clinical documentation will need to be faxed to [phone]. SW advised that the decision will likely NOT change. SW to fax documentation and will continue to follow to facilitate discharge plan.       Marilyn Crisostomo LCSW

## 2024-07-11 NOTE — PROGRESS NOTES
[facility]  TRAUMA SERVICE - PROGRESS NOTE    Patient Name: Ritesh Garza  MRN: [id number]  Admit Date: 701  : 1950  AGE: 73 y.o.   GENDER: male  ==============================================================================  MECHANISM OF INJURY:   73 YOM s/p fall down stairs.    LOC (yes/no?): no  Anticoagulant / Anti-platelet Rx? (for what dx?): ASA/Plavix  Referring Facility Name (N/A for scene EMR run): [facility]    INJURIES:   SCI  Adrenal hemorrhage    OTHER MEDICAL PROBLEMS:  CAD s/p MI s/p CABG  s/p and cardiac debrillator placement    DM2  Asthma  Gout  HTN  Liver cirrohsis   BPH  TIA  CHF  SENIA  CKD    INCIDENTAL FINDINGS:  None    PROCEDURES:  7/3: ACDF C4-C7    ==============================================================================  TODAY'S ASSESSMENT AND PLAN OF CARE:  ## Central cord   - Neuro initially following, s/o        -> Drain removed       -> Okay for ASA POD5 ()       -> Okay for Plavix POD10 ()       -> Okay for dvt chemoppx       -> fu 6 weeks   -ASA started   - Pain control with josefina. Tylenol, Gabapentin, Lidocaine patch, PRN Dilaudid  - PT (moderate), OT (moderate)    ## Adrenal hemorrhage  - hgb stable    ## New onset wide complex tachycardia (paroxismal)  - Cards c/s        -> rTSH; 10.97       -> Can uptitrate Metop to 25mg QID; can increase to 37.5 QID if insufficient --> however patient refusing to take BID dosing, restarted home 50mg XL dose.        -> Can continue Amio 100mg if euthyroid function  - Continuous tele    ## PMHx: continue home medications  - HLD: atorvastatin 80mg every day  - HTN, CHF, CAD: metoprolol XL 50mg qday, Entresto 24-26mg 0.5 tab BID,  furosemide 40mg daily, ASA 81mg daily, amiodarone 200mg daily,   - Depression: sertraline 50mg daily  - CKD: Baseline Cr over last 6mo ~2, will monitor Cr at this time  - Dyspepsia: famotidine 20mg daily, pantoprazole 40mg daily    ## FEN/GI  - Cardiac  diet  - BR    ## PPX  - SCDs  - SQH    Dispo: Continue care on RNF, pending continued PT/OT.    Patient discussed with attending, Dr. Katarina Carias, APRN-CNP  Trauma, Critical Care, [facility]  [id number]/ Epic chat       ==============================================================================  CHIEF COMPLAINT / OVERNIGHT EVENTS:   No acute events overnight.     MEDICAL HISTORY / ROS:  Admission history and ROS reviewed. Pertinent changes as follows:    PHYSICAL EXAM:  Heart Rate:  [64-73]   Temp:  [35.7 °C (96.3 °F)-36.6 °C (97.9 °F)]   Resp:  [16-18]   BP: ()/(50-72)   SpO2:  [96 %-98 %]   Physical Exam  Constitutional:       Appearance: Normal appearance.   HENT:      Head: Atraumatic.      Comments: R neck hematoma mild firmness.      Nose: Nose normal.      Mouth/Throat:      Mouth: Mucous membranes are moist.   Eyes:      Extraocular Movements: Extraocular movements intact.      Pupils: Pupils are equal, round, and reactive to light.   Cardiovascular:      Rate and Rhythm: Normal rate.      Pulses: Normal pulses.   Pulmonary:      Effort: Pulmonary effort is normal. No respiratory distress.      Breath sounds: Normal breath sounds.   Chest:      Chest wall: No tenderness.   Abdominal:      General: Abdomen is flat.      Palpations: Abdomen is soft.   Neurological:      General: No focal deficit present.      Mental Status: He is alert and oriented to person, place, and time.      Sensory: No sensory deficit.      Motor: Weakness present.      Comments: 5/5 BUE today   Psychiatric:         Mood and Affect: Mood normal.         Behavior: Behavior normal.         IMAGING SUMMARY:  (summary of new imaging findings, not a copy of dictation)  No new imaging to review     LABS:  Results from last 7 days   Lab Units 07/09/24  0756 07/08/24  0639 07/07/24  0145   WBC AUTO x10*3/uL 8.0 8.3 8.5   HEMOGLOBIN g/dL 11.1* 11.3* 9.6*   HEMATOCRIT % 35.7* 35.7* 30.1*   PLATELETS AUTO x10*3/uL 194 233 221            Results from last 7 days   Lab Units 07/10/24  0849 07/09/24  0756 07/08/24  0639 07/04/24  2136 07/04/24  1047   SODIUM mmol/L 142 141 143   < >  --    POTASSIUM mmol/L 4.2 4.0 3.9   < >  --    CHLORIDE mmol/L 104 104 103   < >  --    CO2 mmol/L 27 29 28   < >  --    BUN mg/dL 58* 55* 68*   < >  --    CREATININE mg/dL 2.09* 1.82* 1.88*   < >  --    CALCIUM mg/dL 9.0 9.1 9.1   < >  --    PROTEIN TOTAL g/dL  --   --   --   --  6.7   BILIRUBIN TOTAL mg/dL  --   --   --   --  0.6   ALK PHOS U/L  --   --   --   --  80   ALT U/L  --   --   --   --  23   AST U/L  --   --   --   --  45*   GLUCOSE mg/dL 141* 150* 175*   < >  --     < > = values in this interval not displayed.     Results from last 7 days   Lab Units 07/04/24  1047   BILIRUBIN TOTAL mg/dL 0.6   BILIRUBIN DIRECT mg/dL 0.2     Results from last 7 days   Lab Units 07/04/24  2218 07/04/24  1826   POCT PH, ARTERIAL pH 7.42 7.43*   POCT PCO2, ARTERIAL mm Hg 33* 33*   POCT PO2, ARTERIAL mm Hg 67* 53*   POCT HCO3 CALCULATED, ARTERIAL mmol/L 21.4* 21.9*   POCT BASE EXCESS, ARTERIAL mmol/L -2.5* -1.9       I have reviewed all medications, laboratory results, and imaging pertinent for today's encounter.

## 2024-07-11 NOTE — NURSING NOTE
"This nurse educated pt on importance of bed alarm, pt is noncompliant with bed alarm, turning off alarm. This nurse entered pt room and observed pt turning off bed alarm while exiting bed unassisted. Pt states that bed alarm \"annoys\" him and that he turned it off.   "

## 2024-07-12 DIAGNOSIS — I51.89 OTHER ILL-DEFINED HEART DISEASES: ICD-10-CM

## 2024-07-12 DIAGNOSIS — R45.86 MOOD AND AFFECT DISTURBANCE: ICD-10-CM

## 2024-07-12 LAB
GLUCOSE BLD MANUAL STRIP-MCNC: 127 MG/DL (ref 74–99)
GLUCOSE BLD MANUAL STRIP-MCNC: 144 MG/DL (ref 74–99)
GLUCOSE BLD MANUAL STRIP-MCNC: 189 MG/DL (ref 74–99)

## 2024-07-12 PROCEDURE — 99222 1ST HOSP IP/OBS MODERATE 55: CPT | Performed by: HEALTH CARE PROVIDER

## 2024-07-12 PROCEDURE — 2500000002 HC RX 250 W HCPCS SELF ADMINISTERED DRUGS (ALT 637 FOR MEDICARE OP, ALT 636 FOR OP/ED): Performed by: PHYSICIAN ASSISTANT

## 2024-07-12 PROCEDURE — 97116 GAIT TRAINING THERAPY: CPT | Mod: GP,CQ

## 2024-07-12 PROCEDURE — 2500000004 HC RX 250 GENERAL PHARMACY W/ HCPCS (ALT 636 FOR OP/ED): Performed by: PHYSICIAN ASSISTANT

## 2024-07-12 PROCEDURE — 2500000001 HC RX 250 WO HCPCS SELF ADMINISTERED DRUGS (ALT 637 FOR MEDICARE OP): Performed by: PHYSICIAN ASSISTANT

## 2024-07-12 PROCEDURE — 2500000001 HC RX 250 WO HCPCS SELF ADMINISTERED DRUGS (ALT 637 FOR MEDICARE OP): Performed by: HEALTH CARE PROVIDER

## 2024-07-12 PROCEDURE — 1100000001 HC PRIVATE ROOM DAILY

## 2024-07-12 PROCEDURE — 2500000001 HC RX 250 WO HCPCS SELF ADMINISTERED DRUGS (ALT 637 FOR MEDICARE OP): Performed by: NURSE PRACTITIONER

## 2024-07-12 PROCEDURE — 97112 NEUROMUSCULAR REEDUCATION: CPT | Mod: GP,CQ

## 2024-07-12 PROCEDURE — 2500000005 HC RX 250 GENERAL PHARMACY W/O HCPCS: Performed by: PHYSICIAN ASSISTANT

## 2024-07-12 PROCEDURE — 82947 ASSAY GLUCOSE BLOOD QUANT: CPT

## 2024-07-12 RX ORDER — SERTRALINE HYDROCHLORIDE 50 MG/1
TABLET, FILM COATED ORAL
Qty: 90 TABLET | Refills: 1 | Status: SHIPPED | OUTPATIENT
Start: 2024-07-12

## 2024-07-12 RX ORDER — METHOCARBAMOL 500 MG/1
500 TABLET, FILM COATED ORAL EVERY 8 HOURS PRN
Status: DISCONTINUED | OUTPATIENT
Start: 2024-07-12 | End: 2024-07-16 | Stop reason: HOSPADM

## 2024-07-12 RX ORDER — ATORVASTATIN CALCIUM 80 MG/1
80 TABLET, FILM COATED ORAL DAILY
Qty: 90 TABLET | Refills: 3 | Status: SHIPPED | OUTPATIENT
Start: 2024-07-12

## 2024-07-12 ASSESSMENT — COGNITIVE AND FUNCTIONAL STATUS - GENERAL
CLIMB 3 TO 5 STEPS WITH RAILING: A LITTLE
TURNING FROM BACK TO SIDE WHILE IN FLAT BAD: A LITTLE
DAILY ACTIVITIY SCORE: 18
DAILY ACTIVITIY SCORE: 22
DRESSING REGULAR LOWER BODY CLOTHING: A LITTLE
PERSONAL GROOMING: A LITTLE
TURNING FROM BACK TO SIDE WHILE IN FLAT BAD: A LITTLE
MOBILITY SCORE: 17
DRESSING REGULAR LOWER BODY CLOTHING: A LITTLE
MOVING TO AND FROM BED TO CHAIR: A LITTLE
STANDING UP FROM CHAIR USING ARMS: A LITTLE
TOILETING: A LITTLE
MOBILITY SCORE: 17
HELP NEEDED FOR BATHING: A LITTLE
DRESSING REGULAR UPPER BODY CLOTHING: A LITTLE
CLIMB 3 TO 5 STEPS WITH RAILING: A LOT
DRESSING REGULAR UPPER BODY CLOTHING: A LITTLE
STANDING UP FROM CHAIR USING ARMS: A LITTLE
WALKING IN HOSPITAL ROOM: A LITTLE
MOBILITY SCORE: 23
MOVING FROM LYING ON BACK TO SITTING ON SIDE OF FLAT BED WITH BEDRAILS: A LITTLE
EATING MEALS: A LITTLE
WALKING IN HOSPITAL ROOM: A LITTLE
MOVING FROM LYING ON BACK TO SITTING ON SIDE OF FLAT BED WITH BEDRAILS: A LITTLE
CLIMB 3 TO 5 STEPS WITH RAILING: A LOT
MOVING TO AND FROM BED TO CHAIR: A LITTLE

## 2024-07-12 ASSESSMENT — PAIN SCALES - PAIN ASSESSMENT IN ADVANCED DEMENTIA (PAINAD)
TOTALSCORE: OTHER (COMMENT)
TOTALSCORE: OTHER (COMMENT)

## 2024-07-12 ASSESSMENT — PAIN SCALES - GENERAL
PAINLEVEL_OUTOF10: 0 - NO PAIN
PAINLEVEL_OUTOF10: 5 - MODERATE PAIN
PAINLEVEL_OUTOF10: 3
PAINLEVEL_OUTOF10: 5 - MODERATE PAIN
PAINLEVEL_OUTOF10: 8
PAINLEVEL_OUTOF10: 0 - NO PAIN
PAINLEVEL_OUTOF10: 0 - NO PAIN

## 2024-07-12 ASSESSMENT — PAIN SCALES - WONG BAKER
WONGBAKER_NUMERICALRESPONSE: HURTS LITTLE MORE
WONGBAKER_NUMERICALRESPONSE: HURTS LITTLE BIT
WONGBAKER_NUMERICALRESPONSE: NO HURT
WONGBAKER_NUMERICALRESPONSE: HURTS EVEN MORE

## 2024-07-12 ASSESSMENT — PAIN DESCRIPTION - ORIENTATION: ORIENTATION: MID

## 2024-07-12 ASSESSMENT — PAIN - FUNCTIONAL ASSESSMENT: PAIN_FUNCTIONAL_ASSESSMENT: 0-10

## 2024-07-12 ASSESSMENT — PAIN DESCRIPTION - LOCATION: LOCATION: NECK

## 2024-07-12 NOTE — PROGRESS NOTES
Physical Therapy    Physical Therapy Treatment    Patient Name: Ritesh Garza  MRN: [id number]  Today's Date: 7/12/2024  Time Calculation  Start Time: 0859  Stop Time: 0930  Time Calculation (min): 31 min    Assessment/Plan   PT Assessment  End of Session Communication: Bedside nurse  Assessment Comment: Pt tolerated PT session well, continues to have slight balance deficits with ambulation noted. CGA primarily with occassional Rakel. Per most recent PT note pt's wife unable to assist 24/7, pt continues to remain appropriate for Moderate intensity PT upon D/C from hospital.  End of Session Patient Position: Bed, 3 rail up, Alarm off, not on at start of session (Pt refusing bed alarm, RN notified. Educated pt on use of call light when needing to use rest room.)  PT Plan  Inpatient/Swing Bed or Outpatient: Inpatient  PT Plan  Treatment/Interventions: Bed mobility, Transfer training, Gait training, Stair training, Balance training, Strengthening, Endurance training, Therapeutic exercise, Therapeutic activity  PT Plan: Ongoing PT  PT Frequency: 5 times per week  PT Discharge Recommendations: Moderate intensity level of continued care  Equipment Recommended upon Discharge: Wheeled walker  PT Recommended Transfer Status: Assist x1, Assistive device  PT - OK to Discharge: Yes      General Visit Information:   PT  Visit  PT Received On: 07/12/24  General  Missed Visit: No  Missed Visit Reason:  (n/a)  Family/Caregiver Present: No  Prior to Session Communication: Bedside nurse  Patient Position Received: Bed, 3 rail up, Alarm off, not on at start of session (Seated EOB, per RN pt turning off bed alarm)  General Comment: Pt seated EOB, agreeable to work with PT.    Subjective   Precautions:  Precautions  Medical Precautions: Fall precautions, Spinal precautions  Post-Surgical Precautions: Spinal precautions  Vital Signs:  Vital Signs  Heart Rate: 61  SpO2: 92 % (During ambulation)    Objective   Pain:  Pain Assessment  Pain  Assessment: 0-10  0-10 (Numeric) Pain Score: 5 - Moderate pain  Pain Location: Shoulder (neck)  Pain Orientation: Left  Pain Interventions: Heat applied  Cognition:  Cognition  Overall Cognitive Status: Within Functional Limits    Activity Tolerance:  Activity Tolerance  Endurance: Tolerates 30 min exercise with multiple rests  Treatments:  Balance/Neuromuscular Re-Education  Balance/Neuromuscular Re-Education Activity Performed: Yes  Balance/Neuromuscular Re-Education Activity 1: Pt performed standing marching and hip abd x10 BLE, with 1 UE support noted, cues for pacing. Pt with noted unsteadiness, no LOBs however increased lateral swaying.  Balance/Neuromuscular Re-Education Activity 2: Pt performed single leg stance, initial trial BUE support, able to hold ~30 seconds each leg, progressed to 1 UE support, able to perform ~23 seconds RLE, and 30 seconds LLE however noted increased swaying and unsteadiness noted.  Balance/Neuromuscular Re-Education Activity 3: Pt performed UE reaching forward and laterally, x10 BUE, narrow CLARY, increased unsteadiness noted with forward reaching, no LOBs.    Bed Mobility  Bed Mobility: No    Ambulation/Gait Training  Ambulation/Gait Training Performed: Yes  Ambulation/Gait Training 1  Surface 1: Level tile  Device 1: No device  Assistance 1: Contact guard, Minimal verbal cues  Quality of Gait 1: Narrow base of support, Diminished heel strike, Decreased step length (increased lateral sway, mild<>moderate path deviation, unsteadiness with slight LOBs noted, Rakel to recover.)  Comments/Distance (ft) 1: x125x2, 10ft x2    Transfers  Transfer: Yes  Transfer 1  Transfer From 1: Sit to  Transfer to 1: Stand  Technique 1: Sit to stand, Stand to sit  Transfer Device 1:  (no device)  Transfer Level of Assistance 1: Contact guard, Minimal verbal cues  Trials/Comments 1: x3 trials,    Stairs  Stairs: No    Outcome Measures:  [facility] Basic Mobility  Turning from your back to your side while in a  flat bed without using bedrails: A little  Moving from lying on your back to sitting on the side of a flat bed without using bedrails: A little  Moving to and from bed to chair (including a wheelchair): A little  Standing up from a chair using your arms (e.g. wheelchair or bedside chair): A little  To walk in hospital room: A little  Climbing 3-5 steps with railing: A lot  Basic Mobility - Total Score: 17    Education Documentation  Precautions, taught by Laura Hernandez PTA at 7/12/2024 10:28 AM.  Learner: Patient  Readiness: Acceptance  Method: Explanation  Response: Verbalizes Understanding, Needs Reinforcement    Body Mechanics, taught by Laura Hernandez PTA at 7/12/2024 10:28 AM.  Learner: Patient  Readiness: Acceptance  Method: Explanation  Response: Verbalizes Understanding, Needs Reinforcement    Mobility Training, taught by Laura Hernandez PTA at 7/12/2024 10:28 AM.  Learner: Patient  Readiness: Acceptance  Method: Explanation  Response: Verbalizes Understanding, Needs Reinforcement    Education Comments  No comments found.        OP EDUCATION:       Encounter Problems       Encounter Problems (Active)       Balance       Pt will demonstrate ability to complete standing static/dynamic balance activities with unilateral UE support and no LOB for increase in safety up D/C.  (Progressing)       Start:  07/04/24    Expected End:  07/18/24               Mobility       Pt will demonstrated ability to ambulate >/=250ft with proper form, LRAD and no balance deficits for safe home going.   (Progressing)       Start:  07/04/24    Expected End:  07/18/24            Pt will demonstrate ability to ascend/descend 1 flight of stairs with unilateral rail and no balance deficits for safe home going.  (Progressing)       Start:  07/04/24    Expected End:  07/18/24               PT Transfers       Pt will demonstrated ability to complete bed mobility and sit<>stand transfers without assistance and use of assistive  device to safely return home.  (Progressing)       Start:  07/04/24    Expected End:  07/18/24 07/12/24 at 10:30 AM   Laura Hernandez PTA   Rehab Office: [phone]

## 2024-07-12 NOTE — PROGRESS NOTES
[facility]  TRAUMA SERVICE - PROGRESS NOTE    Patient Name: Ritesh Garza  MRN: [id number]  Admit Date: 701  : 1950  AGE: 73 y.o.   GENDER: male  ==============================================================================  MECHANISM OF INJURY:   73 YOM s/p fall down stairs.    LOC (yes/no?): no  Anticoagulant / Anti-platelet Rx? (for what dx?): ASA/Plavix  Referring Facility Name (N/A for scene EMR run): [facility]    INJURIES:   SCI  Adrenal hemorrhage    OTHER MEDICAL PROBLEMS:  CAD s/p MI s/p CABG  s/p and cardiac debrillator placement    DM2  Asthma  Gout  HTN  Liver cirrohsis   BPH  TIA  CHF  SENIA  CKD    INCIDENTAL FINDINGS:  None    PROCEDURES:  7/3: ACDF C4-C7    ==============================================================================  TODAY'S ASSESSMENT AND PLAN OF CARE:  ## Central cord   - Neuro initially following, s/o        -> Drain removed       -> Okay for ASA POD5 ()       -> Okay for Plavix POD10 ()       -> Okay for dvt chemoppx       -> fu 6 weeks   -ASA started   - Pain control with josefina. Tylenol, Gabapentin, Lidocaine patch, PRN Dilaudid  - PT (moderate), OT (moderate)    ## Adrenal hemorrhage  - hgb stable    ## New onset wide complex tachycardia (paroxismal)  - Cards c/s        -> rTSH; 10.97       -> Can uptitrate Metop to 25mg QID; can increase to 37.5 QID if insufficient --> however patient refusing to take BID dosing, restarted home 50mg XL dose.        -> Can continue Amio 100mg if euthyroid function  - Continuous tele    ## PMHx: continue home medications  - HLD: atorvastatin 80mg every day  - HTN, CHF, CAD: metoprolol XL 50mg qday, Entresto 24-26mg 0.5 tab BID,  furosemide 40mg daily, ASA 81mg daily, amiodarone 200mg daily,   - Depression: sertraline 50mg daily  - CKD: Baseline Cr over last 6mo ~2, will monitor Cr at this time  - Dyspepsia: famotidine 20mg daily, pantoprazole 40mg daily    ## FEN/GI  - Cardiac  diet  - BR    ## PPX  - SCDs  - SQH    Dispo: Continue care on RNF, pending continued PT/OT.    Patient discussed with attending, Dr. Katarina Hudson, PAManjitC  Trauma, Critical Care, and Acute Care Surgery  [id number]    Total face to face time spent with patient/family of 30 minutes, with >50% of the time spent discussing plan of care/management, counseling/educating on disease processes, explaining results of diagnostic testing.    ==============================================================================  CHIEF COMPLAINT / OVERNIGHT EVENTS:   No acute events overnight.     MEDICAL HISTORY / ROS:  Admission history and ROS reviewed. Pertinent changes as follows:    PHYSICAL EXAM:  Heart Rate:  [60-79]   Temp:  [36.1 °C (97 °F)-36.8 °C (98.2 °F)]   Resp:  [16-18]   BP: (105-137)/(63-73)   SpO2:  [96 %-100 %]   Physical Exam  Constitutional:       Appearance: Normal appearance.   HENT:      Head: Atraumatic.      Comments: R neck hematoma mild firmness.      Nose: Nose normal.      Mouth/Throat:      Mouth: Mucous membranes are moist.   Eyes:      Extraocular Movements: Extraocular movements intact.      Pupils: Pupils are equal, round, and reactive to light.   Cardiovascular:      Rate and Rhythm: Normal rate.      Pulses: Normal pulses.   Pulmonary:      Effort: Pulmonary effort is normal. No respiratory distress.      Breath sounds: Normal breath sounds.   Chest:      Chest wall: No tenderness.   Abdominal:      General: Abdomen is flat.      Palpations: Abdomen is soft.   Neurological:      General: No focal deficit present.      Mental Status: He is alert and oriented to person, place, and time.      Sensory: No sensory deficit.      Motor: No weakness.      Comments: 5/5 BUE/BLE   Psychiatric:         Mood and Affect: Mood normal.         Behavior: Behavior normal.         IMAGING SUMMARY:  (summary of new imaging findings, not a copy of dictation)  No new imaging to review     LABS:  Results from last 7  days   Lab Units 07/09/24  0756 07/08/24  0639 07/07/24  0145   WBC AUTO x10*3/uL 8.0 8.3 8.5   HEMOGLOBIN g/dL 11.1* 11.3* 9.6*   HEMATOCRIT % 35.7* 35.7* 30.1*   PLATELETS AUTO x10*3/uL 194 233 221           Results from last 7 days   Lab Units 07/10/24  0849 07/09/24  0756 07/08/24  0639   SODIUM mmol/L 142 141 143   POTASSIUM mmol/L 4.2 4.0 3.9   CHLORIDE mmol/L 104 104 103   CO2 mmol/L 27 29 28   BUN mg/dL 58* 55* 68*   CREATININE mg/dL 2.09* 1.82* 1.88*   CALCIUM mg/dL 9.0 9.1 9.1   GLUCOSE mg/dL 141* 150* 175*                   I have reviewed all medications, laboratory results, and imaging pertinent for today's encounter.

## 2024-07-13 LAB
ALBUMIN SERPL BCP-MCNC: 3.8 G/DL (ref 3.4–5)
ANION GAP SERPL CALC-SCNC: 16 MMOL/L (ref 10–20)
BUN SERPL-MCNC: 42 MG/DL (ref 6–23)
CALCIUM SERPL-MCNC: 9 MG/DL (ref 8.6–10.6)
CHLORIDE SERPL-SCNC: 102 MMOL/L (ref 98–107)
CO2 SERPL-SCNC: 26 MMOL/L (ref 21–32)
CREAT SERPL-MCNC: 1.91 MG/DL (ref 0.5–1.3)
EGFRCR SERPLBLD CKD-EPI 2021: 36 ML/MIN/1.73M*2
ERYTHROCYTE [DISTWIDTH] IN BLOOD BY AUTOMATED COUNT: 14.2 % (ref 11.5–14.5)
GLUCOSE BLD MANUAL STRIP-MCNC: 124 MG/DL (ref 74–99)
GLUCOSE BLD MANUAL STRIP-MCNC: 144 MG/DL (ref 74–99)
GLUCOSE BLD MANUAL STRIP-MCNC: 146 MG/DL (ref 74–99)
GLUCOSE BLD MANUAL STRIP-MCNC: 168 MG/DL (ref 74–99)
GLUCOSE BLD MANUAL STRIP-MCNC: 180 MG/DL (ref 74–99)
GLUCOSE SERPL-MCNC: 143 MG/DL (ref 74–99)
HCT VFR BLD AUTO: 36.1 % (ref 41–52)
HGB BLD-MCNC: 11.2 G/DL (ref 13.5–17.5)
MAGNESIUM SERPL-MCNC: 1.88 MG/DL (ref 1.6–2.4)
MCH RBC QN AUTO: 29.9 PG (ref 26–34)
MCHC RBC AUTO-ENTMCNC: 31 G/DL (ref 32–36)
MCV RBC AUTO: 97 FL (ref 80–100)
NRBC BLD-RTO: 0 /100 WBCS (ref 0–0)
PHOSPHATE SERPL-MCNC: 3.1 MG/DL (ref 2.5–4.9)
PLATELET # BLD AUTO: 258 X10*3/UL (ref 150–450)
POTASSIUM SERPL-SCNC: 4.1 MMOL/L (ref 3.5–5.3)
RBC # BLD AUTO: 3.74 X10*6/UL (ref 4.5–5.9)
SODIUM SERPL-SCNC: 140 MMOL/L (ref 136–145)
WBC # BLD AUTO: 8.5 X10*3/UL (ref 4.4–11.3)

## 2024-07-13 PROCEDURE — 2500000001 HC RX 250 WO HCPCS SELF ADMINISTERED DRUGS (ALT 637 FOR MEDICARE OP): Performed by: NURSE PRACTITIONER

## 2024-07-13 PROCEDURE — 36415 COLL VENOUS BLD VENIPUNCTURE: CPT | Performed by: NURSE PRACTITIONER

## 2024-07-13 PROCEDURE — 2500000001 HC RX 250 WO HCPCS SELF ADMINISTERED DRUGS (ALT 637 FOR MEDICARE OP): Performed by: PHYSICIAN ASSISTANT

## 2024-07-13 PROCEDURE — 2500000002 HC RX 250 W HCPCS SELF ADMINISTERED DRUGS (ALT 637 FOR MEDICARE OP, ALT 636 FOR OP/ED): Performed by: PHYSICIAN ASSISTANT

## 2024-07-13 PROCEDURE — 1100000001 HC PRIVATE ROOM DAILY

## 2024-07-13 PROCEDURE — 82947 ASSAY GLUCOSE BLOOD QUANT: CPT

## 2024-07-13 PROCEDURE — 2500000005 HC RX 250 GENERAL PHARMACY W/O HCPCS: Performed by: PHYSICIAN ASSISTANT

## 2024-07-13 PROCEDURE — 83735 ASSAY OF MAGNESIUM: CPT | Performed by: NURSE PRACTITIONER

## 2024-07-13 PROCEDURE — 80069 RENAL FUNCTION PANEL: CPT | Performed by: NURSE PRACTITIONER

## 2024-07-13 PROCEDURE — 99232 SBSQ HOSP IP/OBS MODERATE 35: CPT | Performed by: NURSE PRACTITIONER

## 2024-07-13 PROCEDURE — 85027 COMPLETE CBC AUTOMATED: CPT | Performed by: NURSE PRACTITIONER

## 2024-07-13 PROCEDURE — 2500000004 HC RX 250 GENERAL PHARMACY W/ HCPCS (ALT 636 FOR OP/ED): Performed by: PHYSICIAN ASSISTANT

## 2024-07-13 RX ORDER — AMOXICILLIN 250 MG
1 CAPSULE ORAL 2 TIMES DAILY
Status: DISCONTINUED | OUTPATIENT
Start: 2024-07-13 | End: 2024-07-16 | Stop reason: HOSPADM

## 2024-07-13 ASSESSMENT — COGNITIVE AND FUNCTIONAL STATUS - GENERAL
DRESSING REGULAR UPPER BODY CLOTHING: A LITTLE
CLIMB 3 TO 5 STEPS WITH RAILING: A LITTLE
DRESSING REGULAR UPPER BODY CLOTHING: A LITTLE
DAILY ACTIVITIY SCORE: 22
MOBILITY SCORE: 23
DRESSING REGULAR LOWER BODY CLOTHING: A LITTLE
DAILY ACTIVITIY SCORE: 22
DRESSING REGULAR LOWER BODY CLOTHING: A LITTLE
MOBILITY SCORE: 23
CLIMB 3 TO 5 STEPS WITH RAILING: A LITTLE

## 2024-07-13 ASSESSMENT — PAIN SCALES - GENERAL
PAINLEVEL_OUTOF10: 0 - NO PAIN
PAINLEVEL_OUTOF10: 2
PAINLEVEL_OUTOF10: 5 - MODERATE PAIN
PAINLEVEL_OUTOF10: 2
PAINLEVEL_OUTOF10: 10 - WORST POSSIBLE PAIN
PAINLEVEL_OUTOF10: 0 - NO PAIN

## 2024-07-13 ASSESSMENT — PAIN SCALES - WONG BAKER
WONGBAKER_NUMERICALRESPONSE: NO HURT
WONGBAKER_NUMERICALRESPONSE: HURTS LITTLE BIT
WONGBAKER_NUMERICALRESPONSE: HURTS EVEN MORE
WONGBAKER_NUMERICALRESPONSE: HURTS LITTLE BIT

## 2024-07-13 ASSESSMENT — PAIN DESCRIPTION - ORIENTATION: ORIENTATION: LEFT

## 2024-07-13 ASSESSMENT — PAIN DESCRIPTION - LOCATION: LOCATION: SHOULDER

## 2024-07-13 ASSESSMENT — PAIN - FUNCTIONAL ASSESSMENT: PAIN_FUNCTIONAL_ASSESSMENT: 0-10

## 2024-07-13 NOTE — CARE PLAN
The patient's goals for the shift include      The clinical goals for the shift include pt pain will be controlled this shift

## 2024-07-13 NOTE — PROGRESS NOTES
[facility]  TRAUMA SERVICE - PROGRESS NOTE    Patient Name: Ritesh Garza  MRN: [id number]  Admit Date: 701  : 1950  AGE: 73 y.o.   GENDER: male  ==============================================================================  MECHANISM OF INJURY:     73 YOM s/p fall down stairs.     LOC (yes/no?): no  Anticoagulant / Anti-platelet Rx? (for what dx?): ASA/Plavix  Referring Facility Name (N/A for scene EMR run): [facility]     INJURIES:   SCI  Adrenal hemorrhage     OTHER MEDICAL PROBLEMS:  CAD s/p MI s/p CABG  s/p and cardiac debrillator placement    DM2  Asthma  Gout  HTN  Liver cirrohsis   BPH  TIA  CHF  SENIA  CKD     INCIDENTAL FINDINGS:  None     PROCEDURES:  7/3: ACDF C4-C7    ==============================================================================  TODAY'S ASSESSMENT AND PLAN OF CARE:    ## Central cord   -Neuro initially following, s/o        -> Drain removed       -> Okay for ASA POD5 ()       -> Okay for Plavix POD10 ()       -> Okay for dvt chemoppx       -> fu 6 weeks   -ASA started   -Pain control with josefina. Tylenol, Gabapentin, Lidocaine patch, PRN oxy, robaxin   -PT (moderate), OT (moderate)     ## Adrenal hemorrhage  -hgb stable, repeat cbc today      ## New onset wide complex tachycardia (paroxismal)  -Cards c/s        -> rTSH; 10.97       -> Can uptitrate Metop to 25mg QID; can increase to 37.5 QID if insufficient --> however patient refusing to take BID dosing, restarted home 50mg XL dose (ordered).        -> Can continue Amio 100mg if euthyroid function  -Continuous tele     ## PMHx: continue home medications  - HLD: atorvastatin 80mg every day  - HTN, CHF, CAD: metoprolol XL 50mg qday, Entresto 24-26mg 0.5 tab BID,  furosemide 40mg daily, ASA 81mg daily, amiodarone 200mg daily,   - Depression: sertraline 50mg daily  - CKD: Baseline Cr over last 6mo ~2, will monitor Cr at this time  - Dyspepsia: famotidine 20mg daily,  pantoprazole 40mg daily     FEN: cardiac diet, Strict I/Os, IS, BR, am labs today      ## PPX  - SCDs  - SQH     Dispo: Pending SNF placement. Medically ready.     Pt. And plan discussed with Dr. Bray.     Meliza York, APRN-CNP  Trauma Surgery  [id number]    Total face to face time spent with patient/family of 15 minutes, with >50% of the time spent discussing plan of care/management, counseling/educating on disease processes, explaining results of diagnostic testing.             ==============================================================================  CHIEF COMPLAINT / OVERNIGHT EVENTS:   No acute events overnight.     MEDICAL HISTORY / ROS:  Admission history and ROS reviewed. Pertinent changes as follows:  No new complaints.     PHYSICAL EXAM:  Heart Rate:  [59-81]   Temp:  [36 °C (96.8 °F)-36.5 °C (97.7 °F)]   Resp:  [16-18]   BP: (118-145)/(64-69)   SpO2:  [95 %-100 %]   Physical Exam  Vitals reviewed.   HENT:      Head: Normocephalic.      Right Ear: External ear normal.      Left Ear: External ear normal.      Nose: Nose normal.      Mouth/Throat:      Mouth: Mucous membranes are moist.      Pharynx: Oropharynx is clear.   Eyes:      Extraocular Movements: Extraocular movements intact.      Pupils: Pupils are equal, round, and reactive to light.      Comments: Glasses in place    Neck:      Comments: Anterior neck incision healing, small mildly firm hematoma  Cardiovascular:      Rate and Rhythm: Normal rate.      Pulses: Normal pulses.   Pulmonary:      Effort: Pulmonary effort is normal.      Comments: On room air   Abdominal:      General: There is no distension.      Palpations: Abdomen is soft.      Tenderness: There is no abdominal tenderness.   Musculoskeletal:      Comments: Strength 5/5 x4    Skin:     General: Skin is warm and dry.      Capillary Refill: Capillary refill takes less than 2 seconds.   Neurological:      Mental Status: He is oriented to person, place, and time.              LABS:  Results from last 7 days   Lab Units 07/09/24  0756 07/08/24  0639 07/07/24  0145   WBC AUTO x10*3/uL 8.0 8.3 8.5   HEMOGLOBIN g/dL 11.1* 11.3* 9.6*   HEMATOCRIT % 35.7* 35.7* 30.1*   PLATELETS AUTO x10*3/uL 194 233 221         Results from last 7 days   Lab Units 07/10/24  0849 07/09/24  0756 07/08/24  0639   SODIUM mmol/L 142 141 143   POTASSIUM mmol/L 4.2 4.0 3.9   CHLORIDE mmol/L 104 104 103   CO2 mmol/L 27 29 28   BUN mg/dL 58* 55* 68*   CREATININE mg/dL 2.09* 1.82* 1.88*   CALCIUM mg/dL 9.0 9.1 9.1   GLUCOSE mg/dL 141* 150* 175*               I have reviewed all medications, laboratory results, and imaging pertinent for today's encounter.

## 2024-07-13 NOTE — CARE PLAN
The patient's goals for the shift include      The clinical goals for the shift include pt's pain will be controlled    Over the shift, the patient did make progress toward the following goals. Barriers to progression none are noted. Recommendations to address these barriers include non-pharmacological pain measures.

## 2024-07-14 VITALS
HEIGHT: 63 IN | BODY MASS INDEX: 23.48 KG/M2 | DIASTOLIC BLOOD PRESSURE: 58 MMHG | RESPIRATION RATE: 16 BRPM | HEART RATE: 65 BPM | WEIGHT: 132.5 LBS | SYSTOLIC BLOOD PRESSURE: 117 MMHG | OXYGEN SATURATION: 98 % | TEMPERATURE: 97.7 F

## 2024-07-14 LAB
GLUCOSE BLD MANUAL STRIP-MCNC: 130 MG/DL (ref 74–99)
GLUCOSE BLD MANUAL STRIP-MCNC: 151 MG/DL (ref 74–99)
GLUCOSE BLD MANUAL STRIP-MCNC: 151 MG/DL (ref 74–99)

## 2024-07-14 PROCEDURE — 82947 ASSAY GLUCOSE BLOOD QUANT: CPT

## 2024-07-14 PROCEDURE — 2500000002 HC RX 250 W HCPCS SELF ADMINISTERED DRUGS (ALT 637 FOR MEDICARE OP, ALT 636 FOR OP/ED): Performed by: PHYSICIAN ASSISTANT

## 2024-07-14 PROCEDURE — 2500000001 HC RX 250 WO HCPCS SELF ADMINISTERED DRUGS (ALT 637 FOR MEDICARE OP): Performed by: HEALTH CARE PROVIDER

## 2024-07-14 PROCEDURE — 2500000004 HC RX 250 GENERAL PHARMACY W/ HCPCS (ALT 636 FOR OP/ED): Performed by: PHYSICIAN ASSISTANT

## 2024-07-14 PROCEDURE — 2500000001 HC RX 250 WO HCPCS SELF ADMINISTERED DRUGS (ALT 637 FOR MEDICARE OP): Performed by: NURSE PRACTITIONER

## 2024-07-14 PROCEDURE — 2500000005 HC RX 250 GENERAL PHARMACY W/O HCPCS: Performed by: PHYSICIAN ASSISTANT

## 2024-07-14 PROCEDURE — 2500000001 HC RX 250 WO HCPCS SELF ADMINISTERED DRUGS (ALT 637 FOR MEDICARE OP): Performed by: PHYSICIAN ASSISTANT

## 2024-07-14 PROCEDURE — 1100000001 HC PRIVATE ROOM DAILY

## 2024-07-14 PROCEDURE — 99222 1ST HOSP IP/OBS MODERATE 55: CPT | Performed by: HEALTH CARE PROVIDER

## 2024-07-14 RX ORDER — CLOPIDOGREL BISULFATE 75 MG/1
75 TABLET ORAL DAILY
Status: DISCONTINUED | OUTPATIENT
Start: 2024-07-14 | End: 2024-07-16 | Stop reason: HOSPADM

## 2024-07-14 ASSESSMENT — COGNITIVE AND FUNCTIONAL STATUS - GENERAL
DRESSING REGULAR LOWER BODY CLOTHING: A LITTLE
CLIMB 3 TO 5 STEPS WITH RAILING: A LITTLE
MOBILITY SCORE: 23
DAILY ACTIVITIY SCORE: 22
DRESSING REGULAR UPPER BODY CLOTHING: A LITTLE

## 2024-07-14 ASSESSMENT — PAIN - FUNCTIONAL ASSESSMENT
PAIN_FUNCTIONAL_ASSESSMENT: 0-10

## 2024-07-14 ASSESSMENT — PAIN SCALES - GENERAL
PAINLEVEL_OUTOF10: 7
PAINLEVEL_OUTOF10: 3
PAINLEVEL_OUTOF10: 4
PAINLEVEL_OUTOF10: 7

## 2024-07-14 ASSESSMENT — PAIN DESCRIPTION - LOCATION: LOCATION: NECK

## 2024-07-14 NOTE — CARE PLAN
Problem: Safety - Adult  Goal: Free from fall injury  Outcome: Progressing     Problem: Discharge Planning  Goal: Discharge to home or other facility with appropriate resources  Outcome: Progressing   The patient's goals for the shift include      The clinical goals for the shift include pt will remain free from falls and maintain hemodynamic stability throughout shift

## 2024-07-14 NOTE — PROGRESS NOTES
[facility]  TRAUMA SERVICE - PROGRESS NOTE    Patient Name: Ritesh Garza  MRN: [id number]  Admit Date: 701  : 1950  AGE: 73 y.o.   GENDER: male  ==============================================================================  MECHANISM OF INJURY:     73 YOM s/p fall down stairs.     LOC (yes/no?): no  Anticoagulant / Anti-platelet Rx? (for what dx?): ASA/Plavix  Referring Facility Name (N/A for scene EMR run): [place]     INJURIES:   SCI  Adrenal hemorrhage     OTHER MEDICAL PROBLEMS:  CAD s/p MI s/p CABG  s/p and cardiac debrillator placement    DM2  Asthma  Gout  HTN  Liver cirrohsis   BPH  TIA  CHF  SENIA  CKD     INCIDENTAL FINDINGS:  None     PROCEDURES:  7/3: ACDF C4-C7    ==============================================================================  TODAY'S ASSESSMENT AND PLAN OF CARE:    ## Spinal canal stenosis  -Neuro initially following, s/o        -> Drain removed       -> Okay for ASA POD5 ()       -> Okay for Plavix POD10 ()       -> Okay for dvt chemoppx       -> fu 6 weeks   -ASA started   -Pain control with josefina. Tylenol, Gabapentin, Lidocaine patch, PRN oxy, robaxin   -PT (moderate), OT (moderate)     ## Adrenal hemorrhage  -hgb stable     ## New onset wide complex tachycardia (paroxismal)  -Cards c/s        -> rTSH; 10.97       -> Can uptitrate Metop to 25mg QID; can increase to 37.5 QID if insufficient --> however patient refusing to take BID dosing, restarted home 50mg XL dose (ordered).        -> Can continue Amio 100mg if euthyroid function  -Continuous tele     ## PMHx: continue home medications  - HLD: atorvastatin 80mg every day  - HTN, CHF, CAD: metoprolol XL 50mg qday, Entresto 24-26mg 0.5 tab BID,  furosemide 40mg daily, ASA 81mg daily, amiodarone 200mg daily,   - Depression: sertraline 50mg daily  - CKD: Baseline Cr over last 6mo ~2, will monitor Cr at this time  - Dyspepsia: famotidine 20mg daily, pantoprazole 40mg  daily     FEN: cardiac diet, Strict I/Os, IS, BR, check and replete electrolytes prn     ## PPX  - SCDs  - SQH     Dispo: Pending SNF placement. Medically ready.     Pt. And plan discussed with Dr. Bray.     Miguel Hudson PA-C  Trauma Surgery  [id number]    Total face to face time spent with patient/family of 15 minutes, with >50% of the time spent discussing plan of care/management, counseling/educating on disease processes, explaining results of diagnostic testing.     ==============================================================================  CHIEF COMPLAINT / OVERNIGHT EVENTS:   No acute events overnight. Patient reports improvement in numbness/burning sensation in LUE.    MEDICAL HISTORY / ROS:  Admission history and ROS reviewed. Pertinent changes as follows:  No new complaints.     PHYSICAL EXAM:  Heart Rate:  [57-90]   Temp:  [35.9 °C (96.6 °F)-36.1 °C (97 °F)]   Resp:  [18]   BP: (120-145)/(64-74)   SpO2:  [94 %-100 %]   Physical Exam  Vitals reviewed.   Constitutional:       Comments: Patient sitting in chair at bedside   HENT:      Head: Normocephalic.      Right Ear: External ear normal.      Left Ear: External ear normal.      Nose: Nose normal.      Mouth/Throat:      Mouth: Mucous membranes are moist.      Pharynx: Oropharynx is clear.   Eyes:      Extraocular Movements: Extraocular movements intact.      Pupils: Pupils are equal, round, and reactive to light.      Comments: Glasses in place    Neck:      Comments: Anterior neck incision healing, small mildly firm hematoma. Dressing c/d/I.  Cardiovascular:      Rate and Rhythm: Normal rate.      Pulses: Normal pulses.   Pulmonary:      Effort: Pulmonary effort is normal.      Comments: On room air   Abdominal:      General: There is no distension.      Palpations: Abdomen is soft.      Tenderness: There is no abdominal tenderness.   Musculoskeletal:      Comments: Strength 5/5 x4    Skin:     General: Skin is warm and dry.      Capillary Refill:  Capillary refill takes less than 2 seconds.   Neurological:      Mental Status: He is oriented to person, place, and time.             LABS:  Results from last 7 days   Lab Units 07/13/24  1209 07/09/24  0756 07/08/24  0639   WBC AUTO x10*3/uL 8.5 8.0 8.3   HEMOGLOBIN g/dL 11.2* 11.1* 11.3*   HEMATOCRIT % 36.1* 35.7* 35.7*   PLATELETS AUTO x10*3/uL 258 194 233         Results from last 7 days   Lab Units 07/13/24  1209 07/10/24  0849 07/09/24  0756   SODIUM mmol/L 140 142 141   POTASSIUM mmol/L 4.1 4.2 4.0   CHLORIDE mmol/L 102 104 104   CO2 mmol/L 26 27 29   BUN mg/dL 42* 58* 55*   CREATININE mg/dL 1.91* 2.09* 1.82*   CALCIUM mg/dL 9.0 9.0 9.1   GLUCOSE mg/dL 143* 141* 150*               I have reviewed all medications, laboratory results, and imaging pertinent for today's encounter.

## 2024-07-15 LAB
GLUCOSE BLD MANUAL STRIP-MCNC: 131 MG/DL (ref 74–99)
GLUCOSE BLD MANUAL STRIP-MCNC: 148 MG/DL (ref 74–99)
GLUCOSE BLD MANUAL STRIP-MCNC: 153 MG/DL (ref 74–99)
GLUCOSE BLD MANUAL STRIP-MCNC: 205 MG/DL (ref 74–99)
GLUCOSE BLD MANUAL STRIP-MCNC: 211 MG/DL (ref 74–99)

## 2024-07-15 PROCEDURE — 97116 GAIT TRAINING THERAPY: CPT | Mod: GP,CQ

## 2024-07-15 PROCEDURE — 97530 THERAPEUTIC ACTIVITIES: CPT | Mod: GP,CQ

## 2024-07-15 PROCEDURE — 2500000001 HC RX 250 WO HCPCS SELF ADMINISTERED DRUGS (ALT 637 FOR MEDICARE OP): Performed by: PHYSICIAN ASSISTANT

## 2024-07-15 PROCEDURE — 97530 THERAPEUTIC ACTIVITIES: CPT | Mod: GO

## 2024-07-15 PROCEDURE — 82947 ASSAY GLUCOSE BLOOD QUANT: CPT

## 2024-07-15 PROCEDURE — 2500000002 HC RX 250 W HCPCS SELF ADMINISTERED DRUGS (ALT 637 FOR MEDICARE OP, ALT 636 FOR OP/ED): Performed by: PHYSICIAN ASSISTANT

## 2024-07-15 PROCEDURE — 2500000005 HC RX 250 GENERAL PHARMACY W/O HCPCS: Performed by: PHYSICIAN ASSISTANT

## 2024-07-15 PROCEDURE — 2500000001 HC RX 250 WO HCPCS SELF ADMINISTERED DRUGS (ALT 637 FOR MEDICARE OP): Performed by: NURSE PRACTITIONER

## 2024-07-15 PROCEDURE — 2500000001 HC RX 250 WO HCPCS SELF ADMINISTERED DRUGS (ALT 637 FOR MEDICARE OP): Performed by: HEALTH CARE PROVIDER

## 2024-07-15 PROCEDURE — 1100000001 HC PRIVATE ROOM DAILY

## 2024-07-15 PROCEDURE — 2500000004 HC RX 250 GENERAL PHARMACY W/ HCPCS (ALT 636 FOR OP/ED): Performed by: PHYSICIAN ASSISTANT

## 2024-07-15 PROCEDURE — 99231 SBSQ HOSP IP/OBS SF/LOW 25: CPT

## 2024-07-15 PROCEDURE — 97129 THER IVNTJ 1ST 15 MIN: CPT | Mod: GO

## 2024-07-15 ASSESSMENT — PAIN - FUNCTIONAL ASSESSMENT
PAIN_FUNCTIONAL_ASSESSMENT: 0-10
PAIN_FUNCTIONAL_ASSESSMENT: 0-10

## 2024-07-15 ASSESSMENT — COGNITIVE AND FUNCTIONAL STATUS - GENERAL
MOBILITY SCORE: 18
TOILETING: A LITTLE
DRESSING REGULAR LOWER BODY CLOTHING: A LITTLE
HELP NEEDED FOR BATHING: A LITTLE
MOVING FROM LYING ON BACK TO SITTING ON SIDE OF FLAT BED WITH BEDRAILS: A LITTLE
PERSONAL GROOMING: A LITTLE
CLIMB 3 TO 5 STEPS WITH RAILING: A LITTLE
TURNING FROM BACK TO SIDE WHILE IN FLAT BAD: A LITTLE
STANDING UP FROM CHAIR USING ARMS: A LITTLE
DAILY ACTIVITIY SCORE: 20
MOVING TO AND FROM BED TO CHAIR: A LITTLE
WALKING IN HOSPITAL ROOM: A LITTLE

## 2024-07-15 ASSESSMENT — PAIN SCALES - GENERAL
PAINLEVEL_OUTOF10: 9
PAINLEVEL_OUTOF10: 0 - NO PAIN
PAINLEVEL_OUTOF10: 4

## 2024-07-15 ASSESSMENT — PAIN DESCRIPTION - LOCATION
LOCATION: SHOULDER
LOCATION: NECK

## 2024-07-15 ASSESSMENT — PAIN DESCRIPTION - ORIENTATION: ORIENTATION: LEFT

## 2024-07-15 NOTE — CARE PLAN
The patient's goals for the shift include  to remain injury free    The clinical goals for the shift include pt will remain free from alona and maintain HDS

## 2024-07-15 NOTE — CARE PLAN
Problem: Safety - Adult  Goal: Free from fall injury  Outcome: Progressing     Problem: Discharge Planning  Goal: Discharge to home or other facility with appropriate resources  Outcome: Progressing     Problem: Fall/Injury  Goal: Not fall by end of shift  Outcome: Progressing   The patient's goals for the shift include      The clinical goals for the shift include pt will remain free from falls annd maintain hemodynamic stability

## 2024-07-15 NOTE — PROGRESS NOTES
[facility]  TRAUMA SERVICE - PROGRESS NOTE    Patient Name: Ritesh Garza  MRN: [id number]  Admit Date: 701  : 1950  AGE: 73 y.o.   GENDER: male  ==============================================================================  MECHANISM OF INJURY:     73 YOM s/p fall down stairs.     LOC (yes/no?): no  Anticoagulant / Anti-platelet Rx? (for what dx?): ASA/Plavix  Referring Facility Name (N/A for scene EMR run): [facility]     INJURIES:   SCI  Adrenal hemorrhage     OTHER MEDICAL PROBLEMS:  CAD s/p MI s/p CABG  s/p and cardiac debrillator placement    DM2  Asthma  Gout  HTN  Liver cirrohsis   BPH  TIA  CHF  SENIA  CKD     INCIDENTAL FINDINGS:  None     PROCEDURES:  7/3: ACDF C4-C7    ==============================================================================  TODAY'S ASSESSMENT AND PLAN OF CARE:    ## Spinal canal stenosis  -Neuro initially following, s/o        -> Drain removed       -> Okay for ASA POD5 ()       -> Okay for Plavix POD10 ()       -> Okay for dvt chemoppx       -> fu 6 weeks   -ASA started   -Pain control with josefina. Tylenol, Gabapentin, Lidocaine patch, PRN oxy, robaxin   -PT (moderate), OT (moderate)     ## Adrenal hemorrhage  -hgb stable     ## New onset wide complex tachycardia (paroxismal)  -Cards c/s        -> rTSH; 10.97       -> Can uptitrate Metop to 25mg QID; can increase to 37.5 QID if insufficient --> however patient refusing to take BID dosing, restarted home 50mg XL dose (ordered).        -> Can continue Amio 100mg if euthyroid function  -Continuous tele     ## PMHx: continue home medications  - HLD: atorvastatin 80mg every day  - HTN, CHF, CAD: metoprolol XL 50mg qday, Entresto 24-26mg 0.5 tab BID,  furosemide 40mg daily, ASA 81mg daily, amiodarone 200mg daily,   - Depression: sertraline 50mg daily  - CKD: Baseline Cr over last 6mo ~2, will monitor Cr at this time  - Dyspepsia: famotidine 20mg daily, pantoprazole 40mg  daily     FEN: cardiac diet, Strict I/Os, IS, BR, check and replete electrolytes prn     ## PPX  - SCDs  - SQH     Dispo: Precert denied, will work on PT/OT for possible HHC    Pt discussed with Dr. Warner,    Ahmad A. Sutherland, CNP  Trauma Surgery  Floor: [id number] TICU: [id number]  Pager: [phone]    Total face to face time spent with patient of 15 minutes, with >50% of the time spent discussing plan of care/management, counseling/educating on disease processes, explaining results of diagnostic testing.    ==============================================================================  CHIEF COMPLAINT / OVERNIGHT EVENTS:   No acute events overnight. Patient reports improvement in numbness/burning sensation in LUE.    MEDICAL HISTORY / ROS:  Admission history and ROS reviewed. Pertinent changes as follows:  No new complaints.     PHYSICAL EXAM:  Heart Rate:  [60-65]   Temp:  [35.5 °C (95.9 °F)-36.6 °C (97.8 °F)]   Resp:  [16-18]   BP: (106-154)/(55-76)   SpO2:  [96 %-100 %]   Physical Exam  Vitals reviewed.   Constitutional:       Comments: Patient sitting in chair at bedside   HENT:      Head: Normocephalic.      Right Ear: External ear normal.      Left Ear: External ear normal.      Nose: Nose normal.      Mouth/Throat:      Mouth: Mucous membranes are moist.      Pharynx: Oropharynx is clear.   Eyes:      Extraocular Movements: Extraocular movements intact.      Pupils: Pupils are equal, round, and reactive to light.      Comments: Glasses in place    Neck:      Comments: Anterior neck incision healing, small mildly firm hematoma. Dressing c/d/I.  Cardiovascular:      Rate and Rhythm: Normal rate.      Pulses: Normal pulses.   Pulmonary:      Effort: Pulmonary effort is normal.      Comments: On room air   Abdominal:      General: There is no distension.      Palpations: Abdomen is soft.      Tenderness: There is no abdominal tenderness.   Musculoskeletal:      Comments: Strength 5/5 x4    Skin:     General: Skin is warm  and dry.      Capillary Refill: Capillary refill takes less than 2 seconds.   Neurological:      Mental Status: He is oriented to person, place, and time.             LABS:  Results from last 7 days   Lab Units 07/13/24  1209 07/09/24  0756   WBC AUTO x10*3/uL 8.5 8.0   HEMOGLOBIN g/dL 11.2* 11.1*   HEMATOCRIT % 36.1* 35.7*   PLATELETS AUTO x10*3/uL 258 194         Results from last 7 days   Lab Units 07/13/24  1209 07/10/24  0849 07/09/24  0756   SODIUM mmol/L 140 142 141   POTASSIUM mmol/L 4.1 4.2 4.0   CHLORIDE mmol/L 102 104 104   CO2 mmol/L 26 27 29   BUN mg/dL 42* 58* 55*   CREATININE mg/dL 1.91* 2.09* 1.82*   CALCIUM mg/dL 9.0 9.0 9.1   GLUCOSE mg/dL 143* 141* 150*               I have reviewed all medications, laboratory results, and imaging pertinent for today's encounter.

## 2024-07-15 NOTE — PROGRESS NOTES
Physical Therapy    Physical Therapy Treatment    Patient Name: Ritesh Garza  MRN: [id number]  Today's Date: 7/15/2024  Time Calculation  Start Time: 1122  Stop Time: 1150  Time Calculation (min): 28 min    Assessment/Plan   PT Assessment  End of Session Communication: Bedside nurse  Assessment Comment: Pt tolerated PT session well, able to complete ambulation with and without FWW. Pt with slight unsteadiness still noted. Pt performed stair negotiation with CGA and use of HR, with x1 LOB noted and x1 bout of unsteadiness. Updated supervising PT on pt's progress, Supervising PT updated PT rec to Low with 24/7 supervision.  End of Session Patient Position: Up in chair, Alarm off, not on at start of session  PT Plan  Inpatient/Swing Bed or Outpatient: Inpatient  PT Plan  Treatment/Interventions: Bed mobility, Transfer training, Gait training, Stair training, Balance training, Strengthening, Endurance training, Therapeutic exercise, Therapeutic activity  PT Plan: Ongoing PT  PT Frequency: 5 times per week  PT Discharge Recommendations: Low intensity level of continued care (With 24/7 supervision. Updated supervising PT on pt's progress, Supervising PT updated PT rec to Low with 24/7 supervision.)  Equipment Recommended upon Discharge: Wheeled walker  PT Recommended Transfer Status: Assist x1, Assistive device  PT - OK to Discharge: Yes      General Visit Information:   PT  Visit  PT Received On: 07/15/24  General  Missed Visit: No  Missed Visit Reason:  (n/a)  Family/Caregiver Present: No  Prior to Session Communication: Bedside nurse  Patient Position Received: Up in chair, Alarm off, not on at start of session  General Comment: Pt seated up in chair, agreeable to work with PT.    Subjective   Precautions:  Precautions  Medical Precautions: Fall precautions, Spinal precautions  Post-Surgical Precautions: Spinal precautions  Precautions Comment: Reviewed spine precautions.  Vital Signs:  Vital Signs  Heart Rate:   (Durin-65)  SpO2:  (durin-99%)    Objective   Pain:  Pain Assessment  Pain Assessment: 0-10  0-10 (Numeric) Pain Score: 0 - No pain  Cognition:  Cognition  Overall Cognitive Status: Within Functional Limits    Activity Tolerance:  Activity Tolerance  Endurance: Endurance does not limit participation in activity  Treatments:  Therapeutic Activity  Therapeutic Activity Performed: Yes  Therapeutic Activity 1: Pt performed dynamic standing balance activities with CGA.    Bed Mobility  Bed Mobility: No    Ambulation/Gait Training  Ambulation/Gait Training Performed: Yes  Ambulation/Gait Training 1  Surface 1: Level tile  Device 1: Rolling walker  Assistance 1: Minimal verbal cues, Close supervision  Quality of Gait 1: Narrow base of support, Diminished heel strike, Decreased step length (decreased mandy, slightly unsteady however no LOBs.)  Comments/Distance (ft) 1: x150ft, cues for squencing and avoiding crossing of BLEs with turns.  Ambulation/Gait Training 2  Surface 2: Level tile  Device 2: No device  Assistance 2: Contact guard, Minimal verbal cues  Quality of Gait 2: Diminished heel strike, Decreased step length (Decreased mandy, mild path deviation, slighly unstead at times requiring assist for stability.)  Comments/Distance (ft) 2: x150ft,    Transfers  Transfer: Yes  Transfer 1  Transfer From 1: Sit to  Transfer to 1: Stand  Technique 1: Sit to stand, Stand to sit  Transfer Device 1:  (with and without FWW)  Transfer Level of Assistance 1: Close supervision  Trials/Comments 1: x4 trials    Stairs  Stairs: Yes  Stairs  Rails 1: Left  Curb Step 1: No  Device 1: Railing  Assistance 1: Contact guard, Minimal verbal cues  Comment/Number of Steps 1: x12 steps, step over step ascending, with x1 LOB noted requiring assist to recover, step to step descending with slight unsteadiness noted on finally step. (Educated pt on having assistance or waiting for assistance with stair negotiation.)    Outcome  Measures:  [facility] Basic Mobility  Turning from your back to your side while in a flat bed without using bedrails: A little  Moving from lying on your back to sitting on the side of a flat bed without using bedrails: A little  Moving to and from bed to chair (including a wheelchair): A little  Standing up from a chair using your arms (e.g. wheelchair or bedside chair): A little  To walk in hospital room: A little  Climbing 3-5 steps with railing: A little  Basic Mobility - Total Score: 18    Education Documentation  Precautions, taught by Laura Hernandez PTA at 7/15/2024 12:52 PM.  Learner: Patient  Readiness: Acceptance  Method: Explanation  Response: Verbalizes Understanding    Body Mechanics, taught by Laura Hernandez PTA at 7/15/2024 12:52 PM.  Learner: Patient  Readiness: Acceptance  Method: Explanation  Response: Verbalizes Understanding    Mobility Training, taught by Laura Hernandez PTA at 7/15/2024 12:52 PM.  Learner: Patient  Readiness: Acceptance  Method: Explanation  Response: Verbalizes Understanding    Education Comments  No comments found.        OP EDUCATION:       Encounter Problems       Encounter Problems (Active)       Balance       Pt will demonstrate ability to complete standing static/dynamic balance activities with unilateral UE support and no LOB for increase in safety up D/C.  (Progressing)       Start:  07/04/24    Expected End:  07/18/24               Mobility       Pt will demonstrated ability to ambulate >/=250ft with proper form, LRAD and no balance deficits for safe home going.   (Progressing)       Start:  07/04/24    Expected End:  07/18/24            Pt will demonstrate ability to ascend/descend 1 flight of stairs with unilateral rail and no balance deficits for safe home going.  (Progressing)       Start:  07/04/24    Expected End:  07/18/24               PT Transfers       Pt will demonstrated ability to complete bed mobility and sit<>stand transfers without assistance and  use of assistive device to safely return home.  (Progressing)       Start:  07/04/24    Expected End:  07/18/24                 07/15/24 at 12:56 PM   Laura Henrandez PTA   Rehab Office: [phone]

## 2024-07-15 NOTE — PROGRESS NOTES
Transitional Care Coordinator Note: Patient discussed in morning rounds, per medical team (trauma) patient is medically ready. Discharge dispo: Patient re-evaluated by therapy team, rec low intensity with 24/7 assistance and FWW. TCC placed call to patient's wife Rosa Maria [phone] to disucss discharge plan. Rosa Maria agreeable to discharge plan. TCC spoke with patient and updated patient on discharge plan, patient expressed appreciation of updated information and agreeable to discharge plan. Per patient and patient's wife, plan to move next week, Rosa Maria provided new address [address] [place], [place] [id number]. [facility] built and sent FWW to [place] to have walker dispensed from [facility], pending response. [facility] contacted [facility] 3 intake team to review for possible acceptance.       Lolita Jimenez RN BSN   Transitional Care Coordinator

## 2024-07-15 NOTE — PROGRESS NOTES
Occupational Therapy    Occupational Therapy Treatment    Name: Ritesh Garza  MRN: [id number]  : 1950  Date: 07/15/24  [facility]      Time Calculation  Start Time: 1225  Stop Time: 1248  Time Calculation (min): 23 min    Assessment:  Evaluation/Treatment Tolerance: Patient tolerated treatment well  Medical Staff Made Aware: Yes  End of Session Communication: Bedside nurse  End of Session Patient Position: Up in chair, Alarm off, not on at start of session  Plan:  Treatment Interventions: Functional transfer training, Cognitive reorientation  OT Frequency: 2 times per week  OT Discharge Recommendations: Low intensity level of continued care  Equipment Recommended upon Discharge: Wheeled walker  OT Recommended Transfer Status:  (CGA)  OT - OK to Discharge: Yes    Subjective   General:  OT Last Visit  OT Received On: 07/15/24  Reason for Referral: Pt admitted 2/2 going up his stairs and missed the top step and fell down and landed on L side/head.  Pt now s/p ACD C4-5, 5-6, 6-7  Past Medical History Relevant to Rehab: 1. CAD s/p MI s/p CABG  s/p and cardiac debrillator placement    2. DM2  3. Asthma  4. Gout  5. HTN  6. Liver cirrohsis  7. BPH 8. TIA  9.CHF 10.SENIA 11.CKD  Prior to Session Communication: Bedside nurse  Patient Position Received: Up in chair, Alarm off, not on at start of session  Family/Caregiver Present: No  General Comment: pt up and seated in chair upon arrival. Pt agreeable to OT treatment on this date.   Precautions:  Medical Precautions: Fall precautions, Spinal precautions  Post-Surgical Precautions: Spinal precautions  Precautions Comment: reviewed spinal precautions prior to beginning treatment    Cognition:  Overall Cognitive Status: Impaired at baseline  Orientation Level: Oriented X4  Cognition Comments: Pt was administered the mini mental state examand scored a 23 which indicates cognitive impairment which can neatively impac his safety awareness, adherence to  spinal precautions, and IND in ADL and IADL tasks.  Problem Solving: Exceptions to WFL  Complex Functional Tasks: Impaired  Safety/Judgement: Exceptions to WFL  Insight: Mild  Impulsive: Mildly  Processing Speed: Delayed    Pain Assessment:  Pain Assessment  Pain Assessment: 0-10  0-10 (Numeric) Pain Score: 0 - No pain     Objective     Therapy/Activity:      Therapeutic Activity  Therapeutic Activity Performed: Yes  Therapeutic Activity 1: Pt was engaged in dynamic standing and functional mobility task for retrieving wash cloths from outside of base of support to address balance and functional mobility. Pt completed wash cloth retrieval from waist level higher outside of base of support with CGA for balance. Pt demo'd x2 instances of slight LOB while completing task with ablit to self correct. Pt was unsteady and required cueing for adherence to spinal precautions and attention to task while completing activity.     Sensory:     Cognitive Skill Development:  Cognitive Skill Development  Cognitive Skill Development Activity 1: Pt was administered the mini mental state exam and scored a 23 which indicates cognitive impairment which can negatively impact his safety awareness, adherence to spinal precautions, and IND in ADL and IADL tasks.Recommended 24/7 SUP for safety.       Outcome Measures:  [facility] Daily Activity  Putting on and taking off regular lower body clothing: A little  Bathing (including washing, rinsing, drying): A little  Putting on and taking off regular upper body clothing: None  Toileting, which includes using toilet, bedpan or urinal: A little  Taking care of personal grooming such as brushing teeth: A little  Eating Meals: None  Daily Activity - Total Score: 20     Education Documentation  Body Mechanics, taught by ELEN Amin at 7/15/2024  2:21 PM.  Learner: Patient  Readiness: Acceptance  Method: Explanation  Response: Verbalizes Understanding    Precautions, taught by ELEN Amin  at 7/15/2024  2:21 PM.  Learner: Patient  Readiness: Acceptance  Method: Explanation  Response: Verbalizes Understanding    Education Comments  No comments found.      Goals:  Encounter Problems       Encounter Problems (Active)       ADLs       Patient with complete upper body dressing with supervision level of assistance donning and doffing all UE clothes with no adaptive equipment. (Met)       Start:  07/04/24    Expected End:  07/18/24    Resolved:  07/08/24    Updated to: Patient with complete upper body dressing with IND donning and doffing all UE clothes with no adaptive equipment.    Update reason: met         Patient with complete lower body dressing with supervision level of assistance donning and doffing all LE clothes  with PRN adaptive equipment. (Progressing)       Start:  07/04/24    Expected End:  07/18/24            Patient will complete daily grooming tasks with supervision level of assistance and PRN adaptive equipment while standing. (Met)       Start:  07/04/24    Expected End:  07/18/24    Resolved:  07/08/24    Updated to: Patient will complete daily grooming tasks with IND and PRN adaptive equipment while standing.    Update reason: met         Patient will complete toileting including hygiene clothing management/hygiene with supervision level of assistance. (Progressing)       Start:  07/04/24    Expected End:  07/18/24            Patient with complete upper body dressing with IND donning and doffing all UE clothes with no adaptive equipment. (Met)       Start:  07/08/24    Expected End:  07/18/24    Resolved:  07/15/24             Patient will complete daily grooming tasks with IND and PRN adaptive equipment while standing. (Progressing)       Start:  07/08/24    Expected End:  07/18/24                   BALANCE       Pt will maintain dynamic standing balance during ADL task with supervision level of assistance in order to demonstrate decreased risk of falling and improved postural control.  (Met)       Start:  07/04/24    Expected End:  07/18/24    Resolved:  07/08/24    Updated to: Pt will maintain dynamic standing balance during ADL task with IND in order to demonstrate decreased risk of falling and improved postural control.    Update reason: met         Pt will maintain dynamic standing balance during ADL task with IND in order to demonstrate decreased risk of falling and improved postural control. (Progressing)       Start:  07/08/24    Expected End:  07/18/24                   COGNITION/SAFETY       Patient will recall and adhere to spine precautions during all functional mobility/ADL tasks in order to demonstrate improved understanding and promote healing post op (Progressing)       Start:  07/04/24    Expected End:  07/18/24               MOBILITY       Patient will perform Functional mobility Household distances/Community Distances with supervision level of assistance and front wheeled walker in order to improve safety and functional mobility. (Met)       Start:  07/04/24    Expected End:  07/18/24    Resolved:  07/08/24    Updated to: Patient will perform Functional mobility Household distances/Community Distances with IND and front wheeled walker as needed in order to improve safety and functional mobility.    Update reason: met         Patient will perform Functional mobility Household distances/Community Distances with IND and front wheeled walker as needed in order to improve safety and functional mobility. (Progressing)       Start:  07/08/24    Expected End:  07/18/24                   TRANSFERS       Patient will perform bed mobility supervision level of assistance in order to improve safety and independence with mobility (Met)       Start:  07/04/24    Expected End:  07/18/24    Resolved:  07/08/24    Updated to: Patient will perform bed mobility IND in order to improve safety and independence with mobility    Update reason: met         Patient will complete functional transfers with  front wheeled walker with supervision level of assistance. (Progressing)       Start:  07/04/24    Expected End:  07/18/24            Patient will perform bed mobility IND in order to improve safety and independence with mobility (Met)       Start:  07/08/24    Expected End:  07/18/24    Resolved:  07/15/24                      07/15/24 at 2:21 PM   CORDELIA VERAS, S-OT   [phone]

## 2024-07-16 ENCOUNTER — DOCUMENTATION (OUTPATIENT)
Dept: HOME HEALTH SERVICES | Facility: HOME HEALTH | Age: 74
End: 2024-07-16
Payer: MEDICARE

## 2024-07-16 ENCOUNTER — HOME HEALTH ADMISSION (OUTPATIENT)
Dept: HOME HEALTH SERVICES | Facility: HOME HEALTH | Age: 74
End: 2024-07-16
Payer: MEDICARE

## 2024-07-16 VITALS
RESPIRATION RATE: 16 BRPM | SYSTOLIC BLOOD PRESSURE: 104 MMHG | DIASTOLIC BLOOD PRESSURE: 63 MMHG | TEMPERATURE: 97.2 F | HEART RATE: 60 BPM | WEIGHT: 132.5 LBS | OXYGEN SATURATION: 100 % | BODY MASS INDEX: 23.48 KG/M2 | HEIGHT: 63 IN

## 2024-07-16 LAB
GLUCOSE BLD MANUAL STRIP-MCNC: 127 MG/DL (ref 74–99)
GLUCOSE BLD MANUAL STRIP-MCNC: 235 MG/DL (ref 74–99)

## 2024-07-16 PROCEDURE — 2500000001 HC RX 250 WO HCPCS SELF ADMINISTERED DRUGS (ALT 637 FOR MEDICARE OP): Performed by: PHYSICIAN ASSISTANT

## 2024-07-16 PROCEDURE — 2500000005 HC RX 250 GENERAL PHARMACY W/O HCPCS: Performed by: PHYSICIAN ASSISTANT

## 2024-07-16 PROCEDURE — 82947 ASSAY GLUCOSE BLOOD QUANT: CPT

## 2024-07-16 PROCEDURE — 2500000002 HC RX 250 W HCPCS SELF ADMINISTERED DRUGS (ALT 637 FOR MEDICARE OP, ALT 636 FOR OP/ED): Performed by: PHYSICIAN ASSISTANT

## 2024-07-16 PROCEDURE — 2500000001 HC RX 250 WO HCPCS SELF ADMINISTERED DRUGS (ALT 637 FOR MEDICARE OP): Performed by: NURSE PRACTITIONER

## 2024-07-16 PROCEDURE — 2500000004 HC RX 250 GENERAL PHARMACY W/ HCPCS (ALT 636 FOR OP/ED): Performed by: PHYSICIAN ASSISTANT

## 2024-07-16 PROCEDURE — 2500000001 HC RX 250 WO HCPCS SELF ADMINISTERED DRUGS (ALT 637 FOR MEDICARE OP): Performed by: HEALTH CARE PROVIDER

## 2024-07-16 RX ORDER — AMOXICILLIN 250 MG
1 CAPSULE ORAL 2 TIMES DAILY
Qty: 14 TABLET | Refills: 0 | Status: SHIPPED | OUTPATIENT
Start: 2024-07-16 | End: 2024-07-23

## 2024-07-16 RX ORDER — ACETAMINOPHEN 325 MG/1
650 TABLET ORAL EVERY 6 HOURS
Qty: 240 TABLET | Refills: 0 | Status: SHIPPED | OUTPATIENT
Start: 2024-07-16 | End: 2024-08-15

## 2024-07-16 RX ORDER — POLYETHYLENE GLYCOL 3350 17 G/17G
17 POWDER, FOR SOLUTION ORAL DAILY
Qty: 7 PACKET | Refills: 0 | Status: SHIPPED | OUTPATIENT
Start: 2024-07-17 | End: 2024-07-24

## 2024-07-16 RX ORDER — OXYCODONE HYDROCHLORIDE 5 MG/1
5 TABLET ORAL EVERY 4 HOURS PRN
Qty: 15 TABLET | Refills: 0 | Status: SHIPPED | OUTPATIENT
Start: 2024-07-16 | End: 2024-07-19

## 2024-07-16 ASSESSMENT — COGNITIVE AND FUNCTIONAL STATUS - GENERAL
PERSONAL GROOMING: A LITTLE
MOVING FROM LYING ON BACK TO SITTING ON SIDE OF FLAT BED WITH BEDRAILS: A LITTLE
HELP NEEDED FOR BATHING: A LITTLE
DRESSING REGULAR LOWER BODY CLOTHING: A LITTLE
MOBILITY SCORE: 18
STANDING UP FROM CHAIR USING ARMS: A LITTLE
WALKING IN HOSPITAL ROOM: A LITTLE
CLIMB 3 TO 5 STEPS WITH RAILING: A LITTLE
TURNING FROM BACK TO SIDE WHILE IN FLAT BAD: A LITTLE
TOILETING: A LITTLE
MOVING TO AND FROM BED TO CHAIR: A LITTLE
DAILY ACTIVITIY SCORE: 20

## 2024-07-16 ASSESSMENT — PAIN SCALES - GENERAL: PAINLEVEL_OUTOF10: 5 - MODERATE PAIN

## 2024-07-16 NOTE — CARE PLAN
The patient's goals for the shift include  to go home    The clinical goals for the shift include patient will remain safe and stable

## 2024-07-16 NOTE — CARE PLAN
Problem: Discharge Planning  Goal: Discharge to home or other facility with appropriate resources  Outcome: Progressing     Problem: Fall/Injury  Goal: Not fall by end of shift  Outcome: Progressing  Goal: Be free from injury by end of the shift  Outcome: Progressing  Goal: Verbalize understanding of personal risk factors for fall in the hospital  Outcome: Progressing  Goal: Verbalize understanding of risk factor reduction measures to prevent injury from fall in the home  Outcome: Progressing  Goal: Use assistive devices by end of the shift  Outcome: Progressing  Goal: Pace activities to prevent fatigue by end of the shift  Outcome: Progressing     Problem: Respiratory  Goal: Minimize anxiety/maximize coping throughout shift  Outcome: Progressing  Goal: Minimal/no exertional discomfort or dyspnea this shift  Outcome: Progressing  Goal: No signs of respiratory distress (eg. Use of accessory muscles. Peds grunting)  Outcome: Progressing  Goal: Patent airway maintained this shift  Outcome: Progressing  Goal: Verbalize decreased shortness of breath this shift  Outcome: Progressing  Goal: Wean oxygen to maintain O2 saturation per order/standard this shift  Outcome: Progressing  Goal: Increase self care and/or family involvement in next 24 hours  Outcome: Progressing     Problem: Pain  Goal: Takes deep breaths with improved pain control throughout the shift  Outcome: Progressing  Goal: Turns in bed with improved pain control throughout the shift  Outcome: Progressing  Goal: Walks with improved pain control throughout the shift  Outcome: Progressing  Goal: Performs ADL's with improved pain control throughout shift  Outcome: Progressing  Goal: Participates in PT with improved pain control throughout the shift  Outcome: Progressing  Goal: Free from opioid side effects throughout the shift  Outcome: Progressing  Goal: Free from acute confusion related to pain meds throughout the shift  Outcome: Progressing     Problem:  Skin  Goal: Participates in plan/prevention/treatment measures  Outcome: Progressing  Goal: Prevent/manage excess moisture  Outcome: Progressing  Goal: Prevent/minimize sheer/friction injuries  Outcome: Progressing  Goal: Promote skin healing  Outcome: Progressing   The patient's goals for the shift include      The clinical goals for the shift include pt will remain free from alona and maintain HDS

## 2024-07-16 NOTE — PROGRESS NOTES
Transitional Care Coordinator Note: Patient discussed in morning rounds, per medical team (trauma) patient is medically ready. Discharge dispo: Plan for patient to discharge home with HC. Request sent to [facility] intake team to follow up on acceptance and insurance verification. Per [facility] service area does not have [facility] in area. [facility] updated trauma team that [facility] is not available in service area. Patient's wife agreeable to assist patient in home post discharge. [facility] updated on service address and contact ([address] [place] [address] and contact is Rosa Maria Carranza (Spouse) [phone]). Patient FWW approved to be dispensed from [place]. [facility] to provide.       Lolita LU   Transitional Care Coordinator       UPDATE 7/16/2024 12:10   [facility] provided SOC 24-48hrs. FWW provided to patient from [facility]. IMM provided to patient IMM signed and copy provided to patient. Patient's wife to assist with transportation home.       Lolita LU   Transitional Care Coordinator

## 2024-07-16 NOTE — HH CARE COORDINATION
Home Care received a Referral for Nursing and Physical Therapy. We have processed the referral for a Start of Care on 7/17-7/18.     If you have any questions or concerns, please feel free to contact us at [phone]. Follow the prompts, enter your five digit zip code, and you will be directed to your care team on [facility].

## 2024-07-17 ENCOUNTER — PATIENT OUTREACH (OUTPATIENT)
Dept: PRIMARY CARE | Facility: CLINIC | Age: 74
End: 2024-07-17
Payer: MEDICARE

## 2024-07-17 NOTE — PROGRESS NOTES
Discharge Facility:[facility]  Discharge Diagnosis:Central cord syndrome, initial encounter   Admission Date:7/3/24  Discharge Date: 7/16/24    PCP Appointment Date:Task to office  Specialist Appointment Date: Neurosurgery and Cardiology  Hospital Encounter and Summary Linked: Yes  See discharge assessment below for further details  Engagement  Call Start Time: 0851 (7/17/2024  8:51 AM)    Medications  Medications reviewed with patient/caregiver?: Yes (7/17/2024  8:51 AM)  Is the patient having any side effects they believe may be caused by any medication additions or changes?: No (7/17/2024  8:51 AM)  Does the patient have all medications ordered at discharge?: Yes (7/17/2024  8:51 AM)  Prescription Comments: START taking: oxyCODONE (Roxicodone) polyethylene glycol (Glycolax, Miralax) Start taking on: July 17, 2024 sennosides-docusate sodium (Richelle-Colace) CHANGE how you take: acetaminophen (Tylenol) allopurinol (Zyloprim) atorvastatin (Lipitor) STOP taking: amLODIPine 10 mg tablet (Norvasc) pantoprazole 40 mg EC tablet (ProtoNix) tadalafil 20 mg tablet (Cialis) tadalafil 5 mg tablet (Cialis) turmeric-turmeric root extract 450-50 mg capsule (7/17/2024  8:51 AM)  Is the patient taking all medications as directed (includes completed medication regime)?: Yes (7/17/2024  8:51 AM)  Medication Comments: see med list (7/17/2024  8:51 AM)    Appointments  Does the patient have a primary care provider?: Yes (7/17/2024  8:51 AM)  Care Management Interventions: Educated patient on importance of making appointment; Advised patient to make appointment (7/17/2024  8:51 AM)  Has the patient kept scheduled appointments due by today?: Yes (7/17/2024  8:51 AM)    Self Management  What is the home health agency?:  Home Health (7/17/2024  8:51 AM)  Has home health visited the patient within 72 hours of discharge?: No (7/17/2024  8:51 AM)    Patient Teaching  Does the patient have access to their discharge instructions?: Yes (7/17/2024   8:51 AM)  Care Management Interventions: Reviewed instructions with patient (7/17/2024  8:51 AM)  What is the patient's perception of their health status since discharge?: Improving (7/17/2024  8:51 AM)  Is the patient/caregiver able to teach back the hierarchy of who to call/visit for symptoms/problems? PCP, Specialist, Home Health nurse, Urgent Care, ED, 911: Yes (7/17/2024  8:51 AM)    Wrap Up  Wrap Up Additional Comments: This CM spoke with pt via phone. Pt reports doing well at home since discharge. New meds reviewed. Pt denies CP and SOB. Patient denies any further discharge questions/needs at this time. Emphasized that Follow up is needed after discharge to review the hospital recommendations, assess your response to your treatment. Pt aware of my availability for non-emergent concerns. Contact info provided to patient. (7/17/2024  8:51 AM)      Naomi Liu LPN

## 2024-07-18 ENCOUNTER — HOME CARE VISIT (OUTPATIENT)
Dept: HOME HEALTH SERVICES | Facility: HOME HEALTH | Age: 74
End: 2024-07-18
Payer: MEDICARE

## 2024-07-18 VITALS
DIASTOLIC BLOOD PRESSURE: 58 MMHG | SYSTOLIC BLOOD PRESSURE: 108 MMHG | WEIGHT: 129 LBS | HEIGHT: 63 IN | RESPIRATION RATE: 12 BRPM | BODY MASS INDEX: 22.86 KG/M2 | HEART RATE: 64 BPM | TEMPERATURE: 96.6 F

## 2024-07-18 PROCEDURE — G0299 HHS/HOSPICE OF RN EA 15 MIN: HCPCS

## 2024-07-18 ASSESSMENT — ENCOUNTER SYMPTOMS
PAIN LOCATION - PAIN FREQUENCY: INTERMITTENT
PAIN LOCATION - RELIEVING FACTORS: TYLENOL, REST
PAIN LOCATION: BACK
PAIN LOCATION - PAIN DURATION: VARIES
PAIN LOCATION - PAIN QUALITY: ACHING, THROBBING
PAIN LOCATION - PAIN DURATION: VARIES
PAIN: 1
PAIN LOCATION - RELIEVING FACTORS: TYLENOL, REST
PAIN LOCATION - PAIN SEVERITY: 10/10
DYSPNEA ACTIVITY LEVEL: AFTER AMBULATING MORE THAN 20 FT
PAIN LOCATION - PAIN QUALITY: ACHING, THROBBING
CHANGE IN APPETITE: UNCHANGED
DIZZINESS: 1
PAIN LOCATION: LEFT ARM
LOWEST PAIN SEVERITY IN PAST 24 HOURS: 7/10
PAIN SEVERITY GOAL: 1/10
HIGHEST PAIN SEVERITY IN PAST 24 HOURS: 10/10
PAIN LOCATION - PAIN FREQUENCY: INTERMITTENT
LAST BOWEL MOVEMENT: 67039
STOOL FREQUENCY: DAILY
HEADACHES: 1
PAIN LOCATION - PAIN SEVERITY: 10/10
FATIGUES EASILY: 1
FATIGUE: 1
BOWEL PATTERN NORMAL: 1
SHORTNESS OF BREATH: 1
PERSON REPORTING PAIN: PATIENT
SUBJECTIVE PAIN PROGRESSION: UNCHANGED
APPETITE LEVEL: GOOD

## 2024-07-18 ASSESSMENT — ACTIVITIES OF DAILY LIVING (ADL)
AMBULATION ASSISTANCE: 1
ENTERING_EXITING_HOME: MINIMUM ASSIST
AMBULATION ASSISTANCE: STAND BY ASSIST

## 2024-07-21 ASSESSMENT — ACTIVITIES OF DAILY LIVING (ADL): OASIS_M1830: 03

## 2024-07-23 ENCOUNTER — HOME CARE VISIT (OUTPATIENT)
Dept: HOME HEALTH SERVICES | Facility: HOME HEALTH | Age: 74
End: 2024-07-23
Payer: MEDICARE

## 2024-07-23 VITALS — RESPIRATION RATE: 16 BRPM | OXYGEN SATURATION: 98 % | HEART RATE: 65 BPM

## 2024-07-23 PROCEDURE — G0151 HHCP-SERV OF PT,EA 15 MIN: HCPCS

## 2024-07-23 SDOH — HEALTH STABILITY: PHYSICAL HEALTH: EXERCISE COMMENTS: ASSESSED BLE WITH AROM AT MAJOR JTS. APPEARS WFL.

## 2024-07-23 ASSESSMENT — ENCOUNTER SYMPTOMS
MUSCLE WEAKNESS: 1
SUBJECTIVE PAIN PROGRESSION: WAXING AND WANING
HIGHEST PAIN SEVERITY IN PAST 24 HOURS: 6/10
PAIN LOCATION - PAIN SEVERITY: 6/10
PAIN LOCATION: LEFT ARM
PAIN: 1
LOWEST PAIN SEVERITY IN PAST 24 HOURS: 2/10
PAIN SEVERITY GOAL: 2/10
PERSON REPORTING PAIN: PATIENT

## 2024-07-23 ASSESSMENT — ACTIVITIES OF DAILY LIVING (ADL)
CURRENT_FUNCTION: STAND BY ASSIST
AMBULATION ASSISTANCE ON FLAT SURFACES: 1
PHYSICAL TRANSFERS ASSESSED: 1
AMBULATION ASSISTANCE: 1
AMBULATION_DISTANCE/DURATION_TOLERATED: 50FT
AMBULATION ASSISTANCE: STAND BY ASSIST

## 2024-07-25 ENCOUNTER — APPOINTMENT (OUTPATIENT)
Dept: PRIMARY CARE | Facility: CLINIC | Age: 74
End: 2024-07-25
Payer: MEDICARE

## 2024-07-25 ENCOUNTER — HOME CARE VISIT (OUTPATIENT)
Dept: HOME HEALTH SERVICES | Facility: HOME HEALTH | Age: 74
End: 2024-07-25
Payer: MEDICARE

## 2024-07-25 VITALS
SYSTOLIC BLOOD PRESSURE: 120 MMHG | RESPIRATION RATE: 18 BRPM | OXYGEN SATURATION: 98 % | DIASTOLIC BLOOD PRESSURE: 60 MMHG | HEART RATE: 60 BPM | TEMPERATURE: 97.6 F

## 2024-07-25 VITALS
BODY MASS INDEX: 23.56 KG/M2 | WEIGHT: 133 LBS | SYSTOLIC BLOOD PRESSURE: 136 MMHG | HEART RATE: 60 BPM | DIASTOLIC BLOOD PRESSURE: 72 MMHG | OXYGEN SATURATION: 96 % | TEMPERATURE: 97 F

## 2024-07-25 DIAGNOSIS — N18.32 TYPE 2 DIABETES MELLITUS WITH STAGE 3B CHRONIC KIDNEY DISEASE, WITHOUT LONG-TERM CURRENT USE OF INSULIN (MULTI): Primary | ICD-10-CM

## 2024-07-25 DIAGNOSIS — E11.22 TYPE 2 DIABETES MELLITUS WITH STAGE 3B CHRONIC KIDNEY DISEASE, WITHOUT LONG-TERM CURRENT USE OF INSULIN (MULTI): Primary | ICD-10-CM

## 2024-07-25 PROCEDURE — 1125F AMNT PAIN NOTED PAIN PRSNT: CPT | Performed by: INTERNAL MEDICINE

## 2024-07-25 PROCEDURE — 1159F MED LIST DOCD IN RCRD: CPT | Performed by: INTERNAL MEDICINE

## 2024-07-25 PROCEDURE — 1111F DSCHRG MED/CURRENT MED MERGE: CPT | Performed by: INTERNAL MEDICINE

## 2024-07-25 PROCEDURE — 3078F DIAST BP <80 MM HG: CPT | Performed by: INTERNAL MEDICINE

## 2024-07-25 PROCEDURE — 3044F HG A1C LEVEL LT 7.0%: CPT | Performed by: INTERNAL MEDICINE

## 2024-07-25 PROCEDURE — 1036F TOBACCO NON-USER: CPT | Performed by: INTERNAL MEDICINE

## 2024-07-25 PROCEDURE — 99495 TRANSJ CARE MGMT MOD F2F 14D: CPT | Performed by: INTERNAL MEDICINE

## 2024-07-25 PROCEDURE — 3075F SYST BP GE 130 - 139MM HG: CPT | Performed by: INTERNAL MEDICINE

## 2024-07-25 PROCEDURE — 3048F LDL-C <100 MG/DL: CPT | Performed by: INTERNAL MEDICINE

## 2024-07-25 PROCEDURE — 1157F ADVNC CARE PLAN IN RCRD: CPT | Performed by: INTERNAL MEDICINE

## 2024-07-25 PROCEDURE — 1160F RVW MEDS BY RX/DR IN RCRD: CPT | Performed by: INTERNAL MEDICINE

## 2024-07-25 PROCEDURE — 3060F POS MICROALBUMINURIA REV: CPT | Performed by: INTERNAL MEDICINE

## 2024-07-25 PROCEDURE — G0299 HHS/HOSPICE OF RN EA 15 MIN: HCPCS

## 2024-07-25 RX ORDER — AMLODIPINE BESYLATE 10 MG/1
TABLET ORAL DAILY
COMMUNITY

## 2024-07-25 RX ORDER — GABAPENTIN 100 MG/1
100 CAPSULE ORAL NIGHTLY
Qty: 90 CAPSULE | Refills: 1 | Status: SHIPPED | OUTPATIENT
Start: 2024-07-25

## 2024-07-25 SDOH — ECONOMIC STABILITY: GENERAL

## 2024-07-25 ASSESSMENT — ENCOUNTER SYMPTOMS
PAIN LOCATION - PAIN QUALITY: ACHE/DULL
PAIN LOCATION - PAIN SEVERITY: 6/10
PAIN LOCATION - PAIN QUALITY: ACHE/DULL
STOOL FREQUENCY: DAILY
PAIN SEVERITY GOAL: 0/10
PAIN LOCATION: LEFT LEG
LAST BOWEL MOVEMENT: 67046
PAIN LOCATION - PAIN QUALITY: ACHE/DULL
PAIN LOCATION: RIGHT LEG
HIGHEST PAIN SEVERITY IN PAST 24 HOURS: 6/10
PAIN LOCATION - PAIN SEVERITY: 6/10
FATIGUES EASILY: 1
BOWEL PATTERN NORMAL: 1
PAIN LOCATION: LEFT LEG
PAIN: 1
MUSCLE WEAKNESS: 1
PAIN LOCATION - PAIN SEVERITY: 6/10
PERSON REPORTING PAIN: PATIENT
APPETITE LEVEL: FAIR
LOWEST PAIN SEVERITY IN PAST 24 HOURS: 6/10

## 2024-07-25 ASSESSMENT — ACTIVITIES OF DAILY LIVING (ADL)
AMBULATION ASSISTANCE: ONE PERSON
CURRENT_FUNCTION: ONE PERSON
MONEY MANAGEMENT (EXPENSES/BILLS): NEEDS ASSISTANCE

## 2024-07-25 ASSESSMENT — PAIN SCALES - GENERAL: PAINLEVEL: 8

## 2024-07-25 NOTE — PROGRESS NOTES
"Patient: Ritesh Garza  : 1950  PCP: Ofelia Mccracken DO  MRN: [id number]  Program: Transitional Care Management  Status: Enrolled  Effective Dates: 2024 - present  Responsible Staff: Naomi Liu LPN  Social Determinants to be Addressed: Social Connections         Ritesh Garza is a 73 y.o. male presenting today for follow-up after being discharged from the hospital 9 days ago. The main problem requiring admission was cord compression. The discharge summary and/or Transitional Care Management documentation was reviewed. Medication reconciliation was performed as indicated via the \"[facility]z as Reviewed\" timestamp.     Ritesh Garza was contacted by Transitional Care Management services two days after his discharge. This encounter and supporting documentation was reviewed.    73-year-old male here for [facility] discharge follow-up after he presented to the ED on  after missing the top step of the stairs, falling down and landing on the left side and head. He had never fallen prior, was carrying the dog and leeanne's food.  After noting burning sensation down bilateral arms he presented to the ED where he was found to have moderate to severe multilevel spinal degenerative changes including severe spinal stenosis at C4-C5, C5-C6 and right adrenal hemorrhage.  MRI was notable for a cord compression with myelopathy.  He was consulted by neurosurgery who recommended surgical management which was performed on 7/3 for C4-C7 ACDF.  In the TICU he was noted to have runs of paroxysmal atrial tachycardia recommended continuation of management by cardiology.  There was a neck hematoma for which she was continued to be monitored in a TICU and transferred to our NF.  He was subsequently recommended discharge home with home health care and 24/ supervision.    Discharge medications included oxycodone, MiraLAX and senna/Colace.  Allopurinol was adjusted to 50 mg once per day, atorvastatin to 80 mg and " sertraline to 50 mg.    Medications discontinued: Amlodipine 10, pantoprazole 40, tadalafil 20 and 5  He has since lost 20 pounds since last being seen, pending home PT to be performed, residual pain in both of his upper arms  more on the left from elbow to shoulder. He has been applying vicks almost daily which does help for a while, more pain control than lidocaine patches.   He has since moved 1 week ago [place] a new [place].   Has had residual difficulties with swallowing that jen progressively improving, but still a discomfort to him.     Chest x-ray 7/6: Slight interval improvement in left perihilar edema/effusion.  Correlate her fluid status.  Persistent moderate right and small left pleural effusions.  Medical devices detailed above.  X-ray C-spine: C4-C7 anterior fusion with intact hardware.  No malalignment.  MR cervical spine 7/1: Moderate to marked spinal canal stenosis and bilateral neural foraminal stenosis at C4-C5 and C5-C6 as described.  Cannot exclude subtle increased STIR/T2 hyperintensity final cord signal with minimal contusion/edema not excluded in the setting of trauma.  Artifact or minimal myelomalacia are also possible.  Additional cervical spondylitic changes as described above.  Interval development of moderate to large prevertebral soft tissue fluid collection extending from the single level through approximately C6-C7 likely representing hematoma in the setting of trauma and possibly associate with small anterior inferior endplate fracture noted in retrospect of C3 on the CT cervical spine from 6/30 with possible minimal disruption focally of the anterior spinal ligament at C3-4.  Area may be significantly compromised since the prior CT but it is incompletely evaluated on the current exam.  Increase STIR and T2 signal surrounding the posterior elements at C4-C5 likely representing component of interspinous ligament injury or partial disruption but subtle widening of the interspinous  distance at this level.  X-ray left knee: No acute fracture or malalignment.  Nonspecific soft tissue edema about the knee, mild degenerative changes.  X-ray wrist and humerus and forearm: No acute fracture remote appearing avulsion injury at the left lateral humeral epicondyle with several well-corticated ossific fragments present.  Remote right ulnar styloid fracture.  Widening of the scapholunate distance in the left wrist concerning for underlying scapholunate ligament tear.  Mild degenerative changes.  CT chest abdomen and pelvis 6/30: High density fat stranding surrounding the right adrenal gland concerning for adrenal hemorrhage.  No solid organ injury or free air.  Trace ascites.  Somewhat limited evaluation in the absence of IV contrast.  Hepatic cirrhosis.  No acute cardiopulmonary abnormality.  No acute fracture or traumatic malalignment of the thoracic or lumbar spine.  Unchanged saccular aortic aneurysm measuring up to 3.6 cm in maximal transverse diameter.      Past medical history  - Subclavian artery stenosis status post stent on DAPT   - CAD s/p 5V CABG 1992 on aspirin, recent cardiac cath followed by Dr Hennessy.   - HFrEF 35% -  followed by Dr. Hennessy on Farxiga, metoprolol, discontinued epleronone losartan and potassium.  Previously on Entresto, now status post ICD placement has had adequate follow-up with EP  - CKD 3b with persistent proteinuria referred to nephrology with worsening of renal function  to CKD4, seen by nephrology 2-3 weeks ago with Dr. Meredith  continuing farxiga, screened for mineral bone disease.   - Aortic dilatation at 3.1cm   - HTN - on amlodipine 10mg, metoprolol now in the evening (helps him sleep better)   - SIDHU cirrhosis - followed by Dr. Duran -  ultrasound and AFP every 6 months - recent EGD showing no varices last seen in May, repeat endoscopy in 2026  - Erosive esophagitis on PPI pantoprazole   - Asthma mild and seasonal - has rare use of albuterol  - BPH with LUTS  on daily tadalafil (cialis) 5mg.  - ED - takes cialis takes as needed as well.   - DM2 - with bilateral neuropathy (followed by podiatry, on gabapentin)last A1C 6.6% on Farxiga, off metformin. UTD with diabetic eye exam due for cataract on the left   -SENIA - denies and not interested in treating   -Mild cognitive impairment and depression -recommended continued psychotherapy recommended repeat MoCA in 6 months with Dr. Perrin on sertraline on 25mg BID, followed by neuropsychiatry possible frontal lobe problem list followed by Dr. Andrade  - Right carpal tunnel syndrome followed by Dr. De Paz  -Strabismus status post remote eye muscle surgery  -Tubular adenoma April 2023 repeat 5 years  - Gout on allopurinol 100mg tried to reduce allopurinol dose with gouty flare.   - ED -followed by urology on Cialis 20 one half tab as needed  - Hearing loss now compliant with hearing aids   - HLD - on vascepa      Social:   - [employer] for many years.   - Lives at home with wife, member of [employer]. Has a son in [place] has a grandson who he has not seen yet  Review of Systems    /72   Pulse 60   Temp 36.1 °C (97 °F) (Temporal)   Wt 60.3 kg (133 lb)   SpO2 96%   BMI 23.56 kg/m²     Physical Exam  General: Appears comfortable, NAD, appropriate affect  HEENT: NCAT, EOMI, pupils symmetric, no conjunctival erythema   Neck: Supple, no LAD, right anterior residual hematoma visible without evidence of compromise or compression, no exudate, incision without e/o infection   Heart: RRR S1 S2 no murmurs appreciated, subcutaneous ICD appreciated at left anterior chest wall  Lungs: CTA bilaterally, no rhonchi, rales, or wheezes   Abdomen: Soft, NT/ND, no rebound or guarding, NABS   Extremities: no cyanosis or edema appreciated  Neuro: AAO x 3, answers questions appropriately, no FND, gait unremarkable     The complexity of medical decision making for this patient's transitional care is  moderate.    Assessment/Plan   Problem List Items Addressed This Visit             ICD-10-CM    Type 2 diabetes mellitus (Multi) - Primary E11.9    Relevant Medications    gabapentin (Neurontin) 100 mg capsule     Adrenal hemorrhage   - Seen by surgical team recommended monitoring for now. Remains clinically stable.     Cervical cord compression   - S/p ACDF C4-C7 via anterior approach complicated by hematoma with airway concern that warranted ICU  monitoring.   - Remains stable and symptoms gradually improving, albeit residual mild dysphagia that is gradually improving.   - Uptitrate diet as tolerated, continue supportive measures. Remains stable.     Weight loss  - Significant weight/muscle and strength reduction after recent hospitalization   - Goal at this point is aggressive rehab to regain muscle and strength. Continue PT/OT, fall risk reduction reviewed, avoid stairs at present.     Subclavian artery stenosis   - With successful stent placement, maintains on DAPT     HFrEF   - clinically euvolemic, stable. S/p ICD placement, adequate followup with EP.     Health Maintenance   Cancer screening:   - Colonoscopy 2/23 consider repeat 5 years  - PSA followed by urology  Immunizations        Followup as scheduled in August.

## 2024-07-25 NOTE — PATIENT INSTRUCTIONS
Ritesh,   Schedule followup with Dr. Bre Kiran   Be sure to continue to work on MUSCLE STRENGTHENING and BALANCE training.   See you next month

## 2024-07-26 ENCOUNTER — HOME CARE VISIT (OUTPATIENT)
Dept: HOME HEALTH SERVICES | Facility: HOME HEALTH | Age: 74
End: 2024-07-26
Payer: MEDICARE

## 2024-07-27 ASSESSMENT — ENCOUNTER SYMPTOMS
PAIN LOCATION - EXACERBATING FACTORS: MOVEMENT, PRESSURE
PAIN LOCATION - PAIN DURATION: CONSTANT
PAIN LOCATION - RELIEVING FACTORS: REST, MEDICATION
PAIN LOCATION - PAIN DURATION: CONSTANT
PAIN LOCATION - PAIN FREQUENCY: CONSTANT
PAIN LOCATION - EXACERBATING FACTORS: MOVEMENT, PRESSURE
PAIN LOCATION - PAIN FREQUENCY: CONSTANT
PAIN LOCATION - RELIEVING FACTORS: REST, MEDICATION
PAIN LOCATION - PAIN DURATION: CONSTANT
PAIN LOCATION - EXACERBATING FACTORS: MOVEMENT, PRESSURE
PAIN LOCATION - RELIEVING FACTORS: REST, MEDICATION
PAIN LOCATION - PAIN FREQUENCY: CONSTANT

## 2024-07-27 NOTE — CASE COMMUNICATION
Missed visit:   arrived for scheduled therapy visit patient is not home.  Wife states patient went somewhere but he is not answering cell phone when she calls and they are having problems with cell service. Wife asks that therapy return next week

## 2024-07-29 ENCOUNTER — HOME CARE VISIT (OUTPATIENT)
Dept: HOME HEALTH SERVICES | Facility: HOME HEALTH | Age: 74
End: 2024-07-29
Payer: MEDICARE

## 2024-07-29 ENCOUNTER — PATIENT OUTREACH (OUTPATIENT)
Dept: PRIMARY CARE | Facility: CLINIC | Age: 74
End: 2024-07-29
Payer: MEDICARE

## 2024-07-29 PROCEDURE — G0157 HHC PT ASSISTANT EA 15: HCPCS | Mod: CQ

## 2024-07-29 SDOH — HEALTH STABILITY: PHYSICAL HEALTH: EXERCISE COMMENTS: LLS, AROM IN ALL PLANES

## 2024-07-29 SDOH — HEALTH STABILITY: PHYSICAL HEALTH
EXERCISE COMMENTS: 1 SET OF 15 EACH, BLUE THERABAND  SEATED THER EX: MARCHES, LEG EXT, LAQ, TBAND HIP ABD, BALL SQUEEZES HIP ADD  SUPINE THER EX: SLR, HIP ABD, HEEL SLIDES, SAQ, GLUT SETS 5 SEC HOLDS QUAD SETS 5 SEC HOLDS  CERVICAL THER EX: SHOULDER SHRUGS, SHOULDER RO

## 2024-07-29 SDOH — HEALTH STABILITY: PHYSICAL HEALTH: EXERCISE TYPE: B LE STRENGTHENING THER EX, ENDURANCE TRAINING

## 2024-07-29 ASSESSMENT — ENCOUNTER SYMPTOMS
ARTHRALGIAS: 1
PAIN: 1
PAIN LOCATION - PAIN FREQUENCY: INTERMITTENT
PAIN SEVERITY GOAL: 0/10
LIMITED RANGE OF MOTION: 1
PAIN LOCATION - PAIN SEVERITY: 7/10
PAIN LOCATION: NECK
LOWEST PAIN SEVERITY IN PAST 24 HOURS: 5/10
PAIN LOCATION - PAIN QUALITY: ACHING
MUSCLE WEAKNESS: 1
HIGHEST PAIN SEVERITY IN PAST 24 HOURS: 8/10
PAIN LOCATION - EXACERBATING FACTORS: TIME OF DAY
PERSON REPORTING PAIN: PATIENT
PAIN LOCATION - RELIEVING FACTORS: TIME OF DAY
SUBJECTIVE PAIN PROGRESSION: UNCHANGED
OCCASIONAL FEELINGS OF UNSTEADINESS: 0

## 2024-07-29 ASSESSMENT — ACTIVITIES OF DAILY LIVING (ADL)
AMBULATION ASSISTANCE ON FLAT SURFACES: 1
AMBULATION ASSISTANCE: STAND BY ASSIST
AMBULATION ASSISTANCE: 1
AMBULATION_DISTANCE/DURATION_TOLERATED: 85 FT
BATHING ASSESSED: 1
BATHING_CURRENT_FUNCTION: STAND BY ASSIST
BATHING EQUIPMENT USED: SHOWER CHAIR

## 2024-07-31 ENCOUNTER — HOME CARE VISIT (OUTPATIENT)
Dept: HOME HEALTH SERVICES | Facility: HOME HEALTH | Age: 74
End: 2024-07-31
Payer: MEDICARE

## 2024-07-31 PROCEDURE — G0157 HHC PT ASSISTANT EA 15: HCPCS | Mod: CQ

## 2024-07-31 SDOH — HEALTH STABILITY: PHYSICAL HEALTH
EXERCISE COMMENTS: 1 SET OF 15 EACH, BLUE THERABAND, 2# ANKLE WEIGHTS  SEATED THER EX: MARCHES, LEG EXT, LAQ, TBAND HIP ABD, BALL SQUEEZES HIP ADD  SUPINE THER EX: SLR, HIP ABD, HEEL SLIDES, SAQ, GLUT SETS 5 SEC HOLDS QUAD SETS 5 SEC HOLDS  STANDING THER EX: MARCHES, H

## 2024-07-31 SDOH — HEALTH STABILITY: PHYSICAL HEALTH: EXERCISE TYPE: B LE STRENGTHENING THER EX, ENDURANCE TRAINING

## 2024-07-31 SDOH — HEALTH STABILITY: PHYSICAL HEALTH: EXERCISE COMMENTS: EEL/TOE RAISES, HIP ABD, HIP FLEX/EXT, MINI SQUATS , HAM CURLS

## 2024-07-31 ASSESSMENT — ENCOUNTER SYMPTOMS
PAIN LOCATION - PAIN QUALITY: ACHING
HIGHEST PAIN SEVERITY IN PAST 24 HOURS: 6/10
PAIN: 1
MUSCLE WEAKNESS: 1
PAIN LOCATION - PAIN FREQUENCY: INTERMITTENT
ARTHRALGIAS: 1
PAIN SEVERITY GOAL: 0/10
PAIN LOCATION - RELIEVING FACTORS: TIME OF DAY
LOWEST PAIN SEVERITY IN PAST 24 HOURS: 4/10
PERSON REPORTING PAIN: PATIENT
OCCASIONAL FEELINGS OF UNSTEADINESS: 0
SUBJECTIVE PAIN PROGRESSION: UNCHANGED
PAIN LOCATION - PAIN SEVERITY: 4/10
PAIN LOCATION: NECK

## 2024-07-31 ASSESSMENT — ACTIVITIES OF DAILY LIVING (ADL)
BATHING_CURRENT_FUNCTION: STAND BY ASSIST
AMBULATION_DISTANCE/DURATION_TOLERATED: 100 FT
BATHING EQUIPMENT USED: SHOWER CHAIR
BATHING ASSESSED: 1
AMBULATION ASSISTANCE ON FLAT SURFACES: 1

## 2024-08-02 ENCOUNTER — HOME CARE VISIT (OUTPATIENT)
Dept: HOME HEALTH SERVICES | Facility: HOME HEALTH | Age: 74
End: 2024-08-02
Payer: MEDICARE

## 2024-08-03 ENCOUNTER — HOME CARE VISIT (OUTPATIENT)
Dept: HOME HEALTH SERVICES | Facility: HOME HEALTH | Age: 74
End: 2024-08-03
Payer: MEDICARE

## 2024-08-05 ENCOUNTER — HOME CARE VISIT (OUTPATIENT)
Dept: HOME HEALTH SERVICES | Facility: HOME HEALTH | Age: 74
End: 2024-08-05
Payer: MEDICARE

## 2024-08-05 VITALS
RESPIRATION RATE: 17 BRPM | HEART RATE: 78 BPM | TEMPERATURE: 97.9 F | SYSTOLIC BLOOD PRESSURE: 125 MMHG | DIASTOLIC BLOOD PRESSURE: 68 MMHG | OXYGEN SATURATION: 97 %

## 2024-08-05 PROCEDURE — G0157 HHC PT ASSISTANT EA 15: HCPCS | Mod: CQ

## 2024-08-05 SDOH — HEALTH STABILITY: PHYSICAL HEALTH
EXERCISE COMMENTS: 1 SET OF 15 EACH, BLUE THERABAND  SEATED THER EX: MARCHES, LEG EXT, LAQ, TBAND HIP ABD, BALL SQUEEZES HIP ADD  SUPINE THER EX: SLR, HIP ABD, HEEL SLIDES, SAQ, GLUT SETS 5 SEC HOLDS QUAD SETS 5 SEC HOLDS  STANDING THER EX: MARCHES, HEEL/TOE RAISES, HI

## 2024-08-05 SDOH — HEALTH STABILITY: PHYSICAL HEALTH: EXERCISE TYPE: B LE STRENGTHENING, ENDURANCE TRAINING

## 2024-08-05 SDOH — HEALTH STABILITY: PHYSICAL HEALTH
EXERCISE COMMENTS: P ABD, HIP FLEX/EXT, MINI SQUATS , HAM CURLS  BUE: BLUE THERABAND SHOULDER ABD/ADD,FLEX,EXT, IR/ER  BICEP CURLS, ROWS , 2# BALL BICEP CURLS, SHOULDER PRESS

## 2024-08-05 ASSESSMENT — ACTIVITIES OF DAILY LIVING (ADL)
AMBULATION ASSISTANCE ON FLAT SURFACES: 1
ADLS_COMMENTS: PT. IS I IN ALL ADL'S
AMBULATION_DISTANCE/DURATION_TOLERATED: 120 FT

## 2024-08-05 ASSESSMENT — ENCOUNTER SYMPTOMS
ARTHRALGIAS: 1
OCCASIONAL FEELINGS OF UNSTEADINESS: 1
LIMITED RANGE OF MOTION: 1
MUSCLE WEAKNESS: 1

## 2024-08-07 ENCOUNTER — HOME CARE VISIT (OUTPATIENT)
Dept: HOME HEALTH SERVICES | Facility: HOME HEALTH | Age: 74
End: 2024-08-07
Payer: MEDICARE

## 2024-08-07 PROCEDURE — G0157 HHC PT ASSISTANT EA 15: HCPCS | Mod: CQ

## 2024-08-08 ENCOUNTER — HOME CARE VISIT (OUTPATIENT)
Dept: HOME HEALTH SERVICES | Facility: HOME HEALTH | Age: 74
End: 2024-08-08
Payer: MEDICARE

## 2024-08-08 VITALS
DIASTOLIC BLOOD PRESSURE: 64 MMHG | BODY MASS INDEX: 23.21 KG/M2 | OXYGEN SATURATION: 97 % | HEART RATE: 60 BPM | SYSTOLIC BLOOD PRESSURE: 110 MMHG | RESPIRATION RATE: 20 BRPM | TEMPERATURE: 96.7 F | WEIGHT: 131 LBS

## 2024-08-08 VITALS
HEART RATE: 68 BPM | TEMPERATURE: 97.9 F | DIASTOLIC BLOOD PRESSURE: 58 MMHG | RESPIRATION RATE: 16 BRPM | SYSTOLIC BLOOD PRESSURE: 121 MMHG | OXYGEN SATURATION: 98 %

## 2024-08-08 PROCEDURE — G0299 HHS/HOSPICE OF RN EA 15 MIN: HCPCS

## 2024-08-08 PROCEDURE — G0153 HHCP-SVS OF S/L PATH,EA 15MN: HCPCS

## 2024-08-08 SDOH — HEALTH STABILITY: PHYSICAL HEALTH
EXERCISE COMMENTS: G THER EX: MARCHES, HEEL/TOE RAISES, HIP ABD, HIP FLEX/EXT, MINI SQUATS , HAM CURLS  BUE: BLUE THERABAND SHOULDER ABD/ADD,FLEX,EXT, IR/ER  BICEP CURLS, ROWS , 2# BALL BICEP CURLS, SHOULDER PRESS

## 2024-08-08 SDOH — HEALTH STABILITY: PHYSICAL HEALTH
EXERCISE COMMENTS: 2 SETS OF 10 EACH, BLUE THERABAND, 2# ANKLE WEIGHTS, 2 # WEIGHTED BALL]  SUPINE THER EX: SLR, HIP ABD, HEEL SLIDES, SAQ, GLUT SETS 5 SEC HOLDS QUAD SETS 5 SEC HOLDS  SEATED THER EX: MARCHES, LEG EXT, LAQ, TBAND HIP ABD, BALL SQUEEZES HIP ADD  STANDIN

## 2024-08-08 SDOH — HEALTH STABILITY: PHYSICAL HEALTH: EXERCISE TYPE: B LE STRENGTHENING THER EX, B UE STRENGTHENING THER EX, ENDURANCE TRAINING

## 2024-08-08 ASSESSMENT — ENCOUNTER SYMPTOMS
OCCASIONAL FEELINGS OF UNSTEADINESS: 1
DENIES PAIN: 1
PERSON REPORTING PAIN: PATIENT
SUBJECTIVE PAIN PROGRESSION: UNCHANGED
MUSCLE WEAKNESS: 1
PAIN LOCATION: BACK
LOWEST PAIN SEVERITY IN PAST 24 HOURS: 3/10
PAIN LOCATION - PAIN FREQUENCY: INTERMITTENT
PAIN SEVERITY GOAL: 0/10
APPETITE LEVEL: FAIR
PAIN LOCATION - PAIN SEVERITY: 8/10
LIMITED RANGE OF MOTION: 1
HIGHEST PAIN SEVERITY IN PAST 24 HOURS: 8/10
PAIN LOCATION - RELIEVING FACTORS: TIME OF DAY , POSITIONING
PAIN: 1
PAIN LOCATION - PAIN QUALITY: ACHING
PAIN LOCATION - EXACERBATING FACTORS: TIME OF DAY, POSITIONING

## 2024-08-08 ASSESSMENT — ACTIVITIES OF DAILY LIVING (ADL)
AMBULATION ASSISTANCE ON FLAT SURFACES: 1
AMBULATION_DISTANCE/DURATION_TOLERATED: 125 FT
AMBULATION ASSISTANCE: STAND BY ASSIST
CURRENT_FUNCTION: STAND BY ASSIST

## 2024-08-08 NOTE — CASE COMMUNICATION
Patient  Primary Reason for Home Care: Neck Surgery  Skilled Needs: Oral-pharyngeal strengthening exercises   Instruction provided: Exercises given   Patient response to instruction: Demonstrated understanding   Patient instructed on plan of care and visit frequency.   Patient in agreement with plan of care and visit frequency.    Discipline Communication: Pt presents with moderate pharyngeal dysphagia characterized by difficulty swallo wing foods.    Plan for next visit: Oral-pharyngeal strengthening exercises

## 2024-08-12 ENCOUNTER — HOME CARE VISIT (OUTPATIENT)
Dept: HOME HEALTH SERVICES | Facility: HOME HEALTH | Age: 74
End: 2024-08-12
Payer: MEDICARE

## 2024-08-12 VITALS
HEART RATE: 51 BPM | TEMPERATURE: 97.9 F | RESPIRATION RATE: 16 BRPM | SYSTOLIC BLOOD PRESSURE: 122 MMHG | DIASTOLIC BLOOD PRESSURE: 62 MMHG | OXYGEN SATURATION: 98 %

## 2024-08-12 PROCEDURE — G0157 HHC PT ASSISTANT EA 15: HCPCS | Mod: CQ

## 2024-08-12 SDOH — HEALTH STABILITY: PHYSICAL HEALTH
EXERCISE COMMENTS: 2 SETS OF 10 EACH, BLUE THERABAND, 2# ANKLE WEIGHTS  SEATED THER EX: MARCHES, LEG EXT, LAQ, TBAND HIP ABD, BALL SQUEEZES HIP ADD  STANDING THER EX: MARCHES, HEEL/TOE RAISES, HIP ABD, HIP FLEX/EXT, MINI SQUATS , HAM CURLS

## 2024-08-12 SDOH — HEALTH STABILITY: PHYSICAL HEALTH: EXERCISE TYPE: B LE STRENGTHENING THER EX, ENDURANCE TRAINING

## 2024-08-12 ASSESSMENT — ENCOUNTER SYMPTOMS
PAIN SEVERITY GOAL: 0/10
PAIN LOCATION - EXACERBATING FACTORS: TIME OF DAY
PAIN LOCATION - PAIN QUALITY: ACHING
PERSON REPORTING PAIN: PATIENT
PAIN LOCATION - PAIN FREQUENCY: INTERMITTENT
PAIN LOCATION - RELIEVING FACTORS: TIME OF DAY
PAIN LOCATION: BACK
LOWEST PAIN SEVERITY IN PAST 24 HOURS: 3/10
PAIN LOCATION - PAIN SEVERITY: 3/10
HIGHEST PAIN SEVERITY IN PAST 24 HOURS: 9/10
PAIN: 1
MUSCLE WEAKNESS: 1
OCCASIONAL FEELINGS OF UNSTEADINESS: 1

## 2024-08-12 ASSESSMENT — ACTIVITIES OF DAILY LIVING (ADL)
ADLS_COMMENTS: PT. IS I IN ALL ADL'S
AMBULATION ASSISTANCE ON FLAT SURFACES: 1
AMBULATION_DISTANCE/DURATION_TOLERATED: 150 FT

## 2024-08-12 NOTE — Clinical Note
Thank you.  ----- Message -----  From: Ryanne Solitario, PTA  Sent: 8/12/2024  10:26 PM EDT  To: Era Carson, PT      Pt. is doing well and ready for DC

## 2024-08-14 ENCOUNTER — HOME CARE VISIT (OUTPATIENT)
Dept: HOME HEALTH SERVICES | Facility: HOME HEALTH | Age: 74
End: 2024-08-14
Payer: MEDICARE

## 2024-08-14 ENCOUNTER — PATIENT OUTREACH (OUTPATIENT)
Dept: PRIMARY CARE | Facility: CLINIC | Age: 74
End: 2024-08-14
Payer: MEDICARE

## 2024-08-14 PROCEDURE — G0153 HHCP-SVS OF S/L PATH,EA 15MN: HCPCS

## 2024-08-15 ENCOUNTER — APPOINTMENT (OUTPATIENT)
Dept: CARDIOLOGY | Facility: CLINIC | Age: 74
End: 2024-08-15
Payer: MEDICARE

## 2024-08-15 VITALS
BODY MASS INDEX: 24.73 KG/M2 | HEIGHT: 63 IN | DIASTOLIC BLOOD PRESSURE: 70 MMHG | SYSTOLIC BLOOD PRESSURE: 136 MMHG | OXYGEN SATURATION: 94 % | HEART RATE: 60 BPM | WEIGHT: 139.56 LBS

## 2024-08-15 DIAGNOSIS — I50.82 CONGESTIVE HEART FAILURE WITH RIGHT VENTRICULAR SYSTOLIC DYSFUNCTION (MULTI): ICD-10-CM

## 2024-08-15 DIAGNOSIS — I50.20 CONGESTIVE HEART FAILURE WITH RIGHT VENTRICULAR SYSTOLIC DYSFUNCTION (MULTI): ICD-10-CM

## 2024-08-15 DIAGNOSIS — I50.23 ACUTE ON CHRONIC SYSTOLIC HEART FAILURE (MULTI): ICD-10-CM

## 2024-08-15 PROCEDURE — 3048F LDL-C <100 MG/DL: CPT | Performed by: INTERNAL MEDICINE

## 2024-08-15 PROCEDURE — 3044F HG A1C LEVEL LT 7.0%: CPT | Performed by: INTERNAL MEDICINE

## 2024-08-15 PROCEDURE — 3060F POS MICROALBUMINURIA REV: CPT | Performed by: INTERNAL MEDICINE

## 2024-08-15 PROCEDURE — 3075F SYST BP GE 130 - 139MM HG: CPT | Performed by: INTERNAL MEDICINE

## 2024-08-15 PROCEDURE — 1036F TOBACCO NON-USER: CPT | Performed by: INTERNAL MEDICINE

## 2024-08-15 PROCEDURE — 1160F RVW MEDS BY RX/DR IN RCRD: CPT | Performed by: INTERNAL MEDICINE

## 2024-08-15 PROCEDURE — 3078F DIAST BP <80 MM HG: CPT | Performed by: INTERNAL MEDICINE

## 2024-08-15 PROCEDURE — 99215 OFFICE O/P EST HI 40 MIN: CPT | Performed by: INTERNAL MEDICINE

## 2024-08-15 PROCEDURE — 1159F MED LIST DOCD IN RCRD: CPT | Performed by: INTERNAL MEDICINE

## 2024-08-15 PROCEDURE — 1157F ADVNC CARE PLAN IN RCRD: CPT | Performed by: INTERNAL MEDICINE

## 2024-08-15 PROCEDURE — 1111F DSCHRG MED/CURRENT MED MERGE: CPT | Performed by: INTERNAL MEDICINE

## 2024-08-15 PROCEDURE — G2211 COMPLEX E/M VISIT ADD ON: HCPCS | Performed by: INTERNAL MEDICINE

## 2024-08-15 PROCEDURE — 1125F AMNT PAIN NOTED PAIN PRSNT: CPT | Performed by: INTERNAL MEDICINE

## 2024-08-15 RX ORDER — SACUBITRIL AND VALSARTAN 24; 26 MG/1; MG/1
1 TABLET, FILM COATED ORAL 2 TIMES DAILY
Qty: 60 TABLET | Refills: 11 | Status: SHIPPED | OUTPATIENT
Start: 2024-08-15 | End: 2025-08-15

## 2024-08-15 ASSESSMENT — ENCOUNTER SYMPTOMS
LOSS OF SENSATION IN FEET: 1
OCCASIONAL FEELINGS OF UNSTEADINESS: 0
DEPRESSION: 0

## 2024-08-15 ASSESSMENT — PAIN SCALES - GENERAL: PAINLEVEL: 6

## 2024-08-15 NOTE — PATIENT INSTRUCTIONS
1.  Discontinue amiodarone  2.  Uptitrate Entresto to 24-26 mg 1 tablet twice daily.  3.  Blood tests in 2 weeks.  You do not need to fast for these blood tests.  4.  Schedule a follow-up with electrophysiology device clinic.  5.  Schedule a follow-up in our office in approximately 8 to 10 weeks.  Send us a [employer] message with any questions/concerns.

## 2024-08-15 NOTE — Clinical Note
Mina: Review my note.  You placed an ICD a few months ago.  I am stopping the amiodarone, since he has undergone subclavian stent, no recurrent arrhythmia, hemodynamically stable.  Should probably be seen in device clinic, he will call to arrange but it might be best if your office arranges to follow-up.  Uptitrating Entresto, will continue DAPT for 1 year following non-STEMI in February, 2024.

## 2024-08-15 NOTE — PROGRESS NOTES
Primary Care Physician: Ofelia Mccracken DO  Date of Visit: 08/15/2024  9:20 AM EDT  Location of visit: [facility] [place] [address]   Last office visit: 6/13/2024     Chief Complaint:     Follow-up, Hypertension, Heart Failure, and Coronary Artery Disease     HPI/Summary  Ritesh Garza is a 73 y.o. male who presents for followup cardiology evaluation.       The patient is status post remote MI, with longstanding multivessel CAD, moderate LV systolic dysfunction and compensated congestive heart failure.  The patient's problem list includes chronic kidney disease.  In 2021, he was hospitalized with an episode of unresponsiveness, possibly absence seizures.  Keppra was prescribed.  A monitor showed no atrial fibrillation and no pauses.  There was a long hospital admission in November, 2021, with COVID-related delirium.  The ejection fraction was 30%.  In 2023, eplerenone was discontinued because of hyperkalemia.  There was a long hospitalization in February, 2024 with left hip pain and swelling after a fall, with significant left leg hematoma.  There were episodes of ventricular tachycardia and chest discomfort, thought to be in part related to metabolic acidosis and to acute blood loss anemia.  Biomarkers were elevated.  He was seen by electrophysiology, and was considered for ICD if there was no recovery of LV function, which was measured at 30 to 35% in the hospital.  Catheterization was deferred because of acute on chronic kidney injury.  At discharge, he was loaded with amiodarone, then started on amiodarone 200 mg daily, also started on furosemide and clopidogrel for ACS.  He was continued on aspirin, atorvastatin and Farxiga.  Amlodipine, losartan and metformin were all held, given the acute on chronic kidney injury.     Cardiac catheterization was performed on April 15, 2024.  The left PPO to LAD and D1 was patent, vein grafts were occluded and there was severe native CAD with an 80% left main,  of the proximal LAD,   of the proximal circumflex,  of the proximal RCA.  There was severe left subclavian stenosis.  This likely compromised flow to the left POP when he was hemodynamically unstable, with non-STEMI and ventricular tachycardia.  We arranged for a follow-up limited echocardiogram on April 26, 2024.  The LVEF was estimated at 30 to 35%. After of evaluation, we proceeded with left subclavian stent on April 30, 2024.  He was in our office a few days later and we recommended that we pursue ICD.  This was successfully performed on May 30, 2024.    At his last office visit, we initiated Entresto 24-26 mg, starting with 1/2 tablet twice daily because of chronic kidney disease and a history of hyperkalemia.  He was continued on amiodarone.  Since then, there was a hospitalization with central cord syndrome.  He apparently walked up the stairs, fell down and landed on his left side, imaging showed moderate to severe multilevel spinal degenerative changes and severe spinal canal stenosis.  MRI showed cord compression with myelopathy.  He underwent C4-C7 decompression.  There were runs of paroxysmal atrial tachycardia in the early postoperative period.  He was discharged home with home health care and physical and Occupational Therapy.    He remains on aspirin, amlodipine 10 mg daily, atorvastatin 80 mg daily, Plavix 75 mg daily, Farxiga 10 mg daily, furosemide 40 mg daily, Vascepa 1 g twice daily, metoprolol 50 mg daily, and Entresto one half of 24-26 mg tablet twice daily.    He feels quite well following another hospitalization.  No device discharges.  No tenderness at device pocket site.  No recent angina.  No palpitations.  Medications are unchanged.  Still taking amiodarone although not on his current medication list.  Specialty Problems          Cardiology Problems    Peripheral arterial disease (CMS-[facility])    Cardiomyopathy (Multi)    History of coronary artery bypass graft     1992: CABG x5, [facility].         History of myocardial infarction    Atherosclerosis of coronary artery bypass graft    Benign essential hypertension    Ischemic cardiomyopathy    Hyperlipidemia    Left ventricular systolic dysfunction (LVSD)     Comment on above: February 28, 2014: LV ejection fraction 35-40%. Basal inferolateral, and basal inferior, basal inferoseptal, mid inferolateral, mid inferior, and the inferoseptal akinesis. Mild LV dilatation. Mild mitral regurgitation. June 06, 2018: LV ejection fraction 35-40%. basal and mid inferior wall, basal and mid inferolateral wall, basal inferoseptal and mid inferoseptal segments are abnormal. Left ventricle mildly dilated. Trace mitral valve regurgitation. June 07, 2019: LV ejection fraction stable, 35-40%.;  February 28, 2014: LV ejection fraction 35-40%. Basal inferolateral, and basal inferior, basal inferoseptal, mid inferolateral, mid inferior, and the inferoseptal akinesis. Mild LV dilatation. Mild mitral regurgitation. June 06, 2018: LV ejection fraction 35-40%. basal and mid inferior wall, basal and mid inferolateral wall, basal inferoseptal and mid inferoseptal segments are abnormal. Left ventricle mildly dilated. Trace mitral valve regurgitation. June 07, 2019: LV ejection fraction stable, 35-40%.June 1. 2021: LV EF 40%.;  February 28, 2014: LV ejection fraction 35-40%. Basal inferolateral, and basal inferior, basal inferoseptal, mid inferolateral, mid inferior, and the inferoseptal akinesis. Mild LV dilatation. Mild mitral regurgitation. June 06, 2018: LV ejection fraction 35-40%. basal and mid inferior wall, basal and mid inferolateral wall, basal inferoseptal and mid inferoseptal segments are abnormal. Left ventricle mildly dilated. Trace mitral valve regurgitation. June 07, 2019: LV ejection fraction stable, 35-40%.June 1. 2021: LV EF 40%. #16, 2021: LVEF 35%.;         Congestive heart failure with right ventricular systolic dysfunction (Multi)    CAD (coronary  artery disease)    Chest pain    Demand ischemia (Multi)    Ventricular tachycardia, unspecified (Multi)    Acute on chronic systolic heart failure (Multi)    Myocardial infarction (Multi)    Stenosis of subclavian artery (CMS-HCC)        Past Medical History:   Diagnosis Date    Allergic     Asthma (HHS-HCC)     Coronary artery disease Long Ago    Diabetes mellitus (Multi) Over 20 years ago    Drug-induced gout, unspecified site 10/04/2019    Acute drug-induced gout    Encounter for screening for malignant neoplasm of colon 10/31/2022    Colon cancer screening    Gout, unspecified     Acute gout    Heart disease Over 20 years sgo    Heart murmur Long ago    Hypertension Over 20 years ago    Myocardial infarction (Multi) Over 20 yesrs ago    Ocular pain, right eye 10/14/2018    Pain of right eye    Periorbital cellulitis 10/14/2018    Periorbital cellulitis    Personal history of colonic polyps 10/11/2017    History of colon polyps    Personal history of other diseases of the circulatory system 05/12/2021    History of angina pectoris    Personal history of other diseases of the circulatory system 05/12/2021    History of angina pectoris    Personal history of other diseases of the respiratory system 10/04/2019    History of acute bronchitis    Personal history of other drug therapy     History of influenza vaccination    Type 2 diabetes mellitus (Multi)     Unspecified cirrhosis of liver (Multi) 11/07/2022    Cirrhosis, nonalcoholic          Past Surgical History:   Procedure Laterality Date    ADENOIDECTOMY  Childhood    CARDIAC CATHETERIZATION  Over 30 years ago    CARDIAC CATHETERIZATION N/A 04/15/2024    Procedure: Left And Right Heart Cath, With LV;  Surgeon: Blaine Adams MD;  Location: [facility] Cardiac Cath Lab;  Service: Cardiovascular;  Laterality: N/A;  3 hr hydration / arrive 6:30    CARDIAC DEFIBRILLATOR PLACEMENT      CARDIAC ELECTROPHYSIOLOGY PROCEDURE N/A 05/30/2024    Procedure: ICD Dual Implant;   Surgeon: Mina Kiran MD;  Location: [facility] Cardiac [facility];  Service: Electrophysiology;  Laterality: N/A;  Dual chamber ICD, [facility]  notified [facility] 5/24/24    CHOLECYSTECTOMY  Over 30 years ago    CIRCUMCISION, PRIMARY  72,years ago    CORONARY ARTERY BYPASS GRAFT  12/02/2021    CABG    CORONARY STENT PLACEMENT  Last week    EYE SURGERY  Childhood    INVASIVE VASCULAR PROCEDURE Left 04/30/2024    Procedure: Angioplasty - Upper Extremity;  Surgeon: Narinder Quevedo MD;  Location: [facility];  Service: Cardiovascular;  Laterality: Left;  3 hr hydration  / arrive 6:30    MR HEAD ANGIO WO IV CONTRAST  06/02/2021    MR HEAD ANGIO WO IV CONTRAST 6/2/2021 [facility] CLINICAL LEGACY    MR HEAD ANGIO WO IV CONTRAST  11/16/2021    MR HEAD ANGIO WO IV CONTRAST 11/16/2021 [facility] CLINICAL LEGACY    MR NECK ANGIO WO IV CONTRAST  06/02/2021    MR NECK ANGIO WO IV CONTRAST 6/2/2021 [facility] CLINICAL LEGACY    MR NECK ANGIO WO IV CONTRAST  11/16/2021    MR NECK ANGIO WO IV CONTRAST 11/16/2021 [facility] CLINICAL LEGACY            Social History     Tobacco Use    Smoking status: Never    Smokeless tobacco: Never   Vaping Use    Vaping status: Never Used   Substance Use Topics    Alcohol use: Never    Drug use: Never                 No Active Allergies      Current Outpatient Medications   Medication Instructions    acetaminophen (TYLENOL) 650 mg, oral, Every 6 hours    albuterol (ProAir HFA) 90 mcg/actuation inhaler 2 puffs, inhalation, Every 6 hours PRN    allopurinol (ZYLOPRIM) 100 mg, oral, Daily    amLODIPine (Norvasc) 10 mg tablet oral, Daily    aspirin 81 mg, oral, Daily    atorvastatin (LIPITOR) 80 mg, oral, Daily    b complex vitamins capsule 1 capsule, oral, Daily    clopidogrel (PLAVIX) 75 mg, oral, Daily    coenzyme Q-10 (CoQ-10) 100 mg capsule 1 capsule, oral, Daily    cyanocobalamin (Vitamin B-12) 1,000 mcg tablet 1 tablet, oral, Daily, As directed    dapagliflozin (Farxiga) 10 mg 1 tablet, oral, Daily before  "evening meal    famotidine (PEPCID) 20 mg, oral, Daily PRN    furosemide (LASIX) 40 mg, oral, Daily, As needed for weight gain greater than 3 pounds    gabapentin (NEURONTIN) 100 mg, oral, Nightly    icosapent ethyL (VASCEPA) 1 g, oral, 2 times daily (morning and late afternoon)    loratadine (CLARITIN) 10 mg, oral, 2 times daily, Take 1 tablet every morning and 1 tablet every evening    magnesium gluconate (Magonate) 27.5 mg magne- sium (500 mg) tablet 1 tablet, oral, Daily    metoprolol succinate XL (TOPROL-XL) 50 mg, oral, Daily    multivitamin tablet 1 tablet, oral, Daily    sacubitriL-valsartan (Entresto) 24-26 mg tablet 0.5 tablets, oral, 2 times daily    sertraline (Zoloft) 50 mg tablet TAKE ONE TABLET DAILY IN THE MORNING AFTER BREAKFAST.       ROS     Vital Signs:  Vitals:    08/15/24 0936   BP: 136/70   BP Location: Left arm   Patient Position: Sitting   BP Cuff Size: Adult   Pulse: 60   SpO2: 94%   Weight: 63.3 kg (139 lb 9 oz)   Height: 1.6 m (5' 3\")     Wt Readings from Last 2 Encounters:   08/15/24 63.3 kg (139 lb 9 oz)   08/08/24 59.4 kg (131 lb)     Body mass index is 24.72 kg/m².       Physical Exam:    Not performed today.     Last Labs:  CMP:  Recent Labs     07/13/24  1209 07/10/24  0849 07/09/24  0756 07/08/24  0639 07/07/24  0145    142 141 143 142   K 4.1 4.2 4.0 3.9 4.0    104 104 103 101   CO2 26 27 29 28 31   ANIONGAP 16 15 12 16 14   BUN 42* 58* 55* 68* 76*   CREATININE 1.91* 2.09* 1.82* 1.88* 2.28*   EGFR 36* 32* 38* 37* 29*   GLUCOSE 143* 141* 150* 175* 221*     Recent Labs     07/13/24  1209 07/10/24  0849 07/09/24  0756 07/08/24  0639 07/07/24  0145 07/04/24  2136 07/04/24  1047 07/01/24  1807 07/01/24  0950 06/30/24  2129 05/06/24  1128 02/21/24  0358 02/20/24  0528   ALBUMIN 3.8 3.6 3.5 3.7 3.3*   < > 3.7   < > 4.0 4.0 4.2   < > 3.2*   ALKPHOS  --   --   --   --   --   --  80  --  112 106 103  --  60   ALT  --   --   --   --   --   --  23  --  39 39 27  --  109*   AST  -- "   --   --   --   --   --  45*  --  52* 60* 25  --  99*   BILITOT  --   --   --   --   --   --  0.6  --  0.6 0.7 0.6  --  1.2    < > = values in this interval not displayed.     CBC:  Recent Labs     07/13/24  1209 07/09/24  0756 07/08/24  0639 07/07/24  0145 07/06/24  0331   WBC 8.5 8.0 8.3 8.5 10.5   HGB 11.2* 11.1* 11.3* 9.6* 9.3*   HCT 36.1* 35.7* 35.7* 30.1* 29.1*    194 233 221 204   MCV 97 99 95 96 95     COAG:   Recent Labs     07/02/24  1416 07/01/24  0950 06/30/24 2129 05/28/24  1458 05/06/24  1128   INR 1.2* 1.2* 1.3* 1.2* 1.2*     HEME/ENDO:  Recent Labs     07/08/24  0639 07/07/24  0145 02/17/24  0535 02/15/24  0407 01/11/24  1336 07/31/23  1110 05/04/23  1215 08/18/22  0940   IRONSAT  --   --  8*  --   --   --   --   --    TSH 9.35* 10.97*  --  3.26  --   --   --  3.73   HGBA1C  --   --   --   --  6.6* 7.0* 6.8* 6.6*      CARDIAC:   Recent Labs     07/05/24  0405 06/30/24 2129 02/18/24  1826 02/18/24  1730 02/18/24  0419 02/14/24 2111 02/14/24  1720 02/13/24 2214 05/04/23  1215 09/14/22 2025 12/02/21  0947 11/15/21  2217   TROPHS  --  7 4,490* 4,518*  --  635* 258*   < >  --    < >  --   --    BNP 3,294*  --   --   --  2,116*  --   --   --  203*  --  108* 139*    < > = values in this interval not displayed.     Recent Labs     05/06/24  1128 11/13/23  1041 07/31/23  1110 05/04/23  1215 05/05/22  1450   CHOL 114 102 128 143 144   LDLF  --   --  57 56 36   HDL 31.0 25.8 32.3* 37.1* 35.3*   TRIG 149 203* 192* 252* 362*       Last Cardiology Tests:    ECG:    Not performed    Echo:  Echo Results:  Transthoracic Echo (TTE) Limited With Doppler And Color 07/02/2024    [facility], [address] [place] [address]  Tel [phone] and Fax [phone]    TRANSTHORACIC ECHOCARDIOGRAM REPORT      Patient Name:      CHIDI VARGAS     Reading Physician:    [id number] Kisha Reina MD  Study Date:        7/2/2024             Ordering Provider:    Flores YARBROUGH  RUSSEL ESTRADA  MRN/PID:           [id number]             Fellow:  Accession#:        [id number]         Nurse:                Kisha Michelle RN  Date of Birth/Age: 1950 / 73 years Sonographer:          Angelina Butler  RDSOOT  Gender:            M                    Additional Staff:     Kiki Almendarez  Height:            160.02 cm            Admit Date:           7/1/2024  Weight:            69.40 kg             Admission Status:     Inpatient -  Routine  BSA / BMI:         1.73 m2 / 27.10      Encounter#:           [id number]  kg/m2  Blood Pressure:    142/71 mmHg          Department Location:  [facility]    Study Type:    TRANSTHORACIC ECHO (TTE) LIMITED  Diagnosis/ICD: Encounter for other preprocedural examination-Z01.818  Indication:    Preop evaluation  CPT Code:      Echo Limited-86733; Doppler Limited-55692; Color Doppler-57301    Patient History:  Pertinent History: DM, CAD, HFrEF, CKD, ICD (5/30/24).    Study Detail: The following Echo studies were performed: 2D, M-Mode, Doppler and  color flow. Definity used as a contrast agent for endocardial  border definition. Total contrast used for this procedure was 2.0  mL via IV push.      PHYSICIAN INTERPRETATION:  Left Ventricle: Left ventricular ejection fraction is moderately decreased, by visual estimate at 30-35%. Wall motion is abnormal. The left ventricular cavity size is moderately dilated. Spectral Doppler shows a pseudonormal pattern of left ventricular diastolic filling. There is no definite left ventricular thrombus visualized. Technically difficult exam, though there is moderate LV systolic dysfunction with the apical function being best but hypokinesis of the basal and mid segments. There is no LV apical thrombus seen on the echocontrast images.  Left Atrium: The left atrium is moderately dilated.  Right Ventricle: The right ventricle was not well visualized. There is reduced right ventricular systolic function. A device is visualized  in the right ventricle.  Right Atrium: The right atrium was not well visualized. There is a device visualized in the right atrium.  Aortic Valve: The aortic valve is trileaflet. There is mild aortic valve cusp calcification. There is no evidence of aortic valve regurgitation. The peak instantaneous gradient of the aortic valve is 5.9 mmHg.  Mitral Valve: The mitral valve is mildly thickened. There is mild mitral annular calcification. There is moderate mitral valve regurgitation.  Tricuspid Valve: The tricuspid valve was not well visualized. There is moderate tricuspid regurgitation. The Doppler estimated RVSP is mildly elevated at 41.9 mmHg.  Pulmonic Valve: The pulmonic valve is not well visualized. Pulmonic valve regurgitation was not assessed.  Pericardium: There is a trivial pericardial effusion.  Aorta: The aortic root is normal.  Systemic Veins: The inferior vena cava appears mildly dilated. There is IVC inspiratory collapse greater than 50%.  In comparison to the previous echocardiogram(s): Compared with the prior exam from 5/10/2023, today's exam is more technically difficult making assessment of LV systolic function more difficult. Proir study demonstrated clear wall motiom abnormality in the basal septal, basal inferior and basal and mid inferolateral segments. The LV systolic function appears to be worse today ( hyaving declined from mild dysfunciton to moderate dysfunction. In addition, the degree of MR appears to have increased from mild to moderate.      CONCLUSIONS:  1. Left ventricular ejection fraction is moderately decreased, by visual estimate at 30-35%.  2. Spectral Doppler shows a pseudonormal pattern of left ventricular diastolic filling.  3. Left ventricular cavity size is moderately dilated.  4. No left ventricular thrombus visualized.  5. Technically difficult exam, though there is moderate LV systolic dysfunction with the apical function being best but hypokinesis of the basal and mid  segments. There is no LV apical thrombus seen on the echocontrast images.  6. There is reduced right ventricular systolic function.  7. The left atrium is moderately dilated.  8. Moderate mitral valve regurgitation.  9. Mildly elevated RVSP.  10. Moderate tricuspid regurgitation visualized.  11. Compared with the prior exam from 5/10/2023, today's exam is more technically difficult making assessment of LV systolic function more difficult. Proir study demonstrated clear wall motiom abnormality in the basal septal, basal inferior and basal and mid inferolateral segments. The LV systolic function appears to be worse today ( hyaving declined from mild dysfunciton to moderate dysfunction. In addition, the degree of MR appears to have increased from mild to moderate.    QUANTITATIVE DATA SUMMARY:  2D MEASUREMENTS:  Normal Ranges:  Ao Root d:     3.20 cm   (2.0-3.7cm)  LAs:           4.60 cm   (2.7-4.0cm)  IVSd:          0.90 cm   (0.6-1.1cm)  LVPWd:         0.70 cm   (0.6-1.1cm)  LVIDd:         5.60 cm   (3.9-5.9cm)  LVIDs:         4.90 cm  LV Mass Index: 95.6 g/m2  LV % FS        12.5 %    LA VOLUME:  Normal Ranges:  LA Vol A4C:        69.7 ml    (22+/-6mL/m2)  LA Vol A2C:        78.1 ml  LA Vol BP:         74.4 ml  LA Vol Index A4C:  40.4ml/m2  LA Vol Index A2C:  45.3 ml/m2  LA Vol Index BP:   43.1 ml/m2  LA Area A4C:       22.2 cm2  LA Area A2C:       23.7 cm2  LA Major Axis A4C: 6.0 cm  LA Major Axis A2C: 6.1 cm  LA Volume Index:   43.1 ml/m2    AORTA MEASUREMENTS:  Normal Ranges:  Ao Sinus, d: 2.80 cm (2.1-3.5cm)    LV SYSTOLIC FUNCTION BY 2D PLANIMETRY (MOD):  Normal Ranges:  EF-A4C View:    33 % (>=55%)  EF-A2C View:    39 %  EF-Biplane:     36 %  EF-Visual:      33 %  LV EF Reported: 33 %    LV DIASTOLIC FUNCTION:  Normal Ranges:  MV Peak E:    1.08 m/s    (0.7-1.2 m/s)  MV Peak A:    0.44 m/s    (0.42-0.7 m/s)  E/A Ratio:    2.47        (1.0-2.2)  MV lateral e' 0.10 m/s  MV A Dur:     127.00 msec  MV DT:         211 msec    (150-240 msec)    MITRAL VALVE:  Normal Ranges:  MV Vmax:    1.42 m/s (<=1.3m/s)  MV peak P.1 mmHg (<5mmHg)  MV mean PG: 3.0 mmHg (<2mmHg)  MV DT:      211 msec (150-240msec)    AORTIC VALVE:  Normal Ranges:  AoV Vmax:      1.21 m/s (<=1.7m/s)  AoV Peak P.9 mmHg (<20mmHg)  LVOT Max Matt:  1.09 m/s (<=1.1m/s)  LVOT VTI:      28.00 cm  LVOT Diameter: 2.10 cm  (1.8-2.4cm)  AoV Area,Vmax: 3.12 cm2 (2.5-4.5cm2)      RIGHT VENTRICLE:  TAPSE: 12.6 mm  RV s'  0.08 m/s    TRICUSPID VALVE/RVSP:  Normal Ranges:  Peak TR Velocity: 2.91 m/s  RV Syst Pressure: 41.9 mmHg (< 30mmHg)  IVC Diam:         2.20 cm      [id number] Kisha Reina MD  Electronically signed on 2024 at 4:53:18 PM        ** Final **      Transthoracic Echo (TTE) Limited With Contrast 2024    [place] [address]  Phone [phone] Fax [phone]    TRANSTHORACIC ECHOCARDIOGRAM REPORT      Patient Name:      CHIDI Andrade Physician:    [id number] Adams MD  Study Date:        2024            Ordering Provider:    [id number] HUMBERTO SELLERS  MRN/PID:           [id number]             Fellow:  Accession#:        [id number]         Nurse:                Christina Chacon RN  Date of Birth/Age: 1950 / 73 years Sonographer:          Nikole Porter RDCS  Gender:            M                    Additional Staff:  Height:            152.40 cm            Admit Date:  Weight:            65.32 kg             Admission Status:     Outpatient  BSA / BMI:         1.62 m2 / 28.12      Department Location:  [facility] Echo  kg/m2                                      Lab  Blood Pressure: 163 /74 mmHg    Study Type:    TRANSTHORACIC ECHO (TTE) LIMITED  Diagnosis/ICD: Acute combined systolic (congestive) and diastolic (congestive)  heart failure (CHF)-I50.41; Non ST elevation (NSTEMI) myocardial  infarction-I21.4; Ventricular tachycardia, other-I47.29; Heart  disease,  unspecified-I51.9  Indication:    CHF, NSTEMI, VT, LVD  CPT Codes:     Echo Limited-49826; Doppler Limited-02435; Color Doppler-06492    Patient History:  Diabetes:          Yes  Pertinent History: CHF, Cancer, HTN and TIA.    Study Detail: The following Echo studies were performed: 2D, Doppler and color  flow. Optison used as a contrast agent for endocardial border  definition. Total contrast used for this procedure was 5 mL via IV  push. Unable to obtain subcostal and suprasternal notch view.      PHYSICIAN INTERPRETATION:  Left Ventricle: Left ventricular systolic function is moderately decreased, with an estimated ejection fraction of 30-35%. Wall motion is abnormal. The left ventricular cavity size is mildly dilated. Spectral Doppler shows a restrictive pattern of left ventricular diastolic filling. There is an elevated left ventricular end diastolic pressure.  LV Wall Scoring:  The basal and mid inferior septum, basal and mid inferior wall, and basal and  mid inferolateral wall are akinetic. The basal anteroseptal segment, apical  septal segment, and apical inferior segment are hypokinetic. All remaining  scored segments are normal.    Left Atrium: The left atrium was not assessed.  Right Ventricle: The right ventricle was not assessed. Right ventricular systolic function not assessed.  Right Atrium: The right atrium was not assessed.  Aortic Valve: The aortic valve is trileaflet. There is mild aortic valve cusp calcification. There is no evidence of aortic valve regurgitation.  Mitral Valve: The mitral valve is mildly thickened. There is moderately decreased mitral valve posterior leaflet mobility. There is moderate mitral valve regurgitation.  Tricuspid Valve: The tricuspid valve is structurally normal. There is mild to moderate tricuspid regurgitation. The Doppler estimated RVSP is mildly elevated at 35.7 mmHg.  Pulmonic Valve: The pulmonic valve was not assessed. The pulmonic valve regurgitation was not  assessed.  Pericardium: There is no pericardial effusion noted.  Aorta: The aortic root was not assessed.      CONCLUSIONS:  1. Left ventricular systolic function is moderately decreased with a 30-35% estimated ejection fraction.  2. Multiple segmental abnormalities exist. See findings.  3. Spectral Doppler shows a restrictive pattern of left ventricular diastolic filling.  4. There is an elevated left ventricular end diastolic pressure.  5. Moderate mitral valve regurgitation.  6. Moderately decreased mitral valve posterior leaflet mobility.  7. Mild to moderate tricuspid regurgitation.  8. Mildly elevated RVSP.    QUANTITATIVE DATA SUMMARY:  2D MEASUREMENTS:  Normal Ranges:  IVSd:          1.16 cm    (0.6-1.1cm)  LVPWd:         1.04 cm    (0.6-1.1cm)  LVIDd:         5.17 cm    (3.9-5.9cm)  LVIDs:         4.87 cm  LV Mass Index: 134.2 g/m2  LV % FS        5.8 %    LV SYSTOLIC FUNCTION BY 2D PLANIMETRY (MOD):  Normal Ranges:  EF-A4C View: 36.9 % (>=55%)  EF-A2C View: 28.7 %  EF-Biplane:  29.8 %    LV DIASTOLIC FUNCTION:  Normal Ranges:  MV Peak E:    1.13 m/s   (0.7-1.2 m/s)  MV Peak A:    0.28 m/s   (0.42-0.7 m/s)  E/A Ratio:    3.98       (1.0-2.2)  MV e'         0.04 m/s   (>8.0)  MV lateral e' 0.04 m/s  MV medial e'  0.04 m/s  MV A Dur:     88.49 msec  E/e' Ratio:   28.16      (<8.0)  LV IVRT:      74 msec    (<100ms)    MITRAL VALVE:  Normal Ranges:  MV DT: 171 msec (150-240msec)    AORTIC VALVE:  Normal Ranges:  LVOT Max Matt:  1.02 m/s (<=1.1m/s)  LVOT VTI:      25.00 cm  LVOT Diameter: 2.03 cm  (1.8-2.4cm)    TRICUSPID VALVE/RVSP:  Normal Ranges:  Peak TR Velocity: 2.86 m/s  RV Syst Pressure: 35.7 mmHg (< 30mmHg)      [id number] Blaine Adams MD  Electronically signed on 4/28/2024 at 11:13:11 AM      Wall Scoring        ** Final **      Transthoracic Echo (TTE) Complete With Contrast 02/15/2024    [address] [address]  Phone [phone] Fax  [phone]    TRANSTHORACIC ECHOCARDIOGRAM REPORT      Patient Name:      CHIDI VARGAS      Reading Physician:    [id number] Gonzalo White DO  Study Date:        2/15/2024             Ordering Provider:    [id number] TITO NICOLAS  MRN/PID:           [id number]              Fellow:  Accession#:        [id number]          Nurse:  Date of Birth/Age: 1950 / 73 years  Sonographer:          Heena David SOTO  Gender:            M                     Additional Staff:  Height:            160.02 cm             Admit Date:           2/13/2024  Weight:            73.03 kg              Admission Status:     Inpatient -  Routine  BSA / BMI:         1.76 m2 / 28.52 kg/m2 Department Location:  [facility] Echo  Lab  Blood Pressure: 105 /60 mmHg    Study Type:    TRANSTHORACIC ECHO (TTE) COMPLETE  Diagnosis/ICD: Chest pain, unspecified-R07.9  Indication:    Chest Pain  CPT Codes:     Echo Complete w Full [id number]  Study Detail: The following Echo studies were performed: 2D, M-Mode, Doppler and  color flow. Optison used as a contrast agent for endocardial  border definition. Total contrast used for this procedure was 3 mL  via IV push.      PHYSICIAN INTERPRETATION:  Left Ventricle: Left ventricular systolic function is normal, with an estimated ejection fraction of 30-35%. There are multiple wall motion abnormalities. The left ventricular cavity size is upper limits of normal. Spectral Doppler shows a restrictive pattern of left ventricular diastolic filling.  LV Wall Scoring:  The basal inferoseptal segment and basal inferior segment are akinetic. The  entire anterior septum, mid and apical inferior septum, basal and mid  inferolateral wall, basal anterolateral segment, basal anterior segment, and mid  inferior segment are hypokinetic. All remaining scored segments are normal.    Left Atrium: The left atrium is normal in size.  Right Ventricle: The right ventricle was not well visualized. There is low normal right  ventricular systolic function.  Right Atrium: The right atrium is normal in size.  Aortic Valve: The aortic valve is trileaflet. There is mild aortic valve thickening. There is no evidence of aortic valve regurgitation. The peak instantaneous gradient of the aortic valve is 7.1 mmHg. The mean gradient of the aortic valve is 4.0 mmHg.  Mitral Valve: The mitral valve is normal in structure. There is mild mitral valve regurgitation.  Tricuspid Valve: The tricuspid valve is structurally normal. There is mild to moderate tricuspid regurgitation.  Pulmonic Valve: The pulmonic valve is structurally normal. There is physiologic pulmonic valve regurgitation.  Pericardium: There is no pericardial effusion noted.  Aorta: The aortic root is normal.      CONCLUSIONS:  1. Left ventricular systolic function is normal with a 30-35% estimated ejection fraction.  2. Multiple segmental abnormalities exist. See findings.  3. Spectral Doppler shows a restrictive pattern of left ventricular diastolic filling.  4. There is low normal right ventricular systolic function.  5. Mild to moderate tricuspid regurgitation.  6. There are multiple wall motion abnormalities.    QUANTITATIVE DATA SUMMARY:  2D MEASUREMENTS:  Normal Ranges:  Ao Root d:     2.90 cm    (2.0-3.7cm)  LAs:           4.60 cm    (2.7-4.0cm)  IVSd:          1.00 cm    (0.6-1.1cm)  LVPWd:         0.90 cm    (0.6-1.1cm)  LVIDd:         5.30 cm    (3.9-5.9cm)  LVIDs:         5.20 cm  LV Mass Index: 106.2 g/m2  LV % FS        1.9 %    LA VOLUME:  Normal Ranges:  LA Vol A4C:        53.0 ml    (22+/-6mL/m2)  LA Vol A2C:        47.8 ml  LA Vol BP:         50.3 ml  LA Vol Index A4C:  30.0ml/m2  LA Vol Index A2C:  27.1 ml/m2  LA Vol Index BP:   28.5 ml/m2  LA Area A4C:       18.0 cm2  LA Area A2C:       17.1 cm2  LA Major Axis A4C: 5.2 cm  LA Major Axis A2C: 5.2 cm  LA Volume Index:   26.8 ml/m2    M-MODE MEASUREMENTS:  Normal Ranges:  Ao Root: 2.90 cm (2.0-3.7cm)    AORTA  MEASUREMENTS:  Normal Ranges:  Asc Ao, d: 2.50 cm (2.1-3.4cm)    LV SYSTOLIC FUNCTION BY 2D PLANIMETRY (MOD):  Normal Ranges:  EF-A4C View: 29.3 % (>=55%)  EF-A2C View: 43.2 %  EF-Biplane:  35.2 %    LV DIASTOLIC FUNCTION:  Normal Ranges:  MV Peak E:    1.29 m/s (0.7-1.2 m/s)  MV Peak A:    0.50 m/s (0.42-0.7 m/s)  E/A Ratio:    2.56     (1.0-2.2)  MV e'         0.06 m/s (>8.0)  MV lateral e' 0.06 m/s  MV medial e'  0.06 m/s  E/e' Ratio:   21.50    (<8.0)    MITRAL VALVE:  Normal Ranges:  MV DT: 151 msec (150-240msec)    MITRAL INSUFFICIENCY:  Normal Ranges:  PISA Radius:  0.5 cm  MR VTI:       129.00 cm  MR Vmax:      437.00 cm/s  MR Alias Matt: 38.5 cm/s  MR Volume:    17.85 ml  MR Flow Rt:   60.48 ml/s  MR EROA:      0.14 cm2    AORTIC VALVE:  Normal Ranges:  AoV Vmax:                1.33 m/s (<=1.7m/s)  AoV Peak P.1 mmHg (<20mmHg)  AoV Mean P.0 mmHg (1.7-11.5mmHg)  LVOT Max Matt:            1.04 m/s (<=1.1m/s)  AoV VTI:                 25.10 cm (18-25cm)  LVOT VTI:                20.70 cm  LVOT Diameter:           2.00 cm  (1.8-2.4cm)  AoV Area, VTI:           2.59 cm2 (2.5-5.5cm2)  AoV Area,Vmax:           2.46 cm2 (2.5-4.5cm2)  AoV Dimensionless Index: 0.82      RIGHT VENTRICLE:  TAPSE: 14.1 mm  RV s'  0.09 m/s    TRICUSPID VALVE/RVSP:  Normal Ranges:  Peak TR Velocity: 2.44 m/s  RV Syst Pressure: 26.8 mmHg (< 30mmHg)  IVC Diam:         1.50 cm      [id number] Gonzalo Christopher DO  Electronically signed on 2/15/2024 at 11:57:27 AM      Wall Scoring        ** Final **       Cath:      Stress Test:  Stress Results:  No results found for this or any previous visit from the past 365 days.         Cardiac Imaging:        Assessment/Plan     Complex patient with recent urgent hospitalization following a call that required surgical intervention for severe cervical spinal stenosis with cord compression.  Episodes of atrial tachycardia in the early postoperative period.  He is approximately 6 months  status post non-STEMI after he presented following a fall with significant left leg hematoma and marked anemia.  He was found to have severe left subclavian stenosis which compromised flow to the left POP and he underwent left subclavian stent on April 30, 2024.  He underwent primary prevention ICD on May 30, 2024.  The ejection fraction is moderately impaired.  He has tolerated a very low-dose of Entresto, which will be uptitrated.  Spironolactone was associated with marked hyperkalemia and has not been resumed.    We can discontinue amiodarone at this time.  He had developed ventricular arrhythmia in the setting of acute ischemia and hemodynamic instability, now has an ICD in place and no recent arrhythmia.  We will continue DAPT with aspirin and clopidogrel for 12 months, until February, 2025.  We will titrate Entresto to 1 tablet twice daily, further up titration at next visit.  Repeat blood tests in 2 weeks.  Follow-up in 8 to 12 weeks.    Orders:  No orders of the defined types were placed in this encounter.     Followup Appts:  Future Appointments   Date Time Provider Department [place]   8/16/2024 To Be Determined Era Carson, BAM [facility]   8/21/2024 To Be Determined Brown Garcia, VANDANA [facility]   8/22/2024 12:40 PM Ofelia Mccracken DO [id number] [facility]   8/28/2024 To Be Determined VANDANA [facility]   9/5/2024  1:00 PM Alyse Holland, PhD [id number] [facility]   9/18/2024  3:30 PM Meme Perrin MD [id number] [facility]   11/21/2024  9:20 AM Nate Duran MD [id number] [facility]   11/25/2024  9:50 AM MD FALGUNI [id number] [facility]           ____________________________________________________________  Jose Hennessy MD    Senior Attending Physician  [facility]  [facility]  [employer]

## 2024-08-16 ENCOUNTER — HOME CARE VISIT (OUTPATIENT)
Dept: HOME HEALTH SERVICES | Facility: HOME HEALTH | Age: 74
End: 2024-08-16
Payer: MEDICARE

## 2024-08-16 VITALS — RESPIRATION RATE: 16 BRPM

## 2024-08-16 PROCEDURE — G0151 HHCP-SERV OF PT,EA 15 MIN: HCPCS

## 2024-08-16 ASSESSMENT — ACTIVITIES OF DAILY LIVING (ADL)
TRANSPORTATION ASSESSED: 1
TRANSPORTATION: INDEPENDENT
AMBULATION ASSISTANCE: 1
AMBULATION ASSISTANCE: INDEPENDENT
HOME_HEALTH_OASIS: 00
OASIS_M1830: 00

## 2024-08-16 ASSESSMENT — ENCOUNTER SYMPTOMS
DENIES PAIN: 1
PERSON REPORTING PAIN: PATIENT

## 2024-08-16 NOTE — HOME HEALTH
DISCHARGE SUMMARY:    DISCIPLINE: PT  DATE OF DISCIPLINE DISCHARGE: 8/16/24  REASON FOR DISCHARGE: GOALS MET  Keep pathways clear, use cane or walker for stability, have superv for steps and home egress.Discharging from physical therapy with goals met and health improved.  Patient is indep to [facility] with all home functional mobility. No questions regarding home exercise program.  Patient appears to be at functional baseline.Patient knows current exercises. No questions. Agreeable to continue independently.

## 2024-08-21 ENCOUNTER — HOME CARE VISIT (OUTPATIENT)
Dept: HOME HEALTH SERVICES | Facility: HOME HEALTH | Age: 74
End: 2024-08-21
Payer: MEDICARE

## 2024-08-21 PROCEDURE — G0153 HHCP-SVS OF S/L PATH,EA 15MN: HCPCS

## 2024-08-22 ENCOUNTER — APPOINTMENT (OUTPATIENT)
Dept: PRIMARY CARE | Facility: CLINIC | Age: 74
End: 2024-08-22
Payer: MEDICARE

## 2024-08-28 ENCOUNTER — HOME CARE VISIT (OUTPATIENT)
Dept: HOME HEALTH SERVICES | Facility: HOME HEALTH | Age: 74
End: 2024-08-28
Payer: MEDICARE

## 2024-08-28 DIAGNOSIS — K21.9 GASTROESOPHAGEAL REFLUX DISEASE WITHOUT ESOPHAGITIS: ICD-10-CM

## 2024-08-28 PROCEDURE — G0153 HHCP-SVS OF S/L PATH,EA 15MN: HCPCS

## 2024-08-28 RX ORDER — FAMOTIDINE 20 MG/1
20 TABLET, FILM COATED ORAL DAILY PRN
Qty: 90 TABLET | Refills: 1 | Status: SHIPPED | OUTPATIENT
Start: 2024-08-28

## 2024-08-28 ASSESSMENT — ACTIVITIES OF DAILY LIVING (ADL)
OASIS_M1830: 01
HOME_HEALTH_OASIS: 00

## 2024-09-05 ENCOUNTER — OFFICE VISIT (OUTPATIENT)
Dept: PSYCHOLOGY | Facility: CLINIC | Age: 74
End: 2024-09-05
Payer: MEDICARE

## 2024-09-05 DIAGNOSIS — G31.84 MILD COGNITIVE IMPAIRMENT: Primary | ICD-10-CM

## 2024-09-05 DIAGNOSIS — N52.9 ERECTILE DYSFUNCTION, UNSPECIFIED ERECTILE DYSFUNCTION TYPE: ICD-10-CM

## 2024-09-05 DIAGNOSIS — R41.89 COGNITIVE CHANGES: ICD-10-CM

## 2024-09-05 PROCEDURE — 96133 NRPSYC TST EVAL PHYS/QHP EA: CPT | Mod: AH | Performed by: CLINICAL NEUROPSYCHOLOGIST

## 2024-09-05 PROCEDURE — 96137 PSYCL/NRPSYC TST PHY/QHP EA: CPT | Mod: AH | Performed by: CLINICAL NEUROPSYCHOLOGIST

## 2024-09-05 PROCEDURE — 96132 NRPSYC TST EVAL PHYS/QHP 1ST: CPT | Mod: AH | Performed by: CLINICAL NEUROPSYCHOLOGIST

## 2024-09-05 PROCEDURE — 96116 NUBHVL XM PHYS/QHP 1ST HR: CPT | Mod: AH | Performed by: CLINICAL NEUROPSYCHOLOGIST

## 2024-09-05 PROCEDURE — 96136 PSYCL/NRPSYC TST PHY/QHP 1ST: CPT | Mod: AH | Performed by: CLINICAL NEUROPSYCHOLOGIST

## 2024-09-05 NOTE — PROGRESS NOTES
Neuropsychology Evaluation    Name: Ritesh Garza  : 1950  MRN: [id number]  Referring Provider:   Meme Perrin MD/[facility]  Date of Service: 24     Reason for Referral: Mr. Garza is a 73 year old, right handed,  gentleman who presented for neuropsychological testing as part of ongoing medical/neurological evaluation of cognitive changes, for input on differential diagnosis and longitudinal comparison with prior testing in .  This evaluation is intended for clinical purposes only and is NOT intended for legal/forensic or disability purposes. Verbal consent for neuropsychological services was obtained.     Summary/Impression:   Results of neuropsychological testing revealed an abnormal cognitive profile with areas of impairment in aspects of executive functioning involving speeded and complex information and in aspects of memory involving word list learning/recall (with preserved story memory and visual memory).  Language was well preserved for naming and average/low average for verbal fluency (both semantic and phonemic).  Visuospatial abilities were relatively well preserved.  Assessment of mood was not suggestive of a major affective disorder.  There was note of mildly diminished insight and mild disinhibition.     Longitudinal comparisons with prior neuropsychological testing 2022 show a mild decline in aspects of memory involving word list learning/recall and in complex figure recall, with the relative preservation of story memory.  There are persisting deficits in aspects of executive functioning involving processing speed.  Complex design copy and semantic fluency modestly improved.   Behavioral features of mild disinhibition were again of note.  Overall, there is a mildly progressive course.     Differential diagnoses remains challenging. This cognitive profile is predominantly a dysexecutive test profile and not densley amnestic given well  "preserved story learning/memory relative to word list memory.  This type of test profile is characteristic of possible vascular etiology affecting frontal/subcortical systems.  He has multiple vascular risks.  There are features that raise question of frontotemporal dementia (FTD) but FDG PET did not indicate frontal involvement, but had a more AD pattern.  The decline in word list learning/recall could be an emerging amnestic feature of emerging AD. However, there have also been significant psychosocial stressors with his fall, injury, and recent move to a new home that may all be impacting cognition.      - Although he reportedly \"passed\" a formal driving evaluation (records not found in EMR), the slowing in processing speed is of concern for driving and the concerns raised by spouse. He is quite defensive and emotional regarding driving and going back to , and has diminished insight, but iliana discussion about safety appears warranted     Plan: This neuropsychological assessment is part of a multidisciplinary evaluation. The results will be communicated to the referring provider via shared electronic medical record and reviewed with the patient by referring MD in context of complete medical history and evaluation.    Billing Note: In addition to time spent directly with the patient conducting neurobehavioral exam and face to face neuropsychological testing by a neuropsychologist, the total time billed for also included time spent for record review, test selection, scoring of tests, interpretation of tests, integration of findings, and report/note preparation. Specific billing codes are as follows:   1 hour unit 13642  neurobehavioral status exam  1 hour unit 73657  neuropsychologist evaluation services (interpretation, integration, report preparation)  1 hour unit 39417  neuropsychologist evaluation services (interpretation, integration, report preparation)   1 30 minute unit session 23429  face to " "face testing by neuropsychologist  4 30 min units 31147  face to face testing and scoring by neuropsychologist    History of Presenting Illness/Relevant Background:   The following summary of history and presenting issue(s) was obtained through review of EMR notes detailing medical and/or neurological evaluation to date, medical history, diagnostic studies and clinical interview with Mr. Garza and spouse.     Interim changes: Mild decline since March 2024 evaluation with Dr. Perrin.  Fell down the stairs July 2024 with resulting cervical cord compression with neurosurgical intervention. He also had runs of paroxysmal atrial tachycardia.  He was hospitalized for 9 days and discharged home with home health care, PT, speech therapy. He has residual swallowing difficulties that are improving.  No head trauma. Another changes was a move from prior home to new home several miles away.  Feels he is adjusting.     Cognitive changes:  Mild decline per spouse since interim March 2024 evaluation with Dr. Perrin in context of above cervical spine injury.  Mr. Garza thinks he is \"fine\" and even better.  Spouse described difficulty coming up with names and words at times, forgetfulness for conversations, difficulty with multi tasking, and sense that his thought processes are slower and not as clear.       Mood/Behavioral changes:  Recent psychosocial stressors of fall/injury, hospitalization, and move to a new home but is adjusting with no major mood changes. He expressed feeling frustrated and irritated by past cognitive evaluations and focus on his memory and thinking.      Functional Changes/Activities of Daily Living:   Resides with spouse in new home at the [facility] where spouse is , recent relocation and seems to be adjusting.  Spouse took over finances due to errors. He is taking medications independently. He had a driving evaluation and reportedly \"passed\", though wife remains concerned about his driving " and is uncomfortable when he is the .  He is on leave from his job in  but wants to return.  He is independent in self care.     Comorbid medical issues:   Medical conditions potentially affecting cognition include DM, HTN, CAD, HLP, SENIA (by history refused tx). Hearing loss with bilateral hearing aids. No history of alcohol or drug abuse.     Relevant Family History:  Mother with psychiatric issues, reportedly starved herself in later life. Maternal grandfather committed suicide.  Paternal grandfather with suspected Alzheimer's disease.    Psychosocial history:  History relevant to cognitive functioning includes normal birth and early development. No early learning problems, attentional difficulties, or behavioral problems in school.  College graduate with [employer]/political science major and completed all the coursework for a Master's degree but did not complete the thesis.  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       Neurological/medical evaluation to date:   Cognitive screening - MOCA=20/30 (3/13/24)    Neuroimaging -  MRI BRAIN WO; MRA HEAD W/O C; MRA NECK WO.C;  11/16/2021   IMPRESSION:  1. No evidence of acute infarct/ischemia, or other acute intracranial  pathology.  2. Similar appearance of tiny focus blooming artifact overlying left  frontal lobe as well as minimal white matter changes most consistent  with chronic microvascular disease in this patient's age range.  3. Opacification right maxillary sinus.    CT HEAD W/O CONTRAST TRAUMA PROTOCOL; CT CERVICAL SPINE WO IV  CONTRAST;  6/30/2024 9:58 pm  INDICATION:  Signs/Symptoms:fall down stairs, burning sensation down bilateral  upper extremities; Signs/Symptoms:Proceed to midline C-spine  tenderness, burning sensation to the bilateral upper  extremities.  FINDINGS:  Head:  BRAIN: No acute intracranial hemorrhage. No mass effect or midline  shift. Gray-white matter interfaces are preserved.Subtle white matter  hypodensities which are nonspecific but likely related to  microangiopathic white matter changes. Remote left basal ganglia  lacunar infarct or dilated perivascular space.    PET May 2023  FINDINGS:  This study demonstrates decreased FDG uptake in bilateral temporal  lobe as well as mildly decreased FDG uptake in bilateral parietal  lobes. No evidence of decreased FDG uptake in the frontal or  occipital lobes.  Impression  This study demonstrates decreased FDG uptake in bilateral temporal  lobes as well as mildly decreased FDG uptake in bilateral parietal  lobes, concerning for early stage of Alzheimer disease, correlate  clinically.      Diagnostic impression to date:   Mild Cognitive Impairment with unclear etiology with r/o AD vs. FTD    Tests/Data Sources: This neuropsychological evaluation consisted of a review of available medical records, interview with the patient,  neurobehavioral examination, and the following standardized neuropsychological tests: Dot Counting Test; Maranda Vocabulary Test; Reading subtest (WRAT3); Digit Span, Similarities subtests of Wechsler Adult Intelligence Scale-4th Edition (WAIS-IV); Word List Learning and Memory Test from the Alzheimers Disease Assessment Scale-Cognitive (ADAS-Cog), CERAD version; Bhakta Figures Constructional Praxis Test (copy and recall), [id number] version; Logical Memory subtests of the Wechsler Memory Scale-4th Edition (WMS-IV); [place]on Naming Test; Category Fluency Test (animals); Controlled Oral Word Association Test (F,A,S); Trailmaking Test (Parts A and B); Stroop Color and Word Test; Key Search Test from the Behavioural Assessment of the Dysexecutive Syndrome (BADS); Figure Copy & Recall, Coding, Line Orientation subtest of the Repeatable Battery for the Assessment of  Neuropsychological Status (RBANS); PHQ9 Patient Health Questionnaire; GAD7 Generalized Anxiety Disorders questionnaire    Cognitive testing was administered and results were used to inform counseling on safety and potential patient risks.  Cognitive testing was administered and interpretation of results included consideration of appropriate and relevant cultural-linguistic and demographic factors.  Cognitive testing was administered and results of assessment informed the determination of diagnosis or further clarified etiological factors of cognitive impairment or complaints.    Behavioral Observations/Neurobehavioral Status Exam:   Well groomed in casual attire.  Alert and oriented. Ambulated independently with unremarkable gait on casual inspection. Conversational speech was fluent, normal rate, prosody, and volume.  Loquacious and tangential. Comprehension of instructions was good. Affect was full, mood was euthymic, mildly disinhibited, joking could be baseline. Cooperative, sociable, fully engaged with testing, good effort, slightly impulsive work habits.  Diminished insight.  Glasses and bilateral hearing aids with adequate auditory and visual perception of test stimuli.  Reported some mild residual numbness in his hands potentially a confound for speeded fine motor tasks so these were not administered. Felt it did not affect his use of a pencil to write and draw or complete speeded tasks involving writing. Valid representation of current level of cognitive functioning.      Testing Interpretation Guidelines: Tests listed in Tests section were interpreted using normative data for age, education, ethnicity, and gender yielding scaled scores (Mean=10+-3), z scores (Mean=0), or T scores (Mean=50+-10).  A z score of  -1.5, a scaled score of less than 7, and/or a T score of less than 35 is suggestive of impairment.   Findings are based on the interpretation of test scores, clinical interview, and behavioral  observations.  The following section provides a summary of normal/abnormal findings by cognitive domain.  A detailed list of test scores is not part of this note.     Test Results by Cognitive Domain     Orientation and mental status:  Normal    Fully oriented to person, time, and place. Able to name the current and past presidents, and discuss current events accurately and in depth on an international, national, and local level.     Premorbid Functioning:  Normal  estimated premorbid intellectual functioning within the above average range based on educational and occupational history, fund of general knowledge, vocabulary test correlated with overall IQ, and college reading level     Language:  Normal  average/low average   Object/confrontation naming was within the average range for age and education with a score of 54/60 (T=48, Mean T=50+/-10).  Semantic/category fluency (i.e., rapidly generating exemplars to the category animals in one minute) was within the low average/average range (15 animals, T=44).  Phonemic/letter fluency (i.e., the ability to rapidly generate words beginning with the letters F,A,S in 3 minutes)  was within the low average/average range (36 FAS words, T=45).  Receptive language was intact as evidenced by the ability to follow test instructions and complete simple two- and three-stage commands.     Verbal/auditory Memory:  Abnormal/Variable  mild impairment to high average   Borderline impaired learning curve in the ability to learn a list of 10 words over 3 learning trials (1,4,5/10) with mildly impaired delayed recall of the list (3/10 words, -1.8SD) with preserved recognition memory for the words. Learning and recall of verbal material presented in a story format, was high average for immediate story recall  (scaled score=14, Mean scaled score=10 +-3) and delayed story recall was within the average/high average range (scaled score=12). Delayed recognition was intact.       Visual Memory:  "Abnormal/Variable  borderline to average  Average immediate recall of 4 geometric shapes drawn several minutes earlier.  Borderline/low average delayed recall of a complex figure (6/20 elements, scaled score=6).     Attention/Executive Functioning:  Abnormal/Variable  moderate impairment to average   On a test of basic attention and working memory requiring the repetition of strings of numbers forwards and backwards, performance was average (Digit Span scaled score=11).  Aspects of executive functioning involving complex attention, mental flexibility, and speeded information processing, were moderately impaired. Specifically, on a relatively simple task of information processing involving the sequential connection of randomly placed numbers (Troy Making Test, Part A), performance was moderately impaired with slowing (T=   31).  On a more difficult task requiring the connection of randomly placed numbers and letters in a 1-A-2-B-3-C…etc. sequence (Trail Making Test, Part B), performance was within the mild/moderately impaired range (T=35).  Ability to maintain the alternating “number/letter” set demands of the task was preserved but he was slow.  On a coding task, involving the speeded matching of numbers/symbols, performance was low average (Coding scaled score=7).  On a test of executive functioning involving divided and directed attention, and filtering out of distractions, without a motor component, performance was moderately impaired for the challenging task of reading incongruent color-words (e.g. the word \"red\" printed in blue ink)  (T=33).  Aspects of frontal/executive functioning involving verbal reasoning and abstraction were average for ability to explain how 2 things were alike or related (Similarities scaled score=11).   On a nonverbal test of reasoning/judgment involving the generation of a search strategy for a lost key in a large field, score was  within the borderline range with a haphazard " inefficient search pattern.    Visuospatial/Visuoconstruction:  Normal     average  Copies of geometric figures of increased difficulty were precise and accurate for a Hughes, mariajose, overlapping rectangles, and cube with an overall score within the average range (10/11 elements, 0SD).  Copy of a complex visual design was average (scaled score=9).  Spatial orientation/judgment was high average on a judgment of line orientation test (18/20, 51-75th percentile)    Assessment of Mood: Normal  Depression screen was negative on a self-report measure (PHQ9=3/27).  Anxiety screen was negative on a self-report measure (GAD7=3/21). No suicidal ideation. Sleep and appetite are stable. Perceives adequate social support in network of family and friends.

## 2024-09-05 NOTE — LETTER
2024     Meme Perrin MD  [id number] [address]  Department Of Psychiatry-Replaced by [facility] [id number]    Patient: Ritesh Garza   YOB: 1950   Date of Visit: 2024       Dear Dr. Meme Perrin MD:    Thank you for referring Ritesh Garza to me for evaluation. Below are my notes for this consultation.  If you have questions, please do not hesitate to call me. I look forward to following your patient along with you.       Sincerely,     Alyse Holland, PhD      CC: No Recipients  ______________________________________________________________________________________    Neuropsychology Evaluation    Name: Ritesh Garza  : 1950  MRN: [id number]  Referring Provider:   Meme Perrin MD/[facility]  Date of Service: 24     Reason for Referral: Mr. Garza is a 73 year old, right handed,  gentleman who presented for neuropsychological testing as part of ongoing medical/neurological evaluation of cognitive changes, for input on differential diagnosis and longitudinal comparison with prior testing in .  This evaluation is intended for clinical purposes only and is NOT intended for legal/forensic or disability purposes. Verbal consent for neuropsychological services was obtained.     Summary/Impression:   Results of neuropsychological testing revealed an abnormal cognitive profile with areas of impairment in aspects of executive functioning involving speeded and complex information and in aspects of memory involving word list learning/recall (with preserved story memory and visual memory).  Language was well preserved for naming and average/low average for verbal fluency (both semantic and phonemic).  Visuospatial abilities were relatively well preserved.  Assessment of mood was not suggestive of a major affective disorder.  There was note of mildly diminished insight and mild disinhibition.     Longitudinal comparisons with prior  "neuropsychological testing 12/13/2022 show a mild decline in aspects of memory involving word list learning/recall and in complex figure recall, with the relative preservation of story memory.  There are persisting deficits in aspects of executive functioning involving processing speed.  Complex design copy and semantic fluency modestly improved.   Behavioral features of mild disinhibition were again of note.  Overall, there is a mildly progressive course.     Differential diagnoses remains challenging. This cognitive profile is predominantly a dysexecutive test profile and not densley amnestic given well preserved story learning/memory relative to word list memory.  This type of test profile is characteristic of possible vascular etiology affecting frontal/subcortical systems.  He has multiple vascular risks.  There are features that raise question of frontotemporal dementia (FTD) but FDG PET did not indicate frontal involvement, but had a more AD pattern.  The decline in word list learning/recall could be an emerging amnestic feature of emerging AD. However, there have also been significant psychosocial stressors with his fall, injury, and recent move to a new home that may all be impacting cognition.      - Although he reportedly \"passed\" a formal driving evaluation (records not found in EMR), the slowing in processing speed is of concern for driving and the concerns raised by spouse. He is quite defensive and emotional regarding driving and going back to , and has diminished insight, but iliana discussion about safety appears warranted     Plan: This neuropsychological assessment is part of a multidisciplinary evaluation. The results will be communicated to the referring provider via shared electronic medical record and reviewed with the patient by referring MD in context of complete medical history and evaluation.    Billing Note: In addition to time spent directly with the patient conducting " "neurobehavioral exam and face to face neuropsychological testing by a neuropsychologist, the total time billed for also included time spent for record review, test selection, scoring of tests, interpretation of tests, integration of findings, and report/note preparation. Specific billing codes are as follows:   1 hour unit 34908  neurobehavioral status exam  1 hour unit 93123  neuropsychologist evaluation services (interpretation, integration, report preparation)  1 hour unit [id number]  neuropsychologist evaluation services (interpretation, integration, report preparation)   1 30 minute unit session [id number]  face to face testing by neuropsychologist  4 30 min units [id number]  face to face testing and scoring by neuropsychologist    History of Presenting Illness/Relevant Background:   The following summary of history and presenting issue(s) was obtained through review of EMR notes detailing medical and/or neurological evaluation to date, medical history, diagnostic studies and clinical interview with Mr. Garza and spouse.     Interim changes: Mild decline since March 2024 evaluation with Dr. Perrin.  Fell down the stairs July 2024 with resulting cervical cord compression with neurosurgical intervention. He also had runs of paroxysmal atrial tachycardia.  He was hospitalized for 9 days and discharged home with home health care, PT, speech therapy. He has residual swallowing difficulties that are improving.  No head trauma. Another changes was a move from prior home to new home several miles away.  Feels he is adjusting.     Cognitive changes:  Mild decline per spouse since interim March 2024 evaluation with Dr. Perrin in context of above cervical spine injury.  Mr. Garza thinks he is \"fine\" and even better.  Spouse described difficulty coming up with names and words at times, forgetfulness for conversations, difficulty with multi tasking, and sense that his thought processes are slower and not as clear.   " "    Mood/Behavioral changes:  Recent psychosocial stressors of fall/injury, hospitalization, and move to a new home but is adjusting with no major mood changes. He expressed feeling frustrated and irritated by past cognitive evaluations and focus on his memory and thinking.      Functional Changes/Activities of Daily Living:   Resides with spouse in new home at the [facility] where spouse is , recent relocation and seems to be adjusting.  Spouse took over finances due to errors. He is taking medications independently. He had a driving evaluation and reportedly \"passed\", though wife remains concerned about his driving and is uncomfortable when he is the .  He is on leave from his job in  but wants to return.  He is independent in self care.     Comorbid medical issues:   Medical conditions potentially affecting cognition include DM, HTN, CAD, HLP, SENIA (by history refused tx). Hearing loss with bilateral hearing aids. No history of alcohol or drug abuse.     Relevant Family History:  Mother with psychiatric issues, reportedly starved herself in later life. Maternal grandfather committed suicide.  Paternal grandfather with suspected Alzheimer's disease.    Psychosocial history:  History relevant to cognitive functioning includes normal birth and early development. No early learning problems, attentional difficulties, or behavioral problems in school.  College graduate with communications/political science major and completed all the coursework for a Master's degree but did not complete the thesis.  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       Neurological/medical evaluation to date:   Cognitive screening - MOCA=20/30 (3/13/24)    Neuroimaging -  MRI BRAIN WO; MRA HEAD W/O C; MRA NECK WO.C;  11/16/2021   IMPRESSION:  1. No " evidence of acute infarct/ischemia, or other acute intracranial  pathology.  2. Similar appearance of tiny focus blooming artifact overlying left  frontal lobe as well as minimal white matter changes most consistent  with chronic microvascular disease in this patient's age range.  3. Opacification right maxillary sinus.    CT HEAD W/O CONTRAST TRAUMA PROTOCOL; CT CERVICAL SPINE WO IV  CONTRAST;  6/30/2024 9:58 pm  INDICATION:  Signs/Symptoms:fall down stairs, burning sensation down bilateral  upper extremities; Signs/Symptoms:Proceed to midline C-spine  tenderness, burning sensation to the bilateral upper extremities.  FINDINGS:  Head:  BRAIN: No acute intracranial hemorrhage. No mass effect or midline  shift. Gray-white matter interfaces are preserved.Subtle white matter  hypodensities which are nonspecific but likely related to  microangiopathic white matter changes. Remote left basal ganglia  lacunar infarct or dilated perivascular space.    PET May 2023  FINDINGS:  This study demonstrates decreased FDG uptake in bilateral temporal  lobe as well as mildly decreased FDG uptake in bilateral parietal  lobes. No evidence of decreased FDG uptake in the frontal or  occipital lobes.  Impression  This study demonstrates decreased FDG uptake in bilateral temporal  lobes as well as mildly decreased FDG uptake in bilateral parietal  lobes, concerning for early stage of Alzheimer disease, correlate  clinically.      Diagnostic impression to date:   Mild Cognitive Impairment with unclear etiology with r/o AD vs. FTD    Tests/Data Sources: This neuropsychological evaluation consisted of a review of available medical records, interview with the patient,  neurobehavioral examination, and the following standardized neuropsychological tests: Dot Counting Test; Maranda Vocabulary Test; Reading subtest (WRAT3); Digit Span, Similarities subtests of Wechsler Adult Intelligence Scale-4th Edition (WAIS-IV); Word List Learning and Memory  Test from the Alzheimers Disease Assessment Scale-Cognitive (ADAS-Cog), CERAD version; Bhakta Figures Constructional Praxis Test (copy and recall), [id number] version; Logical Memory subtests of the Wechsler Memory Scale-4th Edition (WMS-IV); [place]ock Naming Test; Category Fluency Test (animals); Controlled Oral Word Association Test (F,A,S); Trailmaking Test (Parts A and B); Stroop Color and Word Test; Key Search Test from the Behavioural Assessment of the Dysexecutive Syndrome (BADS); Figure Copy & Recall, Coding, Line Orientation subtest of the Repeatable Battery for the Assessment of Neuropsychological Status (RBANS); PHQ9 Patient Health Questionnaire; GAD7 Generalized Anxiety Disorders questionnaire    Cognitive testing was administered and results were used to inform counseling on safety and potential patient risks.  Cognitive testing was administered and interpretation of results included consideration of appropriate and relevant cultural-linguistic and demographic factors.  Cognitive testing was administered and results of assessment informed the determination of diagnosis or further clarified etiological factors of cognitive impairment or complaints.    Behavioral Observations/Neurobehavioral Status Exam:   Well groomed in casual attire.  Alert and oriented. Ambulated independently with unremarkable gait on casual inspection. Conversational speech was fluent, normal rate, prosody, and volume.  Loquacious and tangential. Comprehension of instructions was good. Affect was full, mood was euthymic, mildly disinhibited, joking could be baseline. Cooperative, sociable, fully engaged with testing, good effort, slightly impulsive work habits.  Diminished insight.  Glasses and bilateral hearing aids with adequate auditory and visual perception of test stimuli.  Reported some mild residual numbness in his hands potentially a confound for speeded fine motor tasks so these were not administered. Felt it did not affect  his use of a pencil to write and draw or complete speeded tasks involving writing. Valid representation of current level of cognitive functioning.      Testing Interpretation Guidelines: Tests listed in Tests section were interpreted using normative data for age, education, ethnicity, and gender yielding scaled scores (Mean=10+-3), z scores (Mean=0), or T scores (Mean=50+-10).  A z score of  -1.5, a scaled score of less than 7, and/or a T score of less than 35 is suggestive of impairment.   Findings are based on the interpretation of test scores, clinical interview, and behavioral observations.  The following section provides a summary of normal/abnormal findings by cognitive domain.  A detailed list of test scores is not part of this note.     Test Results by Cognitive Domain     Orientation and mental status:  Normal    Fully oriented to person, time, and place. Able to name the current and past presidents, and discuss current events accurately and in depth on an international, national, and local level.     Premorbid Functioning:  Normal  estimated premorbid intellectual functioning within the above average range based on educational and occupational history, fund of general knowledge, vocabulary test correlated with overall IQ, and college reading level     Language:  Normal  average/low average   Object/confrontation naming was within the average range for age and education with a score of 54/60 (T=48, Mean T=50+/-10).  Semantic/category fluency (i.e., rapidly generating exemplars to the category animals in one minute) was within the low average/average range (15 animals, T=44).  Phonemic/letter fluency (i.e., the ability to rapidly generate words beginning with the letters F,A,S in 3 minutes)  was within the low average/average range (36 FAS words, T=45).  Receptive language was intact as evidenced by the ability to follow test instructions and complete simple two- and three-stage commands.     Verbal/auditory  Memory:  Abnormal/Variable  mild impairment to high average   Borderline impaired learning curve in the ability to learn a list of 10 words over 3 learning trials (1,4,5/10) with mildly impaired delayed recall of the list (3/10 words, -1.8SD) with preserved recognition memory for the words. Learning and recall of verbal material presented in a story format, was high average for immediate story recall  (scaled score=14, Mean scaled score=10 +-3) and delayed story recall was within the average/high average range (scaled score=12). Delayed recognition was intact.       Visual Memory: Abnormal/Variable  borderline to average  Average immediate recall of 4 geometric shapes drawn several minutes earlier.  Borderline/low average delayed recall of a complex figure (6/20 elements, scaled score=6).     Attention/Executive Functioning:  Abnormal/Variable  moderate impairment to average   On a test of basic attention and working memory requiring the repetition of strings of numbers forwards and backwards, performance was average (Digit Span scaled score=11).  Aspects of executive functioning involving complex attention, mental flexibility, and speeded information processing, were moderately impaired. Specifically, on a relatively simple task of information processing involving the sequential connection of randomly placed numbers (Amagon Making Test, Part A), performance was moderately impaired with slowing (T=   31).  On a more difficult task requiring the connection of randomly placed numbers and letters in a 1-A-2-B-3-C…etc. sequence (Trail Making Test, Part B), performance was within the mild/moderately impaired range (T=35).  Ability to maintain the alternating “number/letter” set demands of the task was preserved but he was slow.  On a coding task, involving the speeded matching of numbers/symbols, performance was low average (Coding scaled score=7).  On a test of executive functioning involving divided and directed  "attention, and filtering out of distractions, without a motor component, performance was moderately impaired for the challenging task of reading incongruent color-words (e.g. the word \"red\" printed in blue ink)  (T=33).  Aspects of frontal/executive functioning involving verbal reasoning and abstraction were average for ability to explain how 2 things were alike or related (Similarities scaled score=11).   On a nonverbal test of reasoning/judgment involving the generation of a search strategy for a lost key in a large field, score was  within the borderline range with a haphazard inefficient search pattern.    Visuospatial/Visuoconstruction:  Normal     average  Copies of geometric figures of increased difficulty were precise and accurate for a Jamul, mariajose, overlapping rectangles, and cube with an overall score within the average range (10/11 elements, 0SD).  Copy of a complex visual design was average (scaled score=9).  Spatial orientation/judgment was high average on a judgment of line orientation test (18/20, 51-75th percentile)    Assessment of Mood: Normal  Depression screen was negative on a self-report measure (PHQ9=3/27).  Anxiety screen was negative on a self-report measure (GAD7=3/21). No suicidal ideation. Sleep and appetite are stable. Perceives adequate social support in network of family and friends.          "

## 2024-09-06 RX ORDER — TADALAFIL 20 MG/1
20 TABLET ORAL AS NEEDED
Qty: 30 TABLET | Refills: 3 | Status: SHIPPED | OUTPATIENT
Start: 2024-09-06

## 2024-09-13 DIAGNOSIS — N52.9 ERECTILE DYSFUNCTION, UNSPECIFIED ERECTILE DYSFUNCTION TYPE: ICD-10-CM

## 2024-09-13 DIAGNOSIS — R39.9 LOWER URINARY TRACT SYMPTOMS (LUTS): ICD-10-CM

## 2024-09-13 DIAGNOSIS — N52.8 OTHER MALE ERECTILE DYSFUNCTION: ICD-10-CM

## 2024-09-16 RX ORDER — TADALAFIL 5 MG/1
5 TABLET ORAL DAILY
Qty: 30 TABLET | Refills: 3 | Status: SHIPPED | OUTPATIENT
Start: 2024-09-16

## 2024-09-17 ENCOUNTER — LAB (OUTPATIENT)
Dept: LAB | Facility: LAB | Age: 74
End: 2024-09-17
Payer: MEDICARE

## 2024-09-17 DIAGNOSIS — I50.23 ACUTE ON CHRONIC SYSTOLIC HEART FAILURE: ICD-10-CM

## 2024-09-17 DIAGNOSIS — I50.82 CONGESTIVE HEART FAILURE WITH RIGHT VENTRICULAR SYSTOLIC DYSFUNCTION: ICD-10-CM

## 2024-09-17 DIAGNOSIS — I50.20 CONGESTIVE HEART FAILURE WITH RIGHT VENTRICULAR SYSTOLIC DYSFUNCTION: ICD-10-CM

## 2024-09-17 DIAGNOSIS — M10.9 GOUT, UNSPECIFIED CAUSE, UNSPECIFIED CHRONICITY, UNSPECIFIED SITE: ICD-10-CM

## 2024-09-17 LAB
ALBUMIN SERPL BCP-MCNC: 3.9 G/DL (ref 3.4–5)
ANION GAP SERPL CALC-SCNC: 13 MMOL/L (ref 10–20)
BNP SERPL-MCNC: 1712 PG/ML (ref 0–99)
BUN SERPL-MCNC: 30 MG/DL (ref 6–23)
CALCIUM SERPL-MCNC: 8 MG/DL (ref 8.6–10.3)
CHLORIDE SERPL-SCNC: 106 MMOL/L (ref 98–107)
CO2 SERPL-SCNC: 25 MMOL/L (ref 21–32)
CREAT SERPL-MCNC: 1.82 MG/DL (ref 0.5–1.3)
EGFRCR SERPLBLD CKD-EPI 2021: 38 ML/MIN/1.73M*2
GLUCOSE SERPL-MCNC: 157 MG/DL (ref 74–99)
PHOSPHATE SERPL-MCNC: 3.8 MG/DL (ref 2.5–4.9)
POTASSIUM SERPL-SCNC: 4.7 MMOL/L (ref 3.5–5.3)
SODIUM SERPL-SCNC: 139 MMOL/L (ref 136–145)
URATE SERPL-MCNC: 4.5 MG/DL (ref 4–7.5)

## 2024-09-17 PROCEDURE — 83880 ASSAY OF NATRIURETIC PEPTIDE: CPT

## 2024-09-17 PROCEDURE — 84550 ASSAY OF BLOOD/URIC ACID: CPT

## 2024-09-17 PROCEDURE — 36415 COLL VENOUS BLD VENIPUNCTURE: CPT

## 2024-09-17 PROCEDURE — 80069 RENAL FUNCTION PANEL: CPT

## 2024-09-18 ENCOUNTER — OFFICE VISIT (OUTPATIENT)
Dept: BEHAVIORAL HEALTH | Facility: CLINIC | Age: 74
End: 2024-09-18
Payer: MEDICARE

## 2024-09-18 VITALS
TEMPERATURE: 97.6 F | WEIGHT: 142.5 LBS | SYSTOLIC BLOOD PRESSURE: 154 MMHG | HEART RATE: 61 BPM | RESPIRATION RATE: 16 BRPM | BODY MASS INDEX: 25.24 KG/M2 | DIASTOLIC BLOOD PRESSURE: 61 MMHG

## 2024-09-18 DIAGNOSIS — R45.86 MOOD AND AFFECT DISTURBANCE: ICD-10-CM

## 2024-09-18 DIAGNOSIS — G31.84 MILD COGNITIVE IMPAIRMENT: ICD-10-CM

## 2024-09-18 DIAGNOSIS — R41.844 EXECUTIVE FUNCTION DEFICIT: ICD-10-CM

## 2024-09-18 PROCEDURE — 3078F DIAST BP <80 MM HG: CPT | Performed by: PSYCHIATRY & NEUROLOGY

## 2024-09-18 PROCEDURE — 3060F POS MICROALBUMINURIA REV: CPT | Performed by: PSYCHIATRY & NEUROLOGY

## 2024-09-18 PROCEDURE — 1126F AMNT PAIN NOTED NONE PRSNT: CPT | Performed by: PSYCHIATRY & NEUROLOGY

## 2024-09-18 PROCEDURE — 3048F LDL-C <100 MG/DL: CPT | Performed by: PSYCHIATRY & NEUROLOGY

## 2024-09-18 PROCEDURE — 99213 OFFICE O/P EST LOW 20 MIN: CPT | Performed by: PSYCHIATRY & NEUROLOGY

## 2024-09-18 PROCEDURE — 3077F SYST BP >= 140 MM HG: CPT | Performed by: PSYCHIATRY & NEUROLOGY

## 2024-09-18 PROCEDURE — 1157F ADVNC CARE PLAN IN RCRD: CPT | Performed by: PSYCHIATRY & NEUROLOGY

## 2024-09-18 PROCEDURE — 99213 OFFICE O/P EST LOW 20 MIN: CPT | Mod: AM | Performed by: PSYCHIATRY & NEUROLOGY

## 2024-09-18 PROCEDURE — 3044F HG A1C LEVEL LT 7.0%: CPT | Performed by: PSYCHIATRY & NEUROLOGY

## 2024-09-18 PROCEDURE — 1160F RVW MEDS BY RX/DR IN RCRD: CPT | Performed by: PSYCHIATRY & NEUROLOGY

## 2024-09-18 PROCEDURE — 1159F MED LIST DOCD IN RCRD: CPT | Performed by: PSYCHIATRY & NEUROLOGY

## 2024-09-18 ASSESSMENT — ENCOUNTER SYMPTOMS
OCCASIONAL FEELINGS OF UNSTEADINESS: 0
LOSS OF SENSATION IN FEET: 0

## 2024-09-18 ASSESSMENT — PAIN SCALES - GENERAL: PAINLEVEL: 0-NO PAIN

## 2024-09-18 NOTE — PROGRESS NOTES
"[place], [place]      [facility] Follow up visit:     Mr. Ritesh Garza  is a 73 y.o. -year-old with  master's level  education and history of cognitive impairment, DM, HTN, CAD, CHF, hyperlipidemia, BPH. Seen in clinic today for follow up.     Wife Rosa Maria is healthcare POA.     Subjective:   He says he is \"better than how I was few months ago.\"     He says he fell down stairs in July and was hospitalized; he did in-home rehab and recently released.   He went back to work today. He says he did well.     He says his memory is okay. He says he is not as quick to recall things as he used to be.   Wife notes he has trouble multi-tasking.   She notes that overall he seems better - they moved to a new place and she thinks the decreased stress has helped him; he had some conflict at the previous place they lived - was accused of inappropriate behavior but she notes it was unfounded.   They deny any new episodes of behavioral disinhibition or misjudgment.     Mood is \"Okay\".   He says he may get frustrated.   Fair appetite and sleep.  No death wishes or suicidal thoughts, intent or plans.     Functional changes:   Current living situation - They moved to a ranch style house.   Driving - driving without problems. Did OT evaluation and reportedly cleared.    Finances - wife manages finances.   Cooking - cooking without problems.   ADLS - independent.   Medications -     Psychiatric Review Of Systems:   As above.       Medical Review Of Systems:    Pertinent items are noted in HPI.      PMH/PSH:  Past Medical History:   Diagnosis Date    Allergic     Asthma ([facility])     Coronary artery disease Long Ago    Diabetes mellitus (Multi) Over 20 years ago    Drug-induced gout, unspecified site 10/04/2019    Acute drug-induced gout    Encounter for screening for malignant neoplasm of colon 10/31/2022    Colon cancer screening    Gout, unspecified     Acute gout    Heart disease Over 20 " years sgo    Heart murmur Long ago    Hypertension Over 20 years ago    Myocardial infarction (Multi) Over 20 yesrs ago    Ocular pain, right eye 10/14/2018    Pain of right eye    Periorbital cellulitis 10/14/2018    Periorbital cellulitis    Personal history of colonic polyps 10/11/2017    History of colon polyps    Personal history of other diseases of the circulatory system 05/12/2021    History of angina pectoris    Personal history of other diseases of the circulatory system 05/12/2021    History of angina pectoris    Personal history of other diseases of the respiratory system 10/04/2019    History of acute bronchitis    Personal history of other drug therapy     History of influenza vaccination    Type 2 diabetes mellitus (Multi)     Unspecified cirrhosis of liver (Multi) 11/07/2022    Cirrhosis, nonalcoholic        Meds  Current Outpatient Medications on File Prior to Visit   Medication Sig Dispense Refill    albuterol (ProAir HFA) 90 mcg/actuation inhaler Inhale 2 puffs every 6 hours if needed for wheezing or shortness of breath. 18 g 11    allopurinol (Zyloprim) 100 mg tablet Take 1 tablet (100 mg) by mouth once daily.      amLODIPine (Norvasc) 10 mg tablet Take by mouth once daily.      aspirin 81 mg EC tablet TAKE 1 TABLET BY MOUTH EVERY DAY 90 tablet 3    atorvastatin (Lipitor) 80 mg tablet TAKE 1 TABLET BY MOUTH EVERY DAY 90 tablet 3    b complex vitamins capsule Take 1 capsule by mouth once daily.      clopidogrel (Plavix) 75 mg tablet Take 1 tablet (75 mg) by mouth once daily. 90 tablet 3    coenzyme Q-10 (CoQ-10) 100 mg capsule Take 1 capsule (100 mg) by mouth once daily.      cyanocobalamin (Vitamin B-12) 1,000 mcg tablet Take 1 tablet (1,000 mcg) by mouth once daily. As directed      dapagliflozin (Farxiga) 10 mg Take 1 tablet (10 mg) by mouth once daily in the evening.  Take before meals.      famotidine (Pepcid) 20 mg tablet TAKE 1 TABLET (20 MG) BY MOUTH ONCE DAILY AS NEEDED FOR INDIGESTION.  "90 tablet 1    furosemide (Lasix) 40 mg tablet Take 1 tablet (40 mg) by mouth once daily. As needed for weight gain greater than 3 pounds 90 tablet 3    gabapentin (Neurontin) 100 mg capsule Take 1 capsule (100 mg) by mouth once daily at bedtime. 90 capsule 1    icosapent ethyL (Vascepa) 0.5 gram capsule Take 2 capsules (1 g) by mouth 2 times a day with meals. 120 capsule 11    loratadine (Claritin) 10 mg tablet Take 1 tablet (10 mg) by mouth 2 times a day. Take 1 tablet every morning and 1 tablet every evening      magnesium gluconate (Magonate) 27.5 mg magne- sium (500 mg) tablet Take 1 tablet (27.5 mg) by mouth once daily.      metoprolol succinate XL (Toprol-XL) 50 mg 24 hr tablet Take 1 tablet (50 mg) by mouth once daily. 90 tablet 1    multivitamin tablet Take 1 tablet by mouth once daily.      sacubitriL-valsartan (Entresto) 24-26 mg tablet Take 1 tablet by mouth 2 times a day. 60 tablet 11    sertraline (Zoloft) 50 mg tablet TAKE ONE TABLET DAILY IN THE MORNING AFTER BREAKFAST. 90 tablet 1    tadalafil (Cialis) 20 mg tablet Take 1 tablet (20 mg) by mouth if needed for erectile dysfunction (Start with half tab. Take 2 hours prior to wanting erection.If you get an erection over 4 hours, go to the emergency room.). 30 tablet 3    tadalafil (Cialis) 5 mg tablet Take 1 tablet (5 mg) by mouth once daily. 30 tablet 3     No current facility-administered medications on file prior to visit.        Allergies:   No Known Allergies      Mental Status Examination:  General appearance: Hygiene adequate; grooming appropriate  Attitude: cooperative. Joking at times.   Motor: no psychomotor agitation or retardation, no tremor or other abnormal movements  Speech: regular rate, tone and volume; unpressured.   Mood: \"better.\"  Affect: full range; close to euthymic.   Passive death wish: Denies.   Suicidal ideation: denies  Thought process: mostly goal-directed.   Thought content: Hallucinations - No; Delusions - No; Paranoia - " "No.    Insight: fair  Judgment: fair  Fund of knowledge: Good  Recent and remote memory: fair recall of recent and remote autobiogrpahical information.   Attention span and concentration: intact  Language: No aphasia; No word finding difficulties; No paraphasic errors.    Assessment/Plan   Assessment:   Mr. Ritesh Garza  is a 73 y.o. -year-old with master's level education and history of cognitive impairment, DM, HTN, CAD, hyperlipidemia, BPH, who is presenting today for follow up visit.     Initial impression:   23 - He has a history of insidious onset of cognitive changes. He also had significant behavioral changes last year when he contacted women online and ended getting scammed. Since then, these behaviors seemed to have resolved although wife still notes increased irritability and disinhibition compared to before, as well as cognitive difficulties.     Neuropsychological testin/5/24 (Dr. Holland): \"an abnormal cognitive profile with areas of impairment in aspects of executive functioning involving speeded and complex information and in aspects of memory involving word list learning/recall (with preserved story memory and visual memory).  Language was well preserved for naming and average/low average for verbal fluency (both semantic and phonemic).  Visuospatial abilities were relatively well preserved.  Assessment of mood was not suggestive of a major affective disorder.  There was note of mildly diminished insight and mild disinhibition.      Longitudinal comparisons with prior neuropsychological testing 2022 show a mild decline in aspects of memory involving word list learning/recall and in complex figure recall, with the relative preservation of story memory.  There are persisting deficits in aspects of executive functioning involving processing speed.  Complex design copy and semantic fluency modestly improved.   Behavioral features of mild disinhibition were again of note.  Overall, " "there is a mildly progressive course.\"     Labs:   Aug '22 - normal TSH, folate; slightly high B12; nonreactive syphilis.     MRI brain, MRA head and neck:   Nov '21 - mild small vessel ischemic changes, generalized volume loss; one blooming artifact in left frontal lobe region (Similar to June 2021).     MRI brain:   5/12/23 - mild small vessel ischemic changes; generalized volume loss; area of increased susceptibility in medulla and punctate focus in left frontal lobe similar to prior.     FDG-PET/CT: subtle FDG uptake in decrease in biparietal and bitemporal regions.     MoCA:   3/13/24 - 20/30 4/26/23 - 21/30 (visuospatial/executive 3/5; delayed recall 0/5 with +1 after category cues and another +4 after multiple choice cues).   12/13/23 - 19/30 (2/5 visuospatial/executive; 0/5 delayed recall, MIS 5/15).    9/18/24 - Mood seems stable and no significant behavioral issues recently.   No new driving issues; reportedly passed OT driving evaluation.   Had recent hospitalization for injury related to fall.     Diagnosis:   Mild cognitive impairment - underlying etiology is unclear. PET scan is not suggestive of FTD. Recent neuropsychological testing showed some decline in certain aspects of memory but not clearly amnestic.      Treatment Plan/Recommendations:       - Discussed ways to manage executive function deficit - he is open to trying cognitive rehab.      - Continue to monitor for any change in cognition or behaviors.       - Advised to contact me in case of any other new or worsening cognitive, mood or behavioral symptoms.       - Will request OT evaluation report. Discussed driving safety.       - Continue sertraline 50mg daily.       - Continue using hearing aids consistently.       - Brain-healthy lifestyle.       - Follow up in about 6 months with repeat MoCA.      Meme Perrin MD.    "

## 2024-09-18 NOTE — PATIENT INSTRUCTIONS
Plan:   - Recommend cognitive rehab. You can contact Dr. Margie Locke at [phone] to schedule with her or one of her associates.   - We will request the results of your occupational therapy test done earlier this year in [place].   - Continue sertraline 50mg once daily.   - Use hearing aids consistently.   - Follow up in 6 months with repeat memory test.   - Contact sooner if needed.     Brain-healthy lifestyle:   - Make sure your medical conditions (if any) such as diabetes, high blood pressure, high cholesterol, thyroid disease sleep apnea are optimally controlled.   - Use eyeglasses or hearing aids appropriately if needed.   - Eat a heart healthy diet (like a Mediterranean diet; lots of fruits and vegetables, fish; low fat;)  - Exercise regularly as tolerated.   - Maintain good sleep hygiene; avoid daytime naps; try to get 7 to 8 hours of continuous sleep at night.   - Stay mentally active - puzzles, word searches, books, playing cards.  - Stay socially active and engaged.

## 2024-09-22 DIAGNOSIS — Z00.00 ENCOUNTER FOR PREVENTATIVE ADULT HEALTH CARE EXAMINATION: ICD-10-CM

## 2024-09-22 DIAGNOSIS — E83.51 HYPOCALCEMIA: Primary | ICD-10-CM

## 2024-09-24 ENCOUNTER — LAB (OUTPATIENT)
Dept: LAB | Facility: LAB | Age: 74
End: 2024-09-24
Payer: MEDICARE

## 2024-09-24 DIAGNOSIS — E83.51 HYPOCALCEMIA: ICD-10-CM

## 2024-09-24 LAB
ALBUMIN SERPL BCP-MCNC: 3.9 G/DL (ref 3.4–5)
ALP SERPL-CCNC: 132 U/L (ref 33–136)
ALT SERPL W P-5'-P-CCNC: 34 U/L (ref 10–52)
ANION GAP SERPL CALC-SCNC: 13 MMOL/L (ref 10–20)
AST SERPL W P-5'-P-CCNC: 33 U/L (ref 9–39)
BILIRUB SERPL-MCNC: 0.4 MG/DL (ref 0–1.2)
BUN SERPL-MCNC: 30 MG/DL (ref 6–23)
CALCIUM SERPL-MCNC: 8 MG/DL (ref 8.6–10.3)
CHLORIDE SERPL-SCNC: 105 MMOL/L (ref 98–107)
CO2 SERPL-SCNC: 26 MMOL/L (ref 21–32)
CREAT SERPL-MCNC: 1.79 MG/DL (ref 0.5–1.3)
EGFRCR SERPLBLD CKD-EPI 2021: 39 ML/MIN/1.73M*2
GLUCOSE SERPL-MCNC: 142 MG/DL (ref 74–99)
PHOSPHATE SERPL-MCNC: 3.7 MG/DL (ref 2.5–4.9)
POTASSIUM SERPL-SCNC: 4.8 MMOL/L (ref 3.5–5.3)
PROT SERPL-MCNC: 6.5 G/DL (ref 6.4–8.2)
PTH-INTACT SERPL-MCNC: 119.4 PG/ML (ref 18.5–88)
SODIUM SERPL-SCNC: 139 MMOL/L (ref 136–145)

## 2024-09-24 PROCEDURE — 36415 COLL VENOUS BLD VENIPUNCTURE: CPT

## 2024-09-24 PROCEDURE — 83970 ASSAY OF PARATHORMONE: CPT

## 2024-09-24 PROCEDURE — 82306 VITAMIN D 25 HYDROXY: CPT

## 2024-09-24 PROCEDURE — 84100 ASSAY OF PHOSPHORUS: CPT

## 2024-09-24 PROCEDURE — 80053 COMPREHEN METABOLIC PANEL: CPT

## 2024-09-26 DIAGNOSIS — E83.51 HYPOCALCEMIA: ICD-10-CM

## 2024-09-26 DIAGNOSIS — Z00.00 ENCOUNTER FOR PREVENTATIVE ADULT HEALTH CARE EXAMINATION: Primary | ICD-10-CM

## 2024-09-26 LAB — 25(OH)D3 SERPL-MCNC: 48 NG/ML (ref 30–100)

## 2024-09-26 RX ORDER — TADALAFIL 5 MG/1
5 TABLET ORAL DAILY
Qty: 90 TABLET | Refills: 0 | Status: SHIPPED | OUTPATIENT
Start: 2024-09-26

## 2024-09-27 ENCOUNTER — TELEPHONE (OUTPATIENT)
Dept: PSYCHOLOGY | Facility: CLINIC | Age: 74
End: 2024-09-27
Payer: MEDICARE

## 2024-09-27 DIAGNOSIS — E83.51 HYPOCALCEMIA: Primary | ICD-10-CM

## 2024-09-27 RX ORDER — CALCIUM CITRATE/VITAMIN D3 315MG-6.25
2 TABLET ORAL 2 TIMES DAILY
Qty: 28 TABLET | Refills: 0 | Status: SHIPPED | OUTPATIENT
Start: 2024-09-27 | End: 2024-10-04

## 2024-10-02 ENCOUNTER — APPOINTMENT (OUTPATIENT)
Dept: CARDIOLOGY | Facility: HOSPITAL | Age: 74
End: 2024-10-02
Payer: MEDICARE

## 2024-10-07 ENCOUNTER — LAB (OUTPATIENT)
Dept: LAB | Facility: LAB | Age: 74
End: 2024-10-07
Payer: MEDICARE

## 2024-10-07 DIAGNOSIS — E83.51 HYPOCALCEMIA: ICD-10-CM

## 2024-10-07 PROCEDURE — 82330 ASSAY OF CALCIUM: CPT

## 2024-10-07 PROCEDURE — 36415 COLL VENOUS BLD VENIPUNCTURE: CPT

## 2024-10-08 ENCOUNTER — TELEPHONE (OUTPATIENT)
Dept: PSYCHOLOGY | Facility: CLINIC | Age: 74
End: 2024-10-08
Payer: MEDICARE

## 2024-10-08 LAB — CA-I BLD-SCNC: 1.19 MMOL/L (ref 1.1–1.33)

## 2024-10-08 NOTE — PROGRESS NOTES
The patient was contacted regarding his interest in neuropsychological rehabilitation. He continues to be interested, but he did not have his spouse's schedule. He indicated to call his spouse. This was completed, and a voicemail was left. The patient called back, and we scheduled the appointment on 11/14 at 900AM.

## 2024-10-10 ENCOUNTER — APPOINTMENT (OUTPATIENT)
Dept: CARDIOLOGY | Facility: CLINIC | Age: 74
End: 2024-10-10
Payer: MEDICARE

## 2024-10-10 ENCOUNTER — HOSPITAL ENCOUNTER (OUTPATIENT)
Dept: CARDIOLOGY | Facility: HOSPITAL | Age: 74
Discharge: HOME | End: 2024-10-10
Payer: MEDICARE

## 2024-10-10 VITALS
HEIGHT: 63 IN | DIASTOLIC BLOOD PRESSURE: 63 MMHG | OXYGEN SATURATION: 92 % | BODY MASS INDEX: 26.48 KG/M2 | SYSTOLIC BLOOD PRESSURE: 146 MMHG | HEART RATE: 66 BPM | WEIGHT: 149.44 LBS

## 2024-10-10 DIAGNOSIS — Z95.810 PRESENCE OF AUTOMATIC (IMPLANTABLE) CARDIAC DEFIBRILLATOR: Primary | ICD-10-CM

## 2024-10-10 DIAGNOSIS — I47.29 OTHER VENTRICULAR TACHYCARDIA: ICD-10-CM

## 2024-10-10 DIAGNOSIS — I50.20 CONGESTIVE HEART FAILURE WITH RIGHT VENTRICULAR SYSTOLIC DYSFUNCTION: ICD-10-CM

## 2024-10-10 DIAGNOSIS — Z95.810 PRESENCE OF AUTOMATIC (IMPLANTABLE) CARDIAC DEFIBRILLATOR: ICD-10-CM

## 2024-10-10 DIAGNOSIS — I21.4 NSTEMI (NON-ST ELEVATED MYOCARDIAL INFARCTION) (MULTI): ICD-10-CM

## 2024-10-10 DIAGNOSIS — I50.23 ACUTE ON CHRONIC SYSTOLIC HEART FAILURE: ICD-10-CM

## 2024-10-10 DIAGNOSIS — I50.82 CONGESTIVE HEART FAILURE WITH RIGHT VENTRICULAR SYSTOLIC DYSFUNCTION: ICD-10-CM

## 2024-10-10 DIAGNOSIS — I77.1 SUBCLAVIAN ARTERIAL STENOSIS: ICD-10-CM

## 2024-10-10 DIAGNOSIS — I25.10 ASHD (ARTERIOSCLEROTIC HEART DISEASE): Primary | ICD-10-CM

## 2024-10-10 LAB — BODY SURFACE AREA: 1.74 M2

## 2024-10-10 PROCEDURE — 93283 PRGRMG EVAL IMPLANTABLE DFB: CPT

## 2024-10-10 PROCEDURE — 3078F DIAST BP <80 MM HG: CPT | Performed by: INTERNAL MEDICINE

## 2024-10-10 PROCEDURE — 1126F AMNT PAIN NOTED NONE PRSNT: CPT | Performed by: INTERNAL MEDICINE

## 2024-10-10 PROCEDURE — 1157F ADVNC CARE PLAN IN RCRD: CPT | Performed by: INTERNAL MEDICINE

## 2024-10-10 PROCEDURE — G2211 COMPLEX E/M VISIT ADD ON: HCPCS | Performed by: INTERNAL MEDICINE

## 2024-10-10 PROCEDURE — 3048F LDL-C <100 MG/DL: CPT | Performed by: INTERNAL MEDICINE

## 2024-10-10 PROCEDURE — 3077F SYST BP >= 140 MM HG: CPT | Performed by: INTERNAL MEDICINE

## 2024-10-10 PROCEDURE — 3060F POS MICROALBUMINURIA REV: CPT | Performed by: INTERNAL MEDICINE

## 2024-10-10 PROCEDURE — 3044F HG A1C LEVEL LT 7.0%: CPT | Performed by: INTERNAL MEDICINE

## 2024-10-10 PROCEDURE — 99215 OFFICE O/P EST HI 40 MIN: CPT | Performed by: INTERNAL MEDICINE

## 2024-10-10 PROCEDURE — 1159F MED LIST DOCD IN RCRD: CPT | Performed by: INTERNAL MEDICINE

## 2024-10-10 RX ORDER — SACUBITRIL AND VALSARTAN 24; 26 MG/1; MG/1
0.5 TABLET, FILM COATED ORAL 2 TIMES DAILY
Qty: 30 TABLET | Refills: 11 | Status: SHIPPED | OUTPATIENT
Start: 2024-10-10 | End: 2025-10-10

## 2024-10-10 ASSESSMENT — PAIN SCALES - GENERAL: PAINLEVEL: 0-NO PAIN

## 2024-10-10 ASSESSMENT — ENCOUNTER SYMPTOMS
LOSS OF SENSATION IN FEET: 0
DEPRESSION: 0
OCCASIONAL FEELINGS OF UNSTEADINESS: 0

## 2024-10-10 NOTE — PROGRESS NOTES
Primary Care Physician: Ofelia Mccracken DO  Date of Visit: 10/10/2024  9:20 AM EDT  Location of visit: [facility] [place] [address]   Last office visit: 8/15/2024     Chief Complaint:          HPI/Summary  Ritesh Garza is a 73 y.o. male who presents for followup cardiology evaluation.     The patient is status post remote MI, with longstanding multivessel CAD, moderate LV systolic dysfunction and compensated congestive heart failure.  The patient's problem list includes chronic kidney disease.  In 2021, he was hospitalized with an episode of unresponsiveness, possibly absence seizures.  Keppra was prescribed.  A monitor showed no atrial fibrillation and no pauses.  There was a long hospital admission in November, 2021, with COVID-related delirium.  The ejection fraction was 30%.  In 2023, eplerenone was discontinued because of hyperkalemia.  There was a long hospitalization in February, 2024 with left hip pain and swelling after a fall, with significant left leg hematoma.  There were episodes of ventricular tachycardia and chest discomfort, thought to be in part related to metabolic acidosis and to acute blood loss anemia.  Biomarkers were elevated.  He was seen by electrophysiology, and was considered for ICD if there was no recovery of LV function, which was measured at 30 to 35% in the hospital.  Catheterization was deferred because of acute on chronic kidney injury.  At discharge, he was loaded with amiodarone, then started on amiodarone 200 mg daily, also started on furosemide and clopidogrel for ACS.  He was continued on aspirin, atorvastatin and Farxiga.  Amlodipine, losartan and metformin were all held, given the acute on chronic kidney injury.     Cardiac catheterization was performed on April 15, 2024.  The left POP to LAD and D1 was patent, vein grafts were occluded and there was severe native CAD with an 80% left main,  of the proximal LAD,  of the proximal circumflex,  of the proximal RCA.  There was  severe left subclavian stenosis.  This likely compromised flow to the left POP when he was hemodynamically unstable, with non-STEMI and ventricular tachycardia.  We arranged for a follow-up limited echocardiogram on April 26, 2024.  The LVEF was estimated at 30 to 35%. After of evaluation, we proceeded with left subclavian stent on April 30, 2024.  He was in our office a few days later and we recommended that we pursue ICD.  This was successfully performed on May 30, 2024.    Since then, there was a hospitalization with central cord syndrome. He apparently walked up the stairs, fell down and landed on his left side, imaging showed moderate to severe multilevel spinal degenerative changes and severe spinal canal stenosis. MRI showed cord compression with myelopathy. He underwent C4-C7 decompression. There were runs of paroxysmal atrial tachycardia in the early postoperative period. He was discharged home with home health care and physical and Occupational Therapy.     At his last visit, amiodarone was discontinued.  He had developed VT in the setting of acute ischemia and hemodynamic instability, now has an ICD in place and no recent arrhythmia.  We recommended that DAPT be continued until February, 2025.  We titrated Entresto to 1 tablet twice daily.    Renal function panel shows a creatinine of 1.82, BUN 30.  BNP remains elevated at 1712.      He is now treated with amlodipine 10 mg daily, aspirin 81 mg daily, atorvastatin 80 mg daily, Plavix 75 mg daily, Farxiga 10 mg daily, furosemide 40 mg daily as needed, Vascepa 1 g twice daily, metoprolol succinate 50 mg daily, and Entresto which we had to reduce to 1/2 tablet twice daily.  Spironolactone was withdrawn because of painful gynecomastia.    Currently, feeling well.  Minimal effort dyspnea.  He did well with the neurosurgery, no recurrent symptoms of central cord syndrome.  He could not tolerate the higher dose of Entresto, having developed marked fatigue.  He is  compliant with all of his other medications.  Specialty Problems          Cardiology Problems    Peripheral arterial disease (CMS-HCC)    Cardiomyopathy    History of coronary artery bypass graft     1992: CABG x5, [facility].         History of myocardial infarction    Atherosclerosis of coronary artery bypass graft    Benign essential hypertension    Ischemic cardiomyopathy    Hyperlipidemia    Left ventricular systolic dysfunction (LVSD)     Comment on above: February 28, 2014: LV ejection fraction 35-40%. Basal inferolateral, and basal inferior, basal inferoseptal, mid inferolateral, mid inferior, and the inferoseptal akinesis. Mild LV dilatation. Mild mitral regurgitation. June 06, 2018: LV ejection fraction 35-40%. basal and mid inferior wall, basal and mid inferolateral wall, basal inferoseptal and mid inferoseptal segments are abnormal. Left ventricle mildly dilated. Trace mitral valve regurgitation. June 07, 2019: LV ejection fraction stable, 35-40%.;  February 28, 2014: LV ejection fraction 35-40%. Basal inferolateral, and basal inferior, basal inferoseptal, mid inferolateral, mid inferior, and the inferoseptal akinesis. Mild LV dilatation. Mild mitral regurgitation. June 06, 2018: LV ejection fraction 35-40%. basal and mid inferior wall, basal and mid inferolateral wall, basal inferoseptal and mid inferoseptal segments are abnormal. Left ventricle mildly dilated. Trace mitral valve regurgitation. June 07, 2019: LV ejection fraction stable, 35-40%.June 1. 2021: LV EF 40%.;  February 28, 2014: LV ejection fraction 35-40%. Basal inferolateral, and basal inferior, basal inferoseptal, mid inferolateral, mid inferior, and the inferoseptal akinesis. Mild LV dilatation. Mild mitral regurgitation. June 06, 2018: LV ejection fraction 35-40%. basal and mid inferior wall, basal and mid inferolateral wall, basal inferoseptal and mid inferoseptal segments are abnormal. Left ventricle mildly dilated.  Trace mitral valve regurgitation. June 07, 2019: LV ejection fraction stable, 35-40%.June 1. 2021: LV EF 40%. #16, 2021: LVEF 35%.;         Congestive heart failure with right ventricular systolic dysfunction    CAD (coronary artery disease)    Chest pain    Demand ischemia (Multi)    Ventricular tachycardia, unspecified (Multi)    Acute on chronic systolic heart failure    Myocardial infarction (Multi)    Stenosis of subclavian artery        Past Medical History:   Diagnosis Date    Allergic     Asthma (HHS-HCC)     Coronary artery disease Long Ago    Diabetes mellitus (Multi) Over 20 years ago    Drug-induced gout, unspecified site 10/04/2019    Acute drug-induced gout    Encounter for screening for malignant neoplasm of colon 10/31/2022    Colon cancer screening    Gout, unspecified     Acute gout    Heart disease Over 20 years sgo    Heart murmur Long ago    Hypertension Over 20 years ago    Myocardial infarction (Multi) Over 20 yesrs ago    Ocular pain, right eye 10/14/2018    Pain of right eye    Periorbital cellulitis 10/14/2018    Periorbital cellulitis    Personal history of colonic polyps 10/11/2017    History of colon polyps    Personal history of other diseases of the circulatory system 05/12/2021    History of angina pectoris    Personal history of other diseases of the circulatory system 05/12/2021    History of angina pectoris    Personal history of other diseases of the respiratory system 10/04/2019    History of acute bronchitis    Personal history of other drug therapy     History of influenza vaccination    Type 2 diabetes mellitus (Multi)     Unspecified cirrhosis of liver (Multi) 11/07/2022    Cirrhosis, nonalcoholic          Past Surgical History:   Procedure Laterality Date    ADENOIDECTOMY  Childhood    CARDIAC CATHETERIZATION  Over 30 years ago    CARDIAC CATHETERIZATION N/A 04/15/2024    Procedure: Left And Right Heart Cath, With LV;  Surgeon: Blaine Adams MD;  Location: [facility] Cardiac Cath  Lab;  Service: Cardiovascular;  Laterality: N/A;  3 hr hydration / arrive 6:30    CARDIAC DEFIBRILLATOR PLACEMENT      CARDIAC ELECTROPHYSIOLOGY PROCEDURE N/A 05/30/2024    Procedure: ICD Dual Implant;  Surgeon: Mina Kiran MD;  Location: [facility] Cardiac Cath Lab;  Service: Electrophysiology;  Laterality: N/A;  Dual chamber ICD, [facility]  notified [facility] 5/24/24    CHOLECYSTECTOMY  Over 30 years ago    CIRCUMCISION, PRIMARY  72,years ago    CORONARY ARTERY BYPASS GRAFT  12/02/2021    CABG    CORONARY STENT PLACEMENT  Last week    EYE SURGERY  Childhood    INVASIVE VASCULAR PROCEDURE Left 04/30/2024    Procedure: Angioplasty - Upper Extremity;  Surgeon: Narinder Quevedo MD;  Location: [facility] Cardiac Cath Lab;  Service: Cardiovascular;  Laterality: Left;  3 hr hydration  / arrive 6:30    MR HEAD ANGIO WO IV CONTRAST  06/02/2021    MR HEAD ANGIO WO IV CONTRAST 6/2/2021 [facility] CLINICAL LEGACY    MR HEAD ANGIO WO IV CONTRAST  11/16/2021    MR HEAD ANGIO WO IV CONTRAST 11/16/2021 [facility] CLINICAL LEGACY    MR NECK ANGIO WO IV CONTRAST  06/02/2021    MR NECK ANGIO WO IV CONTRAST 6/2/2021 [facility] CLINICAL LEGACY    MR NECK ANGIO WO IV CONTRAST  11/16/2021    MR NECK ANGIO WO IV CONTRAST 11/16/2021 [facility] CLINICAL LEGACY            Social History     Tobacco Use    Smoking status: Never    Smokeless tobacco: Never   Vaping Use    Vaping status: Never Used   Substance Use Topics    Alcohol use: Never    Drug use: Never                 No Known Allergies      Current Outpatient Medications   Medication Instructions    albuterol (ProAir HFA) 90 mcg/actuation inhaler 2 puffs, inhalation, Every 6 hours PRN    allopurinol (ZYLOPRIM) 100 mg, oral, Daily    amLODIPine (Norvasc) 10 mg tablet oral, Daily    aspirin 81 mg, oral, Daily    atorvastatin (LIPITOR) 80 mg, oral, Daily    b complex vitamins capsule 1 capsule, oral, Daily    clopidogrel (PLAVIX) 75 mg, oral, Daily    coenzyme Q-10 (CoQ-10) 100 mg capsule 1 capsule, oral, Daily  "   cyanocobalamin (Vitamin B-12) 1,000 mcg tablet 1 tablet, oral, Daily, As directed    dapagliflozin (Farxiga) 10 mg 1 tablet, oral, Daily before evening meal    famotidine (PEPCID) 20 mg, oral, Daily PRN    furosemide (LASIX) 40 mg, oral, Daily, As needed for weight gain greater than 3 pounds    gabapentin (NEURONTIN) 100 mg, oral, Nightly    icosapent ethyL (VASCEPA) 1 g, oral, 2 times daily (morning and late afternoon)    loratadine (CLARITIN) 10 mg, oral, 2 times daily, Take 1 tablet every morning and 1 tablet every evening    magnesium gluconate (Magonate) 27.5 mg magne- sium (500 mg) tablet 1 tablet, oral, Daily    metoprolol succinate XL (TOPROL-XL) 50 mg, oral, Daily    multivitamin tablet 1 tablet, oral, Daily    sacubitriL-valsartan (Entresto) 24-26 mg tablet 1 tablet, oral, 2 times daily    sertraline (Zoloft) 50 mg tablet TAKE ONE TABLET DAILY IN THE MORNING AFTER BREAKFAST.    tadalafil (CIALIS) 20 mg, oral, As needed    tadalafil (CIALIS) 5 mg, oral, Daily    tadalafil (CIALIS) 5 mg, oral, Daily       ROS     Vital Signs:  Vitals:    10/10/24 0939   BP: 146/63   BP Location: Left arm   Patient Position: Sitting   Pulse: 66   SpO2: 92%   Weight: 67.8 kg (149 lb 7 oz)   Height: 1.6 m (5' 3\")     Wt Readings from Last 2 Encounters:   10/10/24 67.8 kg (149 lb 7 oz)   09/18/24 64.6 kg (142 lb 8 oz)     Body mass index is 26.47 kg/m².       Physical Exam:    Today, he was pleasant and cooperative.  Not in any acute distress.  There were inspiratory rales at both bases, however.  No ventricular gallop noted.  No murmur.  Abdomen mildly obese.  No peripheral edema.     Last Labs:  CMP:  Recent Labs     09/24/24  0927 09/17/24  1331 07/13/24  1209 07/10/24  0849 07/09/24  0756    139 140 142 141   K 4.8 4.7 4.1 4.2 4.0    106 102 104 104   CO2 26 25 26 27 29   ANIONGAP 13 13 16 15 12   BUN 30* 30* 42* 58* 55*   CREATININE 1.79* 1.82* 1.91* 2.09* 1.82*   EGFR 39* 38* 36* 32* 38*   GLUCOSE 142* 157* " 143* 141* 150*     Recent Labs     09/24/24  0927 09/17/24  1331 07/13/24  1209 07/10/24  0849 07/09/24  0756 07/04/24 2136 07/04/24  1047 07/01/24  1807 07/01/24  0950 06/30/24 2129 05/06/24  1128   ALBUMIN 3.9 3.9 3.8 3.6 3.5   < > 3.7   < > 4.0 4.0 4.2   ALKPHOS 132  --   --   --   --   --  80  --  112 106 103   ALT 34  --   --   --   --   --  23  --  39 39 27   AST 33  --   --   --   --   --  45*  --  52* 60* 25   BILITOT 0.4  --   --   --   --   --  0.6  --  0.6 0.7 0.6    < > = values in this interval not displayed.     CBC:  Recent Labs     07/13/24  1209 07/09/24  0756 07/08/24  0639 07/07/24  0145 07/06/24  0331   WBC 8.5 8.0 8.3 8.5 10.5   HGB 11.2* 11.1* 11.3* 9.6* 9.3*   HCT 36.1* 35.7* 35.7* 30.1* 29.1*    194 233 221 204   MCV 97 99 95 96 95     COAG:   Recent Labs     07/02/24  1416 07/01/24  0950 06/30/24 2129 05/28/24  1458 05/06/24  1128   INR 1.2* 1.2* 1.3* 1.2* 1.2*     HEME/ENDO:  Recent Labs     07/08/24  0639 07/07/24  0145 02/17/24  0535 02/15/24  0407 01/11/24 1336 07/31/23  1110 05/04/23  1215 08/18/22  0940   IRONSAT  --   --  8*  --   --   --   --   --    TSH 9.35* 10.97*  --  3.26  --   --   --  3.73   HGBA1C  --   --   --   --  6.6* 7.0* 6.8* 6.6*      CARDIAC:   Recent Labs     09/17/24  1331 07/05/24  0405 06/30/24  2129 02/18/24  1826 02/18/24  1730 02/18/24  0419 02/14/24  2111 02/14/24  1720 02/13/24  2214 05/04/23  1215 09/14/22 2025 12/02/21  0947   TROPHS  --   --  7 4,490* 4,518*  --  635* 258*   < >  --    < >  --    BNP 1,712* 3,294*  --   --   --  2,116*  --   --   --  203*  --  108*    < > = values in this interval not displayed.     Recent Labs     05/06/24  1128 11/13/23  1041 07/31/23  1110 05/04/23  1215 05/05/22  1450   CHOL 114 102 128 143 144   LDLF  --   --  57 56 36   HDL 31.0 25.8 32.3* 37.1* 35.3*   TRIG 149 203* 192* 252* 362*       Last Cardiology Tests:    ECG:    Not performed today    Echo:  Echo Results:  Transthoracic Echo (TTE) Limited With  Doppler And Color 07/02/2024    Narrative  [facility], [address] [place] [address]  Tel [phone] and Fax [phone]    TRANSTHORACIC ECHOCARDIOGRAM REPORT      Patient Name:      CHIDI VARGAS     Reading Physician:    [id number] Kisha Reina MD  Study Date:        7/2/2024             Ordering Provider:    [id number] ZENON ESTRADA  MRN/PID:           [id number]             Fellow:  Accession#:        [id number]         Nurse:                Kisha Michelle RN  Date of Birth/Age: 1950 / 73 years Sonographer:          Angelina Butler  RDCS  Gender:            M                    Additional Staff:     Kiki Almendarez  Height:            160.02 cm            Admit Date:           7/1/2024  Weight:            69.40 kg             Admission Status:     Inpatient -  Routine  BSA / BMI:         1.73 m2 / 27.10      Encounter#:           [id number]  kg/m2  Blood Pressure:    142/71 mmHg          Department Location:  [facility] Non  Invasive    Study Type:    TRANSTHORACIC ECHO (TTE) LIMITED  Diagnosis/ICD: Encounter for other preprocedural examination-Z01.818  Indication:    Preop evaluation  CPT Code:      Echo Limited-18262; Doppler Limited-05745; Color Doppler-79645    Patient History:  Pertinent History: DM, CAD, HFrEF, CKD, ICD (5/30/24).    Study Detail: The following Echo studies were performed: 2D, M-Mode, Doppler and  color flow. Definity used as a contrast agent for endocardial  border definition. Total contrast used for this procedure was 2.0  mL via IV push.      PHYSICIAN INTERPRETATION:  Left Ventricle: Left ventricular ejection fraction is moderately decreased, by visual estimate at 30-35%. Wall motion is abnormal. The left ventricular cavity size is moderately dilated. Spectral Doppler shows a pseudonormal pattern of left ventricular diastolic filling. There is no definite left ventricular thrombus visualized. Technically difficult exam, though there is moderate LV systolic  dysfunction with the apical function being best but hypokinesis of the basal and mid segments. There is no LV apical thrombus seen on the echocontrast images.  Left Atrium: The left atrium is moderately dilated.  Right Ventricle: The right ventricle was not well visualized. There is reduced right ventricular systolic function. A device is visualized in the right ventricle.  Right Atrium: The right atrium was not well visualized. There is a device visualized in the right atrium.  Aortic Valve: The aortic valve is trileaflet. There is mild aortic valve cusp calcification. There is no evidence of aortic valve regurgitation. The peak instantaneous gradient of the aortic valve is 5.9 mmHg.  Mitral Valve: The mitral valve is mildly thickened. There is mild mitral annular calcification. There is moderate mitral valve regurgitation.  Tricuspid Valve: The tricuspid valve was not well visualized. There is moderate tricuspid regurgitation. The Doppler estimated RVSP is mildly elevated at 41.9 mmHg.  Pulmonic Valve: The pulmonic valve is not well visualized. Pulmonic valve regurgitation was not assessed.  Pericardium: There is a trivial pericardial effusion.  Aorta: The aortic root is normal.  Systemic Veins: The inferior vena cava appears mildly dilated. There is IVC inspiratory collapse greater than 50%.  In comparison to the previous echocardiogram(s): Compared with the prior exam from 5/10/2023, today's exam is more technically difficult making assessment of LV systolic function more difficult. Proir study demonstrated clear wall motiom abnormality in the basal septal, basal inferior and basal and mid inferolateral segments. The LV systolic function appears to be worse today ( hyaving declined from mild dysfunciton to moderate dysfunction. In addition, the degree of MR appears to have increased from mild to moderate.      CONCLUSIONS:  1. Left ventricular ejection fraction is moderately decreased, by visual estimate at  30-35%.  2. Spectral Doppler shows a pseudonormal pattern of left ventricular diastolic filling.  3. Left ventricular cavity size is moderately dilated.  4. No left ventricular thrombus visualized.  5. Technically difficult exam, though there is moderate LV systolic dysfunction with the apical function being best but hypokinesis of the basal and mid segments. There is no LV apical thrombus seen on the echocontrast images.  6. There is reduced right ventricular systolic function.  7. The left atrium is moderately dilated.  8. Moderate mitral valve regurgitation.  9. Mildly elevated RVSP.  10. Moderate tricuspid regurgitation visualized.  11. Compared with the prior exam from 5/10/2023, today's exam is more technically difficult making assessment of LV systolic function more difficult. Proir study demonstrated clear wall motiom abnormality in the basal septal, basal inferior and basal and mid inferolateral segments. The LV systolic function appears to be worse today ( hyaving declined from mild dysfunciton to moderate dysfunction. In addition, the degree of MR appears to have increased from mild to moderate.    QUANTITATIVE DATA SUMMARY:  2D MEASUREMENTS:  Normal Ranges:  Ao Root d:     3.20 cm   (2.0-3.7cm)  LAs:           4.60 cm   (2.7-4.0cm)  IVSd:          0.90 cm   (0.6-1.1cm)  LVPWd:         0.70 cm   (0.6-1.1cm)  LVIDd:         5.60 cm   (3.9-5.9cm)  LVIDs:         4.90 cm  LV Mass Index: 95.6 g/m2  LV % FS        12.5 %    LA VOLUME:  Normal Ranges:  LA Vol A4C:        69.7 ml    (22+/-6mL/m2)  LA Vol A2C:        78.1 ml  LA Vol BP:         74.4 ml  LA Vol Index A4C:  40.4ml/m2  LA Vol Index A2C:  45.3 ml/m2  LA Vol Index BP:   43.1 ml/m2  LA Area A4C:       22.2 cm2  LA Area A2C:       23.7 cm2  LA Major Axis A4C: 6.0 cm  LA Major Axis A2C: 6.1 cm  LA Volume Index:   43.1 ml/m2    AORTA MEASUREMENTS:  Normal Ranges:  Ao Sinus, d: 2.80 cm (2.1-3.5cm)    LV SYSTOLIC FUNCTION BY 2D PLANIMETRY (MOD):  Normal  Ranges:  EF-A4C View:    33 % (>=55%)  EF-A2C View:    39 %  EF-Biplane:     36 %  EF-Visual:      33 %  LV EF Reported: 33 %    LV DIASTOLIC FUNCTION:  Normal Ranges:  MV Peak E:    1.08 m/s    (0.7-1.2 m/s)  MV Peak A:    0.44 m/s    (0.42-0.7 m/s)  E/A Ratio:    2.47        (1.0-2.2)  MV lateral e' 0.10 m/s  MV A Dur:     127.00 msec  MV DT:        211 msec    (150-240 msec)    MITRAL VALVE:  Normal Ranges:  MV Vmax:    1.42 m/s (<=1.3m/s)  MV peak P.1 mmHg (<5mmHg)  MV mean PG: 3.0 mmHg (<2mmHg)  MV DT:      211 msec (150-240msec)    AORTIC VALVE:  Normal Ranges:  AoV Vmax:      1.21 m/s (<=1.7m/s)  AoV Peak P.9 mmHg (<20mmHg)  LVOT Max Matt:  1.09 m/s (<=1.1m/s)  LVOT VTI:      28.00 cm  LVOT Diameter: 2.10 cm  (1.8-2.4cm)  AoV Area,Vmax: 3.12 cm2 (2.5-4.5cm2)      RIGHT VENTRICLE:  TAPSE: 12.6 mm  RV s'  0.08 m/s    TRICUSPID VALVE/RVSP:  Normal Ranges:  Peak TR Velocity: 2.91 m/s  RV Syst Pressure: 41.9 mmHg (< 30mmHg)  IVC Diam:         2.20 cm      [id number] Kisha Reina MD  Electronically signed on 2024 at 4:53:18 PM        ** Final **      Transthoracic Echo (TTE) Limited With Contrast 2024    [place] [id number]  Phone [phone] Fax [phone]    TRANSTHORACIC ECHOCARDIOGRAM REPORT      Patient Name:      CHIDI GRISELDA VARGAS     Reading Physician:    [id number] Adams MD  Study Date:        2024            Ordering Provider:    [id number] HUMBERTO SELLERS  MRN/PID:           [id number]             Fellow:  Accession#:        [id number]         Nurse:                Christina Chacon RN  Date of Birth/Age: 1950 / 73 years Sonographer:          Nikole Porter RDCS  Gender:            M                    Additional Staff:  Height:            152.40 cm            Admit Date:  Weight:            65.32 kg             Admission Status:     Outpatient  BSA / BMI:         1.62 m2 / 28.12      Department Location:  [facility] Echo  kg/m2                                       Lab  Blood Pressure: 163 /74 mmHg    Study Type:    TRANSTHORACIC ECHO (TTE) LIMITED  Diagnosis/ICD: Acute combined systolic (congestive) and diastolic (congestive)  heart failure (CHF)-I50.41; Non ST elevation (NSTEMI) myocardial  infarction-I21.4; Ventricular tachycardia, other-I47.29; Heart  disease, unspecified-I51.9  Indication:    CHF, NSTEMI, VT, LVD  CPT Codes:     Echo Limited-56305; Doppler Limited-58045; Color Doppler-64477    Patient History:  Diabetes:          Yes  Pertinent History: CHF, Cancer, HTN and TIA.    Study Detail: The following Echo studies were performed: 2D, Doppler and color  flow. Optison used as a contrast agent for endocardial border  definition. Total contrast used for this procedure was 5 mL via IV  push. Unable to obtain subcostal and suprasternal notch view.      PHYSICIAN INTERPRETATION:  Left Ventricle: Left ventricular systolic function is moderately decreased, with an estimated ejection fraction of 30-35%. Wall motion is abnormal. The left ventricular cavity size is mildly dilated. Spectral Doppler shows a restrictive pattern of left ventricular diastolic filling. There is an elevated left ventricular end diastolic pressure.  LV Wall Scoring:  The basal and mid inferior septum, basal and mid inferior wall, and basal and  mid inferolateral wall are akinetic. The basal anteroseptal segment, apical  septal segment, and apical inferior segment are hypokinetic. All remaining  scored segments are normal.    Left Atrium: The left atrium was not assessed.  Right Ventricle: The right ventricle was not assessed. Right ventricular systolic function not assessed.  Right Atrium: The right atrium was not assessed.  Aortic Valve: The aortic valve is trileaflet. There is mild aortic valve cusp calcification. There is no evidence of aortic valve regurgitation.  Mitral Valve: The mitral valve is mildly thickened. There is moderately decreased mitral valve  posterior leaflet mobility. There is moderate mitral valve regurgitation.  Tricuspid Valve: The tricuspid valve is structurally normal. There is mild to moderate tricuspid regurgitation. The Doppler estimated RVSP is mildly elevated at 35.7 mmHg.  Pulmonic Valve: The pulmonic valve was not assessed. The pulmonic valve regurgitation was not assessed.  Pericardium: There is no pericardial effusion noted.  Aorta: The aortic root was not assessed.      CONCLUSIONS:  1. Left ventricular systolic function is moderately decreased with a 30-35% estimated ejection fraction.  2. Multiple segmental abnormalities exist. See findings.  3. Spectral Doppler shows a restrictive pattern of left ventricular diastolic filling.  4. There is an elevated left ventricular end diastolic pressure.  5. Moderate mitral valve regurgitation.  6. Moderately decreased mitral valve posterior leaflet mobility.  7. Mild to moderate tricuspid regurgitation.  8. Mildly elevated RVSP.    QUANTITATIVE DATA SUMMARY:  2D MEASUREMENTS:  Normal Ranges:  IVSd:          1.16 cm    (0.6-1.1cm)  LVPWd:         1.04 cm    (0.6-1.1cm)  LVIDd:         5.17 cm    (3.9-5.9cm)  LVIDs:         4.87 cm  LV Mass Index: 134.2 g/m2  LV % FS        5.8 %    LV SYSTOLIC FUNCTION BY 2D PLANIMETRY (MOD):  Normal Ranges:  EF-A4C View: 36.9 % (>=55%)  EF-A2C View: 28.7 %  EF-Biplane:  29.8 %    LV DIASTOLIC FUNCTION:  Normal Ranges:  MV Peak E:    1.13 m/s   (0.7-1.2 m/s)  MV Peak A:    0.28 m/s   (0.42-0.7 m/s)  E/A Ratio:    3.98       (1.0-2.2)  MV e'         0.04 m/s   (>8.0)  MV lateral e' 0.04 m/s  MV medial e'  0.04 m/s  MV A Dur:     88.49 msec  E/e' Ratio:   28.16      (<8.0)  LV IVRT:      74 msec    (<100ms)    MITRAL VALVE:  Normal Ranges:  MV DT: 171 msec (150-240msec)    AORTIC VALVE:  Normal Ranges:  LVOT Max Matt:  1.02 m/s (<=1.1m/s)  LVOT VTI:      25.00 cm  LVOT Diameter: 2.03 cm  (1.8-2.4cm)    TRICUSPID VALVE/RVSP:  Normal Ranges:  Peak TR Velocity: 2.86  m/s  RV Syst Pressure: 35.7 mmHg (< 30mmHg)      [id number] Blaine Adams MD  Electronically signed on 4/28/2024 at 11:13:11 AM      Wall Scoring        ** Final **      Transthoracic Echo (TTE) Complete With Contrast 02/15/2024    [place] [address]  Phone [phone] Fax [phone]    TRANSTHORACIC ECHOCARDIOGRAM REPORT      Patient Name:      CHIDI VARGAS      Reading Physician:    [id number] Gonzalo White DO  Study Date:        2/15/2024             Ordering Provider:    [id number] TITO NICOLAS  MRN/PID:           [id number]              Fellow:  Accession#:        [id number]          Nurse:  Date of Birth/Age: 1950 / 73 years  Sonographer:          Heena David RDCS  Gender:            M                     Additional Staff:  Height:            160.02 cm             Admit Date:           2/13/2024  Weight:            73.03 kg              Admission Status:     Inpatient -  Routine  BSA / BMI:         1.76 m2 / 28.52 kg/m2 Department Location:  [facility] Echo  Lab  Blood Pressure: 105 /60 mmHg    Study Type:    TRANSTHORACIC ECHO (TTE) COMPLETE  Diagnosis/ICD: Chest pain, unspecified-R07.9  Indication:    Chest Pain  CPT Codes:     Echo Complete w Full Doppler-[id number]  Study Detail: The following Echo studies were performed: 2D, M-Mode, Doppler and  color flow. Optison used as a contrast agent for endocardial  border definition. Total contrast used for this procedure was 3 mL  via IV push.      PHYSICIAN INTERPRETATION:  Left Ventricle: Left ventricular systolic function is normal, with an estimated ejection fraction of 30-35%. There are multiple wall motion abnormalities. The left ventricular cavity size is upper limits of normal. Spectral Doppler shows a restrictive pattern of left ventricular diastolic filling.  LV Wall Scoring:  The basal inferoseptal segment and basal inferior segment are akinetic. The  entire anterior septum, mid and apical  inferior septum, basal and mid  inferolateral wall, basal anterolateral segment, basal anterior segment, and mid  inferior segment are hypokinetic. All remaining scored segments are normal.    Left Atrium: The left atrium is normal in size.  Right Ventricle: The right ventricle was not well visualized. There is low normal right ventricular systolic function.  Right Atrium: The right atrium is normal in size.  Aortic Valve: The aortic valve is trileaflet. There is mild aortic valve thickening. There is no evidence of aortic valve regurgitation. The peak instantaneous gradient of the aortic valve is 7.1 mmHg. The mean gradient of the aortic valve is 4.0 mmHg.  Mitral Valve: The mitral valve is normal in structure. There is mild mitral valve regurgitation.  Tricuspid Valve: The tricuspid valve is structurally normal. There is mild to moderate tricuspid regurgitation.  Pulmonic Valve: The pulmonic valve is structurally normal. There is physiologic pulmonic valve regurgitation.  Pericardium: There is no pericardial effusion noted.  Aorta: The aortic root is normal.      CONCLUSIONS:  1. Left ventricular systolic function is normal with a 30-35% estimated ejection fraction.  2. Multiple segmental abnormalities exist. See findings.  3. Spectral Doppler shows a restrictive pattern of left ventricular diastolic filling.  4. There is low normal right ventricular systolic function.  5. Mild to moderate tricuspid regurgitation.  6. There are multiple wall motion abnormalities.    QUANTITATIVE DATA SUMMARY:  2D MEASUREMENTS:  Normal Ranges:  Ao Root d:     2.90 cm    (2.0-3.7cm)  LAs:           4.60 cm    (2.7-4.0cm)  IVSd:          1.00 cm    (0.6-1.1cm)  LVPWd:         0.90 cm    (0.6-1.1cm)  LVIDd:         5.30 cm    (3.9-5.9cm)  LVIDs:         5.20 cm  LV Mass Index: 106.2 g/m2  LV % FS        1.9 %    LA VOLUME:  Normal Ranges:  LA Vol A4C:        53.0 ml    (22+/-6mL/m2)  LA Vol A2C:        47.8 ml  LA Vol BP:         50.3  ml  LA Vol Index A4C:  30.0ml/m2  LA Vol Index A2C:  27.1 ml/m2  LA Vol Index BP:   28.5 ml/m2  LA Area A4C:       18.0 cm2  LA Area A2C:       17.1 cm2  LA Major Axis A4C: 5.2 cm  LA Major Axis A2C: 5.2 cm  LA Volume Index:   26.8 ml/m2    M-MODE MEASUREMENTS:  Normal Ranges:  Ao Root: 2.90 cm (2.0-3.7cm)    AORTA MEASUREMENTS:  Normal Ranges:  Asc Ao, d: 2.50 cm (2.1-3.4cm)    LV SYSTOLIC FUNCTION BY 2D PLANIMETRY (MOD):  Normal Ranges:  EF-A4C View: 29.3 % (>=55%)  EF-A2C View: 43.2 %  EF-Biplane:  35.2 %    LV DIASTOLIC FUNCTION:  Normal Ranges:  MV Peak E:    1.29 m/s (0.7-1.2 m/s)  MV Peak A:    0.50 m/s (0.42-0.7 m/s)  E/A Ratio:    2.56     (1.0-2.2)  MV e'         0.06 m/s (>8.0)  MV lateral e' 0.06 m/s  MV medial e'  0.06 m/s  E/e' Ratio:   21.50    (<8.0)    MITRAL VALVE:  Normal Ranges:  MV DT: 151 msec (150-240msec)    MITRAL INSUFFICIENCY:  Normal Ranges:  PISA Radius:  0.5 cm  MR VTI:       129.00 cm  MR Vmax:      437.00 cm/s  MR Alias Matt: 38.5 cm/s  MR Volume:    17.85 ml  MR Flow Rt:   60.48 ml/s  MR EROA:      0.14 cm2    AORTIC VALVE:  Normal Ranges:  AoV Vmax:                1.33 m/s (<=1.7m/s)  AoV Peak P.1 mmHg (<20mmHg)  AoV Mean P.0 mmHg (1.7-11.5mmHg)  LVOT Max Matt:            1.04 m/s (<=1.1m/s)  AoV VTI:                 25.10 cm (18-25cm)  LVOT VTI:                20.70 cm  LVOT Diameter:           2.00 cm  (1.8-2.4cm)  AoV Area, VTI:           2.59 cm2 (2.5-5.5cm2)  AoV Area,Vmax:           2.46 cm2 (2.5-4.5cm2)  AoV Dimensionless Index: 0.82      RIGHT VENTRICLE:  TAPSE: 14.1 mm  RV s'  0.09 m/s    TRICUSPID VALVE/RVSP:  Normal Ranges:  Peak TR Velocity: 2.44 m/s  RV Syst Pressure: 26.8 mmHg (< 30mmHg)  IVC Diam:         1.50 cm      [id number] Gonzalo White DO  Electronically signed on 2/15/2024 at 11:57:27 AM      Wall Scoring        ** Final **       Cath:      Stress Test:  Stress Results:  No results found for this or any previous visit from the past 365  days.         Cardiac Imaging:        Assessment/Plan     Complex patient with recent urgent hospitalization following a fall that required surgical intervention for severe cervical spinal stenosis with cord compression.  He is now 8 months status post non-STEMI after he presented following a fall with significant left leg hematoma and marked anemia.  He was identified with severe left subclavian stenosis which compromised flow to the left POP, and he underwent left subclavian stent on April 10, 2024.  He underwent primary prevention ICD on May 30, 2024.  The ejection fraction is moderate to severely impaired.  He has tolerated low-dose Entresto.  He is not a candidate for resumption of spironolactone, having developed painful gynecomastia and also hyperkalemia.  The examination today suggest modest volume overload.  He has been taking the furosemide only as needed and we urged him to take furosemide 40 mg daily.  No need for any other cardiac testing as yet.  The last echocardiogram was a limited study prior to spine surgery and the ejection fraction was 30 to 35% moderate MR, moderate TR and mildly elevated RVSP.  Lipids are well-controlled, last LDL 53 mg/dL.    Recommend follow-up in 3 months.    Orders:  No orders of the defined types were placed in this encounter.     Followup Appts:  Future Appointments   Date Time Provider Department Center   10/10/2024 11:00 AM [facility]   11/7/2024  1:00 PM Ofelia Mccracken DO [id number] [facility]   11/14/2024  9:00 AM Romy Renee PsyD [id number] [facility]   11/21/2024  9:20 AM Nate Duran MD [id number] [facility]   11/25/2024  9:50 AM MD DES Bolden [facility]   3/19/2025 11:00 AM [facility] RN 2 [id number] [facility]   3/19/2025 11:30 AM Meme Perrin MD [id number] [facility]           ____________________________________________________________  Jose Hennessy MD    Senior Attending Physician  [facility] [place]  [place]    [facility]  [facility] [employer]

## 2024-10-11 DIAGNOSIS — Z00.00 ENCOUNTER FOR GENERAL ADULT MEDICAL EXAMINATION WITHOUT ABNORMAL FINDINGS: ICD-10-CM

## 2024-10-14 ENCOUNTER — TELEPHONE (OUTPATIENT)
Dept: SCHEDULING | Age: 74
End: 2024-10-14
Payer: MEDICARE

## 2024-10-14 DIAGNOSIS — Z00.00 ENCOUNTER FOR GENERAL ADULT MEDICAL EXAMINATION WITHOUT ABNORMAL FINDINGS: ICD-10-CM

## 2024-10-14 RX ORDER — ALLOPURINOL 100 MG/1
TABLET ORAL
Qty: 45 TABLET | Refills: 1 | Status: SHIPPED | OUTPATIENT
Start: 2024-10-14 | End: 2024-10-15

## 2024-10-15 RX ORDER — ALLOPURINOL 100 MG/1
100 TABLET ORAL DAILY
Qty: 90 TABLET | Refills: 1 | Status: SHIPPED | OUTPATIENT
Start: 2024-10-15

## 2024-10-16 ENCOUNTER — PATIENT OUTREACH (OUTPATIENT)
Dept: PRIMARY CARE | Facility: CLINIC | Age: 74
End: 2024-10-16
Payer: MEDICARE

## 2024-10-17 ENCOUNTER — HOSPITAL ENCOUNTER (OUTPATIENT)
Dept: CARDIOLOGY | Facility: HOSPITAL | Age: 74
Discharge: HOME | End: 2024-10-17
Payer: MEDICARE

## 2024-10-17 DIAGNOSIS — Z95.810 PRESENCE OF AUTOMATIC (IMPLANTABLE) CARDIAC DEFIBRILLATOR: ICD-10-CM

## 2024-10-17 DIAGNOSIS — I47.29 OTHER VENTRICULAR TACHYCARDIA: ICD-10-CM

## 2024-11-07 ENCOUNTER — APPOINTMENT (OUTPATIENT)
Dept: PRIMARY CARE | Facility: CLINIC | Age: 74
End: 2024-11-07
Payer: MEDICARE

## 2024-11-07 VITALS
WEIGHT: 131 LBS | OXYGEN SATURATION: 95 % | SYSTOLIC BLOOD PRESSURE: 139 MMHG | DIASTOLIC BLOOD PRESSURE: 71 MMHG | HEIGHT: 63 IN | TEMPERATURE: 97.5 F | HEART RATE: 63 BPM | BODY MASS INDEX: 23.21 KG/M2

## 2024-11-07 DIAGNOSIS — Z23 IMMUNIZATION DUE: ICD-10-CM

## 2024-11-07 DIAGNOSIS — G89.29 CHRONIC RIGHT SHOULDER PAIN: Primary | ICD-10-CM

## 2024-11-07 DIAGNOSIS — E11.40 TYPE 2 DIABETES MELLITUS WITH DIABETIC NEUROPATHY, WITHOUT LONG-TERM CURRENT USE OF INSULIN: ICD-10-CM

## 2024-11-07 DIAGNOSIS — M25.511 CHRONIC RIGHT SHOULDER PAIN: Primary | ICD-10-CM

## 2024-11-07 DIAGNOSIS — M67.911 TENDINOPATHY OF RIGHT ROTATOR CUFF: ICD-10-CM

## 2024-11-07 DIAGNOSIS — I77.1 STENOSIS OF SUBCLAVIAN ARTERY: ICD-10-CM

## 2024-11-07 PROBLEM — R45.86 MOOD CHANGES: Status: RESOLVED | Noted: 2023-10-04 | Resolved: 2024-11-07

## 2024-11-07 PROBLEM — G56.00 CARPAL TUNNEL SYNDROME: Status: RESOLVED | Noted: 2024-05-09 | Resolved: 2024-11-07

## 2024-11-07 PROBLEM — I42.9 CARDIOMYOPATHY: Status: RESOLVED | Noted: 2024-05-30 | Resolved: 2024-11-07

## 2024-11-07 PROBLEM — R94.02 ABNORMAL BRAIN SCAN: Status: RESOLVED | Noted: 2023-08-21 | Resolved: 2024-11-07

## 2024-11-07 PROBLEM — I24.89 DEMAND ISCHEMIA (MULTI): Status: RESOLVED | Noted: 2024-02-15 | Resolved: 2024-11-07

## 2024-11-07 PROBLEM — Z20.822 EXPOSURE TO SEVERE ACUTE RESPIRATORY SYNDROME CORONAVIRUS 2 (SARS-COV-2): Status: RESOLVED | Noted: 2022-09-15 | Resolved: 2024-11-07

## 2024-11-07 PROBLEM — J18.9 PNEUMONIA: Status: RESOLVED | Noted: 2024-02-17 | Resolved: 2024-11-07

## 2024-11-07 PROBLEM — I25.5 ISCHEMIC CARDIOMYOPATHY: Status: RESOLVED | Noted: 2023-04-18 | Resolved: 2024-11-07

## 2024-11-07 PROBLEM — N17.9 ACUTE KIDNEY INJURY (CMS-HCC): Status: RESOLVED | Noted: 2024-02-14 | Resolved: 2024-11-07

## 2024-11-07 PROBLEM — R55 SYNCOPE: Status: RESOLVED | Noted: 2023-08-21 | Resolved: 2024-11-07

## 2024-11-07 PROBLEM — K20.90 ESOPHAGITIS: Status: RESOLVED | Noted: 2023-08-21 | Resolved: 2024-11-07

## 2024-11-07 PROBLEM — S85.909A: Status: RESOLVED | Noted: 2024-05-09 | Resolved: 2024-11-07

## 2024-11-07 PROBLEM — M70.70 BURSITIS OF HIP: Status: RESOLVED | Noted: 2023-08-21 | Resolved: 2024-11-07

## 2024-11-07 PROCEDURE — 1036F TOBACCO NON-USER: CPT | Performed by: INTERNAL MEDICINE

## 2024-11-07 PROCEDURE — G2211 COMPLEX E/M VISIT ADD ON: HCPCS | Performed by: INTERNAL MEDICINE

## 2024-11-07 PROCEDURE — 90480 ADMN SARSCOV2 VAC 1/ONLY CMP: CPT | Performed by: INTERNAL MEDICINE

## 2024-11-07 PROCEDURE — 3078F DIAST BP <80 MM HG: CPT | Performed by: INTERNAL MEDICINE

## 2024-11-07 PROCEDURE — 1126F AMNT PAIN NOTED NONE PRSNT: CPT | Performed by: INTERNAL MEDICINE

## 2024-11-07 PROCEDURE — 3075F SYST BP GE 130 - 139MM HG: CPT | Performed by: INTERNAL MEDICINE

## 2024-11-07 PROCEDURE — 1160F RVW MEDS BY RX/DR IN RCRD: CPT | Performed by: INTERNAL MEDICINE

## 2024-11-07 PROCEDURE — 1157F ADVNC CARE PLAN IN RCRD: CPT | Performed by: INTERNAL MEDICINE

## 2024-11-07 PROCEDURE — 1159F MED LIST DOCD IN RCRD: CPT | Performed by: INTERNAL MEDICINE

## 2024-11-07 PROCEDURE — 99214 OFFICE O/P EST MOD 30 MIN: CPT | Performed by: INTERNAL MEDICINE

## 2024-11-07 PROCEDURE — G0008 ADMIN INFLUENZA VIRUS VAC: HCPCS | Performed by: INTERNAL MEDICINE

## 2024-11-07 PROCEDURE — 90662 IIV NO PRSV INCREASED AG IM: CPT | Performed by: INTERNAL MEDICINE

## 2024-11-07 PROCEDURE — 91320 SARSCV2 VAC 30MCG TRS-SUC IM: CPT | Performed by: INTERNAL MEDICINE

## 2024-11-07 ASSESSMENT — PAIN SCALES - GENERAL: PAINLEVEL_OUTOF10: 0-NO PAIN

## 2024-11-07 NOTE — PROGRESS NOTES
Subjective   Patient ID: Ritesh Garza is a 74 y.o. male who presents for Follow-up.  HPI  74-year-old male here for follow-up visit, last seen in July    Currently experiencing right shoulder pain with radiation to the right deltoid, constant, denies weakness, has been worsening since he last fell, no overt weakness and has not fully recovered since he last fell. No tingling or numbness, just pain. Not interested in cortisone injection. The pain is only intermittent, no clear inciting event, placed an ace bandage which did help the pain.   Has now been noting that he is able to walk better.     Has stopped aspirin out of concern for excess bleeding after minor cut       Past medical history  - Subclavian artery stenosis status post stent 3/24 on DAPT   - CAD s/p 5V CABG 1992 on aspirin, recent cardiac cath followed by Dr Hennessy.   - HFrEF 35% -  followed by Dr. Hennessy on Farxiga, metoprolol, (discontinued epleronone due to gynecomastia and hyperkalemia) losartan and potassium.  Previously on Entresto, now status post ICD placement has had adequate follow-up with EP, seen by Dr. Hennessy recommended daily lasix which has been working well for him.   - CKD 3b with persistent proteinuria referred to nephrology with worsening of renal function  to CKD4, seen by nephrology 2-3 weeks ago with Dr. Meredith, secondary hyperparathyroidism  - Aortic dilatation at 3.1cm   - HTN - on amlodipine 10mg, metoprolol now in the evening (helps him sleep better)   - SIDHU cirrhosis - followed by Dr. Duran -  ultrasound and AFP every 6 months - recent EGD showing no varices last seen in May, repeat endoscopy in 2026  - Erosive esophagitis - on pantoprazole   - Asthma -  mild and seasonal - has rare use of albuterol  - BPH with LUTS on daily tadalafil (cialis) 5mg.  - ED - takes cialis takes as needed as well.   - DM2 - with bilateral neuropathy (followed by podiatry, on gabapentin)last A1C 6.6% on Farxiga, off metformin. UTD with  diabetic eye exam due for cataract on the left   - SENIA - denies and not interested in treating   - Mild cognitive impairment and depression -recommended continued psychotherapy recommended repeat MoCA in 6 months with Dr. Perrin on sertraline on 25mg BID, followed by neuropsychiatry possible frontal lobe problem list followed by Dr. Andrade  - Right carpal tunnel syndrome followed by Dr. De Paz  - Strabismus status post remote eye muscle surgery  - Tubular adenoma April 2023 repeat 5 years  - Gout on allopurinol 100mg tried to reduce allopurinol dose with gouty flare.   - ED -followed by urology on Cialis 20 one half tab as needed  - Hearing loss now compliant with hearing aids   - HLD - on vascepa      Social:   - [employer] for many years.   - Lives at home with wife, member of [employer]. Has a son in [place] has a grandson who he has not seen yet.   -   Current Outpatient Medications   Medication Instructions    albuterol (ProAir HFA) 90 mcg/actuation inhaler 2 puffs, inhalation, Every 6 hours PRN    allopurinol (ZYLOPRIM) 100 mg, oral, Daily    amLODIPine (Norvasc) 10 mg tablet oral, Daily    aspirin 81 mg, oral, Daily    atorvastatin (LIPITOR) 80 mg, oral, Daily    b complex vitamins capsule 1 capsule, oral, Daily    clopidogrel (PLAVIX) 75 mg, oral, Daily    coenzyme Q-10 (CoQ-10) 100 mg capsule 1 capsule, oral, Daily    cyanocobalamin (Vitamin B-12) 1,000 mcg tablet 1 tablet, oral, Daily, As directed    dapagliflozin (Farxiga) 10 mg 1 tablet, oral, Daily before evening meal    famotidine (PEPCID) 20 mg, oral, Daily PRN    furosemide (LASIX) 40 mg, oral, Daily, As needed for weight gain greater than 3 pounds    gabapentin (NEURONTIN) 100 mg, oral, Nightly    icosapent ethyL (VASCEPA) 1 g, oral, 2 times daily (morning and late afternoon)    loratadine (CLARITIN) 10 mg, oral, 2 times daily, Take 1 tablet every morning and 1 tablet every evening    magnesium gluconate (Magonate) 27.5 mg  "magne- sium (500 mg) tablet 1 tablet, oral, Daily    metoprolol succinate XL (TOPROL-XL) 50 mg, oral, Daily    multivitamin tablet 1 tablet, oral, Daily    sacubitriL-valsartan (Entresto) 24-26 mg tablet 0.5 tablets, oral, 2 times daily    sertraline (Zoloft) 50 mg tablet TAKE ONE TABLET DAILY IN THE MORNING AFTER BREAKFAST.    tadalafil (CIALIS) 20 mg, oral, As needed    tadalafil (CIALIS) 5 mg, oral, Daily    tadalafil (CIALIS) 5 mg, oral, Daily        Objective     /71   Pulse 63   Temp 36.4 °C (97.5 °F)   Ht 1.6 m (5' 3\")   Wt 59.4 kg (131 lb)   SpO2 95%   BMI 23.21 kg/m²     Physical Exam  General: Appears comfortable, NAD, appropriate affect  HEENT: NCAT, EOMI, pupils symmetric, no conjunctival erythema   Neck: Supple, no LAD   Heart: RRR systolic murmur   Lungs: CTA bilaterally, no rhonchi, rales, or wheezes   Abdomen: Soft, NT/ND, no rebound or guarding, NABS   Extremities: no cyanosis or edema appreciated  Neuro: AAO x 3, answers questions appropriately, no FND, gait unremarkable   Right shoulder: Shoulder: no overt abnormalities on visual examination. No tenderness at AC joint or long head of the biceps tendon. Full ROM to active and passive external rotation. Empty can test negative. Positive internal and external rotation lag tests. Negative Almonte test.         Lab Results   Component Value Date    WBC 8.5 07/13/2024    HGB 11.2 (L) 07/13/2024    HCT 36.1 (L) 07/13/2024     07/13/2024    CHOL 114 05/06/2024    TRIG 149 05/06/2024    HDL 31.0 05/06/2024    ALT 34 09/24/2024    AST 33 09/24/2024     09/24/2024    K 4.8 09/24/2024     09/24/2024    CREATININE 1.79 (H) 09/24/2024    BUN 30 (H) 09/24/2024    CO2 26 09/24/2024    TSH 9.35 (H) 07/08/2024    INR 1.2 (H) 07/02/2024    HGBA1C 6.6 (H) 01/11/2024     Independently reviewed most recent bloodwork        Assessment/Plan     Subclavian artery stenosis   status post stent 3/24 on DAPT has stopped aspirin, strongly " recommended reinstitution to complete 1 year course.     CKD 3b with persistent proteinuria referred to nephrology with worsening of renal function  to CKD4, seen by nephrology 2-3 weeks ago with Dr. Meredith, secondary hyperparathyroidism    DM2   - with bilateral neuropathy (followed by podiatry, on gabapentin)last A1C 6.6% on Farxiga, off metformin. UTD with diabetic eye exam     Problem List Items Addressed This Visit       Type 2 diabetes mellitus    Overview     Comment on above: Controlled diabetes mellitus with diabetic nephropathy;         Stenosis of subclavian artery     Other Visit Diagnoses       Chronic right shoulder pain    -  Primary    Immunization due        Relevant Orders    Flu vaccine, trivalent, preservative free, HIGH-DOSE, age 65y+ (Fluzone) (Completed)    Pfizer COVID-19 vaccine, monovalent, age 12 years and older (30 mcg/0.3 mL) (Comirnaty) (Completed)    Tendinopathy of right rotator cuff        Relevant Orders    Referral to Physical Therapy              Followup 3 months

## 2024-11-07 NOTE — PATIENT INSTRUCTIONS
Ritesh,   Right shoulder   Voltaren gel or tylenol as needed   Physical therapy referral   Restart aspirin.

## 2024-11-08 ENCOUNTER — TELEPHONE (OUTPATIENT)
Dept: ENDOCRINOLOGY | Facility: CLINIC | Age: 74
End: 2024-11-08
Payer: MEDICARE

## 2024-11-08 NOTE — TELEPHONE ENCOUNTER
Telephone call received to this RN's direct phone line in the  [facility] office. Patient states he has received multiple phone calls providing this RN's direct phone line number. This RN to inform management of this, but if patient is unable to provide exactly who was calling him this RN is not able to help much. Staff message sent to clinical nurse manager to make her aware.    11/08/24 at 4:23 PM - Sabine Perez RN

## 2024-11-16 ENCOUNTER — HOSPITAL ENCOUNTER (OUTPATIENT)
Dept: RADIOLOGY | Facility: HOSPITAL | Age: 74
Discharge: HOME | End: 2024-11-16
Payer: MEDICARE

## 2024-11-16 DIAGNOSIS — K76.0 NON-ALCOHOLIC FATTY LIVER DISEASE: ICD-10-CM

## 2024-11-16 DIAGNOSIS — K74.60 CIRRHOSIS OF LIVER WITHOUT ASCITES, UNSPECIFIED HEPATIC CIRRHOSIS TYPE (MULTI): ICD-10-CM

## 2024-11-16 PROCEDURE — 76705 ECHO EXAM OF ABDOMEN: CPT | Performed by: STUDENT IN AN ORGANIZED HEALTH CARE EDUCATION/TRAINING PROGRAM

## 2024-11-16 PROCEDURE — 76705 ECHO EXAM OF ABDOMEN: CPT

## 2024-11-19 ENCOUNTER — LAB (OUTPATIENT)
Dept: LAB | Facility: LAB | Age: 74
End: 2024-11-19
Payer: MEDICARE

## 2024-11-19 DIAGNOSIS — K76.0 NON-ALCOHOLIC FATTY LIVER DISEASE: ICD-10-CM

## 2024-11-19 DIAGNOSIS — K74.60 CIRRHOSIS OF LIVER WITHOUT ASCITES, UNSPECIFIED HEPATIC CIRRHOSIS TYPE (MULTI): ICD-10-CM

## 2024-11-19 LAB
AFP SERPL-MCNC: <4 NG/ML (ref 0–9)
ALBUMIN SERPL BCP-MCNC: 4.2 G/DL (ref 3.4–5)
ALP SERPL-CCNC: 136 U/L (ref 33–136)
ALT SERPL W P-5'-P-CCNC: 20 U/L (ref 10–52)
ANION GAP SERPL CALC-SCNC: 12 MMOL/L (ref 10–20)
AST SERPL W P-5'-P-CCNC: 23 U/L (ref 9–39)
BASOPHILS # BLD AUTO: 0.05 X10*3/UL (ref 0–0.1)
BASOPHILS NFR BLD AUTO: 0.6 %
BILIRUB DIRECT SERPL-MCNC: 0.1 MG/DL (ref 0–0.3)
BILIRUB SERPL-MCNC: 0.4 MG/DL (ref 0–1.2)
BUN SERPL-MCNC: 35 MG/DL (ref 6–23)
CALCIUM SERPL-MCNC: 9 MG/DL (ref 8.6–10.3)
CHLORIDE SERPL-SCNC: 103 MMOL/L (ref 98–107)
CO2 SERPL-SCNC: 28 MMOL/L (ref 21–32)
CREAT SERPL-MCNC: 1.97 MG/DL (ref 0.5–1.3)
EGFRCR SERPLBLD CKD-EPI 2021: 35 ML/MIN/1.73M*2
EOSINOPHIL # BLD AUTO: 0.24 X10*3/UL (ref 0–0.4)
EOSINOPHIL NFR BLD AUTO: 3 %
ERYTHROCYTE [DISTWIDTH] IN BLOOD BY AUTOMATED COUNT: 15.7 % (ref 11.5–14.5)
GLUCOSE SERPL-MCNC: 125 MG/DL (ref 74–99)
HCT VFR BLD AUTO: 41.2 % (ref 41–52)
HGB BLD-MCNC: 13.1 G/DL (ref 13.5–17.5)
IMM GRANULOCYTES # BLD AUTO: 0.03 X10*3/UL (ref 0–0.5)
IMM GRANULOCYTES NFR BLD AUTO: 0.4 % (ref 0–0.9)
INR PPP: 1.2 (ref 0.9–1.1)
LYMPHOCYTES # BLD AUTO: 2.4 X10*3/UL (ref 0.8–3)
LYMPHOCYTES NFR BLD AUTO: 29.8 %
MCH RBC QN AUTO: 28.1 PG (ref 26–34)
MCHC RBC AUTO-ENTMCNC: 31.8 G/DL (ref 32–36)
MCV RBC AUTO: 88 FL (ref 80–100)
MONOCYTES # BLD AUTO: 0.94 X10*3/UL (ref 0.05–0.8)
MONOCYTES NFR BLD AUTO: 11.7 %
NEUTROPHILS # BLD AUTO: 4.39 X10*3/UL (ref 1.6–5.5)
NEUTROPHILS NFR BLD AUTO: 54.5 %
NRBC BLD-RTO: 0 /100 WBCS (ref 0–0)
PLATELET # BLD AUTO: 183 X10*3/UL (ref 150–450)
POTASSIUM SERPL-SCNC: 5.1 MMOL/L (ref 3.5–5.3)
PROT SERPL-MCNC: 7.1 G/DL (ref 6.4–8.2)
PROTHROMBIN TIME: 13 SECONDS (ref 9.8–12.8)
RBC # BLD AUTO: 4.66 X10*6/UL (ref 4.5–5.9)
SODIUM SERPL-SCNC: 138 MMOL/L (ref 136–145)
WBC # BLD AUTO: 8.1 X10*3/UL (ref 4.4–11.3)

## 2024-11-19 PROCEDURE — 80053 COMPREHEN METABOLIC PANEL: CPT

## 2024-11-19 PROCEDURE — 36415 COLL VENOUS BLD VENIPUNCTURE: CPT

## 2024-11-19 PROCEDURE — 82248 BILIRUBIN DIRECT: CPT

## 2024-11-19 PROCEDURE — 82105 ALPHA-FETOPROTEIN SERUM: CPT

## 2024-11-19 PROCEDURE — 85610 PROTHROMBIN TIME: CPT

## 2024-11-19 PROCEDURE — 85025 COMPLETE CBC W/AUTO DIFF WBC: CPT

## 2024-11-20 NOTE — H&P (VIEW-ONLY)
Primary Care Physician: Ofelia Mccracken DO  Date of Visit: 05/02/2024  9:40 AM EDT  [place] [facility] [place] [address]   Last office visit: 3/6/2024     Chief Complaint:     Follow-up left subclavian stent    HPI/Summary  Ritesh Garza is a 73 y.o. male who presents for followup cardiology evaluation.     The patient presents status post remote MI with longstanding multivessel CAD, moderate LV systolic dysfunction and compensated congestive heart failure.  The problem list includes chronic kidney disease.  In 2021, he was hospitalized with an episode of unresponsiveness, possibly absence seizures.  He was started on Keppra.  A 14-day monitor showed no atrial fibrillation and no pauses.  There was a long hospitalization in November, 2021 with possible TIA, also possible COVID-related delirium.  During that admission, the ejection fraction was measured at 30%.  Repeat echocardiogram on May 12, 2023 showed that the ejection fraction had recovered, estimated 40 to 45%.  Last year, we discontinued eplerenone because of hyperkalemia.  There was a long hospitalization in February, 2022 with left hip pain and swelling after a fall, with significant hematoma of the left leg.  In the hospital, there were episodes of ventricular tachycardia and chest pain.  This was thought to be in part related to metabolic acidosis and acute blood loss anemia.  Biomarkers were elevated.  He was seen by electrophysiology, was considered for ICD if no recovery of LV ejection fraction, which was measured at 30 to 35% in the hospital.  Cardiac catheterization was deferred because of acute on chronic kidney injury.  At discharge, he was loaded with amiodarone, started on amiodarone 200 mg daily, also started on furosemide and clopidogrel for ACS.  He was continued on aspirin, atorvastatin and Farxiga.  Amlodipine, losartan and metformin were all held, given the acute on chronic kidney injury.  We saw him in the office and discontinue daily  furosemide, utilize furosemide on an as-needed basis.    Catheterization on April 15, 2024 showed a patent left POP to LAD and D1, occluded vein grafts and severe native CAD with an 80% left main,  proximal LAD,  proximal circumflex,  proximal RCA.  LVEF 30 to 35%.  Severe left subclavian stenosis.  After evaluation, we elected to proceed with left subclavian stent, which was performed successfully on April 30, 2024.    The patient continues on DAPT with aspirin and Plavix, also high-dose atorvastatin, amlodipine, Farxiga, furosemide, Vascepa, metoprolol succinate, and amiodarone 200 mg daily.  Not currently on Entresto.  Recent creatinine showed improvement to 2.25 mg/dL.    He has not required furosemide.  His weight has gone up by only a few pounds in past 2 months.  He is feeling well, with no palpitations, no angina, no dyspnea with usual activities.  Not yet in cardiac rehabilitation.        Specialty Problems          Cardiology Problems    PAD (peripheral artery disease) (CMS-HCC)    History of coronary artery bypass graft     1992: CABG x5, [facility].         ASHD (arteriosclerotic heart disease)    Benign essential hypertension    Ischemic cardiomyopathy    History of acute inferior wall MI    Left ventricular systolic dysfunction, NYHA class 1    Congestive heart failure with right ventricular systolic dysfunction (Multi)    CAD (coronary artery disease)    Chest pain    HLD (hyperlipidemia)    Demand ischemia (Multi)    Ventricular tachycardia (Multi)    Acute on chronic systolic heart failure (Multi)    NSTEMI (non-ST elevated myocardial infarction) (Multi)    Subclavian arterial stenosis (CMS-HCC)        Past Medical History:   Diagnosis Date    Allergic     Asthma ([facility])     Diabetes mellitus (Multi) Over 20 years ago    Drug-induced gout, unspecified site 10/04/2019    Acute drug-induced gout    Encounter for screening for malignant neoplasm of colon 10/31/2022    Colon  cancer screening    Gout, unspecified     Acute gout    Heart disease Over 20 years sgo    Hypertension Over 20 years ago    Ocular pain, right eye 10/14/2018    Pain of right eye    Periorbital cellulitis 10/14/2018    Periorbital cellulitis    Personal history of colonic polyps 10/11/2017    History of colon polyps    Personal history of other diseases of the circulatory system 05/12/2021    History of angina pectoris    Personal history of other diseases of the circulatory system 05/12/2021    History of angina pectoris    Personal history of other diseases of the respiratory system 10/04/2019    History of acute bronchitis    Personal history of other drug therapy     History of influenza vaccination    Unspecified cirrhosis of liver (Multi) 11/07/2022    Cirrhosis, nonalcoholic          Past Surgical History:   Procedure Laterality Date    ADENOIDECTOMY  Childhood    CARDIAC CATHETERIZATION  Over 30 years ago    CARDIAC CATHETERIZATION N/A 4/15/2024    Procedure: Left And Right Heart Cath, With LV;  Surgeon: Blaine Adams MD;  Location: [facility] Cardiac [facility] Lab;  Service: Cardiovascular;  Laterality: N/A;  3 hr hydration / arrive 6:30    CHOLECYSTECTOMY  Over 30 years ago    CIRCUMCISION, PRIMARY  72,years ago    CORONARY ARTERY BYPASS GRAFT  12/02/2021    CABG    EYE SURGERY  Childhood    INVASIVE VASCULAR PROCEDURE Left 4/30/2024    Procedure: Angioplasty - Upper Extremity;  Surgeon: Narinder Quevedo MD;  Location: [facility] Cardiac [facility];  Service: Cardiovascular;  Laterality: Left;  3 hr hydration  / arrive 6:30    MR HEAD ANGIO WO IV CONTRAST  06/02/2021    MR HEAD ANGIO WO IV CONTRAST 6/2/2021 [facility] CL[facility]ICAL LEGAC[facility]    MR HEAD ANGIO WO IV CONTRAST  11/16/2021    MR HEAD ANGIO WO IV CONTRAST 11/16/2021 [facility] CL[facility]ICAL LEGAC[facility]    MR NECK ANGIO WO IV CONTRAST  06/02/2021    MR NECK ANGIO WO IV CONTRAST 6/2/2021 [facility] CLINICAL LEGAC[facility]    MR NECK ANGIO WO IV CONTRAST  11/16/2021    MR NECK ANGIO WO IV CONTRAST 11/16/2021 [facility]  CLINICAL LEGACY            Social History     Tobacco Use    Smoking status: Never    Smokeless tobacco: Never   Substance Use Topics    Alcohol use: Never    Drug use: Never        Physical Activity: Sufficiently Active (11/2/2023)    Exercise Vital Sign     Days of Exercise per Week: 5 days     Minutes of Exercise per Session: 40 min            No Known Allergies      Current Outpatient Medications   Medication Instructions    acetaminophen (TYLENOL) 650 mg, oral, Every 6 hours PRN    albuterol (ProAir HFA) 90 mcg/actuation inhaler 2 puffs, inhalation, Every 6 hours PRN    allopurinol (ZYLOPRIM) 50 mg, oral, Daily    amiodarone (Pacerone) 200 mg tablet Take 2 tablets (400 mg) by mouth 2 times a day for 9 doses. On February, 28th start to take 1 tablet (200 mg) by mouth once daily.    amLODIPine (NORVASC) 10 mg, oral, Daily    aspirin 81 mg, oral, Daily    atorvastatin (LIPITOR) 80 mg, oral, Daily    b complex vitamins capsule 1 capsule, Daily    clopidogrel (PLAVIX) 75 mg, oral, Daily    coenzyme Q-10 (CoQ-10) 100 mg capsule 1 capsule, oral, Daily    cyanocobalamin (Vitamin B-12) 1,000 mcg tablet 1 tablet, Daily, As directed    dapagliflozin (Farxiga) 10 mg 1 tablet, oral, Daily before breakfast    famotidine (PEPCID) 20 mg, oral, Daily PRN    furosemide (LASIX) 40 mg, oral, Daily, As needed for weight gain greater than 3 pounds    gabapentin (NEURONTIN) 100 mg, oral, Nightly    icosapent ethyL (VASCEPA) 1 g, oral, 2 times daily with meals    loratadine (CLARITIN) 10 mg, oral, Daily    magnesium gluconate (Magonate) 27.5 mg magne- sium (500 mg) tablet 2 tablets, oral, Daily    metoprolol succinate XL (TOPROL-XL) 50 mg, oral, Daily    multivitamin tablet 1 tablet, oral, Daily    pantoprazole (ProtoNix) 40 mg EC tablet 1 tablet, oral, Daily    sertraline (Zoloft) 50 mg tablet Take one tablet daily in the morning after breakfast.    tadalafil (CIALIS) 5 mg, oral, Daily    turmeric-turmeric root extract 450-50 mg  capsule Take daily as directed.        ROS     Vital Signs:  Vitals:    05/02/24 0954   BP: 135/64   BP Location: Left arm   Patient Position: Sitting   BP Cuff Size: Adult   Pulse: 55   Resp: 20   SpO2: 96%   Weight: 67.7 kg (149 lb 3.2 oz)     Wt Readings from Last 2 Encounters:   05/02/24 67.7 kg (149 lb 3.2 oz)   04/30/24 68 kg (149 lb 14.4 oz)     Body mass index is 26.43 kg/m².       Physical Exam:    Brief examination shows that he is alert and oriented, not in any pain, not in any respiratory distress.  Clear lung fields.  Regular heart sounds.  No S3 noted today.  No peripheral edema.  Otherwise, not examined.     Last Labs:  CMP:  Recent Labs     04/30/24  1030 04/30/24  0933 04/10/24  0906 03/04/24  0945 02/23/24  0808 02/22/24  0432   *  --  139 137 137 138   K 4.7  --  4.5 4.9 3.4* 3.5     --  104 100 100 102   CO2 23  --  25 28 25 25   ANIONGAP 10  --  15 14 15 15   BUN 38*  --  42* 39* 59* 67*   CREATININE 2.25*  --  2.40* 2.53* 2.51* 2.67*   EGFR 30* 52* 27* 26* 26* 24*   GLUCOSE 155*  --  170* 199* 230* 213*     Recent Labs     02/23/24  0808 02/21/24  0358 02/20/24  0528 02/15/24  1429 02/15/24  0407 02/14/24  0429 02/13/24  2214 01/11/24  1336 10/21/23  1125   ALBUMIN 3.3* 3.1* 3.2* 3.4 3.0*   < > 3.1* 4.3 4.1   ALKPHOS  --   --  60  --  58  --  70 92 91   ALT  --   --  109*  --  17  --  17 24 33   AST  --   --  99*  --  48*  --  15 31 28   BILITOT  --   --  1.2  --  0.6  --  0.3 0.5 0.5    < > = values in this interval not displayed.     CBC:  Recent Labs     04/10/24  0906 03/04/24  0945 02/23/24  0808 02/22/24  0432 02/21/24  0358   WBC 8.4 8.6 12.6* 11.6* 14.7*   HGB 14.7 14.4 12.0* 10.1* 10.2*   HCT 44.0 46.0 35.1* 31.6* 31.7*    355 266 217 216   MCV 95 98 90 92 93     COAG:   Recent Labs     02/20/24  0528 02/13/24  2214 07/31/23  1110 01/27/22  1241 11/15/21  2217   INR 2.1* 1.3* 1.1 1.1 1.2*     HEME/ENDO:  Recent Labs     02/17/24  0535 02/15/24  0407 01/11/24  1336  07/31/23  1110 05/04/23  1215 08/18/22  0940 11/18/21  0530 06/01/21  0525 05/31/21 2021   IRONSAT 8*  --   --   --   --   --   --   --   --    TSH  --  3.26  --   --   --  3.73 1.32  --  2.54   HGBA1C  --   --  6.6* 7.0* 6.8* 6.6*  --    < >  --     < > = values in this interval not displayed.      CARDIAC:   Recent Labs     02/18/24  1826 02/18/24  1730 02/18/24  0419 02/14/24  2111 02/14/24  1720 02/14/24  1502 02/13/24  2214 05/04/23  1215 09/14/22 2025 12/02/21  0947 11/15/21  2217 05/31/21 2021   TROPHS 4,490* 4,518*  --  635* 258* 377*   < >  --    < >  --   --   --    BNP  --   --  2,116*  --   --   --   --  203*  --  108* 139* 536*    < > = values in this interval not displayed.     Recent Labs     11/13/23  1041 07/31/23  1110 05/04/23  1215 05/05/22  1450   CHOL 102 128 143 144   LDLF  --  57 56 36   HDL 25.8 32.3* 37.1* 35.3*   TRIG 203* 192* 252* 362*       Last Cardiology Tests:    ECG:    Not performed today.    Echo:  Echo Results:  Transthoracic Echo (TTE) Limited With Contrast 04/26/2024    [place] [address]  Phone [phone] Fax [phone]    TRANSTHORACIC ECHOCARDIOGRAM REPORT      Patient Name:      CHIDI Andrade Physician:    [id number] Blaine Adams MD  Study Date:        4/26/2024            Ordering Provider:    [id number] HUMBERTO SELLERS  MRN/PID:           [id number]             Fellow:  Accession#:        [id number]         Nurse:                Christina Chacon RN  Date of Birth/Age: 1950 / 73 years Sonographer:          Nikole Porter RDCS  Gender:            M                    Additional Staff:  Height:            152.40 cm            Admit Date:  Weight:            65.32 kg             Admission Status:     Outpatient  BSA / BMI:         1.62 m2 / 28.12      Department Location:  [facility] Echo  kg/m2                                      Lab  Blood Pressure: 163 /74 mmHg    Study Type:    TRANSTHORACIC ECHO  (TTE) LIMITED  Diagnosis/ICD: Acute combined systolic (congestive) and diastolic (congestive)  heart failure (CHF)-I50.41; Non ST elevation (NSTEMI) myocardial  infarction-I21.4; Ventricular tachycardia, other-I47.29; Heart  disease, unspecified-I51.9  Indication:    CHF, NSTEMI, VT, LVD  CPT Codes:     Echo Limited-79509; Doppler Limited-36677; Color Doppler-89940    Patient History:  Diabetes:          Yes  Pertinent History: CHF, Cancer, HTN and TIA.    Study Detail: The following Echo studies were performed: 2D, Doppler and color  flow. Optison used as a contrast agent for endocardial border  definition. Total contrast used for this procedure was 5 mL via IV  push. Unable to obtain subcostal and suprasternal notch view.      PHYSICIAN INTERPRETATION:  Left Ventricle: Left ventricular systolic function is moderately decreased, with an estimated ejection fraction of 30-35%. Wall motion is abnormal. The left ventricular cavity size is mildly dilated. Spectral Doppler shows a restrictive pattern of left ventricular diastolic filling. There is an elevated left ventricular end diastolic pressure.  LV Wall Scoring:  The basal and mid inferior septum, basal and mid inferior wall, and basal and  mid inferolateral wall are akinetic. The basal anteroseptal segment, apical  septal segment, and apical inferior segment are hypokinetic. All remaining  scored segments are normal.    Left Atrium: The left atrium was not assessed.  Right Ventricle: The right ventricle was not assessed. Right ventricular systolic function not assessed.  Right Atrium: The right atrium was not assessed.  Aortic Valve: The aortic valve is trileaflet. There is mild aortic valve cusp calcification. There is no evidence of aortic valve regurgitation.  Mitral Valve: The mitral valve is mildly thickened. There is moderately decreased mitral valve posterior leaflet mobility. There is moderate mitral valve regurgitation.  Tricuspid Valve: The tricuspid  valve is structurally normal. There is mild to moderate tricuspid regurgitation. The Doppler estimated RVSP is mildly elevated at 35.7 mmHg.  Pulmonic Valve: The pulmonic valve was not assessed. The pulmonic valve regurgitation was not assessed.  Pericardium: There is no pericardial effusion noted.  Aorta: The aortic root was not assessed.      CONCLUSIONS:  1. Left ventricular systolic function is moderately decreased with a 30-35% estimated ejection fraction.  2. Multiple segmental abnormalities exist. See findings.  3. Spectral Doppler shows a restrictive pattern of left ventricular diastolic filling.  4. There is an elevated left ventricular end diastolic pressure.  5. Moderate mitral valve regurgitation.  6. Moderately decreased mitral valve posterior leaflet mobility.  7. Mild to moderate tricuspid regurgitation.  8. Mildly elevated RVSP.    QUANTITATIVE DATA SUMMARY:  2D MEASUREMENTS:  Normal Ranges:  IVSd:          1.16 cm    (0.6-1.1cm)  LVPWd:         1.04 cm    (0.6-1.1cm)  LVIDd:         5.17 cm    (3.9-5.9cm)  LVIDs:         4.87 cm  LV Mass Index: 134.2 g/m2  LV % FS        5.8 %    LV SYSTOLIC FUNCTION BY 2D PLANIMETRY (MOD):  Normal Ranges:  EF-A4C View: 36.9 % (>=55%)  EF-A2C View: 28.7 %  EF-Biplane:  29.8 %    LV DIASTOLIC FUNCTION:  Normal Ranges:  MV Peak E:    1.13 m/s   (0.7-1.2 m/s)  MV Peak A:    0.28 m/s   (0.42-0.7 m/s)  E/A Ratio:    3.98       (1.0-2.2)  MV e'         0.04 m/s   (>8.0)  MV lateral e' 0.04 m/s  MV medial e'  0.04 m/s  MV A Dur:     88.49 msec  E/e' Ratio:   28.16      (<8.0)  LV IVRT:      74 msec    (<100ms)    MITRAL VALVE:  Normal Ranges:  MV DT: 171 msec (150-240msec)    AORTIC VALVE:  Normal Ranges:  LVOT Max Matt:  1.02 m/s (<=1.1m/s)  LVOT VTI:      25.00 cm  LVOT Diameter: 2.03 cm  (1.8-2.4cm)    TRICUSPID VALVE/RVSP:  Normal Ranges:  Peak TR Velocity: 2.86 m/s  RV Syst Pressure: 35.7 mmHg (< 30mmHg)      [id number] Blaine Adams MD  Electronically signed on 4/28/2024  at 11:13:11 AM      Wall Scoring        ** Final **      Transthoracic Echo (TTE) Complete With Contrast 02/15/2024    [place] [address]  Phone [phone] Fax [phone]    TRANSTHORACIC ECHOCARDIOGRAM REPORT      Patient Name:      CHIDI VARGAS      Reading Physician:    [id number] Gonzalo Christopher CONRAD  Study Date:        2/15/2024             Ordering Provider:    [id number] TITO NICOLAS  MRN/PID:           [id number]              Fellow:  Accession#:        [id number]          Nurse:  Date of Birth/Age: 1950 / 73 years  Sonographer:          Heena David RDCS  Gender:            M                     Additional Staff:  Height:            160.02 cm             Admit Date:           2/13/2024  Weight:            73.03 kg              Admission Status:     Inpatient -  Routine  BSA / BMI:         1.76 m2 / 28.52 kg/m2 Department Location:  [facility] Echo  Lab  Blood Pressure: 105 /60 mmHg    Study Type:    TRANSTHORACIC ECHO (TTE) COMPLETE  Diagnosis/ICD: Chest pain, unspecified-R07.9  Indication:    Chest Pain  CPT Codes:     Echo Complete w Full Doppler-19186  Study Detail: The following Echo studies were performed: 2D, M-Mode, Doppler and  color flow. Optison used as a contrast agent for endocardial  border definition. Total contrast used for this procedure was 3 mL  via IV push.      PHYSICIAN INTERPRETATION:  Left Ventricle: Left ventricular systolic function is normal, with an estimated ejection fraction of 30-35%. There are multiple wall motion abnormalities. The left ventricular cavity size is upper limits of normal. Spectral Doppler shows a restrictive pattern of left ventricular diastolic filling.  LV Wall Scoring:  The basal inferoseptal segment and basal inferior segment are akinetic. The  entire anterior septum, mid and apical inferior septum, basal and mid  inferolateral wall, basal anterolateral segment, basal anterior segment, and  mid  inferior segment are hypokinetic. All remaining scored segments are normal.    Left Atrium: The left atrium is normal in size.  Right Ventricle: The right ventricle was not well visualized. There is low normal right ventricular systolic function.  Right Atrium: The right atrium is normal in size.  Aortic Valve: The aortic valve is trileaflet. There is mild aortic valve thickening. There is no evidence of aortic valve regurgitation. The peak instantaneous gradient of the aortic valve is 7.1 mmHg. The mean gradient of the aortic valve is 4.0 mmHg.  Mitral Valve: The mitral valve is normal in structure. There is mild mitral valve regurgitation.  Tricuspid Valve: The tricuspid valve is structurally normal. There is mild to moderate tricuspid regurgitation.  Pulmonic Valve: The pulmonic valve is structurally normal. There is physiologic pulmonic valve regurgitation.  Pericardium: There is no pericardial effusion noted.  Aorta: The aortic root is normal.      CONCLUSIONS:  1. Left ventricular systolic function is normal with a 30-35% estimated ejection fraction.  2. Multiple segmental abnormalities exist. See findings.  3. Spectral Doppler shows a restrictive pattern of left ventricular diastolic filling.  4. There is low normal right ventricular systolic function.  5. Mild to moderate tricuspid regurgitation.  6. There are multiple wall motion abnormalities.    QUANTITATIVE DATA SUMMARY:  2D MEASUREMENTS:  Normal Ranges:  Ao Root d:     2.90 cm    (2.0-3.7cm)  LAs:           4.60 cm    (2.7-4.0cm)  IVSd:          1.00 cm    (0.6-1.1cm)  LVPWd:         0.90 cm    (0.6-1.1cm)  LVIDd:         5.30 cm    (3.9-5.9cm)  LVIDs:         5.20 cm  LV Mass Index: 106.2 g/m2  LV % FS        1.9 %    LA VOLUME:  Normal Ranges:  LA Vol A4C:        53.0 ml    (22+/-6mL/m2)  LA Vol A2C:        47.8 ml  LA Vol BP:         50.3 ml  LA Vol Index A4C:  30.0ml/m2  LA Vol Index A2C:  27.1 ml/m2  LA Vol Index BP:   28.5 ml/m2  LA Area A4C:        18.0 cm2  LA Area A2C:       17.1 cm2  LA Major Axis A4C: 5.2 cm  LA Major Axis A2C: 5.2 cm  LA Volume Index:   26.8 ml/m2    M-MODE MEASUREMENTS:  Normal Ranges:  Ao Root: 2.90 cm (2.0-3.7cm)    AORTA MEASUREMENTS:  Normal Ranges:  Asc Ao, d: 2.50 cm (2.1-3.4cm)    LV SYSTOLIC FUNCTION BY 2D PLANIMETRY (MOD):  Normal Ranges:  EF-A4C View: 29.3 % (>=55%)  EF-A2C View: 43.2 %  EF-Biplane:  35.2 %    LV DIASTOLIC FUNCTION:  Normal Ranges:  MV Peak E:    1.29 m/s (0.7-1.2 m/s)  MV Peak A:    0.50 m/s (0.42-0.7 m/s)  E/A Ratio:    2.56     (1.0-2.2)  MV e'         0.06 m/s (>8.0)  MV lateral e' 0.06 m/s  MV medial e'  0.06 m/s  E/e' Ratio:   21.50    (<8.0)    MITRAL VALVE:  Normal Ranges:  MV DT: 151 msec (150-240msec)    MITRAL INSUFFICIENCY:  Normal Ranges:  PISA Radius:  0.5 cm  MR VTI:       129.00 cm  MR Vmax:      437.00 cm/s  MR Alias Matt: 38.5 cm/s  MR Volume:    17.85 ml  MR Flow Rt:   60.48 ml/s  MR EROA:      0.14 cm2    AORTIC VALVE:  Normal Ranges:  AoV Vmax:                1.33 m/s (<=1.7m/s)  AoV Peak P.1 mmHg (<20mmHg)  AoV Mean P.0 mmHg (1.7-11.5mmHg)  LVOT Max Matt:            1.04 m/s (<=1.1m/s)  AoV VTI:                 25.10 cm (18-25cm)  LVOT VTI:                20.70 cm  LVOT Diameter:           2.00 cm  (1.8-2.4cm)  AoV Area, VTI:           2.59 cm2 (2.5-5.5cm2)  AoV Area,Vmax:           2.46 cm2 (2.5-4.5cm2)  AoV Dimensionless Index: 0.82      RIGHT VENTRICLE:  TAPSE: 14.1 mm  RV s'  0.09 m/s    TRICUSPID VALVE/RVSP:  Normal Ranges:  Peak TR Velocity: 2.44 m/s  RV Syst Pressure: 26.8 mmHg (< 30mmHg)  IVC Diam:         1.50 cm      [id number] Gonzalo White DO  Electronically signed on 2/15/2024 at 11:57:27 AM      Wall Scoring        ** Final **       Cath:      Stress Test:  Stress Results:  No results found for this or any previous visit from the past 365 days.         Cardiac Imaging:        Assessment/Plan     Complex problem list reviewed.  After trauma to the  left thigh with blood loss anemia, developed acute kidney injury, demand ischemia, ventricular tachycardia, significant LV dysfunction and decompensated heart failure.  Catheterization showed occlusion of the native circulation, and a patent mammary graft to the LAD and D1, and occlusion of all vein grafts.  A severe left subclavian stenosis was identified, the likely cause of his acute ischemia in the setting of blood loss anemia and hemodynamic compromise.  2 days ago, he underwent subclavian stent.    He will require DAPT for 1 year.  We will likely initiate Entresto once renal function has stabilized.  He did develop hyperkalemia with both spironolactone and losartan, so we will need to be very careful, monitoring potassium.  For now, we will hold off on the Entresto until he is seen by electrophysiology for assessment of long-term antiarrhythmic drug therapy, or secondary prevention ICD.  Will see him again in 6 weeks.  We encouraged him to walk for exercise, 30 minutes 5 days weekly.    Orders:  Orders Placed This Encounter   Procedures    Renal Function Panel    Referral to Cardiac Electrophysiology      Followup Appts:  Future Appointments   Date Time Provider Department Center   5/14/2024  7:00 AM [facility] ULTRASOUND 2 [facility] [place]   5/16/2024 12:00 PM Blaine Adams MD [id number] [facility]   5/23/2024  8:20 AM Nate Duran MD [id number] [facility]   8/22/2024 12:40 PM Ofelia Mccracken DO [id number] [facility]   9/5/2024  1:00 PM Alyse Holland, PhD [id number] [facility]   9/18/2024  3:30 PM Meme Perrin MD [id number] [facility]   11/25/2024  9:50 AM Jeanmarie Ibarra MD AHUURO [facility]           ____________________________________________________________  Jose Hennessy MD    Senior Attending Physician  [facility]  [facility] Chair for Cardiovascular Excellence  [employer]   [employer]

## 2024-11-21 ENCOUNTER — OFFICE VISIT (OUTPATIENT)
Dept: GASTROENTEROLOGY | Facility: CLINIC | Age: 74
End: 2024-11-21
Payer: MEDICARE

## 2024-11-21 VITALS
DIASTOLIC BLOOD PRESSURE: 68 MMHG | TEMPERATURE: 97.4 F | HEART RATE: 63 BPM | WEIGHT: 130 LBS | BODY MASS INDEX: 23.92 KG/M2 | SYSTOLIC BLOOD PRESSURE: 121 MMHG | HEIGHT: 62 IN

## 2024-11-21 DIAGNOSIS — K74.60 CIRRHOSIS OF LIVER WITHOUT ASCITES, UNSPECIFIED HEPATIC CIRRHOSIS TYPE (MULTI): ICD-10-CM

## 2024-11-21 DIAGNOSIS — K76.0 NON-ALCOHOLIC FATTY LIVER DISEASE: Primary | ICD-10-CM

## 2024-11-21 DIAGNOSIS — Z71.2 ENCOUNTER TO DISCUSS TEST RESULTS: ICD-10-CM

## 2024-11-21 PROCEDURE — 1159F MED LIST DOCD IN RCRD: CPT | Performed by: INTERNAL MEDICINE

## 2024-11-21 PROCEDURE — G2211 COMPLEX E/M VISIT ADD ON: HCPCS | Performed by: INTERNAL MEDICINE

## 2024-11-21 PROCEDURE — 1157F ADVNC CARE PLAN IN RCRD: CPT | Performed by: INTERNAL MEDICINE

## 2024-11-21 PROCEDURE — 3074F SYST BP LT 130 MM HG: CPT | Performed by: INTERNAL MEDICINE

## 2024-11-21 PROCEDURE — 1160F RVW MEDS BY RX/DR IN RCRD: CPT | Performed by: INTERNAL MEDICINE

## 2024-11-21 PROCEDURE — 3078F DIAST BP <80 MM HG: CPT | Performed by: INTERNAL MEDICINE

## 2024-11-21 PROCEDURE — 99214 OFFICE O/P EST MOD 30 MIN: CPT | Performed by: INTERNAL MEDICINE

## 2024-11-21 PROCEDURE — 1126F AMNT PAIN NOTED NONE PRSNT: CPT | Performed by: INTERNAL MEDICINE

## 2024-11-21 ASSESSMENT — ENCOUNTER SYMPTOMS
DEPRESSION: 0
OCCASIONAL FEELINGS OF UNSTEADINESS: 1
LOSS OF SENSATION IN FEET: 0

## 2024-11-21 ASSESSMENT — PAIN SCALES - GENERAL: PAINLEVEL_OUTOF10: 0-NO PAIN
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
## 2024-11-21 NOTE — PATIENT INSTRUCTIONS
"Welcome to Dr. Nate Duran's [facility].  Dr. Duran sees patients at the following sites:  [place] [address] Liver/GI Clinic at [facility]  [address], [address] at [facility] at [facility], [facility] [address]    Dr. Duran's hepatology care coordinator, Leanne SORIANO, can be reached at [phone].  Dr. Duran's , Gaviota Garcia, can be reached at [phone].    Get blood work in 6 months (May 2025).    Get a Liver Ultrasound in 6 months.  Do not eat or drink anything 6 hours prior to your exam.  Call [phone] to schedule.    Get a Fibroscan of your Liver in 6 months.  Do not eat or drink anything 3 hours prior to your exam.   Schedule on the way out from your visit today, or call [phone].  When calling and after prompts, state \"make an appointment,\" and then \"gastro\" to get to the appropriate .    Follow up with me in clinic in 6 months.  Schedule your visit on your way out today. If unable, please call [phone] to schedule.  "

## 2024-11-23 ENCOUNTER — APPOINTMENT (OUTPATIENT)
Dept: RADIOLOGY | Facility: HOSPITAL | Age: 74
End: 2024-11-23
Payer: MEDICARE

## 2024-11-24 NOTE — PROGRESS NOTES
HPI  72 yo M who presents today for erectile dysfunction. DM       Last visit 11/27/23:  #Erectile Dysfunction   -Testosterone level normal  -continue cialis 20mg prn, refilled today - med counseling reviewed  -reviewed warning signs     fu in 1yr with CNP.      Todays visit:  #Erectile Dysfunction   -Cialis 20mg --> working well but doesn't like waiting  - wants to try Sildenafil now  -denies any s/e    Had a stent placed 3/24  Primary care, cardiology notes reviewed  -not taking ntg (has prescription)    Lab Results   Component Value Date    TESTOSTERONE 507 08/29/2023     Current Medications:  Current Outpatient Medications   Medication Sig Dispense Refill    albuterol (ProAir HFA) 90 mcg/actuation inhaler Inhale 2 puffs every 6 hours if needed for wheezing or shortness of breath. 18 g 11    allopurinol (Zyloprim) 100 mg tablet Take 1 tablet (100 mg) by mouth once daily. 90 tablet 1    amLODIPine (Norvasc) 10 mg tablet Take by mouth once daily.      aspirin 81 mg EC tablet TAKE 1 TABLET BY MOUTH EVERY DAY 90 tablet 3    atorvastatin (Lipitor) 80 mg tablet TAKE 1 TABLET BY MOUTH EVERY DAY 90 tablet 3    b complex vitamins capsule Take 1 capsule by mouth once daily.      clopidogrel (Plavix) 75 mg tablet Take 1 tablet (75 mg) by mouth once daily. 90 tablet 3    coenzyme Q-10 (CoQ-10) 100 mg capsule Take 1 capsule (100 mg) by mouth once daily.      cyanocobalamin (Vitamin B-12) 1,000 mcg tablet Take 1 tablet (1,000 mcg) by mouth once daily. As directed      dapagliflozin (Farxiga) 10 mg Take 1 tablet (10 mg) by mouth once daily in the evening.  Take before meals.      famotidine (Pepcid) 20 mg tablet TAKE 1 TABLET (20 MG) BY MOUTH ONCE DAILY AS NEEDED FOR INDIGESTION. 90 tablet 1    furosemide (Lasix) 40 mg tablet Take 1 tablet (40 mg) by mouth once daily. As needed for weight gain greater than 3 pounds 90 tablet 3    gabapentin (Neurontin) 100 mg capsule Take 1 capsule (100 mg) by mouth once daily at bedtime. 90  capsule 1    icosapent ethyL (Vascepa) 0.5 gram capsule Take 2 capsules (1 g) by mouth 2 times a day with meals. 120 capsule 11    loratadine (Claritin) 10 mg tablet Take 1 tablet (10 mg) by mouth 2 times a day. Take 1 tablet every morning and 1 tablet every evening      magnesium gluconate (Magonate) 27.5 mg magne- sium (500 mg) tablet Take 1 tablet (27.5 mg) by mouth once daily.      metoprolol succinate XL (Toprol-XL) 50 mg 24 hr tablet Take 1 tablet (50 mg) by mouth once daily. 90 tablet 1    multivitamin tablet Take 1 tablet by mouth once daily.      sacubitriL-valsartan (Entresto) 24-26 mg tablet Take 0.5 tablets by mouth 2 times a day. 30 tablet 11    sertraline (Zoloft) 50 mg tablet TAKE ONE TABLET DAILY IN THE MORNING AFTER BREAKFAST. 90 tablet 1    tadalafil (Cialis) 20 mg tablet Take 1 tablet (20 mg) by mouth if needed for erectile dysfunction (Start with half tab. Take 2 hours prior to wanting erection.If you get an erection over 4 hours, go to the emergency room.). 30 tablet 3    tadalafil (Cialis) 5 mg tablet TAKE 1 TABLET BY MOUTH EVERY DAY 90 tablet 0    tadalafil (Cialis) 5 mg tablet Take 1 tablet (5 mg) by mouth once daily. 30 tablet 3     No current facility-administered medications for this visit.        PMH:  Past Medical History:   Diagnosis Date    Allergic     Asthma     Coronary artery disease Long Ago    Diabetes mellitus (Multi) Over 20 years ago    Drug-induced gout, unspecified site 10/04/2019    Acute drug-induced gout    Encounter for screening for malignant neoplasm of colon 10/31/2022    Colon cancer screening    Gout, unspecified     Acute gout    Heart disease Over 20 years sgo    Heart murmur Long ago    Hypertension Over 20 years ago    Myocardial infarction (Multi) Over 20 yesrs ago    Ocular pain, right eye 10/14/2018    Pain of right eye    Periorbital cellulitis 10/14/2018    Periorbital cellulitis    Personal history of colonic polyps 10/11/2017    History of colon polyps     Personal history of other diseases of the circulatory system 05/12/2021    History of angina pectoris    Personal history of other diseases of the circulatory system 05/12/2021    History of angina pectoris    Personal history of other diseases of the respiratory system 10/04/2019    History of acute bronchitis    Personal history of other drug therapy     History of influenza vaccination    Type 2 diabetes mellitus     Unspecified cirrhosis of liver (Multi) 11/07/2022    Cirrhosis, nonalcoholic       PSH:  Past Surgical History:   Procedure Laterality Date    ADENOIDECTOMY  Childhood    CARDIAC CATHETERIZATION  Over 30 years ago    CARDIAC CATHETERIZATION N/A 04/15/2024    Procedure: Left And Right Heart Cath, With LV;  Surgeon: Blaine Adams MD;  Location: [facility];  Service: Cardiovascular;  Laterality: N/A;  3 hr hydration / arrive 6:30    CARDIAC DEFIBRILLATOR PLACEMENT      CARDIAC ELECTROPHYSIOLOGY PROCEDURE N/A 05/30/2024    Procedure: ICD Dual Implant;  Surgeon: Mina Kiran MD;  Location: [facility];  Service: Electrophysiology;  Laterality: N/A;  Dual chamber ICD, [facility]  notified [facility] 5/24/24    CHOLECYSTECTOMY  Over 30 years ago    CIRCUMCISION, PRIMARY  72,years ago    CORONARY ARTERY BYPASS GRAFT  12/02/2021    [facility]    CORONARY STENT PLACEMENT  Last week    EYE SURGERY  Childhood    INVASIVE VASCULAR PROCEDURE Left 04/30/2024    Procedure: Angioplasty - Upper Extremity;  Surgeon: Narinder Quevedo MD;  Location: [facility];  Service: Cardiovascular;  Laterality: Left;  3 hr hydration  / arrive 6:30    MR HEAD ANGIO WO IV CONTRAST  06/02/2021    MR HEAD ANGIO WO IV CONTRAST 6/2/2021 [facility] CLINICAL LEGACY    MR HEAD ANGIO WO IV CONTRAST  11/16/2021    MR HEAD ANGIO WO IV CONTRAST 11/16/2021 [facility] CLINICAL LEGACY    MR NECK ANGIO WO IV CONTRAST  06/02/2021    MR NECK ANGIO WO IV CONTRAST 6/2/2021 [facility] CLINICAL LEGACY    MR NECK ANGIO WO IV CONTRAST   11/16/2021    MR NECK ANGIO WO IV CONTRAST 11/16/2021 [facility] CLINICAL LEGACY       FM:  Family History   Problem Relation Name Age of Onset    Diabetes Mother Sofia Garza     Other (mycoardial infarction) Father Hilario 46    Fainting Father Hilario     Heart disease Father Hilario     Stomach cancer Mother's Sister      Stroke Maternal Grandmother Jasmina Mace     Colon cancer Maternal Grandmother Jasmina Mace        SHx:  Social History     Tobacco Use    Smoking status: Never    Smokeless tobacco: Never   Vaping Use    Vaping status: Never Used   Substance Use Topics    Alcohol use: Never    Drug use: Never       Allergies:  No Known Allergies    Physical Exam   NAD  Nonlabored breathing  Abd nondistended    Assessment/Plan  #Erectile Dysfunction   -Testosterone level normal  -will switch to Sildenafil 100mg,  med counseling reviewed   -discussed in detail cannot take Cialis and sildenafil at the same time  -denies ntg - discussed in detail cannot take this with PDE5i's  -reviewed warning signs     fu in 1yr with Avis Eddy Attestation  By signing my name below, ITara Scribe   attest that this documentation has been prepared under the direction and in the presence of Jeanmarie Ibarra MD.

## 2024-11-25 ENCOUNTER — OFFICE VISIT (OUTPATIENT)
Dept: UROLOGY | Facility: HOSPITAL | Age: 74
End: 2024-11-25
Payer: MEDICARE

## 2024-11-25 DIAGNOSIS — N52.9 ERECTILE DYSFUNCTION, UNSPECIFIED ERECTILE DYSFUNCTION TYPE: Primary | ICD-10-CM

## 2024-11-25 PROCEDURE — 99214 OFFICE O/P EST MOD 30 MIN: CPT | Performed by: UROLOGY

## 2024-11-25 PROCEDURE — 1157F ADVNC CARE PLAN IN RCRD: CPT | Performed by: UROLOGY

## 2024-11-25 RX ORDER — SILDENAFIL 100 MG/1
100 TABLET, FILM COATED ORAL AS NEEDED
Qty: 30 TABLET | Refills: 6 | Status: SHIPPED | OUTPATIENT
Start: 2024-11-25

## 2024-11-26 ENCOUNTER — HOSPITAL ENCOUNTER (OUTPATIENT)
Dept: CARDIOLOGY | Facility: HOSPITAL | Age: 74
Discharge: HOME | End: 2024-11-26
Payer: MEDICARE

## 2024-11-26 DIAGNOSIS — I47.29 OTHER VENTRICULAR TACHYCARDIA: ICD-10-CM

## 2024-11-26 DIAGNOSIS — Z95.810 PRESENCE OF AUTOMATIC (IMPLANTABLE) CARDIAC DEFIBRILLATOR: ICD-10-CM

## 2024-12-04 ENCOUNTER — TELEPHONE (OUTPATIENT)
Dept: GERIATRIC MEDICINE | Facility: CLINIC | Age: 74
End: 2024-12-04
Payer: MEDICARE

## 2024-12-04 ENCOUNTER — OFFICE VISIT (OUTPATIENT)
Dept: URGENT CARE | Age: 74
End: 2024-12-04
Payer: MEDICARE

## 2024-12-04 VITALS — DIASTOLIC BLOOD PRESSURE: 85 MMHG | OXYGEN SATURATION: 98 % | SYSTOLIC BLOOD PRESSURE: 139 MMHG | HEART RATE: 65 BPM

## 2024-12-04 DIAGNOSIS — S01.81XA FACIAL LACERATION, INITIAL ENCOUNTER: Primary | ICD-10-CM

## 2024-12-04 DIAGNOSIS — S60.519A ABRASION, HAND W/O INFECTION: ICD-10-CM

## 2024-12-04 ASSESSMENT — ENCOUNTER SYMPTOMS
VOMITING: 0
HEADACHES: 0
DIZZINESS: 0
NUMBNESS: 0
LIGHT-HEADEDNESS: 0
WOUND: 1
SHORTNESS OF BREATH: 0
CHILLS: 0
FEVER: 0
MYALGIAS: 0
ARTHRALGIAS: 0
NAUSEA: 0

## 2024-12-04 NOTE — PATIENT INSTRUCTIONS
Laceration Care:     Keep area clean with soap and water and dry, keep open or cover with simple bandaid if needed    Monitor for redness, swelling, discharge, if signs of infection return to clinic    Return to clinic in 7 days for suture removal    Er if any fever, or facial pain, numbness.

## 2024-12-04 NOTE — PROGRESS NOTES
Subjective   Patient ID: Ritesh Garza is a 74 y.o. male. They present today with a chief complaint of Injury (Fall /Chin left hand ).    History of Present Illness  Patient presents for injury following mechanical fall today. Patient was walking outside and tripped on an uneven curb, causing him to fall and scrape his left hand and patient also hit his chin against the cement. He denies any loc. Denies any blood thinners, only Asprin. Denies bruising, denies any limitation to rom or pain in left hand. Denies other associated injury. Denies any visual changes, nausea, or other symptoms post fall. Claims up to date on tetanus.           Past Medical History  Allergies as of 12/04/2024    (No Known Allergies)       (Not in a hospital admission)       Past Medical History:   Diagnosis Date    Allergic     Asthma     Coronary artery disease Long Ago    Diabetes mellitus (Multi) Over 20 years ago    Drug-induced gout, unspecified site 10/04/2019    Acute drug-induced gout    Encounter for screening for malignant neoplasm of colon 10/31/2022    Colon cancer screening    Gout, unspecified     Acute gout    Heart disease Over 20 years sgo    Heart murmur Long ago    Hypertension Over 20 years ago    Myocardial infarction (Multi) Over 20 yesrs ago    Ocular pain, right eye 10/14/2018    Pain of right eye    Periorbital cellulitis 10/14/2018    Periorbital cellulitis    Personal history of colonic polyps 10/11/2017    History of colon polyps    Personal history of other diseases of the circulatory system 05/12/2021    History of angina pectoris    Personal history of other diseases of the circulatory system 05/12/2021    History of angina pectoris    Personal history of other diseases of the respiratory system 10/04/2019    History of acute bronchitis    Personal history of other drug therapy     History of influenza vaccination    Type 2 diabetes mellitus     Unspecified cirrhosis of liver (Multi) 11/07/2022    Cirrhosis,  nonalcoholic       Past Surgical History:   Procedure Laterality Date    ADENOIDECTOMY  Childhood    CARDIAC CATHETERIZATION  Over 30 years ago    CARDIAC CATHETERIZATION N/A 04/15/2024    Procedure: Left And Right Heart Cath, With LV;  Surgeon: Blaine Adams MD;  Location: [facility];  Service: Cardiovascular;  Laterality: N/A;  3 hr hydration / arrive 6:30    CARDIAC DEFIBRILLATOR PLACEMENT      CARDIAC ELECTROPHYSIOLOGY PROCEDURE N/A 05/30/2024    Procedure: ICD Dual Implant;  Surgeon: Mina Kiran MD;  Location: [facility];  Service: Electrophysiology;  Laterality: N/A;  Dual chamber ICD, [facility]  notified [facility] 5/24/24    CHOLECYSTECTOMY  Over 30 years ago    CIRCUMCISION, PRIMARY  72,years ago    CORONARY ARTERY BYPASS GRAFT  12/02/2021    [facility]    CORONARY STENT PLACEMENT  Last week    EYE SURGERY  Childhood    INVASIVE VASCULAR PROCEDURE Left 04/30/2024    Procedure: Angioplasty - Upper Extremity;  Surgeon: Narinder Quevedo MD;  Location: [facility];  Service: Cardiovascular;  Laterality: Left;  3 hr hydration  / arrive 6:30    MR HEAD ANGIO WO IV CONTRAST  06/02/2021    MR HEAD ANGIO WO IV CONTRAST 6/2/2021 [facility]    MR HEAD ANGIO WO IV CONTRAST  11/16/2021    MR HEAD ANGIO WO IV CONTRAST 11/16/2021 [facility]    MR NECK ANGIO WO IV CONTRAST  06/02/2021    MR NECK ANGIO WO IV CONTRAST 6/2/2021 [facility]    MR NECK ANGIO WO IV CONTRAST  11/16/2021    MR NECK ANGIO WO IV CONTRAST 11/16/2021 [facility]        reports that he has never smoked. He has never used smokeless tobacco. He reports that he does not drink alcohol and does not use drugs.    Review of Systems  Review of Systems   Constitutional:  Negative for chills and fever.   Eyes:  Negative for visual disturbance.   Respiratory:  Negative for shortness of breath.    Cardiovascular:  Negative for chest pain.   Gastrointestinal:  Negative for nausea and vomiting.    Musculoskeletal:  Negative for arthralgias and myalgias.   Skin:  Positive for wound.   Neurological:  Negative for dizziness, light-headedness, numbness and headaches.                                  Objective    Vitals:    12/04/24 1440   BP: 139/85   Pulse: 65   SpO2: 98%     No LMP for male patient.    Physical Exam  Constitutional:       General: He is not in acute distress.     Appearance: He is not ill-appearing.   HENT:      Head: Normocephalic. Laceration present.        Comments: Laceration to chin, measuring 1 cm  Eyes:      Extraocular Movements: Extraocular movements intact.      Pupils: Pupils are equal, round, and reactive to light.   Musculoskeletal:      Left hand: No swelling, deformity, tenderness or bony tenderness. Normal range of motion. Normal capillary refill. Normal pulse.   Skin:     Findings: Abrasion and laceration present.      Comments: Laceration to chin, 1mm deep, linear 3 cm in length. Active bleeding noted, clean wound    Superficial abrasions noted to dorsal surface of 3rd, 4th, 5th digits. No active bleeding.    Neurological:      Mental Status: He is alert and oriented to person, place, and time.      Sensory: Sensation is intact.      Coordination: Coordination is intact.         Laceration Repair    Date/Time: 12/4/2024 2:49 PM    Performed by: Emma Huggins PA-C  Authorized by: Emma Huggins PA-C    Consent:     Consent obtained:  Verbal and written    Consent given by:  Patient    Risks discussed:  Pain and infection  Anesthesia:     Anesthesia method:  Local infiltration    Local anesthetic:  Lidocaine 1% w/o epi  Laceration details:     Location:  Face    Face location:  Chin    Length (cm):  1    Depth (mm):  1  Pre-procedure details:     Preparation:  Patient was prepped and draped in usual sterile fashion  Exploration:     Hemostasis achieved with:  Direct pressure    Imaging outcome: foreign body not noted      Wound exploration: entire depth of wound  visualized      Contaminated: no    Treatment:     Area cleansed with:  Povidone-iodine and saline    Amount of cleaning:  Standard    Irrigation solution:  Sterile saline  Skin repair:     Repair method:  Sutures    Suture size:  6-0    Suture material:  Prolene    Suture technique:  Simple interrupted    Number of sutures:  3  Approximation:     Approximation:  Close  Repair type:     Repair type:  Simple  Post-procedure details:     Dressing:  Non-adherent dressing    Procedure completion:  Tolerated well, no immediate complications      Point of Care Test & Imaging Results from this visit  No results found for this visit on 12/04/24.   No results found.    Diagnostic study results (if any) were reviewed by Emma Huggins PA-C.    Assessment/Plan   Allergies, medications, history, and pertinent labs/EKGs/Imaging reviewed by Emma Huggins PA-C.     Medical Decision Making  MDM- Laceration in office able to be closed with skin sutures, abrasions to hand given proper wound care and cleaned - No evidence of retained FB as well as any vascular/nerve/tendon/osseous injury. Prophylactic antibiotics are unwarranted at this time. Pt is counseled on local wound care. Patient advised to follow up for suture removal within 5-7 days or otherwise return to clinic if any new signs or symptoms develop. Otherwise follow with PCP. Patient verbalized understanding and agree with plan.       Orders and Diagnoses  Diagnoses and all orders for this visit:  Facial laceration, initial encounter  Abrasion, hand w/o infection  Other orders  -     Laceration Repair      Medical Admin Record      Patient disposition: Home    Electronically signed by Emma Huggins PA-C  2:50 PM

## 2024-12-05 DIAGNOSIS — I10 ESSENTIAL (PRIMARY) HYPERTENSION: ICD-10-CM

## 2024-12-05 DIAGNOSIS — N18.32 TYPE 2 DIABETES MELLITUS WITH STAGE 3B CHRONIC KIDNEY DISEASE, WITHOUT LONG-TERM CURRENT USE OF INSULIN (MULTI): ICD-10-CM

## 2024-12-05 DIAGNOSIS — E11.22 TYPE 2 DIABETES MELLITUS WITH STAGE 3B CHRONIC KIDNEY DISEASE, WITHOUT LONG-TERM CURRENT USE OF INSULIN (MULTI): ICD-10-CM

## 2024-12-05 DIAGNOSIS — I10 ESSENTIAL HYPERTENSION: Primary | ICD-10-CM

## 2024-12-05 RX ORDER — GABAPENTIN 100 MG/1
100 CAPSULE ORAL NIGHTLY
Qty: 90 CAPSULE | Refills: 1 | Status: SHIPPED | OUTPATIENT
Start: 2024-12-05

## 2024-12-05 RX ORDER — AMLODIPINE BESYLATE 10 MG/1
10 TABLET ORAL DAILY
Qty: 90 TABLET | Refills: 1 | Status: SHIPPED | OUTPATIENT
Start: 2024-12-05

## 2024-12-05 RX ORDER — METOPROLOL SUCCINATE 50 MG/1
50 TABLET, EXTENDED RELEASE ORAL DAILY
Qty: 90 TABLET | Refills: 3 | Status: SHIPPED | OUTPATIENT
Start: 2024-12-05

## 2024-12-05 NOTE — TELEPHONE ENCOUNTER
Patients wife notices that he has had more balance issues lately with the most recent being today. Patient tripped at work and hitting his leg and has abrasions on his chin resulting in a trip to urgent care. Having a more difficult time with word finding driving and directions to familiar places.  Per Dr. Perrin would like patient to be evaluated by PCP. Would also like to know if a driving eval has been completed as he does not have results.   Contacted [facility] and per Mrs. Garza a  driving eval was completed but she does not remember the name of the facility. She will get that information and provide it to Dr. Perrin. She will also reach out to the PCP and follow up with Dr. Perrin as needed.

## 2024-12-10 ENCOUNTER — OFFICE VISIT (OUTPATIENT)
Dept: URGENT CARE | Age: 74
End: 2024-12-10
Payer: MEDICARE

## 2024-12-10 DIAGNOSIS — Z48.02 ENCOUNTER FOR REMOVAL OF SUTURES: Primary | ICD-10-CM

## 2024-12-10 PROCEDURE — 99499 UNLISTED E&M SERVICE: CPT

## 2024-12-10 PROCEDURE — 1157F ADVNC CARE PLAN IN RCRD: CPT

## 2024-12-10 ASSESSMENT — ENCOUNTER SYMPTOMS
JOINT SWELLING: 0
WOUND: 1
COLOR CHANGE: 0

## 2024-12-10 NOTE — PROGRESS NOTES
Subjective   Patient ID: Ritesh Garza is a 74 y.o. male. They present today with a chief complaint of No chief complaint on file..    History of Present Illness  Presents for suture removal. No redness, swelling, discharge Denies fever, chills, sweats. Denies n/v/d. Denies chest pain, sob.             Past Medical History  Allergies as of 12/10/2024    (No Known Allergies)       (Not in a hospital admission)       Past Medical History:   Diagnosis Date    Allergic     Asthma     Coronary artery disease Long Ago    Diabetes mellitus (Multi) Over 20 years ago    Drug-induced gout, unspecified site 10/04/2019    Acute drug-induced gout    Encounter for screening for malignant neoplasm of colon 10/31/2022    Colon cancer screening    Gout, unspecified     Acute gout    Heart disease Over 20 years sgo    Heart murmur Long ago    Hypertension Over 20 years ago    Myocardial infarction (Multi) Over 20 yesrs ago    Ocular pain, right eye 10/14/2018    Pain of right eye    Periorbital cellulitis 10/14/2018    Periorbital cellulitis    Personal history of colonic polyps 10/11/2017    History of colon polyps    Personal history of other diseases of the circulatory system 05/12/2021    History of angina pectoris    Personal history of other diseases of the circulatory system 05/12/2021    History of angina pectoris    Personal history of other diseases of the respiratory system 10/04/2019    History of acute bronchitis    Personal history of other drug therapy     History of influenza vaccination    Type 2 diabetes mellitus     Unspecified cirrhosis of liver (Multi) 11/07/2022    Cirrhosis, nonalcoholic       Past Surgical History:   Procedure Laterality Date    ADENOIDECTOMY  Childhood    CARDIAC CATHETERIZATION  Over 30 years ago    CARDIAC CATHETERIZATION N/A 04/15/2024    Procedure: Left And Right Heart Cath, With LV;  Surgeon: Blaine Adams MD;  Location: [facility];  Service: Cardiovascular;  Laterality:  N/A;  3 hr hydration / arrive 6:30    CARDIAC DEFIBRILLATOR PLACEMENT      CARDIAC ELECTROPHYSIOLOGY PROCEDURE N/A 05/30/2024    Procedure: ICD Dual Implant;  Surgeon: Mina Kiran MD;  Location: [facility];  Service: Electrophysiology;  Laterality: N/A;  Dual chamber ICD, [facility]  notified [facility]io 5/24/24    CHOLECYSTECTOMY  Over 30 years ago    CIRCUMCISION, PRIMARY  72,years ago    CORONARY ARTERY BYPASS GRAFT  12/02/2021    [facility]    CORONARY STENT PLACEMENT  Last week    EYE SURGERY  Childhood    INVASIVE VASCULAR PROCEDURE Left 04/30/2024    Procedure: Angioplasty - Upper Extremity;  Surgeon: Narinder Quevedo MD;  Location: [facility];  Service: Cardiovascular;  Laterality: Left;  3 hr hydration  / arrive 6:30    MR HEAD ANGIO WO IV CONTRAST  06/02/2021    MR HEAD ANGIO WO IV CONTRAST 6/2/2021 [facility]    MR HEAD ANGIO WO IV CONTRAST  11/16/2021    MR HEAD ANGIO WO IV CONTRAST 11/16/2021 [facility]    MR NECK ANGIO WO IV CONTRAST  06/02/2021    MR NECK ANGIO WO IV CONTRAST 6/2/2021 [facility]    MR NECK ANGIO WO IV CONTRAST  11/16/2021    MR NECK ANGIO WO IV CONTRAST 11/16/2021 [facility]        reports that he has never smoked. He has never used smokeless tobacco. He reports that he does not drink alcohol and does not use drugs.    Review of Systems  Review of Systems   Musculoskeletal:  Negative for joint swelling.   Skin:  Positive for wound. Negative for color change and rash.                                  Objective    There were no vitals filed for this visit.  No LMP for male patient.    Physical Exam  Skin:     Comments: Well approximated healed laceration to left chin with 2 black sutures in place.          Suture Removal    Date/Time: 12/10/2024 5:33 PM    Performed by: Emma Huggins PA-C  Authorized by: Emma Huggins PA-C    Consent:     Consent obtained:  Verbal    Risks discussed:  Bleeding and pain  Universal  protocol:     Patient identity confirmed:  Verbally with patient  Location:     Location:  Head/neck    Head/neck location:  Chin  Procedure details:     Wound appearance:  No signs of infection    Number of sutures removed:  2  Post-procedure details:     Post-removal:  Band-Aid applied    Procedure completion:  Tolerated well, no immediate complications      Point of Care Test & Imaging Results from this visit  No results found for this visit on 12/10/24.   No results found.    Diagnostic study results (if any) were reviewed by Emma Huggins PA-C.    Assessment/Plan   Allergies, medications, history, and pertinent labs/EKGs/Imaging reviewed by Emma Huggins PA-C.     Medical Decision Making  MDM- Suture removal able to be completed without evidence of infection or wound dehiscence. Wound remains closed and well approximated following removal of sutures. See procedure note. Patient counseled on wound care at home and advised to return with any further concerns. Patient/family verbalized understanding and agree with plan.       Orders and Diagnoses  Diagnoses and all orders for this visit:  Encounter for removal of sutures  Other orders  -     Suture Removal      Medical Admin Record      Patient disposition: Home    Electronically signed by Emma Huggins PA-C  5:34 PM

## 2024-12-17 ENCOUNTER — HOSPITAL ENCOUNTER (OUTPATIENT)
Dept: CARDIOLOGY | Facility: HOSPITAL | Age: 74
Discharge: HOME | End: 2024-12-17
Payer: MEDICARE

## 2024-12-17 DIAGNOSIS — I47.29 OTHER VENTRICULAR TACHYCARDIA: ICD-10-CM

## 2024-12-17 DIAGNOSIS — Z95.810 PRESENCE OF AUTOMATIC (IMPLANTABLE) CARDIAC DEFIBRILLATOR: ICD-10-CM

## 2024-12-20 ENCOUNTER — HOSPITAL ENCOUNTER (EMERGENCY)
Facility: HOSPITAL | Age: 74
End: 2024-12-20
Attending: STUDENT IN AN ORGANIZED HEALTH CARE EDUCATION/TRAINING PROGRAM
Payer: MEDICARE

## 2024-12-20 ENCOUNTER — APPOINTMENT (OUTPATIENT)
Dept: RADIOLOGY | Facility: HOSPITAL | Age: 74
End: 2024-12-20
Payer: MEDICARE

## 2024-12-20 ENCOUNTER — APPOINTMENT (OUTPATIENT)
Dept: CARDIOLOGY | Facility: HOSPITAL | Age: 74
End: 2024-12-20
Payer: MEDICARE

## 2024-12-20 DIAGNOSIS — R29.90 STROKE-LIKE SYMPTOMS: Primary | ICD-10-CM

## 2024-12-20 DIAGNOSIS — N17.9 ACUTE KIDNEY INJURY (CMS-HCC): ICD-10-CM

## 2024-12-20 DIAGNOSIS — R79.89 ELEVATED TROPONIN: ICD-10-CM

## 2024-12-20 DIAGNOSIS — I50.9 ACUTE ON CHRONIC HEART FAILURE, UNSPECIFIED HEART FAILURE TYPE: ICD-10-CM

## 2024-12-20 DIAGNOSIS — K92.2 GASTROINTESTINAL HEMORRHAGE, UNSPECIFIED GASTROINTESTINAL HEMORRHAGE TYPE: ICD-10-CM

## 2024-12-20 DIAGNOSIS — D64.9 ANEMIA, UNSPECIFIED TYPE: ICD-10-CM

## 2024-12-20 LAB
ABO GROUP (TYPE) IN BLOOD: NORMAL
ALBUMIN SERPL BCP-MCNC: 3.9 G/DL (ref 3.4–5)
ALP SERPL-CCNC: 77 U/L (ref 33–136)
ALT SERPL W P-5'-P-CCNC: 13 U/L (ref 10–52)
AMMONIA PLAS-SCNC: 17 UMOL/L (ref 16–53)
ANION GAP SERPL CALC-SCNC: 22 MMOL/L (ref 10–20)
ANTIBODY SCREEN: NORMAL
APPEARANCE UR: CLEAR
APTT PPP: 28 SECONDS (ref 27–38)
AST SERPL W P-5'-P-CCNC: 17 U/L (ref 9–39)
ATRIAL RATE: 91 BPM
BASOPHILS # BLD AUTO: 0.02 X10*3/UL (ref 0–0.1)
BASOPHILS # BLD AUTO: 0.04 X10*3/UL (ref 0–0.1)
BASOPHILS NFR BLD AUTO: 0.2 %
BASOPHILS NFR BLD AUTO: 0.3 %
BILIRUB DIRECT SERPL-MCNC: 0.1 MG/DL (ref 0–0.3)
BILIRUB SERPL-MCNC: 0.4 MG/DL (ref 0–1.2)
BILIRUB UR STRIP.AUTO-MCNC: NEGATIVE MG/DL
BLOOD EXPIRATION DATE: NORMAL
BLOOD EXPIRATION DATE: NORMAL
BNP SERPL-MCNC: 1270 PG/ML (ref 0–99)
BUN SERPL-MCNC: 123 MG/DL (ref 6–23)
CALCIUM SERPL-MCNC: 9.4 MG/DL (ref 8.6–10.3)
CARDIAC TROPONIN I PNL SERPL HS: 1097 NG/L (ref 0–20)
CARDIAC TROPONIN I PNL SERPL HS: 1566 NG/L (ref 0–20)
CARDIAC TROPONIN I PNL SERPL HS: 2668 NG/L (ref 0–20)
CARDIAC TROPONIN I PNL SERPL HS: 6583 NG/L (ref 0–20)
CHLORIDE SERPL-SCNC: 102 MMOL/L (ref 98–107)
CO2 SERPL-SCNC: 18 MMOL/L (ref 21–32)
COLOR UR: ABNORMAL
CREAT SERPL-MCNC: 2.64 MG/DL (ref 0.5–1.3)
DISPENSE STATUS: NORMAL
DISPENSE STATUS: NORMAL
EGFRCR SERPLBLD CKD-EPI 2021: 24 ML/MIN/1.73M*2
EOSINOPHIL # BLD AUTO: 0 X10*3/UL (ref 0–0.4)
EOSINOPHIL # BLD AUTO: 0 X10*3/UL (ref 0–0.4)
EOSINOPHIL NFR BLD AUTO: 0 %
EOSINOPHIL NFR BLD AUTO: 0 %
ERYTHROCYTE [DISTWIDTH] IN BLOOD BY AUTOMATED COUNT: 19.1 % (ref 11.5–14.5)
ERYTHROCYTE [DISTWIDTH] IN BLOOD BY AUTOMATED COUNT: 19.6 % (ref 11.5–14.5)
GLUCOSE BLD MANUAL STRIP-MCNC: 259 MG/DL (ref 74–99)
GLUCOSE SERPL-MCNC: 285 MG/DL (ref 74–99)
GLUCOSE UR STRIP.AUTO-MCNC: ABNORMAL MG/DL
HCT VFR BLD AUTO: 19 % (ref 41–52)
HCT VFR BLD AUTO: 24.4 % (ref 41–52)
HGB BLD-MCNC: 6.1 G/DL (ref 13.5–17.5)
HGB BLD-MCNC: 8.1 G/DL (ref 13.5–17.5)
IMM GRANULOCYTES # BLD AUTO: 0.09 X10*3/UL (ref 0–0.5)
IMM GRANULOCYTES # BLD AUTO: 0.1 X10*3/UL (ref 0–0.5)
IMM GRANULOCYTES NFR BLD AUTO: 0.7 % (ref 0–0.9)
IMM GRANULOCYTES NFR BLD AUTO: 0.8 % (ref 0–0.9)
INR PPP: 1.2 (ref 0.9–1.1)
KETONES UR STRIP.AUTO-MCNC: NEGATIVE MG/DL
LEUKOCYTE ESTERASE UR QL STRIP.AUTO: NEGATIVE
LYMPHOCYTES # BLD AUTO: 1.72 X10*3/UL (ref 0.8–3)
LYMPHOCYTES # BLD AUTO: 2.24 X10*3/UL (ref 0.8–3)
LYMPHOCYTES NFR BLD AUTO: 13.3 %
LYMPHOCYTES NFR BLD AUTO: 16.2 %
MCH RBC QN AUTO: 29.2 PG (ref 26–34)
MCH RBC QN AUTO: 29.6 PG (ref 26–34)
MCHC RBC AUTO-ENTMCNC: 32.1 G/DL (ref 32–36)
MCHC RBC AUTO-ENTMCNC: 33.2 G/DL (ref 32–36)
MCV RBC AUTO: 88 FL (ref 80–100)
MCV RBC AUTO: 92 FL (ref 80–100)
MONOCYTES # BLD AUTO: 1.01 X10*3/UL (ref 0.05–0.8)
MONOCYTES # BLD AUTO: 1.62 X10*3/UL (ref 0.05–0.8)
MONOCYTES NFR BLD AUTO: 11.7 %
MONOCYTES NFR BLD AUTO: 7.8 %
NEUTROPHILS # BLD AUTO: 10.09 X10*3/UL (ref 1.6–5.5)
NEUTROPHILS # BLD AUTO: 9.8 X10*3/UL (ref 1.6–5.5)
NEUTROPHILS NFR BLD AUTO: 71.1 %
NEUTROPHILS NFR BLD AUTO: 77.9 %
NITRITE UR QL STRIP.AUTO: NEGATIVE
NRBC BLD-RTO: 0 /100 WBCS (ref 0–0)
NRBC BLD-RTO: 0.4 /100 WBCS (ref 0–0)
P AXIS: 66 DEGREES
PH UR STRIP.AUTO: 5 [PH]
PLATELET # BLD AUTO: 177 X10*3/UL (ref 150–450)
PLATELET # BLD AUTO: 223 X10*3/UL (ref 150–450)
POTASSIUM SERPL-SCNC: 5.9 MMOL/L (ref 3.5–5.3)
PR INTERVAL: 195 MS
PRODUCT BLOOD TYPE: 5100
PRODUCT BLOOD TYPE: 5100
PRODUCT CODE: NORMAL
PRODUCT CODE: NORMAL
PROT SERPL-MCNC: 6.5 G/DL (ref 6.4–8.2)
PROT UR STRIP.AUTO-MCNC: NEGATIVE MG/DL
PROTHROMBIN TIME: 13.5 SECONDS (ref 9.8–12.8)
Q ONSET: 252 MS
QRS COUNT: 15 BEATS
QRS DURATION: 126 MS
QT INTERVAL: 417 MS
QTC CALCULATION(BAZETT): 511 MS
QTC FREDERICIA: 477 MS
R AXIS: 24 DEGREES
RBC # BLD AUTO: 2.06 X10*6/UL (ref 4.5–5.9)
RBC # BLD AUTO: 2.77 X10*6/UL (ref 4.5–5.9)
RBC # UR STRIP.AUTO: NEGATIVE /UL
RH FACTOR (ANTIGEN D): NORMAL
SODIUM SERPL-SCNC: 136 MMOL/L (ref 136–145)
SP GR UR STRIP.AUTO: 1.03
T AXIS: 80 DEGREES
T OFFSET: 460 MS
UNIT ABO: NORMAL
UNIT ABO: NORMAL
UNIT NUMBER: NORMAL
UNIT NUMBER: NORMAL
UNIT RH: NORMAL
UNIT RH: NORMAL
UNIT VOLUME: 350
UNIT VOLUME: 350
UROBILINOGEN UR STRIP.AUTO-MCNC: NORMAL MG/DL
VENTRICULAR RATE: 90 BPM
WBC # BLD AUTO: 12.9 X10*3/UL (ref 4.4–11.3)
WBC # BLD AUTO: 13.8 X10*3/UL (ref 4.4–11.3)
XM INTEP: NORMAL
XM INTEP: NORMAL

## 2024-12-20 PROCEDURE — 36430 TRANSFUSION BLD/BLD COMPNT: CPT

## 2024-12-20 PROCEDURE — 96374 THER/PROPH/DIAG INJ IV PUSH: CPT

## 2024-12-20 PROCEDURE — 99282 EMERGENCY DEPT VISIT SF MDM: CPT | Mod: 25

## 2024-12-20 PROCEDURE — 70450 CT HEAD/BRAIN W/O DYE: CPT | Performed by: RADIOLOGY

## 2024-12-20 PROCEDURE — 70496 CT ANGIOGRAPHY HEAD: CPT

## 2024-12-20 PROCEDURE — 70450 CT HEAD/BRAIN W/O DYE: CPT

## 2024-12-20 PROCEDURE — 99291 CRITICAL CARE FIRST HOUR: CPT | Performed by: STUDENT IN AN ORGANIZED HEALTH CARE EDUCATION/TRAINING PROGRAM

## 2024-12-20 PROCEDURE — 36415 COLL VENOUS BLD VENIPUNCTURE: CPT | Performed by: STUDENT IN AN ORGANIZED HEALTH CARE EDUCATION/TRAINING PROGRAM

## 2024-12-20 PROCEDURE — 84484 ASSAY OF TROPONIN QUANT: CPT | Performed by: STUDENT IN AN ORGANIZED HEALTH CARE EDUCATION/TRAINING PROGRAM

## 2024-12-20 PROCEDURE — 80048 BASIC METABOLIC PNL TOTAL CA: CPT | Performed by: STUDENT IN AN ORGANIZED HEALTH CARE EDUCATION/TRAINING PROGRAM

## 2024-12-20 PROCEDURE — 82947 ASSAY GLUCOSE BLOOD QUANT: CPT

## 2024-12-20 PROCEDURE — 70496 CT ANGIOGRAPHY HEAD: CPT | Performed by: RADIOLOGY

## 2024-12-20 PROCEDURE — P9016 RBC LEUKOCYTES REDUCED: HCPCS

## 2024-12-20 PROCEDURE — 86923 COMPATIBILITY TEST ELECTRIC: CPT

## 2024-12-20 PROCEDURE — 82140 ASSAY OF AMMONIA: CPT | Performed by: STUDENT IN AN ORGANIZED HEALTH CARE EDUCATION/TRAINING PROGRAM

## 2024-12-20 PROCEDURE — 70498 CT ANGIOGRAPHY NECK: CPT | Performed by: RADIOLOGY

## 2024-12-20 PROCEDURE — 2550000001 HC RX 255 CONTRASTS: Performed by: STUDENT IN AN ORGANIZED HEALTH CARE EDUCATION/TRAINING PROGRAM

## 2024-12-20 PROCEDURE — 93005 ELECTROCARDIOGRAM TRACING: CPT

## 2024-12-20 PROCEDURE — 85730 THROMBOPLASTIN TIME PARTIAL: CPT | Performed by: STUDENT IN AN ORGANIZED HEALTH CARE EDUCATION/TRAINING PROGRAM

## 2024-12-20 PROCEDURE — 85025 COMPLETE CBC W/AUTO DIFF WBC: CPT | Performed by: STUDENT IN AN ORGANIZED HEALTH CARE EDUCATION/TRAINING PROGRAM

## 2024-12-20 PROCEDURE — 2500000004 HC RX 250 GENERAL PHARMACY W/ HCPCS (ALT 636 FOR OP/ED): Performed by: STUDENT IN AN ORGANIZED HEALTH CARE EDUCATION/TRAINING PROGRAM

## 2024-12-20 PROCEDURE — 82248 BILIRUBIN DIRECT: CPT | Performed by: STUDENT IN AN ORGANIZED HEALTH CARE EDUCATION/TRAINING PROGRAM

## 2024-12-20 PROCEDURE — 83880 ASSAY OF NATRIURETIC PEPTIDE: CPT | Performed by: STUDENT IN AN ORGANIZED HEALTH CARE EDUCATION/TRAINING PROGRAM

## 2024-12-20 PROCEDURE — 86900 BLOOD TYPING SEROLOGIC ABO: CPT | Performed by: STUDENT IN AN ORGANIZED HEALTH CARE EDUCATION/TRAINING PROGRAM

## 2024-12-20 PROCEDURE — 85610 PROTHROMBIN TIME: CPT | Performed by: STUDENT IN AN ORGANIZED HEALTH CARE EDUCATION/TRAINING PROGRAM

## 2024-12-20 PROCEDURE — 99291 CRITICAL CARE FIRST HOUR: CPT | Mod: 25

## 2024-12-20 PROCEDURE — 81003 URINALYSIS AUTO W/O SCOPE: CPT | Performed by: STUDENT IN AN ORGANIZED HEALTH CARE EDUCATION/TRAINING PROGRAM

## 2024-12-20 RX ORDER — PANTOPRAZOLE SODIUM 40 MG/10ML
80 INJECTION, POWDER, LYOPHILIZED, FOR SOLUTION INTRAVENOUS ONCE
Status: COMPLETED | OUTPATIENT
Start: 2024-12-20 | End: 2024-12-20

## 2024-12-20 RX ADMIN — IOHEXOL 90 ML: 350 INJECTION, SOLUTION INTRAVENOUS at 10:48

## 2024-12-20 RX ADMIN — PANTOPRAZOLE SODIUM 80 MG: 40 INJECTION, POWDER, FOR SOLUTION INTRAVENOUS at 13:06

## 2024-12-20 SDOH — SOCIAL STABILITY: SOCIAL INSECURITY: WERE YOU ABLE TO COMPLETE ALL THE BEHAVIORAL HEALTH SCREENINGS?: YES

## 2024-12-20 SDOH — SOCIAL STABILITY: SOCIAL INSECURITY: HAVE YOU HAD THOUGHTS OF HARMING ANYONE ELSE?: NO

## 2024-12-20 SDOH — SOCIAL STABILITY: SOCIAL INSECURITY: ABUSE: ADULT

## 2024-12-20 ASSESSMENT — COGNITIVE AND FUNCTIONAL STATUS - GENERAL
PATIENT BASELINE BEDBOUND: NO
MOBILITY SCORE: 24
DAILY ACTIVITIY SCORE: 24

## 2024-12-20 ASSESSMENT — LIFESTYLE VARIABLES
HAVE PEOPLE ANNOYED YOU BY CRITICIZING YOUR DRINKING: NO
TOTAL SCORE: 0
HAVE YOU EVER FELT YOU SHOULD CUT DOWN ON YOUR DRINKING: NO
HOW MANY STANDARD DRINKS CONTAINING ALCOHOL DO YOU HAVE ON A TYPICAL DAY: PATIENT DOES NOT DRINK
SKIP TO QUESTIONS 9-10: 1
HOW OFTEN DO YOU HAVE A DRINK CONTAINING ALCOHOL: NEVER
EVER HAD A DRINK FIRST THING IN THE MORNING TO STEADY YOUR NERVES TO GET RID OF A HANGOVER: NO
EVER FELT BAD OR GUILTY ABOUT YOUR DRINKING: NO
HOW OFTEN DO YOU HAVE 6 OR MORE DRINKS ON ONE OCCASION: NEVER
AUDIT-C TOTAL SCORE: 0
AUDIT-C TOTAL SCORE: 0

## 2024-12-20 ASSESSMENT — PAIN DESCRIPTION - PROGRESSION: CLINICAL_PROGRESSION: NOT CHANGED

## 2024-12-20 ASSESSMENT — ACTIVITIES OF DAILY LIVING (ADL)
JUDGMENT_ADEQUATE_SAFELY_COMPLETE_DAILY_ACTIVITIES: YES
GROOMING: INDEPENDENT
FEEDING YOURSELF: INDEPENDENT
HEARING - LEFT EAR: HEARING AID
WALKS IN HOME: INDEPENDENT
HEARING - RIGHT EAR: HEARING AID
PATIENT'S MEMORY ADEQUATE TO SAFELY COMPLETE DAILY ACTIVITIES?: YES
TOILETING: INDEPENDENT
DRESSING YOURSELF: INDEPENDENT
BATHING: INDEPENDENT
ADEQUATE_TO_COMPLETE_ADL: YES

## 2024-12-20 ASSESSMENT — PATIENT HEALTH QUESTIONNAIRE - PHQ9
2. FEELING DOWN, DEPRESSED OR HOPELESS: NOT AT ALL
1. LITTLE INTEREST OR PLEASURE IN DOING THINGS: NOT AT ALL
SUM OF ALL RESPONSES TO PHQ9 QUESTIONS 1 & 2: 0

## 2024-12-20 ASSESSMENT — PAIN DESCRIPTION - LOCATION: LOCATION: BACK

## 2024-12-20 ASSESSMENT — PAIN - FUNCTIONAL ASSESSMENT: PAIN_FUNCTIONAL_ASSESSMENT: 0-10

## 2024-12-20 ASSESSMENT — PAIN DESCRIPTION - PAIN TYPE: TYPE: ACUTE PAIN

## 2024-12-20 ASSESSMENT — PAIN SCALES - GENERAL: PAINLEVEL_OUTOF10: 8

## 2024-12-20 NOTE — PROGRESS NOTES
PHARMACY STROKE RESPONSE      Patient Name: Ritesh Garza  MRN: [id number]  Location: [place] [address]    Patient Weight (kg):   Wt Readings from Last 1 Encounters:   12/20/24 66.5 kg (146 lb 9.7 oz)        An acute Brain Attack has been activated, pharmacy participated in multidisciplinary team bedside response for Ritesh Garza.  Contraindications for fibrinolytic therapy have been reviewed by pharmacy and any issues relating to medication therapy have been discussed directly with the provider(s) caring for this patient.     Pharmacy aided in the bedside response for fibrinolytic therapy. Patient did not receive fibrinolysis.    Orders Placed This Encounter      iohexol (OMNIPaque) 350 mg iodine/mL solution 90 mL      Thank you for allowing me to take part in the care of this patient.     Sally Wilson, PharmD  12/20/2024  11:40 AM         References:    Neurological [place] Stroke Tools   Neurological [place] IV Thrombolysis Checklist

## 2024-12-20 NOTE — ED PROVIDER NOTES
HPI   Chief Complaint   Patient presents with    frequemt falls    Altered Mental Status       History/Exam limitations: none.   Additional history was obtained from patient, spouse/SO, and past medical records.    HPI:       Ritesh Garza is a 74 y.o. with a history of nonalcoholic fatty liver disease, cirrhosis, coronary artery disease, remote MI, CHF, chronic kidney disease, he is presenting to the emergency department today for multiple falls and confusion.  Wife says he has been falling off and on for the past several days but over the past 12 hours he has had 4 falls.  Inability to keep his balance.  Wife also reports he has been increasingly confused and weak over the past 12 hours as well.  States he has not hit his head or lost consciousness.  Patient is unable to tell me exactly why he has felt weak.  Wife also reports he has been confused as well.  No vomiting, no diarrhea, has had a mild headache..       Last Known Well Time: 1900 12/19/24        ------------------------------------------------------------------------------------------------------------------------------------------     Physical Exam:    ED Triage Vitals [12/20/24 0935]  Temperature: 36.6 °C (97.9 °F)  Heart Rate: 92  Respirations: 16  BP: (!) 101/49  Pulse Ox: 100 %  Temp src: n/a  Heart Rate Source: n/a  Patient Position: n/a  BP Location: n/a  FiO2 (%): n/a     Gen: Not in acute distress  Head/Neck: NCAT  Eyes: Anicteric sclerae, noninjected conjunctivae  Nose: No rhinorrhea  Mouth:  MMM  Heart: Regular rate and rhythm w/ no murmurs, rubs, gallops  Lungs: CTAB w/o wheezes, rales, rhonchi, no increased work of breathing  Abdomen: Soft, NT/ND  Musculoskeletal: No deformities  Extremities: No edema.  Neurologic: Please see below for NIHSS  Skin: No rashes noted  Psychological: Calm        Interval: Baseline  Time: 10:32 AM  Person Administering Scale: Francois Simms MD     1a  Level of consciousness: 0=alert; keenly  responsive  1b. LOC questions:  0=Performs both tasks correctly  1c. LOC commands: 0=Performs both tasks correctly  2.  Best Gaze: 0=normal  3. Visual: 0=No visual loss  4. Facial Palsy: 0=Normal symmetric movement  5a. Motor left arm: 1=Drift, limb holds 90 (or 45) degrees but drifts down before full 10 seconds: does not hit bed  5b.  Motor right arm: 0=No drift, limb holds 90 (or 45) degrees for full 10 seconds  6a. motor left le=Drift, limb holds 90 (or 45) degrees but drifts down before full 10 seconds: does not hit bed  6b  Motor right le=No drift, limb holds 90 (or 45) degrees for full 10 seconds  7. Limb Ataxia: 2=Present in two limbs  8.  Sensory: 0=Normal; no sensory loss  9. Best Language:  0=No aphasia, normal  10. Dysarthria: 0=Normal  11. Extinction and Inattention: 0=No abnormality    Total:   4   Patient significantly ataxic on examination, cannot stand without assistance     VAN: VAN: Negative     ------------------------------------------------------------------------------------------------------------------------------------------     Medical Decision Making: Patient is presenting to the emergency department for multiple falls and confusion, has been ongoing for several days but acutely worsened over the last 12 to 24 hours.  On my exam the patient does have some neurological deficits with significant ataxia raising concern for potential stroke.  Also on my differential includes and a potential infection, anemia, hepatic encephalopathy, dehydration and electrolyte derangements, or ACS to name a few.  Patient was immediately taken for CT, CTA was also obtained despite him being Van negative due to the fact that he has a pacemaker and obtaining an MRI and a timely manner will be difficult.  CT is negative for any acute hemorrhage, CTA shows some attenuated branches of the MCA on the right compared to the left which could be secondary to high-grade stenosis or areas of occlusion.  I did  reach out and speak with Dr. Rasmussen, the stroke neurologist on-call at [facility], who does not feel that the patient is a thrombectomy candidate at this time.  Patient also is outside of any window for TNK.  Workup here in the emergency department demonstrate some elevated troponins, elevated BNP concerning for potentially an NSTEMI or demand ischemia and heart failure.  Patient's EKG demonstrates sinus rhythm with a ventricular rate of 90, normal axis with a nonspecific interventricular conduction delay noted with prolonged QRS and QTc interval, no evidence of an acute STEMI although some minimal ST depression is noted in the lateral leads.  Anticoagulation was not initiated on the patient as he was found to have a hemoglobin of 6.1.  Patient was consented and typed and crossed for 2 units of blood.  Patient did have melanotic stool here in the emergency department concerning that this potentially is a GI bleed the patient was started on IV Protonix.  This is also consistent with the patient's uremia on examination, he also has a mild acute on chronic kidney injury.  Patient is very complex in nature.  He has numerous findings including findings concerning for a stroke with areas of occlusion, GI bleed, cardiac dysfunction, and an acute kidney injury.  The patient will require admission with multiple specialties unfortunately we do not have any coverage for stroke and GI starting tomorrow.  I spoke with the patient and family and they are amenable to the patient going to [facility].  The patient was accepted there by their internal medicine team to the stepdown floor.  The patient was signed out to the oncoming team pending transfer, he has a guarded prognosis at this time given multiple system failures.       Independent Interpretation of Studies: I independently interpreted the CT head and see No obvious evidence of intracranial hemorrhage and No obvious evidence of skull fracture    Chronic Medical Conditions  Significantly Affecting Care: GI bleed, NSTEMI vs demand ischemia, CHF, TAYLOR    Social Determinants of Health Significantly Affecting Care: None     External Records Reviewed: I reviewed recent and relevant outside records including: EMR records     Discussion of Management with Other Providers:   I discussed the patient/results with: [facility] neurolog[facility] and the admitting team at [facility]      IV Thrombolysis IV Thrombolysis Checklist        IV Thrombolysis Given: No; Thrombolysis contraindication reason: Time from Last Known Well (or stroke onset) is >4.5 hours         Procedure  [unfilled]                                   Mar Coma Scale Score: 15         NIH Stroke Scale: 0          Patient History   Past Medical History:   Diagnosis Date    Allergic     Asthma     Coronary artery disease Long Ago    Diabetes mellitus (Multi) Over 20 years ago    Drug-induced gout, unspecified site 10/04/2019    Acute drug-induced gout    Encounter for screening for malignant neoplasm of colon 10/31/2022    Colon cancer screening    Gout, unspecified     Acute gout    Heart disease Over 20 years sgo    Heart murmur Long ago    Hypertension Over 20 years ago    Myocardial infarction (Multi) Over 20 yesrs ago    Ocular pain, right eye 10/14/2018    Pain of right eye    Periorbital cellulitis 10/14/2018    Periorbital cellulitis    Personal history of colonic polyps 10/11/2017    History of colon polyps    Personal history of other diseases of the circulatory system 05/12/2021    History of angina pectoris    Personal history of other diseases of the circulatory system 05/12/2021    History of angina pectoris    Personal history of other diseases of the respiratory system 10/04/2019    History of acute bronchitis    Personal history of other drug therapy     History of influenza vaccination    Type 2 diabetes mellitus     Unspecified cirrhosis of liver (Multi) 11/07/2022    Cirrhosis, nonalcoholic     Past Surgical History:   Procedure  Laterality Date    ADENOIDECTOMY  Childhood    CARDIAC CATHETERIZATION  Over 30 years ago    CARDIAC CATHETERIZATION N/A 04/15/2024    Procedure: Left And Right Heart Cath, With LV;  Surgeon: Blaine Adams MD;  Location: [facility];  Service: Cardiovascular;  Laterality: N/A;  3 hr hydration / arrive 6:30    CARDIAC DEFIBRILLATOR PLACEMENT      CARDIAC ELECTROPHYSIOLOGY PROCEDURE N/A 05/30/2024    Procedure: ICD Dual Implant;  Surgeon: Mina Kiran MD;  Location: [facility];  Service: Electrophysiology;  Laterality: N/A;  Dual chamber ICD, [facility]  notified [facility] 5/24/24    CHOLECYSTECTOMY  Over 30 years ago    CIRCUMCISION, PRIMARY  72,years ago    CORONARY ARTERY BYPASS GRAFT  12/02/2021    CABG    CORONARY STENT PLACEMENT  Last week    EYE SURGERY  Childhood    INVASIVE VASCULAR PROCEDURE Left 04/30/2024    Procedure: Angioplasty - Upper Extremity;  Surgeon: Narinder Quevedo MD;  Location: [facility];  Service: Cardiovascular;  Laterality: Left;  3 hr hydration  / arrive 6:30    MR HEAD ANGIO WO IV CONTRAST  06/02/2021    MR HEAD ANGIO WO IV CONTRAST 6/2/2021 [facility] CLINICAL LEGACY    MR HEAD ANGIO WO IV CONTRAST  11/16/2021    MR HEAD ANGIO WO IV CONTRAST 11/16/2021 [facility] CLINICAL LEGACY    MR NECK ANGIO WO IV CONTRAST  06/02/2021    MR NECK ANGIO WO IV CONTRAST 6/2/2021 [facility] CLINICAL LEGACY    MR NECK ANGIO WO IV CONTRAST  11/16/2021    MR NECK ANGIO WO IV CONTRAST 11/16/2021 [facility] CLINICAL LEGACY     Family History   Problem Relation Name Age of Onset    Diabetes Mother Sofia Garza     Other (mycoardial infarction) Father Hilario 46    Fainting Father Hilario     Heart disease Father Hilario     Stomach cancer Mother's Sister      Stroke Maternal Grandmother Jasmina Mace     Colon cancer Maternal Grandmother Jasmina Mace      Social History     Tobacco Use    Smoking status: Never    Smokeless tobacco: Never   Vaping Use    Vaping status: Never Used   Substance Use  "Topics    Alcohol use: Never    Drug use: Never       Physical Exam   Visit Vitals  /75   Pulse 98   Temp 36.6 °C (97.9 °F) (Temporal)   Resp 20   Ht 1.575 m (5' 2\")   Wt 66.5 kg (146 lb 9.7 oz)   SpO2 95%   BMI 26.81 kg/m²   Smoking Status Never   BSA 1.71 m²      Physical Exam    CT brain attack angio head and neck W and WO IV contrast   Final Result   The examination is suboptimal secondary to timing of the scan   relative to the contrast bolus.        CTA neck:        There is coarse atherosclerotic calcification along the carotid   arteries bilaterally with mild-to-moderate narrowing.        No significant stenosis at the carotid bifurcation bilaterally. There   is calcified and noncalcified plaque at the left carotid bifurcation.        There is a beaded appearance of the distal cervical internal carotid   artery on the right which may be secondary to fibromuscular   dysplasia. There is also mild irregularity of the V3 segment of the   left vertebral artery.        There is a stent at the origin of the left subclavian artery which   grossly appears patent, however not well evaluated on the current   exam.        There appears to be narrowing at the origin of the left common   carotid artery, not well evaluated on the current exam.        CTA head:        There appears to be attenuated branches of the MCA on the right   compared to the left which may be secondary to high-grade stenosis or   areas of occlusion, not well evaluated on the current exam secondary   to technique.        There are coarse atherosclerotic calcifications along the intradural   segments of the vertebral arteries with a least moderate narrowing.        Otherwise, no evidence for significant stenosis or large branch   vessel cutoffs of the intracranial vessels.        Consider repeat examination as clinically warranted.        MACRO:   None        Signed by: Zohra Williamson 12/20/2024 11:20 AM   Dictation workstation:   [id number]      CT " brain attack head wo IV contrast   Final Result   No CT evidence for acute intracranial pathology.             Findings discussed with REINALDO Ricardo of the emergency   room via telephone at 10:43 a.m. on 12/20/2024        Signed by: Neeraj Rm 12/20/2024 10:51 AM   Dictation workstation:   [id number]          Labs Reviewed   CBC WITH AUTO DIFFERENTIAL - Abnormal       Result Value    WBC 12.9 (*)     nRBC 0.0      RBC 2.06 (*)     Hemoglobin 6.1 (*)     Hematocrit 19.0 (*)     MCV 92      MCH 29.6      MCHC 32.1      RDW 19.6 (*)     Platelets 223      Neutrophils % 77.9      Immature Granulocytes %, Automated 0.8      Lymphocytes % 13.3      Monocytes % 7.8      Eosinophils % 0.0      Basophils % 0.2      Neutrophils Absolute 10.09 (*)     Immature Granulocytes Absolute, Automated 0.10      Lymphocytes Absolute 1.72      Monocytes Absolute 1.01 (*)     Eosinophils Absolute 0.00      Basophils Absolute 0.02     PROTIME-INR - Abnormal    Protime 13.5 (*)     INR 1.2 (*)    B-TYPE NATRIURETIC PEPTIDE - Abnormal    BNP 1,270 (*)     Narrative:        <100 pg/mL - Heart failure unlikely  100-299 pg/mL - Intermediate probability of acute heart                  failure exacerbation. Correlate with clinical                  context and patient history.    >=300 pg/mL - Heart Failure likely. Correlate with clinical                  context and patient history.    BNP testing is performed using different testing methodology at [facility] than at other [facility]. Direct result comparisons should only be made within the same method.      BASIC METABOLIC PANEL - Abnormal    Glucose 285 (*)     Sodium 136      Potassium 5.9 (*)     Chloride 102      Bicarbonate 18 (*)     Anion Gap 22 (*)     Urea Nitrogen 123 (*)     Creatinine 2.64 (*)     eGFR 24 (*)     Calcium 9.4     SERIAL TROPONIN-INITIAL - Abnormal    Troponin I, High Sensitivity 1,097 (*)     Narrative:     Less than 99th  percentile of normal range cutoff-  Female and children under 18 years old <14 ng/L; Male <21 ng/L: Negative  Repeat testing should be performed if clinically indicated.     Female and children under 18 years old 14-50 ng/L; Male 21-50 ng/L:  Consistent with possible cardiac damage and possible increased clinical   risk. Serial measurements may help to assess extent of myocardial damage.     >50 ng/L: Consistent with cardiac damage, increased clinical risk and  myocardial infarction. Serial measurements may help assess extent of   myocardial damage.      NOTE: Children less than 1 year old may have higher baseline troponin   levels and results should be interpreted in conjunction with the overall   clinical context.     NOTE: Troponin I testing is performed using a different   testing methodology at [facility] than at other   [facility]. Direct result comparisons should only   be made within the same method.   URINALYSIS WITH REFLEX CULTURE AND MICROSCOPIC - Abnormal    Color, Urine Light-Yellow      Appearance, Urine Clear      Specific Gravity, Urine 1.030      pH, Urine 5.0      Protein, Urine NEGATIVE      Glucose, Urine OVER (4+) (*)     Blood, Urine NEGATIVE      Ketones, Urine NEGATIVE      Bilirubin, Urine NEGATIVE      Urobilinogen, Urine Normal      Nitrite, Urine NEGATIVE      Leukocyte Esterase, Urine NEGATIVE      Narrative:     OVER is reported when the result is greater than the clinically reportable range.   SERIAL TROPONIN, 1 HOUR - Abnormal    Troponin I, High Sensitivity 1,566 (*)     Narrative:     Less than 99th percentile of normal range cutoff-  Female and children under 18 years old <14 ng/L; Male <21 ng/L: Negative  Repeat testing should be performed if clinically indicated.     Female and children under 18 years old 14-50 ng/L; Male 21-50 ng/L:  Consistent with possible cardiac damage and possible increased clinical   risk. Serial measurements may help to assess extent  of myocardial damage.     >50 ng/L: Consistent with cardiac damage, increased clinical risk and  myocardial infarction. Serial measurements may help assess extent of   myocardial damage.      NOTE: Children less than 1 year old may have higher baseline troponin   levels and results should be interpreted in conjunction with the overall   clinical context.     NOTE: Troponin I testing is performed using a different   testing methodology at [facility] than at other   [facility]. Direct result comparisons should only   be made within the same method.   TROPONIN I, HIGH SENSITIVITY - Abnormal    Troponin I, High Sensitivity 2,668 (*)     Narrative:     Less than 99th percentile of normal range cutoff-  Female and children under 18 years old <14 ng/L; Male <21 ng/L: Negative  Repeat testing should be performed if clinically indicated.     Female and children under 18 years old 14-50 ng/L; Male 21-50 ng/L:  Consistent with possible cardiac damage and possible increased clinical   risk. Serial measurements may help to assess extent of myocardial damage.     >50 ng/L: Consistent with cardiac damage, increased clinical risk and  myocardial infarction. Serial measurements may help assess extent of   myocardial damage.      NOTE: Children less than 1 year old may have higher baseline troponin   levels and results should be interpreted in conjunction with the overall   clinical context.     NOTE: Troponin I testing is performed using a different   testing methodology at [facility] than at other   [facility]. Direct result comparisons should only   be made within the same method.   CBC WITH AUTO DIFFERENTIAL - Abnormal    WBC 13.8 (*)     nRBC 0.4 (*)     RBC 2.77 (*)     Hemoglobin 8.1 (*)     Hematocrit 24.4 (*)     MCV 88      MCH 29.2      MCHC 33.2      RDW 19.1 (*)     Platelets 177      Neutrophils % 71.1      Immature Granulocytes %, Automated 0.7      Lymphocytes % 16.2       Monocytes % 11.7      Eosinophils % 0.0      Basophils % 0.3      Neutrophils Absolute 9.80 (*)     Immature Granulocytes Absolute, Automated 0.09      Lymphocytes Absolute 2.24      Monocytes Absolute 1.62 (*)     Eosinophils Absolute 0.00      Basophils Absolute 0.04     TROPONIN I, HIGH SENSITIVITY - Abnormal    Troponin I, High Sensitivity 6,583 (*)     Narrative:     Less than 99th percentile of normal range cutoff-  Female and children under 18 years old <14 ng/L; Male <21 ng/L: Negative  Repeat testing should be performed if clinically indicated.     Female and children under 18 years old 14-50 ng/L; Male 21-50 ng/L:  Consistent with possible cardiac damage and possible increased clinical   risk. Serial measurements may help to assess extent of myocardial damage.     >50 ng/L: Consistent with cardiac damage, increased clinical risk and  myocardial infarction. Serial measurements may help assess extent of   myocardial damage.      NOTE: Children less than 1 year old may have higher baseline troponin   levels and results should be interpreted in conjunction with the overall   clinical context.     NOTE: Troponin I testing is performed using a different   testing methodology at [facility] than at other   [facility]. Direct result comparisons should only   be made within the same method.   CBC - Abnormal    WBC 13.0 (*)     nRBC 0.2 (*)     RBC 2.76 (*)     Hemoglobin 8.1 (*)     Hematocrit 24.6 (*)     MCV 89      MCH 29.3      MCHC 32.9      RDW 19.9 (*)     Platelets 194     TROPONIN I, HIGH SENSITIVITY - Abnormal    Troponin I, High Sensitivity 7,200 (*)     Narrative:     Less than 99th percentile of normal range cutoff-  Female and children under 18 years old <14 ng/L; Male <21 ng/L: Negative  Repeat testing should be performed if clinically indicated.     Female and children under 18 years old 14-50 ng/L; Male 21-50 ng/L:  Consistent with possible cardiac damage and possible increased  clinical   risk. Serial measurements may help to assess extent of myocardial damage.     >50 ng/L: Consistent with cardiac damage, increased clinical risk and  myocardial infarction. Serial measurements may help assess extent of   myocardial damage.      NOTE: Children less than 1 year old may have higher baseline troponin   levels and results should be interpreted in conjunction with the overall   clinical context.     NOTE: Troponin I testing is performed using a different   testing methodology at [facility] than at other   [facility]. Direct result comparisons should only   be made within the same method.   BASIC METABOLIC PANEL - Abnormal    Glucose 239 (*)     Sodium 137      Potassium 5.2      Chloride 107      Bicarbonate 19 (*)     Anion Gap 16      Urea Nitrogen 105 (*)     Creatinine 2.56 (*)     eGFR 25 (*)     Calcium 8.5 (*)    HEMOGLOBIN AND HEMATOCRIT, BLOOD - Abnormal    Hemoglobin 8.4 (*)     Hematocrit 25.5 (*)    CBC - Abnormal    WBC 15.2 (*)     nRBC 0.3 (*)     RBC 3.12 (*)     Hemoglobin 9.0 (*)     Hematocrit 28.4 (*)     MCV 91      MCH 28.8      MCHC 31.7 (*)     RDW 20.7 (*)     Platelets 192     TROPONIN I, HIGH SENSITIVITY - Abnormal    Troponin I, High Sensitivity 3,784 (*)     Narrative:     Less than 99th percentile of normal range cutoff-  Female and children under 18 years old <14 ng/L; Male <21 ng/L: Negative  Repeat testing should be performed if clinically indicated.     Female and children under 18 years old 14-50 ng/L; Male 21-50 ng/L:  Consistent with possible cardiac damage and possible increased clinical   risk. Serial measurements may help to assess extent of myocardial damage.     >50 ng/L: Consistent with cardiac damage, increased clinical risk and  myocardial infarction. Serial measurements may help assess extent of   myocardial damage.      NOTE: Children less than 1 year old may have higher baseline troponin   levels and results should be  interpreted in conjunction with the overall   clinical context.     NOTE: Troponin I testing is performed using a different   testing methodology at [facility] than at other   [facility]. Direct result comparisons should only   be made within the same method.   CBC - Abnormal    WBC 10.3      nRBC 0.2 (*)     RBC 2.91 (*)     Hemoglobin 8.7 (*)     Hematocrit 26.5 (*)     MCV 91      MCH 29.9      MCHC 32.8      RDW 20.2 (*)     Platelets 122 (*)    COMPREHENSIVE METABOLIC PANEL - Abnormal    Glucose 228 (*)     Sodium 140      Potassium 4.8      Chloride 112 (*)     Bicarbonate 20 (*)     Anion Gap 13      Urea Nitrogen 73 (*)     Creatinine 2.14 (*)     eGFR 31 (*)     Calcium 8.5 (*)     Albumin 3.7      Alkaline Phosphatase 73      Total Protein 6.4      AST 29      Bilirubin, Total 0.8      ALT 17     MAGNESIUM - Abnormal    Magnesium 2.52 (*)    CBC - Abnormal    WBC 11.1      nRBC 0.0      RBC 2.89 (*)     Hemoglobin 8.3 (*)     Hematocrit 26.9 (*)     MCV 93      MCH 28.7      MCHC 30.9 (*)     RDW 20.3 (*)     Platelets 164     POCT GLUCOSE - Abnormal    POCT Glucose 259 (*)    POCT GLUCOSE - Abnormal    POCT Glucose 178 (*)    POCT GLUCOSE - Abnormal    POCT Glucose 240 (*)    POCT GLUCOSE - Abnormal    POCT Glucose 229 (*)    POCT GLUCOSE - Abnormal    POCT Glucose 215 (*)    POCT GLUCOSE - Abnormal    POCT Glucose 206 (*)    POCT GLUCOSE - Abnormal    POCT Glucose 213 (*)    POCT GLUCOSE - Abnormal    POCT Glucose 225 (*)    APTT - Normal    aPTT 28      Narrative:     The APTT is no longer used for monitoring Unfractionated Heparin Therapy. For monitoring Heparin Therapy, use the Heparin Assay.   AMMONIA - Normal    Ammonia 17     HEPATIC FUNCTION PANEL - Normal    Albumin 3.9      Bilirubin, Total 0.4      Bilirubin, Direct 0.1      Alkaline Phosphatase 77      ALT 13      AST 17      Total Protein 6.5     TROPONIN SERIES- (INITIAL, 1 HR)    Narrative:     The following orders were  created for panel order Troponin I Series, High Sensitivity (0, 1 HR).  Procedure                               Abnormality         Status                     ---------                               -----------         ------                     Troponin I, High Sensiti...[[id number]]  Abnormal            Final result               Troponin, High Sensitivi...[[id number]]  Abnormal            Final result                 Please view results for these tests on the individual orders.   URINALYSIS WITH REFLEX CULTURE AND MICROSCOPIC    Narrative:     The following orders were created for panel order Urinalysis with Reflex Culture and Microscopic.  Procedure                               Abnormality         Status                     ---------                               -----------         ------                     Urinalysis with Reflex C...[[id number]]  Abnormal            Final result               Extra Urine Gray Tube[[id number]]                            Final result                 Please view results for these tests on the individual orders.   TYPE AND SCREEN    ABO TYPE B      Rh TYPE POS      ANTIBODY SCREEN NEG     EXTRA URINE GRAY TUBE    Extra Tube Hold for add-ons.     CBC   CBC   COMPREHENSIVE METABOLIC PANEL   MAGNESIUM   PREPARE RBC    PRODUCT CODE W6465O09      Unit Number [id number]      Unit ABO O      Unit RH POS      XM INTEP COMP      Dispense Status TR      Blood Expiration Date 1/1/2025 11:59:00 PM EST      PRODUCT BLOOD TYPE 5100      UNIT VOLUME 350      PRODUCT CODE S0963T96      Unit Number [id number]      Unit ABO O      Unit RH POS      XM INTEP COMP      Dispense Status TR      Blood Expiration Date 1/1/2025 11:59:00 PM EST      PRODUCT BLOOD TYPE 5100      UNIT VOLUME 350     POCT GLUCOSE METER   POCT GLUCOSE METER   POCT GLUCOSE METER   POCT GLUCOSE METER   POCT GLUCOSE METER   POCT GLUCOSE METER         ED Course & MDM   Diagnoses as of 12/23/24 0402   Stroke-like symptoms    Anemia, unspecified type   Gastrointestinal hemorrhage, unspecified gastrointestinal hemorrhage type   Elevated troponin   Acute on chronic heart failure, unspecified heart failure type   Acute kidney injury (CMS-HCC)           Medical Decision Making         Your medication list        ASK your doctor about these medications        Instructions Last Dose Given Next Dose Due   albuterol 90 mcg/actuation inhaler  Commonly known as: ProAir HFA      Inhale 2 puffs every 6 hours if needed for wheezing or shortness of breath.       allopurinol 100 mg tablet  Commonly known as: Zyloprim      Take 1 tablet (100 mg) by mouth once daily.       amLODIPine 10 mg tablet  Commonly known as: Norvasc      TAKE 1 TABLET BY MOUTH EVERY DAY       aspirin 81 mg EC tablet      TAKE 1 TABLET BY MOUTH EVERY DAY       atorvastatin 80 mg tablet  Commonly known as: Lipitor      TAKE 1 TABLET BY MOUTH EVERY DAY       b complex vitamins capsule           clopidogrel 75 mg tablet  Commonly known as: Plavix      Take 1 tablet (75 mg) by mouth once daily.       CoQ-10 100 mg capsule  Generic drug: coenzyme Q-10           cyanocobalamin 1,000 mcg tablet  Commonly known as: Vitamin B-12           Entresto 24-26 mg tablet  Generic drug: sacubitriL-valsartan      Take 0.5 tablets by mouth 2 times a day.       famotidine 20 mg tablet  Commonly known as: Pepcid      TAKE 1 TABLET (20 MG) BY MOUTH ONCE DAILY AS NEEDED FOR INDIGESTION.       Farxiga 10 mg  Generic drug: dapagliflozin propanediol           furosemide 40 mg tablet  Commonly known as: Lasix      Take 1 tablet (40 mg) by mouth once daily. As needed for weight gain greater than 3 pounds       gabapentin 100 mg capsule  Commonly known as: Neurontin      TAKE 1 CAPSULE (100 MG) BY MOUTH ONCE DAILY AT BEDTIME.       icosapent ethyL 0.5 gram capsule  Commonly known as: Vascepa      Take 2 capsules (1 g) by mouth 2 times a day with meals.       loratadine 10 mg tablet  Commonly known as:  Claritin           magnesium gluconate 27.5 mg magne- sium (500 mg) tablet  Commonly known as: Magonate           metoprolol succinate XL 50 mg 24 hr tablet  Commonly known as: Toprol-XL      TAKE 1 TABLET BY MOUTH EVERY DAY       multivitamin tablet           sertraline 50 mg tablet  Commonly known as: Zoloft      TAKE ONE TABLET DAILY IN THE MORNING AFTER BREAKFAST.       sildenafil 100 mg tablet  Commonly known as: Viagra      Take 1 tablet (100 mg) by mouth if needed for erectile dysfunction (Start with half tab. Take 1 hour prior to intercourse on an empty stomach. If you have an erection for over 4 hours, please go to the emergency room.).       tadalafil 20 mg tablet  Commonly known as: Cialis      Take 1 tablet (20 mg) by mouth if needed for erectile dysfunction (Start with half tab. Take 2 hours prior to wanting erection.If you get an erection over 4 hours, go to the emergency room.).       tadalafil 5 mg tablet  Commonly known as: Cialis      Take 1 tablet (5 mg) by mouth once daily.       tadalafil 5 mg tablet  Commonly known as: Cialis      TAKE 1 TABLET BY MOUTH EVERY DAY                Procedure  Critical Care    Performed by: Francois Simms MD  Authorized by: Mary Grace Lr MD    Critical care provider statement:     Critical care time (minutes):  50    Critical care time was exclusive of:  Separately billable procedures and treating other patients    Critical care was necessary to treat or prevent imminent or life-threatening deterioration of the following conditions:  CNS failure or compromise, cardiac failure and renal failure (stroke, GI bleed, ACS and heart failure)    Critical care was time spent personally by me on the following activities:  Development of treatment plan with patient or surrogate, discussions with consultants, evaluation of patient's response to treatment, examination of patient, obtaining history from patient or surrogate, ordering and performing treatments and  interventions, ordering and review of laboratory studies, ordering and review of radiographic studies and re-evaluation of patient's condition    Care discussed with comment:  [facility] [place] neurologist and Trang MARINO       *This report was transcribed using voice recognition software.  Every effort was made to ensure accuracy; however, inadvertent computerized transcription errors may be present.*  Francois Simms MD  12/23/24         Francois Simms MD  12/23/24 7165

## 2024-12-21 LAB
ANION GAP SERPL CALC-SCNC: 16 MMOL/L (ref 10–20)
BUN SERPL-MCNC: 105 MG/DL (ref 6–23)
CALCIUM SERPL-MCNC: 8.5 MG/DL (ref 8.6–10.3)
CARDIAC TROPONIN I PNL SERPL HS: 7200 NG/L (ref 0–20)
CHLORIDE SERPL-SCNC: 107 MMOL/L (ref 98–107)
CO2 SERPL-SCNC: 19 MMOL/L (ref 21–32)
CREAT SERPL-MCNC: 2.56 MG/DL (ref 0.5–1.3)
EGFRCR SERPLBLD CKD-EPI 2021: 25 ML/MIN/1.73M*2
ERYTHROCYTE [DISTWIDTH] IN BLOOD BY AUTOMATED COUNT: 19.9 % (ref 11.5–14.5)
GLUCOSE BLD MANUAL STRIP-MCNC: 178 MG/DL (ref 74–99)
GLUCOSE SERPL-MCNC: 239 MG/DL (ref 74–99)
HCT VFR BLD AUTO: 24.6 % (ref 41–52)
HCT VFR BLD AUTO: 25.5 % (ref 41–52)
HGB BLD-MCNC: 8.1 G/DL (ref 13.5–17.5)
HGB BLD-MCNC: 8.4 G/DL (ref 13.5–17.5)
HOLD SPECIMEN: NORMAL
MCH RBC QN AUTO: 29.3 PG (ref 26–34)
MCHC RBC AUTO-ENTMCNC: 32.9 G/DL (ref 32–36)
MCV RBC AUTO: 89 FL (ref 80–100)
NRBC BLD-RTO: 0.2 /100 WBCS (ref 0–0)
PLATELET # BLD AUTO: 194 X10*3/UL (ref 150–450)
POTASSIUM SERPL-SCNC: 5.2 MMOL/L (ref 3.5–5.3)
RBC # BLD AUTO: 2.76 X10*6/UL (ref 4.5–5.9)
SODIUM SERPL-SCNC: 137 MMOL/L (ref 136–145)
WBC # BLD AUTO: 13 X10*3/UL (ref 4.4–11.3)

## 2024-12-21 PROCEDURE — 96375 TX/PRO/DX INJ NEW DRUG ADDON: CPT

## 2024-12-21 PROCEDURE — 99223 1ST HOSP IP/OBS HIGH 75: CPT | Performed by: STUDENT IN AN ORGANIZED HEALTH CARE EDUCATION/TRAINING PROGRAM

## 2024-12-21 PROCEDURE — 36415 COLL VENOUS BLD VENIPUNCTURE: CPT | Performed by: EMERGENCY MEDICINE

## 2024-12-21 PROCEDURE — 85027 COMPLETE CBC AUTOMATED: CPT | Performed by: EMERGENCY MEDICINE

## 2024-12-21 PROCEDURE — 84484 ASSAY OF TROPONIN QUANT: CPT | Performed by: EMERGENCY MEDICINE

## 2024-12-21 PROCEDURE — 2500000002 HC RX 250 W HCPCS SELF ADMINISTERED DRUGS (ALT 637 FOR MEDICARE OP, ALT 636 FOR OP/ED): Performed by: STUDENT IN AN ORGANIZED HEALTH CARE EDUCATION/TRAINING PROGRAM

## 2024-12-21 PROCEDURE — 96365 THER/PROPH/DIAG IV INF INIT: CPT

## 2024-12-21 PROCEDURE — 82947 ASSAY GLUCOSE BLOOD QUANT: CPT | Mod: 59

## 2024-12-21 PROCEDURE — 85018 HEMOGLOBIN: CPT | Mod: 59 | Performed by: EMERGENCY MEDICINE

## 2024-12-21 PROCEDURE — 80048 BASIC METABOLIC PNL TOTAL CA: CPT | Performed by: EMERGENCY MEDICINE

## 2024-12-21 PROCEDURE — 2500000004 HC RX 250 GENERAL PHARMACY W/ HCPCS (ALT 636 FOR OP/ED): Performed by: STUDENT IN AN ORGANIZED HEALTH CARE EDUCATION/TRAINING PROGRAM

## 2024-12-21 PROCEDURE — 96366 THER/PROPH/DIAG IV INF ADDON: CPT

## 2024-12-21 RX ORDER — DEXTROSE 50 % IN WATER (D50W) INTRAVENOUS SYRINGE
25
Status: DISCONTINUED | OUTPATIENT
Start: 2024-12-21 | End: 2024-12-25 | Stop reason: HOSPADM

## 2024-12-21 RX ORDER — ALBUTEROL SULFATE 90 UG/1
2 INHALANT RESPIRATORY (INHALATION) EVERY 6 HOURS PRN
Status: DISCONTINUED | OUTPATIENT
Start: 2024-12-21 | End: 2024-12-21

## 2024-12-21 RX ORDER — IPRATROPIUM BROMIDE AND ALBUTEROL SULFATE 2.5; .5 MG/3ML; MG/3ML
3 SOLUTION RESPIRATORY (INHALATION) EVERY 6 HOURS PRN
Status: DISCONTINUED | OUTPATIENT
Start: 2024-12-21 | End: 2024-12-25 | Stop reason: HOSPADM

## 2024-12-21 RX ORDER — INSULIN LISPRO 100 [IU]/ML
0-5 INJECTION, SOLUTION INTRAVENOUS; SUBCUTANEOUS EVERY 6 HOURS
Status: DISCONTINUED | OUTPATIENT
Start: 2024-12-21 | End: 2024-12-25 | Stop reason: HOSPADM

## 2024-12-21 RX ORDER — CEFTRIAXONE 1 G/50ML
1 INJECTION, SOLUTION INTRAVENOUS EVERY 24 HOURS
Status: DISCONTINUED | OUTPATIENT
Start: 2024-12-21 | End: 2024-12-21

## 2024-12-21 RX ORDER — PANTOPRAZOLE SODIUM 40 MG/10ML
40 INJECTION, POWDER, LYOPHILIZED, FOR SOLUTION INTRAVENOUS 2 TIMES DAILY
Status: DISCONTINUED | OUTPATIENT
Start: 2024-12-21 | End: 2024-12-25 | Stop reason: HOSPADM

## 2024-12-21 RX ORDER — DEXTROSE 50 % IN WATER (D50W) INTRAVENOUS SYRINGE
12.5
Status: DISCONTINUED | OUTPATIENT
Start: 2024-12-21 | End: 2024-12-25 | Stop reason: HOSPADM

## 2024-12-21 RX ADMIN — INSULIN LISPRO 1 UNITS: 100 INJECTION, SOLUTION INTRAVENOUS; SUBCUTANEOUS at 21:45

## 2024-12-21 RX ADMIN — OCTREOTIDE ACETATE 50 MCG/HR: 200 INJECTION, SOLUTION INTRAVENOUS; SUBCUTANEOUS at 21:29

## 2024-12-21 RX ADMIN — PANTOPRAZOLE SODIUM 40 MG: 40 INJECTION, POWDER, FOR SOLUTION INTRAVENOUS at 21:28

## 2024-12-21 ASSESSMENT — PAIN SCALES - GENERAL: PAINLEVEL_OUTOF10: 0 - NO PAIN

## 2024-12-21 NOTE — PROGRESS NOTES
Emergency Medicine Transition of Care Note.    I received Ritesh Garza in signout from Dr. Macdonald.  Please see the previous ED provider note for all HPI, PE and MDM up to the time of signout at 0700. This is in addition to the primary record.    In brief Ritesh Garza is an 74 y.o. male presenting for   Chief Complaint   Patient presents with    frequemt falls    Altered Mental Status     At the time of signout we were awaiting: Transfer to [facility]    Diagnoses as of 12/21/24 0732   Stroke-like symptoms   Anemia, unspecified type   Gastrointestinal hemorrhage, unspecified gastrointestinal hemorrhage type   Elevated troponin   Acute on chronic heart failure, unspecified heart failure type   Acute kidney injury (CMS-HCC)       Medical Decision Making  Patient is currently pending transfer to [facility].  Patient is currently hemodynamically stable.  Patient had no acute issues throughout the day in the emergency department.  Patient is signed out to the oncoming physician pending transfer.    Final diagnoses:   [R29.90] Stroke-like symptoms   [D64.9] Anemia, unspecified type   [K92.2] Gastrointestinal hemorrhage, unspecified gastrointestinal hemorrhage type   [R79.89] Elevated troponin   [I50.9] Acute on chronic heart failure, unspecified heart failure type   [N17.9] Acute kidney injury (CMS-HCC)           Procedure  Procedures    Danita Aguilar MD

## 2024-12-21 NOTE — PROGRESS NOTES
Emergency Medicine Transition of Care Note.    I received Ritesh Garza in signout from Dr. Kee.  Please see the previous ED provider note for all HPI, PE and MDM up to the time of signout at 0100. This is in addition to the primary record.    In brief Ritesh Garza is an 74 y.o. male presenting for   Chief Complaint   Patient presents with    frequemt falls    Altered Mental Status     At the time of signout we were awaiting: transfer to [facility]    ED Course as of 12/30/24 1433   Mon Dec 23, 2024   2307 Rapid response was activated on the patient due to a change in heart rate.  Patient became acutely tachycardic.  EKG obtained demonstrates sinus tachycardia with a rate of 136, PVC noted, prolonged QTc interval.  No evidence of an acute STEMI.  Additional labs were sent, request for reconference of the transfer center as the patient is currently been boarding in the emergency department here at [place] for 88 hours. [NS]   Tue Dec 24, 2024   0055 Patient was given IV metoprolol followed by his oral home dose.  Repeat EKG showed conversion to an atrial paced complex with a rate of 84, ventricular bigeminy present with an atypical left bundle branch block.  No evidence of an acute STEMI. [NS]   0056 Troponins are continuing to downtrend however BNP is elevating.  Chest x-ray shows stable cardiomegaly without worsening pulmonary edema. [NS]   0122 Updated Dr. House, the hospitalist currently on call at [facility] regarding the patient's condition. Still no bed available [NS]      ED Course User Index  [NS] Francois Simms MD         Diagnoses as of 12/30/24 1433   Stroke-like symptoms   Anemia, unspecified type   Gastrointestinal hemorrhage, unspecified gastrointestinal hemorrhage type   Elevated troponin   Acute on chronic heart failure, unspecified heart failure type   Acute kidney injury (CMS-HCC)       Medical Decision Making  Patient remains hemodynamically stable awaiting transport.  Morning  CBC, chemistry, troponin was ordered.    No new concerns were raised while under my care.  Patient continues awaiting a bed at [facility].    Final diagnoses:   [R29.90] Stroke-like symptoms   [D64.9] Anemia, unspecified type   [K92.2] Gastrointestinal hemorrhage, unspecified gastrointestinal hemorrhage type   [R79.89] Elevated troponin   [I50.9] Acute on chronic heart failure, unspecified heart failure type   [N17.9] Acute kidney injury (CMS-[facility])           Procedure  Procedures    Kassy Macdonald DO

## 2024-12-21 NOTE — PROGRESS NOTES
I have accept care of this patient in signout.    In summary:  I received this patient in signout in summary is a 74-year-old male past medical history of remote MI and CHF and CKD along with cirrhosis who presents emerged department for multiple falls and confusion.  He does state he had chest pain earlier in the day but is no longer having this.  He was found to have concerns of an upper GI bleed and anemia requiring transfusion.  He is pending transfer to [facility] he is also found to have an NSTEMI given the fact that he is having a GI bleed heparin was not started.  I did inform [facility] that his troponins went up to 6000 and they are aware he continues to not have any symptoms of shortness of breath or chest pain.  He was signed out to oncoming provider pending transfer      Labs Reviewed   CBC WITH AUTO DIFFERENTIAL - Abnormal       Result Value    WBC 12.9 (*)     nRBC 0.0      RBC 2.06 (*)     Hemoglobin 6.1 (*)     Hematocrit 19.0 (*)     MCV 92      MCH 29.6      MCHC 32.1      RDW 19.6 (*)     Platelets 223      Neutrophils % 77.9      Immature Granulocytes %, Automated 0.8      Lymphocytes % 13.3      Monocytes % 7.8      Eosinophils % 0.0      Basophils % 0.2      Neutrophils Absolute 10.09 (*)     Immature Granulocytes Absolute, Automated 0.10      Lymphocytes Absolute 1.72      Monocytes Absolute 1.01 (*)     Eosinophils Absolute 0.00      Basophils Absolute 0.02     PROTIME-INR - Abnormal    Protime 13.5 (*)     INR 1.2 (*)    B-TYPE NATRIURETIC PEPTIDE - Abnormal    BNP 1,270 (*)     Narrative:        <100 pg/mL - Heart failure unlikely  100-299 pg/mL - Intermediate probability of acute heart                  failure exacerbation. Correlate with clinical                  context and patient history.    >=300 pg/mL - Heart Failure likely. Correlate with clinical                  context and patient history.    BNP testing is performed using different testing methodology at [facility] than at  [facility]. Direct result comparisons should only be made within the same method.      BASIC METABOLIC PANEL - Abnormal    Glucose 285 (*)     Sodium 136      Potassium 5.9 (*)     Chloride 102      Bicarbonate 18 (*)     Anion Gap 22 (*)     Urea Nitrogen 123 (*)     Creatinine 2.64 (*)     eGFR 24 (*)     Calcium 9.4     SERIAL TROPONIN-INITIAL - Abnormal    Troponin I, High Sensitivity 1,097 (*)     Narrative:     Less than 99th percentile of normal range cutoff-  Female and children under 18 years old <14 ng/L; Male <21 ng/L: Negative  Repeat testing should be performed if clinically indicated.     Female and children under 18 years old 14-50 ng/L; Male 21-50 ng/L:  Consistent with possible cardiac damage and possible increased clinical   risk. Serial measurements may help to assess extent of myocardial damage.     >50 ng/L: Consistent with cardiac damage, increased clinical risk and  myocardial infarction. Serial measurements may help assess extent of   myocardial damage.      NOTE: Children less than 1 year old may have higher baseline troponin   levels and results should be interpreted in conjunction with the overall   clinical context.     NOTE: Troponin I testing is performed using a different   testing methodology at [facility] than at [facility]. Direct result comparisons should only   be made within the same method.   URINALYSIS WITH REFLEX CULTURE AND MICROSCOPIC - Abnormal    Color, Urine Light-Yellow      Appearance, Urine Clear      Specific Gravity, Urine 1.030      pH, Urine 5.0      Protein, Urine NEGATIVE      Glucose, Urine OVER (4+) (*)     Blood, Urine NEGATIVE      Ketones, Urine NEGATIVE      Bilirubin, Urine NEGATIVE      Urobilinogen, Urine Normal      Nitrite, Urine NEGATIVE      Leukocyte Esterase, Urine NEGATIVE      Narrative:     OVER is reported when the result is greater than the clinically reportable range.   SERIAL TROPONIN, 1 HOUR -  Abnormal    Troponin I, High Sensitivity 1,566 (*)     Narrative:     Less than 99th percentile of normal range cutoff-  Female and children under 18 years old <14 ng/L; Male <21 ng/L: Negative  Repeat testing should be performed if clinically indicated.     Female and children under 18 years old 14-50 ng/L; Male 21-50 ng/L:  Consistent with possible cardiac damage and possible increased clinical   risk. Serial measurements may help to assess extent of myocardial damage.     >50 ng/L: Consistent with cardiac damage, increased clinical risk and  myocardial infarction. Serial measurements may help assess extent of   myocardial damage.      NOTE: Children less than 1 year old may have higher baseline troponin   levels and results should be interpreted in conjunction with the overall   clinical context.     NOTE: Troponin I testing is performed using a different   testing methodology at [facility] than at other   [facility]. Direct result comparisons should only   be made within the same method.   TROPONIN I, HIGH SENSITIVITY - Abnormal    Troponin I, High Sensitivity 2,668 (*)     Narrative:     Less than 99th percentile of normal range cutoff-  Female and children under 18 years old <14 ng/L; Male <21 ng/L: Negative  Repeat testing should be performed if clinically indicated.     Female and children under 18 years old 14-50 ng/L; Male 21-50 ng/L:  Consistent with possible cardiac damage and possible increased clinical   risk. Serial measurements may help to assess extent of myocardial damage.     >50 ng/L: Consistent with cardiac damage, increased clinical risk and  myocardial infarction. Serial measurements may help assess extent of   myocardial damage.      NOTE: Children less than 1 year old may have higher baseline troponin   levels and results should be interpreted in conjunction with the overall   clinical context.     NOTE: Troponin I testing is performed using a different   testing  methodology at [facility] than at other   [facility]. Direct result comparisons should only   be made within the same method.   CBC WITH AUTO DIFFERENTIAL - Abnormal    WBC 13.8 (*)     nRBC 0.4 (*)     RBC 2.77 (*)     Hemoglobin 8.1 (*)     Hematocrit 24.4 (*)     MCV 88      MCH 29.2      MCHC 33.2      RDW 19.1 (*)     Platelets 177      Neutrophils % 71.1      Immature Granulocytes %, Automated 0.7      Lymphocytes % 16.2      Monocytes % 11.7      Eosinophils % 0.0      Basophils % 0.3      Neutrophils Absolute 9.80 (*)     Immature Granulocytes Absolute, Automated 0.09      Lymphocytes Absolute 2.24      Monocytes Absolute 1.62 (*)     Eosinophils Absolute 0.00      Basophils Absolute 0.04     TROPONIN I, HIGH SENSITIVITY - Abnormal    Troponin I, High Sensitivity 6,583 (*)     Narrative:     Less than 99th percentile of normal range cutoff-  Female and children under 18 years old <14 ng/L; Male <21 ng/L: Negative  Repeat testing should be performed if clinically indicated.     Female and children under 18 years old 14-50 ng/L; Male 21-50 ng/L:  Consistent with possible cardiac damage and possible increased clinical   risk. Serial measurements may help to assess extent of myocardial damage.     >50 ng/L: Consistent with cardiac damage, increased clinical risk and  myocardial infarction. Serial measurements may help assess extent of   myocardial damage.      NOTE: Children less than 1 year old may have higher baseline troponin   levels and results should be interpreted in conjunction with the overall   clinical context.     NOTE: Troponin I testing is performed using a different   testing methodology at [facility] than at other   [facility]. Direct result comparisons should only   be made within the same method.   POCT GLUCOSE - Abnormal    POCT Glucose 259 (*)    APTT - Normal    aPTT 28      Narrative:     The APTT is no longer used for monitoring Unfractionated  Heparin Therapy. For monitoring Heparin Therapy, use the Heparin Assay.   AMMONIA - Normal    Ammonia 17     HEPATIC FUNCTION PANEL - Normal    Albumin 3.9      Bilirubin, Total 0.4      Bilirubin, Direct 0.1      Alkaline Phosphatase 77      ALT 13      AST 17      Total Protein 6.5     TROPONIN SERIES- (INITIAL, 1 HR)    Narrative:     The following orders were created for panel order Troponin I Series, High Sensitivity (0, 1 HR).  Procedure                               Abnormality         Status                     ---------                               -----------         ------                     Troponin I, High Sensiti...[[id number]]  Abnormal            Final result               Troponin, High Sensitivi...[[id number]]  Abnormal            Final result                 Please view results for these tests on the individual orders.   TYPE AND SCREEN    ABO TYPE B      Rh TYPE POS      ANTIBODY SCREEN NEG     URINALYSIS WITH REFLEX CULTURE AND MICROSCOPIC    Narrative:     The following orders were created for panel order Urinalysis with Reflex Culture and Microscopic.  Procedure                               Abnormality         Status                     ---------                               -----------         ------                     Urinalysis with Reflex C...[[id number]]  Abnormal            Final result               Extra Urine Gray Tube[[id number]]                            In process                   Please view results for these tests on the individual orders.   EXTRA URINE GRAY TUBE   PREPARE RBC    PRODUCT CODE [id number]      Unit Number [id number]      Unit ABO O      Unit RH POS      XM INTEP COMP      Dispense Status TR      Blood Expiration Date 1/1/2025 11:59:00 PM EST      PRODUCT BLOOD TYPE 5100      UNIT VOLUME 350      PRODUCT CODE [id number]      Unit Number [id number]      Unit ABO O      Unit RH POS      XM INTEP COMP      Dispense Status TR      Blood Expiration Date 1/1/2025  11:59:00 PM EST      PRODUCT BLOOD TYPE 5100      UNIT VOLUME 350     POCT GLUCOSE METER     CT brain attack angio head and neck W and WO IV contrast   Final Result   The examination is suboptimal secondary to timing of the scan   relative to the contrast bolus.        CTA neck:        There is coarse atherosclerotic calcification along the carotid   arteries bilaterally with mild-to-moderate narrowing.        No significant stenosis at the carotid bifurcation bilaterally. There   is calcified and noncalcified plaque at the left carotid bifurcation.        There is a beaded appearance of the distal cervical internal carotid   artery on the right which may be secondary to fibromuscular   dysplasia. There is also mild irregularity of the V3 segment of the   left vertebral artery.        There is a stent at the origin of the left subclavian artery which   grossly appears patent, however not well evaluated on the current   exam.        There appears to be narrowing at the origin of the left common   carotid artery, not well evaluated on the current exam.        CTA head:        There appears to be attenuated branches of the MCA on the right   compared to the left which may be secondary to high-grade stenosis or   areas of occlusion, not well evaluated on the current exam secondary   to technique.        There are coarse atherosclerotic calcifications along the intradural   segments of the vertebral arteries with a least moderate narrowing.        Otherwise, no evidence for significant stenosis or large branch   vessel cutoffs of the intracranial vessels.        Consider repeat examination as clinically warranted.        MACRO:   None        Signed by: Zohra Williamson 12/20/2024 11:20 AM   Dictation workstation:   [id number]      CT brain attack head wo IV contrast   Final Result   No CT evidence for acute intracranial pathology.             Findings discussed with REINALDO Ricardo of the emergency   room via  telephone at 10:43 a.m. on 12/20/2024        Signed by: Neeraj Rm 12/20/2024 10:51 AM   Dictation workstation:   [id number]

## 2024-12-22 VITALS
RESPIRATION RATE: 17 BRPM | TEMPERATURE: 97.9 F | HEART RATE: 100 BPM | DIASTOLIC BLOOD PRESSURE: 69 MMHG | HEIGHT: 62 IN | OXYGEN SATURATION: 95 % | WEIGHT: 146.61 LBS | BODY MASS INDEX: 26.98 KG/M2 | SYSTOLIC BLOOD PRESSURE: 134 MMHG

## 2024-12-22 PROBLEM — I85.11 SECONDARY ESOPHAGEAL VARICES WITH BLEEDING (MULTI): Status: ACTIVE | Noted: 2023-08-21

## 2024-12-22 PROBLEM — K92.2 UPPER GASTROINTESTINAL BLEEDING: Status: ACTIVE | Noted: 2024-12-22

## 2024-12-22 PROBLEM — D72.829 LEUKOCYTOSIS: Status: ACTIVE | Noted: 2024-12-22

## 2024-12-22 PROBLEM — N18.32 ACUTE KIDNEY INJURY SUPERIMPOSED ON STAGE 3B CHRONIC KIDNEY DISEASE: Status: ACTIVE | Noted: 2024-02-14

## 2024-12-22 PROBLEM — K75.81 LIVER CIRRHOSIS SECONDARY TO NASH (MULTI): Status: ACTIVE | Noted: 2023-04-18

## 2024-12-22 LAB
ALBUMIN SERPL BCP-MCNC: 3.7 G/DL (ref 3.4–5)
ALP SERPL-CCNC: 73 U/L (ref 33–136)
ALT SERPL W P-5'-P-CCNC: 17 U/L (ref 10–52)
ANION GAP SERPL CALC-SCNC: 13 MMOL/L (ref 10–20)
AST SERPL W P-5'-P-CCNC: 29 U/L (ref 9–39)
BILIRUB SERPL-MCNC: 0.8 MG/DL (ref 0–1.2)
BUN SERPL-MCNC: 73 MG/DL (ref 6–23)
CALCIUM SERPL-MCNC: 8.5 MG/DL (ref 8.6–10.3)
CARDIAC TROPONIN I PNL SERPL HS: 3784 NG/L (ref 0–20)
CHLORIDE SERPL-SCNC: 112 MMOL/L (ref 98–107)
CO2 SERPL-SCNC: 20 MMOL/L (ref 21–32)
CREAT SERPL-MCNC: 2.14 MG/DL (ref 0.5–1.3)
EGFRCR SERPLBLD CKD-EPI 2021: 31 ML/MIN/1.73M*2
ERYTHROCYTE [DISTWIDTH] IN BLOOD BY AUTOMATED COUNT: 20.2 % (ref 11.5–14.5)
ERYTHROCYTE [DISTWIDTH] IN BLOOD BY AUTOMATED COUNT: 20.7 % (ref 11.5–14.5)
GLUCOSE BLD MANUAL STRIP-MCNC: 206 MG/DL (ref 74–99)
GLUCOSE BLD MANUAL STRIP-MCNC: 213 MG/DL (ref 74–99)
GLUCOSE BLD MANUAL STRIP-MCNC: 215 MG/DL (ref 74–99)
GLUCOSE BLD MANUAL STRIP-MCNC: 229 MG/DL (ref 74–99)
GLUCOSE BLD MANUAL STRIP-MCNC: 240 MG/DL (ref 74–99)
GLUCOSE SERPL-MCNC: 228 MG/DL (ref 74–99)
HCT VFR BLD AUTO: 26.5 % (ref 41–52)
HCT VFR BLD AUTO: 28.4 % (ref 41–52)
HGB BLD-MCNC: 8.7 G/DL (ref 13.5–17.5)
HGB BLD-MCNC: 9 G/DL (ref 13.5–17.5)
MAGNESIUM SERPL-MCNC: 2.52 MG/DL (ref 1.6–2.4)
MCH RBC QN AUTO: 28.8 PG (ref 26–34)
MCH RBC QN AUTO: 29.9 PG (ref 26–34)
MCHC RBC AUTO-ENTMCNC: 31.7 G/DL (ref 32–36)
MCHC RBC AUTO-ENTMCNC: 32.8 G/DL (ref 32–36)
MCV RBC AUTO: 91 FL (ref 80–100)
MCV RBC AUTO: 91 FL (ref 80–100)
NRBC BLD-RTO: 0.2 /100 WBCS (ref 0–0)
NRBC BLD-RTO: 0.3 /100 WBCS (ref 0–0)
PLATELET # BLD AUTO: 122 X10*3/UL (ref 150–450)
PLATELET # BLD AUTO: 192 X10*3/UL (ref 150–450)
POTASSIUM SERPL-SCNC: 4.8 MMOL/L (ref 3.5–5.3)
PROT SERPL-MCNC: 6.4 G/DL (ref 6.4–8.2)
RBC # BLD AUTO: 2.91 X10*6/UL (ref 4.5–5.9)
RBC # BLD AUTO: 3.12 X10*6/UL (ref 4.5–5.9)
SODIUM SERPL-SCNC: 140 MMOL/L (ref 136–145)
WBC # BLD AUTO: 10.3 X10*3/UL (ref 4.4–11.3)
WBC # BLD AUTO: 15.2 X10*3/UL (ref 4.4–11.3)

## 2024-12-22 PROCEDURE — 82947 ASSAY GLUCOSE BLOOD QUANT: CPT

## 2024-12-22 PROCEDURE — 82947 ASSAY GLUCOSE BLOOD QUANT: CPT | Mod: 59

## 2024-12-22 PROCEDURE — 36415 COLL VENOUS BLD VENIPUNCTURE: CPT | Performed by: STUDENT IN AN ORGANIZED HEALTH CARE EDUCATION/TRAINING PROGRAM

## 2024-12-22 PROCEDURE — 99233 SBSQ HOSP IP/OBS HIGH 50: CPT | Performed by: INTERNAL MEDICINE

## 2024-12-22 PROCEDURE — 2500000004 HC RX 250 GENERAL PHARMACY W/ HCPCS (ALT 636 FOR OP/ED): Performed by: INTERNAL MEDICINE

## 2024-12-22 PROCEDURE — 84484 ASSAY OF TROPONIN QUANT: CPT | Performed by: STUDENT IN AN ORGANIZED HEALTH CARE EDUCATION/TRAINING PROGRAM

## 2024-12-22 PROCEDURE — 2500000004 HC RX 250 GENERAL PHARMACY W/ HCPCS (ALT 636 FOR OP/ED): Performed by: STUDENT IN AN ORGANIZED HEALTH CARE EDUCATION/TRAINING PROGRAM

## 2024-12-22 PROCEDURE — 96376 TX/PRO/DX INJ SAME DRUG ADON: CPT

## 2024-12-22 PROCEDURE — 2500000002 HC RX 250 W HCPCS SELF ADMINISTERED DRUGS (ALT 637 FOR MEDICARE OP, ALT 636 FOR OP/ED): Performed by: STUDENT IN AN ORGANIZED HEALTH CARE EDUCATION/TRAINING PROGRAM

## 2024-12-22 PROCEDURE — 96368 THER/DIAG CONCURRENT INF: CPT

## 2024-12-22 PROCEDURE — 85027 COMPLETE CBC AUTOMATED: CPT | Performed by: STUDENT IN AN ORGANIZED HEALTH CARE EDUCATION/TRAINING PROGRAM

## 2024-12-22 PROCEDURE — 85027 COMPLETE CBC AUTOMATED: CPT | Performed by: INTERNAL MEDICINE

## 2024-12-22 PROCEDURE — 83735 ASSAY OF MAGNESIUM: CPT | Performed by: INTERNAL MEDICINE

## 2024-12-22 PROCEDURE — 36415 COLL VENOUS BLD VENIPUNCTURE: CPT | Performed by: INTERNAL MEDICINE

## 2024-12-22 PROCEDURE — 96366 THER/PROPH/DIAG IV INF ADDON: CPT

## 2024-12-22 PROCEDURE — 80053 COMPREHEN METABOLIC PANEL: CPT | Performed by: INTERNAL MEDICINE

## 2024-12-22 RX ORDER — CEFTRIAXONE 1 G/50ML
1 INJECTION, SOLUTION INTRAVENOUS EVERY 24 HOURS
Status: DISCONTINUED | OUTPATIENT
Start: 2024-12-22 | End: 2024-12-25 | Stop reason: HOSPADM

## 2024-12-22 RX ORDER — SODIUM CHLORIDE 9 MG/ML
75 INJECTION, SOLUTION INTRAVENOUS CONTINUOUS
Status: ACTIVE | OUTPATIENT
Start: 2024-12-22 | End: 2024-12-23

## 2024-12-22 RX ADMIN — INSULIN LISPRO 2 UNITS: 100 INJECTION, SOLUTION INTRAVENOUS; SUBCUTANEOUS at 01:28

## 2024-12-22 RX ADMIN — INSULIN LISPRO 2 UNITS: 100 INJECTION, SOLUTION INTRAVENOUS; SUBCUTANEOUS at 07:56

## 2024-12-22 RX ADMIN — INSULIN LISPRO 2 UNITS: 100 INJECTION, SOLUTION INTRAVENOUS; SUBCUTANEOUS at 14:50

## 2024-12-22 RX ADMIN — CEFTRIAXONE SODIUM 1 G: 1 INJECTION, SOLUTION INTRAVENOUS at 09:25

## 2024-12-22 RX ADMIN — OCTREOTIDE ACETATE 50 MCG/HR: 200 INJECTION, SOLUTION INTRAVENOUS; SUBCUTANEOUS at 09:11

## 2024-12-22 RX ADMIN — PANTOPRAZOLE SODIUM 40 MG: 40 INJECTION, POWDER, FOR SOLUTION INTRAVENOUS at 09:20

## 2024-12-22 RX ADMIN — SODIUM CHLORIDE 75 ML/HR: 9 INJECTION, SOLUTION INTRAVENOUS at 09:13

## 2024-12-22 RX ADMIN — PANTOPRAZOLE SODIUM 40 MG: 40 INJECTION, POWDER, FOR SOLUTION INTRAVENOUS at 20:45

## 2024-12-22 RX ADMIN — OCTREOTIDE ACETATE 50 MCG/HR: 200 INJECTION, SOLUTION INTRAVENOUS; SUBCUTANEOUS at 20:30

## 2024-12-22 RX ADMIN — INSULIN LISPRO 2 UNITS: 100 INJECTION, SOLUTION INTRAVENOUS; SUBCUTANEOUS at 20:45

## 2024-12-22 ASSESSMENT — PAIN SCALES - GENERAL
PAINLEVEL_OUTOF10: 0 - NO PAIN
PAINLEVEL_OUTOF10: 0 - NO PAIN

## 2024-12-22 NOTE — CONSULTS
"Medicine History & Physical:    Reason for Consult: prolonged ED stay    HPI:  74M hx CAD, PAD/subclavian stenosis, HFrEF, HTN, asthma, SENIA, SIDHU cirrhosis, GERD, CKD3, TIA, dementia, DM2, BPH, gout, cervical stenosis/neuropathy, allergic rhinitis, depression/anxiety presented for falls and ams    patient denies any complaints for me. wife reported confusion and falls, increased confusion. states did vomit, unsure if blood. denies diarrhea, blood in stools or black stools. unsure if nsaid use. no cough, cp, sob, abd pain, dysuria, ha, dizziness, weakness, numbness, rash    PMH: CAD, PAD/subclavian stenosis, HFrEF, HTN, asthma, SENIA, SIDHU cirrhosis, GERD, CKD3, TIA, dementia, DM2, BPH, gout, cervical stenosis/neuropathy, allergic rhinitis, depression/anxiety  PSH: CABG, colo/egd (w/ polyps, varices), subclavian stent  FH: n/a  SH: lives in [place]. no smoking, etoh, drugs  Meds: see med rec for full.  Allergies: nkda    ED Course:  VSS. no acute findings on exam. labs/imaging w/ cr 2.5 (baseline 1.8), bun 105, trop 7k, bnp 1200, hg 6.1 --> 8.4, wbc 13, coags wnl. ekg w/o stemi. ct/cta head w/o acute findings s/p protonix 80. pending transfer to [facility].     ROS reviewed and negative other than noted in HPI    Visit Vitals  /76   Pulse 93   Temp 37.2 °C (99 °F) (Oral)   Resp (!) 24   Ht 1.575 m (5' 2\")   Wt 66.5 kg (146 lb 9.7 oz)   SpO2 97%   BMI 26.81 kg/m²   Smoking Status Never   BSA 1.71 m²     Physical Exam:  General: NAD, cooperative. no jaundice, pallor, rash, bruises  HEENT: NC/AT, MMM, no oral lesions or pharyngeal erythema. PERRL. no scleral icterus or conjunctival injection. neck soft w/o masses or LAD.  CV: RRR, no murmurs, rubs, or gallops. No JVD.  Chest: breathing unlabored. CTAB w/ adequate air-entry.  Abd: non-distended. BS+. soft, non-tender. MUNA w/ melena  Extr: wwp, 2+ and symmetric peripheral pulses. no LE edema.  Neuro: AAOx1. no dysarthria/aphasia. CNII-XII intact. 5/5 strength, " sensation intact x4. FTN wnl.  asterixes.  BSUS: no ascites, no bladder distension or hydronephrosis    Results:  - all relevant laboratory and radiographic data reviewed    Assessment/Plan:  74M hx CAD, PAD/subclavian stenosis, HFrEF, HTN, asthma, SENIA, SIDHU cirrhosis, GERD, CKD3, TIA, dementia, DM2, BPH, gout, cervical stenosis/neuropathy, allergic rhinitis, depression/anxiety hospitalized for UGIB and NSTEMI. medicine consulted awaiting transfer    #UGIB:  #acute on chronic blood loss anemia:  #SIDHU cirrhosis w/ EV:  severe, w/ melena on exam. no hematemesis. hds, no hemorrhagic shock or acute abdomen. known EV.   - s/p 2u pRBC; transfuse prn for hg > 8 (w/ active cad); coags/plt at goal  - iv ppi bid (12/20 - ); octreotide bolus/gtt; hold off ctx (no ascites by BSUS)  - npo; pending transfer to [facility] for further mgt    #NSTEMI:  #CAD:  denies cp. ekg w/o stemi. suspect type ii w/ above. less likely acs, pe  - asa/plavix, heparin on hold w/ gib  - hold statin, mtp for now; tele    #acute encephalopathy:  #hx dementia:  GCS 14, aaox1-2, baseline aaox3. no focal findings. ct/cta head stable. suspect HE w/ above  - will hold off lactulose for now w/ pending EGD  - neuro checks; delirium precautions     #TAYLOR on CKD3: likely pre-renal w/ above. bsus w/o hydro; blood products  #HFrEF, decompensated: stable on ra. lasix prn w/ blood products; hold gdmt  #PAD/subclavian stenosis: hold meds  #HTN: stable; hold meds  #asthma: stable; duonebs prn  #GERD: iv ppi  #hx TIA: no new focal sx, ct/cta stable. hold meds  #DM2: not at goal; iss q6h for now  #BPH: hold meds  #gout: no flare. hold meds  #cervical stenosis/neuropathy: stable  #allergic rhinitis: hold meds  #depression/anxiety: hold meds    #FEN/GI: npo  #PPx: scds  #Lines/Tubes/Drains: piv  #Analgesia/Sedation: none  #Code: full  #Dispo: pending [facility] transfer    Henry South MD

## 2024-12-22 NOTE — PROGRESS NOTES
Emergency Medicine Transition of Care Note.    I received Ritesh Garza in signout from Dr. Wiley.  Please see the previous ED provider note for all HPI, PE and MDM up to the time of signout at 0700. This is in addition to the primary record.    In brief Ritesh Garza is an 74 y.o. male presenting for   Chief Complaint   Patient presents with   • frequemt falls   • Altered Mental Status     At the time of signout we were awaiting: Transfer    ED Course as of 12/29/24 0707   Mon Dec 23, 2024   2307 Rapid response was activated on the patient due to a change in heart rate.  Patient became acutely tachycardic.  EKG obtained demonstrates sinus tachycardia with a rate of 136, PVC noted, prolonged QTc interval.  No evidence of an acute STEMI.  Additional labs were sent, request for reconference of the transfer center as the patient is currently been boarding in the emergency department here at [facility] for 88 hours. [NS]   Tue Dec 24, 2024   0055 Patient was given IV metoprolol followed by his oral home dose.  Repeat EKG showed conversion to an atrial paced complex with a rate of 84, ventricular bigeminy present with an atypical left bundle branch block.  No evidence of an acute STEMI. [NS]   0056 Troponins are continuing to downtrend however BNP is elevating.  Chest x-ray shows stable cardiomegaly without worsening pulmonary edema. [NS]   0122 Updated Dr. House, the hospitalist currently on call at [facility] regarding the patient's condition. Still no bed available [NS]      ED Course User Index  [NS] Francois Simms MD         Diagnoses as of 12/29/24 0707   Stroke-like symptoms   Anemia, unspecified type   Gastrointestinal hemorrhage, unspecified gastrointestinal hemorrhage type   Elevated troponin   Acute on chronic heart failure, unspecified heart failure type   Acute kidney injury (CMS-HCC)       Medical Decision Making    74-year-old male who signout was being transferred for GI bleed and NSTEMI.   Patient received blood products along with other medications.  Bedside of her pending transfer.  At this point time patient was signed out to oncoming provider awaiting transfer to [facility].  He has been stable condition during my care of patient    Final diagnoses:   [R29.90] Stroke-like symptoms   [D64.9] Anemia, unspecified type   [K92.2] Gastrointestinal hemorrhage, unspecified gastrointestinal hemorrhage type   [R79.89] Elevated troponin   [I50.9] Acute on chronic heart failure, unspecified heart failure type   [N17.9] Acute kidney injury (CMS-[facility])           Procedure  Procedures    Brice Koenig DO

## 2024-12-22 NOTE — PROGRESS NOTES
Ritesh Garza is a 74 y.o. male on day 0 of admission presenting with No Principal Problem: There is no principal problem currently on the Problem List. Please update the Problem List and refresh..      Subjective   Patient seen in follow up. He notes doing about the same.  He denies side effects from medications received.  No new complaints.       Objective     Last Recorded Vitals  /76   Pulse 94   Temp 37.2 °C (99 °F) (Oral)   Resp 12   Wt 66.5 kg (146 lb 9.7 oz)   SpO2 98%   Intake/Output last 3 Shifts:  No intake or output data in the 24 hours ending 12/22/24 0814    Admission Weight  Weight: 66.5 kg (146 lb 9.7 oz) (12/20/24 0935)    Daily Weight  12/20/24 : 66.5 kg (146 lb 9.7 oz)    Image Results  ECG 12 lead  Sinus rhythm  Nonspecific intraventricular conduction delay  Anterior infarct, old  Minimal ST depression, lateral leads  Baseline wander in lead(s) V3    See ED provider note for full interpretation and clinical correlation  Confirmed by Azra Joseph ([id number]) on 12/20/2024 7:56:07 PM  CT brain attack angio head and neck W and WO IV contrast  Narrative: Interpreted By:  Zohra Williamson,   STUDY:  CT BRAIN ATTACK ANGIO HEAD AND NECK W AND WO IV CONTRAST;  12/20/2024  10:57 am      INDICATION:  Signs/Symptoms:Multiple falls, imbalance, L sided weakness.          COMPARISON:  None.      ACCESSION NUMBER(S):  [id number]      ORDERING CLINICIAN:  REINALDO JOSE      TECHNIQUE:  Unenhanced CT images of the head were obtained. Subsequently, 90 ML  of Omnipaque 350 was administered intravenously and axial images of  the head and neck were acquired.  Coronal, sagittal, and 3-D  reconstructions were provided for review.      FINDINGS:  The examination is suboptimal secondary the timing of the scan  relative to the contrast bolus.      CTA HEAD FINDINGS:      Anterior circulation: The bilateral intracranial internal carotid  arteries, bilateral carotid terminals, bilateral proximal  anterior  and middle cerebral arteries are patent. There is coarse  atherosclerotic calcification along the cavernous segments of the  carotid arteries with moderate narrowing. Hypoplastic or aplastic A1  segment on the right.      There is attenuation of MCA branches on the right compared to the  left which may be secondary to high-grade stenosis or areas of  occlusion, not well evaluated on the current exam secondary to  technique.      Posterior circulation: Bilateral intracranial vertebral arteries,  vertebrobasilar junction, basilar artery and proximal posterior  cerebral arteries are patent. There is coarse atherosclerotic  calcification along the intradural segments of the vertebral arteries  with at least moderate narrowing.      CTA NECK FINDINGS:          There is atherosclerotic calcification at the aortic arch and origins  of the great vessels. There is a stent at the origin of the left  subclavian artery. Grossly the stent appears patent. There is  narrowing at the origin of the left common carotid artery, however  the degree of narrowing is not well evaluated secondary to the  technique.      Right carotid vessels: There is coarse atherosclerotic calcification  along the course of the common carotid artery with multifocal areas  of mild narrowing. There is moderate narrowing of the distal common  carotid artery just proximal to the bifurcation. No significant  stenosis at the carotid bifurcation. The internal carotid artery in  the neck is patent without significant stenosis. There is a beaded  appearance of the distal cervical internal carotid artery which may  be secondary to fibromuscular dysplasia.      Left carotid vessels: The common carotid artery is patent. There is  coarse atherosclerotic calcification along the course of the common  carotid artery with multifocal areas of mild-to-moderate narrowing.  There is calcified and noncalcified plaque at the carotid bifurcation  without significant  stenosis by NASCET criteria. The cervical  internal carotid artery is patent without significant stenosis.      Vertebral vessels:  The visualized segments of the cervical vertebral  arteries are patent. There is mild irregularity along the V3 segment  of the left vertebral artery.      Postsurgical and degenerative changes of the cervical spine.      Impression: The examination is suboptimal secondary to timing of the scan  relative to the contrast bolus.      CTA neck:      There is coarse atherosclerotic calcification along the carotid  arteries bilaterally with mild-to-moderate narrowing.      No significant stenosis at the carotid bifurcation bilaterally. There  is calcified and noncalcified plaque at the left carotid bifurcation.      There is a beaded appearance of the distal cervical internal carotid  artery on the right which may be secondary to fibromuscular  dysplasia. There is also mild irregularity of the V3 segment of the  left vertebral artery.      There is a stent at the origin of the left subclavian artery which  grossly appears patent, however not well evaluated on the current  exam.      There appears to be narrowing at the origin of the left common  carotid artery, not well evaluated on the current exam.      CTA head:      There appears to be attenuated branches of the MCA on the right  compared to the left which may be secondary to high-grade stenosis or  areas of occlusion, not well evaluated on the current exam secondary  to technique.      There are coarse atherosclerotic calcifications along the intradural  segments of the vertebral arteries with a least moderate narrowing.      Otherwise, no evidence for significant stenosis or large branch  vessel cutoffs of the intracranial vessels.      Consider repeat examination as clinically warranted.      MACRO:  None      Signed by: Zohra Williamson 12/20/2024 11:20 AM  Dictation workstation:   [id number]  CT brain attack head wo IV  contrast  Narrative: Interpreted By:  Neeraj Rm,   STUDY:  REINALDO JOSE; 12/20/2024 10:37 am      INDICATION:  Signs/Symptoms:Stroke Evaluation. Multiple falls, imbalance, L sided  deficits;      COMPARISON:  06/30/2024      ACCESSION NUMBER(S):  [id number]      ORDERING CLINICIAN:  REINALDO JOSE      TECHNIQUE:  Contiguous axial images were acquired from the vertex through the  posterior fossa without IV contrast. All CT examinations are  performed with 1 or more of the following dose reduction techniques:  Automated exposure control, adjustment of mA and/or kv according to  patient's size, or use of iterative reconstruction techniques.      FINDINGS:  No focal mass effect or midline shift is identified. The ventricles  and sulci are symmetric and appropriate for the patient's age.      The gray white matter differentiation is preserved. Mild degree of  nonspecific white matter changes.      No acute intracranial hemorrhage is seen. No intra-axial or  extra-axial fluid collection is seen.      The visualized paranasal sinuses and mastoid air cells are clear.      Impression: No CT evidence for acute intracranial pathology.          Findings discussed with REINALDO Ricardo of the emergency  room via telephone at 10:43 a.m. on 12/20/2024      Signed by: Neeraj Rm 12/20/2024 10:51 AM  Dictation workstation:   [id number]      Physical Exam  Vitals and nursing note reviewed.   Constitutional:       General: He is not in acute distress.     Appearance: He is ill-appearing.   HENT:      Head: Normocephalic and atraumatic.      Right Ear: External ear normal.      Left Ear: External ear normal.      Nose: Nose normal. No rhinorrhea.      Mouth/Throat:      Mouth: Mucous membranes are moist.      Pharynx: Oropharynx is clear. No oropharyngeal exudate.   Eyes:      General: No scleral icterus.        Right eye: No discharge.         Left eye: No discharge.       Conjunctiva/sclera: Conjunctivae normal.   Cardiovascular:      Rate and Rhythm: Normal rate and regular rhythm.      Pulses: Normal pulses.      Heart sounds: Murmur heard.   Pulmonary:      Effort: Pulmonary effort is normal. No respiratory distress.      Breath sounds: Normal breath sounds. No wheezing or rales.   Abdominal:      General: Abdomen is flat. There is no distension.      Palpations: Abdomen is soft.      Tenderness: There is no abdominal tenderness.   Musculoskeletal:         General: No tenderness.      Right lower leg: No edema.      Left lower leg: No edema.   Skin:     General: Skin is warm and dry.      Capillary Refill: Capillary refill takes less than 2 seconds.      Findings: No rash.   Neurological:      General: No focal deficit present.      Mental Status: He is alert. Mental status is at baseline.   Psychiatric:         Attention and Perception: Attention normal.         Behavior: Behavior is cooperative.         Relevant Results      Scheduled medications  insulin lispro, 0-5 Units, subcutaneous, q6h  pantoprazole, 40 mg, intravenous, BID      Continuous medications  octreotide, 50 mcg/hr, Last Rate: Stopped (12/22/24 0716)      PRN medications  PRN medications: dextrose, dextrose, glucagon, glucagon, ipratropium-albuteroL      Results for orders placed or performed during the hospital encounter of 12/20/24 (from the past 24 hours)   Hemoglobin and hematocrit, blood   Result Value Ref Range    Hemoglobin 8.4 (L) 13.5 - 17.5 g/dL    Hematocrit 25.5 (L) 41.0 - 52.0 %   POCT GLUCOSE   Result Value Ref Range    POCT Glucose 178 (H) 74 - 99 mg/dL   CBC   Result Value Ref Range    WBC 15.2 (H) 4.4 - 11.3 x10*3/uL    nRBC 0.3 (H) 0.0 - 0.0 /100 WBCs    RBC 3.12 (L) 4.50 - 5.90 x10*6/uL    Hemoglobin 9.0 (L) 13.5 - 17.5 g/dL    Hematocrit 28.4 (L) 41.0 - 52.0 %    MCV 91 80 - 100 fL    MCH 28.8 26.0 - 34.0 pg    MCHC 31.7 (L) 32.0 - 36.0 g/dL    RDW 20.7 (H) 11.5 - 14.5 %    Platelets 192 150 -  450 x10*3/uL   Troponin I, High Sensitivity   Result Value Ref Range    Troponin I, High Sensitivity 3,784 (HH) 0 - 20 ng/L   POCT GLUCOSE   Result Value Ref Range    POCT Glucose 240 (H) 74 - 99 mg/dL   POCT GLUCOSE   Result Value Ref Range    POCT Glucose 229 (H) 74 - 99 mg/dL     *Note: Due to a large number of results and/or encounters for the requested time period, some results have not been displayed. A complete set of results can be found in Results Review.                Assessment/Plan      Upper gastrointestinal bleeding  Acute on chronic blood loss anemia  SIDHU cirrhosis  Known esophageal varices  -Pending transfer to [facility] for further management including gastroenterology  -Continue twice daily IV pantoprazole and octreotide drip while here  -Check CBC every 6 hours  -Prophylaxis with ceftriaxone 1 g IV every 24 hours given high concern for variceal bleed as likely etiology  -Monitor on telemetry with continuous pulse oximetry  -Transfuse for hemoglobin less than 8, see discussion regarding non-STEMI below  -Keep n.p.o. for now    Acute Kidney Injury on Chronic Kidney Disease  -Baseline creatinine appears to be around 1.9 corresponding to a GFR of 37, stage IIIb chronic kidney disease  -Creatinine on presentation was 2.64 and then 2.56 on 12/21  -Recheck creatinine this morning and daily  -Gently hydrate with 0.9% normal saline at 75 mL/h  -Given adequate blood pressure we will hold off on any albumin or midodrine at this point.    Non-STEMI  Coronary Artery Disease  -Holding antiplatelets and anticoagulants given upper GI bleed  -Troponin trend, presenting 1097, peaked at 7200 on 12/21 04:34 then downtrending  -Beta blocker use acutely problematic given baseline heart failure with EF 30-35%, unknown current cardiac status  -Hopefully will be able to transfer soon to adequately address potential benefit of beta blockade  -Overall picture suggestive of type II MI though acute primary ischemic event cannot  be ruled out    Type 2 Diabetes Mellitus with Hyperglycemia  -NPO based SSI for now  -Hypoglycemia protocols    Hyperkalemia  -Potassium initially 5.9 on presentation, improved to 5.2 on 12/21  -Recheck now  -Telemetry    Leukocytosis  -Unclear etiology, no overt infection identified to this point  -Empiric ceftriaxone as noted above  -Urinalysis not suggestive of infection  -Continue to trend              Rick Madrigal MD

## 2024-12-22 NOTE — PROGRESS NOTES
Emergency Medicine Transition of Care Note.    I received Ritesh Garza in signout from Dr. Kee.  Please see the previous ED provider note for all HPI, PE and MDM up to the time of signout. This is in addition to the primary record.    In brief Ritesh Garza is an 74 y.o. male presenting for   Chief Complaint   Patient presents with    frequemt falls    Altered Mental Status        Diagnoses as of 12/22/24 0835   Stroke-like symptoms   Anemia, unspecified type   Gastrointestinal hemorrhage, unspecified gastrointestinal hemorrhage type   Elevated troponin   Acute on chronic heart failure, unspecified heart failure type   Acute kidney injury (CMS-HCC)       Medical Decision Making  75 yo M with history of prior MI, CHF, CKD, cirrhosis who presents with multiple falls and increasing confusion with rectal bleeding. Patient anemic, likely secondary to GIB, requiring transfusion of PRBCs and platelets. Found to have NSTEMI. EKG nonischemic. Reported chest pain earlier today but denies any persistent chest pain now. Not anticoagulated due to active GIB with anemia requiring transfusion. Accepted for transfer to [facility] for GIB, anemia, and NSTEMI. EMTALA completed and signed by prior provider. Pending bed availability and transport. Signed out to Dr. Koenig at the conclusion of my shift pending bed availability and transport.    Final diagnoses:   [R29.90] Stroke-like symptoms   [D64.9] Anemia, unspecified type   [K92.2] Gastrointestinal hemorrhage, unspecified gastrointestinal hemorrhage type   [R79.89] Elevated troponin   [I50.9] Acute on chronic heart failure, unspecified heart failure type   [N17.9] Acute kidney injury (CMS-HCC)     Procedure  Procedures    Tsering Wiley MD

## 2024-12-23 ENCOUNTER — APPOINTMENT (OUTPATIENT)
Dept: RADIOLOGY | Facility: HOSPITAL | Age: 74
End: 2024-12-23
Payer: MEDICARE

## 2024-12-23 ENCOUNTER — APPOINTMENT (OUTPATIENT)
Dept: CARDIOLOGY | Facility: HOSPITAL | Age: 74
End: 2024-12-23
Payer: MEDICARE

## 2024-12-23 LAB
ALBUMIN SERPL BCP-MCNC: 3.6 G/DL (ref 3.4–5)
ALP SERPL-CCNC: 75 U/L (ref 33–136)
ALT SERPL W P-5'-P-CCNC: 17 U/L (ref 10–52)
ANION GAP SERPL CALC-SCNC: 14 MMOL/L (ref 10–20)
AST SERPL W P-5'-P-CCNC: 21 U/L (ref 9–39)
BASOPHILS # BLD AUTO: 0.03 X10*3/UL (ref 0–0.1)
BASOPHILS NFR BLD AUTO: 0.2 %
BILIRUB SERPL-MCNC: 0.7 MG/DL (ref 0–1.2)
BUN SERPL-MCNC: 52 MG/DL (ref 6–23)
CALCIUM SERPL-MCNC: 8 MG/DL (ref 8.6–10.3)
CHLORIDE SERPL-SCNC: 112 MMOL/L (ref 98–107)
CO2 SERPL-SCNC: 20 MMOL/L (ref 21–32)
CREAT SERPL-MCNC: 1.96 MG/DL (ref 0.5–1.3)
EGFRCR SERPLBLD CKD-EPI 2021: 35 ML/MIN/1.73M*2
EOSINOPHIL # BLD AUTO: 0.34 X10*3/UL (ref 0–0.4)
EOSINOPHIL NFR BLD AUTO: 2.5 %
ERYTHROCYTE [DISTWIDTH] IN BLOOD BY AUTOMATED COUNT: 19.8 % (ref 11.5–14.5)
ERYTHROCYTE [DISTWIDTH] IN BLOOD BY AUTOMATED COUNT: 20.3 % (ref 11.5–14.5)
ERYTHROCYTE [DISTWIDTH] IN BLOOD BY AUTOMATED COUNT: 20.5 % (ref 11.5–14.5)
GLUCOSE BLD MANUAL STRIP-MCNC: 180 MG/DL (ref 74–99)
GLUCOSE BLD MANUAL STRIP-MCNC: 225 MG/DL (ref 74–99)
GLUCOSE BLD MANUAL STRIP-MCNC: 246 MG/DL (ref 74–99)
GLUCOSE BLD MANUAL STRIP-MCNC: 281 MG/DL (ref 74–99)
GLUCOSE BLD MANUAL STRIP-MCNC: 294 MG/DL (ref 74–99)
GLUCOSE SERPL-MCNC: 289 MG/DL (ref 74–99)
HCT VFR BLD AUTO: 26.9 % (ref 41–52)
HCT VFR BLD AUTO: 28.6 % (ref 41–52)
HCT VFR BLD AUTO: 28.9 % (ref 41–52)
HGB BLD-MCNC: 8.3 G/DL (ref 13.5–17.5)
HGB BLD-MCNC: 8.8 G/DL (ref 13.5–17.5)
HGB BLD-MCNC: 9 G/DL (ref 13.5–17.5)
IMM GRANULOCYTES # BLD AUTO: 0.07 X10*3/UL (ref 0–0.5)
IMM GRANULOCYTES NFR BLD AUTO: 0.5 % (ref 0–0.9)
LYMPHOCYTES # BLD AUTO: 2.27 X10*3/UL (ref 0.8–3)
LYMPHOCYTES NFR BLD AUTO: 16.4 %
MAGNESIUM SERPL-MCNC: 2.35 MG/DL (ref 1.6–2.4)
MCH RBC QN AUTO: 28.7 PG (ref 26–34)
MCH RBC QN AUTO: 28.7 PG (ref 26–34)
MCH RBC QN AUTO: 28.9 PG (ref 26–34)
MCHC RBC AUTO-ENTMCNC: 30.8 G/DL (ref 32–36)
MCHC RBC AUTO-ENTMCNC: 30.9 G/DL (ref 32–36)
MCHC RBC AUTO-ENTMCNC: 31.1 G/DL (ref 32–36)
MCV RBC AUTO: 93 FL (ref 80–100)
MONOCYTES # BLD AUTO: 1.51 X10*3/UL (ref 0.05–0.8)
MONOCYTES NFR BLD AUTO: 10.9 %
NEUTROPHILS # BLD AUTO: 9.6 X10*3/UL (ref 1.6–5.5)
NEUTROPHILS NFR BLD AUTO: 69.5 %
NRBC BLD-RTO: 0 /100 WBCS (ref 0–0)
PLATELET # BLD AUTO: 164 X10*3/UL (ref 150–450)
PLATELET # BLD AUTO: 167 X10*3/UL (ref 150–450)
PLATELET # BLD AUTO: 209 X10*3/UL (ref 150–450)
POTASSIUM SERPL-SCNC: 4.6 MMOL/L (ref 3.5–5.3)
PROT SERPL-MCNC: 6.2 G/DL (ref 6.4–8.2)
RBC # BLD AUTO: 2.89 X10*6/UL (ref 4.5–5.9)
RBC # BLD AUTO: 3.07 X10*6/UL (ref 4.5–5.9)
RBC # BLD AUTO: 3.11 X10*6/UL (ref 4.5–5.9)
SODIUM SERPL-SCNC: 141 MMOL/L (ref 136–145)
WBC # BLD AUTO: 10 X10*3/UL (ref 4.4–11.3)
WBC # BLD AUTO: 11.1 X10*3/UL (ref 4.4–11.3)
WBC # BLD AUTO: 13.8 X10*3/UL (ref 4.4–11.3)

## 2024-12-23 PROCEDURE — 80053 COMPREHEN METABOLIC PANEL: CPT | Performed by: INTERNAL MEDICINE

## 2024-12-23 PROCEDURE — 83735 ASSAY OF MAGNESIUM: CPT | Performed by: INTERNAL MEDICINE

## 2024-12-23 PROCEDURE — 36415 COLL VENOUS BLD VENIPUNCTURE: CPT | Performed by: INTERNAL MEDICINE

## 2024-12-23 PROCEDURE — 99233 SBSQ HOSP IP/OBS HIGH 50: CPT | Performed by: INTERNAL MEDICINE

## 2024-12-23 PROCEDURE — 84100 ASSAY OF PHOSPHORUS: CPT | Performed by: STUDENT IN AN ORGANIZED HEALTH CARE EDUCATION/TRAINING PROGRAM

## 2024-12-23 PROCEDURE — 71045 X-RAY EXAM CHEST 1 VIEW: CPT

## 2024-12-23 PROCEDURE — 2500000002 HC RX 250 W HCPCS SELF ADMINISTERED DRUGS (ALT 637 FOR MEDICARE OP, ALT 636 FOR OP/ED): Performed by: STUDENT IN AN ORGANIZED HEALTH CARE EDUCATION/TRAINING PROGRAM

## 2024-12-23 PROCEDURE — 2500000004 HC RX 250 GENERAL PHARMACY W/ HCPCS (ALT 636 FOR OP/ED): Performed by: INTERNAL MEDICINE

## 2024-12-23 PROCEDURE — 82947 ASSAY GLUCOSE BLOOD QUANT: CPT | Mod: 59

## 2024-12-23 PROCEDURE — 83880 ASSAY OF NATRIURETIC PEPTIDE: CPT | Performed by: STUDENT IN AN ORGANIZED HEALTH CARE EDUCATION/TRAINING PROGRAM

## 2024-12-23 PROCEDURE — 83735 ASSAY OF MAGNESIUM: CPT | Performed by: STUDENT IN AN ORGANIZED HEALTH CARE EDUCATION/TRAINING PROGRAM

## 2024-12-23 PROCEDURE — 85025 COMPLETE CBC W/AUTO DIFF WBC: CPT | Performed by: STUDENT IN AN ORGANIZED HEALTH CARE EDUCATION/TRAINING PROGRAM

## 2024-12-23 PROCEDURE — 96366 THER/PROPH/DIAG IV INF ADDON: CPT

## 2024-12-23 PROCEDURE — 71045 X-RAY EXAM CHEST 1 VIEW: CPT | Performed by: RADIOLOGY

## 2024-12-23 PROCEDURE — 96361 HYDRATE IV INFUSION ADD-ON: CPT

## 2024-12-23 PROCEDURE — 85027 COMPLETE CBC AUTOMATED: CPT | Mod: 59 | Performed by: INTERNAL MEDICINE

## 2024-12-23 PROCEDURE — 2500000004 HC RX 250 GENERAL PHARMACY W/ HCPCS (ALT 636 FOR OP/ED): Performed by: STUDENT IN AN ORGANIZED HEALTH CARE EDUCATION/TRAINING PROGRAM

## 2024-12-23 PROCEDURE — 93005 ELECTROCARDIOGRAM TRACING: CPT

## 2024-12-23 PROCEDURE — 96376 TX/PRO/DX INJ SAME DRUG ADON: CPT

## 2024-12-23 PROCEDURE — 84484 ASSAY OF TROPONIN QUANT: CPT | Performed by: STUDENT IN AN ORGANIZED HEALTH CARE EDUCATION/TRAINING PROGRAM

## 2024-12-23 PROCEDURE — 80053 COMPREHEN METABOLIC PANEL: CPT | Performed by: STUDENT IN AN ORGANIZED HEALTH CARE EDUCATION/TRAINING PROGRAM

## 2024-12-23 RX ORDER — SACUBITRIL AND VALSARTAN 24; 26 MG/1; MG/1
1 TABLET, FILM COATED ORAL 2 TIMES DAILY
Status: ON HOLD | COMMUNITY
End: 2024-12-27 | Stop reason: ENTERED-IN-ERROR

## 2024-12-23 RX ORDER — ALLOPURINOL 100 MG/1
50 TABLET ORAL DAILY
Status: ON HOLD | COMMUNITY
End: 2024-12-27 | Stop reason: ENTERED-IN-ERROR

## 2024-12-23 RX ADMIN — PANTOPRAZOLE SODIUM 40 MG: 40 INJECTION, POWDER, FOR SOLUTION INTRAVENOUS at 08:15

## 2024-12-23 RX ADMIN — INSULIN LISPRO 2 UNITS: 100 INJECTION, SOLUTION INTRAVENOUS; SUBCUTANEOUS at 02:18

## 2024-12-23 RX ADMIN — PANTOPRAZOLE SODIUM 40 MG: 40 INJECTION, POWDER, FOR SOLUTION INTRAVENOUS at 20:22

## 2024-12-23 RX ADMIN — INSULIN LISPRO 3 UNITS: 100 INJECTION, SOLUTION INTRAVENOUS; SUBCUTANEOUS at 08:07

## 2024-12-23 RX ADMIN — OCTREOTIDE ACETATE 50 MCG/HR: 200 INJECTION, SOLUTION INTRAVENOUS; SUBCUTANEOUS at 09:21

## 2024-12-23 RX ADMIN — SODIUM CHLORIDE 75 ML/HR: 9 INJECTION, SOLUTION INTRAVENOUS at 03:20

## 2024-12-23 RX ADMIN — SODIUM CHLORIDE 500 ML: 9 INJECTION, SOLUTION INTRAVENOUS at 23:52

## 2024-12-23 RX ADMIN — CEFTRIAXONE SODIUM 1 G: 1 INJECTION, SOLUTION INTRAVENOUS at 08:11

## 2024-12-23 RX ADMIN — INSULIN LISPRO 2 UNITS: 100 INJECTION, SOLUTION INTRAVENOUS; SUBCUTANEOUS at 19:41

## 2024-12-23 RX ADMIN — OCTREOTIDE ACETATE 50 MCG/HR: 200 INJECTION, SOLUTION INTRAVENOUS; SUBCUTANEOUS at 20:21

## 2024-12-23 RX ADMIN — INSULIN LISPRO 3 UNITS: 100 INJECTION, SOLUTION INTRAVENOUS; SUBCUTANEOUS at 14:28

## 2024-12-23 NOTE — PROGRESS NOTES
Ritesh Garza is a 74 y.o. male on day 0 of admission presenting with Upper gastrointestinal bleeding.      Subjective   Patient seen in follow up. He notes doing about the same.  He denies side effects from medications received.  No new complaints.    12/23: Patient seen.  Alert and oriented x 1.  Disoriented about time and amount of time has been in the emergency department.  Denies any bleeding.  Surprised when told his initial hemoglobin of 6.1.  States his stools are dark brown but not black.  Denies any hematemesis.  Denies any abdominal pain.  Checked with nursing coordinator, still awaiting bed at [facility] but on priority list.  Will advance patient to clears for now.       Objective     Last Recorded Vitals  /85   Pulse 98   Temp 36.6 °C (97.9 °F) (Temporal)   Resp 16   Wt 66.5 kg (146 lb 9.7 oz)   SpO2 97%   Intake/Output last 3 Shifts:  No intake or output data in the 24 hours ending 12/23/24 1420    Admission Weight  Weight: 66.5 kg (146 lb 9.7 oz) (12/20/24 0935)    Daily Weight  12/20/24 : 66.5 kg (146 lb 9.7 oz)    Image Results  ECG 12 lead  Sinus rhythm  Nonspecific intraventricular conduction delay  Anterior infarct, old  Minimal ST depression, lateral leads  Baseline wander in lead(s) V3    See ED provider note for full interpretation and clinical correlation  Confirmed by Azra Joseph ([id number]) on 12/20/2024 7:56:07 PM  CT brain attack angio head and neck W and WO IV contrast  Narrative: Interpreted By:  Zohra Williamson,   STUDY:  CT BRAIN ATTACK ANGIO HEAD AND NECK W AND WO IV CONTRAST;  12/20/2024  10:57 am      INDICATION:  Signs/Symptoms:Multiple falls, imbalance, L sided weakness.          COMPARISON:  None.      ACCESSION NUMBER(S):  [id number]      ORDERING CLINICIAN:  REINALDO JOSE      TECHNIQUE:  Unenhanced CT images of the head were obtained. Subsequently, 90 ML  of Omnipaque 350 was administered intravenously and axial images of  the head and neck were  acquired.  Coronal, sagittal, and 3-D  reconstructions were provided for review.      FINDINGS:  The examination is suboptimal secondary the timing of the scan  relative to the contrast bolus.      CTA HEAD FINDINGS:      Anterior circulation: The bilateral intracranial internal carotid  arteries, bilateral carotid terminals, bilateral proximal anterior  and middle cerebral arteries are patent. There is coarse  atherosclerotic calcification along the cavernous segments of the  carotid arteries with moderate narrowing. Hypoplastic or aplastic A1  segment on the right.      There is attenuation of MCA branches on the right compared to the  left which may be secondary to high-grade stenosis or areas of  occlusion, not well evaluated on the current exam secondary to  technique.      Posterior circulation: Bilateral intracranial vertebral arteries,  vertebrobasilar junction, basilar artery and proximal posterior  cerebral arteries are patent. There is coarse atherosclerotic  calcification along the intradural segments of the vertebral arteries  with at least moderate narrowing.      CTA NECK FINDINGS:          There is atherosclerotic calcification at the aortic arch and origins  of the great vessels. There is a stent at the origin of the left  subclavian artery. Grossly the stent appears patent. There is  narrowing at the origin of the left common carotid artery, however  the degree of narrowing is not well evaluated secondary to the  technique.      Right carotid vessels: There is coarse atherosclerotic calcification  along the course of the common carotid artery with multifocal areas  of mild narrowing. There is moderate narrowing of the distal common  carotid artery just proximal to the bifurcation. No significant  stenosis at the carotid bifurcation. The internal carotid artery in  the neck is patent without significant stenosis. There is a beaded  appearance of the distal cervical internal carotid artery which  may  be secondary to fibromuscular dysplasia.      Left carotid vessels: The common carotid artery is patent. There is  coarse atherosclerotic calcification along the course of the common  carotid artery with multifocal areas of mild-to-moderate narrowing.  There is calcified and noncalcified plaque at the carotid bifurcation  without significant stenosis by NASCET criteria. The cervical  internal carotid artery is patent without significant stenosis.      Vertebral vessels:  The visualized segments of the cervical vertebral  arteries are patent. There is mild irregularity along the V3 segment  of the left vertebral artery.      Postsurgical and degenerative changes of the cervical spine.      Impression: The examination is suboptimal secondary to timing of the scan  relative to the contrast bolus.      CTA neck:      There is coarse atherosclerotic calcification along the carotid  arteries bilaterally with mild-to-moderate narrowing.      No significant stenosis at the carotid bifurcation bilaterally. There  is calcified and noncalcified plaque at the left carotid bifurcation.      There is a beaded appearance of the distal cervical internal carotid  artery on the right which may be secondary to fibromuscular  dysplasia. There is also mild irregularity of the V3 segment of the  left vertebral artery.      There is a stent at the origin of the left subclavian artery which  grossly appears patent, however not well evaluated on the current  exam.      There appears to be narrowing at the origin of the left common  carotid artery, not well evaluated on the current exam.      CTA head:      There appears to be attenuated branches of the MCA on the right  compared to the left which may be secondary to high-grade stenosis or  areas of occlusion, not well evaluated on the current exam secondary  to technique.      There are coarse atherosclerotic calcifications along the intradural  segments of the vertebral arteries with  a least moderate narrowing.      Otherwise, no evidence for significant stenosis or large branch  vessel cutoffs of the intracranial vessels.      Consider repeat examination as clinically warranted.      MACRO:  None      Signed by: Zohra Williamson 12/20/2024 11:20 AM  Dictation workstation:   [id number]  CT brain attack head wo IV contrast  Narrative: Interpreted By:  Neeraj Rm,   STUDY:  REINALDO JOSE; 12/20/2024 10:37 am      INDICATION:  Signs/Symptoms:Stroke Evaluation. Multiple falls, imbalance, L sided  deficits;      COMPARISON:  06/30/2024      ACCESSION NUMBER(S):  [id number]      ORDERING CLINICIAN:  REINALDO JOSE      TECHNIQUE:  Contiguous axial images were acquired from the vertex through the  posterior fossa without IV contrast. All CT examinations are  performed with 1 or more of the following dose reduction techniques:  Automated exposure control, adjustment of mA and/or kv according to  patient's size, or use of iterative reconstruction techniques.      FINDINGS:  No focal mass effect or midline shift is identified. The ventricles  and sulci are symmetric and appropriate for the patient's age.      The gray white matter differentiation is preserved. Mild degree of  nonspecific white matter changes.      No acute intracranial hemorrhage is seen. No intra-axial or  extra-axial fluid collection is seen.      The visualized paranasal sinuses and mastoid air cells are clear.      Impression: No CT evidence for acute intracranial pathology.          Findings discussed with REINALDO Ricardo of the emergency  room via telephone at 10:43 a.m. on 12/20/2024      Signed by: Neeraj Rm 12/20/2024 10:51 AM  Dictation workstation:   [id number]      Physical Exam  Vitals and nursing note reviewed.   Constitutional:       General: He is not in acute distress.     Appearance: He is ill-appearing.   HENT:      Head: Normocephalic and atraumatic.      Right Ear: External ear  normal.      Left Ear: External ear normal.      Nose: Nose normal. No rhinorrhea.      Mouth/Throat:      Mouth: Mucous membranes are moist.      Pharynx: Oropharynx is clear. No oropharyngeal exudate.   Eyes:      General: No scleral icterus.        Right eye: No discharge.         Left eye: No discharge.      Conjunctiva/sclera: Conjunctivae normal.   Cardiovascular:      Rate and Rhythm: Normal rate and regular rhythm.      Pulses: Normal pulses.      Heart sounds: Murmur heard.   Pulmonary:      Effort: Pulmonary effort is normal. No respiratory distress.      Breath sounds: Normal breath sounds. No wheezing or rales.   Abdominal:      General: Abdomen is flat. There is no distension.      Palpations: Abdomen is soft.      Tenderness: There is no abdominal tenderness.   Musculoskeletal:         General: No tenderness.      Right lower leg: No edema.      Left lower leg: No edema.   Skin:     General: Skin is warm and dry.      Capillary Refill: Capillary refill takes less than 2 seconds.      Findings: No rash.   Neurological:      General: No focal deficit present.      Mental Status: He is alert. Mental status is at baseline.   Psychiatric:         Attention and Perception: Attention normal.         Behavior: Behavior is cooperative.         Relevant Results      Scheduled medications  cefTRIAXone, 1 g, intravenous, q24h  insulin lispro, 0-5 Units, subcutaneous, q6h  pantoprazole, 40 mg, intravenous, BID      Continuous medications  octreotide, 50 mcg/hr, Last Rate: 50 mcg/hr (12/23/24 1335)      PRN medications  PRN medications: dextrose, dextrose, glucagon, glucagon, ipratropium-albuteroL      Results for orders placed or performed during the hospital encounter of 12/20/24 (from the past 24 hours)   POCT GLUCOSE   Result Value Ref Range    POCT Glucose 206 (H) 74 - 99 mg/dL   POCT GLUCOSE   Result Value Ref Range    POCT Glucose 213 (H) 74 - 99 mg/dL   CBC   Result Value Ref Range    WBC 11.1 4.4 - 11.3  x10*3/uL    nRBC 0.0 0.0 - 0.0 /100 WBCs    RBC 2.89 (L) 4.50 - 5.90 x10*6/uL    Hemoglobin 8.3 (L) 13.5 - 17.5 g/dL    Hematocrit 26.9 (L) 41.0 - 52.0 %    MCV 93 80 - 100 fL    MCH 28.7 26.0 - 34.0 pg    MCHC 30.9 (L) 32.0 - 36.0 g/dL    RDW 20.3 (H) 11.5 - 14.5 %    Platelets 164 150 - 450 x10*3/uL   POCT GLUCOSE   Result Value Ref Range    POCT Glucose 225 (H) 74 - 99 mg/dL   CBC   Result Value Ref Range    WBC 10.0 4.4 - 11.3 x10*3/uL    nRBC 0.0 0.0 - 0.0 /100 WBCs    RBC 3.07 (L) 4.50 - 5.90 x10*6/uL    Hemoglobin 8.8 (L) 13.5 - 17.5 g/dL    Hematocrit 28.6 (L) 41.0 - 52.0 %    MCV 93 80 - 100 fL    MCH 28.7 26.0 - 34.0 pg    MCHC 30.8 (L) 32.0 - 36.0 g/dL    RDW 20.5 (H) 11.5 - 14.5 %    Platelets 167 150 - 450 x10*3/uL   Comprehensive Metabolic Panel   Result Value Ref Range    Glucose 289 (H) 74 - 99 mg/dL    Sodium 141 136 - 145 mmol/L    Potassium 4.6 3.5 - 5.3 mmol/L    Chloride 112 (H) 98 - 107 mmol/L    Bicarbonate 20 (L) 21 - 32 mmol/L    Anion Gap 14 10 - 20 mmol/L    Urea Nitrogen 52 (H) 6 - 23 mg/dL    Creatinine 1.96 (H) 0.50 - 1.30 mg/dL    eGFR 35 (L) >60 mL/min/1.73m*2    Calcium 8.0 (L) 8.6 - 10.3 mg/dL    Albumin 3.6 3.4 - 5.0 g/dL    Alkaline Phosphatase 75 33 - 136 U/L    Total Protein 6.2 (L) 6.4 - 8.2 g/dL    AST 21 9 - 39 U/L    Bilirubin, Total 0.7 0.0 - 1.2 mg/dL    ALT 17 10 - 52 U/L   Magnesium   Result Value Ref Range    Magnesium 2.35 1.60 - 2.40 mg/dL   POCT GLUCOSE   Result Value Ref Range    POCT Glucose 281 (H) 74 - 99 mg/dL   POCT GLUCOSE   Result Value Ref Range    POCT Glucose 294 (H) 74 - 99 mg/dL     *Note: Due to a large number of results and/or encounters for the requested time period, some results have not been displayed. A complete set of results can be found in Results Review.                Assessment/Plan      Upper gastrointestinal bleeding  Acute on chronic blood loss anemia  SIDHU cirrhosis  Known esophageal varices  -Pending transfer to [facility] for further  management including gastroenterology  -Continue twice daily IV pantoprazole and octreotide drip while here  -Check CBC every 6 hours  -Prophylaxis with ceftriaxone 1 g IV every 24 hours given high concern for variceal bleed as likely etiology  -Monitor on telemetry with continuous pulse oximetry  -Transfuse for hemoglobin less than 8, see discussion regarding non-STEMI below  -Keep n.p.o. for now  12/23: Still awaiting transfer to [facility].  Patient appears to more stable at present.  Hemoglobin is slightly increased.  Will start clears.  Remains on octreotide infusion.    Acute Kidney Injury on Chronic Kidney Disease  -Baseline creatinine appears to be around 1.9 corresponding to a GFR of 37, stage IIIb chronic kidney disease  -Creatinine on presentation was 2.64 and then 2.56 on 12/21  -Recheck creatinine this morning and daily  -Gently hydrate with 0.9% normal saline at 75 mL/h  -Given adequate blood pressure we will hold off on any albumin or midodrine at this point.  12/23: Creatinine slightly improved 1.96.  Continue on current treatment.    Non-STEMI  Coronary Artery Disease  -Holding antiplatelets and anticoagulants given upper GI bleed  -Troponin trend, presenting 1097, peaked at 7200 on 12/21 04:34 then downtrending  -Beta blocker use acutely problematic given baseline heart failure with EF 30-35%, unknown current cardiac status  -Hopefully will be able to transfer soon to adequately address potential benefit of beta blockade  -Overall picture suggestive of type II MI though acute primary ischemic event cannot be ruled out    Type 2 Diabetes Mellitus with Hyperglycemia  -NPO based SSI for now  -Hypoglycemia protocols    Hyperkalemia  -Potassium initially 5.9 on presentation, improved to 5.2 on 12/21  -Recheck now  -Telemetry    Leukocytosis  -Unclear etiology, no overt infection identified to this point  -Empiric ceftriaxone as noted above  -Urinalysis not suggestive of infection  -Continue to trend               Geovanny Daniel MD

## 2024-12-23 NOTE — PROGRESS NOTES
Emergency Medicine Transition of Care Note.    I received Ritesh Garza in signout from Dr. Huffman.  Please see the previous ED provider note for all HPI, PE and MDM up to the time of signout at 0015. This is in addition to the primary record.    In brief Ritesh Garza is an 74 y.o. male presenting for   Chief Complaint   Patient presents with    frequemt falls    Altered Mental Status     At the time of signout we were awaiting: Transfer to [facility]    Diagnoses as of 12/23/24 0338   Stroke-like symptoms   Anemia, unspecified type   Gastrointestinal hemorrhage, unspecified gastrointestinal hemorrhage type   Elevated troponin   Acute on chronic heart failure, unspecified heart failure type   Acute kidney injury (CMS-[facility])       Medical Decision Making  Patient is a 74-year-old male with GI bleed and NSTEMI.  Patient previously received 2 units of PRBCs.  Remained hemodynamically stable.  Repeat CBC with hemoglobin stable at 8.3.  Continues to await transfer to [facility] for GI evaluation.    Final diagnoses:   [R29.90] Stroke-like symptoms   [D64.9] Anemia, unspecified type   [K92.2] Gastrointestinal hemorrhage, unspecified gastrointestinal hemorrhage type   [R79.89] Elevated troponin   [I50.9] Acute on chronic heart failure, unspecified heart failure type   [N17.9] Acute kidney injury (CMS-HCC)           Procedure  Procedures    Mary Grace Lr MD

## 2024-12-23 NOTE — PROGRESS NOTES
Ritesh Garza is a 74 y.o. male admitted for Upper gastrointestinal bleeding. Pharmacy reviewed the patient's [id number] medications and allergies for accuracy.    The list below reflects the PTA list prior to pharmacy medication history. A summary a changes to the PTA medication list has been listed below. Please review each medication in order reconciliation for additional clarification and justification.    Source of information:  patient    Medications added:  Entresto 24/26 1 bid  Allopurinol 50mg 1 qd    Medications modified:    Medications to be removed:  Entresto 24/26 0.5 tab    Medications of concern:      Prior to Admission Medications   Prescriptions Last Dose Informant Patient Reported? Taking?   albuterol (ProAir HFA) 90 mcg/actuation inhaler  Self No No   Sig: Inhale 2 puffs every 6 hours if needed for wheezing or shortness of breath.   allopurinol (Zyloprim) 100 mg tablet   No No   Sig: Take 1 tablet (100 mg) by mouth once daily.   amLODIPine (Norvasc) 10 mg tablet   No No   Sig: TAKE 1 TABLET BY MOUTH EVERY DAY   aspirin 81 mg EC tablet  Self No No   Sig: TAKE 1 TABLET BY MOUTH EVERY DAY   atorvastatin (Lipitor) 80 mg tablet   No No   Sig: TAKE 1 TABLET BY MOUTH EVERY DAY   b complex vitamins capsule  Self Yes No   Sig: Take 1 capsule by mouth once daily.   clopidogrel (Plavix) 75 mg tablet  Self No No   Sig: Take 1 tablet (75 mg) by mouth once daily.   coenzyme Q-10 (CoQ-10) 100 mg capsule  Self Yes No   Sig: Take 1 capsule (100 mg) by mouth once daily.   cyanocobalamin (Vitamin B-12) 1,000 mcg tablet  Self Yes No   Sig: Take 1 tablet (1,000 mcg) by mouth once daily. As directed   dapagliflozin (Farxiga) 10 mg  Self Yes No   Sig: Take 1 tablet (10 mg) by mouth once daily in the evening.  Take before meals.   famotidine (Pepcid) 20 mg tablet   No No   Sig: TAKE 1 TABLET (20 MG) BY MOUTH ONCE DAILY AS NEEDED FOR INDIGESTION.   furosemide (Lasix) 40 mg tablet  Self No No   Sig: Take 1  tablet (40 mg) by mouth once daily. As needed for weight gain greater than 3 pounds   gabapentin (Neurontin) 100 mg capsule   No No   Sig: TAKE 1 CAPSULE (100 MG) BY MOUTH ONCE DAILY AT BEDTIME.   icosapent ethyL (Vascepa) 0.5 gram capsule  Self No No   Sig: Take 2 capsules (1 g) by mouth 2 times a day with meals.   loratadine (Claritin) 10 mg tablet  Self Yes No   Sig: Take 1 tablet (10 mg) by mouth 2 times a day. Take 1 tablet every morning and 1 tablet every evening   magnesium gluconate (Magonate) 27.5 mg magne- sium (500 mg) tablet  Self Yes No   Sig: Take 1 tablet (27.5 mg) by mouth once daily.   metoprolol succinate XL (Toprol-XL) 50 mg 24 hr tablet   No No   Sig: TAKE 1 TABLET BY MOUTH EVERY DAY   multivitamin tablet  Self Yes No   Sig: Take 1 tablet by mouth once daily.   sacubitriL-valsartan (Entresto) 24-26 mg tablet   No No   Sig: Take 0.5 tablets by mouth 2 times a day.   sertraline (Zoloft) 50 mg tablet   No No   Sig: TAKE ONE TABLET DAILY IN THE MORNING AFTER BREAKFAST.   sildenafil (Viagra) 100 mg tablet   No No   Sig: Take 1 tablet (100 mg) by mouth if needed for erectile dysfunction (Start with half tab. Take 1 hour prior to intercourse on an empty stomach. If you have an erection for over 4 hours, please go to the emergency room.).   tadalafil (Cialis) 20 mg tablet   No No   Sig: Take 1 tablet (20 mg) by mouth if needed for erectile dysfunction (Start with half tab. Take 2 hours prior to wanting erection.If you get an erection over 4 hours, go to the emergency room.).   tadalafil (Cialis) 5 mg tablet   No No   Sig: TAKE 1 TABLET BY MOUTH EVERY DAY   tadalafil (Cialis) 5 mg tablet   No No   Sig: Take 1 tablet (5 mg) by mouth once daily.      Facility-Administered Medications: None       Merissa Ramos

## 2024-12-24 VITALS
HEART RATE: 76 BPM | HEIGHT: 62 IN | OXYGEN SATURATION: 100 % | WEIGHT: 146.61 LBS | BODY MASS INDEX: 26.98 KG/M2 | TEMPERATURE: 98.4 F | SYSTOLIC BLOOD PRESSURE: 106 MMHG | DIASTOLIC BLOOD PRESSURE: 65 MMHG | RESPIRATION RATE: 17 BRPM

## 2024-12-24 DIAGNOSIS — I47.29 OTHER VENTRICULAR TACHYCARDIA: Primary | ICD-10-CM

## 2024-12-24 LAB
ALBUMIN SERPL BCP-MCNC: 3 G/DL (ref 3.4–5)
ALBUMIN SERPL BCP-MCNC: 3.7 G/DL (ref 3.4–5)
ALP SERPL-CCNC: 61 U/L (ref 33–136)
ALP SERPL-CCNC: 82 U/L (ref 33–136)
ALT SERPL W P-5'-P-CCNC: 16 U/L (ref 10–52)
ALT SERPL W P-5'-P-CCNC: 18 U/L (ref 10–52)
ANION GAP SERPL CALC-SCNC: 11 MMOL/L (ref 10–20)
ANION GAP SERPL CALC-SCNC: 13 MMOL/L (ref 10–20)
AST SERPL W P-5'-P-CCNC: 17 U/L (ref 9–39)
AST SERPL W P-5'-P-CCNC: 29 U/L (ref 9–39)
BILIRUB SERPL-MCNC: 0.5 MG/DL (ref 0–1.2)
BILIRUB SERPL-MCNC: 0.6 MG/DL (ref 0–1.2)
BNP SERPL-MCNC: 3417 PG/ML (ref 0–99)
BUN SERPL-MCNC: 42 MG/DL (ref 6–23)
BUN SERPL-MCNC: 44 MG/DL (ref 6–23)
CALCIUM SERPL-MCNC: 7.2 MG/DL (ref 8.6–10.3)
CALCIUM SERPL-MCNC: 8.5 MG/DL (ref 8.6–10.3)
CARDIAC TROPONIN I PNL SERPL HS: 1450 NG/L (ref 0–20)
CHLORIDE SERPL-SCNC: 111 MMOL/L (ref 98–107)
CHLORIDE SERPL-SCNC: 115 MMOL/L (ref 98–107)
CO2 SERPL-SCNC: 19 MMOL/L (ref 21–32)
CO2 SERPL-SCNC: 20 MMOL/L (ref 21–32)
CREAT SERPL-MCNC: 1.79 MG/DL (ref 0.5–1.3)
CREAT SERPL-MCNC: 1.83 MG/DL (ref 0.5–1.3)
EGFRCR SERPLBLD CKD-EPI 2021: 38 ML/MIN/1.73M*2
EGFRCR SERPLBLD CKD-EPI 2021: 39 ML/MIN/1.73M*2
GLUCOSE BLD MANUAL STRIP-MCNC: 173 MG/DL (ref 74–99)
GLUCOSE BLD MANUAL STRIP-MCNC: 194 MG/DL (ref 74–99)
GLUCOSE BLD MANUAL STRIP-MCNC: 273 MG/DL (ref 74–99)
GLUCOSE BLD MANUAL STRIP-MCNC: 283 MG/DL (ref 74–99)
GLUCOSE SERPL-MCNC: 177 MG/DL (ref 74–99)
GLUCOSE SERPL-MCNC: 225 MG/DL (ref 74–99)
MAGNESIUM SERPL-MCNC: 2.03 MG/DL (ref 1.6–2.4)
MAGNESIUM SERPL-MCNC: 2.25 MG/DL (ref 1.6–2.4)
PHOSPHATE SERPL-MCNC: 3.2 MG/DL (ref 2.5–4.9)
POTASSIUM SERPL-SCNC: 4.4 MMOL/L (ref 3.5–5.3)
POTASSIUM SERPL-SCNC: 4.8 MMOL/L (ref 3.5–5.3)
PROT SERPL-MCNC: 5.2 G/DL (ref 6.4–8.2)
PROT SERPL-MCNC: 6.6 G/DL (ref 6.4–8.2)
SODIUM SERPL-SCNC: 140 MMOL/L (ref 136–145)
SODIUM SERPL-SCNC: 140 MMOL/L (ref 136–145)

## 2024-12-24 PROCEDURE — 96375 TX/PRO/DX INJ NEW DRUG ADDON: CPT

## 2024-12-24 PROCEDURE — 36415 COLL VENOUS BLD VENIPUNCTURE: CPT | Performed by: INTERNAL MEDICINE

## 2024-12-24 PROCEDURE — 99291 CRITICAL CARE FIRST HOUR: CPT | Performed by: STUDENT IN AN ORGANIZED HEALTH CARE EDUCATION/TRAINING PROGRAM

## 2024-12-24 PROCEDURE — 96361 HYDRATE IV INFUSION ADD-ON: CPT

## 2024-12-24 PROCEDURE — 82947 ASSAY GLUCOSE BLOOD QUANT: CPT | Mod: 59

## 2024-12-24 PROCEDURE — 2500000002 HC RX 250 W HCPCS SELF ADMINISTERED DRUGS (ALT 637 FOR MEDICARE OP, ALT 636 FOR OP/ED): Performed by: STUDENT IN AN ORGANIZED HEALTH CARE EDUCATION/TRAINING PROGRAM

## 2024-12-24 PROCEDURE — 2500000001 HC RX 250 WO HCPCS SELF ADMINISTERED DRUGS (ALT 637 FOR MEDICARE OP): Performed by: PHARMACIST

## 2024-12-24 PROCEDURE — 2500000004 HC RX 250 GENERAL PHARMACY W/ HCPCS (ALT 636 FOR OP/ED): Performed by: INTERNAL MEDICINE

## 2024-12-24 PROCEDURE — 96374 THER/PROPH/DIAG INJ IV PUSH: CPT | Mod: 59

## 2024-12-24 PROCEDURE — 83735 ASSAY OF MAGNESIUM: CPT | Performed by: INTERNAL MEDICINE

## 2024-12-24 PROCEDURE — 2500000002 HC RX 250 W HCPCS SELF ADMINISTERED DRUGS (ALT 637 FOR MEDICARE OP, ALT 636 FOR OP/ED): Performed by: PHARMACIST

## 2024-12-24 PROCEDURE — 96376 TX/PRO/DX INJ SAME DRUG ADON: CPT

## 2024-12-24 PROCEDURE — 80053 COMPREHEN METABOLIC PANEL: CPT | Performed by: INTERNAL MEDICINE

## 2024-12-24 PROCEDURE — 99233 SBSQ HOSP IP/OBS HIGH 50: CPT | Performed by: INTERNAL MEDICINE

## 2024-12-24 PROCEDURE — 2500000004 HC RX 250 GENERAL PHARMACY W/ HCPCS (ALT 636 FOR OP/ED): Performed by: STUDENT IN AN ORGANIZED HEALTH CARE EDUCATION/TRAINING PROGRAM

## 2024-12-24 PROCEDURE — 2500000001 HC RX 250 WO HCPCS SELF ADMINISTERED DRUGS (ALT 637 FOR MEDICARE OP): Performed by: STUDENT IN AN ORGANIZED HEALTH CARE EDUCATION/TRAINING PROGRAM

## 2024-12-24 PROCEDURE — 96365 THER/PROPH/DIAG IV INF INIT: CPT

## 2024-12-24 RX ORDER — METOPROLOL SUCCINATE 50 MG/1
50 TABLET, EXTENDED RELEASE ORAL DAILY
Status: DISCONTINUED | OUTPATIENT
Start: 2024-12-24 | End: 2024-12-25 | Stop reason: HOSPADM

## 2024-12-24 RX ORDER — HALOPERIDOL 5 MG/ML
2.5 INJECTION INTRAMUSCULAR ONCE
Status: COMPLETED | OUTPATIENT
Start: 2024-12-24 | End: 2024-12-24

## 2024-12-24 RX ORDER — CETIRIZINE HYDROCHLORIDE 10 MG/1
10 TABLET ORAL DAILY
Status: DISCONTINUED | OUTPATIENT
Start: 2024-12-24 | End: 2024-12-25 | Stop reason: HOSPADM

## 2024-12-24 RX ORDER — ASPIRIN 81 MG/1
81 TABLET ORAL DAILY
Status: DISCONTINUED | OUTPATIENT
Start: 2024-12-24 | End: 2024-12-25 | Stop reason: HOSPADM

## 2024-12-24 RX ORDER — LANOLIN ALCOHOL/MO/W.PET/CERES
1000 CREAM (GRAM) TOPICAL DAILY
Status: DISCONTINUED | OUTPATIENT
Start: 2024-12-24 | End: 2024-12-25 | Stop reason: HOSPADM

## 2024-12-24 RX ORDER — ATORVASTATIN CALCIUM 40 MG/1
80 TABLET, FILM COATED ORAL NIGHTLY
Status: DISCONTINUED | OUTPATIENT
Start: 2024-12-24 | End: 2024-12-25 | Stop reason: HOSPADM

## 2024-12-24 RX ORDER — BISMUTH SUBSALICYLATE 262 MG
1 TABLET,CHEWABLE ORAL DAILY
Status: DISCONTINUED | OUTPATIENT
Start: 2024-12-24 | End: 2024-12-25 | Stop reason: HOSPADM

## 2024-12-24 RX ORDER — ALBUTEROL SULFATE 90 UG/1
2 INHALANT RESPIRATORY (INHALATION) EVERY 6 HOURS PRN
Status: DISCONTINUED | OUTPATIENT
Start: 2024-12-24 | End: 2024-12-25 | Stop reason: HOSPADM

## 2024-12-24 RX ORDER — METOPROLOL TARTRATE 1 MG/ML
5 INJECTION, SOLUTION INTRAVENOUS ONCE
Status: COMPLETED | OUTPATIENT
Start: 2024-12-24 | End: 2024-12-24

## 2024-12-24 RX ORDER — B-COMPLEX WITH VITAMIN C
1 TABLET ORAL DAILY
Status: DISCONTINUED | OUTPATIENT
Start: 2024-12-24 | End: 2024-12-25 | Stop reason: HOSPADM

## 2024-12-24 RX ORDER — DAPAGLIFLOZIN 5 MG/1
10 TABLET, FILM COATED ORAL EVERY 24 HOURS
Status: DISCONTINUED | OUTPATIENT
Start: 2024-12-24 | End: 2024-12-25 | Stop reason: HOSPADM

## 2024-12-24 RX ORDER — ACETAMINOPHEN 325 MG/1
975 TABLET ORAL ONCE
Status: COMPLETED | OUTPATIENT
Start: 2024-12-24 | End: 2024-12-24

## 2024-12-24 RX ORDER — FUROSEMIDE 40 MG/1
40 TABLET ORAL DAILY PRN
Status: DISCONTINUED | OUTPATIENT
Start: 2024-12-24 | End: 2024-12-25 | Stop reason: HOSPADM

## 2024-12-24 RX ORDER — SERTRALINE HYDROCHLORIDE 50 MG/1
50 TABLET, FILM COATED ORAL DAILY
Status: DISCONTINUED | OUTPATIENT
Start: 2024-12-24 | End: 2024-12-25 | Stop reason: HOSPADM

## 2024-12-24 RX ORDER — SACUBITRIL AND VALSARTAN 24; 26 MG/1; MG/1
1 TABLET, FILM COATED ORAL 2 TIMES DAILY
Status: DISCONTINUED | OUTPATIENT
Start: 2024-12-24 | End: 2024-12-25 | Stop reason: HOSPADM

## 2024-12-24 RX ORDER — GABAPENTIN 100 MG/1
100 CAPSULE ORAL NIGHTLY
Status: DISCONTINUED | OUTPATIENT
Start: 2024-12-24 | End: 2024-12-25 | Stop reason: HOSPADM

## 2024-12-24 RX ORDER — FAMOTIDINE 20 MG/1
20 TABLET, FILM COATED ORAL DAILY PRN
Status: DISCONTINUED | OUTPATIENT
Start: 2024-12-24 | End: 2024-12-25 | Stop reason: HOSPADM

## 2024-12-24 RX ORDER — AMLODIPINE BESYLATE 5 MG/1
10 TABLET ORAL DAILY
Status: DISCONTINUED | OUTPATIENT
Start: 2024-12-24 | End: 2024-12-25 | Stop reason: HOSPADM

## 2024-12-24 RX ORDER — METOPROLOL TARTRATE 50 MG/1
50 TABLET ORAL ONCE
Status: COMPLETED | OUTPATIENT
Start: 2024-12-24 | End: 2024-12-24

## 2024-12-24 RX ORDER — ICOSAPENT ETHYL 1 G/1
1 CAPSULE ORAL
Status: DISCONTINUED | OUTPATIENT
Start: 2024-12-24 | End: 2024-12-25 | Stop reason: HOSPADM

## 2024-12-24 RX ADMIN — FUROSEMIDE 40 MG: 40 TABLET ORAL at 15:55

## 2024-12-24 RX ADMIN — SACUBITRIL AND VALSARTAN 1 TABLET: 24; 26 TABLET, FILM COATED ORAL at 20:48

## 2024-12-24 RX ADMIN — HALOPERIDOL LACTATE 2.5 MG: 5 INJECTION, SOLUTION INTRAMUSCULAR at 02:43

## 2024-12-24 RX ADMIN — METOPROLOL SUCCINATE 50 MG: 50 TABLET, EXTENDED RELEASE ORAL at 15:55

## 2024-12-24 RX ADMIN — GABAPENTIN 100 MG: 100 CAPSULE ORAL at 20:48

## 2024-12-24 RX ADMIN — MULTIVITAMIN TABLET 1 TABLET: TABLET at 15:55

## 2024-12-24 RX ADMIN — SERTRALINE HYDROCHLORIDE 50 MG: 50 TABLET ORAL at 15:55

## 2024-12-24 RX ADMIN — INSULIN LISPRO 3 UNITS: 100 INJECTION, SOLUTION INTRAVENOUS; SUBCUTANEOUS at 02:14

## 2024-12-24 RX ADMIN — PANTOPRAZOLE SODIUM 40 MG: 40 INJECTION, POWDER, FOR SOLUTION INTRAVENOUS at 08:16

## 2024-12-24 RX ADMIN — ASPIRIN 81 MG: 81 TABLET, COATED ORAL at 15:55

## 2024-12-24 RX ADMIN — ICOSAPENT ETHYL 1 G: 1000 CAPSULE ORAL at 16:00

## 2024-12-24 RX ADMIN — FAMOTIDINE 20 MG: 20 TABLET, FILM COATED ORAL at 15:54

## 2024-12-24 RX ADMIN — OCTREOTIDE ACETATE 50 MCG/HR: 200 INJECTION, SOLUTION INTRAVENOUS; SUBCUTANEOUS at 15:58

## 2024-12-24 RX ADMIN — Medication 1000 MCG: at 15:55

## 2024-12-24 RX ADMIN — CETIRIZINE HYDROCHLORIDE 10 MG: 10 TABLET ORAL at 15:55

## 2024-12-24 RX ADMIN — INSULIN LISPRO 3 UNITS: 100 INJECTION, SOLUTION INTRAVENOUS; SUBCUTANEOUS at 14:58

## 2024-12-24 RX ADMIN — AMLODIPINE BESYLATE 10 MG: 5 TABLET ORAL at 15:54

## 2024-12-24 RX ADMIN — INSULIN LISPRO 1 UNITS: 100 INJECTION, SOLUTION INTRAVENOUS; SUBCUTANEOUS at 20:47

## 2024-12-24 RX ADMIN — ATORVASTATIN CALCIUM 80 MG: 40 TABLET, FILM COATED ORAL at 20:47

## 2024-12-24 RX ADMIN — PANTOPRAZOLE SODIUM 40 MG: 40 INJECTION, POWDER, FOR SOLUTION INTRAVENOUS at 20:48

## 2024-12-24 RX ADMIN — ACETAMINOPHEN 975 MG: 325 TABLET ORAL at 00:37

## 2024-12-24 RX ADMIN — DAPAGLIFLOZIN 10 MG: 5 TABLET, FILM COATED ORAL at 15:56

## 2024-12-24 RX ADMIN — OCTREOTIDE ACETATE 50 MCG/HR: 200 INJECTION, SOLUTION INTRAVENOUS; SUBCUTANEOUS at 05:28

## 2024-12-24 RX ADMIN — METOPROLOL TARTRATE 5 MG: 5 INJECTION INTRAVENOUS at 00:37

## 2024-12-24 RX ADMIN — Medication 1 TABLET: at 15:55

## 2024-12-24 RX ADMIN — CEFTRIAXONE SODIUM 1 G: 1 INJECTION, SOLUTION INTRAVENOUS at 08:16

## 2024-12-24 RX ADMIN — HALOPERIDOL LACTATE 2.5 MG: 5 INJECTION, SOLUTION INTRAMUSCULAR at 05:03

## 2024-12-24 RX ADMIN — METOPROLOL TARTRATE 50 MG: 50 TABLET, FILM COATED ORAL at 00:37

## 2024-12-24 SDOH — HEALTH STABILITY: PHYSICAL HEALTH: PATIENT ACTIVITY: AWAKE

## 2024-12-24 ASSESSMENT — PAIN - FUNCTIONAL ASSESSMENT: PAIN_FUNCTIONAL_ASSESSMENT: 0-10

## 2024-12-24 ASSESSMENT — PAIN SCALES - GENERAL
PAINLEVEL_OUTOF10: 4
PAINLEVEL_OUTOF10: 5 - MODERATE PAIN

## 2024-12-24 ASSESSMENT — PAIN DESCRIPTION - LOCATION: LOCATION: HEAD

## 2024-12-24 NOTE — PROGRESS NOTES
Emergency Medicine Transition of Care Note.    I received Ritesh Garza in signout from Dr. Chase.  Please see the previous ED provider note for all HPI, PE and MDM up to the time of signout at 0700. This is in addition to the primary record.    In brief Ritesh Garza is an 74 y.o. male presenting for   Chief Complaint   Patient presents with    frequemt falls    Altered Mental Status     At the time of signout we were awaiting: transfer    ED Course as of 12/24/24 1547   Mon Dec 23, 2024   2307 Rapid response was activated on the patient due to a change in heart rate.  Patient became acutely tachycardic.  EKG obtained demonstrates sinus tachycardia with a rate of 136, PVC noted, prolonged QTc interval.  No evidence of an acute STEMI.  Additional labs were sent, request for reconference of the transfer center as the patient is currently been boarding in the emergency department here at [place] for 88 hours. [NS]   Tue Dec 24, 2024   0055 Patient was given IV metoprolol followed by his oral home dose.  Repeat EKG showed conversion to an atrial paced complex with a rate of 84, ventricular bigeminy present with an atypical left bundle branch block.  No evidence of an acute STEMI. [NS]   0056 Troponins are continuing to downtrend however BNP is elevating.  Chest x-ray shows stable cardiomegaly without worsening pulmonary edema. [NS]   0122 Updated Dr. House, the hospitalist currently on call at [facility] regarding the patient's condition. Still no bed available [NS]      ED Course User Index  [NS] Francois Simms MD         Diagnoses as of 12/24/24 1547   Stroke-like symptoms   Anemia, unspecified type   Gastrointestinal hemorrhage, unspecified gastrointestinal hemorrhage type   Elevated troponin   Acute on chronic heart failure, unspecified heart failure type   Acute kidney injury (CMS-HCC)       Medical Decision Making    74-year-old male who is being transferred to [facility] for strokelike  symptoms, COPD and NSTEMI.  At signout we are pending transfer.  No acute changes in status the patient happened while patient was under my care.  At this point time patient will be signed out to oncoming provider awaiting transfer.    Final diagnoses:   [R29.90] Stroke-like symptoms   [D64.9] Anemia, unspecified type   [K92.2] Gastrointestinal hemorrhage, unspecified gastrointestinal hemorrhage type   [R79.89] Elevated troponin   [I50.9] Acute on chronic heart failure, unspecified heart failure type   [N17.9] Acute kidney injury (CMS-[facility])           Procedure  Procedures    Brice Koenig DO

## 2024-12-24 NOTE — PROGRESS NOTES
Ritesh Garza is a 74 y.o. male on day 0 of admission presenting with Upper gastrointestinal bleeding.      Subjective   Patient seen in follow up. He notes doing about the same.  He denies side effects from medications received.  No new complaints.    12/23: Patient seen.  Alert and oriented x 1.  Disoriented about time and amount of time has been in the emergency department.  Denies any bleeding.  Surprised when told his initial hemoglobin of 6.1.  States his stools are dark brown but not black.  Denies any hematemesis.  Denies any abdominal pain.  Checked with nursing coordinator, still awaiting bed at [facility] but on priority list.  Will advance patient to [facility] for now.    12/24: Patient seen.  Alert and oriented x 2.  No abdominal pain.  No hematemesis or melena.  Remains on octreotide.  Discussed with nursing coordinator, bed available at [facility].  Transferring for GI evaluation, concern for possible esophageal varices with bleeding.       Objective     Last Recorded Vitals  /78   Pulse 78   Temp 36.9 °C (98.4 °F) (Temporal)   Resp 16   Wt 66.5 kg (146 lb 9.7 oz)   SpO2 97%   Intake/Output last 3 Shifts:    Intake/Output Summary (Last 24 hours) at 12/24/2024 1750  Last data filed at 12/23/2024 2232  Gross per 24 hour   Intake --   Output 1150 ml   Net -1150 ml       Admission Weight  Weight: 66.5 kg (146 lb 9.7 oz) (12/20/24 0935)    Daily Weight  12/20/24 : 66.5 kg (146 lb 9.7 oz)    Image Results  ECG 12 Lead  Sinus tachycardia  Ventricular premature complex  Anterior infarct, old  ST depression, probably rate related  Prolonged QT interval      Physical Exam  Vitals and nursing note reviewed.   Constitutional:       General: He is not in acute distress.     Appearance: He is ill-appearing.   HENT:      Head: Normocephalic and atraumatic.      Right Ear: External ear normal.      Left Ear: External ear normal.      Nose: Nose normal. No rhinorrhea.      Mouth/Throat:      Mouth: Mucous  membranes are moist.      Pharynx: Oropharynx is clear. No oropharyngeal exudate.   Eyes:      General: No scleral icterus.        Right eye: No discharge.         Left eye: No discharge.      Conjunctiva/sclera: Conjunctivae normal.   Cardiovascular:      Rate and Rhythm: Normal rate and regular rhythm.      Pulses: Normal pulses.      Heart sounds: Murmur heard.   Pulmonary:      Effort: Pulmonary effort is normal. No respiratory distress.      Breath sounds: Normal breath sounds. No wheezing or rales.   Abdominal:      General: Abdomen is flat. There is no distension.      Palpations: Abdomen is soft.      Tenderness: There is no abdominal tenderness.   Musculoskeletal:         General: No tenderness.      Right lower leg: No edema.      Left lower leg: No edema.   Skin:     General: Skin is warm and dry.      Capillary Refill: Capillary refill takes less than 2 seconds.      Findings: No rash.   Neurological:      General: No focal deficit present.      Mental Status: He is alert. Mental status is at baseline.   Psychiatric:         Attention and Perception: Attention normal.         Behavior: Behavior is cooperative.         Relevant Results      Scheduled medications  amLODIPine, 10 mg, oral, Daily  aspirin, 81 mg, oral, Daily  atorvastatin, 80 mg, oral, Nightly  B complex-vitamin C, 1 tablet, oral, Daily  cefTRIAXone, 1 g, intravenous, q24h  cetirizine, 10 mg, oral, Daily  cyanocobalamin, 1,000 mcg, oral, Daily  dapagliflozin propanediol, 10 mg, oral, q24h  gabapentin, 100 mg, oral, Nightly  icosapent ethyL, 1 g, oral, BID  insulin lispro, 0-5 Units, subcutaneous, q6h  metoprolol succinate XL, 50 mg, oral, Daily  multivitamin, 1 tablet, oral, Daily  pantoprazole, 40 mg, intravenous, BID  sacubitriL-valsartan, 1 tablet, oral, BID  sertraline, 50 mg, oral, Daily      Continuous medications  octreotide, 50 mcg/hr, Last Rate: 50 mcg/hr (12/24/24 9058)      PRN medications  PRN medications: albuterol, dextrose,  dextrose, famotidine, furosemide, glucagon, glucagon, ipratropium-albuteroL      Results for orders placed or performed during the hospital encounter of 12/20/24 (from the past 24 hours)   POCT GLUCOSE   Result Value Ref Range    POCT Glucose 246 (H) 74 - 99 mg/dL   POCT GLUCOSE   Result Value Ref Range    POCT Glucose 180 (H) 74 - 99 mg/dL   ECG 12 Lead   Result Value Ref Range    Ventricular Rate 136 BPM    Atrial Rate 136 BPM    SD Interval 98 ms    QRS Duration 106 ms    QT Interval 342 ms    QTC Calculation(Bazett) 515 ms    P Axis 68 degrees    R Axis -19 degrees    T Axis 122 degrees    QRS Count 22 beats    Q Onset 254 ms    T Offset 425 ms    QTC Fredericia 449 ms   CBC and Auto Differential   Result Value Ref Range    WBC 13.8 (H) 4.4 - 11.3 x10*3/uL    nRBC 0.0 0.0 - 0.0 /100 WBCs    RBC 3.11 (L) 4.50 - 5.90 x10*6/uL    Hemoglobin 9.0 (L) 13.5 - 17.5 g/dL    Hematocrit 28.9 (L) 41.0 - 52.0 %    MCV 93 80 - 100 fL    MCH 28.9 26.0 - 34.0 pg    MCHC 31.1 (L) 32.0 - 36.0 g/dL    RDW 19.8 (H) 11.5 - 14.5 %    Platelets 209 150 - 450 x10*3/uL    Neutrophils % 69.5 40.0 - 80.0 %    Immature Granulocytes %, Automated 0.5 0.0 - 0.9 %    Lymphocytes % 16.4 13.0 - 44.0 %    Monocytes % 10.9 2.0 - 10.0 %    Eosinophils % 2.5 0.0 - 6.0 %    Basophils % 0.2 0.0 - 2.0 %    Neutrophils Absolute 9.60 (H) 1.60 - 5.50 x10*3/uL    Immature Granulocytes Absolute, Automated 0.07 0.00 - 0.50 x10*3/uL    Lymphocytes Absolute 2.27 0.80 - 3.00 x10*3/uL    Monocytes Absolute 1.51 (H) 0.05 - 0.80 x10*3/uL    Eosinophils Absolute 0.34 0.00 - 0.40 x10*3/uL    Basophils Absolute 0.03 0.00 - 0.10 x10*3/uL   Phosphorus   Result Value Ref Range    Phosphorus 3.2 2.5 - 4.9 mg/dL   Magnesium   Result Value Ref Range    Magnesium 2.25 1.60 - 2.40 mg/dL   Comprehensive metabolic panel   Result Value Ref Range    Glucose 225 (H) 74 - 99 mg/dL    Sodium 140 136 - 145 mmol/L    Potassium 4.4 3.5 - 5.3 mmol/L    Chloride 111 (H) 98 - 107 mmol/L     Bicarbonate 20 (L) 21 - 32 mmol/L    Anion Gap 13 10 - 20 mmol/L    Urea Nitrogen 44 (H) 6 - 23 mg/dL    Creatinine 1.79 (H) 0.50 - 1.30 mg/dL    eGFR 39 (L) >60 mL/min/1.73m*2    Calcium 8.5 (L) 8.6 - 10.3 mg/dL    Albumin 3.7 3.4 - 5.0 g/dL    Alkaline Phosphatase 82 33 - 136 U/L    Total Protein 6.6 6.4 - 8.2 g/dL    AST 17 9 - 39 U/L    Bilirubin, Total 0.6 0.0 - 1.2 mg/dL    ALT 18 10 - 52 U/L   Troponin I, High Sensitivity   Result Value Ref Range    Troponin I, High Sensitivity 1,450 (HH) 0 - 20 ng/L   B-Type Natriuretic Peptide   Result Value Ref Range    BNP 3,417 (H) 0 - 99 pg/mL   POCT GLUCOSE   Result Value Ref Range    POCT Glucose 273 (H) 74 - 99 mg/dL   Comprehensive Metabolic Panel   Result Value Ref Range    Glucose 177 (H) 74 - 99 mg/dL    Sodium 140 136 - 145 mmol/L    Potassium 4.8 3.5 - 5.3 mmol/L    Chloride 115 (H) 98 - 107 mmol/L    Bicarbonate 19 (L) 21 - 32 mmol/L    Anion Gap 11 10 - 20 mmol/L    Urea Nitrogen 42 (H) 6 - 23 mg/dL    Creatinine 1.83 (H) 0.50 - 1.30 mg/dL    eGFR 38 (L) >60 mL/min/1.73m*2    Calcium 7.2 (L) 8.6 - 10.3 mg/dL    Albumin 3.0 (L) 3.4 - 5.0 g/dL    Alkaline Phosphatase 61 33 - 136 U/L    Total Protein 5.2 (L) 6.4 - 8.2 g/dL    AST 29 9 - 39 U/L    Bilirubin, Total 0.5 0.0 - 1.2 mg/dL    ALT 16 10 - 52 U/L   Magnesium   Result Value Ref Range    Magnesium 2.03 1.60 - 2.40 mg/dL   POCT GLUCOSE   Result Value Ref Range    POCT Glucose 194 (H) 74 - 99 mg/dL   POCT GLUCOSE   Result Value Ref Range    POCT Glucose 283 (H) 74 - 99 mg/dL     *Note: Due to a large number of results and/or encounters for the requested time period, some results have not been displayed. A complete set of results can be found in Results Review.                Assessment/Plan      Upper gastrointestinal bleeding  Acute on chronic blood loss anemia  SIDHU cirrhosis  Known esophageal varices  -Pending transfer to [facility] for further management including gastroenterology  -Continue twice daily IV  pantoprazole and octreotide drip while here  -Check CBC every 6 hours  -Prophylaxis with ceftriaxone 1 g IV every 24 hours given high concern for variceal bleed as likely etiology  -Monitor on telemetry with continuous pulse oximetry  -Transfuse for hemoglobin less than 8, see discussion regarding non-STEMI below  -Keep n.p.o. for now  12/23: Still awaiting transfer to [facility].  Patient appears to more stable at present.  Hemoglobin is slightly increased.  Will start clears.  Remains on octreotide infusion.    Acute Kidney Injury on Chronic Kidney Disease  -Baseline creatinine appears to be around 1.9 corresponding to a GFR of 37, stage IIIb chronic kidney disease  -Creatinine on presentation was 2.64 and then 2.56 on 12/21  -Recheck creatinine this morning and daily  -Gently hydrate with 0.9% normal saline at 75 mL/h  -Given adequate blood pressure we will hold off on any albumin or midodrine at this point.  12/23: Creatinine slightly improved 1.96.  Continue on current treatment.  12/24: Creatinine 1.83.    Non-STEMI  Coronary Artery Disease  -Holding antiplatelets and anticoagulants given upper GI bleed  -Troponin trend, presenting 1097, peaked at 7200 on 12/21 04:34 then downtrending  -Beta blocker use acutely problematic given baseline heart failure with EF 30-35%, unknown current cardiac status  -Hopefully will be able to transfer soon to adequately address potential benefit of beta blockade  -Overall picture suggestive of type II MI though acute primary ischemic event cannot be ruled out    Type 2 Diabetes Mellitus with Hyperglycemia  -NPO based SSI for now  -Hypoglycemia protocols    Hyperkalemia  -Potassium initially 5.9 on presentation, improved to 5.2 on 12/21  -Recheck now  -Telemetry  12/24: Potassium 4.8.  Mild hemolysis detected.    Leukocytosis  -Unclear etiology, no overt infection identified to this point  -Empiric ceftriaxone as noted above  -Urinalysis not suggestive of infection  -Continue to  trend              Geovanny Daniel MD

## 2024-12-25 ENCOUNTER — HOSPITAL ENCOUNTER (INPATIENT)
Facility: HOSPITAL | Age: 74
DRG: 811 | End: 2024-12-25
Attending: INTERNAL MEDICINE | Admitting: INTERNAL MEDICINE
Payer: MEDICARE

## 2024-12-25 ENCOUNTER — APPOINTMENT (OUTPATIENT)
Dept: CARDIOLOGY | Facility: HOSPITAL | Age: 74
End: 2024-12-25
Payer: MEDICARE

## 2024-12-25 DIAGNOSIS — K76.6 PORTAL HYPERTENSION (MULTI): ICD-10-CM

## 2024-12-25 DIAGNOSIS — K74.60 LIVER CIRRHOSIS SECONDARY TO NASH (MULTI): ICD-10-CM

## 2024-12-25 DIAGNOSIS — K92.2 UPPER GASTROINTESTINAL BLEEDING: ICD-10-CM

## 2024-12-25 DIAGNOSIS — I25.810 ATHEROSCLEROSIS OF CORONARY ARTERY BYPASS GRAFT OF NATIVE HEART WITHOUT ANGINA PECTORIS: ICD-10-CM

## 2024-12-25 DIAGNOSIS — I21.4 NON-STEMI (NON-ST ELEVATED MYOCARDIAL INFARCTION) (MULTI): Primary | ICD-10-CM

## 2024-12-25 DIAGNOSIS — I51.9 LEFT VENTRICULAR SYSTOLIC DYSFUNCTION (LVSD): ICD-10-CM

## 2024-12-25 DIAGNOSIS — K75.81 LIVER CIRRHOSIS SECONDARY TO NASH (MULTI): ICD-10-CM

## 2024-12-25 LAB
ANION GAP SERPL CALC-SCNC: 12 MMOL/L (ref 10–20)
ANION GAP SERPL CALC-SCNC: 15 MMOL/L (ref 10–20)
BUN SERPL-MCNC: 36 MG/DL (ref 6–23)
BUN SERPL-MCNC: 39 MG/DL (ref 6–23)
CALCIUM SERPL-MCNC: 8.2 MG/DL (ref 8.6–10.3)
CALCIUM SERPL-MCNC: 8.2 MG/DL (ref 8.6–10.3)
CHLORIDE SERPL-SCNC: 105 MMOL/L (ref 98–107)
CHLORIDE SERPL-SCNC: 111 MMOL/L (ref 98–107)
CO2 SERPL-SCNC: 21 MMOL/L (ref 21–32)
CO2 SERPL-SCNC: 22 MMOL/L (ref 21–32)
CREAT SERPL-MCNC: 2.02 MG/DL (ref 0.5–1.3)
CREAT SERPL-MCNC: 2.05 MG/DL (ref 0.5–1.3)
EGFRCR SERPLBLD CKD-EPI 2021: 33 ML/MIN/1.73M*2
EGFRCR SERPLBLD CKD-EPI 2021: 34 ML/MIN/1.73M*2
ERYTHROCYTE [DISTWIDTH] IN BLOOD BY AUTOMATED COUNT: 18.6 % (ref 11.5–14.5)
GLUCOSE BLD MANUAL STRIP-MCNC: 101 MG/DL (ref 74–99)
GLUCOSE BLD MANUAL STRIP-MCNC: 130 MG/DL (ref 74–99)
GLUCOSE BLD MANUAL STRIP-MCNC: 190 MG/DL (ref 74–99)
GLUCOSE BLD MANUAL STRIP-MCNC: 213 MG/DL (ref 74–99)
GLUCOSE BLD MANUAL STRIP-MCNC: 216 MG/DL (ref 74–99)
GLUCOSE BLD MANUAL STRIP-MCNC: 218 MG/DL (ref 74–99)
GLUCOSE SERPL-MCNC: 185 MG/DL (ref 74–99)
GLUCOSE SERPL-MCNC: 92 MG/DL (ref 74–99)
HCT VFR BLD AUTO: 28.1 % (ref 41–52)
HGB BLD-MCNC: 8.8 G/DL (ref 13.5–17.5)
MAGNESIUM SERPL-MCNC: 2.07 MG/DL (ref 1.6–2.4)
MCH RBC QN AUTO: 29.2 PG (ref 26–34)
MCHC RBC AUTO-ENTMCNC: 31.3 G/DL (ref 32–36)
MCV RBC AUTO: 93 FL (ref 80–100)
NRBC BLD-RTO: 0 /100 WBCS (ref 0–0)
PLATELET # BLD AUTO: 225 X10*3/UL (ref 150–450)
POTASSIUM SERPL-SCNC: 4.1 MMOL/L (ref 3.5–5.3)
POTASSIUM SERPL-SCNC: 4.5 MMOL/L (ref 3.5–5.3)
RBC # BLD AUTO: 3.01 X10*6/UL (ref 4.5–5.9)
SODIUM SERPL-SCNC: 137 MMOL/L (ref 136–145)
SODIUM SERPL-SCNC: 140 MMOL/L (ref 136–145)
WBC # BLD AUTO: 10.4 X10*3/UL (ref 4.4–11.3)

## 2024-12-25 PROCEDURE — 93005 ELECTROCARDIOGRAM TRACING: CPT

## 2024-12-25 PROCEDURE — 85027 COMPLETE CBC AUTOMATED: CPT | Performed by: INTERNAL MEDICINE

## 2024-12-25 PROCEDURE — 2500000004 HC RX 250 GENERAL PHARMACY W/ HCPCS (ALT 636 FOR OP/ED): Performed by: INTERNAL MEDICINE

## 2024-12-25 PROCEDURE — 36415 COLL VENOUS BLD VENIPUNCTURE: CPT | Performed by: INTERNAL MEDICINE

## 2024-12-25 PROCEDURE — 85025 COMPLETE CBC W/AUTO DIFF WBC: CPT | Performed by: INTERNAL MEDICINE

## 2024-12-25 PROCEDURE — 2500000001 HC RX 250 WO HCPCS SELF ADMINISTERED DRUGS (ALT 637 FOR MEDICARE OP): Performed by: INTERNAL MEDICINE

## 2024-12-25 PROCEDURE — 83735 ASSAY OF MAGNESIUM: CPT | Performed by: INTERNAL MEDICINE

## 2024-12-25 PROCEDURE — 9420000001 HC RT PATIENT EDUCATION 5 MIN

## 2024-12-25 PROCEDURE — 93010 ELECTROCARDIOGRAM REPORT: CPT | Performed by: INTERNAL MEDICINE

## 2024-12-25 PROCEDURE — 2500000001 HC RX 250 WO HCPCS SELF ADMINISTERED DRUGS (ALT 637 FOR MEDICARE OP): Performed by: PHARMACIST

## 2024-12-25 PROCEDURE — 82947 ASSAY GLUCOSE BLOOD QUANT: CPT

## 2024-12-25 PROCEDURE — 99223 1ST HOSP IP/OBS HIGH 75: CPT | Performed by: INTERNAL MEDICINE

## 2024-12-25 PROCEDURE — 2060000001 HC INTERMEDIATE ICU ROOM DAILY

## 2024-12-25 PROCEDURE — 82374 ASSAY BLOOD CARBON DIOXIDE: CPT | Performed by: INTERNAL MEDICINE

## 2024-12-25 PROCEDURE — 2500000002 HC RX 250 W HCPCS SELF ADMINISTERED DRUGS (ALT 637 FOR MEDICARE OP, ALT 636 FOR OP/ED): Performed by: INTERNAL MEDICINE

## 2024-12-25 PROCEDURE — 94640 AIRWAY INHALATION TREATMENT: CPT

## 2024-12-25 PROCEDURE — 99221 1ST HOSP IP/OBS SF/LOW 40: CPT | Performed by: INTERNAL MEDICINE

## 2024-12-25 RX ORDER — AMLODIPINE BESYLATE 10 MG/1
10 TABLET ORAL DAILY
Status: DISCONTINUED | OUTPATIENT
Start: 2024-12-25 | End: 2024-12-30 | Stop reason: HOSPADM

## 2024-12-25 RX ORDER — ONDANSETRON 4 MG/1
4 TABLET, FILM COATED ORAL EVERY 8 HOURS PRN
Status: DISCONTINUED | OUTPATIENT
Start: 2024-12-25 | End: 2024-12-30 | Stop reason: HOSPADM

## 2024-12-25 RX ORDER — DEXTROSE 50 % IN WATER (D50W) INTRAVENOUS SYRINGE
12.5
Status: DISCONTINUED | OUTPATIENT
Start: 2024-12-25 | End: 2024-12-30 | Stop reason: HOSPADM

## 2024-12-25 RX ORDER — METOPROLOL TARTRATE 1 MG/ML
5 INJECTION, SOLUTION INTRAVENOUS EVERY 4 HOURS PRN
Status: DISCONTINUED | OUTPATIENT
Start: 2024-12-25 | End: 2024-12-30 | Stop reason: HOSPADM

## 2024-12-25 RX ORDER — FUROSEMIDE 40 MG/1
40 TABLET ORAL DAILY
Status: DISCONTINUED | OUTPATIENT
Start: 2024-12-25 | End: 2024-12-25

## 2024-12-25 RX ORDER — B-COMPLEX WITH VITAMIN C
1 TABLET ORAL DAILY
Status: DISCONTINUED | OUTPATIENT
Start: 2024-12-25 | End: 2024-12-30 | Stop reason: HOSPADM

## 2024-12-25 RX ORDER — ACETAMINOPHEN 160 MG/5ML
650 SOLUTION ORAL EVERY 4 HOURS PRN
Status: DISCONTINUED | OUTPATIENT
Start: 2024-12-25 | End: 2024-12-30 | Stop reason: HOSPADM

## 2024-12-25 RX ORDER — SACUBITRIL AND VALSARTAN 24; 26 MG/1; MG/1
1 TABLET, FILM COATED ORAL 2 TIMES DAILY
Status: DISCONTINUED | OUTPATIENT
Start: 2024-12-25 | End: 2024-12-25

## 2024-12-25 RX ORDER — ICOSAPENT ETHYL 1 G/1
1 CAPSULE ORAL
Status: DISCONTINUED | OUTPATIENT
Start: 2024-12-25 | End: 2024-12-30 | Stop reason: HOSPADM

## 2024-12-25 RX ORDER — DAPAGLIFLOZIN 10 MG/1
10 TABLET, FILM COATED ORAL
Status: DISCONTINUED | OUTPATIENT
Start: 2024-12-25 | End: 2024-12-30 | Stop reason: HOSPADM

## 2024-12-25 RX ORDER — DEXTROSE 50 % IN WATER (D50W) INTRAVENOUS SYRINGE
25
Status: DISCONTINUED | OUTPATIENT
Start: 2024-12-25 | End: 2024-12-30 | Stop reason: HOSPADM

## 2024-12-25 RX ORDER — LANOLIN ALCOHOL/MO/W.PET/CERES
1000 CREAM (GRAM) TOPICAL DAILY
Status: DISCONTINUED | OUTPATIENT
Start: 2024-12-25 | End: 2024-12-30 | Stop reason: HOSPADM

## 2024-12-25 RX ORDER — CLOPIDOGREL BISULFATE 75 MG/1
75 TABLET ORAL DAILY
Status: DISCONTINUED | OUTPATIENT
Start: 2024-12-25 | End: 2024-12-30 | Stop reason: HOSPADM

## 2024-12-25 RX ORDER — PANTOPRAZOLE SODIUM 40 MG/10ML
40 INJECTION, POWDER, LYOPHILIZED, FOR SOLUTION INTRAVENOUS 2 TIMES DAILY
Status: DISCONTINUED | OUTPATIENT
Start: 2024-12-25 | End: 2024-12-30 | Stop reason: HOSPADM

## 2024-12-25 RX ORDER — ACETAMINOPHEN 650 MG/1
650 SUPPOSITORY RECTAL EVERY 4 HOURS PRN
Status: DISCONTINUED | OUTPATIENT
Start: 2024-12-25 | End: 2024-12-30 | Stop reason: HOSPADM

## 2024-12-25 RX ORDER — CETIRIZINE HYDROCHLORIDE 10 MG/1
10 TABLET ORAL DAILY
Status: DISCONTINUED | OUTPATIENT
Start: 2024-12-25 | End: 2024-12-30 | Stop reason: HOSPADM

## 2024-12-25 RX ORDER — OLANZAPINE 10 MG/2ML
5 INJECTION, POWDER, FOR SOLUTION INTRAMUSCULAR ONCE
Status: DISCONTINUED | OUTPATIENT
Start: 2024-12-25 | End: 2024-12-30 | Stop reason: HOSPADM

## 2024-12-25 RX ORDER — INSULIN LISPRO 100 [IU]/ML
0-10 INJECTION, SOLUTION INTRAVENOUS; SUBCUTANEOUS
Status: DISCONTINUED | OUTPATIENT
Start: 2024-12-25 | End: 2024-12-30 | Stop reason: HOSPADM

## 2024-12-25 RX ORDER — SERTRALINE HYDROCHLORIDE 50 MG/1
50 TABLET, FILM COATED ORAL DAILY
Status: DISCONTINUED | OUTPATIENT
Start: 2024-12-25 | End: 2024-12-30 | Stop reason: HOSPADM

## 2024-12-25 RX ORDER — MAGNESIUM GLUCONATE 27.5 (500)
27.5 TABLET ORAL DAILY
Status: DISCONTINUED | OUTPATIENT
Start: 2024-12-25 | End: 2024-12-25

## 2024-12-25 RX ORDER — ACETAMINOPHEN 325 MG/1
650 TABLET ORAL EVERY 4 HOURS PRN
Status: DISCONTINUED | OUTPATIENT
Start: 2024-12-25 | End: 2024-12-30 | Stop reason: HOSPADM

## 2024-12-25 RX ORDER — FUROSEMIDE 10 MG/ML
40 INJECTION INTRAMUSCULAR; INTRAVENOUS EVERY 24 HOURS
Status: DISCONTINUED | OUTPATIENT
Start: 2024-12-25 | End: 2024-12-25

## 2024-12-25 RX ORDER — ALBUTEROL SULFATE 0.83 MG/ML
2.5 SOLUTION RESPIRATORY (INHALATION) EVERY 6 HOURS PRN
Status: DISCONTINUED | OUTPATIENT
Start: 2024-12-25 | End: 2024-12-25

## 2024-12-25 RX ORDER — OLANZAPINE 5 MG/1
5 TABLET, ORALLY DISINTEGRATING ORAL ONCE
Status: COMPLETED | OUTPATIENT
Start: 2024-12-25 | End: 2024-12-26

## 2024-12-25 RX ORDER — ONDANSETRON HYDROCHLORIDE 2 MG/ML
4 INJECTION, SOLUTION INTRAVENOUS EVERY 8 HOURS PRN
Status: DISCONTINUED | OUTPATIENT
Start: 2024-12-25 | End: 2024-12-30 | Stop reason: HOSPADM

## 2024-12-25 RX ORDER — VITAMIN B COMPLEX
1 CAPSULE ORAL DAILY
Status: DISCONTINUED | OUTPATIENT
Start: 2024-12-25 | End: 2024-12-25

## 2024-12-25 RX ORDER — ATORVASTATIN CALCIUM 80 MG/1
80 TABLET, FILM COATED ORAL DAILY
Status: DISCONTINUED | OUTPATIENT
Start: 2024-12-25 | End: 2024-12-30 | Stop reason: HOSPADM

## 2024-12-25 RX ORDER — ALBUTEROL SULFATE 90 UG/1
2 INHALANT RESPIRATORY (INHALATION) EVERY 6 HOURS PRN
Status: DISCONTINUED | OUTPATIENT
Start: 2024-12-25 | End: 2024-12-25

## 2024-12-25 RX ORDER — SACUBITRIL AND VALSARTAN 24; 26 MG/1; MG/1
0.5 TABLET, FILM COATED ORAL 2 TIMES DAILY
Status: DISCONTINUED | OUTPATIENT
Start: 2024-12-25 | End: 2024-12-30 | Stop reason: HOSPADM

## 2024-12-25 RX ORDER — GABAPENTIN 100 MG/1
100 CAPSULE ORAL NIGHTLY
Status: DISCONTINUED | OUTPATIENT
Start: 2024-12-25 | End: 2024-12-30 | Stop reason: HOSPADM

## 2024-12-25 RX ORDER — ASPIRIN 81 MG/1
81 TABLET ORAL DAILY
Status: DISCONTINUED | OUTPATIENT
Start: 2024-12-25 | End: 2024-12-30 | Stop reason: HOSPADM

## 2024-12-25 RX ORDER — METOPROLOL SUCCINATE 50 MG/1
50 TABLET, EXTENDED RELEASE ORAL DAILY
Status: DISCONTINUED | OUTPATIENT
Start: 2024-12-25 | End: 2024-12-30 | Stop reason: HOSPADM

## 2024-12-25 RX ORDER — ALLOPURINOL 100 MG/1
50 TABLET ORAL DAILY
Status: DISCONTINUED | OUTPATIENT
Start: 2024-12-25 | End: 2024-12-30 | Stop reason: HOSPADM

## 2024-12-25 RX ORDER — ALBUTEROL SULFATE 0.83 MG/ML
2.5 SOLUTION RESPIRATORY (INHALATION) EVERY 2 HOUR PRN
Status: DISCONTINUED | OUTPATIENT
Start: 2024-12-25 | End: 2024-12-30 | Stop reason: HOSPADM

## 2024-12-25 RX ORDER — ALBUTEROL SULFATE 0.83 MG/ML
2.5 SOLUTION RESPIRATORY (INHALATION)
Status: DISCONTINUED | OUTPATIENT
Start: 2024-12-25 | End: 2024-12-30 | Stop reason: HOSPADM

## 2024-12-25 RX ADMIN — ASPIRIN 81 MG: 81 TABLET, COATED ORAL at 09:18

## 2024-12-25 RX ADMIN — ATORVASTATIN CALCIUM 80 MG: 80 TABLET, FILM COATED ORAL at 09:16

## 2024-12-25 RX ADMIN — ALBUTEROL SULFATE 2.5 MG: 2.5 SOLUTION RESPIRATORY (INHALATION) at 13:39

## 2024-12-25 RX ADMIN — ALBUTEROL SULFATE 2.5 MG: 2.5 SOLUTION RESPIRATORY (INHALATION) at 07:36

## 2024-12-25 RX ADMIN — SERTRALINE HYDROCHLORIDE 50 MG: 50 TABLET ORAL at 09:18

## 2024-12-25 RX ADMIN — ALLOPURINOL 50 MG: 100 TABLET ORAL at 09:16

## 2024-12-25 RX ADMIN — CLOPIDOGREL 75 MG: 75 TABLET ORAL at 09:17

## 2024-12-25 RX ADMIN — ICOSAPENT ETHYL 1 G: 1 CAPSULE ORAL at 16:31

## 2024-12-25 RX ADMIN — ICOSAPENT ETHYL 1 G: 1 CAPSULE ORAL at 09:30

## 2024-12-25 RX ADMIN — Medication 1 TABLET: at 09:16

## 2024-12-25 RX ADMIN — ALBUTEROL SULFATE 2.5 MG: 2.5 SOLUTION RESPIRATORY (INHALATION) at 20:10

## 2024-12-25 RX ADMIN — INSULIN LISPRO 4 UNITS: 100 INJECTION, SOLUTION INTRAVENOUS; SUBCUTANEOUS at 02:25

## 2024-12-25 RX ADMIN — GABAPENTIN 100 MG: 100 CAPSULE ORAL at 02:25

## 2024-12-25 RX ADMIN — PANTOPRAZOLE SODIUM 40 MG: 40 INJECTION, POWDER, FOR SOLUTION INTRAVENOUS at 09:18

## 2024-12-25 RX ADMIN — CETIRIZINE HYDROCHLORIDE 10 MG: 10 TABLET, FILM COATED ORAL at 09:18

## 2024-12-25 RX ADMIN — GABAPENTIN 100 MG: 100 CAPSULE ORAL at 21:49

## 2024-12-25 RX ADMIN — METOPROLOL TARTRATE 5 MG: 5 INJECTION INTRAVENOUS at 22:27

## 2024-12-25 RX ADMIN — INSULIN LISPRO 4 UNITS: 100 INJECTION, SOLUTION INTRAVENOUS; SUBCUTANEOUS at 16:31

## 2024-12-25 RX ADMIN — AMLODIPINE BESYLATE 10 MG: 10 TABLET ORAL at 09:18

## 2024-12-25 RX ADMIN — METOPROLOL SUCCINATE 50 MG: 50 TABLET, EXTENDED RELEASE ORAL at 09:18

## 2024-12-25 RX ADMIN — Medication 1000 MCG: at 09:16

## 2024-12-25 RX ADMIN — PANTOPRAZOLE SODIUM 40 MG: 40 INJECTION, POWDER, FOR SOLUTION INTRAVENOUS at 21:48

## 2024-12-25 ASSESSMENT — PAIN - FUNCTIONAL ASSESSMENT
PAIN_FUNCTIONAL_ASSESSMENT: 0-10

## 2024-12-25 ASSESSMENT — COGNITIVE AND FUNCTIONAL STATUS - GENERAL
MOVING TO AND FROM BED TO CHAIR: A LITTLE
MOBILITY SCORE: 24
DAILY ACTIVITIY SCORE: 21
DRESSING REGULAR LOWER BODY CLOTHING: A LITTLE
TOILETING: A LITTLE
HELP NEEDED FOR BATHING: A LITTLE
STANDING UP FROM CHAIR USING ARMS: A LITTLE
CLIMB 3 TO 5 STEPS WITH RAILING: A LOT
WALKING IN HOSPITAL ROOM: A LITTLE
DAILY ACTIVITIY SCORE: 24
MOBILITY SCORE: 19

## 2024-12-25 ASSESSMENT — ENCOUNTER SYMPTOMS
ALLERGIC/IMMUNOLOGIC NEGATIVE: 1
ACTIVITY CHANGE: 1
FATIGUE: 1
ENDOCRINE NEGATIVE: 1
AGITATION: 1
MUSCULOSKELETAL NEGATIVE: 1
CHEST TIGHTNESS: 1
PALPITATIONS: 1
BLOOD IN STOOL: 1
CONFUSION: 1
HEMATOLOGIC/LYMPHATIC NEGATIVE: 1
DECREASED CONCENTRATION: 1
EYES NEGATIVE: 1
NEUROLOGICAL NEGATIVE: 1
SHORTNESS OF BREATH: 1

## 2024-12-25 ASSESSMENT — PAIN SCALES - GENERAL
PAINLEVEL_OUTOF10: 0 - NO PAIN

## 2024-12-25 NOTE — H&P (VIEW-ONLY)
"Inpatient consult to Cardiology  Consult performed by: Nyla Tabor, APRN-CNP  Consult ordered by: Benitez Long DO  Reason for consult: acute non-STEMI, atrial flutter with RVR        HPI:  Ritesh Garza is a 74 y.o. male, with a PMH of mvCAD s/p CABGx5 1992 c/b NSTEMI 2/2024 on DAPT, HFrEF d/t ICM, HTN, HLD, VT s/p ICD placement 5/2024, CKD III, seizures, PAD c/b severe left subclavian stenosis w/ compromised flow to the left POP s/p left subclavian stent 4/2024 , asthma, DM, gout, SIDHU cirrhosis, portal HTN, depression/anxiety, GERD, BPH, SENIA, and TIA who presented to [facility] on 12/25/2024 as a transfer from [facility] for acute GIB. Patient initially presented to 12/20 d/t AMS and recurrent falls at home per spouse, with associated weakness. CTH negative for acute CVA but CTA did note some attenuated branches of the MCA on the right compared to the left which could be secondary to high-grade stenosis or areas of occlusion. Neurology did not feel patient was a candidate for TNK or thrombectomy. Labs at that time were notable for elevated troponin and BNP, TAYLOR on CKD, as well as acute anemia with a Hgb of 6.1 with melanotic stool in the ER. Patient was transfused 2 units of PRBC, started on Octreotide, and planned for transfer to [facility].     Troponin peaked at 7200, no anticoagulation started d/t concern for acute GIB. He additionally developed elevated HR on 12/23, ST vs atrial flutter, in the setting of his home medications being held that resolved with one dose of IVP Metoprolol.     Cardiology is consulted for \"acute non-STEMI, atrial flutter with RVR\".    Home CV Medications:  Amlodipine 10 mg daily  ASA 81 mg daily  Atorvastatin 80 mg daily  Plavix 75 mg daily  Farxiga 10 mg daily  Furosemide 40 mg daily as needed  Vascepa 1 g twice daily  Metoprolol succinate 50 mg daily  Entresto 1/2 tablet twice daily  No Spironolactone d/t painful gynecomastia.   -Follows with  " Gerhard as outpatient, last seen 10/2024    Patient History   Past Medical History:  He has a past medical history of Allergic, Asthma, Coronary artery disease (Long Ago), Diabetes mellitus (Multi) (Over 20 years ago), Drug-induced gout, unspecified site (10/04/2019), Encounter for screening for malignant neoplasm of colon (10/31/2022), Gout, unspecified, Heart disease (Over 20 years sgo), Heart murmur (Long ago), Hypertension (Over 20 years ago), Myocardial infarction (Multi) (Over 20 yesrs ago), Ocular pain, right eye (10/14/2018), Periorbital cellulitis (10/14/2018), Personal history of colonic polyps (10/11/2017), Personal history of other diseases of the circulatory system (05/12/2021), Personal history of other diseases of the circulatory system (05/12/2021), Personal history of other diseases of the respiratory system (10/04/2019), Personal history of other drug therapy, Type 2 diabetes mellitus, and Unspecified cirrhosis of liver (Multi) (11/07/2022).  Past Surgical History:  He has a past surgical history that includes Coronary artery bypass graft (12/02/2021); MR angio head wo IV contrast (06/02/2021); MR angio neck wo IV contrast (06/02/2021); MR angio head wo IV contrast (11/16/2021); MR angio neck wo IV contrast (11/16/2021); Adenoidectomy (Childhood); Cardiac catheterization (Over 30 years ago); Cholecystectomy (Over 30 years ago); Circumcision, primary (72,years ago); Eye surgery (Childhood); Cardiac catheterization (N/A, 04/15/2024); Invasive Vascular Procedure (Left, 04/30/2024); Coronary stent placement (Last week); Cardiac electrophysiology procedure (N/A, 05/30/2024); and Cardiac defibrillator placement.  Social History:  He reports that he has never smoked. He has never used smokeless tobacco. He reports that he does not drink alcohol and does not use drugs.  Family History: family history includes Colon cancer in his maternal grandmother; Diabetes in his mother; Fainting in his father; Heart  disease in his father; Stomach cancer in his mother's sister; Stroke in his maternal grandmother; mycoardial infarction (age of onset: 46) in his father.     Allergies: Patient has no known allergies.    ROS: 10-point review of systems was performed and is otherwise negative except as noted in HPI.     Outpatient Medications:  Current Outpatient Medications   Medication Instructions    albuterol (ProAir HFA) 90 mcg/actuation inhaler 2 puffs, inhalation, Every 6 hours PRN    allopurinol (ZYLOPRIM) 100 mg, oral, Daily    allopurinol (ZYLOPRIM) 50 mg, oral, Daily    amLODIPine (NORVASC) 10 mg, oral, Daily    aspirin 81 mg, oral, Daily    atorvastatin (LIPITOR) 80 mg, oral, Daily    b complex vitamins capsule 1 capsule, Daily    clopidogrel (PLAVIX) 75 mg, oral, Daily    coenzyme Q-10 (CoQ-10) 100 mg capsule 1 capsule, Daily    cyanocobalamin (Vitamin B-12) 1,000 mcg tablet 1 tablet, Daily    dapagliflozin (Farxiga) 10 mg 1 tablet, Daily before evening meal    famotidine (PEPCID) 20 mg, oral, Daily PRN    furosemide (LASIX) 40 mg, oral, Daily, As needed for weight gain greater than 3 pounds    gabapentin (NEURONTIN) 100 mg, oral, Nightly    icosapent ethyL (VASCEPA) 1 g, oral, 2 times daily (morning and late afternoon)    loratadine (CLARITIN) 10 mg, 2 times daily    magnesium gluconate (Magonate) 27.5 mg magne- sium (500 mg) tablet 1 tablet, Daily    metoprolol succinate XL (TOPROL-XL) 50 mg, oral, Daily    multivitamin tablet 1 tablet, Daily    sacubitriL-valsartan (Entresto) 24-26 mg tablet 0.5 tablets, oral, 2 times daily    sacubitriL-valsartan (Entresto) 24-26 mg tablet 1 tablet, oral, 2 times daily    sertraline (Zoloft) 50 mg tablet TAKE ONE TABLET DAILY IN THE MORNING AFTER BREAKFAST.    sildenafil (VIAGRA) 100 mg, oral, As needed    tadalafil (CIALIS) 20 mg, oral, As needed    tadalafil (CIALIS) 5 mg, oral, Daily    tadalafil (CIALIS) 5 mg, oral, Daily       Cardiovascular Testing:  EKG:   ECG 12 Lead  12/24/24      ECG 12 Lead 12/23/24      Encounter Date: 12/20/24   ECG 12 Lead   Result Value    Ventricular Rate 136    Atrial Rate 136    WA Interval 98    QRS Duration 106    QT Interval 342    QTC Calculation(Bazett) 515    P Axis 68    R Axis -19    T Axis 122    QRS Count 22    Q Onset 254    T Offset 425    QTC Fredericia 449    Narrative    Sinus tachycardia  Ventricular premature complex  Anterior infarct, old  ST depression, probably rate related  Prolonged QT interval     Device Check 12/17/24    Echo:  Transthoracic Echocardiogram 7/2024   1. Left ventricular ejection fraction is moderately decreased, by visual estimate at 30-35%.   2. Spectral Doppler shows a pseudonormal pattern of left ventricular diastolic filling.   3. Left ventricular cavity size is moderately dilated.   4. No left ventricular thrombus visualized.   5. Technically difficult exam, though there is moderate LV systolic dysfunction with the apical function being best but hypokinesis of the basal and mid segments. There is no LV apical thrombus seen on the echocontrast images.   6. There is reduced right ventricular systolic function.   7. The left atrium is moderately dilated.   8. Moderate mitral valve regurgitation.   9. Mildly elevated RVSP.  10. Moderate tricuspid regurgitation visualized.  11. Compared with the prior exam from 5/10/2023, today's exam is more technically difficult making assessment of LV systolic function more difficult. Proir study demonstrated clear wall motiom abnormality in the basal septal, basal inferior and basal and mid inferolateral segments. The LV systolic function appears to be worse today ( hyaving declined from mild dysfunciton to moderate dysfunction. In addition, the degree of MR appears to have increased from mild to moderate.    Transthoracic Echocardiogram 4/26/24   1. Left ventricular systolic function is moderately decreased with a 30-35% estimated ejection fraction.   2. Multiple segmental  abnormalities exist. See findings.   3. Spectral Doppler shows a restrictive pattern of left ventricular diastolic filling.   4. There is an elevated left ventricular end diastolic pressure.   5. Moderate mitral valve regurgitation.   6. Moderately decreased mitral valve posterior leaflet mobility.   7. Mild to moderate tricuspid regurgitation.   8. Mildly elevated RVSP.    Transthoracic Echocardiogram 2/2024   1. Left ventricular systolic function is normal with a 30-35% estimated ejection fraction.   2. Multiple segmental abnormalities exist. See findings.   3. Spectral Doppler shows a restrictive pattern of left ventricular diastolic filling.   4. There is low normal right ventricular systolic function.   5. Mild to moderate tricuspid regurgitation.   6. There are multiple wall motion abnormalities.    Transthoracic Echocardiogram 5/2023  1. Left ventricular systolic function is mildly decreased with a 40-45% estimated ejection fraction.  2. The left ventricular cavity size is mildly dilated.  3. Basal and mid inferolateral wall, basal inferoseptal segment, and basal inferior segment are abnormal.  4. Spectral Doppler shows a pseudonormal pattern of left ventricular diastolic filling.  5. The left atrium is mildly dilated.  6. RVSP within normal limits.    Transthoracic Echocardiogram 11/2021   1. The left ventricular systolic function is moderately decreased with a 35% estimated ejection fraction.   2. Multiple segmental abnormalities exist. See findings.   3. Spectral Doppler shows an impaired relaxation pattern of left ventricular diastolic filling.    Transthoracic Echocardiogram 6/2021   1. The left ventricular systolic function is moderately decreased with a 40% estimated ejection fraction.   2. Multiple segmental abnormalities exist. See findings.   3. Spectral Doppler shows a pseudonormal pattern of left ventricular diastolic filling.   4. Mild to moderate mitral valve regurgitation.    Cath:  Left/Right  Heart Catheterization 2024   1. Severe native three vessel CAD.   2. Patent LIMA-LAD/Diagonal vessel. All other grafts are occluded.   3. Severe left subclavian artery stenosis with a gradient of ~28-30 mmHg across the stenosis.   4. Mildly elevated filling pressure with PCWP of 21 mmHg.   5. Mild pulmonary hypertension with mPAP of 29 mmHg (Post capillary).   6. Normal cardiac output and index of 4.1 L/min and 2.5 L/min/m2 respectively.   7. Successful RCFA closure with 6Fr. VIP Angioseal.   8. Left Ventricular end-diastolic pressure = 23 mmHg.    Diagnostic Results   Labs  CBC- 2024:  6:10 AM  10.4 8.8 225    28.1      BMP- 2024:  6:10 AM  140 39 111 92   4.1 2.02 21    Estimated Creatinine Clearance: 25 mL/min (A) (by C-G formula based on SCr of 2.02 mg/dL (H)).     CA: 8.2 PROTIEN: 5.2 ALT: 16 Total Bili: 0.5 M.03   PHOS: 3.2 ALBUMIN: 3.0 AST: 29   Alk Phos: 61      COAGS- 2024: 10:36 AM  1.2   13.5 28     CV Labs  Troponin I, High Sensitivity   Date/Time Value Ref Range Status   2024 11:51 PM 1,450 (HH) 0 - 20 ng/L Final   2024 01:13 AM 3,784 (HH) 0 - 20 ng/L Final   2024 04:34 AM 7,200 (HH) 0 - 20 ng/L Final   2024 11:14 PM 6,583 (HH) 0 - 20 ng/L Final   2024 02:34 PM 2,668 (HH) 0 - 20 ng/L Final   2024 11:35 AM 1,566 (HH) 0 - 20 ng/L Final     BNP   Date/Time Value Ref Range Status   2024 11:51 PM 3,417 (H) 0 - 99 pg/mL Final   2024 10:36 AM 1,270 (H) 0 - 99 pg/mL Final   2024 01:31 PM 1,712 (H) 0 - 99 pg/mL Final     Hemoglobin A1C   Date/Time Value Ref Range Status   2024 01:36 PM 6.6 (H) see below % Final   2023 11:10 AM 7.0 (A) % Final   2023 12:15 PM 6.8 (A) % Final     LDL Calculated   Date/Time Value Ref Range Status   2024 11:28 AM 53 <=99 mg/dL Final   2023 10:41 AM 36 <=99 mg/dL Final     Pertinent Imaging  XR chest 1 view 2024  1. No acute cardiopulmonary process.  2. Stable  cardiomegaly. Central pulmonary vascular prominence which is improved from 07/06/2024.        CT brain attack angio head and neck W and WO IV contrast 12/20/2024  The examination is suboptimal secondary to timing of the scan relative to the contrast bolus.    CTA neck:  There is coarse atherosclerotic calcification along the carotid arteries bilaterally with mild-to-moderate narrowing.    No significant stenosis at the carotid bifurcation bilaterally. There is calcified and noncalcified plaque at the left carotid bifurcation.    There is a beaded appearance of the distal cervical internal carotid artery on the right which may be secondary to fibromuscular  dysplasia. There is also mild irregularity of the V3 segment of the left vertebral artery.    There is a stent at the origin of the left subclavian artery which grossly appears patent, however not well evaluated on the current  exam.    There appears to be narrowing at the origin of the left common carotid artery, not well evaluated on the current exam.    CTA head:  There appears to be attenuated branches of the MCA on the right compared to the left which may be secondary to high-grade stenosis or areas of occlusion, not well evaluated on the current exam secondary to technique.    There are coarse atherosclerotic calcifications along the intradural segments of the vertebral arteries with a least moderate narrowing.    Otherwise, no evidence for significant stenosis or large branch vessel cutoffs of the intracranial vessels.    Consider repeat examination as clinically warranted.       Last Recorded Vitals:  Vitals:    12/25/24 0055 12/25/24 0100 12/25/24 0436   BP: 116/57 123/65 116/60   BP Location: Right arm Right arm    Patient Position: Lying Lying    Pulse: 77  72   Resp: 18 18 17   Temp: 36.5 °C (97.7 °F) 36.4 °C (97.5 °F) 36.5 °C (97.7 °F)   TempSrc: Temporal Temporal Temporal   SpO2: 100% 100% 97%   Weight: 62.5 kg (137 lb 12.8 oz)     Height: 1.6 m (5'  "3\")       Temp:  [36.4 °C (97.5 °F)-36.9 °C (98.4 °F)] 36.5 °C (97.7 °F)  Heart Rate:  [72-78] 72  Resp:  [16-20] 17  BP: (106-147)/() 116/60     Last I/O:  No intake/output data recorded.    Physical Exam:   Vitals and nursing notes reviewed.  /60   Pulse 72   Temp 36.5 °C (97.7 °F) (Temporal)   Resp 17   Ht 1.6 m (5' 3\")   Wt 62.5 kg (137 lb 12.8 oz)   SpO2 97%   BMI 24.41 kg/m²   GENERAL: Alert and awake, cooperative; in no acute distress; elderly male, ill appearing  SKIN: Warm and dry, cap refill <2  HEENT: Normocephalic, PEERL, mucous membranes pink and moist  NECK: No JVD or hepatojugular reflex  CARDIAC: Regular rate and rhythm, S1S2, no murmurs or abnormal heart sounds  CHEST: Normal respiratory effort, no abnormal breath sounds  ABDOMEN: Soft, non-distended, non-tender with palpation  EXTREMITIES: No lower extremity edema, normal pulses all 4 extremities  NEURO: Alert and oriented, mental status at baseline, no focal deficits  PSYCH: Behavior and affect as expected     Tele: NSR HR 70s  Code Status: Full Code    Assessment/Plan   Ritesh Garza is a 74 y.o. male, with a PMH of mvCAD s/p CABGx5 1992 c/b NSTEMI 2/2024 on DAPT, HFrEF d/t ICM, HTN, HLD, VT s/p ICD placement 5/2024, CKD III, seizures, PAD c/b severe left subclavian stenosis w/ compromised flow to the left POP s/p left subclavian stent 4/2024 , asthma, DM, gout, SIDHU cirrhosis, portal HTN, depression/anxiety, GERD, BPH, SENIA, and TIA who presented to [facility] on 12/25/2024 as a transfer from [facility] for acute GIB. Patient initially presented to 12/20 d/t AMS and recurrent falls at home per spouse, with associated weakness. CTH negative for acute CVA but CTA did note some attenuated branches of the MCA on the right compared to the left which could be secondary to high-grade stenosis or areas of occlusion. Neurology did not feel patient was a candidate for TNK or thrombectomy. Labs at that time were " "notable for elevated troponin and BNP, TAYLOR on CKD, as well as acute anemia with a Hgb of 6.1 with melanotic stool in the ER. Patient was transfused 2 units of PRBC, started on Octreotide, and planned for transfer to [facility]. Troponin peaked at 7200, no anticoagulation started d/t concern for acute GIB. He additionally developed elevated HR on 12/23, ST vs atrial flutter, in the setting of his home medications being held that resolved with one dose of IVP Metoprolol. Cardiology is consulted for \"acute non-STEMI, atrial flutter with RVR\".    Home CV Medications: Amlodipine 10 mg daily, ASA 81 mg daily, Atorvastatin 80 mg daily, Plavix 75 mg daily, Farxiga 10 mg daily, Furosemide 40 mg daily as needed, Vascepa 1 g twice daily, Metoprolol succinate 50 mg daily, Entresto 1/2 tablet twice daily, no Spironolactone d/t painful gynecomastia  -Follows with Dr. Hennessy as outpatient, last seen 10/2024    #NonMI Troponin Elevation- Acute non-traumatic myocardial injury in the setting of acute blood loss, known CAD, and HF  -Core measures do not apply; no clinical evidence of overt ischemia, patient denies chest pain  -Troponin level peaked at 7200, no indication to continue to trend  #CAD- s/p CABGx5 with occluded grafts per [facility] 2/2024, planning on DAPT with ASA and Plavix for at least one year  #Chronic Systolic HF- Not overtly hypervolemic on exam  -BNP 3417 [chronically elevated], CXR without pulmonary edema  -Diuresing with IV Lasix 40mg daily, home diuretic Lasix 40mg PRN  -GDMT: Metoprolol Succinate 50mg daily, Entresto 24-26mg 0.5 tablets BID, and Farxiga 10mg daily  #TAYLOR on CKD III- Etiology likely prerenal d/t acute anemia  #Sinus Tachycardia- No evidence of pAF or atrial flutter, appears to be ST per ECG, likely in the setting of acute anemia and held BB  -HR 70s per tele since admission  -No indicated for anticoagulation at this time  #HTN- BP acceptable in current circumstances, c/w home medications   #HLD- on statin " therapy and Vascepa      RECOMMENDATIONS:  -c/w ASA and Plavix as tolerated  -Hold Farxiga and Entresto in the setting of TAYLOR  -Continue all other home CV medications as above  -Diuresis with IV Lasix PRN, does not need daily dosing with current volume status  -Continuous telemetry monitoring  -Monitor electrolytes, replete for K <4 and Mg <2  -Daily wts, strict I&Os, low sodium diet        NORAH Gonzalez-CNP  Advanced Practice Provider   Cardiology  [facility]  12/25/24 8:09 AM         ============================================  Attending Note  ============================================  Both the ELZBIETA and I have had a face to face encounter with the patient today. I have examined the patient and edited the documented physical examination as necessary.  I personally reviewed the patient's recent labs, medications, orders, EKGs, and pertinent cardiac imaging.  I have reviewed the ELZBIETA's encounter note, approve the ELZBIETA's documentation and have edited the note to reflect the diagnostic and therapeutic plan.    Ritesh Garza is a 74 y.o. male, with a PMH of mvCAD s/p CABGx5 1992 c/b NSTEMI 2/2024 on DAPT, HFrEF d/t ICM, HTN, HLD, VT s/p ICD placement 5/2024, CKD III, seizures, PAD c/b severe left subclavian stenosis w/ compromised flow to the left POP s/p left subclavian stent 4/2024 , asthma, DM, gout, SIDHU cirrhosis, portal HTN, depression/anxiety, GERD, BPH, SENIA, and TIA who presented to [facility] on 12/25/2024 as a transfer from [facility] for acute GIB. Patient initially presented to 12/20 d/t AMS and recurrent falls at home per spouse, with associated weakness. CTH negative for acute CVA but CTA did note some attenuated branches of the MCA on the right compared to the left which could be secondary to high-grade stenosis or areas of occlusion. Neurology did not feel patient was a candidate for TNK or thrombectomy. Labs at that time were notable for elevated  "troponin and BNP, TAYLOR on CKD, as well as acute anemia with a Hgb of 6.1 with melanotic stool in the ER. Patient was transfused 2 units of PRBC, started on Octreotide, and planned for transfer to [facility].     Troponin peaked at 7200, no anticoagulation started d/t concern for acute GIB. He additionally developed elevated HR on 12/23, ST vs atrial flutter, in the setting of his home medications being held that resolved with one dose of IVP Metoprolol.     Cardiology is consulted for \"acute non-STEMI, atrial flutter with RVR\".    No exacerbating or relieving factors.  Patient denies chest pain and angina.  Pt denies shortness of breath, dyspnea on exertion, orthopnea, and paroxysmal nocturnal dyspnea.  Pt denies worsening lower extremity edema.  Pt denies palpitations or syncope.  No recent falls.  No fever or chills.  No cough.  No change in bowel or bladder habits.  No sick contacts.  No recent travel.     12 point review of systems including (Constitutional, Eyes, ENMT, Respiratory, Cardiac, Gastrointestinal, Neurological, Psychiatric, and Hematologic) was performed and is otherwise negative.    Past medical history:  As above.    Medications were reviewed.    Allergies were reviewed.    Social history:    Social History     Tobacco Use    Smoking status: Never    Smokeless tobacco: Never   Vaping Use    Vaping status: Never Used   Substance Use Topics    Alcohol use: Never    Drug use: Never         Family history:    Family History   Problem Relation Name Age of Onset    Diabetes Mother Sofia Garza     Other (mycoardial infarction) Father Hilario 46    Fainting Father Hilario     Heart disease  Hilario     Stomach cancer Mother's Sister      Stroke Maternal Grandmother Jasmina Mace     Colon cancer Maternal Grandmother Jasmina Rodri           General:  Patient is awake, alert, and oriented.  Patient is in no acute distress.  HEENT:  Pupils equal and reactive.  Normocephalic.  Moist mucosa.    Neck:  No " "thyromegaly.  Normal Jugular Venous Pressure.  Cardiovascular:  Regular rate and rhythm.  Normal S1 and S2.  Pulmonary:  Clear to auscultation bilaterally.  Abdomen:  Soft. Non-tender.   Non-distended.  Positive bowel sounds.  Lower Extremities:  2+ pedal pulses. No LE edema.  Neurologic:  Cranial nerves intact.  No focal deficit.   Skin: Skin warm and dry, normal skin turgor.   Psychiatric: Normal affect.    Vital signs, telemetry, medications, labs, and imaging were reviewed as well.        Ritesh Garza is a 74 y.o. male, with a PMH of mvCAD s/p CABGx5 1992 c/b NSTEMI 2/2024 on DAPT, HFrEF d/t ICM, HTN, HLD, VT s/p ICD placement 5/2024, CKD III, seizures, PAD c/b severe left subclavian stenosis w/ compromised flow to the left POP s/p left subclavian stent 4/2024 , asthma, DM, gout, SIDHU cirrhosis, portal HTN, depression/anxiety, GERD, BPH, SENIA, and TIA who presented to [facility]note 3[facility] on 12/25/2024 as a transfer from [facility] for acute GIB. Patient initially presented to 12/20 d/t AMS and recurrent falls at home per spouse, with associated weakness. CTH negative for acute CVA but CTA did note some attenuated branches of the MCA on the right compared to the left which could be secondary to high-grade stenosis or areas of occlusion. Neurology did not feel patient was a candidate for TNK or thrombectomy. Labs at that time were notable for elevated troponin and BNP, TAYLOR on CKD, as well as acute anemia with a Hgb of 6.1 with melanotic stool in the ER. Patient was transfused 2 units of PRBC, started on Octreotide, and planned for transfer to [facility]. Troponin peaked at 7200, no anticoagulation started d/t concern for acute GIB. He additionally developed elevated HR on 12/23, ST vs atrial flutter, in the setting of his home medications being held that resolved with one dose of IVP Metoprolol. Cardiology is consulted for \"acute non-STEMI, atrial flutter with RVR\".    Home CV Medications: Amlodipine 10 " mg daily, ASA 81 mg daily, Atorvastatin 80 mg daily, Plavix 75 mg daily, Farxiga 10 mg daily, Furosemide 40 mg daily as needed, Vascepa 1 g twice daily, Metoprolol succinate 50 mg daily, Entresto 1/2 tablet twice daily, no Spironolactone d/t painful gynecomastia  -Follows with Dr. Hennessy as outpatient, last seen 10/2024    #NonMI Troponin Elevation- Acute non-traumatic myocardial injury in the setting of acute blood loss, known CAD, and HF  -Core measures do not apply; no clinical evidence of overt ischemia, patient denies chest pain  -Troponin level peaked at 7200, no indication to continue to trend  #CAD- s/p CABGx5 with occluded grafts per [facility] 2/2024, planning on DAPT with ASA and Plavix for at least one year  #Chronic Systolic HF- Not overtly hypervolemic on exam  -BNP 3417 [chronically elevated], CXR without pulmonary edema  -Diuresing with IV Lasix 40mg daily, home diuretic Lasix 40mg PRN  -GDMT: Metoprolol Succinate 50mg daily, Entresto 24-26mg 0.5 tablets BID, and Farxiga 10mg daily  #TAYLOR on CKD III- Etiology likely prerenal d/t acute anemia  #Sinus Tachycardia- No evidence of pAF or atrial flutter, appears to be ST per ECG, likely in the setting of acute anemia and held BB  -HR 70s per tele since admission  -No indicated for anticoagulation at this time  #HTN- BP acceptable in current circumstances, c/w home medications   #HLD- on statin therapy and Vascepa      RECOMMENDATIONS:  -c/w ASA and Plavix as tolerated  -Hold Farxiga and Entresto in the setting of TAYLOR  -Continue all other home CV medications as above  -Diuresis with IV Lasix PRN, does not need daily dosing with current volume status  -Continuous telemetry monitoring  -Monitor electrolytes, replete for K <4 and Mg <2  -Daily wts, strict I&Os, low sodium diet      No Cardiac Barriers to proceeding with endoscopic evaluation for Source of GIB   We will obtain a transthoracic echocardiogram for structural evaluation including ejection fraction,  assessment of regional wall motion abnormalities or valvular disease, and further evaluation of hemodynamics.  Judicious use of fluids recommended at this time         Mahendra Santacruz DO   Division of Cardiovascular Medicine  [facility]

## 2024-12-25 NOTE — CONSULTS
"Inpatient consult to Cardiology  Consult performed by: Nyla Tabor, APRN-CNP  Consult ordered by: Benitez Long DO  Reason for consult: acute non-STEMI, atrial flutter with RVR        HPI:  Ritesh Garza is a 74 y.o. male, with a PMH of mvCAD s/p CABGx5 1992 c/b NSTEMI 2/2024 on DAPT, HFrEF d/t ICM, HTN, HLD, VT s/p ICD placement 5/2024, CKD III, seizures, PAD c/b severe left subclavian stenosis w/ compromised flow to the left POP s/p left subclavian stent 4/2024 , asthma, DM, gout, SIDHU cirrhosis, portal HTN, depression/anxiety, GERD, BPH, SENIA, and TIA who presented to [facility] on 12/25/2024 as a transfer from [facility] for acute GIB. Patient initially presented to 12/20 d/t AMS and recurrent falls at home per spouse, with associated weakness. CTH negative for acute CVA but CTA did note some attenuated branches of the MCA on the right compared to the left which could be secondary to high-grade stenosis or areas of occlusion. Neurology did not feel patient was a candidate for TNK or thrombectomy. Labs at that time were notable for elevated troponin and BNP, TAYLOR on CKD, as well as acute anemia with a Hgb of 6.1 with melanotic stool in the ER. Patient was transfused 2 units of PRBC, started on Octreotide, and planned for transfer to [facility].     Troponin peaked at 7200, no anticoagulation started d/t concern for acute GIB. He additionally developed elevated HR on 12/23, ST vs atrial flutter, in the setting of his home medications being held that resolved with one dose of IVP Metoprolol.     Cardiology is consulted for \"acute non-STEMI, atrial flutter with RVR\".    Home CV Medications:  Amlodipine 10 mg daily  ASA 81 mg daily  Atorvastatin 80 mg daily  Plavix 75 mg daily  Farxiga 10 mg daily  Furosemide 40 mg daily as needed  Vascepa 1 g twice daily  Metoprolol succinate 50 mg daily  Entresto 1/2 tablet twice daily  No Spironolactone d/t painful gynecomastia.   -Follows with  " Gerhard as outpatient, last seen 10/2024    Patient History   Past Medical History:  He has a past medical history of Allergic, Asthma, Coronary artery disease (Long Ago), Diabetes mellitus (Multi) (Over 20 years ago), Drug-induced gout, unspecified site (10/04/2019), Encounter for screening for malignant neoplasm of colon (10/31/2022), Gout, unspecified, Heart disease (Over 20 years sgo), Heart murmur (Long ago), Hypertension (Over 20 years ago), Myocardial infarction (Multi) (Over 20 yesrs ago), Ocular pain, right eye (10/14/2018), Periorbital cellulitis (10/14/2018), Personal history of colonic polyps (10/11/2017), Personal history of other diseases of the circulatory system (05/12/2021), Personal history of other diseases of the circulatory system (05/12/2021), Personal history of other diseases of the respiratory system (10/04/2019), Personal history of other drug therapy, Type 2 diabetes mellitus, and Unspecified cirrhosis of liver (Multi) (11/07/2022).  Past Surgical History:  He has a past surgical history that includes Coronary artery bypass graft (12/02/2021); MR angio head wo IV contrast (06/02/2021); MR angio neck wo IV contrast (06/02/2021); MR angio head wo IV contrast (11/16/2021); MR angio neck wo IV contrast (11/16/2021); Adenoidectomy (Childhood); Cardiac catheterization (Over 30 years ago); Cholecystectomy (Over 30 years ago); Circumcision, primary (72,years ago); Eye surgery (Childhood); Cardiac catheterization (N/A, 04/15/2024); Invasive Vascular Procedure (Left, 04/30/2024); Coronary stent placement (Last week); Cardiac electrophysiology procedure (N/A, 05/30/2024); and Cardiac defibrillator placement.  Social History:  He reports that he has never smoked. He has never used smokeless tobacco. He reports that he does not drink alcohol and does not use drugs.  Family History: family history includes Colon cancer in his maternal grandmother; Diabetes in his mother; Fainting in his father; Heart  disease in his father; Stomach cancer in his mother's sister; Stroke in his maternal grandmother; mycoardial infarction (age of onset: 46) in his father.     Allergies: Patient has no known allergies.    ROS: 10-point review of systems was performed and is otherwise negative except as noted in HPI.     Outpatient Medications:  Current Outpatient Medications   Medication Instructions    albuterol (ProAir HFA) 90 mcg/actuation inhaler 2 puffs, inhalation, Every 6 hours PRN    allopurinol (ZYLOPRIM) 100 mg, oral, Daily    allopurinol (ZYLOPRIM) 50 mg, oral, Daily    amLODIPine (NORVASC) 10 mg, oral, Daily    aspirin 81 mg, oral, Daily    atorvastatin (LIPITOR) 80 mg, oral, Daily    b complex vitamins capsule 1 capsule, Daily    clopidogrel (PLAVIX) 75 mg, oral, Daily    coenzyme Q-10 (CoQ-10) 100 mg capsule 1 capsule, Daily    cyanocobalamin (Vitamin B-12) 1,000 mcg tablet 1 tablet, Daily    dapagliflozin (Farxiga) 10 mg 1 tablet, Daily before evening meal    famotidine (PEPCID) 20 mg, oral, Daily PRN    furosemide (LASIX) 40 mg, oral, Daily, As needed for weight gain greater than 3 pounds    gabapentin (NEURONTIN) 100 mg, oral, Nightly    icosapent ethyL (VASCEPA) 1 g, oral, 2 times daily (morning and late afternoon)    loratadine (CLARITIN) 10 mg, 2 times daily    magnesium gluconate (Magonate) 27.5 mg magne- sium (500 mg) tablet 1 tablet, Daily    metoprolol succinate XL (TOPROL-XL) 50 mg, oral, Daily    multivitamin tablet 1 tablet, Daily    sacubitriL-valsartan (Entresto) 24-26 mg tablet 0.5 tablets, oral, 2 times daily    sacubitriL-valsartan (Entresto) 24-26 mg tablet 1 tablet, oral, 2 times daily    sertraline (Zoloft) 50 mg tablet TAKE ONE TABLET DAILY IN THE MORNING AFTER BREAKFAST.    sildenafil (VIAGRA) 100 mg, oral, As needed    tadalafil (CIALIS) 20 mg, oral, As needed    tadalafil (CIALIS) 5 mg, oral, Daily    tadalafil (CIALIS) 5 mg, oral, Daily       Cardiovascular Testing:  EKG:   ECG 12 Lead  12/24/24      ECG 12 Lead 12/23/24      Encounter Date: 12/20/24   ECG 12 Lead   Result Value    Ventricular Rate 136    Atrial Rate 136    KY Interval 98    QRS Duration 106    QT Interval 342    QTC Calculation(Bazett) 515    P Axis 68    R Axis -19    T Axis 122    QRS Count 22    Q Onset 254    T Offset 425    QTC Fredericia 449    Narrative    Sinus tachycardia  Ventricular premature complex  Anterior infarct, old  ST depression, probably rate related  Prolonged QT interval     Device Check 12/17/24    Echo:  Transthoracic Echocardiogram 7/2024   1. Left ventricular ejection fraction is moderately decreased, by visual estimate at 30-35%.   2. Spectral Doppler shows a pseudonormal pattern of left ventricular diastolic filling.   3. Left ventricular cavity size is moderately dilated.   4. No left ventricular thrombus visualized.   5. Technically difficult exam, though there is moderate LV systolic dysfunction with the apical function being best but hypokinesis of the basal and mid segments. There is no LV apical thrombus seen on the echocontrast images.   6. There is reduced right ventricular systolic function.   7. The left atrium is moderately dilated.   8. Moderate mitral valve regurgitation.   9. Mildly elevated RVSP.  10. Moderate tricuspid regurgitation visualized.  11. Compared with the prior exam from 5/10/2023, today's exam is more technically difficult making assessment of LV systolic function more difficult. Proir study demonstrated clear wall motiom abnormality in the basal septal, basal inferior and basal and mid inferolateral segments. The LV systolic function appears to be worse today ( hyaving declined from mild dysfunciton to moderate dysfunction. In addition, the degree of MR appears to have increased from mild to moderate.    Transthoracic Echocardiogram 4/26/24   1. Left ventricular systolic function is moderately decreased with a 30-35% estimated ejection fraction.   2. Multiple segmental  abnormalities exist. See findings.   3. Spectral Doppler shows a restrictive pattern of left ventricular diastolic filling.   4. There is an elevated left ventricular end diastolic pressure.   5. Moderate mitral valve regurgitation.   6. Moderately decreased mitral valve posterior leaflet mobility.   7. Mild to moderate tricuspid regurgitation.   8. Mildly elevated RVSP.    Transthoracic Echocardiogram 2/2024   1. Left ventricular systolic function is normal with a 30-35% estimated ejection fraction.   2. Multiple segmental abnormalities exist. See findings.   3. Spectral Doppler shows a restrictive pattern of left ventricular diastolic filling.   4. There is low normal right ventricular systolic function.   5. Mild to moderate tricuspid regurgitation.   6. There are multiple wall motion abnormalities.    Transthoracic Echocardiogram 5/2023  1. Left ventricular systolic function is mildly decreased with a 40-45% estimated ejection fraction.  2. The left ventricular cavity size is mildly dilated.  3. Basal and mid inferolateral wall, basal inferoseptal segment, and basal inferior segment are abnormal.  4. Spectral Doppler shows a pseudonormal pattern of left ventricular diastolic filling.  5. The left atrium is mildly dilated.  6. RVSP within normal limits.    Transthoracic Echocardiogram 11/2021   1. The left ventricular systolic function is moderately decreased with a 35% estimated ejection fraction.   2. Multiple segmental abnormalities exist. See findings.   3. Spectral Doppler shows an impaired relaxation pattern of left ventricular diastolic filling.    Transthoracic Echocardiogram 6/2021   1. The left ventricular systolic function is moderately decreased with a 40% estimated ejection fraction.   2. Multiple segmental abnormalities exist. See findings.   3. Spectral Doppler shows a pseudonormal pattern of left ventricular diastolic filling.   4. Mild to moderate mitral valve regurgitation.    Cath:  Left/Right  Heart Catheterization 2024   1. Severe native three vessel CAD.   2. Patent LIMA-LAD/Diagonal vessel. All other grafts are occluded.   3. Severe left subclavian artery stenosis with a gradient of ~28-30 mmHg across the stenosis.   4. Mildly elevated filling pressure with PCWP of 21 mmHg.   5. Mild pulmonary hypertension with mPAP of 29 mmHg (Post capillary).   6. Normal cardiac output and index of 4.1 L/min and 2.5 L/min/m2 respectively.   7. Successful RCFA closure with 6Fr. VIP Angioseal.   8. Left Ventricular end-diastolic pressure = 23 mmHg.    Diagnostic Results   Labs  CBC- 2024:  6:10 AM  10.4 8.8 225    28.1      BMP- 2024:  6:10 AM  140 39 111 92   4.1 2.02 21    Estimated Creatinine Clearance: 25 mL/min (A) (by C-G formula based on SCr of 2.02 mg/dL (H)).     CA: 8.2 PROTIEN: 5.2 ALT: 16 Total Bili: 0.5 M.03   PHOS: 3.2 ALBUMIN: 3.0 AST: 29   Alk Phos: 61      COAGS- 2024: 10:36 AM  1.2   13.5 28     CV Labs  Troponin I, High Sensitivity   Date/Time Value Ref Range Status   2024 11:51 PM 1,450 (HH) 0 - 20 ng/L Final   2024 01:13 AM 3,784 (HH) 0 - 20 ng/L Final   2024 04:34 AM 7,200 (HH) 0 - 20 ng/L Final   2024 11:14 PM 6,583 (HH) 0 - 20 ng/L Final   2024 02:34 PM 2,668 (HH) 0 - 20 ng/L Final   2024 11:35 AM 1,566 (HH) 0 - 20 ng/L Final     BNP   Date/Time Value Ref Range Status   2024 11:51 PM 3,417 (H) 0 - 99 pg/mL Final   2024 10:36 AM 1,270 (H) 0 - 99 pg/mL Final   2024 01:31 PM 1,712 (H) 0 - 99 pg/mL Final     Hemoglobin A1C   Date/Time Value Ref Range Status   2024 01:36 PM 6.6 (H) see below % Final   2023 11:10 AM 7.0 (A) % Final   2023 12:15 PM 6.8 (A) % Final     LDL Calculated   Date/Time Value Ref Range Status   2024 11:28 AM 53 <=99 mg/dL Final   2023 10:41 AM 36 <=99 mg/dL Final     Pertinent Imaging  XR chest 1 view 2024  1. No acute cardiopulmonary process.  2. Stable  cardiomegaly. Central pulmonary vascular prominence which is improved from 07/06/2024.        CT brain attack angio head and neck W and WO IV contrast 12/20/2024  The examination is suboptimal secondary to timing of the scan relative to the contrast bolus.    CTA neck:  There is coarse atherosclerotic calcification along the carotid arteries bilaterally with mild-to-moderate narrowing.    No significant stenosis at the carotid bifurcation bilaterally. There is calcified and noncalcified plaque at the left carotid bifurcation.    There is a beaded appearance of the distal cervical internal carotid artery on the right which may be secondary to fibromuscular  dysplasia. There is also mild irregularity of the V3 segment of the left vertebral artery.    There is a stent at the origin of the left subclavian artery which grossly appears patent, however not well evaluated on the current  exam.    There appears to be narrowing at the origin of the left common carotid artery, not well evaluated on the current exam.    CTA head:  There appears to be attenuated branches of the MCA on the right compared to the left which may be secondary to high-grade stenosis or areas of occlusion, not well evaluated on the current exam secondary to technique.    There are coarse atherosclerotic calcifications along the intradural segments of the vertebral arteries with a least moderate narrowing.    Otherwise, no evidence for significant stenosis or large branch vessel cutoffs of the intracranial vessels.    Consider repeat examination as clinically warranted.       Last Recorded Vitals:  Vitals:    12/25/24 0055 12/25/24 0100 12/25/24 0436   BP: 116/57 123/65 116/60   BP Location: Right arm Right arm    Patient Position: Lying Lying    Pulse: 77  72   Resp: 18 18 17   Temp: 36.5 °C (97.7 °F) 36.4 °C (97.5 °F) 36.5 °C (97.7 °F)   TempSrc: Temporal Temporal Temporal   SpO2: 100% 100% 97%   Weight: 62.5 kg (137 lb 12.8 oz)     Height: 1.6 m (5'  "3\")       Temp:  [36.4 °C (97.5 °F)-36.9 °C (98.4 °F)] 36.5 °C (97.7 °F)  Heart Rate:  [72-78] 72  Resp:  [16-20] 17  BP: (106-147)/() 116/60     Last I/O:  No intake/output data recorded.    Physical Exam:   Vitals and nursing notes reviewed.  /60   Pulse 72   Temp 36.5 °C (97.7 °F) (Temporal)   Resp 17   Ht 1.6 m (5' 3\")   Wt 62.5 kg (137 lb 12.8 oz)   SpO2 97%   BMI 24.41 kg/m²   GENERAL: Alert and awake, cooperative; in no acute distress; elderly male, ill appearing  SKIN: Warm and dry, cap refill <2  HEENT: Normocephalic, PEERL, mucous membranes pink and moist  NECK: No JVD or hepatojugular reflex  CARDIAC: Regular rate and rhythm, S1S2, no murmurs or abnormal heart sounds  CHEST: Normal respiratory effort, no abnormal breath sounds  ABDOMEN: Soft, non-distended, non-tender with palpation  EXTREMITIES: No lower extremity edema, normal pulses all 4 extremities  NEURO: Alert and oriented, mental status at baseline, no focal deficits  PSYCH: Behavior and affect as expected     Tele: NSR HR 70s  Code Status: Full Code    Assessment/Plan   Ritesh Garza is a 74 y.o. male, with a PMH of mvCAD s/p CABGx5 1992 c/b NSTEMI 2/2024 on DAPT, HFrEF d/t ICM, HTN, HLD, VT s/p ICD placement 5/2024, CKD III, seizures, PAD c/b severe left subclavian stenosis w/ compromised flow to the left POP s/p left subclavian stent 4/2024 , asthma, DM, gout, SIDHU cirrhosis, portal HTN, depression/anxiety, GERD, BPH, SENIA, and TIA who presented to [facility] on 12/25/2024 as a transfer from [facility] for acute GIB. Patient initially presented to 12/20 d/t AMS and recurrent falls at home per spouse, with associated weakness. CTH negative for acute CVA but CTA did note some attenuated branches of the MCA on the right compared to the left which could be secondary to high-grade stenosis or areas of occlusion. Neurology did not feel patient was a candidate for TNK or thrombectomy. Labs at that time were " "notable for elevated troponin and BNP, TAYLOR on CKD, as well as acute anemia with a Hgb of 6.1 with melanotic stool in the ER. Patient was transfused 2 units of PRBC, started on Octreotide, and planned for transfer to [facility]. Troponin peaked at 7200, no anticoagulation started d/t concern for acute GIB. He additionally developed elevated HR on 12/23, ST vs atrial flutter, in the setting of his home medications being held that resolved with one dose of IVP Metoprolol. Cardiology is consulted for \"acute non-STEMI, atrial flutter with RVR\".    Home CV Medications: Amlodipine 10 mg daily, ASA 81 mg daily, Atorvastatin 80 mg daily, Plavix 75 mg daily, Farxiga 10 mg daily, Furosemide 40 mg daily as needed, Vascepa 1 g twice daily, Metoprolol succinate 50 mg daily, Entresto 1/2 tablet twice daily, no Spironolactone d/t painful gynecomastia  -Follows with Dr. Hennessy as outpatient, last seen 10/2024    #NonMI Troponin Elevation- Acute non-traumatic myocardial injury in the setting of acute blood loss, known CAD, and HF  -Core measures do not apply; no clinical evidence of overt ischemia, patient denies chest pain  -Troponin level peaked at 7200, no indication to continue to trend  #CAD- s/p CABGx5 with occluded grafts per [facility] 2/2024, planning on DAPT with ASA and Plavix for at least one year  #Chronic Systolic HF- Not overtly hypervolemic on exam  -BNP 3417 [chronically elevated], CXR without pulmonary edema  -Diuresing with IV Lasix 40mg daily, home diuretic Lasix 40mg PRN  -GDMT: Metoprolol Succinate 50mg daily, Entresto 24-26mg 0.5 tablets BID, and Farxiga 10mg daily  #TAYLOR on CKD III- Etiology likely prerenal d/t acute anemia  #Sinus Tachycardia- No evidence of pAF or atrial flutter, appears to be ST per ECG, likely in the setting of acute anemia and held BB  -HR 70s per tele since admission  -No indicated for anticoagulation at this time  #HTN- BP acceptable in current circumstances, c/w home medications   #HLD- on statin " therapy and Vascepa      RECOMMENDATIONS:  -c/w ASA and Plavix as tolerated  -Hold Farxiga and Entresto in the setting of TAYLOR  -Continue all other home CV medications as above  -Diuresis with IV Lasix PRN, does not need daily dosing with current volume status  -Continuous telemetry monitoring  -Monitor electrolytes, replete for K <4 and Mg <2  -Daily wts, strict I&Os, low sodium diet        NORAH Gonzalez-CNP  Advanced Practice Provider   Cardiology  [facility]  12/25/24 8:09 AM         ============================================  Attending Note  ============================================  Both the ELZBIETA and I have had a face to face encounter with the patient today. I have examined the patient and edited the documented physical examination as necessary.  I personally reviewed the patient's recent labs, medications, orders, EKGs, and pertinent cardiac imaging.  I have reviewed the ELZBIETA's encounter note, approve the ELZBIETA's documentation and have edited the note to reflect the diagnostic and therapeutic plan.    Ritesh Garza is a 74 y.o. male, with a PMH of mvCAD s/p CABGx5 1992 c/b NSTEMI 2/2024 on DAPT, HFrEF d/t ICM, HTN, HLD, VT s/p ICD placement 5/2024, CKD III, seizures, PAD c/b severe left subclavian stenosis w/ compromised flow to the left POP s/p left subclavian stent 4/2024 , asthma, DM, gout, SIDHU cirrhosis, portal HTN, depression/anxiety, GERD, BPH, SENIA, and TIA who presented to [facility] on 12/25/2024 as a transfer from [facility] for acute GIB. Patient initially presented to 12/20 d/t AMS and recurrent falls at home per spouse, with associated weakness. CTH negative for acute CVA but CTA did note some attenuated branches of the MCA on the right compared to the left which could be secondary to high-grade stenosis or areas of occlusion. Neurology did not feel patient was a candidate for TNK or thrombectomy. Labs at that time were notable for elevated  "troponin and BNP, TAYLOR on CKD, as well as acute anemia with a Hgb of 6.1 with melanotic stool in the ER. Patient was transfused 2 units of PRBC, started on Octreotide, and planned for transfer to [facility].     Troponin peaked at 7200, no anticoagulation started d/t concern for acute GIB. He additionally developed elevated HR on 12/23, ST vs atrial flutter, in the setting of his home medications being held that resolved with one dose of IVP Metoprolol.     Cardiology is consulted for \"acute non-STEMI, atrial flutter with RVR\".    No exacerbating or relieving factors.  Patient denies chest pain and angina.  Pt denies shortness of breath, dyspnea on exertion, orthopnea, and paroxysmal nocturnal dyspnea.  Pt denies worsening lower extremity edema.  Pt denies palpitations or syncope.  No recent falls.  No fever or chills.  No cough.  No change in bowel or bladder habits.  No sick contacts.  No recent travel.     12 point review of systems including (Constitutional, Eyes, ENMT, Respiratory, Cardiac, Gastrointestinal, Neurological, Psychiatric, and Hematologic) was performed and is otherwise negative.    Past medical history:  As above.    Medications were reviewed.    Allergies were reviewed.    Social history:    Social History     Tobacco Use    Smoking status: Never    Smokeless tobacco: Never   Vaping Use    Vaping status: Never Used   Substance Use Topics    Alcohol use: Never    Drug use: Never         Family history:    Family History   Problem Relation Name Age of Onset    Diabetes Mother Sofia Garza     Other (mycoardial infarction) Father Hilario 46    Fainting Father Hilario     Heart disease  Hilario     Stomach cancer Mother's Sister      Stroke Maternal Grandmother Jasmina Mace     Colon cancer Maternal Grandmother Jasmina Rodri           General:  Patient is awake, alert, and oriented.  Patient is in no acute distress.  HEENT:  Pupils equal and reactive.  Normocephalic.  Moist mucosa.    Neck:  No " "thyromegaly.  Normal Jugular Venous Pressure.  Cardiovascular:  Regular rate and rhythm.  Normal S1 and S2.  Pulmonary:  Clear to auscultation bilaterally.  Abdomen:  Soft. Non-tender.   Non-distended.  Positive bowel sounds.  Lower Extremities:  2+ pedal pulses. No LE edema.  Neurologic:  Cranial nerves intact.  No focal deficit.   Skin: Skin warm and dry, normal skin turgor.   Psychiatric: Normal affect.    Vital signs, telemetry, medications, labs, and imaging were reviewed as well.        Ritesh Garza is a 74 y.o. male, with a PMH of mvCAD s/p CABGx5 1992 c/b NSTEMI 2/2024 on DAPT, HFrEF d/t ICM, HTN, HLD, VT s/p ICD placement 5/2024, CKD III, seizures, PAD c/b severe left subclavian stenosis w/ compromised flow to the left POP s/p left subclavian stent 4/2024 , asthma, DM, gout, SIDHU cirrhosis, portal HTN, depression/anxiety, GERD, BPH, SENIA, and TIA who presented to [facility] on 12/25/2024 as a transfer from [facility] for acute GIB. Patient initially presented to 12/20 d/t AMS and recurrent falls at home per spouse, with associated weakness. CTH negative for acute CVA but CTA did note some attenuated branches of the MCA on the right compared to the left which could be secondary to high-grade stenosis or areas of occlusion. Neurology did not feel patient was a candidate for TNK or thrombectomy. Labs at that time were notable for elevated troponin and BNP, TAYLOR on CKD, as well as acute anemia with a Hgb of 6.1 with melanotic stool in the ER. Patient was transfused 2 units of PRBC, started on Octreotide, and planned for transfer to [facility]. Troponin peaked at 7200, no anticoagulation started d/t concern for acute GIB. He additionally developed elevated HR on 12/23, ST vs atrial flutter, in the setting of his home medications being held that resolved with one dose of IVP Metoprolol. Cardiology is consulted for \"acute non-STEMI, atrial flutter with RVR\".    Home CV Medications: Amlodipine 10 " mg daily, ASA 81 mg daily, Atorvastatin 80 mg daily, Plavix 75 mg daily, Farxiga 10 mg daily, Furosemide 40 mg daily as needed, Vascepa 1 g twice daily, Metoprolol succinate 50 mg daily, Entresto 1/2 tablet twice daily, no Spironolactone d/t painful gynecomastia  -Follows with Dr. Hennessy as outpatient, last seen 10/2024    #NonMI Troponin Elevation- Acute non-traumatic myocardial injury in the setting of acute blood loss, known CAD, and HF  -Core measures do not apply; no clinical evidence of overt ischemia, patient denies chest pain  -Troponin level peaked at 7200, no indication to continue to trend  #CAD- s/p CABGx5 with occluded grafts per [facility] 2/2024, planning on DAPT with ASA and Plavix for at least one year  #Chronic Systolic HF- Not overtly hypervolemic on exam  -BNP 3417 [chronically elevated], CXR without pulmonary edema  -Diuresing with IV Lasix 40mg daily, home diuretic Lasix 40mg PRN  -GDMT: Metoprolol Succinate 50mg daily, Entresto 24-26mg 0.5 tablets BID, and Farxiga 10mg daily  #TAYLOR on CKD III- Etiology likely prerenal d/t acute anemia  #Sinus Tachycardia- No evidence of pAF or atrial flutter, appears to be ST per ECG, likely in the setting of acute anemia and held BB  -HR 70s per tele since admission  -No indicated for anticoagulation at this time  #HTN- BP acceptable in current circumstances, c/w home medications   #HLD- on statin therapy and Vascepa      RECOMMENDATIONS:  -c/w ASA and Plavix as tolerated  -Hold Farxiga and Entresto in the setting of TAYLOR  -Continue all other home CV medications as above  -Diuresis with IV Lasix PRN, does not need daily dosing with current volume status  -Continuous telemetry monitoring  -Monitor electrolytes, replete for K <4 and Mg <2  -Daily wts, strict I&Os, low sodium diet      No Cardiac Barriers to proceeding with endoscopic evaluation for Source of GIB   We will obtain a transthoracic echocardiogram for structural evaluation including ejection fraction,  assessment of regional wall motion abnormalities or valvular disease, and further evaluation of hemodynamics.  Judicious use of fluids recommended at this time         Mahendra Santacruz DO   Division of Cardiovascular Medicine  [facility] [place]

## 2024-12-25 NOTE — CONSULTS
"Inpatient consult to gastroenterology  Consult performed by: Oscar Suárez MD  Consult ordered by: Benitez Long DO  Reason for consult: Acute on chronic anemia, suspected melena  Assessment/Recommendations: See assessment and plan.          Reason For Consult      History Of Present Illness  Ritesh Garaz is a 74 y.o. male with multiple medical comorbidities including history of CAD, PAD, subclavian artery stenosis, systolic CHF, hypertension, asthma, SENIA, MASH cirrhosis, GERD, CKD 3, TIA, dementia, type 2 diabetes mellitus, SP cholecystectomy, BPH, gout, cervical stenosis/neuropathy, allergic rhinitis, depression, anxiety transferred from [facility] to [facility] [place] due to altered mental status, frequent falls.  Patient is confused, alert oriented x 2-unable to obtain history from patient.  Not sure about melena.  Further questioning he told me -\" someone mentioned, my stool was dark\"  Labs shows acute anemia with hemoglobin of 6.1.  Also found NSTEMI, evaluated by cardiology-on aspirin and Plavix.  In ER he had flutter with RVR responded to beta-blocker.  Lab: BUN/creatinine 39/2.02, hemoglobin: 8.8<--- 9<--8.8<--- 8.3<--- 8.7<--<--<--6.1, troponin 1450<--3784<---7200, INR 1.2  Platelet 225, MCV 93,  CT scan abdomen pelvis on 6/30/2024: Feature of liver cirrhosis, normal spleen, SP cholecystectomy.  He is following with hepatologist-Dr. Nate Duran, last seen on 11/21/2024.  EGD, colonoscopy on 3/10/2023, EGD-LA grade B reflux esophagitis, irregular Z-line at 40, moderate gastritis, mild duodenitis, mild portal hypertensive gastropathy, no esophageal varices.  Gastric biopsy-unremarkable colonoscopy-several small polyps, sigmoid diverticulosis, hemorrhoids, path report on colon polyp-tubular adenoma  Past Medical History  He has a past medical history of Allergic, Asthma, Coronary artery disease (Long Ago), Diabetes mellitus (Multi) (Over 20 years ago), Drug-induced gout, unspecified site " (10/04/2019), Encounter for screening for malignant neoplasm of colon (10/31/2022), Gout, unspecified, Heart disease (Over 20 years sgo), Heart murmur (Long ago), Hypertension (Over 20 years ago), Myocardial infarction (Multi) (Over 20 yesrs ago), Ocular pain, right eye (10/14/2018), Periorbital cellulitis (10/14/2018), Personal history of colonic polyps (10/11/2017), Personal history of other diseases of the circulatory system (05/12/2021), Personal history of other diseases of the circulatory system (05/12/2021), Personal history of other diseases of the respiratory system (10/04/2019), Personal history of other drug therapy, Type 2 diabetes mellitus, and Unspecified cirrhosis of liver (Multi) (11/07/2022).    Surgical History  He has a past surgical history that includes Coronary artery bypass graft (12/02/2021); MR angio head wo IV contrast (06/02/2021); MR angio neck wo IV contrast (06/02/2021); MR angio head wo IV contrast (11/16/2021); MR angio neck wo IV contrast (11/16/2021); Adenoidectomy (Childhood); Cardiac catheterization (Over 30 years ago); Cholecystectomy (Over 30 years ago); Circumcision, primary (72,years ago); Eye surgery (Childhood); Cardiac catheterization (N/A, 04/15/2024); Invasive Vascular Procedure (Left, 04/30/2024); Coronary stent placement (Last week); Cardiac electrophysiology procedure (N/A, 05/30/2024); and Cardiac defibrillator placement.     Social History  He reports that he has never smoked. He has never used smokeless tobacco. He reports that he does not drink alcohol and does not use drugs.    Family History  Family History   Problem Relation Name Age of Onset    Diabetes Mother Sofia Garza     Other (mycoardial infarction) Father Hilario 46    Fainting Father Hilario     Heart disease Father Hilario     Stomach cancer Mother's Sister      Stroke Maternal Grandmother Jasmina Rodri     Colon cancer Maternal Grandmother Jasmina Rodri         Allergies  Patient has no known  "allergies.    Review of Systems     Physical Exam  HENT:      Mouth/Throat:      Mouth: Mucous membranes are dry.   Eyes:      General: No scleral icterus.     Comments: Mild pallor   Cardiovascular:      Rate and Rhythm: Normal rate.   Pulmonary:      Effort: Pulmonary effort is normal. No respiratory distress.   Abdominal:      General: Abdomen is flat. Bowel sounds are normal.      Palpations: Abdomen is soft.   Skin:     Coloration: Skin is not jaundiced.   Neurological:      Mental Status: He is alert.      Comments: Oriented x 2          Last Recorded Vitals  Blood pressure 130/62, pulse 74, temperature 37 °C (98.6 °F), resp. rate 17, height 1.6 m (5' 3\"), weight 62.5 kg (137 lb 12.8 oz), SpO2 95%.    Relevant Results           Assessment/Plan   Acute on chronic anemia.Suspected melena.  History of fall.  No hematoma noted  MASH cirrhosis  Altered mental status  SP cholecystectomy  NSTEMI-on aspirin, Plavix  Portal hypertensive gastropathy-3/10/2023  History of colon polyp-tubular adenoma found with colonoscopy on 3/10/2023.    Monitor CBC.  Continue PPI  Possible EGD tomorrow if cleared by primary team/cardiology  N.p.o. after midnight  Continue to follow          "

## 2024-12-25 NOTE — PROGRESS NOTES
Emergency Medicine Transition of Care Note.    I received Ritesh Garza in signout from Dr. Stout.  Please see the previous ED provider note for all HPI, PE and MDM up to the time of signout at 0100. This is in addition to the primary record.    In brief Ritesh Garza is an 74 y.o. male presenting for   Chief Complaint   Patient presents with    frequemt falls    Altered Mental Status     At the time of signout we were awaiting: Transfer to [facility]    ED Course as of 12/24/24 2235   Mon Dec 23, 2024   2307 Rapid response was activated on the patient due to a change in heart rate.  Patient became acutely tachycardic.  EKG obtained demonstrates sinus tachycardia with a rate of 136, PVC noted, prolonged QTc interval.  No evidence of an acute STEMI.  Additional labs were sent, request for reconference of the transfer center as the patient is currently been boarding in the emergency department here at [place] for 88 hours. [NS]   Tue Dec 24, 2024   0055 Patient was given IV metoprolol followed by his oral home dose.  Repeat EKG showed conversion to an atrial paced complex with a rate of 84, ventricular bigeminy present with an atypical left bundle branch block.  No evidence of an acute STEMI. [NS]   0056 Troponins are continuing to downtrend however BNP is elevating.  Chest x-ray shows stable cardiomegaly without worsening pulmonary edema. [NS]   0122 Updated Dr. House, the hospitalist currently on call at [facility] regarding the patient's condition. Still no bed available [NS]      ED Course User Index  [NS] Francois Simms MD         Diagnoses as of 12/24/24 2235   Stroke-like symptoms   Anemia, unspecified type   Gastrointestinal hemorrhage, unspecified gastrointestinal hemorrhage type   Elevated troponin   Acute on chronic heart failure, unspecified heart failure type   Acute kidney injury (CMS-HCC)       Medical Decision Making  Patient was signed out to me still pending a bed at [facility].  Patient  requiring transfer due to no GI and no neuro coverage at [facility].  During my shift a rapid response was called due to a change in the patient's heart rate.  Patient became tachycardic.  See ED course for details on the EKG.  Pacemaker was interrogated, appears to be due to a likely atrial tachycardia such as flutter versus SVT.  Patient was given a dose of IV Lopressor, his home dose of metoprolol was also ordered for the patient.  Patient's heart rate improved with this.  Troponins are continuing to downtrend, hemoglobin is stable, the patient's electrolytes are otherwise stable as well.  I did update the [facility] Hospitalist regarding the patient's change in condition.  We are still pending of that at this time. Signed out to the oncoming team.    Final diagnoses:   [R29.90] Stroke-like symptoms   [D64.9] Anemia, unspecified type   [K92.2] Gastrointestinal hemorrhage, unspecified gastrointestinal hemorrhage type   [R79.89] Elevated troponin   [I50.9] Acute on chronic heart failure, unspecified heart failure type   [N17.9] Acute kidney injury (CMS-[facility])           Procedure  Critical Care    Performed by: Francois Simms MD  Authorized by: Brice Koenig DO    Critical care provider statement:     Critical care time (minutes):  32    Critical care time was exclusive of:  Separately billable procedures and treating other patients    Critical care was necessary to treat or prevent imminent or life-threatening deterioration of the following conditions:  Cardiac failure    Critical care was time spent personally by me on the following activities:  Development of treatment plan with patient or surrogate, discussions with consultants, evaluation of patient's response to treatment, examination of patient, review of old charts, ordering and performing treatments and interventions and ordering and review of laboratory studies    Care discussed with: accepting provider at another facility        Francois RUTLEDGE  MD Mendez

## 2024-12-25 NOTE — H&P
History Of Present Illness  Ritesh Garza is a 74 y.o. male with a past medical history of CAD, PAD, subclavian artery stenosis, systolic CHF, hypertension, asthma, SENIA, SIDHU cirrhosis, GERD, CKD 3, TIA, dementia, type 2 diabetes mellitus, BPH, gout, cervical stenosis/neuropathy, allergic rhinitis, depression, anxiety who presented to [facility] ER for acute confusional state/altered mental status and recurrent mechanical falls.  Laboratory evaluation showed a glucose of 177 sodium 140 potassium 4.8 BUN 42 creatinine 1.83 troponin was 1450 BNP was 3417 white blood cell count 13.8 hemoglobin was initially very low 6.8 and the patient did not complain of melena he was transfused to a level of 9.0 with hematocrit of 28.9.  In the ER the patient did have atrial flutter with RVR which responded well to metoprolol for heart rate control.  The patient had ataxia with recurrent falls in addition to GI bleeding.  Imaging showed narrowing of the intracranial vessels but he was felt to be not a thrombectomy candidate.  He was accepted in transfer to stepdown unit 4 days ago but due to bed availability he was not transferred until this morning.  He was transferred from [facility] to [facility] for GI consultation and cardiac evaluation as well as neurology     Past Medical History  Past Medical History:   Diagnosis Date    Allergic     Asthma     Coronary artery disease Long Ago    Diabetes mellitus (Multi) Over 20 years ago    Drug-induced gout, unspecified site 10/04/2019    Acute drug-induced gout    Encounter for screening for malignant neoplasm of colon 10/31/2022    Colon cancer screening    Gout, unspecified     Acute gout    Heart disease Over 20 years sgo    Heart murmur Long ago    Hypertension Over 20 years ago    Myocardial infarction (Multi) Over 20 yesrs ago    Ocular pain, right eye 10/14/2018    Pain of right eye    Periorbital cellulitis 10/14/2018    Periorbital cellulitis     Personal history of colonic polyps 10/11/2017    History of colon polyps    Personal history of other diseases of the circulatory system 05/12/2021    History of angina pectoris    Personal history of other diseases of the circulatory system 05/12/2021    History of angina pectoris    Personal history of other diseases of the respiratory system 10/04/2019    History of acute bronchitis    Personal history of other drug therapy     History of influenza vaccination    Type 2 diabetes mellitus     Unspecified cirrhosis of liver (Multi) 11/07/2022    Cirrhosis, nonalcoholic        Surgical History  Past Surgical History:   Procedure Laterality Date    ADENOIDECTOMY  Childhood    CARDIAC CATHETERIZATION  Over 30 years ago    CARDIAC CATHETERIZATION N/A 04/15/2024    Procedure: Left And Right Heart Cath, With LV;  Surgeon: Blaine Adams MD;  Location: [facility];  Service: Cardiovascular;  Laterality: N/A;  3 hr hydration / arrive 6:30    CARDIAC DEFIBRILLATOR PLACEMENT      CARDIAC ELECTROPHYSIOLOGY PROCEDURE N/A 05/30/2024    Procedure: ICD Dual Implant;  Surgeon: Mina Kiran MD;  Location: [facility];  Service: Electrophysiology;  Laterality: N/A;  Dual chamber ICD, [facility]  notified [facility] 5/24/24    CHOLECYSTECTOMY  Over 30 years ago    CIRCUMCISION, PRIMARY  72,years ago    CORONARY ARTERY BYPASS GRAFT  12/02/2021    CABG    CORONARY STENT PLACEMENT  Last week    EYE SURGERY  Childhood    INVASIVE VASCULAR PROCEDURE Left 04/30/2024    Procedure: Angioplasty - Upper Extremity;  Surgeon: Narinder Quevedo MD;  Location: [facility];  Service: Cardiovascular;  Laterality: Left;  3 hr hydration  / arrive 6:30    MR HEAD ANGIO WO IV CONTRAST  06/02/2021    MR HEAD ANGIO WO IV CONTRAST 6/2/2021 [facility] CLINICAL LEGACY    MR HEAD ANGIO WO IV CONTRAST  11/16/2021    MR HEAD ANGIO WO IV CONTRAST 11/16/2021 [facility] CLINICAL LEGACY    MR NECK ANGIO WO IV CONTRAST  06/02/2021    MR NECK  ANGIO WO IV CONTRAST 6/2/2021 [facility] CLINICAL LEGACY    MR NECK ANGIO WO IV CONTRAST  11/16/2021    MR NECK ANGIO WO IV CONTRAST 11/16/2021 [facility] CLINICAL LEGACY         Social History  He reports that he has never smoked. He has never used smokeless tobacco. He reports that he does not drink alcohol and does not use drugs.    Family History  Family History   Problem Relation Name Age of Onset    Diabetes Mother Sofia Garza     Other (mycoardial infarction) Father Hilario 46    Fainting Father Hilario     Heart disease Father Hilario     Stomach cancer Mother's Sister      Stroke Maternal Grandmother Jasmina Mace     Colon cancer Maternal Grandmother Jasmina Mace         Allergies  Patient has no known allergies.    Review of Systems   Constitutional:  Positive for activity change and fatigue.   HENT: Negative.     Eyes: Negative.    Respiratory:  Positive for chest tightness and shortness of breath.    Cardiovascular:  Positive for palpitations.   Gastrointestinal:  Positive for blood in stool.   Endocrine: Negative.    Genitourinary: Negative.    Musculoskeletal: Negative.    Skin: Negative.    Allergic/Immunologic: Negative.    Neurological: Negative.    Hematological: Negative.    Psychiatric/Behavioral:  Positive for agitation, confusion and decreased concentration.    All other systems reviewed and are negative.       Physical Exam  Vitals and nursing note reviewed.   Constitutional:       Appearance: Normal appearance. He is ill-appearing.      Comments: Confused   HENT:      Head: Normocephalic.      Right Ear: External ear normal.      Left Ear: External ear normal.      Nose: Nose normal.      Mouth/Throat:      Mouth: Mucous membranes are dry.      Pharynx: Oropharynx is clear.   Eyes:      Extraocular Movements: Extraocular movements intact.      Conjunctiva/sclera: Conjunctivae normal.      Pupils: Pupils are equal, round, and reactive to light.   Cardiovascular:      Rate and Rhythm: Regular rhythm.  "Tachycardia present.      Heart sounds: Murmur heard.   Pulmonary:      Effort: Pulmonary effort is normal.      Breath sounds: Normal breath sounds.   Abdominal:      General: Abdomen is flat. Bowel sounds are normal.      Palpations: Abdomen is soft.   Musculoskeletal:         General: Normal range of motion.   Skin:     General: Skin is warm and dry.   Neurological:      General: No focal deficit present.      Mental Status: He is alert. Mental status is at baseline.      Comments: Ataxia confused   Psychiatric:         Mood and Affect: Mood normal.         Behavior: Behavior normal.          Last Recorded Vitals  Blood pressure 116/57, pulse 77, temperature 36.5 °C (97.7 °F), temperature source Temporal, resp. rate 18, height 1.6 m (5' 3\"), weight 62.5 kg (137 lb 12.8 oz), SpO2 100%.    Relevant Results  Meds:  Scheduled medications  allopurinol, 50 mg, oral, Daily  amLODIPine, 10 mg, oral, Daily  aspirin, 81 mg, oral, Daily  atorvastatin, 80 mg, oral, Daily  B complex-vitamin C, 1 tablet, oral, Daily  cetirizine, 10 mg, oral, Daily  clopidogrel, 75 mg, oral, Daily  cyanocobalamin, 1,000 mcg, oral, Daily  dapagliflozin propanediol, 10 mg, oral, Daily before evening meal  furosemide, 40 mg, oral, Daily  gabapentin, 100 mg, oral, Nightly  icosapent ethyL, 1 g, oral, BID  insulin lispro, 0-10 Units, subcutaneous, q4h DIVINE  metoprolol succinate XL, 50 mg, oral, Daily  pantoprazole, 40 mg, intravenous, BID  sacubitriL-valsartan, 1 tablet, oral, BID  sertraline, 50 mg, oral, Daily      Continuous medications     PRN medications  PRN medications: acetaminophen **OR** acetaminophen **OR** acetaminophen, albuterol, dextrose, dextrose, glucagon, glucagon, ondansetron **OR** ondansetron   Current Outpatient Medications   Medication Instructions    albuterol (ProAir HFA) 90 mcg/actuation inhaler 2 puffs, inhalation, Every 6 hours PRN    allopurinol (ZYLOPRIM) 100 mg, oral, Daily    allopurinol (ZYLOPRIM) 50 mg, oral, Daily "    amLODIPine (NORVASC) 10 mg, oral, Daily    aspirin 81 mg, oral, Daily    atorvastatin (LIPITOR) 80 mg, oral, Daily    b complex vitamins capsule 1 capsule, Daily    clopidogrel (PLAVIX) 75 mg, oral, Daily    coenzyme Q-10 (CoQ-10) 100 mg capsule 1 capsule, Daily    cyanocobalamin (Vitamin B-12) 1,000 mcg tablet 1 tablet, Daily    dapagliflozin (Farxiga) 10 mg 1 tablet, Daily before evening meal    famotidine (PEPCID) 20 mg, oral, Daily PRN    furosemide (LASIX) 40 mg, oral, Daily, As needed for weight gain greater than 3 pounds    gabapentin (NEURONTIN) 100 mg, oral, Nightly    icosapent ethyL (VASCEPA) 1 g, oral, 2 times daily (morning and late afternoon)    loratadine (CLARITIN) 10 mg, 2 times daily    magnesium gluconate (Magonate) 27.5 mg magne- sium (500 mg) tablet 1 tablet, Daily    metoprolol succinate XL (TOPROL-XL) 50 mg, oral, Daily    multivitamin tablet 1 tablet, Daily    sacubitriL-valsartan (Entresto) 24-26 mg tablet 0.5 tablets, oral, 2 times daily    sacubitriL-valsartan (Entresto) 24-26 mg tablet 1 tablet, oral, 2 times daily    sertraline (Zoloft) 50 mg tablet TAKE ONE TABLET DAILY IN THE MORNING AFTER BREAKFAST.    sildenafil (VIAGRA) 100 mg, oral, As needed    tadalafil (CIALIS) 20 mg, oral, As needed    tadalafil (CIALIS) 5 mg, oral, Daily    tadalafil (CIALIS) 5 mg, oral, Daily        Labs:  Results for orders placed or performed during the hospital encounter of 12/20/24 (from the past 24 hours)   POCT GLUCOSE   Result Value Ref Range    POCT Glucose 273 (H) 74 - 99 mg/dL   Comprehensive Metabolic Panel   Result Value Ref Range    Glucose 177 (H) 74 - 99 mg/dL    Sodium 140 136 - 145 mmol/L    Potassium 4.8 3.5 - 5.3 mmol/L    Chloride 115 (H) 98 - 107 mmol/L    Bicarbonate 19 (L) 21 - 32 mmol/L    Anion Gap 11 10 - 20 mmol/L    Urea Nitrogen 42 (H) 6 - 23 mg/dL    Creatinine 1.83 (H) 0.50 - 1.30 mg/dL    eGFR 38 (L) >60 mL/min/1.73m*2    Calcium 7.2 (L) 8.6 - 10.3 mg/dL    Albumin 3.0 (L)  3.4 - 5.0 g/dL    Alkaline Phosphatase 61 33 - 136 U/L    Total Protein 5.2 (L) 6.4 - 8.2 g/dL    AST 29 9 - 39 U/L    Bilirubin, Total 0.5 0.0 - 1.2 mg/dL    ALT 16 10 - 52 U/L   Magnesium   Result Value Ref Range    Magnesium 2.03 1.60 - 2.40 mg/dL   POCT GLUCOSE   Result Value Ref Range    POCT Glucose 194 (H) 74 - 99 mg/dL   POCT GLUCOSE   Result Value Ref Range    POCT Glucose 283 (H) 74 - 99 mg/dL   POCT GLUCOSE   Result Value Ref Range    POCT Glucose 173 (H) 74 - 99 mg/dL     *Note: Due to a large number of results and/or encounters for the requested time period, some results have not been displayed. A complete set of results can be found in Results Review.      Imaging:  ECG 12 Lead    Result Date: 12/24/2024  Sinus tachycardia Ventricular premature complex Anterior infarct, old ST depression, probably rate related Prolonged QT interval    XR chest 1 view    Result Date: 12/23/2024  Interpreted By:  Fahad Billingsley, STUDY: XR CHEST 1 VIEW;  12/23/2024 11:36 pm   INDICATION: Signs/Symptoms:tachycardia.   COMPARISON: 07/06/2024   ACCESSION NUMBER(S): [id number]   ORDERING CLINICIAN: REINALDO JOSE   FINDINGS: Partially visualized anterior lower cervical spinal fusion hardware. Median sternotomy wires and mediastinal surgical clips. Left pacemaker/AICD. Stable cardiomegaly. Central pulmonary vascular prominence which is improved from 07/06/2024. No pleural effusion or pneumothorax. No focal consolidation. Minimal streaky left basilar opacities, likely subsegmental atelectasis. Right upper quadrant surgical clips.       1. No acute cardiopulmonary process. 2. Stable cardiomegaly. Central pulmonary vascular prominence which is improved from 07/06/2024.   Signed by: Fahad Billingsley 12/23/2024 11:54 PM Dictation workstation:   [id number]      Assessment/Plan   Acute non-STEMI / Known CAD s/p PCI with GINNY on DAPT  Plan:  Telemetry  Cardiology consultation  Aspirin 81 mg orally daily  Plavix 75  mg orally daily  Atorvastatin 80 mg orally daily    Chronic systolic CHF, compensated  Farxiga 10 mg orally daily  Lasix 40 mg IV every 24 hours  Continue Entresto  Cardiology  Telemetry  Echocardiogram    Suspected upper GI bleed with melena and acute on chronic anemia  Protonix 40 mg IV every 12 hours  GI consultation  Holding off on IV heparin or anticoagulation  Will continue aspirin and Plavix for now due to acute non-STEMI    Hyperlipidemia  High intensity atorvastatin 80 mg orally daily    Hypertension  Amlodipine 10 mg orally daily  Metoprolol XL 50 mg orally daily    Paroxysmal atrial flutter  Metoprolol XL 50 mg orally daily  Telemetry  Cardiology    Type 2 diabetes mellitus  Lispro per corrective scale, n.p.o. corrective scale    CKD 3  Trend creatinine especially with diuresis    Ataxia with recurrent falls  High-grade stenosis or areas of occlusion suggested on imaging.  Patient apparently has not a thrombectomy candidate    GERD  Protonix    DVT prophylaxis  No heparin or Lovenox with suspected GI bleeding  SCDs    I spent 60 minutes in the professional and overall care of this patient.      Benitez Long, DO

## 2024-12-26 ENCOUNTER — ANESTHESIA EVENT (OUTPATIENT)
Dept: GASTROENTEROLOGY | Facility: HOSPITAL | Age: 74
End: 2024-12-26
Payer: MEDICARE

## 2024-12-26 ENCOUNTER — APPOINTMENT (OUTPATIENT)
Dept: CARDIOLOGY | Facility: HOSPITAL | Age: 74
End: 2024-12-26
Payer: MEDICARE

## 2024-12-26 ENCOUNTER — APPOINTMENT (OUTPATIENT)
Dept: GASTROENTEROLOGY | Facility: HOSPITAL | Age: 74
End: 2024-12-26
Payer: MEDICARE

## 2024-12-26 ENCOUNTER — APPOINTMENT (OUTPATIENT)
Dept: PRIMARY CARE | Facility: CLINIC | Age: 74
End: 2024-12-26
Payer: MEDICARE

## 2024-12-26 ENCOUNTER — ANESTHESIA (OUTPATIENT)
Dept: GASTROENTEROLOGY | Facility: HOSPITAL | Age: 74
End: 2024-12-26
Payer: MEDICARE

## 2024-12-26 LAB
ANION GAP SERPL CALC-SCNC: 16 MMOL/L (ref 10–20)
BASOPHILS # BLD AUTO: 0.06 X10*3/UL (ref 0–0.1)
BASOPHILS NFR BLD AUTO: 0.5 %
BUN SERPL-MCNC: 35 MG/DL (ref 6–23)
CALCIUM SERPL-MCNC: 7.6 MG/DL (ref 8.6–10.3)
CHLORIDE SERPL-SCNC: 109 MMOL/L (ref 98–107)
CO2 SERPL-SCNC: 20 MMOL/L (ref 21–32)
CREAT SERPL-MCNC: 2.07 MG/DL (ref 0.5–1.3)
EGFRCR SERPLBLD CKD-EPI 2021: 33 ML/MIN/1.73M*2
EJECTION FRACTION APICAL 4 CHAMBER: 32.9
EJECTION FRACTION: 33 %
EOSINOPHIL # BLD AUTO: 0.43 X10*3/UL (ref 0–0.4)
EOSINOPHIL NFR BLD AUTO: 3.5 %
ERYTHROCYTE [DISTWIDTH] IN BLOOD BY AUTOMATED COUNT: 17.9 % (ref 11.5–14.5)
ERYTHROCYTE [DISTWIDTH] IN BLOOD BY AUTOMATED COUNT: 18.4 % (ref 11.5–14.5)
GLUCOSE BLD MANUAL STRIP-MCNC: 166 MG/DL (ref 74–99)
GLUCOSE BLD MANUAL STRIP-MCNC: 189 MG/DL (ref 74–99)
GLUCOSE BLD MANUAL STRIP-MCNC: 199 MG/DL (ref 74–99)
GLUCOSE BLD MANUAL STRIP-MCNC: 206 MG/DL (ref 74–99)
GLUCOSE BLD MANUAL STRIP-MCNC: 278 MG/DL (ref 74–99)
GLUCOSE SERPL-MCNC: 189 MG/DL (ref 74–99)
HCT VFR BLD AUTO: 30 % (ref 41–52)
HCT VFR BLD AUTO: 35.7 % (ref 41–52)
HGB BLD-MCNC: 10.9 G/DL (ref 13.5–17.5)
HGB BLD-MCNC: 9.4 G/DL (ref 13.5–17.5)
IMM GRANULOCYTES # BLD AUTO: 0.23 X10*3/UL (ref 0–0.5)
IMM GRANULOCYTES NFR BLD AUTO: 1.9 % (ref 0–0.9)
LEFT VENTRICLE INTERNAL DIMENSION DIASTOLE: 5.52 CM (ref 3.5–6)
LYMPHOCYTES # BLD AUTO: 1.99 X10*3/UL (ref 0.8–3)
LYMPHOCYTES NFR BLD AUTO: 16.1 %
MCH RBC QN AUTO: 28.6 PG (ref 26–34)
MCH RBC QN AUTO: 29.3 PG (ref 26–34)
MCHC RBC AUTO-ENTMCNC: 30.5 G/DL (ref 32–36)
MCHC RBC AUTO-ENTMCNC: 31.3 G/DL (ref 32–36)
MCV RBC AUTO: 94 FL (ref 80–100)
MCV RBC AUTO: 94 FL (ref 80–100)
MONOCYTES # BLD AUTO: 1.14 X10*3/UL (ref 0.05–0.8)
MONOCYTES NFR BLD AUTO: 9.2 %
NEUTROPHILS # BLD AUTO: 8.53 X10*3/UL (ref 1.6–5.5)
NEUTROPHILS NFR BLD AUTO: 68.8 %
NRBC BLD-RTO: 0 /100 WBCS (ref 0–0)
NRBC BLD-RTO: 0 /100 WBCS (ref 0–0)
PLATELET # BLD AUTO: 257 X10*3/UL (ref 150–450)
PLATELET # BLD AUTO: 372 X10*3/UL (ref 150–450)
POTASSIUM SERPL-SCNC: 4.7 MMOL/L (ref 3.5–5.3)
RBC # BLD AUTO: 3.21 X10*6/UL (ref 4.5–5.9)
RBC # BLD AUTO: 3.81 X10*6/UL (ref 4.5–5.9)
RIGHT VENTRICLE FREE WALL PEAK S': 11 CM/S
RIGHT VENTRICLE PEAK SYSTOLIC PRESSURE: 40 MMHG
SODIUM SERPL-SCNC: 140 MMOL/L (ref 136–145)
TRICUSPID ANNULAR PLANE SYSTOLIC EXCURSION: 1 CM
WBC # BLD AUTO: 12.4 X10*3/UL (ref 4.4–11.3)
WBC # BLD AUTO: 9.8 X10*3/UL (ref 4.4–11.3)

## 2024-12-26 PROCEDURE — 3700000002 HC GENERAL ANESTHESIA TIME - EACH INCREMENTAL 1 MINUTE

## 2024-12-26 PROCEDURE — 43235 EGD DIAGNOSTIC BRUSH WASH: CPT | Performed by: INTERNAL MEDICINE

## 2024-12-26 PROCEDURE — 2500000004 HC RX 250 GENERAL PHARMACY W/ HCPCS (ALT 636 FOR OP/ED): Performed by: INTERNAL MEDICINE

## 2024-12-26 PROCEDURE — 93321 DOPPLER ECHO F-UP/LMTD STD: CPT | Performed by: INTERNAL MEDICINE

## 2024-12-26 PROCEDURE — 2060000001 HC INTERMEDIATE ICU ROOM DAILY

## 2024-12-26 PROCEDURE — A43235 PR ESOPHAGOGASTRODUODENOSCOPY TRANSORAL DIAGNOSTIC: Performed by: ANESTHESIOLOGIST ASSISTANT

## 2024-12-26 PROCEDURE — 2500000002 HC RX 250 W HCPCS SELF ADMINISTERED DRUGS (ALT 637 FOR MEDICARE OP, ALT 636 FOR OP/ED): Performed by: INTERNAL MEDICINE

## 2024-12-26 PROCEDURE — 3700000001 HC GENERAL ANESTHESIA TIME - INITIAL BASE CHARGE

## 2024-12-26 PROCEDURE — 2500000004 HC RX 250 GENERAL PHARMACY W/ HCPCS (ALT 636 FOR OP/ED)

## 2024-12-26 PROCEDURE — 80048 BASIC METABOLIC PNL TOTAL CA: CPT | Performed by: INTERNAL MEDICINE

## 2024-12-26 PROCEDURE — 99233 SBSQ HOSP IP/OBS HIGH 50: CPT | Performed by: INTERNAL MEDICINE

## 2024-12-26 PROCEDURE — A43235 PR ESOPHAGOGASTRODUODENOSCOPY TRANSORAL DIAGNOSTIC: Performed by: ANESTHESIOLOGY

## 2024-12-26 PROCEDURE — 85027 COMPLETE CBC AUTOMATED: CPT | Performed by: INTERNAL MEDICINE

## 2024-12-26 PROCEDURE — 7100000009 HC PHASE TWO TIME - INITIAL BASE CHARGE

## 2024-12-26 PROCEDURE — 7100000010 HC PHASE TWO TIME - EACH INCREMENTAL 1 MINUTE

## 2024-12-26 PROCEDURE — 2500000004 HC RX 250 GENERAL PHARMACY W/ HCPCS (ALT 636 FOR OP/ED): Performed by: ANESTHESIOLOGIST ASSISTANT

## 2024-12-26 PROCEDURE — 0DJ08ZZ INSPECTION OF UPPER INTESTINAL TRACT, VIA NATURAL OR ARTIFICIAL OPENING ENDOSCOPIC: ICD-10-PCS | Performed by: INTERNAL MEDICINE

## 2024-12-26 PROCEDURE — 93308 TTE F-UP OR LMTD: CPT | Performed by: INTERNAL MEDICINE

## 2024-12-26 PROCEDURE — 2500000001 HC RX 250 WO HCPCS SELF ADMINISTERED DRUGS (ALT 637 FOR MEDICARE OP): Performed by: INTERNAL MEDICINE

## 2024-12-26 PROCEDURE — 82947 ASSAY GLUCOSE BLOOD QUANT: CPT

## 2024-12-26 PROCEDURE — 36415 COLL VENOUS BLD VENIPUNCTURE: CPT | Performed by: INTERNAL MEDICINE

## 2024-12-26 PROCEDURE — 94640 AIRWAY INHALATION TREATMENT: CPT

## 2024-12-26 PROCEDURE — 2500000001 HC RX 250 WO HCPCS SELF ADMINISTERED DRUGS (ALT 637 FOR MEDICARE OP): Performed by: PHARMACIST

## 2024-12-26 PROCEDURE — 99100 ANES PT EXTEME AGE<1 YR&>70: CPT | Performed by: ANESTHESIOLOGY

## 2024-12-26 PROCEDURE — 93325 DOPPLER ECHO COLOR FLOW MAPG: CPT

## 2024-12-26 PROCEDURE — 93325 DOPPLER ECHO COLOR FLOW MAPG: CPT | Performed by: INTERNAL MEDICINE

## 2024-12-26 RX ORDER — PHENYLEPHRINE HCL IN 0.9% NACL 1 MG/10 ML
SYRINGE (ML) INTRAVENOUS AS NEEDED
Status: DISCONTINUED | OUTPATIENT
Start: 2024-12-26 | End: 2024-12-26

## 2024-12-26 RX ORDER — PROPOFOL 10 MG/ML
INJECTION, EMULSION INTRAVENOUS AS NEEDED
Status: DISCONTINUED | OUTPATIENT
Start: 2024-12-26 | End: 2024-12-26

## 2024-12-26 RX ADMIN — PANTOPRAZOLE SODIUM 40 MG: 40 INJECTION, POWDER, FOR SOLUTION INTRAVENOUS at 20:31

## 2024-12-26 RX ADMIN — CETIRIZINE HYDROCHLORIDE 10 MG: 10 TABLET, FILM COATED ORAL at 10:36

## 2024-12-26 RX ADMIN — Medication 1 TABLET: at 10:36

## 2024-12-26 RX ADMIN — AMLODIPINE BESYLATE 10 MG: 10 TABLET ORAL at 10:36

## 2024-12-26 RX ADMIN — ALBUTEROL SULFATE 2.5 MG: 2.5 SOLUTION RESPIRATORY (INHALATION) at 14:04

## 2024-12-26 RX ADMIN — ATORVASTATIN CALCIUM 80 MG: 80 TABLET, FILM COATED ORAL at 10:36

## 2024-12-26 RX ADMIN — CLOPIDOGREL 75 MG: 75 TABLET ORAL at 10:36

## 2024-12-26 RX ADMIN — ALBUTEROL SULFATE 2.5 MG: 2.5 SOLUTION RESPIRATORY (INHALATION) at 19:48

## 2024-12-26 RX ADMIN — ALLOPURINOL 50 MG: 100 TABLET ORAL at 10:36

## 2024-12-26 RX ADMIN — ALBUTEROL SULFATE 2.5 MG: 2.5 SOLUTION RESPIRATORY (INHALATION) at 07:57

## 2024-12-26 RX ADMIN — INSULIN LISPRO 6 UNITS: 100 INJECTION, SOLUTION INTRAVENOUS; SUBCUTANEOUS at 20:29

## 2024-12-26 RX ADMIN — GABAPENTIN 100 MG: 100 CAPSULE ORAL at 20:31

## 2024-12-26 RX ADMIN — SERTRALINE HYDROCHLORIDE 50 MG: 50 TABLET ORAL at 10:36

## 2024-12-26 RX ADMIN — PERFLUTREN 10 ML OF DILUTION: 6.52 INJECTION, SUSPENSION INTRAVENOUS at 09:25

## 2024-12-26 RX ADMIN — INSULIN LISPRO 2 UNITS: 100 INJECTION, SOLUTION INTRAVENOUS; SUBCUTANEOUS at 16:59

## 2024-12-26 RX ADMIN — METOPROLOL SUCCINATE 50 MG: 50 TABLET, EXTENDED RELEASE ORAL at 10:36

## 2024-12-26 RX ADMIN — SODIUM CHLORIDE 250 ML: 9 INJECTION, SOLUTION INTRAVENOUS at 02:05

## 2024-12-26 RX ADMIN — ASPIRIN 81 MG: 81 TABLET, COATED ORAL at 10:36

## 2024-12-26 RX ADMIN — PANTOPRAZOLE SODIUM 40 MG: 40 INJECTION, POWDER, FOR SOLUTION INTRAVENOUS at 10:36

## 2024-12-26 RX ADMIN — OLANZAPINE 5 MG: 5 TABLET, ORALLY DISINTEGRATING ORAL at 10:36

## 2024-12-26 RX ADMIN — ICOSAPENT ETHYL 1 G: 1 CAPSULE ORAL at 16:59

## 2024-12-26 RX ADMIN — ICOSAPENT ETHYL 1 G: 1 CAPSULE ORAL at 10:41

## 2024-12-26 RX ADMIN — Medication 1000 MCG: at 10:36

## 2024-12-26 SDOH — HEALTH STABILITY: PHYSICAL HEALTH: ON AVERAGE, HOW MANY DAYS PER WEEK DO YOU ENGAGE IN MODERATE TO STRENUOUS EXERCISE (LIKE A BRISK WALK)?: 5 DAYS

## 2024-12-26 SDOH — ECONOMIC STABILITY: FOOD INSECURITY: HOW HARD IS IT FOR YOU TO PAY FOR THE VERY BASICS LIKE FOOD, HOUSING, MEDICAL CARE, AND HEATING?: NOT HARD AT ALL

## 2024-12-26 SDOH — ECONOMIC STABILITY: TRANSPORTATION INSECURITY: IN THE PAST 12 MONTHS, HAS LACK OF TRANSPORTATION KEPT YOU FROM MEDICAL APPOINTMENTS OR FROM GETTING MEDICATIONS?: NO

## 2024-12-26 SDOH — ECONOMIC STABILITY: HOUSING INSECURITY: AT ANY TIME IN THE PAST 12 MONTHS, WERE YOU HOMELESS OR LIVING IN A SHELTER (INCLUDING NOW)?: NO

## 2024-12-26 SDOH — ECONOMIC STABILITY: HOUSING INSECURITY: IN THE LAST 12 MONTHS, WAS THERE A TIME WHEN YOU WERE NOT ABLE TO PAY THE MORTGAGE OR RENT ON TIME?: NO

## 2024-12-26 SDOH — HEALTH STABILITY: PHYSICAL HEALTH: ON AVERAGE, HOW MANY MINUTES DO YOU ENGAGE IN EXERCISE AT THIS LEVEL?: 30 MIN

## 2024-12-26 SDOH — ECONOMIC STABILITY: HOUSING INSECURITY: IN THE PAST 12 MONTHS, HOW MANY TIMES HAVE YOU MOVED WHERE YOU WERE LIVING?: 0

## 2024-12-26 ASSESSMENT — PAIN SCALES - GENERAL
PAINLEVEL_OUTOF10: 0 - NO PAIN

## 2024-12-26 ASSESSMENT — PAIN - FUNCTIONAL ASSESSMENT
PAIN_FUNCTIONAL_ASSESSMENT: 0-10
PAIN_FUNCTIONAL_ASSESSMENT: 0-10
PAIN_FUNCTIONAL_ASSESSMENT: UNABLE TO SELF-REPORT
PAIN_FUNCTIONAL_ASSESSMENT: 0-10
PAIN_FUNCTIONAL_ASSESSMENT: UNABLE TO SELF-REPORT
PAIN_FUNCTIONAL_ASSESSMENT: 0-10

## 2024-12-26 ASSESSMENT — COGNITIVE AND FUNCTIONAL STATUS - GENERAL
STANDING UP FROM CHAIR USING ARMS: A LITTLE
CLIMB 3 TO 5 STEPS WITH RAILING: A LOT
DRESSING REGULAR LOWER BODY CLOTHING: A LITTLE
MOVING TO AND FROM BED TO CHAIR: A LITTLE
WALKING IN HOSPITAL ROOM: A LITTLE
HELP NEEDED FOR BATHING: A LITTLE
DAILY ACTIVITIY SCORE: 21
TOILETING: A LITTLE
MOBILITY SCORE: 19

## 2024-12-26 ASSESSMENT — ACTIVITIES OF DAILY LIVING (ADL)
LACK_OF_TRANSPORTATION: NO
LACK_OF_TRANSPORTATION: NO

## 2024-12-26 NOTE — CARE PLAN
The patient's goals for the shift include      The clinical goals for the shift include Patient will remain hemodynamically stable throughout shift.      Problem: Pain - Adult  Goal: Verbalizes/displays adequate comfort level or baseline comfort level  Outcome: Progressing     Problem: Safety - Adult  Goal: Free from fall injury  Outcome: Progressing     Problem: Discharge Planning  Goal: Discharge to home or other facility with appropriate resources  Outcome: Progressing     Problem: Chronic Conditions and Co-morbidities  Goal: Patient's chronic conditions and co-morbidity symptoms are monitored and maintained or improved  Outcome: Progressing

## 2024-12-26 NOTE — ANESTHESIA POSTPROCEDURE EVALUATION
Patient: Ritesh Garza    Procedure Summary       Date: 12/26/24 Room / Location: [facility]    Anesthesia Start: 1306 Anesthesia Stop: 1328    Procedure: EGD Diagnosis:       Upper gastrointestinal bleeding      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 (Multi)      Portal hypertension (Multi)    Scheduled Providers: Oscar Suárez MD; Chris Sosa MD; NIKOLAI Baugh Responsible Provider: Chris Sosa MD    Anesthesia Type: MAC ASA Status: 4            Anesthesia Type: MAC    Vitals Value Taken Time   /70 12/26/24 1351   Temp 36.2 °C (97.2 °F) 12/26/24 1321   Pulse 115 12/26/24 1354   Resp 15 12/26/24 1351   SpO2 98 % 12/26/24 1354   Vitals shown include unfiled device data.    Anesthesia Post Evaluation    Patient location during evaluation: PACU  Patient participation: complete - patient participated  Level of consciousness: awake and alert  Pain management: adequate  Airway patency: patent  Cardiovascular status: acceptable and hemodynamically stable  Respiratory status: acceptable, spontaneous ventilation and nonlabored ventilation  Hydration status: acceptable  Postoperative Nausea and Vomiting: none        There were no known notable events for this encounter.

## 2024-12-26 NOTE — ANESTHESIA PREPROCEDURE EVALUATION
Patient: Ritesh Garza    Procedure Information       Date/Time: 12/26/24 1110    Scheduled providers: Oscar Suárez MD; Chris Sosa MD; NIKOLAI Baugh    Procedure: EGD    Location: [facility]            Relevant Problems   Cardiac   (+) Atherosclerosis of coronary artery bypass graft   (+) Benign essential hypertension   (+) CAD (coronary artery disease)   (+) Chest pain   (+) Congestive heart failure with right ventricular systolic dysfunction   (+) History of coronary artery bypass graft   (+) Hyperlipidemia   (+) Myocardial infarction (Multi)   (+) Non-STEMI (non-ST elevated myocardial infarction) (Multi)   (+) Peripheral arterial disease (CMS-HCC)   (+) Stenosis of subclavian artery      Pulmonary   (+) Asthma      Neuro   (+) Bilateral carpal tunnel syndrome   (+) Transient ischemic attack      GI   (+) Erosive esophagitis   (+) Secondary esophageal varices with bleeding (Multi)   (+) Upper gastrointestinal bleeding      /Renal   (+) Benign prostatic hyperplasia   (+) Enlarged prostate with lower urinary tract symptoms (LUTS)      Liver   (+) 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 (Multi)   (+) Nonalcoholic fatty liver      Endocrine   (+) Obesity   (+) Type 2 diabetes mellitus      Hematology   (+) Acute on chronic blood loss anemia      Musculoskeletal   (+) Bilateral carpal tunnel syndrome   (+) Cervical stenosis of spine       Clinical information reviewed:   Tobacco  Allergies  Meds  Problems  Med Hx  Surg Hx   Fam Hx  Soc   Hx         Past Medical History:   Diagnosis Date    ADHD (attention deficit hyperactivity disorder) 1960’s    AICD (automatic cardioverter/defibrillator) present 05/30/2024    [facility]    Asthma     Benign prostatic hyperplasia Several years ago    CHF (congestive heart failure)     Cirrhosis (Multi)     CKD (chronic kidney disease)     Coronary artery disease Long Ago    Diabetes mellitus (Multi) Over 20 years ago    Erectile dysfunction     GERD  (gastroesophageal reflux disease)     Gout     Hypertension Over 20 years ago    Ischemic cardiomyopathy     Kidney stone Several years ago    Myocardial infarction (Multi) Over 20 yesrs ago    SIDHU (nonalcoholic steatohepatitis)     SENIA (obstructive sleep apnea)     Stenosis of left subclavian artery     s/p stent 4/30/2024    Ventricular tachycardia (Multi)       Past Surgical History:   Procedure Laterality Date    ADENOIDECTOMY  Childhood    CARDIAC CATHETERIZATION  Over 30 years ago    CARDIAC CATHETERIZATION N/A 04/15/2024    Procedure: Left And Right Heart Cath, With LV;  Surgeon: Blaine Adams MD;  Location: [facility];  Service: Cardiovascular;  Laterality: N/A;  3 hr hydration / arrive 6:30    CARDIAC DEFIBRILLATOR PLACEMENT  05/30/2024    [facility]    CARDIAC ELECTROPHYSIOLOGY PROCEDURE N/A 05/30/2024    Procedure: ICD Dual Implant;  Surgeon: Mina Kiran MD;  Location: [facility];  Service: Electrophysiology;  Laterality: N/A;  Dual chamber ICD, [facility]  notified [facility] 5/24/24    CERVICAL FUSION  07/03/2024    C4-7    CHOLECYSTECTOMY  Over 30 years ago    CIRCUMCISION, PRIMARY  74,years ago    CORONARY ARTERY BYPASS GRAFT  1992    EYE SURGERY  Childhood    INVASIVE VASCULAR PROCEDURE Left 04/30/2024    Procedure: Angioplasty - Upper Extremity;  Surgeon: Narinder Quevedo MD;  Location: [facility];  Service: Cardiovascular;  Laterality: Left;  3 hr hydration  / arrive 6:30    MR HEAD ANGIO WO IV CONTRAST  06/02/2021    MR HEAD ANGIO WO IV CONTRAST 6/2/2021 [facility] CL[facility]ICAL LEGAC[facility]    MR HEAD ANGIO WO IV CONTRAST  11/16/2021    MR HEAD ANGIO WO IV CONTRAST 11/16/2021 [facility] CLINICAL LEGACY    MR NECK ANGIO WO IV CONTRAST  06/02/2021    MR NECK ANGIO WO IV CONTRAST 6/2/2021 [facility] CLINICAL LEGACY    MR NECK ANGIO WO IV CONTRAST  11/16/2021    MR NECK ANGIO WO IV CONTRAST 11/16/2021 [facility] CLINICAL LEGAC[facility]    TONSILLECTOMY  1950’s     Social History     Tobacco Use    Smoking  status: Never     Passive exposure: Never    Smokeless tobacco: Never   Vaping Use    Vaping status: Never Used   Substance Use Topics    Alcohol use: Never    Drug use: Never      Current Outpatient Medications   Medication Instructions    albuterol (ProAir HFA) 90 mcg/actuation inhaler 2 puffs, inhalation, Every 6 hours PRN    allopurinol (ZYLOPRIM) 100 mg, oral, Daily    allopurinol (ZYLOPRIM) 50 mg, oral, Daily    amLODIPine (NORVASC) 10 mg, oral, Daily    aspirin 81 mg, oral, Daily    atorvastatin (LIPITOR) 80 mg, oral, Daily    b complex vitamins capsule 1 capsule, Daily    clopidogrel (PLAVIX) 75 mg, oral, Daily    coenzyme Q-10 (CoQ-10) 100 mg capsule 1 capsule, Daily    cyanocobalamin (Vitamin B-12) 1,000 mcg tablet 1 tablet, Daily    dapagliflozin (Farxiga) 10 mg 1 tablet, Daily before evening meal    famotidine (PEPCID) 20 mg, oral, Daily PRN    furosemide (LASIX) 40 mg, oral, Daily, As needed for weight gain greater than 3 pounds    gabapentin (NEURONTIN) 100 mg, oral, Nightly    icosapent ethyL (VASCEPA) 1 g, oral, 2 times daily (morning and late afternoon)    loratadine (CLARITIN) 10 mg, 2 times daily    magnesium gluconate (Magonate) 27.5 mg magne- sium (500 mg) tablet 1 tablet, Daily    metoprolol succinate XL (TOPROL-XL) 50 mg, oral, Daily    multivitamin tablet 1 tablet, Daily    sacubitriL-valsartan (Entresto) 24-26 mg tablet 0.5 tablets, oral, 2 times daily    sacubitriL-valsartan (Entresto) 24-26 mg tablet 1 tablet, oral, 2 times daily    sertraline (Zoloft) 50 mg tablet TAKE ONE TABLET DAILY IN THE MORNING AFTER BREAKFAST.    sildenafil (VIAGRA) 100 mg, oral, As needed    tadalafil (CIALIS) 20 mg, oral, As needed    tadalafil (CIALIS) 5 mg, oral, Daily    tadalafil (CIALIS) 5 mg, oral, Daily      No Known Allergies     Chemistry    Lab Results   Component Value Date/Time     12/26/2024 0432    K 4.7 12/26/2024 0432     (H) 12/26/2024 0432    CO2 20 (L) 12/26/2024 0432    BUN 35 (H)  "12/26/2024 0432    CREATININE 2.07 (H) 12/26/2024 0432    Lab Results   Component Value Date/Time    CALCIUM 7.6 (L) 12/26/2024 0432    ALKPHOS 61 12/24/2024 0458    AST 29 12/24/2024 0458    ALT 16 12/24/2024 0458    BILITOT 0.5 12/24/2024 0458          Lab Results   Component Value Date    HGBA1C 6.6 (H) 01/11/2024     Lab Results   Component Value Date/Time    WBC 9.8 12/26/2024 0432    HGB 9.4 (L) 12/26/2024 0432    HCT 30.0 (L) 12/26/2024 0432     12/26/2024 0432     Lab Results   Component Value Date/Time    PROTIME 13.5 (H) 12/20/2024 1036    INR 1.2 (H) 12/20/2024 1036     No results found for: \"ABORH\"  Encounter Date: 12/25/24   Electrocardiogram, 12-lead PRN ACS symptoms   Result Value    Ventricular Rate 122    Atrial Rate 68    QRS Duration 144    QT Interval 410    QTC Calculation(Bazett) 584    R Axis -57    T Axis 83    QRS Count 20    Q Onset 212    T Offset 417    QTC Fredericia 519    Narrative    Wide QRS tachycardia  Left axis deviation  Left bundle branch block  Abnormal ECG  When compared with ECG of 24-DEC-2024 00:49,  PREVIOUS ECG IS PRESENT     No results found for this or any previous visit from the past 1095 days.  Echo 7/2/2024:   Left Ventricle: Left ventricular ejection fraction is moderately decreased, by visual estimate at 30-35%. Wall motion is abnormal. The left ventricular cavity size is moderately dilated. Spectral Doppler shows a pseudonormal pattern of left ventricular diastolic filling. There is no definite left ventricular thrombus visualized. Technically difficult exam, though there is moderate LV systolic dysfunction with the apical function being best but hypokinesis of the basal and mid segments. There is no LV apical thrombus seen on the echocontrast images.  Left Atrium: The left atrium is moderately dilated.  Right Ventricle: The right ventricle was not well visualized. There is reduced right ventricular systolic function. A device is visualized in the right " ventricle.  Right Atrium: The right atrium was not well visualized. There is a device visualized in the right atrium.  Aortic Valve: The aortic valve is trileaflet. There is mild aortic valve cusp calcification. There is no evidence of aortic valve regurgitation. The peak instantaneous gradient of the aortic valve is 5.9 mmHg.  Mitral Valve: The mitral valve is mildly thickened. There is mild mitral annular calcification. There is moderate mitral valve regurgitation.  Tricuspid Valve: The tricuspid valve was not well visualized. There is moderate tricuspid regurgitation. The Doppler estimated RVSP is mildly elevated at 41.9 mmHg.  Pulmonic Valve: The pulmonic valve is not well visualized. Pulmonic valve regurgitation was not assessed.  Pericardium: There is a trivial pericardial effusion.  Aorta: The aortic root is normal.  Systemic Veins: The inferior vena cava appears mildly dilated. There is IVC inspiratory collapse greater than 50%.  In comparison to the previous echocardiogram(s): Compared with the prior exam from 5/10/2023, today's exam is more technically difficult making assessment of LV systolic function more difficult. Proir study demonstrated clear wall motiom abnormality in the basal septal, basal inferior and basal and mid inferolateral segments. The LV systolic function appears to be worse today ( hyaving declined from mild dysfunciton to moderate dysfunction. In addition, the degree of MR appears to have increased from mild to moderate.        CONCLUSIONS:   1. Left ventricular ejection fraction is moderately decreased, by visual estimate at 30-35%.   2. Spectral Doppler shows a pseudonormal pattern of left ventricular diastolic filling.   3. Left ventricular cavity size is moderately dilated.   4. No left ventricular thrombus visualized.   5. Technically difficult exam, though there is moderate LV systolic dysfunction with the apical function being best but hypokinesis of the basal and mid segments.  There is no LV apical thrombus seen on the echocontrast images.   6. There is reduced right ventricular systolic function.   7. The left atrium is moderately dilated.   8. Moderate mitral valve regurgitation.   9. Mildly elevated RVSP.  10. Moderate tricuspid regurgitation visualized.  11. Compared with the prior exam from 5/10/2023, today's exam is more technically difficult making assessment of LV systolic function more difficult. Proir study demonstrated clear wall motiom abnormality in the basal septal, basal inferior and basal and mid inferolateral segments. The LV systolic function appears to be worse today ( hyaving declined from mild dysfunciton to moderate dysfunction. In addition, the degree of MR appears to have increased from mild to moderate.    Cath 4/15/2024:  Left Main Coronary Artery:  The left main coronary artery is a normal caliber vessel. The left main arises normally from the left coronary sinus of Valsalva. The mid to distal left main coronary artery showed 80% stenosis.     Left Anterior Descending Coronary Artery Distribution:  The left anterior descending coronary artery is a normal caliber vessel. The LAD demonstrated chronic occlusion originating at the proximal segment.     Circumflex Coronary Artery Distribution:  The circumflex arises normally from the left main coronary artery. The circumflex revealed chronic occlusion originating at the proximal segment.     Right Heart Catheterization:  A balloon tipped catheter was advanced through the right heart to record pressures. Cardiac output was calculated via the Cinda method. Elevated left sided filling pressures with normal cardiac output. Elevated ventricular filling pressure. Cardiac output is normal. Pulmonary venous hypertension.     Right Coronary Artery Distribution:     The RCA arises normally from the right sinus of Valsalva. The RCA showed chronic occlusion originating at the proximal segment.  Coronary Lesion Summary:  Vessel     "  Stenosis   Vessel Segment  Left Main 80% stenosis mid to distal     Graft Stenosis Summary:  Graft                Destination of Graft  LIMA  proximal left anterior descending and 1st diagonal  SVG 1 distal right coronary artery  SVG 2 1st obtuse marginal  CONCLUSIONS:   1. Severe native three vessel CAD.   2. Patent LIMA-LAD/Diagonal vessel. All other grafts are occluded.   3. Severe left subclavian artery stenosis with a gradient of ~28-30 mmHg across the stenosis.   4. Mildly elevated filling pressure with PCWP of 21 mmHg.   5. Mild pulmonary hypertension with mPAP of 29 mmHg (Post capillary).   6. Normal cardiac output and index of 4.1 L/min and 2.5 L/min/m2 respectively.   7. Successful RCFA closure with 6Fr. VIP Angioseal.   8. Left Ventricular end-diastolic pressure = 23 mmHg.    Visit Vitals  /69   Pulse (!) 118   Temp 36.3 °C (97.3 °F) (Temporal)   Resp 13   Ht 1.6 m (5' 3\")   Wt 62.5 kg (137 lb 12.6 oz)   SpO2 98%   BMI 24.41 kg/m²   Smoking Status Never   BSA 1.67 m²     NPO/Void Status  Carbohydrate Drink Given Prior to Surgery? : N  Date of Last Liquid: 12/26/24  Time of Last Liquid: 0000  Date of Last Solid: 12/26/24  Time of Last Solid: 0000  Last Intake Type: Clear fluids (water)  Time of Last Void: 1200        Physical Exam    Airway  Mallampati: unable to assess     Cardiovascular    Dental    Pulmonary    Abdominal      Other findings: Very somnolent          Anesthesia Plan    History of general anesthesia?: yes  History of complications of general anesthesia?: no    ASA 4     MAC   (Standard ASA monitoring.)  intravenous induction   Anesthetic plan and risks discussed with patient.    Plan discussed with CRNA and CAA.        "
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
## 2024-12-26 NOTE — PROGRESS NOTES
"Ritesh Garza is a 74 y.o. male on day 1 of admission presenting with Non-STEMI (non-ST elevated myocardial infarction) (Multi).    Subjective   No acute events overnight. Pleasantly confused. No further bleeding noted.        Objective     VITALS  Blood pressure 122/71, pulse 65, temperature 36.4 °C (97.5 °F), temperature source Temporal, resp. rate 17, height 1.6 m (5' 3\"), weight 62.5 kg (137 lb 12.8 oz), SpO2 94%.  Physical Exam  Vitals and nursing note reviewed.   Constitutional:       General: He is not in acute distress.     Appearance: Normal appearance. He is not ill-appearing.   HENT:      Head: Normocephalic and atraumatic.      Mouth/Throat:      Mouth: Mucous membranes are moist.      Pharynx: Oropharynx is clear.   Eyes:      Extraocular Movements: Extraocular movements intact.      Conjunctiva/sclera: Conjunctivae normal.   Cardiovascular:      Rate and Rhythm: Normal rate and regular rhythm.      Heart sounds: Normal heart sounds. No murmur heard.     No friction rub. No gallop.   Pulmonary:      Effort: Pulmonary effort is normal.      Breath sounds: Normal breath sounds. No wheezing or rales.   Abdominal:      General: Bowel sounds are normal. There is no distension.      Palpations: Abdomen is soft.      Tenderness: There is no abdominal tenderness. There is no guarding.   Musculoskeletal:         General: No swelling or tenderness.      Right lower leg: No edema.      Left lower leg: No edema.   Skin:     General: Skin is warm and dry.      Capillary Refill: Capillary refill takes less than 2 seconds.   Neurological:      General: No focal deficit present.      Mental Status: He is alert. He is disoriented.   Psychiatric:         Mood and Affect: Mood normal.           Intake/Output last 3 Shifts:  No intake/output data recorded.    Relevant Results  Results for orders placed or performed during the hospital encounter of 12/25/24 (from the past 24 hours)   POCT GLUCOSE   Result Value Ref Range "    POCT Glucose 190 (H) 74 - 99 mg/dL   POCT GLUCOSE   Result Value Ref Range    POCT Glucose 213 (H) 74 - 99 mg/dL   POCT GLUCOSE   Result Value Ref Range    POCT Glucose 130 (H) 74 - 99 mg/dL   Electrocardiogram, 12-lead PRN ACS symptoms   Result Value Ref Range    Ventricular Rate 122 BPM    Atrial Rate 68 BPM    QRS Duration 144 ms    QT Interval 410 ms    QTC Calculation(Bazett) 584 ms    R Axis -57 degrees    T Axis 83 degrees    QRS Count 20 beats    Q Onset 212 ms    T Offset 417 ms    QTC Fredericia 519 ms   Basic Metabolic Panel   Result Value Ref Range    Glucose 185 (H) 74 - 99 mg/dL    Sodium 137 136 - 145 mmol/L    Potassium 4.5 3.5 - 5.3 mmol/L    Chloride 105 98 - 107 mmol/L    Bicarbonate 22 21 - 32 mmol/L    Anion Gap 15 10 - 20 mmol/L    Urea Nitrogen 36 (H) 6 - 23 mg/dL    Creatinine 2.05 (H) 0.50 - 1.30 mg/dL    eGFR 33 (L) >60 mL/min/1.73m*2    Calcium 8.2 (L) 8.6 - 10.3 mg/dL   Magnesium   Result Value Ref Range    Magnesium 2.07 1.60 - 2.40 mg/dL   CBC and Auto Differential   Result Value Ref Range    WBC 12.4 (H) 4.4 - 11.3 x10*3/uL    nRBC 0.0 0.0 - 0.0 /100 WBCs    RBC 3.81 (L) 4.50 - 5.90 x10*6/uL    Hemoglobin 10.9 (L) 13.5 - 17.5 g/dL    Hematocrit 35.7 (L) 41.0 - 52.0 %    MCV 94 80 - 100 fL    MCH 28.6 26.0 - 34.0 pg    MCHC 30.5 (L) 32.0 - 36.0 g/dL    RDW 18.4 (H) 11.5 - 14.5 %    Platelets 372 150 - 450 x10*3/uL    Neutrophils % 68.8 40.0 - 80.0 %    Immature Granulocytes %, Automated 1.9 (H) 0.0 - 0.9 %    Lymphocytes % 16.1 13.0 - 44.0 %    Monocytes % 9.2 2.0 - 10.0 %    Eosinophils % 3.5 0.0 - 6.0 %    Basophils % 0.5 0.0 - 2.0 %    Neutrophils Absolute 8.53 (H) 1.60 - 5.50 x10*3/uL    Immature Granulocytes Absolute, Automated 0.23 0.00 - 0.50 x10*3/uL    Lymphocytes Absolute 1.99 0.80 - 3.00 x10*3/uL    Monocytes Absolute 1.14 (H) 0.05 - 0.80 x10*3/uL    Eosinophils Absolute 0.43 (H) 0.00 - 0.40 x10*3/uL    Basophils Absolute 0.06 0.00 - 0.10 x10*3/uL   POCT GLUCOSE   Result  Value Ref Range    POCT Glucose 216 (H) 74 - 99 mg/dL   POCT GLUCOSE   Result Value Ref Range    POCT Glucose 206 (H) 74 - 99 mg/dL   CBC   Result Value Ref Range    WBC 9.8 4.4 - 11.3 x10*3/uL    nRBC 0.0 0.0 - 0.0 /100 WBCs    RBC 3.21 (L) 4.50 - 5.90 x10*6/uL    Hemoglobin 9.4 (L) 13.5 - 17.5 g/dL    Hematocrit 30.0 (L) 41.0 - 52.0 %    MCV 94 80 - 100 fL    MCH 29.3 26.0 - 34.0 pg    MCHC 31.3 (L) 32.0 - 36.0 g/dL    RDW 17.9 (H) 11.5 - 14.5 %    Platelets 257 150 - 450 x10*3/uL   Basic Metabolic Panel   Result Value Ref Range    Glucose 189 (H) 74 - 99 mg/dL    Sodium 140 136 - 145 mmol/L    Potassium 4.7 3.5 - 5.3 mmol/L    Chloride 109 (H) 98 - 107 mmol/L    Bicarbonate 20 (L) 21 - 32 mmol/L    Anion Gap 16 10 - 20 mmol/L    Urea Nitrogen 35 (H) 6 - 23 mg/dL    Creatinine 2.07 (H) 0.50 - 1.30 mg/dL    eGFR 33 (L) >60 mL/min/1.73m*2    Calcium 7.6 (L) 8.6 - 10.3 mg/dL   POCT GLUCOSE   Result Value Ref Range    POCT Glucose 166 (H) 74 - 99 mg/dL   Transthoracic Echo (TTE) Limited   Result Value Ref Range    BSA 1.67 m2     *Note: Due to a large number of results and/or encounters for the requested time period, some results have not been displayed. A complete set of results can be found in Results Review.       Imaging Results  Transthoracic Echo (TTE) Limited             Medications:  albuterol, 2.5 mg, nebulization, TID  allopurinol, 50 mg, oral, Daily  amLODIPine, 10 mg, oral, Daily  aspirin, 81 mg, oral, Daily  atorvastatin, 80 mg, oral, Daily  B complex-vitamin C, 1 tablet, oral, Daily  cetirizine, 10 mg, oral, Daily  clopidogrel, 75 mg, oral, Daily  cyanocobalamin, 1,000 mcg, oral, Daily  [Held by provider] dapagliflozin propanediol, 10 mg, oral, Daily before evening meal  gabapentin, 100 mg, oral, Nightly  icosapent ethyL, 1 g, oral, BID  insulin lispro, 0-10 Units, subcutaneous, q4h DIVINE  metoprolol succinate XL, 50 mg, oral, Daily  OLANZapine, 5 mg, intramuscular, Once  pantoprazole, 40 mg, intravenous,  BID  [Held by provider] sacubitriL-valsartan, 0.5 tablet, oral, BID  sertraline, 50 mg, oral, Daily       PRN medications: acetaminophen **OR** acetaminophen **OR** acetaminophen, albuterol, dextrose, dextrose, glucagon, glucagon, metoprolol, ondansetron **OR** ondansetron        Assessment/Plan          Assessment & Plan  Non-STEMI (non-ST elevated myocardial infarction) (Multi)    Acute non-STEMI / Known CAD s/p PCI with GINNY on DAPT  -Telemetry  -Cardiology consultation  -Aspirin 81 mg orally daily  -Plavix 75 mg orally daily  -Atorvastatin 80 mg orally daily     Chronic systolic CHF, compensated  -Farxiga 10 mg orally daily  -Lasix 40 mg IV every 24 hours  -Continue Entresto  -Cardiology  -Telemetry  -Echocardiogram     Suspected upper GI bleed with melena and acute on chronic anemia  -Protonix 40 mg IV every 12 hours  -GI consultation. For EGD today   -Holding off on IV heparin or anticoagulation  -Will continue aspirin and Plavix for now due to acute non-STEMI     Hyperlipidemia  -High intensity atorvastatin 80 mg orally daily     Hypertension  -Amlodipine 10 mg orally daily  -Metoprolol XL 50 mg orally daily     Paroxysmal atrial flutter  -Metoprolol XL 50 mg orally daily  -Telemetry  -Cardiology     Type 2 diabetes mellitus  -Lispro per corrective scale, n.p.o. corrective scale     CKD 3  -Trend creatinine especially with diuresis     Ataxia with recurrent falls  -High-grade stenosis or areas of occlusion suggested on imaging.  -Patient apparently has not a thrombectomy candidate     GERD  -Protonix       DVT Prophylaxis:  NA In the setting of concern for GI bleed  SCDs     Disposition:  EGD today     Rafita Peters [facility]

## 2024-12-26 NOTE — PROGRESS NOTES
Subjective Data:  Patient seen and examined, chart reviewed   -- overnight  noted to have sustained tachycardia, remained asymptromatic   -- planned for EGD / Colonoscopy   --     Overnight Events:      Objective Data:  Last Recorded Vitals:  Vitals:    12/26/24 0037 12/26/24 0332 12/26/24 0757 12/26/24 0802   BP: 115/70 109/73  122/71   BP Location:  Right arm  Right arm   Patient Position:  Lying  Lying   Pulse:       Resp:  18  17   Temp:  36.5 °C (97.7 °F)  36.4 °C (97.5 °F)   TempSrc:  Temporal  Temporal   SpO2:  96% 96% 94%   Weight:       Height:           Last Labs:  Results from last 7 days   Lab Units 12/26/24  0432 12/25/24  2314 12/25/24  0610   WBC AUTO x10*3/uL 9.8 12.4* 10.4   HEMOGLOBIN g/dL 9.4* 10.9* 8.8*   HEMATOCRIT % 30.0* 35.7* 28.1*   PLATELETS AUTO x10*3/uL 257 372 225     Results from last 7 days   Lab Units 12/26/24  0432 12/25/24  2314 12/25/24  0610 12/24/24  0458 12/23/24  2351 12/23/24  0723   SODIUM mmol/L 140 137 140 140 140 141   POTASSIUM mmol/L 4.7 4.5 4.1 4.8 4.4 4.6   CHLORIDE mmol/L 109* 105 111* 115* 111* 112*   CO2 mmol/L 20* 22 21 19* 20* 20*   BUN mg/dL 35* 36* 39* 42* 44* 52*   CREATININE mg/dL 2.07* 2.05* 2.02* 1.83* 1.79* 1.96*   CALCIUM mg/dL 7.6* 8.2* 8.2* 7.2* 8.5* 8.0*   PROTEIN TOTAL g/dL  --   --   --  5.2* 6.6 6.2*   BILIRUBIN TOTAL mg/dL  --   --   --  0.5 0.6 0.7   ALK PHOS U/L  --   --   --  61 82 75   ALT U/L  --   --   --  16 18 17   AST U/L  --   --   --  29 17 21   GLUCOSE mg/dL 189* 185* 92 177* 225* 289*       TROPHS   Date/Time Value Ref Range Status   12/23/2024 11:51 PM 1,450 0 - 20 ng/L Final   12/22/2024 01:13 AM 3,784 0 - 20 ng/L Final   12/21/2024 04:34 AM 7,200 0 - 20 ng/L Final     Comment:     Previous result verified on 12/20/2024 1110 on specimen/case [id number] called with component [facility] for procedure Troponin I, High Sensitivity, Initial with value 1,097 ng/L.     BNP   Date/Time Value Ref Range Status   12/23/2024 11:51 PM 3,417 0 - 99  pg/mL Final   12/20/2024 10:36 AM 1,270 0 - 99 pg/mL Final     HGBA1C   Date/Time Value Ref Range Status   01/11/2024 01:36 PM 6.6 see below % Final   07/31/2023 11:10 AM 7.0 % Final     Comment:          Diagnosis of Diabetes-Adults   Non-Diabetic: < or = 5.6%   Increased risk for developing diabetes: 5.7-6.4%   Diagnostic of diabetes: > or = 6.5%  .       Monitoring of Diabetes                Age (y)     Therapeutic Goal (%)   Adults:          >18           <7.0   Pediatrics:    13-18           <7.5                   7-12           <8.0                   0- 6            7.5-8.5   American Diabetes Association. Diabetes Care 33(S1), Jan 2010.     05/04/2023 12:15 PM 6.8 % Final     Comment:          Diagnosis of Diabetes-Adults   Non-Diabetic: < or = 5.6%   Increased risk for developing diabetes: 5.7-6.4%   Diagnostic of diabetes: > or = 6.5%  .       Monitoring of Diabetes                Age (y)     Therapeutic Goal (%)   Adults:          >18           <7.0   Pediatrics:    13-18           <7.5                   7-12           <8.0                   0- 6            7.5-8.5   American Diabetes Association. Diabetes Care 33(S1), Jan 2010.       LDLCALC   Date/Time Value Ref Range Status   05/06/2024 11:28 AM 53 <=99 mg/dL Final     Comment:                                 Near   Borderline      AGE      Desirable  Optimal    High     High     Very High     0-19 Y     0 - 109     ---    110-129   >/= 130     ----    20-24 Y     0 - 119     ---    120-159   >/= 160     ----      >24 Y     0 -  99   100-129  130-159   160-189     >/=190     11/13/2023 10:41 AM 36 <=99 mg/dL Final     Comment:                                 Near   Borderline      AGE      Desirable  Optimal    High     High     Very High     0-19 Y     0 - 109     ---    110-129   >/= 130     ----    20-24 Y     0 - 119     ---    120-159   >/= 160     ----      >24 Y     0 -  99   100-129  130-159   160-189     >/=190       VLDL   Date/Time Value Ref  Range Status   05/06/2024 11:28 AM 30 0 - 40 mg/dL Final   11/13/2023 10:41 AM 41 0 - 40 mg/dL Final   07/31/2023 11:10 AM 38 0 - 40 mg/dL Final   05/04/2023 12:15 PM 50 0 - 40 mg/dL Final   05/05/2022 02:50 PM 72 0 - 40 mg/dL Final      Last I/O:  No intake/output data recorded.    Past Cardiology Tests (Last 3 Years):  EKG:  Electrocardiogram, 12-lead PRN ACS symptoms 12/25/2024 (Preliminary)      ECG 12 Lead 12/23/2024 (Preliminary)      ECG 12 lead 12/20/2024            ECG 12 lead (Clinic Performed) 05/13/2024        Echo:  Transthoracic Echo (TTE) Limited 07/02/2024   1. Left ventricular ejection fraction is moderately decreased, by visual estimate at 30-35%.   2. Spectral Doppler shows a pseudonormal pattern of left ventricular diastolic filling.   3. Left ventricular cavity size is moderately dilated.   4. No left ventricular thrombus visualized.   5. Technically difficult exam, though there is moderate LV systolic dysfunction with the apical function being best but hypokinesis of the basal and mid segments. There is no LV apical thrombus seen on the echocontrast images.   6. There is reduced right ventricular systolic function.   7. The left atrium is moderately dilated.   8. Moderate mitral valve regurgitation.   9. Mildly elevated RVSP.  10. Moderate tricuspid regurgitation visualized.  11. Compared with the prior exam from 5/10/2023, today's exam is more technically difficult making assessment of LV systolic function more difficult. Proir study demonstrated clear wall motiom abnormality in the basal septal, basal inferior and basal and mid inferolateral segments. The LV systolic function appears to be worse today ( hyaving declined from mild dysfunciton to moderate dysfunction. In addition, the degree of MR appears to have increased from mild to moderate    Transthoracic echo (TTE) limited 04/26/2024   1. Left ventricular systolic function is moderately decreased with a 30-35% estimated ejection  fraction.   2. Multiple segmental abnormalities exist. See findings.   3. Spectral Doppler shows a restrictive pattern of left ventricular diastolic filling.   4. There is an elevated left ventricular end diastolic pressure.   5. Moderate mitral valve regurgitation.   6. Moderately decreased mitral valve posterior leaflet mobility.   7. Mild to moderate tricuspid regurgitation.   8. Mildly elevated RVSP.    Transthoracic Echo (TTE) Complete 02/15/2024    Ejection Fractions:  EF   Date/Time Value Ref Range Status   07/02/2024 10:24 AM 33 %    02/15/2024 08:33 AM 35 %      Cath:  Cardiac catheterization - coronary 04/15/2024  Coronary Angiography:  The coronary circulation is right dominant.     Left Main Coronary Artery:  The left main coronary artery is a normal caliber vessel. The left main arises normally from the left coronary sinus of Valsalva. The mid to distal left main coronary artery showed 80% stenosis.     Left Anterior Descending Coronary Artery Distribution:  The left anterior descending coronary artery is a normal caliber vessel. The LAD demonstrated chronic occlusion originating at the proximal segment.     Circumflex Coronary Artery Distribution:  The circumflex arises normally from the left main coronary artery. The circumflex revealed chronic occlusion originating at the proximal segment.     Right Heart Catheterization:  A balloon tipped catheter was advanced through the right heart to record pressures. Cardiac output was calculated via the Cinda method. Elevated left sided filling pressures with normal cardiac output. Elevated ventricular filling pressure. Cardiac output is normal. Pulmonary venous hypertension.     Right Coronary Artery Distribution:     The RCA arises normally from the right sinus of Valsalva. The RCA showed chronic occlusion originating at the proximal segment.     Coronary Grafts:     LIMA Graft:  Left internal mammary artery graft conduit, originating in situ and attached to  the proximal left anterior descending and 1st diagonal, is patent. The left internal mammary artery graft conduit originating in situ and attached to the proximal left anterior descending and 1st diagonal is patent.  Saphaneous Vein Graft(s):  The saphenous vein graft, originating from the aorta and attached to the distal right coronary artery, appeared totally occluded. The saphenous vein graft, originating from the aorta and attached to the distal right coronary artery appeared totally occluded.  The 2nd saphenous vein graft, originating from the aorta and attached to the 1st obtuse marginal, appeared totally occluded. The 2nd saphenous vein graft, originating from the aorta and attached to the 1st obtuse marginal appeared totally occluded.  Sequential Graft:  The saphenous vein graft, originating from the aorta and attached to the distal right coronary artery, appeared totally occluded.     Coronary Lesion Summary:  Vessel      Stenosis   Vessel Segment  Left Main 80% stenosis mid to distal        Graft Stenosis Summary:  Graft                Destination of Graft  LIMA  proximal left anterior descending and 1st diagonal  SVG 1 distal right coronary artery  SVG 2 1st obtuse marginal        Subclavian Artery Findings:     Left Subclavian Artery:  The left subclavian artery revealed severe atherosclerotic disease and severe calcification. There was severe ostial stenosis in the left subclavian artery. The lesion was crossed with the glidwire. The 6Fr. cathteter was unable to be advanced. We then used a 4Fr catheter that was able to cross the stenosis. There was pressure gradient of ~28-30 mmHg across the stenosis.     Stress Test:  No results found for this or any previous visit from the past 1095 days.    Cardiac Imaging:  Device Interrogation 12/17/2024  --Device Abbott [id number]. Leon DE LA CRUZ, ICD serial #[id number] implant 5/30/2024  -- RA lead [employer] [id number] TC tendril STS SN# - [id number] implant date  "5/30/2024  -- RV lead [employer] [id number] Johnson SJ4 SN# -[id number] implant date 5/30/2024  -- VF detection rate to 20 bpm slow VT detection rate 182 bpm  -- Mode DDDR lower rate 60 upper rate 120 upper sounds 115 /mode switch 180      Inpatient Medications:  Scheduled medications   Medication Dose Route Frequency    albuterol  2.5 mg nebulization TID    allopurinol  50 mg oral Daily    amLODIPine  10 mg oral Daily    aspirin  81 mg oral Daily    atorvastatin  80 mg oral Daily    B complex-vitamin C  1 tablet oral Daily    cetirizine  10 mg oral Daily    clopidogrel  75 mg oral Daily    cyanocobalamin  1,000 mcg oral Daily    [Held by provider] dapagliflozin propanediol  10 mg oral Daily before evening meal    gabapentin  100 mg oral Nightly    icosapent ethyL  1 g oral BID    insulin lispro  0-10 Units subcutaneous q4h DIVINE    metoprolol succinate XL  50 mg oral Daily    OLANZapine  5 mg intramuscular Once    OLANZapine zydis  5 mg oral Once    pantoprazole  40 mg intravenous BID    [Held by provider] sacubitriL-valsartan  0.5 tablet oral BID    sertraline  50 mg oral Daily     PRN medications   Medication    acetaminophen    Or    acetaminophen    Or    acetaminophen    albuterol    dextrose    dextrose    glucagon    glucagon    metoprolol    ondansetron    Or    ondansetron     Continuous Medications   Medication Dose Last Rate       Physical Exam:  Documented Vital Signs   Heart Rate:  [65]   Temp:  [36.3 °C (97.3 °F)-36.5 °C (97.7 °F)]   Resp:  [17-18]   BP: ()/(50-73)   SpO2:  [94 %-99 %]   Temp:  [36.3 °C (97.3 °F)-36.5 °C (97.7 °F)] 36.4 °C (97.5 °F)  Heart Rate:  [65] 65  Resp:  [17-18] 17  BP: ()/(50-73) 122/71           Documented Fluid Status   No intake or output data in the 24 hours ending 12/26/24 0853  Net IO Since Admission: No IO data has been entered for this period [12/26/24 0853]    /70   Pulse (!) 115   Temp 36.2 °C (97.2 °F) (Temporal)   Resp 15   Ht 1.6 m (5' 3\")  "  Wt 62.5 kg (137 lb 12.6 oz)   SpO2 98%   BMI 24.41 kg/m²   General:  Patient is awake, alert, and oriented.  Patient is in no acute distress. More alert today  -- Wife at bedisde, permission given to discuss care in her presence.   HEENT:  Pupils equal and reactive.  Normocephalic.  Moist mucosa.    Neck:  No thyromegaly.  Normal Jugular Venous Pressure.  Cardiovascular:  Regular rate and rhythm.  Normal S1 and S2.  1/6 JUSTIN.  Pulmonary:  Clear to auscultation bilaterally.  Abdomen:  Soft. Non-tender.   Non-distended.  Positive bowel sounds.  Lower Extremities:  2+ pedal pulses. No LE edema.  Neurologic:  Cranial nerves intact.  No focal deficit.   Skin: Skin warm and dry, normal skin turgor.   Psychiatric: Normal affect.         Assessment/Plan    Ritesh Garza is a 74 y.o. male, with a PMH of mvCAD s/p CABGx5 1992 c/b NSTEMI 2/2024 on DAPT, HFrEF d/t ICM, HTN, HLD, VT s/p ICD placement 5/2024, CKD III, seizures, PAD c/b severe left subclavian stenosis w/ compromised flow to the left POP s/p left subclavian stent 4/2024 , asthma, DM, gout, SIDHU cirrhosis, portal HTN, depression/anxiety, GERD, BPH, SENIA, and TIA who presented to [facility] on 12/25/2024 as a transfer from [facility] for acute GIB. Patient initially presented to 12/20 d/t AMS and recurrent falls at home per spouse, with associated weakness. CTH negative for acute CVA but CTA did note some attenuated branches of the MCA on the right compared to the left which could be secondary to high-grade stenosis or areas of occlusion. Neurology did not feel patient was a candidate for TNK or thrombectomy. Labs at that time were notable for elevated troponin and BNP, TAYLOR on CKD, as well as acute anemia with a Hgb of 6.1 with melanotic stool in the ER. Patient was transfused 2 units of PRBC, started on Octreotide, and planned for transfer to [facility]. Troponin peaked at 7200, no anticoagulation started d/t concern for acute GIB. He  "additionally developed elevated HR on 12/23, ST vs atrial flutter, in the setting of his home medications being held that resolved with one dose of IVP Metoprolol. Cardiology is consulted for \"acute non-STEMI, atrial flutter with RVR\".     Home CV Medications: Amlodipine 10 mg daily, ASA 81 mg daily, Atorvastatin 80 mg daily, Plavix 75 mg daily, Farxiga 10 mg daily, Furosemide 40 mg daily as needed, Vascepa 1 g twice daily, Metoprolol succinate 50 mg daily, Entresto 1/2 tablet twice daily, no Spironolactone d/t painful gynecomastia  -Follows with Dr. Hennessy as outpatient, last seen 10/2024     #NonMI Troponin Elevation- Acute non-traumatic myocardial injury in the setting of acute blood loss, known CAD, and HF  -Core measures do not apply; no clinical evidence of overt ischemia, patient denies chest pain  -Troponin level peaked at 7200, no indication to continue to trend    #CAD- s/p CABGx5 with occluded grafts per [facility] 2/2024, planning on DAPT with ASA and Plavix for at least one year    #Chronic Systolic HF- Not overtly hypervolemic on exam  -BNP 3417 [chronically elevated], CXR without pulmonary edema  -Diuresing with IV Lasix 40mg daily, home diuretic Lasix 40mg PRN  -GDMT: Metoprolol Succinate 50mg daily, Entresto 24-26mg 0.5 tablets BID, and Farxiga 10mg daily    #TAYLOR on CKD III- Etiology likely prerenal d/t acute anemia    #Sinus Tachycardia- No evidence of pAF or atrial flutter, appears to be ST per ECG, likely in the setting of acute anemia and Beta blockers being held.   -HR 70s per tele since admission  -No indicated for anticoagulation at this time      #HTN- BP acceptable in current circumstances, c/w home medications     #HLD- on statin therapy and Vascepa        RECOMMENDATIONS:  -c/w ASA and Plavix as tolerated  -Hold Farxiga and Entresto in the setting of TAYLOR  -Continue all other home CV medications as above  -Diuresis with IV Lasix PRN, does not need daily dosing with current volume " status  -Continuous telemetry monitoring  -Monitor electrolytes, replete for K <4 and Mg <2  -Daily wts, strict I&Os, low sodium diet    Code Status:  Full Code        Mahendra Santacruz DO   Division of Cardiovascular Medicine  [facility] [place]

## 2024-12-27 LAB
AMMONIA PLAS-SCNC: 24 UMOL/L (ref 16–53)
ANION GAP SERPL CALC-SCNC: 16 MMOL/L (ref 10–20)
ATRIAL RATE: 136 BPM
BUN SERPL-MCNC: 35 MG/DL (ref 6–23)
CALCIUM SERPL-MCNC: 7.9 MG/DL (ref 8.6–10.3)
CHLORIDE SERPL-SCNC: 109 MMOL/L (ref 98–107)
CO2 SERPL-SCNC: 21 MMOL/L (ref 21–32)
CREAT SERPL-MCNC: 2.02 MG/DL (ref 0.5–1.3)
EGFRCR SERPLBLD CKD-EPI 2021: 34 ML/MIN/1.73M*2
ERYTHROCYTE [DISTWIDTH] IN BLOOD BY AUTOMATED COUNT: 18 % (ref 11.5–14.5)
GLUCOSE BLD MANUAL STRIP-MCNC: 135 MG/DL (ref 74–99)
GLUCOSE BLD MANUAL STRIP-MCNC: 147 MG/DL (ref 74–99)
GLUCOSE BLD MANUAL STRIP-MCNC: 159 MG/DL (ref 74–99)
GLUCOSE BLD MANUAL STRIP-MCNC: 178 MG/DL (ref 74–99)
GLUCOSE BLD MANUAL STRIP-MCNC: 246 MG/DL (ref 74–99)
GLUCOSE BLD MANUAL STRIP-MCNC: 290 MG/DL (ref 74–99)
GLUCOSE SERPL-MCNC: 151 MG/DL (ref 74–99)
HCT VFR BLD AUTO: 28.4 % (ref 41–52)
HGB BLD-MCNC: 9 G/DL (ref 13.5–17.5)
MCH RBC QN AUTO: 29.3 PG (ref 26–34)
MCHC RBC AUTO-ENTMCNC: 31.7 G/DL (ref 32–36)
MCV RBC AUTO: 93 FL (ref 80–100)
NRBC BLD-RTO: 0 /100 WBCS (ref 0–0)
P AXIS: 68 DEGREES
PLATELET # BLD AUTO: 281 X10*3/UL (ref 150–450)
POTASSIUM SERPL-SCNC: 4.6 MMOL/L (ref 3.5–5.3)
PR INTERVAL: 98 MS
Q ONSET: 254 MS
QRS COUNT: 22 BEATS
QRS DURATION: 106 MS
QT INTERVAL: 342 MS
QTC CALCULATION(BAZETT): 515 MS
QTC FREDERICIA: 449 MS
R AXIS: -19 DEGREES
RBC # BLD AUTO: 3.07 X10*6/UL (ref 4.5–5.9)
SODIUM SERPL-SCNC: 141 MMOL/L (ref 136–145)
T AXIS: 122 DEGREES
T OFFSET: 425 MS
VENTRICULAR RATE: 136 BPM
WBC # BLD AUTO: 13.5 X10*3/UL (ref 4.4–11.3)

## 2024-12-27 PROCEDURE — 82140 ASSAY OF AMMONIA: CPT | Performed by: INTERNAL MEDICINE

## 2024-12-27 PROCEDURE — 36415 COLL VENOUS BLD VENIPUNCTURE: CPT | Performed by: INTERNAL MEDICINE

## 2024-12-27 PROCEDURE — 82947 ASSAY GLUCOSE BLOOD QUANT: CPT

## 2024-12-27 PROCEDURE — 99233 SBSQ HOSP IP/OBS HIGH 50: CPT | Performed by: INTERNAL MEDICINE

## 2024-12-27 PROCEDURE — 99232 SBSQ HOSP IP/OBS MODERATE 35: CPT | Performed by: INTERNAL MEDICINE

## 2024-12-27 PROCEDURE — 85027 COMPLETE CBC AUTOMATED: CPT | Performed by: INTERNAL MEDICINE

## 2024-12-27 PROCEDURE — 94640 AIRWAY INHALATION TREATMENT: CPT

## 2024-12-27 PROCEDURE — 2500000002 HC RX 250 W HCPCS SELF ADMINISTERED DRUGS (ALT 637 FOR MEDICARE OP, ALT 636 FOR OP/ED): Performed by: INTERNAL MEDICINE

## 2024-12-27 PROCEDURE — 99232 SBSQ HOSP IP/OBS MODERATE 35: CPT | Performed by: NURSE PRACTITIONER

## 2024-12-27 PROCEDURE — 1200000002 HC GENERAL ROOM WITH TELEMETRY DAILY

## 2024-12-27 PROCEDURE — 2500000004 HC RX 250 GENERAL PHARMACY W/ HCPCS (ALT 636 FOR OP/ED): Performed by: INTERNAL MEDICINE

## 2024-12-27 PROCEDURE — 2500000001 HC RX 250 WO HCPCS SELF ADMINISTERED DRUGS (ALT 637 FOR MEDICARE OP): Performed by: INTERNAL MEDICINE

## 2024-12-27 PROCEDURE — 2500000001 HC RX 250 WO HCPCS SELF ADMINISTERED DRUGS (ALT 637 FOR MEDICARE OP): Performed by: PHARMACIST

## 2024-12-27 PROCEDURE — 80048 BASIC METABOLIC PNL TOTAL CA: CPT | Performed by: INTERNAL MEDICINE

## 2024-12-27 RX ADMIN — ALLOPURINOL 50 MG: 100 TABLET ORAL at 10:38

## 2024-12-27 RX ADMIN — Medication 1000 MCG: at 10:38

## 2024-12-27 RX ADMIN — SERTRALINE HYDROCHLORIDE 50 MG: 50 TABLET ORAL at 10:38

## 2024-12-27 RX ADMIN — INSULIN LISPRO 2 UNITS: 100 INJECTION, SOLUTION INTRAVENOUS; SUBCUTANEOUS at 20:35

## 2024-12-27 RX ADMIN — INSULIN LISPRO 4 UNITS: 100 INJECTION, SOLUTION INTRAVENOUS; SUBCUTANEOUS at 16:50

## 2024-12-27 RX ADMIN — ICOSAPENT ETHYL 1 G: 1 CAPSULE ORAL at 16:50

## 2024-12-27 RX ADMIN — PANTOPRAZOLE SODIUM 40 MG: 40 INJECTION, POWDER, FOR SOLUTION INTRAVENOUS at 20:35

## 2024-12-27 RX ADMIN — ALBUTEROL SULFATE 2.5 MG: 2.5 SOLUTION RESPIRATORY (INHALATION) at 19:22

## 2024-12-27 RX ADMIN — INSULIN LISPRO 2 UNITS: 100 INJECTION, SOLUTION INTRAVENOUS; SUBCUTANEOUS at 04:26

## 2024-12-27 RX ADMIN — CETIRIZINE HYDROCHLORIDE 10 MG: 10 TABLET, FILM COATED ORAL at 10:38

## 2024-12-27 RX ADMIN — PANTOPRAZOLE SODIUM 40 MG: 40 INJECTION, POWDER, FOR SOLUTION INTRAVENOUS at 10:39

## 2024-12-27 RX ADMIN — ASPIRIN 81 MG: 81 TABLET, COATED ORAL at 10:39

## 2024-12-27 RX ADMIN — ATORVASTATIN CALCIUM 80 MG: 80 TABLET, FILM COATED ORAL at 10:39

## 2024-12-27 RX ADMIN — METOPROLOL SUCCINATE 50 MG: 50 TABLET, EXTENDED RELEASE ORAL at 10:39

## 2024-12-27 RX ADMIN — GABAPENTIN 100 MG: 100 CAPSULE ORAL at 20:35

## 2024-12-27 RX ADMIN — INSULIN LISPRO 6 UNITS: 100 INJECTION, SOLUTION INTRAVENOUS; SUBCUTANEOUS at 13:25

## 2024-12-27 RX ADMIN — Medication 1 TABLET: at 10:39

## 2024-12-27 RX ADMIN — ICOSAPENT ETHYL 1 G: 1 CAPSULE ORAL at 10:39

## 2024-12-27 RX ADMIN — AMLODIPINE BESYLATE 10 MG: 10 TABLET ORAL at 10:39

## 2024-12-27 RX ADMIN — ALBUTEROL SULFATE 2.5 MG: 2.5 SOLUTION RESPIRATORY (INHALATION) at 12:29

## 2024-12-27 ASSESSMENT — PAIN SCALES - GENERAL
PAINLEVEL_OUTOF10: 0 - NO PAIN

## 2024-12-27 ASSESSMENT — PAIN - FUNCTIONAL ASSESSMENT
PAIN_FUNCTIONAL_ASSESSMENT: 0-10

## 2024-12-27 ASSESSMENT — COGNITIVE AND FUNCTIONAL STATUS - GENERAL
DRESSING REGULAR LOWER BODY CLOTHING: A LITTLE
MOBILITY SCORE: 19
WALKING IN HOSPITAL ROOM: A LITTLE
CLIMB 3 TO 5 STEPS WITH RAILING: A LOT
STANDING UP FROM CHAIR USING ARMS: A LITTLE
DAILY ACTIVITIY SCORE: 21
MOVING TO AND FROM BED TO CHAIR: A LITTLE
HELP NEEDED FOR BATHING: A LITTLE
TOILETING: A LITTLE

## 2024-12-27 NOTE — CARE PLAN
Problem: Pain - Adult  Goal: Verbalizes/displays adequate comfort level or baseline comfort level  Outcome: Progressing   The patient's goals for the shift include      The clinical goals for the shift include Pt safety will be maintained in duration of the shift    Over the shift, the patient did make progress toward the following goals.

## 2024-12-27 NOTE — PROGRESS NOTES
"Ritesh Garza is a 74 y.o. male on day 2 of admission presenting with Non-STEMI (non-ST elevated myocardial infarction) (Multi).    Subjective   No acute events overnight. Pleasantly confused. No further bleeding noted. Talked to wife on the phone yesterday, she seemed very frustrated with the  Health system as a whole. Discussed how she feels like no one knows what's going on. We went of all of his labs and I talked to her about how the GI team did find a duodenal ulcer on EGD which we believe is the source of his anemia. Discussed that his kidney function does in fact appear to be around his baseline and that the cardiology team are working on deciding what more needs to be done for his heart. She did note appreciate of our conversation.        Objective     VITALS  Blood pressure 125/73, pulse 76, temperature 36.7 °C (98.1 °F), temperature source Temporal, resp. rate 16, height 1.6 m (5' 3\"), weight 62.5 kg (137 lb 12.6 oz), SpO2 93%.  Physical Exam  Vitals and nursing note reviewed.   Constitutional:       General: He is not in acute distress.     Appearance: Normal appearance. He is not ill-appearing.   HENT:      Head: Normocephalic and atraumatic.      Mouth/Throat:      Mouth: Mucous membranes are moist.      Pharynx: Oropharynx is clear.   Eyes:      Extraocular Movements: Extraocular movements intact.      Conjunctiva/sclera: Conjunctivae normal.   Cardiovascular:      Rate and Rhythm: Normal rate and regular rhythm.      Heart sounds: Normal heart sounds. No murmur heard.     No friction rub. No gallop.   Pulmonary:      Effort: Pulmonary effort is normal.      Breath sounds: Normal breath sounds. No wheezing or rales.   Abdominal:      General: Bowel sounds are normal. There is no distension.      Palpations: Abdomen is soft.      Tenderness: There is no abdominal tenderness. There is no guarding.   Musculoskeletal:         General: No swelling or tenderness.      Right lower leg: No edema.      " Left lower leg: No edema.   Skin:     General: Skin is warm and dry.      Capillary Refill: Capillary refill takes less than 2 seconds.   Neurological:      General: No focal deficit present.      Mental Status: He is alert. He is disoriented.   Psychiatric:         Mood and Affect: Mood normal.           Intake/Output last 3 Shifts:  I/O last 3 completed shifts:  In: 280 (4.5 mL/kg) [P.O.:280]  Out: - (0 mL/kg)   Weight: 62.5 kg     Relevant Results  Results for orders placed or performed during the hospital encounter of 12/25/24 (from the past 24 hours)   POCT GLUCOSE   Result Value Ref Range    POCT Glucose 189 (H) 74 - 99 mg/dL   POCT GLUCOSE   Result Value Ref Range    POCT Glucose 199 (H) 74 - 99 mg/dL   POCT GLUCOSE   Result Value Ref Range    POCT Glucose 278 (H) 74 - 99 mg/dL   POCT GLUCOSE   Result Value Ref Range    POCT Glucose 147 (H) 74 - 99 mg/dL   POCT GLUCOSE   Result Value Ref Range    POCT Glucose 159 (H) 74 - 99 mg/dL   Ammonia   Result Value Ref Range    Ammonia 24 16 - 53 umol/L   CBC   Result Value Ref Range    WBC 13.5 (H) 4.4 - 11.3 x10*3/uL    nRBC 0.0 0.0 - 0.0 /100 WBCs    RBC 3.07 (L) 4.50 - 5.90 x10*6/uL    Hemoglobin 9.0 (L) 13.5 - 17.5 g/dL    Hematocrit 28.4 (L) 41.0 - 52.0 %    MCV 93 80 - 100 fL    MCH 29.3 26.0 - 34.0 pg    MCHC 31.7 (L) 32.0 - 36.0 g/dL    RDW 18.0 (H) 11.5 - 14.5 %    Platelets 281 150 - 450 x10*3/uL   Basic Metabolic Panel   Result Value Ref Range    Glucose 151 (H) 74 - 99 mg/dL    Sodium 141 136 - 145 mmol/L    Potassium 4.6 3.5 - 5.3 mmol/L    Chloride 109 (H) 98 - 107 mmol/L    Bicarbonate 21 21 - 32 mmol/L    Anion Gap 16 10 - 20 mmol/L    Urea Nitrogen 35 (H) 6 - 23 mg/dL    Creatinine 2.02 (H) 0.50 - 1.30 mg/dL    eGFR 34 (L) >60 mL/min/1.73m*2    Calcium 7.9 (L) 8.6 - 10.3 mg/dL   POCT GLUCOSE   Result Value Ref Range    POCT Glucose 135 (H) 74 - 99 mg/dL     *Note: Due to a large number of results and/or encounters for the requested time period, some  results have not been displayed. A complete set of results can be found in Results Review.       Imaging Results  Transthoracic Echo (TTE) Limited   Final Result          Medications:  albuterol, 2.5 mg, nebulization, TID  allopurinol, 50 mg, oral, Daily  amLODIPine, 10 mg, oral, Daily  aspirin, 81 mg, oral, Daily  atorvastatin, 80 mg, oral, Daily  B complex-vitamin C, 1 tablet, oral, Daily  cetirizine, 10 mg, oral, Daily  [Held by provider] clopidogrel, 75 mg, oral, Daily  cyanocobalamin, 1,000 mcg, oral, Daily  [Held by provider] dapagliflozin propanediol, 10 mg, oral, Daily before evening meal  gabapentin, 100 mg, oral, Nightly  icosapent ethyL, 1 g, oral, BID  insulin lispro, 0-10 Units, subcutaneous, q4h [facility]  metoprolol succinate XL, 50 mg, oral, Daily  OLANZapine, 5 mg, intramuscular, Once  pantoprazole, 40 mg, intravenous, BID  [Held by provider] sacubitriL-valsartan, 0.5 tablet, oral, BID  sertraline, 50 mg, oral, Daily       PRN medications: acetaminophen **OR** acetaminophen **OR** acetaminophen, albuterol, dextrose, dextrose, glucagon, glucagon, metoprolol, ondansetron **OR** ondansetron        Assessment/Plan          Assessment & Plan  Non-STEMI (non-ST elevated myocardial infarction) (Multi)    Acute non-STEMI / Known CAD s/p PCI with GINNY on DAPT  -Telemetry  -Cardiology consultation  -Aspirin 81 mg orally daily  -Plavix 75 mg orally daily  -Atorvastatin 80 mg orally daily  - Discussed with Dr. Santacruz likely no further work up while in house    Chronic systolic CHF, compensated  -Farxiga 10 mg orally daily  -Lasix 40 mg IV every 24 hours  -Continue Entresto  -Cardiology  -Telemetry  -Echocardiogram stable from previous with EF of 33%     Duodenal Ulcer seen on EGD causing acute on chronic anemia   -Protonix 40 mg IV every 12 hours  -GI consultation  -Recommend holding Plavix per GI, continue ASA     Hyperlipidemia  -High intensity atorvastatin 80 mg orally daily     Hypertension  -Amlodipine 10 mg  orally daily  -Metoprolol XL 50 mg orally daily     Paroxysmal atrial flutter  -Metoprolol XL 50 mg orally daily  -Telemetry  -Cardiology     Type 2 diabetes mellitus  -Lispro per corrective scale, n.p.o. corrective scale     CKD 3  -Trend creatinine especially with diuresis     Ataxia with recurrent falls  -High-grade stenosis or areas of occlusion suggested on imaging.  -Patient apparently has not a thrombectomy candidate     GERD  -Protonix       DVT Prophylaxis:  NA In the setting of concern for GI bleed  SCDs     Disposition:  Anticipate PT OT and will likely need placement. Likely getting close to DC.     Rafita Peters, [facility]

## 2024-12-27 NOTE — PROGRESS NOTES
12/27/24 1626   Discharge Planning   Expected Discharge Disposition SNF        Patient is diuresing. Cardio is following. Requested from to send wife a SNF list for choices. Waiting on PT/OT and eval. Will continue to follow for discharge needs.

## 2024-12-27 NOTE — PROGRESS NOTES
Subjective Data:  Patient seen and examined, chart reviewed   -- No acute issues reported, recorded   -- Sitter at bedside reports that he had just fallen asleep prior to visit.           Overnight Events:      Objective Data:  Last Recorded Vitals:  Vitals:    12/26/24 2227 12/27/24 0300 12/27/24 0724 12/27/24 1127   BP: 119/59 121/61 125/73 129/61   BP Location:       Patient Position:       Pulse: 66 76     Resp: 16 16 16 17   Temp: 36.5 °C (97.7 °F) 37 °C (98.6 °F) 36.7 °C (98.1 °F) 36.9 °C (98.4 °F)   TempSrc: Temporal Temporal Temporal Temporal   SpO2: 98% 95% 93%    Weight:       Height:           Last Labs:  Results from last 7 days   Lab Units 12/27/24  0452 12/26/24  0432 12/25/24  2314   WBC AUTO x10*3/uL 13.5* 9.8 12.4*   HEMOGLOBIN g/dL 9.0* 9.4* 10.9*   HEMATOCRIT % 28.4* 30.0* 35.7*   PLATELETS AUTO x10*3/uL 281 257 372     Results from last 7 days   Lab Units 12/27/24  0452 12/26/24  0432 12/25/24  2314 12/25/24  0610 12/24/24  0458 12/23/24  2351 12/23/24  0723   SODIUM mmol/L 141 140 137   < > 140 140 141   POTASSIUM mmol/L 4.6 4.7 4.5   < > 4.8 4.4 4.6   CHLORIDE mmol/L 109* 109* 105   < > 115* 111* 112*   CO2 mmol/L 21 20* 22   < > 19* 20* 20*   BUN mg/dL 35* 35* 36*   < > 42* 44* 52*   CREATININE mg/dL 2.02* 2.07* 2.05*   < > 1.83* 1.79* 1.96*   CALCIUM mg/dL 7.9* 7.6* 8.2*   < > 7.2* 8.5* 8.0*   PROTEIN TOTAL g/dL  --   --   --   --  5.2* 6.6 6.2*   BILIRUBIN TOTAL mg/dL  --   --   --   --  0.5 0.6 0.7   ALK PHOS U/L  --   --   --   --  61 82 75   ALT U/L  --   --   --   --  16 18 17   AST U/L  --   --   --   --  29 17 21   GLUCOSE mg/dL 151* 189* 185*   < > 177* 225* 289*    < > = values in this interval not displayed.       TROPHS   Date/Time Value Ref Range Status   12/23/2024 11:51 PM 1,450 0 - 20 ng/L Final   12/22/2024 01:13 AM 3,784 0 - 20 ng/L Final   12/21/2024 04:34 AM 7,200 0 - 20 ng/L Final     Comment:     Previous result verified on 12/20/2024 1110 on specimen/case [id number]  called with component [facility] for procedure Troponin I, High Sensitivity, Initial with value 1,097 ng/L.     BNP   Date/Time Value Ref Range Status   12/23/2024 11:51 PM 3,417 0 - 99 pg/mL Final   12/20/2024 10:36 AM 1,270 0 - 99 pg/mL Final     HGBA1C   Date/Time Value Ref Range Status   01/11/2024 01:36 PM 6.6 see below % Final   07/31/2023 11:10 AM 7.0 % Final     Comment:          Diagnosis of Diabetes-Adults   Non-Diabetic: < or = 5.6%   Increased risk for developing diabetes: 5.7-6.4%   Diagnostic of diabetes: > or = 6.5%  .       Monitoring of Diabetes                Age (y)     Therapeutic Goal (%)   Adults:          >18           <7.0   Pediatrics:    13-18           <7.5                   7-12           <8.0                   0- 6            7.5-8.5   American Diabetes Association. Diabetes Care 33(S1), Jan 2010.     05/04/2023 12:15 PM 6.8 % Final     Comment:          Diagnosis of Diabetes-Adults   Non-Diabetic: < or = 5.6%   Increased risk for developing diabetes: 5.7-6.4%   Diagnostic of diabetes: > or = 6.5%  .       Monitoring of Diabetes                Age (y)     Therapeutic Goal (%)   Adults:          >18           <7.0   Pediatrics:    13-18           <7.5                   7-12           <8.0                   0- 6            7.5-8.5   American Diabetes Association. Diabetes Care 33(S1), Jan 2010.       LDLCALC   Date/Time Value Ref Range Status   05/06/2024 11:28 AM 53 <=99 mg/dL Final     Comment:                                 Near   Borderline      AGE      Desirable  Optimal    High     High     Very High     0-19 Y     0 - 109     ---    110-129   >/= 130     ----    20-24 Y     0 - 119     ---    120-159   >/= 160     ----      >24 Y     0 -  99   100-129  130-159   160-189     >/=190     11/13/2023 10:41 AM 36 <=99 mg/dL Final     Comment:                                 Near   Borderline      AGE      Desirable  Optimal    High     High     Very High     0-19 Y     0 - 109     ---     110-129   >/= 130     ----    20-24 Y     0 - 119     ---    120-159   >/= 160     ----      >24 Y     0 -  99   100-129  130-159   160-189     >/=190       VLDL   Date/Time Value Ref Range Status   05/06/2024 11:28 AM 30 0 - 40 mg/dL Final   11/13/2023 10:41 AM 41 0 - 40 mg/dL Final   07/31/2023 11:10 AM 38 0 - 40 mg/dL Final   05/04/2023 12:15 PM 50 0 - 40 mg/dL Final   05/05/2022 02:50 PM 72 0 - 40 mg/dL Final      Last I/O:  I/O last 3 completed shifts:  In: 280 (4.5 mL/kg) [P.O.:280]  Out: - (0 mL/kg)   Weight: 62.5 kg     Past Cardiology Tests (Last 3 Years):  EKG:  Electrocardiogram, 12-lead PRN ACS symptoms 12/25/2024 (Preliminary)      ECG 12 Lead 12/23/2024 (Preliminary)      ECG 12 lead 12/20/2024            ECG 12 lead (Clinic Performed) 05/13/2024        Echo: 12/26/2024   1. Left ventricular ejection fraction is moderately decreased, calculated by Reyes's biplane at 33%.   2. Abnormal left venticular wall motion.   3. Left ventricular cavity size is mildly dilated.   4. No left ventricular thrombus visualized.   5. Moderate mitral valve regurgitation.   6. Mildly elevated right ventricular systolic pressure.   7. There is plaque visualized in the ascending aorta.      Transthoracic Echo (TTE) Limited 07/02/2024   1. Left ventricular ejection fraction is moderately decreased, by visual estimate at 30-35%.   2. Spectral Doppler shows a pseudonormal pattern of left ventricular diastolic filling.   3. Left ventricular cavity size is moderately dilated.   4. No left ventricular thrombus visualized.   5. Technically difficult exam, though there is moderate LV systolic dysfunction with the apical function being best but hypokinesis of the basal and mid segments. There is no LV apical thrombus seen on the echocontrast images.   6. There is reduced right ventricular systolic function.   7. The left atrium is moderately dilated.   8. Moderate mitral valve regurgitation.   9. Mildly elevated RVSP.  10. Moderate  tricuspid regurgitation visualized.  11. Compared with the prior exam from 5/10/2023, today's exam is more technically difficult making assessment of LV systolic function more difficult. Proir study demonstrated clear wall motiom abnormality in the basal septal, basal inferior and basal and mid inferolateral segments. The LV systolic function appears to be worse today ( hyaving declined from mild dysfunciton to moderate dysfunction. In addition, the degree of MR appears to have increased from mild to moderate    Transthoracic echo (TTE) limited 04/26/2024   1. Left ventricular systolic function is moderately decreased with a 30-35% estimated ejection fraction.   2. Multiple segmental abnormalities exist. See findings.   3. Spectral Doppler shows a restrictive pattern of left ventricular diastolic filling.   4. There is an elevated left ventricular end diastolic pressure.   5. Moderate mitral valve regurgitation.   6. Moderately decreased mitral valve posterior leaflet mobility.   7. Mild to moderate tricuspid regurgitation.   8. Mildly elevated RVSP.    Transthoracic Echo (TTE) Complete 02/15/2024    Ejection Fractions:  EF   Date/Time Value Ref Range Status   12/26/2024 10:07 AM 33 %    07/02/2024 10:24 AM 33 %    02/15/2024 08:33 AM 35 %      Cath:  Cardiac catheterization - coronary 04/15/2024  Coronary Angiography:  The coronary circulation is right dominant.     Left Main Coronary Artery:  The left main coronary artery is a normal caliber vessel. The left main arises normally from the left coronary sinus of Valsalva. The mid to distal left main coronary artery showed 80% stenosis.     Left Anterior Descending Coronary Artery Distribution:  The left anterior descending coronary artery is a normal caliber vessel. The LAD demonstrated chronic occlusion originating at the proximal segment.     Circumflex Coronary Artery Distribution:  The circumflex arises normally from the left main coronary artery. The circumflex  revealed chronic occlusion originating at the proximal segment.     Right Heart Catheterization:  A balloon tipped catheter was advanced through the right heart to record pressures. Cardiac output was calculated via the Cinda method. Elevated left sided filling pressures with normal cardiac output. Elevated ventricular filling pressure. Cardiac output is normal. Pulmonary venous hypertension.     Right Coronary Artery Distribution:     The RCA arises normally from the right sinus of Valsalva. The RCA showed chronic occlusion originating at the proximal segment.     Coronary Grafts:     LIMA Graft:  Left internal mammary artery graft conduit, originating in situ and attached to the proximal left anterior descending and 1st diagonal, is patent. The left internal mammary artery graft conduit originating in situ and attached to the proximal left anterior descending and 1st diagonal is patent.  Saphaneous Vein Graft(s):  The saphenous vein graft, originating from the aorta and attached to the distal right coronary artery, appeared totally occluded. The saphenous vein graft, originating from the aorta and attached to the distal right coronary artery appeared totally occluded.  The 2nd saphenous vein graft, originating from the aorta and attached to the 1st obtuse marginal, appeared totally occluded. The 2nd saphenous vein graft, originating from the aorta and attached to the 1st obtuse marginal appeared totally occluded.  Sequential Graft:  The saphenous vein graft, originating from the aorta and attached to the distal right coronary artery, appeared totally occluded.     Coronary Lesion Summary:  Vessel      Stenosis   Vessel Segment  Left Main 80% stenosis mid to distal        Graft Stenosis Summary:  Graft                Destination of Graft  LIMA  proximal left anterior descending and 1st diagonal  SVG 1 distal right coronary artery  SVG 2 1st obtuse marginal        Subclavian Artery Findings:     Left Subclavian  Artery:  The left subclavian artery revealed severe atherosclerotic disease and severe calcification. There was severe ostial stenosis in the left subclavian artery. The lesion was crossed with the glidwire. The 6Fr. cathteter was unable to be advanced. We then used a 4Fr catheter that was able to cross the stenosis. There was pressure gradient of ~28-30 mmHg across the stenosis.     Stress Test:  No results found for this or any previous visit from the past 1095 days.    Cardiac Imaging:  Device Interrogation 12/17/2024  --Device Abbott [id number]. Leon DE LA CRUZ, ICD serial #[id number] implant 5/30/2024  -- RA lead [employer] [id number] TC tendril STS SN# - [id number] implant date 5/30/2024  -- RV lead [employer] [id number] Durata SJ4 SN# -[id number] implant date 5/30/2024  -- VF detection rate to 20 bpm slow VT detection rate 182 bpm  -- Mode DDDR lower rate 60 upper rate 120 upper sounds 115 /mode switch 180      Inpatient Medications:  Scheduled medications   Medication Dose Route Frequency    albuterol  2.5 mg nebulization TID    allopurinol  50 mg oral Daily    amLODIPine  10 mg oral Daily    aspirin  81 mg oral Daily    atorvastatin  80 mg oral Daily    B complex-vitamin C  1 tablet oral Daily    cetirizine  10 mg oral Daily    [Held by provider] clopidogrel  75 mg oral Daily    cyanocobalamin  1,000 mcg oral Daily    [Held by provider] dapagliflozin propanediol  10 mg oral Daily before evening meal    gabapentin  100 mg oral Nightly    icosapent ethyL  1 g oral BID    insulin lispro  0-10 Units subcutaneous q4h [facility]    metoprolol succinate XL  50 mg oral Daily    OLANZapine  5 mg intramuscular Once    pantoprazole  40 mg intravenous BID    [Held by provider] sacubitriL-valsartan  0.5 tablet oral BID    sertraline  50 mg oral Daily     PRN medications   Medication    acetaminophen    Or    acetaminophen    Or    acetaminophen    albuterol    dextrose    dextrose    glucagon    glucagon    metoprolol     "ondansetron    Or    ondansetron     Continuous Medications   Medication Dose Last Rate       Physical Exam:  Documented Vital Signs   Heart Rate:  []   Temp:  [36.2 °C (97.2 °F)-37 °C (98.6 °F)]   Resp:  [13-17]   BP: ()/(58-86)   Height:  [160 cm (5' 3\")]   Weight:  [62.5 kg (137 lb 12.6 oz)]   SpO2:  [93 %-100 %]   Temp:  [36.2 °C (97.2 °F)-37 °C (98.6 °F)] 36.9 °C (98.4 °F)  Heart Rate:  [] 76  Resp:  [13-17] 17  BP: ()/(58-86) 129/61           Documented Fluid Status     Intake/Output Summary (Last 24 hours) at 12/27/2024 1224  Last data filed at 12/27/2024 1127  Gross per 24 hour   Intake 520 ml   Output --   Net 520 ml     Net IO Since Admission: 520 mL [12/27/24 1224]    /61   Pulse 76   Temp 36.9 °C (98.4 °F) (Temporal)   Resp 17   Ht 1.6 m (5' 3\")   Wt 62.5 kg (137 lb 12.6 oz)   SpO2 93%   BMI 24.41 kg/m²   General:  Patient is awake, alert, and oriented.  Patient is in no acute distress. More alert today  -- Wife at bedisde, permission given to discuss care in her presence.   HEENT:  Pupils equal and reactive.  Normocephalic.  Moist mucosa.    Neck:  No thyromegaly.  Normal Jugular Venous Pressure.  Cardiovascular:  Regular rate and rhythm.  Normal S1 and S2.  1/6 JUSTIN.  Pulmonary:  Clear to auscultation bilaterally.  Abdomen:  Soft. Non-tender.   Non-distended.  Positive bowel sounds.  Lower Extremities:  2+ pedal pulses. No LE edema.  Neurologic:  Cranial nerves intact.  No focal deficit.   Skin: Skin warm and dry, normal skin turgor.   Psychiatric: Normal affect.         Assessment/Plan    Ritesh Garza is a 74 y.o. male, with a PMH of mvCAD s/p CABGx5 1992 c/b NSTEMI 2/2024 on DAPT, HFrEF d/t ICM, HTN, HLD, VT s/p ICD placement 5/2024, CKD III, seizures, PAD c/b severe left subclavian stenosis w/ compromised flow to the left POP s/p left subclavian stent 4/2024 , asthma, DM, gout, SIDHU cirrhosis, portal HTN, depression/anxiety, GERD, BPH, SENIA, and TIA who " "presented to [facility] on 12/25/2024 as a transfer from [facility] for acute GIB. Patient initially presented to 12/20 d/t AMS and recurrent falls at home per spouse, with associated weakness. CTH negative for acute CVA but CTA did note some attenuated branches of the MCA on the right compared to the left which could be secondary to high-grade stenosis or areas of occlusion. Neurology did not feel patient was a candidate for TNK or thrombectomy. Labs at that time were notable for elevated troponin and BNP, TAYLOR on CKD, as well as acute anemia with a Hgb of 6.1 with melanotic stool in the ER. Patient was transfused 2 units of PRBC, started on Octreotide, and planned for transfer to [facility]. Troponin peaked at 7200, no anticoagulation started d/t concern for acute GIB. He additionally developed elevated HR on 12/23, ST vs atrial flutter, in the setting of his home medications being held that resolved with one dose of IVP Metoprolol. Cardiology is consulted for \"acute non-STEMI, atrial flutter with RVR\".     Home CV Medications: Amlodipine 10 mg daily, ASA 81 mg daily, Atorvastatin 80 mg daily, Plavix 75 mg daily, Farxiga 10 mg daily, Furosemide 40 mg daily as needed, Vascepa 1 g twice daily, Metoprolol succinate 50 mg daily, Entresto 1/2 tablet twice daily, no Spironolactone d/t painful gynecomastia  -Follows with Dr. Hennessy as outpatient, last seen 10/2024     #NonMI Troponin Elevation- Acute non-traumatic myocardial injury in the setting of acute blood loss, known CAD, and HF  -Core measures do not apply; no clinical evidence of overt ischemia, patient denies chest pain  -Troponin level peaked at 7200, no indication to continue to trend    #CAD- s/p CABGx5 with occluded grafts per [facility] 4/2024, planning on DAPT with ASA and Plavix for at least one year    #Chronic Systolic HF- Not overtly hypervolemic on exam  -BNP 3417 [chronically elevated], CXR without pulmonary edema  -Diuresing with IV Lasix " 40mg daily, home diuretic Lasix 40mg PRN  -GDMT: Metoprolol Succinate 50mg daily, Entresto 24-26mg 0.5 tablets BID, and Farxiga 10mg daily    #TAYLOR on CKD III- Etiology likely prerenal d/t acute anemia    #Sinus Tachycardia- No evidence of pAF or atrial flutter, appears to be ST per ECG, likely in the setting of acute anemia and Beta blockers being held.   -HR 70s per tele since admission  -No indicated for anticoagulation at this time      #HTN- BP acceptable in current circumstances, c/w home medications     #HLD- on statin therapy and Vascepa        RECOMMENDATIONS:  -c/w ASA at this time  -Hold Farxiga and Entresto in the setting of TAYLOR, resume whn back to baseline   -Continue all other home CV medications as above  -Diuresis with IV Lasix PRN, does not need daily dosing with current volume status  -Continuous telemetry monitoring  -Monitor electrolytes, replete for K <4 and Mg <2  -Daily wts, strict I&Os, low sodium diet  -No further inpatient cardiac testing is planned.   -Follow up with O/P Cardiologist in 2-3 weeks to discuss continued use of DAPT    Disposition planning as per primary service   No Cardiac Barriers to discharge.     Cardiology to sign off, call if any questions or concerns.       Code Status:  Full Code        Mahendra Santacruz DO   Division of Cardiovascular Medicine  [facility]

## 2024-12-27 NOTE — PROGRESS NOTES
Pharmacy Medication History     Source of Information: patient    Additional concerns with the patient's PTA list.   N/a  The following updates were made to the Prior to Admission medication list:     Medications ADDED:   N/a  Medications CHANGED:  N/a  Medications REMOVED:   N/a  Medications NOT TAKING:   N/a    Allergy reviewed : Yes    Meds 2 Beds : No    Outpatient pharmacy confirmed and updated in chart : Yes    Pharmacy name: [facility]    The list below reflectives the updated PTA list. Please review each medication in order reconciliation for additional clarification and justification.    Prior to Admission Medications   Prescriptions Last Dose Informant   albuterol (ProAir HFA) 90 mcg/actuation inhaler Unknown Self   Sig: Inhale 2 puffs every 6 hours if needed for wheezing or shortness of breath.   allopurinol (Zyloprim) 100 mg tablet Unknown Self   Sig: Take 1 tablet (100 mg) by mouth once daily.   Patient taking differently: Take 0.5 tablets (50 mg) by mouth once daily.   amLODIPine (Norvasc) 10 mg tablet Unknown Self   Sig: TAKE 1 TABLET BY MOUTH EVERY DAY   aspirin 81 mg EC tablet Unknown Self   Sig: TAKE 1 TABLET BY MOUTH EVERY DAY   atorvastatin (Lipitor) 80 mg tablet Unknown Self   Sig: TAKE 1 TABLET BY MOUTH EVERY DAY   b complex vitamins capsule Unknown Self   Sig: Take 1 capsule by mouth once daily.   clopidogrel (Plavix) 75 mg tablet Unknown Self   Sig: Take 1 tablet (75 mg) by mouth once daily.   coenzyme Q-10 (CoQ-10) 100 mg capsule Unknown Self   Sig: Take 1 capsule (100 mg) by mouth once daily.   cyanocobalamin (Vitamin B-12) 1,000 mcg tablet Unknown Self   Sig: Take 1 tablet (1,000 mcg) by mouth once daily. As directed   dapagliflozin (Farxiga) 10 mg Unknown Self   Sig: Take 1 tablet (10 mg) by mouth once daily in the evening.  Take before meals.   famotidine (Pepcid) 20 mg tablet Unknown Self   Sig: TAKE 1 TABLET (20 MG) BY MOUTH ONCE DAILY AS NEEDED FOR INDIGESTION.   furosemide (Lasix)  40 mg tablet Unknown Self   Sig: Take 1 tablet (40 mg) by mouth once daily. As needed for weight gain greater than 3 pounds   gabapentin (Neurontin) 100 mg capsule Unknown Self   Sig: TAKE 1 CAPSULE (100 MG) BY MOUTH ONCE DAILY AT BEDTIME.   icosapent ethyL (Vascepa) 0.5 gram capsule Unknown Self   Sig: Take 2 capsules (1 g) by mouth 2 times a day with meals.   loratadine (Claritin) 10 mg tablet Unknown Self   Sig: Take 1 tablet (10 mg) by mouth 2 times a day. Take 1 tablet every morning and 1 tablet every evening   magnesium gluconate (Magonate) 27.5 mg magne- sium (500 mg) tablet Unknown Self   Sig: Take 1 tablet (27.5 mg) by mouth once daily.   metoprolol succinate XL (Toprol-XL) 50 mg 24 hr tablet Unknown Self   Sig: TAKE 1 TABLET BY MOUTH EVERY DAY   multivitamin tablet Unknown Self   Sig: Take 1 tablet by mouth once daily.   sacubitriL-valsartan (Entresto) 24-26 mg tablet  Self   Sig: Take 0.5 tablets by mouth 2 times a day.   Patient taking differently: Take 1 tablet by mouth 2 times a day.   sertraline (Zoloft) 50 mg tablet Unknown Self   Sig: TAKE ONE TABLET DAILY IN THE MORNING AFTER BREAKFAST.   sildenafil (Viagra) 100 mg tablet Unknown Self   Sig: Take 1 tablet (100 mg) by mouth if needed for erectile dysfunction (Start with half tab. Take 1 hour prior to intercourse on an empty stomach. If you have an erection for over 4 hours, please go to the emergency room.).      Facility-Administered Medications: None       The list below reflectives the updated allergy list. Please review each documented allergy for additional clarification and justification.    No Known Allergies       12/27/24 at 12:54 PM - Anish Nixon

## 2024-12-27 NOTE — PROGRESS NOTES
GI Daily Progress Note    Assessment/Plan:    Assessment & Plan  Non-STEMI (non-ST elevated myocardial infarction) (Multi)    74 y.o.with h/o CAD, PAD, subclavian artery stenosis, systolic CHF, HTN, asthma, SENIA, MASH cirrhosis, GERD, CKD 3, TIA, dementia, DM, BPH, gout, cervical stenosis/neuropathy, allergic rhinitis, depression, anxiety, presented with altered mental status, frequent falls. GI consulted for anemia and melena, suspect in the setting of antiplatelet therapy, possibly due to blood loss secondary to portal hypertensive gastropathy, duodenal ulcer, less likely variceal bleeding.  No asterixis on exam today.    -Continue to monitor H&H, transfuse as needed to keep above 7  -Continue aspirin, hold Plavix if possible  -Twice daily PPI  -Repeat EGD in 1 year  -Follow-up with hepatology after discharge  GI will sign off, please call if any questions or further assistance needed.       LOS: 2 days     Ritesh Garza is a 74 y.o. male who was admitted with Non-STEMI (non-ST elevated myocardial infarction) (Multi). He reports he is feeling better.  The wife is at the bedside, patient appears confused, reported falls.  H&H stable, had brown stool last night.      EGD yesterday  Z-line 37 cm from the incisors  Question of trace esophageal varices  Mild erythematous and mosaic mucosa in the body of the stomach. Findings suggestive of mild portal hypertensive gastropathy  Single 20 mm cratered, round ulcer in the duodenal bulb with clean base (Serafin III)  The 1st part of the duodenum and 2nd part of the duodenum appeared normal.     Subjective:    Patient expresses no new complaints  Patient denies abdominal pain, nausea or vomiting.    Objective:    Vital signs in last 24 hours:  Temp:  [36.2 °C (97.2 °F)-37 °C (98.6 °F)] 36.9 °C (98.4 °F)  Heart Rate:  [] 76  Resp:  [13-17] 17  BP: ()/(58-86) 129/61    Intake/Output last 3 shifts:  I/O last 3 completed shifts:  In: 280 (4.5 mL/kg) [P.O.:280]  Out:  - (0 mL/kg)   Weight: 62.5 kg   Intake/Output this shift:  I/O this shift:  In: 240 [P.O.:240]  Out: -     Physical Exam  Constitutional:       General: He is not in acute distress.     Appearance: He is ill-appearing.   Cardiovascular:      Rate and Rhythm: Normal rate and regular rhythm.   Pulmonary:      Breath sounds: Normal breath sounds.   Abdominal:      General: Bowel sounds are normal. There is no distension.      Palpations: Abdomen is soft.      Tenderness: There is no abdominal tenderness. There is no guarding.   Neurological:      General: No focal deficit present.      Mental Status: He is alert. He is disoriented.      Comments: No asterixis      Psychiatric:         Mood and Affect: Mood normal.         Behavior: Behavior normal.          Results for orders placed or performed during the hospital encounter of 12/25/24 (from the past 24 hours)   POCT GLUCOSE   Result Value Ref Range    POCT Glucose 199 (H) 74 - 99 mg/dL   POCT GLUCOSE   Result Value Ref Range    POCT Glucose 278 (H) 74 - 99 mg/dL   POCT GLUCOSE   Result Value Ref Range    POCT Glucose 147 (H) 74 - 99 mg/dL   POCT GLUCOSE   Result Value Ref Range    POCT Glucose 159 (H) 74 - 99 mg/dL   Ammonia   Result Value Ref Range    Ammonia 24 16 - 53 umol/L   CBC   Result Value Ref Range    WBC 13.5 (H) 4.4 - 11.3 x10*3/uL    nRBC 0.0 0.0 - 0.0 /100 WBCs    RBC 3.07 (L) 4.50 - 5.90 x10*6/uL    Hemoglobin 9.0 (L) 13.5 - 17.5 g/dL    Hematocrit 28.4 (L) 41.0 - 52.0 %    MCV 93 80 - 100 fL    MCH 29.3 26.0 - 34.0 pg    MCHC 31.7 (L) 32.0 - 36.0 g/dL    RDW 18.0 (H) 11.5 - 14.5 %    Platelets 281 150 - 450 x10*3/uL   Basic Metabolic Panel   Result Value Ref Range    Glucose 151 (H) 74 - 99 mg/dL    Sodium 141 136 - 145 mmol/L    Potassium 4.6 3.5 - 5.3 mmol/L    Chloride 109 (H) 98 - 107 mmol/L    Bicarbonate 21 21 - 32 mmol/L    Anion Gap 16 10 - 20 mmol/L    Urea Nitrogen 35 (H) 6 - 23 mg/dL    Creatinine 2.02 (H) 0.50 - 1.30 mg/dL    eGFR 34 (L)  >60 mL/min/1.73m*2    Calcium 7.9 (L) 8.6 - 10.3 mg/dL   POCT GLUCOSE   Result Value Ref Range    POCT Glucose 135 (H) 74 - 99 mg/dL     *Note: Due to a large number of results and/or encounters for the requested time period, some results have not been displayed. A complete set of results can be found in Results Review.

## 2024-12-27 NOTE — CARE PLAN
The patient's goals for the shift include      The clinical goals for the shift include Pt will remain safe throughout shift.      Problem: Pain - Adult  Goal: Verbalizes/displays adequate comfort level or baseline comfort level  Outcome: Progressing     Problem: Safety - Adult  Goal: Free from fall injury  Outcome: Progressing     Problem: Discharge Planning  Goal: Discharge to home or other facility with appropriate resources  Outcome: Progressing     Problem: Chronic Conditions and Co-morbidities  Goal: Patient's chronic conditions and co-morbidity symptoms are monitored and maintained or improved  Outcome: Progressing     Problem: Skin  Goal: Decreased wound size/increased tissue granulation at next dressing change  Outcome: Progressing  Goal: Participates in plan/prevention/treatment measures  Outcome: Progressing  Goal: Prevent/manage excess moisture  Outcome: Progressing  Goal: Prevent/minimize sheer/friction injuries  Outcome: Progressing  Flowsheets (Taken 12/27/2024 1610)  Prevent/minimize sheer/friction injuries: Complete micro-shifts as needed if patient unable. Adjust patient position to relieve pressure points, not a full turn  Goal: Promote/optimize nutrition  Outcome: Progressing  Goal: Promote skin healing  Outcome: Progressing

## 2024-12-27 NOTE — CARE PLAN
Problem: Pain - Adult  Goal: Verbalizes/displays adequate comfort level or baseline comfort level  Outcome: Progressing     Problem: Safety - Adult  Goal: Free from fall injury  Outcome: Progressing     Problem: Discharge Planning  Goal: Discharge to home or other facility with appropriate resources  Outcome: Progressing     Problem: Chronic Conditions and Co-morbidities  Goal: Patient's chronic conditions and co-morbidity symptoms are monitored and maintained or improved  Outcome: Progressing   The patient's goals for the shift include      The clinical goals for the shift include Pt safety will be maintained in duration of the shift    Over the shift, the patient did make progress toward the following goals.

## 2024-12-28 LAB
ANION GAP SERPL CALC-SCNC: 12 MMOL/L (ref 10–20)
ATRIAL RATE: 68 BPM
BUN SERPL-MCNC: 30 MG/DL (ref 6–23)
CALCIUM SERPL-MCNC: 7.7 MG/DL (ref 8.6–10.3)
CHLORIDE SERPL-SCNC: 110 MMOL/L (ref 98–107)
CO2 SERPL-SCNC: 23 MMOL/L (ref 21–32)
CREAT SERPL-MCNC: 1.94 MG/DL (ref 0.5–1.3)
EGFRCR SERPLBLD CKD-EPI 2021: 35 ML/MIN/1.73M*2
ERYTHROCYTE [DISTWIDTH] IN BLOOD BY AUTOMATED COUNT: 17.7 % (ref 11.5–14.5)
GLUCOSE BLD MANUAL STRIP-MCNC: 104 MG/DL (ref 74–99)
GLUCOSE BLD MANUAL STRIP-MCNC: 138 MG/DL (ref 74–99)
GLUCOSE BLD MANUAL STRIP-MCNC: 155 MG/DL (ref 74–99)
GLUCOSE BLD MANUAL STRIP-MCNC: 198 MG/DL (ref 74–99)
GLUCOSE BLD MANUAL STRIP-MCNC: 243 MG/DL (ref 74–99)
GLUCOSE BLD MANUAL STRIP-MCNC: 249 MG/DL (ref 74–99)
GLUCOSE SERPL-MCNC: 150 MG/DL (ref 74–99)
HCT VFR BLD AUTO: 27.8 % (ref 41–52)
HGB BLD-MCNC: 8.5 G/DL (ref 13.5–17.5)
MCH RBC QN AUTO: 28.5 PG (ref 26–34)
MCHC RBC AUTO-ENTMCNC: 30.6 G/DL (ref 32–36)
MCV RBC AUTO: 93 FL (ref 80–100)
NRBC BLD-RTO: 0 /100 WBCS (ref 0–0)
PLATELET # BLD AUTO: 251 X10*3/UL (ref 150–450)
POTASSIUM SERPL-SCNC: 4.5 MMOL/L (ref 3.5–5.3)
Q ONSET: 212 MS
QRS COUNT: 20 BEATS
QRS DURATION: 144 MS
QT INTERVAL: 410 MS
QTC CALCULATION(BAZETT): 584 MS
QTC FREDERICIA: 519 MS
R AXIS: -57 DEGREES
RBC # BLD AUTO: 2.98 X10*6/UL (ref 4.5–5.9)
SODIUM SERPL-SCNC: 140 MMOL/L (ref 136–145)
T AXIS: 83 DEGREES
T OFFSET: 417 MS
VENTRICULAR RATE: 122 BPM
WBC # BLD AUTO: 10 X10*3/UL (ref 4.4–11.3)

## 2024-12-28 PROCEDURE — 1200000002 HC GENERAL ROOM WITH TELEMETRY DAILY

## 2024-12-28 PROCEDURE — 97163 PT EVAL HIGH COMPLEX 45 MIN: CPT | Mod: GP

## 2024-12-28 PROCEDURE — 2500000001 HC RX 250 WO HCPCS SELF ADMINISTERED DRUGS (ALT 637 FOR MEDICARE OP): Performed by: PHARMACIST

## 2024-12-28 PROCEDURE — 97110 THERAPEUTIC EXERCISES: CPT | Mod: GP

## 2024-12-28 PROCEDURE — 82947 ASSAY GLUCOSE BLOOD QUANT: CPT

## 2024-12-28 PROCEDURE — 94640 AIRWAY INHALATION TREATMENT: CPT

## 2024-12-28 PROCEDURE — 99232 SBSQ HOSP IP/OBS MODERATE 35: CPT | Performed by: INTERNAL MEDICINE

## 2024-12-28 PROCEDURE — 97165 OT EVAL LOW COMPLEX 30 MIN: CPT | Mod: GO

## 2024-12-28 PROCEDURE — 36415 COLL VENOUS BLD VENIPUNCTURE: CPT | Performed by: INTERNAL MEDICINE

## 2024-12-28 PROCEDURE — 2500000001 HC RX 250 WO HCPCS SELF ADMINISTERED DRUGS (ALT 637 FOR MEDICARE OP): Performed by: INTERNAL MEDICINE

## 2024-12-28 PROCEDURE — 2500000002 HC RX 250 W HCPCS SELF ADMINISTERED DRUGS (ALT 637 FOR MEDICARE OP, ALT 636 FOR OP/ED): Performed by: INTERNAL MEDICINE

## 2024-12-28 PROCEDURE — 85027 COMPLETE CBC AUTOMATED: CPT | Performed by: INTERNAL MEDICINE

## 2024-12-28 PROCEDURE — 2500000004 HC RX 250 GENERAL PHARMACY W/ HCPCS (ALT 636 FOR OP/ED): Performed by: INTERNAL MEDICINE

## 2024-12-28 PROCEDURE — 80048 BASIC METABOLIC PNL TOTAL CA: CPT | Performed by: INTERNAL MEDICINE

## 2024-12-28 RX ADMIN — ICOSAPENT ETHYL 1 G: 1 CAPSULE ORAL at 10:22

## 2024-12-28 RX ADMIN — Medication 1 TABLET: at 10:22

## 2024-12-28 RX ADMIN — PANTOPRAZOLE SODIUM 40 MG: 40 INJECTION, POWDER, FOR SOLUTION INTRAVENOUS at 10:23

## 2024-12-28 RX ADMIN — ASPIRIN 81 MG: 81 TABLET, COATED ORAL at 10:23

## 2024-12-28 RX ADMIN — CETIRIZINE HYDROCHLORIDE 10 MG: 10 TABLET, FILM COATED ORAL at 10:23

## 2024-12-28 RX ADMIN — ATORVASTATIN CALCIUM 80 MG: 80 TABLET, FILM COATED ORAL at 10:22

## 2024-12-28 RX ADMIN — ACETAMINOPHEN 650 MG: 325 TABLET, FILM COATED ORAL at 10:29

## 2024-12-28 RX ADMIN — GABAPENTIN 100 MG: 100 CAPSULE ORAL at 20:51

## 2024-12-28 RX ADMIN — INSULIN LISPRO 4 UNITS: 100 INJECTION, SOLUTION INTRAVENOUS; SUBCUTANEOUS at 13:25

## 2024-12-28 RX ADMIN — ALLOPURINOL 50 MG: 100 TABLET ORAL at 10:23

## 2024-12-28 RX ADMIN — INSULIN LISPRO 2 UNITS: 100 INJECTION, SOLUTION INTRAVENOUS; SUBCUTANEOUS at 17:38

## 2024-12-28 RX ADMIN — INSULIN LISPRO 4 UNITS: 100 INJECTION, SOLUTION INTRAVENOUS; SUBCUTANEOUS at 22:01

## 2024-12-28 RX ADMIN — METOPROLOL SUCCINATE 50 MG: 50 TABLET, EXTENDED RELEASE ORAL at 10:23

## 2024-12-28 RX ADMIN — AMLODIPINE BESYLATE 10 MG: 10 TABLET ORAL at 10:23

## 2024-12-28 RX ADMIN — ALBUTEROL SULFATE 2.5 MG: 2.5 SOLUTION RESPIRATORY (INHALATION) at 20:02

## 2024-12-28 RX ADMIN — ALBUTEROL SULFATE 2.5 MG: 2.5 SOLUTION RESPIRATORY (INHALATION) at 13:03

## 2024-12-28 RX ADMIN — ICOSAPENT ETHYL 1 G: 1 CAPSULE ORAL at 17:38

## 2024-12-28 RX ADMIN — Medication 1000 MCG: at 10:23

## 2024-12-28 RX ADMIN — PANTOPRAZOLE SODIUM 40 MG: 40 INJECTION, POWDER, FOR SOLUTION INTRAVENOUS at 20:51

## 2024-12-28 RX ADMIN — ALBUTEROL SULFATE 2.5 MG: 2.5 SOLUTION RESPIRATORY (INHALATION) at 07:32

## 2024-12-28 RX ADMIN — SERTRALINE HYDROCHLORIDE 50 MG: 50 TABLET ORAL at 10:22

## 2024-12-28 ASSESSMENT — COGNITIVE AND FUNCTIONAL STATUS - GENERAL
DRESSING REGULAR LOWER BODY CLOTHING: A LITTLE
DRESSING REGULAR UPPER BODY CLOTHING: A LOT
STANDING UP FROM CHAIR USING ARMS: A LITTLE
HELP NEEDED FOR BATHING: A LITTLE
DAILY ACTIVITIY SCORE: 21
EATING MEALS: A LITTLE
DAILY ACTIVITIY SCORE: 14
MOVING TO AND FROM BED TO CHAIR: A LITTLE
MOVING FROM LYING ON BACK TO SITTING ON SIDE OF FLAT BED WITH BEDRAILS: A LITTLE
CLIMB 3 TO 5 STEPS WITH RAILING: TOTAL
HELP NEEDED FOR BATHING: A LOT
CLIMB 3 TO 5 STEPS WITH RAILING: A LOT
TOILETING: A LITTLE
TOILETING: A LOT
WALKING IN HOSPITAL ROOM: A LITTLE
DRESSING REGULAR LOWER BODY CLOTHING: A LOT
MOBILITY SCORE: 19
WALKING IN HOSPITAL ROOM: A LOT
STANDING UP FROM CHAIR USING ARMS: A LITTLE
PERSONAL GROOMING: A LITTLE
MOBILITY SCORE: 15
MOVING TO AND FROM BED TO CHAIR: A LITTLE
TURNING FROM BACK TO SIDE WHILE IN FLAT BAD: A LITTLE

## 2024-12-28 ASSESSMENT — PAIN SCALES - GENERAL
PAINLEVEL_OUTOF10: 0 - NO PAIN
PAINLEVEL_OUTOF10: 2
PAINLEVEL_OUTOF10: 0 - NO PAIN
PAINLEVEL_OUTOF10: 0 - NO PAIN

## 2024-12-28 ASSESSMENT — PAIN - FUNCTIONAL ASSESSMENT
PAIN_FUNCTIONAL_ASSESSMENT: 0-10

## 2024-12-28 ASSESSMENT — PAIN DESCRIPTION - LOCATION: LOCATION: THROAT

## 2024-12-28 ASSESSMENT — PAIN SCALES - PAIN ASSESSMENT IN ADVANCED DEMENTIA (PAINAD): TOTALSCORE: MEDICATION (SEE MAR)

## 2024-12-28 ASSESSMENT — ACTIVITIES OF DAILY LIVING (ADL): ADL_ASSISTANCE: INDEPENDENT

## 2024-12-28 NOTE — PROGRESS NOTES
Physical Therapy    Physical Therapy Evaluation & Treatment    Patient Name: Ritesh Garza  MRN: [id number]  Department: [place] [id number]  Room: [address] [place] [address]  Today's Date: 12/28/2024   Time Calculation  Start Time: 0849  Stop Time: 0920  Time Calculation (min): 31 min    Assessment/Plan   PT Assessment  PT Assessment Results: Decreased endurance, Impaired balance, Decreased mobility, Decreased safety awareness, Decreased strength  Rehab Prognosis: Good  Evaluation/Treatment Tolerance: Patient tolerated treatment well  Medical Staff Made Aware: Yes  Barriers to Participation: Ability to acquire knowledge, Coping skills  End of Session Communication: Bedside nurse  Assessment Comment: Patient presents with decreased strength, endurance, functional mobility. Patient performed dynamic sitting balance exercises, 10 reps x 2 sets, able to perform sit to stand with Min(A) using RW, transfers with Min(A) with stand pivot, ambulates 10 feet with Mod(A) using RW. Patient will benefit from on going PT services to return to [facility].  End of Session Patient Position: Up in chair, Alarm on (caregiver present)   IP OR SWING BED PT PLAN  Inpatient or Swing Bed: Inpatient  PT Plan  Treatment/Interventions: Bed mobility, Transfer training, Gait training, Stair training, Balance training, Neuromuscular re-education, Strengthening, Endurance training, Therapeutic exercise, Therapeutic activity, Positioning  PT Plan: Ongoing PT  PT Frequency: 3 times per week  PT Discharge Recommendations: Moderate intensity level of continued care  PT Recommended Transfer Status: Assist x1  PT - OK to Discharge: Yes      Subjective     General Visit Information:  General  Reason for Referral: Frequent falls, AMS  Referred By: Rafita Peters DO  Past Medical History Relevant to Rehab:   Past Medical History:   Diagnosis Date    ADHD (attention deficit hyperactivity disorder) 1960’s    AICD (automatic cardioverter/defibrillator) present 05/30/2024    [facility]     Asthma     Benign prostatic hyperplasia Several years ago    CHF (congestive heart failure)     Cirrhosis (Multi)     CKD (chronic kidney disease)     Coronary artery disease Long Ago    Diabetes mellitus (Multi) Over 20 years ago    Erectile dysfunction     GERD (gastroesophageal reflux disease)     Gout     Hypertension Over 20 years ago    Ischemic cardiomyopathy     Kidney stone Several years ago    Myocardial infarction (Multi) Over 20 yesrs ago    SIDHU (nonalcoholic steatohepatitis)     SENIA (obstructive sleep apnea)     Stenosis of left subclavian artery     s/p stent 4/30/2024    Ventricular tachycardia (Multi)       Family/Caregiver Present: Yes  Co-Treatment: OT  Co-Treatment Reason: Maximize functional outcome and patient safety.  Prior to Session Communication: Bedside nurse  Patient Position Received: Bed, 3 rail up, Alarm on  Preferred Learning Style: kinesthetic, verbal  General Comment: Patient is confused, pleasant and agreeable to PT session.  Home Living:  Home Living  Type of Home: House  Lives With: Spouse  Home Adaptive Equipment: Cane  Home Layout: One level  Home Access: Stairs to enter with rails  Entrance Stairs-Number of Steps: 2  Bathroom Shower/Tub: Walk-in shower  Bathroom Toilet: Standard  Bathroom Equipment: None  Prior Level of Function:  Prior Function Per Pt/Caregiver Report  Level of [place]: Independent with ADLs and functional transfers, Independent with homemaking with ambulation  ADL Assistance: Independent  Homemaking Assistance: Independent  Ambulatory Assistance: Independent  Prior Function Comments: Patient is questionable historian.  Precautions:  Precautions  Medical Precautions: Fall precautions    Objective   Pain:  Pain Assessment  Pain Assessment: 0-10  0-10 (Numeric) Pain Score: 0 - No pain  Cognition:  Cognition  Overall Cognitive Status: Impaired  Orientation Level: Disoriented to situation    General Assessments:  Activity Tolerance  Endurance: Tolerates 30  min exercise with multiple rests    Strength  Strength Comments: BL LE WFL throughout.  Postural Control  Postural Control: Within Functional Limits    Static Sitting Balance  Static Sitting-Balance Support: Bilateral upper extremity supported, Feet supported  Static Sitting-Level of Assistance: Close supervision  Dynamic Sitting Balance  Dynamic Sitting-Balance Support: Bilateral upper extremity supported, Feet supported  Dynamic Sitting-Level of Assistance: Close supervision    Static Standing Balance  Static Standing-Balance Support: Bilateral upper extremity supported  Static Standing-Level of Assistance: Minimum assistance  Dynamic Standing Balance  Dynamic Standing-Balance Support: Bilateral upper extremity supported  Dynamic Standing-Level of Assistance: Minimum assistance  Functional Assessments:  Bed Mobility  Bed Mobility: Yes  Bed Mobility 1  Bed Mobility 1: Supine to sitting  Level of Assistance 1: Close supervision    Transfers  Transfer: Yes  Transfer 1  Technique 1: Sit to stand, Stand to sit  Transfer Device 1: Gait belt, Walker  Transfer Level of Assistance 1: Minimum assistance  Transfers 2  Transfer From 2: Sit to  Transfer to 2: Chair with drop arm  Technique 2: Stand pivot  Transfer Device 2: Walker, Gait belt  Transfer Level of Assistance 2: Minimum assistance    Ambulation/Gait Training  Ambulation/Gait Training Performed: Yes  Ambulation/Gait Training 1  Surface 1: Level tile  Device 1: Rolling walker  Gait Support Devices: Gait belt  Assistance 1: Moderate assistance  Quality of Gait 1: Inconsistent stride length, Decreased step length  Comments/Distance (ft) 1: 10  Extremity/Trunk Assessments:  RLE   RLE : Within Functional Limits  LLE   LLE : Within Functional Limits  Treatments:  Therapeutic Exercise  Therapeutic Exercise Performed: Yes  Therapeutic Exercise Activity 1: Ankle pumps, LAQ, 10 reps x 2 sets.  Outcome Measures:  [facility] Basic Mobility  Turning from your back to your side while  in a flat bed without using bedrails: A little  Moving from lying on your back to sitting on the side of a flat bed without using bedrails: A little  Moving to and from bed to chair (including a wheelchair): A little  Standing up from a chair using your arms (e.g. wheelchair or bedside chair): A little  To walk in hospital room: A lot  Climbing 3-5 steps with railing: Total  Basic Mobility - Total Score: 15    Encounter Problems       Encounter Problems (Active)       Mobility       STG - Patient will ambulate 95 feet with supervision with RW.       Start:  12/28/24    Expected End:  01/11/25            STG - Patient will ascend and descend 2 stairs with moderate assistance using rails.       Start:  12/28/24    Expected End:  01/11/25               PT Transfers       LTG - Patient will transfer from one surface to another with supervision.       Start:  12/28/24    Expected End:  01/11/25            STG - Patient will perform bed mobility with I.       Start:  12/28/24    Expected End:  01/11/25               Pain - Adult          Safety       LTG - Patient will demonstrate safety requirements appropriate to situation/environment       Start:  12/28/24    Expected End:  01/11/25                   Education Documentation  Home Exercise Program, taught by Tyler Yanez PT at 12/28/2024 10:38 AM.  Learner: Patient  Readiness: Acceptance  Method: Explanation  Response: Verbalizes Understanding    Body Mechanics, taught by Tyler Yanez PT at 12/28/2024 10:38 AM.  Learner: Patient  Readiness: Acceptance  Method: Explanation  Response: Verbalizes Understanding    Precautions, taught by Tyler Yanez PT at 12/28/2024 10:38 AM.  Learner: Patient  Readiness: Acceptance  Method: Explanation  Response: Verbalizes Understanding    ADL Training, taught by Tyler Yanez PT at 12/28/2024 10:38 AM.  Learner: Patient  Readiness: Acceptance  Method: Explanation  Response: Verbalizes Understanding    Education  Comments  No comments found.

## 2024-12-28 NOTE — PROGRESS NOTES
"Ritseh Garza is a 74 y.o. male on day 3 of admission presenting with Non-STEMI (non-ST elevated myocardial infarction) (Multi).    Subjective   No acute events overnight. Up to the chair this AM. More alert today.        Objective     VITALS  Blood pressure (!) 102/49, pulse 61, temperature 37.1 °C (98.8 °F), temperature source Temporal, resp. rate 17, height 1.6 m (5' 3\"), weight 62.5 kg (137 lb 12.6 oz), SpO2 95%.  Physical Exam  Vitals and nursing note reviewed.   Constitutional:       General: He is not in acute distress.     Appearance: Normal appearance. He is not ill-appearing.   HENT:      Head: Normocephalic and atraumatic.      Mouth/Throat:      Mouth: Mucous membranes are moist.      Pharynx: Oropharynx is clear.   Eyes:      Extraocular Movements: Extraocular movements intact.      Conjunctiva/sclera: Conjunctivae normal.   Cardiovascular:      Rate and Rhythm: Normal rate and regular rhythm.      Heart sounds: Normal heart sounds. No murmur heard.     No friction rub. No gallop.   Pulmonary:      Effort: Pulmonary effort is normal.      Breath sounds: Normal breath sounds. No wheezing or rales.   Abdominal:      General: Bowel sounds are normal. There is no distension.      Palpations: Abdomen is soft.      Tenderness: There is no abdominal tenderness. There is no guarding.   Musculoskeletal:         General: No swelling or tenderness.      Right lower leg: No edema.      Left lower leg: No edema.   Skin:     General: Skin is warm and dry.      Capillary Refill: Capillary refill takes less than 2 seconds.   Neurological:      General: No focal deficit present.      Mental Status: He is alert. He is disoriented.   Psychiatric:         Mood and Affect: Mood normal.           Intake/Output last 3 Shifts:  I/O last 3 completed shifts:  In: 290 (4.6 mL/kg) [P.O.:290]  Out: - (0 mL/kg)   Weight: 62.5 kg     Relevant Results  Results for orders placed or performed during the hospital encounter of 12/25/24 " (from the past 24 hours)   POCT GLUCOSE   Result Value Ref Range    POCT Glucose 246 (H) 74 - 99 mg/dL   POCT GLUCOSE   Result Value Ref Range    POCT Glucose 178 (H) 74 - 99 mg/dL   POCT GLUCOSE   Result Value Ref Range    POCT Glucose 104 (H) 74 - 99 mg/dL   POCT GLUCOSE   Result Value Ref Range    POCT Glucose 155 (H) 74 - 99 mg/dL   CBC   Result Value Ref Range    WBC 10.0 4.4 - 11.3 x10*3/uL    nRBC 0.0 0.0 - 0.0 /100 WBCs    RBC 2.98 (L) 4.50 - 5.90 x10*6/uL    Hemoglobin 8.5 (L) 13.5 - 17.5 g/dL    Hematocrit 27.8 (L) 41.0 - 52.0 %    MCV 93 80 - 100 fL    MCH 28.5 26.0 - 34.0 pg    MCHC 30.6 (L) 32.0 - 36.0 g/dL    RDW 17.7 (H) 11.5 - 14.5 %    Platelets 251 150 - 450 x10*3/uL   Basic Metabolic Panel   Result Value Ref Range    Glucose 150 (H) 74 - 99 mg/dL    Sodium 140 136 - 145 mmol/L    Potassium 4.5 3.5 - 5.3 mmol/L    Chloride 110 (H) 98 - 107 mmol/L    Bicarbonate 23 21 - 32 mmol/L    Anion Gap 12 10 - 20 mmol/L    Urea Nitrogen 30 (H) 6 - 23 mg/dL    Creatinine 1.94 (H) 0.50 - 1.30 mg/dL    eGFR 35 (L) >60 mL/min/1.73m*2    Calcium 7.7 (L) 8.6 - 10.3 mg/dL   POCT GLUCOSE   Result Value Ref Range    POCT Glucose 138 (H) 74 - 99 mg/dL   POCT GLUCOSE   Result Value Ref Range    POCT Glucose 243 (H) 74 - 99 mg/dL       Imaging Results  Transthoracic Echo (TTE) Limited   Final Result          Medications:  albuterol, 2.5 mg, nebulization, TID  allopurinol, 50 mg, oral, Daily  amLODIPine, 10 mg, oral, Daily  aspirin, 81 mg, oral, Daily  atorvastatin, 80 mg, oral, Daily  B complex-vitamin C, 1 tablet, oral, Daily  cetirizine, 10 mg, oral, Daily  [Held by provider] clopidogrel, 75 mg, oral, Daily  cyanocobalamin, 1,000 mcg, oral, Daily  [Held by provider] dapagliflozin propanediol, 10 mg, oral, Daily before evening meal  gabapentin, 100 mg, oral, Nightly  icosapent ethyL, 1 g, oral, BID  insulin lispro, 0-10 Units, subcutaneous, q4h DIVINE  metoprolol succinate XL, 50 mg, oral, Daily  OLANZapine, 5 mg,  intramuscular, Once  pantoprazole, 40 mg, intravenous, BID  [Held by provider] sacubitriL-valsartan, 0.5 tablet, oral, BID  sertraline, 50 mg, oral, Daily       PRN medications: acetaminophen **OR** acetaminophen **OR** acetaminophen, albuterol, dextrose, dextrose, glucagon, glucagon, metoprolol, ondansetron **OR** ondansetron        Assessment/Plan          Assessment & Plan  Non-STEMI (non-ST elevated myocardial infarction) (Multi)    Acute non-STEMI / Known CAD s/p PCI with GINNY on DAPT  -Telemetry  -Cardiology consultation  -Aspirin 81 mg orally daily  -Plavix 75 mg orally daily  -Atorvastatin 80 mg orally daily  - Discussed with Dr. Santacruz likely no further work up while in house    Chronic systolic CHF, compensated  -Farxiga 10 mg orally daily  -Lasix 40 mg IV every 24 hours  -Continue Entresto  -Cardiology  -Telemetry  -Echocardiogram stable from previous with EF of 33%     Duodenal Ulcer seen on EGD causing acute on chronic anemia   -Protonix 40 mg IV every 12 hours  -GI consultation  -Recommend holding Plavix per GI, continue ASA     Hyperlipidemia  -High intensity atorvastatin 80 mg orally daily     Hypertension  -Amlodipine 10 mg orally daily  -Metoprolol XL 50 mg orally daily     Paroxysmal atrial flutter  -Metoprolol XL 50 mg orally daily  -Telemetry  -Cardiology     Type 2 diabetes mellitus  -Lispro per corrective scale     CKD 3  -Trend creatinine especially with diuresis     Ataxia with recurrent falls  -High-grade stenosis or areas of occlusion suggested on imaging.  -Patient apparently has not a thrombectomy candidate     GERD  -Protonix       DVT Prophylaxis:  NA In the setting of concern for GI bleed  SCDs     Disposition:  Anticipate placement, suspect will be ready in the next day or so.     Rafita Peters, DO [facility]

## 2024-12-28 NOTE — PROGRESS NOTES
Occupational Therapy    Evaluation    Patient Name: Ritesh Garza  MRN: [id number]  Department: [place] [address]  Room: [address] [place] [address]  Today's Date: 12/28/2024  Time Calculation  Start Time: 0850  Stop Time: 0919  Time Calculation (min): 29 min    Assessment  IP OT Assessment  OT Assessment: Pt demonstrating deficits in functional mobility, strength, and endurance resulting in inability to safely complete ADLs. Pt requires moderate intensity rehabilitation to regain PLOF.  Prognosis: Good  Barriers to Discharge Home: Caregiver assistance, Cognition needs  Caregiver Assistance: Caregiver assistance needed per identified barriers - however, level of patient's required assistance exceeds assistance available at home  Cognition Needs: Medication and/or medical management daily assist needed, Insight of patient limited regarding functional ability/needs, Cognition-related high falls risk  Evaluation/Treatment Tolerance: Patient tolerated treatment well  Medical Staff Made Aware: Yes  End of Session Communication: Bedside nurse  End of Session Patient Position: Up in chair, Alarm on (Sitter present at bedside.)  Plan:  Treatment Interventions: ADL retraining, Functional transfer training, Endurance training, UE strengthening/ROM, Cognitive reorientation, Patient/family training, Compensatory technique education  OT Frequency: 3 times per week  OT Discharge Recommendations: Moderate intensity level of continued care  OT Recommended Transfer Status: Minimal assist, Assist of 2  OT - OK to Discharge: Yes (Per OT POC)    Subjective   Current Problem:  1. Non-STEMI (non-ST elevated myocardial infarction) (Multi)  Transthoracic Echo (TTE) Limited    Transthoracic Echo (TTE) Limited      2. Atherosclerosis of coronary artery bypass graft of native heart without angina pectoris  Transthoracic Echo (TTE) Limited    Transthoracic Echo (TTE) Limited      3. Left ventricular systolic dysfunction (LVSD)  Transthoracic Echo (TTE) Limited     Transthoracic Echo (TTE) Limited      4. Upper gastrointestinal bleeding  Esophagogastroduodenoscopy (EGD)    Esophagogastroduodenoscopy (EGD)      5. 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 Portal hypertension (Multi)  Esophagogastroduodenoscopy (EGD)    Esophagogastroduodenoscopy (EGD)        General:  General  Reason for Referral: presented to [facility] ER for acute confusional state/altered mental status and recurrent mechanical falls.  Laboratory evaluation showed a glucose of 177 sodium 140 potassium 4.8 BUN 42 creatinine 1.83 troponin was 1450 BNP was 3417 white blood cell count 13.8 hemoglobin was initially very low 6.8 and the patient did not complain of melena he was transfused to a level of 9.0 with hematocrit of 28.9.  In the ER the patient did have atrial flutter with RVR which responded well to metoprolol for heart rate control.  The patient had ataxia with recurrent falls in addition to GI bleeding.  Imaging showed narrowing of the intracranial vessels but he was felt to be not a thrombectomy candidate.  He was accepted in transfer to stepdown unit 4 days ago but due to bed availability he was not transferred until this morning.  He was transferred from [facility] to [facility] for GI consultation and cardiac evaluation as well as neurology  Referred By: Rafita Peters DO  Past Medical History Relevant to Rehab:   Past Medical History:   Diagnosis Date    ADHD (attention deficit hyperactivity disorder) 1960’s    AICD (automatic cardioverter/defibrillator) present 05/30/2024    [facility]    Asthma     Benign prostatic hyperplasia Several years ago    CHF (congestive heart failure)     Cirrhosis (Multi)     CKD (chronic kidney disease)     Coronary artery disease Long Ago    Diabetes mellitus (Multi) Over 20 years ago    Erectile dysfunction     GERD (gastroesophageal reflux disease)     Gout      Hypertension Over 20 years ago    Ischemic cardiomyopathy     Kidney stone Several years ago    Myocardial infarction (Multi) Over 20 yesrs ago    SIDHU (nonalcoholic steatohepatitis)     SENIA (obstructive sleep apnea)     Stenosis of left subclavian artery     s/p stent 4/30/2024    Ventricular tachycardia (Multi)        Family/Caregiver Present: No  Co-Treatment: PT  Co-Treatment Reason: Maximize functional outcome and patient safety  Prior to Session Communication: Bedside nurse  Patient Position Received: Bed, 3 rail up, Alarm on  Preferred Learning Style: kinesthetic, verbal  General Comment: Pt pleasantly confused, agreeable to therapy  Precautions:  Medical Precautions: Fall precautions    Pain:  Pain Assessment  Pain Assessment: 0-10  0-10 (Numeric) Pain Score: 0 - No pain    Objective   Cognition:  Overall Cognitive Status: Impaired  Orientation Level: Disoriented to situation, Disoriented to place, Disoriented to time, Other (Comment) (Knew month and year, disoriented to time of day. Believed it was evening when 9am.)  Attention: Exceptions to WFL  Alternating Attention: Impaired  Divided Attention: Impaired  Memory: Exceptions to WFL  Short-Term Memory: Impaired  Safety/Judgement: Exceptions to WFL  Complex Functional Tasks: Maximal  Routine Tasks: Minimal  Unable to Self-Monitor and Self-Correct Consistently: Minimal  Insight: Moderate  Impulsive: Moderately  Task Initiation: Initiates with cues     Home Living:  Type of Home: House  Lives With: Spouse  Home Adaptive Equipment: Cane  Home Layout: One level  Home Access: Stairs to enter with rails  Entrance Stairs-Number of Steps: 2  Bathroom Shower/Tub: Walk-in shower  Bathroom Toilet: Standard  Bathroom Equipment: None     Prior Function:  Level of [place]: Independent with ADLs and functional transfers  Hand Dominance: Right  Prior Function Comments: Pt questionable historian. Reports IND in ADLs/shares IADLs with spouse, (+) working in sales,  (+) driving, PRN use of cane.     ADL:  Eating Assistance: Stand by  Eating Deficit: Setup  LE Dressing Assistance: Moderate  LE Dressing Deficit: Don/doff L sock, Don/doff R sock, Increased time to complete, Steadying  Activity Tolerance:  Endurance: Tolerates 10 - 20 min exercise with multiple rests  Bed Mobility/Transfers: Bed Mobility  Bed Mobility: Yes  Bed Mobility 1  Bed Mobility 1: Supine to sitting  Level of Assistance 1: Close supervision    Transfers  Transfer: Yes  Transfer 1  Transfer From 1: Sit to  Transfer to 1: Stand  Technique 1: Sit to stand  Transfer Device 1: Gait belt  Transfer Level of Assistance 1: Minimum assistance, +2  Transfers 2  Transfer From 2: Stand to  Transfer to 2: Chair with arms  Transfer Device 2: Gait belt, Walker  Transfer Level of Assistance 2: Minimum assistance, +2      Functional Mobility:  Functional Mobility  Functional Mobility Performed: Yes  Functional Mobility 1  Surface 1: Level tile  Device 1: Rolling walker  Functional Mobility Support Devices: Gait belt  Assistance 1: Moderate assistance  Comments 1: x2  Sitting Balance:  Static Sitting Balance  Static Sitting-Balance Support: Feet supported, Bilateral upper extremity supported  Static Sitting-Level of Assistance: Close supervision  Dynamic Sitting Balance  Dynamic Sitting-Balance Support: Right upper extremity supported, Left upper extremity supported  Dynamic Sitting-Level of Assistance: Close supervision     Vision: Vision - Basic Assessment  Current Vision: Wears glasses all the time    Hand Function:  Hand Function  Gross Grasp: Functional  Coordination: Functional  Extremities: RUE   RUE : Within Functional Limits and LUE   LUE: Within Functional Limits    Outcome Measures: [facility] Daily Activity  Putting on and taking off regular lower body clothing: A lot  Bathing (including washing, rinsing, drying): A lot  Putting on and taking off regular upper body clothing: A lot  Toileting, which includes using  toilet, bedpan or urinal: A lot  Taking care of personal grooming such as brushing teeth: A little  Eating Meals: A little  Daily Activity - Total Score: 14    Education Documentation  Home Exercise Program, taught by Meliza Stanford OT at 12/28/2024  9:48 AM.  Learner: Patient  Readiness: Acceptance  Method: Explanation, Demonstration  Response: Verbalizes Understanding    Body Mechanics, taught by Meliza Stanford OT at 12/28/2024  9:48 AM.  Learner: Patient  Readiness: Acceptance  Method: Explanation, Demonstration  Response: Verbalizes Understanding    Precautions, taught by Meliza Stanford OT at 12/28/2024  9:48 AM.  Learner: Patient  Readiness: Acceptance  Method: Explanation, Demonstration  Response: Verbalizes Understanding    ADL Training, taught by Meliza Stanford OT at 12/28/2024  9:48 AM.  Learner: Patient  Readiness: Acceptance  Method: Explanation, Demonstration  Response: Verbalizes Understanding    Goals:   Encounter Problems       Encounter Problems (Active)       Bathing       STG - Patient will bathe body       Start:  12/28/24    Expected End:  01/11/25               Dressing Upper Extremities       STG - Patient will dress upper body       Start:  12/28/24    Expected End:  01/11/25               Dressings Lower Extremities       STG - Patient will complete lower body dressing       Start:  12/28/24    Expected End:  01/11/25               Functional Balance       LTG - Patient will maintain balance to allow for safe mobility       Start:  12/28/24    Expected End:  01/11/25            STG - Maintains dynamic standing balance with upper extremity support       Start:  12/28/24    Expected End:  01/11/25       INTERVENTIONS:  1. Practice standing with minimal support.  2. Educate patient about standing tolerance.  3. Educate patient about independence with gait, transfers, and ADL's.  4. Educate patient about use of assistive device.  5. Educate patient about self-directed care.         STG - Maintains  static standing balance with upper extremity support       Start:  12/28/24    Expected End:  01/11/25       INTERVENTIONS:  1. Practice standing with minimal support.  2. Educate patient about standing tolerance.  3. Educate patient about independence with gait, transfers, and ADL's.  4. Educate patient about use of assistive device.  5. Educate patient about self-directed care.            Functional Mobility       LTG - Patient will ambulate household distance       Start:  12/28/24    Expected End:  01/11/25               Grooming       LTG - Patient will complete daily grooming tasks       Start:  12/28/24    Expected End:  01/11/25               OT Transfers       LTG - Patient will demonstrate safe transfer techniques       Start:  12/28/24    Expected End:  01/11/25            STG - Transfer from bed to chair       Start:  12/28/24    Expected End:  01/11/25               Safety       LTG - Patient will demonstrate safety requirements appropriate to situation/environment       Start:  12/28/24    Expected End:  01/11/25               Toileting       LTG - Patient will complete daily toileting tasks       Start:  12/28/24    Expected End:  01/11/25            STG - Patient will complete clothing management       Start:  12/28/24    Expected End:  01/11/25

## 2024-12-28 NOTE — PROGRESS NOTES
12/28/24 1156   Discharge Planning   Expected Discharge Disposition SNF       Called patient's wife Petra and explained PT/OT rec for mod and SNF placement. She agreed. She is not coming in up would like list emailed to her :  [email or web address]  List emailed from [facility] according to geographic location and insurance acceptance. Will follow for choices.    1250    Patient's wife called back with choices as 1. [facility] [place]   2. [facility]   3. [facility].   Requested from [facility] to send referrals.

## 2024-12-29 VITALS
DIASTOLIC BLOOD PRESSURE: 83 MMHG | BODY MASS INDEX: 24.41 KG/M2 | HEIGHT: 63 IN | HEART RATE: 67 BPM | WEIGHT: 137.79 LBS | SYSTOLIC BLOOD PRESSURE: 137 MMHG | TEMPERATURE: 99.1 F | RESPIRATION RATE: 17 BRPM | OXYGEN SATURATION: 98 %

## 2024-12-29 LAB
ERYTHROCYTE [DISTWIDTH] IN BLOOD BY AUTOMATED COUNT: 17.2 % (ref 11.5–14.5)
GLUCOSE BLD MANUAL STRIP-MCNC: 136 MG/DL (ref 74–99)
GLUCOSE BLD MANUAL STRIP-MCNC: 171 MG/DL (ref 74–99)
GLUCOSE BLD MANUAL STRIP-MCNC: 197 MG/DL (ref 74–99)
GLUCOSE BLD MANUAL STRIP-MCNC: 212 MG/DL (ref 74–99)
GLUCOSE BLD MANUAL STRIP-MCNC: 265 MG/DL (ref 74–99)
HCT VFR BLD AUTO: 26.8 % (ref 41–52)
HGB BLD-MCNC: 8.5 G/DL (ref 13.5–17.5)
HOLD SPECIMEN: NORMAL
HOLD SPECIMEN: NORMAL
MCH RBC QN AUTO: 28.7 PG (ref 26–34)
MCHC RBC AUTO-ENTMCNC: 31.7 G/DL (ref 32–36)
MCV RBC AUTO: 91 FL (ref 80–100)
NRBC BLD-RTO: 0 /100 WBCS (ref 0–0)
PLATELET # BLD AUTO: 253 X10*3/UL (ref 150–450)
RBC # BLD AUTO: 2.96 X10*6/UL (ref 4.5–5.9)
WBC # BLD AUTO: 7.6 X10*3/UL (ref 4.4–11.3)

## 2024-12-29 PROCEDURE — 2500000001 HC RX 250 WO HCPCS SELF ADMINISTERED DRUGS (ALT 637 FOR MEDICARE OP): Performed by: PHARMACIST

## 2024-12-29 PROCEDURE — 2500000004 HC RX 250 GENERAL PHARMACY W/ HCPCS (ALT 636 FOR OP/ED): Performed by: INTERNAL MEDICINE

## 2024-12-29 PROCEDURE — 82947 ASSAY GLUCOSE BLOOD QUANT: CPT

## 2024-12-29 PROCEDURE — 99232 SBSQ HOSP IP/OBS MODERATE 35: CPT | Performed by: INTERNAL MEDICINE

## 2024-12-29 PROCEDURE — 85027 COMPLETE CBC AUTOMATED: CPT | Performed by: INTERNAL MEDICINE

## 2024-12-29 PROCEDURE — 2500000002 HC RX 250 W HCPCS SELF ADMINISTERED DRUGS (ALT 637 FOR MEDICARE OP, ALT 636 FOR OP/ED): Performed by: INTERNAL MEDICINE

## 2024-12-29 PROCEDURE — 1200000002 HC GENERAL ROOM WITH TELEMETRY DAILY

## 2024-12-29 PROCEDURE — 2500000001 HC RX 250 WO HCPCS SELF ADMINISTERED DRUGS (ALT 637 FOR MEDICARE OP): Performed by: INTERNAL MEDICINE

## 2024-12-29 PROCEDURE — 36415 COLL VENOUS BLD VENIPUNCTURE: CPT | Performed by: INTERNAL MEDICINE

## 2024-12-29 PROCEDURE — 94640 AIRWAY INHALATION TREATMENT: CPT

## 2024-12-29 RX ORDER — HEPARIN SODIUM 5000 [USP'U]/ML
5000 INJECTION, SOLUTION INTRAVENOUS; SUBCUTANEOUS EVERY 8 HOURS
Status: DISCONTINUED | OUTPATIENT
Start: 2024-12-29 | End: 2024-12-30 | Stop reason: HOSPADM

## 2024-12-29 RX ADMIN — PANTOPRAZOLE SODIUM 40 MG: 40 INJECTION, POWDER, FOR SOLUTION INTRAVENOUS at 20:43

## 2024-12-29 RX ADMIN — HEPARIN SODIUM 5000 UNITS: 5000 INJECTION, SOLUTION INTRAVENOUS; SUBCUTANEOUS at 13:41

## 2024-12-29 RX ADMIN — ALBUTEROL SULFATE 2.5 MG: 2.5 SOLUTION RESPIRATORY (INHALATION) at 20:34

## 2024-12-29 RX ADMIN — Medication 1000 MCG: at 09:38

## 2024-12-29 RX ADMIN — SERTRALINE HYDROCHLORIDE 50 MG: 50 TABLET ORAL at 09:37

## 2024-12-29 RX ADMIN — ASPIRIN 81 MG: 81 TABLET, COATED ORAL at 09:37

## 2024-12-29 RX ADMIN — INSULIN LISPRO 4 UNITS: 100 INJECTION, SOLUTION INTRAVENOUS; SUBCUTANEOUS at 13:41

## 2024-12-29 RX ADMIN — METOPROLOL SUCCINATE 50 MG: 50 TABLET, EXTENDED RELEASE ORAL at 09:37

## 2024-12-29 RX ADMIN — INSULIN LISPRO 6 UNITS: 100 INJECTION, SOLUTION INTRAVENOUS; SUBCUTANEOUS at 21:18

## 2024-12-29 RX ADMIN — INSULIN LISPRO 2 UNITS: 100 INJECTION, SOLUTION INTRAVENOUS; SUBCUTANEOUS at 09:48

## 2024-12-29 RX ADMIN — ALLOPURINOL 50 MG: 100 TABLET ORAL at 09:37

## 2024-12-29 RX ADMIN — ICOSAPENT ETHYL 1 G: 1 CAPSULE ORAL at 16:52

## 2024-12-29 RX ADMIN — ICOSAPENT ETHYL 1 G: 1 CAPSULE ORAL at 09:37

## 2024-12-29 RX ADMIN — Medication 1 TABLET: at 09:34

## 2024-12-29 RX ADMIN — HEPARIN SODIUM 5000 UNITS: 5000 INJECTION, SOLUTION INTRAVENOUS; SUBCUTANEOUS at 20:43

## 2024-12-29 RX ADMIN — PANTOPRAZOLE SODIUM 40 MG: 40 INJECTION, POWDER, FOR SOLUTION INTRAVENOUS at 10:01

## 2024-12-29 RX ADMIN — INSULIN LISPRO 2 UNITS: 100 INJECTION, SOLUTION INTRAVENOUS; SUBCUTANEOUS at 16:51

## 2024-12-29 RX ADMIN — CETIRIZINE HYDROCHLORIDE 10 MG: 10 TABLET, FILM COATED ORAL at 09:37

## 2024-12-29 RX ADMIN — GABAPENTIN 100 MG: 100 CAPSULE ORAL at 20:43

## 2024-12-29 RX ADMIN — AMLODIPINE BESYLATE 10 MG: 10 TABLET ORAL at 09:37

## 2024-12-29 RX ADMIN — ALBUTEROL SULFATE 2.5 MG: 2.5 SOLUTION RESPIRATORY (INHALATION) at 13:47

## 2024-12-29 RX ADMIN — ALBUTEROL SULFATE 2.5 MG: 2.5 SOLUTION RESPIRATORY (INHALATION) at 07:28

## 2024-12-29 RX ADMIN — ATORVASTATIN CALCIUM 80 MG: 80 TABLET, FILM COATED ORAL at 09:37

## 2024-12-29 ASSESSMENT — PAIN SCALES - GENERAL: PAINLEVEL_OUTOF10: 0 - NO PAIN

## 2024-12-29 NOTE — PROGRESS NOTES
"Ritesh Garza is a 74 y.o. male on day 4 of admission presenting with Non-STEMI (non-ST elevated myocardial infarction) (Multi).    Subjective   No acute events overnight. In the chair again this AM. Pleasant and conversant.         Objective     VITALS  Blood pressure (!) 116/48, pulse 70, temperature 36.5 °C (97.7 °F), resp. rate 17, height 1.6 m (5' 3\"), weight 62.5 kg (137 lb 12.6 oz), SpO2 98%.  Physical Exam  Vitals and nursing note reviewed.   Constitutional:       General: He is not in acute distress.     Appearance: Normal appearance. He is not ill-appearing.   HENT:      Head: Normocephalic and atraumatic.      Mouth/Throat:      Mouth: Mucous membranes are moist.      Pharynx: Oropharynx is clear.   Eyes:      Extraocular Movements: Extraocular movements intact.      Conjunctiva/sclera: Conjunctivae normal.   Cardiovascular:      Rate and Rhythm: Normal rate and regular rhythm.      Heart sounds: Normal heart sounds. No murmur heard.     No friction rub. No gallop.   Pulmonary:      Effort: Pulmonary effort is normal.      Breath sounds: Normal breath sounds. No wheezing or rales.   Abdominal:      General: Bowel sounds are normal. There is no distension.      Palpations: Abdomen is soft.      Tenderness: There is no abdominal tenderness. There is no guarding.   Musculoskeletal:         General: No swelling or tenderness.      Right lower leg: No edema.      Left lower leg: No edema.   Skin:     General: Skin is warm and dry.      Capillary Refill: Capillary refill takes less than 2 seconds.   Neurological:      General: No focal deficit present.      Mental Status: He is alert. He is disoriented.   Psychiatric:         Mood and Affect: Mood normal.           Intake/Output last 3 Shifts:  No intake/output data recorded.    Relevant Results  Results for orders placed or performed during the hospital encounter of 12/25/24 (from the past 24 hours)   POCT GLUCOSE   Result Value Ref Range    POCT Glucose " 243 (H) 74 - 99 mg/dL   POCT GLUCOSE   Result Value Ref Range    POCT Glucose 198 (H) 74 - 99 mg/dL   POCT GLUCOSE   Result Value Ref Range    POCT Glucose 249 (H) 74 - 99 mg/dL   POCT GLUCOSE   Result Value Ref Range    POCT Glucose 136 (H) 74 - 99 mg/dL   CBC   Result Value Ref Range    WBC 7.6 4.4 - 11.3 x10*3/uL    nRBC 0.0 0.0 - 0.0 /100 WBCs    RBC 2.96 (L) 4.50 - 5.90 x10*6/uL    Hemoglobin 8.5 (L) 13.5 - 17.5 g/dL    Hematocrit 26.8 (L) 41.0 - 52.0 %    MCV 91 80 - 100 fL    MCH 28.7 26.0 - 34.0 pg    MCHC 31.7 (L) 32.0 - 36.0 g/dL    RDW 17.2 (H) 11.5 - 14.5 %    Platelets 253 150 - 450 x10*3/uL   POCT GLUCOSE   Result Value Ref Range    POCT Glucose 171 (H) 74 - 99 mg/dL   SST TOP   Result Value Ref Range    Extra Tube Hold for add-ons.    PST Top   Result Value Ref Range    Extra Tube Hold for add-ons.        Imaging Results  Transthoracic Echo (TTE) Limited   Final Result          Medications:  albuterol, 2.5 mg, nebulization, TID  allopurinol, 50 mg, oral, Daily  amLODIPine, 10 mg, oral, Daily  aspirin, 81 mg, oral, Daily  atorvastatin, 80 mg, oral, Daily  B complex-vitamin C, 1 tablet, oral, Daily  cetirizine, 10 mg, oral, Daily  [Held by provider] clopidogrel, 75 mg, oral, Daily  cyanocobalamin, 1,000 mcg, oral, Daily  [Held by provider] dapagliflozin propanediol, 10 mg, oral, Daily before evening meal  gabapentin, 100 mg, oral, Nightly  icosapent ethyL, 1 g, oral, BID  insulin lispro, 0-10 Units, subcutaneous, q4h DIVINE  metoprolol succinate XL, 50 mg, oral, Daily  OLANZapine, 5 mg, intramuscular, Once  pantoprazole, 40 mg, intravenous, BID  [Held by provider] sacubitriL-valsartan, 0.5 tablet, oral, BID  sertraline, 50 mg, oral, Daily       PRN medications: acetaminophen **OR** acetaminophen **OR** acetaminophen, albuterol, dextrose, dextrose, glucagon, glucagon, metoprolol, ondansetron **OR** ondansetron        Assessment/Plan          Assessment & Plan  Non-STEMI (non-ST elevated myocardial  infarction) (Multi)    Acute non-STEMI / Known CAD s/p PCI with GINNY on DAPT  -Telemetry  -Cardiology consultation  -Aspirin 81 mg orally daily  -Plavix 75 mg orally daily  -Atorvastatin 80 mg orally daily  - Discussed with Dr. Santacruz likely no further work up while in house    Chronic systolic CHF, compensated  -Farxiga 10 mg orally daily  -Lasix 40 mg IV every 24 hours  -Continue Entresto  -Cardiology  -Telemetry  -Echocardiogram stable from previous with EF of 33%     Duodenal Ulcer seen on EGD causing acute on chronic anemia   -Protonix 40 mg IV every 12 hours  -GI consultation  -Recommend holding Plavix per GI, continue ASA     Hyperlipidemia  -High intensity atorvastatin 80 mg orally daily     Hypertension  -Amlodipine 10 mg orally daily  -Metoprolol XL 50 mg orally daily     Paroxysmal atrial flutter  -Metoprolol XL 50 mg orally daily  -Telemetry  -Cardiology     Type 2 diabetes mellitus  -Lispro per corrective scale     CKD 3  -Trend creatinine especially with diuresis     Ataxia with recurrent falls  -High-grade stenosis or areas of occlusion suggested on imaging.  -Patient apparently has not a thrombectomy candidate     GERD  -Protonix       DVT Prophylaxis:  Heparin subq   SCDs     Disposition:  Anticipate placement, suspect will be ready tomorrow     Rafita Peters, [facility]

## 2024-12-29 NOTE — CARE PLAN
The patient's goals for the shift include      The clinical goals for the shift include pt will remain free of  injuries during shift        Problem: Safety - Adult  Goal: Free from fall injury  Outcome: Not Progressing

## 2024-12-30 VITALS
HEART RATE: 65 BPM | BODY MASS INDEX: 24.41 KG/M2 | HEIGHT: 63 IN | DIASTOLIC BLOOD PRESSURE: 52 MMHG | WEIGHT: 137.79 LBS | TEMPERATURE: 97.9 F | RESPIRATION RATE: 17 BRPM | SYSTOLIC BLOOD PRESSURE: 119 MMHG | OXYGEN SATURATION: 100 %

## 2024-12-30 LAB
ERYTHROCYTE [DISTWIDTH] IN BLOOD BY AUTOMATED COUNT: 17.1 % (ref 11.5–14.5)
GLUCOSE BLD MANUAL STRIP-MCNC: 101 MG/DL (ref 74–99)
GLUCOSE BLD MANUAL STRIP-MCNC: 126 MG/DL (ref 74–99)
GLUCOSE BLD MANUAL STRIP-MCNC: 126 MG/DL (ref 74–99)
GLUCOSE BLD MANUAL STRIP-MCNC: 130 MG/DL (ref 74–99)
GLUCOSE BLD MANUAL STRIP-MCNC: 170 MG/DL (ref 74–99)
GLUCOSE BLD MANUAL STRIP-MCNC: 219 MG/DL (ref 74–99)
HCT VFR BLD AUTO: 25.8 % (ref 41–52)
HGB BLD-MCNC: 8.1 G/DL (ref 13.5–17.5)
HOLD SPECIMEN: NORMAL
MCH RBC QN AUTO: 29.3 PG (ref 26–34)
MCHC RBC AUTO-ENTMCNC: 31.4 G/DL (ref 32–36)
MCV RBC AUTO: 94 FL (ref 80–100)
NRBC BLD-RTO: 0 /100 WBCS (ref 0–0)
PLATELET # BLD AUTO: 241 X10*3/UL (ref 150–450)
RBC # BLD AUTO: 2.76 X10*6/UL (ref 4.5–5.9)
WBC # BLD AUTO: 7 X10*3/UL (ref 4.4–11.3)

## 2024-12-30 PROCEDURE — 97116 GAIT TRAINING THERAPY: CPT | Mod: GP,CQ

## 2024-12-30 PROCEDURE — 2500000004 HC RX 250 GENERAL PHARMACY W/ HCPCS (ALT 636 FOR OP/ED): Performed by: INTERNAL MEDICINE

## 2024-12-30 PROCEDURE — 36415 COLL VENOUS BLD VENIPUNCTURE: CPT | Performed by: INTERNAL MEDICINE

## 2024-12-30 PROCEDURE — 2500000001 HC RX 250 WO HCPCS SELF ADMINISTERED DRUGS (ALT 637 FOR MEDICARE OP): Performed by: INTERNAL MEDICINE

## 2024-12-30 PROCEDURE — 85027 COMPLETE CBC AUTOMATED: CPT | Performed by: INTERNAL MEDICINE

## 2024-12-30 PROCEDURE — 2500000001 HC RX 250 WO HCPCS SELF ADMINISTERED DRUGS (ALT 637 FOR MEDICARE OP): Performed by: PHARMACIST

## 2024-12-30 PROCEDURE — 82947 ASSAY GLUCOSE BLOOD QUANT: CPT

## 2024-12-30 PROCEDURE — 97530 THERAPEUTIC ACTIVITIES: CPT | Mod: GP,CQ

## 2024-12-30 PROCEDURE — 99232 SBSQ HOSP IP/OBS MODERATE 35: CPT | Performed by: INTERNAL MEDICINE

## 2024-12-30 PROCEDURE — 2500000002 HC RX 250 W HCPCS SELF ADMINISTERED DRUGS (ALT 637 FOR MEDICARE OP, ALT 636 FOR OP/ED): Performed by: INTERNAL MEDICINE

## 2024-12-30 PROCEDURE — 97535 SELF CARE MNGMENT TRAINING: CPT | Mod: GO

## 2024-12-30 PROCEDURE — 94640 AIRWAY INHALATION TREATMENT: CPT

## 2024-12-30 RX ORDER — PANTOPRAZOLE SODIUM 40 MG/1
40 TABLET, DELAYED RELEASE ORAL 2 TIMES DAILY
Qty: 60 TABLET | Refills: 0 | Status: SHIPPED | OUTPATIENT
Start: 2024-12-30 | End: 2025-01-29

## 2024-12-30 RX ADMIN — CETIRIZINE HYDROCHLORIDE 10 MG: 10 TABLET, FILM COATED ORAL at 09:03

## 2024-12-30 RX ADMIN — SERTRALINE HYDROCHLORIDE 50 MG: 50 TABLET ORAL at 09:03

## 2024-12-30 RX ADMIN — Medication 1 TABLET: at 09:03

## 2024-12-30 RX ADMIN — Medication 1000 MCG: at 09:03

## 2024-12-30 RX ADMIN — ASPIRIN 81 MG: 81 TABLET, COATED ORAL at 09:03

## 2024-12-30 RX ADMIN — INSULIN LISPRO 4 UNITS: 100 INJECTION, SOLUTION INTRAVENOUS; SUBCUTANEOUS at 12:27

## 2024-12-30 RX ADMIN — AMLODIPINE BESYLATE 10 MG: 10 TABLET ORAL at 09:03

## 2024-12-30 RX ADMIN — PANTOPRAZOLE SODIUM 40 MG: 40 INJECTION, POWDER, FOR SOLUTION INTRAVENOUS at 11:24

## 2024-12-30 RX ADMIN — ATORVASTATIN CALCIUM 80 MG: 80 TABLET, FILM COATED ORAL at 09:03

## 2024-12-30 RX ADMIN — ALBUTEROL SULFATE 2.5 MG: 2.5 SOLUTION RESPIRATORY (INHALATION) at 07:42

## 2024-12-30 RX ADMIN — ICOSAPENT ETHYL 1 G: 1 CAPSULE ORAL at 09:03

## 2024-12-30 RX ADMIN — ALLOPURINOL 50 MG: 100 TABLET ORAL at 09:03

## 2024-12-30 RX ADMIN — METOPROLOL SUCCINATE 50 MG: 50 TABLET, EXTENDED RELEASE ORAL at 09:03

## 2024-12-30 RX ADMIN — HEPARIN SODIUM 5000 UNITS: 5000 INJECTION, SOLUTION INTRAVENOUS; SUBCUTANEOUS at 04:39

## 2024-12-30 ASSESSMENT — COGNITIVE AND FUNCTIONAL STATUS - GENERAL
STANDING UP FROM CHAIR USING ARMS: A LITTLE
MOVING TO AND FROM BED TO CHAIR: A LITTLE
TURNING FROM BACK TO SIDE WHILE IN FLAT BAD: A LITTLE
MOVING FROM LYING ON BACK TO SITTING ON SIDE OF FLAT BED WITH BEDRAILS: A LITTLE
HELP NEEDED FOR BATHING: A LITTLE
DAILY ACTIVITIY SCORE: 22
WALKING IN HOSPITAL ROOM: A LITTLE
MOBILITY SCORE: 18
CLIMB 3 TO 5 STEPS WITH RAILING: A LITTLE
TOILETING: A LITTLE

## 2024-12-30 ASSESSMENT — PAIN - FUNCTIONAL ASSESSMENT
PAIN_FUNCTIONAL_ASSESSMENT: 0-10
PAIN_FUNCTIONAL_ASSESSMENT: 0-10

## 2024-12-30 ASSESSMENT — PAIN SCALES - GENERAL
PAINLEVEL_OUTOF10: 0 - NO PAIN
PAINLEVEL_OUTOF10: 0 - NO PAIN

## 2024-12-30 ASSESSMENT — ACTIVITIES OF DAILY LIVING (ADL): HOME_MANAGEMENT_TIME_ENTRY: 23

## 2024-12-30 NOTE — NURSING NOTE

## 2024-12-30 NOTE — PROGRESS NOTES
"Ritesh Garza is a 74 y.o. male on day 5 of admission presenting with Non-STEMI (non-ST elevated myocardial infarction) (Multi).    Subjective   No acute events overnight. Waiting for placement        Objective     VITALS  Blood pressure 128/64, pulse 74, temperature 36.6 °C (97.9 °F), temperature source Temporal, resp. rate 17, height 1.6 m (5' 3\"), weight 62.5 kg (137 lb 12.6 oz), SpO2 99%.  Physical Exam  Vitals and nursing note reviewed.   Constitutional:       General: He is not in acute distress.     Appearance: Normal appearance. He is not ill-appearing.   HENT:      Head: Normocephalic and atraumatic.      Mouth/Throat:      Mouth: Mucous membranes are moist.      Pharynx: Oropharynx is clear.   Eyes:      Extraocular Movements: Extraocular movements intact.      Conjunctiva/sclera: Conjunctivae normal.   Cardiovascular:      Rate and Rhythm: Normal rate and regular rhythm.      Heart sounds: Normal heart sounds. No murmur heard.     No friction rub. No gallop.   Pulmonary:      Effort: Pulmonary effort is normal.      Breath sounds: Normal breath sounds. No wheezing or rales.   Abdominal:      General: Bowel sounds are normal. There is no distension.      Palpations: Abdomen is soft.      Tenderness: There is no abdominal tenderness. There is no guarding.   Musculoskeletal:         General: No swelling or tenderness.      Right lower leg: No edema.      Left lower leg: No edema.   Skin:     General: Skin is warm and dry.      Capillary Refill: Capillary refill takes less than 2 seconds.   Neurological:      General: No focal deficit present.      Mental Status: He is alert. He is disoriented.   Psychiatric:         Mood and Affect: Mood normal.           Intake/Output last 3 Shifts:  I/O last 3 completed shifts:  In: 480 (7.7 mL/kg) [P.O.:480]  Out: - (0 mL/kg)   Weight: 62.5 kg     Relevant Results  Results for orders placed or performed during the hospital encounter of 12/25/24 (from the past 24 hours) "   POCT GLUCOSE   Result Value Ref Range    POCT Glucose 212 (H) 74 - 99 mg/dL   POCT GLUCOSE   Result Value Ref Range    POCT Glucose 197 (H) 74 - 99 mg/dL   POCT GLUCOSE   Result Value Ref Range    POCT Glucose 265 (H) 74 - 99 mg/dL   POCT GLUCOSE   Result Value Ref Range    POCT Glucose 101 (H) 74 - 99 mg/dL   POCT GLUCOSE   Result Value Ref Range    POCT Glucose 126 (H) 74 - 99 mg/dL   CBC   Result Value Ref Range    WBC 7.0 4.4 - 11.3 x10*3/uL    nRBC 0.0 0.0 - 0.0 /100 WBCs    RBC 2.76 (L) 4.50 - 5.90 x10*6/uL    Hemoglobin 8.1 (L) 13.5 - 17.5 g/dL    Hematocrit 25.8 (L) 41.0 - 52.0 %    MCV 94 80 - 100 fL    MCH 29.3 26.0 - 34.0 pg    MCHC 31.4 (L) 32.0 - 36.0 g/dL    RDW 17.1 (H) 11.5 - 14.5 %    Platelets 241 150 - 450 x10*3/uL   Green Top   Result Value Ref Range    Extra Tube Hold for add-ons.    POCT GLUCOSE   Result Value Ref Range    POCT Glucose 130 (H) 74 - 99 mg/dL       Imaging Results  Transthoracic Echo (TTE) Limited   Final Result          Medications:  albuterol, 2.5 mg, nebulization, TID  allopurinol, 50 mg, oral, Daily  amLODIPine, 10 mg, oral, Daily  aspirin, 81 mg, oral, Daily  atorvastatin, 80 mg, oral, Daily  B complex-vitamin C, 1 tablet, oral, Daily  cetirizine, 10 mg, oral, Daily  [Held by provider] clopidogrel, 75 mg, oral, Daily  cyanocobalamin, 1,000 mcg, oral, Daily  [Held by provider] dapagliflozin propanediol, 10 mg, oral, Daily before evening meal  gabapentin, 100 mg, oral, Nightly  heparin (porcine), 5,000 Units, subcutaneous, q8h  icosapent ethyL, 1 g, oral, BID  insulin lispro, 0-10 Units, subcutaneous, q4h DIVINE  metoprolol succinate XL, 50 mg, oral, Daily  OLANZapine, 5 mg, intramuscular, Once  pantoprazole, 40 mg, intravenous, BID  [Held by provider] sacubitriL-valsartan, 0.5 tablet, oral, BID  sertraline, 50 mg, oral, Daily       PRN medications: acetaminophen **OR** acetaminophen **OR** acetaminophen, albuterol, dextrose, dextrose, glucagon, glucagon, metoprolol,  ondansetron **OR** ondansetron        Assessment/Plan          Assessment & Plan  Non-STEMI (non-ST elevated myocardial infarction) (Multi)    Acute non-STEMI / Known CAD s/p PCI with GINNY on DAPT  -Telemetry  -Cardiology consultation  -Aspirin 81 mg orally daily  -Plavix 75 mg orally daily  -Atorvastatin 80 mg orally daily  - Discussed with Dr. Santacruz likely no further work up while in house    Chronic systolic CHF, compensated  -Farxiga 10 mg orally daily  -Lasix 40 mg IV every 24 hours  -Continue Entresto  -Cardiology  -Telemetry  -Echocardiogram stable from previous with EF of 33%     Duodenal Ulcer seen on EGD causing acute on chronic anemia   -Protonix 40 mg IV every 12 hours  -GI consultation  -Recommend holding Plavix per GI, continue ASA     Hyperlipidemia  -High intensity atorvastatin 80 mg orally daily     Hypertension  -Amlodipine 10 mg orally daily  -Metoprolol XL 50 mg orally daily     Paroxysmal atrial flutter  -Metoprolol XL 50 mg orally daily  -Telemetry  -Cardiology     Type 2 diabetes mellitus  -Lispro per corrective scale     CKD 3  -Trend creatinine especially with diuresis     Ataxia with recurrent falls  -High-grade stenosis or areas of occlusion suggested on imaging.  -Patient apparently has not a thrombectomy candidate     GERD  -Protonix       DVT Prophylaxis:  Heparin subq   SCDs     Disposition:  Awaiting placement. Medically ready once arrangements have been made.     Rafita Peters, DO [facility]

## 2024-12-30 NOTE — PROGRESS NOTES
12/30/24 0830   Discharge Planning   Expected Discharge Disposition SNF     Per notes [facility] is patient's second choice they are the only facility able to accept PT/OT both rec'd Mod assistx2 patient will need to have auth started, I will reach out to have auth started and continue to monitor for discharge planing.    12:37 Per notes in [facility] will not have a bed until next week, I did talk to the patient's wife Petra at [phone] she requested referrals be sent to [place], [place] and [facility], patient is med ready.    13:55 OT is rec'd [facility] and feels comfortable with patient going home with [facility], I did speak to the patient at bedside to discuss discharge planing, he is wanting to go home and feels comfortable with going home, he stated he does have a walker at home, I also spoke with the patient's wife she would like to have [facility] set up for patient so he can get PT/OT at home,  I have updated the provider and sent a referral over to [facility].

## 2024-12-30 NOTE — PROGRESS NOTES
Occupational Therapy    Occupational Therapy Treatment    Name: Ritesh Garza  MRN: [id number]  Department: [facility] A 5  Room: [address] [place] [address]-  Date: 12/30/24  Time Calculation  Start Time: 1313  Stop Time: 1339  Time Calculation (min): 26 min    Assessment:  OT Assessment: Pt seen for OT tx. Pt up in room upon arrival. Per pt close to his baseline status making excellant progress towards goals updating rec tyo low intensity TCC made aware  Prognosis: Excellent  Barriers to Discharge Home: No anticipated barriers  Evaluation/Treatment Tolerance: Patient tolerated treatment well  Medical Staff Made Aware: Yes  End of Session Communication: Bedside nurse, Care Coordinator  End of Session Patient Position:  (Pt sitting EOB alarm off nursing aware)  Plan:  Treatment Interventions: ADL retraining, Endurance training  OT Frequency: 3 times per week  OT Discharge Recommendations: Low intensity level of continued care  OT Recommended Transfer Status: Independent  OT - OK to Discharge: Yes    Subjective   Previous Visit Info:  OT Last Visit  OT Received On: 12/30/24  General:  General  Reason for Referral: presented to [facility] ER for acute confusional state/altered mental status and recurrent mechanical falls.  Laboratory evaluation showed a glucose of 177 sodium 140 potassium 4.8 BUN 42 creatinine 1.83 troponin was 1450 BNP was 3417 white blood cell count 13.8 hemoglobin was initially very low 6.8 and the patient did not complain of melena he was transfused to a level of 9.0 with hematocrit of 28.9.  In the ER the patient did have atrial flutter with RVR which responded well to metoprolol for heart rate control.  The patient had ataxia with recurrent falls in addition to GI bleeding.  Imaging showed narrowing of the intracranial vessels but he was felt to be not a thrombectomy candidate.  He was accepted in transfer to stepdown unit 4 days ago but due to bed availability he was not transferred until this morning.  He  was transferred from [facility] to [facility] for GI consultation and cardiac evaluation as well as neurology  Family/Caregiver Present: No  Prior to Session Communication: Bedside nurse  Patient Position Received:  (Pt walking in room upon OT arrival.)  Preferred Learning Style: auditory, verbal, visual, written, kinesthetic  General Comment: Pt agreeable to OT tx  Precautions:  Medical Precautions: Fall precautions    Vital Signs (Past 2hrs)                Pain Assessment:  Pain Assessment  Pain Assessment: 0-10  0-10 (Numeric) Pain Score: 0 - No pain     Objective   Cognition:  Overall Cognitive Status: Within Functional Limits  Orientation Level: Oriented X4  Following Commands: Follows all commands and directions without difficulty  Attention: Exceptions to WFL (easily distracted)  Memory: Within Funtional Limits  Insight: Mild  Impulsive: Mildly  Activities of Daily Living: Feeding  Feeding Level of Assistance: Independent    LE Dressing  LE Dressing: Yes  Pants Level of Assistance: Independent  Sock Level of Assistance: Independent  Shoe Level of Assistance: Independent  LE Dressing Where Assessed: Edge of bed     Bed Mobility/Transfers:      Transfers  Transfer: Yes  Transfer 1  Technique 1: Sit to stand, Stand to sit  Transfer Level of Assistance 1: Independent      Functional Mobility:  Functional Mobility  Functional Mobility Performed: Yes  Functional Mobility 1  Surface 1: Level tile  Device 1: No device  Assistance 1: Independent  Comments 1: Pt up ambulating in room indpendently upon OT arrival Reports he walked the entire floorx2 with mobility aide this am  Sitting Balance:  Static Sitting Balance  Static Sitting-Balance Support: Feet supported  Static Sitting-Level of Assistance: Independent  Dynamic Sitting Balance  Dynamic Sitting-Balance Support: Feet supported  Dynamic Sitting-Level of Assistance: Independent  Dynamic Sitting-Balance: Reaching for objects, Reaching across  midline, Forward lean  Standing Balance:  Static Standing Balance  Static Standing-Balance Support: No upper extremity supported  Static Standing-Level of Assistance: Independent  Dynamic Standing Balance  Dynamic Standing-Balance Support: Bilateral upper extremity supported  Dynamic Standing-Level of Assistance: Independent  Dynamic Standing-Balance: Reaching for objects, Reaching across midline  RUE   RUE : Within Functional Limits and LUE   LUE: Within Functional Limits  Outcome Measures:  [facility] Daily Activity  Putting on and taking off regular lower body clothing: None  Bathing (including washing, rinsing, drying): A little  Putting on and taking off regular upper body clothing: None  Toileting, which includes using toilet, bedpan or urinal: A little  Taking care of personal grooming such as brushing teeth: None  Eating Meals: None  Daily Activity - Total Score: 22        Education Documentation  Home Exercise Program, taught by Natali Ramey OT at 12/30/2024  1:57 PM.  Learner: Patient  Readiness: Acceptance  Method: Explanation  Response: Verbalizes Understanding, Needs Reinforcement    Body Mechanics, taught by Natali Ramey OT at 12/30/2024  1:57 PM.  Learner: Patient  Readiness: Acceptance  Method: Explanation  Response: Verbalizes Understanding, Needs Reinforcement    Precautions, taught by Natali Ramey OT at 12/30/2024  1:57 PM.  Learner: Patient  Readiness: Acceptance  Method: Explanation  Response: Verbalizes Understanding, Needs Reinforcement    ADL Training, taught by Natali Ramey OT at 12/30/2024  1:57 PM.  Learner: Patient  Readiness: Acceptance  Method: Explanation  Response: Verbalizes Understanding, Needs Reinforcement    Education Comments  No comments found.      Goals:  Encounter Problems       Encounter Problems (Active)       Bathing       STG - Patient will bathe body (Progressing)       Start:  12/28/24    Expected End:  01/11/25               Functional  Balance       LTG - Patient will maintain balance to allow for safe mobility (Progressing)       Start:  12/28/24    Expected End:  01/11/25            STG - Maintains dynamic standing balance with upper extremity support (Met)       Start:  12/28/24    Expected End:  01/11/25    Resolved:  12/30/24    INTERVENTIONS:  1. Practice standing with minimal support.  2. Educate patient about standing tolerance.  3. Educate patient about independence with gait, transfers, and ADL's.  4. Educate patient about use of assistive device.  5. Educate patient about self-directed care.         STG - Maintains static standing balance with upper extremity support (Met)       Start:  12/28/24    Expected End:  01/11/25    Resolved:  12/30/24    INTERVENTIONS:  1. Practice standing with minimal support.  2. Educate patient about standing tolerance.  3. Educate patient about independence with gait, transfers, and ADL's.  4. Educate patient about use of assistive device.  5. Educate patient about self-directed care.            Functional Mobility       LTG - Patient will ambulate household distance (Progressing)       Start:  12/28/24    Expected End:  01/11/25               Safety       LTG - Patient will demonstrate safety requirements appropriate to situation/environment (Progressing)       Start:  12/28/24    Expected End:  01/11/25               Toileting       LTG - Patient will complete daily toileting tasks (Progressing)       Start:  12/28/24    Expected End:  01/11/25            STG - Patient will complete clothing management (Progressing)       Start:  12/28/24    Expected End:  01/11/25                  Encounter Problems (Resolved)       Dressing Upper Extremities       STG - Patient will dress upper body (Met)       Start:  12/28/24    Expected End:  01/11/25    Resolved:  12/30/24            Dressings Lower Extremities       STG - Patient will complete lower body dressing (Met)       Start:  12/28/24    Expected End:   01/11/25    Resolved:  12/30/24            Grooming       LTG - Patient will complete daily grooming tasks (Met)       Start:  12/28/24    Expected End:  01/11/25    Resolved:  12/30/24            OT Transfers       LTG - Patient will demonstrate safe transfer techniques (Met)       Start:  12/28/24    Expected End:  01/11/25    Resolved:  12/30/24         STG - Transfer from bed to chair (Met)       Start:  12/28/24    Expected End:  01/11/25    Resolved:  12/30/24

## 2024-12-30 NOTE — PROGRESS NOTES
Physical Therapy    Physical Therapy Treatment    Patient Name: Ritesh Garza  MRN: [id number]  Department: [place] [id number]  Room: [address] [place] [address]  Today's Date: 12/30/2024  Time Calculation  Start Time: 1351  Stop Time: 1431  Time Calculation (min): 40 min         Assessment/Plan   PT Assessment  Rehab Prognosis: Good  End of Session Communication: Bedside nurse, Care Coordinator  Assessment Comment: Pt demonstrates improved mobility with transfers and ambulation. Improved activity tolerance noted although stair negotiation unsafe and at increased risk for falls, requiring Brenda repeadetly for fall prevention and safety.  End of Session Patient Position: Up in chair, Alarm on  PT Plan  Inpatient/Swing Bed or Outpatient: Inpatient  PT Plan  Treatment/Interventions: Gait training, Stair training, Transfer training  PT Plan: Ongoing PT  PT Frequency: 3 times per week  PT Discharge Recommendations: Moderate intensity level of continued care  PT Recommended Transfer Status: Assist x1  PT - OK to Discharge: Yes (per POC)      General Visit Information:   PT  Visit  PT Received On: 12/30/24  General  Reason for Referral: presented to [facility] ER for acute confusional state/altered mental status and recurrent mechanical falls.  Laboratory evaluation showed a glucose of 177 sodium 140 potassium 4.8 BUN 42 creatinine 1.83 troponin was 1450 BNP was 3417 white blood cell count 13.8 hemoglobin was initially very low 6.8 and the patient did not complain of melena he was transfused to a level of 9.0 with hematocrit of 28.9.  In the ER the patient did have atrial flutter with RVR which responded well to metoprolol for heart rate control.  The patient had ataxia with recurrent falls in addition to GI bleeding.  Imaging showed narrowing of the intracranial vessels but he was felt to be not a thrombectomy candidate.  He was accepted in transfer to stepdown unit 4 days ago but due to bed availability he was not transferred until this  morning.  He was transferred from [facility] to [facility] [place] for GI consultation and cardiac evaluation as well as neurology  Referred By: Rafita Peters DO  Past Medical History Relevant to Rehab:   Past Medical History:   Diagnosis Date    ADHD (attention deficit hyperactivity disorder) 1960’s    AICD (automatic cardioverter/defibrillator) present 05/30/2024    [facility]    Asthma     Benign prostatic hyperplasia Several years ago    CHF (congestive heart failure)     Cirrhosis (Multi)     CKD (chronic kidney disease)     Coronary artery disease Long Ago    Diabetes mellitus (Multi) Over 20 years ago    Erectile dysfunction     GERD (gastroesophageal reflux disease)     Gout     Hypertension Over 20 years ago    Ischemic cardiomyopathy     Kidney stone Several years ago    Myocardial infarction (Multi) Over 20 yesrs ago    SIDHU (nonalcoholic steatohepatitis)     SENIA (obstructive sleep apnea)     Stenosis of left subclavian artery     s/p stent 4/30/2024    Ventricular tachycardia (Multi)        Family/Caregiver Present: Yes ([employer] friends present)  Prior to Session Communication: Bedside nurse  Patient Position Received: Bed, 3 rail up, Alarm off, not on at start of session (pt seated at EOB upon arrival)  General Comment: Pt required continuous redirection throughout tx session. Pt easily distracted. Slight impulsiveness noted, mod VC for safety throughout tx session for fall prevention    Subjective   Precautions:  Precautions  Medical Precautions: Fall precautions    Vital Signs (Past 2hrs)        Date/Time Vitals Session Patient Position Pulse Resp SpO2 BP MAP (mmHg)    12/30/24 1513 --  --  65  17  100 %  119/52  74                         Objective   Pain:  Pain Assessment  Pain Assessment: 0-10  0-10 (Numeric) Pain Score: 0 - No pain  Cognition:  Cognition  Overall Cognitive Status: Within Functional Limits  Orientation Level: Oriented X4       Postural Control:  Static Sitting  Balance  Static Sitting-Balance Support: Bilateral upper extremity supported, Feet supported  Static Sitting-Level of Assistance: Independent  Static Standing Balance  Static Standing-Balance Support: Bilateral upper extremity supported  Static Standing-Level of Assistance: Close supervision  Static Standing-Comment/Number of Minutes: No overt LOB noted, pt able to tolerate for extended periods of time  Dynamic Standing Balance  Dynamic Standing-Balance Support: Left upper extremity supported  Dynamic Standing-Level of Assistance: Contact guard  Dynamic Standing-Balance: Turning  Dynamic Standing-Comments: No overt LOB noted although impulsiveness noted. Contact guard for fall prevention and safety.       Treatments:        Bed Mobility  Bed Mobility: No    Ambulation/Gait Training  Ambulation/Gait Training Performed: Yes  Ambulation/Gait Training 1  Surface 1: Level tile  Device 1: Rolling walker  Gait Support Devices: Gait belt  Assistance 1: Close supervision  Quality of Gait 1: Inconsistent stride length, Decreased step length  Comments/Distance (ft) 1: 75, 200 x 2 (Pt demonstrates fair activity tolerance although stopping multiple times throughout ambulation requiring redirection to remain on task. No overt LOB noted although VC to remain within CLARY of WW required. Slow mandy noted.)  Transfers  Transfer: Yes  Transfer 1  Technique 1: Sit to stand, Stand to sit  Transfer Device 1: Gait belt  Transfer Level of Assistance 1: Close supervision  Trials/Comments 1: Close supervision for impulsive behavior and poor to fair safety techniques. 3 trials performed, with no overt LOB noted.    Stairs  Stairs: Yes  Stairs  Rails 1: Right  Curb Step 1: No  Device 1: Railing, Single point cane  Support Devices 1: Gait belt  Assistance 1: Minimum assistance  Comment/Number of Steps 1: Pt ascended/descended 4 steps x 2 trials. Pt demonstrated x4 LOB anteriorly and posteriorly while descending/ascending steps. Pt performed  1st trial with railing and 2nd trial without railing to replicate home set up, each trial performed with use of SPC. Pt unsafe with stair climbing and demonstrates fall risk    Outcome Measures:  [facility] Basic Mob[facility]  Turning from your back to your side while in a flat bed without using bedrails: A little  Moving from lying on your back to sitting on the side of a flat bed without using bedrails: A little  Moving to and from bed to chair (including a wheelchair): A little  Standing up from a chair using your arms (e.g. wheelchair or bedside chair): A little  To walk in hospital room: A little  Climbing 3-5 steps with railing: A little  Basic Mobility - Total Score: 18    Education Documentation  Home Exercise Program, taught by Jasmina Banuelos PTA at 12/30/2024  3:41 PM.  Learner: Patient  Readiness: Acceptance  Method: Explanation  Response: Needs Reinforcement    Precautions, taught by Jasmina Banuelos PTA at 12/30/2024  3:41 PM.  Learner: Patient  Readiness: Acceptance  Method: Explanation  Response: Needs Reinforcement    Body Mechanics, taught by Jasmina Banuelos PTA at 12/30/2024  3:41 PM.  Learner: Patient  Readiness: Acceptance  Method: Explanation  Response: Needs Reinforcement    Mobility Training, taught by Jasmina Banuelos PTA at 12/30/2024  3:41 PM.  Learner: Patient  Readiness: Acceptance  Method: Explanation  Response: Needs Reinforcement    Education Comments  No comments found.      Encounter Problems       Encounter Problems (Active)       Mobility       STG - Patient will ambulate 95 feet with supervision with RW. (Progressing)       Start:  12/28/24    Expected End:  01/11/25            STG - Patient will ascend and descend 2 stairs with moderate assistance using rails. (Not Progressing)       Start:  12/28/24    Expected End:  01/11/25               PT Transfers       LTG - Patient will transfer from one surface to another with supervision. (Progressing)       Start:  12/28/24     Expected End:  01/11/25            STG - Patient will perform bed mobility with I. (Progressing)       Start:  12/28/24    Expected End:  01/11/25               Pain - Adult          Safety       LTG - Patient will demonstrate safety requirements appropriate to situation/environment (Not Progressing)       Start:  12/28/24    Expected End:  01/11/25

## 2024-12-30 NOTE — CARE PLAN
The patient's goals for the shift include      The clinical goals for the shift include safe transfer to and from bed during shift        Problem: Safety - Adult  Goal: Free from fall injury  Outcome: Not Progressing

## 2024-12-31 ENCOUNTER — HOSPITAL ENCOUNTER (OUTPATIENT)
Dept: CARDIOLOGY | Facility: HOSPITAL | Age: 74
Discharge: HOME | End: 2024-12-31
Payer: MEDICARE

## 2024-12-31 ENCOUNTER — PATIENT OUTREACH (OUTPATIENT)
Dept: PRIMARY CARE | Facility: CLINIC | Age: 74
End: 2024-12-31
Payer: MEDICARE

## 2024-12-31 DIAGNOSIS — I47.29 OTHER VENTRICULAR TACHYCARDIA: ICD-10-CM

## 2024-12-31 NOTE — PROGRESS NOTES
Discharge Facility:  [facility]  Discharge Diagnosis: Non-STEMI  Admission Date: 12/25/24  Discharge Date:  12/30/24    PCP Appointment Date: TBD - tasked to office due to no available appts  Specialist Appointment Date: 1/13/25 - cardiology  Hospital Encounter and Summary Linked: Yes  See discharge assessment below for further details     Engagement  Call Start Time: 0954 (12/31/2024 10:06 AM)    Medications  Medications reviewed with patient/caregiver?: Yes (12/31/2024 10:06 AM)  Is the patient having any side effects they believe may be caused by any medication additions or changes?: No (12/31/2024 10:06 AM)  Does the patient have all medications ordered at discharge?: Yes (12/31/2024 10:06 AM)  Care Management Interventions: No intervention needed (12/31/2024 10:06 AM)  Prescription Comments: Start Pantoprazole   Stop: clopidogrel, tadalafil (12/31/2024 10:06 AM)  Is the patient taking all medications as directed (includes completed medication regime)?: Yes (12/31/2024 10:06 AM)  Medication Comments: Patient had no questions or concerns related to his medications. (12/31/2024 10:06 AM)    Appointments  Does the patient have a primary care provider?: Yes (12/31/2024 10:06 AM)  Care Management Interventions: Advised patient to make appointment (12/31/2024 10:06 AM)  Has the patient kept scheduled appointments due by today?: Not applicable (12/31/2024 10:06 AM)    Self Management  What is the home health agency?: [facility] (12/31/2024 10:06 AM)  Has home health visited the patient within 72 hours of discharge?: Call prior to 72 hours (12/31/2024 10:06 AM)  What Durable Medical Equipment (DME) was ordered?: n/a (12/31/2024 10:06 AM)    Patient Teaching  Does the patient have access to their discharge instructions?: Yes (12/31/2024 10:06 AM)  Care Management Interventions: Reviewed instructions with patient (12/31/2024 10:06 AM)  What is the patient's perception of their health status since discharge?:  Improving (12/31/2024 10:06 AM)    Wrap Up  Wrap Up Additional Comments: Successful transitions of care outreach to patient. Patient reports he is doing well since discharge. He has filled new Rx and removed dc'ed Rxs from his bag of medications. He has follow up scheduled with his cardiologist 1/13/25.  PCP had no available appts so message has been sent to her office to request appt. Patient reports he had to cancel previous appt with her due to being in the hospital and he would like to see her as soon as possible. Encouraged him to follow up with office directly if he has not gotten a call to schedule appt within the next few days and he is in agreement with this.  Patient aware of my availability for any non-emergent questions or concerns. (12/31/2024 10:06 AM)

## 2025-01-07 DIAGNOSIS — I25.5 ISCHEMIC CARDIOMYOPATHY: ICD-10-CM

## 2025-01-07 RX ORDER — ASPIRIN 81 MG/1
81 TABLET ORAL DAILY
Qty: 90 TABLET | Refills: 3 | Status: SHIPPED | OUTPATIENT
Start: 2025-01-07

## 2025-01-07 NOTE — DOCUMENTATION CLARIFICATION NOTE
PATIENT:               CHIDI VARGAS  ACCT #:                  [id number]  MRN:                       [id number]  :                       1950  ADMIT DATE:       2024 12:37 AM  DISCH DATE:        2024 4:40 PM  RESPONDING PROVIDER #:        [id number]          PROVIDER RESPONSE TEXT:    Non traumatic myocardial injury, Non MI troponin elevation during this admission    CDI QUERY TEXT:    Clarification        Instruction:    Based on your assessment of the patient and the clinical information, please provide the requested documentation by clicking on the appropriate radio button and enter any additional information if prompted.      Question: Based on your medical judgment, can you please clarify which of these conditions is the most clinically supported      When answering this query, please exercise your independent professional judgment. The fact that a question is being asked, does not imply that any particular answer is desired or expected.    The patient's clinical indicators include:  Clinical Information:  75 y/o [place] [facility] [place] c/o recurrent mechanical falls, AMS. DX GIB. GI LAB  - Mild erythematous mucosa of stomach, Single duodenal bulb ulcer.      Documented Diagnoses: There is conflicting documentation in the medical record which requires clarification.  - Cardiology consult per ELIZABETH Santacruz DO 24: ?PMH of mvCAD s/p CABGx5  c/b NSTEMI 2024 on DAPT? / ?NonMI Troponin Elevation- Acute non-traumatic myocardial injury in the setting of acute blood loss, known CAD, and HF-Core measures do not apply; no clinical evidence of overt ischemia, patient denies chest pain?    - Medicine note per CHINA Peters DO 24: ?presenting with Non-STEMI non-ST elevated myocardial infarction? / ?Acute non-STEMI / Known CAD s/p PCI with GINNY on DAPT?      Clinical Indicators:  Vitals trend  - : T 36.2 - 37.3, HR 61 - 120, RR 14 - 18, SpO2 94 - 100, SBP 79  - 139  [place] [facility]    [place] 12/26/24 ?CONCLUSIONS: 1. Left ventricular ejection fraction is moderately decreased, calculated by Reyes's biplane at 33%. 2. Abnormal left venticular wall motion.?    Cardiology consult  No cardiac intervention    Per EPIC, Troponin trend from [facility] 12/20/24 - 12/23/24: 1097 - 1566 - 2668 - 6583 - 7200 - 3784 - 1450      Treatment: PO ASA 81mg, PO Lipitor, PO Metoprolol, PO Plavix 12/25 - 12/26, SQ Heparin 12/29 - 12/30  Risk Factors: PMH: HTN, Systolic CHF, CAD, PAD, SIDHU, GERD, CKD 3, DM 2, BPH, AICD in place  Options provided:  -- Non traumatic myocardial injury, Non MI troponin elevation during this admission  -- NSTEMI during this admission  -- Other - I will add my own diagnosis  -- Refer to Clinical Documentation Reviewer    Query created by: Maggie Palacios on 1/6/2025 1:40 PM      Electronically signed by:  ROBERTO HERNANDEZ DO 1/7/2025 6:18 AM

## 2025-01-07 NOTE — DISCHARGE SUMMARY
Discharge Diagnosis  Non-STEMI (non-ST elevated myocardial infarction) (Multi)    Issues Requiring Follow-Up  Cardiology follow up   PCP follow up   GI follow up     Discharge Meds     Your medication list        START taking these medications        Instructions Last Dose Given Next Dose Due   pantoprazole 40 mg EC tablet  Commonly known as: ProtoNix      Take 1 tablet (40 mg) by mouth 2 times a day. Do not crush, chew, or split.              CONTINUE taking these medications        Instructions Last Dose Given Next Dose Due   albuterol 90 mcg/actuation inhaler  Commonly known as: ProAir HFA      Inhale 2 puffs every 6 hours if needed for wheezing or shortness of breath.       allopurinol 100 mg tablet  Commonly known as: Zyloprim      Take 1 tablet (100 mg) by mouth once daily.       amLODIPine 10 mg tablet  Commonly known as: Norvasc      TAKE 1 TABLET BY MOUTH EVERY DAY       aspirin 81 mg EC tablet      TAKE 1 TABLET BY MOUTH EVERY DAY       atorvastatin 80 mg tablet  Commonly known as: Lipitor      TAKE 1 TABLET BY MOUTH EVERY DAY       b complex vitamins capsule           CoQ-10 100 mg capsule  Generic drug: coenzyme Q-10           cyanocobalamin 1,000 mcg tablet  Commonly known as: Vitamin B-12           Entresto 24-26 mg tablet  Generic drug: sacubitriL-valsartan      Take 0.5 tablets by mouth 2 times a day.       famotidine 20 mg tablet  Commonly known as: Pepcid      TAKE 1 TABLET (20 MG) BY MOUTH ONCE DAILY AS NEEDED FOR INDIGESTION.       Farxiga 10 mg  Generic drug: dapagliflozin propanediol           furosemide 40 mg tablet  Commonly known as: Lasix      Take 1 tablet (40 mg) by mouth once daily. As needed for weight gain greater than 3 pounds       gabapentin 100 mg capsule  Commonly known as: Neurontin      TAKE 1 CAPSULE (100 MG) BY MOUTH ONCE DAILY AT BEDTIME.       icosapent ethyL 0.5 gram capsule  Commonly known as: Vascepa      Take 2 capsules (1 g) by mouth 2 times a day with meals.        loratadine 10 mg tablet  Commonly known as: Claritin           magnesium gluconate 27.5 mg magne- sium (500 mg) tablet  Commonly known as: Magonate           metoprolol succinate XL 50 mg 24 hr tablet  Commonly known as: Toprol-XL      TAKE 1 TABLET BY MOUTH EVERY DAY       multivitamin tablet           sertraline 50 mg tablet  Commonly known as: Zoloft      TAKE ONE TABLET DAILY IN THE MORNING AFTER BREAKFAST.       sildenafil 100 mg tablet  Commonly known as: Viagra      Take 1 tablet (100 mg) by mouth if needed for erectile dysfunction (Start with half tab. Take 1 hour prior to intercourse on an empty stomach. If you have an erection for over 4 hours, please go to the emergency room.).              STOP taking these medications      clopidogrel 75 mg tablet  Commonly known as: Plavix        tadalafil 20 mg tablet  Commonly known as: Cialis        tadalafil 5 mg tablet  Commonly known as: Cialis                  Where to Get Your Medications        These medications were sent to [facility]/pharmacy #[id number] - [place], [place] - [address]  [address], [facility] [address]      Phone: [phone]   pantoprazole 40 mg EC tablet         Test Results Pending At Discharge  Pending Labs       No current pending labs.            Procedures       Hospital Course   Ritesh was admitted to hospital with anemia felt likely secondary to duodenal ulcer.  He did require several transfusions and ultimately did unfortunately sustain an NSTEMI here.  He was evaluated by the gastroenterology team and taken for EGD.  They did find a duodenal ulcer as stated previously.  They did recommend coming off of the Plavix and continuing aspirin if he could tolerate it.  He will need to continue on PPI while the ulcer heals.  He does need to follow-up outpatient with gastroenterology.  He was seen by the cardiology team here in the hospital and medical management was opted as this was felt likely secondary to low hemoglobin counts.  He will need  "to continue to follow-up outpatient with his cardiology team as they would like to restart his Plavix once it is safe in the setting of this GI bleed.  He was evaluated by PT and OT however will be going home with home health care as he does not qualify for a skilled nursing facility stay.  At this time is otherwise hemodynamically stable appropriate for discharge.  This plan discussed with him as well as his wife and they are both in agreement.  All questions were answered.    Blood pressure 119/52, pulse 65, temperature 36.6 °C (97.9 °F), temperature source Axillary, resp. rate 17, height 1.6 m (5' 3\"), weight 62.5 kg (137 lb 12.6 oz), SpO2 100%.  Pertinent Physical Exam At Time of Discharge  Physical Exam  Vitals and nursing note reviewed.   Constitutional:       General: He is not in acute distress.     Appearance: Normal appearance. He is not ill-appearing.   HENT:      Head: Normocephalic and atraumatic.      Mouth/Throat:      Mouth: Mucous membranes are moist.      Pharynx: Oropharynx is clear.   Eyes:      Extraocular Movements: Extraocular movements intact.      Conjunctiva/sclera: Conjunctivae normal.   Cardiovascular:      Rate and Rhythm: Normal rate and regular rhythm.      Heart sounds: Normal heart sounds. No murmur heard.     No friction rub. No gallop.   Pulmonary:      Effort: Pulmonary effort is normal.      Breath sounds: Normal breath sounds. No wheezing or rales.   Abdominal:      General: Bowel sounds are normal. There is no distension.      Palpations: Abdomen is soft.      Tenderness: There is no abdominal tenderness. There is no guarding.   Musculoskeletal:         General: No swelling or tenderness.      Right lower leg: No edema.      Left lower leg: No edema.   Skin:     General: Skin is warm and dry.      Capillary Refill: Capillary refill takes less than 2 seconds.   Neurological:      General: No focal deficit present.      Mental Status: He is alert. He is disoriented. "   Psychiatric:         Mood and Affect: Mood normal.         Outpatient Follow-Up  Future Appointments   Date Time Provider Department Center   1/9/2025 11:00 AM Ofelia Mccracken DO [id number] [facility]   1/13/2025  9:20 AM Jose Hennessy MD [id number] [facility]   2/13/2025  1:00 PM Ofelia Mccracken DO [id number] [facility]   3/19/2025 11:00 AM [facility] RN 2 [id number] [facility]   3/19/2025 11:30 AM Meme Perrin MD [id number] [facility]   5/22/2025  9:00 AM [facility] [id number] FIBROSCAN [id number] [facility]   5/22/2025 10:00 AM Nate Duran MD [id number] [facility]   11/24/2025  9:40 AM Avis Alvarenga APRN-CNP [facility]         Rafita Peters, DO FACP     Time spent during this discharge: >30 min

## 2025-01-07 NOTE — DOCUMENTATION CLARIFICATION NOTE
PATIENT:               CHIDI VARGAS  ACCT #:                  [id number]  MRN:                       [id number]  :                       1950  ADMIT DATE:       2024 12:37 AM  DISCH DATE:        2024 4:40 PM  RESPONDING PROVIDER #:        [id number]          PROVIDER RESPONSE TEXT:    Acute blood loss anemia 2/2 Duodenal ulcer GIB    CDI QUERY TEXT:    Clarification        Instruction:    Based on your assessment of the patient and the clinical information, please provide the requested documentation by clicking on the appropriate radio button and enter any additional information if prompted.      Question: Please further clarify the diagnosis of anemia as      When answering this query, please exercise your independent professional judgment. The fact that a question is being asked, does not imply that any particular answer is desired or expected.    The patient's clinical indicators include:  Clinical Information:  75 y/o male presents to [facility] [place] from [place] c/o recurrent mechanical falls, AMS. DX GIB. GI LAB  - Mild erythematous mucosa of stomach, Single duodenal bulb ulcer.      Clinical Indicators:  - H&P note per WAI Long, DO 24: ?Suspected upper GI bleed with melena and acute on chronic anemia? / ?Holding off on IV heparin or anticoagulation?    - Cardiology note per ELIZABETH Santacruz, DO 24: ?Labs at that time were notable for elevated troponin and BNP, TAYLOR on CKD, as well as acute anemia with a Hgb of 6.1 with melanotic stool in the ER. Patient was transfused 2 units of PRBC, started on Octreotide, and planned for transfer to [facility].? / ?TAYLOR on CKD III- Etiology likely prerenal d/t acute anemia?    - Medicine note per CHINA Peters, DO 24: ?Duodenal Ulcer seen on EGD causing acute on chronic anemia? / ?Recommend holding Plavix per GI, continue ASA?      Vitals trend  - : T 36.2 - 37.3, HR 61 - 120, RR 14 - 18, SpO2 94 - 100, SBP 79 - 139  Labs  -  12/30:  HGB 8.8 - 10.9 - 9.4 - 9.0 - 8.5 - 8.5 - 8.1  HCT 28.1 - 35.7 - 30.0 - 28.4 - 27.8 - 26.8 - 25.8    Plavix held 12/27 - 12/30      Treatment: Daily lab monitoring, IVF Bolus  mL 12/26  Risk Factors: PMH: HTN, Systolic CHF, CAD, PAD, SIDHU, GERD, CKD 3, DM 2, BPH, AICD in place  Options provided:  -- Acute blood loss anemia 2/2 Duodenal ulcer GIB  -- Anemia due to chronic disease CKD  -- Multifactorial Anemia due to both Acute blood loss anemia 2/2 Duodenal ulcer GIB and Anemia due to chronic disease CKD  -- Other - I will add my own diagnosis  -- Refer to Clinical Documentation Reviewer    Query created by: Maggie Palacios on 1/6/2025 2:19 PM      Electronically signed by:  ROBERTO HERNANDEZ DO 1/7/2025 6:18 AM

## 2025-01-08 DIAGNOSIS — R45.86 MOOD AND AFFECT DISTURBANCE: ICD-10-CM

## 2025-01-08 RX ORDER — SERTRALINE HYDROCHLORIDE 50 MG/1
TABLET, FILM COATED ORAL
Qty: 90 TABLET | Refills: 1 | Status: SHIPPED | OUTPATIENT
Start: 2025-01-08

## 2025-01-09 ENCOUNTER — APPOINTMENT (OUTPATIENT)
Dept: PRIMARY CARE | Facility: CLINIC | Age: 75
End: 2025-01-09
Payer: MEDICARE

## 2025-01-09 VITALS
WEIGHT: 135 LBS | DIASTOLIC BLOOD PRESSURE: 57 MMHG | TEMPERATURE: 97.4 F | OXYGEN SATURATION: 98 % | SYSTOLIC BLOOD PRESSURE: 130 MMHG | HEART RATE: 60 BPM | BODY MASS INDEX: 23.91 KG/M2

## 2025-01-09 DIAGNOSIS — F03.A4 MILD DEMENTIA WITH ANXIETY, UNSPECIFIED DEMENTIA TYPE (MULTI): ICD-10-CM

## 2025-01-09 DIAGNOSIS — I85.00 PORTAL HYPERTENSION WITH ESOPHAGEAL VARICES (MULTI): ICD-10-CM

## 2025-01-09 DIAGNOSIS — K92.2 UPPER GASTROINTESTINAL BLEEDING: ICD-10-CM

## 2025-01-09 DIAGNOSIS — E11.22 TYPE 2 DIABETES MELLITUS WITH STAGE 3B CHRONIC KIDNEY DISEASE, WITHOUT LONG-TERM CURRENT USE OF INSULIN (MULTI): ICD-10-CM

## 2025-01-09 DIAGNOSIS — Z00.00 ENCOUNTER FOR PREVENTATIVE ADULT HEALTH CARE EXAMINATION: Primary | ICD-10-CM

## 2025-01-09 DIAGNOSIS — K76.6 PORTAL HYPERTENSION WITH ESOPHAGEAL VARICES (MULTI): ICD-10-CM

## 2025-01-09 DIAGNOSIS — N18.32 TYPE 2 DIABETES MELLITUS WITH STAGE 3B CHRONIC KIDNEY DISEASE, WITHOUT LONG-TERM CURRENT USE OF INSULIN (MULTI): ICD-10-CM

## 2025-01-09 DIAGNOSIS — I10 BENIGN ESSENTIAL HYPERTENSION: ICD-10-CM

## 2025-01-09 DIAGNOSIS — I51.9 LEFT VENTRICULAR SYSTOLIC DYSFUNCTION (LVSD): ICD-10-CM

## 2025-01-09 DIAGNOSIS — I77.1 STENOSIS OF SUBCLAVIAN ARTERY: ICD-10-CM

## 2025-01-09 PROBLEM — I50.82 CONGESTIVE HEART FAILURE WITH RIGHT VENTRICULAR SYSTOLIC DYSFUNCTION: Status: RESOLVED | Noted: 2024-01-11 | Resolved: 2025-01-09

## 2025-01-09 PROBLEM — S14.129A: Status: RESOLVED | Noted: 2024-07-01 | Resolved: 2025-01-09

## 2025-01-09 PROBLEM — I50.23 ACUTE ON CHRONIC SYSTOLIC HEART FAILURE: Status: RESOLVED | Noted: 2024-02-13 | Resolved: 2025-01-09

## 2025-01-09 PROBLEM — I47.20 VENTRICULAR TACHYCARDIA, UNSPECIFIED (MULTI): Status: RESOLVED | Noted: 2024-02-15 | Resolved: 2025-01-09

## 2025-01-09 PROBLEM — I50.20 CONGESTIVE HEART FAILURE WITH RIGHT VENTRICULAR SYSTOLIC DYSFUNCTION: Status: RESOLVED | Noted: 2024-01-11 | Resolved: 2025-01-09

## 2025-01-09 PROCEDURE — 1111F DSCHRG MED/CURRENT MED MERGE: CPT | Performed by: INTERNAL MEDICINE

## 2025-01-09 PROCEDURE — 1159F MED LIST DOCD IN RCRD: CPT | Performed by: INTERNAL MEDICINE

## 2025-01-09 PROCEDURE — 3075F SYST BP GE 130 - 139MM HG: CPT | Performed by: INTERNAL MEDICINE

## 2025-01-09 PROCEDURE — 1160F RVW MEDS BY RX/DR IN RCRD: CPT | Performed by: INTERNAL MEDICINE

## 2025-01-09 PROCEDURE — 3078F DIAST BP <80 MM HG: CPT | Performed by: INTERNAL MEDICINE

## 2025-01-09 PROCEDURE — 99495 TRANSJ CARE MGMT MOD F2F 14D: CPT | Performed by: INTERNAL MEDICINE

## 2025-01-09 PROCEDURE — 1126F AMNT PAIN NOTED NONE PRSNT: CPT | Performed by: INTERNAL MEDICINE

## 2025-01-09 PROCEDURE — 1157F ADVNC CARE PLAN IN RCRD: CPT | Performed by: INTERNAL MEDICINE

## 2025-01-09 ASSESSMENT — PAIN SCALES - GENERAL: PAINLEVEL_OUTOF10: 0-NO PAIN
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
## 2025-01-09 NOTE — ASSESSMENT & PLAN NOTE
Followed by Dr. Perrin   Cognition appears intact, though reported slowed and with significant residual fatigue   Agreeable to abstain from all driving until at least next evaluation out of concern for risks and they are agreeable.   Will attempt to focus on community engagement.

## 2025-01-09 NOTE — PATIENT INSTRUCTIONS
Ritesh,   Labs including bloodwork and/or urine is ordered today. The lab can be found on this floor (2nd floor) next to the pharmacy across from the elevators.    Physical therapy/occupational therapy and continue doing home exercises. Build up muscles as much as you can   Furosemide as needed      Annual wellness visit in 3 months   Ways to Help Prevent Falls at Home    Quick Tips   ? Ask for help if you need it. Most people want to help!   ? Get up slowly after sitting or laying down   ? Wear a medical alert device or keep cell phone in your pocket   ? Use night lights, especially areas near a bathroom   ? Keep the items you use often within reach on a small stool or end table   ? Use an assistive device such as walker or cane, as directed by provider/physical therapy   ? Use a non-slip mat and grab bars in your bathroom. Look for home health sections for best options     Other Areas to Focus On   ? Exercise and nutrition: Regular exercise or taking a falls prevention class are great ways improve strength and balance. Don’t forget to stay hydrated and bring a snack!   ? Medicine side effects: Some medicines can make you sleepy or dizzy, which could cause a fall. Ask your healthcare provider about the side effects your medicines could cause. Be sure to let them know if you take any vitamins or supplements as well.   ? Tripping hazards: Remove items you could trip on, such as loose mats, rugs, cords, and clutter. Wear closed toe shoes with rubber soles.   ? Health and wellness: Get regular checkups with your healthcare provider, plus routine vision and hearing screenings. Talk with your healthcare provider about:   o Your medicines and the possible side effects - bring them in a bag if that is easier!   o Problems with balance or feeling dizzy   o Ways to promote bone health, such as Vitamin D and calcium supplements   o Questions or concerns about falling     *Ask your healthcare team if you have questions      ©[facility], 2022

## 2025-01-09 NOTE — ASSESSMENT & PLAN NOTE
with bilateral neuropathy (followed by podiatry, on gabapentin)last A1C 6.6% on Farxiga, off metformin. UTD with diabetic eye exam due for cataract on the left

## 2025-01-11 NOTE — PROGRESS NOTES
Primary Care Physician: Ofelia Mccracken DO  Date of Visit: 01/13/2025  9:20 AM EST  Location of visit: [facility] [address]   Last office visit: 10/10/2024    Chief Complaint:     Hospital follow-up    HPI/Summary  Ritesh Garza is a 74 y.o. male who presents for followup cardiology evaluation.     The patient presented on December 20, 2024 with confusion and multiple falls.  He was identified with upper GI bleed, and CTA showed attenuated branches of the MCA on the right compared to the left, potentially secondary to high-grade stenosis or occlusion.  The initial hemoglobin was only 6.1, and he had melanotic stool.  Biomarkers showed non-STEMI with a peak troponin of 7200.  Farxiga and Entresto were temporarily held with acute kidney injury.  Pantoprazole was added to famotidine.  Clopidogrel was discontinued, 8 months following prior non-STEMI.  Endoscopy revealed a duodenal ulcer.  At discharge, the hemoglobin was 8.1.  Creatinine was 1.94.  The ejection fraction was estimated at 33% on echocardiogram performed to the hospital.    The patient is status post remote MI, with longstanding multivessel CAD, LV systolic dysfunction compensated heart failure.  The problem list includes chronic kidney disease.  In 2021, he was hospitalized with unresponsiveness, possibly absence seizures and Keppra was prescribed.  There was a long hospitalization in November, 2021 with COVID-related delirium.  There was another long hospitalization in February, 2024 with left hip pain and left leg hematoma after fall, with associated ventricular tachycardia thought to be in part related to metabolic acidosis and to acute blood loss anemia.  Biomarkers were elevated.  He was seen by electrophysiology.  The ejection fraction was 30 to 35% in the hospital.  Cardiac catheterization was ultimately performed on April 15, 2024.  Left POP to LAD and D1 was patent, vein grafts were occluded and there was severe native CAD with an 80% left main,   of the proximal LAD,  of the proximal circumflex and  of the proximal RCA.  There was severe left subclavian stenosis, which likely compromised flow to the left POP when the patient was hemodynamically unstable, and likely contributed to non-STEMI and VTE.  A follow-up echocardiogram showed that the ejection fraction was 30 to 35%.  He then underwent left subclavian stent on April 30, 2024, and secondary prevention ICD on May 30, 2024.    There was a long hospitalization in July, 2024 with central cord syndrome.  He apparently walked up stairs, fell down landed on his left side, and imaging showed moderate to severe multilevel spinal degenerative changes with severe spinal canal stenosis.  MRI showed cord compression with myelopathy.  He underwent C4-C7 decompression.  There were runs of paroxysmal atrial tachycardia in the early postoperative period.    Amiodarone was withdrawn.  He continues on amlodipine 10 mg daily, aspirin 81 mg daily, atorvastatin 80 mg daily, Farxiga 10 mg daily, Vascepa 1 g 2 times daily, metoprolol succinate 50 mg daily, Entresto 24-26 mg 0.5 tablets twice daily, and other medications that were reviewed and reconciled.  Clopidogrel on hold until peptic ulcer healed.    Currently, feeling fair, with no angina, and no exertional dyspnea.  Has not required furosemide.    Specialty Problems          Cardiology Problems    Peripheral arterial disease ([facility])    History of coronary artery bypass graft     1992: CABG x5, [facility].         History of myocardial infarction    Atherosclerosis of coronary artery bypass graft    Benign essential hypertension    Hyperlipidemia    Left ventricular systolic dysfunction (LVSD)     Comment on above: February 28, 2014: LV ejection fraction 35-40%. Basal inferolateral, and basal inferior, basal inferoseptal, mid inferolateral, mid inferior, and the inferoseptal akinesis. Mild LV dilatation. Mild mitral regurgitation. June 06,  2018: LV ejection fraction 35-40%. basal and mid inferior wall, basal and mid inferolateral wall, basal inferoseptal and mid inferoseptal segments are abnormal. Left ventricle mildly dilated. Trace mitral valve regurgitation. June 07, 2019: LV ejection fraction stable, 35-40%.;  February 28, 2014: LV ejection fraction 35-40%. Basal inferolateral, and basal inferior, basal inferoseptal, mid inferolateral, mid inferior, and the inferoseptal akinesis. Mild LV dilatation. Mild mitral regurgitation. June 06, 2018: LV ejection fraction 35-40%. basal and mid inferior wall, basal and mid inferolateral wall, basal inferoseptal and mid inferoseptal segments are abnormal. Left ventricle mildly dilated. Trace mitral valve regurgitation. June 07, 2019: LV ejection fraction stable, 35-40%.June 1. 2021: LV EF 40%.;  February 28, 2014: LV ejection fraction 35-40%. Basal inferolateral, and basal inferior, basal inferoseptal, mid inferolateral, mid inferior, and the inferoseptal akinesis. Mild LV dilatation. Mild mitral regurgitation. June 06, 2018: LV ejection fraction 35-40%. basal and mid inferior wall, basal and mid inferolateral wall, basal inferoseptal and mid inferoseptal segments are abnormal. Left ventricle mildly dilated. Trace mitral valve regurgitation. June 07, 2019: LV ejection fraction stable, 35-40%.June 1. 2021: LV EF 40%. #16, 2021: LVEF 35%.;         Congestive heart failure with right ventricular systolic dysfunction    ASHD (arteriosclerotic heart disease)    Chest pain    Ventricular tachycardia (Multi)    NSTEMI (non-ST elevated myocardial infarction) (Multi)    Subclavian arterial stenosis    Acute on chronic systolic heart failure    Myocardial infarction (Multi)    Stenosis of subclavian artery      Social History     Tobacco Use    Smoking status: Never     Passive exposure: Never    Smokeless tobacco: Never   Vaping Use    Vaping status: Never Used   Substance Use Topics    Alcohol use: Never    Drug use:  "Never      No Known Allergies  Current Outpatient Medications   Medication Instructions    albuterol (ProAir HFA) 90 mcg/actuation inhaler 2 puffs, inhalation, Every 6 hours PRN    allopurinol (ZYLOPRIM) 100 mg, oral, Daily    amLODIPine (NORVASC) 10 mg, oral, Daily    aspirin 81 mg, oral, Daily    atorvastatin (LIPITOR) 80 mg, oral, Daily    coenzyme Q-10 (CoQ-10) 100 mg capsule 1 capsule, Daily    cyanocobalamin (Vitamin B-12) 1,000 mcg tablet 1 tablet, Daily    dapagliflozin (Farxiga) 10 mg 1 tablet, Daily before evening meal    eplerenone (INSPRA) 25 mg, oral, Daily    famotidine (PEPCID) 20 mg, oral, Daily PRN    furosemide (LASIX) 40 mg, oral, Daily, As needed for weight gain greater than 3 pounds    gabapentin (NEURONTIN) 100 mg, oral, Nightly    icosapent ethyL (VASCEPA) 1 g, oral, 2 times daily (morning and late afternoon)    loratadine (CLARITIN) 10 mg, 2 times daily    magnesium gluconate (Magonate) 27.5 mg magne- sium (500 mg) tablet 1 tablet, Daily    metoprolol succinate XL (TOPROL-XL) 50 mg, oral, Daily    multivitamin tablet 1 tablet, Daily    pantoprazole (PROTONIX) 40 mg, oral, 2 times daily, Do not crush, chew, or split.    sacubitriL-valsartan (Entresto) 24-26 mg tablet 0.5 tablets, oral, 2 times daily    sertraline (Zoloft) 50 mg tablet TAKE ONE TABLET DAILY IN THE MORNING AFTER BREAKFAST.    sildenafil (VIAGRA) 100 mg, oral, As needed       ROS    Vital Signs:  Vitals:    01/13/25 0945   BP: 138/58   BP Location: Left arm   Patient Position: Sitting   Pulse: 76   SpO2: 97%   Weight: 62.3 kg (137 lb 7 oz)   Height: 1.6 m (5' 3\")     Wt Readings from Last 2 Encounters:   01/13/25 62.3 kg (137 lb 7 oz)   01/09/25 61.2 kg (135 lb)     Body mass index is 24.35 kg/m².     Physical Exam:    Today, he was pleasant and cooperative.  Not in any acute distress.  No rales at the bases, which is an interval change as compared to previously.  No ventricular gallop.  No murmur.  No peripheral edema noted.   "   Lab Review:  CBC:  Lab Results   Component Value Date    WBC 7.0 12/30/2024    HGB 8.1 (L) 12/30/2024    HCT 25.8 (L) 12/30/2024    MCV 94 12/30/2024     12/30/2024       CMP:  Recent Labs     12/28/24  0503 12/25/24  0610 12/24/24  0458   GLUCOSE 150*   < > 177*      < > 140   K 4.5   < > 4.8   *   < > 115*   CO2 23   < > 19*   ANIONGAP 12   < > 11   BUN 30*   < > 42*   CREATININE 1.94*   < > 1.83*   EGFR 35*   < > 38*   ALBUMIN  --   --  3.0*   ALKPHOS  --   --  61   PROT  --   --  5.2*   ALT  --   --  16   AST  --   --  29   BILITOT  --   --  0.5    < > = values in this interval not displayed.         LIPID PANEL:  Lab Results   Component Value Date    CHOL 114 05/06/2024    HDL 31.0 05/06/2024    CHHDL 3.7 05/06/2024    VLDL 30 05/06/2024    TRIG 149 05/06/2024    NHDL 83 05/06/2024       HEME/ENDO:  Lab Results   Component Value Date    HGBA1C 6.6 (H) 01/11/2024    TSH 9.35 (H) 07/08/2024         Recent Labs     12/23/24  2351 12/20/24  1036 09/17/24  1331   BNP 3,417* 1,270* 1,712*     Recent Cardiology Tests:    ECG:    ECG today shows sinus rhythm, 73/min.  Septal infarct age undetermined.  Left bundle branch block has resolved.    Results for orders placed during the hospital encounter of 12/25/24    Electrocardiogram, 12-lead PRN ACS symptoms    Narrative  Wide QRS tachycardia  Left axis deviation  Left bundle branch block  Abnormal ECG  When compared with ECG of 24-DEC-2024 00:49,  PREVIOUS ECG IS PRESENT  Confirmed by Mahendra Santacruz ([id number]) on 12/28/2024 8:51:52 AM       Echo:  Echo Results:  Transthoracic Echo (TTE) Limited With Doppler, Color And Contrast 12/26/2024    [facility], [address] [place] [address]  Tel [phone] and Fax [phone]    TRANSTHORACIC ECHOCARDIOGRAM REPORT      Patient Name:       CHIDI Andrade Physician:    [id number] Mahendra Santacruz DO  Study Date:         12/26/2024          Ordering Provider:    [id number]  DAVID FARRELL  MRN/PID:            [id number]            Fellow:  Accession#:         [id number]        Nurse:  Date of Birth/Age:  1950 / 74      Sonographer:          Luke lea  Gender assigned at                     Additional Staff:     Ally Olmedo RDCS  Birth:  Height:             160.02 cm           Admit Date:           12/25/2024  Weight:             62.14 kg            Admission Status:     Inpatient -  Routine  BSA / BMI:          1.65 m2 / 24.27     Encounter#:           [id number]  kg/m2  Blood Pressure:     122/71 mmHg         Department Location:  [facility] Non  Invasive    Study Type:    TRANSTHORACIC ECHO (TTE) LIMITED  Diagnosis/ICD: Non ST elevation (NSTEMI) myocardial infarction-I21.4  Indication:    NSTEMI  CPT Code:      Echo Limited-17943; Doppler Limited-01983; Color Doppler-99841    Patient History:  Diabetes:          Yes  Pertinent History: HTN, Hyperlipidemia, CAD and Chest Pain. Left ventricular  Systolic Dysfunction, CKD, Congestive Heart Failure,  Non-STEMI, CABG, Peripheral arterial disease,  Congestive Heart Failure w/ Right Ventricular Systolic  Dysfunction, Obstructive Sleep Apnea, Tachycardia, Stenosis  Of Subclavian Artery.    Study Detail: The following Echo studies were performed: 2D, M-Mode, Doppler and  color flow. Technically challenging study due to patient lying in  supine position and the patient's lack of cooperation. Definity  used as a contrast agent for endocardial border definition. Total  contrast used for this procedure was 3 mL via IV push.      PHYSICIAN INTERPRETATION:  Left Ventricle: Left ventricular ejection fraction is moderately decreased, calculated by Reyes's biplane at 33%. Left venticular wall motion is abnormal. The left ventricular cavity size is mildly dilated. There is normal septal and normal posterior left ventricular wall thickness. Left ventricular diastolic filling was not assessed. There is no definite left ventricular  thrombus visualized.  Left Atrium: The left atrium was not assessed.  Right Ventricle: The right ventricle was not well visualized. Right ventricular systolic function not assessed. A device is visualized in the right ventricle.  Right Atrium: The right atrium was not well visualized. There is a device visualized in the right atrium.  Aortic Valve: The aortic valve is trileaflet. There is mild to moderate aortic valve cusp calcification. There is no evidence of aortic valve regurgitation.  Mitral Valve: The mitral valve is mildly thickened. There is mild mitral annular calcification. There is moderate mitral valve regurgitation.  Tricuspid Valve: The tricuspid valve is structurally normal. There is mild tricuspid regurgitation. The Doppler estimated RVSP is mildly elevated at 40.0 mmHg.  Pulmonic Valve: The pulmonic valve is structurally normal. There is physiologic pulmonic valve regurgitation.  Pericardium: There is no pericardial effusion noted.  Aorta: The aortic root is normal. There is plaque visualized in the ascending aorta, which is classified as a Grade 2 [mild (focal or diffuse) intimal thickening of 2-3 mm] atherosclerosis.  Systemic Veins: The inferior vena cava appears normal in size.  In comparison to the previous echocardiogram(s): Compared with study dated 7/2/2024,. Within the limitations of the current study, no significant changes are noted.      CONCLUSIONS:  1. Left ventricular ejection fraction is moderately decreased, calculated by Reyes's biplane at 33%.  2. Abnormal left venticular wall motion.  3. Left ventricular cavity size is mildly dilated.  4. No left ventricular thrombus visualized.  5. Moderate mitral valve regurgitation.  6. Mildly elevated right ventricular systolic pressure.  7. There is plaque visualized in the ascending aorta.    QUANTITATIVE DATA SUMMARY:    2D MEASUREMENTS:          Normal Ranges:  IVSd:            0.79 cm  (0.6-1.1cm)  LVPWd:           0.66 cm   (0.6-1.1cm)  LVIDd:           5.52 cm  (3.9-5.9cm)  LVIDs:           5.29 cm  LV Mass Index:   87 g/m2  LVEDV Index:     81 ml/m2  LV % FS          4.0 %      LV SYSTOLIC FUNCTION BY 2D PLANIMETRY (MOD):  Normal Ranges:  EF-A4C View:    33 % (>=55%)  EF-A2C View:    35 %  EF-Biplane:     33 %  LV EF Reported: 33 %      RIGHT VENTRICLE:  TAPSE: 10.0 mm  RV s'  0.11 m/s      TRICUSPID VALVE/RVSP:          Normal Ranges:  Peak TR Velocity:     3.04 m/s  Est. RA Pressure:     3 mmHg  RV Syst Pressure:     40 mmHg  (< 30mmHg)  IVC Diam:             1.30 cm      [id number] Mahendra Santacruz DO  Electronically signed on 12/26/2024 at 12:09:03 PM        ** Final **      Transthoracic Echo (TTE) Limited With Doppler And Color 07/02/2024    [facility], [address] [place] [address]  Tel [phone] and Fax [phone]    TRANSTHORACIC ECHOCARDIOGRAM REPORT      Patient Name:      CHIDI Andrade Physician:    [id number] Kisha Reina MD  Study Date:        7/2/2024             Ordering Provider:    [id number] ZENON ESTRADA  MRN/PID:           [id number]             Fellow:  Accession#:        [id number]         Nurse:                Kisha Michelle RN  Date of Birth/Age: 1950 / 73 years Sonographer:          Angelina Butler RDCS  Gender:            M                    Additional Staff:     Kiki Almendarez  Height:            160.02 cm            Admit Date:           7/1/2024  Weight:            69.40 kg             Admission Status:     Inpatient -  Routine  BSA / BMI:         1.73 m2 / 27.10      Encounter#:           [id number]  kg/m2  Blood Pressure:    142/71 mmHg          Department Location:  [facility] Non  Invasive    Study Type:    TRANSTHORACIC ECHO (TTE) LIMITED  Diagnosis/ICD: Encounter for other preprocedural examination-Z01.818  Indication:    Preop evaluation  CPT Code:      Echo Limited-52983; Doppler Limited-19888; Color Doppler-14551    Patient History:  Pertinent  History: DM, CAD, HFrEF, CKD, ICD (5/30/24).    Study Detail: The following Echo studies were performed: 2D, M-Mode, Doppler and  color flow. Definity used as a contrast agent for endocardial  border definition. Total contrast used for this procedure was 2.0  mL via IV push.      PHYSICIAN INTERPRETATION:  Left Ventricle: Left ventricular ejection fraction is moderately decreased, by visual estimate at 30-35%. Wall motion is abnormal. The left ventricular cavity size is moderately dilated. Spectral Doppler shows a pseudonormal pattern of left ventricular diastolic filling. There is no definite left ventricular thrombus visualized. Technically difficult exam, though there is moderate LV systolic dysfunction with the apical function being best but hypokinesis of the basal and mid segments. There is no LV apical thrombus seen on the echocontrast images.  Left Atrium: The left atrium is moderately dilated.  Right Ventricle: The right ventricle was not well visualized. There is reduced right ventricular systolic function. A device is visualized in the right ventricle.  Right Atrium: The right atrium was not well visualized. There is a device visualized in the right atrium.  Aortic Valve: The aortic valve is trileaflet. There is mild aortic valve cusp calcification. There is no evidence of aortic valve regurgitation. The peak instantaneous gradient of the aortic valve is 5.9 mmHg.  Mitral Valve: The mitral valve is mildly thickened. There is mild mitral annular calcification. There is moderate mitral valve regurgitation.  Tricuspid Valve: The tricuspid valve was not well visualized. There is moderate tricuspid regurgitation. The Doppler estimated RVSP is mildly elevated at 41.9 mmHg.  Pulmonic Valve: The pulmonic valve is not well visualized. Pulmonic valve regurgitation was not assessed.  Pericardium: There is a trivial pericardial effusion.  Aorta: The aortic root is normal.  Systemic Veins: The inferior vena cava  appears mildly dilated. There is IVC inspiratory collapse greater than 50%.  In comparison to the previous echocardiogram(s): Compared with the prior exam from 5/10/2023, today's exam is more technically difficult making assessment of LV systolic function more difficult. Proir study demonstrated clear wall motiom abnormality in the basal septal, basal inferior and basal and mid inferolateral segments. The LV systolic function appears to be worse today ( hyaving declined from mild dysfunciton to moderate dysfunction. In addition, the degree of MR appears to have increased from mild to moderate.      CONCLUSIONS:  1. Left ventricular ejection fraction is moderately decreased, by visual estimate at 30-35%.  2. Spectral Doppler shows a pseudonormal pattern of left ventricular diastolic filling.  3. Left ventricular cavity size is moderately dilated.  4. No left ventricular thrombus visualized.  5. Technically difficult exam, though there is moderate LV systolic dysfunction with the apical function being best but hypokinesis of the basal and mid segments. There is no LV apical thrombus seen on the echocontrast images.  6. There is reduced right ventricular systolic function.  7. The left atrium is moderately dilated.  8. Moderate mitral valve regurgitation.  9. Mildly elevated RVSP.  10. Moderate tricuspid regurgitation visualized.  11. Compared with the prior exam from 5/10/2023, today's exam is more technically difficult making assessment of LV systolic function more difficult. Proir study demonstrated clear wall motiom abnormality in the basal septal, basal inferior and basal and mid inferolateral segments. The LV systolic function appears to be worse today ( hyaving declined from mild dysfunciton to moderate dysfunction. In addition, the degree of MR appears to have increased from mild to moderate.    QUANTITATIVE DATA SUMMARY:  2D MEASUREMENTS:  Normal Ranges:  Ao Root d:     3.20 cm   (2.0-3.7cm)  LAs:            4.60 cm   (2.7-4.0cm)  IVSd:          0.90 cm   (0.6-1.1cm)  LVPWd:         0.70 cm   (0.6-1.1cm)  LVIDd:         5.60 cm   (3.9-5.9cm)  LVIDs:         4.90 cm  LV Mass Index: 95.6 g/m2  LV % FS        12.5 %    LA VOLUME:  Normal Ranges:  LA Vol A4C:        69.7 ml    (22+/-6mL/m2)  LA Vol A2C:        78.1 ml  LA Vol BP:         74.4 ml  LA Vol Index A4C:  40.4ml/m2  LA Vol Index A2C:  45.3 ml/m2  LA Vol Index BP:   43.1 ml/m2  LA Area A4C:       22.2 cm2  LA Area A2C:       23.7 cm2  LA Major Axis A4C: 6.0 cm  LA Major Axis A2C: 6.1 cm  LA Volume Index:   43.1 ml/m2    AORTA MEASUREMENTS:  Normal Ranges:  Ao Sinus, d: 2.80 cm (2.1-3.5cm)    LV SYSTOLIC FUNCTION BY 2D PLANIMETRY (MOD):  Normal Ranges:  EF-A4C View:    33 % (>=55%)  EF-A2C View:    39 %  EF-Biplane:     36 %  EF-Visual:      33 %  LV EF Reported: 33 %    LV DIASTOLIC FUNCTION:  Normal Ranges:  MV Peak E:    1.08 m/s    (0.7-1.2 m/s)  MV Peak A:    0.44 m/s    (0.42-0.7 m/s)  E/A Ratio:    2.47        (1.0-2.2)  MV lateral e' 0.10 m/s  MV A Dur:     127.00 msec  MV DT:        211 msec    (150-240 msec)    MITRAL VALVE:  Normal Ranges:  MV Vmax:    1.42 m/s (<=1.3m/s)  MV peak P.1 mmHg (<5mmHg)  MV mean PG: 3.0 mmHg (<2mmHg)  MV DT:      211 msec (150-240msec)    AORTIC VALVE:  Normal Ranges:  AoV Vmax:      1.21 m/s (<=1.7m/s)  AoV Peak P.9 mmHg (<20mmHg)  LVOT Max Matt:  1.09 m/s (<=1.1m/s)  LVOT VTI:      28.00 cm  LVOT Diameter: 2.10 cm  (1.8-2.4cm)  AoV Area,Vmax: 3.12 cm2 (2.5-4.5cm2)      RIGHT VENTRICLE:  TAPSE: 12.6 mm  RV s'  0.08 m/s    TRICUSPID VALVE/RVSP:  Normal Ranges:  Peak TR Velocity: 2.91 m/s  RV Syst Pressure: 41.9 mmHg (< 30mmHg)  IVC Diam:         2.20 cm      [id number] Kisha Reina MD  Electronically signed on 2024 at 4:53:18 PM        ** Final **      Transthoracic Echo (TTE) Limited With Contrast 2024    [place] [id number]  Phone [phone] Fax  [phone]    TRANSTHORACIC ECHOCARDIOGRAM REPORT      Patient Name:      CHIDI VARGAS     Reading Physician:    [id number] Blaine Adams MD  Study Date:        4/26/2024            Ordering Provider:    [id number] HUMBERTO SELLERS  MRN/PID:           [id number]             Fellow:  Accession#:        [id number]         Nurse:                Christina Chacon RN  Date of Birth/Age: 1950 / 73 years Sonographer:          Nikole Porter RDSOTO  Gender:            M                    Additional Staff:  Height:            152.40 cm            Admit Date:  Weight:            65.32 kg             Admission Status:     Outpatient  BSA / BMI:         1.62 m2 / 28.12      Department Location:  [facility] Echo  kg/m2                                      Lab  Blood Pressure: 163 /74 mmHg    Study Type:    TRANSTHORACIC ECHO (TTE) LIMITED  Diagnosis/ICD: Acute combined systolic (congestive) and diastolic (congestive)  heart failure (CHF)-I50.41; Non ST elevation (NSTEMI) myocardial  infarction-I21.4; Ventricular tachycardia, other-I47.29; Heart  disease, unspecified-I51.9  Indication:    CHF, NSTEMI, VT, LVD  CPT Codes:     Echo Limited-90846; Doppler Limited-55959; Color Doppler-26967    Patient History:  Diabetes:          Yes  Pertinent History: CHF, Cancer, HTN and TIA.    Study Detail: The following Echo studies were performed: 2D, Doppler and color  flow. Optison used as a contrast agent for endocardial border  definition. Total contrast used for this procedure was 5 mL via IV  push. Unable to obtain subcostal and suprasternal notch view.      PHYSICIAN INTERPRETATION:  Left Ventricle: Left ventricular systolic function is moderately decreased, with an estimated ejection fraction of 30-35%. Wall motion is abnormal. The left ventricular cavity size is mildly dilated. Spectral Doppler shows a restrictive pattern of left ventricular diastolic filling. There is an elevated left ventricular end diastolic pressure.  LV Wall  Scoring:  The basal and mid inferior septum, basal and mid inferior wall, and basal and  mid inferolateral wall are akinetic. The basal anteroseptal segment, apical  septal segment, and apical inferior segment are hypokinetic. All remaining  scored segments are normal.    Left Atrium: The left atrium was not assessed.  Right Ventricle: The right ventricle was not assessed. Right ventricular systolic function not assessed.  Right Atrium: The right atrium was not assessed.  Aortic Valve: The aortic valve is trileaflet. There is mild aortic valve cusp calcification. There is no evidence of aortic valve regurgitation.  Mitral Valve: The mitral valve is mildly thickened. There is moderately decreased mitral valve posterior leaflet mobility. There is moderate mitral valve regurgitation.  Tricuspid Valve: The tricuspid valve is structurally normal. There is mild to moderate tricuspid regurgitation. The Doppler estimated RVSP is mildly elevated at 35.7 mmHg.  Pulmonic Valve: The pulmonic valve was not assessed. The pulmonic valve regurgitation was not assessed.  Pericardium: There is no pericardial effusion noted.  Aorta: The aortic root was not assessed.      CONCLUSIONS:  1. Left ventricular systolic function is moderately decreased with a 30-35% estimated ejection fraction.  2. Multiple segmental abnormalities exist. See findings.  3. Spectral Doppler shows a restrictive pattern of left ventricular diastolic filling.  4. There is an elevated left ventricular end diastolic pressure.  5. Moderate mitral valve regurgitation.  6. Moderately decreased mitral valve posterior leaflet mobility.  7. Mild to moderate tricuspid regurgitation.  8. Mildly elevated RVSP.    QUANTITATIVE DATA SUMMARY:  2D MEASUREMENTS:  Normal Ranges:  IVSd:          1.16 cm    (0.6-1.1cm)  LVPWd:         1.04 cm    (0.6-1.1cm)  LVIDd:         5.17 cm    (3.9-5.9cm)  LVIDs:         4.87 cm  LV Mass Index: 134.2 g/m2  LV % FS        5.8 %    LV SYSTOLIC  FUNCTION BY 2D PLANIMETRY (MOD):  Normal Ranges:  EF-A4C View: 36.9 % (>=55%)  EF-A2C View: 28.7 %  EF-Biplane:  29.8 %    LV DIASTOLIC FUNCTION:  Normal Ranges:  MV Peak E:    1.13 m/s   (0.7-1.2 m/s)  MV Peak A:    0.28 m/s   (0.42-0.7 m/s)  E/A Ratio:    3.98       (1.0-2.2)  MV e'         0.04 m/s   (>8.0)  MV lateral e' 0.04 m/s  MV medial e'  0.04 m/s  MV A Dur:     88.49 msec  E/e' Ratio:   28.16      (<8.0)  LV IVRT:      74 msec    (<100ms)    MITRAL VALVE:  Normal Ranges:  MV DT: 171 msec (150-240msec)    AORTIC VALVE:  Normal Ranges:  LVOT Max Matt:  1.02 m/s (<=1.1m/s)  LVOT VTI:      25.00 cm  LVOT Diameter: 2.03 cm  (1.8-2.4cm)    TRICUSPID VALVE/RVSP:  Normal Ranges:  Peak TR Velocity: 2.86 m/s  RV Syst Pressure: 35.7 mmHg (< 30mmHg)      [id number] Blaine Adams MD  Electronically signed on 4/28/2024 at 11:13:11 AM      Wall Scoring        ** Final **      Transthoracic Echo (TTE) Complete With Contrast 02/15/2024    [place] [address]  Phone [phone] Fax [phone]    TRANSTHORACIC ECHOCARDIOGRAM REPORT      Patient Name:      CHIDI VARGAS      Reading Physician:    [id number] Gonzalo White DO  Study Date:        2/15/2024             Ordering Provider:    [id number] TITO NICOLAS  MRN/PID:           [id number]              Fellow:  Accession#:        [id number]          Nurse:  Date of Birth/Age: 1950 / 73 years  Sonographer:          Heena David RDCS  Gender:            M                     Additional Staff:  Height:            160.02 cm             Admit Date:           2/13/2024  Weight:            73.03 kg              Admission Status:     Inpatient -  Routine  BSA / BMI:         1.76 m2 / 28.52 kg/m2 Department Location:  [facility] Echo  Lab  Blood Pressure: 105 /60 mmHg    Study Type:    TRANSTHORACIC ECHO (TTE) COMPLETE  Diagnosis/ICD: Chest pain, unspecified-R07.9  Indication:    Chest Pain  CPT Codes:     Echo Complete w  Full [id number]  Study Detail: The following Echo studies were performed: 2D, M-Mode, Doppler and  color flow. Optison used as a contrast agent for endocardial  border definition. Total contrast used for this procedure was 3 mL  via IV push.      PHYSICIAN INTERPRETATION:  Left Ventricle: Left ventricular systolic function is normal, with an estimated ejection fraction of 30-35%. There are multiple wall motion abnormalities. The left ventricular cavity size is upper limits of normal. Spectral Doppler shows a restrictive pattern of left ventricular diastolic filling.  LV Wall Scoring:  The basal inferoseptal segment and basal inferior segment are akinetic. The  entire anterior septum, mid and apical inferior septum, basal and mid  inferolateral wall, basal anterolateral segment, basal anterior segment, and mid  inferior segment are hypokinetic. All remaining scored segments are normal.    Left Atrium: The left atrium is normal in size.  Right Ventricle: The right ventricle was not well visualized. There is low normal right ventricular systolic function.  Right Atrium: The right atrium is normal in size.  Aortic Valve: The aortic valve is trileaflet. There is mild aortic valve thickening. There is no evidence of aortic valve regurgitation. The peak instantaneous gradient of the aortic valve is 7.1 mmHg. The mean gradient of the aortic valve is 4.0 mmHg.  Mitral Valve: The mitral valve is normal in structure. There is mild mitral valve regurgitation.  Tricuspid Valve: The tricuspid valve is structurally normal. There is mild to moderate tricuspid regurgitation.  Pulmonic Valve: The pulmonic valve is structurally normal. There is physiologic pulmonic valve regurgitation.  Pericardium: There is no pericardial effusion noted.  Aorta: The aortic root is normal.      CONCLUSIONS:  1. Left ventricular systolic function is normal with a 30-35% estimated ejection fraction.  2. Multiple segmental abnormalities exist. See  findings.  3. Spectral Doppler shows a restrictive pattern of left ventricular diastolic filling.  4. There is low normal right ventricular systolic function.  5. Mild to moderate tricuspid regurgitation.  6. There are multiple wall motion abnormalities.    QUANTITATIVE DATA SUMMARY:  2D MEASUREMENTS:  Normal Ranges:  Ao Root d:     2.90 cm    (2.0-3.7cm)  LAs:           4.60 cm    (2.7-4.0cm)  IVSd:          1.00 cm    (0.6-1.1cm)  LVPWd:         0.90 cm    (0.6-1.1cm)  LVIDd:         5.30 cm    (3.9-5.9cm)  LVIDs:         5.20 cm  LV Mass Index: 106.2 g/m2  LV % FS        1.9 %    LA VOLUME:  Normal Ranges:  LA Vol A4C:        53.0 ml    (22+/-6mL/m2)  LA Vol A2C:        47.8 ml  LA Vol BP:         50.3 ml  LA Vol Index A4C:  30.0ml/m2  LA Vol Index A2C:  27.1 ml/m2  LA Vol Index BP:   28.5 ml/m2  LA Area A4C:       18.0 cm2  LA Area A2C:       17.1 cm2  LA Major Axis A4C: 5.2 cm  LA Major Axis A2C: 5.2 cm  LA Volume Index:   26.8 ml/m2    M-MODE MEASUREMENTS:  Normal Ranges:  Ao Root: 2.90 cm (2.0-3.7cm)    AORTA MEASUREMENTS:  Normal Ranges:  Asc Ao, d: 2.50 cm (2.1-3.4cm)    LV SYSTOLIC FUNCTION BY 2D PLANIMETRY (MOD):  Normal Ranges:  EF-A4C View: 29.3 % (>=55%)  EF-A2C View: 43.2 %  EF-Biplane:  35.2 %    LV DIASTOLIC FUNCTION:  Normal Ranges:  MV Peak E:    1.29 m/s (0.7-1.2 m/s)  MV Peak A:    0.50 m/s (0.42-0.7 m/s)  E/A Ratio:    2.56     (1.0-2.2)  MV e'         0.06 m/s (>8.0)  MV lateral e' 0.06 m/s  MV medial e'  0.06 m/s  E/e' Ratio:   21.50    (<8.0)    MITRAL VALVE:  Normal Ranges:  MV DT: 151 msec (150-240msec)    MITRAL INSUFFICIENCY:  Normal Ranges:  PISA Radius:  0.5 cm  MR VTI:       129.00 cm  MR Vmax:      437.00 cm/s  MR Alias Matt: 38.5 cm/s  MR Volume:    17.85 ml  MR Flow Rt:   60.48 ml/s  MR EROA:      0.14 cm2    AORTIC VALVE:  Normal Ranges:  AoV Vmax:                1.33 m/s (<=1.7m/s)  AoV Peak P.1 mmHg (<20mmHg)  AoV Mean P.0 mmHg (1.7-11.5mmHg)  LVOT  Max Matt:            1.04 m/s (<=1.1m/s)  AoV VTI:                 25.10 cm (18-25cm)  LVOT VTI:                20.70 cm  LVOT Diameter:           2.00 cm  (1.8-2.4cm)  AoV Area, VTI:           2.59 cm2 (2.5-5.5cm2)  AoV Area,Vmax:           2.46 cm2 (2.5-4.5cm2)  AoV Dimensionless Index: 0.82      RIGHT VENTRICLE:  TAPSE: 14.1 mm  RV s'  0.09 m/s    TRICUSPID VALVE/RVSP:  Normal Ranges:  Peak TR Velocity: 2.44 m/s  RV Syst Pressure: 26.8 mmHg (< 30mmHg)  IVC Diam:         1.50 cm      [id number] Gonzalo White DO  Electronically signed on 2/15/2024 at 11:57:27 AM      Wall Scoring        ** Final **       Cath:      Stress Test:  Stress Results:  No results found for this or any previous visit from the past 365 days.         Cardiac Imaging:        Assessment/Plan   Complex patient with multivessel CAD, now nearly 1 year status post non-STEMI after he presented with a fall and significant left leg hematoma and marked anemia.  He was identified with severe left subclavian stenosis which compromised flow to the left POP, and he underwent left subclavian stent followed by primary prevention ICD.  The ejection fraction is moderate to severely impaired.  He was recently hospitalized with a GI bleed, and another episode of demand ischemia when the hemoglobin was around 6 g/dL.  Not a candidate for spironolactone, having developed painful gynecomastia.  We will try eplerenone 25 mg daily, with careful monitoring of renal function and potassium.  He did develop hyperkalemia in the past.  Lipids are well-controlled.  Repeat blood work in 2 weeks, follow-up in the office in    Orders:  Orders Placed This Encounter   Procedures    Renal Function Panel    B-Type Natriuretic Peptide    ECG 12 lead (Clinic Performed)      Followup Appts:  Future Appointments   Date Time Provider Department Center   2/13/2025  1:00 PM Ofelia Mccracken DO [id number] [facility]   3/19/2025 11:00 AM [facility] RN 2 [id number] [facility]   3/19/2025 11:30 AM  Meme Perrin MD [id number] [facility]   4/29/2025 12:20 PM Ofelia Mccracken DO [id number] [facility]   5/22/2025  9:00 AM [facility] [id number] [id number] [facility]   5/22/2025 10:00 AM Nate Duran MD [id number] [facility]   11/24/2025  9:40 AM NORAH Espinoza-REYES [facility]           ____________________________________________________________  Jose Hennessy MD    Senior Attending Physician  [facility]  [facility]    [facility] Chair for Cardiovascular Excellence  [employer]

## 2025-01-12 NOTE — ASSESSMENT & PLAN NOTE
Orthostatics today negative, though high risk for fall.   Continue cautiously administering medications.

## 2025-01-12 NOTE — ASSESSMENT & PLAN NOTE
Most recent TTE with EF 33% - continue GDMT with Farxiga, metoprolol, entresto   Spironolactone held due to history of hyperkalemia   Lasix as needed

## 2025-01-12 NOTE — ASSESSMENT & PLAN NOTE
Post stent placement, DAPT held for now, maintained only on aspirin in light of recent bleed  Upcoming visit scheduled with cardiology to determine if resumption is warranted

## 2025-01-12 NOTE — ASSESSMENT & PLAN NOTE
Single ulcer in the duodenal bulb with clean base   Recommended repeat EGD in 1 year   Uptitrate PPI to BID dosing as previously recommended

## 2025-01-13 ENCOUNTER — APPOINTMENT (OUTPATIENT)
Dept: CARDIOLOGY | Facility: CLINIC | Age: 75
End: 2025-01-13
Payer: MEDICARE

## 2025-01-13 VITALS
DIASTOLIC BLOOD PRESSURE: 58 MMHG | HEIGHT: 63 IN | SYSTOLIC BLOOD PRESSURE: 138 MMHG | OXYGEN SATURATION: 97 % | BODY MASS INDEX: 24.35 KG/M2 | HEART RATE: 76 BPM | WEIGHT: 137.44 LBS

## 2025-01-13 DIAGNOSIS — I21.4 NSTEMI (NON-ST ELEVATED MYOCARDIAL INFARCTION) (MULTI): ICD-10-CM

## 2025-01-13 DIAGNOSIS — I50.20 CONGESTIVE HEART FAILURE WITH RIGHT VENTRICULAR SYSTOLIC DYSFUNCTION: ICD-10-CM

## 2025-01-13 DIAGNOSIS — I77.1 SUBCLAVIAN ARTERIAL STENOSIS: ICD-10-CM

## 2025-01-13 DIAGNOSIS — I50.23 ACUTE ON CHRONIC SYSTOLIC HEART FAILURE: ICD-10-CM

## 2025-01-13 DIAGNOSIS — I50.82 CONGESTIVE HEART FAILURE WITH RIGHT VENTRICULAR SYSTOLIC DYSFUNCTION: ICD-10-CM

## 2025-01-13 DIAGNOSIS — I47.20 VENTRICULAR TACHYCARDIA (MULTI): ICD-10-CM

## 2025-01-13 DIAGNOSIS — I25.10 ASHD (ARTERIOSCLEROTIC HEART DISEASE): Primary | ICD-10-CM

## 2025-01-13 PROCEDURE — 99215 OFFICE O/P EST HI 40 MIN: CPT | Performed by: INTERNAL MEDICINE

## 2025-01-13 PROCEDURE — 1126F AMNT PAIN NOTED NONE PRSNT: CPT | Performed by: INTERNAL MEDICINE

## 2025-01-13 PROCEDURE — 3078F DIAST BP <80 MM HG: CPT | Performed by: INTERNAL MEDICINE

## 2025-01-13 PROCEDURE — 93010 ELECTROCARDIOGRAM REPORT: CPT | Performed by: INTERNAL MEDICINE

## 2025-01-13 PROCEDURE — 99215 OFFICE O/P EST HI 40 MIN: CPT | Mod: 25 | Performed by: INTERNAL MEDICINE

## 2025-01-13 PROCEDURE — 93005 ELECTROCARDIOGRAM TRACING: CPT | Performed by: INTERNAL MEDICINE

## 2025-01-13 PROCEDURE — 1157F ADVNC CARE PLAN IN RCRD: CPT | Performed by: INTERNAL MEDICINE

## 2025-01-13 PROCEDURE — 3075F SYST BP GE 130 - 139MM HG: CPT | Performed by: INTERNAL MEDICINE

## 2025-01-13 PROCEDURE — G2211 COMPLEX E/M VISIT ADD ON: HCPCS | Performed by: INTERNAL MEDICINE

## 2025-01-13 PROCEDURE — 1159F MED LIST DOCD IN RCRD: CPT | Performed by: INTERNAL MEDICINE

## 2025-01-13 PROCEDURE — 1111F DSCHRG MED/CURRENT MED MERGE: CPT | Performed by: INTERNAL MEDICINE

## 2025-01-13 RX ORDER — EPLERENONE 25 MG/1
25 TABLET, FILM COATED ORAL DAILY
Qty: 30 TABLET | Refills: 3 | Status: SHIPPED | OUTPATIENT
Start: 2025-01-13 | End: 2025-05-13

## 2025-01-13 ASSESSMENT — ENCOUNTER SYMPTOMS
LOSS OF SENSATION IN FEET: 0
OCCASIONAL FEELINGS OF UNSTEADINESS: 1
DEPRESSION: 0

## 2025-01-13 ASSESSMENT — PAIN SCALES - GENERAL: PAINLEVEL_OUTOF10: 0-NO PAIN

## 2025-01-13 NOTE — PATIENT INSTRUCTIONS
1.  Try eplerenone 25 mg daily for additional management of congestive heart failure.    2.  Obtain a renal function panel and a BNP in 2 weeks.  Order has been placed.  This is important.    3.  Follow-up in 3 months.

## 2025-01-14 DIAGNOSIS — N18.32 TYPE 2 DIABETES MELLITUS WITH STAGE 3B CHRONIC KIDNEY DISEASE, WITHOUT LONG-TERM CURRENT USE OF INSULIN (MULTI): Primary | ICD-10-CM

## 2025-01-14 DIAGNOSIS — E11.22 TYPE 2 DIABETES MELLITUS WITH STAGE 3B CHRONIC KIDNEY DISEASE, WITHOUT LONG-TERM CURRENT USE OF INSULIN (MULTI): Primary | ICD-10-CM

## 2025-01-15 ENCOUNTER — APPOINTMENT (OUTPATIENT)
Dept: CARDIOLOGY | Facility: HOSPITAL | Age: 75
End: 2025-01-15
Payer: MEDICARE

## 2025-01-15 ENCOUNTER — APPOINTMENT (OUTPATIENT)
Dept: PRIMARY CARE | Facility: CLINIC | Age: 75
End: 2025-01-15
Payer: MEDICARE

## 2025-01-15 LAB
ATRIAL RATE: 73 BPM
P AXIS: 79 DEGREES
P OFFSET: 192 MS
P ONSET: 142 MS
PR INTERVAL: 140 MS
Q ONSET: 212 MS
QRS COUNT: 12 BEATS
QRS DURATION: 122 MS
QT INTERVAL: 420 MS
QTC CALCULATION(BAZETT): 462 MS
QTC FREDERICIA: 448 MS
R AXIS: 21 DEGREES
T AXIS: 46 DEGREES
T OFFSET: 422 MS
VENTRICULAR RATE: 73 BPM

## 2025-02-13 ENCOUNTER — APPOINTMENT (OUTPATIENT)
Dept: PRIMARY CARE | Facility: CLINIC | Age: 75
End: 2025-02-13
Payer: MEDICARE

## 2025-02-13 ENCOUNTER — HOSPITAL ENCOUNTER (OUTPATIENT)
Dept: CARDIOLOGY | Facility: HOSPITAL | Age: 75
Discharge: HOME | End: 2025-02-13
Payer: MEDICARE

## 2025-02-13 VITALS
BODY MASS INDEX: 24.45 KG/M2 | HEART RATE: 60 BPM | HEIGHT: 63 IN | WEIGHT: 138 LBS | OXYGEN SATURATION: 98 % | SYSTOLIC BLOOD PRESSURE: 133 MMHG | TEMPERATURE: 97.6 F | DIASTOLIC BLOOD PRESSURE: 72 MMHG

## 2025-02-13 DIAGNOSIS — I47.29 OTHER VENTRICULAR TACHYCARDIA: ICD-10-CM

## 2025-02-13 DIAGNOSIS — N18.32 TYPE 2 DIABETES MELLITUS WITH STAGE 3B CHRONIC KIDNEY DISEASE, WITHOUT LONG-TERM CURRENT USE OF INSULIN (MULTI): ICD-10-CM

## 2025-02-13 DIAGNOSIS — Z00.00 ENCOUNTER FOR PREVENTATIVE ADULT HEALTH CARE EXAMINATION: Primary | ICD-10-CM

## 2025-02-13 DIAGNOSIS — E11.22 TYPE 2 DIABETES MELLITUS WITH STAGE 3B CHRONIC KIDNEY DISEASE, WITHOUT LONG-TERM CURRENT USE OF INSULIN (MULTI): ICD-10-CM

## 2025-02-13 DIAGNOSIS — E03.9 PRIMARY HYPOTHYROIDISM: ICD-10-CM

## 2025-02-13 DIAGNOSIS — E87.5 HYPERKALEMIA: ICD-10-CM

## 2025-02-13 PROBLEM — I47.20 VENTRICULAR TACHYCARDIA (MULTI): Status: RESOLVED | Noted: 2024-02-15 | Resolved: 2025-02-13

## 2025-02-13 LAB
ALBUMIN SERPL-MCNC: 4.3 G/DL (ref 3.6–5.1)
ALP SERPL-CCNC: 105 U/L (ref 35–144)
ALT SERPL-CCNC: 10 U/L (ref 9–46)
ANION GAP SERPL CALCULATED.4IONS-SCNC: 9 MMOL/L (CALC) (ref 7–17)
AST SERPL-CCNC: 13 U/L (ref 10–35)
BASOPHILS # BLD AUTO: 32 CELLS/UL (ref 0–200)
BASOPHILS NFR BLD AUTO: 0.5 %
BILIRUB SERPL-MCNC: 0.3 MG/DL (ref 0.2–1.2)
BUN SERPL-MCNC: 33 MG/DL (ref 7–25)
CALCIUM SERPL-MCNC: 8.9 MG/DL (ref 8.6–10.3)
CHLORIDE SERPL-SCNC: 104 MMOL/L (ref 98–110)
CO2 SERPL-SCNC: 26 MMOL/L (ref 20–32)
CREAT SERPL-MCNC: 1.99 MG/DL (ref 0.7–1.28)
EGFRCR SERPLBLD CKD-EPI 2021: 34 ML/MIN/1.73M2
EOSINOPHIL # BLD AUTO: 208 CELLS/UL (ref 15–500)
EOSINOPHIL NFR BLD AUTO: 3.3 %
ERYTHROCYTE [DISTWIDTH] IN BLOOD BY AUTOMATED COUNT: 14.5 % (ref 11–15)
GLUCOSE SERPL-MCNC: 161 MG/DL (ref 65–99)
HCT VFR BLD AUTO: 30.9 % (ref 38.5–50)
HGB BLD-MCNC: 9.1 G/DL (ref 13.2–17.1)
LYMPHOCYTES # BLD AUTO: 1896 CELLS/UL (ref 850–3900)
LYMPHOCYTES NFR BLD AUTO: 30.1 %
MAGNESIUM SERPL-MCNC: 2.6 MG/DL (ref 1.5–2.5)
MCH RBC QN AUTO: 25.1 PG (ref 27–33)
MCHC RBC AUTO-ENTMCNC: 29.4 G/DL (ref 32–36)
MCV RBC AUTO: 85.1 FL (ref 80–100)
MONOCYTES # BLD AUTO: 643 CELLS/UL (ref 200–950)
MONOCYTES NFR BLD AUTO: 10.2 %
NEUTROPHILS # BLD AUTO: 3522 CELLS/UL (ref 1500–7800)
NEUTROPHILS NFR BLD AUTO: 55.9 %
PLATELET # BLD AUTO: 240 THOUSAND/UL (ref 140–400)
PMV BLD REES-ECKER: 10 FL (ref 7.5–12.5)
POC HEMOGLOBIN A1C: 6.2 % (ref 4.2–6.5)
POTASSIUM SERPL-SCNC: 5.5 MMOL/L (ref 3.5–5.3)
PROT SERPL-MCNC: 6.9 G/DL (ref 6.1–8.1)
RBC # BLD AUTO: 3.63 MILLION/UL (ref 4.2–5.8)
SODIUM SERPL-SCNC: 139 MMOL/L (ref 135–146)
T4 FREE SERPL-MCNC: 0.3 NG/DL (ref 0.8–1.8)
TSH SERPL-ACNC: 49.92 MIU/L (ref 0.4–4.5)
WBC # BLD AUTO: 6.3 THOUSAND/UL (ref 3.8–10.8)

## 2025-02-13 PROCEDURE — G2211 COMPLEX E/M VISIT ADD ON: HCPCS | Performed by: INTERNAL MEDICINE

## 2025-02-13 PROCEDURE — 3078F DIAST BP <80 MM HG: CPT | Performed by: INTERNAL MEDICINE

## 2025-02-13 PROCEDURE — 99214 OFFICE O/P EST MOD 30 MIN: CPT | Performed by: INTERNAL MEDICINE

## 2025-02-13 PROCEDURE — 83036 HEMOGLOBIN GLYCOSYLATED A1C: CPT | Performed by: INTERNAL MEDICINE

## 2025-02-13 PROCEDURE — 1125F AMNT PAIN NOTED PAIN PRSNT: CPT | Performed by: INTERNAL MEDICINE

## 2025-02-13 PROCEDURE — 1036F TOBACCO NON-USER: CPT | Performed by: INTERNAL MEDICINE

## 2025-02-13 PROCEDURE — 1160F RVW MEDS BY RX/DR IN RCRD: CPT | Performed by: INTERNAL MEDICINE

## 2025-02-13 PROCEDURE — 3075F SYST BP GE 130 - 139MM HG: CPT | Performed by: INTERNAL MEDICINE

## 2025-02-13 PROCEDURE — 93296 REM INTERROG EVL PM/IDS: CPT

## 2025-02-13 PROCEDURE — 1157F ADVNC CARE PLAN IN RCRD: CPT | Performed by: INTERNAL MEDICINE

## 2025-02-13 PROCEDURE — 1159F MED LIST DOCD IN RCRD: CPT | Performed by: INTERNAL MEDICINE

## 2025-02-13 RX ORDER — LEVOTHYROXINE SODIUM 50 UG/1
50 TABLET ORAL DAILY
Qty: 30 TABLET | Refills: 1 | Status: SHIPPED | OUTPATIENT
Start: 2025-02-13 | End: 2026-02-13

## 2025-02-13 ASSESSMENT — PAIN SCALES - GENERAL: PAINLEVEL_OUTOF10: 5

## 2025-02-13 NOTE — PROGRESS NOTES
Subjective   Patient ID: Ritesh Garza is a 74 y.o. male who presents for Follow-up.  HPI  74-year-old male here for follow-up visit, last seen last month.  Labs performed yesterday and notable for improving hemoglobin from 8.5 to 9.1 and newly diangosed hypothyroidism with a TSH of 49.92 and a low T4 of 0.3, mild hyperkalemia to 5.5, previously 4.5  Memory and cognition  has since improved since last being seen. Still issues with his hearing aid.   He is very eager to start driving again.       Past medical history  - Recurrent falls and ataxia -high-grade stenosis or areas of occlusion suggested on imaging not be thrombectomy candidate, history of central cord syndrome after trauma in July  - Subclavian artery stenosis status post stent 3/24 on DAPT   - CAD s/p 5V CABG 1992 on aspirin, recent cardiac cath followed by Dr Hennessy.   - HFrEF 33% -  followed by Dr. Hennessy on Farxiga, metoprolol, (discontinued epleronone due to gynecomastia and hyperkalemia recently restarted last month) , Entresto, and lasix status post ICD  - CKD 3b with persistent proteinuria referred to nephrology with worsening of renal function  to CKD4, seen by nephrology 2h Dr. Meredith, secondary hyperparathyroidism  - Aortic dilatation at 3.1cm   - HTN - on amlodipine 10mg, metoprolol now in the evening (helps him sleep better)   - SIDHU cirrhosis - followed by Dr. Duran -  ultrasound and AFP every 6 months - recent EGD showing no varices last seen in May, repeat endoscopy in 2026  - DM2 - with bilateral neuropathy (followed by podiatry, on gabapentin)last A1C 6.6% on Farxiga, off metformin. UTD with diabetic eye exam due for cataract on the left   - Erosive esophagitis and recent duodenal ulcer 12/24- on pantoprazole repeat EGD 1 year recommended increase PPI to BID dosing.  - Asthma -  mild and seasonal - has rare use of albuterol  - BPH with LUTS on daily tadalafil (cialis) 5mg.  - ED - takes cialis takes as needed as well.   - SENIA -  denies and not interested in treating   - Mild cognitive impairment and depression -recommended continued psychotherapy recommended repeat MoCA in 6 months with Dr. Perrin on sertraline on 25mg BID, followed by neuropsychiatry possible frontal lobe problem list followed by Dr. Andrade recommending holding driving at last visit  - Right carpal tunnel syndrome followed by Dr. De Paz  - Strabismus status post remote eye muscle surgery  - Tubular adenoma April 2023 repeat 5 years  - Gout on allopurinol 100mg tried to reduce allopurinol dose with gouty flare.   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 Has a son in [place] has a grandson who he has not seen yet.   Current Outpatient Medications   Medication Instructions    albuterol (ProAir HFA) 90 mcg/actuation inhaler 2 puffs, inhalation, Every 6 hours PRN    allopurinol (ZYLOPRIM) 100 mg, oral, Daily    amLODIPine (NORVASC) 10 mg, oral, Daily    aspirin 81 mg, oral, Daily    atorvastatin (LIPITOR) 80 mg, oral, Daily    coenzyme Q-10 (CoQ-10) 100 mg capsule 1 capsule, Daily    cyanocobalamin (Vitamin B-12) 1,000 mcg tablet 1 tablet, Daily    dapagliflozin (Farxiga) 10 mg 1 tablet, Daily before evening meal    eplerenone (INSPRA) 25 mg, oral, Daily    famotidine (PEPCID) 20 mg, oral, Daily PRN    gabapentin (NEURONTIN) 100 mg, oral, Nightly    icosapent ethyL (VASCEPA) 1 g, oral, 2 times daily (morning and late afternoon)    levothyroxine (SYNTHROID, LEVOXYL) 50 mcg, oral, Daily, Take on an empty stomach at the same time each day, either 30 to 60 minutes prior to breakfast    loratadine (CLARITIN) 10 mg, 2 times daily    magnesium gluconate (Magonate) 27.5 mg magne- sium (500 mg) tablet 1 tablet, Daily    metoprolol  "succinate XL (TOPROL-XL) 50 mg, oral, Daily    multivitamin tablet 1 tablet, Daily    pantoprazole (PROTONIX) 40 mg, oral, 2 times daily, Do not crush, chew, or split.    sacubitriL-valsartan (Entresto) 24-26 mg tablet 0.5 tablets, oral, 2 times daily    sertraline (Zoloft) 50 mg tablet TAKE ONE TABLET DAILY IN THE MORNING AFTER BREAKFAST.    sildenafil (VIAGRA) 100 mg, oral, As needed        Objective     /72   Pulse 60   Temp 36.4 °C (97.6 °F)   Ht 1.6 m (5' 3\")   Wt 62.6 kg (138 lb)   SpO2 98%   BMI 24.45 kg/m²     Physical Exam  General: Appears comfortable, NAD, appropriate affect  HEENT: NCAT, EOMI, pupils symmetric, no conjunctival erythema   Heart: RRR S1 S2 no murmurs appreciated   Lungs: CTA bilaterally, no rhonchi, rales, or wheezes   Abdomen: Soft, NT/ND, no rebound or guarding  Extremities: no cyanosis or edema appreciated  Neuro: AAO x 3, answers questions appropriately, no FND, gait unremarkable     Lab Results   Component Value Date    WBC 6.3 02/12/2025    HGB 9.1 (L) 02/12/2025    HCT 30.9 (L) 02/12/2025     02/12/2025    CHOL 114 05/06/2024    TRIG 149 05/06/2024    HDL 31.0 05/06/2024    ALT 10 02/12/2025    AST 13 02/12/2025     02/12/2025    K 5.5 (H) 02/12/2025     02/12/2025    CREATININE 1.99 (H) 02/12/2025    BUN 33 (H) 02/12/2025    CO2 26 02/12/2025    TSH 49.92 (H) 02/12/2025    INR 1.2 (H) 12/20/2024    HGBA1C 6.2 02/13/2025     Independently reviewed most recent bloodwork        Assessment/Plan     Assessment & Plan  Type 2 diabetes mellitus with stage 3b chronic kidney disease, without long-term current use of insulin (Multi)  A1c remains well controlled   Orders:    POCT glycosylated hemoglobin (Hb A1C) manually resulted    Primary hypothyroidism  Significant and new elevation in the TSH suggestive of new onset primary hypothyroidism   Start levothyroxine 50mcg daily, counseling provided on appropriate administration   Recheck TFTs in 6 weeks. "   Orders:    levothyroxine (Synthroid, Levoxyl) 50 mcg tablet; Take 1 tablet (50 mcg) by mouth early in the morning.. Take on an empty stomach at the same time each day, either 30 to 60 minutes prior to breakfast    TSH with reflex to Free T4 if abnormal; Future    Hyperkalemia  K up to 5.5 after initiation of epleronone, plan to recheck in 1 week's time if persists will likely discontinued.   Will discuss with Dr. Hennessy.   Orders:    Basic metabolic panel; Future      Followup as scheduled in April

## 2025-02-13 NOTE — PATIENT INSTRUCTIONS
Ritesh,   Hypothyroidism - start Levothyroxine 50mcg once per day in the morning on an empty stomach 30-60 minutes before anything else.   Recheck thyroid function test in 4-6 weeks.     See you in April.

## 2025-02-13 NOTE — ASSESSMENT & PLAN NOTE
A1c remains well controlled   Orders:    POCT glycosylated hemoglobin (Hb A1C) manually resulted

## 2025-02-14 NOTE — ASSESSMENT & PLAN NOTE
K up to 5.5 after initiation of epleronone, plan to recheck in 1 week's time if persists will likely discontinued.   Will discuss with Dr. Hennessy.   Orders:    Basic metabolic panel; Future

## 2025-02-25 ENCOUNTER — TELEPHONE (OUTPATIENT)
Dept: SCHEDULING | Age: 75
End: 2025-02-25
Payer: MEDICARE

## 2025-02-25 ENCOUNTER — PATIENT OUTREACH (OUTPATIENT)
Dept: PRIMARY CARE | Facility: CLINIC | Age: 75
End: 2025-02-25
Payer: MEDICARE

## 2025-02-25 NOTE — PROGRESS NOTES
TCM RN Outreach  Unable to reach patient for discharge follow up call.   LVM with call back number for patient to call if needed   If no voicemail available call attempts x 2 were made to contact the patient to assist with any questions or concerns patient may have.

## 2025-03-07 DIAGNOSIS — E03.9 PRIMARY HYPOTHYROIDISM: ICD-10-CM

## 2025-03-09 RX ORDER — LEVOTHYROXINE SODIUM 50 UG/1
50 TABLET ORAL DAILY
Qty: 30 TABLET | Refills: 0 | Status: SHIPPED | OUTPATIENT
Start: 2025-03-09 | End: 2025-04-08

## 2025-03-18 ENCOUNTER — APPOINTMENT (OUTPATIENT)
Dept: CARDIOLOGY | Facility: HOSPITAL | Age: 75
End: 2025-03-18
Payer: MEDICARE

## 2025-03-19 ENCOUNTER — APPOINTMENT (OUTPATIENT)
Dept: BEHAVIORAL HEALTH | Facility: CLINIC | Age: 75
End: 2025-03-19
Payer: MEDICARE

## 2025-03-19 ENCOUNTER — APPOINTMENT (OUTPATIENT)
Dept: GERIATRIC MEDICINE | Facility: CLINIC | Age: 75
End: 2025-03-19
Payer: MEDICARE

## 2025-03-24 ENCOUNTER — TELEPHONE (OUTPATIENT)
Dept: SCHEDULING | Age: 75
End: 2025-03-24
Payer: MEDICARE

## 2025-03-24 NOTE — TELEPHONE ENCOUNTER
Spoke with patient.  Exertional dyspnea, sounds like an anginal equivalent rather than heart failure.  Did not obtain the requested blood tests.  Asked him to obtain repeat renal function panel and BNP, move up office visit to next week.  We will review the coronary anatomy.

## 2025-03-25 ENCOUNTER — TELEPHONE (OUTPATIENT)
Dept: SCHEDULING | Age: 75
End: 2025-03-25
Payer: MEDICARE

## 2025-03-25 LAB
ALBUMIN SERPL-MCNC: 4.1 G/DL (ref 3.6–5.1)
BUN SERPL-MCNC: 48 MG/DL (ref 7–25)
BUN/CREAT SERPL: 17 (CALC) (ref 6–22)
CALCIUM SERPL-MCNC: 8.5 MG/DL (ref 8.6–10.3)
CHLORIDE SERPL-SCNC: 108 MMOL/L (ref 98–110)
CO2 SERPL-SCNC: 20 MMOL/L (ref 20–32)
CREAT SERPL-MCNC: 2.89 MG/DL (ref 0.7–1.28)
EGFRCR SERPLBLD CKD-EPI 2021: 22 ML/MIN/1.73M2
GLUCOSE SERPL-MCNC: 200 MG/DL (ref 65–99)
PHOSPHATE SERPL-MCNC: 4.5 MG/DL (ref 2.1–4.3)
POTASSIUM SERPL-SCNC: 6 MMOL/L (ref 3.5–5.3)
SODIUM SERPL-SCNC: 136 MMOL/L (ref 135–146)

## 2025-03-25 NOTE — TELEPHONE ENCOUNTER
Called pt and left voicemail to alert pt of AZ&ME request for more information.  See scanned document

## 2025-03-26 LAB — BNP SERPL-MCNC: 2349 PG/ML

## 2025-04-02 ENCOUNTER — APPOINTMENT (OUTPATIENT)
Dept: CARDIOLOGY | Facility: HOSPITAL | Age: 75
DRG: 286 | End: 2025-04-02
Payer: OTHER MISCELLANEOUS

## 2025-04-02 ENCOUNTER — OFFICE VISIT (OUTPATIENT)
Dept: CARDIOLOGY | Facility: CLINIC | Age: 75
End: 2025-04-02
Payer: MEDICARE

## 2025-04-02 ENCOUNTER — HOSPITAL ENCOUNTER (INPATIENT)
Facility: HOSPITAL | Age: 75
End: 2025-04-02
Attending: EMERGENCY MEDICINE | Admitting: SURGERY
Payer: MEDICARE

## 2025-04-02 ENCOUNTER — APPOINTMENT (OUTPATIENT)
Dept: RADIOLOGY | Facility: HOSPITAL | Age: 75
DRG: 286 | End: 2025-04-02
Payer: OTHER MISCELLANEOUS

## 2025-04-02 VITALS
DIASTOLIC BLOOD PRESSURE: 81 MMHG | HEART RATE: 119 BPM | WEIGHT: 148 LBS | SYSTOLIC BLOOD PRESSURE: 130 MMHG | HEIGHT: 64 IN | BODY MASS INDEX: 25.27 KG/M2 | OXYGEN SATURATION: 90 %

## 2025-04-02 DIAGNOSIS — J30.2 SEASONAL ALLERGIES: ICD-10-CM

## 2025-04-02 DIAGNOSIS — K22.10 EROSIVE ESOPHAGITIS: ICD-10-CM

## 2025-04-02 DIAGNOSIS — I25.10 ASHD (ARTERIOSCLEROTIC HEART DISEASE): ICD-10-CM

## 2025-04-02 DIAGNOSIS — R00.0 WIDE-COMPLEX TACHYCARDIA: Primary | ICD-10-CM

## 2025-04-02 DIAGNOSIS — I21.4 NSTEMI (NON-ST ELEVATED MYOCARDIAL INFARCTION) (MULTI): ICD-10-CM

## 2025-04-02 DIAGNOSIS — I77.1 SUBCLAVIAN ARTERIAL STENOSIS: ICD-10-CM

## 2025-04-02 DIAGNOSIS — I77.1 STENOSIS OF SUBCLAVIAN ARTERY: ICD-10-CM

## 2025-04-02 DIAGNOSIS — E03.9 HYPOTHYROIDISM, UNSPECIFIED TYPE: ICD-10-CM

## 2025-04-02 DIAGNOSIS — I50.20 CONGESTIVE HEART FAILURE WITH RIGHT VENTRICULAR SYSTOLIC DYSFUNCTION: ICD-10-CM

## 2025-04-02 DIAGNOSIS — I47.29 VENTRICULAR TACHYCARDIA, MONOMORPHIC (MULTI): ICD-10-CM

## 2025-04-02 DIAGNOSIS — M48.02 CERVICAL STENOSIS OF SPINE: ICD-10-CM

## 2025-04-02 DIAGNOSIS — I27.20 PULMONARY HYPERTENSION (MULTI): ICD-10-CM

## 2025-04-02 DIAGNOSIS — I12.9 CHRONIC KIDNEY DISEASE DUE TO HYPERTENSION: ICD-10-CM

## 2025-04-02 DIAGNOSIS — R00.0 TACHYCARDIA: ICD-10-CM

## 2025-04-02 DIAGNOSIS — J96.01 ACUTE RESPIRATORY FAILURE WITH HYPOXIA: Primary | ICD-10-CM

## 2025-04-02 DIAGNOSIS — I50.23 ACUTE ON CHRONIC SYSTOLIC HEART FAILURE: ICD-10-CM

## 2025-04-02 DIAGNOSIS — I50.82 CONGESTIVE HEART FAILURE WITH RIGHT VENTRICULAR SYSTOLIC DYSFUNCTION: ICD-10-CM

## 2025-04-02 DIAGNOSIS — R00.0 WIDE-COMPLEX TACHYCARDIA: ICD-10-CM

## 2025-04-02 DIAGNOSIS — E87.70 HYPERVOLEMIA, UNSPECIFIED HYPERVOLEMIA TYPE: ICD-10-CM

## 2025-04-02 LAB
ALBUMIN SERPL BCP-MCNC: 4.5 G/DL (ref 3.4–5)
ALP SERPL-CCNC: 93 U/L (ref 33–136)
ALT SERPL W P-5'-P-CCNC: 13 U/L (ref 10–52)
ANION GAP BLDA CALCULATED.4IONS-SCNC: 16 MMO/L (ref 10–25)
ANION GAP BLDV CALCULATED.4IONS-SCNC: 15 MMOL/L (ref 10–25)
ANION GAP SERPL CALC-SCNC: 17 MMOL/L (ref 10–20)
APPEARANCE UR: CLEAR
APTT PPP: 31 SECONDS (ref 26–36)
AST SERPL W P-5'-P-CCNC: 18 U/L (ref 9–39)
BASE EXCESS BLDA CALC-SCNC: -7.4 MMOL/L (ref -2–3)
BASE EXCESS BLDV CALC-SCNC: -5.8 MMOL/L (ref -2–3)
BASOPHILS # BLD AUTO: 0.03 X10*3/UL (ref 0–0.1)
BASOPHILS NFR BLD AUTO: 0.3 %
BILIRUB SERPL-MCNC: 0.5 MG/DL (ref 0–1.2)
BILIRUB UR STRIP.AUTO-MCNC: NEGATIVE MG/DL
BNP SERPL-MCNC: 3124 PG/ML (ref 0–99)
BODY TEMPERATURE: 37 DEGREES CELSIUS
BODY TEMPERATURE: 37 DEGREES CELSIUS
BUN SERPL-MCNC: 51 MG/DL (ref 6–23)
CA-I BLDA-SCNC: 1.19 MMOL/L (ref 1.1–1.33)
CA-I BLDV-SCNC: 1.21 MMOL/L (ref 1.1–1.33)
CALCIUM SERPL-MCNC: 9.3 MG/DL (ref 8.6–10.3)
CARDIAC TROPONIN I PNL SERPL HS: 82 NG/L (ref 0–20)
CARDIAC TROPONIN I PNL SERPL HS: 85 NG/L (ref 0–20)
CHLORIDE BLDA-SCNC: 107 MMOL/L (ref 98–107)
CHLORIDE BLDV-SCNC: 105 MMOL/L (ref 98–107)
CHLORIDE SERPL-SCNC: 108 MMOL/L (ref 98–107)
CO2 SERPL-SCNC: 18 MMOL/L (ref 21–32)
COLOR UR: ABNORMAL
CREAT SERPL-MCNC: 2.31 MG/DL (ref 0.5–1.3)
D DIMER PPP FEU-MCNC: 653 NG/ML FEU
EGFRCR SERPLBLD CKD-EPI 2021: 29 ML/MIN/1.73M*2
EOSINOPHIL # BLD AUTO: 0.07 X10*3/UL (ref 0–0.4)
EOSINOPHIL NFR BLD AUTO: 0.6 %
ERYTHROCYTE [DISTWIDTH] IN BLOOD BY AUTOMATED COUNT: 18.1 % (ref 11.5–14.5)
FLUAV RNA RESP QL NAA+PROBE: NOT DETECTED
FLUBV RNA RESP QL NAA+PROBE: NOT DETECTED
GLUCOSE BLDA-MCNC: 273 MG/DL (ref 74–99)
GLUCOSE BLDV-MCNC: 285 MG/DL (ref 74–99)
GLUCOSE SERPL-MCNC: 230 MG/DL (ref 74–99)
GLUCOSE UR STRIP.AUTO-MCNC: ABNORMAL MG/DL
HCO3 BLDA-SCNC: 16.6 MMOL/L (ref 22–26)
HCO3 BLDV-SCNC: 20.7 MMOL/L (ref 22–26)
HCT VFR BLD AUTO: 31 % (ref 41–52)
HCT VFR BLD EST: 29 % (ref 41–52)
HCT VFR BLD EST: 29 % (ref 41–52)
HGB BLD-MCNC: 9 G/DL (ref 13.5–17.5)
HGB BLDA-MCNC: 9.8 G/DL (ref 13.5–17.5)
HGB BLDV-MCNC: 9.7 G/DL (ref 13.5–17.5)
HYALINE CASTS #/AREA URNS AUTO: ABNORMAL /LPF
IMM GRANULOCYTES # BLD AUTO: 0.05 X10*3/UL (ref 0–0.5)
IMM GRANULOCYTES NFR BLD AUTO: 0.4 % (ref 0–0.9)
INHALED O2 CONCENTRATION: 50 %
INHALED O2 CONCENTRATION: 50 %
INR PPP: 1.3 (ref 0.9–1.1)
KETONES UR STRIP.AUTO-MCNC: NEGATIVE MG/DL
LACTATE BLDA-SCNC: 2.4 MMOL/L (ref 0.4–2)
LACTATE BLDV-SCNC: 3.6 MMOL/L (ref 0.4–2)
LEUKOCYTE ESTERASE UR QL STRIP.AUTO: NEGATIVE
LYMPHOCYTES # BLD AUTO: 1.54 X10*3/UL (ref 0.8–3)
LYMPHOCYTES NFR BLD AUTO: 13.5 %
MAGNESIUM SERPL-MCNC: 2.08 MG/DL (ref 1.6–2.4)
MCH RBC QN AUTO: 22.7 PG (ref 26–34)
MCHC RBC AUTO-ENTMCNC: 29 G/DL (ref 32–36)
MCV RBC AUTO: 78 FL (ref 80–100)
MONOCYTES # BLD AUTO: 1.23 X10*3/UL (ref 0.05–0.8)
MONOCYTES NFR BLD AUTO: 10.8 %
MUCOUS THREADS #/AREA URNS AUTO: ABNORMAL /LPF
NEUTROPHILS # BLD AUTO: 8.52 X10*3/UL (ref 1.6–5.5)
NEUTROPHILS NFR BLD AUTO: 74.4 %
NITRITE UR QL STRIP.AUTO: NEGATIVE
NRBC BLD-RTO: 0 /100 WBCS (ref 0–0)
OXYHGB MFR BLDA: 96.3 % (ref 94–98)
OXYHGB MFR BLDV: 22 % (ref 45–75)
PCO2 BLDA: 28 MM HG (ref 38–42)
PCO2 BLDV: 44 MM HG (ref 41–51)
PH BLDA: 7.38 PH (ref 7.38–7.42)
PH BLDV: 7.28 PH (ref 7.33–7.43)
PH UR STRIP.AUTO: 5 [PH]
PLATELET # BLD AUTO: 214 X10*3/UL (ref 150–450)
PO2 BLDA: 95 MM HG (ref 85–95)
PO2 BLDV: 18 MM HG (ref 35–45)
POTASSIUM BLDA-SCNC: 5.7 MMOL/L (ref 3.5–5.3)
POTASSIUM BLDV-SCNC: 5.9 MMOL/L (ref 3.5–5.3)
POTASSIUM SERPL-SCNC: 5.1 MMOL/L (ref 3.5–5.3)
PROT SERPL-MCNC: 7.2 G/DL (ref 6.4–8.2)
PROT UR STRIP.AUTO-MCNC: ABNORMAL MG/DL
PROTHROMBIN TIME: 14.9 SECONDS (ref 9.8–12.4)
RBC # BLD AUTO: 3.97 X10*6/UL (ref 4.5–5.9)
RBC # UR STRIP.AUTO: NEGATIVE MG/DL
RBC #/AREA URNS AUTO: ABNORMAL /HPF
RSV RNA RESP QL NAA+PROBE: NOT DETECTED
SALICYLATES SERPL-MCNC: <3 MG/DL
SAO2 % BLDA: 98 % (ref 94–100)
SAO2 % BLDV: 22 % (ref 45–75)
SARS-COV-2 RNA RESP QL NAA+PROBE: NOT DETECTED
SODIUM BLDA-SCNC: 134 MMOL/L (ref 136–145)
SODIUM BLDV-SCNC: 135 MMOL/L (ref 136–145)
SODIUM SERPL-SCNC: 138 MMOL/L (ref 136–145)
SP GR UR STRIP.AUTO: 1.01
TSH SERPL-ACNC: 14.99 MIU/L (ref 0.44–3.98)
UROBILINOGEN UR STRIP.AUTO-MCNC: NORMAL MG/DL
WBC # BLD AUTO: 11.4 X10*3/UL (ref 4.4–11.3)
WBC #/AREA URNS AUTO: ABNORMAL /HPF

## 2025-04-02 PROCEDURE — 71250 CT THORAX DX C-: CPT | Mod: FOREIGN READ | Performed by: RADIOLOGY

## 2025-04-02 PROCEDURE — 96374 THER/PROPH/DIAG INJ IV PUSH: CPT | Mod: 59

## 2025-04-02 PROCEDURE — 96375 TX/PRO/DX INJ NEW DRUG ADDON: CPT

## 2025-04-02 PROCEDURE — 93005 ELECTROCARDIOGRAM TRACING: CPT | Performed by: INTERNAL MEDICINE

## 2025-04-02 PROCEDURE — 87040 BLOOD CULTURE FOR BACTERIA: CPT | Mod: AHULAB | Performed by: EMERGENCY MEDICINE

## 2025-04-02 PROCEDURE — 85730 THROMBOPLASTIN TIME PARTIAL: CPT | Performed by: EMERGENCY MEDICINE

## 2025-04-02 PROCEDURE — 81001 URINALYSIS AUTO W/SCOPE: CPT | Performed by: EMERGENCY MEDICINE

## 2025-04-02 PROCEDURE — 99285 EMERGENCY DEPT VISIT HI MDM: CPT | Mod: 25 | Performed by: EMERGENCY MEDICINE

## 2025-04-02 PROCEDURE — 93005 ELECTROCARDIOGRAM TRACING: CPT

## 2025-04-02 PROCEDURE — 51798 US URINE CAPACITY MEASURE: CPT

## 2025-04-02 PROCEDURE — 2500000005 HC RX 250 GENERAL PHARMACY W/O HCPCS: Performed by: EMERGENCY MEDICINE

## 2025-04-02 PROCEDURE — 87075 CULTR BACTERIA EXCEPT BLOOD: CPT | Mod: AHULAB | Performed by: EMERGENCY MEDICINE

## 2025-04-02 PROCEDURE — 36600 WITHDRAWAL OF ARTERIAL BLOOD: CPT

## 2025-04-02 PROCEDURE — 71045 X-RAY EXAM CHEST 1 VIEW: CPT

## 2025-04-02 PROCEDURE — 3075F SYST BP GE 130 - 139MM HG: CPT | Performed by: INTERNAL MEDICINE

## 2025-04-02 PROCEDURE — 80179 DRUG ASSAY SALICYLATE: CPT | Performed by: EMERGENCY MEDICINE

## 2025-04-02 PROCEDURE — 84484 ASSAY OF TROPONIN QUANT: CPT | Performed by: EMERGENCY MEDICINE

## 2025-04-02 PROCEDURE — 83880 ASSAY OF NATRIURETIC PEPTIDE: CPT | Performed by: EMERGENCY MEDICINE

## 2025-04-02 PROCEDURE — 99215 OFFICE O/P EST HI 40 MIN: CPT | Performed by: INTERNAL MEDICINE

## 2025-04-02 PROCEDURE — 71250 CT THORAX DX C-: CPT

## 2025-04-02 PROCEDURE — 36415 COLL VENOUS BLD VENIPUNCTURE: CPT | Performed by: EMERGENCY MEDICINE

## 2025-04-02 PROCEDURE — 96365 THER/PROPH/DIAG IV INF INIT: CPT

## 2025-04-02 PROCEDURE — 1036F TOBACCO NON-USER: CPT | Performed by: INTERNAL MEDICINE

## 2025-04-02 PROCEDURE — 1157F ADVNC CARE PLAN IN RCRD: CPT | Performed by: INTERNAL MEDICINE

## 2025-04-02 PROCEDURE — 85610 PROTHROMBIN TIME: CPT | Performed by: EMERGENCY MEDICINE

## 2025-04-02 PROCEDURE — 2020000001 HC ICU ROOM DAILY

## 2025-04-02 PROCEDURE — 85379 FIBRIN DEGRADATION QUANT: CPT | Performed by: EMERGENCY MEDICINE

## 2025-04-02 PROCEDURE — 80053 COMPREHEN METABOLIC PANEL: CPT | Performed by: EMERGENCY MEDICINE

## 2025-04-02 PROCEDURE — 94660 CPAP INITIATION&MGMT: CPT

## 2025-04-02 PROCEDURE — 81003 URINALYSIS AUTO W/O SCOPE: CPT | Performed by: EMERGENCY MEDICINE

## 2025-04-02 PROCEDURE — 87637 SARSCOV2&INF A&B&RSV AMP PRB: CPT | Performed by: EMERGENCY MEDICINE

## 2025-04-02 PROCEDURE — 84132 ASSAY OF SERUM POTASSIUM: CPT | Performed by: EMERGENCY MEDICINE

## 2025-04-02 PROCEDURE — G2211 COMPLEX E/M VISIT ADD ON: HCPCS | Performed by: INTERNAL MEDICINE

## 2025-04-02 PROCEDURE — 2500000004 HC RX 250 GENERAL PHARMACY W/ HCPCS (ALT 636 FOR OP/ED): Performed by: EMERGENCY MEDICINE

## 2025-04-02 PROCEDURE — 85520 HEPARIN ASSAY: CPT | Performed by: EMERGENCY MEDICINE

## 2025-04-02 PROCEDURE — 84443 ASSAY THYROID STIM HORMONE: CPT | Performed by: EMERGENCY MEDICINE

## 2025-04-02 PROCEDURE — 1126F AMNT PAIN NOTED NONE PRSNT: CPT | Performed by: INTERNAL MEDICINE

## 2025-04-02 PROCEDURE — 83735 ASSAY OF MAGNESIUM: CPT | Performed by: EMERGENCY MEDICINE

## 2025-04-02 PROCEDURE — 99291 CRITICAL CARE FIRST HOUR: CPT | Mod: 25 | Performed by: EMERGENCY MEDICINE

## 2025-04-02 PROCEDURE — 85025 COMPLETE CBC W/AUTO DIFF WBC: CPT | Performed by: EMERGENCY MEDICINE

## 2025-04-02 PROCEDURE — 84439 ASSAY OF FREE THYROXINE: CPT | Performed by: EMERGENCY MEDICINE

## 2025-04-02 PROCEDURE — 3079F DIAST BP 80-89 MM HG: CPT | Performed by: INTERNAL MEDICINE

## 2025-04-02 PROCEDURE — 96367 TX/PROPH/DG ADDL SEQ IV INF: CPT

## 2025-04-02 PROCEDURE — 71045 X-RAY EXAM CHEST 1 VIEW: CPT | Performed by: RADIOLOGY

## 2025-04-02 RX ORDER — METOPROLOL TARTRATE 25 MG/1
25 TABLET, FILM COATED ORAL ONCE
Status: DISCONTINUED | OUTPATIENT
Start: 2025-04-02 | End: 2025-04-04

## 2025-04-02 RX ORDER — FUROSEMIDE 20 MG/1
20 TABLET ORAL EVERY OTHER DAY
Status: ON HOLD | COMMUNITY

## 2025-04-02 RX ORDER — FUROSEMIDE 10 MG/ML
40 INJECTION INTRAMUSCULAR; INTRAVENOUS ONCE
Status: COMPLETED | OUTPATIENT
Start: 2025-04-02 | End: 2025-04-02

## 2025-04-02 RX ORDER — HEPARIN SODIUM 10000 [USP'U]/100ML
0-4000 INJECTION, SOLUTION INTRAVENOUS CONTINUOUS
Status: DISCONTINUED | OUTPATIENT
Start: 2025-04-02 | End: 2025-04-03

## 2025-04-02 RX ORDER — CEFTRIAXONE 1 G/50ML
1 INJECTION, SOLUTION INTRAVENOUS ONCE
Status: COMPLETED | OUTPATIENT
Start: 2025-04-02 | End: 2025-04-02

## 2025-04-02 RX ADMIN — HEPARIN SODIUM 800 UNITS/HR: 10000 INJECTION, SOLUTION INTRAVENOUS at 19:43

## 2025-04-02 RX ADMIN — Medication 50 PERCENT: at 20:16

## 2025-04-02 RX ADMIN — FUROSEMIDE 40 MG: 10 INJECTION, SOLUTION INTRAMUSCULAR; INTRAVENOUS at 18:17

## 2025-04-02 RX ADMIN — DOXYCYCLINE 100 MG: 100 INJECTION, POWDER, LYOPHILIZED, FOR SOLUTION INTRAVENOUS at 21:58

## 2025-04-02 RX ADMIN — Medication 50 PERCENT: at 21:17

## 2025-04-02 RX ADMIN — CEFTRIAXONE 1 G: 1 INJECTION, SOLUTION INTRAVENOUS at 20:22

## 2025-04-02 ASSESSMENT — COLUMBIA-SUICIDE SEVERITY RATING SCALE - C-SSRS
1. IN THE PAST MONTH, HAVE YOU WISHED YOU WERE DEAD OR WISHED YOU COULD GO TO SLEEP AND NOT WAKE UP?: NO
2. HAVE YOU ACTUALLY HAD ANY THOUGHTS OF KILLING YOURSELF?: NO
6. HAVE YOU EVER DONE ANYTHING, STARTED TO DO ANYTHING, OR PREPARED TO DO ANYTHING TO END YOUR LIFE?: NO
6. HAVE YOU EVER DONE ANYTHING, STARTED TO DO ANYTHING, OR PREPARED TO DO ANYTHING TO END YOUR LIFE?: NO
2. HAVE YOU ACTUALLY HAD ANY THOUGHTS OF KILLING YOURSELF?: NO
1. IN THE PAST MONTH, HAVE YOU WISHED YOU WERE DEAD OR WISHED YOU COULD GO TO SLEEP AND NOT WAKE UP?: NO

## 2025-04-02 ASSESSMENT — ENCOUNTER SYMPTOMS
OCCASIONAL FEELINGS OF UNSTEADINESS: 0
DEPRESSION: 0
LOSS OF SENSATION IN FEET: 1

## 2025-04-02 ASSESSMENT — PAIN SCALES - GENERAL
PAINLEVEL_OUTOF10: 0 - NO PAIN
PAINLEVEL_OUTOF10: 0-NO PAIN

## 2025-04-02 ASSESSMENT — PAIN - FUNCTIONAL ASSESSMENT: PAIN_FUNCTIONAL_ASSESSMENT: 0-10

## 2025-04-02 NOTE — PROGRESS NOTES
Primary Care Physician: Ofelia Mccracken DO  Date of Visit: 04/02/2025  3:20 PM EDT  Location of [place] [facility] [place] [address]   Last office visit: January 13, 2025    Chief Complaint:     Hospital follow-up    HPI/Summary  Ritesh Garza is a 74 y.o. male who presents for followup cardiology evaluation.     The patient presented on December 20, 2024 with confusion and multiple falls.  He was identified with upper GI bleed, and CTA showed attenuated branches of the MCA on the right compared to the left, potentially secondary to high-grade stenosis or occlusion.  The initial hemoglobin was only 6.1, and he had melanotic stool.  Biomarkers showed non-STEMI with a peak troponin of 7200.  Farxiga and Entresto were temporarily held with acute kidney injury.  Pantoprazole was added to famotidine.  Clopidogrel was discontinued, 8 months following prior non-STEMI.  Endoscopy revealed a duodenal ulcer.  At discharge, the hemoglobin was 8.1.  Creatinine was 1.94.  The ejection fraction was estimated at 33% on echocardiogram performed to the hospital.    The patient is status post remote MI, with longstanding multivessel CAD, LV systolic dysfunction compensated heart failure.  The problem list includes chronic kidney disease.  In 2021, he was hospitalized with unresponsiveness, possibly absence seizures and Keppra was prescribed.  There was a long hospitalization in November, 2021 with COVID-related delirium.  There was another long hospitalization in February, 2024 with left hip pain and left leg hematoma after fall, with associated ventricular tachycardia thought to be in part related to metabolic acidosis and to acute blood loss anemia.  Biomarkers were elevated.  He was seen by electrophysiology.  The ejection fraction was 30 to 35% in the hospital.  Cardiac catheterization was ultimately performed on April 15, 2024.  Left POP to LAD and D1 was patent, vein grafts were occluded and there was severe native CAD with an 80% left  main,  of the proximal LAD,  of the proximal circumflex and  of the proximal RCA.  There was severe left subclavian stenosis, which likely compromised flow to the left POP when the patient was hemodynamically unstable, and likely contributed to non-STEMI and VTE.  A follow-up echocardiogram showed that the ejection fraction was 30 to 35%.  He then underwent left subclavian stent on April 30, 2024, and secondary prevention ICD on May 30, 2024.    There was a long hospitalization in July, 2024 with central cord syndrome.  He apparently walked up stairs, fell down landed on his left side, and imaging showed moderate to severe multilevel spinal degenerative changes with severe spinal canal stenosis.  MRI showed cord compression with myelopathy.  He underwent C4-C7 decompression.  There were runs of paroxysmal atrial tachycardia in the early postoperative period.    Amiodarone was withdrawn.  He continues on amlodipine 10 mg daily, aspirin 81 mg daily, atorvastatin 80 mg daily, Farxiga 10 mg daily, Vascepa 1 g 2 times daily, metoprolol succinate 50 mg daily, Entresto 24-26 mg 0.5 tablets twice daily, and other medications that were reviewed and reconciled.  We tried eplerenone at his last visit, which is stopped because of hyperkalemia and worsening renal failure.  We added furosemide 20 mg every other day.    Has noticed more exertional dyspnea past few weeks, and episodes of exertional chest discomfort.  He has not noticed that his heart rate is elevated above baseline, however.  Weight has gone up by 10 pounds.  Specialty Problems          Cardiology Problems    Peripheral arterial disease (CMS-[facility])    History of coronary artery bypass graft     1992: CABG x5, [facility]         History of myocardial infarction    Atherosclerosis of coronary artery bypass graft    Benign essential hypertension    Hyperlipidemia    Left ventricular systolic dysfunction (LVSD)     Comment on above: February 28,  2014: LV ejection fraction 35-40%. Basal inferolateral, and basal inferior, basal inferoseptal, mid inferolateral, mid inferior, and the inferoseptal akinesis. Mild LV dilatation. Mild mitral regurgitation. June 06, 2018: LV ejection fraction 35-40%. basal and mid inferior wall, basal and mid inferolateral wall, basal inferoseptal and mid inferoseptal segments are abnormal. Left ventricle mildly dilated. Trace mitral valve regurgitation. June 07, 2019: LV ejection fraction stable, 35-40%.;  February 28, 2014: LV ejection fraction 35-40%. Basal inferolateral, and basal inferior, basal inferoseptal, mid inferolateral, mid inferior, and the inferoseptal akinesis. Mild LV dilatation. Mild mitral regurgitation. June 06, 2018: LV ejection fraction 35-40%. basal and mid inferior wall, basal and mid inferolateral wall, basal inferoseptal and mid inferoseptal segments are abnormal. Left ventricle mildly dilated. Trace mitral valve regurgitation. June 07, 2019: LV ejection fraction stable, 35-40%.June 1. 2021: LV EF 40%.;  February 28, 2014: LV ejection fraction 35-40%. Basal inferolateral, and basal inferior, basal inferoseptal, mid inferolateral, mid inferior, and the inferoseptal akinesis. Mild LV dilatation. Mild mitral regurgitation. June 06, 2018: LV ejection fraction 35-40%. basal and mid inferior wall, basal and mid inferolateral wall, basal inferoseptal and mid inferoseptal segments are abnormal. Left ventricle mildly dilated. Trace mitral valve regurgitation. June 07, 2019: LV ejection fraction stable, 35-40%.June 1. 2021: LV EF 40%. #16, 2021: LVEF 35%.;         Congestive heart failure with right ventricular systolic dysfunction    ASHD (arteriosclerotic heart disease)    Chest pain    NSTEMI (non-ST elevated myocardial infarction) (Multi)    Subclavian arterial stenosis    Acute on chronic systolic heart failure    Myocardial infarction (Multi)    Stenosis of subclavian artery      Social History     Tobacco Use  "   Smoking status: Never     Passive exposure: Never    Smokeless tobacco: Never   Vaping Use    Vaping status: Never Used   Substance Use Topics    Alcohol use: Never    Drug use: Never      No Known Allergies  Current Outpatient Medications   Medication Instructions    albuterol (ProAir HFA) 90 mcg/actuation inhaler 2 puffs, inhalation, Every 6 hours PRN    allopurinol (ZYLOPRIM) 100 mg, oral, Daily    amLODIPine (NORVASC) 10 mg, oral, Daily    aspirin 81 mg, oral, Daily    atorvastatin (LIPITOR) 80 mg, oral, Daily    coenzyme Q-10 (CoQ-10) 100 mg capsule 1 capsule, Daily    cyanocobalamin (Vitamin B-12) 1,000 mcg tablet 1 tablet, Daily    dapagliflozin (Farxiga) 10 mg 1 tablet, Daily before evening meal    famotidine (PEPCID) 20 mg, oral, Daily PRN    furosemide (LASIX) 20 mg, oral, Every other day    gabapentin (NEURONTIN) 100 mg, oral, Nightly    icosapent ethyL (VASCEPA) 1 g, oral, 2 times daily (morning and late afternoon)    levothyroxine (SYNTHROID, LEVOXYL) 50 mcg, oral, Daily, Take on an empty stomach at the same time each day, either 30 to 60 minutes prior to breakfast    loratadine (CLARITIN) 10 mg, 2 times daily    metoprolol succinate XL (TOPROL-XL) 50 mg, oral, Daily    multivitamin tablet 1 tablet, Daily    pantoprazole (PROTONIX) 40 mg, oral, 2 times daily, Do not crush, chew, or split.    sacubitriL-valsartan (Entresto) 24-26 mg tablet 0.5 tablets, oral, 2 times daily    sertraline (Zoloft) 50 mg tablet TAKE ONE TABLET DAILY IN THE MORNING AFTER BREAKFAST.    sildenafil (VIAGRA) 100 mg, oral, As needed       ROS    Vital Signs:  Vitals:    04/02/25 1530   BP: 130/81   BP Location: Left arm   Patient Position: Sitting   BP Cuff Size: Adult   Pulse: (!) 119   SpO2: 90%   Weight: 67.1 kg (148 lb)   Height: 1.613 m (5' 3.5\")     Wt Readings from Last 2 Encounters:   04/02/25 67.1 kg (148 lb)   02/13/25 62.6 kg (138 lb)     Body mass index is 25.81 kg/m².     Physical Exam:    Today, he was pleasant " and cooperative.  There were a few rales at the bases.  The heart rate was rapid and regular.  There was 2+ ankle and pretibial edema, new since the last visit.    Lab Review:  CBC:  Lab Results   Component Value Date    WBC 6.3 02/12/2025    HGB 9.1 (L) 02/12/2025    HCT 30.9 (L) 02/12/2025    MCV 85.1 02/12/2025     02/12/2025       CMP:  Recent Labs     03/25/25  0848 02/12/25  1532   GLUCOSE 200* 161*    139   K 6.0* 5.5*    104   CO2 20 26   ANIONGAP  --  9   BUN 48* 33*   CREATININE 2.89* 1.99*   EGFR 22* 34*   ALBUMIN 4.1 4.3   ALKPHOS  --  105   PROT  --  6.9   ALT  --  10   AST  --  13   BILITOT  --  0.3         LIPID PANEL:  Lab Results   Component Value Date    CHOL 114 05/06/2024    HDL 31.0 05/06/2024    CHHDL 3.7 05/06/2024    VLDL 30 05/06/2024    TRIG 149 05/06/2024    NHDL 83 05/06/2024       HEME/ENDO:  Lab Results   Component Value Date    HGBA1C 6.2 02/13/2025    TSH 49.92 (H) 02/12/2025         Recent Labs     03/25/25  0848 12/23/24  2351 12/20/24  1036   BNP 2,349* 3,417* 1,270*     Recent Cardiology Tests:    ECG:    An ECG obtained today shows a wide-complex tachycardia, suspect atrial tachycardia with occasional PVC, but cannot be certain.      Results for orders placed during the hospital encounter of 12/25/24    Electrocardiogram, 12-lead PRN ACS symptoms    Narrative  Wide QRS tachycardia  Left axis deviation  Left bundle branch block  Abnormal ECG  When compared with ECG of 24-DEC-2024 00:49,  PREVIOUS ECG IS PRESENT  Confirmed by Mahendra Santacruz ([id number]) on 12/28/2024 8:51:52 AM       Echo:  Echo Results:  Transthoracic Echo (TTE) Limited With Doppler, Color And Contrast 12/26/2024    [facility], [address] [place] [address]  Tel [phone] and Fax [phone]    TRANSTHORACIC ECHOCARDIOGRAM REPORT      Patient Name:       CHIDI VARGAS    Reading Physician:    [id number] Mahendra Santacruz DO  Study Date:         12/26/2024           Ordering Provider:    [id number] DAVID FARRELL  MRN/PID:            [id number]            Fellow:  Accession#:         [id number]        Nurse:  Date of Birth/Age:  1950 / 74      Sonographer:          Luke lea  Gender assigned at                     Additional Staff:     Ally Olmedo RDCS  Birth:  Height:             160.02 cm           Admit Date:           12/25/2024  Weight:             62.14 kg            Admission Status:     Inpatient -  Routine  BSA / BMI:          1.65 m2 / 24.27     Encounter#:           [id number]  kg/m2  Blood Pressure:     122/71 mmHg         Department Location:  [facility] Non  Invasive    Study Type:    TRANSTHORACIC ECHO (TTE) LIMITED  Diagnosis/ICD: Non ST elevation (NSTEMI) myocardial infarction-I21.4  Indication:    NSTEMI  CPT Code:      Echo Limited-76858; Doppler Limited-98271; Color Doppler-71351    Patient History:  Diabetes:          Yes  Pertinent History: HTN, Hyperlipidemia, CAD and Chest Pain. Left ventricular  Systolic Dysfunction, CKD, Congestive Heart Failure,  Non-STEMI, CABG, Peripheral arterial disease,  Congestive Heart Failure w/ Right Ventricular Systolic  Dysfunction, Obstructive Sleep Apnea, Tachycardia, Stenosis  Of Subclavian Artery.    Study Detail: The following Echo studies were performed: 2D, M-Mode, Doppler and  color flow. Technically challenging study due to patient lying in  supine position and the patient's lack of cooperation. Definity  used as a contrast agent for endocardial border definition. Total  contrast used for this procedure was 3 mL via IV push.      PHYSICIAN INTERPRETATION:  Left Ventricle: Left ventricular ejection fraction is moderately decreased, calculated by Reyes's biplane at 33%. Left venticular wall motion is abnormal. The left ventricular cavity size is mildly dilated. There is normal septal and normal posterior left ventricular wall thickness. Left ventricular diastolic filling was not assessed. There is no  definite left ventricular thrombus visualized.  Left Atrium: The left atrium was not assessed.  Right Ventricle: The right ventricle was not well visualized. Right ventricular systolic function not assessed. A device is visualized in the right ventricle.  Right Atrium: The right atrium was not well visualized. There is a device visualized in the right atrium.  Aortic Valve: The aortic valve is trileaflet. There is mild to moderate aortic valve cusp calcification. There is no evidence of aortic valve regurgitation.  Mitral Valve: The mitral valve is mildly thickened. There is mild mitral annular calcification. There is moderate mitral valve regurgitation.  Tricuspid Valve: The tricuspid valve is structurally normal. There is mild tricuspid regurgitation. The Doppler estimated RVSP is mildly elevated at 40.0 mmHg.  Pulmonic Valve: The pulmonic valve is structurally normal. There is physiologic pulmonic valve regurgitation.  Pericardium: There is no pericardial effusion noted.  Aorta: The aortic root is normal. There is plaque visualized in the ascending aorta, which is classified as a Grade 2 [mild (focal or diffuse) intimal thickening of 2-3 mm] atherosclerosis.  Systemic Veins: The inferior vena cava appears normal in size.  In comparison to the previous echocardiogram(s): Compared with study dated 7/2/2024,. Within the limitations of the current study, no significant changes are noted.      CONCLUSIONS:  1. Left ventricular ejection fraction is moderately decreased, calculated by Reyes's biplane at 33%.  2. Abnormal left venticular wall motion.  3. Left ventricular cavity size is mildly dilated.  4. No left ventricular thrombus visualized.  5. Moderate mitral valve regurgitation.  6. Mildly elevated right ventricular systolic pressure.  7. There is plaque visualized in the ascending aorta.    QUANTITATIVE DATA SUMMARY:    2D MEASUREMENTS:          Normal Ranges:  IVSd:            0.79 cm  (0.6-1.1cm)  LVPWd:            0.66 cm  (0.6-1.1cm)  LVIDd:           5.52 cm  (3.9-5.9cm)  LVIDs:           5.29 cm  LV Mass Index:   87 g/m2  LVEDV Index:     81 ml/m2  LV % FS          4.0 %      LV SYSTOLIC FUNCTION BY 2D PLANIMETRY (MOD):  Normal Ranges:  EF-A4C View:    33 % (>=55%)  EF-A2C View:    35 %  EF-Biplane:     33 %  LV EF Reported: 33 %      RIGHT VENTRICLE:  TAPSE: 10.0 mm  RV s'  0.11 m/s      TRICUSPID VALVE/RVSP:          Normal Ranges:  Peak TR Velocity:     3.04 m/s  Est. RA Pressure:     3 mmHg  RV Syst Pressure:     40 mmHg  (< 30mmHg)  IVC Diam:             1.30 cm      [id number] Mahendra Santacruz DO  Electronically signed on 12/26/2024 at 12:09:03 PM        ** Final **      Transthoracic Echo (TTE) Limited With Doppler And Color 07/02/2024    [facility], [address] [place] [address]  Tel [phone] and Fax [phone]    TRANSTHORACIC ECHOCARDIOGRAM REPORT      Patient Name:      CHIDI VILLALOBOS SAM     Reading Physician:    [id number] Kisha Reina MD  Study Date:        7/2/2024             Ordering Provider:    [id number] ZENON ESTRADA  MRN/PID:           [id number]             Fellow:  Accession#:        [id number]         Nurse:                Kisha Michelle RN  Date of Birth/Age: 1950 / 73 years Sonographer:          Angelina Butler RDCS  Gender:            M                    Additional Staff:     Kiki Almendarez  Height:            160.02 cm            Admit Date:           7/1/2024  Weight:            69.40 kg             Admission Status:     Inpatient -  Routine  BSA / BMI:         1.73 m2 / 27.10      Encounter#:           [id number]  kg/m2  Blood Pressure:    142/71 mmHg          Department Location:  [facility] Non  Invasive    Study Type:    TRANSTHORACIC ECHO (TTE) LIMITED  Diagnosis/ICD: Encounter for other preprocedural examination-Z01.818  Indication:    Preop evaluation  CPT Code:      Echo Limited-01811; Doppler Limited-16320; Color Doppler-91604    Patient  History:  Pertinent History: DM, CAD, HFrEF, CKD, ICD (5/30/24).    Study Detail: The following Echo studies were performed: 2D, M-Mode, Doppler and  color flow. Definity used as a contrast agent for endocardial  border definition. Total contrast used for this procedure was 2.0  mL via IV push.      PHYSICIAN INTERPRETATION:  Left Ventricle: Left ventricular ejection fraction is moderately decreased, by visual estimate at 30-35%. Wall motion is abnormal. The left ventricular cavity size is moderately dilated. Spectral Doppler shows a pseudonormal pattern of left ventricular diastolic filling. There is no definite left ventricular thrombus visualized. Technically difficult exam, though there is moderate LV systolic dysfunction with the apical function being best but hypokinesis of the basal and mid segments. There is no LV apical thrombus seen on the echocontrast images.  Left Atrium: The left atrium is moderately dilated.  Right Ventricle: The right ventricle was not well visualized. There is reduced right ventricular systolic function. A device is visualized in the right ventricle.  Right Atrium: The right atrium was not well visualized. There is a device visualized in the right atrium.  Aortic Valve: The aortic valve is trileaflet. There is mild aortic valve cusp calcification. There is no evidence of aortic valve regurgitation. The peak instantaneous gradient of the aortic valve is 5.9 mmHg.  Mitral Valve: The mitral valve is mildly thickened. There is mild mitral annular calcification. There is moderate mitral valve regurgitation.  Tricuspid Valve: The tricuspid valve was not well visualized. There is moderate tricuspid regurgitation. The Doppler estimated RVSP is mildly elevated at 41.9 mmHg.  Pulmonic Valve: The pulmonic valve is not well visualized. Pulmonic valve regurgitation was not assessed.  Pericardium: There is a trivial pericardial effusion.  Aorta: The aortic root is normal.  Systemic Veins: The  inferior vena cava appears mildly dilated. There is IVC inspiratory collapse greater than 50%.  In comparison to the previous echocardiogram(s): Compared with the prior exam from 5/10/2023, today's exam is more technically difficult making assessment of LV systolic function more difficult. Proir study demonstrated clear wall motiom abnormality in the basal septal, basal inferior and basal and mid inferolateral segments. The LV systolic function appears to be worse today ( hyaving declined from mild dysfunciton to moderate dysfunction. In addition, the degree of MR appears to have increased from mild to moderate.      CONCLUSIONS:  1. Left ventricular ejection fraction is moderately decreased, by visual estimate at 30-35%.  2. Spectral Doppler shows a pseudonormal pattern of left ventricular diastolic filling.  3. Left ventricular cavity size is moderately dilated.  4. No left ventricular thrombus visualized.  5. Technically difficult exam, though there is moderate LV systolic dysfunction with the apical function being best but hypokinesis of the basal and mid segments. There is no LV apical thrombus seen on the echocontrast images.  6. There is reduced right ventricular systolic function.  7. The left atrium is moderately dilated.  8. Moderate mitral valve regurgitation.  9. Mildly elevated RVSP.  10. Moderate tricuspid regurgitation visualized.  11. Compared with the prior exam from 5/10/2023, today's exam is more technically difficult making assessment of LV systolic function more difficult. Proir study demonstrated clear wall motiom abnormality in the basal septal, basal inferior and basal and mid inferolateral segments. The LV systolic function appears to be worse today ( hyaving declined from mild dysfunciton to moderate dysfunction. In addition, the degree of MR appears to have increased from mild to moderate.    QUANTITATIVE DATA SUMMARY:  2D MEASUREMENTS:  Normal Ranges:  Ao Root d:     3.20 cm    (2.0-3.7cm)  LAs:           4.60 cm   (2.7-4.0cm)  IVSd:          0.90 cm   (0.6-1.1cm)  LVPWd:         0.70 cm   (0.6-1.1cm)  LVIDd:         5.60 cm   (3.9-5.9cm)  LVIDs:         4.90 cm  LV Mass Index: 95.6 g/m2  LV % FS        12.5 %    LA VOLUME:  Normal Ranges:  LA Vol A4C:        69.7 ml    (22+/-6mL/m2)  LA Vol A2C:        78.1 ml  LA Vol BP:         74.4 ml  LA Vol Index A4C:  40.4ml/m2  LA Vol Index A2C:  45.3 ml/m2  LA Vol Index BP:   43.1 ml/m2  LA Area A4C:       22.2 cm2  LA Area A2C:       23.7 cm2  LA Major Axis A4C: 6.0 cm  LA Major Axis A2C: 6.1 cm  LA Volume Index:   43.1 ml/m2    AORTA MEASUREMENTS:  Normal Ranges:  Ao Sinus, d: 2.80 cm (2.1-3.5cm)    LV SYSTOLIC FUNCTION BY 2D PLANIMETRY (MOD):  Normal Ranges:  EF-A4C View:    33 % (>=55%)  EF-A2C View:    39 %  EF-Biplane:     36 %  EF-Visual:      33 %  LV EF Reported: 33 %    LV DIASTOLIC FUNCTION:  Normal Ranges:  MV Peak E:    1.08 m/s    (0.7-1.2 m/s)  MV Peak A:    0.44 m/s    (0.42-0.7 m/s)  E/A Ratio:    2.47        (1.0-2.2)  MV lateral e' 0.10 m/s  MV A Dur:     127.00 msec  MV DT:        211 msec    (150-240 msec)    MITRAL VALVE:  Normal Ranges:  MV Vmax:    1.42 m/s (<=1.3m/s)  MV peak P.1 mmHg (<5mmHg)  MV mean PG: 3.0 mmHg (<2mmHg)  MV DT:      211 msec (150-240msec)    AORTIC VALVE:  Normal Ranges:  AoV Vmax:      1.21 m/s (<=1.7m/s)  AoV Peak P.9 mmHg (<20mmHg)  LVOT Max Matt:  1.09 m/s (<=1.1m/s)  LVOT VTI:      28.00 cm  LVOT Diameter: 2.10 cm  (1.8-2.4cm)  AoV Area,Vmax: 3.12 cm2 (2.5-4.5cm2)      RIGHT VENTRICLE:  TAPSE: 12.6 mm  RV s'  0.08 m/s    TRICUSPID VALVE/RVSP:  Normal Ranges:  Peak TR Velocity: 2.91 m/s  RV Syst Pressure: 41.9 mmHg (< 30mmHg)  IVC Diam:         2.20 cm      [id number] Kisha Reina MD  Electronically signed on 2024 at 4:53:18 PM        ** Final **      Transthoracic Echo (TTE) Limited With Contrast 2024    [address]  [address]  Phone [phone] Fax [phone]    TRANSTHORACIC ECHOCARDIOGRAM REPORT      Patient Name:      CHIDI VARGAS     Reading Physician:    [id number] Blaine Aadms MD  Study Date:        4/26/2024            Ordering Provider:    [id number] HUMBERTO SELLERS  MRN/PID:           [id number]             Fellow:  Accession#:        [id number]         Nurse:                Christina Chacon RN  Date of Birth/Age: 1950 / 73 years Sonographer:          Nikole Porter RDCS  Gender:            M                    Additional Staff:  Height:            152.40 cm            Admit Date:  Weight:            65.32 kg             Admission Status:     Outpatient  BSA / BMI:         1.62 m2 / 28.12      Department Location:  [facility] Echo  kg/m2                                      Lab  Blood Pressure: 163 /74 mmHg    Study Type:    TRANSTHORACIC ECHO (TTE) LIMITED  Diagnosis/ICD: Acute combined systolic (congestive) and diastolic (congestive)  heart failure (CHF)-I50.41; Non ST elevation (NSTEMI) myocardial  infarction-I21.4; Ventricular tachycardia, other-I47.29; Heart  disease, unspecified-I51.9  Indication:    CHF, NSTEMI, VT, LVD  CPT Codes:     Echo Limited-72697; Doppler Limited-42818; Color Doppler-36088    Patient History:  Diabetes:          Yes  Pertinent History: CHF, Cancer, HTN and TIA.    Study Detail: The following Echo studies were performed: 2D, Doppler and color  flow. Optison used as a contrast agent for endocardial border  definition. Total contrast used for this procedure was 5 mL via IV  push. Unable to obtain subcostal and suprasternal notch view.      PHYSICIAN INTERPRETATION:  Left Ventricle: Left ventricular systolic function is moderately decreased, with an estimated ejection fraction of 30-35%. Wall motion is abnormal. The left ventricular cavity size is mildly dilated. Spectral Doppler shows a restrictive pattern of left ventricular diastolic filling. There is an elevated left ventricular end diastolic  pressure.  LV Wall Scoring:  The basal and mid inferior septum, basal and mid inferior wall, and basal and  mid inferolateral wall are akinetic. The basal anteroseptal segment, apical  septal segment, and apical inferior segment are hypokinetic. All remaining  scored segments are normal.    Left Atrium: The left atrium was not assessed.  Right Ventricle: The right ventricle was not assessed. Right ventricular systolic function not assessed.  Right Atrium: The right atrium was not assessed.  Aortic Valve: The aortic valve is trileaflet. There is mild aortic valve cusp calcification. There is no evidence of aortic valve regurgitation.  Mitral Valve: The mitral valve is mildly thickened. There is moderately decreased mitral valve posterior leaflet mobility. There is moderate mitral valve regurgitation.  Tricuspid Valve: The tricuspid valve is structurally normal. There is mild to moderate tricuspid regurgitation. The Doppler estimated RVSP is mildly elevated at 35.7 mmHg.  Pulmonic Valve: The pulmonic valve was not assessed. The pulmonic valve regurgitation was not assessed.  Pericardium: There is no pericardial effusion noted.  Aorta: The aortic root was not assessed.      CONCLUSIONS:  1. Left ventricular systolic function is moderately decreased with a 30-35% estimated ejection fraction.  2. Multiple segmental abnormalities exist. See findings.  3. Spectral Doppler shows a restrictive pattern of left ventricular diastolic filling.  4. There is an elevated left ventricular end diastolic pressure.  5. Moderate mitral valve regurgitation.  6. Moderately decreased mitral valve posterior leaflet mobility.  7. Mild to moderate tricuspid regurgitation.  8. Mildly elevated RVSP.    QUANTITATIVE DATA SUMMARY:  2D MEASUREMENTS:  Normal Ranges:  IVSd:          1.16 cm    (0.6-1.1cm)  LVPWd:         1.04 cm    (0.6-1.1cm)  LVIDd:         5.17 cm    (3.9-5.9cm)  LVIDs:         4.87 cm  LV Mass Index: 134.2 g/m2  LV % FS         5.8 %    LV SYSTOLIC FUNCTION BY 2D PLANIMETRY (MOD):  Normal Ranges:  EF-A4C View: 36.9 % (>=55%)  EF-A2C View: 28.7 %  EF-Biplane:  29.8 %    LV DIASTOLIC FUNCTION:  Normal Ranges:  MV Peak E:    1.13 m/s   (0.7-1.2 m/s)  MV Peak A:    0.28 m/s   (0.42-0.7 m/s)  E/A Ratio:    3.98       (1.0-2.2)  MV e'         0.04 m/s   (>8.0)  MV lateral e' 0.04 m/s  MV medial e'  0.04 m/s  MV A Dur:     88.49 msec  E/e' Ratio:   28.16      (<8.0)  LV IVRT:      74 msec    (<100ms)    MITRAL VALVE:  Normal Ranges:  MV DT: 171 msec (150-240msec)    AORTIC VALVE:  Normal Ranges:  LVOT Max Matt:  1.02 m/s (<=1.1m/s)  LVOT VTI:      25.00 cm  LVOT Diameter: 2.03 cm  (1.8-2.4cm)    TRICUSPID VALVE/RVSP:  Normal Ranges:  Peak TR Velocity: 2.86 m/s  RV Syst Pressure: 35.7 mmHg (< 30mmHg)      [id number] Blaine Adams MD  Electronically signed on 4/28/2024 at 11:13:11 AM      Wall Scoring        ** Final **      Transthoracic Echo (TTE) Complete With Contrast 02/15/2024    [place] [id number]  Phone [phone] Fax [phone]    TRANSTHORACIC ECHOCARDIOGRAM REPORT      Patient Name:      CHIDI VARGAS      Reading Physician:    [id number] Gonzalo White DO  Study Date:        2/15/2024             Ordering Provider:    [id number] TITO NICOLAS  MRN/PID:           [id number]              Fellow:  Accession#:        [id number]          Nurse:  Date of Birth/Age: 1950 / 73 years  Sonographer:          Heena David RDCS  Gender:            M                     Additional Staff:  Height:            160.02 cm             Admit Date:           2/13/2024  Weight:            73.03 kg              Admission Status:     Inpatient -  Routine  BSA / BMI:         1.76 m2 / 28.52 kg/m2 Department Location:  [facility] Echo  Lab  Blood Pressure: 105 /60 mmHg    Study Type:    TRANSTHORACIC ECHO (TTE) COMPLETE  Diagnosis/ICD: Chest pain, unspecified-R07.9  Indication:    Chest Pain  CPT Codes:      Echo Complete w Full [id number]  Study Detail: The following Echo studies were performed: 2D, M-Mode, Doppler and  color flow. Optison used as a contrast agent for endocardial  border definition. Total contrast used for this procedure was 3 mL  via IV push.      PHYSICIAN INTERPRETATION:  Left Ventricle: Left ventricular systolic function is normal, with an estimated ejection fraction of 30-35%. There are multiple wall motion abnormalities. The left ventricular cavity size is upper limits of normal. Spectral Doppler shows a restrictive pattern of left ventricular diastolic filling.  LV Wall Scoring:  The basal inferoseptal segment and basal inferior segment are akinetic. The  entire anterior septum, mid and apical inferior septum, basal and mid  inferolateral wall, basal anterolateral segment, basal anterior segment, and mid  inferior segment are hypokinetic. All remaining scored segments are normal.    Left Atrium: The left atrium is normal in size.  Right Ventricle: The right ventricle was not well visualized. There is low normal right ventricular systolic function.  Right Atrium: The right atrium is normal in size.  Aortic Valve: The aortic valve is trileaflet. There is mild aortic valve thickening. There is no evidence of aortic valve regurgitation. The peak instantaneous gradient of the aortic valve is 7.1 mmHg. The mean gradient of the aortic valve is 4.0 mmHg.  Mitral Valve: The mitral valve is normal in structure. There is mild mitral valve regurgitation.  Tricuspid Valve: The tricuspid valve is structurally normal. There is mild to moderate tricuspid regurgitation.  Pulmonic Valve: The pulmonic valve is structurally normal. There is physiologic pulmonic valve regurgitation.  Pericardium: There is no pericardial effusion noted.  Aorta: The aortic root is normal.      CONCLUSIONS:  1. Left ventricular systolic function is normal with a 30-35% estimated ejection fraction.  2. Multiple segmental  abnormalities exist. See findings.  3. Spectral Doppler shows a restrictive pattern of left ventricular diastolic filling.  4. There is low normal right ventricular systolic function.  5. Mild to moderate tricuspid regurgitation.  6. There are multiple wall motion abnormalities.    QUANTITATIVE DATA SUMMARY:  2D MEASUREMENTS:  Normal Ranges:  Ao Root d:     2.90 cm    (2.0-3.7cm)  LAs:           4.60 cm    (2.7-4.0cm)  IVSd:          1.00 cm    (0.6-1.1cm)  LVPWd:         0.90 cm    (0.6-1.1cm)  LVIDd:         5.30 cm    (3.9-5.9cm)  LVIDs:         5.20 cm  LV Mass Index: 106.2 g/m2  LV % FS        1.9 %    LA VOLUME:  Normal Ranges:  LA Vol A4C:        53.0 ml    (22+/-6mL/m2)  LA Vol A2C:        47.8 ml  LA Vol BP:         50.3 ml  LA Vol Index A4C:  30.0ml/m2  LA Vol Index A2C:  27.1 ml/m2  LA Vol Index BP:   28.5 ml/m2  LA Area A4C:       18.0 cm2  LA Area A2C:       17.1 cm2  LA Major Axis A4C: 5.2 cm  LA Major Axis A2C: 5.2 cm  LA Volume Index:   26.8 ml/m2    M-MODE MEASUREMENTS:  Normal Ranges:  Ao Root: 2.90 cm (2.0-3.7cm)    AORTA MEASUREMENTS:  Normal Ranges:  Asc Ao, d: 2.50 cm (2.1-3.4cm)    LV SYSTOLIC FUNCTION BY 2D PLANIMETRY (MOD):  Normal Ranges:  EF-A4C View: 29.3 % (>=55%)  EF-A2C View: 43.2 %  EF-Biplane:  35.2 %    LV DIASTOLIC FUNCTION:  Normal Ranges:  MV Peak E:    1.29 m/s (0.7-1.2 m/s)  MV Peak A:    0.50 m/s (0.42-0.7 m/s)  E/A Ratio:    2.56     (1.0-2.2)  MV e'         0.06 m/s (>8.0)  MV lateral e' 0.06 m/s  MV medial e'  0.06 m/s  E/e' Ratio:   21.50    (<8.0)    MITRAL VALVE:  Normal Ranges:  MV DT: 151 msec (150-240msec)    MITRAL INSUFFICIENCY:  Normal Ranges:  PISA Radius:  0.5 cm  MR VTI:       129.00 cm  MR Vmax:      437.00 cm/s  MR Alias Matt: 38.5 cm/s  MR Volume:    17.85 ml  MR Flow Rt:   60.48 ml/s  MR EROA:      0.14 cm2    AORTIC VALVE:  Normal Ranges:  AoV Vmax:                1.33 m/s (<=1.7m/s)  AoV Peak P.1 mmHg (<20mmHg)  AoV Mean P.0  mmHg (1.7-11.5mmHg)  LVOT Max Matt:            1.04 m/s (<=1.1m/s)  AoV VTI:                 25.10 cm (18-25cm)  LVOT VTI:                20.70 cm  LVOT Diameter:           2.00 cm  (1.8-2.4cm)  AoV Area, VTI:           2.59 cm2 (2.5-5.5cm2)  AoV Area,Vmax:           2.46 cm2 (2.5-4.5cm2)  AoV Dimensionless Index: 0.82      RIGHT VENTRICLE:  TAPSE: 14.1 mm  RV s'  0.09 m/s    TRICUSPID VALVE/RVSP:  Normal Ranges:  Peak TR Velocity: 2.44 m/s  RV Syst Pressure: 26.8 mmHg (< 30mmHg)  IVC Diam:         1.50 cm      [id number] Gonzalo Christopher CONRAD  Electronically signed on 2/15/2024 at 11:57:27 AM      Wall Scoring        ** Final **       Cath:      Stress Test:  Stress Results:  No results found for this or any previous visit from the past 365 days.         Cardiac Imaging:        Assessment/Plan   Complex patient with multivessel CAD, now nearly 1 year status post non-STEMI after he presented with a fall and significant left leg hematoma and marked anemia.  He was identified with severe left subclavian stenosis which compromised flow to the left POP, and he underwent left subclavian stent followed by primary prevention ICD.  The ejection fraction is moderate to severely impaired.  He was recently hospitalized with a GI bleed, and another episode of demand ischemia when the hemoglobin was around 6 g/dL.     Presenting now with decompensated heart failure and a wide-complex tachycardia, 120/min.  Also with exertional dyspnea and exertional chest discomfort, likely developed when he developed the tachycardia.  He has had VT in the past, also has had atrial tachycardia.  He is not anticoagulated.    He will require anticoagulation, device interrogation, electrophysiology consultation, treatment for decompensated heart failure.  He does have acute on chronic kidney disease, eplerenone recently stopped, was planning on repeat blood work today but we are directing him to the [facility] emergency room.  Report called.    No  orders of the defined types were placed in this encounter.     Followup Appts:  Future Appointments   Date Time Provider Department [place]   4/16/2025  2:00 PM Jose Hennessy MD [id number] [facility]   4/29/2025 12:20 PM Ofelia Mccracken DO [id number] [facility]   5/22/2025  9:00 AM MG GASTRO [facility] [id number] FIBROSCAN [id number] [facility]   5/22/2025 10:00 AM Nate Duran MD [id number] [facility]   7/21/2025 12:00 PM [facility] RN 1 [id number] [facility]   7/21/2025 12:30 PM Meme Perrin MD [id number] [facility]   11/24/2025  9:40 AM CHRISTOPHER Espinoza [facility]           ____________________________________________________________  Jose Hennessy MD    Senior Attending Physician  [facility]  [facility]    [facility] Chair for [facility]ovascular Excellence  [employer]

## 2025-04-02 NOTE — ED TRIAGE NOTES
Patient presents ED with shortness of breath, and new onset tachycardia, patient was taken off blood thinners couple months ago when they put in the defibrillator, patient had bipass surgery in 1992, patient is diabetic

## 2025-04-02 NOTE — ED PROVIDER NOTES
History/Exam limitations: none.   Additional history was obtained from patient, relative(s), and past medical records.          HPI:    Ritesh Garza is a 74 y.o. male PMH multivessel CAD, recent NSTEMI, left subclavian stenosis, ICD, HFrEF presenting for evaluation of shortness of breath times last 2 weeks approximately.  Patient has had shortness breath over the last 2 weeks, often times exertional or with movement.  No clear with apnea.  Has had increased lower extremity edema as well.  No productive cough.  No hemoptysis.  No asymmetric leg swelling.  No chest pain.  No abdominal pain diarrhea dysuria.  Saw his cardiologist today and was sent in for further evaluation and was found to be tachycardic to the 120s, wide-complex.      Physical Exam:  ED Triage Vitals   Temperature Heart Rate Respirations BP   04/02/25 1640 04/02/25 1640 04/02/25 1640 04/02/25 1640   37 °C (98.6 °F) (!) 122 18 137/87      Pulse Ox Temp src Heart Rate Source Patient Position   04/02/25 1640 -- 04/02/25 1807 04/02/25 1822   (!) 93 %  Monitor Sitting      BP Location FiO2 (%)     04/02/25 1822 --     Right arm         GEN:      Alert, NAD  Eyes:       PERRL, EOMI  HENT:      NC/AT, OP clear, airway patent, MM, + JVD  CV:      RRR, no MRG, 2+ bilateral LE pitting edema, 2+ radial and pedal pulses  PULM:     Recommend bilateral lung fields, diminished breath sounds at bases, mildly increased WOB, symmetric chest rise  ABD:      Soft, NT, ND, no masses, BS +  :       No CVA TTP  NEURO:   A&Ox3, no focal deficits    MSK:      FROM, no joint deformities or swelling, no e/o trauma  SKIN:       Warm and dry  PSYCH:    Appropriate mood and affect         MDM/ED Course:   Ritesh Garza is a 74 y.o. male PMH multivessel CAD, recent NSTEMI, left subclavian stenosis, ICD, HFrEF presenting for evaluation of shortness of breath times last 2 weeks approximately.  Vitals markable for tachycardia 122.  Exam as documented above.  Patient was sent  by his cardiologist for further evaluation.  Initially quite well-appearing however with any exertion gets markedly short of breath reportedly.  Presentation overall most consistent with volume overload given lower extremity edema, crackles in lower lung fields, JVD.  Differential includes asthma exacerbation however less likely given no wheezing on exam.  Differential includes unstable angina.  Differential includes PE however less likely given consistent presentation to volume overload, lack of chest pain.  Differential includes pneumonia, viral syndrome.  IV placed and labs drawn.  Chest x-ray is obtained and enlarged cardiac silhouette with pulmonary edema.  CBC with mild leukocytosis to 11.4 with left shift, hemoglobin 9.0 which is improved from prior.  D-dimer obtained and negative by age adjustment less likely PE given overall clinical presentation, negative D-dimer.  BNP elevated at 3124 which is quite high, similar to baseline 3 months ago and increased from prior baseline which was in the 1000's.  Chemistry with a creatinine of 2.3 from last night of 1.94 suspect potentially related to volume overload, bicarb 18.  Initial troponin 85 with repeat 82, downtrending, unlikely ACS.  TSH elevated with free thyroxine normal.  Flu COVID RSV negative.  Has a borderline anion gap measured normal here at 17.  Salicylate added and normal.  Suspect related to elevated lactate.  Venous blood gas was obtained at 7.28/44 with lactate of 3.6.  Given leukocytosis and left shift, exam imaging findings possible pneumonia is in the differential was covered with community-acquired pneumonia coverage with ceftriaxone and doxycycline.  40 mg IV Lasix given.  Device interrogation initiated, protocol taken by nursing staff signing impedance and concerns of volume overload, episodes of atrial tachycardia, no discharges, minimal pacing, full interrogation report pending.  Remainder of ED course as below.     ED Course as of 04/04/25  1516   Wed Apr 02, 2025   1920 D-dimer negative by age-adjusted.  Spoke to Dr. Santacruz from cardiology, based on patient's outpatient cardiologist recommendations-recommends anticoagulation with heparin overnight.  Recommends p.o. metoprolol, diuresis.  Agrees that EKG appears sinus.  Low suspicion for PE given negative D-dimer.  Agrees that EKG appears sinus.  Appears volume overloaded, 40 mg IV Lasix ordered.  X-ray with possible consolidation, does have crackles and has a leukocytosis, covered with community acquired pneumonia coverage. [JM]   2107 Patient unable to urinate, greater than 400 cc of urine in bladder, Love catheter to be placed when he returns from CT. [JM]   2226 Patient was mildly acidemic and lactic acid elevated.  Suspect there may be an infectious component here.  Also with increased work of breathing and belly breathing reassessment.  No wheezing and good air entry throughout but does have crackles in lower lung fields.  Suspect related to volume overload.  Was placed on BiPAP and did not proved.  Patient was taken off BiPAP initially felt well but then had increased work of breathing again had to be placed back on.  Given this patient was discussed with ICU and accepted for admission.  CT displays pleural effusions and some pulmonary edema.  Lactate improved on BiPAP potentially related to hypoxia as patient may have been hypoxic for the last several days, pneumonia sepsis component is a possibility however patient appears overtly volume overloaded on exam. [JM]      ED Course User Index  [JM] Denny Phillips MD         Diagnoses as of 04/04/25 1516   Acute respiratory failure with hypoxia   Hypervolemia, unspecified hypervolemia type     EKG reviewed by me: 4:36 PM sinus tachycardia, rate 121, intraventricular block, left axis, normal KS interval, QRS prolonged at 130 ms, QTc 497 ms, similar to prior EKG from July and December 2024.    EKG reviewed by me: 6:20 PM sinus tachycardia, rate  126, 6, intraventricular block,  ms, QTc 492 ms, no STEMI, similar to multiple prior EKGs.    Chronic medical conditions affecting care listed in MDM. I independently reviewed imaging studies and agreed with radiology reads. I reviewed recent and relevant outside records including PCP notes, Prior discharge summaries, and prior radiology reports.    Procedure  Critical Care    Performed by: Denny Phillips MD  Authorized by: Denny Phillips MD    Critical care provider statement:     Critical care time (minutes):  45    Critical care time was exclusive of:  Separately billable procedures and treating other patients    Critical care was necessary to treat or prevent imminent or life-threatening deterioration of the following conditions:  Respiratory failure    Critical care was time spent personally by me on the following activities:  Development of treatment plan with patient or surrogate, discussions with consultants, evaluation of patient's response to treatment, examination of patient, obtaining history from patient or surrogate, ordering and performing treatments and interventions, ordering and review of laboratory studies, ordering and review of radiographic studies, pulse oximetry, re-evaluation of patient's condition and review of old charts    Care discussed with: admitting provider        Diagnosis:   1.  Shortness of breath  2.  Hypoxic respiratory failure  3.  Volume overload  4.  Wide-complex tachycardia    Dispo: ICU in stable condition      Disclaimer: Portions of this note were dictated by speech recognition. An attempt at proof reading was made to minimize errors. Minor errors in transcription may be present.  Please call if questions.     Denny Phillips MD  04/04/25 6639

## 2025-04-03 ENCOUNTER — APPOINTMENT (OUTPATIENT)
Dept: CARDIOLOGY | Facility: HOSPITAL | Age: 75
DRG: 286 | End: 2025-04-03
Payer: OTHER MISCELLANEOUS

## 2025-04-03 ENCOUNTER — APPOINTMENT (OUTPATIENT)
Dept: CARDIOLOGY | Facility: HOSPITAL | Age: 75
DRG: 286 | End: 2025-04-03
Payer: MEDICARE

## 2025-04-03 PROBLEM — R00.0 TACHYCARDIA: Status: ACTIVE | Noted: 2025-04-03

## 2025-04-03 LAB
ALBUMIN SERPL BCP-MCNC: 4.2 G/DL (ref 3.4–5)
ANION GAP SERPL CALC-SCNC: 19 MMOL/L (ref 10–20)
ATRIAL RATE: 114 BPM
ATRIAL RATE: 121 BPM
ATRIAL RATE: 126 BPM
ATRIAL RATE: 129 BPM
BODY SURFACE AREA: 1.74 M2
BUN SERPL-MCNC: 54 MG/DL (ref 6–23)
CALCIUM SERPL-MCNC: 9.3 MG/DL (ref 8.6–10.3)
CHLORIDE SERPL-SCNC: 109 MMOL/L (ref 98–107)
CO2 SERPL-SCNC: 17 MMOL/L (ref 21–32)
CREAT SERPL-MCNC: 2.57 MG/DL (ref 0.5–1.3)
EGFRCR SERPLBLD CKD-EPI 2021: 25 ML/MIN/1.73M*2
ERYTHROCYTE [DISTWIDTH] IN BLOOD BY AUTOMATED COUNT: 18.2 % (ref 11.5–14.5)
GLUCOSE BLD MANUAL STRIP-MCNC: 113 MG/DL (ref 74–99)
GLUCOSE BLD MANUAL STRIP-MCNC: 157 MG/DL (ref 74–99)
GLUCOSE BLD MANUAL STRIP-MCNC: 164 MG/DL (ref 74–99)
GLUCOSE BLD MANUAL STRIP-MCNC: 172 MG/DL (ref 74–99)
GLUCOSE BLD MANUAL STRIP-MCNC: 202 MG/DL (ref 74–99)
GLUCOSE BLD MANUAL STRIP-MCNC: 243 MG/DL (ref 74–99)
GLUCOSE BLD MANUAL STRIP-MCNC: 323 MG/DL (ref 74–99)
GLUCOSE SERPL-MCNC: 203 MG/DL (ref 74–99)
HCT VFR BLD AUTO: 33 % (ref 41–52)
HGB BLD-MCNC: 9.7 G/DL (ref 13.5–17.5)
LACTATE SERPL-SCNC: 3.3 MMOL/L (ref 0.4–2)
MCH RBC QN AUTO: 22.8 PG (ref 26–34)
MCHC RBC AUTO-ENTMCNC: 29.4 G/DL (ref 32–36)
MCV RBC AUTO: 78 FL (ref 80–100)
NRBC BLD-RTO: 0 /100 WBCS (ref 0–0)
P AXIS: 0 DEGREES
P AXIS: 3 DEGREES
P AXIS: 91 DEGREES
P OFFSET: 180 MS
P OFFSET: 189 MS
P ONSET: 140 MS
P ONSET: 146 MS
PHOSPHATE SERPL-MCNC: 2.7 MG/DL (ref 2.5–4.9)
PLATELET # BLD AUTO: 223 X10*3/UL (ref 150–450)
POTASSIUM SERPL-SCNC: 5.5 MMOL/L (ref 3.5–5.3)
PR INTERVAL: 128 MS
PR INTERVAL: 130 MS
PR INTERVAL: 140 MS
PR INTERVAL: 168 MS
Q ONSET: 210 MS
Q ONSET: 211 MS
Q ONSET: 211 MS
Q ONSET: 215 MS
QRS COUNT: 19 BEATS
QRS COUNT: 20 BEATS
QRS COUNT: 21 BEATS
QRS COUNT: 21 BEATS
QRS DURATION: 130 MS
QRS DURATION: 140 MS
QRS DURATION: 144 MS
QRS DURATION: 148 MS
QT INTERVAL: 340 MS
QT INTERVAL: 350 MS
QT INTERVAL: 362 MS
QT INTERVAL: 366 MS
QTC CALCULATION(BAZETT): 492 MS
QTC CALCULATION(BAZETT): 497 MS
QTC CALCULATION(BAZETT): 498 MS
QTC CALCULATION(BAZETT): 536 MS
QTC FREDERICIA: 435 MS
QTC FREDERICIA: 442 MS
QTC FREDERICIA: 448 MS
QTC FREDERICIA: 472 MS
R AXIS: -50 DEGREES
R AXIS: -65 DEGREES
R AXIS: -86 DEGREES
R AXIS: 147 DEGREES
RBC # BLD AUTO: 4.25 X10*6/UL (ref 4.5–5.9)
SODIUM SERPL-SCNC: 139 MMOL/L (ref 136–145)
T AXIS: -12 DEGREES
T AXIS: 154 DEGREES
T AXIS: 58 DEGREES
T AXIS: 87 DEGREES
T OFFSET: 381 MS
T OFFSET: 390 MS
T OFFSET: 391 MS
T OFFSET: 394 MS
T4 FREE SERPL-MCNC: 0.62 NG/DL (ref 0.61–1.12)
UFH PPP CHRO-ACNC: 0.5 IU/ML
UFH PPP CHRO-ACNC: 0.8 IU/ML
VENTRICULAR RATE: 114 BPM
VENTRICULAR RATE: 121 BPM
VENTRICULAR RATE: 126 BPM
VENTRICULAR RATE: 129 BPM
WBC # BLD AUTO: 11.8 X10*3/UL (ref 4.4–11.3)

## 2025-04-03 PROCEDURE — 2500000001 HC RX 250 WO HCPCS SELF ADMINISTERED DRUGS (ALT 637 FOR MEDICARE OP): Performed by: SURGERY

## 2025-04-03 PROCEDURE — 2500000005 HC RX 250 GENERAL PHARMACY W/O HCPCS: Performed by: EMERGENCY MEDICINE

## 2025-04-03 PROCEDURE — 2500000001 HC RX 250 WO HCPCS SELF ADMINISTERED DRUGS (ALT 637 FOR MEDICARE OP): Performed by: NURSE PRACTITIONER

## 2025-04-03 PROCEDURE — 2020000001 HC ICU ROOM DAILY

## 2025-04-03 PROCEDURE — 82947 ASSAY GLUCOSE BLOOD QUANT: CPT

## 2025-04-03 PROCEDURE — 99223 1ST HOSP IP/OBS HIGH 75: CPT | Performed by: INTERNAL MEDICINE

## 2025-04-03 PROCEDURE — 2500000005 HC RX 250 GENERAL PHARMACY W/O HCPCS: Performed by: SURGERY

## 2025-04-03 PROCEDURE — 93283 PRGRMG EVAL IMPLANTABLE DFB: CPT

## 2025-04-03 PROCEDURE — 93005 ELECTROCARDIOGRAM TRACING: CPT

## 2025-04-03 PROCEDURE — 93283 PRGRMG EVAL IMPLANTABLE DFB: CPT | Performed by: INTERNAL MEDICINE

## 2025-04-03 PROCEDURE — 36415 COLL VENOUS BLD VENIPUNCTURE: CPT | Performed by: NURSE PRACTITIONER

## 2025-04-03 PROCEDURE — 2500000004 HC RX 250 GENERAL PHARMACY W/ HCPCS (ALT 636 FOR OP/ED): Performed by: NURSE PRACTITIONER

## 2025-04-03 PROCEDURE — 93010 ELECTROCARDIOGRAM REPORT: CPT | Performed by: INTERNAL MEDICINE

## 2025-04-03 PROCEDURE — 85520 HEPARIN ASSAY: CPT | Performed by: NURSE PRACTITIONER

## 2025-04-03 PROCEDURE — 80069 RENAL FUNCTION PANEL: CPT | Performed by: NURSE PRACTITIONER

## 2025-04-03 PROCEDURE — 2500000002 HC RX 250 W HCPCS SELF ADMINISTERED DRUGS (ALT 637 FOR MEDICARE OP, ALT 636 FOR OP/ED): Performed by: NURSE PRACTITIONER

## 2025-04-03 PROCEDURE — 94660 CPAP INITIATION&MGMT: CPT

## 2025-04-03 PROCEDURE — 94799 UNLISTED PULMONARY SVC/PX: CPT

## 2025-04-03 PROCEDURE — 82565 ASSAY OF CREATININE: CPT | Performed by: NURSE PRACTITIONER

## 2025-04-03 PROCEDURE — 99291 CRITICAL CARE FIRST HOUR: CPT | Performed by: SURGERY

## 2025-04-03 PROCEDURE — 99291 CRITICAL CARE FIRST HOUR: CPT | Performed by: NURSE PRACTITIONER

## 2025-04-03 PROCEDURE — 85027 COMPLETE CBC AUTOMATED: CPT | Performed by: NURSE PRACTITIONER

## 2025-04-03 PROCEDURE — 83605 ASSAY OF LACTIC ACID: CPT | Performed by: NURSE PRACTITIONER

## 2025-04-03 RX ORDER — DAPAGLIFLOZIN 10 MG/1
10 TABLET, FILM COATED ORAL
Status: ACTIVE | OUTPATIENT
Start: 2025-04-03

## 2025-04-03 RX ORDER — CETIRIZINE HYDROCHLORIDE 10 MG/1
10 TABLET ORAL DAILY
Status: DISPENSED | OUTPATIENT
Start: 2025-04-03

## 2025-04-03 RX ORDER — ALBUTEROL SULFATE 90 UG/1
2 INHALANT RESPIRATORY (INHALATION) EVERY 6 HOURS PRN
Status: DISCONTINUED | OUTPATIENT
Start: 2025-04-03 | End: 2025-04-03 | Stop reason: CLARIF

## 2025-04-03 RX ORDER — FUROSEMIDE 40 MG/1
40 TABLET ORAL EVERY OTHER DAY
Status: DISCONTINUED | OUTPATIENT
Start: 2025-04-03 | End: 2025-04-04

## 2025-04-03 RX ORDER — ICOSAPENT ETHYL 1 G/1
1 CAPSULE ORAL
Status: DISPENSED | OUTPATIENT
Start: 2025-04-03

## 2025-04-03 RX ORDER — ATORVASTATIN CALCIUM 80 MG/1
80 TABLET, FILM COATED ORAL DAILY
Status: DISPENSED | OUTPATIENT
Start: 2025-04-03

## 2025-04-03 RX ORDER — INSULIN LISPRO 100 [IU]/ML
0-10 INJECTION, SOLUTION INTRAVENOUS; SUBCUTANEOUS
Status: DISPENSED | OUTPATIENT
Start: 2025-04-04

## 2025-04-03 RX ORDER — ALBUTEROL SULFATE 0.83 MG/ML
2.5 SOLUTION RESPIRATORY (INHALATION) EVERY 6 HOURS PRN
Status: DISCONTINUED | OUTPATIENT
Start: 2025-04-03 | End: 2025-04-06

## 2025-04-03 RX ORDER — AMLODIPINE BESYLATE 10 MG/1
10 TABLET ORAL DAILY
Status: DISPENSED | OUTPATIENT
Start: 2025-04-03

## 2025-04-03 RX ORDER — INSULIN LISPRO 100 [IU]/ML
0-10 INJECTION, SOLUTION INTRAVENOUS; SUBCUTANEOUS EVERY 4 HOURS
Status: DISCONTINUED | OUTPATIENT
Start: 2025-04-03 | End: 2025-04-03

## 2025-04-03 RX ORDER — ALLOPURINOL 100 MG/1
100 TABLET ORAL DAILY
Status: ACTIVE | OUTPATIENT
Start: 2025-04-03

## 2025-04-03 RX ORDER — FAMOTIDINE 20 MG/1
20 TABLET, FILM COATED ORAL DAILY PRN
Status: ACTIVE | OUTPATIENT
Start: 2025-04-03

## 2025-04-03 RX ORDER — ALBUTEROL SULFATE 0.83 MG/ML
2.5 SOLUTION RESPIRATORY (INHALATION)
Status: DISCONTINUED | OUTPATIENT
Start: 2025-04-03 | End: 2025-04-03

## 2025-04-03 RX ORDER — FUROSEMIDE 20 MG/1
20 TABLET ORAL EVERY OTHER DAY
Status: DISCONTINUED | OUTPATIENT
Start: 2025-04-03 | End: 2025-04-03

## 2025-04-03 RX ORDER — GABAPENTIN 100 MG/1
100 CAPSULE ORAL NIGHTLY
Status: DISPENSED | OUTPATIENT
Start: 2025-04-03

## 2025-04-03 RX ORDER — DEXTROSE 50 % IN WATER (D50W) INTRAVENOUS SYRINGE
12.5
Status: ACTIVE | OUTPATIENT
Start: 2025-04-03

## 2025-04-03 RX ORDER — FUROSEMIDE 10 MG/ML
40 INJECTION INTRAMUSCULAR; INTRAVENOUS ONCE
Status: COMPLETED | OUTPATIENT
Start: 2025-04-03 | End: 2025-04-03

## 2025-04-03 RX ORDER — LEVOTHYROXINE SODIUM 50 UG/1
50 TABLET ORAL DAILY
Status: DISPENSED | OUTPATIENT
Start: 2025-04-03

## 2025-04-03 RX ORDER — ASPIRIN 81 MG/1
81 TABLET ORAL DAILY
Status: DISPENSED | OUTPATIENT
Start: 2025-04-03

## 2025-04-03 RX ORDER — METOPROLOL SUCCINATE 50 MG/1
50 TABLET, EXTENDED RELEASE ORAL DAILY
Status: DISPENSED | OUTPATIENT
Start: 2025-04-03

## 2025-04-03 RX ORDER — DEXTROSE 50 % IN WATER (D50W) INTRAVENOUS SYRINGE
25
Status: ACTIVE | OUTPATIENT
Start: 2025-04-03

## 2025-04-03 RX ORDER — LANOLIN ALCOHOL/MO/W.PET/CERES
1000 CREAM (GRAM) TOPICAL DAILY
Status: DISPENSED | OUTPATIENT
Start: 2025-04-03

## 2025-04-03 RX ADMIN — ASPIRIN 81 MG: 81 TABLET, COATED ORAL at 09:08

## 2025-04-03 RX ADMIN — AMLODIPINE BESYLATE 10 MG: 10 TABLET ORAL at 09:08

## 2025-04-03 RX ADMIN — INSULIN LISPRO 2 UNITS: 100 INJECTION, SOLUTION INTRAVENOUS; SUBCUTANEOUS at 09:08

## 2025-04-03 RX ADMIN — ATORVASTATIN CALCIUM 80 MG: 80 TABLET, FILM COATED ORAL at 09:08

## 2025-04-03 RX ADMIN — Medication 50 PERCENT: at 00:04

## 2025-04-03 RX ADMIN — INSULIN LISPRO 8 UNITS: 100 INJECTION, SOLUTION INTRAVENOUS; SUBCUTANEOUS at 01:00

## 2025-04-03 RX ADMIN — METOPROLOL SUCCINATE 50 MG: 50 TABLET, EXTENDED RELEASE ORAL at 09:08

## 2025-04-03 RX ADMIN — FUROSEMIDE 40 MG: 40 TABLET ORAL at 17:11

## 2025-04-03 RX ADMIN — ICOSAPENT ETHYL 1 G: 1 CAPSULE ORAL at 09:10

## 2025-04-03 RX ADMIN — LEVOTHYROXINE SODIUM 50 MCG: 0.05 TABLET ORAL at 06:19

## 2025-04-03 RX ADMIN — INSULIN LISPRO 4 UNITS: 100 INJECTION, SOLUTION INTRAVENOUS; SUBCUTANEOUS at 04:35

## 2025-04-03 RX ADMIN — GABAPENTIN 100 MG: 100 CAPSULE ORAL at 20:38

## 2025-04-03 RX ADMIN — CETIRIZINE HYDROCHLORIDE 10 MG: 10 TABLET, FILM COATED ORAL at 11:49

## 2025-04-03 RX ADMIN — GABAPENTIN 100 MG: 100 CAPSULE ORAL at 01:08

## 2025-04-03 RX ADMIN — Medication 2 L/MIN: at 07:40

## 2025-04-03 RX ADMIN — HEPARIN SODIUM 700 UNITS/HR: 10000 INJECTION, SOLUTION INTRAVENOUS at 01:09

## 2025-04-03 RX ADMIN — FUROSEMIDE 40 MG: 10 INJECTION, SOLUTION INTRAMUSCULAR; INTRAVENOUS at 00:53

## 2025-04-03 RX ADMIN — INSULIN LISPRO 2 UNITS: 100 INJECTION, SOLUTION INTRAVENOUS; SUBCUTANEOUS at 17:10

## 2025-04-03 RX ADMIN — INSULIN LISPRO 4 UNITS: 100 INJECTION, SOLUTION INTRAVENOUS; SUBCUTANEOUS at 20:38

## 2025-04-03 RX ADMIN — ICOSAPENT ETHYL 1 G: 1 CAPSULE ORAL at 17:11

## 2025-04-03 RX ADMIN — Medication 1000 MCG: at 09:08

## 2025-04-03 RX ADMIN — Medication 30 PERCENT: at 07:32

## 2025-04-03 SDOH — ECONOMIC STABILITY: INCOME INSECURITY: IN THE PAST 12 MONTHS HAS THE ELECTRIC, GAS, OIL, OR WATER COMPANY THREATENED TO SHUT OFF SERVICES IN YOUR HOME?: NO

## 2025-04-03 SDOH — ECONOMIC STABILITY: FOOD INSECURITY: WITHIN THE PAST 12 MONTHS, YOU WORRIED THAT YOUR FOOD WOULD RUN OUT BEFORE YOU GOT THE MONEY TO BUY MORE.: NEVER TRUE

## 2025-04-03 SDOH — SOCIAL STABILITY: SOCIAL INSECURITY: HAVE YOU HAD ANY THOUGHTS OF HARMING ANYONE ELSE?: NO

## 2025-04-03 SDOH — SOCIAL STABILITY: SOCIAL INSECURITY: WITHIN THE LAST YEAR, HAVE YOU BEEN HUMILIATED OR EMOTIONALLY ABUSED IN OTHER WAYS BY YOUR PARTNER OR EX-PARTNER?: NO

## 2025-04-03 SDOH — ECONOMIC STABILITY: TRANSPORTATION INSECURITY: IN THE PAST 12 MONTHS, HAS LACK OF TRANSPORTATION KEPT YOU FROM MEDICAL APPOINTMENTS OR FROM GETTING MEDICATIONS?: NO

## 2025-04-03 SDOH — ECONOMIC STABILITY: HOUSING INSECURITY: IN THE PAST 12 MONTHS, HOW MANY TIMES HAVE YOU MOVED WHERE YOU WERE LIVING?: 1

## 2025-04-03 SDOH — SOCIAL STABILITY: SOCIAL INSECURITY
WITHIN THE LAST YEAR, HAVE YOU BEEN KICKED, HIT, SLAPPED, OR OTHERWISE PHYSICALLY HURT BY YOUR PARTNER OR EX-PARTNER?: NO

## 2025-04-03 SDOH — SOCIAL STABILITY: SOCIAL NETWORK: IN A TYPICAL WEEK, HOW MANY TIMES DO YOU TALK ON THE PHONE WITH FAMILY, FRIENDS, OR NEIGHBORS?: TWICE A WEEK

## 2025-04-03 SDOH — ECONOMIC STABILITY: FOOD INSECURITY: WITHIN THE PAST 12 MONTHS, THE FOOD YOU BOUGHT JUST DIDN'T LAST AND YOU DIDN'T HAVE MONEY TO GET MORE.: NEVER TRUE

## 2025-04-03 SDOH — SOCIAL STABILITY: SOCIAL INSECURITY: ABUSE: ADULT

## 2025-04-03 SDOH — HEALTH STABILITY: PHYSICAL HEALTH: ON AVERAGE, HOW MANY MINUTES DO YOU ENGAGE IN EXERCISE AT THIS LEVEL?: 30 MIN

## 2025-04-03 SDOH — HEALTH STABILITY: MENTAL HEALTH
DO YOU FEEL STRESS - TENSE, RESTLESS, NERVOUS, OR ANXIOUS, OR UNABLE TO SLEEP AT NIGHT BECAUSE YOUR MIND IS TROUBLED ALL THE TIME - THESE DAYS?: NOT AT ALL

## 2025-04-03 SDOH — SOCIAL STABILITY: SOCIAL INSECURITY: WITHIN THE LAST YEAR, HAVE YOU BEEN AFRAID OF YOUR PARTNER OR EX-PARTNER?: NO

## 2025-04-03 SDOH — SOCIAL STABILITY: SOCIAL INSECURITY: HAS ANYONE EVER THREATENED TO HURT YOUR FAMILY OR YOUR PETS?: NO

## 2025-04-03 SDOH — ECONOMIC STABILITY: HOUSING INSECURITY: IN THE LAST 12 MONTHS, WAS THERE A TIME WHEN YOU WERE NOT ABLE TO PAY THE MORTGAGE OR RENT ON TIME?: NO

## 2025-04-03 SDOH — ECONOMIC STABILITY: HOUSING INSECURITY: AT ANY TIME IN THE PAST 12 MONTHS, WERE YOU HOMELESS OR LIVING IN A SHELTER (INCLUDING NOW)?: NO

## 2025-04-03 SDOH — HEALTH STABILITY: PHYSICAL HEALTH: ON AVERAGE, HOW MANY DAYS PER WEEK DO YOU ENGAGE IN MODERATE TO STRENUOUS EXERCISE (LIKE A BRISK WALK)?: 5 DAYS

## 2025-04-03 SDOH — SOCIAL STABILITY: SOCIAL INSECURITY
WITHIN THE LAST YEAR, HAVE YOU BEEN RAPED OR FORCED TO HAVE ANY KIND OF SEXUAL ACTIVITY BY YOUR PARTNER OR EX-PARTNER?: NO

## 2025-04-03 SDOH — SOCIAL STABILITY: SOCIAL INSECURITY: DO YOU FEEL ANYONE HAS EXPLOITED OR TAKEN ADVANTAGE OF YOU FINANCIALLY OR OF YOUR PERSONAL PROPERTY?: NO

## 2025-04-03 SDOH — SOCIAL STABILITY: SOCIAL INSECURITY: DOES ANYONE TRY TO KEEP YOU FROM HAVING/CONTACTING OTHER FRIENDS OR DOING THINGS OUTSIDE YOUR HOME?: NO

## 2025-04-03 SDOH — SOCIAL STABILITY: SOCIAL INSECURITY: ARE THERE ANY APPARENT SIGNS OF INJURIES/BEHAVIORS THAT COULD BE RELATED TO ABUSE/NEGLECT?: NO

## 2025-04-03 SDOH — SOCIAL STABILITY: SOCIAL INSECURITY: DO YOU FEEL UNSAFE GOING BACK TO THE PLACE WHERE YOU ARE LIVING?: NO

## 2025-04-03 SDOH — ECONOMIC STABILITY: FOOD INSECURITY: HOW HARD IS IT FOR YOU TO PAY FOR THE VERY BASICS LIKE FOOD, HOUSING, MEDICAL CARE, AND HEATING?: NOT HARD AT ALL

## 2025-04-03 SDOH — SOCIAL STABILITY: SOCIAL INSECURITY: HAVE YOU HAD THOUGHTS OF HARMING ANYONE ELSE?: NO

## 2025-04-03 SDOH — SOCIAL STABILITY: SOCIAL INSECURITY: ARE YOU OR HAVE YOU BEEN THREATENED OR ABUSED PHYSICALLY, EMOTIONALLY, OR SEXUALLY BY ANYONE?: NO

## 2025-04-03 ASSESSMENT — ENCOUNTER SYMPTOMS
FATIGUE: 1
WHEEZING: 0
VOMITING: 0
ABDOMINAL PAIN: 0
ABDOMINAL DISTENTION: 0
WEAKNESS: 1
AGITATION: 0
CHEST TIGHTNESS: 0
SHORTNESS OF BREATH: 1
NAUSEA: 0
DIZZINESS: 0
COUGH: 0

## 2025-04-03 ASSESSMENT — ACTIVITIES OF DAILY LIVING (ADL)
FEEDING YOURSELF: INDEPENDENT
BATHING: INDEPENDENT
ADEQUATE_TO_COMPLETE_ADL: YES
HEARING - RIGHT EAR: HEARING AID
GROOMING: INDEPENDENT
PATIENT'S MEMORY ADEQUATE TO SAFELY COMPLETE DAILY ACTIVITIES?: YES
JUDGMENT_ADEQUATE_SAFELY_COMPLETE_DAILY_ACTIVITIES: YES
LACK_OF_TRANSPORTATION: NO
HEARING - LEFT EAR: HEARING AID
WALKS IN HOME: INDEPENDENT
DRESSING YOURSELF: INDEPENDENT
TOILETING: INDEPENDENT

## 2025-04-03 ASSESSMENT — COGNITIVE AND FUNCTIONAL STATUS - GENERAL
MOVING FROM LYING ON BACK TO SITTING ON SIDE OF FLAT BED WITH BEDRAILS: A LITTLE
DRESSING REGULAR UPPER BODY CLOTHING: A LITTLE
PERSONAL GROOMING: A LITTLE
CLIMB 3 TO 5 STEPS WITH RAILING: A LITTLE
HELP NEEDED FOR BATHING: A LITTLE
WALKING IN HOSPITAL ROOM: A LITTLE
STANDING UP FROM CHAIR USING ARMS: A LITTLE
MOVING TO AND FROM BED TO CHAIR: A LITTLE
DRESSING REGULAR LOWER BODY CLOTHING: A LITTLE
DAILY ACTIVITIY SCORE: 18
TURNING FROM BACK TO SIDE WHILE IN FLAT BAD: A LITTLE
TOILETING: A LITTLE
PATIENT BASELINE BEDBOUND: NO
EATING MEALS: A LITTLE
MOBILITY SCORE: 18

## 2025-04-03 ASSESSMENT — LIFESTYLE VARIABLES
HOW MANY STANDARD DRINKS CONTAINING ALCOHOL DO YOU HAVE ON A TYPICAL DAY: PATIENT DOES NOT DRINK
HOW OFTEN DO YOU HAVE 6 OR MORE DRINKS ON ONE OCCASION: NEVER
AUDIT-C TOTAL SCORE: 0
SKIP TO QUESTIONS 9-10: 1
HOW OFTEN DO YOU HAVE A DRINK CONTAINING ALCOHOL: NEVER
AUDIT-C TOTAL SCORE: 0

## 2025-04-03 ASSESSMENT — PAIN SCALES - GENERAL
PAINLEVEL_OUTOF10: 0 - NO PAIN

## 2025-04-03 ASSESSMENT — PATIENT HEALTH QUESTIONNAIRE - PHQ9
1. LITTLE INTEREST OR PLEASURE IN DOING THINGS: NOT AT ALL
2. FEELING DOWN, DEPRESSED OR HOPELESS: NOT AT ALL
SUM OF ALL RESPONSES TO PHQ9 QUESTIONS 1 & 2: 0

## 2025-04-03 ASSESSMENT — PAIN - FUNCTIONAL ASSESSMENT
PAIN_FUNCTIONAL_ASSESSMENT: 0-10

## 2025-04-03 NOTE — H&P
History Of Present Illness  Ritesh Garza is a 74 y.o. male with Pmhx Seizure, multivessel CAD (hx of CABG 2021), recent NSTEMI, VT with ICD placement, HFrEF 30-35% (4/2024), PAD, Subclavian Artery Stenosis, HTN HLD,  DM2 CKD,  SENIA, Liver Cirrhosis multiple falls ( July 2023) Central cord compression, s/p C4-c7 Decompression. GI Bleed  last Dec 2024   2/2 Duodenal Ulcer ( ATC was stopped) was at his cardiology appointment yesterday, when he was redirected to [facility] due to shortness of breath x2 weeks, exacerbated with movement. His cardiologist visit showed Wide complex tachycardia to the 120's     Arrival in the ED HR was 122 Tachypnic b/p was 137/87.   Labs showed BNP 3124. Troponin was 85->82,  K 5.1 Scr 2.31 TSH 14.99 WBC 11k, H/H 9/31  CT Chest showed  Mild interstitial pulmonary edema, small bilateral layering  pleural effusions and cardiomegaly with left ventricular enlargement  suggestive of mild congestive heart failure changes.     Patient needed BIPAP for his SOB. VBG was 7.28/44/18/20 lactic 3.6  He was given Lasix, antibiotics: Ceftriaxone doxycycline he was started on heparin drip.     Past Medical History  He has a past medical history of ADHD (attention deficit hyperactivity disorder) (1960’s), AICD (automatic cardioverter/defibrillator) present (05/30/2024), Asthma, Benign prostatic hyperplasia (Several years ago), CHF (congestive heart failure), Cirrhosis (Multi), CKD (chronic kidney disease), Coronary artery disease (Long Ago), Diabetes mellitus (Multi) (Over 20 years ago), Erectile dysfunction, GERD (gastroesophageal reflux disease), Gout, Hypertension (Over 20 years ago), Ischemic cardiomyopathy, Kidney stone (Several years ago), Myocardial infarction (Multi) (Over 20 yesrs ago), SIDHU (nonalcoholic steatohepatitis), SENIA (obstructive sleep apnea), Stenosis of left subclavian artery, and Ventricular tachycardia (Multi).    Surgical History  He has a past surgical history that  includes Coronary artery bypass graft (1992); MR angio head wo IV contrast (06/02/2021); MR angio neck wo IV contrast (06/02/2021); MR angio head wo IV contrast (11/16/2021); MR angio neck wo IV contrast (11/16/2021); Adenoidectomy (Childhood); Cardiac catheterization (Over 30 years ago); Cholecystectomy (Over 30 years ago); Circumcision, primary (72,years ago); Eye surgery (Childhood); Cardiac catheterization (N/A, 04/15/2024); Invasive Vascular Procedure (Left, 04/30/2024); Cardiac electrophysiology procedure (N/A, 05/30/2024); Cardiac defibrillator placement (05/30/2024); Tonsillectomy (1950’s); and Cervical fusion (07/03/2024).     Social History  He reports that he has never smoked. He has never been exposed to tobacco smoke. He has never used smokeless tobacco. He reports that he does not drink alcohol and does not use drugs.    Family History  Family History   Problem Relation Name Age of Onset    Diabetes Mother Sofia Garza     Other (mycoardial infarction) Father Hilario 46    Fainting Father Hilario     Heart disease Father Hilario     Atrial fibrillation Father Hilario     Stomach cancer Mother's Sister Fadumo Keller     Stroke Maternal Grandmother Jasmina Serabrie     Colon cancer Maternal Grandmother Jasmina Rodri         Allergies  Patient has no known allergies.    Review of Systems   Constitutional:  Positive for fatigue.   Respiratory:  Positive for shortness of breath. Negative for cough, chest tightness and wheezing.    Cardiovascular:  Positive for leg swelling. Negative for chest pain.   Gastrointestinal:  Negative for abdominal distention, abdominal pain, nausea and vomiting.   Musculoskeletal:  Negative for gait problem.   Neurological:  Positive for weakness. Negative for dizziness.   Psychiatric/Behavioral:  Negative for agitation.         Physical Exam  Neuro:   AAOx3.  Moves all extremities.  Eyes:PERRL, clear sclerae.  CV:  Soft systolic murmur.  Otherwise normal S1, S2.  RRR.  Resp:   "Diminished with scattered ronchi on the anterior chest   GI:+BS, abd soft, NT, ND.  Ext:Trace: peripheral edema.  Skin:  Warm and dry.  No rashes or lesions.  Psych:Appropriate mood and affect.      Last Recorded Vitals  Blood pressure (!) 138/103, pulse (!) 124, temperature 37 °C (98.6 °F), resp. rate (!) 28, height 1.6 m (5' 3\"), weight 67.1 kg (148 lb), SpO2 100%.    Relevant Results  Scheduled medications  [Held by provider] allopurinol, 100 mg, oral, Daily  [Held by provider] amLODIPine, 10 mg, oral, Daily  aspirin, 81 mg, oral, Daily  atorvastatin, 80 mg, oral, Daily  cyanocobalamin, 1,000 mcg, oral, Daily  [Held by provider] dapagliflozin propanediol, 10 mg, oral, Daily before evening meal  furosemide, 40 mg, intravenous, Once  [Held by provider] furosemide, 20 mg, oral, Every other day  [Held by provider] gabapentin, 100 mg, oral, Nightly  icosapent ethyL, 1 g, oral, BID  insulin lispro, 0-10 Units, subcutaneous, q4h  levothyroxine, 50 mcg, oral, Daily  [Held by provider] metoprolol succinate XL, 50 mg, oral, Daily  metoprolol tartrate, 25 mg, oral, Once      Continuous medications  heparin, 0-4,000 Units/hr, Last Rate: 800 Units/hr (04/02/25 2022)      PRN medications  PRN medications: albuterol, dextrose, dextrose, famotidine, glucagon, glucagon, heparin, oxygen            LABS:  CMP:  Results from last 7 days   Lab Units 04/02/25  1729   SODIUM mmol/L 138   POTASSIUM mmol/L 5.1   CHLORIDE mmol/L 108*   CO2 mmol/L 18*   ANION GAP mmol/L 17   BUN mg/dL 51*   CREATININE mg/dL 2.31*   EGFR mL/min/1.73m*2 29*   MAGNESIUM mg/dL 2.08   ALBUMIN g/dL 4.5   ALT U/L 13   AST U/L 18   BILIRUBIN TOTAL mg/dL 0.5     CBC:  COAG:   Results from last 7 days   Lab Units 04/02/25  1729   INR  1.3*     HEME/ENDO:  Results from last 7 days   Lab Units 04/02/25  1729   TSH mIU/L 14.99*      CARDIAC:   Results from last 7 days   Lab Units 04/02/25  1807 04/02/25  1729   TROPHS ng/L 82* 85*      MICRO: .No results found for the " last 90 days.      Imaging  CT chest wo IV contrast    Result Date: 4/2/2025  1.  Mild interstitial pulmonary edema, small bilateral layering pleural effusions and cardiomegaly with left ventricular enlargement suggestive of mild congestive heart failure changes. 2.  Mild peritoneal ascites. 3.  Status post cholecystectomy. Signed by Yuri Francis MD    XR chest 1 view    Result Date: 4/2/2025  Enlarged cardiac silhouette with moderate pulmonary edema     MACRO: None   Signed by: Hank Razo 4/2/2025 5:52 PM Dictation workstation:   [id number]     Cardiology, Vascular, and Other Imaging  ECG 12 lead (Clinic Performed)    Result Date: 4/2/2025  Sinus tachycardia with Fusion complexes Left axis deviation Nonspecific intraventricular block Cannot rule out Septal infarct (cited on or before 13-JAN-2025) Possible Lateral infarct , age undetermined Abnormal ECG When compared with ECG of 13-JAN-2025 09:37, Fusion complexes are now Present Vent. rate has increased BY  47 BPM QRS duration has increased Borderline criteria for Lateral infarct are now Present         Assessment/Plan  Diagnosis: Acute respiratory failure secondary to heart failure exacerbation EF 30-35%  Wide-complex tachycardia  TAYLOR on CKD  Lactic acidosis-resolving    Neuro:   -Q1 neuro check per ICU protocol   -CAM ICU score q-shift  -Sleep/wake cycle hygiene  -Delirium precaution   -Resume home medication    CV:   Hx: CAD/CABG/HFrEF, HTN HLD  - X1 lasix  -continue with BB, statin  -Continue with ACS-Hep Drip   -Continuous cardiac monitoring and hourly vital signs    -Continue with B/P control and statin  -Check lipid panel   -Electrolytes daily monitoring   -Echocardiogram: 12/2024-EF 33%, abnormal LV wall motion mildly elevated RVSP 40 mmHg.  Plaque visualized in the ascending or aorta.  Moderate MV regurgitation who may get 30 where    PULM:   - NIV overnight with Breaks  -supplemental Oxygen     GI/FEN/Endo:    Hx of Hypothyrpod   -PPI daily   40mg IV, gastric prophylasxid   -NPO for now and if pass BSW ok for PO     Renal/:   -Strict I&O  -Daily weights  -Renal Panel in am  -ICU electrolyte replacement protocol- keep potassium >4, magnesium >2. Trend daily BMP, mag, phos.  -Avoid Nephrotoxic Agents, NSAIDs   -Love     Heme/ID:     -SCDs  -pDVT-defer Already on hep drip   -ATB : No indication   -Check daily CBC.    MSK/Integumentary:   Reposition, Pressure relieving measures  PT/OT-hold for now     Lines: IV,  Love      Social/family/dispo: admit to ICU.   Code; Full    Plan discussed with Dr Rosibel Pickard   and RN at bedside.     I spent 75 minutes in the professional and overall care of this patient.      Chris Mancera, APRN-CNP

## 2025-04-03 NOTE — CARE PLAN
The patient's goals for the shift include  rest    The clinical goals for the shift include maintain spo2 of 92 or above wile on sup o2.

## 2025-04-03 NOTE — CONSULTS
Inpatient consult to Cardiology  Consult performed by: NORAH Swenson-CNP  Consult ordered by: CHRISTOPHER Regan  Reason for consult: chf        History Of Present Illness:    Ritesh Garza is a 74 y.o. male with past medical history of PMH of mvCAD s/p CABGx5 1992 c/b NSTEMI 2/2024 on DAPT, HFrEF d/t ICM, HTN, HLD, VT s/p ICD placement 5/2024, CKD III, seizures, PAD c/b severe left subclavian stenosis w/ compromised flow to the left POP s/p left subclavian stent 4/2024 , asthma, DM, gout, SIDHU cirrhosis, portal HTN, depression/anxiety, GERD, BPH, SENIA, , recent GIB 12/2024 (Plavix stopped with GIB), who presented to [facility] with SOB, weight gain, and tachycardia.  Cardiology consulted for CHF.    States he has been more short of breath than usual for the past few days. States he has never been short of breath in the past 32 years since his CABG.  He went to doctor visit with his cardiologist, and was told to come to ED for irregular heart rhythm. Denies any chest pain or angina.  States he has gained about 10lbs over the past few months.  Unsure if he is having orthopnea.  He is having some mild lower extremity swelling.  No recent illness, no nausea, vomiting, fever, or chills.     Arrival in the ED HR was 122 Tachypnic b/p was 137/87.   Labs showed BNP 3124. Troponin was 85->82,  K 5.1 Scr 2.31 TSH 14.99 WBC 11k, H/H 9/31  CT Chest showed  Mild interstitial pulmonary edema, small bilateral layering  pleural effusions and cardiomegaly with left ventricular enlargement  suggestive of mild congestive heart failure changes.   He was placed on BiPap, and ultimately placed on oxygen at 2L via NC.  Comfortable.     Home CV Medications:  Amlodipine 10 mg daily  ASA 81 mg daily  Atorvastatin 80 mg daily  Farxiga 10 mg daily  Furosemide 40 mg daily as needed  Vascepa 1 g twice daily  Metoprolol succinate 50 mg daily  Entresto 1/2 tablet twice daily  No Spironolactone d/t painful gynecomastia.     -Follows  with Dr. Hennessy as outpatient    Past Cardiology Tests (Last 3 Years):    EKG:        Device Check 12/17/24    Echo:  Transthoracic Echocardiogram 12/2024  1. Left ventricular ejection fraction is moderately decreased, calculated by Reyes's biplane at 33%.   2. Abnormal left venticular wall motion.   3. Left ventricular cavity size is mildly dilated.   4. No left ventricular thrombus visualized.   5. Moderate mitral valve regurgitation.   6. Mildly elevated right ventricular systolic pressure.   7. There is plaque visualized in the ascending aorta.    Transthoracic Echocardiogram 7/2024   1. Left ventricular ejection fraction is moderately decreased, by visual estimate at 30-35%.   2. Spectral Doppler shows a pseudonormal pattern of left ventricular diastolic filling.   3. Left ventricular cavity size is moderately dilated.   4. No left ventricular thrombus visualized.   5. Technically difficult exam, though there is moderate LV systolic dysfunction with the apical function being best but hypokinesis of the basal and mid segments. There is no LV apical thrombus seen on the echocontrast images.   6. There is reduced right ventricular systolic function.   7. The left atrium is moderately dilated.   8. Moderate mitral valve regurgitation.   9. Mildly elevated RVSP.  10. Moderate tricuspid regurgitation visualized.  11. Compared with the prior exam from 5/10/2023, today's exam is more technically difficult making assessment of LV systolic function more difficult. Proir study demonstrated clear wall motiom abnormality in the basal septal, basal inferior and basal and mid inferolateral segments. The LV systolic function appears to be worse today ( hyaving declined from mild dysfunciton to moderate dysfunction. In addition, the degree of MR appears to have increased from mild to moderate.     Transthoracic Echocardiogram 4/26/24   1. Left ventricular systolic function is moderately decreased with a 30-35% estimated  ejection fraction.   2. Multiple segmental abnormalities exist. See findings.   3. Spectral Doppler shows a restrictive pattern of left ventricular diastolic filling.   4. There is an elevated left ventricular end diastolic pressure.   5. Moderate mitral valve regurgitation.   6. Moderately decreased mitral valve posterior leaflet mobility.   7. Mild to moderate tricuspid regurgitation.   8. Mildly elevated RVSP.     Transthoracic Echocardiogram 2/2024   1. Left ventricular systolic function is normal with a 30-35% estimated ejection fraction.   2. Multiple segmental abnormalities exist. See findings.   3. Spectral Doppler shows a restrictive pattern of left ventricular diastolic filling.   4. There is low normal right ventricular systolic function.   5. Mild to moderate tricuspid regurgitation.   6. There are multiple wall motion abnormalities.     Transthoracic Echocardiogram 5/2023  1. Left ventricular systolic function is mildly decreased with a 40-45% estimated ejection fraction.  2. The left ventricular cavity size is mildly dilated.  3. Basal and mid inferolateral wall, basal inferoseptal segment, and basal inferior segment are abnormal.  4. Spectral Doppler shows a pseudonormal pattern of left ventricular diastolic filling.  5. The left atrium is mildly dilated.  6. RVSP within normal limits.     Transthoracic Echocardiogram 11/2021   1. The left ventricular systolic function is moderately decreased with a 35% estimated ejection fraction.   2. Multiple segmental abnormalities exist. See findings.   3. Spectral Doppler shows an impaired relaxation pattern of left ventricular diastolic filling.     Transthoracic Echocardiogram 6/2021   1. The left ventricular systolic function is moderately decreased with a 40% estimated ejection fraction.   2. Multiple segmental abnormalities exist. See findings.   3. Spectral Doppler shows a pseudonormal pattern of left ventricular diastolic filling.   4. Mild to moderate  mitral valve regurgitation.     Cath:  Left/Right Heart Catheterization 2/2024   1. Severe native three vessel CAD.   2. Patent LIMA-LAD/Diagonal vessel. All other grafts are occluded.   3. Severe left subclavian artery stenosis with a gradient of ~28-30 mmHg across the stenosis.   4. Mildly elevated filling pressure with PCWP of 21 mmHg.   5. Mild pulmonary hypertension with mPAP of 29 mmHg (Post capillary).   6. Normal cardiac output and index of 4.1 L/min and 2.5 L/min/m2 respectively.   7. Successful RCFA closure with 6Fr. VIP Angioseal.   8. Left Ventricular end-diastolic pressure = 23 mmHg.      Past Medical History:  He has a past medical history of ADHD (attention deficit hyperactivity disorder) (1960’s), AICD (automatic cardioverter/defibrillator) present (05/30/2024), Asthma, Benign prostatic hyperplasia (Several years ago), CHF (congestive heart failure), Cirrhosis (Multi), CKD (chronic kidney disease), Coronary artery disease (Long Ago), Diabetes mellitus (Multi) (Over 20 years ago), Erectile dysfunction, GERD (gastroesophageal reflux disease), Gout, Hypertension (Over 20 years ago), Ischemic cardiomyopathy, Kidney stone (Several years ago), Myocardial infarction (Multi) (Over 20 yesrs ago), SIDHU (nonalcoholic steatohepatitis), SENIA (obstructive sleep apnea), Stenosis of left subclavian artery, and Ventricular tachycardia (Multi).    Past Surgical History:  He has a past surgical history that includes Coronary artery bypass graft (1992); MR angio head wo IV contrast (06/02/2021); MR angio neck wo IV contrast (06/02/2021); MR angio head wo IV contrast (11/16/2021); MR angio neck wo IV contrast (11/16/2021); Adenoidectomy (Childhood); Cardiac catheterization (Over 30 years ago); Cholecystectomy (Over 30 years ago); Circumcision, primary (72,years ago); Eye surgery (Childhood); Cardiac catheterization (N/A, 04/15/2024); Invasive Vascular Procedure (Left, 04/30/2024); Cardiac electrophysiology procedure (N/A,  2024); Cardiac defibrillator placement (2024); Tonsillectomy (’s); and Cervical fusion (2024).      Social History:  He reports that he has never smoked. He has never been exposed to tobacco smoke. He has never used smokeless tobacco. He reports that he does not drink alcohol and does not use drugs.    Family History:  Family History   Problem Relation Name Age of Onset    Diabetes Mother Sofia Garza     Other (mycoardial infarction) Father Hilario 46    Fainting Father Hilario     Heart disease Father Hilario     Atrial fibrillation Father Hilario     Stomach cancer Mother's Sister Fadumo Keller     Stroke Maternal Grandmother Jasmina Mace     Colon cancer Maternal Grandmother Jasmina Mace         Allergies:  Patient has no known allergies.    ROS:  10 point review of systems including (Constitutional, Eyes, ENMT, Respiratory, Cardiac, Gastrointestinal, Neurological, Psychiatric, and Hematologic) was performed and is otherwise negative.    Objective Data:  Last Recorded Vitals:  Vitals:    25 0732 25 0740 25 0800 25 0900   BP:   (!) 130/91 (!) 128/96   BP Location:       Patient Position:       Pulse:   (!) 121 (!) 123   Resp:   24 19   Temp:       TempSrc:       SpO2: 95% 94% 94% 93%   Weight:       Height:         Medical Gas Therapy: Supplemental oxygen  Medical Gas Delivery Method: Nasal cannula  Weight  Av.4 kg (150 lb 13.1 oz)  Min: 67.1 kg (148 lb)  Max: 70.3 kg (154 lb 15.7 oz)    LABS:  CMP:  Results from last 7 days   Lab Units 25  0556 25  1729   SODIUM mmol/L 139 138   POTASSIUM mmol/L 5.5* 5.1   CHLORIDE mmol/L 109* 108*   CO2 mmol/L 17* 18*   ANION GAP mmol/L 19 17   BUN mg/dL 54* 51*   CREATININE mg/dL 2.57* 2.31*   EGFR mL/min/1.73m*2 25* 29*   MAGNESIUM mg/dL  --  2.08   ALBUMIN g/dL 4.2 4.5   ALT U/L  --  13   AST U/L  --  18   BILIRUBIN TOTAL mg/dL  --  0.5     CBC:  Results from last 7 days   Lab Units 25  0556 25  172  "  WBC AUTO x10*3/uL 11.8* 11.4*   HEMOGLOBIN g/dL 9.7* 9.0*   HEMATOCRIT % 33.0* 31.0*   PLATELETS AUTO x10*3/uL 223 214   MCV fL 78* 78*     COAG:   Results from last 7 days   Lab Units 04/02/25  1729   INR  1.3*     ABO: No results found for: \"ABO\"  HEME/ENDO:  Results from last 7 days   Lab Units 04/02/25  1729   TSH mIU/L 14.99*      CARDIAC:   Results from last 7 days   Lab Units 04/02/25  1807 04/02/25  1729   TROPHS ng/L 82* 85*   BNP pg/mL  --  3,124*             Last I/O:    Intake/Output Summary (Last 24 hours) at 4/3/2025 0929  Last data filed at 4/3/2025 0500  Gross per 24 hour   Intake --   Output 220 ml   Net -220 ml     Net IO Since Admission: -220 mL [04/03/25 0929]      Imaging Results:      Inpatient Medications:  Scheduled medications   Medication Dose Route Frequency    [Held by provider] allopurinol  100 mg oral Daily    amLODIPine  10 mg oral Daily    aspirin  81 mg oral Daily    atorvastatin  80 mg oral Daily    cyanocobalamin  1,000 mcg oral Daily    [Held by provider] dapagliflozin propanediol  10 mg oral Daily before evening meal    [Held by provider] furosemide  20 mg oral Every other day    gabapentin  100 mg oral Nightly    icosapent ethyL  1 g oral BID    insulin lispro  0-10 Units subcutaneous q4h    levothyroxine  50 mcg oral Daily    metoprolol succinate XL  50 mg oral Daily    metoprolol tartrate  25 mg oral Once     PRN medications   Medication    albuterol    dextrose    dextrose    famotidine    glucagon    glucagon    heparin    oxygen     Continuous Medications   Medication Dose Last Rate    heparin  0-4,000 Units/hr 700 Units/hr (04/03/25 0109)       Outpatient Medications:  Current Outpatient Medications   Medication Instructions    albuterol (ProAir HFA) 90 mcg/actuation inhaler 2 puffs, inhalation, Every 6 hours PRN    allopurinol (ZYLOPRIM) 100 mg, oral, Daily    amLODIPine (NORVASC) 10 mg, oral, Daily    aspirin 81 mg, oral, Daily    atorvastatin (LIPITOR) 80 mg, oral, " Daily    coenzyme Q-10 (CoQ-10) 100 mg capsule 1 capsule, Daily    cyanocobalamin (Vitamin B-12) 1,000 mcg tablet 1 tablet, Daily    dapagliflozin (Farxiga) 10 mg 1 tablet, Daily before evening meal    famotidine (PEPCID) 20 mg, oral, Daily PRN    furosemide (LASIX) 20 mg, oral, Every other day    gabapentin (NEURONTIN) 100 mg, oral, Nightly    icosapent ethyL (VASCEPA) 1 g, oral, 2 times daily (morning and late afternoon)    levothyroxine (SYNTHROID, LEVOXYL) 50 mcg, oral, Daily, Take on an empty stomach at the same time each day, either 30 to 60 minutes prior to breakfast    loratadine (CLARITIN) 10 mg, 2 times daily    metoprolol succinate XL (TOPROL-XL) 50 mg, oral, Daily    multivitamin tablet 1 tablet, Daily    pantoprazole (PROTONIX) 40 mg, oral, 2 times daily, Do not crush, chew, or split.    sacubitriL-valsartan (Entresto) 24-26 mg tablet 0.5 tablets, oral, 2 times daily    sertraline (Zoloft) 50 mg tablet TAKE ONE TABLET DAILY IN THE MORNING AFTER BREAKFAST.    sildenafil (VIAGRA) 100 mg, oral, As needed       Physical Exam:    General:  Patient is awake, alert, and oriented.  Patient is in no acute distress.  HEENT:  Normocephalic.  Moist mucosa.    Neck:  Normal Jugular Venous Pressure.  Cardiovascular:  Regular rate and rhythm.  Normal S1 and S2, no murmurs, rubs or gallops  Pulmonary:  Clear to auscultation bilaterally.  Oxygen at 2L via NC.   Abdomen:  Soft. Non-tender.   Non-distended.  Positive bowel sounds.  Lower Extremities:  2+ pedal pulses. Mild 1+ LE Edema  Neurologic:  Alert and oriented x3. No focal deficit.   Skin: Skin warm and dry, normal skin turgor.   Psychiatric:  Rambling speech, emotional.      Assessment/Plan     Ritesh Garza is a 74 y.o. male with past medical history of PMH of mvCAD s/p CABGx5 1992 c/b NSTEMI 2/2024 on DAPT, HFrEF d/t ICM, HTN, HLD, VT s/p ICD placement 5/2024, CKD III, seizures, PAD c/b severe left subclavian stenosis w/ compromised flow to the left POP s/p  left subclavian stent 4/2024 , asthma, DM, gout, SIDHU cirrhosis, portal HTN, depression/anxiety, GERD, BPH, SENIA, , recent GIB 12/2024 (Plavix stopped with GIB), who presented to [facility] with SOB, weight gain, and tachycardia.  Cardiology consulted for CHF.    States he has been more short of breath than usual for the past few days. States he has never been short of breath in the past 32 years since his CABG.  He went to doctor visit with his cardiologist, and was told to come to ED for irregular heart rhythm. Denies any chest pain or angina.  States he has gained about 10lbs over the past few months.  Unsure if he is having orthopnea.  He is having some mild lower extremity swelling.  No recent illness, no nausea, vomiting, fever, or chills.     Arrival in the ED HR was 122 Tachypnic b/p was 137/87.   Labs showed BNP 3124. Troponin was 85->82,  K 5.1 Scr 2.31 TSH 14.99 WBC 11k, H/H 9/31  CT Chest showed  Mild interstitial pulmonary edema, small bilateral layering  pleural effusions and cardiomegaly with left ventricular enlargement  suggestive of mild congestive heart failure changes.   He was placed on BiPap, and ultimately placed on oxygen at 2L via NC.  Comfortable.     Home CV Medications:  Amlodipine 10 mg daily  ASA 81 mg daily  Atorvastatin 80 mg daily  Farxiga 10 mg daily  Furosemide 40 mg daily as needed  Vascepa 1 g twice daily  Metoprolol succinate 50 mg daily  Entresto 1/2 tablet twice daily  No Spironolactone d/t painful gynecomastia.     -Follows with Dr. Hennessy as outpatient      Assessment:    #CAD- s/p CABGx5 with occluded grafts per [facility] 4/2024, plan was for one year DAPT, but Plavix was stopped 12/2024 with GIB.  Remains on ASA 81mg daily     #Acute  on chronic Systolic HF-   - admit BNP 3124 ( chronic elevation, previous 3417 12/2024)   - last echo 12/2024 with EF 33%    #TAYLOR on CKD   - baseline creatinine closer to 2   - Admit creatinine 2.31, up to 2.57 on 4/3    # Hypertension, BP acceptable    4/3  > Volume up on exam, normotensive, oxygen at 2L via NC    Plan:  - Agree with stopping heparin, no indicated. Device check showing atrial tachycardia.  No signs of atrial fibrillation or flutter.   - GDMT   - holding Farxiga and Entresto with TAYLOR   - Continue Toprol 50mg daily   - no MRA due to past intolerance  - Recommend diuresis with furosemide 40mg IV BID for now.  Strict I/O, daily weight  - Trend creatinine closely.    He will follow up with Dr. Hennessy, established cardiologist within 2-3 weeks of discharge.     Code Status:  Full Code      Kelin Lacy, APRN-CNP      ====================================================  Attending note   ====================================================  Both the ELZBIETA and I have had a face to face encounter with the patient today. I have examined the patient and edited the documented physical examination as necessary.  I personally reviewed the patient's recent labs, medications, orders, EKGs, and pertinent cardiac imaging.  I have reviewed the ELZBIETA's encounter note, approve the ELZBIETA's documentation and have edited the note to reflect the diagnostic and therapeutic plan.    esme Garza is a 74 y.o. male with past medical history of PMH of mvCAD s/p CABGx5 1992 c/b NSTEMI 2/2024 on DAPT, HFrEF d/t ICM, HTN, HLD, VT s/p ICD placement 5/2024, CKD III, seizures, PAD c/b severe left subclavian stenosis w/ compromised flow to the left POP s/p left subclavian stent 4/2024 , asthma, DM, gout, SIDHU cirrhosis, portal HTN, depression/anxiety, GERD, BPH, SENIA, , recent GIB 12/2024 (Plavix stopped with GIB), who presented to [facility] with SOB, weight gain, and tachycardia.  Cardiology consulted for CHF.     States he has been more short of breath than usual for the past few days. States he has never been short of breath in the past 32 years since his CABG.  He went to doctor visit with his cardiologist, and was told to come to ED for irregular heart rhythm. Denies any chest pain or  "angina.  States he has gained about 10lbs over the past few months.  Unsure if he is having orthopnea.  He is having some mild lower extremity swelling.  No recent illness, no nausea, vomiting, fever, or chills.      Arrival in the ED HR was 122 Tachypnic b/p was 137/87.   Labs showed BNP 3124. Troponin was 85->82,  K 5.1 Scr 2.31 TSH 14.99 WBC 11k, H/H 9/31  CT Chest showed  Mild interstitial pulmonary edema, small bilateral layering  pleural effusions and cardiomegaly with left ventricular enlargement  suggestive of mild congestive heart failure changes.   He was placed on BiPap, and ultimately placed on oxygen at 2L via NC.  Comfortable.     No exacerbating or relieving factors.  Patient denies chest pain and angina.  Pt denies shortness of breath, dyspnea on exertion, orthopnea, and paroxysmal nocturnal dyspnea.  Pt denies worsening lower extremity edema.  Pt denies palpitations or syncope.  No recent falls.  No fever or chills.  No cough.  No change in bowel or bladder habits.  No sick contacts.  No recent travel.     12 point review of systems including (Constitutional, Eyes, ENMT, Respiratory, Cardiac, Gastrointestinal, Neurological, Psychiatric, and Hematologic) was performed and is otherwise negative.    Past medical history:  As above.    Medications were reviewed.    Allergies were reviewed.    Social history:    Social History     Tobacco Use    Smoking status: Never     Passive exposure: Never    Smokeless tobacco: Never   Vaping Use    Vaping status: Never Used   Substance Use Topics    Alcohol use: Never    Drug use: Never         Family history:  No sudden cardiac death or premature coronary artery disease.   /76   Pulse 109   Temp 36.1 °C (97 °F)   Resp 17   Ht 1.6 m (5' 3\")   Wt 67.8 kg (149 lb 7.6 oz)   SpO2 94%   BMI 26.48 kg/m²   General:  Resting when seen off BiPAP. Student Rn reports he has been Sleeping a lot .  HEENT:  Pupils equal and reactive.  Normocephalic.  Moist mucosa.  "   Neck:  No thyromegaly.  Elevated JVD  Cardiovascular:  Regular rate and rhythm.  Normal S1 and S2.  Pulmonary:  Clear to auscultation bilaterally.  Abdomen:  Soft. Non-tender.   Non-distended.  Positive bowel sounds.  Lower Extremities:  2+ pedal pulses. No LE edema.  Neurologic:  Cranial nerves intact.  No focal deficit.   Skin: Skin warm and dry, normal skin turgor.   Psychiatric: Normal affect.    Vital signs, telemetry, medications, labs, and imaging were reviewed as well.          #CAD- s/p CABGx5 with occluded grafts per [facility] 4/2024, plan was for one year DAPT, but Plavix was stopped 12/2024 with GIB.  Remains on ASA 81mg daily     #Acute  on chronic Systolic HF-   - admit BNP 3124 ( chronic elevation, previous 3417 12/2024)   - last echo 12/2024 with EF 33%    #TAYLOR on CKD   - baseline creatinine closer to 2   - Admit creatinine 2.31, up to 2.57 on 4/3    # Hypertension, BP acceptable    4/3 > Volume up on exam, normotensive, oxygen at 2L via NC    Plan:  - Agree with stopping heparin, no indicated. Device check showing atrial tachycardia.  No signs of atrial fibrillation or flutter.   - GDMT   - holding Farxiga and Entresto with TAYLOR   - Continue Toprol 50mg daily   - no MRA due to past intolerance  - Recommend diuresis with furosemide 40mg IV BID for now.  Strict I/O, daily weight  - Trend creatinine closely.    He will follow up with Dr. Hennessy, established cardiologist within 2-3 weeks of discharge        Mahendra Santacruz DO   Division of Cardiovascular Medicine  [facility] [place]

## 2025-04-03 NOTE — PROGRESS NOTES
Ritesh Garza is a 74 y.o. male who was referred to the Clinical Pharmacy Team to complete a virtual Transitions of Care encounter for discharge medication optimization. The patient was referred for their heart failure with reduced ejection fraction, DM.    Attending: Rosibel Agrawal  PCP: Ofelia Bullard    _______________________________________________________________________  PHARMACY ASSESSMENT    Home [facility]  Meds to beds? yes    Affordability/Accessibility: no issues with either  Adherence/Organization: no issues with adherence, uses a medicine cabinet  Adverse Effects: none recently     No issues reported in regards to affordability, accessibility, adherence, organization, and adverse effects  _______________________________________________________________________  DIABETES ASSESSMENT    CURRENT PHARMACOTHERAPY  Farxiga (Get via patient assistance  via [employer])    HISTORICAL PHARMACOTHERAPY  N/A    BLOOD SUGAR TESTING AT HOME  Did not assess    Diet:   3 meals per day  Breakfast: homemade oatmeal with raisins, orange juice  Lunch: wonton soup   Dinner: red  meat or chicken and veggies  Snacks: not much  Drinks: orange juice and ginger ale    Social History:  Did not assess much    Exercise Routine:   Did not assess      Additional Contributory Factors [medications, comorbidities]   HTN, Hyperlipidemia, Gout, GERD, LVSD, obesity, NAFL,  history of stroke/TIA, SENIA, hx of MI, CKD3    SECONDARY PREVENTION  Statin? Atorvastatin  ACE-I/ARB? Entresto  Aspirin? Yes    Lab Results   Component Value Date    HGBA1C 6.2 02/13/2025       Lab Results   Component Value Date    CHOL 114 05/06/2024    CHOL 102 11/13/2023    CHOL 128 07/31/2023     Lab Results   Component Value Date    HDL 31.0 05/06/2024    HDL 25.8 11/13/2023    HDL 32.3 (A) 07/31/2023     Lab Results   Component Value Date    LDLCALC 53 05/06/2024    LDLCALC 36 11/13/2023     Lab Results   Component Value Date    TRIG 149 05/06/2024    TRIG 203  "(H) 11/13/2023    TRIG 192 (H) 07/31/2023     No components found for: \"CHOLHDL\"    _______________________________________________________________________  CHRONIC HEART FAILURE ASSESSMENT  HTN present/diagnosed: yes    LVEF: 35$  eGFR: 25  Beta blocker: metoprolol succinate  ACEi/ARB/ARNI: Entresto  MRA: no  SGLT2i: Farxiga  Diuretic: Lasix   Advanced therapies: coronary bypass graft  _______________________________________________________________________  STROKE ASSESSMENT    Cause of stroke: [cardioembolic/non-cardioembolic]  Medical management: none    HTN present/diagnosed: yes  Current Medication regimen: Entresto  BP cuff at home? Did not assess     HLD present/diagnosed: yes   Statin? yes  At Goal: yes  _______________________________________________________________________  ASTHMA/COPD ASSESSMENT    CURRENT PHARMACOTHERAPY  Albuterol 2.5 mg/3 mL (while admitted)  Oxygen (while admitted)    HISTORICAL PHARMACOTHERAPY  N/A    RESCUE INHALER USE  N/A     INHALER TECHNIQUE  N/A    SECONDARY PREVENTION  Influenza: yes  Pneumococcal: yes  RSV: yes  COVID:yes    EXACERBATION HISTORY  When was your last hospitalization for an exacerbation? Current admission  When was the last time you were treated with antibiotics and/or steroids? Long time    SMOKING CESSATION  Never smoked  ___________________________________________________________________  PATIENT EDUCATION/GOALS  Discussed use of Viagra and Nitroglycerin to avoid taking them together  Introduced post-discharge pharmacy team follow up and benefits of calls  Answered all patient questions and concerns to the best of my ability  _______________________________________________________________________  RECOMMENDATIONS/PLAN  Referral to Clinical pharmacy for HF and Asthma (he is not currently on any outpatient medications for this condition and may benefit)   Continue all medications per medical team  Please send prescriptions to [facility] for assistance " on insurance prior authorization and co pay. Prescriptions will be delivered to the patient's bedside prior to discharge with the Meds to Beds program.   Continuity of care will be provided by PCP and clinical pharmacy team    Jessica HernándezD  PGY-1 Resident    Verbal consent to manage patient's drug therapy was obtained from the patient. They were informed they may decline to participate or withdraw from participation in pharmacy services at any time.

## 2025-04-03 NOTE — PROGRESS NOTES
Ritesh Garza is a 74 y.o. male on day 1 of admission presenting with Acute respiratory failure with hypoxia.    Subjective   HD # 1 for this 74 year old male admitted from the ED with tachycardia on BIPAP.     This 75 YO male was admitted after being seen in his PMD cardiology office and discovered to have tachycardia. He was evaluated in the ED and sent to the ICU on BIPAP. ABG revealed normal pH and hypocarbia. He was maintained on BiPAP with peripheral oxygen saturations ranging from 94 to 100 %. He has MMP including: history of  Seizures, multivessel CAD (hx of CABG 2021), recent NSTEMI,  prior VT which predicated an ICD placement, HFrEF 30-35% (4/2024), PAD, Subclavian Artery Stenosis, HTN HLD,  DM2 CKD,  SENIA, Liver Cirrhosis , frequent falls ( July 2023) , Central cord compression, s/p C4-c7 Decompression. GI Bleed Dec 2024 secondary to a Duodenal Ulcer which resulted in cessation of AC. He presented to his cardiologist with a two week history of SOB and tachycardia. ED workup commenced placed on BiPAP without resolution of tachycardia.  BNP 3124. With previous ranges with similar values, Troponin was 85->82, K 5.1 Scr 2.31 prior values < 2.0.  CXR without change from previous studies   CT Chest showed Mild interstitial pulmonary edema, small bilateral layering pleural effusions and cardiomegaly with left ventricular enlargement suggestive of mild congestive heart failure changes. And ascites    PMH   ADHD (attention deficit hyperactivity disorder) (1960's),   AICD (automatic cardioverter/defibrillator) present (05/30/2024)   Asthma  BPH   Chronic CHF Ischemic cardiomyopathy    Cirrhosis   CKD (chronic kidney disease)  Coronary artery disease   Diabetes mellitus   Erectile dysfunction  GERD (gastroesophageal reflux disease)   Gout   Hypertension  Kidney stones   Prior Myocardial infarction   SIDHU (nonalcoholic steatohepatitis)  SENIA (obstructive sleep apnea)  Stenosis of left subclavian artery   Ventricular  "tachycardia      [facility]   Coronary artery bypass graft (1992)   Adenoidectomy   Cholecystectomy   Circumcision, primary (72, years ago)  Unspecified Eye surgery   Cardiac defibrillator placement (05/30/2024)  Tonsillectomy (1950's)  Cervical fusion (07/03/2024) after repeated falls           Objective     Physical Exam  Awakens BIPAP mask in place conversant no specific complaints states his breathing is similar to what it is at home. Changed to CPAP he noted no changes. Converted to NC saturations and respiratory rate maintained. Low grade tachycardia no complaints tolerating oral meds diet. Has just recently received metoprolol at home doses. OP Chart reviewed no mention of vitals or HR for comparison, not tachycardic during last admission. No orthostatic changes await effectiveness of metoprolol dose on  chronotropism. May benefit from conversion from metoprolol to Carvedilol  . He is not fluid overloaded and has no detectable peripheral edema. Heparin discontinued in light of contraindication for AC with GI bleed last admission       Blood pressure (!) 128/96, pulse (!) 123, temperature 36.1 °C (97 °F), resp. rate 19, height 1.6 m (5' 3\"), weight 67.8 kg (149 lb 7.6 oz), SpO2 93%.  Intake/Output last 3 Shifts:  I/O last 3 completed shifts:  In: - (0 mL/kg)   Out: 220 (3.2 mL/kg) [Urine:220 (0.1 mL/kg/hr)]  Weight: 67.8 kg        This patient has a urinary catheter   Reason for the urinary catheter remaining today? Urine catheter unnecessary, will be removed today               Assessment/Plan   Assessment & Plan  Tachycardia            This critically ill patient no longer continues  to be at-risk for clinically significant deterioration / failure due to the above mentioned dysfunctional, unstable organ systems.  I have personally identified and managed all complex critical care issues to prevent aforementioned clinical deterioration.  Critical care time is spent at bedside and/or the immediate area and has " "included, but is not limited to, the review of diagnostic tests, labs, radiographs, serial assessments of hemodynamics, respiratory status, ventilatory management, and family updates.  Time spent in procedures and teaching are reported separately.     CRITICAL CARE TIME:  35 minutes    LABS:  CMP:  Results from last 7 days   Lab Units 04/03/25  0556 04/02/25  1729   SODIUM mmol/L 139 138   POTASSIUM mmol/L 5.5* 5.1   CHLORIDE mmol/L 109* 108*   CO2 mmol/L 17* 18*   ANION GAP mmol/L 19 17   BUN mg/dL 54* 51*   CREATININE mg/dL 2.57* 2.31*   EGFR mL/min/1.73m*2 25* 29*   MAGNESIUM mg/dL  --  2.08   ALBUMIN g/dL 4.2 4.5   ALT U/L  --  13   AST U/L  --  18   BILIRUBIN TOTAL mg/dL  --  0.5     CBC:  Results from last 7 days   Lab Units 04/03/25  0556 04/02/25  1729   WBC AUTO x10*3/uL 11.8* 11.4*   HEMOGLOBIN g/dL 9.7* 9.0*   HEMATOCRIT % 33.0* 31.0*   PLATELETS AUTO x10*3/uL 223 214   MCV fL 78* 78*     COAG:   Results from last 7 days   Lab Units 04/02/25  1729   INR  1.3*     ABO: No results found for: \"ABO\"  HEME/ENDO:  Results from last 7 days   Lab Units 04/02/25  1729   TSH mIU/L 14.99*      CARDIAC:   Results from last 7 days   Lab Units 04/02/25  1807 04/02/25  1729   TROPHS ng/L 82* 85*   BNP pg/mL  --  3,124*       Glenn Sharp MD      "

## 2025-04-03 NOTE — CARE PLAN
The patient's goals for the shift include      The clinical goals for the shift include maintain spo2 of 92 or above    Over the shift, the patient did not make progress toward the following goals. Barriers to progression include . Recommendations to address these barriers include .

## 2025-04-04 ENCOUNTER — APPOINTMENT (OUTPATIENT)
Dept: CARDIOLOGY | Facility: HOSPITAL | Age: 75
DRG: 286 | End: 2025-04-04
Payer: MEDICARE

## 2025-04-04 LAB
ALBUMIN SERPL BCP-MCNC: 3.8 G/DL (ref 3.4–5)
ANION GAP SERPL CALC-SCNC: 20 MMOL/L (ref 10–20)
ATRIAL RATE: 120 BPM
ATRIAL RATE: 123 BPM
BUN SERPL-MCNC: 62 MG/DL (ref 6–23)
CALCIUM SERPL-MCNC: 8.7 MG/DL (ref 8.6–10.3)
CARDIAC TROPONIN I PNL SERPL HS: 171 NG/L (ref 0–20)
CHLORIDE SERPL-SCNC: 109 MMOL/L (ref 98–107)
CO2 SERPL-SCNC: 13 MMOL/L (ref 21–32)
CREAT SERPL-MCNC: 2.56 MG/DL (ref 0.5–1.3)
EGFRCR SERPLBLD CKD-EPI 2021: 25 ML/MIN/1.73M*2
ERYTHROCYTE [DISTWIDTH] IN BLOOD BY AUTOMATED COUNT: 18.6 % (ref 11.5–14.5)
GLUCOSE BLD MANUAL STRIP-MCNC: 196 MG/DL (ref 74–99)
GLUCOSE BLD MANUAL STRIP-MCNC: 220 MG/DL (ref 74–99)
GLUCOSE BLD MANUAL STRIP-MCNC: 236 MG/DL (ref 74–99)
GLUCOSE BLD MANUAL STRIP-MCNC: 236 MG/DL (ref 74–99)
GLUCOSE BLD MANUAL STRIP-MCNC: 249 MG/DL (ref 74–99)
GLUCOSE SERPL-MCNC: 188 MG/DL (ref 74–99)
HCT VFR BLD AUTO: 31.9 % (ref 41–52)
HGB BLD-MCNC: 9.1 G/DL (ref 13.5–17.5)
MCH RBC QN AUTO: 22.7 PG (ref 26–34)
MCHC RBC AUTO-ENTMCNC: 28.5 G/DL (ref 32–36)
MCV RBC AUTO: 80 FL (ref 80–100)
NRBC BLD-RTO: 0 /100 WBCS (ref 0–0)
P AXIS: 67 DEGREES
P AXIS: 7 DEGREES
PHOSPHATE SERPL-MCNC: 2.9 MG/DL (ref 2.5–4.9)
PLATELET # BLD AUTO: 232 X10*3/UL (ref 150–450)
POTASSIUM SERPL-SCNC: 5.3 MMOL/L (ref 3.5–5.3)
PR INTERVAL: 152 MS
PR INTERVAL: 160 MS
Q ONSET: 216 MS
Q ONSET: 217 MS
QRS COUNT: 19 BEATS
QRS COUNT: 21 BEATS
QRS DURATION: 130 MS
QRS DURATION: 132 MS
QT INTERVAL: 348 MS
QT INTERVAL: 360 MS
QTC CALCULATION(BAZETT): 498 MS
QTC CALCULATION(BAZETT): 508 MS
QTC FREDERICIA: 441 MS
QTC FREDERICIA: 453 MS
R AXIS: -56 DEGREES
R AXIS: 118 DEGREES
RBC # BLD AUTO: 4.01 X10*6/UL (ref 4.5–5.9)
SODIUM SERPL-SCNC: 137 MMOL/L (ref 136–145)
T AXIS: -7 DEGREES
T AXIS: 100 DEGREES
T OFFSET: 390 MS
T OFFSET: 397 MS
VENTRICULAR RATE: 120 BPM
VENTRICULAR RATE: 123 BPM
WBC # BLD AUTO: 15.9 X10*3/UL (ref 4.4–11.3)

## 2025-04-04 PROCEDURE — 2500000001 HC RX 250 WO HCPCS SELF ADMINISTERED DRUGS (ALT 637 FOR MEDICARE OP): Performed by: INTERNAL MEDICINE

## 2025-04-04 PROCEDURE — 97535 SELF CARE MNGMENT TRAINING: CPT | Mod: GO

## 2025-04-04 PROCEDURE — 36415 COLL VENOUS BLD VENIPUNCTURE: CPT | Performed by: NURSE PRACTITIONER

## 2025-04-04 PROCEDURE — 99291 CRITICAL CARE FIRST HOUR: CPT | Performed by: INTERNAL MEDICINE

## 2025-04-04 PROCEDURE — 93010 ELECTROCARDIOGRAM REPORT: CPT | Performed by: INTERNAL MEDICINE

## 2025-04-04 PROCEDURE — 2500000001 HC RX 250 WO HCPCS SELF ADMINISTERED DRUGS (ALT 637 FOR MEDICARE OP)

## 2025-04-04 PROCEDURE — 93005 ELECTROCARDIOGRAM TRACING: CPT

## 2025-04-04 PROCEDURE — 2500000004 HC RX 250 GENERAL PHARMACY W/ HCPCS (ALT 636 FOR OP/ED): Performed by: NURSE PRACTITIONER

## 2025-04-04 PROCEDURE — 80069 RENAL FUNCTION PANEL: CPT | Performed by: NURSE PRACTITIONER

## 2025-04-04 PROCEDURE — 97165 OT EVAL LOW COMPLEX 30 MIN: CPT | Mod: GO

## 2025-04-04 PROCEDURE — 2500000001 HC RX 250 WO HCPCS SELF ADMINISTERED DRUGS (ALT 637 FOR MEDICARE OP): Performed by: NURSE PRACTITIONER

## 2025-04-04 PROCEDURE — 85027 COMPLETE CBC AUTOMATED: CPT | Performed by: NURSE PRACTITIONER

## 2025-04-04 PROCEDURE — 2060000001 HC INTERMEDIATE ICU ROOM DAILY

## 2025-04-04 PROCEDURE — 82947 ASSAY GLUCOSE BLOOD QUANT: CPT

## 2025-04-04 PROCEDURE — 2500000002 HC RX 250 W HCPCS SELF ADMINISTERED DRUGS (ALT 637 FOR MEDICARE OP, ALT 636 FOR OP/ED): Performed by: NURSE PRACTITIONER

## 2025-04-04 PROCEDURE — 84484 ASSAY OF TROPONIN QUANT: CPT

## 2025-04-04 PROCEDURE — 2500000004 HC RX 250 GENERAL PHARMACY W/ HCPCS (ALT 636 FOR OP/ED): Performed by: INTERNAL MEDICINE

## 2025-04-04 PROCEDURE — 99232 SBSQ HOSP IP/OBS MODERATE 35: CPT | Performed by: INTERNAL MEDICINE

## 2025-04-04 PROCEDURE — 2500000002 HC RX 250 W HCPCS SELF ADMINISTERED DRUGS (ALT 637 FOR MEDICARE OP, ALT 636 FOR OP/ED)

## 2025-04-04 PROCEDURE — 97161 PT EVAL LOW COMPLEX 20 MIN: CPT | Mod: GP

## 2025-04-04 RX ORDER — METOPROLOL TARTRATE 25 MG/1
25 TABLET, FILM COATED ORAL EVERY 8 HOURS
Status: DISCONTINUED | OUTPATIENT
Start: 2025-04-04 | End: 2025-04-05

## 2025-04-04 RX ORDER — FUROSEMIDE 10 MG/ML
40 INJECTION INTRAMUSCULAR; INTRAVENOUS EVERY 12 HOURS
Status: DISPENSED | OUTPATIENT
Start: 2025-04-04

## 2025-04-04 RX ORDER — HEPARIN SODIUM 5000 [USP'U]/ML
5000 INJECTION, SOLUTION INTRAVENOUS; SUBCUTANEOUS 3 TIMES DAILY
Status: DISPENSED | OUTPATIENT
Start: 2025-04-04

## 2025-04-04 RX ORDER — FUROSEMIDE 40 MG/1
40 TABLET ORAL
Status: DISCONTINUED | OUTPATIENT
Start: 2025-04-04 | End: 2025-04-04

## 2025-04-04 RX ORDER — HEPARIN SODIUM 10000 [USP'U]/ML
8000 INJECTION, SOLUTION INTRAVENOUS; SUBCUTANEOUS 3 TIMES DAILY
Status: DISCONTINUED | OUTPATIENT
Start: 2025-04-04 | End: 2025-04-04

## 2025-04-04 RX ORDER — METOPROLOL TARTRATE 25 MG/1
25 TABLET, FILM COATED ORAL 2 TIMES DAILY
Status: DISCONTINUED | OUTPATIENT
Start: 2025-04-04 | End: 2025-04-04

## 2025-04-04 RX ORDER — NITROGLYCERIN 0.4 MG/1
0.4 TABLET SUBLINGUAL EVERY 5 MIN PRN
Status: DISPENSED | OUTPATIENT
Start: 2025-04-04

## 2025-04-04 RX ADMIN — FUROSEMIDE 40 MG: 10 INJECTION, SOLUTION INTRAMUSCULAR; INTRAVENOUS at 20:38

## 2025-04-04 RX ADMIN — METOPROLOL TARTRATE 25 MG: 25 TABLET, FILM COATED ORAL at 14:56

## 2025-04-04 RX ADMIN — METOPROLOL TARTRATE 25 MG: 25 TABLET, FILM COATED ORAL at 21:00

## 2025-04-04 RX ADMIN — Medication 1000 MCG: at 08:05

## 2025-04-04 RX ADMIN — LEVOTHYROXINE SODIUM 50 MCG: 0.05 TABLET ORAL at 05:24

## 2025-04-04 RX ADMIN — FUROSEMIDE 40 MG: 40 TABLET ORAL at 08:05

## 2025-04-04 RX ADMIN — ICOSAPENT ETHYL 1 G: 1 CAPSULE ORAL at 08:05

## 2025-04-04 RX ADMIN — ICOSAPENT ETHYL 1 G: 1 CAPSULE ORAL at 17:00

## 2025-04-04 RX ADMIN — HEPARIN SODIUM 5000 UNITS: 5000 INJECTION, SOLUTION INTRAVENOUS; SUBCUTANEOUS at 14:57

## 2025-04-04 RX ADMIN — ATORVASTATIN CALCIUM 80 MG: 80 TABLET, FILM COATED ORAL at 08:05

## 2025-04-04 RX ADMIN — CETIRIZINE HYDROCHLORIDE 10 MG: 10 TABLET, FILM COATED ORAL at 08:05

## 2025-04-04 RX ADMIN — ASPIRIN 81 MG: 81 TABLET, COATED ORAL at 08:05

## 2025-04-04 RX ADMIN — INSULIN LISPRO 4 UNITS: 100 INJECTION, SOLUTION INTRAVENOUS; SUBCUTANEOUS at 20:38

## 2025-04-04 RX ADMIN — INSULIN LISPRO 4 UNITS: 100 INJECTION, SOLUTION INTRAVENOUS; SUBCUTANEOUS at 17:00

## 2025-04-04 RX ADMIN — INSULIN LISPRO 4 UNITS: 100 INJECTION, SOLUTION INTRAVENOUS; SUBCUTANEOUS at 08:00

## 2025-04-04 RX ADMIN — AMLODIPINE BESYLATE 10 MG: 10 TABLET ORAL at 08:05

## 2025-04-04 RX ADMIN — HEPARIN SODIUM 5000 UNITS: 5000 INJECTION, SOLUTION INTRAVENOUS; SUBCUTANEOUS at 20:38

## 2025-04-04 RX ADMIN — NITROGLYCERIN 0.4 MG: 0.4 TABLET SUBLINGUAL at 03:38

## 2025-04-04 RX ADMIN — METOPROLOL TARTRATE 25 MG: 25 TABLET, FILM COATED ORAL at 08:05

## 2025-04-04 RX ADMIN — INSULIN LISPRO 4 UNITS: 100 INJECTION, SOLUTION INTRAVENOUS; SUBCUTANEOUS at 11:58

## 2025-04-04 RX ADMIN — GABAPENTIN 100 MG: 100 CAPSULE ORAL at 20:38

## 2025-04-04 ASSESSMENT — PAIN - FUNCTIONAL ASSESSMENT
PAIN_FUNCTIONAL_ASSESSMENT: 0-10

## 2025-04-04 ASSESSMENT — COGNITIVE AND FUNCTIONAL STATUS - GENERAL
HELP NEEDED FOR BATHING: A LITTLE
STANDING UP FROM CHAIR USING ARMS: A LITTLE
MOVING TO AND FROM BED TO CHAIR: A LITTLE
TURNING FROM BACK TO SIDE WHILE IN FLAT BAD: A LITTLE
CLIMB 3 TO 5 STEPS WITH RAILING: A LITTLE
DAILY ACTIVITIY SCORE: 22
MOVING FROM LYING ON BACK TO SITTING ON SIDE OF FLAT BED WITH BEDRAILS: A LITTLE
DRESSING REGULAR UPPER BODY CLOTHING: A LITTLE
DRESSING REGULAR LOWER BODY CLOTHING: A LITTLE
STANDING UP FROM CHAIR USING ARMS: A LITTLE
DAILY ACTIVITIY SCORE: 22
MOBILITY SCORE: 18
WALKING IN HOSPITAL ROOM: A LITTLE
WALKING IN HOSPITAL ROOM: A LITTLE
MOBILITY SCORE: 20
MOVING TO AND FROM BED TO CHAIR: A LITTLE
DRESSING REGULAR LOWER BODY CLOTHING: A LITTLE
CLIMB 3 TO 5 STEPS WITH RAILING: A LITTLE

## 2025-04-04 ASSESSMENT — ACTIVITIES OF DAILY LIVING (ADL)
LACK_OF_TRANSPORTATION: NO
ADL_ASSISTANCE: INDEPENDENT
HOME_MANAGEMENT_TIME_ENTRY: 8
ADL_ASSISTANCE: INDEPENDENT

## 2025-04-04 ASSESSMENT — PAIN SCALES - GENERAL
PAINLEVEL_OUTOF10: 0 - NO PAIN

## 2025-04-04 NOTE — PROGRESS NOTES
04/04/25 0830   Discharge Planning   Living Arrangements Spouse/significant other   Support Systems Spouse/significant other   Type of Residence Private residence   Number of Stairs to Enter Residence 2   Number of Stairs Within Residence 0   Expected Discharge Disposition SNF   Does the patient need discharge transport arranged? Yes   RoundTrip coordination needed? Yes   Has discharge transport been arranged? No   Financial Resource Strain   How hard is it for you to pay for the very basics like food, housing, medical care, and heating? Not hard   Housing Stability   In the last 12 months, was there a time when you were not able to pay the mortgage or rent on time? N   At any time in the past 12 months, were you homeless or living in a shelter (including now)? N   Transportation Needs   In the past 12 months, has lack of transportation kept you from medical appointments or from getting medications? no   In the past 12 months, has lack of transportation kept you from meetings, work, or from getting things needed for daily living? No   Intensity of Service   Intensity of Service 0-30 min     TCC spoke with pt wife on the phone. Explained my role as discharge planner. Lives with wife. 2 stairs to get  in the home. No stairs are used inside. Has a walker at home but does not use it. Has had HHC in the past. Wife stated it didn't do much help. Wife is agreeable to SNF if needed. PT OT to christina. [facility] referrals sent to [facility], [facility].

## 2025-04-04 NOTE — PROGRESS NOTES
"Subjective Data:  \"Too many trips to the bathroom\"  Denies any chest pain    Still SOB.    Overnight Events:    none     Objective Data:  Last Recorded Vitals:  Vitals:    25 1100 25 1200 25 1300 25 1400   BP: 116/76 121/76 (!) 90/48 118/84   BP Location:       Patient Position:       Pulse: (!) 112 (!) 111 (!) 113 (!) 114   Resp: 19 13 18 15   Temp:       TempSrc:       SpO2: 99% 100% 93% 99%   Weight:       Height:         Medical Gas Therapy: Supplemental oxygen  Medical Gas Delivery Method: Nasal cannula  Weight  Av kg (149 lb 14 oz)  Min: 66.7 kg (147 lb 0.8 oz)  Max: 70.3 kg (154 lb 15.7 oz)    LABS:  CMP:  Results from last 7 days   Lab Units 25  0525  0556 25  1729   SODIUM mmol/L 137 139 138   POTASSIUM mmol/L 5.3 5.5* 5.1   CHLORIDE mmol/L 109* 109* 108*   CO2 mmol/L 13* 17* 18*   ANION GAP mmol/L 20 19 17   BUN mg/dL 62* 54* 51*   CREATININE mg/dL 2.56* 2.57* 2.31*   EGFR mL/min/1.73m*2 25* 25* 29*   MAGNESIUM mg/dL  --   --  2.08   ALBUMIN g/dL 3.8 4.2 4.5   ALT U/L  --   --  13   AST U/L  --   --  18   BILIRUBIN TOTAL mg/dL  --   --  0.5     CBC:  Results from last 7 days   Lab Units 25  0525  0556 25  1729   WBC AUTO x10*3/uL 15.9* 11.8* 11.4*   HEMOGLOBIN g/dL 9.1* 9.7* 9.0*   HEMATOCRIT % 31.9* 33.0* 31.0*   PLATELETS AUTO x10*3/uL 232 223 214   MCV fL 80 78* 78*     COAG:   Results from last 7 days   Lab Units 25  1729   INR  1.3*     ABO: No results found for: \"ABO\"  HEME/ENDO:  Results from last 7 days   Lab Units 25  1729   TSH mIU/L 14.99*      CARDIAC:   Results from last 7 days   Lab Units 25  0519 25  1807 25  1729   TROPHS ng/L 171* 82* 85*   BNP pg/mL  --   --  3,124*             Last I/O:    Intake/Output Summary (Last 24 hours) at 2025 1428  Last data filed at 2025 1000  Gross per 24 hour   Intake --   Output 420 ml   Net -420 ml     Net IO Since Admission: -640 mL [25 1428] "      Imaging Results:        Past Cardiology Tests (Last 3 Years):  EKG:      EKG:         Device Check 12/17/24    Echo:  Transthoracic Echocardiogram 12/2024  1. Left ventricular ejection fraction is moderately decreased, calculated by Reyes's biplane at 33%.   2. Abnormal left venticular wall motion.   3. Left ventricular cavity size is mildly dilated.   4. No left ventricular thrombus visualized.   5. Moderate mitral valve regurgitation.   6. Mildly elevated right ventricular systolic pressure.   7. There is plaque visualized in the ascending aorta.     Transthoracic Echocardiogram 7/2024   1. Left ventricular ejection fraction is moderately decreased, by visual estimate at 30-35%.   2. Spectral Doppler shows a pseudonormal pattern of left ventricular diastolic filling.   3. Left ventricular cavity size is moderately dilated.   4. No left ventricular thrombus visualized.   5. Technically difficult exam, though there is moderate LV systolic dysfunction with the apical function being best but hypokinesis of the basal and mid segments. There is no LV apical thrombus seen on the echocontrast images.   6. There is reduced right ventricular systolic function.   7. The left atrium is moderately dilated.   8. Moderate mitral valve regurgitation.   9. Mildly elevated RVSP.  10. Moderate tricuspid regurgitation visualized.  11. Compared with the prior exam from 5/10/2023, today's exam is more technically difficult making assessment of LV systolic function more difficult. Proir study demonstrated clear wall motiom abnormality in the basal septal, basal inferior and basal and mid inferolateral segments. The LV systolic function appears to be worse today ( hyaving declined from mild dysfunciton to moderate dysfunction. In addition, the degree of MR appears to have increased from mild to moderate.     Transthoracic Echocardiogram 4/26/24   1. Left ventricular systolic function is moderately decreased with a 30-35% estimated  ejection fraction.   2. Multiple segmental abnormalities exist. See findings.   3. Spectral Doppler shows a restrictive pattern of left ventricular diastolic filling.   4. There is an elevated left ventricular end diastolic pressure.   5. Moderate mitral valve regurgitation.   6. Moderately decreased mitral valve posterior leaflet mobility.   7. Mild to moderate tricuspid regurgitation.   8. Mildly elevated RVSP.     Transthoracic Echocardiogram 2/2024   1. Left ventricular systolic function is normal with a 30-35% estimated ejection fraction.   2. Multiple segmental abnormalities exist. See findings.   3. Spectral Doppler shows a restrictive pattern of left ventricular diastolic filling.   4. There is low normal right ventricular systolic function.   5. Mild to moderate tricuspid regurgitation.   6. There are multiple wall motion abnormalities.     Transthoracic Echocardiogram 5/2023  1. Left ventricular systolic function is mildly decreased with a 40-45% estimated ejection fraction.  2. The left ventricular cavity size is mildly dilated.  3. Basal and mid inferolateral wall, basal inferoseptal segment, and basal inferior segment are abnormal.  4. Spectral Doppler shows a pseudonormal pattern of left ventricular diastolic filling.  5. The left atrium is mildly dilated.  6. RVSP within normal limits.     Transthoracic Echocardiogram 11/2021   1. The left ventricular systolic function is moderately decreased with a 35% estimated ejection fraction.   2. Multiple segmental abnormalities exist. See findings.   3. Spectral Doppler shows an impaired relaxation pattern of left ventricular diastolic filling.     Transthoracic Echocardiogram 6/2021   1. The left ventricular systolic function is moderately decreased with a 40% estimated ejection fraction.   2. Multiple segmental abnormalities exist. See findings.   3. Spectral Doppler shows a pseudonormal pattern of left ventricular diastolic filling.   4. Mild to moderate  mitral valve regurgitation.     Cath:  Left/Right Heart Catheterization 2/2024   1. Severe native three vessel CAD.   2. Patent LIMA-LAD/Diagonal vessel. All other grafts are occluded.   3. Severe left subclavian artery stenosis with a gradient of ~28-30 mmHg across the stenosis.   4. Mildly elevated filling pressure with PCWP of 21 mmHg.   5. Mild pulmonary hypertension with mPAP of 29 mmHg (Post capillary).   6. Normal cardiac output and index of 4.1 L/min and 2.5 L/min/m2 respectively.   7. Successful RCFA closure with 6Fr. VIP Angioseal.   8. Left Ventricular end-diastolic pressure = 23 mmHg.    Inpatient Medications:  Scheduled medications   Medication Dose Route Frequency    [Held by provider] allopurinol  100 mg oral Daily    amLODIPine  10 mg oral Daily    aspirin  81 mg oral Daily    atorvastatin  80 mg oral Daily    cetirizine  10 mg oral Daily    cyanocobalamin  1,000 mcg oral Daily    [Held by provider] dapagliflozin propanediol  10 mg oral Daily before evening meal    furosemide  40 mg intravenous q12h    gabapentin  100 mg oral Nightly    heparin  5,000 Units subcutaneous TID    icosapent ethyL  1 g oral BID    insulin lispro  0-10 Units subcutaneous Before meals & nightly    levothyroxine  50 mcg oral Daily    [Held by provider] metoprolol succinate XL  50 mg oral Daily    metoprolol tartrate  25 mg oral q8h     PRN medications   Medication    albuterol    dextrose    dextrose    famotidine    glucagon    glucagon    nitroglycerin    oxygen     Continuous Medications   Medication Dose Last Rate       Physical Exam:    General:  Patient is awake, alert, and oriented.  Patient is in no acute distress.  HEENT:  Normocephalic.  Moist mucosa.    Neck:  Normal Jugular Venous Pressure.  Cardiovascular:  Regular rate and rhythm.  Normal S1 and S2, no murmurs, rubs or gallops  Pulmonary:  Clear to auscultation bilaterally.  Oxygen at 2L via NC.   Abdomen:  Soft. Non-tender.   Non-distended.  Positive bowel  sounds.  Lower Extremities:  2+ pedal pulses. Mild 1+ LE Edema  Neurologic:  Alert and oriented x3. No focal deficit.   Skin: Skin warm and dry, normal skin turgor.   Psychiatric:  emotional at times        Assessment/Plan     Ritesh Garza is a 74 y.o. male with past medical history of PMH of mvCAD s/p CABGx5 1992 c/b NSTEMI 2/2024 on DAPT, HFrEF d/t ICM, HTN, HLD, VT s/p ICD placement 5/2024, CKD III, seizures, PAD c/b severe left subclavian stenosis w/ compromised flow to the left POP s/p left subclavian stent 4/2024 , asthma, DM, gout, SIDHU cirrhosis, portal HTN, depression/anxiety, GERD, BPH, SENIA, , recent GIB 12/2024 (Plavix stopped with GIB), who presented to [facility] with SOB, weight gain, and tachycardia.  Cardiology consulted for CHF.     States he has been more short of breath than usual for the past few days. States he has never been short of breath in the past 32 years since his CABG.  He went to doctor visit with his cardiologist, and was told to come to ED for irregular heart rhythm. Denies any chest pain or angina.  States he has gained about 10lbs over the past few months.  Unsure if he is having orthopnea.  He is having some mild lower extremity swelling.  No recent illness, no nausea, vomiting, fever, or chills.      Arrival in the ED HR was 122 Tachypnic b/p was 137/87.   Labs showed BNP 3124. Troponin was 85->82,  K 5.1 Scr 2.31 TSH 14.99 WBC 11k, H/H 9/31  CT Chest showed  Mild interstitial pulmonary edema, small bilateral layering  pleural effusions and cardiomegaly with left ventricular enlargement  suggestive of mild congestive heart failure changes.   He was placed on BiPap, and ultimately placed on oxygen at 2L via NC.  Comfortable.      Home CV Medications:  Amlodipine 10 mg daily  ASA 81 mg daily  Atorvastatin 80 mg daily  Farxiga 10 mg daily  Furosemide 40 mg daily as needed  Vascepa 1 g twice daily  Metoprolol succinate 50 mg daily  Entresto 1/2 tablet twice daily  No Spironolactone d/t  painful gynecomastia.      -Follows with Dr. Hennessy as outpatient        Assessment:     #CAD- s/p CABGx5 with occluded grafts per [facility] 4/2024, plan was for one year DAPT, but Plavix was stopped 12/2024 with GIB.  Remains on ASA 81mg daily     #Acute  on chronic Systolic HF-              - admit BNP 3124 ( chronic elevation, previous 3417 12/2024)              - last echo 12/2024 with EF 33%     #TAYLOR on CKD              - baseline creatinine closer to 2              - Admit creatinine 2.31, up to 2.57 on 4/3     # Hypertension, BP acceptable    # Sinus tachycardia, possible atrial tachycardia   - 0% atrial arrhthymias on ICD reading.        4/3 > Volume up on exam, normotensive, oxygen at 2L via NC     Plan:  - Agree with stopping heparin, no indicated. Device check showing atrial tachycardia.  No signs of atrial fibrillation or flutter.   - GDMT              - holding Farxiga and Entresto with TAYLOR              - Toprol 50mg daily has been changes to metoprolol tartrate 25mg BID, we may need to uptitrate              - no MRA due to past intolerance  - Continue furosemide 40mg IV BID for now.  Strict I/O, daily weight  - Trend creatinine closely.     He will follow up with Dr. Hennessy, established cardiologist within 2-3 weeks of discharge.        Code Status:  Full Code    NORAH Swenson-REYES      ================================  Attending note   ================================  Both the ELZBIETA and I have had a face to face encounter with the patient today. I have examined the patient and edited the documented physical examination as necessary.  I personally reviewed the patient's vital signs, telemetry, recent labs, medications, orders, EKGs, and pertinent cardiac imaging/ echocardiography.  I have reviewed the ELZBIETA's encounter note, approve the ELZBIETA's documentation and have edited the note to reflect my diagnostic and therapeutic plan.      - Agree with stopping heparin, no indicated. Device check showing  atrial tachycardia.  No signs of atrial fibrillation or flutter.   - GDMT              - holding Farxiga and Entresto with TAYLOR              - Toprol 50mg daily has been changes to metoprolol tartrate 25mg BID, we may need to uptitrate              - no MRA due to past intolerance  - Continue furosemide 40mg IV BID for now.  Strict I/O, daily weight  - Trend creatinine closely.     He will follow up with Dr. Hennessy, established cardiologist within 2-3 weeks of discharge.          Mahendra Santacruz DO   Division of Cardiovascular Medicine  [facility] [place]

## 2025-04-04 NOTE — NURSING NOTE
Nurse  educated patent multiple times during the shift to not remove pulse ox. Patient verbalized his understanding and continued to remove the device from his finger. Nurse attempted to apply device to another extremity and at this time patient refused. Patient is resting in bed with call light and bed alarm activated. Patient also has 2 lit per NC  at this time.

## 2025-04-04 NOTE — PROGRESS NOTES
Physical Therapy    Physical Therapy Evaluation    Patient Name: Ritesh Garza  MRN: [id number]  Department: [facility] [address] ICU  Room: [address] [place], [place] [address]  Today's Date: 4/4/2025   Time Calculation  Start Time: 1014  Stop Time: 1044  Time Calculation (min): 30 min    Assessment/Plan   PT Assessment  PT Assessment Results: Decreased endurance, Impaired balance, Decreased mobility, Decreased safety awareness, Impaired judgement  Rehab Prognosis: Good  Barriers to Discharge Home: No anticipated barriers  End of Session Communication: Bedside nurse  Assessment Comment: PT Evaluation Completed. The patient presented with decreased balance, endurance, safety awareness, and increased cognitive deficits and fatigue. These impairments are negatively impacting his ability to perform at baseline functional level, in home environment.  This patient would benefit from skilled therapy intervention to address limitations and progress towards the PT goals.  Anticipate low frequency PT needs at discharge  End of Session Patient Position:  (in restroom, RN present)  IP OR SWING BED PT PLAN  Inpatient or Swing Bed: Inpatient  PT Plan  Treatment/Interventions: Bed mobility, Transfer training, Gait training, Stair training, Balance training, Strengthening, Endurance training, Range of motion, Therapeutic exercise, Therapeutic activity, Home exercise program  PT Plan: Ongoing PT  PT Frequency: 2 times per week  PT Discharge Recommendations: Low intensity level of continued care  PT Recommended Transfer Status: Assist x1  PT - OK to Discharge: Yes (PT POC established.)    Subjective   General Visit Information:  General  Reason for Referral: To ED with SOB and tachycardia. Pt being worked up for CHF.  Referred By: Glenn Sharp MD  Past Medical History Relevant to Rehab:   Past Medical History:   Diagnosis Date    ADHD (attention deficit hyperactivity disorder) 1960’s    AICD (automatic cardioverter/defibrillator) present 05/30/2024    [facility]     Asthma     Benign prostatic hyperplasia Several years ago    CHF (congestive heart failure)     Cirrhosis (Multi)     CKD (chronic kidney disease)     Coronary artery disease Long Ago    Diabetes mellitus (Multi) Over 20 years ago    Erectile dysfunction     GERD (gastroesophageal reflux disease)     Gout     Hypertension Over 20 years ago    Ischemic cardiomyopathy     Kidney stone Several years ago    Myocardial infarction (Multi) Over 20 yesrs ago    SIDHU (nonalcoholic steatohepatitis)     SENIA (obstructive sleep apnea)     Stenosis of left subclavian artery     s/p stent 4/30/2024    Ventricular tachycardia (Multi)        Prior to Session Communication: Bedside nurse  Patient Position Received: Up in room (with RN present)  General Comment: Pt pleasant and agreeable to PT eval. Pt very impulsive, requiring increased cues to slow down.  Home Living:  Home Living  Type of Home: House  Lives With: Spouse  Home Adaptive Equipment: Walker rolling or standard, Cane  Home Layout: One level  Home Access: Stairs to enter without rails  Entrance Stairs-Rails: None  Entrance Stairs-Number of Steps: 2  Prior Level of Function:  Prior Function Per Pt/Caregiver Report  Level of [place]: Independent with ADLs and functional transfers, Independent with homemaking with ambulation  ADL Assistance: Independent  Homemaking Assistance: Independent (splits with wife)  Ambulatory Assistance: Independent (RW used PRN)  Prior Function Comments: Reports 1 fall in the pasy 6 months.  Precautions:  Precautions  Medical Precautions: Fall precautions      Date/Time Vitals Session Patient Position Pulse Resp SpO2 BP MAP (mmHg)    04/04/25 1000 --  --  110  20  --  --  --     04/04/25 1100 --  --  112  19  99 %  116/76  87                 Objective   Pain:  Pain Assessment  Pain Assessment: 0-10  0-10 (Numeric) Pain Score: 0 - No pain  Cognition:  Cognition  Overall Cognitive Status: Within Functional Limits  Orientation Level: Oriented  X4  Safety/Judgement: Exceptions to WFL  Impulsive: Severely    General Assessments:  Activity Tolerance  Endurance: Tolerates 10 - 20 min exercise with multiple rests    Sensation  Light Touch:  (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)         Postural Control  Postural Control: Within Functional Limits    Static Sitting Balance  Static Sitting-Balance Support: Feet supported, Bilateral upper extremity supported  Static Sitting-Level of Assistance: Close supervision  Dynamic Sitting Balance  Dynamic Sitting-Balance Support: Feet supported, Bilateral upper extremity supported  Dynamic Sitting-Level of Assistance: Close supervision    Static Standing Balance  Static Standing-Balance Support: No upper extremity supported  Static Standing-Level of Assistance: Contact guard  Dynamic Standing Balance  Dynamic Standing-Balance Support: No upper extremity supported  Dynamic Standing-Level of Assistance: Contact guard  Functional Assessments:  Bed Mobility  Bed Mobility: No    Transfers  Transfer: Yes  Transfer 1  Technique 1: Sit to stand, Stand to sit  Transfer Level of Assistance 1: Contact guard  Trials/Comments 1: Cues to reach back prior to sitting to avoid falling back into the chair. Repeatedly demonstrating decreased eccentric control when sitting.    Ambulation/Gait Training  Ambulation/Gait Training Performed: Yes  Ambulation/Gait Training 1  Surface 1: Level tile  Device 1: No device, Rolling walker  Assistance 1: Contact guard  Comments/Distance (ft) 1: Pt ambulates with CGA with both RW and no AD. Pt ambulates 25 ft x2. Pt is very impulsive despite cueing demonstrating decreased safety awareness  Extremity/Trunk Assessments:  RUE   RUE : Within Functional Limits  LUE   LUE: Within Functional Limits  RLE   RLE :  (Strength >3/5 based on observation of functional mobility. ROM WFL.)  LLE   LLE :  (Strength >3/5 based on observation of functional mobility. ROM WFL.)  Outcome Measures:  [facility] Basic Mobility  Turning from your  back to your side while in a flat bed without using bedrails: A little  Moving from lying on your back to sitting on the side of a flat bed without using bedrails: A little  Moving to and from bed to chair (including a wheelchair): A little  Standing up from a chair using your arms (e.g. wheelchair or bedside chair): A little  To walk in hospital room: A little  Climbing 3-5 steps with railing: A little  Basic Mobility - Total Score: 18    Encounter Problems       Encounter Problems (Active)       Balance       complete all mobility with normal balance while dual tasking, negotiating in a dynamic environment, carrying items, etc., with proactive and reactive static and dynamic standing and sitting tasks independently, >15 minutes.         Start:  04/04/25    Expected End:  04/18/25               Mobility       STG - Patient will ambulate 150 ft independently with stable vitals.        Start:  04/04/25    Expected End:  04/18/25            STG - Patient will ascend and descend 2 stairs mod I with LRAD       Start:  04/04/25    Expected End:  04/18/25               PT Transfers       STG - Patient will perform bed mobility independently.        Start:  04/04/25    Expected End:  04/18/25            STG - Patient will transfer sit to and from stand independently.        Start:  04/04/25    Expected End:  04/18/25               Pain - Adult              Education Documentation  Body Mechanics, taught by Angelina Escalante, PT at 4/4/2025 11:45 AM.  Learner: Patient  Readiness: Acceptance  Method: Explanation  Response: Needs Reinforcement    Mobility Training, taught by Angelina Escalante, PT at 4/4/2025 11:45 AM.  Learner: Patient  Readiness: Acceptance  Method: Explanation  Response: Needs Reinforcement    Education Comments  No comments found.

## 2025-04-04 NOTE — PROGRESS NOTES
Critical Care Medicine Progress Note    Impression/Plan: Ritesh Garza is a 74 y.o. male with past medical history of PMH of mvCAD s/p CABGx5 1992 c/b NSTEMI 2/2024 on DAPT, HFrEF d/t ICM, HTN, HLD, VT s/p ICD placement 5/2024, CKD III, seizures, PAD c/b severe left subclavian stenosis w/ compromised flow to the left POP s/p left subclavian stent 4/2024 , asthma, DM, gout, SIDHU cirrhosis, portal HTN, depression/anxiety, GERD, BPH, SENIA, , recent GIB 12/2024 (Plavix stopped d/t GIB), [place] [facility] [place], associated dyspnea, which prompted bipap and admission to the ICU for further care. On-going issues include the following:     Troponin elevation likely d/t demand ischemia in the context of persistent tachycardia   Wide-complex tachycardia - atrial tachycardia on PM interrogation   Ischemic cardiomyopathy w/ associated HFrEF (LV EF 30-35%)   Stage 1 TAYLOR - baseline Serum Cr 1.8-2 mg/dL; serum Cr 2.56, essentially unchanged from 4/3 (2.57 mg/dL)               Neuro  - A-F bundle   - Sleep Hygiene measures: appropriate daytime stimulation/physical activity. Lights out at 2100, avoidance of night time lab draws/night baths, avoidance of delirium provoking medications      CV  - holding heparin gtt d/t hx of GIB  - Farxiga and Entresto held d/t TAYLOR  - Metoprolol 25 mg po bid with holding parameters   - Increase Lasix from every other day to bid   - goal MAP >/= 65 mm hg         Pulm:   - goal spo2 >/= 92%    Renal/electrolyte/acid-base:   - avoid nephrotoxins as much as possible   - replace electrolytes as indicated     GI/Nutrition   - regular diet   - GI ppx: H2B    ID  - nil     Heme   - DVT ppx: josefina    Endocrine  - Vascepa + Lipitor   - goal serum glucose 140-180 mg/dL     Musculoskeletal/skin  - skin protective measures   - PT/OT     Family contact: Petra Garza      Critical Care time: 40 min                 Interval History: as above             Objective   Vitals:  Most  "Recent:  Vitals:    04/04/25 0700   BP:    Pulse: (!) 126   Resp: (!) 31   Temp:    SpO2:        24hr Min/Max:  Temp  Min: 36.1 °C (97 °F)  Max: 36.7 °C (98 °F)  Pulse  Min: 109  Max: 126  BP  Min: 97/78  Max: 130/91  Resp  Min: 16  Max: 31  SpO2  Min: 89 %  Max: 99 %    LDA:  Urethral Catheter (Active)   Placement Date/Time: 04/02/25 1400     Number of days: 1             Hemodynamic parameters for last 24 hours:       I/O:  I/O last 2 completed shifts:  In: - (0 mL/kg)   Out: 340 (5.1 mL/kg) [Urine:340 (0.2 mL/kg/hr)]  Weight: 66.7 kg     Physical Exam:   /89   Pulse (!) 126   Temp 36.7 °C (98 °F)   Resp (!) 31   Ht 1.6 m (5' 3\")   Wt 66.7 kg (147 lb 0.8 oz)   SpO2 94%   BMI 26.05 kg/m²     Neuro: GCS E4, V5, M6, no focal deficits   Neck: EJVD  CV: tachycardic, S1s2  Pulm: CTAB  Chest: neg accessory muscle recruitment   Abd: soft, non-tender   Ext: well perfused, cap refill < 2 sec  Skin: warm and dry, no mottling or rash             Assessment/Plan   Assessment & Plan  Tachycardia             "

## 2025-04-04 NOTE — PROGRESS NOTES
04/04/25 1344   Discharge Planning   Expected Discharge Disposition Home H  ([facility])     TCC spoke with pt Wif. Pt rec low by therap. [facility]oc [facility].

## 2025-04-04 NOTE — PROGRESS NOTES
Occupational Therapy    Evaluation/Treatment    Patient Name: Ritesh Garza  MRN: [id number]  Department: [facility] [address] ICU  Room: [address] [place] [address]  Today's Date: 04/04/25  Time Calculation  Start Time: 1015  Stop Time: 1044  Time Calculation (min): 29 min       Assessment:  OT Assessment: Pt functioning close to baseline for ADLs and functional mobility. Cues to maintain safety for fall prevention throughout session. Pt demo's some STM deficits and high emotions requiring coping strategies.  Prognosis: Good  Barriers to Discharge Home: No anticipated barriers  Evaluation/Treatment Tolerance: Patient tolerated treatment well  Medical Staff Made Aware: Yes  End of Session Communication: Bedside nurse  End of Session Patient Position: Up in chair, Alarm on  Prognosis: Good  Evaluation/Treatment Tolerance: Patient tolerated treatment well  Medical Staff Made Aware: Yes  Plan:  Treatment Interventions: ADL retraining, UE strengthening/ROM, Functional transfer training, Endurance training, Compensatory technique education  OT Frequency: 3 times per week  OT Discharge Recommendations: Low intensity level of continued care  OT Recommended Transfer Status: Assist of 1, Stand by assist  OT - OK to Discharge: Yes  Treatment Interventions: ADL retraining, UE strengthening/ROM, Functional transfer training, Endurance training, Compensatory technique education    Subjective   Current Problem:  1. Acute respiratory failure with hypoxia        2. Hypervolemia, unspecified hypervolemia type          General:   OT Received On: 04/04/25  General  Reason for Referral: To ED with SOB and tachycardia. Pt being worked up for CHF.  Referred By: Glenn Sharp MD  Past Medical History Relevant to Rehab:   Past Medical History:   Diagnosis Date    ADHD (attention deficit hyperactivity disorder) 1960’s    AICD (automatic cardioverter/defibrillator) present 05/30/2024    [facility]    Asthma     Benign prostatic hyperplasia Several years ago    CHF  (congestive heart failure)     Cirrhosis (Multi)     CKD (chronic kidney disease)     Coronary artery disease Long Ago    Diabetes mellitus (Multi) Over 20 years ago    Erectile dysfunction     GERD (gastroesophageal reflux disease)     Gout     Hypertension Over 20 years ago    Ischemic cardiomyopathy     Kidney stone Several years ago    Myocardial infarction (Multi) Over 20 yesrs ago    SIDHU (nonalcoholic steatohepatitis)     SENIA (obstructive sleep apnea)     Stenosis of left subclavian artery     s/p stent 4/30/2024    Ventricular tachycardia (Multi)      Family/Caregiver Present: No  Co-Treatment: PT  Co-Treatment Reason: to maximize safety and participation  Prior to Session Communication: Bedside nurse  Patient Position Received: Up in room (pt standing EOB, RN present)  Preferred Learning Style: auditory, kinesthetic  General Comment: Pt agreeable to OT, pt requiring increased time to use toilet at beginning and end of eval   Precautions:  Medical Precautions: Fall precautions  's during session, RN aware.  Pain:  Pain Assessment  Pain Assessment: 0-10  0-10 (Numeric) Pain Score: 0 - No pain    Objective   Cognition:  Overall Cognitive Status: Within Functional Limits  Orientation Level: Oriented X4  Attention: Exceptions to WFL  Memory: Exceptions to WFL  Short-Term Memory: Impaired (pt endorses deficits with STM at baseline lately)  Safety/Judgement: Exceptions to WFL    Home Living:  Type of Home: House  Lives With: Spouse  Home Adaptive Equipment: Walker rolling or standard, Cane  Home Layout: One level  Home Access: Stairs to enter without rails  Entrance Stairs-Rails: None  Entrance Stairs-Number of Steps: 2  Home Living Comments: steps to basement, second living room area  Prior Function:  Level of [place]: Independent with ADLs and functional transfers, Independent with homemaking with ambulation  Receives Help From: Family (spouse)  ADL Assistance: Independent  Homemaking Assistance:  Independent (shares with spouse)  Ambulatory Assistance: Independent  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     ADL:  Toileting Assistance with Device: Stand by  Toileting Deficit:  (on toilet)  Activities of Daily Living: Toileting  Toileting Level of Assistance: Distant supervision, Close supervision  Where Assessed: Toilet  Toileting Comments: close-distant SUP for safety, pt impulsive, pt able to complete tasks and manage clothing up/down  Activity Tolerance:  Endurance: Tolerates 10 - 20 min exercise with multiple rests     Bed Mobility/Transfers:    Transfers  Transfer: Yes  Transfer 1  Technique 1: Sit to stand, Stand to sit  Transfer Level of Assistance 1: Close supervision  Trials/Comments 1: cues for safety      Functional Mobility:  Functional Mobility  Functional Mobility Performed: Yes  Functional Mobility 1  Surface 1: Level tile  Device 1: No device, Rolling walker  Assistance 1: Close supervision  Comments 1: 1st trial with no AD and SUP to ambulate to the toilet, use of FWW from toilet to chair with increased stability, pt ambulating 3rd trial with no AD requiring SUP and cues to slow down for safety  Sitting Balance:  Static Sitting Balance  Static Sitting-Balance Support: Feet supported  Static Sitting-Level of Assistance: Close supervision  Standing Balance:  Static Standing Balance  Static Standing-Balance Support: No upper extremity supported  Static Standing-Level of Assistance: Close supervision    Sensation:  Light Touch: No apparent deficits  Strength:  Strength Comments: WFL     Perception:  Inattention/Neglect: Appears intact  Coordination:  Movements are Fluid and Coordinated: Yes   Hand Function:  Hand Function  Gross Grasp: Functional  Coordination: Functional  Extremities: RUE   RUE : Within Functional Limits and LUE   LUE: Within  Functional Limits    Outcome Measures: [facility] Daily Activity  Putting on and taking off regular lower body clothing: A little  Bathing (including washing, rinsing, drying): A little  Putting on and taking off regular upper body clothing: None  Toileting, which includes using toilet, bedpan or urinal: None  Taking care of personal grooming such as brushing teeth: None  Eating Meals: None  Daily Activity - Total Score: 22    Education Documentation  Body Mechanics, taught by Elizabeth Parker OT at 4/4/2025  1:45 PM.  Learner: Patient  Readiness: Acceptance  Method: Explanation, Demonstration  Response: Verbalizes Understanding, Demonstrated Understanding, Needs Reinforcement    ADL Training, taught by Elizabeth Parker OT at 4/4/2025  1:45 PM.  Learner: Patient  Readiness: Acceptance  Method: Explanation, Demonstration  Response: Verbalizes Understanding, Demonstrated Understanding, Needs Reinforcement    Education Comments  No comments found.           Goals:  Encounter Problems       Encounter Problems (Active)       ADLs       Patient will perform UB and LB bathing with modified independent level of assistance.       Start:  04/04/25    Expected End:  04/18/25            Patient with complete lower body dressing with modified independent level of assistance donning and doffing all LE clothes.       Start:  04/04/25    Expected End:  04/18/25               MOBILITY       Patient will perform Functional mobility mod  Household distances/Community Distances with stand by assist level of assistance and least restrictive device in order to improve safety and functional mobility.       Start:  04/04/25    Expected End:  04/18/25

## 2025-04-04 NOTE — PROGRESS NOTES
Pharmacy Medication History     Source of Information: patient    Additional concerns with the patient's PTA list.   N/a  Notified Provider via [facility]iku : No    The following updates were made to the Prior to Admission medication list:     Medications ADDED:   N/a  Medications CHANGED:  N/a  Medications REMOVED:   N/a  Medications NOT TAKING:   N/a    Allergy reviewed : Yes    Meds 2 Beds : Yes    Outpatient pharmacy confirmed and updated in chart : Yes    Pharm[facility] name: [facility]    The list below reflectives the updated PTA list. Please review each medication in order reconciliation for additional clarification and justification.    Prior to Admission Medications   Prescriptions Last Dose Informant   albuterol (ProAir HFA) 90 mcg/actuation inhaler Unknown Self   Sig: Inhale 2 puffs every 6 hours if needed for wheezing or shortness of breath.   allopurinol (Zyloprim) 100 mg tablet Unknown Self   Sig: Take 1 tablet (100 mg) by mouth once daily.   amLODIPine (Norvasc) 10 mg tablet Unknown Self   Sig: TAKE 1 TABLET BY MOUTH EVERY DAY   aspirin 81 mg EC tablet Unknown    Sig: TAKE 1 TABLET BY MOUTH EVERY DAY   atorvastatin (Lipitor) 80 mg tablet Unknown Self   Sig: TAKE 1 TABLET BY MOUTH EVERY DAY   coenzyme Q-10 (CoQ-10) 100 mg capsule Unknown Self   Sig: Take 1 capsule (100 mg) by mouth once daily.   cyanocobalamin (Vitamin B-12) 1,000 mcg tablet Unknown Self   Sig: Take 1 tablet (1,000 mcg) by mouth once daily. As directed   dapagliflozin (Farxiga) 10 mg Unknown Self   Sig: Take 1 tablet (10 mg) by mouth once daily in the evening.  Take before meals.   famotidine (Pepcid) 20 mg tablet Unknown Self   Sig: TAKE 1 TABLET (20 MG) BY MOUTH ONCE DAILY AS NEEDED FOR INDIGESTION.   furosemide (Lasix) 20 mg tablet Unknown    Sig: Take 1 tablet (20 mg) by mouth every other day.   gabapentin (Neurontin) 100 mg capsule Unknown Self   Sig: TAKE 1 CAPSULE (100 MG) BY MOUTH ONCE DAILY AT BEDTIME.   icosapent ethyL (Vascepa) 0.5 gram  capsule Unknown Self   Sig: Take 2 capsules (1 g) by mouth 2 times a day with meals.   levothyroxine (Synthroid, Levoxyl) 50 mcg tablet Unknown    Sig: TAKE 1 TABLET (50 MCG) BY MOUTH EARLY IN THE MORNING.. TAKE ON AN EMPTY STOMACH AT THE SAME TIME EACH DAY, EITHER 30 TO 60 MINUTES PRIOR TO BREAKFAST   loratadine (Claritin) 10 mg tablet Unknown Self   Sig: Take 1 tablet (10 mg) by mouth 2 times a day. Take 1 tablet every morning and 1 tablet every evening   metoprolol succinate XL (Toprol-XL) 50 mg 24 hr tablet Unknown Self   Sig: TAKE 1 TABLET BY MOUTH EVERY DAY   multivitamin tablet Unknown Self   Sig: Take 1 tablet by mouth once daily.   pantoprazole (ProtoNix) 40 mg EC tablet Unknown    Sig: Take 1 tablet (40 mg) by mouth 2 times a day. Do not crush, chew, or split.   sacubitriL-valsartan (Entresto) 24-26 mg tablet Unknown Self   Sig: Take 0.5 tablets by mouth 2 times a day.   sertraline (Zoloft) 50 mg tablet Unknown    Sig: TAKE ONE TABLET DAILY IN THE MORNING AFTER BREAKFAST.   Patient taking differently: Take 1 tablet (50 mg) by mouth once daily at bedtime. Take one tablet daily in the morning after breakfast.   sildenafil (Viagra) 100 mg tablet Unknown Self   Sig: Take 1 tablet (100 mg) by mouth if needed for erectile dysfunction (Start with half tab. Take 1 hour prior to intercourse on an empty stomach. If you have an erection for over 4 hours, please go to the emergency room.).      Facility-Administered Medications: None       The list below reflectives the updated allergy list. Please review each documented allergy for additional clarification and justification.    No Known Allergies       04/04/25 at 2:09 PM - Anish Nixon

## 2025-04-05 ENCOUNTER — APPOINTMENT (OUTPATIENT)
Dept: CARDIOLOGY | Facility: HOSPITAL | Age: 75
DRG: 286 | End: 2025-04-05
Payer: MEDICARE

## 2025-04-05 LAB
ALBUMIN SERPL BCP-MCNC: 4 G/DL (ref 3.4–5)
ANION GAP SERPL CALC-SCNC: 17 MMOL/L (ref 10–20)
BUN SERPL-MCNC: 77 MG/DL (ref 6–23)
CALCIUM SERPL-MCNC: 9 MG/DL (ref 8.6–10.3)
CARDIAC TROPONIN I PNL SERPL HS: 107 NG/L (ref 0–20)
CHLORIDE SERPL-SCNC: 109 MMOL/L (ref 98–107)
CO2 SERPL-SCNC: 17 MMOL/L (ref 21–32)
CREAT SERPL-MCNC: 3.01 MG/DL (ref 0.5–1.3)
EGFRCR SERPLBLD CKD-EPI 2021: 21 ML/MIN/1.73M*2
ERYTHROCYTE [DISTWIDTH] IN BLOOD BY AUTOMATED COUNT: 18.6 % (ref 11.5–14.5)
GLUCOSE BLD MANUAL STRIP-MCNC: 107 MG/DL (ref 74–99)
GLUCOSE BLD MANUAL STRIP-MCNC: 155 MG/DL (ref 74–99)
GLUCOSE BLD MANUAL STRIP-MCNC: 163 MG/DL (ref 74–99)
GLUCOSE BLD MANUAL STRIP-MCNC: 164 MG/DL (ref 74–99)
GLUCOSE BLD MANUAL STRIP-MCNC: 97 MG/DL (ref 74–99)
GLUCOSE SERPL-MCNC: 93 MG/DL (ref 74–99)
HCT VFR BLD AUTO: 32 % (ref 41–52)
HGB BLD-MCNC: 9.3 G/DL (ref 13.5–17.5)
MCH RBC QN AUTO: 23 PG (ref 26–34)
MCHC RBC AUTO-ENTMCNC: 29.1 G/DL (ref 32–36)
MCV RBC AUTO: 79 FL (ref 80–100)
NRBC BLD-RTO: 0 /100 WBCS (ref 0–0)
PHOSPHATE SERPL-MCNC: 3.9 MG/DL (ref 2.5–4.9)
PLATELET # BLD AUTO: 247 X10*3/UL (ref 150–450)
POTASSIUM SERPL-SCNC: 5.3 MMOL/L (ref 3.5–5.3)
RBC # BLD AUTO: 4.05 X10*6/UL (ref 4.5–5.9)
SODIUM SERPL-SCNC: 138 MMOL/L (ref 136–145)
WBC # BLD AUTO: 15.4 X10*3/UL (ref 4.4–11.3)

## 2025-04-05 PROCEDURE — 2500000002 HC RX 250 W HCPCS SELF ADMINISTERED DRUGS (ALT 637 FOR MEDICARE OP, ALT 636 FOR OP/ED): Performed by: NURSE PRACTITIONER

## 2025-04-05 PROCEDURE — 2500000001 HC RX 250 WO HCPCS SELF ADMINISTERED DRUGS (ALT 637 FOR MEDICARE OP): Performed by: SURGERY

## 2025-04-05 PROCEDURE — 99233 SBSQ HOSP IP/OBS HIGH 50: CPT | Performed by: INTERNAL MEDICINE

## 2025-04-05 PROCEDURE — 36415 COLL VENOUS BLD VENIPUNCTURE: CPT | Performed by: INTERNAL MEDICINE

## 2025-04-05 PROCEDURE — 2500000004 HC RX 250 GENERAL PHARMACY W/ HCPCS (ALT 636 FOR OP/ED): Performed by: SURGERY

## 2025-04-05 PROCEDURE — 84484 ASSAY OF TROPONIN QUANT: CPT | Performed by: INTERNAL MEDICINE

## 2025-04-05 PROCEDURE — 2500000001 HC RX 250 WO HCPCS SELF ADMINISTERED DRUGS (ALT 637 FOR MEDICARE OP): Performed by: INTERNAL MEDICINE

## 2025-04-05 PROCEDURE — 93010 ELECTROCARDIOGRAM REPORT: CPT | Performed by: INTERNAL MEDICINE

## 2025-04-05 PROCEDURE — 2500000001 HC RX 250 WO HCPCS SELF ADMINISTERED DRUGS (ALT 637 FOR MEDICARE OP): Performed by: NURSE PRACTITIONER

## 2025-04-05 PROCEDURE — 80069 RENAL FUNCTION PANEL: CPT | Performed by: NURSE PRACTITIONER

## 2025-04-05 PROCEDURE — 2060000001 HC INTERMEDIATE ICU ROOM DAILY

## 2025-04-05 PROCEDURE — 82947 ASSAY GLUCOSE BLOOD QUANT: CPT

## 2025-04-05 PROCEDURE — 93005 ELECTROCARDIOGRAM TRACING: CPT

## 2025-04-05 PROCEDURE — 85027 COMPLETE CBC AUTOMATED: CPT | Performed by: NURSE PRACTITIONER

## 2025-04-05 PROCEDURE — 36415 COLL VENOUS BLD VENIPUNCTURE: CPT | Performed by: NURSE PRACTITIONER

## 2025-04-05 PROCEDURE — 99232 SBSQ HOSP IP/OBS MODERATE 35: CPT | Performed by: INTERNAL MEDICINE

## 2025-04-05 PROCEDURE — 2500000002 HC RX 250 W HCPCS SELF ADMINISTERED DRUGS (ALT 637 FOR MEDICARE OP, ALT 636 FOR OP/ED): Performed by: SURGERY

## 2025-04-05 RX ORDER — OXYCODONE HYDROCHLORIDE 5 MG/1
5 TABLET ORAL EVERY 6 HOURS PRN
Status: DISPENSED | OUTPATIENT
Start: 2025-04-05

## 2025-04-05 RX ORDER — METOPROLOL TARTRATE 50 MG/1
50 TABLET ORAL EVERY 8 HOURS
Status: DISCONTINUED | OUTPATIENT
Start: 2025-04-05 | End: 2025-04-06

## 2025-04-05 RX ADMIN — LEVOTHYROXINE SODIUM 50 MCG: 0.05 TABLET ORAL at 05:08

## 2025-04-05 RX ADMIN — Medication 1000 MCG: at 10:10

## 2025-04-05 RX ADMIN — AMLODIPINE BESYLATE 10 MG: 10 TABLET ORAL at 10:10

## 2025-04-05 RX ADMIN — ICOSAPENT ETHYL 1 G: 1 CAPSULE ORAL at 10:10

## 2025-04-05 RX ADMIN — METOPROLOL TARTRATE 25 MG: 25 TABLET, FILM COATED ORAL at 05:08

## 2025-04-05 RX ADMIN — INSULIN LISPRO 2 UNITS: 100 INJECTION, SOLUTION INTRAVENOUS; SUBCUTANEOUS at 13:28

## 2025-04-05 RX ADMIN — ICOSAPENT ETHYL 1 G: 1 CAPSULE ORAL at 16:53

## 2025-04-05 RX ADMIN — OXYCODONE HYDROCHLORIDE 5 MG: 5 TABLET ORAL at 20:13

## 2025-04-05 RX ADMIN — CETIRIZINE HYDROCHLORIDE 10 MG: 10 TABLET, FILM COATED ORAL at 10:10

## 2025-04-05 RX ADMIN — GABAPENTIN 100 MG: 100 CAPSULE ORAL at 20:13

## 2025-04-05 RX ADMIN — FUROSEMIDE 40 MG: 10 INJECTION, SOLUTION INTRAMUSCULAR; INTRAVENOUS at 10:10

## 2025-04-05 RX ADMIN — ATORVASTATIN CALCIUM 80 MG: 80 TABLET, FILM COATED ORAL at 10:10

## 2025-04-05 RX ADMIN — INSULIN LISPRO 2 UNITS: 100 INJECTION, SOLUTION INTRAVENOUS; SUBCUTANEOUS at 22:19

## 2025-04-05 RX ADMIN — INSULIN LISPRO 2 UNITS: 100 INJECTION, SOLUTION INTRAVENOUS; SUBCUTANEOUS at 16:53

## 2025-04-05 RX ADMIN — METOPROLOL TARTRATE 50 MG: 50 TABLET, FILM COATED ORAL at 22:20

## 2025-04-05 RX ADMIN — HEPARIN SODIUM 5000 UNITS: 5000 INJECTION, SOLUTION INTRAVENOUS; SUBCUTANEOUS at 20:13

## 2025-04-05 RX ADMIN — METOPROLOL TARTRATE 50 MG: 50 TABLET, FILM COATED ORAL at 14:30

## 2025-04-05 RX ADMIN — HEPARIN SODIUM 5000 UNITS: 5000 INJECTION, SOLUTION INTRAVENOUS; SUBCUTANEOUS at 14:30

## 2025-04-05 RX ADMIN — ASPIRIN 81 MG: 81 TABLET, COATED ORAL at 10:10

## 2025-04-05 RX ADMIN — HEPARIN SODIUM 5000 UNITS: 5000 INJECTION, SOLUTION INTRAVENOUS; SUBCUTANEOUS at 10:10

## 2025-04-05 ASSESSMENT — COGNITIVE AND FUNCTIONAL STATUS - GENERAL
STANDING UP FROM CHAIR USING ARMS: A LITTLE
MOBILITY SCORE: 20
WALKING IN HOSPITAL ROOM: A LITTLE
DRESSING REGULAR UPPER BODY CLOTHING: A LITTLE
WALKING IN HOSPITAL ROOM: A LITTLE
CLIMB 3 TO 5 STEPS WITH RAILING: A LITTLE
MOVING TO AND FROM BED TO CHAIR: A LITTLE
STANDING UP FROM CHAIR USING ARMS: A LITTLE
MOBILITY SCORE: 20
DAILY ACTIVITIY SCORE: 22
DAILY ACTIVITIY SCORE: 22
CLIMB 3 TO 5 STEPS WITH RAILING: A LITTLE
DRESSING REGULAR LOWER BODY CLOTHING: A LITTLE
DRESSING REGULAR LOWER BODY CLOTHING: A LITTLE
MOVING TO AND FROM BED TO CHAIR: A LITTLE
DRESSING REGULAR UPPER BODY CLOTHING: A LITTLE

## 2025-04-05 ASSESSMENT — PAIN SCALES - GENERAL
PAINLEVEL_OUTOF10: 0 - NO PAIN
PAINLEVEL_OUTOF10: 5 - MODERATE PAIN
PAINLEVEL_OUTOF10: 0 - NO PAIN
PAINLEVEL_OUTOF10: 0 - NO PAIN

## 2025-04-05 ASSESSMENT — PAIN - FUNCTIONAL ASSESSMENT
PAIN_FUNCTIONAL_ASSESSMENT: 0-10

## 2025-04-05 ASSESSMENT — PAIN SCALES - WONG BAKER
WONGBAKER_NUMERICALRESPONSE: HURTS LITTLE BIT
WONGBAKER_NUMERICALRESPONSE: NO HURT

## 2025-04-05 ASSESSMENT — PAIN DESCRIPTION - LOCATION: LOCATION: CHEST

## 2025-04-05 NOTE — PROGRESS NOTES
"Subjective Data:  Now in 415 and with sitter in place   -- Verbally aggressive \" I know how toilets and chemicals work. You dont know anything\"  when asked \" good morning\"     Still SOB.    Overnight Events:    none     Objective Data:  Last Recorded Vitals:  Vitals:    25 0400 25 0500 25 0753 25 1100   BP: 94/61 106/84 99/66 103/71   BP Location:   Right arm Right arm   Patient Position:   Lying Sitting   Pulse: (!) 114 (!) 114 105 107   Resp: 24 15 16 16   Temp: 35.9 °C (96.6 °F)  36.4 °C (97.6 °F) 36.3 °C (97.3 °F)   TempSrc:   Temporal Oral   SpO2: 97% 98% 97% 97%   Weight:       Height:         Medical Gas Therapy: Supplemental oxygen  Medical Gas Delivery Method: Nasal cannula  Weight  Av kg (149 lb 14 oz)  Min: 66.7 kg (147 lb 0.8 oz)  Max: 70.3 kg (154 lb 15.7 oz)    LABS:  CMP:  Results from last 7 days   Lab Units 25  0525  0525  0556 25  1729   SODIUM mmol/L 138 137 139 138   POTASSIUM mmol/L 5.3 5.3 5.5* 5.1   CHLORIDE mmol/L 109* 109* 109* 108*   CO2 mmol/L 17* 13* 17* 18*   ANION GAP mmol/L 17 20 19 17   BUN mg/dL 77* 62* 54* 51*   CREATININE mg/dL 3.01* 2.56* 2.57* 2.31*   EGFR mL/min/1.73m*2 21* 25* 25* 29*   MAGNESIUM mg/dL  --   --   --  2.08   ALBUMIN g/dL 4.0 3.8 4.2 4.5   ALT U/L  --   --   --  13   AST U/L  --   --   --  18   BILIRUBIN TOTAL mg/dL  --   --   --  0.5     CBC:  Results from last 7 days   Lab Units 25  0518 25  0519 25  0556 25  1729   WBC AUTO x10*3/uL 15.4* 15.9* 11.8* 11.4*   HEMOGLOBIN g/dL 9.3* 9.1* 9.7* 9.0*   HEMATOCRIT % 32.0* 31.9* 33.0* 31.0*   PLATELETS AUTO x10*3/uL 247 232 223 214   MCV fL 79* 80 78* 78*     COAG:   Results from last 7 days   Lab Units 25  1729   INR  1.3*     ABO: No results found for: \"ABO\"  HEME/ENDO:  Results from last 7 days   Lab Units 25  1729   TSH mIU/L 14.99*      CARDIAC:   Results from last 7 days   Lab Units 25  0519 25  1807 " 04/02/25  1729   TROPHS ng/L 171* 82* 85*   BNP pg/mL  --   --  3,124*             Last I/O:  Net IO Since Admission: -1,395 mL [04/05/25 1349]    Intake/Output Summary (Last 24 hours) at 4/5/2025 1349  Last data filed at 4/5/2025 1344  Gross per 24 hour   Intake --   Output 700 ml   Net -700 ml         Imaging Results:        Past Cardiology Tests (Last 3 Years):  EKG:      EKG:         Device Check 12/17/24    Echo:  Transthoracic Echocardiogram 12/2024  1. Left ventricular ejection fraction is moderately decreased, calculated by Reyes's biplane at 33%.   2. Abnormal left venticular wall motion.   3. Left ventricular cavity size is mildly dilated.   4. No left ventricular thrombus visualized.   5. Moderate mitral valve regurgitation.   6. Mildly elevated right ventricular systolic pressure.   7. There is plaque visualized in the ascending aorta.     Transthoracic Echocardiogram 7/2024   1. Left ventricular ejection fraction is moderately decreased, by visual estimate at 30-35%.   2. Spectral Doppler shows a pseudonormal pattern of left ventricular diastolic filling.   3. Left ventricular cavity size is moderately dilated.   4. No left ventricular thrombus visualized.   5. Technically difficult exam, though there is moderate LV systolic dysfunction with the apical function being best but hypokinesis of the basal and mid segments. There is no LV apical thrombus seen on the echocontrast images.   6. There is reduced right ventricular systolic function.   7. The left atrium is moderately dilated.   8. Moderate mitral valve regurgitation.   9. Mildly elevated RVSP.  10. Moderate tricuspid regurgitation visualized.  11. Compared with the prior exam from 5/10/2023, today's exam is more technically difficult making assessment of LV systolic function more difficult. Proir study demonstrated clear wall motiom abnormality in the basal septal, basal inferior and basal and mid inferolateral segments. The LV systolic function  appears to be worse today ( hyaving declined from mild dysfunciton to moderate dysfunction. In addition, the degree of MR appears to have increased from mild to moderate.     Transthoracic Echocardiogram 4/26/24   1. Left ventricular systolic function is moderately decreased with a 30-35% estimated ejection fraction.   2. Multiple segmental abnormalities exist. See findings.   3. Spectral Doppler shows a restrictive pattern of left ventricular diastolic filling.   4. There is an elevated left ventricular end diastolic pressure.   5. Moderate mitral valve regurgitation.   6. Moderately decreased mitral valve posterior leaflet mobility.   7. Mild to moderate tricuspid regurgitation.   8. Mildly elevated RVSP.     Transthoracic Echocardiogram 2/2024   1. Left ventricular systolic function is normal with a 30-35% estimated ejection fraction.   2. Multiple segmental abnormalities exist. See findings.   3. Spectral Doppler shows a restrictive pattern of left ventricular diastolic filling.   4. There is low normal right ventricular systolic function.   5. Mild to moderate tricuspid regurgitation.   6. There are multiple wall motion abnormalities.     Transthoracic Echocardiogram 5/2023  1. Left ventricular systolic function is mildly decreased with a 40-45% estimated ejection fraction.  2. The left ventricular cavity size is mildly dilated.  3. Basal and mid inferolateral wall, basal inferoseptal segment, and basal inferior segment are abnormal.  4. Spectral Doppler shows a pseudonormal pattern of left ventricular diastolic filling.  5. The left atrium is mildly dilated.  6. RVSP within normal limits.     Transthoracic Echocardiogram 11/2021   1. The left ventricular systolic function is moderately decreased with a 35% estimated ejection fraction.   2. Multiple segmental abnormalities exist. See findings.   3. Spectral Doppler shows an impaired relaxation pattern of left ventricular diastolic filling.     Transthoracic  Echocardiogram 6/2021   1. The left ventricular systolic function is moderately decreased with a 40% estimated ejection fraction.   2. Multiple segmental abnormalities exist. See findings.   3. Spectral Doppler shows a pseudonormal pattern of left ventricular diastolic filling.   4. Mild to moderate mitral valve regurgitation.     Cath:  Left/Right Heart Catheterization 2/2024   1. Severe native three vessel CAD.   2. Patent LIMA-LAD/Diagonal vessel. All other grafts are occluded.   3. Severe left subclavian artery stenosis with a gradient of ~28-30 mmHg across the stenosis.   4. Mildly elevated filling pressure with PCWP of 21 mmHg.   5. Mild pulmonary hypertension with mPAP of 29 mmHg (Post capillary).   6. Normal cardiac output and index of 4.1 L/min and 2.5 L/min/m2 respectively.   7. Successful RCFA closure with 6Fr. VIP Angioseal.   8. Left Ventricular end-diastolic pressure = 23 mmHg.    Inpatient Medications:  Scheduled medications   Medication Dose Route Frequency    [Held by provider] allopurinol  100 mg oral Daily    amLODIPine  10 mg oral Daily    aspirin  81 mg oral Daily    atorvastatin  80 mg oral Daily    cetirizine  10 mg oral Daily    cyanocobalamin  1,000 mcg oral Daily    [Held by provider] dapagliflozin propanediol  10 mg oral Daily before evening meal    furosemide  40 mg intravenous q12h    gabapentin  100 mg oral Nightly    heparin  5,000 Units subcutaneous TID    icosapent ethyL  1 g oral BID    insulin lispro  0-10 Units subcutaneous Before meals & nightly    levothyroxine  50 mcg oral Daily    [Held by provider] metoprolol succinate XL  50 mg oral Daily    metoprolol tartrate  25 mg oral q8h     PRN medications   Medication    albuterol    dextrose    dextrose    famotidine    glucagon    glucagon    nitroglycerin    oxygen     Continuous Medications   Medication Dose Last Rate       Physical Exam:    General:  Patient is awake, alert, and oriented.  Patient is in no acute  distress.  HEENT:  Normocephalic.  Moist mucosa.    Neck:  Normal Jugular Venous Pressure.  Cardiovascular:  Regular rate and rhythm.  Normal S1 and S2, no murmurs, rubs or gallops  Pulmonary:  Clear to auscultation bilaterally.  Oxygen at 2L via NC.   Abdomen:  Soft. Non-tender.   Non-distended.  Positive bowel sounds.  Lower Extremities:  2+ pedal pulses. Mild 1+ LE Edema  Neurologic:  Alert and oriented x3. No focal deficit.   Skin: Skin warm and dry, normal skin turgor.   Psychiatric:  emotional at times        Assessment/Plan     Ritesh Garza is a 74 y.o. male with past medical history of PMH of mvCAD s/p CABGx5 1992 c/b NSTEMI 2/2024 on DAPT, HFrEF d/t ICM, HTN, HLD, VT s/p ICD placement 5/2024, CKD III, seizures, PAD c/b severe left subclavian stenosis w/ compromised flow to the left POP s/p left subclavian stent 4/2024 , asthma, DM, gout, SIDHU cirrhosis, portal HTN, depression/anxiety, GERD, BPH, SENIA, , recent GIB 12/2024 (Plavix stopped with GIB), who presented to [facility] with SOB, weight gain, and tachycardia.  Cardiology consulted for CHF.     States he has been more short of breath than usual for the past few days. States he has never been short of breath in the past 32 years since his CABG.  He went to doctor visit with his cardiologist, and was told to come to ED for irregular heart rhythm. Denies any chest pain or angina.  States he has gained about 10lbs over the past few months.  Unsure if he is having orthopnea.  He is having some mild lower extremity swelling.  No recent illness, no nausea, vomiting, fever, or chills.      Arrival in the ED HR was 122 Tachypnic b/p was 137/87.   Labs showed BNP 3124. Troponin was 85->82,  K 5.1 Scr 2.31 TSH 14.99 WBC 11k, H/H 9/31  CT Chest showed  Mild interstitial pulmonary edema, small bilateral layering  pleural effusions and cardiomegaly with left ventricular enlargement  suggestive of mild congestive heart failure changes.   He was placed on BiPap, and  ultimately placed on oxygen at 2L via NC.  Comfortable.      Home CV Medications:  Amlodipine 10 mg daily  ASA 81 mg daily  Atorvastatin 80 mg daily  Farxiga 10 mg daily  Furosemide 40 mg daily as needed  Vascepa 1 g twice daily  Metoprolol succinate 50 mg daily  Entresto 1/2 tablet twice daily  No Spironolactone d/t painful gynecomastia.      -Follows with Dr. Hennessy as outpatient        Assessment:     #CAD- s/p CABGx5 with occluded grafts per [facility] 4/2024, plan was for one year DAPT, but Plavix was stopped 12/2024 with GIB.  Remains on ASA 81mg daily     #Acute  on chronic Systolic HF-              - admit BNP 3124 ( chronic elevation, previous 3417 12/2024)              - last echo 12/2024 with EF 33%     #TAYLOR on CKD              - baseline creatinine closer to 2              - Admit creatinine 2.31, up to 2.57 on 4/3     # Hypertension, BP acceptable    # Sinus tachycardia, possible atrial tachycardia   - 0% atrial arrhthymias on ICD reading.        4/3 > Volume up on exam, normotensive, oxygen at 2L via NC     Plan:  - Agree with stopping heparin, no indicated. Device check showing atrial tachycardia.  No signs of atrial fibrillation or flutter.   - GDMT              - holding Farxiga and Entresto with TAYLOR   - Hold on further diuretic at this time given increased GFR with minimal Urina Output               - Toprol 50mg daily has been changes to metoprolol [id number] mg TID, we may need to uptitrate              - no MRA due to past intolerance       He will follow up with Dr. Hennessy, established cardiologist within 2-3 weeks of discharge.        Code Status:  Full Code        Mahendra Santacruz DO   Division of Cardiovascular Medicine  [facility] [place]

## 2025-04-05 NOTE — PROGRESS NOTES
"Ritesh Garza is a 74 y.o. male on day 3 of admission presenting with Acute respiratory failure with hypoxia.    Subjective   Bump in creatinine to 3; he is on iv lasix appears to improving in volume status, wbc elevated at 15; ck pro calcitonin, bcx have been ntd       Objective     Physical Exam  Vitals reviewed.   Constitutional:       Appearance: Normal appearance.   HENT:      Head: Normocephalic.      Right Ear: Tympanic membrane normal.      Nose: Nose normal.      Mouth/Throat:      Mouth: Mucous membranes are moist.   Cardiovascular:      Rate and Rhythm: Normal rate and regular rhythm.   Pulmonary:      Effort: Pulmonary effort is normal.   Abdominal:      General: Abdomen is flat. Bowel sounds are normal.      Palpations: Abdomen is soft.   Musculoskeletal:      Comments: Has LLE more than RLE     Skin:     Capillary Refill: Capillary refill takes less than 2 seconds.   Neurological:      General: No focal deficit present.      Mental Status: He is alert.         Last Recorded Vitals  Blood pressure 99/66, pulse 105, temperature 36.4 °C (97.6 °F), temperature source Temporal, resp. rate 16, height 1.6 m (5' 3\"), weight 66.7 kg (147 lb 0.8 oz), SpO2 97%.  Intake/Output last 3 Shifts:  I/O last 3 completed shifts:  In: - (0 mL/kg)   Out: 1075 (16.1 mL/kg) [Urine:1075 (0.4 mL/kg/hr)]  Weight: 66.7 kg     Relevant Results  Results for orders placed or performed during the hospital encounter of 04/02/25 (from the past 24 hours)   POCT GLUCOSE   Result Value Ref Range    POCT Glucose 249 (H) 74 - 99 mg/dL   POCT GLUCOSE   Result Value Ref Range    POCT Glucose 236 (H) 74 - 99 mg/dL   POCT GLUCOSE   Result Value Ref Range    POCT Glucose 236 (H) 74 - 99 mg/dL   POCT GLUCOSE   Result Value Ref Range    POCT Glucose 97 74 - 99 mg/dL   POCT GLUCOSE   Result Value Ref Range    POCT Glucose 107 (H) 74 - 99 mg/dL   CBC   Result Value Ref Range    WBC 15.4 (H) 4.4 - 11.3 x10*3/uL    nRBC 0.0 0.0 - 0.0 /100 WBCs    " RBC 4.05 (L) 4.50 - 5.90 x10*6/uL    Hemoglobin 9.3 (L) 13.5 - 17.5 g/dL    Hematocrit 32.0 (L) 41.0 - 52.0 %    MCV 79 (L) 80 - 100 fL    MCH 23.0 (L) 26.0 - 34.0 pg    MCHC 29.1 (L) 32.0 - 36.0 g/dL    RDW 18.6 (H) 11.5 - 14.5 %    Platelets 247 150 - 450 x10*3/uL   Renal Function Panel   Result Value Ref Range    Glucose 93 74 - 99 mg/dL    Sodium 138 136 - 145 mmol/L    Potassium 5.3 3.5 - 5.3 mmol/L    Chloride 109 (H) 98 - 107 mmol/L    Bicarbonate 17 (L) 21 - 32 mmol/L    Anion Gap 17 10 - 20 mmol/L    Urea Nitrogen 77 (H) 6 - 23 mg/dL    Creatinine 3.01 (H) 0.50 - 1.30 mg/dL    eGFR 21 (L) >60 mL/min/1.73m*2    Calcium 9.0 8.6 - 10.3 mg/dL    Phosphorus 3.9 2.5 - 4.9 mg/dL    Albumin 4.0 3.4 - 5.0 g/dL     *Note: Due to a large number of results and/or encounters for the requested time period, some results have not been displayed. A complete set of results can be found in Results Review.       Imaging Results  Ct chest:  IMPRESSION:  1.  Mild interstitial pulmonary edema, small bilateral layering  pleural effusions and cardiomegaly with left ventricular enlargement  suggestive of mild congestive heart failure changes.  2.  Mild peritoneal ascites.  3.  Status post cholecystectomy.    Ct head/neck angio with ct head:  IMPRESSION:  The examination is suboptimal secondary to timing of the scan  relative to the contrast bolus.      CTA neck:      There is coarse atherosclerotic calcification along the carotid  arteries bilaterally with mild-to-moderate narrowing.      No significant stenosis at the carotid bifurcation bilaterally. There  is calcified and noncalcified plaque at the left carotid bifurcation.      There is a beaded appearance of the distal cervical internal carotid  artery on the right which may be secondary to fibromuscular  dysplasia. There is also mild irregularity of the V3 segment of the  left vertebral artery.      There is a stent at the origin of the left subclavian artery which  grossly  appears patent, however not well evaluated on the current  exam.      There appears to be narrowing at the origin of the left common  carotid artery, not well evaluated on the current exam.      CTA head:      There appears to be attenuated branches of the MCA on the right  compared to the left which may be secondary to high-grade stenosis or  areas of occlusion, not well evaluated on the current exam secondary  to technique.      There are coarse atherosclerotic calcifications along the intradural  segments of the vertebral arteries with a least moderate narrowing.      Otherwise, no evidence for significant stenosis or large branch  vessel cutoffs of the intracranial vessels.      Consider repeat examination as clinically warranted.    Medications:  [Held by provider] allopurinol, 100 mg, oral, Daily  amLODIPine, 10 mg, oral, Daily  aspirin, 81 mg, oral, Daily  atorvastatin, 80 mg, oral, Daily  cetirizine, 10 mg, oral, Daily  cyanocobalamin, 1,000 mcg, oral, Daily  [Held by provider] dapagliflozin propanediol, 10 mg, oral, Daily before evening meal  furosemide, 40 mg, intravenous, q12h  gabapentin, 100 mg, oral, Nightly  heparin, 5,000 Units, subcutaneous, TID  icosapent ethyL, 1 g, oral, BID  insulin lispro, 0-10 Units, subcutaneous, Before meals & nightly  levothyroxine, 50 mcg, oral, Daily  [Held by provider] metoprolol succinate XL, 50 mg, oral, Daily  metoprolol tartrate, 25 mg, oral, q8h       PRN medications: albuterol, dextrose, dextrose, famotidine, glucagon, glucagon, nitroglycerin, oxygen     Assessment/Plan   1.  Wide-complex tachycardia, atrial tachycardia cannot be on pacemaker interrogation  - Metoprolol XL changed to metoprolol tartrate 25 mg oral every 8 hours  - Currently stable on telemetry    2.  Acute on chronic systolic heart failure, with ischemic heart cardiomyopathy EF of 33%  - On IV Lasix 40 mg twice daily although patient seems to be nearing his euvolemic status, will defer to  cardiology if they would like to decrease dose of Lasix    3.  Acute kidney injury on CKD 3  -Creatinine does seem to be around 1.9 currently bumped to 3 will get nephrology on board.  -Entresto and farxiga on hold    4.Non Mi troponin elevation h/o CAD s/p CABG x 5  -on asa daily plavix stopped due to GI bleeding in 12/24    5.HTN/HLD  -continue home meds        DVT Prophylaxis:  Heparin subq          Dee Yaenz MD  [facility]

## 2025-04-05 NOTE — CARE PLAN
The patient's goals for the shift include  to rest     The clinical goals for the shift include Patient will be safe and free from injury during this shift

## 2025-04-06 ENCOUNTER — APPOINTMENT (OUTPATIENT)
Dept: CARDIOLOGY | Facility: HOSPITAL | Age: 75
DRG: 286 | End: 2025-04-06
Payer: OTHER MISCELLANEOUS

## 2025-04-06 ENCOUNTER — APPOINTMENT (OUTPATIENT)
Dept: RADIOLOGY | Facility: HOSPITAL | Age: 75
DRG: 286 | End: 2025-04-06
Payer: OTHER MISCELLANEOUS

## 2025-04-06 ENCOUNTER — APPOINTMENT (OUTPATIENT)
Dept: RADIOLOGY | Facility: HOSPITAL | Age: 75
DRG: 286 | End: 2025-04-06
Payer: MEDICARE

## 2025-04-06 LAB
ALBUMIN SERPL BCP-MCNC: 3.6 G/DL (ref 3.4–5)
ALBUMIN SERPL BCP-MCNC: 3.7 G/DL (ref 3.4–5)
ALBUMIN SERPL BCP-MCNC: 3.8 G/DL (ref 3.4–5)
ALBUMIN SERPL BCP-MCNC: 4 G/DL (ref 3.4–5)
ALP SERPL-CCNC: 122 U/L (ref 33–136)
ALT SERPL W P-5'-P-CCNC: 316 U/L (ref 10–52)
AMMONIA PLAS-SCNC: 54 UMOL/L (ref 16–53)
ANION GAP BLDA CALCULATED.4IONS-SCNC: 24 MMO/L (ref 10–25)
ANION GAP BLDV CALCULATED.4IONS-SCNC: 18 MMOL/L (ref 10–25)
ANION GAP SERPL CALC-SCNC: 18 MMOL/L (ref 10–20)
ANION GAP SERPL CALC-SCNC: 19 MMOL/L (ref 10–20)
ANION GAP SERPL CALC-SCNC: 20 MMOL/L (ref 10–20)
ANION GAP SERPL CALC-SCNC: 22 MMOL/L (ref 10–20)
ANION GAP SERPL CALC-SCNC: 25 MMOL/L (ref 10–20)
ARTERIAL PATENCY WRIST A: POSITIVE
AST SERPL W P-5'-P-CCNC: 438 U/L (ref 9–39)
BACTERIA BLD CULT: NORMAL
BASE EXCESS BLDA CALC-SCNC: -12.2 MMOL/L (ref -2–3)
BASE EXCESS BLDV CALC-SCNC: -10.8 MMOL/L (ref -2–3)
BASOPHILS # BLD AUTO: 0.02 X10*3/UL (ref 0–0.1)
BASOPHILS NFR BLD AUTO: 0.1 %
BILIRUB SERPL-MCNC: 0.9 MG/DL (ref 0–1.2)
BNP SERPL-MCNC: 3082 PG/ML (ref 0–99)
BODY TEMPERATURE: 37 DEGREES CELSIUS
BODY TEMPERATURE: 37 DEGREES CELSIUS
BUN SERPL-MCNC: 100 MG/DL (ref 6–23)
BUN SERPL-MCNC: 91 MG/DL (ref 6–23)
BUN SERPL-MCNC: 92 MG/DL (ref 6–23)
BUN SERPL-MCNC: 94 MG/DL (ref 6–23)
BUN SERPL-MCNC: 96 MG/DL (ref 6–23)
CA-I BLDA-SCNC: 1.14 MMOL/L (ref 1.1–1.33)
CA-I BLDV-SCNC: 1.25 MMOL/L (ref 1.1–1.33)
CALCIUM SERPL-MCNC: 8.6 MG/DL (ref 8.6–10.3)
CALCIUM SERPL-MCNC: 8.8 MG/DL (ref 8.6–10.3)
CALCIUM SERPL-MCNC: 8.9 MG/DL (ref 8.6–10.3)
CALCIUM SERPL-MCNC: 9 MG/DL (ref 8.6–10.3)
CALCIUM SERPL-MCNC: 9.3 MG/DL (ref 8.6–10.3)
CARDIAC TROPONIN I PNL SERPL HS: 138 NG/L (ref 0–20)
CARDIAC TROPONIN I PNL SERPL HS: 161 NG/L (ref 0–20)
CHLORIDE BLDA-SCNC: 103 MMOL/L (ref 98–107)
CHLORIDE BLDV-SCNC: 105 MMOL/L (ref 98–107)
CHLORIDE SERPL-SCNC: 105 MMOL/L (ref 98–107)
CHLORIDE SERPL-SCNC: 105 MMOL/L (ref 98–107)
CHLORIDE SERPL-SCNC: 106 MMOL/L (ref 98–107)
CHLORIDE SERPL-SCNC: 106 MMOL/L (ref 98–107)
CHLORIDE SERPL-SCNC: 108 MMOL/L (ref 98–107)
CO2 SERPL-SCNC: 12 MMOL/L (ref 21–32)
CO2 SERPL-SCNC: 16 MMOL/L (ref 21–32)
CO2 SERPL-SCNC: 18 MMOL/L (ref 21–32)
CO2 SERPL-SCNC: 18 MMOL/L (ref 21–32)
CO2 SERPL-SCNC: 20 MMOL/L (ref 21–32)
CREAT SERPL-MCNC: 3.37 MG/DL (ref 0.5–1.3)
CREAT SERPL-MCNC: 3.6 MG/DL (ref 0.5–1.3)
CREAT SERPL-MCNC: 3.7 MG/DL (ref 0.5–1.3)
CREAT SERPL-MCNC: 3.88 MG/DL (ref 0.5–1.3)
CREAT SERPL-MCNC: 4.08 MG/DL (ref 0.5–1.3)
CREAT UR-MCNC: 136 MG/DL (ref 20–370)
CREAT UR-MCNC: 136 MG/DL (ref 20–370)
EGFRCR SERPLBLD CKD-EPI 2021: 14 ML/MIN/1.73M*2
EGFRCR SERPLBLD CKD-EPI 2021: 15 ML/MIN/1.73M*2
EGFRCR SERPLBLD CKD-EPI 2021: 16 ML/MIN/1.73M*2
EGFRCR SERPLBLD CKD-EPI 2021: 17 ML/MIN/1.73M*2
EGFRCR SERPLBLD CKD-EPI 2021: 18 ML/MIN/1.73M*2
EOSINOPHIL # BLD AUTO: 0 X10*3/UL (ref 0–0.4)
EOSINOPHIL NFR BLD AUTO: 0 %
ERYTHROCYTE [DISTWIDTH] IN BLOOD BY AUTOMATED COUNT: 18.6 % (ref 11.5–14.5)
ERYTHROCYTE [DISTWIDTH] IN BLOOD BY AUTOMATED COUNT: 19 % (ref 11.5–14.5)
GLUCOSE BLD MANUAL STRIP-MCNC: 123 MG/DL (ref 74–99)
GLUCOSE BLD MANUAL STRIP-MCNC: 126 MG/DL (ref 74–99)
GLUCOSE BLD MANUAL STRIP-MCNC: 169 MG/DL (ref 74–99)
GLUCOSE BLD MANUAL STRIP-MCNC: 195 MG/DL (ref 74–99)
GLUCOSE BLDA-MCNC: 120 MG/DL (ref 74–99)
GLUCOSE BLDV-MCNC: 179 MG/DL (ref 74–99)
GLUCOSE SERPL-MCNC: 112 MG/DL (ref 74–99)
GLUCOSE SERPL-MCNC: 121 MG/DL (ref 74–99)
GLUCOSE SERPL-MCNC: 127 MG/DL (ref 74–99)
GLUCOSE SERPL-MCNC: 164 MG/DL (ref 74–99)
GLUCOSE SERPL-MCNC: 177 MG/DL (ref 74–99)
HCO3 BLDA-SCNC: 12.6 MMOL/L (ref 22–26)
HCO3 BLDV-SCNC: 16.4 MMOL/L (ref 22–26)
HCT VFR BLD AUTO: 29.9 % (ref 41–52)
HCT VFR BLD AUTO: 38.5 % (ref 41–52)
HCT VFR BLD EST: 30 % (ref 41–52)
HCT VFR BLD EST: 32 % (ref 41–52)
HGB BLD-MCNC: 10.8 G/DL (ref 13.5–17.5)
HGB BLD-MCNC: 8.9 G/DL (ref 13.5–17.5)
HGB BLDA-MCNC: 10.5 G/DL (ref 13.5–17.5)
HGB BLDV-MCNC: 10 G/DL (ref 13.5–17.5)
HOLD SPECIMEN: NORMAL
IMM GRANULOCYTES # BLD AUTO: 0.13 X10*3/UL (ref 0–0.5)
IMM GRANULOCYTES NFR BLD AUTO: 0.7 % (ref 0–0.9)
INHALED O2 CONCENTRATION: 60 %
INHALED O2 CONCENTRATION: 60 %
LACTATE BLDA-SCNC: 6.7 MMOL/L (ref 0.4–2)
LACTATE BLDV-SCNC: 7.4 MMOL/L (ref 0.4–2)
LACTATE BLDV-SCNC: 7.4 MMOL/L (ref 0.4–2)
LACTATE SERPL-SCNC: 2.8 MMOL/L (ref 0.4–2)
LACTATE SERPL-SCNC: 3.9 MMOL/L (ref 0.4–2)
LACTATE SERPL-SCNC: 4.3 MMOL/L (ref 0.4–2)
LACTATE SERPL-SCNC: 5.4 MMOL/L (ref 0.4–2)
LYMPHOCYTES # BLD AUTO: 1.3 X10*3/UL (ref 0.8–3)
LYMPHOCYTES NFR BLD AUTO: 7.3 %
MAGNESIUM SERPL-MCNC: 2.21 MG/DL (ref 1.6–2.4)
MAGNESIUM SERPL-MCNC: 2.45 MG/DL (ref 1.6–2.4)
MCH RBC QN AUTO: 22.9 PG (ref 26–34)
MCH RBC QN AUTO: 23 PG (ref 26–34)
MCHC RBC AUTO-ENTMCNC: 28.1 G/DL (ref 32–36)
MCHC RBC AUTO-ENTMCNC: 29.8 G/DL (ref 32–36)
MCV RBC AUTO: 77 FL (ref 80–100)
MCV RBC AUTO: 82 FL (ref 80–100)
MONOCYTES # BLD AUTO: 1.7 X10*3/UL (ref 0.05–0.8)
MONOCYTES NFR BLD AUTO: 9.5 %
NEUTROPHILS # BLD AUTO: 14.71 X10*3/UL (ref 1.6–5.5)
NEUTROPHILS NFR BLD AUTO: 82.4 %
NRBC BLD-RTO: 0.2 /100 WBCS (ref 0–0)
NRBC BLD-RTO: 0.3 /100 WBCS (ref 0–0)
OXYHGB MFR BLDA: 94 % (ref 94–98)
OXYHGB MFR BLDV: 49.4 % (ref 45–75)
PCO2 BLDA: 25 MM HG (ref 38–42)
PCO2 BLDV: 41 MM HG (ref 41–51)
PH BLDA: 7.31 PH (ref 7.38–7.42)
PH BLDV: 7.21 PH (ref 7.33–7.43)
PHOSPHATE SERPL-MCNC: 5.7 MG/DL (ref 2.5–4.9)
PHOSPHATE SERPL-MCNC: 6.1 MG/DL (ref 2.5–4.9)
PHOSPHATE SERPL-MCNC: 6.5 MG/DL (ref 2.5–4.9)
PLATELET # BLD AUTO: 235 X10*3/UL (ref 150–450)
PLATELET # BLD AUTO: 338 X10*3/UL (ref 150–450)
PO2 BLDA: 80 MM HG (ref 85–95)
PO2 BLDV: 40 MM HG (ref 35–45)
POTASSIUM BLDA-SCNC: 7.3 MMOL/L (ref 3.5–5.3)
POTASSIUM BLDV-SCNC: 6.7 MMOL/L (ref 3.5–5.3)
POTASSIUM SERPL-SCNC: 5.4 MMOL/L (ref 3.5–5.3)
POTASSIUM SERPL-SCNC: 5.5 MMOL/L (ref 3.5–5.3)
POTASSIUM SERPL-SCNC: 5.6 MMOL/L (ref 3.5–5.3)
POTASSIUM SERPL-SCNC: 5.8 MMOL/L (ref 3.5–5.3)
POTASSIUM SERPL-SCNC: 7.1 MMOL/L (ref 3.5–5.3)
PROT SERPL-MCNC: 6.5 G/DL (ref 6.4–8.2)
PROT UR-ACNC: 72 MG/DL (ref 5–25)
PROT/CREAT UR: 0.53 MG/MG CREAT (ref 0–0.17)
RBC # BLD AUTO: 3.88 X10*6/UL (ref 4.5–5.9)
RBC # BLD AUTO: 4.69 X10*6/UL (ref 4.5–5.9)
SAO2 % BLDA: 97 % (ref 94–100)
SAO2 % BLDV: 51 % (ref 45–75)
SODIUM BLDA-SCNC: 132 MMOL/L (ref 136–145)
SODIUM BLDV-SCNC: 133 MMOL/L (ref 136–145)
SODIUM SERPL-SCNC: 136 MMOL/L (ref 136–145)
SODIUM SERPL-SCNC: 136 MMOL/L (ref 136–145)
SODIUM SERPL-SCNC: 138 MMOL/L (ref 136–145)
SODIUM SERPL-SCNC: 139 MMOL/L (ref 136–145)
SODIUM SERPL-SCNC: 139 MMOL/L (ref 136–145)
SODIUM UR-SCNC: 46 MMOL/L
SODIUM/CREAT UR-RTO: 34 MMOL/G CREAT
SPECIMEN DRAWN FROM PATIENT: ABNORMAL
WBC # BLD AUTO: 17.9 X10*3/UL (ref 4.4–11.3)
WBC # BLD AUTO: 18.6 X10*3/UL (ref 4.4–11.3)

## 2025-04-06 PROCEDURE — 84300 ASSAY OF URINE SODIUM: CPT | Performed by: NURSE PRACTITIONER

## 2025-04-06 PROCEDURE — 2500000004 HC RX 250 GENERAL PHARMACY W/ HCPCS (ALT 636 FOR OP/ED): Performed by: NURSE PRACTITIONER

## 2025-04-06 PROCEDURE — 84132 ASSAY OF SERUM POTASSIUM: CPT | Performed by: STUDENT IN AN ORGANIZED HEALTH CARE EDUCATION/TRAINING PROGRAM

## 2025-04-06 PROCEDURE — 82947 ASSAY GLUCOSE BLOOD QUANT: CPT

## 2025-04-06 PROCEDURE — 93010 ELECTROCARDIOGRAM REPORT: CPT | Performed by: INTERNAL MEDICINE

## 2025-04-06 PROCEDURE — 71045 X-RAY EXAM CHEST 1 VIEW: CPT | Mod: FOREIGN READ | Performed by: RADIOLOGY

## 2025-04-06 PROCEDURE — 83735 ASSAY OF MAGNESIUM: CPT | Performed by: STUDENT IN AN ORGANIZED HEALTH CARE EDUCATION/TRAINING PROGRAM

## 2025-04-06 PROCEDURE — 2500000001 HC RX 250 WO HCPCS SELF ADMINISTERED DRUGS (ALT 637 FOR MEDICARE OP): Performed by: INTERNAL MEDICINE

## 2025-04-06 PROCEDURE — 2500000004 HC RX 250 GENERAL PHARMACY W/ HCPCS (ALT 636 FOR OP/ED): Performed by: STUDENT IN AN ORGANIZED HEALTH CARE EDUCATION/TRAINING PROGRAM

## 2025-04-06 PROCEDURE — 71045 X-RAY EXAM CHEST 1 VIEW: CPT

## 2025-04-06 PROCEDURE — 94640 AIRWAY INHALATION TREATMENT: CPT

## 2025-04-06 PROCEDURE — 84132 ASSAY OF SERUM POTASSIUM: CPT | Performed by: INTERNAL MEDICINE

## 2025-04-06 PROCEDURE — 2500000001 HC RX 250 WO HCPCS SELF ADMINISTERED DRUGS (ALT 637 FOR MEDICARE OP): Performed by: NURSE PRACTITIONER

## 2025-04-06 PROCEDURE — 80053 COMPREHEN METABOLIC PANEL: CPT | Performed by: INTERNAL MEDICINE

## 2025-04-06 PROCEDURE — 71045 X-RAY EXAM CHEST 1 VIEW: CPT | Performed by: RADIOLOGY

## 2025-04-06 PROCEDURE — 84484 ASSAY OF TROPONIN QUANT: CPT | Performed by: NURSE PRACTITIONER

## 2025-04-06 PROCEDURE — 93005 ELECTROCARDIOGRAM TRACING: CPT

## 2025-04-06 PROCEDURE — 84132 ASSAY OF SERUM POTASSIUM: CPT | Performed by: NURSE PRACTITIONER

## 2025-04-06 PROCEDURE — 82435 ASSAY OF BLOOD CHLORIDE: CPT | Performed by: NURSE PRACTITIONER

## 2025-04-06 PROCEDURE — 83735 ASSAY OF MAGNESIUM: CPT | Performed by: INTERNAL MEDICINE

## 2025-04-06 PROCEDURE — 84156 ASSAY OF PROTEIN URINE: CPT | Performed by: NURSE PRACTITIONER

## 2025-04-06 PROCEDURE — 87040 BLOOD CULTURE FOR BACTERIA: CPT | Mod: AHULAB | Performed by: STUDENT IN AN ORGANIZED HEALTH CARE EDUCATION/TRAINING PROGRAM

## 2025-04-06 PROCEDURE — 82140 ASSAY OF AMMONIA: CPT | Performed by: NURSE PRACTITIONER

## 2025-04-06 PROCEDURE — 36415 COLL VENOUS BLD VENIPUNCTURE: CPT | Performed by: STUDENT IN AN ORGANIZED HEALTH CARE EDUCATION/TRAINING PROGRAM

## 2025-04-06 PROCEDURE — 80069 RENAL FUNCTION PANEL: CPT | Mod: CCI | Performed by: STUDENT IN AN ORGANIZED HEALTH CARE EDUCATION/TRAINING PROGRAM

## 2025-04-06 PROCEDURE — 83605 ASSAY OF LACTIC ACID: CPT | Performed by: NURSE PRACTITIONER

## 2025-04-06 PROCEDURE — 2500000005 HC RX 250 GENERAL PHARMACY W/O HCPCS: Performed by: NURSE PRACTITIONER

## 2025-04-06 PROCEDURE — 2500000002 HC RX 250 W HCPCS SELF ADMINISTERED DRUGS (ALT 637 FOR MEDICARE OP, ALT 636 FOR OP/ED): Performed by: SURGERY

## 2025-04-06 PROCEDURE — 2500000005 HC RX 250 GENERAL PHARMACY W/O HCPCS: Performed by: INTERNAL MEDICINE

## 2025-04-06 PROCEDURE — 99223 1ST HOSP IP/OBS HIGH 75: CPT | Performed by: INTERNAL MEDICINE

## 2025-04-06 PROCEDURE — 80069 RENAL FUNCTION PANEL: CPT | Mod: CCI | Performed by: INTERNAL MEDICINE

## 2025-04-06 PROCEDURE — 83605 ASSAY OF LACTIC ACID: CPT | Performed by: INTERNAL MEDICINE

## 2025-04-06 PROCEDURE — 94644 CONT INHLJ TX 1ST HOUR: CPT

## 2025-04-06 PROCEDURE — 5A0945A ASSISTANCE WITH RESPIRATORY VENTILATION, 24-96 CONSECUTIVE HOURS, HIGH NASAL FLOW/VELOCITY: ICD-10-PCS | Performed by: STUDENT IN AN ORGANIZED HEALTH CARE EDUCATION/TRAINING PROGRAM

## 2025-04-06 PROCEDURE — 2500000005 HC RX 250 GENERAL PHARMACY W/O HCPCS: Performed by: STUDENT IN AN ORGANIZED HEALTH CARE EDUCATION/TRAINING PROGRAM

## 2025-04-06 PROCEDURE — 87075 CULTR BACTERIA EXCEPT BLOOD: CPT | Mod: AHULAB | Performed by: STUDENT IN AN ORGANIZED HEALTH CARE EDUCATION/TRAINING PROGRAM

## 2025-04-06 PROCEDURE — 2500000002 HC RX 250 W HCPCS SELF ADMINISTERED DRUGS (ALT 637 FOR MEDICARE OP, ALT 636 FOR OP/ED): Performed by: INTERNAL MEDICINE

## 2025-04-06 PROCEDURE — 82330 ASSAY OF CALCIUM: CPT | Performed by: STUDENT IN AN ORGANIZED HEALTH CARE EDUCATION/TRAINING PROGRAM

## 2025-04-06 PROCEDURE — 83605 ASSAY OF LACTIC ACID: CPT | Performed by: STUDENT IN AN ORGANIZED HEALTH CARE EDUCATION/TRAINING PROGRAM

## 2025-04-06 PROCEDURE — 2500000005 HC RX 250 GENERAL PHARMACY W/O HCPCS: Performed by: SURGERY

## 2025-04-06 PROCEDURE — 2500000005 HC RX 250 GENERAL PHARMACY W/O HCPCS

## 2025-04-06 PROCEDURE — 2020000001 HC ICU ROOM DAILY

## 2025-04-06 PROCEDURE — 2500000002 HC RX 250 W HCPCS SELF ADMINISTERED DRUGS (ALT 637 FOR MEDICARE OP, ALT 636 FOR OP/ED): Performed by: STUDENT IN AN ORGANIZED HEALTH CARE EDUCATION/TRAINING PROGRAM

## 2025-04-06 PROCEDURE — 82570 ASSAY OF URINE CREATININE: CPT | Performed by: NURSE PRACTITIONER

## 2025-04-06 PROCEDURE — 36415 COLL VENOUS BLD VENIPUNCTURE: CPT | Performed by: INTERNAL MEDICINE

## 2025-04-06 PROCEDURE — 2500000002 HC RX 250 W HCPCS SELF ADMINISTERED DRUGS (ALT 637 FOR MEDICARE OP, ALT 636 FOR OP/ED): Performed by: NURSE PRACTITIONER

## 2025-04-06 PROCEDURE — 80048 BASIC METABOLIC PNL TOTAL CA: CPT | Performed by: INTERNAL MEDICINE

## 2025-04-06 PROCEDURE — 83880 ASSAY OF NATRIURETIC PEPTIDE: CPT | Performed by: INTERNAL MEDICINE

## 2025-04-06 PROCEDURE — 99233 SBSQ HOSP IP/OBS HIGH 50: CPT | Performed by: INTERNAL MEDICINE

## 2025-04-06 PROCEDURE — 84075 ASSAY ALKALINE PHOSPHATASE: CPT | Performed by: STUDENT IN AN ORGANIZED HEALTH CARE EDUCATION/TRAINING PROGRAM

## 2025-04-06 PROCEDURE — 85027 COMPLETE CBC AUTOMATED: CPT | Performed by: STUDENT IN AN ORGANIZED HEALTH CARE EDUCATION/TRAINING PROGRAM

## 2025-04-06 PROCEDURE — 84484 ASSAY OF TROPONIN QUANT: CPT | Performed by: STUDENT IN AN ORGANIZED HEALTH CARE EDUCATION/TRAINING PROGRAM

## 2025-04-06 PROCEDURE — 2500000004 HC RX 250 GENERAL PHARMACY W/ HCPCS (ALT 636 FOR OP/ED): Performed by: INTERNAL MEDICINE

## 2025-04-06 PROCEDURE — 99291 CRITICAL CARE FIRST HOUR: CPT | Performed by: NURSE PRACTITIONER

## 2025-04-06 PROCEDURE — 85025 COMPLETE CBC W/AUTO DIFF WBC: CPT | Performed by: NURSE PRACTITIONER

## 2025-04-06 RX ORDER — METOLAZONE 5 MG/1
5 TABLET ORAL ONCE
Status: COMPLETED | OUTPATIENT
Start: 2025-04-06 | End: 2025-04-06

## 2025-04-06 RX ORDER — SODIUM BICARBONATE 1 MEQ/ML
50 SYRINGE (ML) INTRAVENOUS ONCE
Status: COMPLETED | OUTPATIENT
Start: 2025-04-06 | End: 2025-04-06

## 2025-04-06 RX ORDER — CALCIUM GLUCONATE 20 MG/ML
2 INJECTION, SOLUTION INTRAVENOUS ONCE
Status: COMPLETED | OUTPATIENT
Start: 2025-04-06 | End: 2025-04-06

## 2025-04-06 RX ORDER — SODIUM BICARBONATE 1 MEQ/ML
SYRINGE (ML) INTRAVENOUS
Status: COMPLETED
Start: 2025-04-06 | End: 2025-04-06

## 2025-04-06 RX ORDER — DEXTROSE MONOHYDRATE 100 MG/ML
50 INJECTION, SOLUTION INTRAVENOUS CONTINUOUS
Status: DISPENSED | OUTPATIENT
Start: 2025-04-06 | End: 2025-04-06

## 2025-04-06 RX ORDER — IPRATROPIUM BROMIDE AND ALBUTEROL SULFATE 2.5; .5 MG/3ML; MG/3ML
3 SOLUTION RESPIRATORY (INHALATION)
Status: DISPENSED | OUTPATIENT
Start: 2025-04-06

## 2025-04-06 RX ORDER — IPRATROPIUM BROMIDE AND ALBUTEROL SULFATE 2.5; .5 MG/3ML; MG/3ML
3 SOLUTION RESPIRATORY (INHALATION) EVERY 2 HOUR PRN
Status: ACTIVE | OUTPATIENT
Start: 2025-04-06

## 2025-04-06 RX ORDER — FUROSEMIDE 10 MG/ML
80 INJECTION INTRAMUSCULAR; INTRAVENOUS ONCE
Status: COMPLETED | OUTPATIENT
Start: 2025-04-06 | End: 2025-04-06

## 2025-04-06 RX ORDER — METOPROLOL TARTRATE 25 MG/1
25 TABLET, FILM COATED ORAL EVERY 8 HOURS
Status: DISPENSED | OUTPATIENT
Start: 2025-04-06

## 2025-04-06 RX ORDER — ALBUTEROL SULFATE 0.83 MG/ML
20 SOLUTION RESPIRATORY (INHALATION) ONCE
Status: COMPLETED | OUTPATIENT
Start: 2025-04-06 | End: 2025-04-06

## 2025-04-06 RX ORDER — IPRATROPIUM BROMIDE AND ALBUTEROL SULFATE 2.5; .5 MG/3ML; MG/3ML
3 SOLUTION RESPIRATORY (INHALATION) EVERY 4 HOURS PRN
Status: DISCONTINUED | OUTPATIENT
Start: 2025-04-06 | End: 2025-04-06

## 2025-04-06 RX ORDER — DEXTROSE 50 % IN WATER (D50W) INTRAVENOUS SYRINGE
25 ONCE
Status: COMPLETED | OUTPATIENT
Start: 2025-04-06 | End: 2025-04-06

## 2025-04-06 RX ADMIN — ICOSAPENT ETHYL 1 G: 1 CAPSULE ORAL at 10:15

## 2025-04-06 RX ADMIN — SODIUM CHLORIDE 1000 ML: 0.9 INJECTION, SOLUTION INTRAVENOUS at 08:20

## 2025-04-06 RX ADMIN — SODIUM ZIRCONIUM CYCLOSILICATE 10 G: 10 POWDER, FOR SUSPENSION ORAL at 10:08

## 2025-04-06 RX ADMIN — Medication 50 MEQ: at 08:25

## 2025-04-06 RX ADMIN — HEPARIN SODIUM 5000 UNITS: 5000 INJECTION, SOLUTION INTRAVENOUS; SUBCUTANEOUS at 20:15

## 2025-04-06 RX ADMIN — LEVOTHYROXINE SODIUM 50 MCG: 0.05 TABLET ORAL at 05:43

## 2025-04-06 RX ADMIN — INSULIN HUMAN 10 UNITS: 100 INJECTION, SOLUTION PARENTERAL at 08:22

## 2025-04-06 RX ADMIN — Medication 10 L/MIN: at 09:24

## 2025-04-06 RX ADMIN — HEPARIN SODIUM 5000 UNITS: 5000 INJECTION, SOLUTION INTRAVENOUS; SUBCUTANEOUS at 10:08

## 2025-04-06 RX ADMIN — Medication 10 L/MIN: at 10:45

## 2025-04-06 RX ADMIN — ICOSAPENT ETHYL 1 G: 1 CAPSULE ORAL at 16:00

## 2025-04-06 RX ADMIN — CALCIUM GLUCONATE 2 G: 20 INJECTION, SOLUTION INTRAVENOUS at 08:20

## 2025-04-06 RX ADMIN — INSULIN LISPRO 2 UNITS: 100 INJECTION, SOLUTION INTRAVENOUS; SUBCUTANEOUS at 20:15

## 2025-04-06 RX ADMIN — SODIUM ZIRCONIUM CYCLOSILICATE 10 G: 10 POWDER, FOR SUSPENSION ORAL at 20:16

## 2025-04-06 RX ADMIN — IPRATROPIUM BROMIDE AND ALBUTEROL SULFATE 3 ML: 2.5; .5 SOLUTION RESPIRATORY (INHALATION) at 07:35

## 2025-04-06 RX ADMIN — Medication 1000 MCG: at 10:08

## 2025-04-06 RX ADMIN — IPRATROPIUM BROMIDE AND ALBUTEROL SULFATE 3 ML: 2.5; .5 SOLUTION RESPIRATORY (INHALATION) at 19:43

## 2025-04-06 RX ADMIN — IPRATROPIUM BROMIDE AND ALBUTEROL SULFATE 3 ML: 2.5; .5 SOLUTION RESPIRATORY (INHALATION) at 14:52

## 2025-04-06 RX ADMIN — Medication 6 L/MIN: at 07:35

## 2025-04-06 RX ADMIN — Medication 6 L/MIN: at 06:40

## 2025-04-06 RX ADMIN — GABAPENTIN 100 MG: 100 CAPSULE ORAL at 20:15

## 2025-04-06 RX ADMIN — ALBUTEROL SULFATE 20 MG: 2.5 SOLUTION RESPIRATORY (INHALATION) at 08:20

## 2025-04-06 RX ADMIN — INSULIN LISPRO 2 UNITS: 100 INJECTION, SOLUTION INTRAVENOUS; SUBCUTANEOUS at 15:57

## 2025-04-06 RX ADMIN — FUROSEMIDE 80 MG: 10 INJECTION, SOLUTION INTRAMUSCULAR; INTRAVENOUS at 06:23

## 2025-04-06 RX ADMIN — ATORVASTATIN CALCIUM 80 MG: 80 TABLET, FILM COATED ORAL at 10:08

## 2025-04-06 RX ADMIN — SODIUM BICARBONATE 50 MEQ: 84 INJECTION INTRAVENOUS at 08:25

## 2025-04-06 RX ADMIN — DEXTROSE MONOHYDRATE 25 G: 25 INJECTION, SOLUTION INTRAVENOUS at 08:21

## 2025-04-06 RX ADMIN — CETIRIZINE HYDROCHLORIDE 10 MG: 10 TABLET, FILM COATED ORAL at 10:07

## 2025-04-06 RX ADMIN — SODIUM ZIRCONIUM CYCLOSILICATE 10 G: 10 POWDER, FOR SUSPENSION ORAL at 15:57

## 2025-04-06 RX ADMIN — SODIUM BICARBONATE 50 MEQ: 84 INJECTION INTRAVENOUS at 12:20

## 2025-04-06 RX ADMIN — Medication 6 L/MIN: at 06:31

## 2025-04-06 RX ADMIN — HEPARIN SODIUM 5000 UNITS: 5000 INJECTION, SOLUTION INTRAVENOUS; SUBCUTANEOUS at 15:57

## 2025-04-06 RX ADMIN — ASPIRIN 81 MG: 81 TABLET, COATED ORAL at 10:08

## 2025-04-06 RX ADMIN — METOPROLOL TARTRATE 50 MG: 50 TABLET, FILM COATED ORAL at 05:43

## 2025-04-06 RX ADMIN — FUROSEMIDE 10 MG/HR: 10 INJECTION, SOLUTION INTRAMUSCULAR; INTRAVENOUS at 12:21

## 2025-04-06 RX ADMIN — METOLAZONE 5 MG: 5 TABLET ORAL at 12:20

## 2025-04-06 ASSESSMENT — ENCOUNTER SYMPTOMS
DYSURIA: 0
SORE THROAT: 0
CONSTIPATION: 1
SINUS PRESSURE: 0
CONFUSION: 1
CHILLS: 0
NAUSEA: 1
UNEXPECTED WEIGHT CHANGE: 1
ARTHRALGIAS: 1
FATIGUE: 1
FEVER: 0
SINUS PAIN: 0
BACK PAIN: 1
CHEST TIGHTNESS: 1
WHEEZING: 1
MYALGIAS: 1
HEADACHES: 0
APPETITE CHANGE: 1
NECK PAIN: 0
LIGHT-HEADEDNESS: 0
EYE PAIN: 0
EYE ITCHING: 1
ABDOMINAL PAIN: 0
HEMATURIA: 0
SHORTNESS OF BREATH: 1
EYE REDNESS: 1
DIARRHEA: 0
VOMITING: 0
COUGH: 1
WOUND: 0
RHINORRHEA: 1
DIZZINESS: 0

## 2025-04-06 ASSESSMENT — PAIN - FUNCTIONAL ASSESSMENT
PAIN_FUNCTIONAL_ASSESSMENT: 0-10

## 2025-04-06 ASSESSMENT — PAIN SCALES - GENERAL
PAINLEVEL_OUTOF10: 0 - NO PAIN

## 2025-04-06 ASSESSMENT — PAIN SCALES - WONG BAKER: WONGBAKER_NUMERICALRESPONSE: NO HURT

## 2025-04-06 NOTE — CARE PLAN
The patient's goals for the shift include      The clinical goals for the shift include pt chest pain will be controlled    Over the shift, the patient did not make progress toward the following goals. Barriers to progression include . Recommendations to address these barriers include   Problem: Pain - Adult  Goal: Verbalizes/displays adequate comfort level or baseline comfort level  Outcome: Progressing     Problem: Safety - Adult  Goal: Free from fall injury  Outcome: Progressing     Problem: Discharge Planning  Goal: Discharge to home or other facility with appropriate resources  Outcome: Progressing     Problem: Chronic Conditions and Co-morbidities  Goal: Patient's chronic conditions and co-morbidity symptoms are monitored and maintained or improved  Outcome: Progressing     Problem: Nutrition  Goal: Nutrient intake appropriate for maintaining nutritional needs  Outcome: Progressing   .

## 2025-04-06 NOTE — CARE PLAN
The patient's goals for the shift include      The clinical goals for the shift include pt chest pain will be controlled    Over the shift, the patient did not make progress toward the following goals. Barriers to progression include . Recommendations to address these barriers include .

## 2025-04-06 NOTE — CONSULTS
CONSULT: NEPHROLOGY SERVICE    REASON FOR CONSULT: TALYOR-Hyperkalemia   Admit Date: 4/2/2025  4:43 PM       HPI: Patient is a 74 y.o. male admitted 4/2/2025  with h/o CKD stage 3b, seizures, CAD, CABG 2021, recent NSTEMI, VT with ICD placement, HFrEF 30-35%, HTN, HLD,  DM2, SENIA, Liver Cirrhosis, who presented who came with SOB and Wide complex tachycardia to the 120's getting tx with diuretics, metoprolol, with improving symptoms but leading to hypotension with subsequent rising Scr and lowering UO. High BNP, CXR just with small effusion  Noted to have worsening hyperkalemia this am, also requiring more O2 suppl but he denies SOB. Had NS bolus yesterday, iv lasix this am  SGLT2i on hold, not on RAASi    Past Medical History:   Diagnosis Date    ADHD (attention deficit hyperactivity disorder) 1960’s    AICD (automatic cardioverter/defibrillator) present 05/30/2024    [facility]    Asthma     Benign prostatic hyperplasia Several years ago    CHF (congestive heart failure)     Cirrhosis (Multi)     CKD (chronic kidney disease)     Coronary artery disease Long Ago    Diabetes mellitus (Multi) Over 20 years ago    Erectile dysfunction     GERD (gastroesophageal reflux disease)     Gout     Hypertension Over 20 years ago    Ischemic cardiomyopathy     Kidney stone Several years ago    Myocardial infarction (Multi) Over 20 yesrs ago    SIDHU (nonalcoholic steatohepatitis)     SENIA (obstructive sleep apnea)     Stenosis of left subclavian artery     s/p stent 4/30/2024    Ventricular tachycardia (Multi)      Allergies: Patient has no known allergies.     Past Surgical History:   Procedure Laterality Date    ADENOIDECTOMY  Childhood    CARDIAC CATHETERIZATION  Over 30 years ago    CARDIAC CATHETERIZATION N/A 04/15/2024    Procedure: Left And Right Heart Cath, With LV;  Surgeon: Blaine Adams MD;  Location: [facility] Card[facility];  Service: Cardiovascular;  Laterality: N/A;  3 hr hydration / arrive 6:30    CARDIAC DEFIBRILLATOR  PLACEMENT  05/30/2024    [facility]    CARDIAC ELECTROPHYSIOLOGY PROCEDURE N/A 05/30/2024    Procedure: ICD Dual Implant;  Surgeon: Mina Kiran MD;  Location: [facility];  Service: Electrophysiology;  Laterality: N/A;  Dual chamber ICD, [facility]  notified [facility] 5/24/24    CERVICAL FUSION  07/03/2024    C4-7    CHOLECYSTECTOMY  Over 30 years ago    CIRCUMCISION, PRIMARY  72,years ago    CORONARY ARTERY BYPASS GRAFT  1992    EYE SURGERY  Childhood    INVASIVE VASCULAR PROCEDURE Left 04/30/2024    Procedure: Angioplasty - Upper Extremity;  Surgeon: Narinder Quevedo MD;  Location: [facility];  Service: Cardiovascular;  Laterality: Left;  3 hr hydration  / arrive 6:30    MR HEAD ANGIO WO IV CONTRAST  06/02/2021    MR HEAD ANGIO WO IV CONTRAST 6/2/2021 [facility] CLINICAL LEGACY    MR HEAD ANGIO WO IV CONTRAST  11/16/2021    MR HEAD ANGIO WO IV CONTRAST 11/16/2021 [facility] CLINICAL LEGACY    MR NECK ANGIO WO IV CONTRAST  06/02/2021    MR NECK ANGIO WO IV CONTRAST 6/2/2021 [facility] CLINICAL LEGACY    MR NECK ANGIO WO IV CONTRAST  11/16/2021    MR NECK ANGIO WO IV CONTRAST 11/16/2021 [facility] CLINICAL LEGACY    TONSILLECTOMY  1950’s       Family History   Problem Relation Name Age of Onset    Diabetes Mother Sofia Garza     Other (mycoardial infarction) Father Hilario 46    Fainting Father Hilario     Heart disease Father Hilario     Atrial fibrillation Father Hilario     Stomach cancer Mother's Sister Fadumo Callejasffe     Stroke Maternal Grandmother Jasmina Mace     Colon cancer Maternal Grandmother Jasmina Mace        Social History  He reports that he has never smoked. He has never been exposed to tobacco smoke. He has never used smokeless tobacco. He reports that he does not drink alcohol and does not use drugs.    Review of Systems  As above     CURRENT HOSP MEDS:    Current Facility-Administered Medications:     [Held by provider] allopurinol (Zyloprim) tablet 100 mg, 100 mg, oral, Daily, Lyn Fragoso  APRN-CNP, DNP    [Held by provider] amLODIPine (Norvasc) tablet 10 mg, 10 mg, oral, Daily, Lyn H Fouzia, APRN-CNP, DNP, 10 mg at 04/05/25 1010    aspirin EC tablet 81 mg, 81 mg, oral, Daily, Lyn H Fouzia, APRN-CNP, DNP, 81 mg at 04/06/25 1008    atorvastatin (Lipitor) tablet 80 mg, 80 mg, oral, Daily, Lyn H Fouzia, APRN-CNP, DNP, 80 mg at 04/06/25 1008    cetirizine (ZyrTEC) tablet 10 mg, 10 mg, oral, Daily, Lyn H Fouzia, APRN-CNP, DNP, 10 mg at 04/06/25 1007    cyanocobalamin (Vitamin B-12) tablet 1,000 mcg, 1,000 mcg, oral, Daily, Lyn H Fouzia, APRN-CNP, DNP, 1,000 mcg at 04/06/25 1008    [Held by provider] dapagliflozin propanediol (Farxiga) tablet 10 mg, 10 mg, oral, Daily before evening meal, Lyn H Fouzia, APRN-CNP, DNP    [COMPLETED] insulin regular (HumuLIN R,NovoLIN R) injection 10 Units, 10 Units, intravenous, Once, 10 Units at 04/06/25 0822 **FOLLOWED BY** [COMPLETED] dextrose 50 % injection 25 g, 25 g, intravenous, Once, 25 g at 04/06/25 0821 **FOLLOWED BY** dextrose 10 % in water (D10W) infusion, 50 mL/hr, intravenous, Continuous, Lyn H Fouzia, APRN-CNP, DNP    dextrose 50 % injection 12.5 g, 12.5 g, intravenous, q15 min PRN, Lyn H Fouzia, APRN-CNP, DNP    dextrose 50 % injection 25 g, 25 g, intravenous, q15 min PRN, Lyn H Fouzia, APRN-CNP, DNP    famotidine (Pepcid) tablet 20 mg, 20 mg, oral, Daily PRN, Lyn H Fouzia, APRN-CNP, DNP    furosemide (Lasix) 500 mg/50 mL (10 mg/mL) IV infusion, 10 mg/hr, intravenous, Continuous, Lyn H Fouzia, APRN-CNP, DNP    [Held by provider] furosemide (Lasix) injection 40 mg, 40 mg, intravenous, q12h, Lyn H Fouzia, APRN-CNP, DNP, 40 mg at 04/05/25 1010    gabapentin (Neurontin) capsule 100 mg, 100 mg, oral, Nightly, Lyn H Fouzia, APRN-CNP, DNP, 100 mg at 04/05/25 2013    glucagon (Glucagen) injection 1 mg, 1 mg, intramuscular, q15 min PRN, Lyn H Fouzia, APRN-CNP, DNP    glucagon (Glucagen) injection 1 mg, 1 mg, intramuscular, q15 min PRN,  "CHRISTOPHER Alcantara DNP    heparin (porcine) injection 5,000 Units, 5,000 Units, subcutaneous, TID, Lyn Fragoso, NORAH-CNP, DNP, 5,000 Units at 04/06/25 1008    icosapent ethyL (Vascepa) capsule 1 g, 1 g, oral, BID, Lyn Fragoso, NORAH-CNP, DNP, 1 g at 04/06/25 1015    insulin lispro injection 0-10 Units, 0-10 Units, subcutaneous, Before meals & nightly, Lyn Fragoso, NORAH-CNP, DNP, 2 Units at 04/05/25 2219    ipratropium-albuteroL (Duo-Neb) 0.5-2.5 mg/3 mL nebulizer solution 3 mL, 3 mL, nebulization, q2h PRN, CHRISTOPHER Alcantara DNP    ipratropium-albuteroL (Duo-Neb) 0.5-2.5 mg/3 mL nebulizer solution 3 mL, 3 mL, nebulization, 4x daily, NORAH Alcantara-CNP, DNP, 3 mL at 04/06/25 0735    levothyroxine (Synthroid, Levoxyl) tablet 50 mcg, 50 mcg, oral, Daily, NORAH Alcantara-CNP, DNP, 50 mcg at 04/06/25 0543    metOLazone (Zaroxolyn) tablet 5 mg, 5 mg, oral, Once, CHRISTOPHER Alcantara DNP    [Held by provider] metoprolol succinate XL (Toprol-XL) 24 hr tablet 50 mg, 50 mg, oral, Daily, CHRISTOPHER Alcantara, DNP, 50 mg at 04/03/25 0908    metoprolol tartrate (Lopressor) tablet 25 mg, 25 mg, oral, q8h, CHRISTOPHER Alcantara DNP    nitroglycerin (Nitrostat) SL tablet 0.4 mg, 0.4 mg, sublingual, q5 min PRN, Lyn Fragoso, NORAH-CNP, AISLINN, 0.4 mg at 04/04/25 0338    oxyCODONE (Roxicodone) immediate release tablet 5 mg, 5 mg, oral, q6h PRN, Lyn H Fouzia, APRN-CNP, DNP, 5 mg at 04/05/25 2013    oxygen (O2) therapy, , inhalation, Continuous PRN - O2/gases, Lyn H Fouzia, APRN-CNP, DNP, 10 L/min at 04/06/25 1045    sodium zirconium cyclosilicate (Lokelma) packet 10 g, 10 g, oral, TID, Lyn H Fouzia, APRN-CNP, DNP, 10 g at 04/06/25 1008     PHYSICAL EXAM:  /55 (BP Location: Right arm, Patient Position: Lying)   Pulse 108   Temp 36.2 °C (97.1 °F)   Resp 13   Ht 1.6 m (5' 3\")   Wt 66.7 kg (147 lb 0.8 oz)   SpO2 100%   BMI 26.05 kg/m²     Intake/Output Summary (Last 24 hours) " at 4/6/2025 1139  Last data filed at 4/6/2025 1100  Gross per 24 hour   Intake 106.58 ml   Output 390 ml   Net -283.42 ml     Gen: Awake, confuse, NAD  Neck: ++JVD  Cardiac: RRR  Resp: decrease BS  Abd: Soft, non tender, +BS, non distended   Ext: No edema   Neuro: moves 4 ext  Peripheral Pulses: weak peripheral pulses.  Skin: Skin color, texture, turgor normal, no suspicious rashes or lesions.    LABS:   Results for orders placed or performed during the hospital encounter of 04/02/25 (from the past 24 hours)   POCT GLUCOSE   Result Value Ref Range    POCT Glucose 164 (H) 74 - 99 mg/dL   POCT GLUCOSE   Result Value Ref Range    POCT Glucose 163 (H) 74 - 99 mg/dL   POCT GLUCOSE   Result Value Ref Range    POCT Glucose 155 (H) 74 - 99 mg/dL   Troponin I, High Sensitivity   Result Value Ref Range    Troponin I, High Sensitivity 107 (HH) 0 - 20 ng/L   Basic Metabolic Panel   Result Value Ref Range    Glucose 127 (H) 74 - 99 mg/dL    Sodium 136 136 - 145 mmol/L    Potassium 5.8 (H) 3.5 - 5.3 mmol/L    Chloride 106 98 - 107 mmol/L    Bicarbonate 18 (L) 21 - 32 mmol/L    Anion Gap 18 10 - 20 mmol/L    Urea Nitrogen 91 (HH) 6 - 23 mg/dL    Creatinine 3.37 (H) 0.50 - 1.30 mg/dL    eGFR 18 (L) >60 mL/min/1.73m*2    Calcium 9.3 8.6 - 10.3 mg/dL   Magnesium   Result Value Ref Range    Magnesium 2.21 1.60 - 2.40 mg/dL   Blood Gas Arterial Full Panel   Result Value Ref Range    POCT pH, Arterial 7.31 (L) 7.38 - 7.42 pH    POCT pCO2, Arterial 25 (L) 38 - 42 mm Hg    POCT pO2, Arterial 80 (L) 85 - 95 mm Hg    POCT SO2, Arterial 97 94 - 100 %    POCT Oxy Hemoglobin, Arterial 94.0 94.0 - 98.0 %    POCT Hematocrit Calculated, Arterial 32.0 (L) 41.0 - 52.0 %    POCT Sodium, Arterial 132 (L) 136 - 145 mmol/L    POCT Potassium, Arterial 7.3 (HH) 3.5 - 5.3 mmol/L    POCT Chloride, Arterial 103 98 - 107 mmol/L    POCT Ionized Calcium, Arterial 1.14 1.10 - 1.33 mmol/L    POCT Glucose, Arterial 120 (H) 74 - 99 mg/dL    POCT Lactate, Arterial  6.7 (HH) 0.4 - 2.0 mmol/L    POCT Base Excess, Arterial -12.2 (L) -2.0 - 3.0 mmol/L    POCT HCO3 Calculated, Arterial 12.6 (L) 22.0 - 26.0 mmol/L    POCT Hemoglobin, Arterial 10.5 (L) 13.5 - 17.5 g/dL    POCT Anion Gap, Arterial 24 10 - 25 mmo/L    Patient Temperature 37.0 degrees Celsius    FiO2 60 %    Site of Arterial Puncture Radial Left     Kaden's Test Positive    POCT GLUCOSE   Result Value Ref Range    POCT Glucose 123 (H) 74 - 99 mg/dL   CBC   Result Value Ref Range    WBC 18.6 (H) 4.4 - 11.3 x10*3/uL    nRBC 0.3 (H) 0.0 - 0.0 /100 WBCs    RBC 4.69 4.50 - 5.90 x10*6/uL    Hemoglobin 10.8 (L) 13.5 - 17.5 g/dL    Hematocrit 38.5 (L) 41.0 - 52.0 %    MCV 82 80 - 100 fL    MCH 23.0 (L) 26.0 - 34.0 pg    MCHC 28.1 (L) 32.0 - 36.0 g/dL    RDW 19.0 (H) 11.5 - 14.5 %    Platelets 338 150 - 450 x10*3/uL   Comprehensive metabolic panel   Result Value Ref Range    Glucose 112 (H) 74 - 99 mg/dL    Sodium 136 136 - 145 mmol/L    Potassium 7.1 (HH) 3.5 - 5.3 mmol/L    Chloride 106 98 - 107 mmol/L    Bicarbonate 12 (L) 21 - 32 mmol/L    Anion Gap 25 (H) 10 - 20 mmol/L    Urea Nitrogen 92 (HH) 6 - 23 mg/dL    Creatinine 3.60 (H) 0.50 - 1.30 mg/dL    eGFR 17 (L) >60 mL/min/1.73m*2    Calcium 9.0 8.6 - 10.3 mg/dL    Albumin 4.0 3.4 - 5.0 g/dL    Alkaline Phosphatase 122 33 - 136 U/L    Total Protein 6.5 6.4 - 8.2 g/dL     (H) 9 - 39 U/L    Bilirubin, Total 0.9 0.0 - 1.2 mg/dL     (H) 10 - 52 U/L   Troponin I, High Sensitivity   Result Value Ref Range    Troponin I, High Sensitivity 161 (HH) 0 - 20 ng/L   Lactate   Result Value Ref Range    Lactate 5.4 (HH) 0.4 - 2.0 mmol/L   Magnesium   Result Value Ref Range    Magnesium 2.45 (H) 1.60 - 2.40 mg/dL   Green Top   Result Value Ref Range    Extra Tube Hold for add-ons.    B-type natriuretic peptide   Result Value Ref Range    BNP 3,082 (H) 0 - 99 pg/mL   Blood Gas Venous Full Panel   Result Value Ref Range    POCT pH, Venous 7.21 (LL) 7.33 - 7.43 pH    POCT pCO2,  Venous 41 41 - 51 mm Hg    POCT pO2, Venous 40 35 - 45 mm Hg    POCT SO2, Venous 51 45 - 75 %    POCT Oxy Hemoglobin, Venous 49.4 45.0 - 75.0 %    POCT Hematocrit Calculated, Venous 30.0 (L) 41.0 - 52.0 %    POCT Sodium, Venous 133 (L) 136 - 145 mmol/L    POCT Potassium, Venous 6.7 (HH) 3.5 - 5.3 mmol/L    POCT Chloride, Venous 105 98 - 107 mmol/L    POCT Ionized Calicum, Venous 1.25 1.10 - 1.33 mmol/L    POCT Glucose, Venous 179 (H) 74 - 99 mg/dL    POCT Lactate, Venous 7.4 (HH) 0.4 - 2.0 mmol/L    POCT Base Excess, Venous -10.8 (L) -2.0 - 3.0 mmol/L    POCT HCO3 Calculated, Venous 16.4 (L) 22.0 - 26.0 mmol/L    POCT Hemoglobin, Venous 10.0 (L) 13.5 - 17.5 g/dL    POCT Anion Gap, Venous 18.0 10.0 - 25.0 mmol/L    Patient Temperature 37.0 degrees Celsius    FiO2 60 %   Blood Gas Lactic Acid, Venous   Result Value Ref Range    POCT Lactate, Venous 7.4 (HH) 0.4 - 2.0 mmol/L   Renal Function Panel   Result Value Ref Range    Glucose 121 (H) 74 - 99 mg/dL    Sodium 138 136 - 145 mmol/L    Potassium 5.5 (H) 3.5 - 5.3 mmol/L    Chloride 108 (H) 98 - 107 mmol/L    Bicarbonate 16 (L) 21 - 32 mmol/L    Anion Gap 20 10 - 20 mmol/L    Urea Nitrogen 94 (HH) 6 - 23 mg/dL    Creatinine 3.70 (H) 0.50 - 1.30 mg/dL    eGFR 16 (L) >60 mL/min/1.73m*2    Calcium 8.6 8.6 - 10.3 mg/dL    Phosphorus 5.7 (H) 2.5 - 4.9 mg/dL    Albumin 3.6 3.4 - 5.0 g/dL   Lactate   Result Value Ref Range    Lactate 4.3 (HH) 0.4 - 2.0 mmol/L     *Note: Due to a large number of results and/or encounters for the requested time period, some results have not been displayed. A complete set of results can be found in Results Review.       DATA:   Diagnostic tests reviewed for today's visit:    Labs and meds    ASSESSMENT AND PLAN:  - TAYLOR on CKD stage 3b (baseline Scr ~1.9): TAYLOR hemodynamically induced in setting of tachycardia/hypotension/obligatory use of diuretics to treat  CHF  [place] UA on admission  Rising Scr, oliguric, thibodeaux in place    BP: improved  hypotension, had diuretics on hold yesterday, still hypervolemic  AGMA and hyperkalemia; improving with  medical tx, getting lokelma     PLAN:  - agree with lasix bolus and metolazone, to start lasix drip, keep lokalma, redosing iv bicarbonate  - at risk of needing dialysis, discussed with pt who is confuse, trying to reach wife but unable to do so, discussed with  ICU team, following labs and diuretic response     Greatly appreciate the opportunity to assist in the care of this patient. Will continue to follow.     Signature: Blake Miller MD  Division of Nephrology and Hypertension

## 2025-04-06 NOTE — H&P (VIEW-ONLY)
Inpatient consult to Pulmonology  Consult performed by: Alfonzo Drew MD  Consult ordered by: Lyn Fragoso, APRN-CNP, DNP    Reason For Consult  Hypoxia  History Of Present Illness  Ritesh Garza is a 74 y.o. male with CAD s/p CABG in 1992 c/b NSTEMI, HFrEF 2/2 ICM, HTN, DLP, VT s/p ICD, CAP s/p L subclavian stent, asthma, SENIA, CKD III, BPH, GERD, GI bleed, SIDHU cirrhosis c/b portal hypertension, T2DM, gout,  seizure, depression/anxiety, who p/w SOB x 2 weeks to his cardiologist. Found to have wide complex tachycardia, subsequently was sent to ED for further management. In the ED found to be tachycardic, but stable BP. Work up revealed BNP 3100s, TSH 15, WBC 11, and mild interstitial edema and b/l pleural effusion on chest imaging. Also found to be acidotic, started on BiPAP and admitted to ICU for further management. Improved and is downgraded to medical floor. However developed AMS, lactic acidosis and increased O2 requirement and transferred back to the ICU. Pulmonary is consulted to hypoxia.   Patient is currently confused and the history is unreliable. No family to assist.  Complains of increased SOB x few months. Sudden onset, progressive. Mostly with exertion. MORRIS after walking 1 city blocks. Associated with wheezing and CP. +ve orthopnea, and PND. No LE edema.   +ve mild cough, at time productive of dark sputum. No hemoptysis.  Full ROS as below.   Past Medical History  Past Medical History:   Diagnosis Date    ADHD (attention deficit hyperactivity disorder) 1960’s    AICD (automatic cardioverter/defibrillator) present 05/30/2024    [facility]    Asthma     Benign prostatic hyperplasia Several years ago    CHF (congestive heart failure)     Cirrhosis (Multi)     CKD (chronic kidney disease)     Coronary artery disease Long Ago    Diabetes mellitus (Multi) Over 20 years ago    Erectile dysfunction     GERD (gastroesophageal reflux disease)     Gout     Hypertension Over 20 years ago    Ischemic  cardiomyopathy     Kidney stone Several years ago    Myocardial infarction (Multi) Over 20 yesrs ago    SIDHU (nonalcoholic steatohepatitis)     SENIA (obstructive sleep apnea)     Stenosis of left subclavian artery     s/p stent 4/30/2024    Ventricular tachycardia (Multi)    Surgical History  Past Surgical History:   Procedure Laterality Date    ADENOIDECTOMY  Childhood    CARDIAC CATHETERIZATION  Over 30 years ago    CARDIAC CATHETERIZATION N/A 04/15/2024    Procedure: Left And Right Heart Cath, With LV;  Surgeon: Blaine Adams MD;  Location: [facility];  Service: Cardiovascular;  Laterality: N/A;  3 hr hydration / arrive 6:30    CARDIAC DEFIBRILLATOR PLACEMENT  05/30/2024    [facility]    CARDIAC ELECTROPHYSIOLOGY PROCEDURE N/A 05/30/2024    Procedure: ICD Dual Implant;  Surgeon: Mina Kiran MD;  Location: [facility];  Service: Electrophysiology;  Laterality: N/A;  Dual chamber ICD, [facility]  notified [facility] 5/24/24    CERVICAL FUSION  07/03/2024    C4-7    CHOLECYSTECTOMY  Over 30 years ago    CIRCUMCISION, PRIMARY  72,years ago    CORONARY ARTERY BYPASS GRAFT  1992    EYE SURGERY  Childhood    INVASIVE VASCULAR PROCEDURE Left 04/30/2024    Procedure: Angioplasty - Upper Extremity;  Surgeon: Narinder Quevedo MD;  Location: [facility];  Service: Cardiovascular;  Laterality: Left;  3 hr hydration  / arrive 6:30    MR HEAD ANGIO WO IV CONTRAST  06/02/2021    MR HEAD ANGIO WO IV CONTRAST 6/2/2021 [facility]    MR HEAD ANGIO WO IV CONTRAST  11/16/2021    MR HEAD ANGIO WO IV CONTRAST 11/16/2021 [facility]    MR NECK ANGIO WO IV CONTRAST  06/02/2021    MR NECK ANGIO WO IV CONTRAST 6/2/2021 [facility]    MR NECK ANGIO WO IV CONTRAST  11/16/2021    MR NECK ANGIO WO IV CONTRAST 11/16/2021 [facility]    TONSILLECTOMY  1950’s   Social History  Social History     Tobacco Use    Smoking status: Never     Passive exposure: Never    Smokeless  tobacco: Never   Vaping Use    Vaping status: Never Used   Substance Use Topics    Alcohol use: Never    Drug use: Never   Family History  Family History   Problem Relation Name Age of Onset    Diabetes Mother Sofia Garza     Other (mycoardial infarction) Father Hilario 46    Fainting Father Hilario     Heart disease Father Hilario     Atrial fibrillation Father Hilario     Stomach cancer Mother's Sister Fadumo Keller     Stroke Maternal Grandmother Jasmina Mace     Colon cancer Maternal Grandmother Jasmina Mace      Current Outpatient Medications   Medication Instructions    albuterol (ProAir HFA) 90 mcg/actuation inhaler 2 puffs, inhalation, Every 6 hours PRN    allopurinol (ZYLOPRIM) 100 mg, oral, Daily    amLODIPine (NORVASC) 10 mg, oral, Daily    aspirin 81 mg, oral, Daily    atorvastatin (LIPITOR) 80 mg, oral, Daily    coenzyme Q-10 (CoQ-10) 100 mg capsule 1 capsule, Daily    cyanocobalamin (Vitamin B-12) 1,000 mcg tablet 1 tablet, Daily    dapagliflozin (Farxiga) 10 mg 1 tablet, Daily before evening meal    famotidine (PEPCID) 20 mg, oral, Daily PRN    furosemide (LASIX) 20 mg, oral, Every other day    gabapentin (NEURONTIN) 100 mg, oral, Nightly    icosapent ethyL (VASCEPA) 1 g, oral, 2 times daily (morning and late afternoon)    levothyroxine (SYNTHROID, LEVOXYL) 50 mcg, oral, Daily, Take on an empty stomach at the same time each day, either 30 to 60 minutes prior to breakfast    loratadine (CLARITIN) 10 mg, 2 times daily    metoprolol succinate XL (TOPROL-XL) 50 mg, oral, Daily    multivitamin tablet 1 tablet, Daily    pantoprazole (PROTONIX) 40 mg, oral, 2 times daily, Do not crush, chew, or split.    sacubitriL-valsartan (Entresto) 24-26 mg tablet 0.5 tablets, oral, 2 times daily    sertraline (Zoloft) 50 mg tablet TAKE ONE TABLET DAILY IN THE MORNING AFTER BREAKFAST.    sildenafil (VIAGRA) 100 mg, oral, As needed   Allergies  Patient has no known allergies.  Review of Systems   Unable to perform ROS:  Mental status change (very confused, at times falls sleep during the interview. Cannot provide reliable ROS)   Constitutional:  Positive for appetite change, fatigue and unexpected weight change (weight loss). Negative for chills and fever.   HENT:  Positive for congestion and rhinorrhea. Negative for sinus pressure, sinus pain and sore throat.    Eyes:  Positive for redness, itching and visual disturbance. Negative for pain.   Respiratory:  Positive for cough, chest tightness, shortness of breath and wheezing.    Cardiovascular:  Positive for chest pain. Negative for leg swelling.   Gastrointestinal:  Positive for constipation and nausea. Negative for abdominal pain, diarrhea and vomiting.   Genitourinary:  Negative for dysuria and hematuria.   Musculoskeletal:  Positive for arthralgias, back pain and myalgias. Negative for neck pain.   Skin:  Negative for pallor, rash and wound.   Neurological:  Negative for dizziness, light-headedness and headaches.   Psychiatric/Behavioral:  Positive for confusion.    Scheduled medications  [Held by provider] allopurinol, 100 mg, oral, Daily  [Held by provider] amLODIPine, 10 mg, oral, Daily  aspirin, 81 mg, oral, Daily  atorvastatin, 80 mg, oral, Daily  cetirizine, 10 mg, oral, Daily  cyanocobalamin, 1,000 mcg, oral, Daily  [Held by provider] dapagliflozin propanediol, 10 mg, oral, Daily before evening meal  [Held by provider] furosemide, 40 mg, intravenous, q12h  gabapentin, 100 mg, oral, Nightly  heparin, 5,000 Units, subcutaneous, TID  icosapent ethyL, 1 g, oral, BID  insulin lispro, 0-10 Units, subcutaneous, Before meals & nightly  ipratropium-albuteroL, 3 mL, nebulization, 4x daily  levothyroxine, 50 mcg, oral, Daily  [Held by provider] metoprolol succinate XL, 50 mg, oral, Daily  metoprolol tartrate, 25 mg, oral, q8h  sodium zirconium cyclosilicate, 10 g, oral, TID    Continuous medications  furosemide, 20 mg/hr, Last Rate: 20 mg/hr (04/06/25 4378)    PRN medications  PRN  "medications: dextrose, dextrose, famotidine, glucagon, glucagon, ipratropium-albuteroL, nitroglycerin, oxyCODONE, oxygen   Physical Exam  Constitutional:       General: He is not in acute distress.     Appearance: He is obese. He is ill-appearing. He is not toxic-appearing.   HENT:      Head: Normocephalic and atraumatic.      Nose:      Comments: On 8L NC     Mouth/Throat:      Mouth: Mucous membranes are moist.      Comments: Mallampati 3-4  Eyes:      General: No scleral icterus.     Extraocular Movements: Extraocular movements intact.      Pupils: Pupils are equal, round, and reactive to light.   Cardiovascular:      Rate and Rhythm: Regular rhythm. Tachycardia present.      Heart sounds: No murmur heard.     No friction rub. No gallop.   Pulmonary:      Effort: Pulmonary effort is normal. No respiratory distress.      Breath sounds: No wheezing or rales.      Comments: Decreased breath sounds at the bases.   Abdominal:      General: Bowel sounds are normal. There is no distension.      Palpations: Abdomen is soft.      Tenderness: There is no abdominal tenderness.   Musculoskeletal:         General: No tenderness. Normal range of motion.      Cervical back: Normal range of motion and neck supple. No rigidity or tenderness.      Right lower leg: No edema.      Left lower leg: No edema.   Lymphadenopathy:      Cervical: No cervical adenopathy.   Skin:     General: Skin is warm and dry.      Coloration: Skin is not jaundiced.   Neurological:      General: No focal deficit present.      Cranial Nerves: No cranial nerve deficit.      Motor: No weakness.      Comments: Very sleepy but when aroused oriented x 3.    Psychiatric:         Mood and Affect: Mood normal.         Behavior: Behavior normal.     Vital Signs  Blood pressure 104/73, pulse 107, temperature 36.8 °C (98.2 °F), temperature source Temporal, resp. rate 15, height 1.6 m (5' 3\"), weight 66.7 kg (147 lb 0.8 oz), SpO2 100%.  Oxygen Therapy  SpO2: 100 " %  Medical Gas Therapy: Supplemental oxygen  Medical Gas Delivery Method: High flow nasal cannula  FiO2 (%): 44 %  Relevant Results  XR chest 1 view 04/06/2025    Narrative  Interpreted By:  Anette Borjas,  STUDY:  XR CHEST 1 VIEW;  4/6/2025 8:41 am    INDICATION:  Signs/Symptoms:Hypoxia.    COMPARISON:  04/06/2025    ACCESSION NUMBER(S):  [id number]    ORDERING CLINICIAN:  JOSH ZAFAR    FINDINGS:  The inspiration is suboptimal. The cardiac silhouette appears  enlarged. There is a multi lead transvenous pacemaker/AICD device.    There is no discrete consolidation or pleural fluid. There is slight  elevation of the left hemidiaphragm.    Sternal wires and mediastinal clips are present. The patient is  status post cervical spine fusion.    COMPARISON OF FINDING:  The chest is similar    Impression  Limited inspiration. Enlarged cardiac silhouette.    MACRO:  none    Signed by: Anette Borjas 4/6/2025 10:30 AM  Dictation workstation:   [id number]    Results for orders placed or performed during the hospital encounter of 04/02/25 (from the past 24 hours)   POCT GLUCOSE   Result Value Ref Range    POCT Glucose 163 (H) 74 - 99 mg/dL   POCT GLUCOSE   Result Value Ref Range    POCT Glucose 155 (H) 74 - 99 mg/dL   Troponin I, High Sensitivity   Result Value Ref Range    Troponin I, High Sensitivity 107 (HH) 0 - 20 ng/L   Basic Metabolic Panel   Result Value Ref Range    Glucose 127 (H) 74 - 99 mg/dL    Sodium 136 136 - 145 mmol/L    Potassium 5.8 (H) 3.5 - 5.3 mmol/L    Chloride 106 98 - 107 mmol/L    Bicarbonate 18 (L) 21 - 32 mmol/L    Anion Gap 18 10 - 20 mmol/L    Urea Nitrogen 91 (HH) 6 - 23 mg/dL    Creatinine 3.37 (H) 0.50 - 1.30 mg/dL    eGFR 18 (L) >60 mL/min/1.73m*2    Calcium 9.3 8.6 - 10.3 mg/dL   Magnesium   Result Value Ref Range    Magnesium 2.21 1.60 - 2.40 mg/dL   Blood Gas Arterial Full Panel   Result Value Ref Range    POCT pH, Arterial 7.31 (L) 7.38 - 7.42 pH    POCT pCO2, Arterial 25 (L) 38 - 42 mm  Hg    POCT pO2, Arterial 80 (L) 85 - 95 mm Hg    POCT SO2, Arterial 97 94 - 100 %    POCT Oxy Hemoglobin, Arterial 94.0 94.0 - 98.0 %    POCT Hematocrit Calculated, Arterial 32.0 (L) 41.0 - 52.0 %    POCT Sodium, Arterial 132 (L) 136 - 145 mmol/L    POCT Potassium, Arterial 7.3 (HH) 3.5 - 5.3 mmol/L    POCT Chloride, Arterial 103 98 - 107 mmol/L    POCT Ionized Calcium, Arterial 1.14 1.10 - 1.33 mmol/L    POCT Glucose, Arterial 120 (H) 74 - 99 mg/dL    POCT Lactate, Arterial 6.7 (HH) 0.4 - 2.0 mmol/L    POCT Base Excess, Arterial -12.2 (L) -2.0 - 3.0 mmol/L    POCT HCO3 Calculated, Arterial 12.6 (L) 22.0 - 26.0 mmol/L    POCT Hemoglobin, Arterial 10.5 (L) 13.5 - 17.5 g/dL    POCT Anion Gap, Arterial 24 10 - 25 mmo/L    Patient Temperature 37.0 degrees Celsius    FiO2 60 %    Site of Arterial Puncture Radial Left     Kaden's Test Positive    POCT GLUCOSE   Result Value Ref Range    POCT Glucose 123 (H) 74 - 99 mg/dL   CBC   Result Value Ref Range    WBC 18.6 (H) 4.4 - 11.3 x10*3/uL    nRBC 0.3 (H) 0.0 - 0.0 /100 WBCs    RBC 4.69 4.50 - 5.90 x10*6/uL    Hemoglobin 10.8 (L) 13.5 - 17.5 g/dL    Hematocrit 38.5 (L) 41.0 - 52.0 %    MCV 82 80 - 100 fL    MCH 23.0 (L) 26.0 - 34.0 pg    MCHC 28.1 (L) 32.0 - 36.0 g/dL    RDW 19.0 (H) 11.5 - 14.5 %    Platelets 338 150 - 450 x10*3/uL   Comprehensive metabolic panel   Result Value Ref Range    Glucose 112 (H) 74 - 99 mg/dL    Sodium 136 136 - 145 mmol/L    Potassium 7.1 (HH) 3.5 - 5.3 mmol/L    Chloride 106 98 - 107 mmol/L    Bicarbonate 12 (L) 21 - 32 mmol/L    Anion Gap 25 (H) 10 - 20 mmol/L    Urea Nitrogen 92 (HH) 6 - 23 mg/dL    Creatinine 3.60 (H) 0.50 - 1.30 mg/dL    eGFR 17 (L) >60 mL/min/1.73m*2    Calcium 9.0 8.6 - 10.3 mg/dL    Albumin 4.0 3.4 - 5.0 g/dL    Alkaline Phosphatase 122 33 - 136 U/L    Total Protein 6.5 6.4 - 8.2 g/dL     (H) 9 - 39 U/L    Bilirubin, Total 0.9 0.0 - 1.2 mg/dL     (H) 10 - 52 U/L   Troponin I, High Sensitivity   Result Value  Ref Range    Troponin I, High Sensitivity 161 (HH) 0 - 20 ng/L   Lactate   Result Value Ref Range    Lactate 5.4 (HH) 0.4 - 2.0 mmol/L   Magnesium   Result Value Ref Range    Magnesium 2.45 (H) 1.60 - 2.40 mg/dL   Green Top   Result Value Ref Range    Extra Tube Hold for add-ons.    B-type natriuretic peptide   Result Value Ref Range    BNP 3,082 (H) 0 - 99 pg/mL   Blood Gas Venous Full Panel   Result Value Ref Range    POCT pH, Venous 7.21 (LL) 7.33 - 7.43 pH    POCT pCO2, Venous 41 41 - 51 mm Hg    POCT pO2, Venous 40 35 - 45 mm Hg    POCT SO2, Venous 51 45 - 75 %    POCT Oxy Hemoglobin, Venous 49.4 45.0 - 75.0 %    POCT Hematocrit Calculated, Venous 30.0 (L) 41.0 - 52.0 %    POCT Sodium, Venous 133 (L) 136 - 145 mmol/L    POCT Potassium, Venous 6.7 (HH) 3.5 - 5.3 mmol/L    POCT Chloride, Venous 105 98 - 107 mmol/L    POCT Ionized Calicum, Venous 1.25 1.10 - 1.33 mmol/L    POCT Glucose, Venous 179 (H) 74 - 99 mg/dL    POCT Lactate, Venous 7.4 (HH) 0.4 - 2.0 mmol/L    POCT Base Excess, Venous -10.8 (L) -2.0 - 3.0 mmol/L    POCT HCO3 Calculated, Venous 16.4 (L) 22.0 - 26.0 mmol/L    POCT Hemoglobin, Venous 10.0 (L) 13.5 - 17.5 g/dL    POCT Anion Gap, Venous 18.0 10.0 - 25.0 mmol/L    Patient Temperature 37.0 degrees Celsius    FiO2 60 %   Blood Gas Lactic Acid, Venous   Result Value Ref Range    POCT Lactate, Venous 7.4 (HH) 0.4 - 2.0 mmol/L   Renal Function Panel   Result Value Ref Range    Glucose 121 (H) 74 - 99 mg/dL    Sodium 138 136 - 145 mmol/L    Potassium 5.5 (H) 3.5 - 5.3 mmol/L    Chloride 108 (H) 98 - 107 mmol/L    Bicarbonate 16 (L) 21 - 32 mmol/L    Anion Gap 20 10 - 20 mmol/L    Urea Nitrogen 94 (HH) 6 - 23 mg/dL    Creatinine 3.70 (H) 0.50 - 1.30 mg/dL    eGFR 16 (L) >60 mL/min/1.73m*2    Calcium 8.6 8.6 - 10.3 mg/dL    Phosphorus 5.7 (H) 2.5 - 4.9 mg/dL    Albumin 3.6 3.4 - 5.0 g/dL   Troponin I, High Sensitivity   Result Value Ref Range    Troponin I, High Sensitivity 138 (HH) 0 - 20 ng/L   Lactate    Result Value Ref Range    Lactate 4.3 (HH) 0.4 - 2.0 mmol/L   POCT GLUCOSE   Result Value Ref Range    POCT Glucose 126 (H) 74 - 99 mg/dL   Protein, Urine Random   Result Value Ref Range    Total Protein, Urine Random 72 (H) 5 - 25 mg/dL    Creatinine, Urine Random 136.0 20.0 - 370.0 mg/dL    T. Protein/Creatinine Ratio 0.53 (H) 0.00 - 0.17 mg/mg Creat   Sodium, Urine Random   Result Value Ref Range    Sodium, Urine Random 46 mmol/L    Creatinine, Urine Random 136.0 20.0 - 370.0 mg/dL    Sodium/Creatinine Ratio 34 Not established. mmol/g Creat     *Note: Due to a large number of results and/or encounters for the requested time period, some results have not been displayed. A complete set of results can be found in Results Review.   Assessment/Plan   74 YOM with CAD s/p CABG in 1992 c/b NSTEMI, HFrEF 2/2 ICM, HTN, DLP, VT s/p ICD, CAP s/p L subclavian stent, asthma, SENIA, CKD III, BPH, GERD, GI bleed, SIDHU cirrhosis c/b portal hypertension, T2DM, gout,  seizure, depression/anxiety, who p/w SOB x 2 weeks to his cardiologist. Found to have wide complex tachycardia, subsequently was sent to ED for further management. In the ED found to be tachycardic, but stable BP. Work up revealed BNP 3100s, TSH 15, WBC 11, and mild interstitial edema and b/l pleural effusion on chest imaging. Also found to be acidotic, started on BiPAP and admitted to ICU for further management. Improved and is downgraded to medical floor. However developed AMS, lactic acidosis and increased O2 requirement and transferred back to the ICU. Pulmonary is consulted to hypoxia.     Respiratory failure: acute with hypoxia and hypercarbia. Due to the followings. Overall is slightly improving.        Continue supplemental O2, wean off as tolerates       BPH with IS, acapella if able to participate       Home O2 evaluation before DC    HFrEF: with acute exacerbation, given BNP 3100s and evidence of volume overload on chest imaging. Echo in 12/2024 showed: EF  33% and RVSP 40. Pulmonary HTN mild and probably group 2 +/- joi-pulmonary HTN. Also with small b/l pleural effusion, but no previous analysis in our EMR       Diuresis as per primary team       If worsening pleural effusion might need therapeutic/diagnostic thoracentesis.        Would benefit from repeat echo    Asthma: does not seem to be in acute exacerbation. On albuterol as outpatient       Continue Duo-Neb       Resume albuterol on DC       FU with pulmonary after discharge    SENIA: unclear if on treatment.         Consider starting on CPAP 8 at nights if can tolerate        FU with pulmonary/sleep after DC    DVT  prophylaxis: on subcutaneous heparin.     Thank you for the consult.  Pulmonary will FU while in house.   Alfonzo Drew MD

## 2025-04-06 NOTE — SIGNIFICANT EVENT
Rapid response called this morning for acute mental status change. Bedside RN reported that pt said he had chest pain this morning then began becoming lethargic with cold extremities. Overnight, lasix was given got K 5.8 but K was never rechecked.     Patient with eyes closed but arousable to name. AAOx1, able to give me and spell his name but not able to answer other questions. My targeted physical exam was remarkable for cold extremities but no significant concerns with pulm, cardio, or abd exam. Able to follow commands. EKG was concerning for evolution of peaked T waves with loss of P waves with high concern for worsening hyperK. K cocktail ordered- bicarb push, insulin/dextrose, albuterol, Ca gluc all ordered. [facility] labs ordered with ABG full panel- which soonafter resulted with criticals K 7.3, lactate 6.7. 7.31/25/80.     I was also told that pt desatted overnight from 2L -> 6L -> 10L upon my arrival. CXR was done overnight when pt was requiring 6L which showed resolving and residual opacities. We repeated another CXR given the further desaturations, and upon my review on the cart, did not show any significant findings, able to visualize bilateral costophrenic angles. Wondering if pt is a mouth breather who would benefit from tented mask. Was satting 94% on ABG on 10L. With cold extremities, sensor placed on ear but poor wave form to get pulse ox.     Full panel VBG ordered after with worsening pH, increasing lactate, but slight decrease in K though still elevated.     Discussed with Dr. Peña ICU who accepted him. No further interventions at this time. Dr. Yanez, primary, updated.    Naomi Jay MD

## 2025-04-06 NOTE — PROGRESS NOTES
[facility] Critical Care Medicine       Date:  4/6/2025  Patient:  Ritesh Garza  YOB: 1950  MRN:  [id number]   Admit Date:  4/2/2025  Hospital Length of Stay: 4   ICU Length of Stay: 2d 5h     Chief Complaint   Patient presents with    Shortness of Breath         History of Present Illness:  Ritesh Garza is a 74 y.o. year old male patient with   previous medical history of seizures, CAD (hx of CABG 2021), recent NSTEMI, VT with ICD placement, HFrEF 30-35% (4/2024), PAD, Subclavian Artery Stenosis, HTN, HLD,  DM2, CKD,  SENIA, Liver Cirrhosis multiple falls ( July 2023) Central cord compression s/p C4-c7 Decompression, GIB (12/2024)  2/2 Duodenal Ulcer ( ATC was stopped) who presented to his cardiology appointment on 4/2 at which time he was redirected to [facility] due to shortness of breath x2 weeks, exacerbated with movement. His cardiologist visit showed Wide complex tachycardia to the 120's He initially was admitted to the ICU where he was treated and transferred to the step-down unit for ongoing management.     Today, rapid response was called for altered mentation.   He was found to have hyperkalemia with increased oxygen requirements, worsening lactate, and pH.  He was transferred to the ICU for ongoing management.        Interval ICU Events:  -Rapid response transferred to the ICU    Subjective   Medical History:  Past Medical History:   Diagnosis Date    ADHD (attention deficit hyperactivity disorder) 1960’s    AICD (automatic cardioverter/defibrillator) present 05/30/2024    [facility]    Asthma     Benign prostatic hyperplasia Several years ago    CHF (congestive heart failure)     Cirrhosis (Multi)     CKD (chronic kidney disease)     Coronary artery disease Long Ago    Diabetes mellitus (Multi) Over 20 years ago    Erectile dysfunction     GERD (gastroesophageal reflux disease)     Gout     Hypertension Over 20 years ago    Ischemic cardiomyopathy     Kidney stone Several  years ago    Myocardial infarction (Multi) Over 20 yesrs ago    SIDHU (nonalcoholic steatohepatitis)     SENIA (obstructive sleep apnea)     Stenosis of left subclavian artery     s/p stent 4/30/2024    Ventricular tachycardia (Multi)      Past Surgical History:   Procedure Laterality Date    ADENOIDECTOMY  Childhood    CARDIAC CATHETERIZATION  Over 30 years ago    CARDIAC CATHETERIZATION N/A 04/15/2024    Procedure: Left And Right Heart Cath, With LV;  Surgeon: Blaine Adams MD;  Location: [facility];  Service: Cardiovascular;  Laterality: N/A;  3 hr hydration / arrive 6:30    CARDIAC DEFIBRILLATOR PLACEMENT  05/30/2024    [facility]    CARDIAC ELECTROPHYSIOLOGY PROCEDURE N/A 05/30/2024    Procedure: ICD Dual Implant;  Surgeon: Mina Kiran MD;  Location: [facility];  Service: Electrophysiology;  Laterality: N/A;  Dual chamber ICD, [facility]  notified [facility] 5/24/24    CERVICAL FUSION  07/03/2024    C4-7    CHOLECYSTECTOMY  Over 30 years ago    CIRCUMCISION, PRIMARY  74,years ago    CORONARY ARTERY BYPASS GRAFT  1992    EYE SURGERY  Childhood    INVASIVE VASCULAR PROCEDURE Left 04/30/2024    Procedure: Angioplasty - Upper Extremity;  Surgeon: Narinder Quevedo MD;  Location: [facility];  Service: Cardiovascular;  Laterality: Left;  3 hr hydration  / arrive 6:30    MR HEAD ANGIO WO IV CONTRAST  06/02/2021    MR HEAD ANGIO WO IV CONTRAST 6/2/2021 [facility] CLINICAL LEGACY    MR HEAD ANGIO WO IV CONTRAST  11/16/2021    MR HEAD ANGIO WO IV CONTRAST 11/16/2021 [facility] CLINICAL LEGACY    MR NECK ANGIO WO IV CONTRAST  06/02/2021    MR NECK ANGIO WO IV CONTRAST 6/2/2021 [facility] CLINICAL LEGACY    MR NECK ANGIO WO IV CONTRAST  11/16/2021    MR NECK ANGIO WO IV CONTRAST 11/16/2021 [facility] CLINICAL LEGACY    TONSILLECTOMY  1950’s     Medications Prior to Admission   Medication Sig Dispense Refill Last Dose/Taking    albuterol (ProAir HFA) 90 mcg/actuation inhaler Inhale 2 puffs every 6 hours if needed  for wheezing or shortness of breath. 18 g 11 Unknown    allopurinol (Zyloprim) 100 mg tablet Take 1 tablet (100 mg) by mouth once daily. 90 tablet 1 Unknown    amLODIPine (Norvasc) 10 mg tablet TAKE 1 TABLET BY MOUTH EVERY DAY 90 tablet 1 Unknown    aspirin 81 mg EC tablet TAKE 1 TABLET BY MOUTH EVERY DAY 90 tablet 3 Unknown    atorvastatin (Lipitor) 80 mg tablet TAKE 1 TABLET BY MOUTH EVERY DAY 90 tablet 3 Unknown    coenzyme Q-10 (CoQ-10) 100 mg capsule Take 1 capsule (100 mg) by mouth once daily.   Unknown    cyanocobalamin (Vitamin B-12) 1,000 mcg tablet Take 1 tablet (1,000 mcg) by mouth once daily. As directed   Unknown    dapagliflozin (Farxiga) 10 mg Take 1 tablet (10 mg) by mouth once daily in the evening.  Take before meals.   Unknown    famotidine (Pepcid) 20 mg tablet TAKE 1 TABLET (20 MG) BY MOUTH ONCE DAILY AS NEEDED FOR INDIGESTION. 90 tablet 1 Unknown    furosemide (Lasix) 20 mg tablet Take 1 tablet (20 mg) by mouth every other day.   Unknown    gabapentin (Neurontin) 100 mg capsule TAKE 1 CAPSULE (100 MG) BY MOUTH ONCE DAILY AT BEDTIME. 90 capsule 1 Unknown    icosapent ethyL (Vascepa) 0.5 gram capsule Take 2 capsules (1 g) by mouth 2 times a day with meals. 120 capsule 11 Unknown    levothyroxine (Synthroid, Levoxyl) 50 mcg tablet TAKE 1 TABLET (50 MCG) BY MOUTH EARLY IN THE MORNING.. TAKE ON AN EMPTY STOMACH AT THE SAME TIME EACH DAY, EITHER 30 TO 60 MINUTES PRIOR TO BREAKFAST 30 tablet 0 Unknown    loratadine (Claritin) 10 mg tablet Take 1 tablet (10 mg) by mouth 2 times a day. Take 1 tablet every morning and 1 tablet every evening   Unknown    metoprolol succinate XL (Toprol-XL) 50 mg 24 hr tablet TAKE 1 TABLET BY MOUTH EVERY DAY 90 tablet 3 Unknown    multivitamin tablet Take 1 tablet by mouth once daily.   Unknown    pantoprazole (ProtoNix) 40 mg EC tablet Take 1 tablet (40 mg) by mouth 2 times a day. Do not crush, chew, or split. 60 tablet 0 Unknown    sacubitriL-valsartan (Entresto) 24-26 mg  tablet Take 0.5 tablets by mouth 2 times a day. 30 tablet 11 Unknown    sertraline (Zoloft) 50 mg tablet TAKE ONE TABLET DAILY IN THE MORNING AFTER BREAKFAST. (Patient taking differently: Take 1 tablet (50 mg) by mouth once daily at bedtime. Take one tablet daily in the morning after breakfast.) 90 tablet 1 Unknown    sildenafil (Viagra) 100 mg tablet Take 1 tablet (100 mg) by mouth if needed for erectile dysfunction (Start with half tab. Take 1 hour prior to intercourse on an empty stomach. If you have an erection for over 4 hours, please go to the emergency room.). 30 tablet 6 Unknown     Patient has no known allergies.  Social History     Tobacco Use    Smoking status: Never     Passive exposure: Never    Smokeless tobacco: Never   Vaping Use    Vaping status: Never Used   Substance Use Topics    Alcohol use: Never    Drug use: Never     Family History   Problem Relation Name Age of Onset    Diabetes Mother Sofia Garza     Other (mycoardial infarction) Father Hilario 46    Fainting Father Hilario     Heart disease Father Hilario     Atrial fibrillation Father Hilario     Stomach cancer Mother's Sister Fadumo Keller     Stroke Maternal Grandmother Jasmina Mace     Colon cancer Maternal Grandmother Jasmina Mace           [facility] Medications:    dextrose 10 % in water (D10W), 50 mL/hr          Current Facility-Administered Medications:     [Held by provider] allopurinol (Zyloprim) tablet 100 mg, 100 mg, oral, Daily, Chris Mancera, APRN-CNP    [Held by provider] amLODIPine (Norvasc) tablet 10 mg, 10 mg, oral, Daily, Rosibel Pickard MD, 10 mg at 04/05/25 1010    aspirin EC tablet 81 mg, 81 mg, oral, Daily, Rosibel Pickard MD, 81 mg at 04/05/25 1010    atorvastatin (Lipitor) tablet 80 mg, 80 mg, oral, Daily, Rosibel Pickard MD, 80 mg at 04/05/25 1010    cetirizine (ZyrTEC) tablet 10 mg, 10 mg, oral, Daily, Rosibel Pickard MD, 10 mg at 04/05/25 1010    cyanocobalamin (Vitamin B-12) tablet 1,000 mcg,  1,000 mcg, oral, Daily, Rosibel Pickard MD, 1,000 mcg at 04/05/25 1010    [Held by provider] dapagliflozin propanediol (Farxiga) tablet 10 mg, 10 mg, oral, Daily before evening meal, Chris Mancera, APRN-CNP    [COMPLETED] insulin regular (HumuLIN R,NovoLIN R) injection 10 Units, 10 Units, intravenous, Once, 10 Units at 04/06/25 0822 **FOLLOWED BY** [COMPLETED] dextrose 50 % injection 25 g, 25 g, intravenous, Once, 25 g at 04/06/25 0821 **FOLLOWED BY** dextrose 10 % in water (D10W) infusion, 50 mL/hr, intravenous, Continuous, Naomi Jay MD    dextrose 50 % injection 12.5 g, 12.5 g, intravenous, q15 min PRN, Rosibel Pickard MD    dextrose 50 % injection 25 g, 25 g, intravenous, q15 min PRN, Rosibel Pickard MD    famotidine (Pepcid) tablet 20 mg, 20 mg, oral, Daily PRN, Rosibel Pickard MD    [Held by provider] furosemide (Lasix) injection 40 mg, 40 mg, intravenous, q12h, Rosibel Pickard MD, 40 mg at 04/05/25 1010    gabapentin (Neurontin) capsule 100 mg, 100 mg, oral, Nightly, Rosibel Pickard MD, 100 mg at 04/05/25 2013    glucagon (Glucagen) injection 1 mg, 1 mg, intramuscular, q15 min PRN, Rosibel Pickard MD    glucagon (Glucagen) injection 1 mg, 1 mg, intramuscular, q15 min PRN, Rosibel Pickard MD    heparin (porcine) injection 5,000 Units, 5,000 Units, subcutaneous, TID, Rosibel Pickard MD, 5,000 Units at 04/05/25 2013    icosapent ethyL (Vascepa) capsule 1 g, 1 g, oral, BID, Rosibel Pickard MD, 1 g at 04/05/25 1653    insulin lispro injection 0-10 Units, 0-10 Units, subcutaneous, Before meals & nightly, Rosibel Pickard MD, 2 Units at 04/05/25 221    ipratropium-albuteroL (Duo-Neb) 0.5-2.5 mg/3 mL nebulizer solution 3 mL, 3 mL, nebulization, q2h PRN, Dee Yanez MD    ipratropium-albuteroL (Duo-Neb) 0.5-2.5 mg/3 mL nebulizer solution 3 mL, 3 mL, nebulization, 4x daily, Dee Yanez MD, 3 mL at 04/06/25 0726    levothyroxine (Synthroid, Levoxyl) tablet 50 mcg, 50 mcg, oral, Daily, Rosibel Pickard,  MD, 50 mcg at 04/06/25 0543    [Held by provider] metoprolol succinate XL (Toprol-XL) 24 hr tablet 50 mg, 50 mg, oral, Daily, NORAH Regan-CNP, 50 mg at 04/03/25 0908    metoprolol tartrate (Lopressor) tablet 25 mg, 25 mg, oral, q8h, Blake Miller MD    nitroglycerin (Nitrostat) SL tablet 0.4 mg, 0.4 mg, sublingual, q5 min PRN, Rosibel Pickard MD, 0.4 mg at 04/04/25 0338    oxyCODONE (Roxicodone) immediate release tablet 5 mg, 5 mg, oral, q6h PRN, Benitez Long DO, 5 mg at 04/05/25 2013    oxygen (O2) therapy, , inhalation, Continuous PRN - O2/gases, Naomi Jay MD, 10 L/min at 04/06/25 0800    sodium zirconium cyclosilicate (Lokelma) packet 10 g, 10 g, oral, TID, Blake Miller MD    Review of Systems:  14 point review of systems was completed and negative except for those specially mention in my HPI       Objective     Physical Exam:    Heart Rate:  []   Temp:  [36.3 °C (97.3 °F)-37.1 °C (98.8 °F)]   Resp:  [16-24]   BP: ()/(50-75)   SpO2:  [90 %-98 %]     Physical Exam  Constitutional:       Appearance: He is ill-appearing.   Eyes:      Extraocular Movements: Extraocular movements intact.      Conjunctiva/sclera: Conjunctivae normal.   Cardiovascular:      Rate and Rhythm: Normal rate and regular rhythm.   Pulmonary:      Effort: Pulmonary effort is normal.      Breath sounds: Wheezing present.   Abdominal:      General: Bowel sounds are normal.      Palpations: Abdomen is soft.   Skin:     General: Skin is warm and dry.      Capillary Refill: Capillary refill takes less than 2 seconds.   Neurological:      Mental Status: He is alert. He is disoriented.      Comments: Oriented x2         Objective:    I have reviewed all medications, laboratory results, and imaging pertinent for today's encounter.    FiO2 (%):  [40 %-60 %] 44 %      Intake/Output Summary (Last 24 hours) at 4/6/2025 0902  Last data filed at 4/5/2025 2139  Gross per 24 hour   Intake 106.58 ml   Output 300 ml   Net  -193.42 ml       Daily Labs:  CBC:   Results from last 7 days   Lab Units 04/06/25  0821 04/05/25  0518   WBC AUTO x10*3/uL 18.6* 15.4*   HEMOGLOBIN g/dL 10.8* 9.3*   HEMATOCRIT % 38.5* 32.0*   MCV fL 82 79*     BMP:    Results from last 7 days   Lab Units 04/06/25  0821 04/06/25  0301 04/03/25  0556 04/02/25  1729   SODIUM mmol/L 136 136   < > 138   POTASSIUM mmol/L 7.1* 5.8*   < > 5.1   CHLORIDE mmol/L 106 106   < > 108*   CO2 mmol/L 12* 18*   < > 18*   BUN mg/dL 92* 91*   < > 51*   CREATININE mg/dL 3.60* 3.37*   < > 2.31*   CALCIUM mg/dL 9.0 9.3   < > 9.3   GLUCOSE mg/dL 112* 127*   < > 230*   MAGNESIUM mg/dL  --  2.21  --  2.08    < > = values in this interval not displayed.     ABG:   Results from last 7 days   Lab Units 04/06/25  0806 04/02/25  2142   POCT PH, ARTERIAL pH 7.31* 7.38   POCT PCO2, ARTERIAL mm Hg 25* 28*   POCT PO2, ARTERIAL mm Hg 80* 95   POCT SO2, ARTERIAL % 97 98   POCT HCO3 CALCULATED, ARTERIAL mmol/L 12.6* 16.6*   POCT LACTATE, ARTERIAL mmol/L 6.7* 2.4*      VBG:   Results from last 7 days   Lab Units 04/06/25  0843 04/02/25 2026   POCT PH, VENOUS pH 7.21* 7.28*   POCT PCO2, VENOUS mm Hg 41 44   POCT PO2, VENOUS mm Hg 40 18*   POCT SO2, VENOUS % 51 22*   POCT HCO3 CALCULATED, VENOUS mmol/L 16.4* 20.7*   POCT LACTATE, VENOUS mmol/L 7.4*  7.4* 3.6*                      Assessment/Plan:    I am currently managing this critically ill patient for the following problems:    74 year old male patient with   previous medical history of seizures, CAD (hx of CABG 2021), recent NSTEMI, VT with ICD placement, HFrEF 30-35% (4/2024), PAD, Subclavian Artery Stenosis, HTN, HLD,  DM2, CKD,  SENIA, Liver Cirrhosis multiple falls ( July 2023) Central cord compression s/p C4-c7 Decompression, GIB (12/2024)  2/2 Duodenal Ulcer ( ATC was stopped) who presented to his cardiology appointment on 4/2 at which time he was redirected to [facility] due to shortness of breath x2 weeks, exacerbated with  movement. His cardiologist visit showed Wide complex tachycardia to the 120's He initially was admitted to the ICU where he was treated and transferred to the step-down unit for ongoing management. On 4/6, rapid response was called for altered mentation.   He was found to be hyperkalemic with increased oxygen requirements, worsening lactate, and pH.  He was transferred to the ICU for ongoing management.         Neuro/Psych/Pain Ctrl/Sedation: ADHD  -Pain Control: PRN oxycodone  -CAM Assessment every shift, Promote Sleep/wake hygiene  -Continue gabapentin     Cardiovascular: HFrEF/CAD with prior CABG/VT s/p ICD/PAD/subclavian artery stenosis   -Maintain MAP > 65   -Continuous Cardiac Monitoring   -ASA/lipitor   -Metoprolol tartrate  -Hold home regimen (entresto)    Respiratory/ENT: Asthma/Acute respiratory failure 2/2 volume status  -Maintain SPO2 > 92%  -Promote Pulm Hygiene Daily     Renal/Volume Status (Intra & Extravascular): TAYLOR on CKD/Hyperkalemia   -Worsening TAYLOR in the setting of electrolyte derangements, anticipate potential need to dialysis in the near future.  Will trial lasix drip, metolazone and hyperkalemia protocol in an attempt to support the patient through this TAYLOR  -Strict I&Os   -Lasix administered this AM -->Transition to lasix drip  -Metolazone   -Hyperkalemia protocol   -->Repeat RFP   -Daily BMP, Mag & Phos, Replete electrolytes as indicated   -Repeat lactate  -Nephrology following, appreciate recommendations    GI: Prior GIB/GERD  Diet: Carb control     Endocrine: Hypothyroidism   -POCT Glucose   -SSI  -Synthroid     Infectious Disease: Leukocytosis  -Afebrile  -Blood cultures with NGTDx3 days   - Repeat blood culture pending   -Monitor for SIRS    Heme/Onc: Anemia  -Monitor for s/s of anemia  -CBC daily, Transfuse for Hgb < 7     MSK:  -Padded pressure points  -Skin Protocol per ICU     Ethics/Code Status:  Full Code     :  DVT Prophylaxis: Heparin subcutaneous   Diet: Carb  control   Love: Maintain in the setting of TAYLOR   Dispo: Admit to the ICU       I have reviewed all medications, laboratory results, and imaging pertinent for today's encounter.  Plan Discussed with Dr. Peña   Critical Care Time: 90 minutes          NORAH Alcantara-REYES, DNP

## 2025-04-06 NOTE — NURSING NOTE
Bedside RN called rapid response this morning for increased work of breathing and chest pain reported by patient.

## 2025-04-06 NOTE — NURSING NOTE
Pt transferred from rm 415 to rm 436 on ICU monitor by rapid response RN and charge nurse. Vitals documented. CHG completed. Pt belongings brought with pt.

## 2025-04-06 NOTE — PROGRESS NOTES
Subjective Data:  Patient seen and examined on AM Rounds after Transfer to ICU     Overnight Events:    Overnight, episode of chest pain and reports of Ventricular Tachycardia ( Short, non sustained 3 beat runs) with intermittent paciung.   -- He had Worsening renal function and hyperkalemia, treated with Lasix X1  -- Oxycodone 5 mg Orally administered for pain            Objective Data:  Last Recorded Vitals:  Vitals:    04/06/25 1400 04/06/25 1452 04/06/25 1500 04/06/25 1600   BP: 101/53  104/73 104/69   BP Location:   Right arm    Patient Position:   Lying    Pulse: 108  107 108   Resp: 13  15 13   Temp:   36.8 °C (98.2 °F)    TempSrc:   Temporal    SpO2: 94% 97% 100% 100%   Weight:       Height:           Last Labs:  Results from last 7 days   Lab Units 04/06/25  0821 04/05/25  0518 04/04/25  0519   WBC AUTO x10*3/uL 18.6* 15.4* 15.9*   HEMOGLOBIN g/dL 10.8* 9.3* 9.1*   HEMATOCRIT % 38.5* 32.0* 31.9*   PLATELETS AUTO x10*3/uL 338 247 232     Results from last 7 days   Lab Units 04/06/25  1506 04/06/25  1045 04/06/25  0821 04/03/25  0556 04/02/25  1729   SODIUM mmol/L 139 138 136   < > 138   POTASSIUM mmol/L 5.6* 5.5* 7.1*   < > 5.1   CHLORIDE mmol/L 105 108* 106   < > 108*   CO2 mmol/L 18* 16* 12*   < > 18*   BUN mg/dL 96* 94* 92*   < > 51*   CREATININE mg/dL 3.88* 3.70* 3.60*   < > 2.31*   CALCIUM mg/dL 8.8 8.6 9.0   < > 9.3   PROTEIN TOTAL g/dL  --   --  6.5  --  7.2   BILIRUBIN TOTAL mg/dL  --   --  0.9  --  0.5   ALK PHOS U/L  --   --  122  --  93   ALT U/L  --   --  316*  --  13   AST U/L  --   --  438*  --  18   GLUCOSE mg/dL 177* 121* 112*   < > 230*    < > = values in this interval not displayed.               TROPHS   Date/Time Value Ref Range Status   04/06/2025 10:45  0 - 20 ng/L Final     Comment:     Previous result verified on 4/5/2025 2049 on specimen/case [id number] called with component [facility] for procedure Troponin I, High Sensitivity with value 107 ng/L.   04/06/2025 08:21  0 -  20 ng/L Final     Comment:     Previous result verified on 4/5/2025 2049 on specimen/case [id number] called with component [facility] for procedure Troponin I, High Sensitivity with value 107 ng/L.   04/05/2025 07:18  0 - 20 ng/L Final     BNP   Date/Time Value Ref Range Status   04/06/2025 08:21 AM 3,082 0 - 99 pg/mL Final   04/02/2025 05:29 PM 3,124 0 - 99 pg/mL Final   03/25/2025 08:48 AM 2,349 <100 pg/mL Final     Comment:        BNP levels increase with age in the general  population with the highest values seen in  individuals greater than 75 years of age.  Reference: J. Am. Mellissa. Cardiol. 2002; 40:976-982.          HGBA1C   Date/Time Value Ref Range Status   02/13/2025 01:45 PM 6.2 4.2 - 6.5 % Final   01/11/2024 01:36 PM 6.6 see below % Final   07/31/2023 11:10 AM 7.0 % Final     Comment:          Diagnosis of Diabetes-Adults   Non-Diabetic: < or = 5.6%   Increased risk for developing diabetes: 5.7-6.4%   Diagnostic of diabetes: > or = 6.5%  .       Monitoring of Diabetes                Age (y)     Therapeutic Goal (%)   Adults:          >18           <7.0   Pediatrics:    13-18           <7.5                   7-12           <8.0                   0- 6            7.5-8.5   American Diabetes Association. Diabetes Care 33(S1), Jan 2010.     05/04/2023 12:15 PM 6.8 % Final     Comment:          Diagnosis of Diabetes-Adults   Non-Diabetic: < or = 5.6%   Increased risk for developing diabetes: 5.7-6.4%   Diagnostic of diabetes: > or = 6.5%  .       Monitoring of Diabetes                Age (y)     Therapeutic Goal (%)   Adults:          >18           <7.0   Pediatrics:    13-18           <7.5                   7-12           <8.0                   0- 6            7.5-8.5   American Diabetes Association. Diabetes Care 33(S1), Jan 2010.       LDLCALC   Date/Time Value Ref Range Status   05/06/2024 11:28 AM 53 <=99 mg/dL Final     Comment:                                 Near   Borderline      AGE       Desirable  Optimal    High     High     Very High     0-19 Y     0 - 109     ---    110-129   >/= 130     ----    20-24 Y     0 - 119     ---    120-159   >/= 160     ----      >24 Y     0 -  99   100-129  130-159   160-189     >/=190     11/13/2023 10:41 AM 36 <=99 mg/dL Final     Comment:                                 Near   Borderline      AGE      Desirable  Optimal    High     High     Very High     0-19 Y     0 - 109     ---    110-129   >/= 130     ----    20-24 Y     0 - 119     ---    120-159   >/= 160     ----      >24 Y     0 -  99   100-129  130-159   160-189     >/=190       VLDL   Date/Time Value Ref Range Status   05/06/2024 11:28 AM 30 0 - 40 mg/dL Final   11/13/2023 10:41 AM 41 0 - 40 mg/dL Final   07/31/2023 11:10 AM 38 0 - 40 mg/dL Final   05/04/2023 12:15 PM 50 0 - 40 mg/dL Final   05/05/2022 02:50 PM 72 0 - 40 mg/dL Final      Last I/O:  I/O last 3 completed shifts:  In: 106.6 (1.6 mL/kg) [I.V.:106.6 (1.6 mL/kg)]  Out: 875 (13.1 mL/kg) [Urine:875 (0.4 mL/kg/hr)]  Weight: 66.7 kg     Past Cardiology Tests (Last 3 Years):  EKG:    Echo:  Transthoracic Echocardiogram 12/2024  1. Left ventricular ejection fraction is moderately decreased, calculated by Reyes's biplane at 33%.   2. Abnormal left venticular wall motion.   3. Left ventricular cavity size is mildly dilated.   4. No left ventricular thrombus visualized.   5. Moderate mitral valve regurgitation.   6. Mildly elevated right ventricular systolic pressure.   7. There is plaque visualized in the ascending aorta.     Transthoracic Echocardiogram 7/2024   1. Left ventricular ejection fraction is moderately decreased, by visual estimate at 30-35%.   2. Spectral Doppler shows a pseudonormal pattern of left ventricular diastolic filling.   3. Left ventricular cavity size is moderately dilated.   4. No left ventricular thrombus visualized.   5. Technically difficult exam, though there is moderate LV systolic dysfunction with the apical function  being best but hypokinesis of the basal and mid segments. There is no LV apical thrombus seen on the echocontrast images.   6. There is reduced right ventricular systolic function.   7. The left atrium is moderately dilated.   8. Moderate mitral valve regurgitation.   9. Mildly elevated RVSP.  10. Moderate tricuspid regurgitation visualized.  11. Compared with the prior exam from 5/10/2023, today's exam is more technically difficult making assessment of LV systolic function more difficult. Proir study demonstrated clear wall motiom abnormality in the basal septal, basal inferior and basal and mid inferolateral segments. The LV systolic function appears to be worse today ( hyaving declined from mild dysfunciton to moderate dysfunction. In addition, the degree of MR appears to have increased from mild to moderate.     Transthoracic Echocardiogram 4/26/24   1. Left ventricular systolic function is moderately decreased with a 30-35% estimated ejection fraction.   2. Multiple segmental abnormalities exist. See findings.   3. Spectral Doppler shows a restrictive pattern of left ventricular diastolic filling.   4. There is an elevated left ventricular end diastolic pressure.   5. Moderate mitral valve regurgitation.   6. Moderately decreased mitral valve posterior leaflet mobility.   7. Mild to moderate tricuspid regurgitation.   8. Mildly elevated RVSP.     Transthoracic Echocardiogram 2/2024   1. Left ventricular systolic function is normal with a 30-35% estimated ejection fraction.   2. Multiple segmental abnormalities exist. See findings.   3. Spectral Doppler shows a restrictive pattern of left ventricular diastolic filling.   4. There is low normal right ventricular systolic function.   5. Mild to moderate tricuspid regurgitation.   6. There are multiple wall motion abnormalities.     Transthoracic Echocardiogram 5/2023  1. Left ventricular systolic function is mildly decreased with a 40-45% estimated ejection  fraction.  2. The left ventricular cavity size is mildly dilated.  3. Basal and mid inferolateral wall, basal inferoseptal segment, and basal inferior segment are abnormal.  4. Spectral Doppler shows a pseudonormal pattern of left ventricular diastolic filling.  5. The left atrium is mildly dilated.  6. RVSP within normal limits.     Transthoracic Echocardiogram 11/2021   1. The left ventricular systolic function is moderately decreased with a 35% estimated ejection fraction.   2. Multiple segmental abnormalities exist. See findings.   3. Spectral Doppler shows an impaired relaxation pattern of left ventricular diastolic filling.     Transthoracic Echocardiogram 6/2021   1. The left ventricular systolic function is moderately decreased with a 40% estimated ejection fraction.   2. Multiple segmental abnormalities exist. See findings.   3. Spectral Doppler shows a pseudonormal pattern of left ventricular diastolic filling.   4. Mild to moderate mitral valve regurgitation.     Cath:  Left/Right Heart Catheterization 2/2024   1. Severe native three vessel CAD.   2. Patent LIMA-LAD/Diagonal vessel. All other grafts are occluded.   3. Severe left subclavian artery stenosis with a gradient of ~28-30 mmHg across the stenosis.   4. Mildly elevated filling pressure with PCWP of 21 mmHg.   5. Mild pulmonary hypertension with mPAP of 29 mmHg (Post capillary).   6. Normal cardiac output and index of 4.1 L/min and 2.5 L/min/m2 respectively.   7. Successful RCFA closure with 6Fr. VIP Angioseal.   8. Left Ventricular end-diastolic pressure = 23 mmHg.      Inpatient Medications:  Scheduled medications   Medication Dose Route Frequency    [Held by provider] allopurinol  100 mg oral Daily    [Held by provider] amLODIPine  10 mg oral Daily    aspirin  81 mg oral Daily    atorvastatin  80 mg oral Daily    cetirizine  10 mg oral Daily    cyanocobalamin  1,000 mcg oral Daily    [Held by provider] dapagliflozin propanediol  10 mg oral  Daily before evening meal    [Held by provider] furosemide  40 mg intravenous q12h    gabapentin  100 mg oral Nightly    heparin  5,000 Units subcutaneous TID    icosapent ethyL  1 g oral BID    insulin lispro  0-10 Units subcutaneous Before meals & nightly    ipratropium-albuteroL  3 mL nebulization 4x daily    levothyroxine  50 mcg oral Daily    [Held by provider] metoprolol succinate XL  50 mg oral Daily    metoprolol tartrate  25 mg oral q8h    sodium zirconium cyclosilicate  10 g oral TID     PRN medications   Medication    dextrose    dextrose    famotidine    glucagon    glucagon    ipratropium-albuteroL    nitroglycerin    oxyCODONE    oxygen     Continuous Medications   Medication Dose Last Rate    furosemide  20 mg/hr 20 mg/hr (04/06/25 1355)       Physical Exam:  Documented Vital Signs   Heart Rate:  []   Temp:  [36.2 °C (97.1 °F)-37.1 °C (98.8 °F)]   Resp:  [13-24]   BP: ()/(50-87)   SpO2:  [91 %-100 %]   Temp:  [36.2 °C (97.1 °F)-37.1 °C (98.8 °F)] 36.8 °C (98.2 °F)  Heart Rate:  [] 108  Resp:  [13-24] 13  BP: ()/(50-87) 104/69  FiO2 (%):  [40 %-60 %] 44 %  Oxygen Dose: *8 L/min  Documented Fluid Status     Intake/Output Summary (Last 24 hours) at 4/6/2025 1628  Last data filed at 4/6/2025 1500  Gross per 24 hour   Intake 346.58 ml   Output 225 ml   Net 121.58 ml     Net IO Since Admission: -1,373.42 mL [04/06/25 1628]       Assessment/Plan   Ritesh Garza is a 74 y.o. male with past medical history of PMH of mvCAD s/p CABGx5 1992 c/b NSTEMI 2/2024 on DAPT, HFrEF d/t ICM, HTN, HLD, VT s/p ICD placement 5/2024, CKD III, seizures, PAD c/b severe left subclavian stenosis w/ compromised flow to the left POP s/p left subclavian stent 4/2024 , asthma, DM, gout, SIDHU cirrhosis, portal HTN, depression/anxiety, GERD, BPH, SENIA, , recent GIB 12/2024 (Plavix stopped with GIB), who presented to [facility] with SOB, weight gain, and tachycardia.  Cardiology consulted for CHF.     States he has  "been more short of breath than usual for the past few days. States he has never been short of breath in the past 32 years since his CABG.  He went to doctor visit with his cardiologist, and was told to come to ED for irregular heart rhythm. Denies any chest pain or angina.  States he has gained about 10lbs over the past few months.  Unsure if he is having orthopnea.  He is having some mild lower extremity swelling.  No recent illness, no nausea, vomiting, fever, or chills.      Arrival in the ED HR was 122 Tachypnic b/p was 137/87.   Labs showed BNP 3124. Troponin was 85->82,  K 5.1 Scr 2.31 TSH 14.99 WBC 11k, H/H 9/31  CT Chest showed  Mild interstitial pulmonary edema, small bilateral layering  pleural effusions and cardiomegaly with left ventricular enlargement  suggestive of mild congestive heart failure changes.   He was placed on BiPap, and ultimately placed on oxygen at 2L via NC.  Comfortable.     His Cardiac Device Interrogation performed 4/3/2025 showed no no therapy delivered and importantly no atrial fibrillation or atrial flutter.  Felt that he was having an atrial tachycardia below detection rate and was increased on his beta blockade to help address this. We had originally suggested that he was volume up with some fluid and suggested diuresis. We did not feel that he was overly \"Wet\" and did not recommend aggressive diuresing. On HD-3 he had a thibodeaux catheter with minimal urinary output and some contraction noted on labs. We held his lasix in light of this and decreased oxygen requirement. He was noted to be aggressive verbally, which he is not known to be from prior encounter, rather the opposite and had a sitter at bedside.Overnight, he reported chest pain and was given Oxycodone rather than NTG due to a softer blood pressures and concerns for decompensation.  He had brief self limited episodes of ventricular tachycardia with pacing and baseline Left Bundle Branch   His troponin was modestly " elevated, but within range of his known severe disease. His Creatinine had climbed, his potassium was isgnifincaltyy elevated. He has been transferred to ICU for supportive care and potential dialysis.       Suggest A- Line for blood pressure or consistent use of right Arm to follow blood pressures and hemodynamics     Agree with Resuming lasix and drip.  Dialysis if needed     If needed we can consider Cardioversion of his Atrial Tachycardia              Peripheral IV 04/02/25 20 G Right Forearm (Active)   Site Assessment Clean;Dry;Intact 04/06/25 1230   Line Status Flushed 04/06/25 1230   Dressing Status Clean;Dry 04/06/25 1230   Number of days: 4       Peripheral IV 04/02/25 Distal;Left;Upper Arm (Active)   Site Assessment Clean;Dry;Intact 04/06/25 1230   Dressing Status Clean;Dry 04/06/25 1230   Number of days: 4       Urethral Catheter (Active)   Output (mL) 5 mL 04/06/25 1500   Number of days: 4       Code Status:  Full Code      Mahendra Santacruz DO   Division of Cardiovascular Medicine  [facility]

## 2025-04-06 NOTE — NURSING NOTE
Pt has been running multiple v Tachs to SR , Dr justice ordered BMP and mag ( Critical - BUN 91 K 5.8)    pt was desaturating on oxygen 2l/min saying he can't breathe, with Vtachs, due metoprolol was given, oxygen increasd to 6l/min, Respiratory therapist informed  Dr Santacruz informed on the pt state, and  ordered stat CXR ,blood gas iv Lasix 80mg .  Patient is A&O X4.

## 2025-04-07 ENCOUNTER — APPOINTMENT (OUTPATIENT)
Dept: CARDIOLOGY | Facility: HOSPITAL | Age: 75
DRG: 286 | End: 2025-04-07
Payer: MEDICARE

## 2025-04-07 VITALS
OXYGEN SATURATION: 95 % | TEMPERATURE: 97.8 F | DIASTOLIC BLOOD PRESSURE: 55 MMHG | SYSTOLIC BLOOD PRESSURE: 104 MMHG | BODY MASS INDEX: 26.05 KG/M2 | HEART RATE: 108 BPM | HEIGHT: 63 IN | WEIGHT: 147.05 LBS | RESPIRATION RATE: 15 BRPM

## 2025-04-07 LAB
ALBUMIN SERPL BCP-MCNC: 3.6 G/DL (ref 3.4–5)
ALBUMIN SERPL BCP-MCNC: 3.6 G/DL (ref 3.4–5)
ALBUMIN SERPL BCP-MCNC: 3.7 G/DL (ref 3.4–5)
ALBUMIN SERPL BCP-MCNC: 3.8 G/DL (ref 3.4–5)
ANION GAP SERPL CALC-SCNC: 17 MMOL/L (ref 10–20)
ANION GAP SERPL CALC-SCNC: 18 MMOL/L (ref 10–20)
ANION GAP SERPL CALC-SCNC: 18 MMOL/L (ref 10–20)
ANION GAP SERPL CALC-SCNC: 19 MMOL/L (ref 10–20)
ATRIAL RATE: 101 BPM
ATRIAL RATE: 105 BPM
ATRIAL RATE: 114 BPM
BACTERIA BLD CULT: NORMAL
BACTERIA BLD CULT: NORMAL
BUN SERPL-MCNC: 103 MG/DL (ref 6–23)
BUN SERPL-MCNC: 103 MG/DL (ref 6–23)
BUN SERPL-MCNC: 107 MG/DL (ref 6–23)
BUN SERPL-MCNC: 108 MG/DL (ref 6–23)
CALCIUM SERPL-MCNC: 8.5 MG/DL (ref 8.6–10.3)
CALCIUM SERPL-MCNC: 8.6 MG/DL (ref 8.6–10.3)
CHLORIDE SERPL-SCNC: 101 MMOL/L (ref 98–107)
CHLORIDE SERPL-SCNC: 103 MMOL/L (ref 98–107)
CHLORIDE SERPL-SCNC: 103 MMOL/L (ref 98–107)
CHLORIDE SERPL-SCNC: 105 MMOL/L (ref 98–107)
CO2 SERPL-SCNC: 21 MMOL/L (ref 21–32)
CO2 SERPL-SCNC: 22 MMOL/L (ref 21–32)
CO2 SERPL-SCNC: 23 MMOL/L (ref 21–32)
CO2 SERPL-SCNC: 24 MMOL/L (ref 21–32)
CREAT SERPL-MCNC: 4.22 MG/DL (ref 0.5–1.3)
CREAT SERPL-MCNC: 4.24 MG/DL (ref 0.5–1.3)
CREAT SERPL-MCNC: 4.3 MG/DL (ref 0.5–1.3)
CREAT SERPL-MCNC: 4.31 MG/DL (ref 0.5–1.3)
EGFRCR SERPLBLD CKD-EPI 2021: 14 ML/MIN/1.73M*2
EJECTION FRACTION APICAL 4 CHAMBER: 22.2
EJECTION FRACTION: 26 %
ERYTHROCYTE [DISTWIDTH] IN BLOOD BY AUTOMATED COUNT: 18 % (ref 11.5–14.5)
GLUCOSE BLD MANUAL STRIP-MCNC: 175 MG/DL (ref 74–99)
GLUCOSE BLD MANUAL STRIP-MCNC: 182 MG/DL (ref 74–99)
GLUCOSE BLD MANUAL STRIP-MCNC: 192 MG/DL (ref 74–99)
GLUCOSE BLD MANUAL STRIP-MCNC: 225 MG/DL (ref 74–99)
GLUCOSE BLD MANUAL STRIP-MCNC: 228 MG/DL (ref 74–99)
GLUCOSE BLD MANUAL STRIP-MCNC: 239 MG/DL (ref 74–99)
GLUCOSE SERPL-MCNC: 159 MG/DL (ref 74–99)
GLUCOSE SERPL-MCNC: 160 MG/DL (ref 74–99)
GLUCOSE SERPL-MCNC: 218 MG/DL (ref 74–99)
GLUCOSE SERPL-MCNC: 240 MG/DL (ref 74–99)
HCT VFR BLD AUTO: 26 % (ref 41–52)
HGB BLD-MCNC: 8.3 G/DL (ref 13.5–17.5)
LEFT VENTRICLE INTERNAL DIMENSION DIASTOLE: 5.43 CM (ref 3.5–6)
MCH RBC QN AUTO: 23 PG (ref 26–34)
MCHC RBC AUTO-ENTMCNC: 31.9 G/DL (ref 32–36)
MCV RBC AUTO: 72 FL (ref 80–100)
NRBC BLD-RTO: 0 /100 WBCS (ref 0–0)
P AXIS: 70 DEGREES
P AXIS: 91 DEGREES
P OFFSET: 180 MS
P ONSET: 140 MS
PHOSPHATE SERPL-MCNC: 6.1 MG/DL (ref 2.5–4.9)
PHOSPHATE SERPL-MCNC: 6.1 MG/DL (ref 2.5–4.9)
PHOSPHATE SERPL-MCNC: 6.6 MG/DL (ref 2.5–4.9)
PHOSPHATE SERPL-MCNC: 6.9 MG/DL (ref 2.5–4.9)
PLATELET # BLD AUTO: 203 X10*3/UL (ref 150–450)
POTASSIUM SERPL-SCNC: 4.1 MMOL/L (ref 3.5–5.3)
POTASSIUM SERPL-SCNC: 4.4 MMOL/L (ref 3.5–5.3)
POTASSIUM SERPL-SCNC: 4.5 MMOL/L (ref 3.5–5.3)
POTASSIUM SERPL-SCNC: 4.6 MMOL/L (ref 3.5–5.3)
PR INTERVAL: 140 MS
PR INTERVAL: 200 MS
Q ONSET: 210 MS
Q ONSET: 216 MS
Q ONSET: 218 MS
QRS COUNT: 17 BEATS
QRS COUNT: 17 BEATS
QRS COUNT: 19 BEATS
QRS DURATION: 120 MS
QRS DURATION: 138 MS
QRS DURATION: 144 MS
QT INTERVAL: 358 MS
QT INTERVAL: 362 MS
QT INTERVAL: 380 MS
QTC CALCULATION(BAZETT): 464 MS
QTC CALCULATION(BAZETT): 498 MS
QTC CALCULATION(BAZETT): 502 MS
QTC FREDERICIA: 426 MS
QTC FREDERICIA: 448 MS
QTC FREDERICIA: 457 MS
R AXIS: -60 DEGREES
R AXIS: -65 DEGREES
R AXIS: 206 DEGREES
RBC # BLD AUTO: 3.61 X10*6/UL (ref 4.5–5.9)
RIGHT VENTRICLE FREE WALL PEAK S': 7 CM/S
RIGHT VENTRICLE PEAK SYSTOLIC PRESSURE: 29.2 MMHG
SODIUM SERPL-SCNC: 138 MMOL/L (ref 136–145)
SODIUM SERPL-SCNC: 139 MMOL/L (ref 136–145)
SODIUM SERPL-SCNC: 139 MMOL/L (ref 136–145)
SODIUM SERPL-SCNC: 140 MMOL/L (ref 136–145)
T AXIS: 120 DEGREES
T AXIS: 152 DEGREES
T AXIS: 58 DEGREES
T OFFSET: 391 MS
T OFFSET: 397 MS
T OFFSET: 406 MS
TRICUSPID ANNULAR PLANE SYSTOLIC EXCURSION: 1.2 CM
VENTRICULAR RATE: 101 BPM
VENTRICULAR RATE: 105 BPM
VENTRICULAR RATE: 114 BPM
WBC # BLD AUTO: 12.5 X10*3/UL (ref 4.4–11.3)

## 2025-04-07 PROCEDURE — 2500000004 HC RX 250 GENERAL PHARMACY W/ HCPCS (ALT 636 FOR OP/ED): Performed by: NURSE PRACTITIONER

## 2025-04-07 PROCEDURE — 2500000001 HC RX 250 WO HCPCS SELF ADMINISTERED DRUGS (ALT 637 FOR MEDICARE OP): Performed by: NURSE PRACTITIONER

## 2025-04-07 PROCEDURE — 80069 RENAL FUNCTION PANEL: CPT | Performed by: NURSE PRACTITIONER

## 2025-04-07 PROCEDURE — 2500000004 HC RX 250 GENERAL PHARMACY W/ HCPCS (ALT 636 FOR OP/ED)

## 2025-04-07 PROCEDURE — 2500000002 HC RX 250 W HCPCS SELF ADMINISTERED DRUGS (ALT 637 FOR MEDICARE OP, ALT 636 FOR OP/ED): Performed by: NURSE PRACTITIONER

## 2025-04-07 PROCEDURE — 97530 THERAPEUTIC ACTIVITIES: CPT | Mod: GP,CQ

## 2025-04-07 PROCEDURE — 2020000001 HC ICU ROOM DAILY

## 2025-04-07 PROCEDURE — 36415 COLL VENOUS BLD VENIPUNCTURE: CPT | Performed by: NURSE PRACTITIONER

## 2025-04-07 PROCEDURE — C8924 2D TTE W OR W/O FOL W/CON,FU: HCPCS

## 2025-04-07 PROCEDURE — 97116 GAIT TRAINING THERAPY: CPT | Mod: GP,CQ

## 2025-04-07 PROCEDURE — 2500000005 HC RX 250 GENERAL PHARMACY W/O HCPCS: Performed by: INTERNAL MEDICINE

## 2025-04-07 PROCEDURE — 99291 CRITICAL CARE FIRST HOUR: CPT | Performed by: NURSE PRACTITIONER

## 2025-04-07 PROCEDURE — 94640 AIRWAY INHALATION TREATMENT: CPT

## 2025-04-07 PROCEDURE — 97110 THERAPEUTIC EXERCISES: CPT | Mod: GP,CQ

## 2025-04-07 PROCEDURE — 9420000001 HC RT PATIENT EDUCATION 5 MIN

## 2025-04-07 PROCEDURE — 85027 COMPLETE CBC AUTOMATED: CPT | Performed by: NURSE PRACTITIONER

## 2025-04-07 PROCEDURE — 93308 TTE F-UP OR LMTD: CPT | Performed by: INTERNAL MEDICINE

## 2025-04-07 PROCEDURE — 99233 SBSQ HOSP IP/OBS HIGH 50: CPT | Performed by: INTERNAL MEDICINE

## 2025-04-07 PROCEDURE — 82947 ASSAY GLUCOSE BLOOD QUANT: CPT

## 2025-04-07 RX ORDER — CHLOROTHIAZIDE SODIUM 500 MG/1
1000 INJECTION INTRAVENOUS ONCE
Status: COMPLETED | OUTPATIENT
Start: 2025-04-07 | End: 2025-04-07

## 2025-04-07 RX ADMIN — Medication 3 L/MIN: at 08:00

## 2025-04-07 RX ADMIN — IPRATROPIUM BROMIDE AND ALBUTEROL SULFATE 3 ML: 2.5; .5 SOLUTION RESPIRATORY (INHALATION) at 20:14

## 2025-04-07 RX ADMIN — HEPARIN SODIUM 5000 UNITS: 5000 INJECTION INTRAVENOUS; SUBCUTANEOUS at 09:39

## 2025-04-07 RX ADMIN — HEPARIN SODIUM 5000 UNITS: 5000 INJECTION INTRAVENOUS; SUBCUTANEOUS at 20:23

## 2025-04-07 RX ADMIN — CETIRIZINE HYDROCHLORIDE 10 MG: 10 TABLET, FILM COATED ORAL at 09:40

## 2025-04-07 RX ADMIN — Medication 1000 MCG: at 09:39

## 2025-04-07 RX ADMIN — INSULIN LISPRO 4 UNITS: 100 INJECTION, SOLUTION INTRAVENOUS; SUBCUTANEOUS at 16:11

## 2025-04-07 RX ADMIN — CHLOROTHIAZIDE SODIUM 1000 MG: 500 INJECTION, POWDER, LYOPHILIZED, FOR SOLUTION INTRAVENOUS at 14:05

## 2025-04-07 RX ADMIN — INSULIN LISPRO 4 UNITS: 100 INJECTION, SOLUTION INTRAVENOUS; SUBCUTANEOUS at 20:23

## 2025-04-07 RX ADMIN — IPRATROPIUM BROMIDE AND ALBUTEROL SULFATE 3 ML: 2.5; .5 SOLUTION RESPIRATORY (INHALATION) at 14:46

## 2025-04-07 RX ADMIN — Medication 2 L/MIN: at 20:14

## 2025-04-07 RX ADMIN — SODIUM ZIRCONIUM CYCLOSILICATE 10 G: 10 POWDER, FOR SUSPENSION ORAL at 09:39

## 2025-04-07 RX ADMIN — HEPARIN SODIUM 5000 UNITS: 5000 INJECTION INTRAVENOUS; SUBCUTANEOUS at 15:36

## 2025-04-07 RX ADMIN — ATORVASTATIN CALCIUM 80 MG: 80 TABLET, FILM COATED ORAL at 09:39

## 2025-04-07 RX ADMIN — ASPIRIN 81 MG: 81 TABLET, COATED ORAL at 09:39

## 2025-04-07 RX ADMIN — INSULIN LISPRO 2 UNITS: 100 INJECTION, SOLUTION INTRAVENOUS; SUBCUTANEOUS at 12:36

## 2025-04-07 RX ADMIN — Medication 3 L/MIN: at 11:35

## 2025-04-07 RX ADMIN — FUROSEMIDE 20 MG/HR: 10 INJECTION, SOLUTION INTRAMUSCULAR; INTRAVENOUS at 05:50

## 2025-04-07 RX ADMIN — Medication 3 L/MIN: at 03:56

## 2025-04-07 RX ADMIN — IPRATROPIUM BROMIDE AND ALBUTEROL SULFATE 3 ML: 2.5; .5 SOLUTION RESPIRATORY (INHALATION) at 07:56

## 2025-04-07 RX ADMIN — INSULIN LISPRO 2 UNITS: 100 INJECTION, SOLUTION INTRAVENOUS; SUBCUTANEOUS at 09:40

## 2025-04-07 RX ADMIN — IPRATROPIUM BROMIDE AND ALBUTEROL SULFATE 3 ML: 2.5; .5 SOLUTION RESPIRATORY (INHALATION) at 11:35

## 2025-04-07 RX ADMIN — PERFLUTREN 2 ML OF DILUTION: 6.52 INJECTION, SUSPENSION INTRAVENOUS at 09:05

## 2025-04-07 RX ADMIN — ICOSAPENT ETHYL 1 G: 1 CAPSULE ORAL at 16:11

## 2025-04-07 RX ADMIN — ICOSAPENT ETHYL 1 G: 1 CAPSULE ORAL at 09:39

## 2025-04-07 RX ADMIN — LEVOTHYROXINE SODIUM 50 MCG: 0.1 TABLET ORAL at 05:45

## 2025-04-07 RX ADMIN — GABAPENTIN 100 MG: 100 CAPSULE ORAL at 20:23

## 2025-04-07 ASSESSMENT — COGNITIVE AND FUNCTIONAL STATUS - GENERAL
DRESSING REGULAR LOWER BODY CLOTHING: A LITTLE
MOVING TO AND FROM BED TO CHAIR: A LITTLE
CLIMB 3 TO 5 STEPS WITH RAILING: A LITTLE
MOBILITY SCORE: 17
STANDING UP FROM CHAIR USING ARMS: A LITTLE
DAILY ACTIVITIY SCORE: 19
MOVING TO AND FROM BED TO CHAIR: A LITTLE
MOVING FROM LYING ON BACK TO SITTING ON SIDE OF FLAT BED WITH BEDRAILS: A LITTLE
MOVING FROM LYING ON BACK TO SITTING ON SIDE OF FLAT BED WITH BEDRAILS: A LITTLE
TURNING FROM BACK TO SIDE WHILE IN FLAT BAD: A LITTLE
TURNING FROM BACK TO SIDE WHILE IN FLAT BAD: A LITTLE
STANDING UP FROM CHAIR USING ARMS: A LITTLE
TOILETING: A LITTLE
MOBILITY SCORE: 18
HELP NEEDED FOR BATHING: A LITTLE
PERSONAL GROOMING: A LITTLE
WALKING IN HOSPITAL ROOM: A LITTLE
WALKING IN HOSPITAL ROOM: A LITTLE
DRESSING REGULAR UPPER BODY CLOTHING: A LITTLE
CLIMB 3 TO 5 STEPS WITH RAILING: A LOT

## 2025-04-07 ASSESSMENT — PAIN - FUNCTIONAL ASSESSMENT
PAIN_FUNCTIONAL_ASSESSMENT: 0-10

## 2025-04-07 ASSESSMENT — PAIN SCALES - GENERAL
PAINLEVEL_OUTOF10: 0 - NO PAIN

## 2025-04-07 NOTE — PROGRESS NOTES
Physical Therapy    Physical Therapy Treatment    Patient Name: Ritesh Garza  MRN: [id number]  Department: [place] [address] ICU  Room: [address] [place] [address]  Today's Date: 4/7/2025  Time Calculation  Start Time: 1324  Stop Time: 1410  Time Calculation (min): 46 min         Assessment/Plan   PT Assessment  Rehab Prognosis: Good  Barriers to Discharge Home: No anticipated barriers  End of Session Communication: Bedside nurse  Assessment Comment: Pt ranging from contact guard to Brenda for unsteadiness during tx session, possibly due to confusion and poor cognition. Pt requries redirection throughout tx session to remain on task.  End of Session Patient Position: Up in chair, Alarm on  PT Plan  Inpatient/Swing Bed or Outpatient: Inpatient  PT Plan  Treatment/Interventions: Bed mobility, Transfer training, Gait training  PT Plan: Ongoing PT  PT Frequency: 2 times per week  PT Discharge Recommendations: Low intensity level of continued care  PT Recommended Transfer Status: Assist x1  PT - OK to Discharge: Yes (per POC)      General Visit Information:   PT  Visit  PT Received On: 04/07/25  General  Reason for Referral: To ED with SOB and tachycardia. Pt being worked up for CHF.  Referred By: Glenn Sharp MD  Past Medical History Relevant to Rehab:   Past Medical History:   Diagnosis Date    ADHD (attention deficit hyperactivity disorder) 1960’s    AICD (automatic cardioverter/defibrillator) present 05/30/2024    [facility]    Asthma     Benign prostatic hyperplasia Several years ago    CHF (congestive heart failure)     Cirrhosis (Multi)     CKD (chronic kidney disease)     Coronary artery disease Long Ago    Diabetes mellitus (Multi) Over 20 years ago    Erectile dysfunction     GERD (gastroesophageal reflux disease)     Gout     Hypertension Over 20 years ago    Ischemic cardiomyopathy     Kidney stone Several years ago    Myocardial infarction (Multi) Over 20 yesrs ago    SIDHU (nonalcoholic steatohepatitis)     SENIA (obstructive sleep  apnea)     Stenosis of left subclavian artery     s/p stent 4/30/2024    Ventricular tachycardia (Multi)        Family/Caregiver Present: Yes (wife and family member present at beginning of tx session)  Prior to Session Communication: Bedside nurse  Patient Position Received: Bed, 3 rail up, Alarm on (sitter present)  General Comment: Pt slightly agitated throughout tx session. Pt confused throughout tx session and requires continuous redirection    Subjective   Precautions:        Date/Time Vitals Session Patient Position Pulse Resp SpO2 BP MAP (mmHg)    04/07/25 1400 --  --  120  15  --  --  --     04/07/25 1409 --  --  119  15  97 %  97/70  79     04/07/25 1500 --  --  79  13  97 %  92/54  65           Vital Signs Comment: Dizziness reported in sitting, vitals assessed. RN notified.     Objective   Pain:  Pain Assessment  Pain Assessment: 0-10  0-10 (Numeric) Pain Score: 0 - No pain  Cognition:  Cognition  Orientation Level: Disoriented to situation, Disoriented to place, Disoriented to time       Postural Control:  Static Sitting Balance  Static Sitting-Balance Support: Feet supported, Bilateral upper extremity supported  Static Sitting-Level of Assistance: Close supervision  Static Standing Balance  Static Standing-Balance Support: Bilateral upper extremity supported  Static Standing-Level of Assistance: Contact guard, Minimum assistance  Static Standing-Comment/Number of Minutes: Intermittent contact guard to Brenda due to unsteadines and retropulsion.       Treatments:  Therapeutic Exercise  Therapeutic Exercise Performed: Yes  Therapeutic Exercise Activity 1: Pt performs supine x 10 reps AP and HS. Pt performs seated x10 reps LAQ. Pt requires VC to remain on task, for technique, and pacing. Full ROM and no physical assist required.    Therapeutic Activity  Therapeutic Activity Performed: Yes  Therapeutic Activity 1: pt performed x2 trials of static standing balance to improve posture and standing balance. Pt  requires intermittent contact guard and Brenda for unsteadiness.    Bed Mobility  Bed Mobility: Yes  Bed Mobility 1  Bed Mobility 1: Supine to sitting  Level of Assistance 1: Close supervision  Bed Mobility Comments 1: HOB elevated. Pt required VC for correct sequencing, use of bed rail for trunk elevation required.    Ambulation/Gait Training  Ambulation/Gait Training Performed: Yes  Ambulation/Gait Training 1  Surface 1: Level tile  Device 1: Rolling walker  Gait Support Devices: Gait belt  Assistance 1: Minimum assistance  Quality of Gait 1: Diminished heel strike, Inconsistent stride length, Decreased step length  Comments/Distance (ft) 1: 20 x 2 (WW management required and Brenda for unsteadiness required. Pt demonstrates poor safety awareness and is impulsive. Pt fatigues easily and requires seated rest break in between ambulation trials.)  Transfers  Transfer: Yes  Transfer 1  Transfer From 1: Bed to  Transfer to 1: Stand  Technique 1: Sit to stand, Stand to sit  Transfer Device 1: Walker, Gait belt  Transfer Level of Assistance 1: Contact guard, Minimum assistance  Trials/Comments 1: VC for correct hand placement with poor carry over. Pt unsteady and impulsive while performing transfer trails. Brenda to contact guard for unsteadiness, trunk elevation, and eccentric control.    Outcome Measures:  [facility] Basic Mobility  Turning from your back to your side while in a flat bed without using bedrails: A little  Moving from lying on your back to sitting on the side of a flat bed without using bedrails: A little  Moving to and from bed to chair (including a wheelchair): A little  Standing up from a chair using your arms (e.g. wheelchair or bedside chair): A little  To walk in hospital room: A little  Climbing 3-5 steps with railing: A lot  Basic Mobility - Total Score: 17    Education Documentation  Home Exercise Program, taught by Jasmina Banuelos PTA at 4/7/2025  3:41 PM.  Learner: Patient  Readiness:  Acceptance  Method: Explanation  Response: Needs Reinforcement    Precautions, taught by Jasmina Banuelos PTA at 4/7/2025  3:41 PM.  Learner: Patient  Readiness: Acceptance  Method: Explanation  Response: Needs Reinforcement    Body Mechanics, taught by Jasmina Banuelos PTA at 4/7/2025  3:41 PM.  Learner: Patient  Readiness: Acceptance  Method: Explanation  Response: Needs Reinforcement    Mobility Training, taught by Jasmina Banuelos PTA at 4/7/2025  3:41 PM.  Learner: Patient  Readiness: Acceptance  Method: Explanation  Response: Needs Reinforcement    Education Comments  No comments found.           Encounter Problems       Encounter Problems (Active)       Balance       complete all mobility with normal balance while dual tasking, negotiating in a dynamic environment, carrying items, etc., with proactive and reactive static and dynamic standing and sitting tasks independently, >15 minutes.   (Not Progressing)       Start:  04/04/25    Expected End:  04/18/25               Mobility       STG - Patient will ambulate 150 ft independently with stable vitals.  (Not Progressing)       Start:  04/04/25    Expected End:  04/18/25            STG - Patient will ascend and descend 2 stairs mod I with LRAD       Start:  04/04/25    Expected End:  04/18/25               PT Transfers       STG - Patient will perform bed mobility independently.  (Not Progressing)       Start:  04/04/25    Expected End:  04/18/25            STG - Patient will transfer sit to and from stand independently.  (Not Progressing)       Start:  04/04/25    Expected End:  04/18/25               Pain - Adult

## 2025-04-07 NOTE — CARE PLAN
Problem: Pain - Adult  Goal: Verbalizes/displays adequate comfort level or baseline comfort level  Outcome: Progressing     Problem: Safety - Adult  Goal: Free from fall injury  Outcome: Progressing     Problem: Discharge Planning  Goal: Discharge to home or other facility with appropriate resources  Outcome: Progressing     Problem: Nutrition  Goal: Nutrient intake appropriate for maintaining nutritional needs  Outcome: Progressing     Problem: Skin  Goal: Promote skin healing  Outcome: Progressing  Flowsheets (Taken 4/7/2025 4982)  Promote skin healing: Assess skin/pad under line(s)/device(s)

## 2025-04-07 NOTE — PROGRESS NOTES
Ritesh Garza is a 74 y.o. male on day 5 of admission presenting with Acute respiratory failure with hypoxia.      Subjective   Patient seen and examined in the ICU.  He was started on Lasix drip now up to 20 mg/hour.  His urine output appears to be picking up.  He is undergoing a 2D echo.       Objective          Vitals 24HR  Heart Rate:  [107-113]   Temp:  [36.2 °C (97.1 °F)-37.1 °C (98.7 °F)]   Resp:  [11-18]   BP: ()/(53-89)   Weight:  [70.6 kg (155 lb 10.3 oz)]   SpO2:  [94 %-100 %]     Intake/Output last 3 Shifts:    Intake/Output Summary (Last 24 hours) at 4/7/2025 1103  Last data filed at 4/7/2025 1000  Gross per 24 hour   Intake 993.4 ml   Output 685 ml   Net 308.4 ml       Physical Exam  General: Nontoxic appearing middle-age male. Patient is in no acute distress.  HEENT:  Normocephalic. Moist mucosa.    Neck: Supple.  Elevated jugular venous pressure.  Cardiovascular:  Regular rate and rhythm. Normal S1 and S2. No murmurs, rubs or gallops  Pulmonary:  Clear to auscultation bilaterally. On 2L NC.   Abdomen:  Soft. Non-tender. Non-distended. Positive bowel sounds.  Lower Extremities: No lower leg edema appreciated.  Neurologic:  Alert and oriented x3. No focal deficit.   Skin: Skin warm and dry, normal skin turgor.  No rashes or lesions to exposed skin.  Psychiatric: Normal mood and behavior.    Scheduled Medications  [Held by provider] allopurinol, 100 mg, oral, Daily  [Held by provider] amLODIPine, 10 mg, oral, Daily  aspirin, 81 mg, oral, Daily  atorvastatin, 80 mg, oral, Daily  cetirizine, 10 mg, oral, Daily  cyanocobalamin, 1,000 mcg, oral, Daily  [Held by provider] dapagliflozin propanediol, 10 mg, oral, Daily before evening meal  [Held by provider] furosemide, 40 mg, intravenous, q12h  gabapentin, 100 mg, oral, Nightly  heparin, 5,000 Units, subcutaneous, TID  icosapent ethyL, 1 g, oral, BID  insulin lispro, 0-10 Units, subcutaneous, Before meals & nightly  ipratropium-albuteroL, 3 mL,  nebulization, 4x daily  levothyroxine, 50 mcg, oral, Daily  [Held by provider] metoprolol succinate XL, 50 mg, oral, Daily  metoprolol tartrate, 25 mg, oral, q8h      Continuous medications  furosemide, 20 mg/hr, Last Rate: 20 mg/hr (04/07/25 0550)        PRN medications: dextrose, dextrose, famotidine, glucagon, glucagon, ipratropium-albuteroL, nitroglycerin, oxyCODONE, oxygen     Relevant Results  Results from last 7 days   Lab Units 04/07/25  0537 04/06/25  1858 04/06/25  0821 04/03/25  0556 04/02/25  1729   WBC AUTO x10*3/uL 12.5* 17.9* 18.6*   < > 11.4*   HEMOGLOBIN g/dL 8.3* 8.9* 10.8*   < > 9.0*   HEMATOCRIT % 26.0* 29.9* 38.5*   < > 31.0*   PLATELETS AUTO x10*3/uL 203 235 338   < > 214   NEUTROS PCT AUTO %  --  82.4  --   --  74.4   LYMPHS PCT AUTO %  --  7.3  --   --  13.5   MONOS PCT AUTO %  --  9.5  --   --  10.8   EOS PCT AUTO %  --  0.0  --   --  0.6    < > = values in this interval not displayed.     Results from last 7 days   Lab Units 04/07/25  0752 04/07/25  0537 04/06/25  1858   SODIUM mmol/L 139 140 139   POTASSIUM mmol/L 4.6 4.5 5.4*   CHLORIDE mmol/L 103 105 105   CO2 mmol/L 23 22 20*   BUN mg/dL 103* 103* 100*   CREATININE mg/dL 4.30* 4.22* 4.08*   GLUCOSE mg/dL 160* 159* 164*   CALCIUM mg/dL 8.6 8.6 8.9       Transthoracic Echo (TTE) Limited         XR chest 1 view   Final Result   Limited inspiration. Enlarged cardiac silhouette.        MACRO:   none        Signed by: Anette Borjas 4/6/2025 10:30 AM   Dictation workstation:   [id number]      XR chest 1 view   Final Result   Resolving right basilar opacities, residual left basilar opacities.   Signed by Luke Middleton MD      Cardiac device check - Inpatient         CT chest wo IV contrast   Final Result   1.  Mild interstitial pulmonary edema, small bilateral layering   pleural effusions and cardiomegaly with left ventricular enlargement   suggestive of mild congestive heart failure changes.   2.  Mild peritoneal ascites.   3.  Status post  cholecystectomy.   Signed by Yuri Francis MD      XR chest 1 view   Final Result   Enlarged cardiac silhouette with moderate pulmonary edema             MACRO:   None        Signed by: Hank Razo 4/2/2025 5:52 PM   Dictation workstation:   [id number]               Assessment/Plan      Ritesh Garza is a 74 y.o. male with a past medical history of coronary artery disease status post 5v CABG 1995, NSTEMI in February 2024, HFrEF with an LVEF of 33%, moderate mitral valve regurg, mild pulmonary hypertension by 2D echo 12/2024, ischemic cardiomyopathy, hypertension, hyperlipidemia, ventricular tachycardia status post ICD placement 5/2024, seizures, peripheral arterial disease, severe left subclavian stenosis status post stenting 4/2024, asthma, diabetes, gout, SIDHU cirrhosis, portal hypertension, depression, anxiety, GERD, BPH, obstructive sleep apnea, recent history of GI bleed and stage G3b/A3 chronic kidney disease with a baseline creatinine that fluctuates around 2 mg/deciliter who presented with shortness of breath, a 10 pound weight gain and tachycardia.  Workup was notable for an elevated BNP above 3,000.  There was transaminitis, and elevated lactate and worsening acute kidney injury.  CT imaging of the chest showed mild interstitial pulmonary edema, small bilateral layering pleural effusions, cardiomegaly with LV enlargement consistent with mild CHF.  There was mild ascites.  He was placed on BiPAP and admitted to the intensive care unit.  Nephrology was consulted for acute kidney injury on top of stage G3b chronic kidney disease management.    Mr. Garza has a significant acute kidney injury in the setting of heart failure physiology.  He is now on Lasix drip at 20 mg/hour.  He will get a dose of Diuril.  His blood pressures are soft albeit I am not against uptitrating the diuretic drip as his urine output remains suboptimal.  His acidosis has improved with IV bicarbonate pushes.  His  potassium has normalized with Lokelma.  There is no acute indication for renal replacement therapy albeit he is very close.  A repeat 2D echo is underway.  Continue the diuretic drip.  Trend his RFP.  We will see where he stands tomorrow.    Assessment & Plan  Acute respiratory failure with hypoxia    Tachycardia      I spent 50 minutes in the professional and overall care of this patient.      Sony Stout, DO

## 2025-04-07 NOTE — PROGRESS NOTES
04/07/25 1051   Discharge Planning   Assistance Needed per intensivist:Today, rapid response was called for altered mentation.   He was found to have hyperkalemia with increased oxygen requirements, worsening lactate, and pH.  He was transferred to the ICU for ongoing management.........lasix drip restarted. anticipate possible need for snf at dc   Expected Discharge Disposition   (possible snf)     Ritesh Garza is a 74 y.o. male on day 5 of admission presenting with Acute respiratory failure with hypoxia.    Era Rodriguez RN

## 2025-04-07 NOTE — PROGRESS NOTES
Subjective Data:  Patient seen and examined   Sitter at the bedside  Lethargic at present      Overnight Events:       Objective Data:  Last Recorded Vitals:  Vitals:    04/07/25 1400 04/07/25 1409 04/07/25 1500 04/07/25 1600   BP:  97/70 92/54 108/63   BP Location:       Patient Position:       Pulse: (!) 120 (!) 119 79 83   Resp: 15 15 13 13   Temp:       TempSrc:       SpO2:  97% 97% 97%   Weight:       Height:           Last Labs:  Results from last 7 days   Lab Units 04/07/25  0537 04/06/25  1858 04/06/25  0821   WBC AUTO x10*3/uL 12.5* 17.9* 18.6*   HEMOGLOBIN g/dL 8.3* 8.9* 10.8*   HEMATOCRIT % 26.0* 29.9* 38.5*   PLATELETS AUTO x10*3/uL 203 235 338     Results from last 7 days   Lab Units 04/07/25  1511 04/07/25  0752 04/07/25  0537 04/06/25  1045 04/06/25  0821 04/03/25  0556 04/02/25  1729   SODIUM mmol/L 139 139 140   < > 136   < > 138   POTASSIUM mmol/L 4.1 4.6 4.5   < > 7.1*   < > 5.1   CHLORIDE mmol/L 103 103 105   < > 106   < > 108*   CO2 mmol/L 21 23 22   < > 12*   < > 18*   BUN mg/dL 108* 103* 103*   < > 92*   < > 51*   CREATININE mg/dL 4.31* 4.30* 4.22*   < > 3.60*   < > 2.31*   CALCIUM mg/dL 8.5* 8.6 8.6   < > 9.0   < > 9.3   PROTEIN TOTAL g/dL  --   --   --   --  6.5  --  7.2   BILIRUBIN TOTAL mg/dL  --   --   --   --  0.9  --  0.5   ALK PHOS U/L  --   --   --   --  122  --  93   ALT U/L  --   --   --   --  316*  --  13   AST U/L  --   --   --   --  438*  --  18   GLUCOSE mg/dL 218* 160* 159*   < > 112*   < > 230*    < > = values in this interval not displayed.               TROPHS   Date/Time Value Ref Range Status   04/06/2025 10:45  0 - 20 ng/L Final     Comment:     Previous result verified on 4/5/2025 2049 on specimen/case [id number] called with component [facility] for procedure Troponin I, High Sensitivity with value 107 ng/L.   04/06/2025 08:21  0 - 20 ng/L Final     Comment:     Previous result verified on 4/5/2025 2049 on specimen/case [id number] called with component  [facility] for procedure Troponin I, High Sensitivity with value 107 ng/L.   04/05/2025 07:18  0 - 20 ng/L Final     BNP   Date/Time Value Ref Range Status   04/06/2025 08:21 AM 3,082 0 - 99 pg/mL Final   04/02/2025 05:29 PM 3,124 0 - 99 pg/mL Final   03/25/2025 08:48 AM 2,349 <100 pg/mL Final     Comment:        BNP levels increase with age in the general  population with the highest values seen in  individuals greater than 75 years of age.  Reference: J. Am. Mellissa. Cardiol. 2002; 40:976-982.          HGBA1C   Date/Time Value Ref Range Status   02/13/2025 01:45 PM 6.2 4.2 - 6.5 % Final   01/11/2024 01:36 PM 6.6 see below % Final   07/31/2023 11:10 AM 7.0 % Final     Comment:          Diagnosis of Diabetes-Adults   Non-Diabetic: < or = 5.6%   Increased risk for developing diabetes: 5.7-6.4%   Diagnostic of diabetes: > or = 6.5%  .       Monitoring of Diabetes                Age (y)     Therapeutic Goal (%)   Adults:          >18           <7.0   Pediatrics:    13-18           <7.5                   7-12           <8.0                   0- 6            7.5-8.5   American Diabetes Association. Diabetes Care 33(S1), Jan 2010.     05/04/2023 12:15 PM 6.8 % Final     Comment:          Diagnosis of Diabetes-Adults   Non-Diabetic: < or = 5.6%   Increased risk for developing diabetes: 5.7-6.4%   Diagnostic of diabetes: > or = 6.5%  .       Monitoring of Diabetes                Age (y)     Therapeutic Goal (%)   Adults:          >18           <7.0   Pediatrics:    13-18           <7.5                   7-12           <8.0                   0- 6            7.5-8.5   American Diabetes Association. Diabetes Care 33(S1), Jan 2010.       LDLCALC   Date/Time Value Ref Range Status   05/06/2024 11:28 AM 53 <=99 mg/dL Final     Comment:                                 Near   Borderline      AGE      Desirable  Optimal    High     High     Very High     0-19 Y     0 - 109     ---    110-129   >/= 130     ----    20-24 Y     0 - 119      ---    120-159   >/= 160     ----      >24 Y     0 -  99   100-129  130-159   160-189     >/=190     11/13/2023 10:41 AM 36 <=99 mg/dL Final     Comment:                                 Near   Borderline      AGE      Desirable  Optimal    High     High     Very High     0-19 Y     0 - 109     ---    110-129   >/= 130     ----    20-24 Y     0 - 119     ---    120-159   >/= 160     ----      >24 Y     0 -  99   100-129  130-159   160-189     >/=190       VLDL   Date/Time Value Ref Range Status   05/06/2024 11:28 AM 30 0 - 40 mg/dL Final   11/13/2023 10:41 AM 41 0 - 40 mg/dL Final   07/31/2023 11:10 AM 38 0 - 40 mg/dL Final   05/04/2023 12:15 PM 50 0 - 40 mg/dL Final   05/05/2022 02:50 PM 72 0 - 40 mg/dL Final      Last I/O:  I/O last 3 completed shifts:  In: 620 (8.8 mL/kg) [P.O.:480; I.V.:140 (2 mL/kg)]  Out: 550 (7.8 mL/kg) [Urine:550 (0.2 mL/kg/hr)]  Weight: 70.6 kg     Past Cardiology Tests (Last 3 Years):  EKG:    Echo:  Transthoracic Echocardiogram 12/2024  1. Left ventricular ejection fraction is moderately decreased, calculated by Reyes's biplane at 33%.   2. Abnormal left venticular wall motion.   3. Left ventricular cavity size is mildly dilated.   4. No left ventricular thrombus visualized.   5. Moderate mitral valve regurgitation.   6. Mildly elevated right ventricular systolic pressure.   7. There is plaque visualized in the ascending aorta.     Transthoracic Echocardiogram 7/2024   1. Left ventricular ejection fraction is moderately decreased, by visual estimate at 30-35%.   2. Spectral Doppler shows a pseudonormal pattern of left ventricular diastolic filling.   3. Left ventricular cavity size is moderately dilated.   4. No left ventricular thrombus visualized.   5. Technically difficult exam, though there is moderate LV systolic dysfunction with the apical function being best but hypokinesis of the basal and mid segments. There is no LV apical thrombus seen on the echocontrast images.   6. There  is reduced right ventricular systolic function.   7. The left atrium is moderately dilated.   8. Moderate mitral valve regurgitation.   9. Mildly elevated RVSP.  10. Moderate tricuspid regurgitation visualized.  11. Compared with the prior exam from 5/10/2023, today's exam is more technically difficult making assessment of LV systolic function more difficult. Proir study demonstrated clear wall motiom abnormality in the basal septal, basal inferior and basal and mid inferolateral segments. The LV systolic function appears to be worse today ( hyaving declined from mild dysfunciton to moderate dysfunction. In addition, the degree of MR appears to have increased from mild to moderate.     Transthoracic Echocardiogram 4/26/24   1. Left ventricular systolic function is moderately decreased with a 30-35% estimated ejection fraction.   2. Multiple segmental abnormalities exist. See findings.   3. Spectral Doppler shows a restrictive pattern of left ventricular diastolic filling.   4. There is an elevated left ventricular end diastolic pressure.   5. Moderate mitral valve regurgitation.   6. Moderately decreased mitral valve posterior leaflet mobility.   7. Mild to moderate tricuspid regurgitation.   8. Mildly elevated RVSP.     Transthoracic Echocardiogram 2/2024   1. Left ventricular systolic function is normal with a 30-35% estimated ejection fraction.   2. Multiple segmental abnormalities exist. See findings.   3. Spectral Doppler shows a restrictive pattern of left ventricular diastolic filling.   4. There is low normal right ventricular systolic function.   5. Mild to moderate tricuspid regurgitation.   6. There are multiple wall motion abnormalities.     Transthoracic Echocardiogram 5/2023  1. Left ventricular systolic function is mildly decreased with a 40-45% estimated ejection fraction.  2. The left ventricular cavity size is mildly dilated.  3. Basal and mid inferolateral wall, basal inferoseptal segment, and  basal inferior segment are abnormal.  4. Spectral Doppler shows a pseudonormal pattern of left ventricular diastolic filling.  5. The left atrium is mildly dilated.  6. RVSP within normal limits.     Transthoracic Echocardiogram 11/2021   1. The left ventricular systolic function is moderately decreased with a 35% estimated ejection fraction.   2. Multiple segmental abnormalities exist. See findings.   3. Spectral Doppler shows an impaired relaxation pattern of left ventricular diastolic filling.     Transthoracic Echocardiogram 6/2021   1. The left ventricular systolic function is moderately decreased with a 40% estimated ejection fraction.   2. Multiple segmental abnormalities exist. See findings.   3. Spectral Doppler shows a pseudonormal pattern of left ventricular diastolic filling.   4. Mild to moderate mitral valve regurgitation.     Cath:  Left/Right Heart Catheterization 2/2024   1. Severe native three vessel CAD.   2. Patent LIMA-LAD/Diagonal vessel. All other grafts are occluded.   3. Severe left subclavian artery stenosis with a gradient of ~28-30 mmHg across the stenosis.   4. Mildly elevated filling pressure with PCWP of 21 mmHg.   5. Mild pulmonary hypertension with mPAP of 29 mmHg (Post capillary).   6. Normal cardiac output and index of 4.1 L/min and 2.5 L/min/m2 respectively.   7. Successful RCFA closure with 6Fr. VIP Angioseal.   8. Left Ventricular end-diastolic pressure = 23 mmHg.      Inpatient Medications:  Scheduled medications   Medication Dose Route Frequency    [Held by provider] allopurinol  100 mg oral Daily    [Held by provider] amLODIPine  10 mg oral Daily    aspirin  81 mg oral Daily    atorvastatin  80 mg oral Daily    cetirizine  10 mg oral Daily    cyanocobalamin  1,000 mcg oral Daily    [Held by provider] dapagliflozin propanediol  10 mg oral Daily before evening meal    [Held by provider] furosemide  40 mg intravenous q12h    gabapentin  100 mg oral Nightly    heparin  5,000  Units subcutaneous TID    icosapent ethyL  1 g oral BID    insulin lispro  0-10 Units subcutaneous Before meals & nightly    ipratropium-albuteroL  3 mL nebulization 4x daily    levothyroxine  50 mcg oral Daily    [Held by provider] metoprolol succinate XL  50 mg oral Daily    metoprolol tartrate  25 mg oral q8h     PRN medications   Medication    dextrose    dextrose    famotidine    glucagon    glucagon    ipratropium-albuteroL    nitroglycerin    oxyCODONE    oxygen     Continuous Medications   Medication Dose Last Rate    furosemide  20 mg/hr 20 mg/hr (04/07/25 0550)       Physical Exam:  Documented Vital Signs   Heart Rate:  []   Temp:  [36.5 °C (97.7 °F)-37 °C (98.6 °F)]   Resp:  [11-18]   BP: ()/(54-89)   Weight:  [70.6 kg (155 lb 10.3 oz)]   SpO2:  [94 %-100 %]   Temp:  [36.5 °C (97.7 °F)-37 °C (98.6 °F)] 36.8 °C (98.2 °F)  Heart Rate:  [] 83  Resp:  [11-18] 13  BP: ()/(54-89) 108/63  FiO2 (%):  [28 %] 28 %  Oxygen Dose: *2 L/min  Documented Fluid Status     Intake/Output Summary (Last 24 hours) at 4/7/2025 1619  Last data filed at 4/7/2025 1500  Gross per 24 hour   Intake 1034.4 ml   Output 1065 ml   Net -30.6 ml     Net IO Since Admission: -1,409.02 mL [04/07/25 1619]       Assessment/Plan   Ritesh Garza is a 74 y.o. male with past medical history of PMH of mvCAD s/p CABGx5 1992 c/b NSTEMI 2/2024 on DAPT, HFrEF d/t ICM, HTN, HLD, VT s/p ICD placement 5/2024, CKD III, seizures, PAD c/b severe left subclavian stenosis w/ compromised flow to the left POP s/p left subclavian stent 4/2024 , asthma, DM, gout, SIDHU cirrhosis, portal HTN, depression/anxiety, GERD, BPH, SENIA, , recent GIB 12/2024 (Plavix stopped with GIB), who presented to [facility] with SOB, weight gain, and tachycardia.  Cardiology consulted for CHF.     States he has been more short of breath than usual for the past few days. States he has never been short of breath in the past 32 years since his CABG.  He went to doctor  "visit with his cardiologist, and was told to come to ED for irregular heart rhythm. Denies any chest pain or angina.  States he has gained about 10lbs over the past few months.  Unsure if he is having orthopnea.  He is having some mild lower extremity swelling.  No recent illness, no nausea, vomiting, fever, or chills.      Arrival in the ED HR was 122 Tachypnic b/p was 137/87.   Labs showed BNP 3124. Troponin was 85->82,  K 5.1 Scr 2.31 TSH 14.99 WBC 11k, H/H 9/31  CT Chest showed  Mild interstitial pulmonary edema, small bilateral layering  pleural effusions and cardiomegaly with left ventricular enlargement  suggestive of mild congestive heart failure changes.   He was placed on BiPap, and ultimately placed on oxygen at 2L via NC.  Comfortable.     His Cardiac Device Interrogation performed 4/3/2025 showed no no therapy delivered and importantly no atrial fibrillation or atrial flutter.  Felt that he was having an atrial tachycardia below detection rate and was increased on his beta blockade to help address this. We had originally suggested that he was volume up with some fluid and suggested diuresis. We did not feel that he was overly \"Wet\" and did not recommend aggressive diuresing. On HD-3 he had a thibodeaux catheter with minimal urinary output and some contraction noted on labs. We held his lasix in light of this and decreased oxygen requirement. He was noted to be aggressive verbally, which he is not known to be from prior encounter, rather the opposite and had a sitter at bedside.Overnight, he reported chest pain and was given Oxycodone rather than NTG due to a softer blood pressures and concerns for decompensation.  He had brief self limited episodes of ventricular tachycardia with pacing and baseline Left Bundle Branch   His troponin was modestly elevated, but within range of his known severe disease. His Creatinine had climbed, his potassium was isgnifincaltyy elevated. He has been transferred to ICU for " supportive care and potential dialysis.     Volume removal per nephrology. Currently on Lasix infusion. Received diuril today     If needed we can consider Cardioversion of his Atrial Tachycardia if he develops hemodynamically instability     ========================================  Attending note   ========================================  Both the ELZBIETA and I have had a face to face encounter with the patient today. I have examined the patient and edited the documented physical examination as necessary.  I personally reviewed the patient's vital signs, telemetry, recent labs, medications, orders, EKGs, and pertinent cardiac imaging/ echocardiography.  I have reviewed the ELZBIETA's encounter note, approve the ELZBIETA's documentation and have edited the note to reflect my diagnostic and therapeutic plan.      Patient seen and examined, chart reviewed   -- Sittying in chair, more alert today   -- Making some urine  --  Heart rates are down ( ~ 85 when seen )       Documented Vital Signs   Heart Rate:  []   Temp:  [36.4 °C (97.6 °F)-37 °C (98.6 °F)]   Resp:  [11-18]   BP: ()/(54-89)   Weight:  [70.6 kg (155 lb 10.3 oz)]   SpO2:  [94 %-100 %]   Temp:  [36.4 °C (97.6 °F)-37 °C (98.6 °F)] 36.4 °C (97.6 °F)  Heart Rate:  [] 84  Resp:  [11-18] 17  BP: ()/(54-89) 97/65  FiO2 (%):  [28 %] 28 %      Oxygen Dose: *2 L/min    Documented Fluid Status     Intake/Output Summary (Last 24 hours) at 4/7/2025 1714  Last data filed at 4/7/2025 1700  Gross per 24 hour   Intake 1034.4 ml   Output 1180 ml   Net -145.6 ml     Net IO Since Admission: -1,539.02 mL [04/07/25 1714]      Ritesh Garza is a 74 y.o. male with past medical history of PMH of mvCAD s/p CABGx5 1992 c/b NSTEMI 2/2024 on DAPT, HFrEF d/t ICM, HTN, HLD, VT s/p ICD placement 5/2024, CKD III, seizures, PAD c/b severe left subclavian stenosis w/ compromised flow to the left POP s/p left subclavian stent 4/2024 , asthma, DM, gout, SIDHU cirrhosis, portal  "HTN, depression/anxiety, GERD, BPH, SENIA, , recent GIB 12/2024 (Plavix stopped with GIB), who presented to [facility] with SOB, weight gain, and tachycardia.  Cardiology consulted for CHF.     States he has been more short of breath than usual for the past few days. States he has never been short of breath in the past 32 years since his CABG.  He went to doctor visit with his cardiologist, and was told to come to ED for irregular heart rhythm. Denies any chest pain or angina.  States he has gained about 10lbs over the past few months.  Unsure if he is having orthopnea.  He is having some mild lower extremity swelling.  No recent illness, no nausea, vomiting, fever, or chills.      Arrival in the ED HR was 122 Tachypnic b/p was 137/87.   Labs showed BNP 3124. Troponin was 85->82,  K 5.1 Scr 2.31 TSH 14.99 WBC 11k, H/H 9/31  CT Chest showed  Mild interstitial pulmonary edema, small bilateral layering  pleural effusions and cardiomegaly with left ventricular enlargement  suggestive of mild congestive heart failure changes.   He was placed on BiPap, and ultimately placed on oxygen at 2L via NC.  Comfortable.     His Cardiac Device Interrogation performed 4/3/2025 showed no no therapy delivered and importantly no atrial fibrillation or atrial flutter.  Felt that he was having an atrial tachycardia below detection rate and was increased on his beta blockade to help address this. We had originally suggested that he was volume up with some fluid and suggested diuresis. We did not feel that he was overly \"Wet\" and did not recommend aggressive diuresing. On HD-3 he had a thibodeaux catheter with minimal urinary output and some contraction noted on labs. We held his lasix in light of this and decreased oxygen requirement. He was noted to be aggressive verbally, which he is not known to be from prior encounter, rather the opposite and had a sitter at bedside.Overnight, he reported chest pain and was given Oxycodone rather than NTG due to a " softer blood pressures and concerns for decompensation.  He had brief self limited episodes of ventricular tachycardia with pacing and baseline Left Bundle Branch   His troponin was modestly elevated, but within range of his known severe disease. His Creatinine had climbed, his potassium was isgnifincaltyy elevated. He has been transferred to ICU for supportive care and potential dialysis.     Volume removal per nephrology. Currently on Lasix infusion. Received diuril today     If needed we can consider Cardioversion of his Atrial Tachycardia if he develops hemodynamically instability, although this seems to be improving at this time.         Mahendra Santacruz DO   Division of Cardiovascular Medicine  [facility] between [facility] [place]

## 2025-04-07 NOTE — PROGRESS NOTES
"Ritesh Garza is a 74 y.o. male on day 5 of admission presenting with Acute respiratory failure with hypoxia    Subjective   TYLER  Urine 290 over night, improved output this morning ~75cc/h  Alert, cooperative, impulsive  HD stable    Objective     Physical Exam  Vitals reviewed.   Constitutional:       Appearance: Normal appearance. He is not toxic-appearing.   Cardiovascular:      Rate and Rhythm: Tachycardia present.      Pulses: Normal pulses.   Pulmonary:      Breath sounds: Normal breath sounds.   Abdominal:      General: Bowel sounds are normal.      Palpations: Abdomen is soft.   Genitourinary:     Comments: Indwelling thibodeaux  Musculoskeletal:         General: No swelling.      Cervical back: Neck supple.   Skin:     General: Skin is warm and dry.      Capillary Refill: Capillary refill takes less than 2 seconds.   Neurological:      Mental Status: He is alert.      Comments: Intermittent confused, follows commands, conversent   Psychiatric:         Mood and Affect: Mood normal.      Comments: impulsive         Review of Systems    Last Recorded Vitals   Blood pressure 110/74, pulse (!) 118, temperature 36.8 °C (98.2 °F), temperature source Temporal, resp. rate 13, height 1.6 m (5' 3\"), weight 70.6 kg (155 lb 10.3 oz), SpO2 97%.    Intake/Output Last 3 Shifts  I/O last 3 completed shifts:  In: 620 (8.8 mL/kg) [P.O.:480; I.V.:140 (2 mL/kg)]  Out: 550 (7.8 mL/kg) [Urine:550 (0.2 mL/kg/hr)]  Weight: 70.6 kg     Lines  Urethral Catheter (Active)   Placement Date/Time: 04/02/25 1400     Number of days: 4        Recent Labs  CMP:  Results from last 7 days   Lab Units 04/07/25  0752 04/07/25  0537 04/06/25  1858 04/06/25  1506 04/06/25  1045 04/06/25  0821 04/06/25  0301 04/05/25  0518 04/04/25  0519 04/03/25  0556 04/02/25  1729   SODIUM mmol/L 139 140 139 139 138 136 136 138 137 139 138   POTASSIUM mmol/L 4.6 4.5 5.4* 5.6* 5.5* 7.1* 5.8* 5.3 5.3 5.5* 5.1   CHLORIDE mmol/L 103 105 105 105 108* 106 106 109* 109* " 109* 108*   CO2 mmol/L 23 22 20* 18* 16* 12* 18* 17* 13* 17* 18*   ANION GAP mmol/L 18 18 19 22* 20 25* 18 17 20 19 17   BUN mg/dL 103* 103* 100* 96* 94* 92* 91* 77* 62* 54* 51*   CREATININE mg/dL 4.30* 4.22* 4.08* 3.88* 3.70* 3.60* 3.37* 3.01* 2.56* 2.57* 2.31*   EGFR mL/min/1.73m*2 14* 14* 14* 15* 16* 17* 18* 21* 25* 25* 29*   MAGNESIUM mg/dL  --   --   --   --   --  2.45* 2.21  --   --   --  2.08   ALBUMIN g/dL 3.6 3.6 3.8 3.7 3.6 4.0  --  4.0 3.8 4.2 4.5   ALT U/L  --   --   --   --   --  316*  --   --   --   --  13   AST U/L  --   --   --   --   --  438*  --   --   --   --  18   BILIRUBIN TOTAL mg/dL  --   --   --   --   --  0.9  --   --   --   --  0.5     CBC:  Results from last 7 days   Lab Units 04/07/25  0537 04/06/25  1858 04/06/25  0821 04/05/25  0518 04/04/25  0519 04/03/25  0556 04/02/25  1729   WBC AUTO x10*3/uL 12.5* 17.9* 18.6* 15.4* 15.9* 11.8* 11.4*   HEMOGLOBIN g/dL 8.3* 8.9* 10.8* 9.3* 9.1* 9.7* 9.0*   HEMATOCRIT % 26.0* 29.9* 38.5* 32.0* 31.9* 33.0* 31.0*   PLATELETS AUTO x10*3/uL 203 235 338 247 232 223 214   MCV fL 72* 77* 82 79* 80 78* 78*     COAG:   Results from last 7 days   Lab Units 04/02/25  1729   INR  1.3*     HEME/ENDO:  Results from last 7 days   Lab Units 04/02/25  1729   TSH mIU/L 14.99*      CARDIAC:   Results from last 7 days   Lab Units 04/06/25  1045 04/06/25  0821 04/05/25  1918 04/04/25  0519 04/02/25  1807 04/02/25  1729   TROPHS ng/L 138* 161* 107* 171* 82* 85*        Imaging  Transthoracic Echo (TTE) Limited         XR chest 1 view   Final Result   Limited inspiration. Enlarged cardiac silhouette.        MACRO:   none        Signed by: Anette Borjas 4/6/2025 10:30 AM   Dictation workstation:   [id number]      XR chest 1 view   Final Result   Resolving right basilar opacities, residual left basilar opacities.   Signed by Luke Middleton MD      Cardiac device check - Inpatient         CT chest wo IV contrast   Final Result   1.  Mild interstitial pulmonary edema, small  bilateral layering   pleural effusions and cardiomegaly with left ventricular enlargement   suggestive of mild congestive heart failure changes.   2.  Mild peritoneal ascites.   3.  Status post cholecystectomy.   Signed by Yuri Francis MD      XR chest 1 view   Final Result   Enlarged cardiac silhouette with moderate pulmonary edema             MACRO:   None        Signed by: Hank Razo 4/2/2025 5:52 PM   Dictation workstation:   [id number]           Medications  Scheduled medications  [Held by provider] allopurinol, 100 mg, oral, Daily  [Held by provider] amLODIPine, 10 mg, oral, Daily  aspirin, 81 mg, oral, Daily  atorvastatin, 80 mg, oral, Daily  cetirizine, 10 mg, oral, Daily  chlorothiazide, 1,000 mg, intravenous, Once  cyanocobalamin, 1,000 mcg, oral, Daily  [Held by provider] dapagliflozin propanediol, 10 mg, oral, Daily before evening meal  [Held by provider] furosemide, 40 mg, intravenous, q12h  gabapentin, 100 mg, oral, Nightly  heparin, 5,000 Units, subcutaneous, TID  icosapent ethyL, 1 g, oral, BID  insulin lispro, 0-10 Units, subcutaneous, Before meals & nightly  ipratropium-albuteroL, 3 mL, nebulization, 4x daily  levothyroxine, 50 mcg, oral, Daily  [Held by provider] metoprolol succinate XL, 50 mg, oral, Daily  metoprolol tartrate, 25 mg, oral, q8h      Continuous medications  furosemide, 20 mg/hr, Last Rate: 20 mg/hr (04/07/25 0550)      PRN medications  PRN medications: dextrose, dextrose, famotidine, glucagon, glucagon, ipratropium-albuteroL, nitroglycerin, oxyCODONE, oxygen    Assessment/Plan   Assessment & Plan  Tachycardia    Ritesh Garza is a 74 y.o. year old male patient with   previous medical history of seizures, CAD (hx of CABG 2021), recent NSTEMI, VT with ICD placement, HFrEF 30-35% (4/2024), PAD, Subclavian Artery Stenosis, HTN, HLD,  DM2, CKD,  SENIA, Liver Cirrhosis multiple falls ( July 2023) Central cord compression s/p C4-c7 Decompression, GIB (12/2024)  2/2 Duodenal  Ulcer ( ATC was stopped) who presented to his cardiology appointment on 4/2 at which time he was redirected to [facility] due to shortness of breath x2 weeks, exacerbated with movement. His cardiologist visit showed Wide complex tachycardia to the 120's He initially was admitted to the ICU where he was treated and transferred to the step-down unit for ongoing management.      4/6  rapid response was called for altered mentation.   He was found to have hyperkalemia with increased oxygen requirements, worsening lactate, and pH.  He was transferred to the ICU for ongoing management.      Neuro/Psych/Pain Ctrl/Sedation: ADHD  -Pain Control: PRN oxycodone  -CAM Assessment every shift, Promote Sleep/wake hygiene  -Continue gabapentin      Cardiovascular: HFrEF/CAD with prior CABG/VT s/p ICD/PAD/subclavian artery stenosis   -Maintain MAP > 65   -Continuous Cardiac Monitoring   -ASA/lipitor   -Metoprolol tartrate  -Hold home regimen (entresto)     Respiratory/ENT: Asthma/Acute respiratory failure 2/2 volume status  -Maintain SPO2 > 92%  -Promote Pulm Hygiene Daily      Renal/Volume Status (Intra & Extravascular): TAYLOR on CKD/Hyperkalemia   -Worsening TAYLOR in the setting of electrolyte derangements improving with diuretics and hyperkalemia protocol no need for dialysis at this time, reassess daily and as needed  -Strict I&Os   -Lasix drip  -Diuril   -Hyperkalemia protocol   -->Repeat RFP this afternoon  -Daily BMP, Mag & Phos, Replete electrolytes as indicated   -Nephrology following, appreciate recommendations  -1000cc fluid restriction     GI: Prior GIB/GERD  Diet: Carb control     Endocrine: Hypothyroidism   -POCT Glucose   -SSI  -Synthroid      Infectious Disease: Leukocytosis- down trending  -Afebrile  -Blood cultures with NGTDx3 days   - Repeat blood culture pending   -Monitor for SIRS     Heme/Onc: Anemia  -Monitor for s/s of anemia  -CBC daily, Transfuse for Hgb < 7      MSK:  -Padded pressure  points  -Skin Protocol per ICU      Ethics/Code Status:  Full Code      :  DVT Prophylaxis: Heparin subcutaneous   Diet: Carb control   Love: Maintain in the setting of TAYLOR   Dispo: Maintain ICU      Discussed with Dr Peña and team during bedside rounds    This critically ill patient continues to be at-risk for clinically significant deterioration / failure due to the above mentioned dysfunctional, unstable organ systems.  I have personally identified and managed all complex critical care issues with efforts to prevent aforementioned clinical deterioration.  Critical care time is spent at bedside and/or the immediate area and has included, but is not limited to, the review of diagnostic tests, labs, radiographs, serial assessments of hemodynamics, respiratory status, ventilatory management, and family updates.  Time spent in procedures and teaching are reported separately.    CRITICAL CARE TIME: 65 minutes        Pennie Aiken, APRN-CNP

## 2025-04-07 NOTE — CARE PLAN
The patient's goals for the shift include   no respiratory distress this shift     The clinical goals for the shift include Pt will have urine output >30cc/hr with lasix gtt        Problem: Pain - Adult  Goal: Verbalizes/displays adequate comfort level or baseline comfort level  Outcome: Progressing     Problem: Safety - Adult  Goal: Free from fall injury  Outcome: Progressing     Problem: Discharge Planning  Goal: Discharge to home or other facility with appropriate resources  Outcome: Progressing     Problem: Chronic Conditions and Co-morbidities  Goal: Patient's chronic conditions and co-morbidity symptoms are monitored and maintained or improved  Outcome: Progressing     Problem: Nutrition  Goal: Nutrient intake appropriate for maintaining nutritional needs  Outcome: Progressing     Problem: Skin  Goal: Promote skin healing  Outcome: Progressing

## 2025-04-08 LAB
ALBUMIN SERPL BCP-MCNC: 3.6 G/DL (ref 3.4–5)
ANION GAP SERPL CALC-SCNC: 18 MMOL/L (ref 10–20)
BUN SERPL-MCNC: 106 MG/DL (ref 6–23)
CALCIUM SERPL-MCNC: 8.2 MG/DL (ref 8.6–10.3)
CHLORIDE SERPL-SCNC: 100 MMOL/L (ref 98–107)
CO2 SERPL-SCNC: 24 MMOL/L (ref 21–32)
CREAT SERPL-MCNC: 4.16 MG/DL (ref 0.5–1.3)
EGFRCR SERPLBLD CKD-EPI 2021: 14 ML/MIN/1.73M*2
ERYTHROCYTE [DISTWIDTH] IN BLOOD BY AUTOMATED COUNT: 18.5 % (ref 11.5–14.5)
GLUCOSE BLD MANUAL STRIP-MCNC: 167 MG/DL (ref 74–99)
GLUCOSE BLD MANUAL STRIP-MCNC: 189 MG/DL (ref 74–99)
GLUCOSE BLD MANUAL STRIP-MCNC: 196 MG/DL (ref 74–99)
GLUCOSE BLD MANUAL STRIP-MCNC: 243 MG/DL (ref 74–99)
GLUCOSE BLD MANUAL STRIP-MCNC: 251 MG/DL (ref 74–99)
GLUCOSE SERPL-MCNC: 254 MG/DL (ref 74–99)
HCT VFR BLD AUTO: 27.9 % (ref 41–52)
HGB BLD-MCNC: 8.4 G/DL (ref 13.5–17.5)
MCH RBC QN AUTO: 23.4 PG (ref 26–34)
MCHC RBC AUTO-ENTMCNC: 30.1 G/DL (ref 32–36)
MCV RBC AUTO: 78 FL (ref 80–100)
NRBC BLD-RTO: 0 /100 WBCS (ref 0–0)
PHOSPHATE SERPL-MCNC: 6 MG/DL (ref 2.5–4.9)
PLATELET # BLD AUTO: 191 X10*3/UL (ref 150–450)
POTASSIUM SERPL-SCNC: 3.8 MMOL/L (ref 3.5–5.3)
RBC # BLD AUTO: 3.59 X10*6/UL (ref 4.5–5.9)
SODIUM SERPL-SCNC: 138 MMOL/L (ref 136–145)
WBC # BLD AUTO: 10.3 X10*3/UL (ref 4.4–11.3)

## 2025-04-08 PROCEDURE — 80048 BASIC METABOLIC PNL TOTAL CA: CPT | Mod: CCI | Performed by: HOSPITALIST

## 2025-04-08 PROCEDURE — 99291 CRITICAL CARE FIRST HOUR: CPT | Performed by: NURSE PRACTITIONER

## 2025-04-08 PROCEDURE — 2500000001 HC RX 250 WO HCPCS SELF ADMINISTERED DRUGS (ALT 637 FOR MEDICARE OP): Performed by: NURSE PRACTITIONER

## 2025-04-08 PROCEDURE — 85027 COMPLETE CBC AUTOMATED: CPT | Performed by: NURSE PRACTITIONER

## 2025-04-08 PROCEDURE — 80069 RENAL FUNCTION PANEL: CPT | Performed by: NURSE PRACTITIONER

## 2025-04-08 PROCEDURE — 82947 ASSAY GLUCOSE BLOOD QUANT: CPT

## 2025-04-08 PROCEDURE — 2060000001 HC INTERMEDIATE ICU ROOM DAILY

## 2025-04-08 PROCEDURE — 83735 ASSAY OF MAGNESIUM: CPT | Performed by: HOSPITALIST

## 2025-04-08 PROCEDURE — 94640 AIRWAY INHALATION TREATMENT: CPT

## 2025-04-08 PROCEDURE — 2500000002 HC RX 250 W HCPCS SELF ADMINISTERED DRUGS (ALT 637 FOR MEDICARE OP, ALT 636 FOR OP/ED): Performed by: NURSE PRACTITIONER

## 2025-04-08 PROCEDURE — 2500000004 HC RX 250 GENERAL PHARMACY W/ HCPCS (ALT 636 FOR OP/ED): Performed by: NURSE PRACTITIONER

## 2025-04-08 PROCEDURE — 36415 COLL VENOUS BLD VENIPUNCTURE: CPT | Performed by: NURSE PRACTITIONER

## 2025-04-08 PROCEDURE — 99233 SBSQ HOSP IP/OBS HIGH 50: CPT | Performed by: INTERNAL MEDICINE

## 2025-04-08 PROCEDURE — 2500000001 HC RX 250 WO HCPCS SELF ADMINISTERED DRUGS (ALT 637 FOR MEDICARE OP): Performed by: STUDENT IN AN ORGANIZED HEALTH CARE EDUCATION/TRAINING PROGRAM

## 2025-04-08 PROCEDURE — 97535 SELF CARE MNGMENT TRAINING: CPT | Mod: GO

## 2025-04-08 PROCEDURE — 2500000002 HC RX 250 W HCPCS SELF ADMINISTERED DRUGS (ALT 637 FOR MEDICARE OP, ALT 636 FOR OP/ED): Performed by: STUDENT IN AN ORGANIZED HEALTH CARE EDUCATION/TRAINING PROGRAM

## 2025-04-08 RX ORDER — IPRATROPIUM BROMIDE AND ALBUTEROL SULFATE 2.5; .5 MG/3ML; MG/3ML
3 SOLUTION RESPIRATORY (INHALATION)
Status: DISCONTINUED | OUTPATIENT
Start: 2025-04-08 | End: 2025-04-09

## 2025-04-08 RX ORDER — AMOXICILLIN 250 MG
1 CAPSULE ORAL NIGHTLY
Status: DISCONTINUED | OUTPATIENT
Start: 2025-04-08 | End: 2025-04-19 | Stop reason: HOSPADM

## 2025-04-08 RX ADMIN — SENNOSIDES AND DOCUSATE SODIUM 1 TABLET: 50; 8.6 TABLET ORAL at 17:28

## 2025-04-08 RX ADMIN — Medication 1000 MCG: at 08:37

## 2025-04-08 RX ADMIN — LEVOTHYROXINE SODIUM 50 MCG: 0.1 TABLET ORAL at 06:17

## 2025-04-08 RX ADMIN — IPRATROPIUM BROMIDE AND ALBUTEROL SULFATE 3 ML: 2.5; .5 SOLUTION RESPIRATORY (INHALATION) at 11:36

## 2025-04-08 RX ADMIN — HEPARIN SODIUM 5000 UNITS: 5000 INJECTION INTRAVENOUS; SUBCUTANEOUS at 20:34

## 2025-04-08 RX ADMIN — GABAPENTIN 100 MG: 100 CAPSULE ORAL at 20:34

## 2025-04-08 RX ADMIN — ICOSAPENT ETHYL 1 G: 1 CAPSULE ORAL at 08:37

## 2025-04-08 RX ADMIN — IPRATROPIUM BROMIDE AND ALBUTEROL SULFATE 3 ML: 2.5; .5 SOLUTION RESPIRATORY (INHALATION) at 07:24

## 2025-04-08 RX ADMIN — ASPIRIN 81 MG: 81 TABLET, COATED ORAL at 08:37

## 2025-04-08 RX ADMIN — INSULIN LISPRO 2 UNITS: 100 INJECTION, SOLUTION INTRAVENOUS; SUBCUTANEOUS at 08:37

## 2025-04-08 RX ADMIN — ATORVASTATIN CALCIUM 80 MG: 80 TABLET, FILM COATED ORAL at 08:37

## 2025-04-08 RX ADMIN — HEPARIN SODIUM 5000 UNITS: 5000 INJECTION INTRAVENOUS; SUBCUTANEOUS at 08:37

## 2025-04-08 RX ADMIN — INSULIN LISPRO 6 UNITS: 100 INJECTION, SOLUTION INTRAVENOUS; SUBCUTANEOUS at 16:28

## 2025-04-08 RX ADMIN — CETIRIZINE HYDROCHLORIDE 10 MG: 10 TABLET, FILM COATED ORAL at 08:37

## 2025-04-08 RX ADMIN — METOPROLOL TARTRATE 25 MG: 25 TABLET, FILM COATED ORAL at 15:17

## 2025-04-08 RX ADMIN — INSULIN LISPRO 4 UNITS: 100 INJECTION, SOLUTION INTRAVENOUS; SUBCUTANEOUS at 12:06

## 2025-04-08 RX ADMIN — INSULIN LISPRO 2 UNITS: 100 INJECTION, SOLUTION INTRAVENOUS; SUBCUTANEOUS at 20:34

## 2025-04-08 RX ADMIN — HEPARIN SODIUM 5000 UNITS: 5000 INJECTION INTRAVENOUS; SUBCUTANEOUS at 15:17

## 2025-04-08 RX ADMIN — ICOSAPENT ETHYL 1 G: 1 CAPSULE ORAL at 16:28

## 2025-04-08 RX ADMIN — IPRATROPIUM BROMIDE AND ALBUTEROL SULFATE 3 ML: 2.5; .5 SOLUTION RESPIRATORY (INHALATION) at 19:48

## 2025-04-08 RX ADMIN — FUROSEMIDE 20 MG/HR: 10 INJECTION, SOLUTION INTRAMUSCULAR; INTRAVENOUS at 04:34

## 2025-04-08 RX ADMIN — METOPROLOL TARTRATE 25 MG: 25 TABLET, FILM COATED ORAL at 22:30

## 2025-04-08 ASSESSMENT — PAIN SCALES - GENERAL
PAINLEVEL_OUTOF10: 0 - NO PAIN

## 2025-04-08 ASSESSMENT — PAIN - FUNCTIONAL ASSESSMENT
PAIN_FUNCTIONAL_ASSESSMENT: 0-10

## 2025-04-08 ASSESSMENT — ACTIVITIES OF DAILY LIVING (ADL): HOME_MANAGEMENT_TIME_ENTRY: 15

## 2025-04-08 ASSESSMENT — COGNITIVE AND FUNCTIONAL STATUS - GENERAL
HELP NEEDED FOR BATHING: A LITTLE
TOILETING: A LITTLE
DRESSING REGULAR LOWER BODY CLOTHING: A LITTLE
DAILY ACTIVITIY SCORE: 21

## 2025-04-08 NOTE — CARE PLAN
The patient's goals for the shift include  Patient to get adequate rest this shift       The clinical goals for the shift include patient will remain HDS throughout this shift      Problem: Pain - Adult  Goal: Verbalizes/displays adequate comfort level or baseline comfort level  Outcome: Progressing     Problem: Safety - Adult  Goal: Free from fall injury  Outcome: Progressing     Problem: Discharge Planning  Goal: Discharge to home or other facility with appropriate resources  Outcome: Progressing     Problem: Chronic Conditions and Co-morbidities  Goal: Patient's chronic conditions and co-morbidity symptoms are monitored and maintained or improved  Outcome: Progressing     Problem: Nutrition  Goal: Nutrient intake appropriate for maintaining nutritional needs  Outcome: Progressing     Problem: Skin  Goal: Promote skin healing  Outcome: Progressing

## 2025-04-08 NOTE — PROGRESS NOTES
Occupational Therapy    Occupational Therapy Treatment    Name: Ritesh Garza  MRN: [id number]  Department: [place] [address] ICU  Room[facility]  Date: 04/08/25  Time Calculation  Start Time: 1440  Stop Time: 1455  Time Calculation (min): 15 min    Assessment:  OT Assessment: Progressing well towards acute OT goals, will continue to follow per POC. Cognition impairments resolved today  Prognosis: Excellent  Medical Staff Made Aware: Yes  End of Session Communication: Bedside nurse  End of Session Patient Position: Up in chair, Alarm on  Plan:  Treatment Interventions: ADL retraining, Functional transfer training, Endurance training, Cognitive reorientation, Patient/family training, Equipment evaluation/education, Neuromuscular reeducation, Fine motor coordination activities  OT Frequency: 2 times per week  OT Discharge Recommendations: Low intensity level of continued care  OT Recommended Transfer Status: Stand by assist  OT - OK to Discharge: Yes    Subjective   Previous Visit Info:  OT Last Visit  OT Received On: 04/08/25  General:  General  Prior to Session Communication: Bedside nurse  Patient Position Received: Alarm on, Up in chair  Preferred Learning Style: auditory, kinesthetic, verbal  General Comment: pt up in chair, cognition normal today, agreeable to OT tx  Precautions:  Medical Precautions: Fall precautions    Pain Assessment:  Pain Assessment  Pain Assessment: 0-10  0-10 (Numeric) Pain Score: 0 - No pain    Objective   Cognition:  Overall Cognitive Status: Within Functional Limits  Orientation Level: Oriented X4  Attention: Within Functional Limits  Safety/Judgement: Within Functional Limits  Insight: Within function limits  Impulsive: Within functional limits  Activities of Daily Living: Feeding  Feeding Level of Assistance: Independent    Grooming  Grooming Level of Assistance: Distant supervision  Grooming Where Assessed: Standing sinkside  Grooming Comments: tolerated ~8 min standing sinkside for  extensive oral hygiene; no balance or safety deficits    UE Bathing  UE Bathing Level of Assistance: Distant supervision  UE Bathing Where Assessed: Standing sinkside  UE Bathing Comments: hand hygiene and face washing performed standing    LE Dressing  LE Dressing: No         Bed Mobility/Transfers: Bed Mobility  Bed Mobility: No    Transfers  Transfer: Yes  Transfer 1  Technique 1: Sit to stand, Stand to sit  Transfer Device 1: Walker  Transfer Level of Assistance 1: Modified independent, Minimal verbal cues  Trials/Comments 1: hand placement cues to standing, fair+ eccentric lowering control      Functional Mobility:  Functional Mobility  Functional Mobility Performed: Yes  Functional Mobility 1  Surface 1: Level tile  Device 1: Rolling walker  Assistance 1: Close supervision, Minimal verbal cues  Comments 1: ambulatory to/from restroom area with ww, no overt balance or safety deficits observed.  min cues for education on need for ww and proximity within ww  Sitting Balance:  Static Sitting Balance  Static Sitting-Level of Assistance: Independent  Dynamic Sitting Balance  Dynamic Sitting-Level of Assistance: Close supervision  Standing Balance:  Static Standing Balance  Static Standing-Balance Support: Bilateral upper extremity supported  Static Standing-Level of Assistance: Distant supervision  Dynamic Standing Balance  Dynamic Standing-Balance Support: Bilateral upper extremity supported  Dynamic Standing-Level of Assistance: Close supervision    RUE   RUE : Within Functional Limits, LUE   LUE: Within Functional Limits,  ,      Outcome Measures:  [facility] Daily Activity  Putting on and taking off regular lower body clothing: A little  Bathing (including washing, rinsing, drying): A little  Putting on and taking off regular upper body clothing: None  Toileting, which includes using toilet, bedpan or urinal: A little  Taking care of personal grooming such as brushing teeth: None  Eating Meals: None  Daily Activity -  Total Score: 21      Education Documentation  ADL Training, taught by Chance Boland OT at 4/8/2025  4:07 PM.  Learner: Patient  Readiness: Acceptance  Method: Explanation  Response: Verbalizes Understanding    Education Comments  No comments found.      Goals:  Encounter Problems       Encounter Problems (Active)       ADLs       Patient will perform UB and LB bathing with modified independent level of assistance. (Progressing)       Start:  04/04/25    Expected End:  04/18/25            Patient with complete lower body dressing with modified independent level of assistance donning and doffing all LE clothes. (Progressing)       Start:  04/04/25    Expected End:  04/18/25               MOBILITY       Patient will perform Functional mobility mod  Household distances/Community Distances with stand by assist level of assistance and least restrictive device in order to improve safety and functional mobility. (Progressing)       Start:  04/04/25    Expected End:  04/18/25

## 2025-04-08 NOTE — PROGRESS NOTES
"Subjective Data:  Transferred to floor  Wife at bedside  Mentation a little \"murky\" today, but still more clear overall     Tele:      Overnight Events:       Objective Data:  Last Recorded Vitals:  Vitals:    04/08/25 1100 04/08/25 1136 04/08/25 1200 04/08/25 1300   BP:   118/62    BP Location:       Patient Position:       Pulse: 86  85 88   Resp: 16  14 13   Temp:   36.3 °C (97.3 °F)    TempSrc:   Temporal    SpO2: 98% 99% 94% 98%   Weight:       Height:           Last Labs:  Results from last 7 days   Lab Units 04/08/25  0548 04/07/25  0537 04/06/25  1858   WBC AUTO x10*3/uL 10.3 12.5* 17.9*   HEMOGLOBIN g/dL 8.4* 8.3* 8.9*   HEMATOCRIT % 27.9* 26.0* 29.9*   PLATELETS AUTO x10*3/uL 191 203 235     Results from last 7 days   Lab Units 04/08/25  0027 04/07/25  2110 04/07/25  1511 04/06/25  1045 04/06/25  0821 04/03/25  0556 04/02/25  1729   SODIUM mmol/L 138 138 139   < > 136   < > 138   POTASSIUM mmol/L 3.8 4.4 4.1   < > 7.1*   < > 5.1   CHLORIDE mmol/L 100 101 103   < > 106   < > 108*   CO2 mmol/L 24 24 21   < > 12*   < > 18*   BUN mg/dL 106* 107* 108*   < > 92*   < > 51*   CREATININE mg/dL 4.16* 4.24* 4.31*   < > 3.60*   < > 2.31*   CALCIUM mg/dL 8.2* 8.6 8.5*   < > 9.0   < > 9.3   PROTEIN TOTAL g/dL  --   --   --   --  6.5  --  7.2   BILIRUBIN TOTAL mg/dL  --   --   --   --  0.9  --  0.5   ALK PHOS U/L  --   --   --   --  122  --  93   ALT U/L  --   --   --   --  316*  --  13   AST U/L  --   --   --   --  438*  --  18   GLUCOSE mg/dL 254* 240* 218*   < > 112*   < > 230*    < > = values in this interval not displayed.               TROPHS   Date/Time Value Ref Range Status   04/06/2025 10:45  0 - 20 ng/L Final     Comment:     Previous result verified on 4/5/2025 2049 on specimen/case [id number] called with component [facility] for procedure Troponin I, High Sensitivity with value 107 ng/L.   04/06/2025 08:21  0 - 20 ng/L Final     Comment:     Previous result verified on 4/5/2025 2049 on " Ana from [facility] pharmacy called to say:    Enablex 15 mgs no longer can be obtained by [facility] and insurance will not cover the generic.    The 7.5 mg Enablex is still available currently.  But not sure for how long.       specimen/case [id number] called with component [facility] for procedure Troponin I, High Sensitivity with value 107 ng/L.   04/05/2025 07:18  0 - 20 ng/L Final     BNP   Date/Time Value Ref Range Status   04/06/2025 08:21 AM 3,082 0 - 99 pg/mL Final   04/02/2025 05:29 PM 3,124 0 - 99 pg/mL Final   03/25/2025 08:48 AM 2,349 <100 pg/mL Final     Comment:        BNP levels increase with age in the general  population with the highest values seen in  individuals greater than 75 years of age.  Reference: J. Am. Mellissa. Cardiol. 2002; 40:976-982.          HGBA1C   Date/Time Value Ref Range Status   02/13/2025 01:45 PM 6.2 4.2 - 6.5 % Final   01/11/2024 01:36 PM 6.6 see below % Final   07/31/2023 11:10 AM 7.0 % Final     Comment:          Diagnosis of Diabetes-Adults   Non-Diabetic: < or = 5.6%   Increased risk for developing diabetes: 5.7-6.4%   Diagnostic of diabetes: > or = 6.5%  .       Monitoring of Diabetes                Age (y)     Therapeutic Goal (%)   Adults:          >18           <7.0   Pediatrics:    13-18           <7.5                   7-12           <8.0                   0- 6            7.5-8.5   American Diabetes Association. Diabetes Care 33(S1), Jan 2010.     05/04/2023 12:15 PM 6.8 % Final     Comment:          Diagnosis of Diabetes-Adults   Non-Diabetic: < or = 5.6%   Increased risk for developing diabetes: 5.7-6.4%   Diagnostic of diabetes: > or = 6.5%  .       Monitoring of Diabetes                Age (y)     Therapeutic Goal (%)   Adults:          >18           <7.0   Pediatrics:    13-18           <7.5                   7-12           <8.0                   0- 6            7.5-8.5   American Diabetes Association. Diabetes Care 33(S1), Jan 2010.       LDLCALC   Date/Time Value Ref Range Status   05/06/2024 11:28 AM 53 <=99 mg/dL Final     Comment:                                 Near   Borderline      AGE      Desirable  Optimal    High     High     Very High     0-19 Y     0 - 109     ---     110-129   >/= 130     ----    20-24 Y     0 - 119     ---    120-159   >/= 160     ----      >24 Y     0 -  99   100-129  130-159   160-189     >/=190     11/13/2023 10:41 AM 36 <=99 mg/dL Final     Comment:                                 Near   Borderline      AGE      Desirable  Optimal    High     High     Very High     0-19 Y     0 - 109     ---    110-129   >/= 130     ----    20-24 Y     0 - 119     ---    120-159   >/= 160     ----      >24 Y     0 -  99   100-129  130-159   160-189     >/=190       VLDL   Date/Time Value Ref Range Status   05/06/2024 11:28 AM 30 0 - 40 mg/dL Final   11/13/2023 10:41 AM 41 0 - 40 mg/dL Final   07/31/2023 11:10 AM 38 0 - 40 mg/dL Final   05/04/2023 12:15 PM 50 0 - 40 mg/dL Final   05/05/2022 02:50 PM 72 0 - 40 mg/dL Final      Last I/O:  I/O last 3 completed shifts:  In: 1421.5 (20.6 mL/kg) [P.O.:1320; I.V.:101.5 (1.5 mL/kg)]  Out: 3000 (43.5 mL/kg) [Urine:3000 (1.2 mL/kg/hr)]  Weight: 68.9 kg     Past Cardiology Tests (Last 3 Years):  EKG:    Echo:  Transthoracic Echocardiogram 4/7/2025  1. Left ventricular ejection fraction is severely decreased, calculated by Reyes's biplane at 26%.   2. There is global hypokinesis of the left ventricle with minor regional variations.   3. Left ventricular cavity size is severely dilated.   4. There is reduced right ventricular systolic function.   5. Moderate mitral valve regurgitation.   6. Slightly elevated right ventricular systolic pressure.   7. The inferior vena cava appears mildly dilated, with IVC inspiratory collapse greater than 50%.   8. There is plaque visualized in the ascending aorta.   9. Compared with study dated 12/26/2024, The LVEF is depressed from 33% to currently 26% by Simpsons Biplane Method. RVSP has decreased from 40 mmHg to currently 29 mmHg. Moderate mitral regurgitation remains unchanged.    Transthoracic Echocardiogram 12/2024  1. Left ventricular ejection fraction is moderately decreased, calculated by  Reyes's biplane at 33%.   2. Abnormal left venticular wall motion.   3. Left ventricular cavity size is mildly dilated.   4. No left ventricular thrombus visualized.   5. Moderate mitral valve regurgitation.   6. Mildly elevated right ventricular systolic pressure.   7. There is plaque visualized in the ascending aorta.     Transthoracic Echocardiogram 7/2024   1. Left ventricular ejection fraction is moderately decreased, by visual estimate at 30-35%.   2. Spectral Doppler shows a pseudonormal pattern of left ventricular diastolic filling.   3. Left ventricular cavity size is moderately dilated.   4. No left ventricular thrombus visualized.   5. Technically difficult exam, though there is moderate LV systolic dysfunction with the apical function being best but hypokinesis of the basal and mid segments. There is no LV apical thrombus seen on the echocontrast images.   6. There is reduced right ventricular systolic function.   7. The left atrium is moderately dilated.   8. Moderate mitral valve regurgitation.   9. Mildly elevated RVSP.  10. Moderate tricuspid regurgitation visualized.  11. Compared with the prior exam from 5/10/2023, today's exam is more technically difficult making assessment of LV systolic function more difficult. Proir study demonstrated clear wall motiom abnormality in the basal septal, basal inferior and basal and mid inferolateral segments. The LV systolic function appears to be worse today ( hyaving declined from mild dysfunciton to moderate dysfunction. In addition, the degree of MR appears to have increased from mild to moderate.     Transthoracic Echocardiogram 4/26/24   1. Left ventricular systolic function is moderately decreased with a 30-35% estimated ejection fraction.   2. Multiple segmental abnormalities exist. See findings.   3. Spectral Doppler shows a restrictive pattern of left ventricular diastolic filling.   4. There is an elevated left ventricular end diastolic pressure.    5. Moderate mitral valve regurgitation.   6. Moderately decreased mitral valve posterior leaflet mobility.   7. Mild to moderate tricuspid regurgitation.   8. Mildly elevated RVSP.     Transthoracic Echocardiogram 2/2024   1. Left ventricular systolic function is normal with a 30-35% estimated ejection fraction.   2. Multiple segmental abnormalities exist. See findings.   3. Spectral Doppler shows a restrictive pattern of left ventricular diastolic filling.   4. There is low normal right ventricular systolic function.   5. Mild to moderate tricuspid regurgitation.   6. There are multiple wall motion abnormalities.     Transthoracic Echocardiogram 5/2023  1. Left ventricular systolic function is mildly decreased with a 40-45% estimated ejection fraction.  2. The left ventricular cavity size is mildly dilated.  3. Basal and mid inferolateral wall, basal inferoseptal segment, and basal inferior segment are abnormal.  4. Spectral Doppler shows a pseudonormal pattern of left ventricular diastolic filling.  5. The left atrium is mildly dilated.  6. RVSP within normal limits.     Transthoracic Echocardiogram 11/2021   1. The left ventricular systolic function is moderately decreased with a 35% estimated ejection fraction.   2. Multiple segmental abnormalities exist. See findings.   3. Spectral Doppler shows an impaired relaxation pattern of left ventricular diastolic filling.     Transthoracic Echocardiogram 6/2021   1. The left ventricular systolic function is moderately decreased with a 40% estimated ejection fraction.   2. Multiple segmental abnormalities exist. See findings.   3. Spectral Doppler shows a pseudonormal pattern of left ventricular diastolic filling.   4. Mild to moderate mitral valve regurgitation.     Cath:  Left/Right Heart Catheterization 2/2024   1. Severe native three vessel CAD.   2. Patent LIMA-LAD/Diagonal vessel. All other grafts are occluded.   3. Severe left subclavian artery stenosis with a  gradient of ~28-30 mmHg across the stenosis.   4. Mildly elevated filling pressure with PCWP of 21 mmHg.   5. Mild pulmonary hypertension with mPAP of 29 mmHg (Post capillary).   6. Normal cardiac output and index of 4.1 L/min and 2.5 L/min/m2 respectively.   7. Successful RCFA closure with 6Fr. VIP Angioseal.   8. Left Ventricular end-diastolic pressure = 23 mmHg.      Inpatient Medications:  Scheduled medications   Medication Dose Route Frequency    [Held by provider] allopurinol  100 mg oral Daily    [Held by provider] amLODIPine  10 mg oral Daily    aspirin  81 mg oral Daily    atorvastatin  80 mg oral Daily    cetirizine  10 mg oral Daily    cyanocobalamin  1,000 mcg oral Daily    [Held by provider] dapagliflozin propanediol  10 mg oral Daily before evening meal    [Held by provider] furosemide  40 mg intravenous q12h    gabapentin  100 mg oral Nightly    heparin  5,000 Units subcutaneous TID    icosapent ethyL  1 g oral BID    insulin lispro  0-10 Units subcutaneous Before meals & nightly    ipratropium-albuteroL  3 mL nebulization 4x daily    levothyroxine  50 mcg oral Daily    [Held by provider] metoprolol succinate XL  50 mg oral Daily    metoprolol tartrate  25 mg oral q8h     PRN medications   Medication    dextrose    dextrose    famotidine    glucagon    glucagon    ipratropium-albuteroL    nitroglycerin    oxyCODONE    oxygen     Continuous Medications   Medication Dose Last Rate    furosemide  20 mg/hr 20 mg/hr (04/08/25 1300)       Physical Exam:  Documented Vital Signs   Heart Rate:  []   Temp:  [36.2 °C (97.2 °F)-36.5 °C (97.7 °F)]   Resp:  [12-21]   BP: ()/(44-79)   Weight:  [64.2 kg (141 lb 8.6 oz)-68.9 kg (151 lb 14.4 oz)]   SpO2:  [85 %-100 %]   Temp:  [36.2 °C (97.2 °F)-36.5 °C (97.7 °F)] 36.3 °C (97.3 °F)  Heart Rate:  [] 88  Resp:  [12-21] 13  BP: ()/(44-79) 118/62  FiO2 (%):  [28 %-30 %] 30 %  Oxygen Dose: *0 L/min  Documented Fluid Status     Intake/Output Summary  (Last 24 hours) at 4/8/2025 1359  Last data filed at 4/8/2025 1300  Gross per 24 hour   Intake 1068.13 ml   Output 3545 ml   Net -2476.87 ml     Net IO Since Admission: -3,731.89 mL [04/08/25 1359]       Assessment/Plan   Ritesh Garza is a 74 y.o. male with past medical history of PMH of mvCAD s/p CABGx5 1992 c/b NSTEMI 2/2024 on DAPT, HFrEF d/t ICM, HTN, HLD, VT s/p ICD placement 5/2024, CKD III, seizures, PAD c/b severe left subclavian stenosis w/ compromised flow to the left POP s/p left subclavian stent 4/2024 , asthma, DM, gout, SIDHU cirrhosis, portal HTN, depression/anxiety, GERD, BPH, SENIA, , recent GIB 12/2024 (Plavix stopped with GIB), who presented to [facility] with SOB, weight gain, and tachycardia.  Cardiology consulted for CHF.     States he has been more short of breath than usual for the past few days. States he has never been short of breath in the past 32 years since his CABG.  He went to doctor visit with his cardiologist, and was told to come to ED for irregular heart rhythm. Denies any chest pain or angina.  States he has gained about 10lbs over the past few months.  Unsure if he is having orthopnea.  He is having some mild lower extremity swelling.  No recent illness, no nausea, vomiting, fever, or chills.      Arrival in the ED HR was 122 Tachypnic b/p was 137/87.   Labs showed BNP 3124. Troponin was 85->82,  K 5.1 Scr 2.31 TSH 14.99 WBC 11k, H/H 9/31  CT Chest showed  Mild interstitial pulmonary edema, small bilateral layering  pleural effusions and cardiomegaly with left ventricular enlargement  suggestive of mild congestive heart failure changes.   He was placed on BiPap, and ultimately placed on oxygen at 2L via NC.  Comfortable.     His Cardiac Device Interrogation performed 4/3/2025 showed no no therapy delivered and importantly no atrial fibrillation or atrial flutter.  Felt that he was having an atrial tachycardia below detection rate and was increased on his beta blockade to help address  "this. We had originally suggested that he was volume up with some fluid and suggested diuresis. We did not feel that he was overly \"Wet\" and did not recommend aggressive diuresing. On HD-3 he had a thibodeaux catheter with minimal urinary output and some contraction noted on labs. We held his lasix in light of this and decreased oxygen requirement. He was noted to be aggressive verbally, which he is not known to be from prior encounter, rather the opposite and had a sitter at bedside.Overnight, he reported chest pain and was given Oxycodone rather than NTG due to a softer blood pressures and concerns for decompensation.  He had brief self limited episodes of ventricular tachycardia with pacing and baseline Left Bundle Branch   His troponin was modestly elevated, but within range of his known severe disease. His Creatinine had climbed, his potassium was signifincalty elevated. He has been transferred to ICU for supportive care and potential dialysis.     Transthoracic Echocardiogram 4/7/2025  1. Left ventricular ejection fraction is severely decreased, calculated by Reyes's biplane at 26%.   2. There is global hypokinesis of the left ventricle with minor regional variations.   3. Left ventricular cavity size is severely dilated.   4. There is reduced right ventricular systolic function.   5. Moderate mitral valve regurgitation.   6. Slightly elevated right ventricular systolic pressure.   7. The inferior vena cava appears mildly dilated, with IVC inspiratory collapse greater than 50%.   8. There is plaque visualized in the ascending aorta.   9. Compared with study dated 12/26/2024, The LVEF is depressed from 33% to currently 26% by Simpsons Biplane Method. RVSP has decreased from 40 mmHg to currently 29 mmHg. Moderate mitral regurgitation remains unchanged.    Volume removal per nephrology. Currently on Lasix infusion and diuresing well. Received diuril yesterday 4/7  Net IO Since Admission: -4,321.89 mL [04/08/25 " 2206]  Improving at this time   Diuresis per renal,  probably duration of tachycardia caused decline in LVEF in light of knonw ischemic CM   Continue Care             Mahendra Santacruz DO   Division of Cardiovascular Medicine  [facility] [place]

## 2025-04-08 NOTE — PROGRESS NOTES
Ritesh Garza is a 74 y.o. male on day 6 of admission presenting with Acute respiratory failure with hypoxia.      Subjective   Patient seen and examined in the ICU.  On Lasix drip at 20 mg/hour, he received 1 g of Diurel x 1 dose yesterday.   UOP of 2.7 L in 24 hours.   ELKE.   Chart/labs/meds/notes/imaging/VS reviewed.       Objective          Vitals 24HR  Heart Rate:  []   Temp:  [36.2 °C (97.2 °F)-36.5 °C (97.7 °F)]   Resp:  [12-21]   BP: ()/(44-79)   Weight:  [64.2 kg (141 lb 8.6 oz)-68.9 kg (151 lb 14.4 oz)]   SpO2:  [85 %-100 %]     Intake/Output last 3 Shifts:    Intake/Output Summary (Last 24 hours) at 4/8/2025 1351  Last data filed at 4/8/2025 1300  Gross per 24 hour   Intake 1068.13 ml   Output 3545 ml   Net -2476.87 ml       Physical Exam  General: Nontoxic appearing middle-age male. Patient is in no acute distress.  HEENT: Normocephalic. Moist mucosa.    Neck: Supple.  Elevated jugular venous pressure.  Cardiovascular:  Regular rate and rhythm. Normal S1 and S2. No murmurs, rubs or gallops  Pulmonary:  Clear to auscultation bilaterally.   Abdomen:  Soft. Non-tender. Non-distended. Positive bowel sounds.  Lower Extremities: No lower leg edema appreciated.  Neurologic:  Alert and oriented x3. No focal deficit.   Skin: Skin warm and dry, normal skin turgor. No rashes or lesions to exposed skin.  Psychiatric: Normal mood and behavior.    Scheduled Medications  [Held by provider] allopurinol, 100 mg, oral, Daily  [Held by provider] amLODIPine, 10 mg, oral, Daily  aspirin, 81 mg, oral, Daily  atorvastatin, 80 mg, oral, Daily  cetirizine, 10 mg, oral, Daily  cyanocobalamin, 1,000 mcg, oral, Daily  [Held by provider] dapagliflozin propanediol, 10 mg, oral, Daily before evening meal  [Held by provider] furosemide, 40 mg, intravenous, q12h  gabapentin, 100 mg, oral, Nightly  heparin, 5,000 Units, subcutaneous, TID  icosapent ethyL, 1 g, oral, BID  insulin lispro, 0-10 Units, subcutaneous, Before  meals & nightly  ipratropium-albuteroL, 3 mL, nebulization, 4x daily  levothyroxine, 50 mcg, oral, Daily  [Held by provider] metoprolol succinate XL, 50 mg, oral, Daily  metoprolol tartrate, 25 mg, oral, q8h      Continuous medications  furosemide, 20 mg/hr, Last Rate: 20 mg/hr (04/08/25 1300)        PRN medications: dextrose, dextrose, famotidine, glucagon, glucagon, ipratropium-albuteroL, nitroglycerin, oxyCODONE, oxygen     Relevant Results  Results from last 7 days   Lab Units 04/08/25  0548 04/07/25  0537 04/06/25  1858 04/03/25  0556 04/02/25  1729   WBC AUTO x10*3/uL 10.3 12.5* 17.9*   < > 11.4*   HEMOGLOBIN g/dL 8.4* 8.3* 8.9*   < > 9.0*   HEMATOCRIT % 27.9* 26.0* 29.9*   < > 31.0*   PLATELETS AUTO x10*3/uL 191 203 235   < > 214   NEUTROS PCT AUTO %  --   --  82.4  --  74.4   LYMPHS PCT AUTO %  --   --  7.3  --  13.5   MONOS PCT AUTO %  --   --  9.5  --  10.8   EOS PCT AUTO %  --   --  0.0  --  0.6    < > = values in this interval not displayed.     Results from last 7 days   Lab Units 04/08/25  0027 04/07/25  2110 04/07/25  1511   SODIUM mmol/L 138 138 139   POTASSIUM mmol/L 3.8 4.4 4.1   CHLORIDE mmol/L 100 101 103   CO2 mmol/L 24 24 21   BUN mg/dL 106* 107* 108*   CREATININE mg/dL 4.16* 4.24* 4.31*   GLUCOSE mg/dL 254* 240* 218*   CALCIUM mg/dL 8.2* 8.6 8.5*       Transthoracic Echo (TTE) Limited   Final Result      XR chest 1 view   Final Result   Limited inspiration. Enlarged cardiac silhouette.        MACRO:   none        Signed by: Anette Borjas 4/6/2025 10:30 AM   Dictation workstation:   [id number]      XR chest 1 view   Final Result   Resolving right basilar opacities, residual left basilar opacities.   Signed by Luke Middleton MD      Cardiac device check - Inpatient         CT chest wo IV contrast   Final Result   1.  Mild interstitial pulmonary edema, small bilateral layering   pleural effusions and cardiomegaly with left ventricular enlargement   suggestive of mild congestive heart failure  changes.   2.  Mild peritoneal ascites.   3.  Status post cholecystectomy.   Signed by Yuri Francis MD      XR chest 1 view   Final Result   Enlarged cardiac silhouette with moderate pulmonary edema             MACRO:   None        Signed by: Hank Razo 4/2/2025 5:52 PM   Dictation workstation:   [id number]               Assessment/Plan      Ritesh Garza is a 74 y.o. male with a past medical history of coronary artery disease status post 5v CABG 1995, NSTEMI in February 2024, HFrEF with an LVEF of 33%, moderate mitral valve regurg, mild pulmonary hypertension by 2D echo 12/2024, ischemic cardiomyopathy, hypertension, hyperlipidemia, ventricular tachycardia status post ICD placement 5/2024, seizures, peripheral arterial disease, severe left subclavian stenosis status post stenting 4/2024, asthma, diabetes, gout, SIDHU cirrhosis, portal hypertension, depression, anxiety, GERD, BPH, obstructive sleep apnea, recent history of GI bleed and stage G3b/A3 chronic kidney disease with a baseline creatinine that fluctuates around 2 mg/deciliter who presented with shortness of breath, a 10 pound weight gain and tachycardia.  Workup was notable for an elevated BNP above 3,000.  There was transaminitis, and elevated lactate and worsening acute kidney injury.  CT imaging of the chest showed mild interstitial pulmonary edema, small bilateral layering pleural effusions, cardiomegaly with LV enlargement consistent with mild CHF.  There was mild ascites.  He was placed on BiPAP and admitted to the intensive care unit.  Nephrology was consulted for acute kidney injury on top of stage G3b chronic kidney disease management.    Mr. Garza has a significant acute kidney injury in the setting of heart failure physiology. He is now on Lasix drip at 20 mg/hour. He got a dose of IV Diuril. He is now responding to the diuretic. Blood pressures remain soft albeit his creatinine has plateau'd since yesterday. There is no acute  indication for RRT.  Repeat 2D echo shows a drop in his EF to 26%.  He remains in heart failure.  We can uptitrate his drip to 30 mg/hour if his urine output drops off.  He is no longer acidotic after receiving IV bicarbonate pushes. His potassium has normalized with Lokelma.  I will send iron stores and ferritin. Trend his RFP.  We will see where he stands tomorrow.    Assessment & Plan  Acute respiratory failure with hypoxia    Tachycardia      I spent 50 minutes in the professional and overall care of this patient.      Sony Stout, DO

## 2025-04-08 NOTE — PROGRESS NOTES
"Ritesh Garza is a 74 y.o. male on day 6 of admission presenting with Acute respiratory failure with hypoxia.    Subjective   TYLER; average urine output of ~100 ml/hr overnight   Sitting comfortably in the chair on RA.     Objective     Physical Exam  Vitals and nursing note reviewed.   Constitutional:       General: He is not in acute distress.     Appearance: He is not ill-appearing.   Neck:      Vascular: Hepatojugular reflux present.   Cardiovascular:      Rate and Rhythm: Normal rate and regular rhythm.      Pulses: Normal pulses.      Heart sounds: Normal heart sounds. No murmur heard.     No friction rub. No gallop.   Pulmonary:      Effort: Pulmonary effort is normal. No respiratory distress.      Breath sounds: Normal breath sounds. No wheezing, rhonchi or rales.   Abdominal:      General: Abdomen is flat. Bowel sounds are normal. There is no distension.      Palpations: Abdomen is soft.      Tenderness: There is no abdominal tenderness.   Genitourinary:     Comments: Indwelling thibodeaux catheter draining yellow urine   Musculoskeletal:         General: No swelling. Normal range of motion.   Skin:     General: Skin is warm and dry.      Capillary Refill: Capillary refill takes less than 2 seconds.   Neurological:      Mental Status: He is alert and oriented to person, place, and time.   Psychiatric:         Mood and Affect: Mood normal.         Behavior: Behavior normal.       Last Recorded Vitals  Blood pressure (!) 96/44, pulse 85, temperature 36.2 °C (97.2 °F), temperature source Temporal, resp. rate 13, height 1.6 m (5' 3\"), weight 68.9 kg (151 lb 14.4 oz), SpO2 99%.  Intake/Output last 3 Shifts:  I/O last 3 completed shifts:  In: 1421.5 (20.6 mL/kg) [P.O.:1320; I.V.:101.5 (1.5 mL/kg)]  Out: 3000 (43.5 mL/kg) [Urine:3000 (1.2 mL/kg/hr)]  Weight: 68.9 kg     Relevant Results  Results for orders placed or performed during the hospital encounter of 04/02/25 (from the past 24 hours)   POCT GLUCOSE   Result " Value Ref Range    POCT Glucose 182 (H) 74 - 99 mg/dL   Transthoracic Echo (TTE) Limited   Result Value Ref Range    Tricuspid annular plane systolic excursion 1.2 cm    LV EF 26 %    RV free wall pk S' 7.00 cm/s    LVIDd 5.43 cm    RVSP 29.2 mmHg    LV A4C EF 22.2    POCT GLUCOSE   Result Value Ref Range    POCT Glucose 192 (H) 74 - 99 mg/dL   Renal Function Panel   Result Value Ref Range    Glucose 218 (H) 74 - 99 mg/dL    Sodium 139 136 - 145 mmol/L    Potassium 4.1 3.5 - 5.3 mmol/L    Chloride 103 98 - 107 mmol/L    Bicarbonate 21 21 - 32 mmol/L    Anion Gap 19 10 - 20 mmol/L    Urea Nitrogen 108 (HH) 6 - 23 mg/dL    Creatinine 4.31 (H) 0.50 - 1.30 mg/dL    eGFR 14 (L) >60 mL/min/1.73m*2    Calcium 8.5 (L) 8.6 - 10.3 mg/dL    Phosphorus 6.1 (H) 2.5 - 4.9 mg/dL    Albumin 3.7 3.4 - 5.0 g/dL   POCT GLUCOSE   Result Value Ref Range    POCT Glucose 225 (H) 74 - 99 mg/dL   POCT GLUCOSE   Result Value Ref Range    POCT Glucose 239 (H) 74 - 99 mg/dL   Renal Function Panel   Result Value Ref Range    Glucose 240 (H) 74 - 99 mg/dL    Sodium 138 136 - 145 mmol/L    Potassium 4.4 3.5 - 5.3 mmol/L    Chloride 101 98 - 107 mmol/L    Bicarbonate 24 21 - 32 mmol/L    Anion Gap 17 10 - 20 mmol/L    Urea Nitrogen 107 (HH) 6 - 23 mg/dL    Creatinine 4.24 (H) 0.50 - 1.30 mg/dL    eGFR 14 (L) >60 mL/min/1.73m*2    Calcium 8.6 8.6 - 10.3 mg/dL    Phosphorus 6.1 (H) 2.5 - 4.9 mg/dL    Albumin 3.8 3.4 - 5.0 g/dL   Renal Function Panel   Result Value Ref Range    Glucose 254 (H) 74 - 99 mg/dL    Sodium 138 136 - 145 mmol/L    Potassium 3.8 3.5 - 5.3 mmol/L    Chloride 100 98 - 107 mmol/L    Bicarbonate 24 21 - 32 mmol/L    Anion Gap 18 10 - 20 mmol/L    Urea Nitrogen 106 (HH) 6 - 23 mg/dL    Creatinine 4.16 (H) 0.50 - 1.30 mg/dL    eGFR 14 (L) >60 mL/min/1.73m*2    Calcium 8.2 (L) 8.6 - 10.3 mg/dL    Phosphorus 6.0 (H) 2.5 - 4.9 mg/dL    Albumin 3.6 3.4 - 5.0 g/dL   CBC   Result Value Ref Range    WBC 10.3 4.4 - 11.3 x10*3/uL    nRBC  0.0 0.0 - 0.0 /100 WBCs    RBC 3.59 (L) 4.50 - 5.90 x10*6/uL    Hemoglobin 8.4 (L) 13.5 - 17.5 g/dL    Hematocrit 27.9 (L) 41.0 - 52.0 %    MCV 78 (L) 80 - 100 fL    MCH 23.4 (L) 26.0 - 34.0 pg    MCHC 30.1 (L) 32.0 - 36.0 g/dL    RDW 18.5 (H) 11.5 - 14.5 %    Platelets 191 150 - 450 x10*3/uL   POCT GLUCOSE   Result Value Ref Range    POCT Glucose 196 (H) 74 - 99 mg/dL     Transthoracic Echo (TTE) Limited    Result Date: 4/7/2025   [facility], [address] [place] [address]              Tel [phone] and Fax [phone] TRANSTHORACIC ECHOCARDIOGRAM REPORT  Patient Name:       CHIDI Andrade Physician:    [id number] Santacruz DO Study Date:         4/7/2025            Ordering Provider:    [id number] SANTACRUZ MRN/PID:            [id number]            Fellow: Accession#:         [id number]        Nurse: Date of Birth/Age:  1950 / 74      Sonographer:          Luke lae Gender assigned at  M                   Additional Staff: Birth: Height:             160.00 cm           Admit Date:           4/2/2025 Weight:             71.00 kg            Admission Status:     Inpatient -                                                               Priority                                                               discharge BSA / BMI:          1.74 m2 / 27.73     Encounter#:           [id number]                     kg/m2 Blood Pressure:     93/69 mmHg          Department Location:  [facility] Non                                                               Invasive Study Type:    TRANSTHORACIC ECHO (TTE) LIMITED Diagnosis/ICD: Non ST elevation (NSTEMI) myocardial infarction-I21.4 Indication:    HFrEF w/TAYLOR CPT Code:      Echo Limited-93507 Patient History: Diabetes:          Yes Pertinent History: HTN, CHF and Chest Pain. Acute  Respiratory Failure w/Hypoxia,                    Portal HTN, Dementia, CKD, MI, SENIA. Study Detail: The following Echo studies were performed: 2D, M-Mode, Doppler and               color flow. Technically challenging study due to patient lying in               supine position. Definity used as a contrast agent for endocardial               border definition. Total contrast used for this procedure was 2 mL               via IV push.  PHYSICIAN INTERPRETATION: Left Ventricle: Left ventricular ejection fraction is severely decreased, calculated by Reyes's biplane at 26%. There is global hypokinesis of the left ventricle with minor regional variations. The left ventricular cavity size is severely dilated. There is normal septal and normal posterior left ventricular wall thickness. Left ventricular diastolic filling was not assessed. Left Atrium: The left atrial size was not assessed. Right Ventricle: The right ventricle was not well visualized. There is reduced right ventricular systolic function. TAPSE = 12 mm / S' = 7. Right Atrium: The right atrium is normal in size. There is a device visualized in the right atrium. Aortic Valve: The aortic valve is probably trileaflet. There is mild to moderate aortic valve cusp calcification. There is no evidence of aortic valve regurgitation. Mitral Valve: The mitral valve is mildly thickened. There is mild mitral annular calcification. There is moderate mitral valve regurgitation. Tricuspid Valve: The tricuspid valve is structurally normal. There is trace to mild tricuspid regurgitation. The Doppler estimated RVSP is slightly elevated at 29.2 mmHg. Pulmonic Valve: The pulmonic valve is structurally normal. There is physiologic pulmonic valve regurgitation. Pericardium: There is no pericardial effusion noted. Aorta: The aortic root was not assessed. There is plaque visualized in the ascending aorta, which is classified as a Grade 2 [mild (focal or diffuse) intimal thickening of  2-3 mm] atherosclerosis. Systemic Veins: The inferior vena cava appears mildly dilated, with IVC inspiratory collapse greater than 50%. In comparison to the previous echocardiogram(s): Compared with study dated 12/26/2024, The LVEF is depressed from 33% to currently 26% by Simpsons Biplane Method. RVSP has decreased from 40 mmHg to currently 29 mmHg. Moderate mitral regurgitation remains unchanged.  CONCLUSIONS:  1. Left ventricular ejection fraction is severely decreased, calculated by Reyes's biplane at 26%.  2. There is global hypokinesis of the left ventricle with minor regional variations.  3. Left ventricular cavity size is severely dilated.  4. There is reduced right ventricular systolic function.  5. Moderate mitral valve regurgitation.  6. Slightly elevated right ventricular systolic pressure.  7. The inferior vena cava appears mildly dilated, with IVC inspiratory collapse greater than 50%.  8. There is plaque visualized in the ascending aorta.  9. Compared with study dated 12/26/2024, The LVEF is depressed from 33% to currently 26% by Simpsons Biplane Method. RVSP has decreased from 40 mmHg to currently 29 mmHg. Moderate mitral regurgitation remains unchanged. QUANTITATIVE DATA SUMMARY:  2D MEASUREMENTS:          Normal Ranges: LAs:             4.40 cm  (2.7-4.0cm) IVSd:            0.98 cm  (0.6-1.1cm) LVPWd:           0.73 cm  (0.6-1.1cm) LVIDd:           5.43 cm  (3.9-5.9cm) LVIDs:           4.96 cm LV Mass Index:   98 g/m2 LVEDV Index:     91 ml/m2 LV % FS          8.7 %  LV SYSTOLIC FUNCTION:                      Normal Ranges: EF-A4C View:    22 % (>=55%) EF-A2C View:    24 % EF-Biplane:     26 % LV EF Reported: 26 %  LV DIASTOLIC FUNCTION:           Normal Ranges: MV e'                  0.045 m/s (>8.0) MV lateral e'          0.04 m/s MV medial e'           0.05 m/s  MITRAL INSUFFICIENCY:            Normal Ranges: PISA Radius:          0.5 cm MR Alias Matt:         30.7 cm/s MR Flow Rt:            41.83 ml/s  RIGHT VENTRICLE: TAPSE: 12.0 mm RV s'  0.07 m/s  TRICUSPID VALVE/RVSP:          Normal Ranges: Peak TR Velocity:     2.56 m/s Est. RA Pressure:     3 mmHg RV Syst Pressure:     29 mmHg  (< 30mmHg) IVC Diam:             2.20 cm  [id number] Mahendra Santacruz DO Electronically signed on 4/7/2025 at 1:25:37 PM  ** Final **     XR chest 1 view    Result Date: 4/6/2025  Interpreted By:  Anette Borjas, STUDY: XR CHEST 1 VIEW;  4/6/2025 8:41 am   INDICATION: Signs/Symptoms:Hypoxia.   COMPARISON: 04/06/2025   ACCESSION NUMBER(S): [id number]   ORDERING CLINICIAN: JOSH ZAFAR   FINDINGS: The inspiration is suboptimal. The cardiac silhouette appears enlarged. There is a multi lead transvenous pacemaker/AICD device.   There is no discrete consolidation or pleural fluid. There is slight elevation of the left hemidiaphragm.   Sternal wires and mediastinal clips are present. The patient is status post cervical spine fusion.   COMPARISON OF FINDING: The chest is similar       Limited inspiration. Enlarged cardiac silhouette.   MACRO: none   Signed by: Anette Borjas 4/6/2025 10:30 AM Dictation workstation:   [id number]       This patient has a urinary catheter   Reason for the urinary catheter remaining today? critically ill patient who need accurate urinary output measurements      Assessment/Plan   Assessment & Plan  Tachycardia    Ritesh Garza is a 74 y.o. year old male patient with a past medical history of seizures, CAD (hx of CABG 2021), recent NSTEMI, VT with ICD placement, HFrEF 30-35% (4/2024), PAD, Subclavian Artery Stenosis, HTN, HLD,  DM2, CKD,  SENIA, Liver Cirrhosis multiple falls ( July 2023) Central cord compression s/p C4-C7 decompression, GIB (12/2024) 2/2 Duodenal Ulcer (ATC was stopped) who presented to his cardiology appointment on 4/2 at which time he was redirected to [facility] due to shortness of breath x2 weeks, exacerbated with movement. His cardiologist visit showed Wide complex  tachycardia to the 120's He initially was admitted to the ICU where he was treated and transferred to the step-down unit for ongoing management.      4/6  rapid response was called for altered mentation.   He was found to have hyperkalemia with increased oxygen requirements, worsening lactate, and pH.  He was transferred to the ICU for ongoing management.     Neuro:  Hx ADHD, neuropathy   -PRN oxycodone for analgesia   - continue gabapentin   - CAM assessment every shift, promote sleep/wake hygiene     Cardiovascular:   Hx HFrEF/CAD s/p CABG (2021), VT s/p ICD placement, PAD, subclavian artery stenosis   - Maintain MAP >65   - Continuous ECG and NIBP monitoring   - Continue ASA/Lipitor/Vascepa   - Continue Metoprolol tartrate with hold parameters   - Hold home Entresto, amlodipine, IVP lasix, farxiga,  until HD permit   - LVEF 26% (KAM 4/7/2025)   - cardiology following    Respiratory:  Acute respiratory failure 2/2 volume status--improving   Hx Asthma   - Maintain SPO2 >92%   - Continuous SPO2 monitoring   - BPH     /Renal:   TAYLOR on CKD stage 3--improving with diuresis   Hyperkalemia-- resolved    Hx CKD stage 3, BPH  - Strict I&Os   - Continue lasix gtt with a net negative goal of 1-1.5 liters over 12 hours   - Daily RFP and replete electrolytes as needed  - 1000 ml fluid restriction   - Nephrology following, appreciate recs     GI:  Hx prior GI bleed, GERD, portal HTN, liver cirrhosis 2/2 SIDHU  - Regular diet, 1000 ml fluid restriction    - PRN Pepcid for indigestion     Endocrine:   Hx Hypothyroidism, DM2   - POCT glucose   - SSI   - Continue Synthroid   - Hypoglycemia protocol     ID:  Leukocytosis--resolved   - Afebrile   - Trend temps and WBCs   - Repeat blood cultures NGTD (4/6/2025)     Heme:  Hx anemia  - CBC daily, transfuse for Hgb <7   - DVT ppx--Heparin subcutaneous  and SCDs    MSK:  -Padded pressure points   - Skin protocol per ICU   - PT/OT     Dispo: Transfer out of ICU to stepdown    Plan  discussed with Dr. Peña during morning MDRs      MITCHEL YOSELYNON    I was present with the student who participated in the documentation of this note. I personally saw and evaluated the patient and performed my own history and examination. I discussed the case with the student. I have reviewed, verified, and revised the note as necessary and agree with the content and plan as written by the student.     This critically ill patient continues to be at-risk for clinically significant deterioration / failure due to the above mentioned dysfunctional, unstable organ systems.  I have personally identified and managed all complex critical care issues with efforts to prevent aforementioned clinical deterioration.  Critical care time is spent at bedside and/or the immediate area and has included, but is not limited to, the review of diagnostic tests, labs, radiographs, serial assessments of hemodynamics, respiratory status, ventilatory management, and family updates.  Time spent in procedures and teaching are reported separately.    CRITICAL CARE TIME: 60 minutes    Aakash Longoria CNP

## 2025-04-09 ENCOUNTER — APPOINTMENT (OUTPATIENT)
Dept: CARDIOLOGY | Facility: HOSPITAL | Age: 75
DRG: 286 | End: 2025-04-09
Payer: OTHER MISCELLANEOUS

## 2025-04-09 ENCOUNTER — APPOINTMENT (OUTPATIENT)
Dept: RADIOLOGY | Facility: HOSPITAL | Age: 75
DRG: 286 | End: 2025-04-09
Payer: OTHER MISCELLANEOUS

## 2025-04-09 LAB
ALBUMIN SERPL BCP-MCNC: 3.7 G/DL (ref 3.4–5)
ANION GAP SERPL CALC-SCNC: 16 MMOL/L (ref 10–20)
ANION GAP SERPL CALC-SCNC: 17 MMOL/L (ref 10–20)
ATRIAL RATE: 122 BPM
BUN SERPL-MCNC: 95 MG/DL (ref 6–23)
BUN SERPL-MCNC: 99 MG/DL (ref 6–23)
CALCIUM SERPL-MCNC: 8.8 MG/DL (ref 8.6–10.3)
CALCIUM SERPL-MCNC: 9.1 MG/DL (ref 8.6–10.3)
CHLORIDE SERPL-SCNC: 92 MMOL/L (ref 98–107)
CHLORIDE SERPL-SCNC: 94 MMOL/L (ref 98–107)
CO2 SERPL-SCNC: 29 MMOL/L (ref 21–32)
CO2 SERPL-SCNC: 31 MMOL/L (ref 21–32)
CREAT SERPL-MCNC: 3.09 MG/DL (ref 0.5–1.3)
CREAT SERPL-MCNC: 3.4 MG/DL (ref 0.5–1.3)
EGFRCR SERPLBLD CKD-EPI 2021: 18 ML/MIN/1.73M*2
EGFRCR SERPLBLD CKD-EPI 2021: 20 ML/MIN/1.73M*2
ERYTHROCYTE [DISTWIDTH] IN BLOOD BY AUTOMATED COUNT: 18.2 % (ref 11.5–14.5)
FERRITIN SERPL-MCNC: 45 NG/ML (ref 20–300)
GLUCOSE BLD MANUAL STRIP-MCNC: 130 MG/DL (ref 74–99)
GLUCOSE BLD MANUAL STRIP-MCNC: 140 MG/DL (ref 74–99)
GLUCOSE BLD MANUAL STRIP-MCNC: 169 MG/DL (ref 74–99)
GLUCOSE BLD MANUAL STRIP-MCNC: 222 MG/DL (ref 74–99)
GLUCOSE SERPL-MCNC: 166 MG/DL (ref 74–99)
GLUCOSE SERPL-MCNC: 186 MG/DL (ref 74–99)
HCT VFR BLD AUTO: 28.9 % (ref 41–52)
HGB BLD-MCNC: 8.9 G/DL (ref 13.5–17.5)
IRON SATN MFR SERPL: 3 % (ref 25–45)
IRON SERPL-MCNC: 13 UG/DL (ref 35–150)
MAGNESIUM SERPL-MCNC: 1.85 MG/DL (ref 1.6–2.4)
MCH RBC QN AUTO: 22.8 PG (ref 26–34)
MCHC RBC AUTO-ENTMCNC: 30.8 G/DL (ref 32–36)
MCV RBC AUTO: 74 FL (ref 80–100)
NRBC BLD-RTO: 0 /100 WBCS (ref 0–0)
P AXIS: 86 DEGREES
P OFFSET: 180 MS
P ONSET: 134 MS
PHOSPHATE SERPL-MCNC: 3.7 MG/DL (ref 2.5–4.9)
PLATELET # BLD AUTO: 187 X10*3/UL (ref 150–450)
POTASSIUM SERPL-SCNC: 3.6 MMOL/L (ref 3.5–5.3)
POTASSIUM SERPL-SCNC: 4.1 MMOL/L (ref 3.5–5.3)
PR INTERVAL: 154 MS
Q ONSET: 211 MS
QRS COUNT: 20 BEATS
QRS DURATION: 138 MS
QT INTERVAL: 322 MS
QTC CALCULATION(BAZETT): 458 MS
QTC FREDERICIA: 407 MS
R AXIS: 219 DEGREES
RBC # BLD AUTO: 3.91 X10*6/UL (ref 4.5–5.9)
SODIUM SERPL-SCNC: 135 MMOL/L (ref 136–145)
SODIUM SERPL-SCNC: 136 MMOL/L (ref 136–145)
T AXIS: 7 DEGREES
T OFFSET: 372 MS
TIBC SERPL-MCNC: 404 UG/DL (ref 240–445)
UIBC SERPL-MCNC: 391 UG/DL (ref 110–370)
VENTRICULAR RATE: 122 BPM
WBC # BLD AUTO: 9.2 X10*3/UL (ref 4.4–11.3)

## 2025-04-09 PROCEDURE — 85027 COMPLETE CBC AUTOMATED: CPT | Performed by: NURSE PRACTITIONER

## 2025-04-09 PROCEDURE — 2500000001 HC RX 250 WO HCPCS SELF ADMINISTERED DRUGS (ALT 637 FOR MEDICARE OP): Performed by: STUDENT IN AN ORGANIZED HEALTH CARE EDUCATION/TRAINING PROGRAM

## 2025-04-09 PROCEDURE — 71045 X-RAY EXAM CHEST 1 VIEW: CPT

## 2025-04-09 PROCEDURE — 2500000002 HC RX 250 W HCPCS SELF ADMINISTERED DRUGS (ALT 637 FOR MEDICARE OP, ALT 636 FOR OP/ED): Performed by: NURSE PRACTITIONER

## 2025-04-09 PROCEDURE — 83540 ASSAY OF IRON: CPT | Performed by: NURSE PRACTITIONER

## 2025-04-09 PROCEDURE — 93005 ELECTROCARDIOGRAM TRACING: CPT

## 2025-04-09 PROCEDURE — 36415 COLL VENOUS BLD VENIPUNCTURE: CPT | Performed by: NURSE PRACTITIONER

## 2025-04-09 PROCEDURE — 83550 IRON BINDING TEST: CPT | Performed by: NURSE PRACTITIONER

## 2025-04-09 PROCEDURE — 99233 SBSQ HOSP IP/OBS HIGH 50: CPT | Performed by: INTERNAL MEDICINE

## 2025-04-09 PROCEDURE — 2500000004 HC RX 250 GENERAL PHARMACY W/ HCPCS (ALT 636 FOR OP/ED): Performed by: NURSE PRACTITIONER

## 2025-04-09 PROCEDURE — 2500000004 HC RX 250 GENERAL PHARMACY W/ HCPCS (ALT 636 FOR OP/ED): Performed by: STUDENT IN AN ORGANIZED HEALTH CARE EDUCATION/TRAINING PROGRAM

## 2025-04-09 PROCEDURE — 99233 SBSQ HOSP IP/OBS HIGH 50: CPT | Performed by: STUDENT IN AN ORGANIZED HEALTH CARE EDUCATION/TRAINING PROGRAM

## 2025-04-09 PROCEDURE — 2500000002 HC RX 250 W HCPCS SELF ADMINISTERED DRUGS (ALT 637 FOR MEDICARE OP, ALT 636 FOR OP/ED): Performed by: STUDENT IN AN ORGANIZED HEALTH CARE EDUCATION/TRAINING PROGRAM

## 2025-04-09 PROCEDURE — 82728 ASSAY OF FERRITIN: CPT | Performed by: NURSE PRACTITIONER

## 2025-04-09 PROCEDURE — 2060000001 HC INTERMEDIATE ICU ROOM DAILY

## 2025-04-09 PROCEDURE — 93010 ELECTROCARDIOGRAM REPORT: CPT | Performed by: INTERNAL MEDICINE

## 2025-04-09 PROCEDURE — 2500000001 HC RX 250 WO HCPCS SELF ADMINISTERED DRUGS (ALT 637 FOR MEDICARE OP): Performed by: INTERNAL MEDICINE

## 2025-04-09 PROCEDURE — 71045 X-RAY EXAM CHEST 1 VIEW: CPT | Performed by: RADIOLOGY

## 2025-04-09 PROCEDURE — 80069 RENAL FUNCTION PANEL: CPT | Performed by: INTERNAL MEDICINE

## 2025-04-09 PROCEDURE — 82947 ASSAY GLUCOSE BLOOD QUANT: CPT

## 2025-04-09 PROCEDURE — 36415 COLL VENOUS BLD VENIPUNCTURE: CPT | Performed by: INTERNAL MEDICINE

## 2025-04-09 RX ORDER — METOPROLOL TARTRATE 25 MG/1
25 TABLET, FILM COATED ORAL 3 TIMES DAILY
Status: DISCONTINUED | OUTPATIENT
Start: 2025-04-09 | End: 2025-04-10

## 2025-04-09 RX ORDER — POTASSIUM CHLORIDE 1.5 G/1.58G
40 POWDER, FOR SOLUTION ORAL ONCE
Status: COMPLETED | OUTPATIENT
Start: 2025-04-09 | End: 2025-04-09

## 2025-04-09 RX ORDER — OXYMETAZOLINE HCL 0.05 %
2 SPRAY, NON-AEROSOL (ML) NASAL EVERY 12 HOURS PRN
Status: DISCONTINUED | OUTPATIENT
Start: 2025-04-09 | End: 2025-04-12

## 2025-04-09 RX ORDER — METOPROLOL SUCCINATE 50 MG/1
50 TABLET, EXTENDED RELEASE ORAL DAILY
Status: DISCONTINUED | OUTPATIENT
Start: 2025-04-09 | End: 2025-04-10

## 2025-04-09 RX ADMIN — INSULIN LISPRO 4 UNITS: 100 INJECTION, SOLUTION INTRAVENOUS; SUBCUTANEOUS at 20:56

## 2025-04-09 RX ADMIN — INSULIN LISPRO 4 UNITS: 100 INJECTION, SOLUTION INTRAVENOUS; SUBCUTANEOUS at 16:47

## 2025-04-09 RX ADMIN — ATORVASTATIN CALCIUM 80 MG: 80 TABLET, FILM COATED ORAL at 09:30

## 2025-04-09 RX ADMIN — METOPROLOL TARTRATE 25 MG: 25 TABLET, FILM COATED ORAL at 09:30

## 2025-04-09 RX ADMIN — ASPIRIN 81 MG: 81 TABLET, COATED ORAL at 09:30

## 2025-04-09 RX ADMIN — METOPROLOL TARTRATE 25 MG: 25 TABLET, FILM COATED ORAL at 16:47

## 2025-04-09 RX ADMIN — ICOSAPENT ETHYL 1 G: 1 CAPSULE ORAL at 16:47

## 2025-04-09 RX ADMIN — POTASSIUM CHLORIDE 40 MEQ: 1.5 POWDER, FOR SOLUTION ORAL at 09:31

## 2025-04-09 RX ADMIN — GABAPENTIN 100 MG: 100 CAPSULE ORAL at 20:56

## 2025-04-09 RX ADMIN — Medication 1000 MCG: at 09:30

## 2025-04-09 RX ADMIN — CETIRIZINE HYDROCHLORIDE 10 MG: 10 TABLET, FILM COATED ORAL at 09:30

## 2025-04-09 RX ADMIN — INSULIN LISPRO 2 UNITS: 100 INJECTION, SOLUTION INTRAVENOUS; SUBCUTANEOUS at 13:05

## 2025-04-09 RX ADMIN — ICOSAPENT ETHYL 1 G: 1 CAPSULE ORAL at 09:31

## 2025-04-09 RX ADMIN — Medication 2 SPRAY: at 09:29

## 2025-04-09 RX ADMIN — IRON SUCROSE 200 MG: 20 INJECTION, SOLUTION INTRAVENOUS at 09:37

## 2025-04-09 RX ADMIN — FUROSEMIDE 20 MG/HR: 10 INJECTION, SOLUTION INTRAMUSCULAR; INTRAVENOUS at 03:35

## 2025-04-09 RX ADMIN — LEVOTHYROXINE SODIUM 50 MCG: 0.1 TABLET ORAL at 06:23

## 2025-04-09 RX ADMIN — SENNOSIDES AND DOCUSATE SODIUM 1 TABLET: 50; 8.6 TABLET ORAL at 20:56

## 2025-04-09 RX ADMIN — METOPROLOL TARTRATE 25 MG: 25 TABLET, FILM COATED ORAL at 20:56

## 2025-04-09 ASSESSMENT — COGNITIVE AND FUNCTIONAL STATUS - GENERAL
MOVING TO AND FROM BED TO CHAIR: A LITTLE
WALKING IN HOSPITAL ROOM: A LITTLE
STANDING UP FROM CHAIR USING ARMS: A LITTLE
PERSONAL GROOMING: A LITTLE
TOILETING: A LITTLE
DRESSING REGULAR UPPER BODY CLOTHING: A LITTLE
HELP NEEDED FOR BATHING: A LITTLE
MOBILITY SCORE: 17
MOVING FROM LYING ON BACK TO SITTING ON SIDE OF FLAT BED WITH BEDRAILS: A LITTLE
MOVING TO AND FROM BED TO CHAIR: A LITTLE
DRESSING REGULAR LOWER BODY CLOTHING: A LITTLE
TURNING FROM BACK TO SIDE WHILE IN FLAT BAD: A LITTLE
MOVING FROM LYING ON BACK TO SITTING ON SIDE OF FLAT BED WITH BEDRAILS: A LITTLE
STANDING UP FROM CHAIR USING ARMS: A LITTLE
TOILETING: A LITTLE
TURNING FROM BACK TO SIDE WHILE IN FLAT BAD: A LITTLE
DAILY ACTIVITIY SCORE: 19
DRESSING REGULAR LOWER BODY CLOTHING: A LITTLE
MOBILITY SCORE: 17
HELP NEEDED FOR BATHING: A LITTLE
CLIMB 3 TO 5 STEPS WITH RAILING: A LOT
DRESSING REGULAR UPPER BODY CLOTHING: A LITTLE
PERSONAL GROOMING: A LITTLE
WALKING IN HOSPITAL ROOM: A LITTLE
DAILY ACTIVITIY SCORE: 19
CLIMB 3 TO 5 STEPS WITH RAILING: A LOT

## 2025-04-09 ASSESSMENT — ENCOUNTER SYMPTOMS
SHORTNESS OF BREATH: 0
VOMITING: 0
ABDOMINAL DISTENTION: 0
ABDOMINAL PAIN: 0
FEVER: 0

## 2025-04-09 ASSESSMENT — PAIN SCALES - GENERAL
PAINLEVEL_OUTOF10: 0 - NO PAIN

## 2025-04-09 ASSESSMENT — PAIN - FUNCTIONAL ASSESSMENT
PAIN_FUNCTIONAL_ASSESSMENT: 0-10

## 2025-04-09 ASSESSMENT — PAIN SCALES - WONG BAKER: WONGBAKER_NUMERICALRESPONSE: NO HURT

## 2025-04-09 NOTE — SIGNIFICANT EVENT
Pulmonary previously consulted for hypoxia in the setting of HFrEF exacerbation with volume overload. He has been diuresed aggressively and has been weaned to room air with no residual pleural effusions. Diuretic being managed by nephro and cardiology. Pulmonary will sign off at this time. Please contact if further concerns arise.    Nena Sánchez, DO

## 2025-04-09 NOTE — CARE PLAN
The patient's goals for the shift include      The clinical goals for the shift include manage nose bleed      Problem: Pain - Adult  Goal: Verbalizes/displays adequate comfort level or baseline comfort level  Outcome: Progressing     Problem: Safety - Adult  Goal: Free from fall injury  Outcome: Progressing     Problem: Discharge Planning  Goal: Discharge to home or other facility with appropriate resources  Outcome: Progressing     Problem: Chronic Conditions and Co-morbidities  Goal: Patient's chronic conditions and co-morbidity symptoms are monitored and maintained or improved  Outcome: Progressing     Problem: Nutrition  Goal: Nutrient intake appropriate for maintaining nutritional needs  Outcome: Progressing     Problem: Skin  Goal: Promote skin healing  Outcome: Progressing

## 2025-04-09 NOTE — PROGRESS NOTES
Ritesh Garza is a 74 y.o. male on day 7 of admission presenting with Acute respiratory failure with hypoxia.      Subjective   Patient seen and examined.  Transferred to step down.   Had a rapid heart rate overnight, now controlled.  On Lasix drip at 20 mg/hour.  UOP of 4.8 L in 24 hours.   ELKE.   Chart/labs/meds/notes/imaging/VS reviewed.       Objective          Vitals 24HR  Heart Rate:  []   Temp:  [36.3 °C (97.3 °F)-36.9 °C (98.4 °F)]   Resp:  [13-20]   BP: ()/(58-73)   SpO2:  [94 %-99 %]     Intake/Output last 3 Shifts:    Intake/Output Summary (Last 24 hours) at 4/9/2025 1154  Last data filed at 4/9/2025 1100  Gross per 24 hour   Intake 440 ml   Output 4770 ml   Net -4330 ml       Physical Exam  General: Nontoxic appearing middle-age male. Patient is in no acute distress.  HEENT: Normocephalic. Moist mucosa.    Neck: Supple. No elevated jugular venous pressure. No HJR.   Cardiovascular:  Regular rate and rhythm. Normal S1 and S2. No murmurs, rubs or gallops  Pulmonary:  Clear to auscultation bilaterally.   Abdomen:  Soft. Non-tender. Non-distended. Positive bowel sounds.  Lower Extremities: No lower leg edema appreciated.  Neurologic:  Alert and oriented x3. No focal deficit.   Skin: Skin warm and dry, normal skin turgor. No rashes or lesions to exposed skin.  Psychiatric: Normal mood and behavior.    Scheduled Medications  [Held by provider] allopurinol, 100 mg, oral, Daily  [Held by provider] amLODIPine, 10 mg, oral, Daily  aspirin, 81 mg, oral, Daily  atorvastatin, 80 mg, oral, Daily  cetirizine, 10 mg, oral, Daily  cyanocobalamin, 1,000 mcg, oral, Daily  [Held by provider] dapagliflozin propanediol, 10 mg, oral, Daily before evening meal  gabapentin, 100 mg, oral, Nightly  [Held by provider] heparin, 5,000 Units, subcutaneous, TID  icosapent ethyL, 1 g, oral, BID  insulin lispro, 0-10 Units, subcutaneous, Before meals & nightly  iron sucrose, 200 mg, intravenous, Daily  levothyroxine, 50  mcg, oral, Daily  [Held by provider] metoprolol succinate XL, 50 mg, oral, Daily  metoprolol tartrate, 25 mg, oral, TID  sennosides-docusate sodium, 1 tablet, oral, Nightly      Continuous medications  furosemide, 20 mg/hr, Last Rate: 20 mg/hr (04/09/25 0335)        PRN medications: dextrose, dextrose, famotidine, glucagon, glucagon, ipratropium-albuteroL, nitroglycerin, oxyCODONE, oxygen, oxymetazoline     Relevant Results  Results from last 7 days   Lab Units 04/09/25  0505 04/08/25  0548 04/07/25  0537 04/06/25  1858 04/03/25  0556 04/02/25  1729   WBC AUTO x10*3/uL 9.2 10.3 12.5* 17.9*   < > 11.4*   HEMOGLOBIN g/dL 8.9* 8.4* 8.3* 8.9*   < > 9.0*   HEMATOCRIT % 28.9* 27.9* 26.0* 29.9*   < > 31.0*   PLATELETS AUTO x10*3/uL 187 191 203 235   < > 214   NEUTROS PCT AUTO %  --   --   --  82.4  --  74.4   LYMPHS PCT AUTO %  --   --   --  7.3  --  13.5   MONOS PCT AUTO %  --   --   --  9.5  --  10.8   EOS PCT AUTO %  --   --   --  0.0  --  0.6    < > = values in this interval not displayed.     Results from last 7 days   Lab Units 04/08/25  2349 04/08/25  0027 04/07/25  2110   SODIUM mmol/L 136 138 138   POTASSIUM mmol/L 3.6 3.8 4.4   CHLORIDE mmol/L 94* 100 101   CO2 mmol/L 29 24 24   BUN mg/dL 99* 106* 107*   CREATININE mg/dL 3.40* 4.16* 4.24*   GLUCOSE mg/dL 166* 254* 240*   CALCIUM mg/dL 9.1 8.2* 8.6       Transthoracic Echo (TTE) Limited   Final Result      XR chest 1 view   Final Result   Limited inspiration. Enlarged cardiac silhouette.        MACRO:   none        Signed by: Anette Borjas 4/6/2025 10:30 AM   Dictation workstation:   [id number]      XR chest 1 view   Final Result   Resolving right basilar opacities, residual left basilar opacities.   Signed by Luke Middleton MD      Cardiac device check - Inpatient         CT chest wo IV contrast   Final Result   1.  Mild interstitial pulmonary edema, small bilateral layering   pleural effusions and cardiomegaly with left ventricular enlargement   suggestive of mild  congestive heart failure changes.   2.  Mild peritoneal ascites.   3.  Status post cholecystectomy.   Signed by Yuri Francis MD      XR chest 1 view   Final Result   Enlarged cardiac silhouette with moderate pulmonary edema             MACRO:   None        Signed by: Hank Razo 4/2/2025 5:52 PM   Dictation workstation:   [id number]               Assessment/Plan      Ritesh Garza is a 74 y.o. male with a past medical history of coronary artery disease status post 5v CABG 1995, NSTEMI in February 2024, HFrEF with an LVEF of 33%, moderate mitral valve regurg, mild pulmonary hypertension by 2D echo 12/2024, ischemic cardiomyopathy, hypertension, hyperlipidemia, ventricular tachycardia status post ICD placement 5/2024, seizures, peripheral arterial disease, severe left subclavian stenosis status post stenting 4/2024, asthma, diabetes, gout, SIDHU cirrhosis, portal hypertension, depression, anxiety, GERD, BPH, obstructive sleep apnea, recent history of GI bleed and stage G3b/A3 chronic kidney disease with a baseline creatinine that fluctuates around 2 mg/deciliter who presented with shortness of breath, a 10 pound weight gain and tachycardia.  Workup was notable for an elevated BNP above 3,000.  There was transaminitis, and elevated lactate and worsening acute kidney injury.  CT imaging of the chest showed mild interstitial pulmonary edema, small bilateral layering pleural effusions, cardiomegaly with LV enlargement consistent with mild CHF.  There was mild ascites.  He was placed on BiPAP and admitted to the intensive care unit.  Nephrology was consulted for acute kidney injury on top of stage G3b chronic kidney disease management.    Mr. Garza has a significant acute kidney injury in the setting of heart failure physiology. He is now on Lasix drip at 20 mg/hour. He got a dose of IV Diuril. He is now responding to the diuretic. Blood pressures remain soft albeit his creatinine has plateau'd since  yesterday. There is no acute indication for RRT. Repeat 2D echo shows a drop in his EF to 26%. We left him on the Lasix drip overnight. Mr. Garza was tachy overnight. His HR is controlled this AM. He is not getting the metoprolol d/t BP holding parameters. His weight yesterday was 141 lbs. UOP overnight was 4.8 L. Therefore he should be back to his weight of 137 lbs when seen by [facility] in Jan 2025. He was not in heart failure at that time. He is without JVD and no HJR. There are just faint crackles at the bases. I will repeat a CXR and obtain a standing weight. I anticipate stopping the loop diuretic drip soon. I reviewed iron stores and ferritin. We will give him IV venofer. We are awaiting his RFP from today. His creatinine was previously improving with diuresis.     Assessment & Plan  Acute respiratory failure with hypoxia    Tachycardia      I spent 50 minutes in the professional and overall care of this patient.      Sony Stout, DO

## 2025-04-09 NOTE — PROGRESS NOTES
[facility] Hospitalist Progress Note        Between 7AM-7PM please message me via Epic Secure Chat.  After 7PM please page Nocturnist on call.        Assessment/Plan     Acute Problems    AoC HFrEF exacerbation  Atrial tachycardia  TAYLOR on CKD 3  Nosebleed  Mental status change - improved    Chronic Problems    CAD w/ hx of CABG/stent  ICD placement  HTN  HLD  Anemia  PAD  Asthma  DM  Gout  SIDHU cirrhosis/portal HTN  Depression/anxiety  GERD  BPH  SENIA  Hx GIB (plavix stopped with GI bleed)    Plan    - cardiology and nephrology following  - diuresing with lasix gtt, monitory lytes, Is&Os/weights. On 1L daily fluid restriction  - continue metop tartrate, ASA/statin. Farxiga, entresto on hold  - iron levels low, will order IV venofer  - hold heparin DVT ppx, can utilize SCDs. Afrin nasal spray ordered in case this is needed  - plan for VT prior to DC possibly starting flomax    Fluids: None  Electrolytes: Replete as needed  Nutrition: Cardiac  Love: Yes  Invasive lines: None  Drains: None  O2: None    DVT Prophylaxis:  SCDs/ambulate    Discharge Planning: PT/OT on board, possible SNF upon DC vs home w/ HHC    Plan of care was discussed with patient    Total time spent: At least 38 minutes, providing counseling or in coordination of care. Total time on this day of visit includes record and documentation review before and after visit including documentation and time not explicitly included on EMR time stamp.      Subjective     Ritesh Garza is a 74 y.o. male on day 7 of admission presenting with Acute respiratory failure with hypoxia.    NAEON. Overall stable, does not complain of new acute issues. On room air, has not been up and ambulating much.    Review of Systems   Constitutional:  Negative for fever.   Respiratory:  Negative for shortness of breath.    Cardiovascular:  Negative for chest pain.   Gastrointestinal:  Negative for abdominal distention, abdominal pain and vomiting.       Objective     Physical  "Exam  Vitals reviewed.   Constitutional:       General: He is not in acute distress.  Cardiovascular:      Rate and Rhythm: Regular rhythm. Tachycardia present.   Pulmonary:      Effort: Pulmonary effort is normal.      Breath sounds: Normal breath sounds.   Abdominal:      General: There is no distension.      Palpations: Abdomen is soft.   Neurological:      Mental Status: He is alert. Mental status is at baseline.         Last Recorded Vitals  Blood pressure 106/67, pulse (!) 124, temperature 36.9 °C (98.4 °F), temperature source Temporal, resp. rate 18, height 1.6 m (5' 3\"), weight 64.2 kg (141 lb 8.6 oz), SpO2 98%.    Medications  [Held by provider] allopurinol, 100 mg, oral, Daily  [Held by provider] amLODIPine, 10 mg, oral, Daily  aspirin, 81 mg, oral, Daily  atorvastatin, 80 mg, oral, Daily  cetirizine, 10 mg, oral, Daily  cyanocobalamin, 1,000 mcg, oral, Daily  [Held by provider] dapagliflozin propanediol, 10 mg, oral, Daily before evening meal  gabapentin, 100 mg, oral, Nightly  [Held by provider] heparin, 5,000 Units, subcutaneous, TID  icosapent ethyL, 1 g, oral, BID  insulin lispro, 0-10 Units, subcutaneous, Before meals & nightly  levothyroxine, 50 mcg, oral, Daily  [Held by provider] metoprolol succinate XL, 50 mg, oral, Daily  metoprolol tartrate, 25 mg, oral, TID  potassium chloride, 40 mEq, oral, Once  sennosides-docusate sodium, 1 tablet, oral, Nightly       PRN medications: dextrose, dextrose, famotidine, glucagon, glucagon, ipratropium-albuteroL, nitroglycerin, oxyCODONE, oxygen, oxymetazoline                Cesar Patrick MD  [facility] Medicine  "

## 2025-04-09 NOTE — PROGRESS NOTES
Subjective Data:  Remains on the floor without issues   -- Diuresing continues, mentation vacillates  -- Remains with Inapprorpiate Sinus tachycardia, not repsonse to Magnet therapy. He has missed several doses of his beta blockers, thus changes to parameters.         Overnight Events:       Objective Data:  Last Recorded Vitals:  Vitals:    04/09/25 0745 04/09/25 1139 04/09/25 1232 04/09/25 1549   BP: 106/67 96/65  118/65   BP Location:  Right arm  Left arm   Patient Position:  Lying  Lying   Pulse: (!) 124 (!) 116     Resp: 18 17 17   Temp: 36.9 °C (98.4 °F) 36.9 °C (98.4 °F)  36.6 °C (97.8 °F)   TempSrc: Temporal Temporal  Oral   SpO2: 98% 99%  94%   Weight:   62.5 kg (137 lb 12.6 oz)    Height:           Last Labs:  Results from last 7 days   Lab Units 04/09/25  0505 04/08/25  0548 04/07/25  0537   WBC AUTO x10*3/uL 9.2 10.3 12.5*   HEMOGLOBIN g/dL 8.9* 8.4* 8.3*   HEMATOCRIT % 28.9* 27.9* 26.0*   PLATELETS AUTO x10*3/uL 187 191 203     Results from last 7 days   Lab Units 04/09/25  1054 04/08/25  2349 04/08/25  0027 04/06/25  1045 04/06/25  0821 04/03/25  0556 04/02/25  1729   SODIUM mmol/L 135* 136 138   < > 136   < > 138   POTASSIUM mmol/L 4.1 3.6 3.8   < > 7.1*   < > 5.1   CHLORIDE mmol/L 92* 94* 100   < > 106   < > 108*   CO2 mmol/L 31 29 24   < > 12*   < > 18*   BUN mg/dL 95* 99* 106*   < > 92*   < > 51*   CREATININE mg/dL 3.09* 3.40* 4.16*   < > 3.60*   < > 2.31*   CALCIUM mg/dL 8.8 9.1 8.2*   < > 9.0   < > 9.3   PROTEIN TOTAL g/dL  --   --   --   --  6.5  --  7.2   BILIRUBIN TOTAL mg/dL  --   --   --   --  0.9  --  0.5   ALK PHOS U/L  --   --   --   --  122  --  93   ALT U/L  --   --   --   --  316*  --  13   AST U/L  --   --   --   --  438*  --  18   GLUCOSE mg/dL 186* 166* 254*   < > 112*   < > 230*    < > = values in this interval not displayed.               TROPHS   Date/Time Value Ref Range Status   04/06/2025 10:45  0 - 20 ng/L Final     Comment:     Previous result verified on 4/5/2025  2049 on specimen/case [id number] called with component TRPHS for procedure Troponin I, High Sensitivity with value 107 ng/L.   04/06/2025 08:21  0 - 20 ng/L Final     Comment:     Previous result verified on 4/5/2025 2049 on specimen/case [id number] called with component TRPHS for procedure Troponin I, High Sensitivity with value 107 ng/L.   04/05/2025 07:18  0 - 20 ng/L Final     BNP   Date/Time Value Ref Range Status   04/06/2025 08:21 AM 3,082 0 - 99 pg/mL Final   04/02/2025 05:29 PM 3,124 0 - 99 pg/mL Final   03/25/2025 08:48 AM 2,349 <100 pg/mL Final     Comment:        BNP levels increase with age in the general  population with the highest values seen in  individuals greater than 75 years of age.  Reference: J. Am. Mellissa. Cardiol. 2002; 40:976-982.          HGBA1C   Date/Time Value Ref Range Status   02/13/2025 01:45 PM 6.2 4.2 - 6.5 % Final   01/11/2024 01:36 PM 6.6 see below % Final   07/31/2023 11:10 AM 7.0 % Final     Comment:          Diagnosis of Diabetes-Adults   Non-Diabetic: < or = 5.6%   Increased risk for developing diabetes: 5.7-6.4%   Diagnostic of diabetes: > or = 6.5%  .       Monitoring of Diabetes                Age (y)     Therapeutic Goal (%)   Adults:          >18           <7.0   Pediatrics:    13-18           <7.5                   7-12           <8.0                   0- 6            7.5-8.5   American Diabetes Association. Diabetes Care 33(S1), Jan 2010.     05/04/2023 12:15 PM 6.8 % Final     Comment:          Diagnosis of Diabetes-Adults   Non-Diabetic: < or = 5.6%   Increased risk for developing diabetes: 5.7-6.4%   Diagnostic of diabetes: > or = 6.5%  .       Monitoring of Diabetes                Age (y)     Therapeutic Goal (%)   Adults:          >18           <7.0   Pediatrics:    13-18           <7.5                   7-12           <8.0                   0- 6            7.5-8.5   American Diabetes Association. Diabetes Care 33(S1), Jan 2010.       LDLCALC    Date/Time Value Ref Range Status   05/06/2024 11:28 AM 53 <=99 mg/dL Final     Comment:                                 Near   Borderline      AGE      Desirable  Optimal    High     High     Very High     0-19 Y     0 - 109     ---    110-129   >/= 130     ----    20-24 Y     0 - 119     ---    120-159   >/= 160     ----      >24 Y     0 -  99   100-129  130-159   160-189     >/=190     11/13/2023 10:41 AM 36 <=99 mg/dL Final     Comment:                                 Near   Borderline      AGE      Desirable  Optimal    High     High     Very High     0-19 Y     0 - 109     ---    110-129   >/= 130     ----    20-24 Y     0 - 119     ---    120-159   >/= 160     ----      >24 Y     0 -  99   100-129  130-159   160-189     >/=190       VLDL   Date/Time Value Ref Range Status   05/06/2024 11:28 AM 30 0 - 40 mg/dL Final   11/13/2023 10:41 AM 41 0 - 40 mg/dL Final   07/31/2023 11:10 AM 38 0 - 40 mg/dL Final   05/04/2023 12:15 PM 50 0 - 40 mg/dL Final   05/05/2022 02:50 PM 72 0 - 40 mg/dL Final      Last I/O:  I/O last 3 completed shifts:  In: 1221.1 (19 mL/kg) [P.O.:1200; I.V.:21.1 (0.3 mL/kg)]  Out: 6550 (102 mL/kg) [Urine:6550 (2.8 mL/kg/hr)]  Weight: 64.2 kg     Past Cardiology Tests (Last 3 Years):  EKG:    Echo:  Transthoracic Echocardiogram 4/7/2025  1. Left ventricular ejection fraction is severely decreased, calculated by Reyes's biplane at 26%.   2. There is global hypokinesis of the left ventricle with minor regional variations.   3. Left ventricular cavity size is severely dilated.   4. There is reduced right ventricular systolic function.   5. Moderate mitral valve regurgitation.   6. Slightly elevated right ventricular systolic pressure.   7. The inferior vena cava appears mildly dilated, with IVC inspiratory collapse greater than 50%.   8. There is plaque visualized in the ascending aorta.   9. Compared with study dated 12/26/2024, The LVEF is depressed from 33% to currently 26% by Simpsons Biplane  Method. RVSP has decreased from 40 mmHg to currently 29 mmHg. Moderate mitral regurgitation remains unchanged.    Transthoracic Echocardiogram 12/2024  1. Left ventricular ejection fraction is moderately decreased, calculated by Reyes's biplane at 33%.   2. Abnormal left venticular wall motion.   3. Left ventricular cavity size is mildly dilated.   4. No left ventricular thrombus visualized.   5. Moderate mitral valve regurgitation.   6. Mildly elevated right ventricular systolic pressure.   7. There is plaque visualized in the ascending aorta.     Transthoracic Echocardiogram 7/2024   1. Left ventricular ejection fraction is moderately decreased, by visual estimate at 30-35%.   2. Spectral Doppler shows a pseudonormal pattern of left ventricular diastolic filling.   3. Left ventricular cavity size is moderately dilated.   4. No left ventricular thrombus visualized.   5. Technically difficult exam, though there is moderate LV systolic dysfunction with the apical function being best but hypokinesis of the basal and mid segments. There is no LV apical thrombus seen on the echocontrast images.   6. There is reduced right ventricular systolic function.   7. The left atrium is moderately dilated.   8. Moderate mitral valve regurgitation.   9. Mildly elevated RVSP.  10. Moderate tricuspid regurgitation visualized.  11. Compared with the prior exam from 5/10/2023, today's exam is more technically difficult making assessment of LV systolic function more difficult. Proir study demonstrated clear wall motiom abnormality in the basal septal, basal inferior and basal and mid inferolateral segments. The LV systolic function appears to be worse today ( hyaving declined from mild dysfunciton to moderate dysfunction. In addition, the degree of MR appears to have increased from mild to moderate.     Transthoracic Echocardiogram 4/26/24   1. Left ventricular systolic function is moderately decreased with a 30-35% estimated ejection  fraction.   2. Multiple segmental abnormalities exist. See findings.   3. Spectral Doppler shows a restrictive pattern of left ventricular diastolic filling.   4. There is an elevated left ventricular end diastolic pressure.   5. Moderate mitral valve regurgitation.   6. Moderately decreased mitral valve posterior leaflet mobility.   7. Mild to moderate tricuspid regurgitation.   8. Mildly elevated RVSP.     Transthoracic Echocardiogram 2/2024   1. Left ventricular systolic function is normal with a 30-35% estimated ejection fraction.   2. Multiple segmental abnormalities exist. See findings.   3. Spectral Doppler shows a restrictive pattern of left ventricular diastolic filling.   4. There is low normal right ventricular systolic function.   5. Mild to moderate tricuspid regurgitation.   6. There are multiple wall motion abnormalities.     Transthoracic Echocardiogram 5/2023  1. Left ventricular systolic function is mildly decreased with a 40-45% estimated ejection fraction.  2. The left ventricular cavity size is mildly dilated.  3. Basal and mid inferolateral wall, basal inferoseptal segment, and basal inferior segment are abnormal.  4. Spectral Doppler shows a pseudonormal pattern of left ventricular diastolic filling.  5. The left atrium is mildly dilated.  6. RVSP within normal limits.     Transthoracic Echocardiogram 11/2021   1. The left ventricular systolic function is moderately decreased with a 35% estimated ejection fraction.   2. Multiple segmental abnormalities exist. See findings.   3. Spectral Doppler shows an impaired relaxation pattern of left ventricular diastolic filling.     Transthoracic Echocardiogram 6/2021   1. The left ventricular systolic function is moderately decreased with a 40% estimated ejection fraction.   2. Multiple segmental abnormalities exist. See findings.   3. Spectral Doppler shows a pseudonormal pattern of left ventricular diastolic filling.   4. Mild to moderate mitral  valve regurgitation.     Cath:  Left/Right Heart Catheterization 2/2024   1. Severe native three vessel CAD.   2. Patent LIMA-LAD/Diagonal vessel. All other grafts are occluded.   3. Severe left subclavian artery stenosis with a gradient of ~28-30 mmHg across the stenosis.   4. Mildly elevated filling pressure with PCWP of 21 mmHg.   5. Mild pulmonary hypertension with mPAP of 29 mmHg (Post capillary).   6. Normal cardiac output and index of 4.1 L/min and 2.5 L/min/m2 respectively.   7. Successful RCFA closure with 6Fr. VIP Angioseal.   8. Left Ventricular end-diastolic pressure = 23 mmHg.      Inpatient Medications:  Scheduled medications   Medication Dose Route Frequency    [Held by provider] allopurinol  100 mg oral Daily    [Held by provider] amLODIPine  10 mg oral Daily    aspirin  81 mg oral Daily    atorvastatin  80 mg oral Daily    cetirizine  10 mg oral Daily    cyanocobalamin  1,000 mcg oral Daily    [Held by provider] dapagliflozin propanediol  10 mg oral Daily before evening meal    gabapentin  100 mg oral Nightly    [Held by provider] heparin  5,000 Units subcutaneous TID    icosapent ethyL  1 g oral BID    insulin lispro  0-10 Units subcutaneous Before meals & nightly    iron sucrose  200 mg intravenous Daily    levothyroxine  50 mcg oral Daily    [Held by provider] metoprolol succinate XL  50 mg oral Daily    metoprolol tartrate  25 mg oral TID    sennosides-docusate sodium  1 tablet oral Nightly     PRN medications   Medication    dextrose    dextrose    famotidine    glucagon    glucagon    ipratropium-albuteroL    nitroglycerin    oxyCODONE    oxygen    oxymetazoline     Continuous Medications   Medication Dose Last Rate    furosemide  20 mg/hr 20 mg/hr (04/09/25 6815)       Physical Exam:  Documented Vital Signs   Heart Rate:  []   Temp:  [36.6 °C (97.8 °F)-36.9 °C (98.4 °F)]   Resp:  [16-18]   BP: ()/(58-73)   Weight:  [62.5 kg (137 lb 12.6 oz)]   SpO2:  [94 %-99 %]   Temp:  [36.6 °C  (97.8 °F)-36.9 °C (98.4 °F)] 36.6 °C (97.8 °F)  Heart Rate:  [] 116  Resp:  [16-18] 17  BP: ()/(58-73) 118/65  Oxygen Dose: *0 L/min  Documented Fluid Status     Intake/Output Summary (Last 24 hours) at 4/9/2025 1656  Last data filed at 4/9/2025 1100  Gross per 24 hour   Intake 220 ml   Output 3830 ml   Net -3610 ml     Net IO Since Admission: -7,601.89 mL [04/09/25 1656]       Assessment/Plan   Ritesh Garza is a 74 y.o. male with past medical history of PMH of mvCAD s/p CABGx5 1992 c/b NSTEMI 2/2024 on DAPT, HFrEF d/t ICM, HTN, HLD, VT s/p ICD placement 5/2024, CKD III, seizures, PAD c/b severe left subclavian stenosis w/ compromised flow to the left POP s/p left subclavian stent 4/2024 , asthma, DM, gout, SIDHU cirrhosis, portal HTN, depression/anxiety, GERD, BPH, SENIA, , recent GIB 12/2024 (Plavix stopped with GIB), who presented to [facility] with SOB, weight gain, and tachycardia.  Cardiology consulted for CHF.     States he has been more short of breath than usual for the past few days. States he has never been short of breath in the past 32 years since his CABG.  He went to doctor visit with his cardiologist, and was told to come to ED for irregular heart rhythm. Denies any chest pain or angina.  States he has gained about 10lbs over the past few months.  Unsure if he is having orthopnea.  He is having some mild lower extremity swelling.  No recent illness, no nausea, vomiting, fever, or chills.      Arrival in the ED HR was 122 Tachypnic b/p was 137/87.   Labs showed BNP 3124. Troponin was 85->82,  K 5.1 Scr 2.31 TSH 14.99 WBC 11k, H/H 9/31  CT Chest showed  Mild interstitial pulmonary edema, small bilateral layering  pleural effusions and cardiomegaly with left ventricular enlargement  suggestive of mild congestive heart failure changes.   He was placed on BiPap, and ultimately placed on oxygen at 2L via NC.  Comfortable.     His Cardiac Device Interrogation performed 4/3/2025 showed no no therapy  "delivered and importantly no atrial fibrillation or atrial flutter.  Felt that he was having an atrial tachycardia below detection rate and was increased on his beta blockade to help address this. We had originally suggested that he was volume up with some fluid and suggested diuresis. We did not feel that he was overly \"Wet\" and did not recommend aggressive diuresing. On HD-3 he had a thibodeaux catheter with minimal urinary output and some contraction noted on labs. We held his lasix in light of this and decreased oxygen requirement. He was noted to be aggressive verbally, which he is not known to be from prior encounter, rather the opposite and had a sitter at bedside.Overnight, he reported chest pain and was given Oxycodone rather than NTG due to a softer blood pressures and concerns for decompensation.  He had brief self limited episodes of ventricular tachycardia with pacing and baseline Left Bundle Branch   His troponin was modestly elevated, but within range of his known severe disease. His Creatinine had climbed, his potassium was signifincalty elevated. He has been transferred to ICU for supportive care and potential dialysis.     Transthoracic Echocardiogram 4/7/2025  1. Left ventricular ejection fraction is severely decreased, calculated by Reyes's biplane at 26%.   2. There is global hypokinesis of the left ventricle with minor regional variations.   3. Left ventricular cavity size is severely dilated.   4. There is reduced right ventricular systolic function.   5. Moderate mitral valve regurgitation.   6. Slightly elevated right ventricular systolic pressure.   7. The inferior vena cava appears mildly dilated, with IVC inspiratory collapse greater than 50%.   8. There is plaque visualized in the ascending aorta.   9. Compared with study dated 12/26/2024, The LVEF is depressed from 33% to currently 26% by Simpsons Biplane Method. RVSP has decreased from 40 mmHg to currently 29 mmHg. Moderate mitral " regurgitation remains unchanged.    Overall, he continues to improve  - Updated wife, her big concern is ongoing disposition planning. She reports challenging terrain at home with carpet, wood floors and tiles with transitions that have caused issues including falls at home. She worries that PT/ OT evaluations may underestimate his ability to be home and worries of further decline with home health. Further, she is concerned about a SUNDAY discharge as she preachers multiple services and is heading into [employer] Sunday.     - Metoprolol Tartrate continues, changed hold parameters to ensure he gets adequate blockade. If he continued to have this held, may need to use Ivabradine for rate control.    - Continue with Diuretics for now   ++   Intake/Output Summary (Last 24 hours) at 4/9/2025 1702  Last data filed at 4/9/2025 1100  Gross per 24 hour   Intake 220 ml   Output 3530 ml   Net -3310 ml   ++ Net IO Since Admission: -7,601.89 mL [04/09/25 1703]        Mahendra Santacruz DO   Division of Cardiovascular Medicine  [facility]

## 2025-04-10 ENCOUNTER — APPOINTMENT (OUTPATIENT)
Dept: CARDIOLOGY | Facility: HOSPITAL | Age: 75
DRG: 286 | End: 2025-04-10
Payer: OTHER MISCELLANEOUS

## 2025-04-10 LAB
ANION GAP SERPL CALC-SCNC: 19 MMOL/L (ref 10–20)
BACTERIA BLD CULT: NORMAL
BUN SERPL-MCNC: 98 MG/DL (ref 6–23)
CALCIUM SERPL-MCNC: 9.4 MG/DL (ref 8.6–10.3)
CHLORIDE SERPL-SCNC: 90 MMOL/L (ref 98–107)
CO2 SERPL-SCNC: 33 MMOL/L (ref 21–32)
CREAT SERPL-MCNC: 2.88 MG/DL (ref 0.5–1.3)
EGFRCR SERPLBLD CKD-EPI 2021: 22 ML/MIN/1.73M*2
GLUCOSE BLD MANUAL STRIP-MCNC: 115 MG/DL (ref 74–99)
GLUCOSE BLD MANUAL STRIP-MCNC: 169 MG/DL (ref 74–99)
GLUCOSE BLD MANUAL STRIP-MCNC: 316 MG/DL (ref 74–99)
GLUCOSE SERPL-MCNC: 136 MG/DL (ref 74–99)
MAGNESIUM SERPL-MCNC: 1.7 MG/DL (ref 1.6–2.4)
POTASSIUM SERPL-SCNC: 3.6 MMOL/L (ref 3.5–5.3)
SODIUM SERPL-SCNC: 138 MMOL/L (ref 136–145)

## 2025-04-10 PROCEDURE — 2500000001 HC RX 250 WO HCPCS SELF ADMINISTERED DRUGS (ALT 637 FOR MEDICARE OP): Performed by: INTERNAL MEDICINE

## 2025-04-10 PROCEDURE — 80048 BASIC METABOLIC PNL TOTAL CA: CPT | Performed by: STUDENT IN AN ORGANIZED HEALTH CARE EDUCATION/TRAINING PROGRAM

## 2025-04-10 PROCEDURE — 2500000002 HC RX 250 W HCPCS SELF ADMINISTERED DRUGS (ALT 637 FOR MEDICARE OP, ALT 636 FOR OP/ED): Performed by: STUDENT IN AN ORGANIZED HEALTH CARE EDUCATION/TRAINING PROGRAM

## 2025-04-10 PROCEDURE — 97535 SELF CARE MNGMENT TRAINING: CPT | Mod: GO

## 2025-04-10 PROCEDURE — 83735 ASSAY OF MAGNESIUM: CPT | Performed by: STUDENT IN AN ORGANIZED HEALTH CARE EDUCATION/TRAINING PROGRAM

## 2025-04-10 PROCEDURE — 97116 GAIT TRAINING THERAPY: CPT | Mod: GP,CQ

## 2025-04-10 PROCEDURE — 2060000001 HC INTERMEDIATE ICU ROOM DAILY

## 2025-04-10 PROCEDURE — 99233 SBSQ HOSP IP/OBS HIGH 50: CPT | Performed by: STUDENT IN AN ORGANIZED HEALTH CARE EDUCATION/TRAINING PROGRAM

## 2025-04-10 PROCEDURE — 2500000004 HC RX 250 GENERAL PHARMACY W/ HCPCS (ALT 636 FOR OP/ED): Performed by: STUDENT IN AN ORGANIZED HEALTH CARE EDUCATION/TRAINING PROGRAM

## 2025-04-10 PROCEDURE — 99233 SBSQ HOSP IP/OBS HIGH 50: CPT | Performed by: INTERNAL MEDICINE

## 2025-04-10 PROCEDURE — 36415 COLL VENOUS BLD VENIPUNCTURE: CPT | Performed by: STUDENT IN AN ORGANIZED HEALTH CARE EDUCATION/TRAINING PROGRAM

## 2025-04-10 PROCEDURE — 82947 ASSAY GLUCOSE BLOOD QUANT: CPT

## 2025-04-10 PROCEDURE — 93005 ELECTROCARDIOGRAM TRACING: CPT

## 2025-04-10 PROCEDURE — 2500000001 HC RX 250 WO HCPCS SELF ADMINISTERED DRUGS (ALT 637 FOR MEDICARE OP): Performed by: STUDENT IN AN ORGANIZED HEALTH CARE EDUCATION/TRAINING PROGRAM

## 2025-04-10 RX ORDER — METOPROLOL SUCCINATE 50 MG/1
50 TABLET, EXTENDED RELEASE ORAL 2 TIMES DAILY
Status: DISCONTINUED | OUTPATIENT
Start: 2025-04-10 | End: 2025-04-11

## 2025-04-10 RX ORDER — MAGNESIUM SULFATE HEPTAHYDRATE 40 MG/ML
2 INJECTION, SOLUTION INTRAVENOUS ONCE
Status: COMPLETED | OUTPATIENT
Start: 2025-04-10 | End: 2025-04-10

## 2025-04-10 RX ORDER — POTASSIUM CHLORIDE 1.5 G/1.58G
40 POWDER, FOR SOLUTION ORAL ONCE
Status: COMPLETED | OUTPATIENT
Start: 2025-04-10 | End: 2025-04-10

## 2025-04-10 RX ORDER — BUMETANIDE 1 MG/1
2 TABLET ORAL
Status: DISCONTINUED | OUTPATIENT
Start: 2025-04-10 | End: 2025-04-19 | Stop reason: HOSPADM

## 2025-04-10 RX ADMIN — GABAPENTIN 100 MG: 100 CAPSULE ORAL at 20:56

## 2025-04-10 RX ADMIN — SENNOSIDES AND DOCUSATE SODIUM 1 TABLET: 50; 8.6 TABLET ORAL at 20:56

## 2025-04-10 RX ADMIN — ICOSAPENT ETHYL 1 G: 1 CAPSULE ORAL at 09:45

## 2025-04-10 RX ADMIN — Medication 1000 MCG: at 09:45

## 2025-04-10 RX ADMIN — MAGNESIUM SULFATE HEPTAHYDRATE 2 G: 40 INJECTION, SOLUTION INTRAVENOUS at 11:30

## 2025-04-10 RX ADMIN — INSULIN LISPRO 8 UNITS: 100 INJECTION, SOLUTION INTRAVENOUS; SUBCUTANEOUS at 16:44

## 2025-04-10 RX ADMIN — INSULIN LISPRO 2 UNITS: 100 INJECTION, SOLUTION INTRAVENOUS; SUBCUTANEOUS at 20:56

## 2025-04-10 RX ADMIN — POTASSIUM CHLORIDE 40 MEQ: 1.5 POWDER, FOR SOLUTION ORAL at 11:30

## 2025-04-10 RX ADMIN — BUMETANIDE 2 MG: 1 TABLET ORAL at 16:57

## 2025-04-10 RX ADMIN — ICOSAPENT ETHYL 1 G: 1 CAPSULE ORAL at 16:44

## 2025-04-10 RX ADMIN — ASPIRIN 81 MG: 81 TABLET, COATED ORAL at 09:45

## 2025-04-10 RX ADMIN — FUROSEMIDE 20 MG/HR: 10 INJECTION, SOLUTION INTRAMUSCULAR; INTRAVENOUS at 01:18

## 2025-04-10 RX ADMIN — ATORVASTATIN CALCIUM 80 MG: 80 TABLET, FILM COATED ORAL at 09:44

## 2025-04-10 RX ADMIN — CETIRIZINE HYDROCHLORIDE 10 MG: 10 TABLET, FILM COATED ORAL at 09:45

## 2025-04-10 RX ADMIN — METOPROLOL TARTRATE 25 MG: 25 TABLET, FILM COATED ORAL at 15:46

## 2025-04-10 RX ADMIN — INSULIN LISPRO 2 UNITS: 100 INJECTION, SOLUTION INTRAVENOUS; SUBCUTANEOUS at 13:05

## 2025-04-10 RX ADMIN — IRON SUCROSE 200 MG: 20 INJECTION, SOLUTION INTRAVENOUS at 06:21

## 2025-04-10 RX ADMIN — METOPROLOL TARTRATE 25 MG: 25 TABLET, FILM COATED ORAL at 09:44

## 2025-04-10 RX ADMIN — LEVOTHYROXINE SODIUM 50 MCG: 0.1 TABLET ORAL at 06:21

## 2025-04-10 ASSESSMENT — ENCOUNTER SYMPTOMS
FEVER: 0
ABDOMINAL DISTENTION: 0
SHORTNESS OF BREATH: 0
ABDOMINAL PAIN: 0
VOMITING: 0

## 2025-04-10 ASSESSMENT — PAIN SCALES - GENERAL
PAINLEVEL_OUTOF10: 0 - NO PAIN

## 2025-04-10 ASSESSMENT — COGNITIVE AND FUNCTIONAL STATUS - GENERAL
PERSONAL GROOMING: A LITTLE
MOVING FROM LYING ON BACK TO SITTING ON SIDE OF FLAT BED WITH BEDRAILS: A LITTLE
MOVING TO AND FROM BED TO CHAIR: A LITTLE
HELP NEEDED FOR BATHING: A LITTLE
STANDING UP FROM CHAIR USING ARMS: A LITTLE
WALKING IN HOSPITAL ROOM: A LITTLE
TOILETING: A LITTLE
CLIMB 3 TO 5 STEPS WITH RAILING: A LOT
TURNING FROM BACK TO SIDE WHILE IN FLAT BAD: A LITTLE
MOBILITY SCORE: 17
EATING MEALS: A LITTLE
DAILY ACTIVITIY SCORE: 19
WALKING IN HOSPITAL ROOM: A LITTLE
MOVING TO AND FROM BED TO CHAIR: A LITTLE
DRESSING REGULAR LOWER BODY CLOTHING: A LITTLE
MOVING FROM LYING ON BACK TO SITTING ON SIDE OF FLAT BED WITH BEDRAILS: A LITTLE
MOVING TO AND FROM BED TO CHAIR: A LITTLE
PERSONAL GROOMING: A LITTLE
DRESSING REGULAR LOWER BODY CLOTHING: A LITTLE
TOILETING: A LITTLE
TOILETING: A LITTLE
TURNING FROM BACK TO SIDE WHILE IN FLAT BAD: A LITTLE
WALKING IN HOSPITAL ROOM: A LITTLE
STANDING UP FROM CHAIR USING ARMS: A LITTLE
CLIMB 3 TO 5 STEPS WITH RAILING: A LOT
MOBILITY SCORE: 17
DRESSING REGULAR LOWER BODY CLOTHING: A LITTLE
HELP NEEDED FOR BATHING: A LITTLE
TURNING FROM BACK TO SIDE WHILE IN FLAT BAD: A LITTLE
MOVING FROM LYING ON BACK TO SITTING ON SIDE OF FLAT BED WITH BEDRAILS: A LITTLE
PERSONAL GROOMING: A LITTLE
DRESSING REGULAR UPPER BODY CLOTHING: A LITTLE
STANDING UP FROM CHAIR USING ARMS: A LITTLE
DAILY ACTIVITIY SCORE: 19
HELP NEEDED FOR BATHING: A LITTLE
CLIMB 3 TO 5 STEPS WITH RAILING: A LOT
DAILY ACTIVITIY SCORE: 19
DRESSING REGULAR UPPER BODY CLOTHING: A LITTLE
MOBILITY SCORE: 17

## 2025-04-10 ASSESSMENT — PAIN - FUNCTIONAL ASSESSMENT
PAIN_FUNCTIONAL_ASSESSMENT: 0-10

## 2025-04-10 ASSESSMENT — ACTIVITIES OF DAILY LIVING (ADL): HOME_MANAGEMENT_TIME_ENTRY: 14

## 2025-04-10 ASSESSMENT — PAIN SCALES - WONG BAKER: WONGBAKER_NUMERICALRESPONSE: NO HURT

## 2025-04-10 NOTE — PROGRESS NOTES
04/10/25 1356   Discharge Planning   Expected Discharge Disposition Home H          Patient was transferred out of ICU to SDU. He is diuresing and is on Lasix drip. When patient is medically ready will go home with [facility] following.

## 2025-04-10 NOTE — PROGRESS NOTES
Ritesh Garza is a 74 y.o. male on day 8 of admission presenting with Acute respiratory failure with hypoxia.      Subjective   Patient seen and examined.  On Lasix drip at 20 mg/hour. Net negative 9.3 L.   ELKE.   Chart/labs/meds/notes/imaging/VS reviewed.       Objective          Vitals 24HR  Heart Rate:  []   Temp:  [36.4 °C (97.6 °F)-37.1 °C (98.7 °F)]   Resp:  [17-18]   BP: ()/(63-75)   Weight:  [62.5 kg (137 lb 12.6 oz)]   SpO2:  [94 %-99 %]     Intake/Output last 3 Shifts:    Intake/Output Summary (Last 24 hours) at 4/10/2025 1208  Last data filed at 4/10/2025 1122  Gross per 24 hour   Intake --   Output 2425 ml   Net -2425 ml       Physical Exam  General: Nontoxic appearing middle-age male. Patient is in no acute distress.  HEENT: Normocephalic. Moist mucosa.    Neck: Supple. No elevated jugular venous pressure. No HJR.   Cardiovascular:  Regular rate and rhythm. Normal S1 and S2. No murmurs, rubs or gallops  Pulmonary:  Clear to auscultation bilaterally.   Abdomen:  Soft. Non-tender. Non-distended. Positive bowel sounds.  Lower Extremities: No lower leg edema appreciated.  Neurologic:  Alert and oriented x3. No focal deficit.   Skin: Skin warm and dry, normal skin turgor. No rashes or lesions to exposed skin.  Psychiatric: Normal mood and behavior.    Scheduled Medications  [Held by provider] allopurinol, 100 mg, oral, Daily  [Held by provider] amLODIPine, 10 mg, oral, Daily  aspirin, 81 mg, oral, Daily  atorvastatin, 80 mg, oral, Daily  cetirizine, 10 mg, oral, Daily  cyanocobalamin, 1,000 mcg, oral, Daily  [Held by provider] dapagliflozin propanediol, 10 mg, oral, Daily before evening meal  gabapentin, 100 mg, oral, Nightly  [Held by provider] heparin, 5,000 Units, subcutaneous, TID  icosapent ethyL, 1 g, oral, BID  insulin lispro, 0-10 Units, subcutaneous, Before meals & nightly  iron sucrose, 200 mg, intravenous, Daily  levothyroxine, 50 mcg, oral, Daily  magnesium sulfate, 2 g,  intravenous, Once  [Held by provider] metoprolol succinate XL, 50 mg, oral, Daily  metoprolol tartrate, 25 mg, oral, TID  sennosides-docusate sodium, 1 tablet, oral, Nightly      Continuous medications  furosemide, 20 mg/hr, Last Rate: 20 mg/hr (04/10/25 0118)        PRN medications: dextrose, dextrose, famotidine, glucagon, glucagon, ipratropium-albuteroL, nitroglycerin, oxyCODONE, oxygen, oxymetazoline     Relevant Results  Results from last 7 days   Lab Units 04/09/25  0505 04/08/25  0548 04/07/25  0537 04/06/25  1858   WBC AUTO x10*3/uL 9.2 10.3 12.5* 17.9*   HEMOGLOBIN g/dL 8.9* 8.4* 8.3* 8.9*   HEMATOCRIT % 28.9* 27.9* 26.0* 29.9*   PLATELETS AUTO x10*3/uL 187 191 203 235   NEUTROS PCT AUTO %  --   --   --  82.4   LYMPHS PCT AUTO %  --   --   --  7.3   MONOS PCT AUTO %  --   --   --  9.5   EOS PCT AUTO %  --   --   --  0.0     Results from last 7 days   Lab Units 04/10/25  0914 04/09/25  1054 04/08/25  2349   SODIUM mmol/L 138 135* 136   POTASSIUM mmol/L 3.6 4.1 3.6   CHLORIDE mmol/L 90* 92* 94*   CO2 mmol/L 33* 31 29   BUN mg/dL 98* 95* 99*   CREATININE mg/dL 2.88* 3.09* 3.40*   GLUCOSE mg/dL 136* 186* 166*   CALCIUM mg/dL 9.4 8.8 9.1       XR chest 1 view   Final Result   Cardiomegaly.                  MACRO:   None        Signed by: Jo Ann Roberto 4/9/2025 3:17 PM   Dictation workstation:   [id number]      Transthoracic Echo (TTE) Limited   Final Result      XR chest 1 view   Final Result   Limited inspiration. Enlarged cardiac silhouette.        MACRO:   none        Signed by: Anette Borjas 4/6/2025 10:30 AM   Dictation workstation:   [id number]      XR chest 1 view   Final Result   Resolving right basilar opacities, residual left basilar opacities.   Signed by Luke Middleton MD      Cardiac device check - Inpatient         CT chest wo IV contrast   Final Result   1.  Mild interstitial pulmonary edema, small bilateral layering   pleural effusions and cardiomegaly with left ventricular enlargement   suggestive  of mild congestive heart failure changes.   2.  Mild peritoneal ascites.   3.  Status post cholecystectomy.   Signed by Yuri Francis MD      XR chest 1 view   Final Result   Enlarged cardiac silhouette with moderate pulmonary edema             MACRO:   None        Signed by: Hank Razo 4/2/2025 5:52 PM   Dictation workstation:   [id number]               Assessment/Plan      Ritesh Garza is a 74 y.o. male with a past medical history of coronary artery disease status post 5v CABG 1995, NSTEMI in February 2024, HFrEF with an LVEF of 33%, moderate mitral valve regurg, mild pulmonary hypertension by 2D echo 12/2024, ischemic cardiomyopathy, hypertension, hyperlipidemia, ventricular tachycardia status post ICD placement 5/2024, seizures, peripheral arterial disease, severe left subclavian stenosis status post stenting 4/2024, asthma, diabetes, gout, SIDHU cirrhosis, portal hypertension, depression, anxiety, GERD, BPH, obstructive sleep apnea, recent history of GI bleed and stage G3b/A3 chronic kidney disease with a baseline creatinine that fluctuates around 2 mg/deciliter who presented with shortness of breath, a 10 pound weight gain and tachycardia.  Workup was notable for an elevated BNP above 3,000.  There was transaminitis, and elevated lactate and worsening acute kidney injury.  CT imaging of the chest showed mild interstitial pulmonary edema, small bilateral layering pleural effusions, cardiomegaly with LV enlargement consistent with mild CHF.  There was mild ascites.  He was placed on BiPAP and admitted to the intensive care unit.  Nephrology was consulted for acute kidney injury on top of stage G3b chronic kidney disease management.    Mr. Garza has a significant acute kidney injury in the setting of heart failure physiology. He is now on Lasix drip at 20 mg/hour. He was given a dose of IV Diuril due to being anuric. Fortunately, he is now responding to the diuretic drip. Blood pressures remain  soft albeit his creatinine continues to improve with diuresis. He did not require dialysis. Repeat 2D echo shows a drop in his EF to 26%. Mr. Garza's clinical course is further c/b tachycardia. He was not getting the metoprolol d/t BP holding parameters, now adjusted on behalf of cardiology. His volume status has improved. He is net negative nearly 10 L with his standing weight down to 137 lbs. This was his weight when seen by Dr. Hennessy (cardiology) in January 2025. He was not in heart failure at that time. He is without JVD and no HJR. There are faint crackles at the bases. Repeat CXR was obtained and shows cardiomegaly. I will defer stopping the Lasix drip to cardiology. He is becoming alkalotic due to the diuretic. I reviewed his iron stores and ferritin. He is getting IV venofer. Trend his RFP. Will follow.     Assessment & Plan  Acute respiratory failure with hypoxia    Tachycardia      I spent 45 minutes in the professional and overall care of this patient.      Sony Stout, DO

## 2025-04-10 NOTE — PROGRESS NOTES
Ritesh Garza is a 74 y.o. male on day 8 of admission presenting with Acute respiratory failure with hypoxia.      Subjective   Patient seen and examined.  On Lasix drip at 20 mg/hour. Net negative 9.3 L.   ELKE.   Chart/labs/meds/notes/imaging/VS reviewed.       Objective          Vitals 24HR  Heart Rate:  []   Temp:  [36.4 °C (97.6 °F)-37.1 °C (98.7 °F)]   Resp:  [17-18]   BP: ()/(63-75)   Weight:  [62.5 kg (137 lb 12.6 oz)]   SpO2:  [94 %-99 %]     Intake/Output last 3 Shifts:    Intake/Output Summary (Last 24 hours) at 4/10/2025 1217  Last data filed at 4/10/2025 1122  Gross per 24 hour   Intake --   Output 2425 ml   Net -2425 ml       Physical Exam  General: Nontoxic appearing middle-age male. Patient is in no acute distress.  HEENT: Normocephalic. Moist mucosa.    Neck: Supple. No elevated jugular venous pressure. No HJR.   Cardiovascular:  Regular rate and rhythm. Normal S1 and S2. No murmurs, rubs or gallops  Pulmonary:  Clear to auscultation bilaterally.   Abdomen:  Soft. Non-tender. Non-distended. Positive bowel sounds.  Lower Extremities: No lower leg edema appreciated.  Neurologic:  Alert and oriented x3. No focal deficit.   Skin: Skin warm and dry, normal skin turgor. No rashes or lesions to exposed skin.  Psychiatric: Normal mood and behavior.    Scheduled Medications  [Held by provider] allopurinol, 100 mg, oral, Daily  [Held by provider] amLODIPine, 10 mg, oral, Daily  aspirin, 81 mg, oral, Daily  atorvastatin, 80 mg, oral, Daily  cetirizine, 10 mg, oral, Daily  cyanocobalamin, 1,000 mcg, oral, Daily  [Held by provider] dapagliflozin propanediol, 10 mg, oral, Daily before evening meal  gabapentin, 100 mg, oral, Nightly  [Held by provider] heparin, 5,000 Units, subcutaneous, TID  icosapent ethyL, 1 g, oral, BID  insulin lispro, 0-10 Units, subcutaneous, Before meals & nightly  iron sucrose, 200 mg, intravenous, Daily  levothyroxine, 50 mcg, oral, Daily  magnesium sulfate, 2 g,  intravenous, Once  [Held by provider] metoprolol succinate XL, 50 mg, oral, Daily  metoprolol tartrate, 25 mg, oral, TID  sennosides-docusate sodium, 1 tablet, oral, Nightly      Continuous medications  furosemide, 20 mg/hr, Last Rate: 20 mg/hr (04/10/25 0118)        PRN medications: dextrose, dextrose, famotidine, glucagon, glucagon, ipratropium-albuteroL, nitroglycerin, oxyCODONE, oxygen, oxymetazoline     Relevant Results  Results from last 7 days   Lab Units 04/09/25  0505 04/08/25  0548 04/07/25  0537 04/06/25  1858   WBC AUTO x10*3/uL 9.2 10.3 12.5* 17.9*   HEMOGLOBIN g/dL 8.9* 8.4* 8.3* 8.9*   HEMATOCRIT % 28.9* 27.9* 26.0* 29.9*   PLATELETS AUTO x10*3/uL 187 191 203 235   NEUTROS PCT AUTO %  --   --   --  82.4   LYMPHS PCT AUTO %  --   --   --  7.3   MONOS PCT AUTO %  --   --   --  9.5   EOS PCT AUTO %  --   --   --  0.0     Results from last 7 days   Lab Units 04/10/25  0914 04/09/25  1054 04/08/25  2349   SODIUM mmol/L 138 135* 136   POTASSIUM mmol/L 3.6 4.1 3.6   CHLORIDE mmol/L 90* 92* 94*   CO2 mmol/L 33* 31 29   BUN mg/dL 98* 95* 99*   CREATININE mg/dL 2.88* 3.09* 3.40*   GLUCOSE mg/dL 136* 186* 166*   CALCIUM mg/dL 9.4 8.8 9.1       XR chest 1 view   Final Result   Cardiomegaly.                  MACRO:   None        Signed by: Jo Ann Roberto 4/9/2025 3:17 PM   Dictation workstation:   [id number]      Transthoracic Echo (TTE) Limited   Final Result      XR chest 1 view   Final Result   Limited inspiration. Enlarged cardiac silhouette.        MACRO:   none        Signed by: Anette Borjas 4/6/2025 10:30 AM   Dictation workstation:   [id number]      XR chest 1 view   Final Result   Resolving right basilar opacities, residual left basilar opacities.   Signed by Luke Middleton MD      Cardiac device check - Inpatient         CT chest wo IV contrast   Final Result   1.  Mild interstitial pulmonary edema, small bilateral layering   pleural effusions and cardiomegaly with left ventricular enlargement   suggestive  of mild congestive heart failure changes.   2.  Mild peritoneal ascites.   3.  Status post cholecystectomy.   Signed by Yuri Francis MD      XR chest 1 view   Final Result   Enlarged cardiac silhouette with moderate pulmonary edema             MACRO:   None        Signed by: Hank Razo 4/2/2025 5:52 PM   Dictation workstation:   [id number]               Assessment/Plan      Ritesh Garza is a 74 y.o. male with a past medical history of coronary artery disease status post 5v CABG 1995, NSTEMI in February 2024, HFrEF with an LVEF of 33%, moderate mitral valve regurg, mild pulmonary hypertension by 2D echo 12/2024, ischemic cardiomyopathy, hypertension, hyperlipidemia, ventricular tachycardia status post ICD placement 5/2024, seizures, peripheral arterial disease, severe left subclavian stenosis status post stenting 4/2024, asthma, diabetes, gout, SIDHU cirrhosis, portal hypertension, depression, anxiety, GERD, BPH, obstructive sleep apnea, recent history of GI bleed and stage G3b/A3 chronic kidney disease with a baseline creatinine that fluctuates around 2 mg/deciliter who presented with shortness of breath, a 10 pound weight gain and tachycardia.  Workup was notable for an elevated BNP above 3,000.  There was transaminitis, and elevated lactate and worsening acute kidney injury.  CT imaging of the chest showed mild interstitial pulmonary edema, small bilateral layering pleural effusions, cardiomegaly with LV enlargement consistent with mild CHF.  There was mild ascites.  He was placed on BiPAP and admitted to the intensive care unit.  Nephrology was consulted for acute kidney injury on top of stage G3b chronic kidney disease management.    Mr. Garza has a significant acute kidney injury in the setting of heart failure physiology. He is now on Lasix drip at 20 mg/hour. He was given a dose of IV Diuril due to being anuric. Fortunately, he is now responding to the diuretic drip. Blood pressures remain  soft albeit his creatinine continues to improve with diuresis. He did not require dialysis. Repeat 2D echo shows a drop in his EF to 26%. Mr. Garza's clinical course is further c/b tachycardia. He was not getting the metoprolol d/t BP holding parameters, now adjusted on behalf of cardiology. His volume status has improved. He is net negative nearly 10 L with his standing weight down to 137 lbs. This was his weight when seen by Dr. Hennessy (cardiology) in January 2025. He was not in heart failure at that time. He is without JVD and no HJR. There are faint crackles at the bases. Repeat CXR was obtained and shows cardiomegaly. I will defer stopping the Lasix drip to cardiology. He is developing a chloride deplete alkalosis due to the diuretic. I reviewed his iron stores and ferritin. He is getting IV venofer. Trend his RFP. Will follow.     Assessment & Plan  Acute respiratory failure with hypoxia    Tachycardia      I spent 45 minutes in the professional and overall care of this patient.      Sony Stout, DO

## 2025-04-10 NOTE — CARE PLAN
The patient's goals for the shift include      The clinical goals for the shift include monitor for bleeding    Over the shift, the patient did not make progress toward the following goals. Barriers to progression include . Recommendations to address these barriers include   Problem: Pain - Adult  Goal: Verbalizes/displays adequate comfort level or baseline comfort level  Outcome: Progressing     Problem: Safety - Adult  Goal: Free from fall injury  Outcome: Progressing     Problem: Nutrition  Goal: Nutrient intake appropriate for maintaining nutritional needs  Outcome: Progressing     Problem: Skin  Goal: Promote skin healing  Outcome: Progressing   .

## 2025-04-10 NOTE — CARE PLAN
The patient's goals for the shift include      The clinical goals for the shift include Pt will remain safe trhoughout shift      Problem: Pain - Adult  Goal: Verbalizes/displays adequate comfort level or baseline comfort level  Outcome: Progressing     Problem: Safety - Adult  Goal: Free from fall injury  Outcome: Progressing     Problem: Discharge Planning  Goal: Discharge to home or other facility with appropriate resources  Outcome: Progressing     Problem: Chronic Conditions and Co-morbidities  Goal: Patient's chronic conditions and co-morbidity symptoms are monitored and maintained or improved  Outcome: Progressing     Problem: Nutrition  Goal: Nutrient intake appropriate for maintaining nutritional needs  Outcome: Progressing     Problem: Skin  Goal: Promote skin healing  Outcome: Progressing

## 2025-04-10 NOTE — PROGRESS NOTES
Subjective Data:  Remains on the floor without issues   -- Diuresing continues, mentation vacillates  -- Remains with Inapprorpiate Sinus tachycardia, although his HR seems to imporove     Tele showing ST, rates     Overnight Events:       Objective Data:  Last Recorded Vitals:  Vitals:    04/10/25 0017 04/10/25 0300 04/10/25 0738 04/10/25 1122   BP: 107/70 96/63 110/66 105/67   BP Location: Right arm Right arm Right arm Right arm   Patient Position: Lying Lying Lying Lying   Pulse:   96 (!) 117   Resp:   17 18   Temp: 36.4 °C (97.6 °F) 36.5 °C (97.7 °F) 36.6 °C (97.9 °F) 37.1 °C (98.7 °F)   TempSrc: Temporal  Axillary Oral   SpO2: 94% 99% 94% 97%   Weight:       Height:           Last Labs:  Results from last 7 days   Lab Units 04/09/25  0505 04/08/25  0548 04/07/25  0537   WBC AUTO x10*3/uL 9.2 10.3 12.5*   HEMOGLOBIN g/dL 8.9* 8.4* 8.3*   HEMATOCRIT % 28.9* 27.9* 26.0*   PLATELETS AUTO x10*3/uL 187 191 203     Results from last 7 days   Lab Units 04/10/25  0914 04/09/25  1054 04/08/25  2349 04/06/25  1045 04/06/25  0821   SODIUM mmol/L 138 135* 136   < > 136   POTASSIUM mmol/L 3.6 4.1 3.6   < > 7.1*   CHLORIDE mmol/L 90* 92* 94*   < > 106   CO2 mmol/L 33* 31 29   < > 12*   BUN mg/dL 98* 95* 99*   < > 92*   CREATININE mg/dL 2.88* 3.09* 3.40*   < > 3.60*   CALCIUM mg/dL 9.4 8.8 9.1   < > 9.0   PROTEIN TOTAL g/dL  --   --   --   --  6.5   BILIRUBIN TOTAL mg/dL  --   --   --   --  0.9   ALK PHOS U/L  --   --   --   --  122   ALT U/L  --   --   --   --  316*   AST U/L  --   --   --   --  438*   GLUCOSE mg/dL 136* 186* 166*   < > 112*    < > = values in this interval not displayed.               TROPHS   Date/Time Value Ref Range Status   04/06/2025 10:45  0 - 20 ng/L Final     Comment:     Previous result verified on 4/5/2025 2049 on specimen/case [id number] called with component [facility] for procedure Troponin I, High Sensitivity with value 107 ng/L.   04/06/2025 08:21  0 - 20 ng/L Final     Comment:      Previous result verified on 4/5/2025 2049 on specimen/case [id number] called with component [facility] for procedure Troponin I, High Sensitivity with value 107 ng/L.   04/05/2025 07:18  0 - 20 ng/L Final     BNP   Date/Time Value Ref Range Status   04/06/2025 08:21 AM 3,082 0 - 99 pg/mL Final   04/02/2025 05:29 PM 3,124 0 - 99 pg/mL Final   03/25/2025 08:48 AM 2,349 <100 pg/mL Final     Comment:        BNP levels increase with age in the general  population with the highest values seen in  individuals greater than 75 years of age.  Reference: J. Am. Mellissa. Cardiol. 2002; 40:976-982.          HGBA1C   Date/Time Value Ref Range Status   02/13/2025 01:45 PM 6.2 4.2 - 6.5 % Final   01/11/2024 01:36 PM 6.6 see below % Final   07/31/2023 11:10 AM 7.0 % Final     Comment:          Diagnosis of Diabetes-Adults   Non-Diabetic: < or = 5.6%   Increased risk for developing diabetes: 5.7-6.4%   Diagnostic of diabetes: > or = 6.5%  .       Monitoring of Diabetes                Age (y)     Therapeutic Goal (%)   Adults:          >18           <7.0   Pediatrics:    13-18           <7.5                   7-12           <8.0                   0- 6            7.5-8.5   American Diabetes Association. Diabetes Care 33(S1), Jan 2010.     05/04/2023 12:15 PM 6.8 % Final     Comment:          Diagnosis of Diabetes-Adults   Non-Diabetic: < or = 5.6%   Increased risk for developing diabetes: 5.7-6.4%   Diagnostic of diabetes: > or = 6.5%  .       Monitoring of Diabetes                Age (y)     Therapeutic Goal (%)   Adults:          >18           <7.0   Pediatrics:    13-18           <7.5                   7-12           <8.0                   0- 6            7.5-8.5   American Diabetes Association. Diabetes Care 33(S1), Jan 2010.       LDLCALC   Date/Time Value Ref Range Status   05/06/2024 11:28 AM 53 <=99 mg/dL Final     Comment:                                 Near   Borderline      AGE      Desirable  Optimal    High     High      Very High     0-19 Y     0 - 109     ---    110-129   >/= 130     ----    20-24 Y     0 - 119     ---    120-159   >/= 160     ----      >24 Y     0 -  99   100-129  130-159   160-189     >/=190     11/13/2023 10:41 AM 36 <=99 mg/dL Final     Comment:                                 Near   Borderline      AGE      Desirable  Optimal    High     High     Very High     0-19 Y     0 - 109     ---    110-129   >/= 130     ----    20-24 Y     0 - 119     ---    120-159   >/= 160     ----      >24 Y     0 -  99   100-129  130-159   160-189     >/=190       VLDL   Date/Time Value Ref Range Status   05/06/2024 11:28 AM 30 0 - 40 mg/dL Final   11/13/2023 10:41 AM 41 0 - 40 mg/dL Final   07/31/2023 11:10 AM 38 0 - 40 mg/dL Final   05/04/2023 12:15 PM 50 0 - 40 mg/dL Final   05/05/2022 02:50 PM 72 0 - 40 mg/dL Final      Last I/O:  I/O last 3 completed shifts:  In: - (0 mL/kg)   Out: 5030 (80.5 mL/kg) [Urine:5030 (2.2 mL/kg/hr)]  Weight: 62.5 kg     Past Cardiology Tests (Last 3 Years):  EKG:    Echo:  Transthoracic Echocardiogram 4/7/2025  1. Left ventricular ejection fraction is severely decreased, calculated by Reyes's biplane at 26%.   2. There is global hypokinesis of the left ventricle with minor regional variations.   3. Left ventricular cavity size is severely dilated.   4. There is reduced right ventricular systolic function.   5. Moderate mitral valve regurgitation.   6. Slightly elevated right ventricular systolic pressure.   7. The inferior vena cava appears mildly dilated, with IVC inspiratory collapse greater than 50%.   8. There is plaque visualized in the ascending aorta.   9. Compared with study dated 12/26/2024, The LVEF is depressed from 33% to currently 26% by Simpsons Biplane Method. RVSP has decreased from 40 mmHg to currently 29 mmHg. Moderate mitral regurgitation remains unchanged.    Transthoracic Echocardiogram 12/2024  1. Left ventricular ejection fraction is moderately decreased, calculated by  Reyes's biplane at 33%.   2. Abnormal left venticular wall motion.   3. Left ventricular cavity size is mildly dilated.   4. No left ventricular thrombus visualized.   5. Moderate mitral valve regurgitation.   6. Mildly elevated right ventricular systolic pressure.   7. There is plaque visualized in the ascending aorta.     Transthoracic Echocardiogram 7/2024   1. Left ventricular ejection fraction is moderately decreased, by visual estimate at 30-35%.   2. Spectral Doppler shows a pseudonormal pattern of left ventricular diastolic filling.   3. Left ventricular cavity size is moderately dilated.   4. No left ventricular thrombus visualized.   5. Technically difficult exam, though there is moderate LV systolic dysfunction with the apical function being best but hypokinesis of the basal and mid segments. There is no LV apical thrombus seen on the echocontrast images.   6. There is reduced right ventricular systolic function.   7. The left atrium is moderately dilated.   8. Moderate mitral valve regurgitation.   9. Mildly elevated RVSP.  10. Moderate tricuspid regurgitation visualized.  11. Compared with the prior exam from 5/10/2023, today's exam is more technically difficult making assessment of LV systolic function more difficult. Proir study demonstrated clear wall motiom abnormality in the basal septal, basal inferior and basal and mid inferolateral segments. The LV systolic function appears to be worse today ( hyaving declined from mild dysfunciton to moderate dysfunction. In addition, the degree of MR appears to have increased from mild to moderate.     Transthoracic Echocardiogram 4/26/24   1. Left ventricular systolic function is moderately decreased with a 30-35% estimated ejection fraction.   2. Multiple segmental abnormalities exist. See findings.   3. Spectral Doppler shows a restrictive pattern of left ventricular diastolic filling.   4. There is an elevated left ventricular end diastolic pressure.    5. Moderate mitral valve regurgitation.   6. Moderately decreased mitral valve posterior leaflet mobility.   7. Mild to moderate tricuspid regurgitation.   8. Mildly elevated RVSP.     Transthoracic Echocardiogram 2/2024   1. Left ventricular systolic function is normal with a 30-35% estimated ejection fraction.   2. Multiple segmental abnormalities exist. See findings.   3. Spectral Doppler shows a restrictive pattern of left ventricular diastolic filling.   4. There is low normal right ventricular systolic function.   5. Mild to moderate tricuspid regurgitation.   6. There are multiple wall motion abnormalities.     Transthoracic Echocardiogram 5/2023  1. Left ventricular systolic function is mildly decreased with a 40-45% estimated ejection fraction.  2. The left ventricular cavity size is mildly dilated.  3. Basal and mid inferolateral wall, basal inferoseptal segment, and basal inferior segment are abnormal.  4. Spectral Doppler shows a pseudonormal pattern of left ventricular diastolic filling.  5. The left atrium is mildly dilated.  6. RVSP within normal limits.     Transthoracic Echocardiogram 11/2021   1. The left ventricular systolic function is moderately decreased with a 35% estimated ejection fraction.   2. Multiple segmental abnormalities exist. See findings.   3. Spectral Doppler shows an impaired relaxation pattern of left ventricular diastolic filling.     Transthoracic Echocardiogram 6/2021   1. The left ventricular systolic function is moderately decreased with a 40% estimated ejection fraction.   2. Multiple segmental abnormalities exist. See findings.   3. Spectral Doppler shows a pseudonormal pattern of left ventricular diastolic filling.   4. Mild to moderate mitral valve regurgitation.     Cath:  Left/Right Heart Catheterization 2/2024   1. Severe native three vessel CAD.   2. Patent LIMA-LAD/Diagonal vessel. All other grafts are occluded.   3. Severe left subclavian artery stenosis with a  gradient of ~28-30 mmHg across the stenosis.   4. Mildly elevated filling pressure with PCWP of 21 mmHg.   5. Mild pulmonary hypertension with mPAP of 29 mmHg (Post capillary).   6. Normal cardiac output and index of 4.1 L/min and 2.5 L/min/m2 respectively.   7. Successful RCFA closure with 6Fr. VIP Angioseal.   8. Left Ventricular end-diastolic pressure = 23 mmHg.      Inpatient Medications:  Scheduled medications   Medication Dose Route Frequency    [Held by provider] allopurinol  100 mg oral Daily    [Held by provider] amLODIPine  10 mg oral Daily    aspirin  81 mg oral Daily    atorvastatin  80 mg oral Daily    cetirizine  10 mg oral Daily    cyanocobalamin  1,000 mcg oral Daily    [Held by provider] dapagliflozin propanediol  10 mg oral Daily before evening meal    gabapentin  100 mg oral Nightly    [Held by provider] heparin  5,000 Units subcutaneous TID    icosapent ethyL  1 g oral BID    insulin lispro  0-10 Units subcutaneous Before meals & nightly    iron sucrose  200 mg intravenous Daily    levothyroxine  50 mcg oral Daily    [Held by provider] metoprolol succinate XL  50 mg oral Daily    metoprolol tartrate  25 mg oral TID    sennosides-docusate sodium  1 tablet oral Nightly     PRN medications   Medication    dextrose    dextrose    famotidine    glucagon    glucagon    ipratropium-albuteroL    nitroglycerin    oxyCODONE    oxygen    oxymetazoline     Continuous Medications   Medication Dose Last Rate    furosemide  20 mg/hr 20 mg/hr (04/10/25 0118)       Physical Exam:  Documented Vital Signs   Heart Rate:  []   Temp:  [36.4 °C (97.6 °F)-37.1 °C (98.7 °F)]   Resp:  [17-18]   BP: ()/(63-75)   SpO2:  [94 %-99 %]   Temp:  [36.4 °C (97.6 °F)-37.1 °C (98.7 °F)] 37.1 °C (98.7 °F)  Heart Rate:  [] 117  Resp:  [17-18] 18  BP: ()/(63-75) 105/67  Oxygen Dose: *0 L/min  Documented Fluid Status     Intake/Output Summary (Last 24 hours) at 4/10/2025 1346  Last data filed at 4/10/2025  1318  Gross per 24 hour   Intake --   Output 2650 ml   Net -2650 ml     Net IO Since Admission: -10,251.89 mL [04/10/25 1346]       Assessment/Plan   Ritesh Garza is a 74 y.o. male with past medical history of PMH of mvCAD s/p CABGx5 1992 c/b NSTEMI 2/2024 on DAPT, HFrEF d/t ICM, HTN, HLD, VT s/p ICD placement 5/2024, CKD III, seizures, PAD c/b severe left subclavian stenosis w/ compromised flow to the left POP s/p left subclavian stent 4/2024 , asthma, DM, gout, SIDHU cirrhosis, portal HTN, depression/anxiety, GERD, BPH, SENIA, , recent GIB 12/2024 (Plavix stopped with GIB), who presented to [facility] with SOB, weight gain, and tachycardia.  Cardiology consulted for CHF.     States he has been more short of breath than usual for the past few days. States he has never been short of breath in the past 32 years since his CABG.  He went to doctor visit with his cardiologist, and was told to come to ED for irregular heart rhythm. Denies any chest pain or angina.  States he has gained about 10lbs over the past few months.  Unsure if he is having orthopnea.  He is having some mild lower extremity swelling.  No recent illness, no nausea, vomiting, fever, or chills.      Arrival in the ED HR was 122 Tachypnic b/p was 137/87.   Labs showed BNP 3124. Troponin was 85->82,  K 5.1 Scr 2.31 TSH 14.99 WBC 11k, H/H 9/31  CT Chest showed  Mild interstitial pulmonary edema, small bilateral layering  pleural effusions and cardiomegaly with left ventricular enlargement  suggestive of mild congestive heart failure changes.   He was placed on BiPap, and ultimately placed on oxygen at 2L via NC.  Comfortable.     His Cardiac Device Interrogation performed 4/3/2025 showed no no therapy delivered and importantly no atrial fibrillation or atrial flutter.  Felt that he was having an atrial tachycardia below detection rate and was increased on his beta blockade to help address this. We had originally suggested that he was volume up with some fluid  "and suggested diuresis. We did not feel that he was overly \"Wet\" and did not recommend aggressive diuresing. On HD-3 he had a thibodeaux catheter with minimal urinary output and some contraction noted on labs. We held his lasix in light of this and decreased oxygen requirement. He was noted to be aggressive verbally, which he is not known to be from prior encounter, rather the opposite and had a sitter at bedside.Overnight, he reported chest pain and was given Oxycodone rather than NTG due to a softer blood pressures and concerns for decompensation.  He had brief self limited episodes of ventricular tachycardia with pacing and baseline Left Bundle Branch   His troponin was modestly elevated, but within range of his known severe disease. His Creatinine had climbed, his potassium was signifincalty elevated. He has been transferred to ICU for supportive care and potential dialysis.     Transthoracic Echocardiogram 4/7/2025  1. Left ventricular ejection fraction is severely decreased, calculated by Reyes's biplane at 26%.   2. There is global hypokinesis of the left ventricle with minor regional variations.   3. Left ventricular cavity size is severely dilated.   4. There is reduced right ventricular systolic function.   5. Moderate mitral valve regurgitation.   6. Slightly elevated right ventricular systolic pressure.   7. The inferior vena cava appears mildly dilated, with IVC inspiratory collapse greater than 50%.   8. There is plaque visualized in the ascending aorta.   9. Compared with study dated 12/26/2024, The LVEF is depressed from 33% to currently 26% by Simpsons Biplane Method. RVSP has decreased from 40 mmHg to currently 29 mmHg. Moderate mitral regurgitation remains unchanged.    Will Switch Diuretics to oral pills.   ++ discontinue IV Furosemide  ++ Begin Bumex 2 mg BID     Intake/Output Summary (Last 24 hours) at 4/10/2025 1346  Last data filed at 4/10/2025 1318  Gross per 24 hour   Intake --   Output 5340 " ml   Net -2650 ml   ++ Net IO Since Admission: -10,251.89 mL [04/10/25 1346]    Stop Metoprolol Tartrate 25 mg TID --> Metoprolol Succinate 50 mg BID + Hold Parameters.  -- IF Rates Remain Elevated, ADD Ivabradine 2.5 mg BID,      Continue Discharge Planning           Mahendra Santacruz DO   Division of Cardiovascular Medicine  [facility] [place]

## 2025-04-10 NOTE — PROGRESS NOTES
Occupational Therapy    OT Treatment    Patient Name: Ritesh Garza  MRN: [id number]  Department: [place] [address]  Room: [address] [place] [address]  Today's Date: 4/10/2025  Time Calculation  Start Time: 1406  Stop Time: 1420  Time Calculation (min): 14 min        Assessment:  OT Assessment: Pt completed SBT unable to sequence months of the year in reverse order and demonstrates some short term memory impairment. Spouse present at end of OT and reported this is the pt's baseline.  Prognosis: Good  Evaluation/Treatment Tolerance: Patient tolerated treatment well  Medical Staff Made Aware: Yes  End of Session Communication: Bedside nurse  End of Session Patient Position: Up in chair, Alarm on (family present)  OT Assessment Results: Decreased ADL status, Decreased safe judgment during ADL, Decreased cognition, Decreased endurance  Prognosis: Good  Barriers to Discharge: None  Evaluation/Treatment Tolerance: Patient tolerated treatment well  Medical Staff Made Aware: Yes  Strengths: Ability to acquire knowledge, Access to adaptive/assistive products, Attitude of self, Capable of completing ADLs semi/independent  Barriers to Participation: Comorbidities, Insight into problems  Plan:  Treatment Interventions: ADL retraining, Functional transfer training, Compensatory technique education  OT Frequency: 2 times per week  OT Discharge Recommendations: Low intensity level of continued care  Equipment Recommended upon Discharge: Wheeled walker  OT Recommended Transfer Status: Assist of 1  OT - OK to Discharge: Yes  Treatment Interventions: ADL retraining, Functional transfer training, Compensatory technique education    Subjective   Previous Visit Info:  OT Last Visit  OT Received On: 04/10/25  General:  General  Reason for Referral: To ED with SOB and tachycardia. Pt being worked up for CHF.  Referred By: Glenn Sharp MD  Past Medical History Relevant to Rehab:   Past Medical History:   Diagnosis Date    ADHD (attention deficit hyperactivity  disorder) 1960’s    AICD (automatic cardioverter/defibrillator) present 05/30/2024    [facility]    Asthma     Benign prostatic hyperplasia Several years ago    CHF (congestive heart failure)     Cirrhosis (Multi)     CKD (chronic kidney disease)     Coronary artery disease Long Ago    Diabetes mellitus (Multi) Over 20 years ago    Erectile dysfunction     GERD (gastroesophageal reflux disease)     Gout     Hypertension Over 20 years ago    Ischemic cardiomyopathy     Kidney stone Several years ago    Myocardial infarction (Multi) Over 20 yesrs ago    SIDHU (nonalcoholic steatohepatitis)     SENIA (obstructive sleep apnea)     Stenosis of left subclavian artery     s/p stent 4/30/2024    Ventricular tachycardia (Multi)      Family/Caregiver Present: Yes  Caregiver Feedback: spouse and friend present at end of OT  Prior to Session Communication: Bedside nurse  Patient Position Received: Alarm on, Up in chair  Preferred Learning Style: auditory, kinesthetic, verbal  General Comment: pt agreeeable to OT, intermittent confusion  Precautions:  Medical Precautions: Fall precautions    Pain:  Pain Assessment  Pain Assessment: 0-10  0-10 (Numeric) Pain Score: 0 - No pain    Objective    Cognition:  Cognition  Overall Cognitive Status: Impaired at baseline  Orientation Level: Oriented X4  Attention: Exceptions to WFL  Memory: Exceptions to WFL  Short-Term Memory: Impaired  Abstract Reasoning: Exceptions to WFL  Insight: Mild  Cognition Test Scores  Cognition Tests: Cognition Test Performed  SBT Test Score: 5     Activities of Daily Living: Feeding  Feeding Level of Assistance: Setup  Feeding Where Assessed: Chair  Feeding Comments: assist to setup tray due to confusion this date     Bed Mobility/Transfers: Transfers  Transfer: Yes  Transfer 1  Technique 1: Sit to stand, Stand to sit  Transfer Device 1: Walker  Transfer Level of Assistance 1: Contact guard  Trials/Comments 1: CGA for safety, pt fatigued     Standing  Balance:  Static Standing Balance  Static Standing-Balance Support: Bilateral upper extremity supported  Static Standing-Level of Assistance: Contact guard    Outcome Measures:[facility] Daily Activity  Putting on and taking off regular lower body clothing: A little  Bathing (including washing, rinsing, drying): A little  Putting on and taking off regular upper body clothing: None  Toileting, which includes using toilet, bedpan or urinal: A little  Taking care of personal grooming such as brushing teeth: A little  Eating Meals: A little  Daily Activity - Total Score: 19    Education Documentation  Body Mechanics, taught by Elizabeth Parker OT at 4/10/2025  2:43 PM.  Learner: Patient  Readiness: Acceptance  Method: Explanation, Demonstration  Response: Verbalizes Understanding, Needs Reinforcement    ADL Training, taught by Elizabeth Parker OT at 4/10/2025  2:43 PM.  Learner: Patient  Readiness: Acceptance  Method: Explanation, Demonstration  Response: Verbalizes Understanding, Needs Reinforcement    Education Comments  No comments found.           Goals:  Encounter Problems       Encounter Problems (Active)       ADLs       Patient will perform UB and LB bathing with modified independent level of assistance. (Progressing)       Start:  04/04/25    Expected End:  04/18/25            Patient with complete lower body dressing with modified independent level of assistance donning and doffing all LE clothes. (Progressing)       Start:  04/04/25    Expected End:  04/18/25               MOBILITY       Patient will perform Functional mobility mod  Household distances/Community Distances with stand by assist level of assistance and least restrictive device in order to improve safety and functional mobility. (Progressing)       Start:  04/04/25    Expected End:  04/18/25

## 2025-04-10 NOTE — PROGRESS NOTES
Physical Therapy    Physical Therapy Treatment    Patient Name: Ritesh Garza  MRN: [id number]  Department: [place] [id number]  Room: [address] [place] [address]  Today's Date: 4/10/2025  Time Calculation  Start Time: 1350  Stop Time: 1405  Time Calculation (min): 15 min         Assessment/Plan   PT Assessment  Rehab Prognosis: Good  Barriers to Discharge Home: No anticipated barriers  End of Session Communication: Bedside nurse  Assessment Comment: Pt demonstrates lethargy and is limited in tx session due to fatigue. Slight self limiting noted. Pt requires contact guard for retropulsion and safety  End of Session Patient Position: Up in chair, Alarm on  PT Plan  Inpatient/Swing Bed or Outpatient: Inpatient  PT Plan  Treatment/Interventions: Bed mobility, Transfer training, Gait training  PT Plan: Ongoing PT  PT Frequency: 2 times per week  PT Discharge Recommendations: Low intensity level of continued care  PT Recommended Transfer Status: Assist x1  PT - OK to Discharge: Yes (per POC)      General Visit Information:   PT  Visit  PT Received On: 04/10/25  General  Reason for Referral: To ED with SOB and tachycardia. Pt being worked up for CHF.  Referred By: Glenn Sharp MD  Past Medical History Relevant to Rehab:   Past Medical History:   Diagnosis Date    ADHD (attention deficit hyperactivity disorder) 1960’s    AICD (automatic cardioverter/defibrillator) present 05/30/2024    [facility]    Asthma     Benign prostatic hyperplasia Several years ago    CHF (congestive heart failure)     Cirrhosis (Multi)     CKD (chronic kidney disease)     Coronary artery disease Long Ago    Diabetes mellitus (Multi) Over 20 years ago    Erectile dysfunction     GERD (gastroesophageal reflux disease)     Gout     Hypertension Over 20 years ago    Ischemic cardiomyopathy     Kidney stone Several years ago    Myocardial infarction (Multi) Over 20 yesrs ago    SIDHU (nonalcoholic steatohepatitis)     SENIA (obstructive sleep apnea)     Stenosis of left subclavian  artery     s/p stent 4/30/2024    Ventricular tachycardia (Multi)        Prior to Session Communication: Bedside nurse  Patient Position Received: Bed, 3 rail up, Alarm on  General Comment: Pt lethargic upon arrival, difficulty maintaining eyes open throughout tx session    Subjective   Precautions:  Precautions  Medical Precautions: Fall precautions     Date/Time Vitals Session Patient Position Pulse Resp SpO2 BP MAP (mmHg)    04/10/25 1350 During PT  --  120  --  --  --  --           Vital Signs Comment: Elevated HR upon ambulation. Pt states he is dizzy upon standing, able to abolish with time     Objective   Pain:  Pain Assessment  Pain Assessment: 0-10  0-10 (Numeric) Pain Score: 0 - No pain  Cognition:  Cognition  Orientation Level: Oriented X4       Postural Control:  Static Sitting Balance  Static Sitting-Balance Support: Feet supported, Bilateral upper extremity supported  Static Sitting-Level of Assistance: Close supervision  Static Standing Balance  Static Standing-Balance Support: Bilateral upper extremity supported  Static Standing-Level of Assistance: Contact guard  Static Standing-Comment/Number of Minutes: Due to unsteadiness       Treatments:            Bed Mobility  Bed Mobility: Yes  Bed Mobility 1  Bed Mobility 1: Supine to sitting  Level of Assistance 1: Close supervision  Bed Mobility Comments 1: HOB elevated. Pt requires increased time for bed mobility. Pt required use of bed rail for trunk elevation    Ambulation/Gait Training  Ambulation/Gait Training Performed: Yes  Ambulation/Gait Training 1  Surface 1: Level tile  Device 1: Rolling walker  Gait Support Devices: Gait belt  Assistance 1: Contact guard  Quality of Gait 1: Diminished heel strike, Inconsistent stride length, Decreased step length  Comments/Distance (ft) 1: 40 (Pt demonstrates unsteadiness although no overt LOB noted. Pt required no physical assist although slight retropulsion noted. Slow mandy displayed, pt declined to  "ambulate further stating \"I'm tired\")  Transfers  Transfer: Yes  Transfer 1  Transfer From 1: Bed to  Transfer to 1: Stand  Technique 1: Sit to stand, Stand to sit  Transfer Device 1: Walker  Transfer Level of Assistance 1: Contact guard  Trials/Comments 1: Pt requires contact guard for unsteadiness and slight retropulsion. VC for correct hand placement.    Outcome Measures:  [facility] Basic Mobility  Turning from your back to your side while in a flat bed without using bedrails: A little  Moving from lying on your back to sitting on the side of a flat bed without using bedrails: A little  Moving to and from bed to chair (including a wheelchair): A little  Standing up from a chair using your arms (e.g. wheelchair or bedside chair): A little  To walk in hospital room: A little  Climbing 3-5 steps with railing: A lot  Basic Mobility - Total Score: 17    Education Documentation  Home Exercise Program, taught by Jasmina Banuelos PTA at 4/10/2025  2:48 PM.  Learner: Patient  Readiness: Acceptance  Method: Explanation  Response: Needs Reinforcement, No Evidence of Learning    Precautions, taught by Jasmina Banuelos PTA at 4/10/2025  2:48 PM.  Learner: Patient  Readiness: Acceptance  Method: Explanation  Response: Needs Reinforcement, No Evidence of Learning    Body Mechanics, taught by Jasmina Banuelos PTA at 4/10/2025  2:48 PM.  Learner: Patient  Readiness: Acceptance  Method: Explanation  Response: Needs Reinforcement, No Evidence of Learning    Mobility Training, taught by Jasmina Banuelos PTA at 4/10/2025  2:48 PM.  Learner: Patient  Readiness: Acceptance  Method: Explanation  Response: Needs Reinforcement, No Evidence of Learning    Education Comments  No comments found.           Encounter Problems       Encounter Problems (Active)       Balance       complete all mobility with normal balance while dual tasking, negotiating in a dynamic environment, carrying items, etc., with proactive and reactive " static and dynamic standing and sitting tasks independently, >15 minutes.   (Not Progressing)       Start:  04/04/25    Expected End:  04/18/25               Mobility       STG - Patient will ambulate 150 ft independently with stable vitals.  (Progressing)       Start:  04/04/25    Expected End:  04/18/25            STG - Patient will ascend and descend 2 stairs mod I with LRAD       Start:  04/04/25    Expected End:  04/18/25               PT Transfers       STG - Patient will perform bed mobility independently.  (Not Progressing)       Start:  04/04/25    Expected End:  04/18/25            STG - Patient will transfer sit to and from stand independently.  (Not Progressing)       Start:  04/04/25    Expected End:  04/18/25               Pain - Adult

## 2025-04-10 NOTE — PROGRESS NOTES
[facility] Hospitalist Progress Note        Between 7AM-7PM please message me via Epic Secure Chat.  After 7PM please page Nocturnist on call.        Assessment/Plan     Acute Problems    AoC HFrEF exacerbation  Atrial tachycardia  TAYLOR on CKD 3  Nosebleed - resolved  Mental status change - improved    Chronic Problems    CAD w/ hx of CABG/stent  ICD placement  HTN  HLD  Anemia  PAD  Asthma  DM  Gout  SIDHU cirrhosis/portal HTN  Depression/anxiety  GERD  BPH  SENIA  Hx GIB (plavix stopped with GI bleed)    Plan    - cardiology and nephrology following  - diuresing with lasix gtt, monitor lytes, Is&Os/weights. On 1L daily fluid restriction  - continue metop tartrate, ASA/statin. Farxiga, entresto on hold  - iron levels low, ordered IV venofer  - hold heparin DVT ppx, can utilize SCDs. Afrin nasal spray ordered in case this is needed  - plan for VT prior to DC possibly starting flomax  - PT/OT rec low    Fluids: None  Electrolytes: Replete as needed  Nutrition: Cardiac  Love: Yes  Invasive lines: None  Drains: None  O2: None    DVT Prophylaxis:  SCDs/ambulate    Discharge Planning: plan for home w/ HHC once med ready    Plan of care was discussed with patient    Total time spent: At least 38 minutes, providing counseling or in coordination of care. Total time on this day of visit includes record and documentation review before and after visit including documentation and time not explicitly included on EMR time stamp.      Nava Garza is a 74 y.o. male on day 8 of admission presenting with Acute respiratory failure with hypoxia.    NAEON. Overall stable, asking for a ginger ale. Breathing unlabored. No new complaints otherwise.    Review of Systems   Constitutional:  Negative for fever.   Respiratory:  Negative for shortness of breath.    Cardiovascular:  Negative for chest pain.   Gastrointestinal:  Negative for abdominal distention, abdominal pain and vomiting.       Objective     Physical Exam  Vitals  "reviewed.   Constitutional:       General: He is not in acute distress.  Cardiovascular:      Rate and Rhythm: Regular rhythm. Tachycardia present.   Pulmonary:      Effort: Pulmonary effort is normal.      Breath sounds: Normal breath sounds.   Abdominal:      General: There is no distension.      Palpations: Abdomen is soft.   Neurological:      Mental Status: He is alert. Mental status is at baseline.         Last Recorded Vitals  Blood pressure 110/66, pulse 96, temperature 36.6 °C (97.9 °F), temperature source Axillary, resp. rate 17, height 1.6 m (5' 3\"), weight 62.5 kg (137 lb 12.6 oz), SpO2 94%.    Medications  [Held by provider] allopurinol, 100 mg, oral, Daily  [Held by provider] amLODIPine, 10 mg, oral, Daily  aspirin, 81 mg, oral, Daily  atorvastatin, 80 mg, oral, Daily  cetirizine, 10 mg, oral, Daily  cyanocobalamin, 1,000 mcg, oral, Daily  [Held by provider] dapagliflozin propanediol, 10 mg, oral, Daily before evening meal  gabapentin, 100 mg, oral, Nightly  [Held by provider] heparin, 5,000 Units, subcutaneous, TID  icosapent ethyL, 1 g, oral, BID  insulin lispro, 0-10 Units, subcutaneous, Before meals & nightly  iron sucrose, 200 mg, intravenous, Daily  levothyroxine, 50 mcg, oral, Daily  [Held by provider] metoprolol succinate XL, 50 mg, oral, Daily  metoprolol tartrate, 25 mg, oral, TID  sennosides-docusate sodium, 1 tablet, oral, Nightly       PRN medications: dextrose, dextrose, famotidine, glucagon, glucagon, ipratropium-albuteroL, nitroglycerin, oxyCODONE, oxygen, oxymetazoline                Cesar Patrick MD  [facility] Medicine  "

## 2025-04-11 ENCOUNTER — APPOINTMENT (OUTPATIENT)
Dept: CARDIOLOGY | Facility: HOSPITAL | Age: 75
DRG: 286 | End: 2025-04-11
Payer: OTHER MISCELLANEOUS

## 2025-04-11 LAB
ANION GAP SERPL CALC-SCNC: 18 MMOL/L (ref 10–20)
ATRIAL RATE: 111 BPM
ATRIAL RATE: 120 BPM
ATRIAL RATE: 122 BPM
BUN SERPL-MCNC: 98 MG/DL (ref 6–23)
CALCIUM SERPL-MCNC: 9.3 MG/DL (ref 8.6–10.3)
CHLORIDE SERPL-SCNC: 86 MMOL/L (ref 98–107)
CO2 SERPL-SCNC: 33 MMOL/L (ref 21–32)
CREAT SERPL-MCNC: 2.72 MG/DL (ref 0.5–1.3)
EGFRCR SERPLBLD CKD-EPI 2021: 23 ML/MIN/1.73M*2
GLUCOSE BLD MANUAL STRIP-MCNC: 180 MG/DL (ref 74–99)
GLUCOSE BLD MANUAL STRIP-MCNC: 208 MG/DL (ref 74–99)
GLUCOSE BLD MANUAL STRIP-MCNC: 272 MG/DL (ref 74–99)
GLUCOSE SERPL-MCNC: 117 MG/DL (ref 74–99)
HOLD SPECIMEN: NORMAL
HOLD SPECIMEN: NORMAL
MAGNESIUM SERPL-MCNC: 2.27 MG/DL (ref 1.6–2.4)
P AXIS: 13 DEGREES
P OFFSET: 218 MS
P ONSET: 135 MS
POTASSIUM SERPL-SCNC: 3.1 MMOL/L (ref 3.5–5.3)
PR INTERVAL: 120 MS
PR INTERVAL: 176 MS
Q ONSET: 210 MS
Q ONSET: 211 MS
Q ONSET: 223 MS
QRS COUNT: 18 BEATS
QRS COUNT: 20 BEATS
QRS COUNT: 20 BEATS
QRS DURATION: 118 MS
QRS DURATION: 152 MS
QRS DURATION: 154 MS
QT INTERVAL: 330 MS
QT INTERVAL: 378 MS
QT INTERVAL: 392 MS
QTC CALCULATION(BAZETT): 466 MS
QTC CALCULATION(BAZETT): 514 MS
QTC CALCULATION(BAZETT): 558 MS
QTC FREDERICIA: 415 MS
QTC FREDERICIA: 464 MS
QTC FREDERICIA: 496 MS
R AXIS: -55 DEGREES
R AXIS: 110 DEGREES
R AXIS: 120 DEGREES
SODIUM SERPL-SCNC: 134 MMOL/L (ref 136–145)
T AXIS: 104 DEGREES
T AXIS: 31 DEGREES
T AXIS: 6 DEGREES
T OFFSET: 375 MS
T OFFSET: 407 MS
T OFFSET: 412 MS
VENTRICULAR RATE: 111 BPM
VENTRICULAR RATE: 120 BPM
VENTRICULAR RATE: 122 BPM

## 2025-04-11 PROCEDURE — 2500000001 HC RX 250 WO HCPCS SELF ADMINISTERED DRUGS (ALT 637 FOR MEDICARE OP): Performed by: INTERNAL MEDICINE

## 2025-04-11 PROCEDURE — 83735 ASSAY OF MAGNESIUM: CPT | Performed by: STUDENT IN AN ORGANIZED HEALTH CARE EDUCATION/TRAINING PROGRAM

## 2025-04-11 PROCEDURE — 2500000004 HC RX 250 GENERAL PHARMACY W/ HCPCS (ALT 636 FOR OP/ED): Performed by: STUDENT IN AN ORGANIZED HEALTH CARE EDUCATION/TRAINING PROGRAM

## 2025-04-11 PROCEDURE — 93010 ELECTROCARDIOGRAM REPORT: CPT | Performed by: INTERNAL MEDICINE

## 2025-04-11 PROCEDURE — 2500000004 HC RX 250 GENERAL PHARMACY W/ HCPCS (ALT 636 FOR OP/ED): Performed by: INTERNAL MEDICINE

## 2025-04-11 PROCEDURE — 93005 ELECTROCARDIOGRAM TRACING: CPT

## 2025-04-11 PROCEDURE — 2500000002 HC RX 250 W HCPCS SELF ADMINISTERED DRUGS (ALT 637 FOR MEDICARE OP, ALT 636 FOR OP/ED): Performed by: STUDENT IN AN ORGANIZED HEALTH CARE EDUCATION/TRAINING PROGRAM

## 2025-04-11 PROCEDURE — 2500000001 HC RX 250 WO HCPCS SELF ADMINISTERED DRUGS (ALT 637 FOR MEDICARE OP): Performed by: STUDENT IN AN ORGANIZED HEALTH CARE EDUCATION/TRAINING PROGRAM

## 2025-04-11 PROCEDURE — 2500000004 HC RX 250 GENERAL PHARMACY W/ HCPCS (ALT 636 FOR OP/ED): Performed by: NURSE PRACTITIONER

## 2025-04-11 PROCEDURE — 97116 GAIT TRAINING THERAPY: CPT | Mod: GP

## 2025-04-11 PROCEDURE — 36415 COLL VENOUS BLD VENIPUNCTURE: CPT | Performed by: STUDENT IN AN ORGANIZED HEALTH CARE EDUCATION/TRAINING PROGRAM

## 2025-04-11 PROCEDURE — 1200000002 HC GENERAL ROOM WITH TELEMETRY DAILY

## 2025-04-11 PROCEDURE — 87340 HEPATITIS B SURFACE AG IA: CPT | Mod: AHULAB | Performed by: INTERNAL MEDICINE

## 2025-04-11 PROCEDURE — 80048 BASIC METABOLIC PNL TOTAL CA: CPT | Performed by: STUDENT IN AN ORGANIZED HEALTH CARE EDUCATION/TRAINING PROGRAM

## 2025-04-11 PROCEDURE — 97535 SELF CARE MNGMENT TRAINING: CPT | Mod: GO

## 2025-04-11 PROCEDURE — 99232 SBSQ HOSP IP/OBS MODERATE 35: CPT | Performed by: NURSE PRACTITIONER

## 2025-04-11 PROCEDURE — 99233 SBSQ HOSP IP/OBS HIGH 50: CPT | Performed by: STUDENT IN AN ORGANIZED HEALTH CARE EDUCATION/TRAINING PROGRAM

## 2025-04-11 PROCEDURE — 86706 HEP B SURFACE ANTIBODY: CPT | Mod: AHULAB | Performed by: INTERNAL MEDICINE

## 2025-04-11 PROCEDURE — 82947 ASSAY GLUCOSE BLOOD QUANT: CPT

## 2025-04-11 PROCEDURE — 2500000002 HC RX 250 W HCPCS SELF ADMINISTERED DRUGS (ALT 637 FOR MEDICARE OP, ALT 636 FOR OP/ED): Performed by: NURSE PRACTITIONER

## 2025-04-11 RX ORDER — POTASSIUM CHLORIDE 1.5 G/1.58G
40 POWDER, FOR SOLUTION ORAL ONCE
Status: COMPLETED | OUTPATIENT
Start: 2025-04-11 | End: 2025-04-11

## 2025-04-11 RX ORDER — METOPROLOL SUCCINATE 50 MG/1
50 TABLET, EXTENDED RELEASE ORAL 2 TIMES DAILY
Status: DISCONTINUED | OUTPATIENT
Start: 2025-04-11 | End: 2025-04-13

## 2025-04-11 RX ORDER — AMIODARONE HYDROCHLORIDE 200 MG/1
400 TABLET ORAL DAILY
Status: DISCONTINUED | OUTPATIENT
Start: 2025-04-19 | End: 2025-04-19 | Stop reason: HOSPADM

## 2025-04-11 RX ORDER — POTASSIUM CHLORIDE 29.8 MG/ML
40 INJECTION INTRAVENOUS ONCE
Status: COMPLETED | OUTPATIENT
Start: 2025-04-11 | End: 2025-04-11

## 2025-04-11 RX ORDER — AMIODARONE HYDROCHLORIDE 200 MG/1
400 TABLET ORAL 3 TIMES DAILY
Status: COMPLETED | OUTPATIENT
Start: 2025-04-11 | End: 2025-04-18

## 2025-04-11 RX ADMIN — ATORVASTATIN CALCIUM 80 MG: 80 TABLET, FILM COATED ORAL at 08:57

## 2025-04-11 RX ADMIN — INSULIN LISPRO 6 UNITS: 100 INJECTION, SOLUTION INTRAVENOUS; SUBCUTANEOUS at 16:35

## 2025-04-11 RX ADMIN — SENNOSIDES AND DOCUSATE SODIUM 1 TABLET: 50; 8.6 TABLET ORAL at 20:42

## 2025-04-11 RX ADMIN — AMIODARONE HYDROCHLORIDE 150 MG: 1.5 INJECTION, SOLUTION INTRAVENOUS at 10:59

## 2025-04-11 RX ADMIN — METOPROLOL SUCCINATE 50 MG: 50 TABLET, EXTENDED RELEASE ORAL at 12:36

## 2025-04-11 RX ADMIN — AMIODARONE HYDROCHLORIDE 400 MG: 200 TABLET ORAL at 20:42

## 2025-04-11 RX ADMIN — AMIODARONE HYDROCHLORIDE 400 MG: 200 TABLET ORAL at 11:10

## 2025-04-11 RX ADMIN — POTASSIUM CHLORIDE 40 MEQ: 29.8 INJECTION, SOLUTION INTRAVENOUS at 07:58

## 2025-04-11 RX ADMIN — Medication 1000 MCG: at 08:57

## 2025-04-11 RX ADMIN — ASPIRIN 81 MG: 81 TABLET, COATED ORAL at 08:57

## 2025-04-11 RX ADMIN — CETIRIZINE HYDROCHLORIDE 10 MG: 10 TABLET, FILM COATED ORAL at 08:57

## 2025-04-11 RX ADMIN — INSULIN LISPRO 4 UNITS: 100 INJECTION, SOLUTION INTRAVENOUS; SUBCUTANEOUS at 22:08

## 2025-04-11 RX ADMIN — BUMETANIDE 2 MG: 1 TABLET ORAL at 16:43

## 2025-04-11 RX ADMIN — METOPROLOL SUCCINATE 50 MG: 50 TABLET, EXTENDED RELEASE ORAL at 20:42

## 2025-04-11 RX ADMIN — POTASSIUM CHLORIDE 40 MEQ: 1.5 POWDER, FOR SOLUTION ORAL at 08:57

## 2025-04-11 RX ADMIN — BUMETANIDE 2 MG: 1 TABLET ORAL at 08:57

## 2025-04-11 RX ADMIN — LEVOTHYROXINE SODIUM 50 MCG: 0.1 TABLET ORAL at 06:21

## 2025-04-11 RX ADMIN — ICOSAPENT ETHYL 1 G: 1 CAPSULE ORAL at 08:57

## 2025-04-11 RX ADMIN — IRON SUCROSE 200 MG: 20 INJECTION, SOLUTION INTRAVENOUS at 06:21

## 2025-04-11 RX ADMIN — ICOSAPENT ETHYL 1 G: 1 CAPSULE ORAL at 16:35

## 2025-04-11 RX ADMIN — GABAPENTIN 100 MG: 100 CAPSULE ORAL at 20:42

## 2025-04-11 RX ADMIN — AMIODARONE HYDROCHLORIDE 400 MG: 200 TABLET ORAL at 16:35

## 2025-04-11 RX ADMIN — INSULIN LISPRO 2 UNITS: 100 INJECTION, SOLUTION INTRAVENOUS; SUBCUTANEOUS at 12:28

## 2025-04-11 ASSESSMENT — PAIN - FUNCTIONAL ASSESSMENT
PAIN_FUNCTIONAL_ASSESSMENT: 0-10

## 2025-04-11 ASSESSMENT — COGNITIVE AND FUNCTIONAL STATUS - GENERAL
DAILY ACTIVITIY SCORE: 19
MOVING FROM LYING ON BACK TO SITTING ON SIDE OF FLAT BED WITH BEDRAILS: A LITTLE
MOBILITY SCORE: 17
TURNING FROM BACK TO SIDE WHILE IN FLAT BAD: A LITTLE
CLIMB 3 TO 5 STEPS WITH RAILING: A LITTLE
HELP NEEDED FOR BATHING: A LITTLE
PERSONAL GROOMING: A LITTLE
PERSONAL GROOMING: A LITTLE
EATING MEALS: A LITTLE
MOBILITY SCORE: 18
TOILETING: A LITTLE
TOILETING: A LITTLE
STANDING UP FROM CHAIR USING ARMS: A LITTLE
DRESSING REGULAR UPPER BODY CLOTHING: A LITTLE
WALKING IN HOSPITAL ROOM: A LITTLE
HELP NEEDED FOR BATHING: A LITTLE
DRESSING REGULAR LOWER BODY CLOTHING: A LITTLE
CLIMB 3 TO 5 STEPS WITH RAILING: A LOT
MOVING TO AND FROM BED TO CHAIR: A LITTLE
WALKING IN HOSPITAL ROOM: A LITTLE
MOVING FROM LYING ON BACK TO SITTING ON SIDE OF FLAT BED WITH BEDRAILS: A LITTLE
DAILY ACTIVITIY SCORE: 19
MOVING TO AND FROM BED TO CHAIR: A LITTLE
STANDING UP FROM CHAIR USING ARMS: A LITTLE
DRESSING REGULAR LOWER BODY CLOTHING: A LITTLE
TURNING FROM BACK TO SIDE WHILE IN FLAT BAD: A LITTLE

## 2025-04-11 ASSESSMENT — ENCOUNTER SYMPTOMS
VOMITING: 0
ABDOMINAL DISTENTION: 0
SHORTNESS OF BREATH: 0
ABDOMINAL PAIN: 0
FEVER: 0

## 2025-04-11 ASSESSMENT — PAIN SCALES - GENERAL
PAINLEVEL_OUTOF10: 7
PAINLEVEL_OUTOF10: 0 - NO PAIN

## 2025-04-11 ASSESSMENT — ACTIVITIES OF DAILY LIVING (ADL): HOME_MANAGEMENT_TIME_ENTRY: 32

## 2025-04-11 NOTE — PROGRESS NOTES
Subjective Data:    Episode of VT this morning lasting about 30 seconds, then back to a tach with rate 120.  Patient asymptomatic per RN.     Overnight Events:       Objective Data:  Last Recorded Vitals:  Vitals:    04/10/25 1900 04/10/25 2358 04/11/25 1011 04/11/25 1034   BP: 111/68 107/72  111/69   BP Location: Right arm Left arm  Left arm   Patient Position: Lying Lying     Pulse:    (!) 125   Resp: 18 18  17   Temp: 36.6 °C (97.9 °F) 36.4 °C (97.5 °F)  36.8 °C (98.2 °F)   TempSrc: Temporal Temporal  Oral   SpO2: 98% 99%  97%   Weight:   57.7 kg (127 lb 4.8 oz)    Height:           Last Labs:  Results from last 7 days   Lab Units 04/09/25  0505 04/08/25  0548 04/07/25  0537   WBC AUTO x10*3/uL 9.2 10.3 12.5*   HEMOGLOBIN g/dL 8.9* 8.4* 8.3*   HEMATOCRIT % 28.9* 27.9* 26.0*   PLATELETS AUTO x10*3/uL 187 191 203     Results from last 7 days   Lab Units 04/11/25  0616 04/10/25  0914 04/09/25  1054 04/06/25  1045 04/06/25  0821   SODIUM mmol/L 134* 138 135*   < > 136   POTASSIUM mmol/L 3.1* 3.6 4.1   < > 7.1*   CHLORIDE mmol/L 86* 90* 92*   < > 106   CO2 mmol/L 33* 33* 31   < > 12*   BUN mg/dL 98* 98* 95*   < > 92*   CREATININE mg/dL 2.72* 2.88* 3.09*   < > 3.60*   CALCIUM mg/dL 9.3 9.4 8.8   < > 9.0   PROTEIN TOTAL g/dL  --   --   --   --  6.5   BILIRUBIN TOTAL mg/dL  --   --   --   --  0.9   ALK PHOS U/L  --   --   --   --  122   ALT U/L  --   --   --   --  316*   AST U/L  --   --   --   --  438*   GLUCOSE mg/dL 117* 136* 186*   < > 112*    < > = values in this interval not displayed.               TROPHS   Date/Time Value Ref Range Status   04/06/2025 10:45  0 - 20 ng/L Final     Comment:     Previous result verified on 4/5/2025 2049 on specimen/case [id number] called with component [facility] for procedure Troponin I, High Sensitivity with value 107 ng/L.   04/06/2025 08:21  0 - 20 ng/L Final     Comment:     Previous result verified on 4/5/2025 2049 on specimen/case [id number] called with  component [facility] for procedure Troponin I, High Sensitivity with value 107 ng/L.   04/05/2025 07:18  0 - 20 ng/L Final     BNP   Date/Time Value Ref Range Status   04/06/2025 08:21 AM 3,082 0 - 99 pg/mL Final   04/02/2025 05:29 PM 3,124 0 - 99 pg/mL Final   03/25/2025 08:48 AM 2,349 <100 pg/mL Final     Comment:        BNP levels increase with age in the general  population with the highest values seen in  individuals greater than 75 years of age.  Reference: J. Am. Mellissa. Cardiol. 2002; 40:976-982.          HGBA1C   Date/Time Value Ref Range Status   02/13/2025 01:45 PM 6.2 4.2 - 6.5 % Final   01/11/2024 01:36 PM 6.6 see below % Final   07/31/2023 11:10 AM 7.0 % Final     Comment:          Diagnosis of Diabetes-Adults   Non-Diabetic: < or = 5.6%   Increased risk for developing diabetes: 5.7-6.4%   Diagnostic of diabetes: > or = 6.5%  .       Monitoring of Diabetes                Age (y)     Therapeutic Goal (%)   Adults:          >18           <7.0   Pediatrics:    13-18           <7.5                   7-12           <8.0                   0- 6            7.5-8.5   American Diabetes Association. Diabetes Care 33(S1), Jan 2010.     05/04/2023 12:15 PM 6.8 % Final     Comment:          Diagnosis of Diabetes-Adults   Non-Diabetic: < or = 5.6%   Increased risk for developing diabetes: 5.7-6.4%   Diagnostic of diabetes: > or = 6.5%  .       Monitoring of Diabetes                Age (y)     Therapeutic Goal (%)   Adults:          >18           <7.0   Pediatrics:    13-18           <7.5                   7-12           <8.0                   0- 6            7.5-8.5   American Diabetes Association. Diabetes Care 33(S1), Jan 2010.       LDLCALC   Date/Time Value Ref Range Status   05/06/2024 11:28 AM 53 <=99 mg/dL Final     Comment:                                 Near   Borderline      AGE      Desirable  Optimal    High     High     Very High     0-19 Y     0 - 109     ---    110-129   >/= 130     ----    20-24 Y      0 - 119     ---    120-159   >/= 160     ----      >24 Y     0 -  99   100-129  130-159   160-189     >/=190     11/13/2023 10:41 AM 36 <=99 mg/dL Final     Comment:                                 Near   Borderline      AGE      Desirable  Optimal    High     High     Very High     0-19 Y     0 - 109     ---    110-129   >/= 130     ----    20-24 Y     0 - 119     ---    120-159   >/= 160     ----      >24 Y     0 -  99   100-129  130-159   160-189     >/=190       VLDL   Date/Time Value Ref Range Status   05/06/2024 11:28 AM 30 0 - 40 mg/dL Final   11/13/2023 10:41 AM 41 0 - 40 mg/dL Final   07/31/2023 11:10 AM 38 0 - 40 mg/dL Final   05/04/2023 12:15 PM 50 0 - 40 mg/dL Final   05/05/2022 02:50 PM 72 0 - 40 mg/dL Final      Last I/O:  I/O last 3 completed shifts:  In: 474.2 (7.6 mL/kg) [P.O.:300; I.V.:174.2 (2.8 mL/kg)]  Out: 3150 (50.4 mL/kg) [Urine:3150 (1.4 mL/kg/hr)]  Weight: 62.5 kg     Past Cardiology Tests (Last 3 Years):  EKG:    Echo:  Transthoracic Echocardiogram 4/7/2025  1. Left ventricular ejection fraction is severely decreased, calculated by Reyes's biplane at 26%.   2. There is global hypokinesis of the left ventricle with minor regional variations.   3. Left ventricular cavity size is severely dilated.   4. There is reduced right ventricular systolic function.   5. Moderate mitral valve regurgitation.   6. Slightly elevated right ventricular systolic pressure.   7. The inferior vena cava appears mildly dilated, with IVC inspiratory collapse greater than 50%.   8. There is plaque visualized in the ascending aorta.   9. Compared with study dated 12/26/2024, The LVEF is depressed from 33% to currently 26% by Simpsons Biplane Method. RVSP has decreased from 40 mmHg to currently 29 mmHg. Moderate mitral regurgitation remains unchanged.    Transthoracic Echocardiogram 12/2024  1. Left ventricular ejection fraction is moderately decreased, calculated by Reyes's biplane at 33%.   2. Abnormal left  venticular wall motion.   3. Left ventricular cavity size is mildly dilated.   4. No left ventricular thrombus visualized.   5. Moderate mitral valve regurgitation.   6. Mildly elevated right ventricular systolic pressure.   7. There is plaque visualized in the ascending aorta.     Transthoracic Echocardiogram 7/2024   1. Left ventricular ejection fraction is moderately decreased, by visual estimate at 30-35%.   2. Spectral Doppler shows a pseudonormal pattern of left ventricular diastolic filling.   3. Left ventricular cavity size is moderately dilated.   4. No left ventricular thrombus visualized.   5. Technically difficult exam, though there is moderate LV systolic dysfunction with the apical function being best but hypokinesis of the basal and mid segments. There is no LV apical thrombus seen on the echocontrast images.   6. There is reduced right ventricular systolic function.   7. The left atrium is moderately dilated.   8. Moderate mitral valve regurgitation.   9. Mildly elevated RVSP.  10. Moderate tricuspid regurgitation visualized.  11. Compared with the prior exam from 5/10/2023, today's exam is more technically difficult making assessment of LV systolic function more difficult. Proir study demonstrated clear wall motiom abnormality in the basal septal, basal inferior and basal and mid inferolateral segments. The LV systolic function appears to be worse today ( hyaving declined from mild dysfunciton to moderate dysfunction. In addition, the degree of MR appears to have increased from mild to moderate.     Transthoracic Echocardiogram 4/26/24   1. Left ventricular systolic function is moderately decreased with a 30-35% estimated ejection fraction.   2. Multiple segmental abnormalities exist. See findings.   3. Spectral Doppler shows a restrictive pattern of left ventricular diastolic filling.   4. There is an elevated left ventricular end diastolic pressure.   5. Moderate mitral valve regurgitation.   6.  Moderately decreased mitral valve posterior leaflet mobility.   7. Mild to moderate tricuspid regurgitation.   8. Mildly elevated RVSP.     Transthoracic Echocardiogram 2/2024   1. Left ventricular systolic function is normal with a 30-35% estimated ejection fraction.   2. Multiple segmental abnormalities exist. See findings.   3. Spectral Doppler shows a restrictive pattern of left ventricular diastolic filling.   4. There is low normal right ventricular systolic function.   5. Mild to moderate tricuspid regurgitation.   6. There are multiple wall motion abnormalities.     Transthoracic Echocardiogram 5/2023  1. Left ventricular systolic function is mildly decreased with a 40-45% estimated ejection fraction.  2. The left ventricular cavity size is mildly dilated.  3. Basal and mid inferolateral wall, basal inferoseptal segment, and basal inferior segment are abnormal.  4. Spectral Doppler shows a pseudonormal pattern of left ventricular diastolic filling.  5. The left atrium is mildly dilated.  6. RVSP within normal limits.     Transthoracic Echocardiogram 11/2021   1. The left ventricular systolic function is moderately decreased with a 35% estimated ejection fraction.   2. Multiple segmental abnormalities exist. See findings.   3. Spectral Doppler shows an impaired relaxation pattern of left ventricular diastolic filling.     Transthoracic Echocardiogram 6/2021   1. The left ventricular systolic function is moderately decreased with a 40% estimated ejection fraction.   2. Multiple segmental abnormalities exist. See findings.   3. Spectral Doppler shows a pseudonormal pattern of left ventricular diastolic filling.   4. Mild to moderate mitral valve regurgitation.     Cath:  Left/Right Heart Catheterization 2/2024   1. Severe native three vessel CAD.   2. Patent LIMA-LAD/Diagonal vessel. All other grafts are occluded.   3. Severe left subclavian artery stenosis with a gradient of ~28-30 mmHg across the stenosis.    4. Mildly elevated filling pressure with PCWP of 21 mmHg.   5. Mild pulmonary hypertension with mPAP of 29 mmHg (Post capillary).   6. Normal cardiac output and index of 4.1 L/min and 2.5 L/min/m2 respectively.   7. Successful RCFA closure with 6Fr. VIP Angioseal.   8. Left Ventricular end-diastolic pressure = 23 mmHg.      Inpatient Medications:  Scheduled medications   Medication Dose Route Frequency    [Held by provider] allopurinol  100 mg oral Daily    amiodarone  150 mg intravenous Once    amiodarone  400 mg oral TID    Followed by    [START ON 4/19/2025] amiodarone  400 mg oral Daily    [Held by provider] amLODIPine  10 mg oral Daily    aspirin  81 mg oral Daily    atorvastatin  80 mg oral Daily    bumetanide  2 mg oral BID    cetirizine  10 mg oral Daily    cyanocobalamin  1,000 mcg oral Daily    [Held by provider] dapagliflozin propanediol  10 mg oral Daily before evening meal    gabapentin  100 mg oral Nightly    [Held by provider] heparin  5,000 Units subcutaneous TID    icosapent ethyL  1 g oral BID    insulin lispro  0-10 Units subcutaneous Before meals & nightly    iron sucrose  200 mg intravenous Daily    levothyroxine  50 mcg oral Daily    [Held by provider] metoprolol succinate XL  50 mg oral BID    potassium chloride  40 mEq intravenous Once    sennosides-docusate sodium  1 tablet oral Nightly     PRN medications   Medication    dextrose    dextrose    famotidine    glucagon    glucagon    ipratropium-albuteroL    nitroglycerin    oxyCODONE    oxygen    oxymetazoline     Continuous Medications   Medication Dose Last Rate       Physical Exam:  Documented Vital Signs   Heart Rate:  []   Temp:  [36.4 °C (97.5 °F)-37.1 °C (98.7 °F)]   Resp:  [17-18]   BP: (105-111)/(67-72)   Weight:  [57.7 kg (127 lb 4.8 oz)]   SpO2:  [97 %-99 %]   Temp:  [36.4 °C (97.5 °F)-37.1 °C (98.7 °F)] 36.8 °C (98.2 °F)  Heart Rate:  [] 125  Resp:  [17-18] 17  BP: (105-111)/(67-72) 111/69  Oxygen Dose: *0  L/min  Documented Fluid Status     Intake/Output Summary (Last 24 hours) at 4/11/2025 1104  Last data filed at 4/11/2025 1103  Gross per 24 hour   Intake 374.24 ml   Output 2500 ml   Net -2125.76 ml     Net IO Since Admission: -11,902.65 mL [04/11/25 1104]       Assessment/Plan   Ritesh Garza is a 74 y.o. male with past medical history of PMH of mvCAD s/p CABGx5 1992 c/b NSTEMI 2/2024 on DAPT, HFrEF d/t ICM, HTN, HLD, VT s/p ICD placement 5/2024, CKD III, seizures, PAD c/b severe left subclavian stenosis w/ compromised flow to the left POP s/p left subclavian stent 4/2024 , asthma, DM, gout, SIDHU cirrhosis, portal HTN, depression/anxiety, GERD, BPH, SENIA, , recent GIB 12/2024 (Plavix stopped with GIB), who presented to [facility] with SOB, weight gain, and tachycardia.  Cardiology consulted for CHF.     States he has been more short of breath than usual for the past few days. States he has never been short of breath in the past 32 years since his CABG.  He went to doctor visit with his cardiologist, and was told to come to ED for irregular heart rhythm. Denies any chest pain or angina.  States he has gained about 10lbs over the past few months.  Unsure if he is having orthopnea.  He is having some mild lower extremity swelling.  No recent illness, no nausea, vomiting, fever, or chills.      Arrival in the ED HR was 122 Tachypnic b/p was 137/87.   Labs showed BNP 3124. Troponin was 85->82,  K 5.1 Scr 2.31 TSH 14.99 WBC 11k, H/H 9/31  CT Chest showed  Mild interstitial pulmonary edema, small bilateral layering  pleural effusions and cardiomegaly with left ventricular enlargement  suggestive of mild congestive heart failure changes.   He was placed on BiPap, and ultimately placed on oxygen at 2L via NC.  Comfortable.     His Cardiac Device Interrogation performed 4/3/2025 showed no no therapy delivered and importantly no atrial fibrillation or atrial flutter.  Felt that he was having an atrial tachycardia below detection  "rate and was increased on his beta blockade to help address this. We had originally suggested that he was volume up with some fluid and suggested diuresis. We did not feel that he was overly \"Wet\" and did not recommend aggressive diuresing. On HD-3 he had a thibodeaux catheter with minimal urinary output and some contraction noted on labs. We held his lasix in light of this and decreased oxygen requirement. He was noted to be aggressive verbally, which he is not known to be from prior encounter, rather the opposite and had a sitter at bedside.Overnight, he reported chest pain and was given Oxycodone rather than NTG due to a softer blood pressures and concerns for decompensation.  He had brief self limited episodes of ventricular tachycardia with pacing and baseline Left Bundle Branch   His troponin was modestly elevated, but within range of his known severe disease. His Creatinine had climbed, his potassium was signifincalty elevated. He has been transferred to ICU for supportive care and potential dialysis.     Transthoracic Echocardiogram 4/7/2025  1. Left ventricular ejection fraction is severely decreased, calculated by Reyes's biplane at 26%.   2. There is global hypokinesis of the left ventricle with minor regional variations.   3. Left ventricular cavity size is severely dilated.   4. There is reduced right ventricular systolic function.   5. Moderate mitral valve regurgitation.   6. Slightly elevated right ventricular systolic pressure.   7. The inferior vena cava appears mildly dilated, with IVC inspiratory collapse greater than 50%.   8. There is plaque visualized in the ascending aorta.   9. Compared with study dated 12/26/2024, The LVEF is depressed from 33% to currently 26% by Simpsons Biplane Method. RVSP has decreased from 40 mmHg to currently 29 mmHg. Moderate mitral regurgitation remains unchanged.    Will Switch Diuretics to oral pills.   ++ discontinued IV Furosemide 4/10  ++ Continue Bumex 2 mg BID "     Intake/Output Summary (Last 24 hours) at 4/11/2025 1104  Last data filed at 4/11/2025 1103  Gross per 24 hour   Intake 374.24 ml   Output 2500 ml   Net -2125.76 ml   ++ Net IO Since Admission: -11,902.65 mL [04/11/25 1104]    Continue Metoprolol Succinate 50 mg BID + Hold Parameters.  -- IF Rates Remain Elevated, consider adding Ivabradine 2.5 mg BID,    Episode of VT on the morning of 4/11/2025 for 30 seconds  -- we will give amiodarone 150mg IV x 1 now, continue with amiodarone 400mg TID x 24 doses, then continue amiodarone 400mg daily.     Monitor electrolytes closely.  Goals K > 4, Mag >2.    Continue Discharge Planning     Kelin Lacy, APRN-CNP

## 2025-04-11 NOTE — PROGRESS NOTES
Occupational Therapy    OT Treatment    Patient Name: Ritesh Garza  MRN: [id number]  Department: [place] [address]  Room: [address] [place] [address]  Today's Date: 4/11/2025  Time Calculation  Start Time: 1415  Stop Time: 1447  Time Calculation (min): 32 min        Assessment:  OT Assessment: Pt requiring assist for ADLs and CGA for safety during transfers.  Evaluation/Treatment Tolerance: Patient tolerated treatment well  Medical Staff Made Aware: Yes  End of Session Communication: Bedside nurse  End of Session Patient Position: Up in chair, Alarm on  Evaluation/Treatment Tolerance: Patient tolerated treatment well  Medical Staff Made Aware: Yes  Plan:  Treatment Interventions: ADL retraining, UE strengthening/ROM, Endurance training  OT Frequency: 2 times per week  OT Discharge Recommendations: Low intensity level of continued care  Equipment Recommended upon Discharge: Wheeled walker  OT Recommended Transfer Status: Assist of 1  OT - OK to Discharge: Yes  Treatment Interventions: ADL retraining, UE strengthening/ROM, Endurance training    Subjective   Previous Visit Info:  OT Last Visit  OT Received On: 04/11/25  General:  General  Reason for Referral: To ED with SOB and tachycardia. Pt being worked up for CHF.  Referred By: Glenn Sharp MD  Past Medical History Relevant to Rehab:   Past Medical History:   Diagnosis Date    ADHD (attention deficit hyperactivity disorder) 1960’s    AICD (automatic cardioverter/defibrillator) present 05/30/2024    [facility]    Asthma     Benign prostatic hyperplasia Several years ago    CHF (congestive heart failure)     Cirrhosis (Multi)     CKD (chronic kidney disease)     Coronary artery disease Long Ago    Diabetes mellitus (Multi) Over 20 years ago    Erectile dysfunction     GERD (gastroesophageal reflux disease)     Gout     Hypertension Over 20 years ago    Ischemic cardiomyopathy     Kidney stone Several years ago    Myocardial infarction (Multi) Over 20 yesrs ago    SIDHU (nonalcoholic  steatohepatitis)     SENIA (obstructive sleep apnea)     Stenosis of left subclavian artery     s/p stent 4/30/2024    Ventricular tachycardia (Multi)      Family/Caregiver Present: No  Prior to Session Communication: Bedside nurse  Patient Position Received: Alarm on, Bed, 3 rail up  Preferred Learning Style: auditory, kinesthetic, verbal  General Comment: Pt agreeable to OT and ADLs  Precautions:  Medical Precautions: Fall precautions    Pain:  Pain Assessment  Pain Assessment: 0-10    Objective    Cognition:  Cognition  Overall Cognitive Status: Impaired at baseline  Orientation Level: Oriented X4  Coordination:  Movements are Fluid and Coordinated: Yes  Activities of Daily Living: Grooming  Grooming Level of Assistance: Contact guard  Grooming Where Assessed: Standing sinkside  Grooming Comments: pt stood at the sink to brush his teeth, pt able to sequence steps, CGA-SUP for safety     Bed Mobility/Transfers: Bed Mobility  Bed Mobility: Yes  Bed Mobility 1  Bed Mobility 1: Supine to sitting  Level of Assistance 1: Close supervision  Bed Mobility Comments 1: increased time to complete task    Transfers  Transfer: Yes  Transfer 1  Technique 1: Sit to stand, Stand to sit  Transfer Device 1: Walker  Transfer Level of Assistance 1: Contact guard  Trials/Comments 1: max cues for hand placement and safety      Functional Mobility:  Functional Mobility  Functional Mobility Performed: Yes  Functional Mobility 1  Surface 1: Level tile  Device 1: Rolling walker  Assistance 1: Contact guard  Comments 1: CGA to complete functional ambulation around the room with the FWW  Sitting Balance:  Static Sitting Balance  Static Sitting-Balance Support: Feet supported  Static Sitting-Level of Assistance: Close supervision    Outcome Measures:[facility] Daily Activity  Putting on and taking off regular lower body clothing: A little  Bathing (including washing, rinsing, drying): A little  Putting on and taking off regular upper body clothing:  None  Toileting, which includes using toilet, bedpan or urinal: A little  Taking care of personal grooming such as brushing teeth: A little  Eating Meals: A little  Daily Activity - Total Score: 19    Education Documentation  Body Mechanics, taught by Elizabeth Parker OT at 4/11/2025  4:20 PM.  Learner: Patient  Readiness: Acceptance  Method: Explanation, Demonstration  Response: Verbalizes Understanding, Demonstrated Understanding, Needs Reinforcement    ADL Training, taught by Elizabeth Parker OT at 4/11/2025  4:20 PM.  Learner: Patient  Readiness: Acceptance  Method: Explanation, Demonstration  Response: Verbalizes Understanding, Demonstrated Understanding, Needs Reinforcement    Education Comments  No comments found.           Goals:  Encounter Problems       Encounter Problems (Active)       ADLs       Patient will perform UB and LB bathing with modified independent level of assistance. (Progressing)       Start:  04/04/25    Expected End:  04/18/25            Patient with complete lower body dressing with modified independent level of assistance donning and doffing all LE clothes. (Progressing)       Start:  04/04/25    Expected End:  04/18/25               MOBILITY       Patient will perform Functional mobility mod  Household distances/Community Distances with stand by assist level of assistance and least restrictive device in order to improve safety and functional mobility. (Progressing)       Start:  04/04/25    Expected End:  04/18/25

## 2025-04-11 NOTE — PROGRESS NOTES
04/11/25 1052   Discharge Planning   Expected Discharge Disposition Home H  ([facility])         Patient remains in SDU. He is of Lasix drip and is on Bumex 2 mg oral BID. His potassium was 3.1 and he is getting replenished.When patient is medically ready will go home with his wife and [facility] following at home. Patient had runs of VT and is getting amio bolus.

## 2025-04-11 NOTE — CARE PLAN
The patient's goals for the shift include      The clinical goals for the shift include Pt will remain safe throughout shift.      Problem: Pain - Adult  Goal: Verbalizes/displays adequate comfort level or baseline comfort level  Outcome: Progressing     Problem: Safety - Adult  Goal: Free from fall injury  Outcome: Progressing     Problem: Discharge Planning  Goal: Discharge to home or other facility with appropriate resources  Outcome: Progressing     Problem: Chronic Conditions and Co-morbidities  Goal: Patient's chronic conditions and co-morbidity symptoms are monitored and maintained or improved  Outcome: Progressing     Problem: Nutrition  Goal: Nutrient intake appropriate for maintaining nutritional needs  Outcome: Progressing     Problem: Skin  Goal: Promote skin healing  Outcome: Progressing

## 2025-04-11 NOTE — CARE PLAN
The patient's goals for the shift include      The clinical goals for the shift include maintain pt safety    Over the shift, the patient did not make progress toward the following goals. Barriers to progression include . Recommendations to address these barriers include   Problem: Pain - Adult  Goal: Verbalizes/displays adequate comfort level or baseline comfort level  Outcome: Progressing     Problem: Safety - Adult  Goal: Free from fall injury  Outcome: Progressing     Problem: Skin  Goal: Promote skin healing  Outcome: Progressing   .

## 2025-04-11 NOTE — PROGRESS NOTES
Ritesh Garza is a 74 y.o. male on day 9 of admission presenting with Acute respiratory failure with hypoxia.      Subjective   Patient seen and examined.  Off Lasix drip since yesterday evening. Net negative 11.4 L.  Breathing easy, lying flat in bed.   ELKE.   Chart/labs/meds/notes/imaging/VS reviewed.       Objective          Vitals 24HR  Heart Rate:  []   Temp:  [36.4 °C (97.5 °F)-37.1 °C (98.7 °F)]   Resp:  [17-18]   BP: (105-111)/(67-72)   Weight:  [57.7 kg (127 lb 4.8 oz)]   SpO2:  [97 %-99 %]     Intake/Output last 3 Shifts:    Intake/Output Summary (Last 24 hours) at 4/11/2025 1047  Last data filed at 4/11/2025 0907  Gross per 24 hour   Intake 474.24 ml   Output 2075 ml   Net -1600.76 ml       Physical Exam  General: Nontoxic appearing middle-age male. Patient is in no acute distress.  HEENT: Normocephalic. Moist mucosa.    Neck: Supple. No elevated jugular venous pressure. No HJR.   Cardiovascular:  Regular rate and rhythm. Normal S1 and S2. No murmurs, rubs or gallops  Pulmonary:  Clear to auscultation bilaterally.   Abdomen:  Soft. Non-tender. Non-distended. Positive bowel sounds.  Lower Extremities: No lower leg edema appreciated.  Neurologic:  Alert and oriented x3. No focal deficit.   Skin: Skin warm and dry, normal skin turgor. No rashes or lesions to exposed skin.  Psychiatric: Normal mood and behavior.    Scheduled Medications  [Held by provider] allopurinol, 100 mg, oral, Daily  amiodarone, 150 mg, intravenous, Once  amiodarone, 400 mg, oral, TID   Followed by  [START ON 4/19/2025] amiodarone, 400 mg, oral, Daily  [Held by provider] amLODIPine, 10 mg, oral, Daily  aspirin, 81 mg, oral, Daily  atorvastatin, 80 mg, oral, Daily  bumetanide, 2 mg, oral, BID  cetirizine, 10 mg, oral, Daily  cyanocobalamin, 1,000 mcg, oral, Daily  [Held by provider] dapagliflozin propanediol, 10 mg, oral, Daily before evening meal  gabapentin, 100 mg, oral, Nightly  [Held by provider] heparin, 5,000 Units,  subcutaneous, TID  icosapent ethyL, 1 g, oral, BID  insulin lispro, 0-10 Units, subcutaneous, Before meals & nightly  iron sucrose, 200 mg, intravenous, Daily  levothyroxine, 50 mcg, oral, Daily  [Held by provider] metoprolol succinate XL, 50 mg, oral, BID  potassium chloride, 40 mEq, intravenous, Once  sennosides-docusate sodium, 1 tablet, oral, Nightly      Continuous medications         PRN medications: dextrose, dextrose, famotidine, glucagon, glucagon, ipratropium-albuteroL, nitroglycerin, oxyCODONE, oxygen, oxymetazoline     Relevant Results  Results from last 7 days   Lab Units 04/09/25  0505 04/08/25  0548 04/07/25  0537 04/06/25  1858   WBC AUTO x10*3/uL 9.2 10.3 12.5* 17.9*   HEMOGLOBIN g/dL 8.9* 8.4* 8.3* 8.9*   HEMATOCRIT % 28.9* 27.9* 26.0* 29.9*   PLATELETS AUTO x10*3/uL 187 191 203 235   NEUTROS PCT AUTO %  --   --   --  82.4   LYMPHS PCT AUTO %  --   --   --  7.3   MONOS PCT AUTO %  --   --   --  9.5   EOS PCT AUTO %  --   --   --  0.0     Results from last 7 days   Lab Units 04/11/25  0616 04/10/25  0914 04/09/25  1054   SODIUM mmol/L 134* 138 135*   POTASSIUM mmol/L 3.1* 3.6 4.1   CHLORIDE mmol/L 86* 90* 92*   CO2 mmol/L 33* 33* 31   BUN mg/dL 98* 98* 95*   CREATININE mg/dL 2.72* 2.88* 3.09*   GLUCOSE mg/dL 117* 136* 186*   CALCIUM mg/dL 9.3 9.4 8.8       XR chest 1 view   Final Result   Cardiomegaly.                  MACRO:   None        Signed by: Jo Ann Roberto 4/9/2025 3:17 PM   Dictation workstation:   [id number]      Transthoracic Echo (TTE) Limited   Final Result      XR chest 1 view   Final Result   Limited inspiration. Enlarged cardiac silhouette.        MACRO:   none        Signed by: Anette Borjas 4/6/2025 10:30 AM   Dictation workstation:   [id number]      XR chest 1 view   Final Result   Resolving right basilar opacities, residual left basilar opacities.   Signed by Luke Middleton MD      Cardiac device check - Inpatient         CT chest wo IV contrast   Final Result   1.  Mild  interstitial pulmonary edema, small bilateral layering   pleural effusions and cardiomegaly with left ventricular enlargement   suggestive of mild congestive heart failure changes.   2.  Mild peritoneal ascites.   3.  Status post cholecystectomy.   Signed by Yuri Francis MD      XR chest 1 view   Final Result   Enlarged cardiac silhouette with moderate pulmonary edema             MACRO:   None        Signed by: Hank Razo 4/2/2025 5:52 PM   Dictation workstation:   [id number]               Assessment/Plan      Ritesh Garza is a 74 y.o. male with a past medical history of coronary artery disease status post 5v CABG 1995, NSTEMI in February 2024, HFrEF with an LVEF of 33%, moderate mitral valve regurg, mild pulmonary hypertension by 2D echo 12/2024, ischemic cardiomyopathy, hypertension, hyperlipidemia, ventricular tachycardia status post ICD placement 5/2024, seizures, peripheral arterial disease, severe left subclavian stenosis status post stenting 4/2024, asthma, diabetes, gout, SIDHU cirrhosis, portal hypertension, depression, anxiety, GERD, BPH, obstructive sleep apnea, recent history of GI bleed and stage G3b/A3 chronic kidney disease with a baseline creatinine that fluctuates around 2 mg/deciliter who presented with shortness of breath, a 10 pound weight gain and tachycardia.  Workup was notable for an elevated BNP above 3,000.  There was transaminitis, and elevated lactate and worsening acute kidney injury.  CT imaging of the chest showed mild interstitial pulmonary edema, small bilateral layering pleural effusions, cardiomegaly with LV enlargement consistent with mild CHF.  There was mild ascites.  He was placed on BiPAP and admitted to the intensive care unit.  Nephrology was consulted for acute kidney injury on top of stage G3b chronic kidney disease management.    Mr. Garza had a significant acute kidney injury in the setting of heart failure physiology. He was placed on Lasix drip at 20  mg/hour. He was given a dose of IV Diuril due to being anuric. Fortunately, he responded to the diuretic drip. Blood pressures remain soft albeit his creatinine continues to improve with diuresis and he did not require dialysis. Repeat 2D echo shows a drop in his EF to 26%. Mr. Garza's clinical course was further c/b tachycardia. He was not getting the metoprolol d/t BP holding parameters, now adjusted on behalf of cardiology. His volume status has significantly  improved and he is net negative 11.4 L. His previous weight when seen by Dr. Hennessy (cardiology) in January 2025 was 137 lbs. He was not in heart failure at that time. He is without JVD and no HJR. There are faint crackles at the bases. Repeat CXR was obtained and shows only cardiomegaly. We have stopped the Lasix drip and he is back on Bumex 2 mg BID. I have asked for a standing weight ~ 127 lbs. He is receiving potassium chloride replacement. This will buffer against a worsening alkalosis. I reviewed his iron stores and ferritin. He is getting IV venofer. Trend his RFP. Will follow.     Assessment & Plan  Acute respiratory failure with hypoxia    Tachycardia      I spent 45 minutes in the professional and overall care of this patient.      Sony Stout, DO       polyhydramnios

## 2025-04-11 NOTE — PROGRESS NOTES
Physical Therapy    Physical Therapy Treatment    Patient Name: Ritesh Garza  MRN: [id number]  Department: [facility] [address]  Room: [address] [place] [address]  Today's Date: 4/11/2025  Time Calculation  Start Time: 1522  Stop Time: 1546  Time Calculation (min): 24 min         Assessment/Plan   PT Assessment  Rehab Prognosis: Good  Barriers to Discharge Home: No anticipated barriers  End of Session Communication: Bedside nurse  Assessment Comment: PT treatment completed. Pt continues to demonstrate confusion however is progressing well towards his goals. Pt continues to belimited by balance and endurance deficits. Continue to recommend low intensity therapy on discharge.  End of Session Patient Position: Up in chair, Alarm on  PT Plan  Inpatient/Swing Bed or Outpatient: Inpatient  PT Plan  Treatment/Interventions: Bed mobility, Transfer training, Gait training  PT Plan: Ongoing PT  PT Frequency: 2 times per week  PT Discharge Recommendations: Low intensity level of continued care  Equipment Recommended upon Discharge: Wheeled walker  PT Recommended Transfer Status: Assist x1  PT - OK to Discharge: Yes (PT POC established.)      General Visit Information:   PT  Visit  PT Received On: 04/11/25  General  Reason for Referral: To ED with SOB and tachycardia. Pt being worked up for CHF.  Referred By: Glenn Sharp MD  Past Medical History Relevant to Rehab:   Past Medical History:   Diagnosis Date    ADHD (attention deficit hyperactivity disorder) 1960’s    AICD (automatic cardioverter/defibrillator) present 05/30/2024    [facility]    Asthma     Benign prostatic hyperplasia Several years ago    CHF (congestive heart failure)     Cirrhosis (Multi)     CKD (chronic kidney disease)     Coronary artery disease Long Ago    Diabetes mellitus (Multi) Over 20 years ago    Erectile dysfunction     GERD (gastroesophageal reflux disease)     Gout     Hypertension Over 20 years ago    Ischemic cardiomyopathy     Kidney stone Several years ago    Myocardial  infarction (Multi) Over 20 yesrs ago    SIDHU (nonalcoholic steatohepatitis)     SENIA (obstructive sleep apnea)     Stenosis of left subclavian artery     s/p stent 4/30/2024    Ventricular tachycardia (Multi)        Prior to Session Communication: Bedside nurse  Patient Position Received: Alarm on, Up in chair  General Comment: Pt pleasant and agreeable to PT eval. Pt with decreased attention span, requiring frequent redirection throughout session.    Subjective   Precautions:  Precautions  Medical Precautions: Fall precautions      Vital Signs Comment: HR ranging in the 120s throughout session. Pt asymptomatic.     Objective   Pain:  Pain Assessment  Pain Assessment: 0-10  0-10 (Numeric) Pain Score: 7  Pain Location:  (Perry shoulders and elbows)  Cognition:  Cognition  Overall Cognitive Status:  (Pt mildly confused throughout session)  Orientation Level: Oriented X4  Attention: Exceptions to WFL  Sustained Attention: Impaired  Memory: Exceptions to WFL  Insight: Mild  Impulsive: Mildly    Postural Control:  Static Sitting Balance  Static Sitting-Balance Support: Feet supported, Bilateral upper extremity supported  Static Sitting-Level of Assistance: Close supervision  Dynamic Sitting Balance  Dynamic Sitting-Balance Support: Feet supported, Bilateral upper extremity supported  Dynamic Sitting-Level of Assistance: Close supervision  Static Standing Balance  Static Standing-Balance Support: Bilateral upper extremity supported  Static Standing-Level of Assistance: Contact guard  Dynamic Standing Balance  Dynamic Standing-Balance Support: Bilateral upper extremity supported  Dynamic Standing-Level of Assistance: Contact guard (occasional min A, grossly CGA)    Activity Tolerance:  Activity Tolerance  Endurance: Tolerates 10 - 20 min exercise with multiple rests  Treatments:  Therapeutic Exercise  Therapeutic Exercise Performed: Yes  Therapeutic Exercise Activity 1: Pt completes x15 heel raises and standing marches perry to  improve strength and endurance.         Ambulation/Gait Training  Ambulation/Gait Training Performed: Yes  Ambulation/Gait Training 1  Surface 1: Level tile  Device 1: Rolling walker  Gait Support Devices: Gait belt  Assistance 1: Contact guard  Comments/Distance (ft) 1: 200 ft. Pt ambulates with decreased mandy and step length. pt requires frequent cueing to stay focused on task. Cues for walker placement and upright posture. Mild swaying with occasional min A for balance.  Transfers  Transfer: Yes  Transfer 1  Technique 1: Sit to stand, Stand to sit  Transfer Device 1: Walker  Transfer Level of Assistance 1: Contact guard  Trials/Comments 1: Cues for hand placement and backing up fully prior to sitting down.    Stairs  Stairs: Yes  Stairs  Rails 1: Right  Device 1: Railing  Assistance 1: Contact guard  Comment/Number of Steps 1: up/down 3 stairs. Pt unclear providing details of home stair setup but attempted to simulate. Pt completes with good stability.    Outcome Measures:  [facility] Basic Mobility  Turning from your back to your side while in a flat bed without using bedrails: A little  Moving from lying on your back to sitting on the side of a flat bed without using bedrails: A little  Moving to and from bed to chair (including a wheelchair): A little  Standing up from a chair using your arms (e.g. wheelchair or bedside chair): A little  To walk in hospital room: A little  Climbing 3-5 steps with railing: A little  Basic Mobility - Total Score: 18    Education Documentation  Body Mechanics, taught by Angelina Escalante PT at 4/11/2025  4:28 PM.  Learner: Patient  Readiness: Acceptance  Method: Explanation  Response: Needs Reinforcement    Mobility Training, taught by Angelina Escalante PT at 4/11/2025  4:28 PM.  Learner: Patient  Readiness: Acceptance  Method: Explanation  Response: Needs Reinforcement    Education Comments  No comments found.        OP EDUCATION:       Encounter Problems       Encounter Problems  (Active)       Balance       complete all mobility with normal balance while dual tasking, negotiating in a dynamic environment, carrying items, etc., with proactive and reactive static and dynamic standing and sitting tasks independently, >15 minutes.   (Progressing)       Start:  04/04/25    Expected End:  04/18/25               Mobility       STG - Patient will ambulate 150 ft independently with stable vitals.  (Progressing)       Start:  04/04/25    Expected End:  04/18/25            STG - Patient will ascend and descend 2 stairs mod I with LRAD (Progressing)       Start:  04/04/25    Expected End:  04/18/25               PT Transfers       STG - Patient will perform bed mobility independently.  (Progressing)       Start:  04/04/25    Expected End:  04/18/25            STG - Patient will transfer sit to and from stand independently.  (Progressing)       Start:  04/04/25    Expected End:  04/18/25               Pain - Adult

## 2025-04-11 NOTE — PROGRESS NOTES
[facility] Hospitalist Progress Note        Between 7AM-7PM please message me via Epic Secure Chat.  After 7PM please page Nocturnist on call.        Assessment/Plan     Acute Problems    AoC HFrEF exacerbation  Atrial tachycardia w/ an episode of VT  TAYLOR on CKD 3  Nosebleed - resolved  Mental status change - improved    Chronic Problems    CAD w/ hx of CABG/stent  ICD placement  HTN  HLD  Anemia  PAD  Asthma  DM  Gout  SIDHU cirrhosis/portal HTN  Depression/anxiety  GERD  BPH  SENIA  Hx GIB (plavix stopped with GI bleed)    Plan    - cardiology and nephrology following  - lasix gtt switched to PO bumex, monitor lytes, Is&Os/weights. On 1L daily fluid restriction  - Amiodarone started. Continue toprol XL, ASA/statin. Farxiga, entresto, norvasc on hold  - iron levels low, ordered IV venofer  - hold heparin DVT ppx, can utilize SCDs. Afrin nasal spray ordered in case this is needed  - plan for VT prior to DC possibly starting flomax  - PT/OT rec low    Fluids: None  Electrolytes: Replete as needed  Nutrition: Cardiac  Love: Yes  Invasive lines: None  Drains: None  O2: None    DVT Prophylaxis:  SCDs/ambulate    Discharge Planning: plan for home w/ HHC once med ready    Plan of care was discussed with patient    Total time spent: At least 38 minutes, providing counseling or in coordination of care. Total time on this day of visit includes record and documentation review before and after visit including documentation and time not explicitly included on EMR time stamp.      Nava Garza is a 74 y.o. male on day 9 of admission presenting with Acute respiratory failure with hypoxia.    NAEON. Noted a 30 second of VT this AM, he did not feel it. Overall stable, breathing unlabored. No new complaints otherwise.    Review of Systems   Constitutional:  Negative for fever.   Respiratory:  Negative for shortness of breath.    Cardiovascular:  Negative for chest pain.   Gastrointestinal:  Negative for abdominal  "distention, abdominal pain and vomiting.       Objective     Physical Exam  Vitals reviewed.   Constitutional:       General: He is not in acute distress.  Cardiovascular:      Rate and Rhythm: Regular rhythm. Tachycardia present.   Pulmonary:      Effort: Pulmonary effort is normal.      Breath sounds: Normal breath sounds.   Abdominal:      General: There is no distension.      Palpations: Abdomen is soft.   Neurological:      Mental Status: He is alert. Mental status is at baseline.         Last Recorded Vitals  Blood pressure 111/69, pulse (!) 125, temperature 36.8 °C (98.2 °F), temperature source Oral, resp. rate 17, height 1.6 m (5' 3\"), weight 57.7 kg (127 lb 4.8 oz), SpO2 97%.    Medications  [Held by provider] allopurinol, 100 mg, oral, Daily  amiodarone, 400 mg, oral, TID   Followed by  [START ON 4/19/2025] amiodarone, 400 mg, oral, Daily  [Held by provider] amLODIPine, 10 mg, oral, Daily  aspirin, 81 mg, oral, Daily  atorvastatin, 80 mg, oral, Daily  bumetanide, 2 mg, oral, BID  cetirizine, 10 mg, oral, Daily  cyanocobalamin, 1,000 mcg, oral, Daily  [Held by provider] dapagliflozin propanediol, 10 mg, oral, Daily before evening meal  gabapentin, 100 mg, oral, Nightly  [Held by provider] heparin, 5,000 Units, subcutaneous, TID  icosapent ethyL, 1 g, oral, BID  insulin lispro, 0-10 Units, subcutaneous, Before meals & nightly  iron sucrose, 200 mg, intravenous, Daily  levothyroxine, 50 mcg, oral, Daily  [Held by provider] metoprolol succinate XL, 50 mg, oral, BID  sennosides-docusate sodium, 1 tablet, oral, Nightly       PRN medications: dextrose, dextrose, famotidine, glucagon, glucagon, ipratropium-albuteroL, nitroglycerin, oxyCODONE, oxygen, oxymetazoline                Cesar Patrick MD  [facility] Medicine  "

## 2025-04-12 LAB
ANION GAP SERPL CALC-SCNC: 21 MMOL/L (ref 10–20)
BUN SERPL-MCNC: 101 MG/DL (ref 6–23)
CALCIUM SERPL-MCNC: 9 MG/DL (ref 8.6–10.3)
CHLORIDE SERPL-SCNC: 85 MMOL/L (ref 98–107)
CO2 SERPL-SCNC: 29 MMOL/L (ref 21–32)
CREAT SERPL-MCNC: 2.86 MG/DL (ref 0.5–1.3)
EGFRCR SERPLBLD CKD-EPI 2021: 22 ML/MIN/1.73M*2
GLUCOSE BLD MANUAL STRIP-MCNC: 172 MG/DL (ref 74–99)
GLUCOSE BLD MANUAL STRIP-MCNC: 192 MG/DL (ref 74–99)
GLUCOSE BLD MANUAL STRIP-MCNC: 205 MG/DL (ref 74–99)
GLUCOSE BLD MANUAL STRIP-MCNC: 308 MG/DL (ref 74–99)
GLUCOSE BLD MANUAL STRIP-MCNC: 403 MG/DL (ref 74–99)
GLUCOSE SERPL-MCNC: 143 MG/DL (ref 74–99)
MAGNESIUM SERPL-MCNC: 2.25 MG/DL (ref 1.6–2.4)
POTASSIUM SERPL-SCNC: 3.5 MMOL/L (ref 3.5–5.3)
SODIUM SERPL-SCNC: 131 MMOL/L (ref 136–145)

## 2025-04-12 PROCEDURE — 2500000001 HC RX 250 WO HCPCS SELF ADMINISTERED DRUGS (ALT 637 FOR MEDICARE OP): Performed by: STUDENT IN AN ORGANIZED HEALTH CARE EDUCATION/TRAINING PROGRAM

## 2025-04-12 PROCEDURE — 99232 SBSQ HOSP IP/OBS MODERATE 35: CPT | Performed by: STUDENT IN AN ORGANIZED HEALTH CARE EDUCATION/TRAINING PROGRAM

## 2025-04-12 PROCEDURE — 83735 ASSAY OF MAGNESIUM: CPT | Performed by: STUDENT IN AN ORGANIZED HEALTH CARE EDUCATION/TRAINING PROGRAM

## 2025-04-12 PROCEDURE — 1200000002 HC GENERAL ROOM WITH TELEMETRY DAILY

## 2025-04-12 PROCEDURE — 2500000001 HC RX 250 WO HCPCS SELF ADMINISTERED DRUGS (ALT 637 FOR MEDICARE OP): Performed by: INTERNAL MEDICINE

## 2025-04-12 PROCEDURE — 80048 BASIC METABOLIC PNL TOTAL CA: CPT | Performed by: STUDENT IN AN ORGANIZED HEALTH CARE EDUCATION/TRAINING PROGRAM

## 2025-04-12 PROCEDURE — 2500000002 HC RX 250 W HCPCS SELF ADMINISTERED DRUGS (ALT 637 FOR MEDICARE OP, ALT 636 FOR OP/ED): Performed by: NURSE PRACTITIONER

## 2025-04-12 PROCEDURE — 82374 ASSAY BLOOD CARBON DIOXIDE: CPT | Performed by: STUDENT IN AN ORGANIZED HEALTH CARE EDUCATION/TRAINING PROGRAM

## 2025-04-12 PROCEDURE — 82947 ASSAY GLUCOSE BLOOD QUANT: CPT

## 2025-04-12 PROCEDURE — 36415 COLL VENOUS BLD VENIPUNCTURE: CPT | Performed by: STUDENT IN AN ORGANIZED HEALTH CARE EDUCATION/TRAINING PROGRAM

## 2025-04-12 PROCEDURE — 2500000002 HC RX 250 W HCPCS SELF ADMINISTERED DRUGS (ALT 637 FOR MEDICARE OP, ALT 636 FOR OP/ED): Performed by: STUDENT IN AN ORGANIZED HEALTH CARE EDUCATION/TRAINING PROGRAM

## 2025-04-12 PROCEDURE — 2500000004 HC RX 250 GENERAL PHARMACY W/ HCPCS (ALT 636 FOR OP/ED): Performed by: STUDENT IN AN ORGANIZED HEALTH CARE EDUCATION/TRAINING PROGRAM

## 2025-04-12 RX ORDER — POTASSIUM CHLORIDE 1.5 G/1.58G
40 POWDER, FOR SOLUTION ORAL ONCE
Status: DISCONTINUED | OUTPATIENT
Start: 2025-04-12 | End: 2025-04-12

## 2025-04-12 RX ORDER — INSULIN GLARGINE 100 [IU]/ML
8 INJECTION, SOLUTION SUBCUTANEOUS EVERY 24 HOURS
Status: DISCONTINUED | OUTPATIENT
Start: 2025-04-12 | End: 2025-04-12

## 2025-04-12 RX ADMIN — Medication 1000 MCG: at 08:47

## 2025-04-12 RX ADMIN — METOPROLOL SUCCINATE 50 MG: 50 TABLET, EXTENDED RELEASE ORAL at 08:48

## 2025-04-12 RX ADMIN — GABAPENTIN 100 MG: 100 CAPSULE ORAL at 21:09

## 2025-04-12 RX ADMIN — BUMETANIDE 2 MG: 1 TABLET ORAL at 08:48

## 2025-04-12 RX ADMIN — LEVOTHYROXINE SODIUM 50 MCG: 0.1 TABLET ORAL at 06:20

## 2025-04-12 RX ADMIN — ASPIRIN 81 MG: 81 TABLET, COATED ORAL at 08:47

## 2025-04-12 RX ADMIN — INSULIN LISPRO 8 UNITS: 100 INJECTION, SOLUTION INTRAVENOUS; SUBCUTANEOUS at 17:06

## 2025-04-12 RX ADMIN — HEPARIN SODIUM 5000 UNITS: 5000 INJECTION INTRAVENOUS; SUBCUTANEOUS at 15:59

## 2025-04-12 RX ADMIN — HEPARIN SODIUM 5000 UNITS: 5000 INJECTION INTRAVENOUS; SUBCUTANEOUS at 21:10

## 2025-04-12 RX ADMIN — INSULIN LISPRO 2 UNITS: 100 INJECTION, SOLUTION INTRAVENOUS; SUBCUTANEOUS at 12:19

## 2025-04-12 RX ADMIN — ATORVASTATIN CALCIUM 80 MG: 80 TABLET, FILM COATED ORAL at 08:47

## 2025-04-12 RX ADMIN — AMIODARONE HYDROCHLORIDE 400 MG: 200 TABLET ORAL at 08:48

## 2025-04-12 RX ADMIN — INSULIN LISPRO 2 UNITS: 100 INJECTION, SOLUTION INTRAVENOUS; SUBCUTANEOUS at 08:48

## 2025-04-12 RX ADMIN — BUMETANIDE 2 MG: 1 TABLET ORAL at 17:05

## 2025-04-12 RX ADMIN — ICOSAPENT ETHYL 1 G: 1 CAPSULE ORAL at 08:47

## 2025-04-12 RX ADMIN — ICOSAPENT ETHYL 1 G: 1 CAPSULE ORAL at 17:05

## 2025-04-12 RX ADMIN — AMIODARONE HYDROCHLORIDE 400 MG: 200 TABLET ORAL at 21:09

## 2025-04-12 RX ADMIN — AMIODARONE HYDROCHLORIDE 400 MG: 200 TABLET ORAL at 15:59

## 2025-04-12 RX ADMIN — CETIRIZINE HYDROCHLORIDE 10 MG: 10 TABLET, FILM COATED ORAL at 08:48

## 2025-04-12 RX ADMIN — INSULIN LISPRO 4 UNITS: 100 INJECTION, SOLUTION INTRAVENOUS; SUBCUTANEOUS at 21:10

## 2025-04-12 ASSESSMENT — COGNITIVE AND FUNCTIONAL STATUS - GENERAL
DRESSING REGULAR UPPER BODY CLOTHING: A LITTLE
STANDING UP FROM CHAIR USING ARMS: A LITTLE
WALKING IN HOSPITAL ROOM: A LITTLE
TOILETING: A LITTLE
DRESSING REGULAR LOWER BODY CLOTHING: A LITTLE
MOVING TO AND FROM BED TO CHAIR: A LITTLE
EATING MEALS: A LITTLE
DAILY ACTIVITIY SCORE: 18
CLIMB 3 TO 5 STEPS WITH RAILING: A LOT
WALKING IN HOSPITAL ROOM: A LITTLE
MOVING FROM LYING ON BACK TO SITTING ON SIDE OF FLAT BED WITH BEDRAILS: A LITTLE
MOBILITY SCORE: 17
CLIMB 3 TO 5 STEPS WITH RAILING: A LITTLE
STANDING UP FROM CHAIR USING ARMS: A LITTLE
TURNING FROM BACK TO SIDE WHILE IN FLAT BAD: A LITTLE
DRESSING REGULAR UPPER BODY CLOTHING: A LITTLE
TOILETING: A LITTLE
TURNING FROM BACK TO SIDE WHILE IN FLAT BAD: A LITTLE
DRESSING REGULAR LOWER BODY CLOTHING: A LITTLE
MOVING TO AND FROM BED TO CHAIR: A LITTLE
DAILY ACTIVITIY SCORE: 18
HELP NEEDED FOR BATHING: A LITTLE
EATING MEALS: A LITTLE
HELP NEEDED FOR BATHING: A LITTLE
MOBILITY SCORE: 18
PERSONAL GROOMING: A LITTLE
PERSONAL GROOMING: A LITTLE
MOVING FROM LYING ON BACK TO SITTING ON SIDE OF FLAT BED WITH BEDRAILS: A LITTLE

## 2025-04-12 ASSESSMENT — ENCOUNTER SYMPTOMS
SHORTNESS OF BREATH: 0
ABDOMINAL PAIN: 0
ABDOMINAL DISTENTION: 0
VOMITING: 0
FEVER: 0

## 2025-04-12 ASSESSMENT — PAIN - FUNCTIONAL ASSESSMENT: PAIN_FUNCTIONAL_ASSESSMENT: 0-10

## 2025-04-12 ASSESSMENT — PAIN SCALES - GENERAL: PAINLEVEL_OUTOF10: 0 - NO PAIN

## 2025-04-12 NOTE — CARE PLAN
The patient's goals for the shift include      The clinical goals for the shift include pt will remain HDS    Problem: Pain - Adult  Goal: Verbalizes/displays adequate comfort level or baseline comfort level  Outcome: Progressing     Problem: Safety - Adult  Goal: Free from fall injury  Outcome: Progressing     Problem: Discharge Planning  Goal: Discharge to home or other facility with appropriate resources  Outcome: Progressing     Problem: Chronic Conditions and Co-morbidities  Goal: Patient's chronic conditions and co-morbidity symptoms are monitored and maintained or improved  Outcome: Progressing     Problem: Nutrition  Goal: Nutrient intake appropriate for maintaining nutritional needs  Outcome: Progressing     Problem: Skin  Goal: Promote skin healing  Outcome: Progressing     Problem: Skin  Goal: Promote skin healing  Outcome: Progressing

## 2025-04-12 NOTE — PROGRESS NOTES
[facility] Hospitalist Progress Note        Between 7AM-7PM please message me via Epic Secure Chat.  After 7PM please page Nocturnist on call.        Assessment/Plan     Acute Problems    AoC HFrEF exacerbation  Atrial tachycardia w/ an episode of VT  TAYLOR on CKD 3  Nosebleed - resolved  Mental status change - improved    Chronic Problems    CAD w/ hx of CABG/stent  ICD placement  HTN  HLD  Anemia  PAD  Asthma  DM  Gout  SIDHU cirrhosis/portal HTN  Depression/anxiety  GERD  BPH  SENIA  Hx GIB (plavix stopped with GI bleed)    Plan    - cardiology and nephrology following  - lasix gtt switched to PO bumex, monitor lytes, Is&Os/weights. On 1L daily fluid restriction  - Amiodarone started. Continue toprol XL, ASA/statin. Farxiga, entresto, norvasc on hold  - iron levels low, ordered IV venofer -> completed  - plan for VT today  - PT/OT rec low    Fluids: None  Electrolytes: Replete as needed  Nutrition: Cardiac  Love: Yes  Invasive lines: None  Drains: None  O2: None    DVT Prophylaxis:  SubQ Heparin    Discharge Planning: plan for home w/ HHC once med ready    Plan of care was discussed with patient/updated wife Petra    Total time spent: At least 38 minutes, providing counseling or in coordination of care. Total time on this day of visit includes record and documentation review before and after visit including documentation and time not explicitly included on EMR time stamp.      Subjective     Ritesh Garza is a 74 y.o. male on day 10 of admission presenting with Acute respiratory failure with hypoxia.    NAEON. No VT noted on tele. Voiding trial today. Going to get up in chair.    Review of Systems   Constitutional:  Negative for fever.   Respiratory:  Negative for shortness of breath.    Cardiovascular:  Negative for chest pain.   Gastrointestinal:  Negative for abdominal distention, abdominal pain and vomiting.       Objective     Physical Exam  Vitals reviewed.   Constitutional:       General: He is not in acute  "distress.  Cardiovascular:      Rate and Rhythm: Regular rhythm. Tachycardia present.   Pulmonary:      Effort: Pulmonary effort is normal.      Breath sounds: Normal breath sounds.   Abdominal:      General: There is no distension.      Palpations: Abdomen is soft.   Neurological:      Mental Status: He is alert. Mental status is at baseline.         Last Recorded Vitals  Blood pressure 99/64, pulse 105, temperature 36.6 °C (97.9 °F), temperature source Skin, resp. rate 18, height 1.6 m (5' 3\"), weight 57.7 kg (127 lb 4.8 oz), SpO2 99%.    Medications  [Held by provider] allopurinol, 100 mg, oral, Daily  amiodarone, 400 mg, oral, TID   Followed by  [START ON 4/19/2025] amiodarone, 400 mg, oral, Daily  [Held by provider] amLODIPine, 10 mg, oral, Daily  aspirin, 81 mg, oral, Daily  atorvastatin, 80 mg, oral, Daily  bumetanide, 2 mg, oral, BID  cetirizine, 10 mg, oral, Daily  cyanocobalamin, 1,000 mcg, oral, Daily  [Held by provider] dapagliflozin propanediol, 10 mg, oral, Daily before evening meal  gabapentin, 100 mg, oral, Nightly  heparin, 5,000 Units, subcutaneous, TID  icosapent ethyL, 1 g, oral, BID  insulin lispro, 0-10 Units, subcutaneous, Before meals & nightly  levothyroxine, 50 mcg, oral, Daily  metoprolol succinate XL, 50 mg, oral, BID  sennosides-docusate sodium, 1 tablet, oral, Nightly       PRN medications: dextrose, dextrose, famotidine, glucagon, glucagon, ipratropium-albuteroL, nitroglycerin, oxyCODONE, oxygen                Cesar Patrick MD  [facility] Medicine  "

## 2025-04-12 NOTE — PROGRESS NOTES
Ritesh Garza is a 74 y.o. male on day 10 of admission presenting with Acute respiratory failure with hypoxia.      Subjective   Patient was seen, examined and evaluated today, overall doing well with no new complaints       Objective        Vitals 24HR  Heart Rate:  [102-124]   Temp:  [36.4 °C (97.5 °F)-36.8 °C (98.3 °F)]   Resp:  [17-18]   BP: ()/(59-76)   SpO2:  [96 %-99 %]     Intake/Output last 3 Shifts:    Intake/Output Summary (Last 24 hours) at 4/12/2025 1214  Last data filed at 4/12/2025 1037  Gross per 24 hour   Intake 100 ml   Output 850 ml   Net -750 ml       Physical Exam    Appearance: Awake and alert in no acute distress  Head and ENT; normocephalic/atraumatic/supple neck/no JVD  Lungs; CTA  Heart; RRR  Abdomen; soft no tenderness no organomegaly  Extremities; no edema  Neurologic; physiologic              Relevant Results     Results from last 7 days   Lab Units 04/09/25  0505 04/08/25  0548 04/07/25  0537   WBC AUTO x10*3/uL 9.2 10.3 12.5*   HEMOGLOBIN g/dL 8.9* 8.4* 8.3*   HEMATOCRIT % 28.9* 27.9* 26.0*   PLATELETS AUTO x10*3/uL 187 191 203      Results from last 7 days   Lab Units 04/12/25  0619 04/11/25  0616 04/10/25  0914   SODIUM mmol/L 131* 134* 138   POTASSIUM mmol/L 3.5 3.1* 3.6   CHLORIDE mmol/L 85* 86* 90*   CO2 mmol/L 29 33* 33*   BUN mg/dL 101* 98* 98*   CREATININE mg/dL 2.86* 2.72* 2.88*   GLUCOSE mg/dL 143* 117* 136*   CALCIUM mg/dL 9.0 9.3 9.4        Current Facility-Administered Medications:     [Held by provider] allopurinol (Zyloprim) tablet 100 mg, 100 mg, oral, Daily, Cesar Patrick MD    amiodarone (Pacerone) tablet 400 mg, 400 mg, oral, TID, 400 mg at 04/12/25 0848 **FOLLOWED BY** [START ON 4/19/2025] amiodarone (Pacerone) tablet 400 mg, 400 mg, oral, Daily, Kelin Lacy, APRN-CNP    [Held by provider] amLODIPine (Norvasc) tablet 10 mg, 10 mg, oral, Daily, Cesar Patrick MD, 10 mg at 04/05/25 1010    aspirin EC tablet 81 mg, 81 mg, oral, Daily, Cesar Patrick MD, 81 mg  at 04/12/25 0847    atorvastatin (Lipitor) tablet 80 mg, 80 mg, oral, Daily, Cesar Patrick MD, 80 mg at 04/12/25 0847    bumetanide (Bumex) tablet 2 mg, 2 mg, oral, BID, Mahendra Santacruz DO, 2 mg at 04/12/25 0848    cetirizine (ZyrTEC) tablet 10 mg, 10 mg, oral, Daily, Cesar Patrick MD, 10 mg at 04/12/25 0848    cyanocobalamin (Vitamin B-12) tablet 1,000 mcg, 1,000 mcg, oral, Daily, Cesar Patrick MD, 1,000 mcg at 04/12/25 0847    [Held by provider] dapagliflozin propanediol (Farxiga) tablet 10 mg, 10 mg, oral, Daily before evening meal, Cesar Patrick MD    dextrose 50 % injection 12.5 g, 12.5 g, intravenous, q15 min PRN, Cesar Patrick MD    dextrose 50 % injection 25 g, 25 g, intravenous, q15 min PRN, Cesar Patrick MD    famotidine (Pepcid) tablet 20 mg, 20 mg, oral, Daily PRN, Cesar Patrick MD    gabapentin (Neurontin) capsule 100 mg, 100 mg, oral, Nightly, Cesar Patrick MD, 100 mg at 04/11/25 2042    glucagon (Glucagen) injection 1 mg, 1 mg, intramuscular, q15 min PRN, Cesar Partick MD    glucagon (Glucagen) injection 1 mg, 1 mg, intramuscular, q15 min PRN, Cesar Patrick MD    heparin (porcine) injection 5,000 Units, 5,000 Units, subcutaneous, TID, Cesar Patrick MD, 5,000 Units at 04/08/25 2034    icosapent ethyL (Vascepa) capsule 1 g, 1 g, oral, BID, Cesar Patrick MD, 1 g at 04/12/25 0847    insulin lispro injection 0-10 Units, 0-10 Units, subcutaneous, Before meals & nightly, Cesar Patrick MD, 2 Units at 04/12/25 0848    ipratropium-albuteroL (Duo-Neb) 0.5-2.5 mg/3 mL nebulizer solution 3 mL, 3 mL, nebulization, q2h PRN, Cesar Patrick MD    levothyroxine (Synthroid, Levoxyl) tablet 50 mcg, 50 mcg, oral, Daily, Cesar Patrick MD, 50 mcg at 04/12/25 0620    metoprolol succinate XL (Toprol-XL) 24 hr tablet 50 mg, 50 mg, oral, BID, Cesar Patrick MD, 50 mg at 04/12/25 0848    nitroglycerin (Nitrostat) SL tablet 0.4 mg, 0.4 mg, sublingual, q5 min PRN, Cesar Patrick MD, 0.4 mg at 04/04/25 0338    oxyCODONE (Roxicodone) immediate  release tablet 5 mg, 5 mg, oral, q6h PRN, Cesar Patrick MD, 5 mg at 04/05/25 2013    oxygen (O2) therapy, , inhalation, Continuous PRN - O2/gases, Cesar Patrick MD, 2 L/min at 04/07/25 2014    sennosides-docusate sodium (Richelle-Colace) 8.6-50 mg per tablet 1 tablet, 1 tablet, oral, Nightly, Cesar Patrick MD, 1 tablet at 04/11/25 2042           Assessment/Plan   1.  TAYLOR on top of CKD stage III continue current management plan  2.  Congestive heart failure patient was placed on diuretics with remarkable response   3.  Acute respiratory failure with hypoxia currently stable on current management  4.  Hypertension continued management  5.  Diabetes mellitus continue current management      Will continue to follow patient daily with examination and evaluation and reviewing all other consultants input.  Assessment & Plan  Acute respiratory failure with hypoxia    Tachycardia              I spent 35 minutes in the professional and overall care of this patient.      Manuel Mathews MD

## 2025-04-13 VITALS
BODY MASS INDEX: 23.83 KG/M2 | OXYGEN SATURATION: 96 % | WEIGHT: 134.48 LBS | HEART RATE: 62 BPM | SYSTOLIC BLOOD PRESSURE: 101 MMHG | HEIGHT: 63 IN | TEMPERATURE: 97.8 F | DIASTOLIC BLOOD PRESSURE: 62 MMHG | RESPIRATION RATE: 18 BRPM

## 2025-04-13 DIAGNOSIS — E03.9 PRIMARY HYPOTHYROIDISM: ICD-10-CM

## 2025-04-13 LAB
ALBUMIN SERPL BCP-MCNC: 3.8 G/DL (ref 3.4–5)
ALP SERPL-CCNC: 111 U/L (ref 33–136)
ALT SERPL W P-5'-P-CCNC: 266 U/L (ref 10–52)
AMMONIA PLAS-SCNC: 26 UMOL/L (ref 16–53)
ANION GAP SERPL CALC-SCNC: 22 MMOL/L (ref 10–20)
AST SERPL W P-5'-P-CCNC: 69 U/L (ref 9–39)
ATRIAL RATE: 123 BPM
BASOPHILS # BLD AUTO: 0.02 X10*3/UL (ref 0–0.1)
BASOPHILS NFR BLD AUTO: 0.2 %
BILIRUB DIRECT SERPL-MCNC: 0.3 MG/DL (ref 0–0.3)
BILIRUB SERPL-MCNC: 0.8 MG/DL (ref 0–1.2)
BUN SERPL-MCNC: 109 MG/DL (ref 6–23)
CALCIUM SERPL-MCNC: 9 MG/DL (ref 8.6–10.3)
CHLORIDE SERPL-SCNC: 83 MMOL/L (ref 98–107)
CO2 SERPL-SCNC: 30 MMOL/L (ref 21–32)
CREAT SERPL-MCNC: 3.71 MG/DL (ref 0.5–1.3)
EGFRCR SERPLBLD CKD-EPI 2021: 16 ML/MIN/1.73M*2
EOSINOPHIL # BLD AUTO: 0.04 X10*3/UL (ref 0–0.4)
EOSINOPHIL NFR BLD AUTO: 0.4 %
ERYTHROCYTE [DISTWIDTH] IN BLOOD BY AUTOMATED COUNT: 18 % (ref 11.5–14.5)
GLUCOSE BLD MANUAL STRIP-MCNC: 153 MG/DL (ref 74–99)
GLUCOSE BLD MANUAL STRIP-MCNC: 170 MG/DL (ref 74–99)
GLUCOSE BLD MANUAL STRIP-MCNC: 173 MG/DL (ref 74–99)
GLUCOSE BLD MANUAL STRIP-MCNC: 208 MG/DL (ref 74–99)
GLUCOSE SERPL-MCNC: 167 MG/DL (ref 74–99)
HCT VFR BLD AUTO: 29.3 % (ref 41–52)
HGB BLD-MCNC: 9.2 G/DL (ref 13.5–17.5)
HOLD SPECIMEN: NORMAL
IMM GRANULOCYTES # BLD AUTO: 0.04 X10*3/UL (ref 0–0.5)
IMM GRANULOCYTES NFR BLD AUTO: 0.4 % (ref 0–0.9)
LACTATE SERPL-SCNC: 1 MMOL/L (ref 0.4–2)
LYMPHOCYTES # BLD AUTO: 2.04 X10*3/UL (ref 0.8–3)
LYMPHOCYTES NFR BLD AUTO: 18 %
MAGNESIUM SERPL-MCNC: 2.28 MG/DL (ref 1.6–2.4)
MCH RBC QN AUTO: 22.7 PG (ref 26–34)
MCHC RBC AUTO-ENTMCNC: 31.4 G/DL (ref 32–36)
MCV RBC AUTO: 72 FL (ref 80–100)
MONOCYTES # BLD AUTO: 1.38 X10*3/UL (ref 0.05–0.8)
MONOCYTES NFR BLD AUTO: 12.2 %
NEUTROPHILS # BLD AUTO: 7.82 X10*3/UL (ref 1.6–5.5)
NEUTROPHILS NFR BLD AUTO: 68.8 %
NRBC BLD-RTO: 0 /100 WBCS (ref 0–0)
P AXIS: 7 DEGREES
PLATELET # BLD AUTO: 268 X10*3/UL (ref 150–450)
POTASSIUM SERPL-SCNC: 3.5 MMOL/L (ref 3.5–5.3)
PR INTERVAL: 152 MS
PROT SERPL-MCNC: 7.1 G/DL (ref 6.4–8.2)
Q ONSET: 216 MS
QRS COUNT: 21 BEATS
QRS DURATION: 132 MS
QT INTERVAL: 348 MS
QTC CALCULATION(BAZETT): 498 MS
QTC FREDERICIA: 441 MS
R AXIS: 118 DEGREES
RBC # BLD AUTO: 4.06 X10*6/UL (ref 4.5–5.9)
SODIUM SERPL-SCNC: 131 MMOL/L (ref 136–145)
T AXIS: -7 DEGREES
T OFFSET: 390 MS
VENTRICULAR RATE: 123 BPM
WBC # BLD AUTO: 11.3 X10*3/UL (ref 4.4–11.3)

## 2025-04-13 PROCEDURE — 82947 ASSAY GLUCOSE BLOOD QUANT: CPT

## 2025-04-13 PROCEDURE — 85025 COMPLETE CBC W/AUTO DIFF WBC: CPT | Performed by: STUDENT IN AN ORGANIZED HEALTH CARE EDUCATION/TRAINING PROGRAM

## 2025-04-13 PROCEDURE — 2500000004 HC RX 250 GENERAL PHARMACY W/ HCPCS (ALT 636 FOR OP/ED): Performed by: STUDENT IN AN ORGANIZED HEALTH CARE EDUCATION/TRAINING PROGRAM

## 2025-04-13 PROCEDURE — 80048 BASIC METABOLIC PNL TOTAL CA: CPT | Performed by: STUDENT IN AN ORGANIZED HEALTH CARE EDUCATION/TRAINING PROGRAM

## 2025-04-13 PROCEDURE — 83735 ASSAY OF MAGNESIUM: CPT | Performed by: STUDENT IN AN ORGANIZED HEALTH CARE EDUCATION/TRAINING PROGRAM

## 2025-04-13 PROCEDURE — 82140 ASSAY OF AMMONIA: CPT | Performed by: STUDENT IN AN ORGANIZED HEALTH CARE EDUCATION/TRAINING PROGRAM

## 2025-04-13 PROCEDURE — 1200000002 HC GENERAL ROOM WITH TELEMETRY DAILY

## 2025-04-13 PROCEDURE — 2500000001 HC RX 250 WO HCPCS SELF ADMINISTERED DRUGS (ALT 637 FOR MEDICARE OP): Performed by: STUDENT IN AN ORGANIZED HEALTH CARE EDUCATION/TRAINING PROGRAM

## 2025-04-13 PROCEDURE — 82248 BILIRUBIN DIRECT: CPT | Performed by: STUDENT IN AN ORGANIZED HEALTH CARE EDUCATION/TRAINING PROGRAM

## 2025-04-13 PROCEDURE — 36415 COLL VENOUS BLD VENIPUNCTURE: CPT | Performed by: STUDENT IN AN ORGANIZED HEALTH CARE EDUCATION/TRAINING PROGRAM

## 2025-04-13 PROCEDURE — 2500000002 HC RX 250 W HCPCS SELF ADMINISTERED DRUGS (ALT 637 FOR MEDICARE OP, ALT 636 FOR OP/ED): Performed by: STUDENT IN AN ORGANIZED HEALTH CARE EDUCATION/TRAINING PROGRAM

## 2025-04-13 PROCEDURE — 2500000002 HC RX 250 W HCPCS SELF ADMINISTERED DRUGS (ALT 637 FOR MEDICARE OP, ALT 636 FOR OP/ED): Performed by: NURSE PRACTITIONER

## 2025-04-13 PROCEDURE — 2500000001 HC RX 250 WO HCPCS SELF ADMINISTERED DRUGS (ALT 637 FOR MEDICARE OP): Performed by: INTERNAL MEDICINE

## 2025-04-13 PROCEDURE — 99233 SBSQ HOSP IP/OBS HIGH 50: CPT | Performed by: STUDENT IN AN ORGANIZED HEALTH CARE EDUCATION/TRAINING PROGRAM

## 2025-04-13 PROCEDURE — 80053 COMPREHEN METABOLIC PANEL: CPT | Performed by: STUDENT IN AN ORGANIZED HEALTH CARE EDUCATION/TRAINING PROGRAM

## 2025-04-13 PROCEDURE — 83605 ASSAY OF LACTIC ACID: CPT | Performed by: STUDENT IN AN ORGANIZED HEALTH CARE EDUCATION/TRAINING PROGRAM

## 2025-04-13 RX ORDER — TAMSULOSIN HYDROCHLORIDE 0.4 MG/1
0.4 CAPSULE ORAL DAILY
Status: DISCONTINUED | OUTPATIENT
Start: 2025-04-13 | End: 2025-04-19 | Stop reason: HOSPADM

## 2025-04-13 RX ORDER — METOPROLOL SUCCINATE 50 MG/1
50 TABLET, EXTENDED RELEASE ORAL DAILY
Status: DISCONTINUED | OUTPATIENT
Start: 2025-04-14 | End: 2025-04-19 | Stop reason: HOSPADM

## 2025-04-13 RX ADMIN — METOPROLOL SUCCINATE 50 MG: 50 TABLET, EXTENDED RELEASE ORAL at 08:45

## 2025-04-13 RX ADMIN — INSULIN LISPRO 2 UNITS: 100 INJECTION, SOLUTION INTRAVENOUS; SUBCUTANEOUS at 12:04

## 2025-04-13 RX ADMIN — BUMETANIDE 2 MG: 1 TABLET ORAL at 17:09

## 2025-04-13 RX ADMIN — HEPARIN SODIUM 5000 UNITS: 5000 INJECTION INTRAVENOUS; SUBCUTANEOUS at 20:19

## 2025-04-13 RX ADMIN — AMIODARONE HYDROCHLORIDE 400 MG: 200 TABLET ORAL at 20:18

## 2025-04-13 RX ADMIN — HEPARIN SODIUM 5000 UNITS: 5000 INJECTION INTRAVENOUS; SUBCUTANEOUS at 08:47

## 2025-04-13 RX ADMIN — TAMSULOSIN HYDROCHLORIDE 0.4 MG: 0.4 CAPSULE ORAL at 12:04

## 2025-04-13 RX ADMIN — BUMETANIDE 2 MG: 1 TABLET ORAL at 08:46

## 2025-04-13 RX ADMIN — GABAPENTIN 100 MG: 100 CAPSULE ORAL at 20:18

## 2025-04-13 RX ADMIN — AMIODARONE HYDROCHLORIDE 400 MG: 200 TABLET ORAL at 14:39

## 2025-04-13 RX ADMIN — ASPIRIN 81 MG: 81 TABLET, COATED ORAL at 08:46

## 2025-04-13 RX ADMIN — INSULIN LISPRO 2 UNITS: 100 INJECTION, SOLUTION INTRAVENOUS; SUBCUTANEOUS at 17:14

## 2025-04-13 RX ADMIN — SENNOSIDES AND DOCUSATE SODIUM 1 TABLET: 50; 8.6 TABLET ORAL at 20:18

## 2025-04-13 RX ADMIN — ATORVASTATIN CALCIUM 80 MG: 80 TABLET, FILM COATED ORAL at 08:46

## 2025-04-13 RX ADMIN — ICOSAPENT ETHYL 1 G: 1 CAPSULE ORAL at 08:47

## 2025-04-13 RX ADMIN — HEPARIN SODIUM 5000 UNITS: 5000 INJECTION INTRAVENOUS; SUBCUTANEOUS at 14:54

## 2025-04-13 RX ADMIN — LEVOTHYROXINE SODIUM 50 MCG: 0.1 TABLET ORAL at 06:28

## 2025-04-13 RX ADMIN — Medication 1000 MCG: at 08:46

## 2025-04-13 RX ADMIN — INSULIN LISPRO 2 UNITS: 100 INJECTION, SOLUTION INTRAVENOUS; SUBCUTANEOUS at 08:42

## 2025-04-13 RX ADMIN — AMIODARONE HYDROCHLORIDE 400 MG: 200 TABLET ORAL at 08:47

## 2025-04-13 RX ADMIN — CETIRIZINE HYDROCHLORIDE 10 MG: 10 TABLET, FILM COATED ORAL at 08:47

## 2025-04-13 RX ADMIN — INSULIN LISPRO 4 UNITS: 100 INJECTION, SOLUTION INTRAVENOUS; SUBCUTANEOUS at 20:19

## 2025-04-13 RX ADMIN — ICOSAPENT ETHYL 1 G: 1 CAPSULE ORAL at 17:09

## 2025-04-13 ASSESSMENT — ENCOUNTER SYMPTOMS
ABDOMINAL DISTENTION: 0
VOMITING: 0
FEVER: 0
FATIGUE: 1
SHORTNESS OF BREATH: 0
ABDOMINAL PAIN: 0
CONFUSION: 1

## 2025-04-13 ASSESSMENT — COGNITIVE AND FUNCTIONAL STATUS - GENERAL
STANDING UP FROM CHAIR USING ARMS: A LITTLE
HELP NEEDED FOR BATHING: A LITTLE
MOVING TO AND FROM BED TO CHAIR: A LITTLE
HELP NEEDED FOR BATHING: A LITTLE
MOBILITY SCORE: 20
DRESSING REGULAR UPPER BODY CLOTHING: A LITTLE
DAILY ACTIVITIY SCORE: 19
TOILETING: A LITTLE
WALKING IN HOSPITAL ROOM: A LITTLE
PERSONAL GROOMING: A LITTLE
DRESSING REGULAR UPPER BODY CLOTHING: A LITTLE
DAILY ACTIVITIY SCORE: 21
DRESSING REGULAR LOWER BODY CLOTHING: A LITTLE
DRESSING REGULAR LOWER BODY CLOTHING: A LITTLE
STANDING UP FROM CHAIR USING ARMS: A LITTLE
WALKING IN HOSPITAL ROOM: A LITTLE
CLIMB 3 TO 5 STEPS WITH RAILING: A LITTLE
CLIMB 3 TO 5 STEPS WITH RAILING: A LITTLE
MOBILITY SCORE: 21

## 2025-04-13 ASSESSMENT — PAIN SCALES - GENERAL
PAINLEVEL_OUTOF10: 0 - NO PAIN

## 2025-04-13 ASSESSMENT — PAIN - FUNCTIONAL ASSESSMENT
PAIN_FUNCTIONAL_ASSESSMENT: 0-10
PAIN_FUNCTIONAL_ASSESSMENT: 0-10

## 2025-04-13 NOTE — PROGRESS NOTES
Ritesh Garza is a 74 y.o. male on day 11 of admission presenting with Acute respiratory failure with hypoxia.      Subjective   Overall doing well no new complaints       Objective        Vitals 24HR  Heart Rate:  [61-69]   Temp:  [36.5 °C (97.7 °F)-36.8 °C (98.2 °F)]   Resp:  [10-18]   BP: ()/(51-64)   Weight:  [61 kg (134 lb 7.7 oz)]   SpO2:  [93 %-99 %]     Intake/Output last 3 Shifts:    Intake/Output Summary (Last 24 hours) at 4/13/2025 1257  Last data filed at 4/13/2025 0846  Gross per 24 hour   Intake 680 ml   Output --   Net 680 ml       Physical Exam        Appearance: Awake and alert in no acute distress  Head and ENT; normocephalic/atraumatic/supple neck/no JVD  Lungs; CTA  Heart; RRR  Abdomen; soft no tenderness no organomegaly  Extremities; no edema  Neurologic; physiologic            Relevant Results     Results from last 7 days   Lab Units 04/13/25  1108 04/09/25  0505 04/08/25  0548   WBC AUTO x10*3/uL 11.3 9.2 10.3   HEMOGLOBIN g/dL 9.2* 8.9* 8.4*   HEMATOCRIT % 29.3* 28.9* 27.9*   PLATELETS AUTO x10*3/uL 268 187 191      Results from last 7 days   Lab Units 04/13/25  0540 04/12/25  0619 04/11/25  0616   SODIUM mmol/L 131* 131* 134*   POTASSIUM mmol/L 3.5 3.5 3.1*   CHLORIDE mmol/L 83* 85* 86*   CO2 mmol/L 30 29 33*   BUN mg/dL 109* 101* 98*   CREATININE mg/dL 3.71* 2.86* 2.72*   GLUCOSE mg/dL 167* 143* 117*   CALCIUM mg/dL 9.0 9.0 9.3        Current Facility-Administered Medications:     [Held by provider] allopurinol (Zyloprim) tablet 100 mg, 100 mg, oral, Daily, Cesar Patrick MD    amiodarone (Pacerone) tablet 400 mg, 400 mg, oral, TID, 400 mg at 04/13/25 0847 **FOLLOWED BY** [START ON 4/19/2025] amiodarone (Pacerone) tablet 400 mg, 400 mg, oral, Daily, Kelin Lacy, APRN-CNP    [Held by provider] amLODIPine (Norvasc) tablet 10 mg, 10 mg, oral, Daily, Cesar Patrick MD, 10 mg at 04/05/25 1010    aspirin EC tablet 81 mg, 81 mg, oral, Daily, Cesar Patrick MD, 81 mg at 04/13/25 0846     atorvastatin (Lipitor) tablet 80 mg, 80 mg, oral, Daily, Cesar Patrick MD, 80 mg at 04/13/25 0846    bumetanide (Bumex) tablet 2 mg, 2 mg, oral, BID, Mahendra Santacruz DO, 2 mg at 04/13/25 0846    cetirizine (ZyrTEC) tablet 10 mg, 10 mg, oral, Daily, Cesar Patrick MD, 10 mg at 04/13/25 0847    cyanocobalamin (Vitamin B-12) tablet 1,000 mcg, 1,000 mcg, oral, Daily, Cesar Patrick MD, 1,000 mcg at 04/13/25 0846    [Held by provider] dapagliflozin propanediol (Farxiga) tablet 10 mg, 10 mg, oral, Daily before evening meal, Cesar Patrick MD    dextrose 50 % injection 12.5 g, 12.5 g, intravenous, q15 min PRN, Cesar Patrick MD    dextrose 50 % injection 25 g, 25 g, intravenous, q15 min PRN, Cesar Patrick MD    famotidine (Pepcid) tablet 20 mg, 20 mg, oral, Daily PRN, Cesar Patrick MD    gabapentin (Neurontin) capsule 100 mg, 100 mg, oral, Nightly, Cesar Patrick MD, 100 mg at 04/12/25 2109    glucagon (Glucagen) injection 1 mg, 1 mg, intramuscular, q15 min PRN, Cesar Patrick MD    glucagon (Glucagen) injection 1 mg, 1 mg, intramuscular, q15 min PRN, Cesar Patrick MD    heparin (porcine) injection 5,000 Units, 5,000 Units, subcutaneous, TID, Cesar Patrick MD, 5,000 Units at 04/13/25 0847    icosapent ethyL (Vascepa) capsule 1 g, 1 g, oral, BID, Cesar Patrick MD, 1 g at 04/13/25 0847    insulin lispro injection 0-10 Units, 0-10 Units, subcutaneous, Before meals & nightly, Cesar Patrick MD, 2 Units at 04/13/25 1204    ipratropium-albuteroL (Duo-Neb) 0.5-2.5 mg/3 mL nebulizer solution 3 mL, 3 mL, nebulization, q2h PRN, Cesar Patrick MD    levothyroxine (Synthroid, Levoxyl) tablet 50 mcg, 50 mcg, oral, Daily, Cesar Patrick MD, 50 mcg at 04/13/25 0628    metoprolol succinate XL (Toprol-XL) 24 hr tablet 50 mg, 50 mg, oral, BID, Cesar Patrick MD, 50 mg at 04/13/25 0845    nitroglycerin (Nitrostat) SL tablet 0.4 mg, 0.4 mg, sublingual, q5 min PRN, Cesar Patrick MD, 0.4 mg at 04/04/25 0338    oxyCODONE (Roxicodone) immediate release tablet 5 mg, 5 mg,  oral, q6h PRN, Cesar Patrick MD, 5 mg at 04/05/25 2013    sennosides-docusate sodium (Richelle-Colace) 8.6-50 mg per tablet 1 tablet, 1 tablet, oral, Nightly, Cesar Patrick MD, 1 tablet at 04/11/25 2042    tamsulosin (Flomax) 24 hr capsule 0.4 mg, 0.4 mg, oral, Daily, Cesar Patrick MD, 0.4 mg at 04/13/25 1204           Assessment/Plan        1.  TAYLOR on top of CKD stage III continue current management plan  2.  Congestive heart failure patient was placed on diuretics with remarkable response   3.  Acute respiratory failure with hypoxia currently stable on current management  4.  Hypertension continued management  5.  Diabetes mellitus continue current management        Will continue to follow patient daily with examination and evaluation and reviewing all other consultants input.         Assessment & Plan  Acute respiratory failure with hypoxia    Tachycardia              I spent 35 minutes in the professional and overall care of this patient.      Manuel Mathews MD

## 2025-04-13 NOTE — CARE PLAN
Report received from ROHAN Álvarez. Pt resting comfortably. Pt on monitor. In no acute distress. Will continue to monitor. The clinical goals for the shift include Pt will remain HDS stable    Over the shift, the patient did make progress toward the following goals. Barriers to progression include      Problem: Pain - Adult  Goal: Verbalizes/displays adequate comfort level or baseline comfort level  Outcome: Progressing  Flowsheets (Taken 4/13/2025 1016)  Verbalizes/displays adequate comfort level or baseline comfort level:   Encourage patient to monitor pain and request assistance   Assess pain using appropriate pain scale   Administer analgesics based on type and severity of pain and evaluate response   Implement non-pharmacological measures as appropriate and evaluate response   Consider cultural and social influences on pain and pain management   Notify Licensed Independent Practitioner if interventions unsuccessful or patient reports new pain     Problem: Safety - Adult  Goal: Free from fall injury  Outcome: Progressing  Flowsheets (Taken 4/13/2025 1016)  Free from fall injury: Instruct family/caregiver on patient safety     Problem: Discharge Planning  Goal: Discharge to home or other facility with appropriate resources  Outcome: Progressing  Flowsheets (Taken 4/13/2025 1016)  Discharge to home or other facility with appropriate resources:   Identify barriers to discharge with patient and caregiver   Arrange for needed discharge resources and transportation as appropriate   Identify discharge learning needs (meds, wound care, etc)   Arrange for interpreters to assist at discharge as needed   Refer to discharge planning if patient needs post-hospital services based on physician order or complex needs related to functional status, cognitive ability or social support system

## 2025-04-13 NOTE — CARE PLAN
Problem: Pain - Adult  Goal: Verbalizes/displays adequate comfort level or baseline comfort level  4/13/2025 0058 by Brigid Tran RN  Outcome: Progressing  4/13/2025 0056 by Brigid Tran RN  Outcome: Progressing     Problem: Safety - Adult  Goal: Free from fall injury  4/13/2025 0058 by Brigid Tarn RN  Outcome: Progressing  4/13/2025 0056 by Brigid Tran RN  Outcome: Progressing     Problem: Discharge Planning  Goal: Discharge to home or other facility with appropriate resources  4/13/2025 0058 by Brigid Tran RN  Outcome: Progressing  4/13/2025 0056 by Brigid Tran RN  Outcome: Progressing     Problem: Chronic Conditions and Co-morbidities  Goal: Patient's chronic conditions and co-morbidity symptoms are monitored and maintained or improved  4/13/2025 0058 by Brigid Tran RN  Outcome: Progressing  4/13/2025 0056 by Brigid Tran RN  Outcome: Progressing     Problem: Nutrition  Goal: Nutrient intake appropriate for maintaining nutritional needs  4/13/2025 0058 by Brigid Tran RN  Outcome: Progressing  4/13/2025 0056 by Brigid Tran RN  Outcome: Progressing     Problem: Skin  Goal: Promote skin healing  4/13/2025 0058 by Brigid Tran RN  Outcome: Progressing  Flowsheets (Taken 4/7/2025 1842 by Olinda Sewell RN)  Promote skin healing: Assess skin/pad under line(s)/device(s)  4/13/2025 0056 by Brigid Tran RN  Outcome: Progressing

## 2025-04-13 NOTE — PROGRESS NOTES
[facility] Hospitalist Progress Note        Between 7AM-7PM please message me via Epic Secure Chat.  After 7PM please page [facility] on call.        Assessment/Plan     Acute Problems    AoC HFrEF exacerbation  Atrial tachycardia w/ an episode of VT - now in NSR  TAYLOR on CKD 3  Nosebleed - resolved  Mental status change - waxing and waning    Chronic Problems    CAD w/ hx of CABG/stent  ICD placement  HTN  HLD  Anemia  PAD  Asthma  DM  Gout  SIDHU cirrhosis/portal HTN  Hypothyroidism  Depression/anxiety  GERD  BPH  SENIA  Hx GIB (plavix stopped with GI bleed)    Plan    - cardiology and nephrology following  - lasix gtt switched to PO bumex, monitor lytes, Is&Os/weights. On 1L daily fluid restriction  - Amiodarone started. Continue toprol XL, ASA/statin. Farxiga, entresto, norvasc on hold  - iron levels low, ordered IV venofer -> completed  - thibodeaux removed, his PVR was 104. Will bladder scan today given bump in Scr to make sure no issues with urinary retention. Want to start flomax but with borderline BP this may be an issue  - add on CBC, HFP, ammonia, lactate  - PT/OT rec low    Fluids: None  Electrolytes: Replete as needed  Nutrition: Cardiac  Thibodeaux: None  Invasive lines: None  Drains: None  O2: None    DVT Prophylaxis:  SubQ Heparin    Discharge Planning: plan for home w/ HHC once med ready    Plan of care was discussed with patient/updated wife Petra    Total time spent: At least 38 minutes, providing counseling or in coordination of care. Total time on this day of visit includes record and documentation review before and after visit including documentation and time not explicitly included on EMR time stamp.      Subjective     Ritesh Garza is a 74 y.o. male on day 11 of admission presenting with Acute respiratory failure with hypoxia.    Possible episode of bradycardia overnight. This AM, NSR with HR in 60s. Seems a bit sleepy this AM. Doesn't endorse other sx at this time.    Review of Systems   Constitutional:   "Positive for fatigue. Negative for fever.   Respiratory:  Negative for shortness of breath.    Cardiovascular:  Negative for chest pain.   Gastrointestinal:  Negative for abdominal distention, abdominal pain and vomiting.   Psychiatric/Behavioral:  Positive for confusion.        Objective     Physical Exam  Vitals reviewed.   Constitutional:       General: He is not in acute distress.  Cardiovascular:      Rate and Rhythm: Normal rate and regular rhythm.   Pulmonary:      Effort: Pulmonary effort is normal.      Breath sounds: Normal breath sounds.   Abdominal:      General: There is no distension.      Palpations: Abdomen is soft.   Neurological:      Mental Status: He is alert. Mental status is at baseline.         Last Recorded Vitals  Blood pressure 106/58, pulse 67, temperature 36.8 °C (98.2 °F), temperature source Skin, resp. rate 10, height 1.6 m (5' 3\"), weight 61 kg (134 lb 7.7 oz), SpO2 98%.    Medications  [Held by provider] allopurinol, 100 mg, oral, Daily  amiodarone, 400 mg, oral, TID   Followed by  [START ON 4/19/2025] amiodarone, 400 mg, oral, Daily  [Held by provider] amLODIPine, 10 mg, oral, Daily  aspirin, 81 mg, oral, Daily  atorvastatin, 80 mg, oral, Daily  bumetanide, 2 mg, oral, BID  cetirizine, 10 mg, oral, Daily  cyanocobalamin, 1,000 mcg, oral, Daily  [Held by provider] dapagliflozin propanediol, 10 mg, oral, Daily before evening meal  gabapentin, 100 mg, oral, Nightly  heparin, 5,000 Units, subcutaneous, TID  icosapent ethyL, 1 g, oral, BID  insulin lispro, 0-10 Units, subcutaneous, Before meals & nightly  levothyroxine, 50 mcg, oral, Daily  metoprolol succinate XL, 50 mg, oral, BID  sennosides-docusate sodium, 1 tablet, oral, Nightly       PRN medications: dextrose, dextrose, famotidine, glucagon, glucagon, ipratropium-albuteroL, nitroglycerin, oxyCODONE, oxygen                Cesar Patrick MD  [facility]  "

## 2025-04-14 ENCOUNTER — APPOINTMENT (OUTPATIENT)
Dept: CARDIOLOGY | Facility: HOSPITAL | Age: 75
DRG: 286 | End: 2025-04-14
Payer: OTHER MISCELLANEOUS

## 2025-04-14 LAB
ANION GAP SERPL CALC-SCNC: 20 MMOL/L (ref 10–20)
BUN SERPL-MCNC: 114 MG/DL (ref 6–23)
CALCIUM SERPL-MCNC: 8.9 MG/DL (ref 8.6–10.3)
CHLORIDE SERPL-SCNC: 81 MMOL/L (ref 98–107)
CO2 SERPL-SCNC: 31 MMOL/L (ref 21–32)
CREAT SERPL-MCNC: 4.38 MG/DL (ref 0.5–1.3)
EGFRCR SERPLBLD CKD-EPI 2021: 13 ML/MIN/1.73M*2
GLUCOSE BLD MANUAL STRIP-MCNC: 168 MG/DL (ref 74–99)
GLUCOSE BLD MANUAL STRIP-MCNC: 185 MG/DL (ref 74–99)
GLUCOSE BLD MANUAL STRIP-MCNC: 213 MG/DL (ref 74–99)
GLUCOSE BLD MANUAL STRIP-MCNC: 280 MG/DL (ref 74–99)
GLUCOSE SERPL-MCNC: 169 MG/DL (ref 74–99)
HOLD SPECIMEN: NORMAL
MAGNESIUM SERPL-MCNC: 2.41 MG/DL (ref 1.6–2.4)
POTASSIUM SERPL-SCNC: 3.4 MMOL/L (ref 3.5–5.3)
SODIUM SERPL-SCNC: 129 MMOL/L (ref 136–145)

## 2025-04-14 PROCEDURE — 82374 ASSAY BLOOD CARBON DIOXIDE: CPT | Performed by: STUDENT IN AN ORGANIZED HEALTH CARE EDUCATION/TRAINING PROGRAM

## 2025-04-14 PROCEDURE — 97530 THERAPEUTIC ACTIVITIES: CPT | Mod: GO

## 2025-04-14 PROCEDURE — 2500000002 HC RX 250 W HCPCS SELF ADMINISTERED DRUGS (ALT 637 FOR MEDICARE OP, ALT 636 FOR OP/ED): Performed by: STUDENT IN AN ORGANIZED HEALTH CARE EDUCATION/TRAINING PROGRAM

## 2025-04-14 PROCEDURE — 2500000004 HC RX 250 GENERAL PHARMACY W/ HCPCS (ALT 636 FOR OP/ED): Performed by: INTERNAL MEDICINE

## 2025-04-14 PROCEDURE — 83735 ASSAY OF MAGNESIUM: CPT | Performed by: STUDENT IN AN ORGANIZED HEALTH CARE EDUCATION/TRAINING PROGRAM

## 2025-04-14 PROCEDURE — 99232 SBSQ HOSP IP/OBS MODERATE 35: CPT | Performed by: NURSE PRACTITIONER

## 2025-04-14 PROCEDURE — 93005 ELECTROCARDIOGRAM TRACING: CPT

## 2025-04-14 PROCEDURE — 2500000001 HC RX 250 WO HCPCS SELF ADMINISTERED DRUGS (ALT 637 FOR MEDICARE OP): Performed by: INTERNAL MEDICINE

## 2025-04-14 PROCEDURE — 2500000002 HC RX 250 W HCPCS SELF ADMINISTERED DRUGS (ALT 637 FOR MEDICARE OP, ALT 636 FOR OP/ED): Performed by: NURSE PRACTITIONER

## 2025-04-14 PROCEDURE — 82947 ASSAY GLUCOSE BLOOD QUANT: CPT

## 2025-04-14 PROCEDURE — 99233 SBSQ HOSP IP/OBS HIGH 50: CPT | Performed by: STUDENT IN AN ORGANIZED HEALTH CARE EDUCATION/TRAINING PROGRAM

## 2025-04-14 PROCEDURE — 36415 COLL VENOUS BLD VENIPUNCTURE: CPT | Performed by: STUDENT IN AN ORGANIZED HEALTH CARE EDUCATION/TRAINING PROGRAM

## 2025-04-14 PROCEDURE — 2500000001 HC RX 250 WO HCPCS SELF ADMINISTERED DRUGS (ALT 637 FOR MEDICARE OP): Performed by: STUDENT IN AN ORGANIZED HEALTH CARE EDUCATION/TRAINING PROGRAM

## 2025-04-14 PROCEDURE — 2500000004 HC RX 250 GENERAL PHARMACY W/ HCPCS (ALT 636 FOR OP/ED): Performed by: STUDENT IN AN ORGANIZED HEALTH CARE EDUCATION/TRAINING PROGRAM

## 2025-04-14 PROCEDURE — 80048 BASIC METABOLIC PNL TOTAL CA: CPT | Performed by: STUDENT IN AN ORGANIZED HEALTH CARE EDUCATION/TRAINING PROGRAM

## 2025-04-14 PROCEDURE — 99233 SBSQ HOSP IP/OBS HIGH 50: CPT | Performed by: INTERNAL MEDICINE

## 2025-04-14 PROCEDURE — 93010 ELECTROCARDIOGRAM REPORT: CPT | Performed by: INTERNAL MEDICINE

## 2025-04-14 PROCEDURE — 1200000002 HC GENERAL ROOM WITH TELEMETRY DAILY

## 2025-04-14 RX ORDER — SODIUM CHLORIDE, SODIUM LACTATE, POTASSIUM CHLORIDE, CALCIUM CHLORIDE 600; 310; 30; 20 MG/100ML; MG/100ML; MG/100ML; MG/100ML
100 INJECTION, SOLUTION INTRAVENOUS CONTINUOUS
Status: DISCONTINUED | OUTPATIENT
Start: 2025-04-14 | End: 2025-04-14

## 2025-04-14 RX ORDER — MIDODRINE HYDROCHLORIDE 5 MG/1
5 TABLET ORAL EVERY 8 HOURS
Status: COMPLETED | OUTPATIENT
Start: 2025-04-14 | End: 2025-04-15

## 2025-04-14 RX ORDER — LEVOTHYROXINE SODIUM 50 UG/1
50 TABLET ORAL DAILY
Qty: 90 TABLET | Refills: 0 | Status: ON HOLD | OUTPATIENT
Start: 2025-04-14 | End: 2025-07-13

## 2025-04-14 RX ADMIN — AMIODARONE HYDROCHLORIDE 400 MG: 200 TABLET ORAL at 21:57

## 2025-04-14 RX ADMIN — INSULIN LISPRO 4 UNITS: 100 INJECTION, SOLUTION INTRAVENOUS; SUBCUTANEOUS at 17:50

## 2025-04-14 RX ADMIN — HEPARIN SODIUM 5000 UNITS: 5000 INJECTION INTRAVENOUS; SUBCUTANEOUS at 17:51

## 2025-04-14 RX ADMIN — Medication 1000 MCG: at 08:45

## 2025-04-14 RX ADMIN — HEPARIN SODIUM 5000 UNITS: 5000 INJECTION INTRAVENOUS; SUBCUTANEOUS at 08:46

## 2025-04-14 RX ADMIN — GABAPENTIN 100 MG: 100 CAPSULE ORAL at 21:57

## 2025-04-14 RX ADMIN — ATORVASTATIN CALCIUM 80 MG: 80 TABLET, FILM COATED ORAL at 08:45

## 2025-04-14 RX ADMIN — BUMETANIDE 2 MG: 1 TABLET ORAL at 08:45

## 2025-04-14 RX ADMIN — CETIRIZINE HYDROCHLORIDE 10 MG: 10 TABLET, FILM COATED ORAL at 08:46

## 2025-04-14 RX ADMIN — INSULIN LISPRO 2 UNITS: 100 INJECTION, SOLUTION INTRAVENOUS; SUBCUTANEOUS at 08:45

## 2025-04-14 RX ADMIN — ICOSAPENT ETHYL 1 G: 1 CAPSULE ORAL at 08:45

## 2025-04-14 RX ADMIN — INSULIN LISPRO 6 UNITS: 100 INJECTION, SOLUTION INTRAVENOUS; SUBCUTANEOUS at 12:01

## 2025-04-14 RX ADMIN — MIDODRINE HYDROCHLORIDE 5 MG: 5 TABLET ORAL at 21:57

## 2025-04-14 RX ADMIN — ASPIRIN 81 MG: 81 TABLET, COATED ORAL at 08:45

## 2025-04-14 RX ADMIN — INSULIN LISPRO 2 UNITS: 100 INJECTION, SOLUTION INTRAVENOUS; SUBCUTANEOUS at 21:55

## 2025-04-14 RX ADMIN — AMIODARONE HYDROCHLORIDE 400 MG: 200 TABLET ORAL at 08:45

## 2025-04-14 RX ADMIN — ICOSAPENT ETHYL 1 G: 1 CAPSULE ORAL at 17:51

## 2025-04-14 RX ADMIN — AMIODARONE HYDROCHLORIDE 400 MG: 200 TABLET ORAL at 17:51

## 2025-04-14 RX ADMIN — HEPARIN SODIUM 5000 UNITS: 5000 INJECTION INTRAVENOUS; SUBCUTANEOUS at 21:58

## 2025-04-14 RX ADMIN — METOPROLOL SUCCINATE 50 MG: 50 TABLET, EXTENDED RELEASE ORAL at 08:45

## 2025-04-14 RX ADMIN — TAMSULOSIN HYDROCHLORIDE 0.4 MG: 0.4 CAPSULE ORAL at 08:45

## 2025-04-14 RX ADMIN — SODIUM CHLORIDE, SODIUM LACTATE, POTASSIUM CHLORIDE, AND CALCIUM CHLORIDE 250 ML: .6; .31; .03; .02 INJECTION, SOLUTION INTRAVENOUS at 21:56

## 2025-04-14 RX ADMIN — LEVOTHYROXINE SODIUM 50 MCG: 0.1 TABLET ORAL at 05:41

## 2025-04-14 RX ADMIN — SODIUM CHLORIDE, SODIUM LACTATE, POTASSIUM CHLORIDE, AND CALCIUM CHLORIDE 100 ML/HR: .6; .31; .03; .02 INJECTION, SOLUTION INTRAVENOUS at 10:24

## 2025-04-14 ASSESSMENT — COGNITIVE AND FUNCTIONAL STATUS - GENERAL
DRESSING REGULAR UPPER BODY CLOTHING: A LITTLE
MOBILITY SCORE: 21
DRESSING REGULAR UPPER BODY CLOTHING: A LITTLE
MOBILITY SCORE: 19
DAILY ACTIVITIY SCORE: 21
DAILY ACTIVITIY SCORE: 21
DRESSING REGULAR LOWER BODY CLOTHING: A LITTLE
HELP NEEDED FOR BATHING: A LITTLE
DRESSING REGULAR LOWER BODY CLOTHING: A LITTLE
WALKING IN HOSPITAL ROOM: A LITTLE
DAILY ACTIVITIY SCORE: 21
WALKING IN HOSPITAL ROOM: A LOT
CLIMB 3 TO 5 STEPS WITH RAILING: A LITTLE
HELP NEEDED FOR BATHING: A LITTLE
STANDING UP FROM CHAIR USING ARMS: A LITTLE
DRESSING REGULAR LOWER BODY CLOTHING: A LITTLE
CLIMB 3 TO 5 STEPS WITH RAILING: A LOT
HELP NEEDED FOR BATHING: A LITTLE
STANDING UP FROM CHAIR USING ARMS: A LITTLE
TOILETING: A LITTLE

## 2025-04-14 ASSESSMENT — PAIN SCALES - GENERAL
PAINLEVEL_OUTOF10: 0 - NO PAIN

## 2025-04-14 ASSESSMENT — ENCOUNTER SYMPTOMS
FEVER: 0
SHORTNESS OF BREATH: 0
FATIGUE: 1
ABDOMINAL DISTENTION: 0
ABDOMINAL PAIN: 0
VOMITING: 0
CONFUSION: 1

## 2025-04-14 ASSESSMENT — PAIN - FUNCTIONAL ASSESSMENT
PAIN_FUNCTIONAL_ASSESSMENT: 0-10
PAIN_FUNCTIONAL_ASSESSMENT: 0-10

## 2025-04-14 NOTE — PROGRESS NOTES
Ritesh Garza is a 74 y.o. male on day 12 of admission presenting with Acute respiratory failure with hypoxia.      Subjective   Mr. Garza is a 74-year-old man with a history of coronary artery disease status post 5 vessel bypass in 1995, suffered a NSTEMI in February 2024.  Heart failure with reduced EF at 33%, moderate MR, pulmonary hypertension last December.  Has hypertension, hyperlipidemia, has had VT status post ICD last May.  Peripheral vascular disease, known left subclavian stenosis, had a stent placed 1 year ago.  He has diabetes, gout, SIDHU cirrhosis, portal hypertension, obstructive sleep apnea, has had a GI bleed.  Stage IIIb chronic kidney disease, baseline creatinine in the 2 mg/dL range.  He came in with shortness of breath, 10 pound weight gain, elevated BNP.  Elevated lactate, acute kidney injury, elevated liver enzymes.  CT noted pulmonary edema with pleural effusions, but was felt to be heart failure, ascites.  Placed on BiPAP, was in the ICU and seen by Dr. Stout.  Was placed on a Lasix drip, EF 26% by echocardiogram.  Responded well to the Lasix drip.  Baseline weight is approximately 137 pounds when seen by Dr. Hennessy.    He denies shortness of breath at this time.  This morning a bladder scan noted 322 mL, he is against a Love catheter.    Chart/labs/meds/notes/imaging/VS reviewed.       Objective          Vitals 24HR  Heart Rate:  [58-64]   Temp:  [36.2 °C (97.2 °F)-36.9 °C (98.4 °F)]   Resp:  [12-18]   BP: ()/(55-64)   Weight:  [57.5 kg (126 lb 12.8 oz)]   SpO2:  [96 %-99 %]     Intake/Output last 3 Shifts:    Intake/Output Summary (Last 24 hours) at 4/14/2025 0912  Last data filed at 4/13/2025 2014  Gross per 24 hour   Intake 240 ml   Output --   Net 240 ml       Physical Exam  General: Nontoxic appearing middle-age male. Patient is in no acute distress.  HEENT: Normocephalic. Moist mucosa.    Neck: Supple. No elevated jugular venous pressure. No HJR.   Cardiovascular:   Regular rate and rhythm. Normal S1 and S2. No murmurs, rubs or gallops  Pulmonary:  Clear to auscultation bilaterally.   Abdomen:  Soft. Non-tender. Non-distended. Positive bowel sounds.  Lower Extremities: No lower leg edema appreciated.  Neurologic:  Alert and oriented x3. No focal deficit.   Skin: Skin warm and dry, normal skin turgor. No rashes or lesions to exposed skin.  Psychiatric: Normal mood and behavior.    Scheduled Medications  [Held by provider] allopurinol, 100 mg, oral, Daily  amiodarone, 400 mg, oral, TID   Followed by  [START ON 4/19/2025] amiodarone, 400 mg, oral, Daily  [Held by provider] amLODIPine, 10 mg, oral, Daily  aspirin, 81 mg, oral, Daily  atorvastatin, 80 mg, oral, Daily  bumetanide, 2 mg, oral, BID  cetirizine, 10 mg, oral, Daily  cyanocobalamin, 1,000 mcg, oral, Daily  [Held by provider] dapagliflozin propanediol, 10 mg, oral, Daily before evening meal  gabapentin, 100 mg, oral, Nightly  heparin, 5,000 Units, subcutaneous, TID  icosapent ethyL, 1 g, oral, BID  insulin lispro, 0-10 Units, subcutaneous, Before meals & nightly  levothyroxine, 50 mcg, oral, Daily  metoprolol succinate XL, 50 mg, oral, Daily  sennosides-docusate sodium, 1 tablet, oral, Nightly  tamsulosin, 0.4 mg, oral, Daily      Continuous medications         PRN medications: dextrose, dextrose, famotidine, glucagon, glucagon, ipratropium-albuteroL, nitroglycerin, oxyCODONE     Relevant Results  Results from last 7 days   Lab Units 04/13/25  1108 04/09/25  0505 04/08/25  0548   WBC AUTO x10*3/uL 11.3 9.2 10.3   HEMOGLOBIN g/dL 9.2* 8.9* 8.4*   HEMATOCRIT % 29.3* 28.9* 27.9*   PLATELETS AUTO x10*3/uL 268 187 191   NEUTROS PCT AUTO % 68.8  --   --    LYMPHS PCT AUTO % 18.0  --   --    MONOS PCT AUTO % 12.2  --   --    EOS PCT AUTO % 0.4  --   --      Results from last 7 days   Lab Units 04/14/25  0559 04/13/25  0540 04/12/25  0619   SODIUM mmol/L 129* 131* 131*   POTASSIUM mmol/L 3.4* 3.5 3.5   CHLORIDE mmol/L 81* 83* 85*    CO2 mmol/L 31 30 29   BUN mg/dL 114* 109* 101*   CREATININE mg/dL 4.38* 3.71* 2.86*   GLUCOSE mg/dL 169* 167* 143*   CALCIUM mg/dL 8.9 9.0 9.0       XR chest 1 view   Final Result   Cardiomegaly.                  MACRO:   None        Signed by: Jo Ann Roberto 4/9/2025 3:17 PM   Dictation workstation:   [id number]      Transthoracic Echo (TTE) Limited   Final Result      XR chest 1 view   Final Result   Limited inspiration. Enlarged cardiac silhouette.        MACRO:   none        Signed by: Anette Borjas 4/6/2025 10:30 AM   Dictation workstation:   [id number]      XR chest 1 view   Final Result   Resolving right basilar opacities, residual left basilar opacities.   Signed by Luke Middleton MD      Cardiac device check - Inpatient   Final Result      CT chest wo IV contrast   Final Result   1.  Mild interstitial pulmonary edema, small bilateral layering   pleural effusions and cardiomegaly with left ventricular enlargement   suggestive of mild congestive heart failure changes.   2.  Mild peritoneal ascites.   3.  Status post cholecystectomy.   Signed by Yuri Francis MD      XR chest 1 view   Final Result   Enlarged cardiac silhouette with moderate pulmonary edema             MACRO:   None        Signed by: Hank Razo 4/2/2025 5:52 PM   Dictation workstation:   [id number]               Assessment/Plan      Ritesh Garza is a 74 y.o. male with a past medical history of coronary artery disease status post 5v CABG 1995, NSTEMI in February 2024, HFrEF with an LVEF of 33%, moderate mitral valve regurg, mild pulmonary hypertension by 2D echo 12/2024, ischemic cardiomyopathy, hypertension, hyperlipidemia, ventricular tachycardia status post ICD placement 5/2024, seizures, peripheral arterial disease, severe left subclavian stenosis status post stenting 4/2024, asthma, diabetes, gout, SDIHU cirrhosis, portal hypertension, depression, anxiety, GERD, BPH, obstructive sleep apnea, recent history of GI bleed and stage  G3b/A3 chronic kidney disease with a baseline creatinine that fluctuates around 2 mg/deciliter who presented with shortness of breath, a 10 pound weight gain and tachycardia.  Workup was notable for an elevated BNP above 3,000.  There was transaminitis, and elevated lactate and worsening acute kidney injury.  CT imaging of the chest showed mild interstitial pulmonary edema, small bilateral layering pleural effusions, cardiomegaly with LV enlargement consistent with mild CHF.  There was mild ascites.  He was placed on BiPAP and admitted to the intensive care unit.  Nephrology was consulted for acute kidney injury on top of stage G3b chronic kidney disease management.    Mr. Garza had a significant acute kidney injury in the setting of heart failure physiology. He was placed on Lasix drip at 20 mg/hour. He was given a dose of IV Diuril due to being anuric. Fortunately, he responded to the diuretic drip. Blood pressures are up-and-down.  EF 26% is worrisome.  But he is now suffering worsening kidney function.  The question is whether his weight is lower than it should be for him.  His weight is 131 pounds on the bed scale, was 127 pounds standing on Friday.  A bladder scan suggested urinary retention of over 300 mL.  But his abdomen is soft on exam, I am slightly surprised.  He is refusing a Love catheter.  I am going to hold his diuretic, I asked for standing weight, I may consider giving him a small amount of fluid back.  But the EF 26% concerns me.  He will get a dose of intravenous iron.    Assessment & Plan  Acute respiratory failure with hypoxia    Tachycardia      I spent 45 minutes in the professional and overall care of this patient.      Kevin Hutchins MD

## 2025-04-14 NOTE — PROGRESS NOTES
[facility] Hospitalist Progress Note        Between 7AM-7PM please message me via Epic Secure Chat.  After 7PM please page Nocturnist on call.        Assessment/Plan     Acute Problems    AoC HFrEF exacerbation - improved  Atrial tachycardia w/ an episode of VT - now in NSR  TAYLOR on CKD 3 - worsening  Nosebleed - resolved  Acute liver injury - hepatocellular pattern, suspect related to CHF/hemodynamic fluctuations. Trend improving overall  Mental status change - waxing and waning. Possibly hospital acquired delirium at this point    Chronic Problems    CAD w/ hx of CABG/stent  ICD placement  HTN  HLD  Anemia  PAD  Asthma  DM  Gout  SIDHU cirrhosis/portal HTN  Hypothyroidism  Depression/anxiety  GERD  BPH  SENIA  Hx GIB (plavix stopped with GI bleed)    Plan    - cardiology and nephrology following  - discussed w/ Dr. Hutchins... holding bumex and giving back 1L LR today. We did a post-void residual on him and he had 322 mL. Incomplete bladder emptying may be complicating this picture. Flomax was started 4/13. He was agreeable to us replacing his thibodeaux again today, once inserted we got about 250 mL urine return. I imagine he will need to DC with this thibodeaux and have urology follow up outpatient for a trial of void. Discussed this with his wife Petra  - PO Amiodarone load ongoing. Continue toprol XL, ASA/statin. Farxiga, entresto, norvasc on hold. Meds can be further adjusted outpatient. He has appointment [facility] 4/16 w/ Dr. Hennessy  - iron levels low, ordered IV venofer -> received a few doses, declining further doses   - PT/OT rec low    Fluids: None  Electrolytes: Replete as needed  Nutrition: Cardiac  Thibodeaux: Yes placed here  Invasive lines: None  Drains: None  O2: None    DVT Prophylaxis:  SubQ Heparin    Discharge Planning: plan for home w/ HHC and healthy at home, hopeful for DC 4/15 pending improvement in renal fx and nephrology clearance.    Plan of care was discussed with patient/updated wife Petra    Total time spent: At  "least 38 minutes, providing counseling or in coordination of care. Total time on this day of visit includes record and documentation review before and after visit including documentation and time not explicitly included on EMR time stamp.      Subjective     Ritesh Garza is a 74 y.o. male on day 12 of admission presenting with Acute respiratory failure with hypoxia.    NAEON. Bladder scan this AM post-void showing 322 mL. No supra-pubic pain.     Review of Systems   Constitutional:  Positive for fatigue. Negative for fever.   Respiratory:  Negative for shortness of breath.    Cardiovascular:  Negative for chest pain.   Gastrointestinal:  Negative for abdominal distention, abdominal pain and vomiting.   Psychiatric/Behavioral:  Positive for confusion.        Objective     Physical Exam  Vitals reviewed.   Constitutional:       General: He is not in acute distress.  Cardiovascular:      Rate and Rhythm: Normal rate and regular rhythm.   Pulmonary:      Effort: Pulmonary effort is normal.      Breath sounds: Normal breath sounds.   Abdominal:      General: There is no distension.      Palpations: Abdomen is soft.   Neurological:      Mental Status: He is alert. Mental status is at baseline.         Last Recorded Vitals  Blood pressure (!) 97/49, pulse 64, temperature 36.6 °C (97.8 °F), temperature source Temporal, resp. rate 16, height 1.6 m (5' 3\"), weight 57 kg (125 lb 10.6 oz), SpO2 99%.    Medications  [Held by provider] allopurinol, 100 mg, oral, Daily  amiodarone, 400 mg, oral, TID   Followed by  [START ON 4/19/2025] amiodarone, 400 mg, oral, Daily  [Held by provider] amLODIPine, 10 mg, oral, Daily  aspirin, 81 mg, oral, Daily  atorvastatin, 80 mg, oral, Daily  [Held by provider] bumetanide, 2 mg, oral, BID  cetirizine, 10 mg, oral, Daily  cyanocobalamin, 1,000 mcg, oral, Daily  [Held by provider] dapagliflozin propanediol, 10 mg, oral, Daily before evening meal  gabapentin, 100 mg, oral, Nightly  heparin, " 5,000 Units, subcutaneous, TID  icosapent ethyL, 1 g, oral, BID  insulin lispro, 0-10 Units, subcutaneous, Before meals & nightly  iron sucrose, 200 mg, intravenous, Once  levothyroxine, 50 mcg, oral, Daily  metoprolol succinate XL, 50 mg, oral, Daily  sennosides-docusate sodium, 1 tablet, oral, Nightly  tamsulosin, 0.4 mg, oral, Daily       PRN medications: dextrose, dextrose, famotidine, glucagon, glucagon, ipratropium-albuteroL, nitroglycerin, oxyCODONE                Cesar Patrick MD  [facility]

## 2025-04-14 NOTE — PROGRESS NOTES
Subjective Data:    No further VT    Now SR in 60's, no prolonged pauses    Patient very anxious to be discharged.     Overnight Events:       Objective Data:  Last Recorded Vitals:  Vitals:    04/14/25 0600 04/14/25 0757 04/14/25 0954 04/14/25 1148   BP:  (!) 185/55  (!) 97/49   BP Location:  Right arm  Right arm   Patient Position:  Lying  Sitting   Pulse:  64  64   Resp:    16   Temp:  36.2 °C (97.2 °F)  36.6 °C (97.8 °F)   TempSrc:  Temporal  Temporal   SpO2:  96%  99%   Weight: 57.5 kg (126 lb 12.8 oz)  57 kg (125 lb 10.6 oz)    Height:           Last Labs:  Results from last 7 days   Lab Units 04/13/25  1108 04/09/25  0505 04/08/25  0548   WBC AUTO x10*3/uL 11.3 9.2 10.3   HEMOGLOBIN g/dL 9.2* 8.9* 8.4*   HEMATOCRIT % 29.3* 28.9* 27.9*   PLATELETS AUTO x10*3/uL 268 187 191     Results from last 7 days   Lab Units 04/14/25  0559 04/13/25  1109 04/13/25  0540 04/12/25  0619   SODIUM mmol/L 129*  --  131* 131*   POTASSIUM mmol/L 3.4*  --  3.5 3.5   CHLORIDE mmol/L 81*  --  83* 85*   CO2 mmol/L 31  --  30 29   BUN mg/dL 114*  --  109* 101*   CREATININE mg/dL 4.38*  --  3.71* 2.86*   CALCIUM mg/dL 8.9  --  9.0 9.0   PROTEIN TOTAL g/dL  --  7.1  --   --    BILIRUBIN TOTAL mg/dL  --  0.8  --   --    ALK PHOS U/L  --  111  --   --    ALT U/L  --  266*  --   --    AST U/L  --  69*  --   --    GLUCOSE mg/dL 169*  --  167* 143*               TROPHS   Date/Time Value Ref Range Status   04/06/2025 10:45  0 - 20 ng/L Final     Comment:     Previous result verified on 4/5/2025 2049 on specimen/case [id number] called with component TRPHS for procedure Troponin I, High Sensitivity with value 107 ng/L.   04/06/2025 08:21  0 - 20 ng/L Final     Comment:     Previous result verified on 4/5/2025 2049 on specimen/case [id number] called with component TRPHS for procedure Troponin I, High Sensitivity with value 107 ng/L.   04/05/2025 07:18  0 - 20 ng/L Final     BNP   Date/Time Value Ref Range Status    04/06/2025 08:21 AM 3,082 0 - 99 pg/mL Final   04/02/2025 05:29 PM 3,124 0 - 99 pg/mL Final   03/25/2025 08:48 AM 2,349 <100 pg/mL Final     Comment:        BNP levels increase with age in the general  population with the highest values seen in  individuals greater than 75 years of age.  Reference: J. Am. Mellissa. Cardiol. 2002; 40:976-982.          HGBA1C   Date/Time Value Ref Range Status   02/13/2025 01:45 PM 6.2 4.2 - 6.5 % Final   01/11/2024 01:36 PM 6.6 see below % Final   07/31/2023 11:10 AM 7.0 % Final     Comment:          Diagnosis of Diabetes-Adults   Non-Diabetic: < or = 5.6%   Increased risk for developing diabetes: 5.7-6.4%   Diagnostic of diabetes: > or = 6.5%  .       Monitoring of Diabetes                Age (y)     Therapeutic Goal (%)   Adults:          >18           <7.0   Pediatrics:    13-18           <7.5                   7-12           <8.0                   0- 6            7.5-8.5   American Diabetes Association. Diabetes Care 33(S1), Jan 2010.     05/04/2023 12:15 PM 6.8 % Final     Comment:          Diagnosis of Diabetes-Adults   Non-Diabetic: < or = 5.6%   Increased risk for developing diabetes: 5.7-6.4%   Diagnostic of diabetes: > or = 6.5%  .       Monitoring of Diabetes                Age (y)     Therapeutic Goal (%)   Adults:          >18           <7.0   Pediatrics:    13-18           <7.5                   7-12           <8.0                   0- 6            7.5-8.5   American Diabetes Association. Diabetes Care 33(S1), Jan 2010.       LDLCALC   Date/Time Value Ref Range Status   05/06/2024 11:28 AM 53 <=99 mg/dL Final     Comment:                                 Near   Borderline      AGE      Desirable  Optimal    High     High     Very High     0-19 Y     0 - 109     ---    110-129   >/= 130     ----    20-24 Y     0 - 119     ---    120-159   >/= 160     ----      >24 Y     0 -  99   100-129  130-159   160-189     >/=190     11/13/2023 10:41 AM 36 <=99 mg/dL Final     Comment:                                  Near   Borderline      AGE      Desirable  Optimal    High     High     Very High     0-19 Y     0 - 109     ---    110-129   >/= 130     ----    20-24 Y     0 - 119     ---    120-159   >/= 160     ----      >24 Y     0 -  99   100-129  130-159   160-189     >/=190       VLDL   Date/Time Value Ref Range Status   05/06/2024 11:28 AM 30 0 - 40 mg/dL Final   11/13/2023 10:41 AM 41 0 - 40 mg/dL Final   07/31/2023 11:10 AM 38 0 - 40 mg/dL Final   05/04/2023 12:15 PM 50 0 - 40 mg/dL Final   05/05/2022 02:50 PM 72 0 - 40 mg/dL Final      Last I/O:  I/O last 3 completed shifts:  In: 600 (10.4 mL/kg) [P.O.:600]  Out: - (0 mL/kg)   Weight: 57.5 kg     Past Cardiology Tests (Last 3 Years):  EKG:    Echo:  Transthoracic Echocardiogram 4/7/2025  1. Left ventricular ejection fraction is severely decreased, calculated by Reyes's biplane at 26%.   2. There is global hypokinesis of the left ventricle with minor regional variations.   3. Left ventricular cavity size is severely dilated.   4. There is reduced right ventricular systolic function.   5. Moderate mitral valve regurgitation.   6. Slightly elevated right ventricular systolic pressure.   7. The inferior vena cava appears mildly dilated, with IVC inspiratory collapse greater than 50%.   8. There is plaque visualized in the ascending aorta.   9. Compared with study dated 12/26/2024, The LVEF is depressed from 33% to currently 26% by Simpsons Biplane Method. RVSP has decreased from 40 mmHg to currently 29 mmHg. Moderate mitral regurgitation remains unchanged.    Transthoracic Echocardiogram 12/2024  1. Left ventricular ejection fraction is moderately decreased, calculated by Reyes's biplane at 33%.   2. Abnormal left venticular wall motion.   3. Left ventricular cavity size is mildly dilated.   4. No left ventricular thrombus visualized.   5. Moderate mitral valve regurgitation.   6. Mildly elevated right ventricular systolic pressure.   7.  There is plaque visualized in the ascending aorta.     Transthoracic Echocardiogram 7/2024   1. Left ventricular ejection fraction is moderately decreased, by visual estimate at 30-35%.   2. Spectral Doppler shows a pseudonormal pattern of left ventricular diastolic filling.   3. Left ventricular cavity size is moderately dilated.   4. No left ventricular thrombus visualized.   5. Technically difficult exam, though there is moderate LV systolic dysfunction with the apical function being best but hypokinesis of the basal and mid segments. There is no LV apical thrombus seen on the echocontrast images.   6. There is reduced right ventricular systolic function.   7. The left atrium is moderately dilated.   8. Moderate mitral valve regurgitation.   9. Mildly elevated RVSP.  10. Moderate tricuspid regurgitation visualized.  11. Compared with the prior exam from 5/10/2023, today's exam is more technically difficult making assessment of LV systolic function more difficult. Proir study demonstrated clear wall motiom abnormality in the basal septal, basal inferior and basal and mid inferolateral segments. The LV systolic function appears to be worse today ( hyaving declined from mild dysfunciton to moderate dysfunction. In addition, the degree of MR appears to have increased from mild to moderate.     Transthoracic Echocardiogram 4/26/24   1. Left ventricular systolic function is moderately decreased with a 30-35% estimated ejection fraction.   2. Multiple segmental abnormalities exist. See findings.   3. Spectral Doppler shows a restrictive pattern of left ventricular diastolic filling.   4. There is an elevated left ventricular end diastolic pressure.   5. Moderate mitral valve regurgitation.   6. Moderately decreased mitral valve posterior leaflet mobility.   7. Mild to moderate tricuspid regurgitation.   8. Mildly elevated RVSP.     Transthoracic Echocardiogram 2/2024   1. Left ventricular systolic function is normal  with a 30-35% estimated ejection fraction.   2. Multiple segmental abnormalities exist. See findings.   3. Spectral Doppler shows a restrictive pattern of left ventricular diastolic filling.   4. There is low normal right ventricular systolic function.   5. Mild to moderate tricuspid regurgitation.   6. There are multiple wall motion abnormalities.     Transthoracic Echocardiogram 5/2023  1. Left ventricular systolic function is mildly decreased with a 40-45% estimated ejection fraction.  2. The left ventricular cavity size is mildly dilated.  3. Basal and mid inferolateral wall, basal inferoseptal segment, and basal inferior segment are abnormal.  4. Spectral Doppler shows a pseudonormal pattern of left ventricular diastolic filling.  5. The left atrium is mildly dilated.  6. RVSP within normal limits.     Transthoracic Echocardiogram 11/2021   1. The left ventricular systolic function is moderately decreased with a 35% estimated ejection fraction.   2. Multiple segmental abnormalities exist. See findings.   3. Spectral Doppler shows an impaired relaxation pattern of left ventricular diastolic filling.     Transthoracic Echocardiogram 6/2021   1. The left ventricular systolic function is moderately decreased with a 40% estimated ejection fraction.   2. Multiple segmental abnormalities exist. See findings.   3. Spectral Doppler shows a pseudonormal pattern of left ventricular diastolic filling.   4. Mild to moderate mitral valve regurgitation.     Cath:  Left/Right Heart Catheterization 2/2024   1. Severe native three vessel CAD.   2. Patent LIMA-LAD/Diagonal vessel. All other grafts are occluded.   3. Severe left subclavian artery stenosis with a gradient of ~28-30 mmHg across the stenosis.   4. Mildly elevated filling pressure with PCWP of 21 mmHg.   5. Mild pulmonary hypertension with mPAP of 29 mmHg (Post capillary).   6. Normal cardiac output and index of 4.1 L/min and 2.5 L/min/m2 respectively.   7. Successful  RCFA closure with 6Fr. VIP Angioseal.   8. Left Ventricular end-diastolic pressure = 23 mmHg.      Inpatient Medications:  Scheduled medications   Medication Dose Route Frequency    [Held by provider] allopurinol  100 mg oral Daily    amiodarone  400 mg oral TID    Followed by    [START ON 4/19/2025] amiodarone  400 mg oral Daily    [Held by provider] amLODIPine  10 mg oral Daily    aspirin  81 mg oral Daily    atorvastatin  80 mg oral Daily    [Held by provider] bumetanide  2 mg oral BID    cetirizine  10 mg oral Daily    cyanocobalamin  1,000 mcg oral Daily    [Held by provider] dapagliflozin propanediol  10 mg oral Daily before evening meal    gabapentin  100 mg oral Nightly    heparin  5,000 Units subcutaneous TID    icosapent ethyL  1 g oral BID    insulin lispro  0-10 Units subcutaneous Before meals & nightly    iron sucrose  200 mg intravenous Once    levothyroxine  50 mcg oral Daily    metoprolol succinate XL  50 mg oral Daily    sennosides-docusate sodium  1 tablet oral Nightly    tamsulosin  0.4 mg oral Daily     PRN medications   Medication    dextrose    dextrose    famotidine    glucagon    glucagon    ipratropium-albuteroL    nitroglycerin    oxyCODONE     Continuous Medications   Medication Dose Last Rate    lactated Ringer's  100 mL/hr 100 mL/hr (04/14/25 1137)       Physical Exam:  Documented Vital Signs   Heart Rate:  [58-64]   Temp:  [36.2 °C (97.2 °F)-36.9 °C (98.4 °F)]   Resp:  [14-18]   BP: ()/(49-64)   Weight:  [57 kg (125 lb 10.6 oz)-57.5 kg (126 lb 12.8 oz)]   SpO2:  [96 %-99 %]   Temp:  [36.2 °C (97.2 °F)-36.9 °C (98.4 °F)] 36.6 °C (97.8 °F)  Heart Rate:  [58-64] 64  Resp:  [14-18] 16  BP: ()/(49-64) 97/49  Oxygen Dose: *0 L/min  Documented Fluid Status     Intake/Output Summary (Last 24 hours) at 4/14/2025 1509  Last data filed at 4/14/2025 1333  Gross per 24 hour   Intake 361.67 ml   Output 250 ml   Net 111.67 ml     Net IO Since Admission: -11,860.98 mL [04/14/25 0846]      "  Assessment/Plan   Ritesh Garza is a 74 y.o. male with past medical history of PMH of mvCAD s/p CABGx5 1992 c/b NSTEMI 2/2024 on DAPT, HFrEF d/t ICM, HTN, HLD, VT s/p ICD placement 5/2024, CKD III, seizures, PAD c/b severe left subclavian stenosis w/ compromised flow to the left POP s/p left subclavian stent 4/2024 , asthma, DM, gout, SIDHU cirrhosis, portal HTN, depression/anxiety, GERD, BPH, SENIA, , recent GIB 12/2024 (Plavix stopped with GIB), who presented to [facility] with SOB, weight gain, and tachycardia.  Cardiology consulted for CHF.     States he has been more short of breath than usual for the past few days. States he has never been short of breath in the past 32 years since his CABG.  He went to doctor visit with his cardiologist, and was told to come to ED for irregular heart rhythm. Denies any chest pain or angina.  States he has gained about 10lbs over the past few months.  Unsure if he is having orthopnea.  He is having some mild lower extremity swelling.  No recent illness, no nausea, vomiting, fever, or chills.      Arrival in the ED HR was 122 Tachypnic b/p was 137/87.   Labs showed BNP 3124. Troponin was 85->82,  K 5.1 Scr 2.31 TSH 14.99 WBC 11k, H/H 9/31  CT Chest showed  Mild interstitial pulmonary edema, small bilateral layering  pleural effusions and cardiomegaly with left ventricular enlargement  suggestive of mild congestive heart failure changes.   He was placed on BiPap, and ultimately placed on oxygen at 2L via NC.  Comfortable.     His Cardiac Device Interrogation performed 4/3/2025 showed no no therapy delivered and importantly no atrial fibrillation or atrial flutter.  Felt that he was having an atrial tachycardia below detection rate and was increased on his beta blockade to help address this. We had originally suggested that he was volume up with some fluid and suggested diuresis. We did not feel that he was overly \"Wet\" and did not recommend aggressive diuresing. On HD-3 he had a " thibodeaux catheter with minimal urinary output and some contraction noted on labs. We held his lasix in light of this and decreased oxygen requirement. He was noted to be aggressive verbally, which he is not known to be from prior encounter, rather the opposite and had a sitter at bedside.Overnight, he reported chest pain and was given Oxycodone rather than NTG due to a softer blood pressures and concerns for decompensation.  He had brief self limited episodes of ventricular tachycardia with pacing and baseline Left Bundle Branch   His troponin was modestly elevated, but within range of his known severe disease. His Creatinine had climbed, his potassium was signifincalty elevated. He has been transferred to ICU for supportive care and potential dialysis.     Transthoracic Echocardiogram 4/7/2025  1. Left ventricular ejection fraction is severely decreased, calculated by Reyes's biplane at 26%.   2. There is global hypokinesis of the left ventricle with minor regional variations.   3. Left ventricular cavity size is severely dilated.   4. There is reduced right ventricular systolic function.   5. Moderate mitral valve regurgitation.   6. Slightly elevated right ventricular systolic pressure.   7. The inferior vena cava appears mildly dilated, with IVC inspiratory collapse greater than 50%.   8. There is plaque visualized in the ascending aorta.   9. Compared with study dated 12/26/2024, The LVEF is depressed from 33% to currently 26% by Simpsons Biplane Method. RVSP has decreased from 40 mmHg to currently 29 mmHg. Moderate mitral regurgitation remains unchanged.    Will Switch Diuretics to oral pills.   ++ discontinued IV Furosemide 4/10  ++ Continue Bumex 2 mg BID per renal    Intake/Output Summary (Last 24 hours) at 4/14/2025 1509  Last data filed at 4/14/2025 1333  Gross per 24 hour   Intake 361.67 ml   Output 250 ml   Net 111.67 ml   ++ Net IO Since Admission: -11,860.98 mL [04/14/25 1509]    Continue Metoprolol  Succinate 50 mg BID + Hold Parameters.    Episode of VT on the morning of 4/11/2025 for 30 seconds  -- Continue with amiodarone 400mg TID x 24 doses, then continue amiodarone 400mg daily.     Monitor electrolytes closely.  Goals K > 4, Mag >2.    Continue Discharge Planning   Ok to discharge from a cardiology perspective.    Cardiology signing off, please call again if needed    Patient to follow up with Dr. NATALIA Hennessy in 1-2 weeks post hospital discharge.     Kelin Lacy, NORAH-CNP

## 2025-04-14 NOTE — CONSULTS
"Nutrition Assessment Note  Nutrition Assessment      Reason for Assessment: Length of stay  Admitted with acute respiratory failure.  MST score of 0 on admit.  Appetite appears fair-good.  Discharge pending renal fx & nephrology clearance.  No nutrition intervention is indicated at this time. Please reconsult if there are changes in patient status & nutrition therapy is warranted.     History:  Energy Intake: Fair 50-75 %  Food and Nutrient History: Patient asleep, unable to interview.  Ate >75% breakfast and had lunch next to his bed. Charted intake shows 50% intake of meals recorded.    Anthropometrics:  Height: 160 cm (5' 2.99\")  Weight: 57 kg (125 lb 10.6 oz)  BMI (Calculated): 22.27  IBW/kg (Dietitian Calculated): 56.2 kg     Dietary Orders   Adult diet Regular; 1000 mL fluid      Nutrition Interventions/Recommendations   Nutrition Prescription: Nutrition prescription for oral nutrition  Individualized Nutrition Prescription Provided for : Continue diet as ordered.  MD to manage fluid restriction as indicated.    Food and/or Nutrient Delivery Interventions  Meals and Snacks: Fluid-modified diet  Goal: intake meets >75% estimated nutrient needs.     Last Date of Nutrition Visit: 04/14/25  Nutrition Follow-Up Needed?: Dietitian to reassess per policy  Follow up Comment: LOS-TR       "

## 2025-04-14 NOTE — PROGRESS NOTES
Occupational Therapy    Occupational Therapy Treatment    Name: Ritesh Garza  MRN: [id number]  Department: [facility] A 5  Room: [address] [place] [address]  Date: 04/14/25  Time Calculation  Start Time: 1545  Stop Time: 1555  Time Calculation (min): 10 min    Assessment:  OT Assessment: progressing towards goals though mildly agitated and self limiting/not receptive to OT therapeutic interventions and recommendations today (shower chair, importance of OOB to chair and frequent mobility during day time)  Prognosis: Good  Medical Staff Made Aware: Yes  End of Session Communication: PCT/NA/CTA  End of Session Patient Position: Alarm on (sitting EOB eating dinner)  Plan:  Treatment Interventions: ADL retraining, Cognitive reorientation, Patient/family training, Equipment evaluation/education, Neuromuscular reeducation  OT Frequency: 2 times per week  OT Discharge Recommendations: Low intensity level of continued care  Equipment Recommended upon Discharge:  (shower chair (pt refusing))  OT Recommended Transfer Status: Stand by assist  OT - OK to Discharge: Yes    Subjective   Previous Visit Info:  OT Last Visit  OT Received On: 04/14/25  General:  General  Patient Position Received: Bed, 3 rail up, Alarm off, not on at start of session  Preferred Learning Style: auditory, verbal  Precautions:  Medical Precautions: Fall precautions    Pain Assessment:  Pain Assessment  Pain Assessment: 0-10  0-10 (Numeric) Pain Score: 0 - No pain    Objective   Cognition:  Overall Cognitive Status: Impaired at baseline  Arousal/Alertness: Delayed responses to stimuli  Orientation Level:  (agitated to orientation questions; able to state hospital/month)  Following Commands: Follows one step commands with repetition  Cognition Comments: not receptive to therapeutic recommendations/intervention strategies and education  Attention: Within Functional Limits  Safety/Judgement: Within Functional Limits  Insight: Moderate  Impulsive: Mildly  Activities of Daily  Living: Feeding  Feeding Level of Assistance: Independent  Feeding Where Assessed: Edge of bed    Bed Mobility/Transfers: Bed Mobility  Bed Mobility: Yes  Bed Mobility 1  Bed Mobility 1: Supine to sitting  Level of Assistance 1: Independent    Transfers  Transfer: Yes  Transfer 1  Transfer From 1: Bed to  Transfer to 1: Stand  Technique 1: Sit to stand, Stand to sit  Transfer Device 1: Walker  Transfer Level of Assistance 1: Contact guard, Minimal verbal cues  Trials/Comments 1: hand placement cues    Functional Mobility:  Functional Mobility  Functional Mobility Performed: Yes  Functional Mobility 1  Surface 1: Level tile  Device 1: Rolling walker  Assistance 1: Contact guard, Minimal verbal cues  Comments 1: cga with min verbal cues regarding hand placement on walker and anterior weight shift, only amb. 5 feet laterally to HOB  Sitting Balance:  Static Sitting Balance  Static Sitting-Level of Assistance: Independent  Dynamic Sitting Balance  Dynamic Sitting-Level of Assistance: Independent  Standing Balance:  Static Standing Balance  Static Standing-Level of Assistance: Close supervision  Dynamic Standing Balance  Dynamic Standing-Level of Assistance: Contact guard    RUE   RUE : Within Functional Limits and LUE   LUE: Within Functional Limits    Outcome Measures:  [facility] Daily Activity  Putting on and taking off regular lower body clothing: A little  Bathing (including washing, rinsing, drying): A little  Putting on and taking off regular upper body clothing: None  Toileting, which includes using toilet, bedpan or urinal: A little  Taking care of personal grooming such as brushing teeth: None  Eating Meals: None  Daily Activity - Total Score: 21      Education Documentation  Home Exercise Program, taught by Chance Boland OT at 4/14/2025  5:04 PM.  Learner: Patient  Readiness: Refuses  Method: Explanation  Response: Needs Reinforcement    Body Mechanics, taught by Chance Boland OT at 4/14/2025  5:04  PM.  Learner: Patient  Readiness: Refuses  Method: Explanation  Response: Needs Reinforcement    Precautions, taught by Chance Boland OT at 4/14/2025  5:04 PM.  Learner: Patient  Readiness: Refuses  Method: Explanation  Response: Needs Reinforcement    ADL Training, taught by Chance Boland, OT at 4/14/2025  5:04 PM.  Learner: Patient  Readiness: Refuses  Method: Explanation  Response: Needs Reinforcement    Education Comments  No comments found.      Goals:  Encounter Problems       Encounter Problems (Active)       ADLs       Patient will perform UB and LB bathing with modified independent level of assistance. (Progressing)       Start:  04/04/25    Expected End:  04/18/25            Patient with complete lower body dressing with modified independent level of assistance donning and doffing all LE clothes. (Progressing)       Start:  04/04/25    Expected End:  04/18/25               MOBILITY       Patient will perform Functional mobility mod  Household distances/Community Distances with stand by assist level of assistance and least restrictive device in order to improve safety and functional mobility. (Progressing)       Start:  04/04/25    Expected End:  04/18/25

## 2025-04-14 NOTE — PROGRESS NOTES
Ritesh Garza is a 74 y.o. male on day 12 of admission presenting with Acute respiratory failure with hypoxia.    Plan: continues treatment for CHF, transitioned to PO bumex, cardiology still following. Love removed over the weekend. Nephrology following for TAYLOR on CKD.   Disposition: Home with wife/ [facility]  Barrier: cardio & renal clearance  ADOD:  today/tomorrow       04/14/25 0712   Discharge Planning   Living Arrangements Spouse/significant other   Support Systems Spouse/significant other   Type of Residence Private residence   Home or Post Acute Services In home services   Type of Home Care Services Home PT;Home OT   Expected Discharge Disposition Home H  ([facility])   Does the patient need discharge transport arranged? Yes   RoundTrip coordination needed? Yes   Has discharge transport been arranged? No   What day is the transport expected? 04/15/25   Intensity of Service   Intensity of Service 0-30 min       Cherri Priest RN

## 2025-04-14 NOTE — CARE PLAN
The patient's goals for the shift include      The clinical goals for the shift include safety        Problem: Pain - Adult  Goal: Verbalizes/displays adequate comfort level or baseline comfort level  Outcome: Not Progressing     Problem: Safety - Adult  Goal: Free from fall injury  Outcome: Not Progressing     Problem: Discharge Planning  Goal: Discharge to home or other facility with appropriate resources  Outcome: Not Progressing     Problem: Chronic Conditions and Co-morbidities  Goal: Patient's chronic conditions and co-morbidity symptoms are monitored and maintained or improved  Outcome: Not Progressing     Problem: Nutrition  Goal: Nutrient intake appropriate for maintaining nutritional needs  Outcome: Not Progressing     Problem: Skin  Goal: Promote skin healing  Outcome: Not Progressing

## 2025-04-14 NOTE — CARE PLAN
The patient's goals for the shift include      The clinical goals for the shift include Pt will remain HDS throughout shift    Problem: Skin  Goal: Promote skin healing  Outcome: Not Progressing  Flowsheets (Taken 4/14/2025 1046)  Promote skin healing: Protective dressings over bony prominences

## 2025-04-15 LAB
ALBUMIN SERPL BCP-MCNC: 3.6 G/DL (ref 3.4–5)
ANION GAP SERPL CALC-SCNC: 21 MMOL/L (ref 10–20)
ATRIAL RATE: 61 BPM
BUN SERPL-MCNC: 118 MG/DL (ref 6–23)
CALCIUM SERPL-MCNC: 8.4 MG/DL (ref 8.6–10.3)
CHLORIDE SERPL-SCNC: 82 MMOL/L (ref 98–107)
CO2 SERPL-SCNC: 29 MMOL/L (ref 21–32)
CREAT SERPL-MCNC: 5.1 MG/DL (ref 0.5–1.3)
CREAT UR-MCNC: 159.9 MG/DL (ref 20–370)
EGFRCR SERPLBLD CKD-EPI 2021: 11 ML/MIN/1.73M*2
ERYTHROCYTE [DISTWIDTH] IN BLOOD BY AUTOMATED COUNT: 18.3 % (ref 11.5–14.5)
GLUCOSE BLD MANUAL STRIP-MCNC: 166 MG/DL (ref 74–99)
GLUCOSE BLD MANUAL STRIP-MCNC: 220 MG/DL (ref 74–99)
GLUCOSE BLD MANUAL STRIP-MCNC: 280 MG/DL (ref 74–99)
GLUCOSE BLD MANUAL STRIP-MCNC: 293 MG/DL (ref 74–99)
GLUCOSE SERPL-MCNC: 178 MG/DL (ref 74–99)
HBV SURFACE AB SER-ACNC: <3.1 MIU/ML
HBV SURFACE AG SERPL QL IA: NONREACTIVE
HCT VFR BLD AUTO: 27.4 % (ref 41–52)
HGB BLD-MCNC: 8.4 G/DL (ref 13.5–17.5)
MAGNESIUM SERPL-MCNC: 2.31 MG/DL (ref 1.6–2.4)
MCH RBC QN AUTO: 22.3 PG (ref 26–34)
MCHC RBC AUTO-ENTMCNC: 30.7 G/DL (ref 32–36)
MCV RBC AUTO: 73 FL (ref 80–100)
NRBC BLD-RTO: 0 /100 WBCS (ref 0–0)
P OFFSET: 194 MS
P ONSET: 144 MS
PHOSPHATE SERPL-MCNC: 5.9 MG/DL (ref 2.5–4.9)
PLATELET # BLD AUTO: 224 X10*3/UL (ref 150–450)
POTASSIUM SERPL-SCNC: 3.7 MMOL/L (ref 3.5–5.3)
PR INTERVAL: 120 MS
Q ONSET: 204 MS
QRS COUNT: 10 BEATS
QRS DURATION: 188 MS
QT INTERVAL: 504 MS
QTC CALCULATION(BAZETT): 507 MS
QTC FREDERICIA: 506 MS
R AXIS: 253 DEGREES
RBC # BLD AUTO: 3.77 X10*6/UL (ref 4.5–5.9)
SODIUM SERPL-SCNC: 128 MMOL/L (ref 136–145)
SODIUM UR-SCNC: 13 MMOL/L
SODIUM/CREAT UR-RTO: 8 MMOL/G CREAT
T AXIS: 7 DEGREES
T OFFSET: 456 MS
VENTRICULAR RATE: 61 BPM
WBC # BLD AUTO: 9.5 X10*3/UL (ref 4.4–11.3)

## 2025-04-15 PROCEDURE — 84300 ASSAY OF URINE SODIUM: CPT | Performed by: INTERNAL MEDICINE

## 2025-04-15 PROCEDURE — 2500000002 HC RX 250 W HCPCS SELF ADMINISTERED DRUGS (ALT 637 FOR MEDICARE OP, ALT 636 FOR OP/ED): Performed by: STUDENT IN AN ORGANIZED HEALTH CARE EDUCATION/TRAINING PROGRAM

## 2025-04-15 PROCEDURE — 2500000001 HC RX 250 WO HCPCS SELF ADMINISTERED DRUGS (ALT 637 FOR MEDICARE OP): Performed by: INTERNAL MEDICINE

## 2025-04-15 PROCEDURE — 1200000002 HC GENERAL ROOM WITH TELEMETRY DAILY

## 2025-04-15 PROCEDURE — 2500000001 HC RX 250 WO HCPCS SELF ADMINISTERED DRUGS (ALT 637 FOR MEDICARE OP): Performed by: STUDENT IN AN ORGANIZED HEALTH CARE EDUCATION/TRAINING PROGRAM

## 2025-04-15 PROCEDURE — 83735 ASSAY OF MAGNESIUM: CPT | Performed by: STUDENT IN AN ORGANIZED HEALTH CARE EDUCATION/TRAINING PROGRAM

## 2025-04-15 PROCEDURE — 99233 SBSQ HOSP IP/OBS HIGH 50: CPT | Performed by: INTERNAL MEDICINE

## 2025-04-15 PROCEDURE — 85027 COMPLETE CBC AUTOMATED: CPT | Performed by: INTERNAL MEDICINE

## 2025-04-15 PROCEDURE — 82947 ASSAY GLUCOSE BLOOD QUANT: CPT

## 2025-04-15 PROCEDURE — 2500000004 HC RX 250 GENERAL PHARMACY W/ HCPCS (ALT 636 FOR OP/ED): Performed by: STUDENT IN AN ORGANIZED HEALTH CARE EDUCATION/TRAINING PROGRAM

## 2025-04-15 PROCEDURE — 82570 ASSAY OF URINE CREATININE: CPT | Performed by: INTERNAL MEDICINE

## 2025-04-15 PROCEDURE — 2500000004 HC RX 250 GENERAL PHARMACY W/ HCPCS (ALT 636 FOR OP/ED): Performed by: INTERNAL MEDICINE

## 2025-04-15 PROCEDURE — 2500000002 HC RX 250 W HCPCS SELF ADMINISTERED DRUGS (ALT 637 FOR MEDICARE OP, ALT 636 FOR OP/ED): Performed by: NURSE PRACTITIONER

## 2025-04-15 PROCEDURE — 80069 RENAL FUNCTION PANEL: CPT | Performed by: STUDENT IN AN ORGANIZED HEALTH CARE EDUCATION/TRAINING PROGRAM

## 2025-04-15 PROCEDURE — 36415 COLL VENOUS BLD VENIPUNCTURE: CPT | Performed by: INTERNAL MEDICINE

## 2025-04-15 PROCEDURE — 99232 SBSQ HOSP IP/OBS MODERATE 35: CPT | Performed by: STUDENT IN AN ORGANIZED HEALTH CARE EDUCATION/TRAINING PROGRAM

## 2025-04-15 RX ORDER — SODIUM CHLORIDE, SODIUM LACTATE, POTASSIUM CHLORIDE, CALCIUM CHLORIDE 600; 310; 30; 20 MG/100ML; MG/100ML; MG/100ML; MG/100ML
75 INJECTION, SOLUTION INTRAVENOUS CONTINUOUS
Status: DISCONTINUED | OUTPATIENT
Start: 2025-04-15 | End: 2025-04-16

## 2025-04-15 RX ADMIN — SENNOSIDES AND DOCUSATE SODIUM 1 TABLET: 50; 8.6 TABLET ORAL at 21:36

## 2025-04-15 RX ADMIN — INSULIN LISPRO 4 UNITS: 100 INJECTION, SOLUTION INTRAVENOUS; SUBCUTANEOUS at 08:16

## 2025-04-15 RX ADMIN — Medication 1000 MCG: at 09:00

## 2025-04-15 RX ADMIN — GABAPENTIN 100 MG: 100 CAPSULE ORAL at 21:36

## 2025-04-15 RX ADMIN — ICOSAPENT ETHYL 1 G: 1 CAPSULE ORAL at 09:01

## 2025-04-15 RX ADMIN — MIDODRINE HYDROCHLORIDE 5 MG: 5 TABLET ORAL at 21:35

## 2025-04-15 RX ADMIN — HEPARIN SODIUM 5000 UNITS: 5000 INJECTION INTRAVENOUS; SUBCUTANEOUS at 14:56

## 2025-04-15 RX ADMIN — CETIRIZINE HYDROCHLORIDE 10 MG: 10 TABLET, FILM COATED ORAL at 09:02

## 2025-04-15 RX ADMIN — HEPARIN SODIUM 5000 UNITS: 5000 INJECTION INTRAVENOUS; SUBCUTANEOUS at 09:02

## 2025-04-15 RX ADMIN — HEPARIN SODIUM 5000 UNITS: 5000 INJECTION INTRAVENOUS; SUBCUTANEOUS at 21:35

## 2025-04-15 RX ADMIN — METOPROLOL SUCCINATE 50 MG: 50 TABLET, EXTENDED RELEASE ORAL at 09:16

## 2025-04-15 RX ADMIN — AMIODARONE HYDROCHLORIDE 400 MG: 200 TABLET ORAL at 14:56

## 2025-04-15 RX ADMIN — AMIODARONE HYDROCHLORIDE 400 MG: 200 TABLET ORAL at 21:35

## 2025-04-15 RX ADMIN — SODIUM CHLORIDE, SODIUM LACTATE, POTASSIUM CHLORIDE, AND CALCIUM CHLORIDE 75 ML/HR: .6; .31; .03; .02 INJECTION, SOLUTION INTRAVENOUS at 11:48

## 2025-04-15 RX ADMIN — INSULIN LISPRO 6 UNITS: 100 INJECTION, SOLUTION INTRAVENOUS; SUBCUTANEOUS at 12:24

## 2025-04-15 RX ADMIN — LEVOTHYROXINE SODIUM 50 MCG: 0.1 TABLET ORAL at 06:09

## 2025-04-15 RX ADMIN — MIDODRINE HYDROCHLORIDE 5 MG: 5 TABLET ORAL at 06:09

## 2025-04-15 RX ADMIN — INSULIN LISPRO 2 UNITS: 100 INJECTION, SOLUTION INTRAVENOUS; SUBCUTANEOUS at 22:39

## 2025-04-15 RX ADMIN — ASPIRIN 81 MG: 81 TABLET, COATED ORAL at 09:00

## 2025-04-15 RX ADMIN — MIDODRINE HYDROCHLORIDE 5 MG: 5 TABLET ORAL at 13:29

## 2025-04-15 RX ADMIN — ICOSAPENT ETHYL 1 G: 1 CAPSULE ORAL at 17:16

## 2025-04-15 RX ADMIN — INSULIN LISPRO 6 UNITS: 100 INJECTION, SOLUTION INTRAVENOUS; SUBCUTANEOUS at 17:11

## 2025-04-15 RX ADMIN — AMIODARONE HYDROCHLORIDE 400 MG: 200 TABLET ORAL at 09:01

## 2025-04-15 RX ADMIN — TAMSULOSIN HYDROCHLORIDE 0.4 MG: 0.4 CAPSULE ORAL at 09:01

## 2025-04-15 RX ADMIN — ATORVASTATIN CALCIUM 80 MG: 80 TABLET, FILM COATED ORAL at 09:00

## 2025-04-15 ASSESSMENT — PAIN - FUNCTIONAL ASSESSMENT
PAIN_FUNCTIONAL_ASSESSMENT: 0-10

## 2025-04-15 ASSESSMENT — PAIN SCALES - GENERAL
PAINLEVEL_OUTOF10: 0 - NO PAIN

## 2025-04-15 ASSESSMENT — COGNITIVE AND FUNCTIONAL STATUS - GENERAL
DRESSING REGULAR LOWER BODY CLOTHING: A LITTLE
HELP NEEDED FOR BATHING: A LITTLE
DRESSING REGULAR LOWER BODY CLOTHING: A LITTLE
DAILY ACTIVITIY SCORE: 21
DAILY ACTIVITIY SCORE: 21
WALKING IN HOSPITAL ROOM: A LOT
CLIMB 3 TO 5 STEPS WITH RAILING: A LOT
MOBILITY SCORE: 19
DRESSING REGULAR UPPER BODY CLOTHING: A LITTLE
STANDING UP FROM CHAIR USING ARMS: A LITTLE
STANDING UP FROM CHAIR USING ARMS: A LITTLE
DRESSING REGULAR UPPER BODY CLOTHING: A LITTLE
HELP NEEDED FOR BATHING: A LITTLE
MOBILITY SCORE: 19
CLIMB 3 TO 5 STEPS WITH RAILING: A LOT
WALKING IN HOSPITAL ROOM: A LOT

## 2025-04-15 NOTE — PROGRESS NOTES
Ritesh Garza is a 74 y.o. male on day 13 of admission presenting with Acute respiratory failure with hypoxia.      Subjective   Patient states that he would like to go home. Discussed that unfortunately his kidney function has continued to worsen and that it is safer for him to stay inpatient for monitoring and IVF. Patient stated that he is disappointed but agreeable to stay        Objective     Last Recorded Vitals  /59 (BP Location: Right arm, Patient Position: Lying)   Pulse 68   Temp 36.4 °C (97.5 °F) (Temporal)   Resp 20   Wt 58 kg (127 lb 13.9 oz)   SpO2 96%   Intake/Output last 3 Shifts:    Intake/Output Summary (Last 24 hours) at 4/15/2025 1354  Last data filed at 4/15/2025 0911  Gross per 24 hour   Intake 1434.16 ml   Output 150 ml   Net 1284.16 ml       Admission Weight  Weight: 67.1 kg (148 lb) (04/02/25 1640)    Daily Weight  04/15/25 : 58 kg (127 lb 13.9 oz)    Image Results  ECG 12 lead  Normal sinus rhythm  Nonspecific intraventricular block  Possible Lateral infarct , age undetermined  Abnormal ECG  Confirmed by Christiano Ewing ([id number]) on 4/15/2025 12:00:06 PM      Physical Exam  Vitals reviewed.   Constitutional:       General: He is not in acute distress.  Cardiovascular:      Rate and Rhythm: Normal rate and regular rhythm.   Pulmonary:      Effort: Pulmonary effort is normal.      Breath sounds: Normal breath sounds.   Abdominal:      General: There is no distension.      Palpations: Abdomen is soft.   Neurological:      Mental Status: He is alert. Mental status is at baseline.   Relevant Results               Assessment/Plan          This patient has a urinary catheter   Reason for the urinary catheter remaining today? Urine catheter unnecessary, will be removed today          Assessment & Plan  Acute respiratory failure with hypoxia    Tachycardia    Acute Problems     AoC HFrEF exacerbation - improved  Atrial tachycardia w/ an episode of VT - now in NSR  TAYLOR on CKD 3 -  worsening  Nosebleed - resolved  Acute liver injury - hepatocellular pattern, suspect related to CHF/hemodynamic fluctuations. Trend improving overall  Mental status change - waxing and waning. Possibly hospital acquired delirium at this point     Chronic Problems     CAD w/ hx of CABG/stent  ICD placement  HTN  HLD  Anemia  PAD  Asthma  DM  Gout  SIDHU cirrhosis/portal HTN  Hypothyroidism  Depression/anxiety  GERD  BPH  SENIA  Hx GIB (plavix stopped with GI bleed)     Plan     - nephrology following  -  TAYLOR worsening today. Creatinine is 5.10 today. Nephrology following and patient was given 1.5L of LR for c/f overdiuresis  - PO Amiodarone load ongoing. Continue toprol XL, ASA/statin. Farxiga, entresto, norvasc on hold. Meds can be further adjusted outpatient. He has appointment [facility] 4/16 w/ Dr. Hennessy  - iron levels low, ordered IV venofer -> received a few doses, declining further doses   - PT/OT rec low     Fluids: None  Electrolytes: Replete as needed  Nutrition: Cardiac  Love: Yes placed here  Invasive lines: None  Drains: None  O2: None     DVT Prophylaxis:  SubQ Heparin     Discharge Planning: plan for home w/ HHC and healthy at home, hopeful for DC 4/15 pending improvement in renal fx and nephrology clearance.     Plan of care was discussed with patient/updated wife Petra Amanda MD

## 2025-04-15 NOTE — CARE PLAN
The patient's goals for the shift include  no falls    The clinical goals for the shift include Pt will remain HDS stable    Problem: Nutrition  Goal: Nutrient intake appropriate for maintaining nutritional needs  Outcome: Not Progressing     Problem: Safety - Adult  Goal: Free from fall injury  Outcome: Progressing     Problem: Discharge Planning  Goal: Discharge to home or other facility with appropriate resources  Outcome: Progressing     Problem: Chronic Conditions and Co-morbidities  Goal: Patient's chronic conditions and co-morbidity symptoms are monitored and maintained or improved  Outcome: Progressing     Problem: Nutrition  Goal: Nutrient intake appropriate for maintaining nutritional needs  Outcome: Not Progressing     Problem: Skin  Goal: Promote skin healing  Outcome: Progressing

## 2025-04-15 NOTE — PROGRESS NOTES
"Ritesh Garza is a 74 y.o. male on day 13 of admission presenting with Acute respiratory failure with hypoxia.      Subjective   Mr. Garza is a 74-year-old man with a history of coronary artery disease status post 5 vessel bypass in 1995, suffered a NSTEMI in February 2024.  Heart failure with reduced EF at 33%, moderate MR, pulmonary hypertension last December.  Has hypertension, hyperlipidemia, has had VT status post ICD last May.  Peripheral vascular disease, known left subclavian stenosis, had a stent placed 1 year ago.  He has diabetes, gout, SIDHU cirrhosis, portal hypertension, obstructive sleep apnea, has had a GI bleed.  Stage IIIb chronic kidney disease, baseline creatinine in the 2 mg/dL range.  He came in with shortness of breath, 10 pound weight gain, elevated BNP.  Elevated lactate, acute kidney injury, elevated liver enzymes.  CT noted pulmonary edema with pleural effusions, but was felt to be heart failure, ascites.  Placed on BiPAP, was in the ICU and seen by Dr. Stout.  Was placed on a Lasix drip, EF 26% by echocardiogram.  Responded well to the Lasix drip.  Baseline weight is approximately 137 pounds when seen by Dr. Hennessy.    He denies shortness of breath at this time.  This morning a bladder scan noted 322 mL, he is against a Love catheter.    Chart/labs/meds/notes/imaging/VS reviewed.       Objective          Vitals 24HR  Heart Rate:  [59-68]   Temp:  [36.3 °C (97.3 °F)-36.7 °C (98 °F)]   Resp:  [16-20]   BP: ()/(49-59)   Height:  [160 cm (5' 2.99\")]   Weight:  [57 kg (125 lb 10.6 oz)-58 kg (127 lb 13.9 oz)]   SpO2:  [91 %-99 %]     Intake/Output last 3 Shifts:    Intake/Output Summary (Last 24 hours) at 4/15/2025 1117  Last data filed at 4/15/2025 0911  Gross per 24 hour   Intake 1555.83 ml   Output 400 ml   Net 1155.83 ml       Physical Exam  General: Nontoxic appearing middle-age male. Patient is in no acute distress.  HEENT: Normocephalic. Moist mucosa.    Neck: Supple. No " elevated jugular venous pressure. No HJR.   Cardiovascular:  Regular rate and rhythm. Normal S1 and S2. No murmurs, rubs or gallops  Pulmonary:  Clear to auscultation bilaterally.   Abdomen:  Soft. Non-tender. Non-distended. Positive bowel sounds.  Lower Extremities: No lower leg edema appreciated.  Neurologic:  Alert and oriented x3. No focal deficit.   Skin: Skin warm and dry, normal skin turgor. No rashes or lesions to exposed skin.  Psychiatric: Normal mood and behavior.    Scheduled Medications  [Held by provider] allopurinol, 100 mg, oral, Daily  amiodarone, 400 mg, oral, TID   Followed by  [START ON 4/19/2025] amiodarone, 400 mg, oral, Daily  [Held by provider] amLODIPine, 10 mg, oral, Daily  aspirin, 81 mg, oral, Daily  atorvastatin, 80 mg, oral, Daily  [Held by provider] bumetanide, 2 mg, oral, BID  cetirizine, 10 mg, oral, Daily  cyanocobalamin, 1,000 mcg, oral, Daily  [Held by provider] dapagliflozin propanediol, 10 mg, oral, Daily before evening meal  gabapentin, 100 mg, oral, Nightly  heparin, 5,000 Units, subcutaneous, TID  icosapent ethyL, 1 g, oral, BID  insulin lispro, 0-10 Units, subcutaneous, Before meals & nightly  iron sucrose, 200 mg, intravenous, Once  levothyroxine, 50 mcg, oral, Daily  metoprolol succinate XL, 50 mg, oral, Daily  midodrine, 5 mg, oral, q8h  sennosides-docusate sodium, 1 tablet, oral, Nightly  tamsulosin, 0.4 mg, oral, Daily      Continuous medications  lactated Ringer's, 75 mL/hr          PRN medications: dextrose, dextrose, famotidine, glucagon, glucagon, ipratropium-albuteroL, nitroglycerin, oxyCODONE     Relevant Results  Results from last 7 days   Lab Units 04/15/25  0542 04/13/25  1108 04/09/25  0505   WBC AUTO x10*3/uL 9.5 11.3 9.2   HEMOGLOBIN g/dL 8.4* 9.2* 8.9*   HEMATOCRIT % 27.4* 29.3* 28.9*   PLATELETS AUTO x10*3/uL 224 268 187   NEUTROS PCT AUTO %  --  68.8  --    LYMPHS PCT AUTO %  --  18.0  --    MONOS PCT AUTO %  --  12.2  --    EOS PCT AUTO %  --  0.4  --       Results from last 7 days   Lab Units 04/15/25  0542 04/14/25  0559 04/13/25  0540   SODIUM mmol/L 128* 129* 131*   POTASSIUM mmol/L 3.7 3.4* 3.5   CHLORIDE mmol/L 82* 81* 83*   CO2 mmol/L 29 31 30   BUN mg/dL 118* 114* 109*   CREATININE mg/dL 5.10* 4.38* 3.71*   GLUCOSE mg/dL 178* 169* 167*   CALCIUM mg/dL 8.4* 8.9 9.0       XR chest 1 view   Final Result   Cardiomegaly.                  MACRO:   None        Signed by: Jo Ann Roberto 4/9/2025 3:17 PM   Dictation workstation:   [id number]      Transthoracic Echo (TTE) Limited   Final Result      XR chest 1 view   Final Result   Limited inspiration. Enlarged cardiac silhouette.        MACRO:   none        Signed by: Anette Borjas 4/6/2025 10:30 AM   Dictation workstation:   [id number]      XR chest 1 view   Final Result   Resolving right basilar opacities, residual left basilar opacities.   Signed by Luke Middleton MD      Cardiac device check - Inpatient   Final Result      CT chest wo IV contrast   Final Result   1.  Mild interstitial pulmonary edema, small bilateral layering   pleural effusions and cardiomegaly with left ventricular enlargement   suggestive of mild congestive heart failure changes.   2.  Mild peritoneal ascites.   3.  Status post cholecystectomy.   Signed by Yuri Francis MD      XR chest 1 view   Final Result   Enlarged cardiac silhouette with moderate pulmonary edema             MACRO:   None        Signed by: Hank Razo 4/2/2025 5:52 PM   Dictation workstation:   [id number]               Assessment/Plan      Ritesh Garza is a 74 y.o. male with a past medical history of coronary artery disease status post 5v CABG 1995, NSTEMI in February 2024, HFrEF with an LVEF of 33%, moderate mitral valve regurg, mild pulmonary hypertension by 2D echo 12/2024, ischemic cardiomyopathy, hypertension, hyperlipidemia, ventricular tachycardia status post ICD placement 5/2024, seizures, peripheral arterial disease, severe left subclavian stenosis  status post stenting 4/2024, asthma, diabetes, gout, SIDHU cirrhosis, portal hypertension, depression, anxiety, GERD, BPH, obstructive sleep apnea, recent history of GI bleed and stage G3b/A3 chronic kidney disease with a baseline creatinine that fluctuates around 2 mg/deciliter who presented with shortness of breath, a 10 pound weight gain and tachycardia.  Workup was notable for an elevated BNP above 3,000.  There was transaminitis, and elevated lactate and worsening acute kidney injury.  CT imaging of the chest showed mild interstitial pulmonary edema, small bilateral layering pleural effusions, cardiomegaly with LV enlargement consistent with mild CHF.  There was mild ascites.  He was placed on BiPAP and admitted to the intensive care unit.  Nephrology was consulted for acute kidney injury on top of stage G3b chronic kidney disease management.    Mr. Garza had a significant acute kidney injury in the setting of heart failure physiology. He was placed on Lasix drip at 20 mg/hour. He was given a dose of IV Diuril due to being anuric. Fortunately, he responded to the diuretic drip. Blood pressures are up-and-down.  EF 26% is worrisome.  But he is now suffering worsening kidney function.  The question is whether his weight is lower than it should be for him.  I asked for standing weight yesterday, it was only 125 pounds.  I am concerned that we have over diuresed him.  But, in the setting of an EF of 26%, it is often difficult to overdiuresis, he would likely be sodium-avid.  Nonetheless, he has acute kidney injury, has the Love catheter in now, kidneys continue to worsen.  I am hoping that he does not have acute tubular necrosis at this point.  I will give him back 1.5 L of lactated Ringer's today.  He had received intravenous iron last week.  I am hoping that we can avoid renal replacement therapy.   Assessment & Plan  Acute respiratory failure with hypoxia    Tachycardia      I spent 45 minutes in the  professional and overall care of this patient.      Kevin Hutchins MD

## 2025-04-15 NOTE — CARE PLAN
Problem: Pain - Adult  Goal: Verbalizes/displays adequate comfort level or baseline comfort level  Outcome: Progressing     Problem: Safety - Adult  Goal: Free from fall injury  Outcome: Progressing     Problem: Discharge Planning  Goal: Discharge to home or other facility with appropriate resources  Outcome: Progressing     Problem: Chronic Conditions and Co-morbidities  Goal: Patient's chronic conditions and co-morbidity symptoms are monitored and maintained or improved  Outcome: Progressing     Problem: Nutrition  Goal: Nutrient intake appropriate for maintaining nutritional needs  Outcome: Progressing     Problem: Skin  Goal: Promote skin healing  Outcome: Progressing  Flowsheets (Taken 4/14/2025 2208)  Promote skin healing: Protective dressings over bony prominences

## 2025-04-16 ENCOUNTER — APPOINTMENT (OUTPATIENT)
Dept: CARDIOLOGY | Facility: CLINIC | Age: 75
End: 2025-04-16
Payer: MEDICARE

## 2025-04-16 PROBLEM — E87.70 HYPERVOLEMIA: Status: ACTIVE | Noted: 2025-04-02

## 2025-04-16 PROBLEM — I27.20 PULMONARY HYPERTENSION (MULTI): Status: ACTIVE | Noted: 2025-04-02

## 2025-04-16 LAB
ALBUMIN SERPL BCP-MCNC: 3.2 G/DL (ref 3.4–5)
ALBUMIN SERPL BCP-MCNC: 3.5 G/DL (ref 3.4–5)
ANION GAP BLDMV CALCULATED.4IONS-SCNC: 9 MMO/L (ref 10–25)
ANION GAP SERPL CALC-SCNC: 19 MMOL/L (ref 10–20)
ANION GAP SERPL CALC-SCNC: 22 MMOL/L (ref 10–20)
BASE EXCESS BLDMV CALC-SCNC: 6.8 MMOL/L (ref -2–3)
BNP SERPL-MCNC: 1848 PG/ML (ref 0–99)
BODY TEMPERATURE: 37 DEGREES CELSIUS
BUN SERPL-MCNC: 118 MG/DL (ref 6–23)
BUN SERPL-MCNC: 120 MG/DL (ref 6–23)
CA-I BLDMV-SCNC: 1.03 MMOL/L (ref 1.1–1.33)
CALCIUM SERPL-MCNC: 8.2 MG/DL (ref 8.6–10.3)
CALCIUM SERPL-MCNC: 8.6 MG/DL (ref 8.6–10.3)
CHLORIDE BLD-SCNC: 86 MMOL/L (ref 98–107)
CHLORIDE SERPL-SCNC: 84 MMOL/L (ref 98–107)
CHLORIDE SERPL-SCNC: 84 MMOL/L (ref 98–107)
CK SERPL-CCNC: 27 U/L (ref 0–325)
CO2 SERPL-SCNC: 27 MMOL/L (ref 21–32)
CO2 SERPL-SCNC: 28 MMOL/L (ref 21–32)
CREAT SERPL-MCNC: 5.75 MG/DL (ref 0.5–1.3)
CREAT SERPL-MCNC: 5.98 MG/DL (ref 0.5–1.3)
EGFRCR SERPLBLD CKD-EPI 2021: 10 ML/MIN/1.73M*2
EGFRCR SERPLBLD CKD-EPI 2021: 9 ML/MIN/1.73M*2
ERYTHROCYTE [DISTWIDTH] IN BLOOD BY AUTOMATED COUNT: 18.6 % (ref 11.5–14.5)
GLUCOSE BLD MANUAL STRIP-MCNC: 115 MG/DL (ref 74–99)
GLUCOSE BLD MANUAL STRIP-MCNC: 143 MG/DL (ref 74–99)
GLUCOSE BLD MANUAL STRIP-MCNC: 158 MG/DL (ref 74–99)
GLUCOSE BLD MANUAL STRIP-MCNC: 167 MG/DL (ref 74–99)
GLUCOSE BLD MANUAL STRIP-MCNC: 184 MG/DL (ref 74–99)
GLUCOSE BLD-MCNC: 143 MG/DL (ref 74–99)
GLUCOSE SERPL-MCNC: 119 MG/DL (ref 74–99)
GLUCOSE SERPL-MCNC: 143 MG/DL (ref 74–99)
HCO3 BLDMV-SCNC: 31.9 MMOL/L (ref 22–26)
HCT VFR BLD AUTO: 28.5 % (ref 41–52)
HCT VFR BLD EST: 26 % (ref 41–52)
HGB BLD-MCNC: 8.9 G/DL (ref 13.5–17.5)
HGB BLDMV-MCNC: 8.7 G/DL (ref 13.5–17.5)
INHALED O2 CONCENTRATION: 28 %
LACTATE BLDMV-SCNC: 1.4 MMOL/L (ref 0.4–2)
MAGNESIUM SERPL-MCNC: 2.32 MG/DL (ref 1.6–2.4)
MCH RBC QN AUTO: 22.9 PG (ref 26–34)
MCHC RBC AUTO-ENTMCNC: 31.2 G/DL (ref 32–36)
MCV RBC AUTO: 73 FL (ref 80–100)
NRBC BLD-RTO: 0 /100 WBCS (ref 0–0)
OXYHGB MFR BLDMV: 60.2 % (ref 45–75)
PCO2 BLDMV: 48 MM HG (ref 41–51)
PH BLDMV: 7.43 PH (ref 7.33–7.43)
PHOSPHATE SERPL-MCNC: 6 MG/DL (ref 2.5–4.9)
PHOSPHATE SERPL-MCNC: 6.3 MG/DL (ref 2.5–4.9)
PLATELET # BLD AUTO: 229 X10*3/UL (ref 150–450)
PO2 BLDMV: 38 MM HG (ref 35–45)
POTASSIUM BLDMV-SCNC: 3.8 MMOL/L (ref 3.5–5.3)
POTASSIUM SERPL-SCNC: 3.4 MMOL/L (ref 3.5–5.3)
POTASSIUM SERPL-SCNC: 4 MMOL/L (ref 3.5–5.3)
RBC # BLD AUTO: 3.89 X10*6/UL (ref 4.5–5.9)
SAO2 % BLDMV: 61 % (ref 45–75)
SODIUM BLDMV-SCNC: 123 MMOL/L (ref 136–145)
SODIUM SERPL-SCNC: 128 MMOL/L (ref 136–145)
SODIUM SERPL-SCNC: 129 MMOL/L (ref 136–145)
WBC # BLD AUTO: 12 X10*3/UL (ref 4.4–11.3)

## 2025-04-16 PROCEDURE — 83880 ASSAY OF NATRIURETIC PEPTIDE: CPT | Performed by: INTERNAL MEDICINE

## 2025-04-16 PROCEDURE — 85027 COMPLETE CBC AUTOMATED: CPT | Performed by: INTERNAL MEDICINE

## 2025-04-16 PROCEDURE — 2020000001 HC ICU ROOM DAILY

## 2025-04-16 PROCEDURE — 99233 SBSQ HOSP IP/OBS HIGH 50: CPT | Performed by: INTERNAL MEDICINE

## 2025-04-16 PROCEDURE — 82947 ASSAY GLUCOSE BLOOD QUANT: CPT

## 2025-04-16 PROCEDURE — 84132 ASSAY OF SERUM POTASSIUM: CPT | Performed by: NURSE PRACTITIONER

## 2025-04-16 PROCEDURE — 2500000001 HC RX 250 WO HCPCS SELF ADMINISTERED DRUGS (ALT 637 FOR MEDICARE OP): Performed by: STUDENT IN AN ORGANIZED HEALTH CARE EDUCATION/TRAINING PROGRAM

## 2025-04-16 PROCEDURE — 99291 CRITICAL CARE FIRST HOUR: CPT | Performed by: NURSE PRACTITIONER

## 2025-04-16 PROCEDURE — C1751 CATH, INF, PER/CENT/MIDLINE: HCPCS | Performed by: HOSPITALIST

## 2025-04-16 PROCEDURE — 83735 ASSAY OF MAGNESIUM: CPT | Performed by: STUDENT IN AN ORGANIZED HEALTH CARE EDUCATION/TRAINING PROGRAM

## 2025-04-16 PROCEDURE — C1887 CATHETER, GUIDING: HCPCS | Performed by: HOSPITALIST

## 2025-04-16 PROCEDURE — 93451 RIGHT HEART CATH: CPT | Performed by: HOSPITALIST

## 2025-04-16 PROCEDURE — 37799 UNLISTED PX VASCULAR SURGERY: CPT | Performed by: NURSE PRACTITIONER

## 2025-04-16 PROCEDURE — 2500000002 HC RX 250 W HCPCS SELF ADMINISTERED DRUGS (ALT 637 FOR MEDICARE OP, ALT 636 FOR OP/ED): Performed by: NURSE PRACTITIONER

## 2025-04-16 PROCEDURE — 2720000007 HC OR 272 NO HCPCS: Performed by: HOSPITALIST

## 2025-04-16 PROCEDURE — 97530 THERAPEUTIC ACTIVITIES: CPT | Mod: GP,CQ

## 2025-04-16 PROCEDURE — 2500000002 HC RX 250 W HCPCS SELF ADMINISTERED DRUGS (ALT 637 FOR MEDICARE OP, ALT 636 FOR OP/ED): Performed by: STUDENT IN AN ORGANIZED HEALTH CARE EDUCATION/TRAINING PROGRAM

## 2025-04-16 PROCEDURE — 99233 SBSQ HOSP IP/OBS HIGH 50: CPT | Performed by: STUDENT IN AN ORGANIZED HEALTH CARE EDUCATION/TRAINING PROGRAM

## 2025-04-16 PROCEDURE — 97116 GAIT TRAINING THERAPY: CPT | Mod: GP,CQ

## 2025-04-16 PROCEDURE — 2500000001 HC RX 250 WO HCPCS SELF ADMINISTERED DRUGS (ALT 637 FOR MEDICARE OP): Performed by: NURSE PRACTITIONER

## 2025-04-16 PROCEDURE — 82550 ASSAY OF CK (CPK): CPT | Performed by: INTERNAL MEDICINE

## 2025-04-16 PROCEDURE — 80069 RENAL FUNCTION PANEL: CPT | Performed by: STUDENT IN AN ORGANIZED HEALTH CARE EDUCATION/TRAINING PROGRAM

## 2025-04-16 PROCEDURE — 2500000004 HC RX 250 GENERAL PHARMACY W/ HCPCS (ALT 636 FOR OP/ED): Mod: JZ | Performed by: INTERNAL MEDICINE

## 2025-04-16 PROCEDURE — 2500000004 HC RX 250 GENERAL PHARMACY W/ HCPCS (ALT 636 FOR OP/ED): Mod: JZ | Performed by: NURSE PRACTITIONER

## 2025-04-16 PROCEDURE — 2500000004 HC RX 250 GENERAL PHARMACY W/ HCPCS (ALT 636 FOR OP/ED): Performed by: HOSPITALIST

## 2025-04-16 PROCEDURE — 2500000005 HC RX 250 GENERAL PHARMACY W/O HCPCS: Performed by: HOSPITALIST

## 2025-04-16 PROCEDURE — 36415 COLL VENOUS BLD VENIPUNCTURE: CPT | Performed by: INTERNAL MEDICINE

## 2025-04-16 PROCEDURE — 2500000004 HC RX 250 GENERAL PHARMACY W/ HCPCS (ALT 636 FOR OP/ED): Performed by: STUDENT IN AN ORGANIZED HEALTH CARE EDUCATION/TRAINING PROGRAM

## 2025-04-16 PROCEDURE — C1894 INTRO/SHEATH, NON-LASER: HCPCS | Performed by: HOSPITALIST

## 2025-04-16 PROCEDURE — 4A023N6 MEASUREMENT OF CARDIAC SAMPLING AND PRESSURE, RIGHT HEART, PERCUTANEOUS APPROACH: ICD-10-PCS | Performed by: HOSPITALIST

## 2025-04-16 PROCEDURE — 2500000004 HC RX 250 GENERAL PHARMACY W/ HCPCS (ALT 636 FOR OP/ED): Performed by: NURSE PRACTITIONER

## 2025-04-16 RX ORDER — FUROSEMIDE 10 MG/ML
80 INJECTION INTRAMUSCULAR; INTRAVENOUS ONCE
Status: COMPLETED | OUTPATIENT
Start: 2025-04-16 | End: 2025-04-16

## 2025-04-16 RX ORDER — CHLOROTHIAZIDE SODIUM 500 MG/1
500 INJECTION INTRAVENOUS ONCE
Status: COMPLETED | OUTPATIENT
Start: 2025-04-16 | End: 2025-04-16

## 2025-04-16 RX ORDER — SODIUM CHLORIDE 9 MG/ML
INJECTION, SOLUTION INTRAVENOUS CONTINUOUS PRN
Status: COMPLETED | OUTPATIENT
Start: 2025-04-16 | End: 2025-04-16

## 2025-04-16 RX ORDER — LIDOCAINE HYDROCHLORIDE 10 MG/ML
INJECTION, SOLUTION EPIDURAL; INFILTRATION; INTRACAUDAL; PERINEURAL AS NEEDED
Status: DISCONTINUED | OUTPATIENT
Start: 2025-04-16 | End: 2025-04-16 | Stop reason: HOSPADM

## 2025-04-16 RX ORDER — FUROSEMIDE 10 MG/ML
INJECTION INTRAMUSCULAR; INTRAVENOUS AS NEEDED
Status: DISCONTINUED | OUTPATIENT
Start: 2025-04-16 | End: 2025-04-16 | Stop reason: HOSPADM

## 2025-04-16 RX ORDER — POTASSIUM CHLORIDE 20 MEQ/1
40 TABLET, EXTENDED RELEASE ORAL ONCE
Status: COMPLETED | OUTPATIENT
Start: 2025-04-16 | End: 2025-04-16

## 2025-04-16 RX ADMIN — METOPROLOL SUCCINATE 50 MG: 50 TABLET, EXTENDED RELEASE ORAL at 08:49

## 2025-04-16 RX ADMIN — ICOSAPENT ETHYL 1 G: 1 CAPSULE ORAL at 18:49

## 2025-04-16 RX ADMIN — CHLOROTHIAZIDE SODIUM 500 MG: 500 INJECTION, POWDER, LYOPHILIZED, FOR SOLUTION INTRAVENOUS at 20:42

## 2025-04-16 RX ADMIN — LEVOTHYROXINE SODIUM 50 MCG: 0.1 TABLET ORAL at 06:51

## 2025-04-16 RX ADMIN — Medication 1000 MCG: at 08:50

## 2025-04-16 RX ADMIN — POTASSIUM CHLORIDE 40 MEQ: 1500 TABLET, EXTENDED RELEASE ORAL at 23:26

## 2025-04-16 RX ADMIN — FUROSEMIDE 30 MG/HR: 10 INJECTION, SOLUTION INTRAMUSCULAR; INTRAVENOUS at 20:42

## 2025-04-16 RX ADMIN — CETIRIZINE HYDROCHLORIDE 10 MG: 10 TABLET, FILM COATED ORAL at 08:51

## 2025-04-16 RX ADMIN — HEPARIN SODIUM 5000 UNITS: 5000 INJECTION INTRAVENOUS; SUBCUTANEOUS at 15:09

## 2025-04-16 RX ADMIN — AMIODARONE HYDROCHLORIDE 400 MG: 200 TABLET ORAL at 20:32

## 2025-04-16 RX ADMIN — FUROSEMIDE 80 MG: 10 INJECTION, SOLUTION INTRAMUSCULAR; INTRAVENOUS at 11:45

## 2025-04-16 RX ADMIN — FUROSEMIDE 80 MG: 10 INJECTION, SOLUTION INTRAMUSCULAR; INTRAVENOUS at 18:49

## 2025-04-16 RX ADMIN — ASPIRIN 81 MG: 81 TABLET, COATED ORAL at 08:54

## 2025-04-16 RX ADMIN — INSULIN LISPRO 2 UNITS: 100 INJECTION, SOLUTION INTRAVENOUS; SUBCUTANEOUS at 20:33

## 2025-04-16 RX ADMIN — AMIODARONE HYDROCHLORIDE 400 MG: 200 TABLET ORAL at 15:09

## 2025-04-16 RX ADMIN — HEPARIN SODIUM 5000 UNITS: 5000 INJECTION INTRAVENOUS; SUBCUTANEOUS at 08:49

## 2025-04-16 RX ADMIN — INSULIN LISPRO 2 UNITS: 100 INJECTION, SOLUTION INTRAVENOUS; SUBCUTANEOUS at 11:46

## 2025-04-16 RX ADMIN — ATORVASTATIN CALCIUM 80 MG: 80 TABLET, FILM COATED ORAL at 08:50

## 2025-04-16 RX ADMIN — TAMSULOSIN HYDROCHLORIDE 0.4 MG: 0.4 CAPSULE ORAL at 08:50

## 2025-04-16 RX ADMIN — SENNOSIDES AND DOCUSATE SODIUM 1 TABLET: 50; 8.6 TABLET ORAL at 20:32

## 2025-04-16 RX ADMIN — HEPARIN SODIUM 5000 UNITS: 5000 INJECTION INTRAVENOUS; SUBCUTANEOUS at 20:42

## 2025-04-16 RX ADMIN — AMIODARONE HYDROCHLORIDE 400 MG: 200 TABLET ORAL at 08:49

## 2025-04-16 RX ADMIN — ICOSAPENT ETHYL 1 G: 1 CAPSULE ORAL at 08:49

## 2025-04-16 RX ADMIN — GABAPENTIN 100 MG: 100 CAPSULE ORAL at 20:32

## 2025-04-16 ASSESSMENT — COGNITIVE AND FUNCTIONAL STATUS - GENERAL
DRESSING REGULAR LOWER BODY CLOTHING: A LITTLE
WALKING IN HOSPITAL ROOM: A LITTLE
STANDING UP FROM CHAIR USING ARMS: A LITTLE
DAILY ACTIVITIY SCORE: 21
CLIMB 3 TO 5 STEPS WITH RAILING: A LOT
MOBILITY SCORE: 18
MOVING FROM LYING ON BACK TO SITTING ON SIDE OF FLAT BED WITH BEDRAILS: A LITTLE
CLIMB 3 TO 5 STEPS WITH RAILING: A LITTLE
HELP NEEDED FOR BATHING: A LITTLE
MOVING TO AND FROM BED TO CHAIR: A LITTLE
DRESSING REGULAR UPPER BODY CLOTHING: A LITTLE
TURNING FROM BACK TO SIDE WHILE IN FLAT BAD: A LITTLE
STANDING UP FROM CHAIR USING ARMS: A LITTLE
MOBILITY SCORE: 19
WALKING IN HOSPITAL ROOM: A LOT

## 2025-04-16 ASSESSMENT — PAIN - FUNCTIONAL ASSESSMENT
PAIN_FUNCTIONAL_ASSESSMENT: 0-10

## 2025-04-16 ASSESSMENT — PAIN SCALES - GENERAL
PAINLEVEL_OUTOF10: 0 - NO PAIN

## 2025-04-16 NOTE — PROGRESS NOTES
Ritesh Garza is a 74 y.o. male on day 14 of admission presenting with Acute respiratory failure with hypoxia.      Subjective   No acute events overnight. Patient continues to report that he feels well and would like to go home however patient's TAYLOR continues to worsen. Discussed case with nephrology and cardiology and patient was taken to cath lab. RHC shows severe biventricular failure, cardiogenic shock. Cardiac Index 1.6. Emelle was placed. Patient requiring transfer to ICU. In cath patient was given 80 IV lasix. Called patient's wife to inform her of procedure and transfer to ICU.        Objective     Last Recorded Vitals  /64 (BP Location: Right arm, Patient Position: Sitting)   Pulse 63   Temp 36.6 °C (97.9 °F) (Oral)   Resp 18   Wt 60.3 kg (132 lb 15 oz) Comment: on a pair of lether shoes  SpO2 100%   Intake/Output last 3 Shifts:    Intake/Output Summary (Last 24 hours) at 4/16/2025 1321  Last data filed at 4/16/2025 1023  Gross per 24 hour   Intake 1720 ml   Output --   Net 1720 ml       Admission Weight  Weight: 67.1 kg (148 lb) (04/02/25 1640)    Daily Weight  04/16/25 : 60.3 kg (132 lb 15 oz)    Image Results  ECG 12 lead  Normal sinus rhythm  Nonspecific intraventricular block  Possible Lateral infarct , age undetermined  Abnormal ECG  Confirmed by Christiano Ewing ([id number]) on 4/15/2025 12:00:06 PM      Physical Exam  Vitals reviewed.   Constitutional:       General: He is not in acute distress.  Cardiovascular:      Rate and Rhythm: Normal rate and regular rhythm.   Pulmonary:      Effort: Pulmonary effort is normal.      Breath sounds: Normal breath sounds.   Abdominal:      General: There is no distension.      Palpations: Abdomen is soft.   Neurological:      Mental Status: He is alert. Mental status is at baseline.   Relevant Results                      This patient has a urinary catheter   Reason for the urinary catheter remaining today? critically ill patient who need accurate  urinary output measurements          Assessment & Plan      Acute Problems     Severe biventricular heart failure s/p cath.    Atrial tachycardia w/ an episode of VT - now in NSR  TAYLOR on CKD 3 - worsening 2/2 to HF   Acute liver injury - hepatocellular pattern, suspect related to CHF/hemodynamic fluctuations. Trend improving overall  Mental status change - waxing and waning. Possibly hospital acquired delirium at this point     Chronic Problems     CAD w/ hx of CABG/stent  ICD placement  HTN  HLD  Anemia  PAD  Asthma  DM  Gout  SIDHU cirrhosis/portal HTN  Hypothyroidism  Depression/anxiety  GERD  BPH  SENIA  Hx GIB (plavix stopped with GI bleed)     Plan     - patient transferred to ICU with SWAN placement. Now s/p IV diuresis 80mg. Cardiology reengaged   - PO Amiodarone load ongoing. Continue toprol XL, ASA/statin. Farxiga, entresto, norvasc on hold. Meds can be further adjusted outpatient. He has appointment [facility] 4/16 w/ Dr. Hennessy  - iron levels low, ordered IV venofer -> received a few doses, declining further doses        Fluids: None  Electrolytes: Replete as needed  Nutrition: Cardiac  Love: Yes placed here  Invasive lines: None  Drains: None  O2: None  Please inform patient's wife daily [phone]      DVT Prophylaxis:  SubQ Heparin           Chata Amanda MD

## 2025-04-16 NOTE — PROGRESS NOTES
Physical Therapy    Physical Therapy Treatment    Patient Name: Ritesh Garza  MRN: [id number]  Department: [facility] [address]  Room: [address] [place] [address]  Today's Date: 4/16/2025  Time Calculation  Start Time: 1105  Stop Time: 1130  Time Calculation (min): 25 min         Assessment/Plan   PT Assessment  Rehab Prognosis: Good  Barriers to Discharge Home: No anticipated barriers  End of Session Communication: Bedside nurse  Assessment Comment: Pt demonstrates impulsivity and requires contact guard for safety with ambulation. 1 LOB noted although no physical assist required for fall prevention and balance recovery. Encouragement required for participation in PT session  End of Session Patient Position: Up in chair, Alarm on  PT Plan  Inpatient/Swing Bed or Outpatient: Inpatient  PT Plan  Treatment/Interventions: Bed mobility, Transfer training, Gait training  PT Plan: Ongoing PT  PT Frequency: 2 times per week  PT Discharge Recommendations: Low intensity level of continued care  Equipment Recommended upon Discharge: Wheeled walker  PT Recommended Transfer Status: Contact guard  PT - OK to Discharge: Yes (per POC)      General Visit Information:   PT  Visit  PT Received On: 04/16/25  General  Reason for Referral: To ED with SOB and tachycardia. Pt being worked up for CHF.  Referred By: Glenn Sharp MD  Past Medical History Relevant to Rehab: Medical History[1]    Prior to Session Communication: Bedside nurse  Patient Position Received: Bed, 3 rail up, Alarm on    Subjective   Precautions:  Precautions  Medical Precautions: Fall precautions  Precautions Comment: carlos eduardo     Date/Time Vitals Session Patient Position Pulse Resp SpO2 BP MAP (mmHg)    04/16/25 1145 --  --  --  --  100 %  102/64  77                 Objective   Pain:  Pain Assessment  Pain Assessment: 0-10  0-10 (Numeric) Pain Score: 0 - No pain  Cognition:  Cognition  Orientation Level: Oriented X4       Postural Control:  Static Sitting Balance  Static Sitting-Balance  Support: Feet supported, Bilateral upper extremity supported  Static Sitting-Level of Assistance: Close supervision  Static Standing Balance  Static Standing-Balance Support: Bilateral upper extremity supported  Static Standing-Level of Assistance: Contact guard  Dynamic Standing Balance  Dynamic Standing-Balance Support: No upper extremity supported  Dynamic Standing-Level of Assistance: Contact guard  Dynamic Standing-Balance: Lateral lean, Forward lean  Dynamic Standing-Comments: Pt requires contact guard for safety. No LOB, pt able to return to midline without physical assist.       Treatments:       Therapeutic Activity  Therapeutic Activity Performed: Yes  Therapeutic Activity 1: Pt performed seated dynamic balance x 3 minutes. No physical assist required.    Bed Mobility  Bed Mobility: Yes  Bed Mobility 1  Bed Mobility 1: Supine to sitting  Level of Assistance 1: Modified independent  Bed Mobility Comments 1: HOB elevated. No use of bed rail to perform    Ambulation/Gait Training  Ambulation/Gait Training Performed: Yes  Ambulation/Gait Training 1  Surface 1: Level tile  Device 1: Rolling walker  Gait Support Devices: Gait belt  Assistance 1: Contact guard  Quality of Gait 1: Diminished heel strike, Inconsistent stride length, Decreased step length  Comments/Distance (ft) 1: 200, 150 (Pt demonstrates fast mandy and unsteadiness. Contact guard for safety and fall prevention. Pt requires one seated rest break due to fatigue and SOB)  Transfers  Transfer: Yes  Transfer 1  Transfer From 1: Bed to  Transfer to 1: Stand  Technique 1: Sit to stand  Transfer Device 1: Walker, Gait belt  Transfer Level of Assistance 1: Contact guard  Trials/Comments 1: VC for correct hand placement.         Outcome Measures:  [facility] Basic Mobility  Turning from your back to your side while in a flat bed without using bedrails: A little  Moving from lying on your back to sitting on the side of a flat bed without using bedrails: A  little  Moving to and from bed to chair (including a wheelchair): A little  Standing up from a chair using your arms (e.g. wheelchair or bedside chair): A little  To walk in hospital room: A little  Climbing 3-5 steps with railing: A little  Basic Mobility - Total Score: 18    Education Documentation  Home Exercise Program, taught by Jasmina Banuelos PTA at 4/16/2025  1:12 PM.  Learner: Patient  Readiness: Acceptance  Method: Explanation  Response: Needs Reinforcement    Precautions, taught by Jasmina Banuelos PTA at 4/16/2025  1:12 PM.  Learner: Patient  Readiness: Acceptance  Method: Explanation  Response: Needs Reinforcement    Body Mechanics, taught by Jasmina Banuelos PTA at 4/16/2025  1:12 PM.  Learner: Patient  Readiness: Acceptance  Method: Explanation  Response: Needs Reinforcement    Mobility Training, taught by Jasmina Banuelos PTA at 4/16/2025  1:12 PM.  Learner: Patient  Readiness: Acceptance  Method: Explanation  Response: Needs Reinforcement    Education Comments  No comments found.        Encounter Problems       Encounter Problems (Active)       Balance       complete all mobility with normal balance while dual tasking, negotiating in a dynamic environment, carrying items, etc., with proactive and reactive static and dynamic standing and sitting tasks independently, >15 minutes.   (Progressing)       Start:  04/04/25    Expected End:  04/18/25               Mobility       STG - Patient will ambulate 150 ft independently with stable vitals.  (Progressing)       Start:  04/04/25    Expected End:  04/18/25            STG - Patient will ascend and descend 2 stairs mod I with LRAD (Progressing)       Start:  04/04/25    Expected End:  04/18/25               PT Transfers       STG - Patient will perform bed mobility independently.  (Progressing)       Start:  04/04/25    Expected End:  04/18/25            STG - Patient will transfer sit to and from stand independently.  (Progressing)        Start:  04/04/25    Expected End:  04/18/25               Pain - Adult                   [1]   Past Medical History:  Diagnosis Date    ADHD (attention deficit hyperactivity disorder) 1960’s    AICD (automatic cardioverter/defibrillator) present 05/30/2024    [facility]    Asthma     Benign prostatic hyperplasia Several years ago    CHF (congestive heart failure)     Cirrhosis (Multi)     CKD (chronic kidney disease)     Coronary artery disease Long Ago    Diabetes mellitus (Multi) Over 20 years ago    Erectile dysfunction     GERD (gastroesophageal reflux disease)     Gout     Hypertension Over 20 years ago    Ischemic cardiomyopathy     Kidney stone Several years ago    Myocardial infarction (Multi) Over 20 yesrs ago    SIDHU (nonalcoholic steatohepatitis)     SENIA (obstructive sleep apnea)     Stenosis of left subclavian artery     s/p stent 4/30/2024    Ventricular tachycardia (Multi)

## 2025-04-16 NOTE — PROGRESS NOTES
Ritesh Garza is a 74 y.o. male  with Pmhx  CAD s/p CABGx5 1992 c/b NSTEMI 2/2024 on DAPT, HFrEF d/t ICM, HTN, HLD, VT s/p ICD placement 5/2024, CKD III, seizures, PAD c/b severe left subclavian stenosis w/ compromised flow to the left POP s/p left subclavian stent 4/2024 , asthma, DM, gout, SIDHU cirrhosis, portal HTN, depression/anxiety, GERD, BPH, SENIA, , recent GIB 12/2024 (Plavix stopped d/t GIB),  on day 14 of admission originally presented to us here in the ICU after he was found to be on Atrial Tachycardia  , Assoc with SOB, during his cardiology appointment, also other findings during the initial presentation BNP was 3124 Troponin was 85->82, K 5.1 Scr 2.31 prior values < 2.0.  He was treated with diuretic and placed on NIV and was admitted to the ICU.  PM interrogation showed wide complex tachycardia.     He has been in and out of the ICU service with Exacerbation of the FF  # Ischemic Cardiomyopathy- HFrEF ( LV ED 30-35%); also noteworthy s/p CABGx5 with occluded grafts per [facility] 4/2024,   -was treated with diuretic, +/- BB, NIV Fluid restrictions . There were episodes during his hospitalization that fluids were given back due low/soft b/p  -Asprin, Plavix was stopped 2/2 GIB   #CRS; baseline Serum Cr 1.8-2 mg/dL; recent Scr 5.75    # Has had an episode of  Hyperkalemia in the setting of Renal, which was resolved during his last visit in the ICU.   # Encephalopathy, multifactorial : Delirium, ARF, Azotemia, ICM  # Atrial tachy: also has had episodes of VT: On Amiodarone     He was taken to the cath lab today with concerns of cardiogenic Shock , also with worsening Kidney function and Confusion. To us in the ICU with Gomer Matthew catheter.     Arrived int he ICU, alert Awake, confused, Denies specific complaint. Currently on Room air. Love placed UOP ~since arrival 45ml/      Objective     Physical Exam  Constitutional:       Comments: Frail appearing    HENT:      Head: Normocephalic.  "  Cardiovascular:      Rate and Rhythm: Normal rate and regular rhythm.   Pulmonary:      Effort: Pulmonary effort is normal.      Breath sounds: Normal breath sounds and air entry.   Genitourinary:     Comments: Love, dark yellow   Skin:     General: Skin is warm and dry.   Neurological:      Mental Status: He is alert. He is disoriented and confused.         Last Recorded Vitals  Blood pressure 99/61, pulse 60, temperature 36.6 °C (97.9 °F), temperature source Temporal, resp. rate 15, height 1.6 m (5' 2.99\"), weight 61 kg (134 lb 7.7 oz), SpO2 98%.  Intake/Output last 3 Shifts:  I/O last 3 completed shifts:  In: 2200 (36.1 mL/kg) [P.O.:1200; I.V.:1000 (16.4 mL/kg)]  Out: 200 (3.3 mL/kg) [Urine:195 (0.1 mL/kg/hr); Blood:5]  Weight: 61 kg     Relevant Results.    Scheduled medications  Scheduled Medications[1]  Continuous medications  Continuous Medications[2]  PRN medications  PRN Medications[3]     Results for orders placed or performed during the hospital encounter of 04/02/25 (from the past 24 hours)   POCT GLUCOSE   Result Value Ref Range    POCT Glucose 166 (H) 74 - 99 mg/dL   Magnesium   Result Value Ref Range    Magnesium 2.32 1.60 - 2.40 mg/dL   Renal Function Panel   Result Value Ref Range    Glucose 119 (H) 74 - 99 mg/dL    Sodium 129 (L) 136 - 145 mmol/L    Potassium 4.0 3.5 - 5.3 mmol/L    Chloride 84 (L) 98 - 107 mmol/L    Bicarbonate 27 21 - 32 mmol/L    Anion Gap 22 (H) 10 - 20 mmol/L    Urea Nitrogen 118 (HH) 6 - 23 mg/dL    Creatinine 5.75 (H) 0.50 - 1.30 mg/dL    eGFR 10 (L) >60 mL/min/1.73m*2    Calcium 8.6 8.6 - 10.3 mg/dL    Phosphorus 6.0 (H) 2.5 - 4.9 mg/dL    Albumin 3.5 3.4 - 5.0 g/dL   CBC   Result Value Ref Range    WBC 12.0 (H) 4.4 - 11.3 x10*3/uL    nRBC 0.0 0.0 - 0.0 /100 WBCs    RBC 3.89 (L) 4.50 - 5.90 x10*6/uL    Hemoglobin 8.9 (L) 13.5 - 17.5 g/dL    Hematocrit 28.5 (L) 41.0 - 52.0 %    MCV 73 (L) 80 - 100 fL    MCH 22.9 (L) 26.0 - 34.0 pg    MCHC 31.2 (L) 32.0 - 36.0 g/dL    RDW " 18.6 (H) 11.5 - 14.5 %    Platelets 229 150 - 450 x10*3/uL   Creatine Kinase   Result Value Ref Range    Creatine Kinase 27 0 - 325 U/L   B-type natriuretic peptide   Result Value Ref Range    BNP 1,848 (H) 0 - 99 pg/mL   POCT GLUCOSE   Result Value Ref Range    POCT Glucose 115 (H) 74 - 99 mg/dL   POCT GLUCOSE   Result Value Ref Range    POCT Glucose 184 (H) 74 - 99 mg/dL   POCT GLUCOSE   Result Value Ref Range    POCT Glucose 167 (H) 74 - 99 mg/dL   POCT GLUCOSE   Result Value Ref Range    POCT Glucose 143 (H) 74 - 99 mg/dL   BLOOD GAS MIXED VENOUS FULL PANEL   Result Value Ref Range    POCT pH, Mixed 7.43 7.33 - 7.43 pH    POCT pCO2, Mixed 48 41 - 51 mm Hg    POCT pO2, Mixed 38 35 - 45 mm Hg    POCT SO2, Mixed 61 45 - 75 %    POCT Oxy Hemoglobin, Mixed 60.2 45.0 - 75.0 %    POCT Hematocrit Calculated, Mixed 26.0 (L) 41.0 - 52.0 %    POCT Sodium, Mixed 123 (L) 136 - 145 mmol/L    POCT Potassium, Mixed 3.8 3.5 - 5.3 mmol/L    POCT Chloride, Mixed 86 (L) 98 - 107 mmol/L    POCT Ionized Calcium, Mixed 1.03 (L) 1.10 - 1.33 mmol/L    POCT Glucose, Mixed 143 (H) 74 - 99 mg/dL    POCT Lactate, Mixed 1.4 0.4 - 2.0 mmol/L    POCT Base Excess, Mixed 6.8 (H) -2.0 - 3.0 mmol/L    POCT HCO3 Calculated, Mixed 31.9 (H) 22.0 - 26.0 mmol/L    POCT Hemoglobin, Mixed 8.7 (L) 13.5 - 17.5 g/dL    POCT Anion Gap, Mixed 9 (L) 10 - 25 mmo/L    Patient Temperature 37.0 degrees Celsius    FiO2 28 %     *Note: Due to a large number of results and/or encounters for the requested time period, some results have not been displayed. A complete set of results can be found in Results Review.      Cardiac Catheterization Procedure  Result Date: 4/16/2025   [facility], Cath Lab, [address] [place] [address] Cardiovascular Catheterization Report Patient Name:     CHIDI VARGAS    Performing Physician:  [id number] Eder Schwartz MD Study Date:       4/16/2025            Verifying Physician:   [id number] Eder Schwartz MD MRN/PID:          [id number]            Cardiologist/Co-Scrub: Accession#:       [id number]        Ordering Provider:     [id number] DAVID FARRELL Date of           1950 / 74      Cardiologist: Birth/Age:        years Gender:           M                   Fellow:                [id number] Lesley Higuera MD Encounter#:       [id number]          Surgeon:  Study: Right Heart Cath  Indications: Heart failure.  Procedure Description: After infiltration of local anesthetic, the left internal jugular vein was identified with two-dimensional ultrasound. Under direct ultrasound visualization, the left internal jugular vein was cannulated with a micropuncture technique. A 9 Lebanese sheath was placed in the vein. Cardiac output was calculated via the Cinda method. Post-procedure, the venous sheath was left intact and sutured in place.  Procedure Description Comments: Access of the Procedure: 5 Lebanese right brachial vein, closed with manual pressure. 9 Lebanese right internal jugular vein, sutured in place. External marker of [place]-Matthew at 51 cm.  Right Heart Catheterization: Cardiac output was calculated via the Cinda method. Elevated left sided filling pressures with low cardiac output. Severe biventricular failure by hemodynamics [RA 25, RVEDP 25, PCWP of 27 with a V wave of 47 mmHg] with low assumed Cinda cardiac output at 2.7 L/min [cardiac index 1.6 L/min/mï¿½, PA O2 sat of 36%] with elevated SVR at 1503 dynesï¿½secï¿½cm-5.  Complications: No in-lab complications observed.  Cardiac Cath Post Procedure Notes: Post Procedure Diagnosis: See below. Blood Loss:               Estimated blood loss during the procedure was 5 mls. Specimens Removed:        Number of specimen(s) removed: none.  ____________________________________________________________________________________ CONCLUSIONS:  1. Severe biventricular failure by hemodynamics [RA 25, RVEDP 25, PCWP of 27 with a V wave of 47 mmHg] with low assumed Cinda cardiac output at 2.7 L/min [cardiac index 1.6 L/min/mï¿½, PA O2 sat of 36%] and elevated SVR at 1503 dynesï¿½secï¿½cm-5.  2. Patient is alert and oriented, hemodynamically stable with good BP, making good urine in response to diuresis [given extra 80 mg of IV Lasix in the Cath Lab], and has normal lactate. No indication for advanced mechanical circulatory support therapy at the time being.  3. Further management as per ICU and inpatient cardiology consult team. ICD 10 Codes: Acute on chronic systolic (congestive) heart failure-I50.23  CPT Codes: Alma Matthew-[id number]; Ultrasound guidance for vascular access-[id number]  [id number] Eder Schwartz MD Performing Physician Electronically signed by [id number] Eder Schwartz MD on 4/16/2025 at 5:53:06 PM  ** Final **     ECG 12 lead  Result Date: 4/15/2025  Normal sinus rhythm Nonspecific intraventricular block Possible Lateral infarct , age undetermined Abnormal ECG Confirmed by Christiano Ewing ([id number]) on 4/15/2025 12:00:06 PM    Electrocardiogram, 12-lead PRN ACS symptoms  Result Date: 4/13/2025  Sinus tachycardia Nonspecific intraventricular block Possible Lateral infarct (cited on or before 03-APR-2025) Abnormal ECG When compared with ECG of 03-APR-2025 01:18, (unconfirmed) Fusion complexes are no longer Present Questionable change in QRS axis Confirmed by Salbador Villar ([id number]) on 4/13/2025 2:33:41 PM    ECG 12 lead  Result Date: 4/11/2025  ATRIAL ELECTRONIC PACEMAKER Abnormal ECG When compared with ECG of 06-APR-2025 02:08, (unconfirmed) Current undetermined rhythm precludes rhythm comparison, needs review Confirmed by Conner King ([id number]) on 4/11/2025 3:35:06 PM    Electrocardiogram, 12-lead PRN ACS symptoms  Result Date: 4/11/2025  Sinus tachycardia with Fusion  complexes Right axis deviation Nonspecific intraventricular block Abnormal ECG When compared with ECG of 11-APR-2025 00:52, (unconfirmed) Previous ECG has undetermined rhythm, needs review QRS axis Shifted right Nonspecific T wave abnormality no longer evident in Lateral leads    Electrocardiogram, 12-lead PRN ACS symptoms  Result Date: 4/11/2025  Undetermined rhythm Left axis deviation Left bundle branch block Abnormal ECG When compared with ECG of 09-APR-2025 07:59, (unconfirmed) Current undetermined rhythm precludes rhythm comparison, needs review QRS axis Shifted right Minimal criteria for Anteroseptal infarct are no longer Present Nonspecific T wave abnormality, worse in Lateral leads    XR chest 1 view  Result Date: 4/9/2025  Interpreted By:  Jo Ann Roberto, STUDY: XR CHEST 1 VIEW;  4/9/2025 12:49 pm   INDICATION: Signs/Symptoms:CHF.   COMPARISON: 04/06/2025   ACCESSION NUMBER(S): [id number]   ORDERING CLINICIAN: SELENA GARSIA   FINDINGS: Pacemaker is again identified. Sternotomy wires and surgical clips are seen. The heart is enlarged. No gross consolidation, pleural effusion or pneumothorax is noted.       Cardiomegaly.       MACRO: None   Signed by: Jo Ann Roberto 4/9/2025 3:17 PM Dictation workstation:   [id number]    Electrocardiogram, 12-lead PRN ACS symptoms  Result Date: 4/9/2025  Sinus tachycardia with Fusion complexes Right superior axis deviation Nonspecific intraventricular block Cannot rule out Anteroseptal infarct , age undetermined Abnormal ECG When compared with ECG of 06-APR-2025 02:08, (unconfirmed) Sinus rhythm has replaced Wide QRS rhythm    Transthoracic Echo (TTE) Limited  Result Date: 4/7/2025   [facility] [place], [address] [place] [address]              Tel [phone] and Fax [phone] TRANSTHORACIC ECHOCARDIOGRAM REPORT  Patient Name:       CHIDI VARGAS    Reading Physician:    [id number] Santacruz  DO Study Date:         4/7/2025            Ordering Provider:    [id number] DAVID PRACHI FARRELL MRN/PID:            [id number]            Fellow: Accession#:         [id number]        Nurse: Date of Birth/Age:  1950 / 74      Sonographer:          Luke lea Gender assigned at  M                   Additional Staff: Birth: Height:             160.00 cm           Admit Date:           4/2/2025 Weight:             71.00 kg            Admission Status:     Inpatient -                                                               Priority                                                               discharge BSA / BMI:          1.74 m2 / 27.73     Encounter#:           [id number]                     kg/m2 Blood Pressure:     93/69 mmHg          Department Location:  [facility] Non                                                               Invasive Study Type:    TRANSTHORACIC ECHO (TTE) LIMITED Diagnosis/ICD: Non ST elevation (NSTEMI) myocardial infarction-I21.4 Indication:    HFrEF w/TAYLOR CPT Code:      Echo Limited-52087 Patient History: Diabetes:          Yes Pertinent History: HTN, CHF and Chest Pain. Acute Respiratory Failure w/Hypoxia,                    Portal HTN, Dementia, CKD, MI, SENIA. Study Detail: The following Echo studies were performed: 2D, M-Mode, Doppler and               color flow. Technically challenging study due to patient lying in               supine position. Definity used as a contrast agent for endocardial               border definition. Total contrast used for this procedure was 2 mL               via IV push.  PHYSICIAN INTERPRETATION: Left Ventricle: Left ventricular ejection fraction is severely decreased, calculated by Reyes's biplane at 26%. There is global hypokinesis of the left ventricle with minor regional variations. The left ventricular cavity size is severely dilated. There is normal septal  and normal posterior left ventricular wall thickness. Left ventricular diastolic filling was not assessed. Left Atrium: The left atrial size was not assessed. Right Ventricle: The right ventricle was not well visualized. There is reduced right ventricular systolic function. TAPSE = 12 mm / S' = 7. Right Atrium: The right atrium is normal in size. There is a device visualized in the right atrium. Aortic Valve: The aortic valve is probably trileaflet. There is mild to moderate aortic valve cusp calcification. There is no evidence of aortic valve regurgitation. Mitral Valve: The mitral valve is mildly thickened. There is mild mitral annular calcification. There is moderate mitral valve regurgitation. Tricuspid Valve: The tricuspid valve is structurally normal. There is trace to mild tricuspid regurgitation. The Doppler estimated RVSP is slightly elevated at 29.2 mmHg. Pulmonic Valve: The pulmonic valve is structurally normal. There is physiologic pulmonic valve regurgitation. Pericardium: There is no pericardial effusion noted. Aorta: The aortic root was not assessed. There is plaque visualized in the ascending aorta, which is classified as a Grade 2 [mild (focal or diffuse) intimal thickening of 2-3 mm] atherosclerosis. Systemic Veins: The inferior vena cava appears mildly dilated, with IVC inspiratory collapse greater than 50%. In comparison to the previous echocardiogram(s): Compared with study dated 12/26/2024, The LVEF is depressed from 33% to currently 26% by Simpsons Biplane Method. RVSP has decreased from 40 mmHg to currently 29 mmHg. Moderate mitral regurgitation remains unchanged.  CONCLUSIONS:  1. Left ventricular ejection fraction is severely decreased, calculated by Reyes's biplane at 26%.  2. There is global hypokinesis of the left ventricle with minor regional variations.  3. Left ventricular cavity size is severely dilated.  4. There is reduced right ventricular systolic function.  5. Moderate mitral  valve regurgitation.  6. Slightly elevated right ventricular systolic pressure.  7. The inferior vena cava appears mildly dilated, with IVC inspiratory collapse greater than 50%.  8. There is plaque visualized in the ascending aorta.  9. Compared with study dated 12/26/2024, The LVEF is depressed from 33% to currently 26% by Simpsons Biplane Method. RVSP has decreased from 40 mmHg to currently 29 mmHg. Moderate mitral regurgitation remains unchanged. QUANTITATIVE DATA SUMMARY:  2D MEASUREMENTS:          Normal Ranges: LAs:             4.40 cm  (2.7-4.0cm) IVSd:            0.98 cm  (0.6-1.1cm) LVPWd:           0.73 cm  (0.6-1.1cm) LVIDd:           5.43 cm  (3.9-5.9cm) LVIDs:           4.96 cm LV Mass Index:   98 g/m2 LVEDV Index:     91 ml/m2 LV % FS          8.7 %  LV SYSTOLIC FUNCTION:                      Normal Ranges: EF-A4C View:    22 % (>=55%) EF-A2C View:    24 % EF-Biplane:     26 % LV EF Reported: 26 %  LV DIASTOLIC FUNCTION:           Normal Ranges: MV e'                  0.045 m/s (>8.0) MV lateral e'          0.04 m/s MV medial e'           0.05 m/s  MITRAL INSUFFICIENCY:            Normal Ranges: PISA Radius:          0.5 cm MR Alias Matt:         30.7 cm/s MR Flow Rt:           41.83 ml/s  RIGHT VENTRICLE: TAPSE: 12.0 mm RV s'  0.07 m/s  TRICUSPID VALVE/RVSP:          Normal Ranges: Peak TR Velocity:     2.56 m/s Est. RA Pressure:     3 mmHg RV Syst Pressure:     29 mmHg  (< 30mmHg) IVC Diam:             2.20 cm  [id number] Mahendra Santacruz DO Electronically signed on 4/7/2025 at 1:25:37 PM  ** Final **     Electrocardiogram, 12-lead PRN ACS symptoms  Result Date: 4/7/2025  Sinus tachycardia with Fusion complexes Left axis deviation Nonspecific intraventricular block Cannot rule out Septal infarct , age undetermined Possible Lateral infarct , age undetermined Abnormal ECG When compared with ECG of 02-APR-2025 23:34, (unconfirmed) Fusion complexes are now Present Confirmed by Conner King ([id number]) on 4/7/2025  11:41:28 AM    ECG 12 Lead  Result Date: 4/7/2025  Sinus tachycardia with Fusion complexes Left axis deviation Left bundle branch block Abnormal ECG When compared with ECG of 04-APR-2025 07:56, (unconfirmed) Fusion complexes are now Present QRS axis Shifted left    Electrocardiogram, 12-lead PRN ACS symptoms  Result Date: 4/7/2025  Wide QRS rhythm with frequent and consecutive Premature ventricular complexes and Fusion complexes Low voltage QRS Right bundle branch block Possible Anterolateral infarct , age undetermined Abnormal ECG When compared with ECG of 05-APR-2025 18:58, (unconfirmed) Wide QRS rhythm has replaced Sinus rhythm    XR chest 1 view  Result Date: 4/6/2025  Interpreted By:  Anette Borjas, STUDY: XR CHEST 1 VIEW;  4/6/2025 8:41 am   INDICATION: Signs/Symptoms:Hypoxia.   COMPARISON: 04/06/2025   ACCESSION NUMBER(S): [id number]   ORDERING CLINICIAN: JOSH ZAFAR   FINDINGS: The inspiration is suboptimal. The cardiac silhouette appears enlarged. There is a multi lead transvenous pacemaker/AICD device.   There is no discrete consolidation or pleural fluid. There is slight elevation of the left hemidiaphragm.   Sternal wires and mediastinal clips are present. The patient is status post cervical spine fusion.   COMPARISON OF FINDING: The chest is similar       Limited inspiration. Enlarged cardiac silhouette.   MACRO: none   Signed by: Anette Borjas 4/6/2025 10:30 AM Dictation workstation:   [id number]    XR chest 1 view  Result Date: 4/6/2025  STUDY: Chest Radiograph;  [4-6-2025; 6:42 am] INDICATION: Hypoxia. COMPARISON: XR chest 4-2-2025 ACCESSION NUMBER(S): [id number] ORDERING CLINICIAN: JOZEF ABDI TECHNIQUE:  Frontal chest was obtained at 06:42 hours. FINDINGS: Left chest wall implanted cardiac device unchanged.  Median sternotomy wires. CARDIOMEDIASTINAL SILHOUETTE: Cardiac size remains enlarged.  LUNGS: Left basilar airspace opacities unchanged.  Resolution of right basilar opacities.  No  visible pleural effusions..  ABDOMEN: No remarkable upper abdominal findings.  BONES: No acute osseous changes.    Resolving right basilar opacities, residual left basilar opacities. Signed by Luke Middleton MD    ECG 12 lead (Clinic Performed)  Result Date: 4/4/2025  Wide QRS tachycardia Suspect Atrial tachycardia Nonspecific intraventricular block Cannot rule out Septal infarct (cited on or before 13-JAN-2025) Possible Lateral infarct , age undetermined Abnormal ECG When compared with ECG of 13-JAN-2025 09:37, Vent. rate has increased BY  47 BPM QRS duration has increased Confirmed by Jose Hennessy ([id number]) on 4/4/2025 11:54:35 AM    ECG 12 lead  Result Date: 4/3/2025  Sinus tachycardia Left axis deviation Left bundle branch block Abnormal ECG When compared with ECG of 02-APR-2025 18:20, (unconfirmed) Questionable change in QRS axis T wave inversion no longer evident in Inferior leads See ED provider note for full interpretation and clinical correlation Confirmed by Jennifer Vogt ([id number]) on 4/3/2025 10:14:12 AM    Electrocardiogram, 12-lead PRN ACS symptoms  Result Date: 4/3/2025  Sinus tachycardia Nonspecific intraventricular block Cannot rule out Septal infarct (cited on or before 13-JAN-2025) Abnormal ECG When compared with ECG of 02-APR-2025 16:36, (unconfirmed) Questionable change in QRS axis Borderline criteria for Lateral infarct are no longer Present T wave inversion now evident in Inferior leads See ED provider note for full interpretation and clinical correlation Confirmed by Jennifer Vogt ([id number]) on 4/3/2025 10:13:12 AM    ECG 12 lead  Result Date: 4/3/2025  Sinus tachycardia Left axis deviation Nonspecific intraventricular block Cannot rule out Septal infarct (cited on or before 13-JAN-2025) Possible Lateral infarct (cited on or before 02-APR-2025) Inferior infarct , age undetermined Abnormal ECG When compared with ECG of 02-APR-2025 15:55, (unconfirmed) Fusion complexes are no longer Present Nonspecific T wave  abnormality no longer evident in Inferior leads See ED provider note for full interpretation and clinical correlation Confirmed by Jennifer Vogt ([id number]) on 4/3/2025 10:12:46 AM    CT chest wo IV contrast  Result Date: 4/2/2025  STUDY: CT Chest without IV Contrast; 4/2/2025 at 9:15 PM INDICATION: Tachypnea, tachycardia.  Evaluate for volume overload; pneumonia. COMPARISON: CT chest 2/18/2024. ACCESSION NUMBER(S): [id number] ORDERING CLINICIAN: CLAUDIA VERA TECHNIQUE:  CT of the chest was performed without contrast.  847 DICOM images received. Automated mA/kV exposure control was utilized and patient examination was performed in strict accordance with principles of ALARA. FINDINGS: MEDIASTINUM: The heart is enlarged with left ventricular enlargement.  There is no evidence for pericardial effusion.  Coronary artery calcifications are identified.  LUNGS/PLEURA: Small pleural effusions layer dependently in the bilateral hemithoraces.  There is no pleural thickening, or pneumothorax.  The airways are patent. Prominent interlobular septa consistent with mild interstitial pulmonary edema.  Subsegmental dependent atelectasis identified at the bilateral lower lobes. LYMPH NODES: Thoracic lymph nodes are not enlarged. UPPER ABDOMEN: Mild volume of free fluid surrounding the liver and spleen represents ascites.  The gallbladder is surgically absent. BONES: There are no acute fractures.  No suspicious bony lesions.    1.  Mild interstitial pulmonary edema, small bilateral layering pleural effusions and cardiomegaly with left ventricular enlargement suggestive of mild congestive heart failure changes. 2.  Mild peritoneal ascites. 3.  Status post cholecystectomy. Signed by Yuri Francis MD    XR chest 1 view  Result Date: 4/2/2025  Interpreted By:  Hank Razo, STUDY: XR CHEST 1 VIEW;  4/2/2025 5:38 pm   INDICATION: Signs/Symptoms:Chest Pain.     COMPARISON: 12/23/2024   ACCESSION NUMBER(S): [id number]   ORDERING  CLINICIAN: CLAUDIA VERA   FINDINGS: Left-sided pacemaker in place. Median sternotomy wires present   CARDIOMEDIASTINAL SILHOUETTE: Cardiomediastinal silhouette is moderately enlarged.   LUNGS: There is pulmonary edema. No consolidation or effusion seen   ABDOMEN: No remarkable upper abdominal findings.   BONES: No acute osseous changes.       Enlarged cardiac silhouette with moderate pulmonary edema     MACRO: None   Signed by: Hank Razo 4/2/2025 5:52 PM Dictation workstation:   [id number]     Assessment & Plan  Tachycardia    Subclavian arterial stenosis    Hypervolemia    Pulmonary hypertension (Multi)    #Acute Exacerbation of HF, with Biventricular Failure  #Cardio/Renal Syndrome, , Azotemia  # Encephalopathy     Neuro:   -Q1 neuro check per ICU protocol   -CAM ICU score q-shift  -Sleep/wake cycle hygiene  -Delirium precaution     CV:   -Continuous cardiac monitoring and hourly vital signs    -Aspirin, HLD meds, Hold BB, blood pressure is In the low side  -Lasix Drip @30mg/hr  -Diuril 500mg x1  -Patient might need an Inotrope  -Arlee can be remove     PULM:   -Supplement Oxygen, keep SPO> 92%    GI/FEN/Endo:    Hx of Hypothyrpod   -PPI daily  40mg IV  -Fluid Restriction,   -Close monitoring with Electrolyte  electrolyte as replete as needed    -Ok with Diet Low Na, Carb Diet     Renal/:   -Strict I&O  -Daily weights  -Renal Panel in am  -ICU electrolyte replacement protocol- keep potassium >4, magnesium >2. Trend daily BMP, mag, phos.  -Avoid Nephrotoxic Agents, NSAIDs   -Love     Heme/ID:     -SCDs  -pDVT  -Check daily CBC.    MSK/Integumentary:   Reposition, Pressure relieving measures  PT/OT-hold for now     Lines: IV,  Love Arlee      Social/family/dispo: admit to ICU.   Code; Full, Recommend GOC with Patient and family     Plan Discussed with Dr Jo Ann Aguiar and RN @ bedside.   I spent 65 minutes in the professional and overall care of this patient.      Chris Mancera,  APRN-CNP                   [1] [Held by provider] allopurinol, 100 mg, oral, Daily  amiodarone, 400 mg, oral, TID   Followed by  [START ON 4/19/2025] amiodarone, 400 mg, oral, Daily  [Held by provider] amLODIPine, 10 mg, oral, Daily  aspirin, 81 mg, oral, Daily  atorvastatin, 80 mg, oral, Daily  [Held by provider] bumetanide, 2 mg, oral, BID  cetirizine, 10 mg, oral, Daily  cyanocobalamin, 1,000 mcg, oral, Daily  [Held by provider] dapagliflozin propanediol, 10 mg, oral, Daily before evening meal  gabapentin, 100 mg, oral, Nightly  heparin, 5,000 Units, subcutaneous, TID  icosapent ethyL, 1 g, oral, BID  insulin lispro, 0-10 Units, subcutaneous, Before meals & nightly  iron sucrose, 200 mg, intravenous, Once  levothyroxine, 50 mcg, oral, Daily  [Held by provider] metoprolol succinate XL, 50 mg, oral, Daily  oxygen, , inhalation, Continuous - 02/gases  sennosides-docusate sodium, 1 tablet, oral, Nightly  tamsulosin, 0.4 mg, oral, Daily  [2]    [3] PRN medications: dextrose, dextrose, famotidine, glucagon, glucagon, ipratropium-albuteroL, nitroglycerin, oxyCODONE

## 2025-04-16 NOTE — CARE PLAN
The clinical goals for the shift include Patient will remain hemodynamically stable this shift.      Problem: Pain - Adult  Goal: Verbalizes/displays adequate comfort level or baseline comfort level  Outcome: Progressing     Problem: Safety - Adult  Goal: Free from fall injury  Outcome: Progressing     Problem: Discharge Planning  Goal: Discharge to home or other facility with appropriate resources  Outcome: Progressing     Problem: Chronic Conditions and Co-morbidities  Goal: Patient's chronic conditions and co-morbidity symptoms are monitored and maintained or improved  Outcome: Progressing     Problem: Nutrition  Goal: Nutrient intake appropriate for maintaining nutritional needs  Outcome: Progressing     Problem: Skin  Goal: Promote skin healing  Outcome: Progressing

## 2025-04-16 NOTE — POST-PROCEDURE NOTE
Physician Transition of Care Summary  Invasive Cardiovascular Lab    Procedure Date: 4/16/2025  Attending:    Tanisha Schwartz - Primary  Resident/Fellow/Other Assistant: Surgeons and Role:  * No surgeons found with a matching role *    Indications:   Pre-op Diagnosis      * Hypervolemia, unspecified hypervolemia type [E87.70]     * Subclavian arterial stenosis [I77.1]     * Pulmonary hypertension (Multi) [I27.20]    Post-procedure diagnosis:   Post-op Diagnosis     * Hypervolemia, unspecified hypervolemia type [E87.70]     * Subclavian arterial stenosis [I77.1]     * Pulmonary hypertension (Multi) [I27.20]    Procedure(s):   Right Heart Cath  91889 - DE RIGHT HEART CATH O2 SATURATION & CARDIAC OUTPUT    Procedure Findings:   -Severe biventricular failure by hemodynamics [RA 25, RVEDP 25, PCWP of 27 with a V wave of 47 mmHg] with low assumed Cinda cardiac output at 2.7 L/min [cardiac index 1.6 L/min/m², PA O2 sat of 36%] and elevated SVR at 1503 dynes·sec·cm-5.  -Patient is alert and oriented, hemodynamically stable with good BP, making good urine in response to diuresis [given extra 80 mg of IV Lasix in the Cath Lab], and has normal lactate of 1.4.  No indication for advanced mechanical circulatory support therapy at the time being.    Access of the Procedure:   5 Italian right brachial vein, closed with manual pressure.  9 Italian right internal jugular vein, sutured in place.  External marker of Winston-Matthew at 51 cm.    Complications:   None.    Stents/Implants:   None.    Anticoagulation/Antiplatelet Plan:   None applicable.    Estimated Blood Loss:   5 mL.    Anesthesia: Moderate Sedation Anesthesia Staff: No anesthesia staff entered.    Any Specimen(s) Removed:   Order Name Source Comment Collection Info Order Time   BLOOD GAS MIXED VENOUS FULL PANEL Blood, Mixed Venous  Collected By: Shahrzad Goyal RN 4/16/2025  5:31 PM     Release result to My Point...Exactly   Immediate            Disposition:   -Further management as per  ICU and inpatient cardiology consult team.      Electronically signed by: Eder Schwartz MD, 4/16/2025 5:39 PM

## 2025-04-16 NOTE — PROGRESS NOTES
Ritesh Garza is a 74 y.o. male on day 14 of admission presenting with Acute respiratory failure with hypoxia.      Subjective   Mr. Garza is a 74-year-old man with a history of coronary artery disease status post 5 vessel bypass in 1995, suffered a NSTEMI in February 2024.  Heart failure with reduced EF at 33%, moderate MR, pulmonary hypertension last December.  Has hypertension, hyperlipidemia, has had VT status post ICD last May.  Peripheral vascular disease, known left subclavian stenosis, had a stent placed 1 year ago.  He has diabetes, gout, SIDHU cirrhosis, portal hypertension, obstructive sleep apnea, has had a GI bleed.  Stage IIIb chronic kidney disease, baseline creatinine in the 2 mg/dL range.  He came in with shortness of breath, 10 pound weight gain, elevated BNP.  Elevated lactate, acute kidney injury, elevated liver enzymes.  CT noted pulmonary edema with pleural effusions, but was felt to be heart failure, ascites.  Placed on BiPAP, was in the ICU and seen by Dr. Stout.  Was placed on a Lasix drip, EF 26% by echocardiogram.  Responded well to the Lasix drip.      He had the Love catheter placed.  We gave him back IV fluids as his weight had gotten low.    Chart/labs/meds/notes/imaging/VS reviewed.       Objective          Vitals 24HR  Heart Rate:  [60-71]   Temp:  [36.3 °C (97.3 °F)-37.1 °C (98.8 °F)]   Resp:  [16-18]   BP: ()/(55-65)   SpO2:  [94 %-98 %]     Intake/Output last 3 Shifts:    Intake/Output Summary (Last 24 hours) at 4/16/2025 1125  Last data filed at 4/16/2025 1023  Gross per 24 hour   Intake 1720 ml   Output --   Net 1720 ml       Physical Exam  General: Nontoxic appearing middle-age male. Patient is in no acute distress.  HEENT: Normocephalic. Moist mucosa.    Neck: Supple. No elevated jugular venous pressure. No HJR.   Cardiovascular:  Regular rate and rhythm. Normal S1 and S2. No murmurs, rubs or gallops.  Elevated JVP now Love catheter was placed.  As his weight  had gotten down to 125 pounds, 12 pounds lower than when Dr. Melara at centimeters, we gave him fluids back, his blood pressure has been running low at times.  Pulmonary:  Clear to auscultation bilaterally.   Abdomen:  Soft. Non-tender. Non-distended. Positive bowel sounds.  Lower Extremities: No lower leg edema appreciated.  Neurologic:  Alert and oriented x3. No focal deficit.   Skin: Skin warm and dry, normal skin turgor. No rashes or lesions to exposed skin.  Psychiatric: Normal mood and behavior.    Scheduled Medications  [Held by provider] allopurinol, 100 mg, oral, Daily  amiodarone, 400 mg, oral, TID   Followed by  [START ON 4/19/2025] amiodarone, 400 mg, oral, Daily  [Held by provider] amLODIPine, 10 mg, oral, Daily  aspirin, 81 mg, oral, Daily  atorvastatin, 80 mg, oral, Daily  [Held by provider] bumetanide, 2 mg, oral, BID  cetirizine, 10 mg, oral, Daily  cyanocobalamin, 1,000 mcg, oral, Daily  [Held by provider] dapagliflozin propanediol, 10 mg, oral, Daily before evening meal  furosemide, 80 mg, intravenous, Once  gabapentin, 100 mg, oral, Nightly  heparin, 5,000 Units, subcutaneous, TID  icosapent ethyL, 1 g, oral, BID  insulin lispro, 0-10 Units, subcutaneous, Before meals & nightly  iron sucrose, 200 mg, intravenous, Once  levothyroxine, 50 mcg, oral, Daily  metoprolol succinate XL, 50 mg, oral, Daily  sennosides-docusate sodium, 1 tablet, oral, Nightly  tamsulosin, 0.4 mg, oral, Daily      Continuous medications         PRN medications: dextrose, dextrose, famotidine, glucagon, glucagon, ipratropium-albuteroL, nitroglycerin, oxyCODONE     Relevant Results  Results from last 7 days   Lab Units 04/16/25  0548 04/15/25  0542 04/13/25  1108   WBC AUTO x10*3/uL 12.0* 9.5 11.3   HEMOGLOBIN g/dL 8.9* 8.4* 9.2*   HEMATOCRIT % 28.5* 27.4* 29.3*   PLATELETS AUTO x10*3/uL 229 224 268   NEUTROS PCT AUTO %  --   --  68.8   LYMPHS PCT AUTO %  --   --  18.0   MONOS PCT AUTO %  --   --  12.2   EOS PCT AUTO %  --    --  0.4     Results from last 7 days   Lab Units 04/16/25  0548 04/15/25  0542 04/14/25  0559   SODIUM mmol/L 129* 128* 129*   POTASSIUM mmol/L 4.0 3.7 3.4*   CHLORIDE mmol/L 84* 82* 81*   CO2 mmol/L 27 29 31   BUN mg/dL 118* 118* 114*   CREATININE mg/dL 5.75* 5.10* 4.38*   GLUCOSE mg/dL 119* 178* 169*   CALCIUM mg/dL 8.6 8.4* 8.9       XR chest 1 view   Final Result   Cardiomegaly.                  MACRO:   None        Signed by: Jo Ann Roberto 4/9/2025 3:17 PM   Dictation workstation:   [id number]      Transthoracic Echo (TTE) Limited   Final Result      XR chest 1 view   Final Result   Limited inspiration. Enlarged cardiac silhouette.        MACRO:   none        Signed by: Anette Borjas 4/6/2025 10:30 AM   Dictation workstation:   [id number]      XR chest 1 view   Final Result   Resolving right basilar opacities, residual left basilar opacities.   Signed by Luke Middleton MD      Cardiac device check - Inpatient   Final Result      CT chest wo IV contrast   Final Result   1.  Mild interstitial pulmonary edema, small bilateral layering   pleural effusions and cardiomegaly with left ventricular enlargement   suggestive of mild congestive heart failure changes.   2.  Mild peritoneal ascites.   3.  Status post cholecystectomy.   Signed by Yuri Francis MD      XR chest 1 view   Final Result   Enlarged cardiac silhouette with moderate pulmonary edema             MACRO:   None        Signed by: Hank Razo 4/2/2025 5:52 PM   Dictation workstation:   [id number]               Assessment/Plan      Ritesh Garza is a 74 y.o. male with a past medical history of coronary artery disease status post 5v CABG 1995, NSTEMI in February 2024, HFrEF with an LVEF of 33%, moderate mitral valve regurg, mild pulmonary hypertension by 2D echo 12/2024, ischemic cardiomyopathy, hypertension, hyperlipidemia, ventricular tachycardia status post ICD placement 5/2024, seizures, peripheral arterial disease, severe left subclavian  stenosis status post stenting 4/2024, asthma, diabetes, gout, SIDHU cirrhosis, portal hypertension, depression, anxiety, GERD, BPH, obstructive sleep apnea, recent history of GI bleed and stage G3b/A3 chronic kidney disease with a baseline creatinine that fluctuates around 2 mg/deciliter who presented with shortness of breath, a 10 pound weight gain and tachycardia.  Workup was notable for an elevated BNP above 3,000.  There was transaminitis, and elevated lactate and worsening acute kidney injury.  CT imaging of the chest showed mild interstitial pulmonary edema, small bilateral layering pleural effusions, cardiomegaly with LV enlargement consistent with mild CHF.  There was mild ascites.  He was placed on BiPAP and admitted to the intensive care unit.  Nephrology was consulted for acute kidney injury on top of stage G3b chronic kidney disease management.    Mr. Garza had a significant acute kidney injury in the setting of heart failure physiology. He was placed on Lasix drip at 20 mg/hour. He was given a dose of IV Diuril due to being anuric. Fortunately, he responded to the diuretic drip. Blood pressures are up-and-down.  EF 26% is worrisome.  But he is now suffering worsening kidney function.  The question is whether his weight is lower than it should be for him.  His weight is 131 pounds on the bed scale, was 127 pounds standing on Friday.  A bladder scan suggested urinary retention of over 300 mL.  He had a Love catheter placed.  He was 137 pounds when seen by Dr. Melara, he came down to 125 pounds, we gave him fluid back.  But the kidneys continue to get worse, I now see elevated JVP.  I am communicating with Dr. Santacruz from cardiology, trying to ascertain whether a right heart cath may be beneficial.  He will get Lasix 80 mg IV now, he is going in the direction of requiring dialysis.    Assessment & Plan  Acute respiratory failure with hypoxia    Tachycardia      I spent 45 minutes in the professional  and overall care of this patient.      Kevin Hutchins MD

## 2025-04-16 NOTE — NURSING NOTE
Pt arrived to room 422 from cath lab via bed. Pt placed on monitor and oriented to the room. Bedside report received. Pt call light in reach.

## 2025-04-17 LAB
ALBUMIN SERPL BCP-MCNC: 3.2 G/DL (ref 3.4–5)
ALBUMIN SERPL BCP-MCNC: 3.3 G/DL (ref 3.4–5)
ANION GAP SERPL CALC-SCNC: 19 MMOL/L (ref 10–20)
ANION GAP SERPL CALC-SCNC: 20 MMOL/L (ref 10–20)
ATRIAL RATE: 122 BPM
BUN SERPL-MCNC: 121 MG/DL (ref 6–23)
BUN SERPL-MCNC: 124 MG/DL (ref 6–23)
CALCIUM SERPL-MCNC: 8.3 MG/DL (ref 8.6–10.3)
CALCIUM SERPL-MCNC: 8.5 MG/DL (ref 8.6–10.3)
CHLORIDE SERPL-SCNC: 85 MMOL/L (ref 98–107)
CHLORIDE SERPL-SCNC: 85 MMOL/L (ref 98–107)
CO2 SERPL-SCNC: 27 MMOL/L (ref 21–32)
CO2 SERPL-SCNC: 29 MMOL/L (ref 21–32)
CREAT SERPL-MCNC: 6.02 MG/DL (ref 0.5–1.3)
CREAT SERPL-MCNC: 6.36 MG/DL (ref 0.5–1.3)
EGFRCR SERPLBLD CKD-EPI 2021: 8 ML/MIN/1.73M*2
EGFRCR SERPLBLD CKD-EPI 2021: 9 ML/MIN/1.73M*2
ERYTHROCYTE [DISTWIDTH] IN BLOOD BY AUTOMATED COUNT: 18.4 % (ref 11.5–14.5)
GLUCOSE BLD MANUAL STRIP-MCNC: 122 MG/DL (ref 74–99)
GLUCOSE BLD MANUAL STRIP-MCNC: 148 MG/DL (ref 74–99)
GLUCOSE BLD MANUAL STRIP-MCNC: 197 MG/DL (ref 74–99)
GLUCOSE BLD MANUAL STRIP-MCNC: 256 MG/DL (ref 74–99)
GLUCOSE BLD MANUAL STRIP-MCNC: 264 MG/DL (ref 74–99)
GLUCOSE BLD MANUAL STRIP-MCNC: 89 MG/DL (ref 74–99)
GLUCOSE SERPL-MCNC: 122 MG/DL (ref 74–99)
GLUCOSE SERPL-MCNC: 200 MG/DL (ref 74–99)
HCT VFR BLD AUTO: 26.2 % (ref 41–52)
HGB BLD-MCNC: 8.1 G/DL (ref 13.5–17.5)
MAGNESIUM SERPL-MCNC: 2.23 MG/DL (ref 1.6–2.4)
MCH RBC QN AUTO: 22.7 PG (ref 26–34)
MCHC RBC AUTO-ENTMCNC: 30.9 G/DL (ref 32–36)
MCV RBC AUTO: 73 FL (ref 80–100)
NRBC BLD-RTO: 0 /100 WBCS (ref 0–0)
PHOSPHATE SERPL-MCNC: 6 MG/DL (ref 2.5–4.9)
PHOSPHATE SERPL-MCNC: 6.4 MG/DL (ref 2.5–4.9)
PLATELET # BLD AUTO: 216 X10*3/UL (ref 150–450)
POTASSIUM SERPL-SCNC: 4.1 MMOL/L (ref 3.5–5.3)
POTASSIUM SERPL-SCNC: 4.1 MMOL/L (ref 3.5–5.3)
PR INTERVAL: 120 MS
Q ONSET: 211 MS
QRS COUNT: 20 BEATS
QRS DURATION: 152 MS
QT INTERVAL: 392 MS
QTC CALCULATION(BAZETT): 558 MS
QTC FREDERICIA: 496 MS
R AXIS: 110 DEGREES
RBC # BLD AUTO: 3.57 X10*6/UL (ref 4.5–5.9)
SODIUM SERPL-SCNC: 128 MMOL/L (ref 136–145)
SODIUM SERPL-SCNC: 129 MMOL/L (ref 136–145)
T AXIS: 6 DEGREES
T OFFSET: 407 MS
VENTRICULAR RATE: 122 BPM
WBC # BLD AUTO: 8.9 X10*3/UL (ref 4.4–11.3)

## 2025-04-17 PROCEDURE — 99233 SBSQ HOSP IP/OBS HIGH 50: CPT | Performed by: INTERNAL MEDICINE

## 2025-04-17 PROCEDURE — 2500000004 HC RX 250 GENERAL PHARMACY W/ HCPCS (ALT 636 FOR OP/ED): Mod: JZ | Performed by: INTERNAL MEDICINE

## 2025-04-17 PROCEDURE — 2500000004 HC RX 250 GENERAL PHARMACY W/ HCPCS (ALT 636 FOR OP/ED): Mod: JZ | Performed by: NURSE PRACTITIONER

## 2025-04-17 PROCEDURE — 2500000001 HC RX 250 WO HCPCS SELF ADMINISTERED DRUGS (ALT 637 FOR MEDICARE OP): Performed by: NURSE PRACTITIONER

## 2025-04-17 PROCEDURE — 2500000002 HC RX 250 W HCPCS SELF ADMINISTERED DRUGS (ALT 637 FOR MEDICARE OP, ALT 636 FOR OP/ED): Performed by: NURSE PRACTITIONER

## 2025-04-17 PROCEDURE — 85027 COMPLETE CBC AUTOMATED: CPT | Performed by: NURSE PRACTITIONER

## 2025-04-17 PROCEDURE — 2020000001 HC ICU ROOM DAILY

## 2025-04-17 PROCEDURE — 83735 ASSAY OF MAGNESIUM: CPT | Performed by: NURSE PRACTITIONER

## 2025-04-17 PROCEDURE — 82947 ASSAY GLUCOSE BLOOD QUANT: CPT

## 2025-04-17 PROCEDURE — 99223 1ST HOSP IP/OBS HIGH 75: CPT | Performed by: NURSE PRACTITIONER

## 2025-04-17 PROCEDURE — 99497 ADVNCD CARE PLAN 30 MIN: CPT | Performed by: NURSE PRACTITIONER

## 2025-04-17 PROCEDURE — 80069 RENAL FUNCTION PANEL: CPT | Performed by: NURSE PRACTITIONER

## 2025-04-17 PROCEDURE — 99291 CRITICAL CARE FIRST HOUR: CPT | Performed by: INTERNAL MEDICINE

## 2025-04-17 PROCEDURE — 2500000004 HC RX 250 GENERAL PHARMACY W/ HCPCS (ALT 636 FOR OP/ED): Performed by: NURSE PRACTITIONER

## 2025-04-17 PROCEDURE — 36415 COLL VENOUS BLD VENIPUNCTURE: CPT | Performed by: NURSE PRACTITIONER

## 2025-04-17 RX ORDER — CHLOROTHIAZIDE SODIUM 500 MG/1
500 INJECTION INTRAVENOUS ONCE
Status: COMPLETED | OUTPATIENT
Start: 2025-04-17 | End: 2025-04-17

## 2025-04-17 RX ORDER — CHLOROTHIAZIDE SODIUM 500 MG/1
500 INJECTION INTRAVENOUS EVERY 12 HOURS
Status: COMPLETED | OUTPATIENT
Start: 2025-04-17 | End: 2025-04-19

## 2025-04-17 RX ADMIN — ATORVASTATIN CALCIUM 80 MG: 80 TABLET, FILM COATED ORAL at 09:30

## 2025-04-17 RX ADMIN — INSULIN LISPRO 6 UNITS: 100 INJECTION, SOLUTION INTRAVENOUS; SUBCUTANEOUS at 21:11

## 2025-04-17 RX ADMIN — SENNOSIDES AND DOCUSATE SODIUM 1 TABLET: 50; 8.6 TABLET ORAL at 20:28

## 2025-04-17 RX ADMIN — FUROSEMIDE 30 MG/HR: 10 INJECTION, SOLUTION INTRAMUSCULAR; INTRAVENOUS at 04:36

## 2025-04-17 RX ADMIN — HEPARIN SODIUM 5000 UNITS: 5000 INJECTION INTRAVENOUS; SUBCUTANEOUS at 11:01

## 2025-04-17 RX ADMIN — TAMSULOSIN HYDROCHLORIDE 0.4 MG: 0.4 CAPSULE ORAL at 09:30

## 2025-04-17 RX ADMIN — ICOSAPENT ETHYL 1 G: 1 CAPSULE ORAL at 09:30

## 2025-04-17 RX ADMIN — LEVOTHYROXINE SODIUM 50 MCG: 0.1 TABLET ORAL at 05:41

## 2025-04-17 RX ADMIN — GABAPENTIN 100 MG: 100 CAPSULE ORAL at 20:30

## 2025-04-17 RX ADMIN — ASPIRIN 81 MG: 81 TABLET, COATED ORAL at 09:30

## 2025-04-17 RX ADMIN — AMIODARONE HYDROCHLORIDE 400 MG: 200 TABLET ORAL at 09:30

## 2025-04-17 RX ADMIN — CHLOROTHIAZIDE SODIUM 500 MG: 500 INJECTION, POWDER, LYOPHILIZED, FOR SOLUTION INTRAVENOUS at 12:51

## 2025-04-17 RX ADMIN — AMIODARONE HYDROCHLORIDE 400 MG: 200 TABLET ORAL at 20:28

## 2025-04-17 RX ADMIN — HEPARIN SODIUM 5000 UNITS: 5000 INJECTION INTRAVENOUS; SUBCUTANEOUS at 15:59

## 2025-04-17 RX ADMIN — CHLOROTHIAZIDE SODIUM 500 MG: 500 INJECTION, POWDER, LYOPHILIZED, FOR SOLUTION INTRAVENOUS at 23:30

## 2025-04-17 RX ADMIN — AMIODARONE HYDROCHLORIDE 400 MG: 200 TABLET ORAL at 15:59

## 2025-04-17 RX ADMIN — CETIRIZINE HYDROCHLORIDE 10 MG: 10 TABLET, FILM COATED ORAL at 09:30

## 2025-04-17 RX ADMIN — HEPARIN SODIUM 5000 UNITS: 5000 INJECTION INTRAVENOUS; SUBCUTANEOUS at 20:28

## 2025-04-17 RX ADMIN — Medication 1000 MCG: at 09:30

## 2025-04-17 RX ADMIN — FUROSEMIDE 40 MG/HR: 10 INJECTION, SOLUTION INTRAMUSCULAR; INTRAVENOUS at 16:33

## 2025-04-17 RX ADMIN — ICOSAPENT ETHYL 1 G: 1 CAPSULE ORAL at 17:32

## 2025-04-17 ASSESSMENT — COGNITIVE AND FUNCTIONAL STATUS - GENERAL
MOVING FROM LYING ON BACK TO SITTING ON SIDE OF FLAT BED WITH BEDRAILS: A LITTLE
TOILETING: A LITTLE
MOVING FROM LYING ON BACK TO SITTING ON SIDE OF FLAT BED WITH BEDRAILS: A LITTLE
MOVING TO AND FROM BED TO CHAIR: A LITTLE
WALKING IN HOSPITAL ROOM: A LOT
MOBILITY SCORE: 16
DRESSING REGULAR LOWER BODY CLOTHING: A LITTLE
DRESSING REGULAR UPPER BODY CLOTHING: A LITTLE
PERSONAL GROOMING: A LITTLE
STANDING UP FROM CHAIR USING ARMS: A LITTLE
PERSONAL GROOMING: A LITTLE
WALKING IN HOSPITAL ROOM: A LOT
TURNING FROM BACK TO SIDE WHILE IN FLAT BAD: A LITTLE
STANDING UP FROM CHAIR USING ARMS: A LITTLE
DRESSING REGULAR LOWER BODY CLOTHING: A LITTLE
MOBILITY SCORE: 16
DAILY ACTIVITIY SCORE: 19
TURNING FROM BACK TO SIDE WHILE IN FLAT BAD: A LITTLE
HELP NEEDED FOR BATHING: A LITTLE
TOILETING: A LITTLE
DRESSING REGULAR UPPER BODY CLOTHING: A LITTLE
MOVING TO AND FROM BED TO CHAIR: A LITTLE
DAILY ACTIVITIY SCORE: 19
CLIMB 3 TO 5 STEPS WITH RAILING: A LOT
CLIMB 3 TO 5 STEPS WITH RAILING: A LOT
HELP NEEDED FOR BATHING: A LITTLE

## 2025-04-17 ASSESSMENT — PAIN SCALES - GENERAL
PAINLEVEL_OUTOF10: 0 - NO PAIN

## 2025-04-17 ASSESSMENT — PAIN - FUNCTIONAL ASSESSMENT
PAIN_FUNCTIONAL_ASSESSMENT: 0-10

## 2025-04-17 ASSESSMENT — ENCOUNTER SYMPTOMS
RESPIRATORY NEGATIVE: 1
NEUROLOGICAL NEGATIVE: 1
ENDOCRINE NEGATIVE: 1
ALLERGIC/IMMUNOLOGIC NEGATIVE: 1
GASTROINTESTINAL NEGATIVE: 1
PSYCHIATRIC NEGATIVE: 1
EYES NEGATIVE: 1
HEMATOLOGIC/LYMPHATIC NEGATIVE: 1
CONSTITUTIONAL NEGATIVE: 1
MUSCULOSKELETAL NEGATIVE: 1

## 2025-04-17 NOTE — CARE PLAN
Problem: Pain - Adult  Goal: Verbalizes/displays adequate comfort level or baseline comfort level  Outcome: Progressing     Problem: Safety - Adult  Goal: Free from fall injury  Outcome: Progressing     Problem: Skin  Goal: Promote skin healing  Outcome: Progressing  Flowsheets (Taken 4/17/2025 8926)  Promote skin healing: Assess skin/pad under line(s)/device(s)

## 2025-04-17 NOTE — PROGRESS NOTES
Ritesh Garza is a 74 y.o. male on day 15 of admission presenting with Acute respiratory failure with hypoxia.      Subjective   Mr. Garza is a 74-year-old man with a history of coronary artery disease status post 5 vessel bypass in 1995, suffered a NSTEMI in February 2024.  Heart failure with reduced EF at 33%, moderate MR, pulmonary hypertension last December.  Has hypertension, hyperlipidemia, has had VT status post ICD last May.  Peripheral vascular disease, known left subclavian stenosis, had a stent placed 1 year ago.  He has diabetes, gout, SIDHU cirrhosis, portal hypertension, obstructive sleep apnea, has had a GI bleed.  Stage IIIb chronic kidney disease, baseline creatinine in the 2 mg/dL range.  He came in with shortness of breath, 10 pound weight gain, elevated BNP.  Elevated lactate, acute kidney injury, elevated liver enzymes.  CT noted pulmonary edema with pleural effusions, but was felt to be heart failure, ascites.  Placed on BiPAP, was in the ICU and seen by Dr. Stout.  Was placed on a Lasix drip, EF 26% by echocardiogram.  Responded well to the Lasix drip initially.      He had the Love catheter placed.  We gave him back IV fluids as his weight had gotten low.  But, he had elevated JVP yesterday, I felt that he was going in the wrong direction.  He had a right heart cath done, has biventricular failure, high LVEDP, high RVEDP.  Evidence of passive liver congestion.  In the ICU now.    Chart/labs/meds/notes/imaging/VS reviewed.       Objective          Vitals 24HR  Heart Rate:  [60-67]   Temp:  [36.2 °C (97.1 °F)-36.7 °C (98.1 °F)]   Resp:  [9-29]   BP: ()/(36-67)   Weight:  [60.3 kg (132 lb 15 oz)-64.5 kg (142 lb 3.2 oz)]   SpO2:  [88 %-100 %]     Intake/Output last 3 Shifts:    Intake/Output Summary (Last 24 hours) at 4/17/2025 0903  Last data filed at 4/17/2025 0600  Gross per 24 hour   Intake 417.9 ml   Output 475 ml   Net -57.1 ml       Physical Exam  General: Nontoxic appearing  middle-age male. Patient is in no acute distress.  HEENT: Normocephalic. Moist mucosa.    Neck: Supple. No elevated jugular venous pressure. No HJR.   Cardiovascular:  Regular rate and rhythm. Normal S1 and S2. No murmurs, rubs or gallops.  Elevated JVP now Love catheter was placed.  As his weight had gotten down to 125 pounds, 12 pounds lower than when Dr. Melara at centimeters, we gave him fluids back, his blood pressure has been running low at times.  Pulmonary:  Clear to auscultation bilaterally.   Abdomen:  Soft. Non-tender. Non-distended. Positive bowel sounds.  Lower Extremities: No lower leg edema appreciated.  Neurologic:  Alert and oriented x3. No focal deficit.   Skin: Skin warm and dry, normal skin turgor. No rashes or lesions to exposed skin.  Psychiatric: Normal mood and behavior.    Scheduled Medications  [Held by provider] allopurinol, 100 mg, oral, Daily  amiodarone, 400 mg, oral, TID   Followed by  [START ON 4/19/2025] amiodarone, 400 mg, oral, Daily  [Held by provider] amLODIPine, 10 mg, oral, Daily  aspirin, 81 mg, oral, Daily  atorvastatin, 80 mg, oral, Daily  [Held by provider] bumetanide, 2 mg, oral, BID  cetirizine, 10 mg, oral, Daily  chlorothiazide, 500 mg, intravenous, Once  cyanocobalamin, 1,000 mcg, oral, Daily  [Held by provider] dapagliflozin propanediol, 10 mg, oral, Daily before evening meal  gabapentin, 100 mg, oral, Nightly  heparin, 5,000 Units, subcutaneous, TID  icosapent ethyL, 1 g, oral, BID  insulin lispro, 0-10 Units, subcutaneous, Before meals & nightly  iron sucrose, 200 mg, intravenous, Once  levothyroxine, 50 mcg, oral, Daily  [Held by provider] metoprolol succinate XL, 50 mg, oral, Daily  oxygen, , inhalation, Continuous - 02/gases  sennosides-docusate sodium, 1 tablet, oral, Nightly  tamsulosin, 0.4 mg, oral, Daily      Continuous medications  furosemide, 40 mg/hr, Last Rate: 30 mg/hr (04/17/25 0436)          PRN medications: dextrose, dextrose, famotidine, glucagon,  glucagon, ipratropium-albuteroL, nitroglycerin, oxyCODONE     Relevant Results  Results from last 7 days   Lab Units 04/17/25  0300 04/16/25  0548 04/15/25  0542 04/13/25  1108   WBC AUTO x10*3/uL 8.9 12.0* 9.5 11.3   HEMOGLOBIN g/dL 8.1* 8.9* 8.4* 9.2*   HEMATOCRIT % 26.2* 28.5* 27.4* 29.3*   PLATELETS AUTO x10*3/uL 216 229 224 268   NEUTROS PCT AUTO %  --   --   --  68.8   LYMPHS PCT AUTO %  --   --   --  18.0   MONOS PCT AUTO %  --   --   --  12.2   EOS PCT AUTO %  --   --   --  0.4     Results from last 7 days   Lab Units 04/17/25  0300 04/16/25  2211 04/16/25  0548   SODIUM mmol/L 128* 128* 129*   POTASSIUM mmol/L 4.1 3.4* 4.0   CHLORIDE mmol/L 85* 84* 84*   CO2 mmol/L 27 28 27   BUN mg/dL 121* 120* 118*   CREATININE mg/dL 6.02* 5.98* 5.75*   GLUCOSE mg/dL 122* 143* 119*   CALCIUM mg/dL 8.3* 8.2* 8.6       Cardiac Catheterization Procedure   Final Result      XR chest 1 view   Final Result   Cardiomegaly.                  MACRO:   None        Signed by: Jo Ann Roberto 4/9/2025 3:17 PM   Dictation workstation:   [id number]      Transthoracic Echo (TTE) Limited   Final Result      XR chest 1 view   Final Result   Limited inspiration. Enlarged cardiac silhouette.        MACRO:   none        Signed by: Anette Borjas 4/6/2025 10:30 AM   Dictation workstation:   [id number]      XR chest 1 view   Final Result   Resolving right basilar opacities, residual left basilar opacities.   Signed by Luke Middleton MD      Cardiac device check - Inpatient   Final Result      CT chest wo IV contrast   Final Result   1.  Mild interstitial pulmonary edema, small bilateral layering   pleural effusions and cardiomegaly with left ventricular enlargement   suggestive of mild congestive heart failure changes.   2.  Mild peritoneal ascites.   3.  Status post cholecystectomy.   Signed by Yuri Francis MD      XR chest 1 view   Final Result   Enlarged cardiac silhouette with moderate pulmonary edema             MACRO:   None        Signed  by: Hank Razo 4/2/2025 5:52 PM   Dictation workstation:   [id number]               Assessment/Plan   This patient currently has cardiac telemetry ordered; if you would like to modify or discontinue the telemetry order, click here to go to the orders activity to modify/discontinue the order.  Ritesh Garza is a 74 y.o. male with a past medical history of coronary artery disease status post 5v CABG 1995, NSTEMI in February 2024, HFrEF with an LVEF of 33%, moderate mitral valve regurg, mild pulmonary hypertension by 2D echo 12/2024, ischemic cardiomyopathy, hypertension, hyperlipidemia, ventricular tachycardia status post ICD placement 5/2024, seizures, peripheral arterial disease, severe left subclavian stenosis status post stenting 4/2024, asthma, diabetes, gout, SIDHU cirrhosis, portal hypertension, depression, anxiety, GERD, BPH, obstructive sleep apnea, recent history of GI bleed and stage G3b/A3 chronic kidney disease with a baseline creatinine that fluctuates around 2 mg/deciliter who presented with shortness of breath, a 10 pound weight gain and tachycardia.  Workup was notable for an elevated BNP above 3,000.  There was transaminitis, and elevated lactate and worsening acute kidney injury.  CT imaging of the chest showed mild interstitial pulmonary edema, small bilateral layering pleural effusions, cardiomegaly with LV enlargement consistent with mild CHF.  There was mild ascites.  He was placed on BiPAP and admitted to the intensive care unit.  Nephrology was consulted for acute kidney injury on top of stage G3b chronic kidney disease management.    Mr. Garza had a significant acute kidney injury in the setting of heart failure physiology. He was placed on Lasix drip at 20 mg/hour. He was given a dose of IV Diuril due to being anuric. Fortunately, he responded to the diuretic drip. Blood pressures are up-and-down.  EF 26% is worrisome.  But he is now suffering worsening kidney function.  The  question is whether his weight is lower than it should be for him.  His weight is 131 pounds on the bed scale, was 127 pounds standing on Friday.  A bladder scan suggested urinary retention of over 300 mL.  He had a Love catheter placed.  He was 137 pounds when seen by Dr. Houston I noted elevated JVP yesterday.  He had a right heart cath, biventricular failure, elevated dipesh, he came down to 125 pounds, we gave him fluid back.  But the kidneys continue to get worse, LVEDP, RVEDP, low cardiac index.  He has cardiogenic shock.  I spoke with Dr. Knott, we brought him to the ICU, he has been on a Lasix drip overnight.  Blood pressures are better.  He is not on inotropes.  Dr. Santacruz will be involved pertaining to whether he would benefit from an inotrope but his SVR is noted, lactate was not particularly high.  I am afraid that palliative care will need to get involved.  But I will increase the diuretic drip, give him a dose of chlorothiazide intravenously.  He may be a poor dialysis candidate.    Assessment & Plan  Acute respiratory failure with hypoxia    Tachycardia    Subclavian arterial stenosis    Hypervolemia    Pulmonary hypertension (Multi)      I spent 45 minutes in the professional and overall care of this patient.      Kevin Hutchins MD

## 2025-04-17 NOTE — PROGRESS NOTES
Subjective Data:  Cardiology Called back to case after his discharge was significantly delayed.  Since our last encounter, his Diuretics were stopped with worsening renal insuffiencey. Nephrology was involved. They requested a Right Heart Catheterization due to difficulty understanding his volume status. This was completed on Hospital Day 14.  This again demonstrated compensated Cardiogenic Shock in the setting of Severe Ischemic Cardiomyopathy with Biventricular Failure, again with elevated filling pressures and low Cardiac Index and Cardiac Output.  While in the cathlab, he was again given IV Diuretics with at least another 1L of urine produced. He was subsequently transferred back to ICU with RHC in place.     In the ICU, he was again started on IV Diuretics and cardiology discussed with treating teams.  We recommened ongoing diuresis at this time and goals of care because of chronic cardiogenic shock, chronic volume overload and extremely limited options.     Palliative care has been consulted and his code status has been changed to DNR/DNI which is appropriate and a Hospice Meeting is currently planned.        Objective Data:  Last Recorded Vitals:  Vitals:    04/17/25 1600 04/17/25 1613 04/17/25 1700 04/17/25 1800   BP: (!) 99/48  100/55 98/67   Pulse: 70  69 70   Resp: 14 13 16   Temp:  37.1 °C (98.7 °F)     TempSrc:  Temporal     SpO2: 97%  97% 96%   Weight:       Height:           Results from last 7 days   Lab Units 04/17/25  0300 04/16/25  0548 04/15/25  0542   WBC AUTO x10*3/uL 8.9 12.0* 9.5   HEMOGLOBIN g/dL 8.1* 8.9* 8.4*   HEMATOCRIT % 26.2* 28.5* 27.4*   PLATELETS AUTO x10*3/uL 216 229 224     Results from last 7 days   Lab Units 04/17/25  0300 04/16/25  2211 04/16/25  0548 04/14/25  0559 04/13/25  1109   SODIUM mmol/L 128* 128* 129*   < >  --    POTASSIUM mmol/L 4.1 3.4* 4.0   < >  --    CHLORIDE mmol/L 85* 84* 84*   < >  --    CO2 mmol/L 27 28 27   < >  --    BUN mg/dL 121* 120* 118*   < >  --     CREATININE mg/dL 6.02* 5.98* 5.75*   < >  --    CALCIUM mg/dL 8.3* 8.2* 8.6   < >  --    PROTEIN TOTAL g/dL  --   --   --   --  7.1   BILIRUBIN TOTAL mg/dL  --   --   --   --  0.8   ALK PHOS U/L  --   --   --   --  111   ALT U/L  --   --   --   --  266*   AST U/L  --   --   --   --  69*   GLUCOSE mg/dL 122* 143* 119*   < >  --     < > = values in this interval not displayed.       TROPHS   Date/Time Value Ref Range Status   04/06/2025 10:45  0 - 20 ng/L Final     Comment:     Previous result verified on 4/5/2025 2049 on specimen/case [id number] called with component TRPHS for procedure Troponin I, High Sensitivity with value 107 ng/L.   04/06/2025 08:21  0 - 20 ng/L Final     Comment:     Previous result verified on 4/5/2025 2049 on specimen/case [id number] called with component TRPHS for procedure Troponin I, High Sensitivity with value 107 ng/L.   04/05/2025 07:18  0 - 20 ng/L Final     BNP   Date/Time Value Ref Range Status   04/16/2025 05:48 AM 1,848 0 - 99 pg/mL Final   04/06/2025 08:21 AM 3,082 0 - 99 pg/mL Final   03/25/2025 08:48 AM 2,349 <100 pg/mL Final     Comment:        BNP levels increase with age in the general  population with the highest values seen in  individuals greater than 75 years of age.  Reference: J. Am. Mellissa. Cardiol. 2002; 40:976-982.          HGBA1C   Date/Time Value Ref Range Status   02/13/2025 01:45 PM 6.2 4.2 - 6.5 % Final   01/11/2024 01:36 PM 6.6 see below % Final   07/31/2023 11:10 AM 7.0 % Final     Comment:          Diagnosis of Diabetes-Adults   Non-Diabetic: < or = 5.6%   Increased risk for developing diabetes: 5.7-6.4%   Diagnostic of diabetes: > or = 6.5%  .       Monitoring of Diabetes                Age (y)     Therapeutic Goal (%)   Adults:          >18           <7.0   Pediatrics:    13-18           <7.5                   7-12           <8.0                   0- 6            7.5-8.5   American Diabetes Association. Diabetes Care 33(S1), Niall  2010.     05/04/2023 12:15 PM 6.8 % Final     Comment:          Diagnosis of Diabetes-Adults   Non-Diabetic: < or = 5.6%   Increased risk for developing diabetes: 5.7-6.4%   Diagnostic of diabetes: > or = 6.5%  .       Monitoring of Diabetes                Age (y)     Therapeutic Goal (%)   Adults:          >18           <7.0   Pediatrics:    13-18           <7.5                   7-12           <8.0                   0- 6            7.5-8.5   American Diabetes Association. Diabetes Care 33(S1), Jan 2010.       LDLCALC   Date/Time Value Ref Range Status   05/06/2024 11:28 AM 53 <=99 mg/dL Final     Comment:                                 Near   Borderline      AGE      Desirable  Optimal    High     High     Very High     0-19 Y     0 - 109     ---    110-129   >/= 130     ----    20-24 Y     0 - 119     ---    120-159   >/= 160     ----      >24 Y     0 -  99   100-129  130-159   160-189     >/=190     11/13/2023 10:41 AM 36 <=99 mg/dL Final     Comment:                                 Near   Borderline      AGE      Desirable  Optimal    High     High     Very High     0-19 Y     0 - 109     ---    110-129   >/= 130     ----    20-24 Y     0 - 119     ---    120-159   >/= 160     ----      >24 Y     0 -  99   100-129  130-159   160-189     >/=190       VLDL   Date/Time Value Ref Range Status   05/06/2024 11:28 AM 30 0 - 40 mg/dL Final   11/13/2023 10:41 AM 41 0 - 40 mg/dL Final   07/31/2023 11:10 AM 38 0 - 40 mg/dL Final   05/04/2023 12:15 PM 50 0 - 40 mg/dL Final   05/05/2022 02:50 PM 72 0 - 40 mg/dL Final      Last I/O:  I/O last 3 completed shifts:  In: 1537.9 (23.8 mL/kg) [P.O.:510; I.V.:1027.9 (15.9 mL/kg)]  Out: 475 (7.4 mL/kg) [Urine:470 (0.2 mL/kg/hr); Blood:5]  Weight: 64.5 kg     Past Cardiology Tests (Last 3 Years):    Echo:  Transthoracic Echo (TTE) Limited 04/07/2025   1. Left ventricular ejection fraction is severely decreased, calculated by Reyes's biplane at 26%.   2. There is global hypokinesis  of the left ventricle with minor regional variations.   3. Left ventricular cavity size is severely dilated.   4. There is reduced right ventricular systolic function.   5. Moderate mitral valve regurgitation.   6. Slightly elevated right ventricular systolic pressure.   7. The inferior vena cava appears mildly dilated, with IVC inspiratory collapse greater than 50%.   8. There is plaque visualized in the ascending aorta.   9. Compared with study dated 12/26/2024, The LVEF is depressed from 33% to currently 26% by Simpsons Biplane Method. RVSP has decreased from 40 mmHg to currently 29 mmHg. Moderate mitral regurgitation remains unchanged.      Transthoracic Echo (TTE) Limited 12/26/2024   1. Left ventricular ejection fraction is moderately decreased, calculated by Reyes's biplane at 33%.   2. Abnormal left venticular wall motion.   3. Left ventricular cavity size is mildly dilated.   4. No left ventricular thrombus visualized.   5. Moderate mitral valve regurgitation.   6. Mildly elevated right ventricular systolic pressure.   7. There is plaque visualized in the ascending aorta.      Transthoracic Echo (TTE) Limited 07/02/2024   1. Left ventricular ejection fraction is moderately decreased, by visual estimate at 30-35%.   2. Spectral Doppler shows a pseudonormal pattern of left ventricular diastolic filling.   3. Left ventricular cavity size is moderately dilated.   4. No left ventricular thrombus visualized.   5. Technically difficult exam, though there is moderate LV systolic dysfunction with the apical function being best but hypokinesis of the basal and mid segments. There is no LV apical thrombus seen on the echocontrast images.   6. There is reduced right ventricular systolic function.   7. The left atrium is moderately dilated.   8. Moderate mitral valve regurgitation.   9. Mildly elevated RVSP.  10. Moderate tricuspid regurgitation visualized.  11. Compared with the prior exam from 5/10/2023, today's  exam is more technically difficult making assessment of LV systolic function more difficult. Proir study demonstrated clear wall motiom abnormality in the basal septal, basal inferior and basal and mid inferolateral segments. The LV systolic function appears to be worse today ( hyaving declined from mild dysfunciton to moderate dysfunction. In addition, the degree of MR appears to have increased from mild to moderate.       Transthoracic echo (TTE) limited 04/26/2024      Transthoracic Echo (TTE) Complete 02/15/2024    Ejection Fractions:  EF   Date/Time Value Ref Range Status   04/07/2025 11:02 AM 26 %    12/26/2024 10:07 AM 33 %    07/02/2024 10:24 AM 33 %      Cath:  Cardiac Catheterization Procedure 04/16/2025   1. Severe biventricular failure by hemodynamics [RA 25, RVEDP 25, PCWP of 27 with a V wave of 47 mmHg] with low assumed Cinda cardiac output at 2.7 L/min [cardiac index 1.6 L/min/mï¿½, PA O2 sat of 36%] and elevated SVR at 1503 dynesï¿½secï¿½cm-5.   2. Patient is alert and oriented, hemodynamically stable with good BP, making good urine in response to diuresis [given extra 80 mg of IV Lasix in the Cath Lab], and has normal lactate. No indication for advanced mechanical circulatory support therapy at the time being.   3. Further management as per ICU and inpatient cardiology consult team.         Cardiac catheterization - coronary 04/15/2024  Coronary Angiography:  The coronary circulation is right dominant.     Left Main Coronary Artery:  The left main coronary artery is a normal caliber vessel. The left main arises normally from the left coronary sinus of Valsalva. The mid to distal left main coronary artery showed 80% stenosis.     Left Anterior Descending Coronary Artery Distribution:  The left anterior descending coronary artery is a normal caliber vessel. The LAD demonstrated chronic occlusion originating at the proximal segment.     Circumflex Coronary Artery Distribution:  The circumflex arises  normally from the left main coronary artery. The circumflex revealed chronic occlusion originating at the proximal segment.     Right Heart Catheterization:  A balloon tipped catheter was advanced through the right heart to record pressures. Cardiac output was calculated via the Cinda method. Elevated left sided filling pressures with normal cardiac output. Elevated ventricular filling pressure. Cardiac output is normal. Pulmonary venous hypertension.     Right Coronary Artery Distribution:     The RCA arises normally from the right sinus of Valsalva. The RCA showed chronic occlusion originating at the proximal segment.     Coronary Grafts:     LIMA Graft:  Left internal mammary artery graft conduit, originating in situ and attached to the proximal left anterior descending and 1st diagonal, is patent. The left internal mammary artery graft conduit originating in situ and attached to the proximal left anterior descending and 1st diagonal is patent.  Saphaneous Vein Graft(s):  The saphenous vein graft, originating from the aorta and attached to the distal right coronary artery, appeared totally occluded. The saphenous vein graft, originating from the aorta and attached to the distal right coronary artery appeared totally occluded.  The 2nd saphenous vein graft, originating from the aorta and attached to the 1st obtuse marginal, appeared totally occluded. The 2nd saphenous vein graft, originating from the aorta and attached to the 1st obtuse marginal appeared totally occluded.  Sequential Graft:  The saphenous vein graft, originating from the aorta and attached to the distal right coronary artery, appeared totally occluded.     Coronary Lesion Summary:  Vessel      Stenosis   Vessel Segment  Left Main 80% stenosis mid to distal        Graft Stenosis Summary:  Graft                Destination of Graft  LIMA  proximal left anterior descending and 1st diagonal  SVG 1 distal right coronary artery  SVG 2 1st obtuse  marginal        Subclavian Artery Findings:     Left Subclavian Artery:  The left subclavian artery revealed severe atherosclerotic disease and severe calcification. There was severe ostial stenosis in the left subclavian artery. The lesion was crossed with the glidwire. The 6Fr. cathteter was unable to be advanced. We then used a 4Fr catheter that was able to cross the stenosis. There was pressure gradient of ~28-30 mmHg across the stenosis.    Stress Test:  No results found for this or any previous visit from the past 1095 days.    Cardiac Imaging:  No results found for this or any previous visit from the past 1095 days.      Inpatient Medications:  Scheduled Medications[1]  PRN Medications[2]  Continuous Medications[3]    Physical Exam:  Documented Vital Signs   Heart Rate:  [59-71]   Temp:  [36.2 °C (97.1 °F)-37.1 °C (98.7 °F)]   Resp:  [9-29]   BP: ()/(36-71)   Weight:  [64.5 kg (142 lb 3.2 oz)]   SpO2:  [88 %-100 %]   Temp:  [36.2 °C (97.1 °F)-37.1 °C (98.7 °F)] 37.1 °C (98.7 °F)  Heart Rate:  [59-71] 70  Resp:  [9-29] 16  BP: ()/(36-71) 98/67      Oxygen Dose: *3 L/min    Documented Fluid Status     Intake/Output Summary (Last 24 hours) at 4/17/2025 1835  Last data filed at 4/17/2025 1800  Gross per 24 hour   Intake 643.9 ml   Output 1319 ml   Net -675.1 ml     Net IO Since Admission: -9,461.92 mL [04/17/25 1835]       Assessment/Plan   Ritesh Garza is a 74 y.o. male with past medical history of PMH of mvCAD s/p CABGx5 1992 c/b NSTEMI 2/2024 on DAPT, HFrEF d/t ICM, HTN, HLD, VT s/p ICD placement 5/2024, CKD III, seizures, PAD c/b severe left subclavian stenosis w/ compromised flow to the left POP s/p left subclavian stent 4/2024 , asthma, DM, gout, SIDHU cirrhosis, portal HTN, depression/anxiety, GERD, BPH, SENIA, , recent GIB 12/2024 (Plavix stopped with GIB), who presented to [facility] with SOB, weight gain, and tachycardia.  Cardiology consulted for CHF.     States he has been more short of breath  than usual for the past few days. States he has never been short of breath in the past 32 years since his CABG.  He went to doctor visit with his cardiologist, and was told to come to ED for irregular heart rhythm. Denies any chest pain or angina.  States he has gained about 10lbs over the past few months.  Unsure if he is having orthopnea.  He is having some mild lower extremity swelling.  No recent illness, no nausea, vomiting, fever, or chills.      Arrival in the ED HR was 122 Tachypnic b/p was 137/87.   Labs showed BNP 3124. Troponin was 85->82,  K 5.1 Scr 2.31 TSH 14.99 WBC 11k, H/H 9/31  CT Chest showed  Mild interstitial pulmonary edema, small bilateral layering  pleural effusions and cardiomegaly with left ventricular enlargement  suggestive of mild congestive heart failure changes.   He was placed on BiPap, and ultimately placed on oxygen at 2L via NC.  Comfortable.      His Cardiac Device Interrogation performed 4/3/2025 showed no no therapy delivered and importantly no atrial fibrillation or atrial flutter.  Felt that he was having an atrial tachycardia below detection rate and was increased on his beta blockade to help address this. We had originally suggested that he was volume up with some fluid and suggested diuresis. On HD-3 he had a thibodeaux catheter with minimal urinary output and some contraction noted on labs. We held his lasix in light of this and decreased oxygen requirement. He was noted to be aggressive verbally, which he is not known to be from prior encounter, rather the opposite and had a sitter at bedside.Overnight, he reported chest pain and was given Oxycodone rather than NTG due to a softer blood pressures and concerns for decompensation.  He had brief self limited episodes of ventricular tachycardia with pacing and baseline Left Bundle Branch   His troponin was modestly elevated, but within range of his known severe disease. His Creatinine had climbed, his potassium was signifincalty  elevated. He has been transferred to ICU for supportive care and potential dialysis. He responded to IV Diuretics and Diuril for Nephron Blockade. He ultimately was able to be diuresed to with a reduction ion his Creatinine to ~ 2.72 with about 15L of total fluid removed at that point. He was net -12L. His Weight at that time was 57.7 kg  He was transitioned to oral agents.  He had some sustained episodes of Ventricular Tachycardia for which we started Amiodarone.  He was anticipated for discharge and Cardiology had signed off.  His PO Intake appears to have increased as per documentation and he again had worsening kidney dysfunction and fluid retention. Nephrology was again engaged and Diuretics were held and Fluids given, with thought being that he was over diuresed. Despite these interventions he continued to have worsening symptoms ultimately leading to repeat RHC as detailed above.     At this time, we are left with very limited options.     He is known to have a severely ischemic cardiomyopathy with known severe left main stenosis, known native LAD  known native circumflex , known RCA .  His remote bypass grafting completed in 1992 is also known to be jeopardized.  The LIMA graft attaches to first Proximal left anterior descending and first diagonal is patent while SVG grafts originating from the aorta attached to the distal right coronary is occluded, a second patch to OM1 is completely occluded.  And we know that there is severe ostial stenosis of the left subclavian artery with a 20 to 30 mm drop in pressure across this lesion.  He does not have any revascularization options at this time.    He is further known to be proarrhythmic as evidenced by sustained episodes of atrial tachycardias and  ventricular tachycardias during this admission that required initiation of amiodarone.    He has been known to be in chronic cardiogenic shock for which he has compensated as much as he can, but initiation of  inotropes or vasodilators would likely worsen his clinical outcome.  We have not been able to initiate many medications that would be utilized for guideline directed medical therapy in the past  while both hospitalized and in the out patient setting as well as this admission.  Given his multiple comorbidities, he is not a candidate for advanced therapies either.    As has been stated, I do not think that he would be a good candidate for aggressive fluid removal via CVVH or intermittent hemodialysis as I do not think that he would tolerate this therapy due to hemodynamic compromise.  He has been on minimal medications and remains hypotensive - low normotensive.      We have likely reached the stage where he will be back into the hospital repeatedly to be diuresed / fluid removed with rapid re accumulation due to failing organ systems as evidenced by progression of end stage ischemic cardiomyopathy, heart failure and Cardio-Renal Syndrome.     Agree with re-institution of IV Diuretics and Sequential Nephron Blockade to try and remove additional fluid.     Agree with Goals of Care Discussions     Agree with Hospice discussions as well                 Peripheral IV 04/09/25 20 G Left;Anterior Forearm (Active)   Site Assessment Clean;Dry;Intact 04/17/25 0900   Dressing Status Clean;Dry;Occlusive 04/17/25 0900   Number of days: 8       Urethral Catheter (Active)   Output (mL) 50 mL 04/17/25 1800   Number of days: 3       Code Status:  DNR and No Intubation      Mahendra Santacruz DO   Division of Cardiovascular Medicine  [facility] [place]             [1]   Scheduled medications   Medication Dose Route Frequency    [Held by provider] allopurinol  100 mg oral Daily    amiodarone  400 mg oral TID    Followed by    [START ON 4/19/2025] amiodarone  400 mg oral Daily    [Held by provider] amLODIPine  10 mg oral Daily    aspirin  81 mg oral Daily    atorvastatin  80 mg oral Daily    [Held  by provider] bumetanide  2 mg oral BID    cetirizine  10 mg oral Daily    chlorothiazide  500 mg intravenous q12h    cyanocobalamin  1,000 mcg oral Daily    [Held by provider] dapagliflozin propanediol  10 mg oral Daily before evening meal    gabapentin  100 mg oral Nightly    heparin  5,000 Units subcutaneous TID    icosapent ethyL  1 g oral BID    insulin lispro  0-10 Units subcutaneous Before meals & nightly    iron sucrose  200 mg intravenous Once    levothyroxine  50 mcg oral Daily    [Held by provider] metoprolol succinate XL  50 mg oral Daily    oxygen   inhalation Continuous - 02/gases    sennosides-docusate sodium  1 tablet oral Nightly    tamsulosin  0.4 mg oral Daily   [2]   PRN medications   Medication    dextrose    dextrose    famotidine    glucagon    glucagon    ipratropium-albuteroL    nitroglycerin    oxyCODONE   [3]   Continuous Medications   Medication Dose Last Rate    furosemide  40 mg/hr 40 mg/hr (04/17/25 3013)

## 2025-04-17 NOTE — CONSULTS
Inpatient consult to Palliative Care  Consult performed by: NORAH Mcintosh-CNP  Consult ordered by: NORAH Regan-REYES          Reason For Consult  Reason for Consult: communication / medical decision making     History Of Present Illness  Ritesh Garza is a 74 y.o. male with complex PMHx of CAD s/p CABG x 5 in 1992(grafts found occluded in [facility] 4/2024) , NSTEMI 2/2024, HFrEF( Echo on 4/7 showed EF of 26%.)  ischemic cardiomyopathy, HTN, HLD, ICD placement 5/2024 for v-tach, CKD III, seizure disorder, PAD c/b severe left subclavian stenosis with stent 4/2024, asthma, DM, gout, SIDHU cirrhosis, portal HTN, depression/anxiety, GERD, BPH, SENIA, recent GI bleed 12/2024 who presented to our ED from hi cardiologist's office on 4/2/25 with SOB and tachycardia.  He required BiPap in ED and admitted to ICU with acute respiratory failure due to CHF exacerbation.   He stabilized and moved out of ICU 4/5, went back after rapid response on 4/6, back to floor 4/9.  Kidney function worsened and urine output decreased.  He went to cath lab 4/16, found to be in severe biventricular failure.  A [place] Matthew catheter was placed and he was admitted to ICU.  Dr. Hutchins does not feel patient is a candidate for RRT due to cardiac status.    HCPOA on file naming patient's wife Petra as only proxy agent.  Palliative medicine consulted to assist with goals of care.    Symptoms (0 - 10, Best to Worst)  Iota Symptom Assessment System  0-10 (Numeric) Pain Score: 0 - No pain    BM in last 48 hours? yes         Personal/Social History  Lives in [facility] with wife.  Estranged from his two children.    He reports that he has never smoked. He has never been exposed to tobacco smoke. He has never used smokeless tobacco. He reports that he does not drink alcohol and does not use drugs.         Past Medical History  He has a past medical history of ADHD (attention deficit hyperactivity disorder) (1960’s), AICD (automatic  "cardioverter/defibrillator) present (05/30/2024), Asthma, Benign prostatic hyperplasia (Several years ago), CHF (congestive heart failure), Cirrhosis (Multi), CKD (chronic kidney disease), Coronary artery disease (Long Ago), Diabetes mellitus (Multi) (Over 20 years ago), Erectile dysfunction, GERD (gastroesophageal reflux disease), Gout, Hypertension (Over 20 years ago), Ischemic cardiomyopathy, Kidney stone (Several years ago), Myocardial infarction (Multi) (Over 20 yesrs ago), SIDHU (nonalcoholic steatohepatitis), SENIA (obstructive sleep apnea), Stenosis of left subclavian artery, and Ventricular tachycardia (Multi).    Surgical History  He has a past surgical history that includes Coronary artery bypass graft (1992); MR angio head wo IV contrast (06/02/2021); MR angio neck wo IV contrast (06/02/2021); MR angio head wo IV contrast (11/16/2021); MR angio neck wo IV contrast (11/16/2021); Adenoidectomy (Childhood); Cardiac catheterization (Over 30 years ago); Cholecystectomy (Over 30 years ago); Circumcision, primary (72,years ago); Eye surgery (Childhood); Cardiac catheterization (N/A, 04/15/2024); Invasive Vascular Procedure (Left, 04/30/2024); Cardiac electrophysiology procedure (N/A, 05/30/2024); Cardiac defibrillator placement (05/30/2024); Tonsillectomy (1950’s); and Cervical fusion (07/03/2024).     Family History  Family History[1]  Allergies  Patient has no known allergies.    Review of Systems   Constitutional: Negative.         Patient reports he feels \"much better\" and frankly denies feeling unwell at present at all despite being in an ICU   HENT: Negative.     Eyes: Negative.    Respiratory: Negative.     Cardiovascular:  Negative for chest pain and leg swelling.   Gastrointestinal: Negative.    Endocrine: Negative.    Genitourinary:  Positive for decreased urine volume.   Musculoskeletal: Negative.    Allergic/Immunologic: Negative.    Neurological: Negative.    Hematological: Negative.  " "  Psychiatric/Behavioral: Negative.          Physical Exam  Constitutional:       Appearance: He is ill-appearing.   HENT:      Head: Normocephalic and atraumatic.      Nose: Nose normal.      Mouth/Throat:      Mouth: Mucous membranes are moist.      Pharynx: Oropharynx is clear.   Eyes:      Conjunctiva/sclera: Conjunctivae normal.      Pupils: Pupils are equal, round, and reactive to light.   Cardiovascular:      Heart sounds: Murmur heard.      Comments: Monitor with multiple PVCs  Distant heart sounds    Pulmonary:      Effort: Pulmonary effort is normal.      Breath sounds: Normal breath sounds.   Abdominal:      General: Abdomen is flat. Bowel sounds are normal.      Palpations: Abdomen is soft.   Genitourinary:     Comments: Love with clear yellow urine  Musculoskeletal:         General: Normal range of motion.      Cervical back: Normal range of motion.      Comments: Trace generalized non pitting edema to limbs     Skin:     General: Skin is warm and dry.      Coloration: Skin is pale.   Neurological:      Mental Status: He is alert.      Comments: Declined to answer many orientation questions for me, seemed more on target when Dr. Peña was in room.  Seems to conflate recent and past hx regarding his cardiac issues.     Psychiatric:      Comments: Withdrawn           Last Recorded Vitals  Blood pressure 104/58, pulse 67, temperature 36.4 °C (97.5 °F), temperature source Temporal, resp. rate 13, height 1.6 m (5' 2.99\"), weight 64.5 kg (142 lb 3.2 oz), SpO2 98%.    Relevant Results  Cardiac Catheterization Procedure  Result Date: 4/16/2025   [facility], Cath Lab, [address] [place] [address] Cardiovascular Catheterization Report Patient Name:     CHIDI VARGAS    Performing Physician:  [id number] Schwartz MD Study Date:       4/16/2025           Verifying Physician:   [id number] Schwartz                                      "                         MD MRN/PID:          [id number]            Cardiologist/Co-Scrub: Accession#:       [id number]        Ordering Provider:     [id number] DAVID FARRELL Date of           1950 / 74      Cardiologist: Birth/Age:        years Gender:           M                   Fellow:                [id number] Lesley Higuera MD Encounter#:       [id number]          Surgeon:  Study: Right Heart Cath  Indications: Heart failure.  Procedure Description: After infiltration of local anesthetic, the left internal jugular vein was identified with two-dimensional ultrasound. Under direct ultrasound visualization, the left internal jugular vein was cannulated with a micropuncture technique. A 9 Indian sheath was placed in the vein. Cardiac output was calculated via the Cinda method. Post-procedure, the venous sheath was left intact and sutured in place.  Procedure Description Comments: Access of the Procedure: 5 Indian right brachial vein, closed with manual pressure. 9 Indian right internal jugular vein, sutured in place. External marker of Martin-Matthew at 51 cm.  Right Heart Catheterization: Cardiac output was calculated via the Cinda method. Elevated left sided filling pressures with low cardiac output. Severe biventricular failure by hemodynamics [RA 25, RVEDP 25, PCWP of 27 with a V wave of 47 mmHg] with low assumed Cinda cardiac output at 2.7 L/min [cardiac index 1.6 L/min/mï¿½, PA O2 sat of 36%] with elevated SVR at 1503 dynesï¿½secï¿½cm-5.  Complications: No in-lab complications observed.  Cardiac Cath Post Procedure Notes: Post Procedure Diagnosis: See below. Blood Loss:               Estimated blood loss during the procedure was 5 mls. Specimens Removed:        Number of specimen(s) removed: none. ____________________________________________________________________________________ CONCLUSIONS:  1.  Severe biventricular failure by hemodynamics [RA 25, RVEDP 25, PCWP of 27 with a V wave of 47 mmHg] with low assumed Cinda cardiac output at 2.7 L/min [cardiac index 1.6 L/min/mï¿½, PA O2 sat of 36%] and elevated SVR at 1503 dynesï¿½secï¿½cm-5.  2. Patient is alert and oriented, hemodynamically stable with good BP, making good urine in response to diuresis [given extra 80 mg of IV Lasix in the Cath Lab], and has normal lactate. No indication for advanced mechanical circulatory support therapy at the time being.  3. Further management as per ICU and inpatient cardiology consult team. ICD 10 Codes: Acute on chronic systolic (congestive) heart failure-I50.23  CPT Codes: [place] Matthew-02053; Ultrasound guidance for vascular access-[id number]  [id number] Eder Schwartz MD Performing Physician Electronically signed by [id number] Eder Schwartz MD on 4/16/2025 at 5:53:06 PM  ** Final **     ECG 12 lead  Result Date: 4/15/2025  Normal sinus rhythm Nonspecific intraventricular block Possible Lateral infarct , age undetermined Abnormal ECG Confirmed by Christiano Ewing ([id number]) on 4/15/2025 12:00:06 PM    Electrocardiogram, 12-lead PRN ACS symptoms  Result Date: 4/13/2025  Sinus tachycardia Nonspecific intraventricular block Possible Lateral infarct (cited on or before 03-APR-2025) Abnormal ECG When compared with ECG of 03-APR-2025 01:18, (unconfirmed) Fusion complexes are no longer Present Questionable change in QRS axis Confirmed by Salbador Villar ([id number]) on 4/13/2025 2:33:41 PM    ECG 12 lead  Result Date: 4/11/2025  ATRIAL ELECTRONIC PACEMAKER Abnormal ECG When compared with ECG of 06-APR-2025 02:08, (unconfirmed) Current undetermined rhythm precludes rhythm comparison, needs review Confirmed by Conner King ([id number]) on 4/11/2025 3:35:06 PM    Electrocardiogram, 12-lead PRN ACS symptoms  Result Date: 4/11/2025  Sinus tachycardia with Fusion complexes Right axis deviation Nonspecific intraventricular block Abnormal ECG When compared with ECG of  11-APR-2025 00:52, (unconfirmed) Previous ECG has undetermined rhythm, needs review QRS axis Shifted right Nonspecific T wave abnormality no longer evident in Lateral leads    Electrocardiogram, 12-lead PRN ACS symptoms  Result Date: 4/11/2025  Undetermined rhythm Left axis deviation Left bundle branch block Abnormal ECG When compared with ECG of 09-APR-2025 07:59, (unconfirmed) Current undetermined rhythm precludes rhythm comparison, needs review QRS axis Shifted right Minimal criteria for Anteroseptal infarct are no longer Present Nonspecific T wave abnormality, worse in Lateral leads    XR chest 1 view  Result Date: 4/9/2025  Interpreted By:  Jo Ann Roberto, STUDY: XR CHEST 1 VIEW;  4/9/2025 12:49 pm   INDICATION: Signs/Symptoms:CHF.   COMPARISON: 04/06/2025   ACCESSION NUMBER(S): [id number]   ORDERING CLINICIAN: SELENA GARSIA   FINDINGS: Pacemaker is again identified. Sternotomy wires and surgical clips are seen. The heart is enlarged. No gross consolidation, pleural effusion or pneumothorax is noted.       Cardiomegaly.       MACRO: None   Signed by: Jo Ann Roberto 4/9/2025 3:17 PM Dictation workstation:   [id number]    Electrocardiogram, 12-lead PRN ACS symptoms  Result Date: 4/9/2025  Sinus tachycardia with Fusion complexes Right superior axis deviation Nonspecific intraventricular block Cannot rule out Anteroseptal infarct , age undetermined Abnormal ECG When compared with ECG of 06-APR-2025 02:08, (unconfirmed) Sinus rhythm has replaced Wide QRS rhythm    Transthoracic Echo (TTE) Limited  Result Date: 4/7/2025   [facility], [address] [place] [address]              Tel [phone] and Fax [phone] TRANSTHORACIC ECHOCARDIOGRAM REPORT  Patient Name:       CHIDI VARGAS    Reading Physician:    Danita Santacruz DO Study Date:         4/7/2025            Ordering Provider:    Danita VELARDE                                                                JAMI MRN/PID:            [id number]            Fellow: Accession#:         [id number]        Nurse: Date of Birth/Age:  1950 / 74      Sonographer:          Luke lea Gender assigned at  M                   Additional Staff: Birth: Height:             160.00 cm           Admit Date:           4/2/2025 Weight:             71.00 kg            Admission Status:     Inpatient -                                                               Priority                                                               discharge BSA / BMI:          1.74 m2 / 27.73     Encounter#:           [id number]                     kg/m2 Blood Pressure:     93/69 mmHg          Department Location:  [facility] Non                                                               Invasive Study Type:    TRANSTHORACIC ECHO (TTE) LIMITED Diagnosis/ICD: Non ST elevation (NSTEMI) myocardial infarction-I21.4 Indication:    HFrEF w/TAYLOR CPT Code:      Echo Limited-96534 Patient History: Diabetes:          Yes Pertinent History: HTN, CHF and Chest Pain. Acute Respiratory Failure w/Hypoxia,                    Portal HTN, Dementia, CKD, MI, SENIA. Study Detail: The following Echo studies were performed: 2D, M-Mode, Doppler and               color flow. Technically challenging study due to patient lying in               supine position. Definity used as a contrast agent for endocardial               border definition. Total contrast used for this procedure was 2 mL               via IV push.  PHYSICIAN INTERPRETATION: Left Ventricle: Left ventricular ejection fraction is severely decreased, calculated by Reyes's biplane at 26%. There is global hypokinesis of the left ventricle with minor regional variations. The left ventricular cavity size is severely dilated. There is normal septal and normal posterior left ventricular wall thickness. Left ventricular diastolic filling was not assessed.  Left Atrium: The left atrial size was not assessed. Right Ventricle: The right ventricle was not well visualized. There is reduced right ventricular systolic function. TAPSE = 12 mm / S' = 7. Right Atrium: The right atrium is normal in size. There is a device visualized in the right atrium. Aortic Valve: The aortic valve is probably trileaflet. There is mild to moderate aortic valve cusp calcification. There is no evidence of aortic valve regurgitation. Mitral Valve: The mitral valve is mildly thickened. There is mild mitral annular calcification. There is moderate mitral valve regurgitation. Tricuspid Valve: The tricuspid valve is structurally normal. There is trace to mild tricuspid regurgitation. The Doppler estimated RVSP is slightly elevated at 29.2 mmHg. Pulmonic Valve: The pulmonic valve is structurally normal. There is physiologic pulmonic valve regurgitation. Pericardium: There is no pericardial effusion noted. Aorta: The aortic root was not assessed. There is plaque visualized in the ascending aorta, which is classified as a Grade 2 [mild (focal or diffuse) intimal thickening of 2-3 mm] atherosclerosis. Systemic Veins: The inferior vena cava appears mildly dilated, with IVC inspiratory collapse greater than 50%. In comparison to the previous echocardiogram(s): Compared with study dated 12/26/2024, The LVEF is depressed from 33% to currently 26% by Simpsons Biplane Method. RVSP has decreased from 40 mmHg to currently 29 mmHg. Moderate mitral regurgitation remains unchanged.  CONCLUSIONS:  1. Left ventricular ejection fraction is severely decreased, calculated by Reyes's biplane at 26%.  2. There is global hypokinesis of the left ventricle with minor regional variations.  3. Left ventricular cavity size is severely dilated.  4. There is reduced right ventricular systolic function.  5. Moderate mitral valve regurgitation.  6. Slightly elevated right ventricular systolic pressure.  7. The inferior vena cava  appears mildly dilated, with IVC inspiratory collapse greater than 50%.  8. There is plaque visualized in the ascending aorta.  9. Compared with study dated 12/26/2024, The LVEF is depressed from 33% to currently 26% by Simpsons Biplane Method. RVSP has decreased from 40 mmHg to currently 29 mmHg. Moderate mitral regurgitation remains unchanged. QUANTITATIVE DATA SUMMARY:  2D MEASUREMENTS:          Normal Ranges: LAs:             4.40 cm  (2.7-4.0cm) IVSd:            0.98 cm  (0.6-1.1cm) LVPWd:           0.73 cm  (0.6-1.1cm) LVIDd:           5.43 cm  (3.9-5.9cm) LVIDs:           4.96 cm LV Mass Index:   98 g/m2 LVEDV Index:     91 ml/m2 LV % FS          8.7 %  LV SYSTOLIC FUNCTION:                      Normal Ranges: EF-A4C View:    22 % (>=55%) EF-A2C View:    24 % EF-Biplane:     26 % LV EF Reported: 26 %  LV DIASTOLIC FUNCTION:           Normal Ranges: MV e'                  0.045 m/s (>8.0) MV lateral e'          0.04 m/s MV medial e'           0.05 m/s  MITRAL INSUFFICIENCY:            Normal Ranges: PISA Radius:          0.5 cm MR Alias Matt:         30.7 cm/s MR Flow Rt:           41.83 ml/s  RIGHT VENTRICLE: TAPSE: 12.0 mm RV s'  0.07 m/s  TRICUSPID VALVE/RVSP:          Normal Ranges: Peak TR Velocity:     2.56 m/s Est. RA Pressure:     3 mmHg RV Syst Pressure:     29 mmHg  (< 30mmHg) IVC Diam:             2.20 cm  [id number] Mahendra Santacruz DO Electronically signed on 4/7/2025 at 1:25:37 PM  ** Final **     Electrocardiogram, 12-lead PRN ACS symptoms  Result Date: 4/7/2025  Sinus tachycardia with Fusion complexes Left axis deviation Nonspecific intraventricular block Cannot rule out Septal infarct , age undetermined Possible Lateral infarct , age undetermined Abnormal ECG When compared with ECG of 02-APR-2025 23:34, (unconfirmed) Fusion complexes are now Present Confirmed by Conner King ([id number]) on 4/7/2025 11:41:28 AM    ECG 12 Lead  Result Date: 4/7/2025  Sinus tachycardia with Fusion complexes Left axis  deviation Left bundle branch block Abnormal ECG When compared with ECG of 04-APR-2025 07:56, (unconfirmed) Fusion complexes are now Present QRS axis Shifted left    Electrocardiogram, 12-lead PRN ACS symptoms  Result Date: 4/7/2025  Wide QRS rhythm with frequent and consecutive Premature ventricular complexes and Fusion complexes Low voltage QRS Right bundle branch block Possible Anterolateral infarct , age undetermined Abnormal ECG When compared with ECG of 05-APR-2025 18:58, (unconfirmed) Wide QRS rhythm has replaced Sinus rhythm    XR chest 1 view  Result Date: 4/6/2025  Interpreted By:  Anette Borjas, STUDY: XR CHEST 1 VIEW;  4/6/2025 8:41 am   INDICATION: Signs/Symptoms:Hypoxia.   COMPARISON: 04/06/2025   ACCESSION NUMBER(S): [id number]   ORDERING CLINICIAN: JOSH ZAFAR   FINDINGS: The inspiration is suboptimal. The cardiac silhouette appears enlarged. There is a multi lead transvenous pacemaker/AICD device.   There is no discrete consolidation or pleural fluid. There is slight elevation of the left hemidiaphragm.   Sternal wires and mediastinal clips are present. The patient is status post cervical spine fusion.   COMPARISON OF FINDING: The chest is similar       Limited inspiration. Enlarged cardiac silhouette.   MACRO: none   Signed by: Anette Borjas 4/6/2025 10:30 AM Dictation workstation:   [id number]    XR chest 1 view  Result Date: 4/6/2025  STUDY: Chest Radiograph;  [4-6-2025; 6:42 am] INDICATION: Hypoxia. COMPARISON: XR chest 4-2-2025 ACCESSION NUMBER(S): [id number] ORDERING CLINICIAN: JOZEF ABDI TECHNIQUE:  Frontal chest was obtained at 06:42 hours. FINDINGS: Left chest wall implanted cardiac device unchanged.  Median sternotomy wires. CARDIOMEDIASTINAL SILHOUETTE: Cardiac size remains enlarged.  LUNGS: Left basilar airspace opacities unchanged.  Resolution of right basilar opacities.  No visible pleural effusions..  ABDOMEN: No remarkable upper abdominal findings.  BONES: No acute osseous  changes.    Resolving right basilar opacities, residual left basilar opacities. Signed by Luke Middleton MD    ECG 12 lead (Clinic Performed)  Result Date: 4/4/2025  Wide QRS tachycardia Suspect Atrial tachycardia Nonspecific intraventricular block Cannot rule out Septal infarct (cited on or before 13-JAN-2025) Possible Lateral infarct , age undetermined Abnormal ECG When compared with ECG of 13-JAN-2025 09:37, Vent. rate has increased BY  47 BPM QRS duration has increased Confirmed by Jose Hennessy ([id number]) on 4/4/2025 11:54:35 AM    ECG 12 lead  Result Date: 4/3/2025  Sinus tachycardia Left axis deviation Left bundle branch block Abnormal ECG When compared with ECG of 02-APR-2025 18:20, (unconfirmed) Questionable change in QRS axis T wave inversion no longer evident in Inferior leads See ED provider note for full interpretation and clinical correlation Confirmed by Jennifer Vogt ([id number]) on 4/3/2025 10:14:12 AM    Electrocardiogram, 12-lead PRN ACS symptoms  Result Date: 4/3/2025  Sinus tachycardia Nonspecific intraventricular block Cannot rule out Septal infarct (cited on or before 13-JAN-2025) Abnormal ECG When compared with ECG of 02-APR-2025 16:36, (unconfirmed) Questionable change in QRS axis Borderline criteria for Lateral infarct are no longer Present T wave inversion now evident in Inferior leads See ED provider note for full interpretation and clinical correlation Confirmed by Jennifer Vogt ([id number]) on 4/3/2025 10:13:12 AM    ECG 12 lead  Result Date: 4/3/2025  Sinus tachycardia Left axis deviation Nonspecific intraventricular block Cannot rule out Septal infarct (cited on or before 13-JAN-2025) Possible Lateral infarct (cited on or before 02-APR-2025) Inferior infarct , age undetermined Abnormal ECG When compared with ECG of 02-APR-2025 15:55, (unconfirmed) Fusion complexes are no longer Present Nonspecific T wave abnormality no longer evident in Inferior leads See ED provider note for full interpretation and  clinical correlation Confirmed by Jennifer Vogt ([id number]) on 4/3/2025 10:12:46 AM    CT chest wo IV contrast  Result Date: 4/2/2025  STUDY: CT Chest without IV Contrast; 4/2/2025 at 9:15 PM INDICATION: Tachypnea, tachycardia.  Evaluate for volume overload; pneumonia. COMPARISON: CT chest 2/18/2024. ACCESSION NUMBER(S): [id number] ORDERING CLINICIAN: CLAUDIA VERA TECHNIQUE:  CT of the chest was performed without contrast.  847 DICOM images received. Automated mA/kV exposure control was utilized and patient examination was performed in strict accordance with principles of ALARA. FINDINGS: MEDIASTINUM: The heart is enlarged with left ventricular enlargement.  There is no evidence for pericardial effusion.  Coronary artery calcifications are identified.  LUNGS/PLEURA: Small pleural effusions layer dependently in the bilateral hemithoraces.  There is no pleural thickening, or pneumothorax.  The airways are patent. Prominent interlobular septa consistent with mild interstitial pulmonary edema.  Subsegmental dependent atelectasis identified at the bilateral lower lobes. LYMPH NODES: Thoracic lymph nodes are not enlarged. UPPER ABDOMEN: Mild volume of free fluid surrounding the liver and spleen represents ascites.  The gallbladder is surgically absent. BONES: There are no acute fractures.  No suspicious bony lesions.    1.  Mild interstitial pulmonary edema, small bilateral layering pleural effusions and cardiomegaly with left ventricular enlargement suggestive of mild congestive heart failure changes. 2.  Mild peritoneal ascites. 3.  Status post cholecystectomy. Signed by Yuri Francis MD    XR chest 1 view  Result Date: 4/2/2025  Interpreted By:  Hank Razo, STUDY: XR CHEST 1 VIEW;  4/2/2025 5:38 pm   INDICATION: Signs/Symptoms:Chest Pain.     COMPARISON: 12/23/2024   ACCESSION NUMBER(S): [id number]   ORDERING CLINICIAN: CLAUDIA VERA   FINDINGS: Left-sided pacemaker in place. Median sternotomy wires  present   CARDIOMEDIASTINAL SILHOUETTE: Cardiomediastinal silhouette is moderately enlarged.   LUNGS: There is pulmonary edema. No consolidation or effusion seen   ABDOMEN: No remarkable upper abdominal findings.   BONES: No acute osseous changes.       Enlarged cardiac silhouette with moderate pulmonary edema     MACRO: None   Signed by: Hank Raoz 4/2/2025 5:52 PM Dictation workstation:   [id number]     Results for orders placed or performed during the hospital encounter of 04/02/25 (from the past 24 hours)   POCT GLUCOSE   Result Value Ref Range    POCT Glucose 184 (H) 74 - 99 mg/dL   POCT GLUCOSE   Result Value Ref Range    POCT Glucose 167 (H) 74 - 99 mg/dL   POCT GLUCOSE   Result Value Ref Range    POCT Glucose 143 (H) 74 - 99 mg/dL   BLOOD GAS MIXED VENOUS FULL PANEL   Result Value Ref Range    POCT pH, Mixed 7.43 7.33 - 7.43 pH    POCT pCO2, Mixed 48 41 - 51 mm Hg    POCT pO2, Mixed 38 35 - 45 mm Hg    POCT SO2, Mixed 61 45 - 75 %    POCT Oxy Hemoglobin, Mixed 60.2 45.0 - 75.0 %    POCT Hematocrit Calculated, Mixed 26.0 (L) 41.0 - 52.0 %    POCT Sodium, Mixed 123 (L) 136 - 145 mmol/L    POCT Potassium, Mixed 3.8 3.5 - 5.3 mmol/L    POCT Chloride, Mixed 86 (L) 98 - 107 mmol/L    POCT Ionized Calcium, Mixed 1.03 (L) 1.10 - 1.33 mmol/L    POCT Glucose, Mixed 143 (H) 74 - 99 mg/dL    POCT Lactate, Mixed 1.4 0.4 - 2.0 mmol/L    POCT Base Excess, Mixed 6.8 (H) -2.0 - 3.0 mmol/L    POCT HCO3 Calculated, Mixed 31.9 (H) 22.0 - 26.0 mmol/L    POCT Hemoglobin, Mixed 8.7 (L) 13.5 - 17.5 g/dL    POCT Anion Gap, Mixed 9 (L) 10 - 25 mmo/L    Patient Temperature 37.0 degrees Celsius    FiO2 28 %   POCT GLUCOSE   Result Value Ref Range    POCT Glucose 158 (H) 74 - 99 mg/dL   Renal Function Panel   Result Value Ref Range    Glucose 143 (H) 74 - 99 mg/dL    Sodium 128 (L) 136 - 145 mmol/L    Potassium 3.4 (L) 3.5 - 5.3 mmol/L    Chloride 84 (L) 98 - 107 mmol/L    Bicarbonate 28 21 - 32 mmol/L    Anion Gap 19 10 - 20  mmol/L    Urea Nitrogen 120 (HH) 6 - 23 mg/dL    Creatinine 5.98 (H) 0.50 - 1.30 mg/dL    eGFR 9 (L) >60 mL/min/1.73m*2    Calcium 8.2 (L) 8.6 - 10.3 mg/dL    Phosphorus 6.3 (H) 2.5 - 4.9 mg/dL    Albumin 3.2 (L) 3.4 - 5.0 g/dL   Magnesium   Result Value Ref Range    Magnesium 2.23 1.60 - 2.40 mg/dL   Renal Function Panel   Result Value Ref Range    Glucose 122 (H) 74 - 99 mg/dL    Sodium 128 (L) 136 - 145 mmol/L    Potassium 4.1 3.5 - 5.3 mmol/L    Chloride 85 (L) 98 - 107 mmol/L    Bicarbonate 27 21 - 32 mmol/L    Anion Gap 20 10 - 20 mmol/L    Urea Nitrogen 121 (HH) 6 - 23 mg/dL    Creatinine 6.02 (H) 0.50 - 1.30 mg/dL    eGFR 9 (L) >60 mL/min/1.73m*2    Calcium 8.3 (L) 8.6 - 10.3 mg/dL    Phosphorus 6.4 (H) 2.5 - 4.9 mg/dL    Albumin 3.2 (L) 3.4 - 5.0 g/dL   CBC   Result Value Ref Range    WBC 8.9 4.4 - 11.3 x10*3/uL    nRBC 0.0 0.0 - 0.0 /100 WBCs    RBC 3.57 (L) 4.50 - 5.90 x10*6/uL    Hemoglobin 8.1 (L) 13.5 - 17.5 g/dL    Hematocrit 26.2 (L) 41.0 - 52.0 %    MCV 73 (L) 80 - 100 fL    MCH 22.7 (L) 26.0 - 34.0 pg    MCHC 30.9 (L) 32.0 - 36.0 g/dL    RDW 18.4 (H) 11.5 - 14.5 %    Platelets 216 150 - 450 x10*3/uL   POCT GLUCOSE   Result Value Ref Range    POCT Glucose 122 (H) 74 - 99 mg/dL     *Note: Due to a large number of results and/or encounters for the requested time period, some results have not been displayed. A complete set of results can be found in Results Review.      Scheduled medications  Scheduled Medications[2]  Continuous medications  Continuous Medications[3]  PRN medications  PRN Medications[4]       Assessment/Plan      Ritesh Garza is a 74 y.o. male with complex PMHx of CAD s/p CABG x 5 in 1992(grafts found occluded in [facility] 4/2024) , NSTEMI 2/2024, HFrEF( Echo on 4/7 showed EF of 26%.)  ischemic cardiomyopathy, HTN, HLD, ICD placement 5/2024 for v-tach, CKD III, seizure disorder, PAD c/b severe left subclavian stenosis with stent 4/2024, asthma, DM, gout, SIDHU cirrhosis, portal HTN,  depression/anxiety, GERD, BPH, SENIA, recent GI bleed 12/2024 who presented to our ED from his cardiologist's office on 4/2/25 with SOB and tachycardia.  He required BiPap in ED and admitted to ICU with acute respiratory failure due to CHF exacerbation.   He stabilized and moved out of ICU 4/5, went back after rapid response on 4/6, back to floor 4/9.  Kidney function worsened and urine output decreased.  He went to cath lab 4/16, found to be in severe biventricular failure.  A [place] catheter was placed and he was admitted to ICU.  He is now in ARF with climbing Cr, reduced urine output.  Dr. Hutchins does not feel patient is a candidate for RRT due to cardiac status.  HCPOA on file naming patient's wife Petra as only proxy agent.  Palliative medicine consulted to assist with goals of care.    Goals of care:  Myself and Dr. Peña met with patient to discuss his current status including the fact that we can manage symptoms, but cannot repair his heart's function, he is not a candidate for RRT therapy at this time.  CPR was discussed and patient reported he would not want resuscitative measures.  Patient while clear he would not want CPR, may not have full insight on his more immediate prognosis so I saved discussion on hospice for when I spoke with wife on the phone.  She was surprised but in agreement with code status change.  She is also open to meeting with hospice  -code status changed to DNR/DNI  -hospice referral placed, SW aware  -will place [place] Portable DNR form at bedside closer to discharge for transport purposes    I spent 20 minutes in the professional and overall care of this patient related to ACP in addition to other time spent in assessment, chart review, and coordination of care           Natali Talley, APRN-CNP         [1]   Family History  Problem Relation Name Age of Onset    Diabetes Mother Sofia Garza     Other (mycoardial infarction) Father Hilario 46    Fainting Father Hilario     Heart  disease Father Hilario     Atrial fibrillation Father Hilario     Stomach cancer Mother's Sister Fdaumo Keller     Stroke Maternal Grandmother Jasmina Mace     Colon cancer Maternal Grandmother Jasmina Mace    [2] [Held by provider] allopurinol, 100 mg, oral, Daily  amiodarone, 400 mg, oral, TID   Followed by  [START ON 4/19/2025] amiodarone, 400 mg, oral, Daily  [Held by provider] amLODIPine, 10 mg, oral, Daily  aspirin, 81 mg, oral, Daily  atorvastatin, 80 mg, oral, Daily  [Held by provider] bumetanide, 2 mg, oral, BID  cetirizine, 10 mg, oral, Daily  cyanocobalamin, 1,000 mcg, oral, Daily  [Held by provider] dapagliflozin propanediol, 10 mg, oral, Daily before evening meal  gabapentin, 100 mg, oral, Nightly  heparin, 5,000 Units, subcutaneous, TID  icosapent ethyL, 1 g, oral, BID  insulin lispro, 0-10 Units, subcutaneous, Before meals & nightly  iron sucrose, 200 mg, intravenous, Once  levothyroxine, 50 mcg, oral, Daily  [Held by provider] metoprolol succinate XL, 50 mg, oral, Daily  oxygen, , inhalation, Continuous - 02/gases  sennosides-docusate sodium, 1 tablet, oral, Nightly  tamsulosin, 0.4 mg, oral, Daily  [3] furosemide, 30 mg/hr, Last Rate: 30 mg/hr (04/17/25 6446)  [4] PRN medications: dextrose, dextrose, famotidine, glucagon, glucagon, ipratropium-albuteroL, nitroglycerin, oxyCODONE

## 2025-04-17 NOTE — PROGRESS NOTES
Critical Care Medicine Progress Note    Impression/Plan:  Ritesh Garza is a 74 y.o. male with past medical history of PMH of mvCAD s/p CABGx5 1992 c/b NSTEMI 2/2024 on DAPT, HFrEF d/t ICM, HTN, HLD, VT s/p ICD placement 5/2024, CKD III, seizures, PAD c/b severe left subclavian stenosis w/ compromised flow to the left POP s/p left subclavian stent 4/2024 , asthma, DM, gout, SIDHU cirrhosis, portal HTN, depression/anxiety, GERD, BPH, SENIA, , recent GIB 12/2024 (Plavix stopped d/t GIB), [place] [facility] [place], associated dyspnea, which prompted bipap and admission to the ICU for further care. He has returned to the ICU for a 3rd time in the context of concerns regarding fluid overload, most recently on 4/16 s/p RHC w/quinton-dwaine catheter placement. On-going issues include the following:      Ischemic CM - LV EF 26%, findings c/w biventricular failure on echo from 4/6/2025 in the context of  CABG which are no longer patent.   AT on admission, currently on Amiodarone rendering patient a poor candidate for Inotrope therapy d/t significant arrhythmia risk rosa in context of ICM  Cardiorenal syndrome as a consequence of #1  GOC counseling/discussion - see below                 Neuro  - A-F bundle   - Sleep Hygiene measures: appropriate daytime stimulation/physical activity. Lights out at 2100, avoidance of night time lab draws/night baths, avoidance of delirium provoking medications        CV/Pulm/renal electrolyte, acid-base:  - holding heparin gtt d/t hx of GIB  - Farxiga and Entresto held d/t TAYLOR  - Amiodarone maintenance dosing  - Lasix gtt + Diuril: titrate dosing to achieve min net fluid balance goal of - 1L for shift   - Patient does not wish to have dialysis   - can remove R Internal jugular introducer sheath            Pulm:   - goal spo2 >/= 92%     GI/Nutrition   - regular diet   - GI ppx: H2B     ID  - nil      Heme   - DVT ppx: josefina     Endocrine  - Vascepa + Lipitor   - goal serum  "glucose 140-180 mg/dL      Musculoskeletal/skin  - skin protective measures   - PT/OT     Code status: DNR/DNI - d/w Palliative Med at bedside      Family contact: Petra Garza        Critical Care time: 40 min                     Interval History: quinton removed             Objective   Vitals:  Most Recent:  Vitals:    04/17/25 0900   BP: 102/71   Pulse: 68   Resp: 16   Temp:    SpO2: 97%       24hr Min/Max:  Temp  Min: 36.2 °C (97.1 °F)  Max: 36.7 °C (98.1 °F)  Pulse  Min: 60  Max: 68  BP  Min: 74/59  Max: 117/67  Resp  Min: 9  Max: 29  SpO2  Min: 88 %  Max: 100 %    LDA:  Introducer 04/16/25 Internal jugular Right (Active)   Placement Date/Time: 04/16/25 (c) 1724   Earliest Known Present: 04/16/25  Hand Hygiene Completed: Yes  Location: Internal jugular  Orientation: Right  Description (optional): 5 Fr. with [place] at 52cm  Placed by: Cath Lab MD   Number of days: 0       Urethral Catheter (Active)   Placement Date/Time: 04/14/25 1310   Hand Hygiene Completed: Yes  Catheter Balloon Size: 10 mL  Urine Returned: Yes   Number of days: 2             Hemodynamic parameters for last 24 hours:       I/O:  I/O last 2 completed shifts:  In: 537.9 (8.3 mL/kg) [P.O.:510; I.V.:27.9 (0.4 mL/kg)]  Out: 475 (7.4 mL/kg) [Urine:470 (0.3 mL/kg/hr); Blood:5]  Weight: 64.5 kg     Physical Exam:   /71   Pulse 68   Temp 36.2 °C (97.1 °F) (Temporal)   Resp 16   Ht 1.6 m (5' 2.99\")   Wt 64.5 kg (142 lb 3.2 oz)   SpO2 97%   BMI 25.20 kg/m²   Gen: awake, alert, cooperative NAD  Neuro: GCS E4, V5, M6, no focal deficits   Neck: Ext JVD to angle of mandible noted  CV: S1s2  Pulm: CTAB  Chest: neg accessory muscle recruitment   Abd: soft, non-tender   Ext: well perfused, cap refill < 2 sec  Skin: warm and dry, no mottling or rash                 "

## 2025-04-17 NOTE — PROGRESS NOTES
Ritesh Garza is a 74 y.o. male on day 15 of admission presenting with Acute respiratory failure with hypoxia.    Plan: continues treatment for CHF, Cardiology still following. Swanz placed for further monitoring of diuresis.   Disposition: Home with wife/ [facility]  Barrier: cardio & renal clearance  ADOD: 3-4 days       04/17/25 0750   Discharge Planning   Living Arrangements Spouse/significant other   Home or Post Acute Services In home services   Type of Home Care Services Home PT;Home OT   Expected Discharge Disposition Home H  ([facility])   Does the patient need discharge transport arranged? Yes   RoundTrip coordination needed? Yes   Has discharge transport been arranged? No   What day is the transport expected? 04/21/25   Intensity of Service   Intensity of Service 0-30 min       Cherri Priest RN

## 2025-04-18 LAB
ALBUMIN SERPL BCP-MCNC: 3.3 G/DL (ref 3.4–5)
ANION GAP SERPL CALC-SCNC: 20 MMOL/L (ref 10–20)
ATRIAL RATE: 120 BPM
BUN SERPL-MCNC: 126 MG/DL (ref 6–23)
CALCIUM SERPL-MCNC: 8.4 MG/DL (ref 8.6–10.3)
CHLORIDE SERPL-SCNC: 84 MMOL/L (ref 98–107)
CO2 SERPL-SCNC: 30 MMOL/L (ref 21–32)
CREAT SERPL-MCNC: 6.33 MG/DL (ref 0.5–1.3)
EGFRCR SERPLBLD CKD-EPI 2021: 9 ML/MIN/1.73M*2
ERYTHROCYTE [DISTWIDTH] IN BLOOD BY AUTOMATED COUNT: 18.5 % (ref 11.5–14.5)
GLUCOSE BLD MANUAL STRIP-MCNC: 154 MG/DL (ref 74–99)
GLUCOSE BLD MANUAL STRIP-MCNC: 191 MG/DL (ref 74–99)
GLUCOSE BLD MANUAL STRIP-MCNC: 209 MG/DL (ref 74–99)
GLUCOSE BLD MANUAL STRIP-MCNC: 260 MG/DL (ref 74–99)
GLUCOSE SERPL-MCNC: 146 MG/DL (ref 74–99)
HCT VFR BLD AUTO: 25.1 % (ref 41–52)
HGB BLD-MCNC: 7.8 G/DL (ref 13.5–17.5)
MAGNESIUM SERPL-MCNC: 2.24 MG/DL (ref 1.6–2.4)
MCH RBC QN AUTO: 22.7 PG (ref 26–34)
MCHC RBC AUTO-ENTMCNC: 31.1 G/DL (ref 32–36)
MCV RBC AUTO: 73 FL (ref 80–100)
NRBC BLD-RTO: 0 /100 WBCS (ref 0–0)
PHOSPHATE SERPL-MCNC: 6.1 MG/DL (ref 2.5–4.9)
PLATELET # BLD AUTO: 230 X10*3/UL (ref 150–450)
POTASSIUM SERPL-SCNC: 3.7 MMOL/L (ref 3.5–5.3)
Q ONSET: 210 MS
QRS COUNT: 20 BEATS
QRS DURATION: 154 MS
QT INTERVAL: 330 MS
QTC CALCULATION(BAZETT): 466 MS
QTC FREDERICIA: 415 MS
R AXIS: -55 DEGREES
RBC # BLD AUTO: 3.43 X10*6/UL (ref 4.5–5.9)
SODIUM SERPL-SCNC: 130 MMOL/L (ref 136–145)
T AXIS: 104 DEGREES
T OFFSET: 375 MS
VENTRICULAR RATE: 120 BPM
WBC # BLD AUTO: 8 X10*3/UL (ref 4.4–11.3)

## 2025-04-18 PROCEDURE — 99232 SBSQ HOSP IP/OBS MODERATE 35: CPT | Performed by: INTERNAL MEDICINE

## 2025-04-18 PROCEDURE — 2500000001 HC RX 250 WO HCPCS SELF ADMINISTERED DRUGS (ALT 637 FOR MEDICARE OP): Performed by: NURSE PRACTITIONER

## 2025-04-18 PROCEDURE — 2500000004 HC RX 250 GENERAL PHARMACY W/ HCPCS (ALT 636 FOR OP/ED): Mod: JZ | Performed by: INTERNAL MEDICINE

## 2025-04-18 PROCEDURE — 2500000002 HC RX 250 W HCPCS SELF ADMINISTERED DRUGS (ALT 637 FOR MEDICARE OP, ALT 636 FOR OP/ED): Performed by: NURSE PRACTITIONER

## 2025-04-18 PROCEDURE — 99233 SBSQ HOSP IP/OBS HIGH 50: CPT | Performed by: NURSE PRACTITIONER

## 2025-04-18 PROCEDURE — 2020000001 HC ICU ROOM DAILY

## 2025-04-18 PROCEDURE — 82947 ASSAY GLUCOSE BLOOD QUANT: CPT

## 2025-04-18 PROCEDURE — 80069 RENAL FUNCTION PANEL: CPT | Performed by: NURSE PRACTITIONER

## 2025-04-18 PROCEDURE — 36415 COLL VENOUS BLD VENIPUNCTURE: CPT | Performed by: INTERNAL MEDICINE

## 2025-04-18 PROCEDURE — 85027 COMPLETE CBC AUTOMATED: CPT | Performed by: INTERNAL MEDICINE

## 2025-04-18 PROCEDURE — 99233 SBSQ HOSP IP/OBS HIGH 50: CPT | Performed by: INTERNAL MEDICINE

## 2025-04-18 PROCEDURE — 83735 ASSAY OF MAGNESIUM: CPT | Performed by: NURSE PRACTITIONER

## 2025-04-18 PROCEDURE — 97110 THERAPEUTIC EXERCISES: CPT | Mod: GP,CQ

## 2025-04-18 PROCEDURE — 2500000004 HC RX 250 GENERAL PHARMACY W/ HCPCS (ALT 636 FOR OP/ED): Performed by: NURSE PRACTITIONER

## 2025-04-18 PROCEDURE — 99291 CRITICAL CARE FIRST HOUR: CPT | Performed by: INTERNAL MEDICINE

## 2025-04-18 PROCEDURE — 97530 THERAPEUTIC ACTIVITIES: CPT | Mod: GO

## 2025-04-18 RX ADMIN — SENNOSIDES AND DOCUSATE SODIUM 1 TABLET: 50; 8.6 TABLET ORAL at 20:34

## 2025-04-18 RX ADMIN — HEPARIN SODIUM 5000 UNITS: 5000 INJECTION INTRAVENOUS; SUBCUTANEOUS at 09:01

## 2025-04-18 RX ADMIN — AMIODARONE HYDROCHLORIDE 400 MG: 200 TABLET ORAL at 15:03

## 2025-04-18 RX ADMIN — INSULIN LISPRO 4 UNITS: 100 INJECTION, SOLUTION INTRAVENOUS; SUBCUTANEOUS at 16:46

## 2025-04-18 RX ADMIN — CHLOROTHIAZIDE SODIUM 500 MG: 500 INJECTION, POWDER, LYOPHILIZED, FOR SOLUTION INTRAVENOUS at 20:34

## 2025-04-18 RX ADMIN — HEPARIN SODIUM 5000 UNITS: 5000 INJECTION INTRAVENOUS; SUBCUTANEOUS at 20:34

## 2025-04-18 RX ADMIN — ASPIRIN 81 MG: 81 TABLET, COATED ORAL at 09:01

## 2025-04-18 RX ADMIN — ICOSAPENT ETHYL 1 G: 1 CAPSULE ORAL at 09:01

## 2025-04-18 RX ADMIN — AMIODARONE HYDROCHLORIDE 400 MG: 200 TABLET ORAL at 20:34

## 2025-04-18 RX ADMIN — CHLOROTHIAZIDE SODIUM 500 MG: 500 INJECTION, POWDER, LYOPHILIZED, FOR SOLUTION INTRAVENOUS at 10:33

## 2025-04-18 RX ADMIN — INSULIN LISPRO 6 UNITS: 100 INJECTION, SOLUTION INTRAVENOUS; SUBCUTANEOUS at 20:35

## 2025-04-18 RX ADMIN — FUROSEMIDE 40 MG/HR: 10 INJECTION, SOLUTION INTRAMUSCULAR; INTRAVENOUS at 18:36

## 2025-04-18 RX ADMIN — FUROSEMIDE 40 MG/HR: 10 INJECTION, SOLUTION INTRAMUSCULAR; INTRAVENOUS at 05:20

## 2025-04-18 RX ADMIN — GABAPENTIN 100 MG: 100 CAPSULE ORAL at 20:34

## 2025-04-18 RX ADMIN — INSULIN LISPRO 2 UNITS: 100 INJECTION, SOLUTION INTRAVENOUS; SUBCUTANEOUS at 07:06

## 2025-04-18 RX ADMIN — LEVOTHYROXINE SODIUM 50 MCG: 0.1 TABLET ORAL at 05:40

## 2025-04-18 RX ADMIN — HEPARIN SODIUM 5000 UNITS: 5000 INJECTION INTRAVENOUS; SUBCUTANEOUS at 15:03

## 2025-04-18 RX ADMIN — ATORVASTATIN CALCIUM 80 MG: 80 TABLET, FILM COATED ORAL at 09:01

## 2025-04-18 RX ADMIN — TAMSULOSIN HYDROCHLORIDE 0.4 MG: 0.4 CAPSULE ORAL at 09:01

## 2025-04-18 RX ADMIN — ICOSAPENT ETHYL 1 G: 1 CAPSULE ORAL at 16:46

## 2025-04-18 RX ADMIN — CETIRIZINE HYDROCHLORIDE 10 MG: 10 TABLET, FILM COATED ORAL at 09:01

## 2025-04-18 RX ADMIN — INSULIN LISPRO 2 UNITS: 100 INJECTION, SOLUTION INTRAVENOUS; SUBCUTANEOUS at 12:16

## 2025-04-18 RX ADMIN — Medication 1000 MCG: at 09:00

## 2025-04-18 RX ADMIN — AMIODARONE HYDROCHLORIDE 400 MG: 200 TABLET ORAL at 09:01

## 2025-04-18 ASSESSMENT — COGNITIVE AND FUNCTIONAL STATUS - GENERAL
DAILY ACTIVITIY SCORE: 20
WALKING IN HOSPITAL ROOM: A LOT
WALKING IN HOSPITAL ROOM: A LITTLE
MOBILITY SCORE: 16
MOBILITY SCORE: 16
PERSONAL GROOMING: A LITTLE
STANDING UP FROM CHAIR USING ARMS: A LITTLE
MOBILITY SCORE: 17
DRESSING REGULAR UPPER BODY CLOTHING: A LITTLE
PERSONAL GROOMING: A LITTLE
PERSONAL GROOMING: A LITTLE
DAILY ACTIVITIY SCORE: 19
HELP NEEDED FOR BATHING: A LITTLE
STANDING UP FROM CHAIR USING ARMS: A LITTLE
WALKING IN HOSPITAL ROOM: A LOT
CLIMB 3 TO 5 STEPS WITH RAILING: A LOT
DRESSING REGULAR LOWER BODY CLOTHING: A LITTLE
TURNING FROM BACK TO SIDE WHILE IN FLAT BAD: A LITTLE
TURNING FROM BACK TO SIDE WHILE IN FLAT BAD: A LITTLE
MOVING FROM LYING ON BACK TO SITTING ON SIDE OF FLAT BED WITH BEDRAILS: A LITTLE
TOILETING: A LITTLE
TOILETING: A LITTLE
DRESSING REGULAR LOWER BODY CLOTHING: A LITTLE
MOVING TO AND FROM BED TO CHAIR: A LITTLE
HELP NEEDED FOR BATHING: A LITTLE
MOVING TO AND FROM BED TO CHAIR: A LITTLE
WALKING IN HOSPITAL ROOM: A LOT
DRESSING REGULAR UPPER BODY CLOTHING: A LITTLE
DAILY ACTIVITIY SCORE: 19
DRESSING REGULAR LOWER BODY CLOTHING: A LITTLE
CLIMB 3 TO 5 STEPS WITH RAILING: A LOT
DRESSING REGULAR UPPER BODY CLOTHING: A LITTLE
TURNING FROM BACK TO SIDE WHILE IN FLAT BAD: A LITTLE
TURNING FROM BACK TO SIDE WHILE IN FLAT BAD: A LITTLE
STANDING UP FROM CHAIR USING ARMS: A LITTLE
PERSONAL GROOMING: A LITTLE
CLIMB 3 TO 5 STEPS WITH RAILING: A LOT
HELP NEEDED FOR BATHING: A LITTLE
TOILETING: A LITTLE
MOVING FROM LYING ON BACK TO SITTING ON SIDE OF FLAT BED WITH BEDRAILS: A LITTLE
HELP NEEDED FOR BATHING: A LITTLE
MOVING TO AND FROM BED TO CHAIR: A LITTLE
TOILETING: A LITTLE
MOVING FROM LYING ON BACK TO SITTING ON SIDE OF FLAT BED WITH BEDRAILS: A LITTLE
MOVING FROM LYING ON BACK TO SITTING ON SIDE OF FLAT BED WITH BEDRAILS: A LITTLE
STANDING UP FROM CHAIR USING ARMS: A LITTLE
DRESSING REGULAR LOWER BODY CLOTHING: A LITTLE
CLIMB 3 TO 5 STEPS WITH RAILING: A LOT
MOVING TO AND FROM BED TO CHAIR: A LITTLE

## 2025-04-18 ASSESSMENT — PAIN SCALES - GENERAL
PAINLEVEL_OUTOF10: 0 - NO PAIN

## 2025-04-18 ASSESSMENT — PAIN - FUNCTIONAL ASSESSMENT
PAIN_FUNCTIONAL_ASSESSMENT: 0-10

## 2025-04-18 ASSESSMENT — ACTIVITIES OF DAILY LIVING (ADL): HOME_MANAGEMENT_TIME_ENTRY: 4

## 2025-04-18 NOTE — PROGRESS NOTES
Occupational Therapy    OT Treatment    Patient Name: Ritesh Garza  MRN: [id number]  Department: [place] [address] ICU  Room: [address] [place] [address]  Today's Date: 4/18/2025  Time Calculation  Start Time: 1359  Stop Time: 1418  Time Calculation (min): 19 min        Assessment:  OT Assessment: Pt with decreased activity tolerance this date. Pt able to stand from chair and take steps forward and back with CGA. RN present in room.  Prognosis: Good  Evaluation/Treatment Tolerance: Patient tolerated treatment well, Patient limited by fatigue  Medical Staff Made Aware: Yes  End of Session Communication: Bedside nurse  End of Session Patient Position: Up in chair (pt working with PT)  OT Assessment Results: Decreased ADL status, Decreased safe judgment during ADL, Decreased cognition, Decreased endurance  Prognosis: Good  Barriers to Discharge: None  Evaluation/Treatment Tolerance: Patient tolerated treatment well, Patient limited by fatigue  Medical Staff Made Aware: Yes  Strengths: Ability to acquire knowledge, Access to adaptive/assistive products  Barriers to Participation: Attitude of self, Capable of completing ADLs semi/independent, Comorbidities  Plan:  Treatment Interventions: ADL retraining, Functional transfer training, UE strengthening/ROM, Endurance training, Compensatory technique education  OT Frequency: 2 times per week  OT Discharge Recommendations: Low intensity level of continued care  Equipment Recommended upon Discharge:  (shower chair (pt refusing))  OT Recommended Transfer Status: Assist of 1  OT - OK to Discharge: Yes  Treatment Interventions: ADL retraining, Functional transfer training, UE strengthening/ROM, Endurance training, Compensatory technique education    Subjective   Previous Visit Info:  OT Last Visit  OT Received On: 04/18/25  General:  General  Reason for Referral: To ED with SOB and tachycardia. Pt being worked up for CHF.  Referred By: Glenn Sharp MD  Past Medical History Relevant to Rehab: Medical  History[1]  Co-Treatment: PT  Co-Treatment Reason: to maximize safety and participation  Prior to Session Communication: Bedside nurse  Patient Position Received: Alarm off, not on at start of session, Up in chair  Preferred Learning Style: auditory, verbal  General Comment: pt agreeable to OT, pt with low activity tolerance  Precautions:  Medical Precautions: Fall precautions     Date/Time Vitals Session Patient Position Pulse Resp SpO2 BP MAP (mmHg)    04/18/25 1500 --  --  60  14  97 %  108/56  72     04/18/25 1503 --  --  60  --  --  108/56  72     04/18/25 1600 --  --  60  13  96 %  104/61  74                 Pain:  Pain Assessment  Pain Assessment: 0-10  0-10 (Numeric) Pain Score: 0 - No pain    Objective    Cognition:  Cognition  Overall Cognitive Status: Impaired at baseline  Orientation Level: Oriented X4     Activities of Daily Living: Grooming  Grooming Level of Assistance: Setup  Grooming Where Assessed: Chair  Grooming Comments: assist to setup, cues to remove glasses     Bed Mobility/Transfers:      Transfers  Transfer: Yes  Transfer 1  Technique 1: Sit to stand, Stand to sit  Transfer Device 1: Walker, Gait belt  Transfer Level of Assistance 1: Contact guard  Trials/Comments 1: cues for hand placement and safety    Functional Mobility:  Functional Mobility  Functional Mobility Performed: Yes  Functional Mobility 1  Surface 1: Level tile  Device 1: Rolling walker  Assistance 1: Contact guard, Minimal verbal cues  Comments 1: pt took ~5 steps forward and back from chair; pt fatigued requesting to sit     Standing Balance:  Dynamic Standing Balance  Dynamic Standing-Balance Support: Bilateral upper extremity supported  Dynamic Standing-Level of Assistance: Contact guard    Outcome Measures:[facility] Daily Activity  Putting on and taking off regular lower body clothing: A little  Bathing (including washing, rinsing, drying): A little  Putting on and taking off regular upper body clothing: None  Toileting,  which includes using toilet, bedpan or urinal: A little  Taking care of personal grooming such as brushing teeth: A little  Eating Meals: None  Daily Activity - Total Score: 20    Education Documentation  Body Mechanics, taught by Elizabeth Parker OT at 4/18/2025  4:19 PM.  Learner: Patient  Readiness: Acceptance  Method: Explanation, Demonstration  Response: Verbalizes Understanding, Needs Reinforcement    ADL Training, taught by Elizabeth Parker OT at 4/18/2025  4:19 PM.  Learner: Patient  Readiness: Acceptance  Method: Explanation, Demonstration  Response: Verbalizes Understanding, Needs Reinforcement    Education Comments  No comments found.           Goals:  Encounter Problems       Encounter Problems (Active)       ADLs       Patient will perform UB and LB bathing with modified independent level of assistance. (Progressing)       Start:  04/04/25    Expected End:  04/18/25            Patient with complete lower body dressing with modified independent level of assistance donning and doffing all LE clothes. (Not Progressing)       Start:  04/04/25    Expected End:  04/18/25               MOBILITY       Patient will perform Functional mobility mod  Household distances/Community Distances with stand by assist level of assistance and least restrictive device in order to improve safety and functional mobility. (Progressing)       Start:  04/04/25    Expected End:  04/18/25                                  [1]   Past Medical History:  Diagnosis Date    ADHD (attention deficit hyperactivity disorder) 1960’s    AICD (automatic cardioverter/defibrillator) present 05/30/2024    [facility]    Asthma     Benign prostatic hyperplasia Several years ago    CHF (congestive heart failure)     Cirrhosis (Multi)     CKD (chronic kidney disease)     Coronary artery disease Long Ago    Diabetes mellitus (Multi) Over 20 years ago    Erectile dysfunction     GERD (gastroesophageal reflux disease)     Gout     Hypertension Over 20 years ago     Ischemic cardiomyopathy     Kidney stone Several years ago    Myocardial infarction (Multi) Over 20 yesrs ago    SIDHU (nonalcoholic steatohepatitis)     SENIA (obstructive sleep apnea)     Stenosis of left subclavian artery     s/p stent 4/30/2024    Ventricular tachycardia (Multi)

## 2025-04-18 NOTE — PROGRESS NOTES
Subjective Data:  Cardiology Called back to case after his discharge was significantly delayed.  Since our last encounter, his Diuretics were stopped with worsening renal insuffiencey. Nephrology was involved. They requested a Right Heart Catheterization due to difficulty understanding his volume status. This was completed on Hospital Day 14.  This again demonstrated compensated Cardiogenic Shock in the setting of Severe Ischemic Cardiomyopathy with Biventricular Failure, again with elevated filling pressures and low Cardiac Index and Cardiac Output.  While in the cathlab, he was again given IV Diuretics with at least another 1L of urine produced. He was subsequently transferred back to ICU with RHC in place.     In the ICU, he was again started on IV Diuretics and cardiology discussed with treating teams.  We recommened ongoing diuresis at this time and goals of care because of chronic cardiogenic shock, chronic volume overload and extremely limited options.     Palliative care has been consulted and his code status has been changed to DNR/DNI which is appropriate and a Hospice Meeting is currently planned.      no acute events overnight, he is making urine but Cr remains very elevated at 6.3        Objective Data:  Last Recorded Vitals:  Vitals:    04/18/25 1100 04/18/25 1200 04/18/25 1300 04/18/25 1400   BP: (!) 98/48 109/56 113/61 101/57   BP Location:       Patient Position:       Pulse: 68 60 60 60   Resp: 18 16 13 15   Temp:       TempSrc:       SpO2: 94% 98%     Weight:       Height:           Results from last 7 days   Lab Units 04/18/25  0533 04/17/25  0300 04/16/25  0548   WBC AUTO x10*3/uL 8.0 8.9 12.0*   HEMOGLOBIN g/dL 7.8* 8.1* 8.9*   HEMATOCRIT % 25.1* 26.2* 28.5*   PLATELETS AUTO x10*3/uL 230 216 229     Results from last 7 days   Lab Units 04/18/25  0533 04/17/25  2246 04/17/25  0300 04/14/25  0559 04/13/25  1109   SODIUM mmol/L 130* 129* 128*   < >  --    POTASSIUM mmol/L 3.7 4.1 4.1   < >  --     CHLORIDE mmol/L 84* 85* 85*   < >  --    CO2 mmol/L 30 29 27   < >  --    BUN mg/dL 126* 124* 121*   < >  --    CREATININE mg/dL 6.33* 6.36* 6.02*   < >  --    CALCIUM mg/dL 8.4* 8.5* 8.3*   < >  --    PROTEIN TOTAL g/dL  --   --   --   --  7.1   BILIRUBIN TOTAL mg/dL  --   --   --   --  0.8   ALK PHOS U/L  --   --   --   --  111   ALT U/L  --   --   --   --  266*   AST U/L  --   --   --   --  69*   GLUCOSE mg/dL 146* 200* 122*   < >  --     < > = values in this interval not displayed.       TROPHS   Date/Time Value Ref Range Status   04/06/2025 10:45  0 - 20 ng/L Final     Comment:     Previous result verified on 4/5/2025 2049 on specimen/case [id number] called with component [facility] for procedure Troponin I, High Sensitivity with value 107 ng/L.   04/06/2025 08:21  0 - 20 ng/L Final     Comment:     Previous result verified on 4/5/2025 2049 on specimen/case [id number] called with component [facility] for procedure Troponin I, High Sensitivity with value 107 ng/L.   04/05/2025 07:18  0 - 20 ng/L Final     BNP   Date/Time Value Ref Range Status   04/16/2025 05:48 AM 1,848 0 - 99 pg/mL Final   04/06/2025 08:21 AM 3,082 0 - 99 pg/mL Final   03/25/2025 08:48 AM 2,349 <100 pg/mL Final     Comment:        BNP levels increase with age in the general  population with the highest values seen in  individuals greater than 75 years of age.  Reference: J. Am. Mellissa. Cardiol. 2002; 40:976-982.          HGBA1C   Date/Time Value Ref Range Status   02/13/2025 01:45 PM 6.2 4.2 - 6.5 % Final   01/11/2024 01:36 PM 6.6 see below % Final   07/31/2023 11:10 AM 7.0 % Final     Comment:          Diagnosis of Diabetes-Adults   Non-Diabetic: < or = 5.6%   Increased risk for developing diabetes: 5.7-6.4%   Diagnostic of diabetes: > or = 6.5%  .       Monitoring of Diabetes                Age (y)     Therapeutic Goal (%)   Adults:          >18           <7.0   Pediatrics:    13-18           <7.5                   7-12            <8.0                   0- 6            7.5-8.5   American Diabetes Association. Diabetes Care 33(S1), Jan 2010.     05/04/2023 12:15 PM 6.8 % Final     Comment:          Diagnosis of Diabetes-Adults   Non-Diabetic: < or = 5.6%   Increased risk for developing diabetes: 5.7-6.4%   Diagnostic of diabetes: > or = 6.5%  .       Monitoring of Diabetes                Age (y)     Therapeutic Goal (%)   Adults:          >18           <7.0   Pediatrics:    13-18           <7.5                   7-12           <8.0                   0- 6            7.5-8.5   American Diabetes Association. Diabetes Care 33(S1), Jan 2010.       LDLCALC   Date/Time Value Ref Range Status   05/06/2024 11:28 AM 53 <=99 mg/dL Final     Comment:                                 Near   Borderline      AGE      Desirable  Optimal    High     High     Very High     0-19 Y     0 - 109     ---    110-129   >/= 130     ----    20-24 Y     0 - 119     ---    120-159   >/= 160     ----      >24 Y     0 -  99   100-129  130-159   160-189     >/=190     11/13/2023 10:41 AM 36 <=99 mg/dL Final     Comment:                                 Near   Borderline      AGE      Desirable  Optimal    High     High     Very High     0-19 Y     0 - 109     ---    110-129   >/= 130     ----    20-24 Y     0 - 119     ---    120-159   >/= 160     ----      >24 Y     0 -  99   100-129  130-159   160-189     >/=190       VLDL   Date/Time Value Ref Range Status   05/06/2024 11:28 AM 30 0 - 40 mg/dL Final   11/13/2023 10:41 AM 41 0 - 40 mg/dL Final   07/31/2023 11:10 AM 38 0 - 40 mg/dL Final   05/04/2023 12:15 PM 50 0 - 40 mg/dL Final   05/05/2022 02:50 PM 72 0 - 40 mg/dL Final      Last I/O:  I/O last 3 completed shifts:  In: 902.7 (14.9 mL/kg) [P.O.:710; I.V.:192.7 (3.2 mL/kg)]  Out: 1899 (31.3 mL/kg) [Urine:1899 (0.9 mL/kg/hr)]  Weight: 60.7 kg     Past Cardiology Tests (Last 3 Years):    Echo:  Transthoracic Echo (TTE) Limited 04/07/2025   1. Left ventricular ejection  fraction is severely decreased, calculated by Reyes's biplane at 26%.   2. There is global hypokinesis of the left ventricle with minor regional variations.   3. Left ventricular cavity size is severely dilated.   4. There is reduced right ventricular systolic function.   5. Moderate mitral valve regurgitation.   6. Slightly elevated right ventricular systolic pressure.   7. The inferior vena cava appears mildly dilated, with IVC inspiratory collapse greater than 50%.   8. There is plaque visualized in the ascending aorta.   9. Compared with study dated 12/26/2024, The LVEF is depressed from 33% to currently 26% by Simpsons Biplane Method. RVSP has decreased from 40 mmHg to currently 29 mmHg. Moderate mitral regurgitation remains unchanged.      Transthoracic Echo (TTE) Limited 12/26/2024   1. Left ventricular ejection fraction is moderately decreased, calculated by Reyes's biplane at 33%.   2. Abnormal left venticular wall motion.   3. Left ventricular cavity size is mildly dilated.   4. No left ventricular thrombus visualized.   5. Moderate mitral valve regurgitation.   6. Mildly elevated right ventricular systolic pressure.   7. There is plaque visualized in the ascending aorta.      Transthoracic Echo (TTE) Limited 07/02/2024   1. Left ventricular ejection fraction is moderately decreased, by visual estimate at 30-35%.   2. Spectral Doppler shows a pseudonormal pattern of left ventricular diastolic filling.   3. Left ventricular cavity size is moderately dilated.   4. No left ventricular thrombus visualized.   5. Technically difficult exam, though there is moderate LV systolic dysfunction with the apical function being best but hypokinesis of the basal and mid segments. There is no LV apical thrombus seen on the echocontrast images.   6. There is reduced right ventricular systolic function.   7. The left atrium is moderately dilated.   8. Moderate mitral valve regurgitation.   9. Mildly elevated RVSP.  10.  Moderate tricuspid regurgitation visualized.  11. Compared with the prior exam from 5/10/2023, today's exam is more technically difficult making assessment of LV systolic function more difficult. Proir study demonstrated clear wall motiom abnormality in the basal septal, basal inferior and basal and mid inferolateral segments. The LV systolic function appears to be worse today ( hyaving declined from mild dysfunciton to moderate dysfunction. In addition, the degree of MR appears to have increased from mild to moderate.       Transthoracic echo (TTE) limited 04/26/2024      Transthoracic Echo (TTE) Complete 02/15/2024    Ejection Fractions:  EF   Date/Time Value Ref Range Status   04/07/2025 11:02 AM 26 %    12/26/2024 10:07 AM 33 %    07/02/2024 10:24 AM 33 %      Cath:  Cardiac Catheterization Procedure 04/16/2025   1. Severe biventricular failure by hemodynamics [RA 25, RVEDP 25, PCWP of 27 with a V wave of 47 mmHg] with low assumed Cinda cardiac output at 2.7 L/min [cardiac index 1.6 L/min/mï¿½, PA O2 sat of 36%] and elevated SVR at 1503 dynesï¿½secï¿½cm-5.   2. Patient is alert and oriented, hemodynamically stable with good BP, making good urine in response to diuresis [given extra 80 mg of IV Lasix in the Cath Lab], and has normal lactate. No indication for advanced mechanical circulatory support therapy at the time being.   3. Further management as per ICU and inpatient cardiology consult team.         Cardiac catheterization - coronary 04/15/2024  Coronary Angiography:  The coronary circulation is right dominant.     Left Main Coronary Artery:  The left main coronary artery is a normal caliber vessel. The left main arises normally from the left coronary sinus of Valsalva. The mid to distal left main coronary artery showed 80% stenosis.     Left Anterior Descending Coronary Artery Distribution:  The left anterior descending coronary artery is a normal caliber vessel. The LAD demonstrated chronic occlusion  originating at the proximal segment.     Circumflex Coronary Artery Distribution:  The circumflex arises normally from the left main coronary artery. The circumflex revealed chronic occlusion originating at the proximal segment.     Right Heart Catheterization:  A balloon tipped catheter was advanced through the right heart to record pressures. Cardiac output was calculated via the Cinda method. Elevated left sided filling pressures with normal cardiac output. Elevated ventricular filling pressure. Cardiac output is normal. Pulmonary venous hypertension.     Right Coronary Artery Distribution:     The RCA arises normally from the right sinus of Valsalva. The RCA showed chronic occlusion originating at the proximal segment.     Coronary Grafts:     LIMA Graft:  Left internal mammary artery graft conduit, originating in situ and attached to the proximal left anterior descending and 1st diagonal, is patent. The left internal mammary artery graft conduit originating in situ and attached to the proximal left anterior descending and 1st diagonal is patent.  Saphaneous Vein Graft(s):  The saphenous vein graft, originating from the aorta and attached to the distal right coronary artery, appeared totally occluded. The saphenous vein graft, originating from the aorta and attached to the distal right coronary artery appeared totally occluded.  The 2nd saphenous vein graft, originating from the aorta and attached to the 1st obtuse marginal, appeared totally occluded. The 2nd saphenous vein graft, originating from the aorta and attached to the 1st obtuse marginal appeared totally occluded.  Sequential Graft:  The saphenous vein graft, originating from the aorta and attached to the distal right coronary artery, appeared totally occluded.     Coronary Lesion Summary:  Vessel      Stenosis   Vessel Segment  Left Main 80% stenosis mid to distal        Graft Stenosis Summary:  Graft                Destination of Graft  LIMA  proximal  left anterior descending and 1st diagonal  SVG 1 distal right coronary artery  SVG 2 1st obtuse marginal        Subclavian Artery Findings:     Left Subclavian Artery:  The left subclavian artery revealed severe atherosclerotic disease and severe calcification. There was severe ostial stenosis in the left subclavian artery. The lesion was crossed with the glidwire. The 6Fr. cathteter was unable to be advanced. We then used a 4Fr catheter that was able to cross the stenosis. There was pressure gradient of ~28-30 mmHg across the stenosis.    Stress Test:  No results found for this or any previous visit from the past 1095 days.    Cardiac Imaging:  No results found for this or any previous visit from the past 1095 days.      Inpatient Medications:  Scheduled Medications[1]  PRN Medications[2]  Continuous Medications[3]    Physical Exam:  Documented Vital Signs   Heart Rate:  [60-74]   Temp:  [36.5 °C (97.7 °F)-37.1 °C (98.7 °F)]   Resp:  [11-20]   BP: ()/(44-67)   Weight:  [60.7 kg (133 lb 13.1 oz)]   SpO2:  [89 %-98 %]   Temp:  [36.5 °C (97.7 °F)-37.1 °C (98.7 °F)] 36.7 °C (98.1 °F)  Heart Rate:  [60-74] 60  Resp:  [11-20] 15  BP: ()/(44-67) 101/57      Oxygen Dose: *3 L/min    Documented Fluid Status     Intake/Output Summary (Last 24 hours) at 4/18/2025 1433  Last data filed at 4/18/2025 1200  Gross per 24 hour   Intake 478.76 ml   Output 1355 ml   Net -876.24 ml     Net IO Since Admission: -10,138.16 mL [04/18/25 1433]       Assessment/Plan   Ritesh Garza is a 74 y.o. male with past medical history of PMH of mvCAD s/p CABGx5 1992 c/b NSTEMI 2/2024 on DAPT, HFrEF d/t ICM, HTN, HLD, VT s/p ICD placement 5/2024, CKD III, seizures, PAD c/b severe left subclavian stenosis w/ compromised flow to the left POP s/p left subclavian stent 4/2024 , asthma, DM, gout, SIDHU cirrhosis, portal HTN, depression/anxiety, GERD, BPH, SENIA, , recent GIB 12/2024 (Plavix stopped with GIB), who presented to [facility] [place] with SOB,  weight gain, and tachycardia.  Cardiology consulted for CHF.     States he has been more short of breath than usual for the past few days. States he has never been short of breath in the past 32 years since his CABG.  He went to doctor visit with his cardiologist, and was told to come to ED for irregular heart rhythm. Denies any chest pain or angina.  States he has gained about 10lbs over the past few months.  Unsure if he is having orthopnea.  He is having some mild lower extremity swelling.  No recent illness, no nausea, vomiting, fever, or chills.      Arrival in the ED HR was 122 Tachypnic b/p was 137/87.   Labs showed BNP 3124. Troponin was 85->82,  K 5.1 Scr 2.31 TSH 14.99 WBC 11k, H/H 9/31  CT Chest showed  Mild interstitial pulmonary edema, small bilateral layering  pleural effusions and cardiomegaly with left ventricular enlargement  suggestive of mild congestive heart failure changes.   He was placed on BiPap, and ultimately placed on oxygen at 2L via NC.  Comfortable.      His Cardiac Device Interrogation performed 4/3/2025 showed no no therapy delivered and importantly no atrial fibrillation or atrial flutter.  Felt that he was having an atrial tachycardia below detection rate and was increased on his beta blockade to help address this. We had originally suggested that he was volume up with some fluid and suggested diuresis. On HD-3 he had a thibodeaux catheter with minimal urinary output and some contraction noted on labs. We held his lasix in light of this and decreased oxygen requirement. He was noted to be aggressive verbally, which he is not known to be from prior encounter, rather the opposite and had a sitter at bedside.Overnight, he reported chest pain and was given Oxycodone rather than NTG due to a softer blood pressures and concerns for decompensation.  He had brief self limited episodes of ventricular tachycardia with pacing and baseline Left Bundle Branch   His troponin was modestly elevated, but  within range of his known severe disease. His Creatinine had climbed, his potassium was signifincalty elevated. He has been transferred to ICU for supportive care and potential dialysis. He responded to IV Diuretics and Diuril for Nephron Blockade. He ultimately was able to be diuresed to with a reduction ion his Creatinine to ~ 2.72 with about 15L of total fluid removed at that point. He was net -12L. His Weight at that time was 57.7 kg  He was transitioned to oral agents.  He had some sustained episodes of Ventricular Tachycardia for which we started Amiodarone.  He was anticipated for discharge and Cardiology had signed off.  His PO Intake appears to have increased as per documentation and he again had worsening kidney dysfunction and fluid retention. Nephrology was again engaged and Diuretics were held and Fluids given, with thought being that he was over diuresed. Despite these interventions he continued to have worsening symptoms ultimately leading to repeat RHC as detailed above.     At this time, we are left with very limited options.     He is known to have a severely ischemic cardiomyopathy with known severe left main stenosis, known native LAD  known native circumflex , known RCA .  His remote bypass grafting completed in 1992 is also known to be jeopardized.  The LIMA graft attaches to first Proximal left anterior descending and first diagonal is patent while SVG grafts originating from the aorta attached to the distal right coronary is occluded, a second patch to OM1 is completely occluded.  And we know that there is severe ostial stenosis of the left subclavian artery with a 20 to 30 mm drop in pressure across this lesion.  He does not have any revascularization options at this time.    He is further known to be proarrhythmic as evidenced by sustained episodes of atrial tachycardias and  ventricular tachycardias during this admission that required initiation of amiodarone.    He has been known  to be in chronic cardiogenic shock for which he has compensated as much as he can, but initiation of inotropes or vasodilators would likely worsen his clinical outcome.  We have not been able to initiate many medications that would be utilized for guideline directed medical therapy in the past  while both hospitalized and in the out patient setting as well as this admission.  Given his multiple comorbidities, he is not a candidate for advanced therapies either.    As has been stated, I do not think that he would be a good candidate for aggressive fluid removal via CVVH or intermittent hemodialysis as I do not think that he would tolerate this therapy due to hemodynamic compromise.  He has been on minimal medications and remains hypotensive - low normotensive.      We have likely reached the stage where he will be back into the hospital repeatedly to be diuresed / fluid removed with rapid re accumulation due to failing organ systems as evidenced by progression of end stage ischemic cardiomyopathy, heart failure and Cardio-Renal Syndrome.     Agree with re-institution of IV Diuretics and Sequential Nephron Blockade to try and remove additional fluid.     Agree with Goals of Care Discussions     Agree with Hospice discussions as well, currently DNR / DNI, possible hospice care soon       NO additional recommendations at this time     Cardiology will sign off. Contact if any additional questions or concerns.                 Peripheral IV 04/09/25 20 G Left;Anterior Forearm (Active)   Site Assessment Clean;Dry;Intact 04/17/25 0900   Dressing Status Clean;Dry;Occlusive 04/17/25 0900   Number of days: 8       Urethral Catheter (Active)   Output (mL) 50 mL 04/17/25 1800   Number of days: 3       Code Status:  DNR and No Intubation      Mahendra Santacruz DO   Division of Cardiovascular Medicine  [facility] [place]             [1]   Scheduled medications   Medication Dose Route Frequency     [Held by provider] allopurinol  100 mg oral Daily    amiodarone  400 mg oral TID    Followed by    [START ON 4/19/2025] amiodarone  400 mg oral Daily    [Held by provider] amLODIPine  10 mg oral Daily    aspirin  81 mg oral Daily    atorvastatin  80 mg oral Daily    [Held by provider] bumetanide  2 mg oral BID    cetirizine  10 mg oral Daily    chlorothiazide  500 mg intravenous q12h    cyanocobalamin  1,000 mcg oral Daily    [Held by provider] dapagliflozin propanediol  10 mg oral Daily before evening meal    gabapentin  100 mg oral Nightly    heparin  5,000 Units subcutaneous TID    icosapent ethyL  1 g oral BID    insulin lispro  0-10 Units subcutaneous Before meals & nightly    iron sucrose  200 mg intravenous Once    levothyroxine  50 mcg oral Daily    [Held by provider] metoprolol succinate XL  50 mg oral Daily    oxygen   inhalation Continuous - 02/gases    sennosides-docusate sodium  1 tablet oral Nightly    tamsulosin  0.4 mg oral Daily   [2]   PRN medications   Medication    dextrose    dextrose    famotidine    glucagon    glucagon    ipratropium-albuteroL    nitroglycerin    oxyCODONE   [3]   Continuous Medications   Medication Dose Last Rate    furosemide  40 mg/hr 40 mg/hr (04/18/25 0600)

## 2025-04-18 NOTE — PROGRESS NOTES
04/18/25 1300   Discharge Planning   Expected Discharge Disposition Home H  (Hospice consult)          Patient remains in ICU. His swan was discontinued. He is still diuresing. Patient has Hospice of  consult. His EF is 26 %. If patient is going home ,he has HHC in place. Will follow for discharge needs.

## 2025-04-18 NOTE — PROGRESS NOTES
Ritesh Garza is a 74 y.o. male on day 16 of admission presenting with Acute respiratory failure with hypoxia.      Subjective   Mr. Garza is a 74-year-old man with a history of coronary artery disease status post 5 vessel bypass in 1995, suffered a NSTEMI in February 2024.  Heart failure with reduced EF at 33%, moderate MR, pulmonary hypertension last December.  Has hypertension, hyperlipidemia, has had VT status post ICD last May.  Peripheral vascular disease, known left subclavian stenosis, had a stent placed 1 year ago.  He has diabetes, gout, SIDHU cirrhosis, portal hypertension, obstructive sleep apnea, has had a GI bleed.  Stage IIIb chronic kidney disease, baseline creatinine in the 2 mg/dL range.  He came in with shortness of breath, 10 pound weight gain, elevated BNP.  Elevated lactate, acute kidney injury, elevated liver enzymes.  CT noted pulmonary edema with pleural effusions, but was felt to be heart failure, ascites.  Placed on BiPAP, was in the ICU and seen by Dr. Stout.  Was placed on a Lasix drip, EF 26% by echocardiogram.  Responded well to the Lasix drip initially.      He had the Love catheter placed.  We gave him back IV fluids as his weight had gotten low.  But, he had elevated JVP yesterday, I felt that he was going in the wrong direction.  He had a right heart cath done, has biventricular failure, high LVEDP, high RVEDP.  Evidence of passive liver congestion.  In the ICU now.  More lethargic, he does wake up.  Blood pressures are better.    Chart/labs/meds/notes/imaging/VS reviewed.       Objective          Vitals 24HR  Heart Rate:  [59-73]   Temp:  [36.5 °C (97.7 °F)-37.1 °C (98.7 °F)]   Resp:  [11-20]   BP: ()/(44-71)   Weight:  [60.7 kg (133 lb 13.1 oz)]   SpO2:  [89 %-100 %]     Intake/Output last 3 Shifts:    Intake/Output Summary (Last 24 hours) at 4/18/2025 0844  Last data filed at 4/18/2025 0600  Gross per 24 hour   Intake 721.76 ml   Output 1358 ml   Net -636.24 ml        Physical Exam  General: Less communicative, he will wake up and converse.    HEENT: Normocephalic. Moist mucosa.    Neck: Supple. No elevated jugular venous pressure. No HJR.   Cardiovascular:  Regular rate and rhythm. Normal S1 and S2. No murmurs, rubs or gallops.  Elevated JVP now Love catheter was placed.  As his weight had gotten down to 125 pounds, 12 pounds lower than when Dr. Melara at centimeters, we gave him fluids back, his blood pressure has been running low at times.  Pulmonary:  Clear to auscultation bilaterally.   Abdomen:  Soft. Non-tender. Non-distended. Positive bowel sounds.  Lower Extremities: No lower leg edema appreciated.  Neurologic:  Alert and oriented x3. No focal deficit.   Skin: Skin warm and dry, normal skin turgor. No rashes or lesions to exposed skin.  Psychiatric: Normal mood and behavior.    Scheduled Medications  [Held by provider] allopurinol, 100 mg, oral, Daily  amiodarone, 400 mg, oral, TID   Followed by  [START ON 4/19/2025] amiodarone, 400 mg, oral, Daily  [Held by provider] amLODIPine, 10 mg, oral, Daily  aspirin, 81 mg, oral, Daily  atorvastatin, 80 mg, oral, Daily  [Held by provider] bumetanide, 2 mg, oral, BID  cetirizine, 10 mg, oral, Daily  chlorothiazide, 500 mg, intravenous, q12h  cyanocobalamin, 1,000 mcg, oral, Daily  [Held by provider] dapagliflozin propanediol, 10 mg, oral, Daily before evening meal  gabapentin, 100 mg, oral, Nightly  heparin, 5,000 Units, subcutaneous, TID  icosapent ethyL, 1 g, oral, BID  insulin lispro, 0-10 Units, subcutaneous, Before meals & nightly  iron sucrose, 200 mg, intravenous, Once  levothyroxine, 50 mcg, oral, Daily  [Held by provider] metoprolol succinate XL, 50 mg, oral, Daily  oxygen, , inhalation, Continuous - 02/gases  sennosides-docusate sodium, 1 tablet, oral, Nightly  tamsulosin, 0.4 mg, oral, Daily      Continuous medications  furosemide, 40 mg/hr, Last Rate: 40 mg/hr (04/18/25 0600)          PRN medications: dextrose,  dextrose, famotidine, glucagon, glucagon, ipratropium-albuteroL, nitroglycerin, oxyCODONE     Relevant Results  Results from last 7 days   Lab Units 04/18/25  0533 04/17/25  0300 04/16/25  0548 04/15/25  0542 04/13/25  1108   WBC AUTO x10*3/uL 8.0 8.9 12.0*   < > 11.3   HEMOGLOBIN g/dL 7.8* 8.1* 8.9*   < > 9.2*   HEMATOCRIT % 25.1* 26.2* 28.5*   < > 29.3*   PLATELETS AUTO x10*3/uL 230 216 229   < > 268   NEUTROS PCT AUTO %  --   --   --   --  68.8   LYMPHS PCT AUTO %  --   --   --   --  18.0   MONOS PCT AUTO %  --   --   --   --  12.2   EOS PCT AUTO %  --   --   --   --  0.4    < > = values in this interval not displayed.     Results from last 7 days   Lab Units 04/18/25  0533 04/17/25  2246 04/17/25  0300   SODIUM mmol/L 130* 129* 128*   POTASSIUM mmol/L 3.7 4.1 4.1   CHLORIDE mmol/L 84* 85* 85*   CO2 mmol/L 30 29 27   BUN mg/dL 126* 124* 121*   CREATININE mg/dL 6.33* 6.36* 6.02*   GLUCOSE mg/dL 146* 200* 122*   CALCIUM mg/dL 8.4* 8.5* 8.3*       Cardiac Catheterization Procedure   Final Result      XR chest 1 view   Final Result   Cardiomegaly.                  MACRO:   None        Signed by: Jo Ann Roberto 4/9/2025 3:17 PM   Dictation workstation:   [id number]      Transthoracic Echo (TTE) Limited   Final Result      XR chest 1 view   Final Result   Limited inspiration. Enlarged cardiac silhouette.        MACRO:   none        Signed by: Anette Borjas 4/6/2025 10:30 AM   Dictation workstation:   [id number]      XR chest 1 view   Final Result   Resolving right basilar opacities, residual left basilar opacities.   Signed by Luke Middleton MD      Cardiac device check - Inpatient   Final Result      CT chest wo IV contrast   Final Result   1.  Mild interstitial pulmonary edema, small bilateral layering   pleural effusions and cardiomegaly with left ventricular enlargement   suggestive of mild congestive heart failure changes.   2.  Mild peritoneal ascites.   3.  Status post cholecystectomy.   Signed by Yuri Francis,  MD      XR chest 1 view   Final Result   Enlarged cardiac silhouette with moderate pulmonary edema             MACRO:   None        Signed by: Hank Razo 4/2/2025 5:52 PM   Dictation workstation:   [id number]               Assessment/Plan   This patient currently has cardiac telemetry ordered; if you would like to modify or discontinue the telemetry order, click here to go to the orders activity to modify/discontinue the order.  Ritesh Garza is a 74 y.o. male with a past medical history of coronary artery disease status post 5v CABG 1995, NSTEMI in February 2024, HFrEF with an LVEF of 33%, moderate mitral valve regurg, mild pulmonary hypertension by 2D echo 12/2024, ischemic cardiomyopathy, hypertension, hyperlipidemia, ventricular tachycardia status post ICD placement 5/2024, seizures, peripheral arterial disease, severe left subclavian stenosis status post stenting 4/2024, asthma, diabetes, gout, SIDHU cirrhosis, portal hypertension, depression, anxiety, GERD, BPH, obstructive sleep apnea, recent history of GI bleed and stage G3b/A3 chronic kidney disease with a baseline creatinine that fluctuates around 2 mg/deciliter who presented with shortness of breath, a 10 pound weight gain and tachycardia.  Workup was notable for an elevated BNP above 3,000.  There was transaminitis, and elevated lactate and worsening acute kidney injury.  CT imaging of the chest showed mild interstitial pulmonary edema, small bilateral layering pleural effusions, cardiomegaly with LV enlargement consistent with mild CHF.  There was mild ascites.  He was placed on BiPAP and admitted to the intensive care unit.  Nephrology was consulted for acute kidney injury on top of stage G3b chronic kidney disease management.    Mr. Garza had a significant acute kidney injury in the setting of heart failure physiology. He was placed on Lasix drip at 20 mg/hour. He was given a dose of IV Diuril due to being anuric. Fortunately, he  responded to the diuretic drip. Blood pressures are up-and-down.  EF 26% is worrisome.  But he is now suffering worsening kidney function.  The question is whether his weight is lower than it should be for him.  His weight is 131 pounds on the bed scale, was 127 pounds standing on Friday.  A bladder scan suggested urinary retention of over 300 mL.  He had a Love catheter placed.  He was 137 pounds when seen by Dr. Hennessy.  I noted elevated JVP, he had a right heart cath, biventricular failure, elevated LVEDP, RVEDP, low cardiac index.  He has cardiogenic shock.  He is on the Lasix drip, starting to make more urine, blood pressures are looking better.  No plans for an inotrope.  Hospice are involved.  We may have to go in that direction.  Kidneys are not getting better or worse, stable hyponatremia.  His wife looks to be on board with going in the direction of hospice but I will await the results of those conversations.  Assessment & Plan  Acute respiratory failure with hypoxia    Tachycardia    Subclavian arterial stenosis    Hypervolemia    Pulmonary hypertension (Multi)      I spent 55 minutes in the professional and overall care of this patient.      Kevin Hutchins MD

## 2025-04-18 NOTE — PROGRESS NOTES
Critical Care Medicine Progress Note    Impression/Plan:  Ritesh Garza is a 74 y.o. male with past medical history of PMH of mvCAD s/p CABGx5 1992 c/b NSTEMI 2/2024 on DAPT, HFrEF d/t ICM, HTN, HLD, VT s/p ICD placement 5/2024, CKD III, seizures, PAD c/b severe left subclavian stenosis w/ compromised flow to the left POP s/p left subclavian stent 4/2024 , asthma, DM, gout, SIDHU cirrhosis, portal HTN, depression/anxiety, GERD, BPH, SENIA, , recent GIB 12/2024 (Plavix stopped d/t GIB), who presented to [facility] w/ wide complex tachycardia, associated dyspnea, which prompted bipap and admission to the ICU for further care. He has returned to the ICU for a 3rd time in the context of concerns regarding fluid overload, most recently on 4/16 s/p RHC w/quinton-dwaine catheter placement. On-going issues include the following:      Metabolic encephalopathy - likely developing uremia   Ischemic CM - LV EF 26%, findings c/w biventricular failure on echo from 4/6/2025 in the context of  CABG which are no longer patent.   AT on admission, currently on Amiodarone rendering patient a poor candidate for Inotrope therapy d/t significant arrhythmia risk rosa in context of ICM  Cardiorenal syndrome as a consequence of #1  [facility] counseling/discussion - see below                  Neuro/GI, Nutrition   - A-F bundle   - Sleep Hygiene measures: appropriate daytime stimulation/physical activity. Lights out at 2100, avoidance of night time lab draws/night baths, avoidance of delirium provoking medications  - will liberalize fluid restriction if mentation permits        CV/Pulm/renal electrolyte, acid-base:  - holding heparin gtt d/t hx of GIB  - Farxiga and Entresto held d/t TAYLOR  - Amiodarone maintenance dosing  - Lasix gtt + Diuril: titrate dosing to achieve min net fluid balance goal of - 500 for 24 hrs             Pulm:   - goal spo2 >/= 92%        ID  - nil      Heme   - DVT ppx: josefina     Endocrine  - Vascepa + Lipitor   - goal serum glucose 140-180  "mg/dL      Musculoskeletal/skin  - skin protective measures   - PT/OT      Code status: DNR/DNI - d/w Palliative Med at bedside.      Family contact: Petra Garza -      Critical Care time: 40 min - will keep patient on Crit Care Medicine service through discharge in order to maintain continuity of care. This was discussed with Petra (spouse) ICU charge nurse and nurse supervisor on 4/17.        Interval History:  Patient and Petra are interested in pursuing hospice per GOC discussion on 4/17. Consults to SW and Hospice placed.             Objective   Vitals:  Most Recent:  Vitals:    04/18/25 0800   BP: 103/60   Pulse: 60   Resp: 12   Temp:    SpO2:        24hr Min/Max:  Temp  Min: 36.5 °C (97.7 °F)  Max: 37.1 °C (98.7 °F)  Pulse  Min: 59  Max: 73  BP  Min: 84/60  Max: 109/44  Resp  Min: 11  Max: 20  SpO2  Min: 89 %  Max: 100 %    LDA:  Urethral Catheter (Active)   Placement Date/Time: 04/14/25 1310   Hand Hygiene Completed: Yes  Catheter Balloon Size: 10 mL  Urine Returned: Yes   Number of days: 3             Hemodynamic parameters for last 24 hours:       I/O:  I/O last 2 completed shifts:  In: 724.8 (11.9 mL/kg) [P.O.:560; I.V.:164.8 (2.7 mL/kg)]  Out: 1474 (24.3 mL/kg) [Urine:1474 (1 mL/kg/hr)]  Weight: 60.7 kg     Physical Exam:   /60   Pulse 60   Temp 36.7 °C (98.1 °F) (Temporal)   Resp 12   Ht 1.6 m (5' 2.99\")   Wt 60.7 kg (133 lb 13.1 oz)   SpO2 97%   BMI 23.71 kg/m²   Gen: sleepy, less alert than previous exam  HENT: NC/AT, mucous membranes are dry  Neuro: GCS E4, V5, M6, no focal deficits   Neck: Ext JVD again noted although below the angle of mandible  CV: S1s2  Pulm: CTAB  Chest: neg accessory muscle recruitment   Abd: soft, non-tender   Ext: well perfused, cap refill < 2 sec  Skin: warm and dry, no mottling or rash                    "

## 2025-04-18 NOTE — PROGRESS NOTES
04/18/25 0857   Discharge Planning   Expected Discharge Disposition [facility]     Hospice referral was placed yesterday for [facility]. [facility] is working to connect with wife. SW will follow.    Addendum: [facility] meeting scheduled for tomorrow(4/19) at 9:30am.

## 2025-04-18 NOTE — PROGRESS NOTES
Physical Therapy    Physical Therapy Treatment    Patient Name: Ritesh Garza  MRN: [id number]  Department: [place] [address] ICU  Room: [address] [place] [address]  Today's Date: 4/18/2025  Time Calculation  Start Time: 1410  Stop Time: 1426  Time Calculation (min): 16 min         Assessment/Plan   PT Assessment  Barriers to Participation: Other (Comment)  End of Session Communication: Bedside nurse  Assessment Comment: Pt activity tolerance and ambulation limited by general/overall fatigue. Frequent rest breaks provided to manage SOB and discomfort.  End of Session Patient Position: Up in chair, Alarm on  PT Plan  Inpatient/Swing Bed or Outpatient: Inpatient  PT Plan  Treatment/Interventions: Transfer training, Gait training, Therapeutic exercise  PT Plan: Ongoing PT  PT Frequency: 2 times per week  PT Discharge Recommendations: Low intensity level of continued care  Equipment Recommended upon Discharge: Wheeled walker  PT Recommended Transfer Status: Contact guard  PT - OK to Discharge: Yes (Per PT POC.)      General Visit Information:   PT  Visit  PT Received On: 04/18/25  General  Reason for Referral: To ED with SOB and tachycardia. Pt being worked up for CHF.  Referred By: Glenn Sharp MD  Co-Treatment: OT  Co-Treatment Reason: to maximize safety and participation  Prior to Session Communication: Bedside nurse  Patient Position Received: Up in chair (Pt withOT upon arrival.)  Preferred Learning Style: auditory, verbal  General Comment: Pt agreeable to PT session.    Subjective   Precautions:  Precautions  Medical Precautions: Fall precautions  Precautions Comment: carlos eduardo     Date/Time Vitals Session Patient Position Pulse Resp SpO2 BP MAP (mmHg)    04/18/25 1500 --  --  60  14  97 %  108/56  72     04/18/25 1503 --  --  60  --  --  108/56  72     04/18/25 1600 --  --  60  13  96 %  104/61  74                 Objective   Pain:  Pain Assessment  Pain Assessment: 0-10  0-10 (Numeric) Pain Score: 0 - No  pain  Cognition:  Cognition  Overall Cognitive Status: Within Functional Limits     Treatments:  Therapeutic Exercise  Therapeutic Exercise Performed: Yes  Therapeutic Exercise Activity 1: Pt performed the following seated ther exs x10: AP, LAQ, marches, and hip abd/add.    Ambulation/Gait Training  Ambulation/Gait Training Performed: Yes  Ambulation/Gait Training 1  Surface 1: Level tile  Device 1: Rolling walker  Gait Support Devices: Gait belt  Assistance 1: Contact guard  Quality of Gait 1: Diminished heel strike, Inconsistent stride length, Decreased step length  Comments/Distance (ft) 1: 3' fwd/bwd (Pt amb tolerance limited by general fatigue and notes feeling more unstable at this time.)  Transfers  Transfer: Yes  Transfer 1  Transfer From 1: Sit to, Stand to  Transfer to 1: Sit, Stand, Chair with arms  Technique 1: Sit to stand, Stand to sit  Transfer Device 1: Walker, Gait belt  Transfer Level of Assistance 1: Contact guard  Trials/Comments 1: 2 Trials (Vcs rovided for proper hand placement to stand to use of UE to control descent to chair.)    Outcome Measures:  [facility] Basic Mobility  Turning from your back to your side while in a flat bed without using bedrails: A little  Moving from lying on your back to sitting on the side of a flat bed without using bedrails: A little  Moving to and from bed to chair (including a wheelchair): A little  Standing up from a chair using your arms (e.g. wheelchair or bedside chair): A little  To walk in hospital room: A little  Climbing 3-5 steps with railing: A lot  Basic Mobility - Total Score: 17    Education Documentation  Home Exercise Program, taught by Steffi Crowder PTA at 4/18/2025  4:40 PM.  Learner: Patient  Readiness: Acceptance  Method: Explanation  Response: Verbalizes Understanding    Body Mechanics, taught by Steffi Crowder PTA at 4/18/2025  4:40 PM.  Learner: Patient  Readiness: Acceptance  Method: Explanation  Response: Verbalizes Understanding    Mobility  Training, taught by Steffi Crowder PTA at 4/18/2025  4:40 PM.  Learner: Patient  Readiness: Acceptance  Method: Explanation  Response: Verbalizes Understanding    Education Comments  No comments found.        OP EDUCATION:       Encounter Problems       Encounter Problems (Active)       Balance       complete all mobility with normal balance while dual tasking, negotiating in a dynamic environment, carrying items, etc., with proactive and reactive static and dynamic standing and sitting tasks independently, >15 minutes.   (Progressing)       Start:  04/04/25    Expected End:  04/18/25               Mobility       STG - Patient will ambulate 150 ft independently with stable vitals.  (Progressing)       Start:  04/04/25    Expected End:  04/18/25            STG - Patient will ascend and descend 2 stairs mod I with LRAD (Progressing)       Start:  04/04/25    Expected End:  04/18/25               PT Transfers       STG - Patient will perform bed mobility independently.  (Progressing)       Start:  04/04/25    Expected End:  04/18/25            STG - Patient will transfer sit to and from stand independently.  (Progressing)       Start:  04/04/25    Expected End:  04/18/25               Pain - Adult

## 2025-04-18 NOTE — CARE PLAN
The patient's goals for the shift include      The clinical goals for the shift include PT remains HDS with strict I&O monitoring  throughout the shift    Over the shift, the patient did not make progress toward the following goals. Barriers to progression include cardiorenal syndrome and decreased cardiac index & output

## 2025-04-18 NOTE — PROGRESS NOTES
"Chidi Garza is a 74 y.o. male on day 16 of admission presenting with Acute respiratory failure with hypoxia.      Interval Hx and Subjective no acute events overnight, he is making urine but Cr remains very elevated at 6.3       Objective  Blood pressure 103/60, pulse 60, temperature 36.7 °C (98.1 °F), temperature source Temporal, resp. rate 12, height 1.6 m (5' 2.99\"), weight 60.7 kg (133 lb 13.1 oz), SpO2 97%.    General:  appears comfortable, NAD  Neuro: Less communicative today, unclear if lethargy or withdrawal, no focal findings  Psych:  cooperative, does not open eyes or engage much  HEENT:  oral mucosa moist without exudate, tongue midline.     Resps:  resps unlabored, lungs CTA, no cough noted  Card:  RRR, + murmur, monitor with pacer spikes, frequent PVCs, generalized not pitting edema to limbs  GI:  normal bowel sounds, soft and non tender  :  thibodeaux with dilute yellow urine  MS:  No injury or deformity noted  Integ:  skin warm and dry overall, pale, sallow        Imaging  No results found.    Cardiology, Vascular, and Other Imaging  Cardiac Catheterization Procedure  Result Date: 4/16/2025   [facility], Cath Lab, [address] [place] [address] Cardiovascular Catheterization Report Patient Name:     CHIDI GARZA    Performing Physician:  [id number] Schwartz MD Study Date:       4/16/2025           Verifying Physician:   [id number] Schwartz MD MRN/PID:          [id number]            Cardiologist/Co-Scrub: Accession#:       [id number]        Ordering Provider:     [id number] DAVID FARRELL Date of           1950 / 74      Cardiologist: Birth/Age:        years Gender:           M                   Fellow:                [id number] Lesley Higuera                                                              " MD Encounter#:       [id number]          Surgeon:  Study: Right Heart Cath  Indications: Heart failure.  Procedure Description: After infiltration of local anesthetic, the left internal jugular vein was identified with two-dimensional ultrasound. Under direct ultrasound visualization, the left internal jugular vein was cannulated with a micropuncture technique. A 9 Kazakh sheath was placed in the vein. Cardiac output was calculated via the Cinda method. Post-procedure, the venous sheath was left intact and sutured in place.  Procedure Description Comments: Access of the Procedure: 5 Kazakh right brachial vein, closed with manual pressure. 9 Kazakh right internal jugular vein, sutured in place. External marker of [place]-Matthew at 51 cm.  Right Heart Catheterization: Cardiac output was calculated via the Cinda method. Elevated left sided filling pressures with low cardiac output. Severe biventricular failure by hemodynamics [RA 25, RVEDP 25, PCWP of 27 with a V wave of 47 mmHg] with low assumed Cinda cardiac output at 2.7 L/min [cardiac index 1.6 L/min/mï¿½, PA O2 sat of 36%] with elevated SVR at 1503 dynesï¿½secï¿½cm-5.  Complications: No in-lab complications observed.  Cardiac Cath Post Procedure Notes: Post Procedure Diagnosis: See below. Blood Loss:               Estimated blood loss during the procedure was 5 mls. Specimens Removed:        Number of specimen(s) removed: none. ____________________________________________________________________________________ CONCLUSIONS:  1. Severe biventricular failure by hemodynamics [RA 25, RVEDP 25, PCWP of 27 with a V wave of 47 mmHg] with low assumed Cinda cardiac output at 2.7 L/min [cardiac index 1.6 L/min/mï¿½, PA O2 sat of 36%] and elevated SVR at 1503 dynesï¿½secï¿½cm-5.  2. Patient is alert and oriented, hemodynamically stable with good BP, making good urine in response to diuresis [given extra 80 mg of IV Lasix in the Cath Lab], and has normal lactate. No indication for  advanced mechanical circulatory support therapy at the time being.  3. Further management as per ICU and inpatient cardiology consult team. ICD 10 Codes: Acute on chronic systolic (congestive) heart failure-I50.23  CPT Codes: Jorge Castro-[id number]; Ultrasound guidance for vascular access-[id number]  [id number] Eder Schwartz MD Performing Physician Electronically signed by [id number] Eder Schwartz MD on 4/16/2025 at 5:53:06 PM  ** Final **        Results for orders placed or performed during the hospital encounter of 04/02/25 (from the past 24 hours)   POCT GLUCOSE   Result Value Ref Range    POCT Glucose 148 (H) 74 - 99 mg/dL   POCT GLUCOSE   Result Value Ref Range    POCT Glucose 197 (H) 74 - 99 mg/dL   POCT GLUCOSE   Result Value Ref Range    POCT Glucose 256 (H) 74 - 99 mg/dL   POCT GLUCOSE   Result Value Ref Range    POCT Glucose 89 74 - 99 mg/dL   POCT GLUCOSE   Result Value Ref Range    POCT Glucose 264 (H) 74 - 99 mg/dL   Renal Function Panel   Result Value Ref Range    Glucose 200 (H) 74 - 99 mg/dL    Sodium 129 (L) 136 - 145 mmol/L    Potassium 4.1 3.5 - 5.3 mmol/L    Chloride 85 (L) 98 - 107 mmol/L    Bicarbonate 29 21 - 32 mmol/L    Anion Gap 19 10 - 20 mmol/L    Urea Nitrogen 124 (HH) 6 - 23 mg/dL    Creatinine 6.36 (H) 0.50 - 1.30 mg/dL    eGFR 8 (L) >60 mL/min/1.73m*2    Calcium 8.5 (L) 8.6 - 10.3 mg/dL    Phosphorus 6.0 (H) 2.5 - 4.9 mg/dL    Albumin 3.3 (L) 3.4 - 5.0 g/dL   Magnesium   Result Value Ref Range    Magnesium 2.24 1.60 - 2.40 mg/dL   Renal Function Panel   Result Value Ref Range    Glucose 146 (H) 74 - 99 mg/dL    Sodium 130 (L) 136 - 145 mmol/L    Potassium 3.7 3.5 - 5.3 mmol/L    Chloride 84 (L) 98 - 107 mmol/L    Bicarbonate 30 21 - 32 mmol/L    Anion Gap 20 10 - 20 mmol/L    Urea Nitrogen 126 (HH) 6 - 23 mg/dL    Creatinine 6.33 (H) 0.50 - 1.30 mg/dL    eGFR 9 (L) >60 mL/min/1.73m*2    Calcium 8.4 (L) 8.6 - 10.3 mg/dL    Phosphorus 6.1 (H) 2.5 - 4.9 mg/dL    Albumin 3.3 (L) 3.4 - 5.0 g/dL   CBC   Result  Value Ref Range    WBC 8.0 4.4 - 11.3 x10*3/uL    nRBC 0.0 0.0 - 0.0 /100 WBCs    RBC 3.43 (L) 4.50 - 5.90 x10*6/uL    Hemoglobin 7.8 (L) 13.5 - 17.5 g/dL    Hematocrit 25.1 (L) 41.0 - 52.0 %    MCV 73 (L) 80 - 100 fL    MCH 22.7 (L) 26.0 - 34.0 pg    MCHC 31.1 (L) 32.0 - 36.0 g/dL    RDW 18.5 (H) 11.5 - 14.5 %    Platelets 230 150 - 450 x10*3/uL   POCT GLUCOSE   Result Value Ref Range    POCT Glucose 154 (H) 74 - 99 mg/dL     *Note: Due to a large number of results and/or encounters for the requested time period, some results have not been displayed. A complete set of results can be found in Results Review.      Scheduled medications  Scheduled Medications[1]  Continuous medications  Continuous Medications[2]  PRN medications  PRN Medications[3]     Dietary Orders (From admission, onward)       Start     Ordered    04/07/25 1118  Adult diet Regular; 1000 mL fluid  Diet effective now        Question Answer Comment   Diet type Regular    Dietary fluid restriction / 24h: 1000 mL fluid        04/07/25 1117    04/03/25 0127  May Participate in Room Service  ( ROOM SERVICE MAY PARTICIPATE)  Once        Question:  .  Answer:  Yes    04/03/25 0126                     Assessment/Plan     Ritesh Garza is a 74 y.o. male with complex PMHx of CAD s/p CABG x 5 in 1992(grafts found occluded in [facility] 4/2024) , NSTEMI 2/2024, HFrEF( Echo on 4/7 showed EF of 26%.)  ischemic cardiomyopathy, HTN, HLD, ICD placement 5/2024 for v-tach, CKD III, seizure disorder, PAD c/b severe left subclavian stenosis with stent 4/2024, asthma, DM, gout, SIDHU cirrhosis, portal HTN, depression/anxiety, GERD, BPH, SENIA, recent GI bleed 12/2024 who presented to our ED from his cardiologist's office on 4/2/25 with SOB and tachycardia.  He required BiPap in ED and admitted to ICU with acute respiratory failure due to CHF exacerbation.   He stabilized and moved out of ICU 4/5, went back after rapid response on 4/6, back to floor 4/9.  Kidney function  worsened and urine output decreased.  He went to cath lab 4/16, found to be in severe biventricular failure.  A [place] Matthew catheter was placed and he was admitted to ICU.  He is now in ARF with climbing Cr, reduced urine output.  Dr. Hutchins does not feel patient is a candidate for RRT due to cardiac status..  HCPOA on file naming patient's wife Petra as only proxy agent.  Palliative medicine consulted to assist with goals of care.     Goals of care:  4/17 Myself and Dr. Peña met with patient to discuss his current status including the fact that we can manage symptoms, but cannot repair his heart's function, he is not a candidate for RRT therapy at this time.  CPR was discussed and patient reported he would not want resuscitative measures.  Patient while clear he would not want CPR, may not have full insight on his more immediate prognosis so I saved discussion on hospice for when I spoke with wife on the phone.  She was surprised but in agreement with code status change.  She was also open to meeting with hospice.  This morning, hospice reports they have left messages for wife but have not yet received response.    -code status changed to DNR/DNI  -hospice referral in process, SW aware wife has not returned hospice calls and will follow up  -will place [place] Portable DNR form at bedside closer to discharge for transport purposes    Patient appears comfortable, less alert today.  No symptoms burden at present but suggest the following if needed  -hydromorphone 0.2 mg IV Q 1 hour PRN dyspnea   -lorazepam 0.5 mg IV Q 4 hours PRN anxiety     Ally Talley APRN CNP          [1] [Held by provider] allopurinol, 100 mg, oral, Daily  amiodarone, 400 mg, oral, TID   Followed by  [START ON 4/19/2025] amiodarone, 400 mg, oral, Daily  [Held by provider] amLODIPine, 10 mg, oral, Daily  aspirin, 81 mg, oral, Daily  atorvastatin, 80 mg, oral, Daily  [Held by provider] bumetanide, 2 mg, oral, BID  cetirizine, 10 mg, oral,  Daily  chlorothiazide, 500 mg, intravenous, q12h  cyanocobalamin, 1,000 mcg, oral, Daily  [Held by provider] dapagliflozin propanediol, 10 mg, oral, Daily before evening meal  gabapentin, 100 mg, oral, Nightly  heparin, 5,000 Units, subcutaneous, TID  icosapent ethyL, 1 g, oral, BID  insulin lispro, 0-10 Units, subcutaneous, Before meals & nightly  iron sucrose, 200 mg, intravenous, Once  levothyroxine, 50 mcg, oral, Daily  [Held by provider] metoprolol succinate XL, 50 mg, oral, Daily  oxygen, , inhalation, Continuous - 02/gases  sennosides-docusate sodium, 1 tablet, oral, Nightly  tamsulosin, 0.4 mg, oral, Daily  [2] furosemide, 40 mg/hr, Last Rate: 40 mg/hr (04/18/25 0600)  [3] PRN medications: dextrose, dextrose, famotidine, glucagon, glucagon, ipratropium-albuteroL, nitroglycerin, oxyCODONE

## 2025-04-19 ENCOUNTER — HOSPITAL ENCOUNTER (INPATIENT)
Facility: HOSPITAL | Age: 75
DRG: 951 | End: 2025-04-19
Attending: INTERNAL MEDICINE | Admitting: INTERNAL MEDICINE
Payer: MEDICARE

## 2025-04-19 VITALS
RESPIRATION RATE: 14 BRPM | HEIGHT: 63 IN | TEMPERATURE: 97.3 F | WEIGHT: 134.7 LBS | BODY MASS INDEX: 23.87 KG/M2 | HEART RATE: 67 BPM | DIASTOLIC BLOOD PRESSURE: 58 MMHG | SYSTOLIC BLOOD PRESSURE: 115 MMHG | OXYGEN SATURATION: 98 %

## 2025-04-19 DIAGNOSIS — E03.9 HYPOTHYROIDISM, UNSPECIFIED TYPE: ICD-10-CM

## 2025-04-19 DIAGNOSIS — J30.2 SEASONAL ALLERGIES: ICD-10-CM

## 2025-04-19 DIAGNOSIS — K22.10 EROSIVE ESOPHAGITIS: ICD-10-CM

## 2025-04-19 DIAGNOSIS — M48.02 CERVICAL STENOSIS OF SPINE: ICD-10-CM

## 2025-04-19 LAB
ALBUMIN SERPL BCP-MCNC: 3.3 G/DL (ref 3.4–5)
ANION GAP SERPL CALC-SCNC: 17 MMOL/L (ref 10–20)
BUN SERPL-MCNC: 124 MG/DL (ref 6–23)
CALCIUM SERPL-MCNC: 8.4 MG/DL (ref 8.6–10.3)
CHLORIDE SERPL-SCNC: 84 MMOL/L (ref 98–107)
CO2 SERPL-SCNC: 33 MMOL/L (ref 21–32)
CREAT SERPL-MCNC: 5.7 MG/DL (ref 0.5–1.3)
EGFRCR SERPLBLD CKD-EPI 2021: 10 ML/MIN/1.73M*2
GLUCOSE BLD MANUAL STRIP-MCNC: 131 MG/DL (ref 74–99)
GLUCOSE BLD MANUAL STRIP-MCNC: 131 MG/DL (ref 74–99)
GLUCOSE BLD MANUAL STRIP-MCNC: 245 MG/DL (ref 74–99)
GLUCOSE SERPL-MCNC: 122 MG/DL (ref 74–99)
PHOSPHATE SERPL-MCNC: 5.7 MG/DL (ref 2.5–4.9)
POTASSIUM SERPL-SCNC: 3.4 MMOL/L (ref 3.5–5.3)
SODIUM SERPL-SCNC: 131 MMOL/L (ref 136–145)

## 2025-04-19 PROCEDURE — 2500000002 HC RX 250 W HCPCS SELF ADMINISTERED DRUGS (ALT 637 FOR MEDICARE OP, ALT 636 FOR OP/ED): Performed by: INTERNAL MEDICINE

## 2025-04-19 PROCEDURE — 2500000002 HC RX 250 W HCPCS SELF ADMINISTERED DRUGS (ALT 637 FOR MEDICARE OP, ALT 636 FOR OP/ED): Performed by: NURSE PRACTITIONER

## 2025-04-19 PROCEDURE — 80069 RENAL FUNCTION PANEL: CPT | Performed by: STUDENT IN AN ORGANIZED HEALTH CARE EDUCATION/TRAINING PROGRAM

## 2025-04-19 PROCEDURE — 2500000004 HC RX 250 GENERAL PHARMACY W/ HCPCS (ALT 636 FOR OP/ED): Mod: JZ | Performed by: INTERNAL MEDICINE

## 2025-04-19 PROCEDURE — 99233 SBSQ HOSP IP/OBS HIGH 50: CPT | Performed by: INTERNAL MEDICINE

## 2025-04-19 PROCEDURE — 99239 HOSP IP/OBS DSCHRG MGMT >30: CPT | Performed by: STUDENT IN AN ORGANIZED HEALTH CARE EDUCATION/TRAINING PROGRAM

## 2025-04-19 PROCEDURE — 2500000004 HC RX 250 GENERAL PHARMACY W/ HCPCS (ALT 636 FOR OP/ED): Performed by: NURSE PRACTITIONER

## 2025-04-19 PROCEDURE — 36415 COLL VENOUS BLD VENIPUNCTURE: CPT | Performed by: STUDENT IN AN ORGANIZED HEALTH CARE EDUCATION/TRAINING PROGRAM

## 2025-04-19 PROCEDURE — 2500000001 HC RX 250 WO HCPCS SELF ADMINISTERED DRUGS (ALT 637 FOR MEDICARE OP): Performed by: STUDENT IN AN ORGANIZED HEALTH CARE EDUCATION/TRAINING PROGRAM

## 2025-04-19 PROCEDURE — 2500000005 HC RX 250 GENERAL PHARMACY W/O HCPCS: Performed by: NURSE PRACTITIONER

## 2025-04-19 PROCEDURE — 2500000001 HC RX 250 WO HCPCS SELF ADMINISTERED DRUGS (ALT 637 FOR MEDICARE OP): Performed by: NURSE PRACTITIONER

## 2025-04-19 PROCEDURE — 82947 ASSAY GLUCOSE BLOOD QUANT: CPT

## 2025-04-19 PROCEDURE — 99291 CRITICAL CARE FIRST HOUR: CPT | Performed by: STUDENT IN AN ORGANIZED HEALTH CARE EDUCATION/TRAINING PROGRAM

## 2025-04-19 PROCEDURE — 1150000001 HC HOSPICE PRIVATE ROOM DAILY

## 2025-04-19 RX ORDER — POTASSIUM CHLORIDE 20 MEQ/1
40 TABLET, EXTENDED RELEASE ORAL EVERY 4 HOURS
Status: DISCONTINUED | OUTPATIENT
Start: 2025-04-19 | End: 2025-04-19 | Stop reason: HOSPADM

## 2025-04-19 RX ORDER — HALOPERIDOL 2 MG/ML
1 SOLUTION ORAL EVERY 8 HOURS PRN
Status: DISCONTINUED | OUTPATIENT
Start: 2025-04-19 | End: 2025-04-22 | Stop reason: HOSPADM

## 2025-04-19 RX ORDER — HYDROMORPHONE HYDROCHLORIDE 2 MG/1
2 TABLET ORAL
Refills: 0 | Status: DISCONTINUED | OUTPATIENT
Start: 2025-04-19 | End: 2025-04-22 | Stop reason: HOSPADM

## 2025-04-19 RX ORDER — LEVOTHYROXINE SODIUM 100 UG/1
50 TABLET ORAL DAILY
Status: DISCONTINUED | OUTPATIENT
Start: 2025-04-20 | End: 2025-04-19

## 2025-04-19 RX ORDER — HYDROMORPHONE HYDROCHLORIDE 2 MG/1
1 TABLET ORAL
Refills: 0 | Status: DISCONTINUED | OUTPATIENT
Start: 2025-04-19 | End: 2025-04-22 | Stop reason: HOSPADM

## 2025-04-19 RX ORDER — ALBUTEROL SULFATE 0.83 MG/ML
2.5 SOLUTION RESPIRATORY (INHALATION) EVERY 4 HOURS PRN
Status: DISCONTINUED | OUTPATIENT
Start: 2025-04-19 | End: 2025-04-22 | Stop reason: HOSPADM

## 2025-04-19 RX ORDER — LORAZEPAM 0.5 MG/1
0.5 TABLET ORAL EVERY 4 HOURS PRN
Status: DISCONTINUED | OUTPATIENT
Start: 2025-04-19 | End: 2025-04-22 | Stop reason: HOSPADM

## 2025-04-19 RX ORDER — FAMOTIDINE 20 MG/1
20 TABLET, FILM COATED ORAL DAILY PRN
Status: DISCONTINUED | OUTPATIENT
Start: 2025-04-19 | End: 2025-04-22 | Stop reason: HOSPADM

## 2025-04-19 RX ORDER — FAMOTIDINE 20 MG/1
20 TABLET, FILM COATED ORAL DAILY PRN
Status: CANCELLED | OUTPATIENT
Start: 2025-04-19

## 2025-04-19 RX ORDER — CETIRIZINE HYDROCHLORIDE 10 MG/1
10 TABLET ORAL DAILY
Status: CANCELLED | OUTPATIENT
Start: 2025-04-20

## 2025-04-19 RX ORDER — GABAPENTIN 100 MG/1
100 CAPSULE ORAL NIGHTLY
Status: DISCONTINUED | OUTPATIENT
Start: 2025-04-19 | End: 2025-04-22 | Stop reason: HOSPADM

## 2025-04-19 RX ORDER — CETIRIZINE HYDROCHLORIDE 10 MG/1
10 TABLET ORAL DAILY
Status: DISCONTINUED | OUTPATIENT
Start: 2025-04-20 | End: 2025-04-19

## 2025-04-19 RX ORDER — HALOPERIDOL 2 MG/ML
1 SOLUTION ORAL EVERY 4 HOURS PRN
Status: DISCONTINUED | OUTPATIENT
Start: 2025-04-19 | End: 2025-04-22 | Stop reason: HOSPADM

## 2025-04-19 RX ORDER — CETIRIZINE HYDROCHLORIDE 10 MG/1
10 TABLET ORAL DAILY PRN
Status: DISCONTINUED | OUTPATIENT
Start: 2025-04-19 | End: 2025-04-22 | Stop reason: HOSPADM

## 2025-04-19 RX ORDER — HYOSCYAMINE SULFATE 0.12 MG/1
0.12 TABLET, ORALLY DISINTEGRATING ORAL EVERY 4 HOURS PRN
Status: DISCONTINUED | OUTPATIENT
Start: 2025-04-19 | End: 2025-04-22 | Stop reason: HOSPADM

## 2025-04-19 RX ORDER — LEVOTHYROXINE SODIUM 100 UG/1
50 TABLET ORAL DAILY
Status: CANCELLED | OUTPATIENT
Start: 2025-04-20

## 2025-04-19 RX ORDER — GABAPENTIN 100 MG/1
100 CAPSULE ORAL NIGHTLY
Status: CANCELLED | OUTPATIENT
Start: 2025-04-19

## 2025-04-19 RX ADMIN — CHLOROTHIAZIDE SODIUM 500 MG: 500 INJECTION, POWDER, LYOPHILIZED, FOR SOLUTION INTRAVENOUS at 09:14

## 2025-04-19 RX ADMIN — ICOSAPENT ETHYL 1 G: 1 CAPSULE ORAL at 08:35

## 2025-04-19 RX ADMIN — ATORVASTATIN CALCIUM 80 MG: 80 TABLET, FILM COATED ORAL at 08:34

## 2025-04-19 RX ADMIN — Medication 2 L/MIN: at 08:00

## 2025-04-19 RX ADMIN — HEPARIN SODIUM 5000 UNITS: 5000 INJECTION INTRAVENOUS; SUBCUTANEOUS at 08:34

## 2025-04-19 RX ADMIN — INSULIN LISPRO 4 UNITS: 100 INJECTION, SOLUTION INTRAVENOUS; SUBCUTANEOUS at 12:03

## 2025-04-19 RX ADMIN — FUROSEMIDE 40 MG/HR: 10 INJECTION, SOLUTION INTRAMUSCULAR; INTRAVENOUS at 06:15

## 2025-04-19 RX ADMIN — LEVOTHYROXINE SODIUM 50 MCG: 0.1 TABLET ORAL at 05:39

## 2025-04-19 RX ADMIN — Medication 1000 MCG: at 08:34

## 2025-04-19 RX ADMIN — POTASSIUM CHLORIDE 40 MEQ: 1500 TABLET, EXTENDED RELEASE ORAL at 10:18

## 2025-04-19 RX ADMIN — GABAPENTIN 100 MG: 100 CAPSULE ORAL at 21:21

## 2025-04-19 RX ADMIN — CETIRIZINE HYDROCHLORIDE 10 MG: 10 TABLET, FILM COATED ORAL at 08:35

## 2025-04-19 RX ADMIN — ASPIRIN 81 MG: 81 TABLET, COATED ORAL at 08:34

## 2025-04-19 RX ADMIN — TAMSULOSIN HYDROCHLORIDE 0.4 MG: 0.4 CAPSULE ORAL at 08:34

## 2025-04-19 RX ADMIN — AMIODARONE HYDROCHLORIDE 400 MG: 200 TABLET ORAL at 08:34

## 2025-04-19 ASSESSMENT — PAIN - FUNCTIONAL ASSESSMENT
PAIN_FUNCTIONAL_ASSESSMENT: 0-10

## 2025-04-19 ASSESSMENT — COGNITIVE AND FUNCTIONAL STATUS - GENERAL
STANDING UP FROM CHAIR USING ARMS: A LITTLE
WALKING IN HOSPITAL ROOM: A LOT
CLIMB 3 TO 5 STEPS WITH RAILING: A LOT
MOVING FROM LYING ON BACK TO SITTING ON SIDE OF FLAT BED WITH BEDRAILS: A LITTLE
STANDING UP FROM CHAIR USING ARMS: A LOT
TURNING FROM BACK TO SIDE WHILE IN FLAT BAD: A LITTLE
DRESSING REGULAR UPPER BODY CLOTHING: A LITTLE
PERSONAL GROOMING: A LITTLE
MOVING FROM LYING ON BACK TO SITTING ON SIDE OF FLAT BED WITH BEDRAILS: A LITTLE
DRESSING REGULAR UPPER BODY CLOTHING: A LITTLE
HELP NEEDED FOR BATHING: A LITTLE
MOVING FROM LYING ON BACK TO SITTING ON SIDE OF FLAT BED WITH BEDRAILS: A LITTLE
PERSONAL GROOMING: A LITTLE
TOILETING: A LITTLE
MOVING TO AND FROM BED TO CHAIR: A LITTLE
WALKING IN HOSPITAL ROOM: A LOT
MOVING TO AND FROM BED TO CHAIR: A LOT
DAILY ACTIVITIY SCORE: 17
WALKING IN HOSPITAL ROOM: A LOT
CLIMB 3 TO 5 STEPS WITH RAILING: A LOT
MOBILITY SCORE: 16
DAILY ACTIVITIY SCORE: 19
PERSONAL GROOMING: A LITTLE
DRESSING REGULAR LOWER BODY CLOTHING: A LITTLE
DRESSING REGULAR LOWER BODY CLOTHING: A LITTLE
MOBILITY SCORE: 16
HELP NEEDED FOR BATHING: A LOT
TURNING FROM BACK TO SIDE WHILE IN FLAT BAD: A LITTLE
DAILY ACTIVITIY SCORE: 19
TOILETING: A LITTLE
DRESSING REGULAR LOWER BODY CLOTHING: A LITTLE
MOBILITY SCORE: 14
TOILETING: A LITTLE
STANDING UP FROM CHAIR USING ARMS: A LITTLE
DRESSING REGULAR UPPER BODY CLOTHING: A LITTLE
MOVING TO AND FROM BED TO CHAIR: A LITTLE
EATING MEALS: A LITTLE
TURNING FROM BACK TO SIDE WHILE IN FLAT BAD: A LITTLE
HELP NEEDED FOR BATHING: A LITTLE
CLIMB 3 TO 5 STEPS WITH RAILING: A LOT

## 2025-04-19 ASSESSMENT — PAIN SCALES - GENERAL
PAINLEVEL_OUTOF10: 0 - NO PAIN

## 2025-04-19 NOTE — NURSING NOTE
RN Hospice Note    Ritesh Garza is a Hospice Patient.   Hospice terminal diagnosis: Heart Failure  Physician: Dr. Peters  Visit type: Initial Visit/GIP admission    Comments/recommendations: Pt admitted GIP today for fluid overload/dyspnea. HWR services and POC were discussed with wife/DPOAHC Petra Garza via telephone, she verbalized her understanding and signed consents remotely. Pt is presently a DNR/DNI, will continue to discuss GOC to transition to DNRCC. Plan will be to DC home with services Tuesday 4/22/25.    Discharge Planning:  Patient to be discharged to Home with HWR services Tuesday 4/22/25.      Please notify [facility] of any changes in condition. Thank you.  Office: [phone] (8 am-6:30 pm M-F and 8 am-4:30 pm weekends and holidays)   [phone] (6:30 pm-8 am M-F and 4:30 pm-8 am weekends and holidays)    AUDRA PLATT RN

## 2025-04-19 NOTE — PROGRESS NOTES
"Ritesh Garza is a 74 y.o. male on day 17 of admission presenting with Acute respiratory failure with hypoxia.    Subjective   TYLER  Hospice met with wife Petra this morning via telephone call       Objective     Physical Exam  Constitutional:       Comments: Elderly adult male appearing younger than stated age, asleep but awoken easily, clear speech but confused thought process, talking about seeing estranged grandchild and wanting to go back to work. States that he definitely wants to go home   Cardiovascular:      Rate and Rhythm: Normal rate and regular rhythm.   Pulmonary:      Effort: Pulmonary effort is normal.   Abdominal:      Palpations: Abdomen is soft.   Skin:     General: Skin is warm and dry.   Neurological:      General: No focal deficit present.      Mental Status: He is oriented to person, place, and time.   Psychiatric:         Mood and Affect: Mood normal.         Behavior: Behavior normal.       Last Recorded Vitals  Blood pressure 117/61, pulse 68, temperature 36.2 °C (97.1 °F), temperature source Temporal, resp. rate 16, height 1.6 m (5' 2.99\"), weight 61.1 kg (134 lb 11.2 oz), SpO2 100%.  Intake/Output last 3 Shifts:  I/O last 3 completed shifts:  In: 455.8 (7.5 mL/kg) [P.O.:200; I.V.:255.8 (4.2 mL/kg)]  Out: 2240 (36.7 mL/kg) [Urine:2240 (1 mL/kg/hr)]  Weight: 61.1 kg     Relevant Results  Scheduled medications  Scheduled Medications[1]  Continuous medications  Continuous Medications[2]  PRN medications  PRN Medications[3]    LABS:  CMP:  Results from last 7 days   Lab Units 04/19/25  0833 04/18/25  0533 04/17/25  2246 04/17/25  0300 04/16/25  2211 04/16/25  0548 04/15/25  0542 04/14/25  0559 04/13/25  1109 04/13/25  0540   SODIUM mmol/L 131* 130* 129* 128* 128* 129* 128* 129*  --  131*   POTASSIUM mmol/L 3.4* 3.7 4.1 4.1 3.4* 4.0 3.7 3.4*  --  3.5   CHLORIDE mmol/L 84* 84* 85* 85* 84* 84* 82* 81*  --  83*   CO2 mmol/L 33* 30 29 27 28 27 29 31  --  30   ANION GAP mmol/L 17 20 19 20 19 22* " 21* 20  --  22*   BUN mg/dL 124* 126* 124* 121* 120* 118* 118* 114*  --  109*   CREATININE mg/dL 5.70* 6.33* 6.36* 6.02* 5.98* 5.75* 5.10* 4.38*  --  3.71*   EGFR mL/min/1.73m*2 10* 9* 8* 9* 9* 10* 11* 13*  --  16*   MAGNESIUM mg/dL  --  2.24  --  2.23  --  2.32 2.31 2.41*  --  2.28   ALBUMIN g/dL 3.3* 3.3* 3.3* 3.2* 3.2* 3.5 3.6  --  3.8  --    ALT U/L  --   --   --   --   --   --   --   --  266*  --    AST U/L  --   --   --   --   --   --   --   --  69*  --    BILIRUBIN TOTAL mg/dL  --   --   --   --   --   --   --   --  0.8  --      CBC:  Results from last 7 days   Lab Units 04/18/25  0533 04/17/25  0300 04/16/25  0548 04/15/25  0542 04/13/25  1108   WBC AUTO x10*3/uL 8.0 8.9 12.0* 9.5 11.3   HEMOGLOBIN g/dL 7.8* 8.1* 8.9* 8.4* 9.2*   HEMATOCRIT % 25.1* 26.2* 28.5* 27.4* 29.3*   PLATELETS AUTO x10*3/uL 230 216 229 224 268   MCV fL 73* 73* 73* 73* 72*     COAG:     HEME/ENDO:     CARDIAC:     Cardiac Catheterization Procedure   Final Result      XR chest 1 view   Final Result   Cardiomegaly.                  MACRO:   None        Signed by: Jo Ann Roberto 4/9/2025 3:17 PM   Dictation workstation:   [id number]      Transthoracic Echo (TTE) Limited   Final Result      XR chest 1 view   Final Result   Limited inspiration. Enlarged cardiac silhouette.        MACRO:   none        Signed by: Anette Borjas 4/6/2025 10:30 AM   Dictation workstation:   [id number]      XR chest 1 view   Final Result   Resolving right basilar opacities, residual left basilar opacities.   Signed by Luke Middleton MD      Cardiac device check - Inpatient   Final Result      CT chest wo IV contrast   Final Result   1.  Mild interstitial pulmonary edema, small bilateral layering   pleural effusions and cardiomegaly with left ventricular enlargement   suggestive of mild congestive heart failure changes.   2.  Mild peritoneal ascites.   3.  Status post cholecystectomy.   Signed by Yuri Francis MD      XR chest 1 view   Final Result   Enlarged  cardiac silhouette with moderate pulmonary edema             MACRO:   None        Signed by: Hank Razo 4/2/2025 5:52 PM   Dictation workstation:   [id number]      This patient has a urinary catheter   Reason for the urinary catheter remaining today? critically ill patient who need accurate urinary output measurements and end-of-life care           Assessment & Plan  Tachycardia    Subclavian arterial stenosis    Hypervolemia    Pulmonary hypertension (Multi)    Ritesh Garza is a 74 y.o. male who presented with wide-complex tachycardia and dyspnea. Extensive PMHx including CAD s/p CABGx5 (1995) c/b NSTEMI (2/2024) on DAPT, end stage HFrEF d/t ICM, VT s/p ICD placement (5/2024), HTN, HLD, CKD3, seizures, PAD c/b severe Lt subclavian stenosis with compromised flow to Lt POP s/p Lt subclavian stent (4/2024), asthma, DM, gout, SIDHU cirrhosis with portal HTN, depression/anxiety, GERD, BPH, SENIA, and recent GIB (12/2024 - Plavix stopped).    He was treated with BiPAP and admitted to [facility] ICU for further critical care management of acute metabolic encephalopathy, ischemic cardiomyopathy with biventricular failure causing cardiorenal syndrome and complicated by arrhythmia.    Unfortunately, his multiple acute on chronic end-stage conditions have progressed despite aggressive management. He was already limited in his options for further therapy given his multiple competing comorbidities and has been deemed not a candidate for inotropic therapy, dialysis, or further interventional therapies. Patient and wife have opted to pursue hospice measures and have met with hospice today. Due to concern of patient's capacity for insight on complex conditions with his history of dementia and uremic metabolic encephalopathy, Ritesh's wife Petra has discussed with hospice and myself today and will sign consents. Plan to transition patient to inpatient hospice and go home with hospice mid-next week. Will transfer to hospitalist  service once GIP.        Thalia Jade PA-C         [1] [Held by provider] allopurinol, 100 mg, oral, Daily  amiodarone, 400 mg, oral, Daily  [Held by provider] amLODIPine, 10 mg, oral, Daily  aspirin, 81 mg, oral, Daily  atorvastatin, 80 mg, oral, Daily  [Held by provider] bumetanide, 2 mg, oral, BID  cetirizine, 10 mg, oral, Daily  cyanocobalamin, 1,000 mcg, oral, Daily  [Held by provider] dapagliflozin propanediol, 10 mg, oral, Daily before evening meal  gabapentin, 100 mg, oral, Nightly  heparin, 5,000 Units, subcutaneous, TID  icosapent ethyL, 1 g, oral, BID  insulin lispro, 0-10 Units, subcutaneous, Before meals & nightly  iron sucrose, 200 mg, intravenous, Once  levothyroxine, 50 mcg, oral, Daily  [Held by provider] metoprolol succinate XL, 50 mg, oral, Daily  oxygen, , inhalation, Continuous - 02/gases  potassium chloride CR, 40 mEq, oral, q4h  sennosides-docusate sodium, 1 tablet, oral, Nightly  tamsulosin, 0.4 mg, oral, Daily  [2] furosemide, 40 mg/hr, Last Rate: 40 mg/hr (04/19/25 1000)  [3] PRN medications: dextrose, dextrose, famotidine, glucagon, glucagon, ipratropium-albuteroL, nitroglycerin, oxyCODONE

## 2025-04-19 NOTE — CARE PLAN
The patient's goals for the shift include      The clinical goals for the shift include PT will remain HDS throuhgout the shift    Over the shift, the patient did make progress toward the following goals.

## 2025-04-19 NOTE — CARE PLAN
The patient's goals for the shift include comfort    The clinical goals for the shift include pt will remain comfortable and pain free this shift      Problem: Pain - Adult  Goal: Verbalizes/displays adequate comfort level or baseline comfort level  Outcome: Progressing     Problem: Safety - Adult  Goal: Free from fall injury  Outcome: Progressing     Problem: Discharge Planning  Goal: Discharge to home or other facility with appropriate resources  Outcome: Progressing     Problem: Chronic Conditions and Co-morbidities  Goal: Patient's chronic conditions and co-morbidity symptoms are monitored and maintained or improved  Outcome: Progressing     Problem: Nutrition  Goal: Nutrient intake appropriate for maintaining nutritional needs  Outcome: Progressing     Problem: Skin  Goal: Prevent/manage excess moisture  Outcome: Progressing  Flowsheets (Taken 4/19/2025 1726)  Prevent/manage excess moisture:   Cleanse incontinence/protect with barrier cream   Moisturize dry skin   Monitor for/manage infection if present  Goal: Prevent/minimize sheer/friction injuries  Outcome: Progressing  Flowsheets (Taken 4/19/2025 1726)  Prevent/minimize sheer/friction injuries:   Complete micro-shifts as needed if patient unable. Adjust patient position to relieve pressure points, not a full turn   HOB 30 degrees or less   Turn/reposition every 2 hours/use positioning/transfer devices   Use pull sheet     Problem: Fall/Injury  Goal: Not fall by end of shift  Outcome: Progressing  Goal: Be free from injury by end of the shift  Outcome: Progressing  Goal: Use assistive devices by end of the shift  Outcome: Progressing  Goal: Pace activities to prevent fatigue by end of the shift  Outcome: Progressing

## 2025-04-19 NOTE — PROGRESS NOTES
Ritesh Garza is a 74 y.o. male on day 17 of admission presenting with Acute respiratory failure with hypoxia.      Subjective   Mr. Garza is a 74-year-old man with a history of coronary artery disease status post 5 vessel bypass in 1995, suffered a NSTEMI in February 2024.  Heart failure with reduced EF at 33%, moderate MR, pulmonary hypertension last December.  Has hypertension, hyperlipidemia, has had VT status post ICD last May.  Peripheral vascular disease, known left subclavian stenosis, had a stent placed 1 year ago.  He has diabetes, gout, SIDHU cirrhosis, portal hypertension, obstructive sleep apnea, has had a GI bleed.  Stage IIIb chronic kidney disease, baseline creatinine in the 2 mg/dL range.  He came in with shortness of breath, 10 pound weight gain, elevated BNP.  Elevated lactate, acute kidney injury, elevated liver enzymes.  CT noted pulmonary edema with pleural effusions, but was felt to be heart failure, ascites.  Placed on BiPAP, was in the ICU and seen by Dr. Stout.  Was placed on a Lasix drip, EF 26% by echocardiogram.  Responded well to the Lasix drip initially.      He had the Love catheter placed.  We gave him back IV fluids as his weight had gotten low.  But, he had elevated JVP yesterday, I felt that he was going in the wrong direction.  He had a right heart cath done, has biventricular failure, high LVEDP, high RVEDP.  Evidence of passive liver congestion.  In the ICU now.  Much more alert today.    Chart/labs/meds/notes/imaging/VS reviewed.       Objective          Vitals 24HR  Heart Rate:  [60-68]   Temp:  [36.2 °C (97.1 °F)-37 °C (98.6 °F)]   Resp:  [13-23]   BP: ()/(41-97)   Weight:  [61.1 kg (134 lb 11.2 oz)]   SpO2:  [94 %-100 %]     Intake/Output last 3 Shifts:    Intake/Output Summary (Last 24 hours) at 4/19/2025 1002  Last data filed at 4/19/2025 0957  Gross per 24 hour   Intake 737 ml   Output 1675 ml   Net -938 ml       Physical Exam  General: Less communicative,  he will wake up and converse.    HEENT: Normocephalic. Moist mucosa.    Neck: Supple. No elevated jugular venous pressure. No HJR.   Cardiovascular:  Regular rate and rhythm. Normal S1 and S2. No murmurs, rubs or gallops.  Elevated JVP now Love catheter was placed.  As his weight had gotten down to 125 pounds, 12 pounds lower than when Dr. Melara at centimeters, we gave him fluids back, his blood pressure has been running low at times.  Pulmonary:  Clear to auscultation bilaterally.   Abdomen:  Soft. Non-tender. Non-distended. Positive bowel sounds.  Lower Extremities: No lower leg edema appreciated.  Neurologic:  Alert and oriented x3. No focal deficit.   Skin: Skin warm and dry, normal skin turgor. No rashes or lesions to exposed skin.  Psychiatric: Normal mood and behavior.    Scheduled Medications  [Held by provider] allopurinol, 100 mg, oral, Daily  amiodarone, 400 mg, oral, Daily  [Held by provider] amLODIPine, 10 mg, oral, Daily  aspirin, 81 mg, oral, Daily  atorvastatin, 80 mg, oral, Daily  [Held by provider] bumetanide, 2 mg, oral, BID  cetirizine, 10 mg, oral, Daily  cyanocobalamin, 1,000 mcg, oral, Daily  [Held by provider] dapagliflozin propanediol, 10 mg, oral, Daily before evening meal  gabapentin, 100 mg, oral, Nightly  heparin, 5,000 Units, subcutaneous, TID  icosapent ethyL, 1 g, oral, BID  insulin lispro, 0-10 Units, subcutaneous, Before meals & nightly  iron sucrose, 200 mg, intravenous, Once  levothyroxine, 50 mcg, oral, Daily  [Held by provider] metoprolol succinate XL, 50 mg, oral, Daily  oxygen, , inhalation, Continuous - 02/gases  potassium chloride CR, 40 mEq, oral, q4h  sennosides-docusate sodium, 1 tablet, oral, Nightly  tamsulosin, 0.4 mg, oral, Daily      Continuous medications  furosemide, 40 mg/hr, Last Rate: 40 mg/hr (04/19/25 0900)          PRN medications: dextrose, dextrose, famotidine, glucagon, glucagon, ipratropium-albuteroL, nitroglycerin, oxyCODONE     Relevant  Results  Results from last 7 days   Lab Units 04/18/25  0533 04/17/25  0300 04/16/25  0548 04/15/25  0542 04/13/25  1108   WBC AUTO x10*3/uL 8.0 8.9 12.0*   < > 11.3   HEMOGLOBIN g/dL 7.8* 8.1* 8.9*   < > 9.2*   HEMATOCRIT % 25.1* 26.2* 28.5*   < > 29.3*   PLATELETS AUTO x10*3/uL 230 216 229   < > 268   NEUTROS PCT AUTO %  --   --   --   --  68.8   LYMPHS PCT AUTO %  --   --   --   --  18.0   MONOS PCT AUTO %  --   --   --   --  12.2   EOS PCT AUTO %  --   --   --   --  0.4    < > = values in this interval not displayed.     Results from last 7 days   Lab Units 04/19/25  0833 04/18/25  0533 04/17/25  2246   SODIUM mmol/L 131* 130* 129*   POTASSIUM mmol/L 3.4* 3.7 4.1   CHLORIDE mmol/L 84* 84* 85*   CO2 mmol/L 33* 30 29   BUN mg/dL 124* 126* 124*   CREATININE mg/dL 5.70* 6.33* 6.36*   GLUCOSE mg/dL 122* 146* 200*   CALCIUM mg/dL 8.4* 8.4* 8.5*       Cardiac Catheterization Procedure   Final Result      XR chest 1 view   Final Result   Cardiomegaly.                  MACRO:   None        Signed by: Jo Ann Roberto 4/9/2025 3:17 PM   Dictation workstation:   [id number]      Transthoracic Echo (TTE) Limited   Final Result      XR chest 1 view   Final Result   Limited inspiration. Enlarged cardiac silhouette.        MACRO:   none        Signed by: Anette Borjas 4/6/2025 10:30 AM   Dictation workstation:   [id number]      XR chest 1 view   Final Result   Resolving right basilar opacities, residual left basilar opacities.   Signed by Luke Middleton MD      Cardiac device check - Inpatient   Final Result      CT chest wo IV contrast   Final Result   1.  Mild interstitial pulmonary edema, small bilateral layering   pleural effusions and cardiomegaly with left ventricular enlargement   suggestive of mild congestive heart failure changes.   2.  Mild peritoneal ascites.   3.  Status post cholecystectomy.   Signed by Yuri Francis MD      XR chest 1 view   Final Result   Enlarged cardiac silhouette with moderate pulmonary edema              MACRO:   None        Signed by: Hank Razo 4/2/2025 5:52 PM   Dictation workstation:   [id number]               Assessment/Plan   This patient currently has cardiac telemetry ordered; if you would like to modify or discontinue the telemetry order, click here to go to the orders activity to modify/discontinue the order.  Ritesh Garza is a 74 y.o. male with a past medical history of coronary artery disease status post 5v CABG 1995, NSTEMI in February 2024, HFrEF with an LVEF of 33%, moderate mitral valve regurg, mild pulmonary hypertension by 2D echo 12/2024, ischemic cardiomyopathy, hypertension, hyperlipidemia, ventricular tachycardia status post ICD placement 5/2024, seizures, peripheral arterial disease, severe left subclavian stenosis status post stenting 4/2024, asthma, diabetes, gout, SIDHU cirrhosis, portal hypertension, depression, anxiety, GERD, BPH, obstructive sleep apnea, recent history of GI bleed and stage G3b/A3 chronic kidney disease with a baseline creatinine that fluctuates around 2 mg/deciliter who presented with shortness of breath, a 10 pound weight gain and tachycardia.  Workup was notable for an elevated BNP above 3,000.  There was transaminitis, and elevated lactate and worsening acute kidney injury.  CT imaging of the chest showed mild interstitial pulmonary edema, small bilateral layering pleural effusions, cardiomegaly with LV enlargement consistent with mild CHF.  There was mild ascites.  He was placed on BiPAP and admitted to the intensive care unit.  Nephrology was consulted for acute kidney injury on top of stage G3b chronic kidney disease management.    Mr. Garza had a significant acute kidney injury in the setting of heart failure physiology. He was placed on Lasix drip at 20 mg/hour. He was given a dose of IV Diuril due to being anuric. Fortunately, he responded to the diuretic drip. Blood pressures are up-and-down.  EF 26% is worrisome.  But he is now  suffering worsening kidney function.  The question is whether his weight is lower than it should be for him.  His weight is 131 pounds on the bed scale, was 127 pounds standing on Friday.  A bladder scan suggested urinary retention of over 300 mL.  He had a Love catheter placed.  He was 137 pounds when seen by Dr. Hennessy.  I noted elevated JVP, he had a right heart cath, biventricular failure, elevated LVEDP, RVEDP, low cardiac index.  He has cardiogenic shock.  He is on the Lasix drip, he made more urine, he had chlorothiazide today.  I will give him 80 mEq of potassium chloride.  Monitoring the magnesium.  Acute kidney injury may be improving, stable hyponatremia.  Hospice around the case.  Assessment & Plan  Acute respiratory failure with hypoxia    Tachycardia    Subclavian arterial stenosis    Hypervolemia    Pulmonary hypertension (Multi)      I spent 50 minutes in the professional and overall care of this patient.      Kevin Hutchins MD

## 2025-04-20 VITALS
HEART RATE: 77 BPM | RESPIRATION RATE: 17 BRPM | OXYGEN SATURATION: 99 % | DIASTOLIC BLOOD PRESSURE: 55 MMHG | SYSTOLIC BLOOD PRESSURE: 116 MMHG | TEMPERATURE: 99.5 F

## 2025-04-20 PROCEDURE — 99222 1ST HOSP IP/OBS MODERATE 55: CPT | Performed by: INTERNAL MEDICINE

## 2025-04-20 PROCEDURE — 1150000001 HC HOSPICE PRIVATE ROOM DAILY

## 2025-04-20 PROCEDURE — 2500000001 HC RX 250 WO HCPCS SELF ADMINISTERED DRUGS (ALT 637 FOR MEDICARE OP): Performed by: STUDENT IN AN ORGANIZED HEALTH CARE EDUCATION/TRAINING PROGRAM

## 2025-04-20 RX ADMIN — HYDROMORPHONE HYDROCHLORIDE 1 MG: 2 TABLET ORAL at 20:59

## 2025-04-20 RX ADMIN — GABAPENTIN 100 MG: 100 CAPSULE ORAL at 20:49

## 2025-04-20 ASSESSMENT — COGNITIVE AND FUNCTIONAL STATUS - GENERAL
MOBILITY SCORE: 14
MOVING TO AND FROM BED TO CHAIR: A LOT
MOBILITY SCORE: 14
DRESSING REGULAR UPPER BODY CLOTHING: A LITTLE
DRESSING REGULAR LOWER BODY CLOTHING: A LITTLE
HELP NEEDED FOR BATHING: A LOT
TURNING FROM BACK TO SIDE WHILE IN FLAT BAD: A LITTLE
TOILETING: A LITTLE
STANDING UP FROM CHAIR USING ARMS: A LOT
TOILETING: A LITTLE
EATING MEALS: A LITTLE
WALKING IN HOSPITAL ROOM: A LOT
CLIMB 3 TO 5 STEPS WITH RAILING: A LOT
CLIMB 3 TO 5 STEPS WITH RAILING: A LOT
EATING MEALS: A LITTLE
PERSONAL GROOMING: A LITTLE
STANDING UP FROM CHAIR USING ARMS: A LOT
PERSONAL GROOMING: A LITTLE
PERSONAL GROOMING: A LITTLE
HELP NEEDED FOR BATHING: A LITTLE
DAILY ACTIVITIY SCORE: 17
DRESSING REGULAR UPPER BODY CLOTHING: A LITTLE
DAILY ACTIVITIY SCORE: 17
EATING MEALS: A LITTLE
MOVING FROM LYING ON BACK TO SITTING ON SIDE OF FLAT BED WITH BEDRAILS: A LITTLE
WALKING IN HOSPITAL ROOM: A LOT
STANDING UP FROM CHAIR USING ARMS: A LOT
DRESSING REGULAR LOWER BODY CLOTHING: A LITTLE
DRESSING REGULAR UPPER BODY CLOTHING: A LITTLE
MOVING TO AND FROM BED TO CHAIR: A LOT
WALKING IN HOSPITAL ROOM: A LOT
MOVING FROM LYING ON BACK TO SITTING ON SIDE OF FLAT BED WITH BEDRAILS: A LITTLE
TOILETING: A LITTLE
HELP NEEDED FOR BATHING: A LOT
MOVING FROM LYING ON BACK TO SITTING ON SIDE OF FLAT BED WITH BEDRAILS: A LITTLE
DAILY ACTIVITIY SCORE: 18
MOVING TO AND FROM BED TO CHAIR: A LOT
DRESSING REGULAR LOWER BODY CLOTHING: A LITTLE
TURNING FROM BACK TO SIDE WHILE IN FLAT BAD: A LITTLE
MOBILITY SCORE: 14
CLIMB 3 TO 5 STEPS WITH RAILING: A LOT
TURNING FROM BACK TO SIDE WHILE IN FLAT BAD: A LITTLE

## 2025-04-20 ASSESSMENT — PAIN SCALES - GENERAL
PAINLEVEL_OUTOF10: 8
PAINLEVEL_OUTOF10: 0 - NO PAIN

## 2025-04-20 ASSESSMENT — PAIN - FUNCTIONAL ASSESSMENT
PAIN_FUNCTIONAL_ASSESSMENT: 0-10
PAIN_FUNCTIONAL_ASSESSMENT: 0-10

## 2025-04-20 NOTE — CARE PLAN
Problem: Pain - Adult  Goal: Verbalizes/displays adequate comfort level or baseline comfort level  Outcome: Progressing     Problem: Safety - Adult  Goal: Free from fall injury  Outcome: Progressing     Problem: Discharge Planning  Goal: Discharge to home or other facility with appropriate resources  Outcome: Progressing     Problem: Chronic Conditions and Co-morbidities  Goal: Patient's chronic conditions and co-morbidity symptoms are monitored and maintained or improved  Outcome: Progressing     Problem: Nutrition  Goal: Nutrient intake appropriate for maintaining nutritional needs  Outcome: Progressing     Problem: Skin  Goal: Decreased wound size/increased tissue granulation at next dressing change  Outcome: Progressing  Goal: Participates in plan/prevention/treatment measures  Outcome: Progressing  Goal: Prevent/manage excess moisture  Outcome: Progressing  Goal: Prevent/minimize sheer/friction injuries  Outcome: Progressing  Goal: Promote/optimize nutrition  Outcome: Progressing  Goal: Promote skin healing  Outcome: Progressing     Problem: Fall/Injury  Goal: Not fall by end of shift  Outcome: Progressing  Goal: Be free from injury by end of the shift  Outcome: Progressing  Goal: Verbalize understanding of personal risk factors for fall in the hospital  Outcome: Progressing  Goal: Verbalize understanding of risk factor reduction measures to prevent injury from fall in the home  Outcome: Progressing  Goal: Use assistive devices by end of the shift  Outcome: Progressing  Goal: Pace activities to prevent fatigue by end of the shift  Outcome: Progressing

## 2025-04-20 NOTE — NURSING NOTE
[facility]: Visit to pt. Awake and alert, denies pain or dyspnea. Eating breakfast and watching Jeopardy. Spoke with spouse Shara. Aware plan for DC Tuesday. DC process reviewed. Collab with Gaby Irizarry RN and Era Rodriguez CM. FLAVIA RN will follow up daily. Please call [phone] for pt/family needs. Thanks Zander SORIANO

## 2025-04-20 NOTE — DISCHARGE SUMMARY
Discharge Diagnosis  Acute respiratory failure with hypoxia    Issues Requiring Follow-Up  None    Test Results Pending At Discharge  Pending Labs       No current pending labs.            Hospital Course   Ritesh Garza is a 74 y.o. male who presented with wide-complex tachycardia and dyspnea. Extensive PMHx including CAD s/p CABGx5 (1995) c/b NSTEMI (2/2024) on DAPT, end stage HFrEF d/t ICM, VT s/p ICD placement (5/2024), HTN, HLD, CKD3, seizures, PAD c/b severe Lt subclavian stenosis with compromised flow to Lt POP s/p Lt subclavian stent (4/2024), asthma, DM, gout, SIDHU cirrhosis with portal HTN, depression/anxiety, GERD, BPH, SENIA, and recent GIB (12/2024 - Plavix stopped).     He was treated with BiPAP and admitted to [facility] ICU for further critical care management of acute metabolic encephalopathy, ischemic cardiomyopathy with biventricular failure causing cardiorenal syndrome and complicated by arrhythmia.     Unfortunately, his multiple acute on chronic end-stage conditions have progressed despite aggressive management. He was already limited in his options for further therapy given his multiple competing comorbidities and has been deemed not a candidate for inotropic therapy, dialysis, or further interventional therapies. Patient and wife have opted to pursue hospice measures and have met with hospice today. Due to concern of patient's capacity for insight on complex conditions with his history of dementia and uremic metabolic encephalopathy, Ritesh's wife Petra has discussed with hospice and myself today and will sign consents. Plan to transition patient to inpatient hospice and go home with hospice mid-next week. Will transfer to hospitalist service once GIP.    Pertinent Physical Exam At Time of Discharge  Physical Exam  Constitutional:       Comments: Elderly adult male appearing younger than stated age, asleep but awoken easily, clear speech but confused thought process, talking about seeing estranged  grandchild and wanting to go back to work. States that he definitely wants to go home   Cardiovascular:      Rate and Rhythm: Normal rate and regular rhythm.   Pulmonary:      Effort: Pulmonary effort is normal.   Abdominal:      Palpations: Abdomen is soft.   Skin:     General: Skin is warm and dry.   Neurological:      General: No focal deficit present.      Mental Status: He is oriented to person, place, and time.   Psychiatric:         Mood and Affect: Mood normal.         Behavior: Behavior normal.     Home Medications     Medication List      ASK your doctor about these medications     albuterol 90 mcg/actuation inhaler; Commonly known as: ProAir HFA;   Inhale 2 puffs every 6 hours if needed for wheezing or shortness of   breath.   allopurinol 100 mg tablet; Commonly known as: Zyloprim; Take 1 tablet   (100 mg) by mouth once daily.   amLODIPine 10 mg tablet; Commonly known as: Norvasc; TAKE 1 TABLET BY   MOUTH EVERY DAY   aspirin 81 mg EC tablet; TAKE 1 TABLET BY MOUTH EVERY DAY   atorvastatin 80 mg tablet; Commonly known as: Lipitor; TAKE 1 TABLET BY   MOUTH EVERY DAY   CoQ-10 100 mg capsule; Generic drug: coenzyme Q-10   cyanocobalamin 1,000 mcg tablet; Commonly known as: Vitamin B-12   Entresto 24-26 mg tablet; Generic drug: sacubitriL-valsartan; Take 0.5   tablets by mouth 2 times a day.   famotidine 20 mg tablet; Commonly known as: Pepcid; TAKE 1 TABLET (20   MG) BY MOUTH ONCE DAILY AS NEEDED FOR INDIGESTION.   Farxiga 10 mg tablet; Generic drug: dapagliflozin propanediol   furosemide 20 mg tablet; Commonly known as: Lasix   gabapentin 100 mg capsule; Commonly known as: Neurontin; TAKE 1 CAPSULE   (100 MG) BY MOUTH ONCE DAILY AT BEDTIME.   icosapent ethyL 0.5 gram capsule; Commonly known as: Vascepa; Take 2   capsules (1 g) by mouth 2 times a day with meals.   levothyroxine 50 mcg tablet; Commonly known as: Synthroid, Levoxyl; TAKE   1 TABLET (50 MCG) BY MOUTH EARLY IN THE MORNING.. TAKE ON AN EMPTY  STOMACH   AT THE SAME TIME EACH DAY, EITHER 30 TO 60 MINUTES PRIOR TO BREAKFAST; Ask   about: Which instructions should I use?   loratadine 10 mg tablet; Commonly known as: Claritin   metoprolol succinate XL 50 mg 24 hr tablet; Commonly known as:   Toprol-XL; TAKE 1 TABLET BY MOUTH EVERY DAY   multivitamin tablet   pantoprazole 40 mg EC tablet; Commonly known as: ProtoNix; Take 1 tablet   (40 mg) by mouth 2 times a day. Do not crush, chew, or split.   sertraline 50 mg tablet; Commonly known as: Zoloft; TAKE ONE TABLET   DAILY IN THE MORNING AFTER BREAKFAST.   sildenafil 100 mg tablet; Commonly known as: Viagra; Take 1 tablet (100   mg) by mouth if needed for erectile dysfunction (Start with half tab. Take   1 hour prior to intercourse on an empty stomach. If you have an erection   for over 4 hours, please go to the emergency room.).       Outpatient Follow-Up  Future Appointments   Date Time Provider Department Center   4/29/2025 12:20 PM Ofelia Mccracken DO [id number] [facility]   5/22/2025  9:00 AM MG GASTRO [facility] [id number] FIBROSCAN [id number] [facility]   5/22/2025 10:00 AM Nate Duran MD [id number] [facility]   7/21/2025 12:00 PM [facility] RN 1 [id number] [facility]   7/21/2025 12:30 PM Meme Perrin MD [id number] [facility]   11/24/2025  9:40 AM Avis Alvarenga APRN-CNP [facility]       Thalia Jade PA-C

## 2025-04-20 NOTE — CARE PLAN
The patient's goals for the shift include comfort    The clinical goals for the shift include pt will remain safe and comfortable this shift      Problem: Pain - Adult  Goal: Verbalizes/displays adequate comfort level or baseline comfort level  Outcome: Progressing     Problem: Safety - Adult  Goal: Free from fall injury  Outcome: Progressing     Problem: Discharge Planning  Goal: Discharge to home or other facility with appropriate resources  Outcome: Progressing     Problem: Chronic Conditions and Co-morbidities  Goal: Patient's chronic conditions and co-morbidity symptoms are monitored and maintained or improved  Outcome: Progressing     Problem: Nutrition  Goal: Nutrient intake appropriate for maintaining nutritional needs  Outcome: Progressing     Problem: Skin  Goal: Prevent/manage excess moisture  Outcome: Progressing  Flowsheets (Taken 4/20/2025 1114)  Prevent/manage excess moisture:   Cleanse incontinence/protect with barrier cream   Monitor for/manage infection if present   Moisturize dry skin  Goal: Prevent/minimize sheer/friction injuries  Outcome: Progressing  Flowsheets (Taken 4/20/2025 1114)  Prevent/minimize sheer/friction injuries:   HOB 30 degrees or less   Turn/reposition every 2 hours/use positioning/transfer devices   Use pull sheet     Problem: Fall/Injury  Goal: Not fall by end of shift  Outcome: Progressing  Goal: Be free from injury by end of the shift  Outcome: Progressing  Goal: Verbalize understanding of personal risk factors for fall in the hospital  Outcome: Progressing  Goal: Verbalize understanding of risk factor reduction measures to prevent injury from fall in the home  Outcome: Progressing  Goal: Use assistive devices by end of the shift  Outcome: Progressing  Goal: Pace activities to prevent fatigue by end of the shift  Outcome: Progressing

## 2025-04-20 NOTE — CARE PLAN
Problem: Pain - Adult  Goal: Verbalizes/displays adequate comfort level or baseline comfort level  4/20/2025 0434 by Kristi James RN  Outcome: Progressing  4/20/2025 0432 by Kristi James RN  Outcome: Progressing  Flowsheets (Taken 4/20/2025 0432)  Verbalizes/displays adequate comfort level or baseline comfort level:   Encourage patient to monitor pain and request assistance   Assess pain using appropriate pain scale  4/20/2025 0432 by Kristi James RN  Outcome: Progressing  Flowsheets (Taken 4/20/2025 0432)  Verbalizes/displays adequate comfort level or baseline comfort level:   Encourage patient to monitor pain and request assistance   Assess pain using appropriate pain scale  4/20/2025 0431 by Kristi James RN  Outcome: Progressing     Problem: Safety - Adult  Goal: Free from fall injury  4/20/2025 0434 by Kristi James RN  Outcome: Progressing  Flowsheets (Taken 4/20/2025 0434)  Free from fall injury: Instruct family/caregiver on patient safety  4/20/2025 0432 by Kristi James RN  Outcome: Progressing  4/20/2025 0432 by Kristi James RN  Outcome: Progressing  4/20/2025 0431 by Kristi James RN  Outcome: Progressing     Problem: Discharge Planning  Goal: Discharge to home or other facility with appropriate resources  4/20/2025 0434 by Kristi James RN  Outcome: Progressing  Flowsheets (Taken 4/20/2025 0434)  Discharge to home or other facility with appropriate resources:   Identify barriers to discharge with patient and caregiver   Arrange for needed discharge resources and transportation as appropriate  4/20/2025 0432 by Kristi James RN  Outcome: Progressing  4/20/2025 0432 by Kristi James RN  Outcome: Progressing  4/20/2025 0431 by Kristi James RN  Outcome: Progressing     Problem: Chronic Conditions and Co-morbidities  Goal: Patient's chronic conditions and co-morbidity symptoms are monitored and maintained or  improved  4/20/2025 0434 by Kristi James RN  Outcome: Progressing  Flowsheets (Taken 4/20/2025 0434)  Care Plan - Patient's Chronic Conditions and Co-Morbidity Symptoms are Monitored and Maintained or Improved:   Monitor and assess patient's chronic conditions and comorbid symptoms for stability, deterioration, or improvement   Collaborate with multidisciplinary team to address chronic and comorbid conditions and prevent exacerbation or deterioration  4/20/2025 0432 by Kristi James RN  Outcome: Progressing  4/20/2025 0432 by Kristi James RN  Outcome: Progressing  4/20/2025 0431 by Kristi James RN  Outcome: Progressing     Problem: Nutrition  Goal: Nutrient intake appropriate for maintaining nutritional needs  4/20/2025 0434 by Kristi James RN  Outcome: Progressing  4/20/2025 0432 by Kristi James RN  Outcome: Progressing  4/20/2025 0432 by Kristi James RN  Outcome: Progressing  4/20/2025 0431 by Kristi James RN  Outcome: Progressing     Problem: Skin  Goal: Decreased wound size/increased tissue granulation at next dressing change  4/20/2025 0434 by Kristi James RN  Outcome: Progressing  Flowsheets (Taken 4/20/2025 0434)  Decreased wound size/increased tissue granulation at next dressing change:   Promote sleep for wound healing   Protective dressings over bony prominences  4/20/2025 0432 by Kristi James RN  Outcome: Progressing  4/20/2025 0432 by Kristi James RN  Outcome: Progressing  4/20/2025 0431 by Kristi James RN  Outcome: Progressing  Goal: Participates in plan/prevention/treatment measures  4/20/2025 0434 by Kristi James RN  Outcome: Progressing  Flowsheets (Taken 4/20/2025 0434)  Participates in plan/prevention/treatment measures: Elevate heels  4/20/2025 0432 by Kristi James RN  Outcome: Progressing  4/20/2025 0432 by Kristi James RN  Outcome: Progressing  4/20/2025 0431 by  Kristi James RN  Outcome: Progressing  Goal: Prevent/manage excess moisture  4/20/2025 0434 by Kristi James RN  Outcome: Progressing  Flowsheets (Taken 4/19/2025 1726 by Gaby Cunningham RN)  Prevent/manage excess moisture:   Cleanse incontinence/protect with barrier cream   Moisturize dry skin   Monitor for/manage infection if present  4/20/2025 0432 by Kristi James RN  Outcome: Progressing  4/20/2025 0432 by Kristi James RN  Outcome: Progressing  4/20/2025 0431 by Kristi James RN  Outcome: Progressing  Goal: Prevent/minimize sheer/friction injuries  4/20/2025 0434 by Kristi James RN  Outcome: Progressing  Flowsheets (Taken 4/20/2025 0434)  Prevent/minimize sheer/friction injuries: HOB 30 degrees or less  4/20/2025 0432 by Kristi James RN  Outcome: Progressing  4/20/2025 0432 by Kristi James RN  Outcome: Progressing  4/20/2025 0431 by Kristi James RN  Outcome: Progressing  Goal: Promote/optimize nutrition  4/20/2025 0434 by Kristi James, RN  Outcome: Progressing  4/20/2025 0432 by Kristi James RN  Outcome: Progressing  4/20/2025 0432 by Kristi James, RN  Outcome: Progressing  4/20/2025 0431 by Kristi James RN  Outcome: Progressing  Goal: Promote skin healing  4/20/2025 0434 by Kristi James RN  Outcome: Progressing  Flowsheets (Taken 4/20/2025 0434)  Promote skin healing:   Assess skin/pad under line(s)/device(s)   Turn/reposition every 2 hours/use positioning/transfer devices  4/20/2025 0432 by Kristi James RN  Outcome: Progressing  4/20/2025 0432 by Kristi James RN  Outcome: Progressing  4/20/2025 0431 by Kristi James RN  Outcome: Progressing     Problem: Fall/Injury  Goal: Not fall by end of shift  4/20/2025 0434 by Kristi James RN  Outcome: Progressing  4/20/2025 0432 by Kristi James RN  Outcome: Progressing  4/20/2025 0432 by Kristi  Erika, RN  Outcome: Progressing  4/20/2025 0431 by Kristi James, RN  Outcome: Progressing  Goal: Be free from injury by end of the shift  4/20/2025 0434 by Kristi James, RN  Outcome: Progressing  4/20/2025 0432 by Kristi James, RN  Outcome: Progressing  4/20/2025 0432 by Kristi James, RN  Outcome: Progressing  4/20/2025 0431 by Kristi James, RN  Outcome: Progressing  Goal: Verbalize understanding of personal risk factors for fall in the hospital  4/20/2025 0434 by Kristi James, RN  Outcome: Progressing  4/20/2025 0432 by Kristi James, RN  Outcome: Progressing  4/20/2025 0432 by Kristi James, RN  Outcome: Progressing  4/20/2025 0431 by Kristi James, RN  Outcome: Progressing  Goal: Verbalize understanding of risk factor reduction measures to prevent injury from fall in the home  4/20/2025 0434 by Kristi James, RN  Outcome: Progressing  4/20/2025 0432 by Kristi James, RN  Outcome: Progressing  4/20/2025 0432 by Kristi James, RN  Outcome: Progressing  4/20/2025 0431 by Kristi James, RN  Outcome: Progressing  Goal: Use assistive devices by end of the shift  4/20/2025 0434 by Kristi James, RN  Outcome: Progressing  4/20/2025 0432 by Kristi James, RN  Outcome: Progressing  4/20/2025 0432 by Kristi James, RN  Outcome: Progressing  4/20/2025 0431 by Kristi James, RN  Outcome: Progressing  Goal: Pace activities to prevent fatigue by end of the shift  4/20/2025 0434 by Kristi James, RN  Outcome: Progressing  4/20/2025 0432 by Kristi James, RN  Outcome: Progressing  4/20/2025 0432 by Kristi James RN  Outcome: Progressing  4/20/2025 0431 by Kristi James RN  Outcome: Progressing     Problem: Pain - Adult  Goal: Verbalizes/displays adequate comfort level or baseline comfort level  4/20/2025 0438 by Kristi James RN  Outcome: Progressing  4/20/2025  0434 by Kristi James RN  Outcome: Progressing  4/20/2025 0432 by Kristi James RN  Outcome: Progressing  Flowsheets (Taken 4/20/2025 0432)  Verbalizes/displays adequate comfort level or baseline comfort level:   Encourage patient to monitor pain and request assistance   Assess pain using appropriate pain scale  4/20/2025 0432 by Kristi James RN  Outcome: Progressing  Flowsheets (Taken 4/20/2025 0432)  Verbalizes/displays adequate comfort level or baseline comfort level:   Encourage patient to monitor pain and request assistance   Assess pain using appropriate pain scale  4/20/2025 0431 by Kristi James RN  Outcome: Progressing     Problem: Safety - Adult  Goal: Free from fall injury  4/20/2025 0438 by Kristi James RN  Outcome: Progressing  4/20/2025 0434 by Kristi James RN  Outcome: Progressing  Flowsheets (Taken 4/20/2025 0434)  Free from fall injury: Instruct family/caregiver on patient safety  4/20/2025 0432 by Kristi James RN  Outcome: Progressing  4/20/2025 0432 by Kristi James RN  Outcome: Progressing  4/20/2025 0431 by Kristi James RN  Outcome: Progressing     Problem: Discharge Planning  Goal: Discharge to home or other facility with appropriate resources  4/20/2025 0438 by Kristi James RN  Outcome: Progressing  4/20/2025 0434 by Kristi James RN  Outcome: Progressing  Flowsheets (Taken 4/20/2025 0434)  Discharge to home or other facility with appropriate resources:   Identify barriers to discharge with patient and caregiver   Arrange for needed discharge resources and transportation as appropriate  4/20/2025 0432 by Kristi James RN  Outcome: Progressing  4/20/2025 0432 by Kristi James RN  Outcome: Progressing  4/20/2025 0431 by Kristi James RN  Outcome: Progressing     Problem: Chronic Conditions and Co-morbidities  Goal: Patient's chronic conditions and co-morbidity symptoms are  monitored and maintained or improved  4/20/2025 0438 by Kristi James RN  Outcome: Progressing  4/20/2025 0434 by Kristi James RN  Outcome: Progressing  Flowsheets (Taken 4/20/2025 0434)  Care Plan - Patient's Chronic Conditions and Co-Morbidity Symptoms are Monitored and Maintained or Improved:   Monitor and assess patient's chronic conditions and comorbid symptoms for stability, deterioration, or improvement   Collaborate with multidisciplinary team to address chronic and comorbid conditions and prevent exacerbation or deterioration  4/20/2025 0432 by Kristi James RN  Outcome: Progressing  4/20/2025 0432 by Kristi James RN  Outcome: Progressing  4/20/2025 0431 by Kristi James RN  Outcome: Progressing     Problem: Nutrition  Goal: Nutrient intake appropriate for maintaining nutritional needs  4/20/2025 0438 by Kristi James RN  Outcome: Progressing  4/20/2025 0434 by Kristi James RN  Outcome: Progressing  4/20/2025 0432 by Kristi James RN  Outcome: Progressing  4/20/2025 0432 by Kristi James RN  Outcome: Progressing  4/20/2025 0431 by Kristi James RN  Outcome: Progressing     Problem: Skin  Goal: Decreased wound size/increased tissue granulation at next dressing change  4/20/2025 0438 by Kristi James RN  Outcome: Progressing  4/20/2025 0434 by Kristi James RN  Outcome: Progressing  Flowsheets (Taken 4/20/2025 0434)  Decreased wound size/increased tissue granulation at next dressing change:   Promote sleep for wound healing   Protective dressings over bony prominences  4/20/2025 0432 by Kristi James RN  Outcome: Progressing  4/20/2025 0432 by Kristi James RN  Outcome: Progressing  4/20/2025 0431 by Kristi James RN  Outcome: Progressing  Goal: Participates in plan/prevention/treatment measures  4/20/2025 0438 by Kristi James RN  Outcome: Progressing  4/20/2025 0434 by Kristi  Lobito-Djermani, RN  Outcome: Progressing  Flowsheets (Taken 4/20/2025 0434)  Participates in plan/prevention/treatment measures: Elevate heels  4/20/2025 0432 by Kristi James, RN  Outcome: Progressing  4/20/2025 0432 by Kristi James RN  Outcome: Progressing  4/20/2025 0431 by Kristi aJmes RN  Outcome: Progressing  Goal: Prevent/manage excess moisture  4/20/2025 0438 by Kristi James, RN  Outcome: Progressing  4/20/2025 0434 by Kristi James RN  Outcome: Progressing  Flowsheets (Taken 4/19/2025 1726 by Gaby Cunningham RN)  Prevent/manage excess moisture:   Cleanse incontinence/protect with barrier cream   Moisturize dry skin   Monitor for/manage infection if present  4/20/2025 0432 by Kristi James, RN  Outcome: Progressing  4/20/2025 0432 by Kristi James RN  Outcome: Progressing  4/20/2025 0431 by Kristi James RN  Outcome: Progressing  Goal: Prevent/minimize sheer/friction injuries  4/20/2025 0438 by Kristi James RN  Outcome: Progressing  4/20/2025 0434 by Kristi James RN  Outcome: Progressing  Flowsheets (Taken 4/20/2025 0434)  Prevent/minimize sheer/friction injuries: HOB 30 degrees or less  4/20/2025 0432 by Kristi James, RN  Outcome: Progressing  4/20/2025 0432 by Kristi James RN  Outcome: Progressing  4/20/2025 0431 by Kristi James RN  Outcome: Progressing  Goal: Promote/optimize nutrition  4/20/2025 0438 by Kristi James, RN  Outcome: Progressing  4/20/2025 0434 by Kristi James RN  Outcome: Progressing  4/20/2025 0432 by Kristi James RN  Outcome: Progressing  4/20/2025 0432 by Kristi James RN  Outcome: Progressing  4/20/2025 0431 by Kristi James RN  Outcome: Progressing  Goal: Promote skin healing  4/20/2025 0438 by Kristi James RN  Outcome: Progressing  4/20/2025 0434 by Kristi James RN  Outcome: Progressing  Flowsheets (Taken 4/20/2025  0434)  Promote skin healing:   Assess skin/pad under line(s)/device(s)   Turn/reposition every 2 hours/use positioning/transfer devices  4/20/2025 0432 by Kristi James, RN  Outcome: Progressing  4/20/2025 0432 by Kristi James, RN  Outcome: Progressing  4/20/2025 0431 by Kristi James, RN  Outcome: Progressing     Problem: Fall/Injury  Goal: Not fall by end of shift  4/20/2025 0438 by Kristi James, RN  Outcome: Progressing  4/20/2025 0434 by Kristi James, RN  Outcome: Progressing  4/20/2025 0432 by Kristi James, RN  Outcome: Progressing  4/20/2025 0432 by Kristi James, RN  Outcome: Progressing  4/20/2025 0431 by Kristi James, RN  Outcome: Progressing  Goal: Be free from injury by end of the shift  4/20/2025 0438 by Kristi James, RN  Outcome: Progressing  4/20/2025 0434 by Kristi James, RN  Outcome: Progressing  4/20/2025 0432 by Kristi James, RN  Outcome: Progressing  4/20/2025 0432 by Kristi James, RN  Outcome: Progressing  4/20/2025 0431 by Kristi James, RN  Outcome: Progressing  Goal: Verbalize understanding of personal risk factors for fall in the hospital  4/20/2025 0438 by Kristi James, RN  Outcome: Progressing  4/20/2025 0434 by Kristi James, RN  Outcome: Progressing  4/20/2025 0432 by Kristi James, RN  Outcome: Progressing  4/20/2025 0432 by Kristi James, RN  Outcome: Progressing  4/20/2025 0431 by Kristi James, RN  Outcome: Progressing  Goal: Verbalize understanding of risk factor reduction measures to prevent injury from fall in the home  4/20/2025 0438 by Kristi James, RN  Outcome: Progressing  4/20/2025 0434 by Kristi James, RN  Outcome: Progressing  4/20/2025 0432 by Kristi James, RN  Outcome: Progressing  4/20/2025 0432 by Kristi James, RN  Outcome: Progressing  4/20/2025 0431 by Kristi James, RN  Outcome:  Progressing  Goal: Use assistive devices by end of the shift  4/20/2025 0438 by Kristi James, RN  Outcome: Progressing  4/20/2025 0434 by Kristi James, RN  Outcome: Progressing  4/20/2025 0432 by Kristi James RN  Outcome: Progressing  4/20/2025 0432 by Kristi James, RN  Outcome: Progressing  4/20/2025 0431 by Kristi James RN  Outcome: Progressing  Goal: Pace activities to prevent fatigue by end of the shift  4/20/2025 0438 by Kristi James, RN  Outcome: Progressing  4/20/2025 0434 by Kristi James RN  Outcome: Progressing  4/20/2025 0432 by Kristi James RN  Outcome: Progressing  4/20/2025 0432 by Kristi James, RN  Outcome: Progressing  4/20/2025 0431 by Kristi James, RN  Outcome: Progressing

## 2025-04-20 NOTE — H&P
History Of Present Illness  Ritesh Garza is a 74 y.o. male presenting with respiratory failure secondary to end-stage heart failure now transitioning over to [facility] hospice.  Patient was admitted to the hospital in the ICU but has had a general poor overall outlook and decline over the last year.  Patient has noted that he no longer wants any significant treatment and he and his wife have decided to go with hospice.  Patient is being made GIP until he can discharge home on Tuesday.  Today when I evaluated him patient was alert and oriented noting he just wants to go home.  Otherwise feeling fairly well.     Past Medical History  Medical History[1]    Surgical History  Surgical History[2]     Social History  Social History[3]    Family History  Family History[4]     Allergies  Patient has no known allergies.    Medications prior to admission  Medications Ordered Prior to Encounter[5]    Review of Systems  10 point review of systems otherwise negative.  Vital Signs  /61 (BP Location: Right arm, Patient Position: Lying)   Pulse 79   Temp 36.7 °C (98.1 °F) (Temporal)   Resp 18   SpO2 95%     Physical Exam  Vitals and nursing note reviewed.   Constitutional:       General: He is not in acute distress.     Appearance: Normal appearance. He is not ill-appearing.   HENT:      Head: Normocephalic and atraumatic.      Mouth/Throat:      Mouth: Mucous membranes are moist.      Pharynx: Oropharynx is clear.   Eyes:      Extraocular Movements: Extraocular movements intact.      Conjunctiva/sclera: Conjunctivae normal.   Cardiovascular:      Rate and Rhythm: Normal rate and regular rhythm.      Heart sounds: Normal heart sounds. No murmur heard.     No friction rub. No gallop.   Pulmonary:      Effort: Pulmonary effort is normal.      Breath sounds: Normal breath sounds. No wheezing or rales.   Abdominal:      General: Bowel sounds are normal. There is no distension.      Palpations: Abdomen is soft.      Tenderness:  There is no abdominal tenderness. There is no guarding.   Musculoskeletal:         General: No swelling or tenderness.      Right lower leg: No edema.      Left lower leg: No edema.   Skin:     General: Skin is warm and dry.      Capillary Refill: Capillary refill takes less than 2 seconds.   Neurological:      General: No focal deficit present.      Mental Status: He is alert.   Psychiatric:         Mood and Affect: Mood normal.                Relevant Results  Results for orders placed or performed during the hospital encounter of 04/02/25 (from the past 24 hours)   POCT GLUCOSE   Result Value Ref Range    POCT Glucose 245 (H) 74 - 99 mg/dL     *Note: Due to a large number of results and/or encounters for the requested time period, some results have not been displayed. A complete set of results can be found in Results Review.       No orders to display       Scheduled medications  Scheduled Medications[6]  Continuous medications  Continuous Medications[7]  PRN medications  PRN Medications[8]      Assessment/Plan   Assessment & Plan       Hospice care  -stop home meds  -Continue with hospice meds for pain, secretions and agitation   -Continue O2 for comfort   -Plan on DC home with hospice on Tuesday              Rafita Peters [facility]        [1]   Past Medical History:  Diagnosis Date    ADHD (attention deficit hyperactivity disorder) 1960’s    AICD (automatic cardioverter/defibrillator) present 05/30/2024    [facility]    Asthma     Benign prostatic hyperplasia Several years ago    CHF (congestive heart failure)     Cirrhosis (Multi)     CKD (chronic kidney disease)     Coronary artery disease Long Ago    Diabetes mellitus (Multi) Over 20 years ago    Erectile dysfunction     GERD (gastroesophageal reflux disease)     Gout     Hypertension Over 20 years ago    Ischemic cardiomyopathy     Kidney stone Several years ago    Myocardial infarction (Multi) Over 20 yesrs ago    SIDHU (nonalcoholic  steatohepatitis)     SENIA (obstructive sleep apnea)     Stenosis of left subclavian artery     s/p stent 4/30/2024    Ventricular tachycardia (Multi)    [2]   Past Surgical History:  Procedure Laterality Date    ADENOIDECTOMY  Childhood    CARDIAC CATHETERIZATION  Over 30 years ago    CARDIAC CATHETERIZATION N/A 04/15/2024    Procedure: Left And Right Heart Cath, With LV;  Surgeon: Blaine Adams MD;  Location: [facility];  Service: Cardiovascular;  Laterality: N/A;  3 hr hydration / arrive 6:30    CARDIAC CATHETERIZATION N/A 4/16/2025    Procedure: Right Heart Cath;  Surgeon: Eder Schwartz MD;  Location: [facility] Cardiac Cath Lab;  Service: Cardiovascular;  Laterality: N/A;  Nephrology requesting    CARDIAC DEFIBRILLATOR PLACEMENT  05/30/2024    [facility]    CARDIAC ELECTROPHYSIOLOGY PROCEDURE N/A 05/30/2024    Procedure: ICD Dual Implant;  Surgeon: Mina Kiran MD;  Location: [facility];  Service: Electrophysiology;  Laterality: N/A;  Dual chamber ICD, [facility]  notified [facility] 5/24/24    CERVICAL FUSION  07/03/2024    C4-7    CHOLECYSTECTOMY  Over 30 years ago    CIRCUMCISION, PRIMARY  72,years ago    CORONARY ARTERY BYPASS GRAFT  1992    EYE SURGERY  Childhood    INVASIVE VASCULAR PROCEDURE Left 04/30/2024    Procedure: Angioplasty - Upper Extremity;  Surgeon: Narinder Quevedo MD;  Location: [facility];  Service: Cardiovascular;  Laterality: Left;  3 hr hydration  / arrive 6:30    MR HEAD ANGIO WO IV CONTRAST  06/02/2021    MR HEAD ANGIO WO IV CONTRAST 6/2/2021 [facility] CL[facility]ICAL LEGAC[facility]    MR HEAD ANGIO WO IV CONTRAST  11/16/2021    MR HEAD ANGIO WO IV CONTRAST 11/16/2021 [facility] CLINICAL LEGAC[facility]    MR NECK ANGIO WO IV CONTRAST  06/02/2021    MR NECK ANGIO WO IV CONTRAST 6/2/2021 [facility] CLINICAL LEGAC[facility]    MR NECK ANGIO WO IV CONTRAST  11/16/2021    MR NECK ANGIO WO IV CONTRAST 11/16/2021 [facility] CLINICAL LEGAC[facility]    TONSILLECTOMY  1950’s   [3]   Social History  Socioeconomic History     Marital status:    Tobacco Use    Smoking status: Never     Passive exposure: Never    Smokeless tobacco: Never   Vaping Use    Vaping status: Never Used   Substance and Sexual Activity    Alcohol use: Never    Drug use: Never    Sexual activity: Yes     Partners: Female     Birth control/protection: None     Comment: Only active with my wife     Social Drivers of Health     Financial Resource Strain: Low Risk  (4/4/2025)    Overall Financial Resource Strain (CARDIA)     Difficulty of Paying Living Expenses: Not hard at all   Food Insecurity: No Food Insecurity (4/3/2025)    Hunger Vital Sign     Worried About Running Out of Food in the Last Year: Never true     Ran Out of Food in the Last Year: Never true   Transportation Needs: No Transportation Needs (4/4/2025)    PRAPARE - Transportation     Lack of Transportation (Medical): No     Lack of Transportation (Non-Medical): No   Physical Activity: Sufficiently Active (4/3/2025)    Exercise Vital Sign     Days of Exercise per Week: 5 days     Minutes of Exercise per Session: 30 min   Stress: No Stress Concern Present (4/3/2025)    [employer] - Occupational Stress Questionnaire     Feeling of Stress : Not at all   Social Connections: Unknown (4/3/2025)    Social Connection and Isolation Panel [NHANES]     Frequency of Communication with Friends and Family: Twice a week   Intimate Partner Violence: Not At Risk (4/3/2025)    Humiliation, Afraid, Rape, and Kick questionnaire     Fear of Current or Ex-Partner: No     Emotionally Abused: No     Physically Abused: No     Sexually Abused: No   Housing Stability: Low Risk  (4/4/2025)    Housing Stability Vital Sign     Unable to Pay for Housing in the Last Year: No     Number of Times Moved in the Last Year: 1     Homeless in the Last Year: No   [4]   Family History  Problem Relation Name Age of Onset    Diabetes Mother Sofia Garza     Other (mycoardial infarction) Father Hilario Henry     Fainting Father Hilario     Heart disease Father Hilario     Atrial fibrillation Father Hilario     Stomach cancer Mother's Sister Fadumo Keller     Stroke Maternal Grandmother Jasmina Mace     Colon cancer Maternal Grandmother Jasmina Mace    [5]   Current Facility-Administered Medications on File Prior to Encounter   Medication Dose Route Frequency Provider Last Rate Last Admin    [DISCONTINUED] allopurinol (Zyloprim) tablet 100 mg  100 mg oral Daily Khalida Medina, APRN-CNP        [DISCONTINUED] amiodarone (Pacerone) tablet 400 mg  400 mg oral Daily Bretzyl M Liscoe, APRN-CNP   400 mg at 04/19/25 0834    [DISCONTINUED] amLODIPine (Norvasc) tablet 10 mg  10 mg oral Daily Khalida Medina, APRN-CNP   10 mg at 04/05/25 1010    [DISCONTINUED] aspirin EC tablet 81 mg  81 mg oral Daily Bretzyl M Liscoe, APRN-CNP   81 mg at 04/19/25 0834    [DISCONTINUED] atorvastatin (Lipitor) tablet 80 mg  80 mg oral Daily Bretzyl M Liscoe, APRN-CNP   80 mg at 04/19/25 0834    [DISCONTINUED] bumetanide (Bumex) tablet 2 mg  2 mg oral BID Khalida Medina APRN-CNP   2 mg at 04/14/25 0845    [DISCONTINUED] cetirizine (ZyrTEC) tablet 10 mg  10 mg oral Daily Bretzyl M Liscoe, APRN-CNP   10 mg at 04/19/25 0835    [DISCONTINUED] cyanocobalamin (Vitamin B-12) tablet 1,000 mcg  1,000 mcg oral Daily Bretzyl M Liscoe, APRN-CNP   1,000 mcg at 04/19/25 0834    [DISCONTINUED] dapagliflozin propanediol (Farxiga) tablet 10 mg  10 mg oral Daily before evening meal Khalida Medina APRN-CNP        [DISCONTINUED] dextrose 50 % injection 12.5 g  12.5 g intravenous q15 min PRN Bretzyl M Liscoe, APRN-CNP        [DISCONTINUED] dextrose 50 % injection 25 g  25 g intravenous q15 min PRN Bretzyl M Liscoe, APRN-CNP        [DISCONTINUED] famotidine (Pepcid) tablet 20 mg  20 mg oral Daily PRN Bretzyl M Liscoe, APRN-CNP        [DISCONTINUED] furosemide (Lasix) 500 mg in 50 mL (10 mg/mL) infusion  40 mg/hr intravenous Continuous Kevin Hutchins MD 4  mL/hr at 04/19/25 1200 40 mg/hr at 04/19/25 1200    [DISCONTINUED] gabapentin (Neurontin) capsule 100 mg  100 mg oral Nightly Bretzyl M Liscoe, APRN-CNP   100 mg at 04/18/25 2034    [DISCONTINUED] glucagon (Glucagen) injection 1 mg  1 mg intramuscular q15 min PRN Bretzyl M Liscoe, APRN-CNP        [DISCONTINUED] glucagon (Glucagen) injection 1 mg  1 mg intramuscular q15 min PRN Bretzyl M Liscoe, APRN-CNP        [DISCONTINUED] heparin (porcine) injection 5,000 Units  5,000 Units subcutaneous TID Bretzyl M Liscoe, APRN-CNP   5,000 Units at 04/19/25 0834    [DISCONTINUED] icosapent ethyL (Vascepa) capsule 1 g  1 g oral BID Bretzyl M Liscoe, APRN-CNP   1 g at 04/19/25 0835    [DISCONTINUED] insulin lispro injection 0-10 Units  0-10 Units subcutaneous Before meals & nightly Bretzyl M Liscoe, APRN-CNP   4 Units at 04/19/25 1203    [DISCONTINUED] ipratropium-albuteroL (Duo-Neb) 0.5-2.5 mg/3 mL nebulizer solution 3 mL  3 mL nebulization q2h PRN Bretzyl M Liscoe, APRN-CNP        [DISCONTINUED] iron sucrose (Venofer) injection 200 mg  200 mg intravenous Once Bretzyl M Liscoe, APRN-CNP        [DISCONTINUED] levothyroxine (Synthroid, Levoxyl) tablet 50 mcg  50 mcg oral Daily Bretzyl M Liscoe, APRN-CNP   50 mcg at 04/19/25 0539    [DISCONTINUED] metoprolol succinate XL (Toprol-XL) 24 hr tablet 50 mg  50 mg oral Daily Bretzyl M Liscoe, APRN-CNP   50 mg at 04/16/25 0849    [DISCONTINUED] nitroglycerin (Nitrostat) SL tablet 0.4 mg  0.4 mg sublingual q5 min PRN Bretzyl M Liscoe, APRN-CNP   0.4 mg at 04/04/25 0338    [DISCONTINUED] oxyCODONE (Roxicodone) immediate release tablet 5 mg  5 mg oral q6h PRN Bretzyl M Liscoe, APRN-CNP   5 mg at 04/05/25 2013    [DISCONTINUED] oxygen (O2) therapy   inhalation Continuous - 02/gases Chris Mancera, APRN-CNP   2 L/min at 04/19/25 0800    [DISCONTINUED] potassium chloride CR (Klor-Con M20) ER tablet 40 mEq  40 mEq oral q4h Kevin Hutchins MD   40 mEq at 04/19/25 1018    [DISCONTINUED]  sennosides-docusate sodium (Richelle-Colace) 8.6-50 mg per tablet 1 tablet  1 tablet oral Nightly ANDREA ReganCNP   1 tablet at 04/18/25 2034    [DISCONTINUED] tamsulosin (Flomax) 24 hr capsule 0.4 mg  0.4 mg oral Daily NORAH Regan-CNP   0.4 mg at 04/19/25 0834     Current Outpatient Medications on File Prior to Encounter   Medication Sig Dispense Refill    albuterol (ProAir HFA) 90 mcg/actuation inhaler Inhale 2 puffs every 6 hours if needed for wheezing or shortness of breath. 18 g 11    allopurinol (Zyloprim) 100 mg tablet Take 1 tablet (100 mg) by mouth once daily. 90 tablet 1    amLODIPine (Norvasc) 10 mg tablet TAKE 1 TABLET BY MOUTH EVERY DAY 90 tablet 1    aspirin 81 mg EC tablet TAKE 1 TABLET BY MOUTH EVERY DAY 90 tablet 3    atorvastatin (Lipitor) 80 mg tablet TAKE 1 TABLET BY MOUTH EVERY DAY 90 tablet 3    coenzyme Q-10 (CoQ-10) 100 mg capsule Take 1 capsule (100 mg) by mouth once daily.      cyanocobalamin (Vitamin B-12) 1,000 mcg tablet Take 1 tablet (1,000 mcg) by mouth once daily. As directed      dapagliflozin (Farxiga) 10 mg Take 1 tablet (10 mg) by mouth once daily in the evening.  Take before meals.      famotidine (Pepcid) 20 mg tablet TAKE 1 TABLET (20 MG) BY MOUTH ONCE DAILY AS NEEDED FOR INDIGESTION. 90 tablet 1    furosemide (Lasix) 20 mg tablet Take 1 tablet (20 mg) by mouth every other day.      gabapentin (Neurontin) 100 mg capsule TAKE 1 CAPSULE (100 MG) BY MOUTH ONCE DAILY AT BEDTIME. 90 capsule 1    icosapent ethyL (Vascepa) 0.5 gram capsule Take 2 capsules (1 g) by mouth 2 times a day with meals. 120 capsule 11    levothyroxine (Synthroid, Levoxyl) 50 mcg tablet TAKE 1 TABLET (50 MCG) BY MOUTH EARLY IN THE MORNING.. TAKE ON AN EMPTY STOMACH AT THE SAME TIME EACH DAY, EITHER 30 TO 60 MINUTES PRIOR TO BREAKFAST 90 tablet 0    loratadine (Claritin) 10 mg tablet Take 1 tablet (10 mg) by mouth 2 times a day. Take 1 tablet every morning and 1 tablet every evening       metoprolol succinate XL (Toprol-XL) 50 mg 24 hr tablet TAKE 1 TABLET BY MOUTH EVERY DAY 90 tablet 3    multivitamin tablet Take 1 tablet by mouth once daily.      pantoprazole (ProtoNix) 40 mg EC tablet Take 1 tablet (40 mg) by mouth 2 times a day. Do not crush, chew, or split. 60 tablet 0    sacubitriL-valsartan (Entresto) 24-26 mg tablet Take 0.5 tablets by mouth 2 times a day. 30 tablet 11    sertraline (Zoloft) 50 mg tablet TAKE ONE TABLET DAILY IN THE MORNING AFTER BREAKFAST. (Patient taking differently: Take 1 tablet (50 mg) by mouth once daily at bedtime. Take one tablet daily in the morning after breakfast.) 90 tablet 1    sildenafil (Viagra) 100 mg tablet Take 1 tablet (100 mg) by mouth if needed for erectile dysfunction (Start with half tab. Take 1 hour prior to intercourse on an empty stomach. If you have an erection for over 4 hours, please go to the emergency room.). 30 tablet 6    [DISCONTINUED] levothyroxine (Synthroid, Levoxyl) 50 mcg tablet TAKE 1 TABLET (50 MCG) BY MOUTH EARLY IN THE MORNING.. TAKE ON AN EMPTY STOMACH AT THE SAME TIME EACH DAY, EITHER 30 TO 60 MINUTES PRIOR TO BREAKFAST 30 tablet 0   [6] gabapentin, 100 mg, oral, Nightly    [7]    [8] PRN medications: albuterol, cetirizine, famotidine, haloperidol, haloperidol, HYDROmorphone, HYDROmorphone, HYDROmorphone, hyoscyamine, LORazepam, oxygen

## 2025-04-21 PROCEDURE — 1150000001 HC HOSPICE PRIVATE ROOM DAILY

## 2025-04-21 PROCEDURE — 2500000001 HC RX 250 WO HCPCS SELF ADMINISTERED DRUGS (ALT 637 FOR MEDICARE OP): Performed by: STUDENT IN AN ORGANIZED HEALTH CARE EDUCATION/TRAINING PROGRAM

## 2025-04-21 PROCEDURE — 99232 SBSQ HOSP IP/OBS MODERATE 35: CPT | Performed by: INTERNAL MEDICINE

## 2025-04-21 RX ADMIN — GABAPENTIN 100 MG: 100 CAPSULE ORAL at 20:20

## 2025-04-21 ASSESSMENT — COGNITIVE AND FUNCTIONAL STATUS - GENERAL
WALKING IN HOSPITAL ROOM: A LOT
MOVING FROM LYING ON BACK TO SITTING ON SIDE OF FLAT BED WITH BEDRAILS: A LITTLE
TOILETING: A LOT
MOBILITY SCORE: 14
DAILY ACTIVITIY SCORE: 13
TURNING FROM BACK TO SIDE WHILE IN FLAT BAD: A LITTLE
CLIMB 3 TO 5 STEPS WITH RAILING: A LOT
DRESSING REGULAR LOWER BODY CLOTHING: A LOT
STANDING UP FROM CHAIR USING ARMS: A LOT
PERSONAL GROOMING: A LOT
DRESSING REGULAR UPPER BODY CLOTHING: A LITTLE
EATING MEALS: A LOT
MOVING TO AND FROM BED TO CHAIR: A LOT
HELP NEEDED FOR BATHING: A LOT

## 2025-04-21 ASSESSMENT — PAIN SCALES - GENERAL: PAINLEVEL_OUTOF10: 0 - NO PAIN

## 2025-04-21 ASSESSMENT — PAIN - FUNCTIONAL ASSESSMENT: PAIN_FUNCTIONAL_ASSESSMENT: 0-10

## 2025-04-21 NOTE — PROGRESS NOTES
PALLIATIVE CARE SOCIAL WORK NOTE     Family met with [facility] over the weekend. Pt transitioned to GIP status with [facility] on 4/19. [facility] has been following daily since that time. [facility] RN Nikole met with pt's wife this afternoon. Plan will be for pt to discharge home with [facility] tomorrow. Arrangements per [facility]. Thank you.    DAY Parekh

## 2025-04-21 NOTE — PROGRESS NOTES
04/21/25 1106   Discharge Planning   Who is requesting discharge planning? Provider   Home or Post Acute Services In home services   Type of Home Care Services Hospice   Expected Discharge Disposition HospiceHome   Does the patient need discharge transport arranged? Yes   RoundTrip coordination needed? Yes   Has discharge transport been arranged? No   What day is the transport expected? 04/22/25   Intensity of Service   Intensity of Service 0-30 min     4/21/25 1107  Wife signed consents with [facility].  Plan per hospice, patient to discharge home with hospice on 4/22/25.  Jasmina Dumont RN [facility]

## 2025-04-21 NOTE — PROGRESS NOTES
Ritesh Garza is a 74 y.o. male on day 2 of admission presenting with No Principal Problem: There is no principal problem currently on the Problem List. Please update the Problem List and refresh..    Subjective   No acute events overnight. Wants to go home.        Objective     VITALS  Blood pressure 109/58, pulse 85, temperature 37.2 °C (99 °F), temperature source Temporal, resp. rate 16, SpO2 91%.  Physical Exam  Vitals and nursing note reviewed.   Constitutional:       General: He is not in acute distress.     Appearance: Normal appearance. He is not ill-appearing.   HENT:      Head: Normocephalic and atraumatic.      Mouth/Throat:      Mouth: Mucous membranes are moist.      Pharynx: Oropharynx is clear.   Eyes:      Extraocular Movements: Extraocular movements intact.      Conjunctiva/sclera: Conjunctivae normal.   Cardiovascular:      Rate and Rhythm: Normal rate and regular rhythm.      Heart sounds: Normal heart sounds. No murmur heard.     No friction rub. No gallop.   Pulmonary:      Effort: Pulmonary effort is normal.      Breath sounds: Normal breath sounds. No wheezing or rales.   Abdominal:      General: Bowel sounds are normal. There is no distension.      Palpations: Abdomen is soft.      Tenderness: There is no abdominal tenderness. There is no guarding.   Musculoskeletal:         General: No swelling or tenderness.      Right lower leg: No edema.      Left lower leg: No edema.   Skin:     General: Skin is warm and dry.      Capillary Refill: Capillary refill takes less than 2 seconds.   Neurological:      General: No focal deficit present.      Mental Status: He is alert.   Psychiatric:         Mood and Affect: Mood normal.           Intake/Output last 3 Shifts:  I/O last 3 completed shifts:  In: -   Out: 2300 [Urine:2300]    Relevant Results  No results found. However, due to the size of the patient record, not all encounters were searched. Please check Results Review for a complete set of  results.    Imaging Results  No orders to display       Medications:  Scheduled Medications[1]   PRN Medications[2]        Assessment/Plan          Assessment & Plan    Hospice care  -stop home meds  -Continue with hospice meds for pain, secretions and agitation   -Continue O2 for comfort   -Plan on DC home with hospice on Tuesday     DVT Prophylaxis:  NA    Disposition:  Anticipate home with hospice tomorrow     Rafita Peters, DO [facility]           [1] gabapentin, 100 mg, oral, Nightly    [2] PRN medications: albuterol, cetirizine, famotidine, haloperidol, haloperidol, HYDROmorphone, HYDROmorphone, HYDROmorphone, hyoscyamine, LORazepam, oxygen

## 2025-04-21 NOTE — CARE PLAN
Problem: Pain - Adult  Goal: Verbalizes/displays adequate comfort level or baseline comfort level  4/20/2025 2239 by Kristi James RN  Outcome: Progressing  4/20/2025 2239 by Kristi James RN  Outcome: Progressing  4/20/2025 2238 by Kristi James RN  Outcome: Progressing  4/20/2025 2238 by Kristi James RN  Outcome: Progressing     Problem: Safety - Adult  Goal: Free from fall injury  4/20/2025 2239 by Kristi James, RN  Outcome: Progressing  4/20/2025 2239 by Kristi James RN  Outcome: Progressing  4/20/2025 2238 by Kristi James RN  Outcome: Progressing  4/20/2025 2238 by Kristi James RN  Outcome: Progressing     Problem: Discharge Planning  Goal: Discharge to home or other facility with appropriate resources  4/20/2025 2239 by Kristi James RN  Outcome: Progressing  4/20/2025 2239 by Kristi James RN  Outcome: Progressing  4/20/2025 2238 by Kristi James RN  Outcome: Progressing  4/20/2025 2238 by Kristi James RN  Outcome: Progressing     Problem: Chronic Conditions and Co-morbidities  Goal: Patient's chronic conditions and co-morbidity symptoms are monitored and maintained or improved  4/20/2025 2239 by Krsiti James RN  Outcome: Progressing  4/20/2025 2239 by Kristi James, RN  Outcome: Progressing  4/20/2025 2238 by Kristi James RN  Outcome: Progressing  4/20/2025 2238 by Kristi James RN  Outcome: Progressing     Problem: Nutrition  Goal: Nutrient intake appropriate for maintaining nutritional needs  4/20/2025 2239 by Kristi James RN  Outcome: Progressing  4/20/2025 2239 by Kristi James, RN  Outcome: Progressing  4/20/2025 2238 by Kristi James, RN  Outcome: Progressing  4/20/2025 2238 by Kristi James RN  Outcome: Progressing     Problem: Skin  Goal: Decreased wound size/increased tissue granulation at next dressing change  4/20/2025 2239 by Kristi  Erika, RN  Outcome: Progressing  4/20/2025 2239 by Kristi James, RN  Outcome: Progressing  4/20/2025 2238 by Kristi James, RN  Outcome: Progressing  4/20/2025 2238 by Kristi James, RN  Outcome: Progressing  Goal: Participates in plan/prevention/treatment measures  4/20/2025 2239 by Kristi James, RN  Outcome: Progressing  4/20/2025 2239 by Kristi James, RN  Outcome: Progressing  4/20/2025 2238 by Kristi James, RN  Outcome: Progressing  4/20/2025 2238 by Kristi James, RN  Outcome: Progressing  Goal: Prevent/manage excess moisture  4/20/2025 2239 by Kristi James, RN  Outcome: Progressing  4/20/2025 2239 by Kristi James, RN  Outcome: Progressing  4/20/2025 2238 by Kristi James, RN  Outcome: Progressing  4/20/2025 2238 by Kristi James, RN  Outcome: Progressing  Goal: Prevent/minimize sheer/friction injuries  4/20/2025 2239 by Kristi James, RN  Outcome: Progressing  4/20/2025 2239 by Kristi James, RN  Outcome: Progressing  4/20/2025 2238 by Kristi James, RN  Outcome: Progressing  4/20/2025 2238 by Kristi James, RN  Outcome: Progressing  Goal: Promote/optimize nutrition  4/20/2025 2239 by Kristi James, RN  Outcome: Progressing  4/20/2025 2239 by Kristi James, RN  Outcome: Progressing  4/20/2025 2238 by Kristi James, RN  Outcome: Progressing  4/20/2025 2238 by Kristi James, RN  Outcome: Progressing  Goal: Promote skin healing  4/20/2025 2239 by Kristi James, RN  Outcome: Progressing  4/20/2025 2239 by Kristi James, RN  Outcome: Progressing  4/20/2025 2238 by Kristi James RN  Outcome: Progressing  4/20/2025 2238 by Kristi James RN  Outcome: Progressing     Problem: Fall/Injury  Goal: Not fall by end of shift  4/20/2025 2239 by Kristi James RN  Outcome: Progressing  4/20/2025 2239 by Kristi James RN  Outcome:  Progressing  4/20/2025 2238 by Kristi James, RN  Outcome: Progressing  4/20/2025 2238 by Kristi James, RN  Outcome: Progressing  Goal: Be free from injury by end of the shift  4/20/2025 2239 by Kristi James, RN  Outcome: Progressing  4/20/2025 2239 by Kristi James, RN  Outcome: Progressing  4/20/2025 2238 by Kristi James, RN  Outcome: Progressing  4/20/2025 2238 by Kristi James, RN  Outcome: Progressing  Goal: Verbalize understanding of personal risk factors for fall in the hospital  4/20/2025 2239 by Kristi James, RN  Outcome: Progressing  4/20/2025 2239 by Kristi James, RN  Outcome: Progressing  4/20/2025 2238 by Kristi James, RN  Outcome: Progressing  4/20/2025 2238 by Kristi James, RN  Outcome: Progressing  Goal: Verbalize understanding of risk factor reduction measures to prevent injury from fall in the home  4/20/2025 2239 by Kristi James, RN  Outcome: Progressing  4/20/2025 2239 by Kristi James, RN  Outcome: Progressing  4/20/2025 2238 by Kristi James, RN  Outcome: Progressing  4/20/2025 2238 by Kristi James, RN  Outcome: Progressing  Goal: Use assistive devices by end of the shift  4/20/2025 2239 by Kristi James, RN  Outcome: Progressing  4/20/2025 2239 by Kristi James, RN  Outcome: Progressing  4/20/2025 2238 by Kristi James, RN  Outcome: Progressing  4/20/2025 2238 by Kristi James, RN  Outcome: Progressing  Goal: Pace activities to prevent fatigue by end of the shift  4/20/2025 2239 by Kristi James, RN  Outcome: Progressing  4/20/2025 2239 by Kristi James, RN  Outcome: Progressing  4/20/2025 2238 by Kristi James RN  Outcome: Progressing  4/20/2025 2238 by Kristi James RN  Outcome: Progressing

## 2025-04-21 NOTE — CARE PLAN
Problem: Skin  Goal: Decreased wound size/increased tissue granulation at next dressing change  4/20/2025 2326 by Jodi Ramirez RN  Outcome: Progressing  Flowsheets (Taken 4/20/2025 0434 by Kristi James RN)  Decreased wound size/increased tissue granulation at next dressing change:   Promote sleep for wound healing   Protective dressings over bony prominences  4/20/2025 2325 by Jodi Ramirez RN  Outcome: Progressing  4/20/2025 2308 by Jodi Ramirez RN  Outcome: Progressing  Flowsheets (Taken 4/20/2025 0434 by Kristi James RN)  Decreased wound size/increased tissue granulation at next dressing change:   Promote sleep for wound healing   Protective dressings over bony prominences  Goal: Participates in plan/prevention/treatment measures  4/20/2025 2326 by Jodi Ramirez RN  Outcome: Progressing  Flowsheets (Taken 4/20/2025 2326)  Participates in plan/prevention/treatment measures: Elevate heels  4/20/2025 2325 by Jodi Ramirez RN  Outcome: Progressing  4/20/2025 2308 by Jodi Ramirez RN  Outcome: Progressing  Flowsheets (Taken 4/20/2025 0434 by Kristi James RN)  Participates in plan/prevention/treatment measures: Elevate heels  Goal: Prevent/manage excess moisture  4/20/2025 2326 by Jodi Ramirez RN  Outcome: Progressing  Flowsheets (Taken 4/20/2025 2326)  Prevent/manage excess moisture: Moisturize dry skin  4/20/2025 2325 by Jodi Ramirez RN  Outcome: Progressing  4/20/2025 2308 by Jodi Ramirez RN  Outcome: Progressing  Flowsheets (Taken 4/20/2025 1114 by Gaby Cunningham RN)  Prevent/manage excess moisture:   Cleanse incontinence/protect with barrier cream   Monitor for/manage infection if present   Moisturize dry skin  Goal: Prevent/minimize sheer/friction injuries  4/20/2025 2326 by Jdoi Ramirez RN  Outcome: Progressing  Flowsheets (Taken 4/20/2025 2326)  Prevent/minimize sheer/friction injuries: Use pull sheet  4/20/2025 2308 by  Joid Ramirez RN  Outcome: Progressing  Flowsheets (Taken 4/20/2025 1114 by Gaby Cunningham RN)  Prevent/minimize sheer/friction injuries:   HOB 30 degrees or less   Turn/reposition every 2 hours/use positioning/transfer devices   Use pull sheet  Goal: Promote/optimize nutrition  4/20/2025 2326 by Jodi Ramirez RN  Outcome: Progressing  Flowsheets (Taken 4/20/2025 2326)  Promote/optimize nutrition:   Offer water/supplements/favorite foods   Assist with feeding  4/20/2025 2308 by Jodi Ramirez RN  Outcome: Progressing  Goal: Promote skin healing  4/20/2025 2326 by Jodi Ramirez RN  Outcome: Progressing  Flowsheets (Taken 4/20/2025 2326)  Promote skin healing: Protective dressings over bony prominences  4/20/2025 2308 by Jodi Ramirez RN  Outcome: Progressing  Flowsheets (Taken 4/20/2025 0434 by Kristi James RN)  Promote skin healing:   Assess skin/pad under line(s)/device(s)   Turn/reposition every 2 hours/use positioning/transfer devices   The patient's goals for the shift include

## 2025-04-21 NOTE — CARE PLAN
The patient's goals for the shift include seeing family    The clinical goals for the shift include remain pain-free and comfortable      Problem: Pain - Adult  Goal: Verbalizes/displays adequate comfort level or baseline comfort level  Outcome: Progressing     Problem: Safety - Adult  Goal: Free from fall injury  Outcome: Progressing     Problem: Discharge Planning  Goal: Discharge to home or other facility with appropriate resources  Outcome: Progressing     Problem: Chronic Conditions and Co-morbidities  Goal: Patient's chronic conditions and co-morbidity symptoms are monitored and maintained or improved  Outcome: Progressing     Problem: Nutrition  Goal: Nutrient intake appropriate for maintaining nutritional needs  Outcome: Progressing     Problem: Skin  Goal: Participates in plan/prevention/treatment measures  Outcome: Progressing  Goal: Prevent/manage excess moisture  Outcome: Progressing  Goal: Prevent/minimize sheer/friction injuries  Outcome: Progressing

## 2025-04-21 NOTE — CARE PLAN
Problem: Skin  Goal: Decreased wound size/increased tissue granulation at next dressing change  Outcome: Progressing  Flowsheets (Taken 4/20/2025 0434 by Kristi James, RN)  Decreased wound size/increased tissue granulation at next dressing change:   Promote sleep for wound healing   Protective dressings over bony prominences  Goal: Participates in plan/prevention/treatment measures  Outcome: Progressing  Flowsheets (Taken 4/20/2025 0434 by Kristi James RN)  Participates in plan/prevention/treatment measures: Elevate heels  Goal: Prevent/manage excess moisture  Outcome: Progressing  Flowsheets (Taken 4/20/2025 1114 by Gaby Cunningham, RN)  Prevent/manage excess moisture:   Cleanse incontinence/protect with barrier cream   Monitor for/manage infection if present   Moisturize dry skin  Goal: Prevent/minimize sheer/friction injuries  Outcome: Progressing  Flowsheets (Taken 4/20/2025 1114 by Gaby Cunningham, RN)  Prevent/minimize sheer/friction injuries:   HOB 30 degrees or less   Turn/reposition every 2 hours/use positioning/transfer devices   Use pull sheet  Goal: Promote/optimize nutrition  Outcome: Progressing  Goal: Promote skin healing  Outcome: Progressing  Flowsheets (Taken 4/20/2025 0434 by Kristi James RN)  Promote skin healing:   Assess skin/pad under line(s)/device(s)   Turn/reposition every 2 hours/use positioning/transfer devices

## 2025-04-21 NOTE — CARE PLAN
Problem: Pain - Adult  Goal: Verbalizes/displays adequate comfort level or baseline comfort level  4/20/2025 2238 by Kristi James RN  Outcome: Progressing  4/20/2025 2238 by Kristi James RN  Outcome: Progressing     Problem: Safety - Adult  Goal: Free from fall injury  4/20/2025 2238 by Kristi James RN  Outcome: Progressing  4/20/2025 2238 by Kristi James RN  Outcome: Progressing     Problem: Discharge Planning  Goal: Discharge to home or other facility with appropriate resources  4/20/2025 2238 by Kristi James RN  Outcome: Progressing  4/20/2025 2238 by Kristi James RN  Outcome: Progressing     Problem: Chronic Conditions and Co-morbidities  Goal: Patient's chronic conditions and co-morbidity symptoms are monitored and maintained or improved  4/20/2025 2238 by Kristi James RN  Outcome: Progressing  4/20/2025 2238 by Kristi James RN  Outcome: Progressing     Problem: Nutrition  Goal: Nutrient intake appropriate for maintaining nutritional needs  4/20/2025 2238 by Kristi James RN  Outcome: Progressing  4/20/2025 2238 by Kristi James RN  Outcome: Progressing     Problem: Skin  Goal: Decreased wound size/increased tissue granulation at next dressing change  4/20/2025 2238 by Kristi James RN  Outcome: Progressing  4/20/2025 2238 by Kristi James RN  Outcome: Progressing  Goal: Participates in plan/prevention/treatment measures  4/20/2025 2238 by Kristi James RN  Outcome: Progressing  4/20/2025 2238 by Kristi James RN  Outcome: Progressing  Goal: Prevent/manage excess moisture  4/20/2025 2238 by Kristi James RN  Outcome: Progressing  4/20/2025 2238 by Kristi James RN  Outcome: Progressing  Goal: Prevent/minimize sheer/friction injuries  4/20/2025 2238 by Kristi James RN  Outcome: Progressing  4/20/2025/20/2025 2238 by Kristi James, RN  Outcome: Progressing  Goal:  Promote/optimize nutrition  4/20/2025 2238 by Kristi James RN  Outcome: Progressing  4/20/2025 2238 by Kristi James RN  Outcome: Progressing  Goal: Promote skin healing  4/20/2025 2238 by Kristi James RN  Outcome: Progressing  4/20/2025 2238 by Kristi James RN  Outcome: Progressing     Problem: Fall/Injury  Goal: Not fall by end of shift  4/20/2025 2238 by Kristi James, RN  Outcome: Progressing  4/20/2025 2238 by Kristi James RN  Outcome: Progressing  Goal: Be free from injury by end of the shift  4/20/2025 2238 by Kristi James, RN  Outcome: Progressing  4/20/2025 2238 by Kristi James RN  Outcome: Progressing  Goal: Verbalize understanding of personal risk factors for fall in the hospital  4/20/2025 2238 by Kristi James, RN  Outcome: Progressing  4/20/2025 2238 by Kristi James RN  Outcome: Progressing  Goal: Verbalize understanding of risk factor reduction measures to prevent injury from fall in the home  4/20/2025 2238 by Kristi James, RN  Outcome: Progressing  4/20/2025 2238 by Kristi James RN  Outcome: Progressing  Goal: Use assistive devices by end of the shift  4/20/2025 2238 by Kristi James, RN  Outcome: Progressing  4/20/2025 2238 by Kristi James RN  Outcome: Progressing  Goal: Pace activities to prevent fatigue by end of the shift  4/20/2025 2238 by Kristi James RN  Outcome: Progressing  4/20/2025 2238 by Kristi James RN  Outcome: Progressing

## 2025-04-22 VITALS
OXYGEN SATURATION: 96 % | HEART RATE: 78 BPM | TEMPERATURE: 97.5 F | DIASTOLIC BLOOD PRESSURE: 70 MMHG | RESPIRATION RATE: 16 BRPM | SYSTOLIC BLOOD PRESSURE: 114 MMHG

## 2025-04-22 LAB
ATRIAL RATE: 101 BPM
ATRIAL RATE: 105 BPM
ATRIAL RATE: 122 BPM
P AXIS: 70 DEGREES
P AXIS: 86 DEGREES
P OFFSET: 180 MS
P ONSET: 134 MS
PR INTERVAL: 154 MS
PR INTERVAL: 200 MS
Q ONSET: 211 MS
Q ONSET: 216 MS
Q ONSET: 218 MS
QRS COUNT: 17 BEATS
QRS COUNT: 17 BEATS
QRS COUNT: 20 BEATS
QRS DURATION: 120 MS
QRS DURATION: 138 MS
QRS DURATION: 138 MS
QT INTERVAL: 322 MS
QT INTERVAL: 358 MS
QT INTERVAL: 380 MS
QTC CALCULATION(BAZETT): 458 MS
QTC CALCULATION(BAZETT): 464 MS
QTC CALCULATION(BAZETT): 502 MS
QTC FREDERICIA: 407 MS
QTC FREDERICIA: 426 MS
QTC FREDERICIA: 457 MS
R AXIS: -60 DEGREES
R AXIS: 206 DEGREES
R AXIS: 219 DEGREES
T AXIS: 120 DEGREES
T AXIS: 152 DEGREES
T AXIS: 7 DEGREES
T OFFSET: 372 MS
T OFFSET: 397 MS
T OFFSET: 406 MS
VENTRICULAR RATE: 101 BPM
VENTRICULAR RATE: 105 BPM
VENTRICULAR RATE: 122 BPM

## 2025-04-22 PROCEDURE — 99238 HOSP IP/OBS DSCHRG MGMT 30/<: CPT | Performed by: INTERNAL MEDICINE

## 2025-04-22 ASSESSMENT — COGNITIVE AND FUNCTIONAL STATUS - GENERAL
STANDING UP FROM CHAIR USING ARMS: A LOT
DRESSING REGULAR UPPER BODY CLOTHING: A LOT
DRESSING REGULAR LOWER BODY CLOTHING: A LOT
WALKING IN HOSPITAL ROOM: TOTAL
HELP NEEDED FOR BATHING: A LOT
DAILY ACTIVITIY SCORE: 22
MOBILITY SCORE: 11
TOILETING: A LOT
MOBILITY SCORE: 23
MOVING FROM LYING ON BACK TO SITTING ON SIDE OF FLAT BED WITH BEDRAILS: A LITTLE
HELP NEEDED FOR BATHING: A LITTLE
MOVING FROM LYING ON BACK TO SITTING ON SIDE OF FLAT BED WITH BEDRAILS: A LITTLE
TURNING FROM BACK TO SIDE WHILE IN FLAT BAD: A LOT
DAILY ACTIVITIY SCORE: 12
EATING MEALS: A LITTLE
EATING MEALS: A LOT
MOVING TO AND FROM BED TO CHAIR: A LOT
PERSONAL GROOMING: A LOT
CLIMB 3 TO 5 STEPS WITH RAILING: TOTAL

## 2025-04-22 ASSESSMENT — PAIN SCALES - GENERAL
PAINLEVEL_OUTOF10: 0 - NO PAIN
PAINLEVEL_OUTOF10: 0 - NO PAIN

## 2025-04-22 ASSESSMENT — PAIN SCALES - WONG BAKER: WONGBAKER_NUMERICALRESPONSE: NO HURT

## 2025-04-22 ASSESSMENT — PAIN - FUNCTIONAL ASSESSMENT: PAIN_FUNCTIONAL_ASSESSMENT: 0-10

## 2025-04-22 NOTE — CARE PLAN
Problem: Skin  Goal: Decreased wound size/increased tissue granulation at next dressing change  Outcome: Progressing  Flowsheets (Taken 4/20/2025 0434 by Kristi James RN)  Decreased wound size/increased tissue granulation at next dressing change:   Promote sleep for wound healing   Protective dressings over bony prominences  Goal: Participates in plan/prevention/treatment measures  Outcome: Progressing  Flowsheets (Taken 4/21/2025 1019 by Gaby Cunningham RN)  Participates in plan/prevention/treatment measures: Elevate heels  Goal: Prevent/manage excess moisture  Outcome: Progressing  Flowsheets (Taken 4/22/2025 0320)  Prevent/manage excess moisture: Moisturize dry skin  Goal: Prevent/minimize sheer/friction injuries  Outcome: Progressing  Flowsheets (Taken 4/22/2025 0320)  Prevent/minimize sheer/friction injuries: Use pull sheet  Goal: Promote/optimize nutrition  Outcome: Progressing  Flowsheets (Taken 4/22/2025 0320)  Promote/optimize nutrition: Offer water/supplements/favorite foods  Goal: Promote skin healing  Outcome: Progressing  Flowsheets (Taken 4/22/2025 0320)  Promote skin healing: Assess skin/pad under line(s)/device(s)   The patient's goals for the shift include      The clinical goals for the shift include comfort

## 2025-04-22 NOTE — CARE PLAN
Problem: Pain - Adult  Goal: Verbalizes/displays adequate comfort level or baseline comfort level  Outcome: Progressing     Problem: Safety - Adult  Goal: Free from fall injury  Outcome: Progressing     Problem: Discharge Planning  Goal: Discharge to home or other facility with appropriate resources  Outcome: Progressing     Problem: Chronic Conditions and Co-morbidities  Goal: Patient's chronic conditions and co-morbidity symptoms are monitored and maintained or improved  Outcome: Progressing     Problem: Nutrition  Goal: Nutrient intake appropriate for maintaining nutritional needs  Outcome: Progressing     Problem: Skin  Goal: Decreased wound size/increased tissue granulation at next dressing change  Outcome: Progressing  Goal: Participates in plan/prevention/treatment measures  Outcome: Progressing  Goal: Prevent/manage excess moisture  Outcome: Progressing  Goal: Prevent/minimize sheer/friction injuries  Outcome: Progressing  Goal: Promote/optimize nutrition  Outcome: Progressing  Goal: Promote skin healing  Outcome: Progressing     Problem: Fall/Injury  Goal: Not fall by end of shift  Outcome: Progressing  Goal: Be free from injury by end of the shift  Outcome: Progressing  Goal: Verbalize understanding of personal risk factors for fall in the hospital  Outcome: Progressing  Goal: Verbalize understanding of risk factor reduction measures to prevent injury from fall in the home  Outcome: Progressing  Goal: Use assistive devices by end of the shift  Outcome: Progressing  Goal: Pace activities to prevent fatigue by end of the shift  Outcome: Progressing   The patient's goals for the shift include      The clinical goals for the shift include comfort    Over the shift, the patient did not make progress toward the following goals. Barriers to progression include . Recommendations to address these barriers include .

## 2025-04-22 NOTE — NURSING NOTE
Pt for DC home with hospice today  DME and Meds to be delivered between 12-4 PM.  Physicians to  pm  Wife Petra made aware of the plan, she expressed she is not happy, she wants him to stay at the hospital, but she is aware he needs to go.  Support provided.  Will have [facility] visit tonight once he arrives home to educate and complete admission.

## 2025-04-23 ENCOUNTER — PATIENT OUTREACH (OUTPATIENT)
Dept: PRIMARY CARE | Facility: CLINIC | Age: 75
End: 2025-04-23
Payer: OTHER MISCELLANEOUS

## 2025-04-23 NOTE — PROGRESS NOTES
Discharge Facility: [facility]  Discharge Diagnosis: acute respiratory failure with hypoxia  Admission Date: 4/2/25  Discharge Date:  4/22/25    PCP Appointment Date: n/a - enrolled with hospice  Specialist Appointment Date: n/a  Hospital Encounter and Summary Linked: Yes  Admission (Discharged) with Rafita Peters DO (04/19/2025)   ED to Hosp-Admission (Discharged) with Brice Peña DO; Denny Phillips MD (04/02/2025)     TCM RN had successful contact with patient's wife, Petra Garza. Patient came home from the hospital yesterday under hospice care. [facility] is enrolled with [facility]. Hospice nurse was there last night.  Patient is off of all medications except for ones to help with keeping him comfortable. He is on medication for anxiety and receives breathing treatments.  He is on oxygen.  Patient currently has a visitor from [employer] sitting and talking with him. Wife states some days he's awake and others he sleeps most of the day. He does have decreased appetite.   Patient's wife states it would be difficult for him to get to in person visits with his PCP, and requested cancelling his 4/29/25 appt.  Support provided and encouraged her to call if she is needing any assistance.

## 2025-04-29 ENCOUNTER — APPOINTMENT (OUTPATIENT)
Dept: PRIMARY CARE | Facility: CLINIC | Age: 75
End: 2025-04-29
Payer: MEDICARE

## 2025-04-29 ENCOUNTER — HOSPITAL ENCOUNTER (OUTPATIENT)
Dept: CARDIOLOGY | Facility: HOSPITAL | Age: 75
Discharge: HOME | End: 2025-04-29
Payer: OTHER MISCELLANEOUS

## 2025-04-29 DIAGNOSIS — Z95.810 PRESENCE OF AUTOMATIC (IMPLANTABLE) CARDIAC DEFIBRILLATOR: ICD-10-CM

## 2025-04-29 DIAGNOSIS — I47.29 OTHER VENTRICULAR TACHYCARDIA: ICD-10-CM

## 2025-04-29 NOTE — DISCHARGE SUMMARY
Discharge Diagnosis  Hospice care    Issues Requiring Follow-Up  N/A    Discharge Meds     Your medication list        CHANGE how you take these medications        Instructions Last Dose Given Next Dose Due   sertraline 50 mg tablet  Commonly known as: Zoloft  What changed: See the new instructions.      TAKE ONE TABLET DAILY IN THE MORNING AFTER BREAKFAST.              CONTINUE taking these medications        Instructions Last Dose Given Next Dose Due   albuterol 90 mcg/actuation inhaler  Commonly known as: ProAir HFA      Inhale 2 puffs every 6 hours if needed for wheezing or shortness of breath.       allopurinol 100 mg tablet  Commonly known as: Zyloprim      Take 1 tablet (100 mg) by mouth once daily.       amLODIPine 10 mg tablet  Commonly known as: Norvasc      TAKE 1 TABLET BY MOUTH EVERY DAY       aspirin 81 mg EC tablet      TAKE 1 TABLET BY MOUTH EVERY DAY       atorvastatin 80 mg tablet  Commonly known as: Lipitor      TAKE 1 TABLET BY MOUTH EVERY DAY       CoQ-10 100 mg capsule  Generic drug: coenzyme Q-10           cyanocobalamin 1,000 mcg tablet  Commonly known as: Vitamin B-12           Entresto 24-26 mg tablet  Generic drug: sacubitriL-valsartan      Take 0.5 tablets by mouth 2 times a day.       famotidine 20 mg tablet  Commonly known as: Pepcid      TAKE 1 TABLET (20 MG) BY MOUTH ONCE DAILY AS NEEDED FOR INDIGESTION.       Farxiga 10 mg tablet  Generic drug: dapagliflozin propanediol           furosemide 20 mg tablet  Commonly known as: Lasix           gabapentin 100 mg capsule  Commonly known as: Neurontin      TAKE 1 CAPSULE (100 MG) BY MOUTH ONCE DAILY AT BEDTIME.       icosapent ethyL 0.5 gram capsule  Commonly known as: Vascepa      Take 2 capsules (1 g) by mouth 2 times a day with meals.       levothyroxine 50 mcg tablet  Commonly known as: Synthroid, Levoxyl      TAKE 1 TABLET (50 MCG) BY MOUTH EARLY IN THE MORNING.. TAKE ON AN EMPTY STOMACH AT THE SAME TIME EACH DAY, EITHER 30 TO 60  MINUTES PRIOR TO BREAKFAST       loratadine 10 mg tablet  Commonly known as: Claritin           metoprolol succinate XL 50 mg 24 hr tablet  Commonly known as: Toprol-XL      TAKE 1 TABLET BY MOUTH EVERY DAY       multivitamin tablet           pantoprazole 40 mg EC tablet  Commonly known as: ProtoNix      Take 1 tablet (40 mg) by mouth 2 times a day. Do not crush, chew, or split.       sildenafil 100 mg tablet  Commonly known as: Viagra      Take 1 tablet (100 mg) by mouth if needed for erectile dysfunction (Start with half tab. Take 1 hour prior to intercourse on an empty stomach. If you have an erection for over 4 hours, please go to the emergency room.).                Test Results Pending At Discharge  Pending Labs       No current pending labs.            Procedures       Hospital Course   Ritesh was admitted to hospital for GIP.  He was transition to GIP from the ICU for overall worsening heart failure.  He was monitored in the hospital on hospice while arrangements were made at home.  He stayed comfortable here.  At this time he is otherwise hemodynamically stable for discharge home with hospice.  This plan was discussed with him and his wife.  They are both in agreement.  All questions were answered.    Blood pressure 114/70, pulse 78, temperature 36.4 °C (97.5 °F), temperature source Temporal, resp. rate 16, SpO2 96%.  Pertinent Physical Exam At Time of Discharge  Physical Exam  Vitals and nursing note reviewed.   Constitutional:       General: He is not in acute distress.     Appearance: Normal appearance. He is not ill-appearing.   HENT:      Head: Normocephalic and atraumatic.      Mouth/Throat:      Mouth: Mucous membranes are moist.      Pharynx: Oropharynx is clear.   Eyes:      Extraocular Movements: Extraocular movements intact.      Conjunctiva/sclera: Conjunctivae normal.   Cardiovascular:      Rate and Rhythm: Normal rate and regular rhythm.      Heart sounds: Normal heart sounds. No murmur heard.      No friction rub. No gallop.   Pulmonary:      Effort: Pulmonary effort is normal.      Breath sounds: Normal breath sounds. No wheezing or rales.   Abdominal:      General: Bowel sounds are normal. There is no distension.      Palpations: Abdomen is soft.      Tenderness: There is no abdominal tenderness. There is no guarding.   Musculoskeletal:         General: No swelling or tenderness.      Right lower leg: No edema.      Left lower leg: No edema.   Skin:     General: Skin is warm and dry.      Capillary Refill: Capillary refill takes less than 2 seconds.   Neurological:      General: No focal deficit present.      Mental Status: He is alert.   Psychiatric:         Mood and Affect: Mood normal.         Outpatient Follow-Up  Future Appointments   Date Time Provider Department Center   5/22/2025  9:00 AM [id number] FIBROSCAN [id number] [facility]   5/22/2025 10:00 AM Nate Duran MD [id number] [facility]   7/21/2025 12:00 PM [facility] RN 1 [id number] [facility]   7/21/2025 12:30 PM Meme Perrin MD [id number] [facility]   11/24/2025  9:40 AM Avis Alvarenga, APRN-CNP [facility]         Rafita Peters, DO FACP

## 2025-05-22 ENCOUNTER — APPOINTMENT (OUTPATIENT)
Dept: GASTROENTEROLOGY | Facility: CLINIC | Age: 75
End: 2025-05-22
Payer: OTHER MISCELLANEOUS

## 2025-05-22 ENCOUNTER — APPOINTMENT (OUTPATIENT)
Dept: CARDIOLOGY | Facility: HOSPITAL | Age: 75
End: 2025-05-22
Payer: OTHER MISCELLANEOUS

## 2025-07-21 ENCOUNTER — APPOINTMENT (OUTPATIENT)
Dept: BEHAVIORAL HEALTH | Facility: CLINIC | Age: 75
End: 2025-07-21
Payer: OTHER MISCELLANEOUS

## 2025-07-21 ENCOUNTER — APPOINTMENT (OUTPATIENT)
Dept: GERIATRIC MEDICINE | Facility: CLINIC | Age: 75
End: 2025-07-21
Payer: OTHER MISCELLANEOUS

## 2025-11-24 ENCOUNTER — APPOINTMENT (OUTPATIENT)
Dept: UROLOGY | Facility: HOSPITAL | Age: 75
End: 2025-11-24
Payer: OTHER MISCELLANEOUS

## (undated) DEVICE — SHEATH, PINNACLE, 10 CM,  7FR INTRODUCER, 7FR DIA, 2.5 CM DIALATOR

## (undated) DEVICE — DRAPE, C-ARM IMAGE

## (undated) DEVICE — ELECTRODE, ELECTROSURGICAL, BLADE EXT 4 INCH, INSULATED

## (undated) DEVICE — GUIDE WIRE, 260CM, HI-TORQUE, VERSACORE, MODIFIED J

## (undated) DEVICE — GUIDEWIRE, ANGLE TIP,  .035 DIA, 260 CM, 3 CM TIP"

## (undated) DEVICE — Device

## (undated) DEVICE — CATHETER, THERMODILUTION, 7FR X 110CM, MULTIFLEX

## (undated) DEVICE — MANIFOLD, 4 PORT NEPTUNE STANDARD

## (undated) DEVICE — 6FR DXTERITY MPA DIAGNOSTIC CATHETER, 2 SH, .038 100 CM RADIAL

## (undated) DEVICE — PAD, GROUNDING, ELECTROSURGICAL, W/9 FT CABLE, POLYHESIVE II, ADULT, LF

## (undated) DEVICE — CLOSURE DEVICE, VASCULAR, ANGIO-SEAL, VIP, 6FR, LF

## (undated) DEVICE — CATHETER, DIAGNOSTIC, DXTERITY, 6 FR, JL 4.0, 100C

## (undated) DEVICE — CATHETER TRAY, SURESTEP, 16FR, URINE METER W/STATLOCK

## (undated) DEVICE — SHEATH, PINNACLE, 10 CM,  6FR INTRODUCER, 6FR DIA, 2.5 CM DIALATOR

## (undated) DEVICE — DRAIN, WOUND, ROUND, W/TROCAR, HOLE PATTERN, 10 IN, MEDIUM, 1/8 X 49 IN

## (undated) DEVICE — SPONGE, DISSECTOR, PEANUT, 3/8, STERILE 5 FOAM HOLDER"

## (undated) DEVICE — BUR, 3MM X 3.8MM, PRECISION, NEURO DRILL

## (undated) DEVICE — DEVICE, INFLATION, PRESTO ID40ATM 30CC

## (undated) DEVICE — DRAPE PACK, MAJOR, OPTIMA, PEDIATRIC, 77 X 108 IN, DISPOSABLE, LF, STERILE

## (undated) DEVICE — TUBING, SMOKE EVAC, 3/8 X 10 FT

## (undated) DEVICE — SUTURE, MONOCRYL, 4-0, 18 IN, PS2, UNDYED

## (undated) DEVICE — ACCESS KIT, S-MAK MINI, 4FR 10CM 0.018IN 40CM, NT/PT, ECHO ENHANCE NEEDLE

## (undated) DEVICE — MARKER, SKIN, RULER AND LABEL PACK, CUSTOM

## (undated) DEVICE — INTRODUCER KIT, HEMOSTASIS, VALVE/SIDEPORT, W/GUIDEWIRE, 0.035 IN, 8.5 FR, 10 CM, LF

## (undated) DEVICE — SUTURE, VICRYL, 3-0,18 IN, SH, UNDYED

## (undated) DEVICE — SHEATH, INTRODUCER, 25CM, 6FR, R/O RADIOPAQUE MARKER, 6FR DIA, 2.5 CM DILATOR.

## (undated) DEVICE — DRAPE, MICROSCOPE, FOR ZEISS 65MM, VARI-LENS II, 52 X 150

## (undated) DEVICE — CATHETER, WEDGE PRESSURE, BALLOON, DOUBLE LUMEN, 5 FR, 110 CM

## (undated) DEVICE — CATHETER, THERMODILUTION, SWAN GANZ, VIP, 5 LUMEN, 7.5 FR, 110 CM

## (undated) DEVICE — SHEATH, PINNACLE, 10 CM,  5FR INTRODUCER, 5FR DIA, 2.5 CM DIALATOR

## (undated) DEVICE — GUIDEWIRE, AMPLATZ SUPER STIFF, STR, .035 IN X 75CM

## (undated) DEVICE — APPLICATOR, PREP, CHLORAPREP, W/ORANGE TINT, 10.5ML

## (undated) DEVICE — CATHETER, DIAGNOSTIC, DXTERITY, 6FR JR 4.0, 100CM

## (undated) DEVICE — SEALANT, HEMOSTATIC, FLOSEAL, 10 ML

## (undated) DEVICE — INTRODUCER SYSTEM, PRELUDE SNAP, SPLITTABLE, HEMOSTATIC, 7FR

## (undated) DEVICE — ADHESIVE, SKIN, LIQUIBAND EXCEED

## (undated) DEVICE — INTRODUCER, GLIDESHEATH SLENDER A-KIT, 5FR 10CM

## (undated) DEVICE — FLOSEAL, MATRIX, HEMOSTATIC, FULL STERILE PREP, 5ML

## (undated) DEVICE — CATHETER, DIAGNOSTIC, DXTERITY, 5 FR JR 4.0, 100CM

## (undated) DEVICE — REST, HEAD, BAGEL, 9 IN

## (undated) DEVICE — COVER, CART, 45 X 27 X 48 IN, CLEAR

## (undated) DEVICE — CATHETER, DIAGNOSTIC, DXTERITY, AL 1.0, 100CM